# Patient Record
Sex: FEMALE | Race: BLACK OR AFRICAN AMERICAN | NOT HISPANIC OR LATINO | URBAN - METROPOLITAN AREA
[De-identification: names, ages, dates, MRNs, and addresses within clinical notes are randomized per-mention and may not be internally consistent; named-entity substitution may affect disease eponyms.]

---

## 2017-08-09 ENCOUNTER — OUTPATIENT (OUTPATIENT)
Dept: OUTPATIENT SERVICES | Facility: HOSPITAL | Age: 57
LOS: 1 days | End: 2017-08-09
Payer: COMMERCIAL

## 2017-08-09 DIAGNOSIS — I10 ESSENTIAL (PRIMARY) HYPERTENSION: ICD-10-CM

## 2017-08-09 DIAGNOSIS — E11.8 TYPE 2 DIABETES MELLITUS WITH UNSPECIFIED COMPLICATIONS: ICD-10-CM

## 2017-08-09 DIAGNOSIS — E78.00 PURE HYPERCHOLESTEROLEMIA, UNSPECIFIED: ICD-10-CM

## 2017-08-09 DIAGNOSIS — E55.9 VITAMIN D DEFICIENCY, UNSPECIFIED: ICD-10-CM

## 2017-08-09 LAB
24R-OH-CALCIDIOL SERPL-MCNC: 31.7 NG/ML — SIGNIFICANT CHANGE UP (ref 30–100)
ALBUMIN SERPL ELPH-MCNC: 3.3 G/DL — SIGNIFICANT CHANGE UP (ref 3.3–5)
ALP SERPL-CCNC: 165 U/L — HIGH (ref 40–120)
ALT FLD-CCNC: 32 U/L — SIGNIFICANT CHANGE UP (ref 12–78)
ANION GAP SERPL CALC-SCNC: 9 MMOL/L — SIGNIFICANT CHANGE UP (ref 5–17)
APPEARANCE UR: CLEAR — SIGNIFICANT CHANGE UP
AST SERPL-CCNC: 25 U/L — SIGNIFICANT CHANGE UP (ref 15–37)
BACTERIA # UR AUTO: ABNORMAL
BASOPHILS # BLD AUTO: 0.1 K/UL — SIGNIFICANT CHANGE UP (ref 0–0.2)
BASOPHILS NFR BLD AUTO: 2.5 % — HIGH (ref 0–2)
BILIRUB SERPL-MCNC: 0.5 MG/DL — SIGNIFICANT CHANGE UP (ref 0.2–1.2)
BILIRUB UR-MCNC: NEGATIVE — SIGNIFICANT CHANGE UP
BUN SERPL-MCNC: 14 MG/DL — SIGNIFICANT CHANGE UP (ref 7–23)
CALCIUM SERPL-MCNC: 8.8 MG/DL — SIGNIFICANT CHANGE UP (ref 8.5–10.1)
CHLORIDE SERPL-SCNC: 104 MMOL/L — SIGNIFICANT CHANGE UP (ref 96–108)
CHOLEST SERPL-MCNC: 157 MG/DL — SIGNIFICANT CHANGE UP (ref 10–199)
CO2 SERPL-SCNC: 24 MMOL/L — SIGNIFICANT CHANGE UP (ref 22–31)
COLOR SPEC: YELLOW — SIGNIFICANT CHANGE UP
CREAT ?TM UR-MCNC: 54 MG/DL — SIGNIFICANT CHANGE UP
CREAT SERPL-MCNC: 0.86 MG/DL — SIGNIFICANT CHANGE UP (ref 0.5–1.3)
DIFF PNL FLD: NEGATIVE — SIGNIFICANT CHANGE UP
EOSINOPHIL # BLD AUTO: 0.4 K/UL — SIGNIFICANT CHANGE UP (ref 0–0.5)
EOSINOPHIL NFR BLD AUTO: 6.9 % — HIGH (ref 0–6)
EPI CELLS # UR: SIGNIFICANT CHANGE UP
FRUCTOSAMINE SERPL-MCNC: 469 UMOL/L — HIGH (ref 205–285)
GLUCOSE SERPL-MCNC: 329 MG/DL — HIGH (ref 70–99)
GLUCOSE UR QL: 1000 MG/DL
HBA1C BLD-MCNC: 12.1 % — HIGH (ref 4–5.6)
HCT VFR BLD CALC: 35.6 % — SIGNIFICANT CHANGE UP (ref 34.5–45)
HDLC SERPL-MCNC: 86 MG/DL — SIGNIFICANT CHANGE UP (ref 40–125)
HGB BLD-MCNC: 11.5 G/DL — SIGNIFICANT CHANGE UP (ref 11.5–15.5)
KETONES UR-MCNC: NEGATIVE — SIGNIFICANT CHANGE UP
LEUKOCYTE ESTERASE UR-ACNC: NEGATIVE — SIGNIFICANT CHANGE UP
LIPID PNL WITH DIRECT LDL SERPL: 60 MG/DL — SIGNIFICANT CHANGE UP
LYMPHOCYTES # BLD AUTO: 1.7 K/UL — SIGNIFICANT CHANGE UP (ref 1–3.3)
LYMPHOCYTES # BLD AUTO: 30.3 % — SIGNIFICANT CHANGE UP (ref 13–44)
MCHC RBC-ENTMCNC: 27.3 PG — SIGNIFICANT CHANGE UP (ref 27–34)
MCHC RBC-ENTMCNC: 32.2 GM/DL — SIGNIFICANT CHANGE UP (ref 32–36)
MCV RBC AUTO: 84.8 FL — SIGNIFICANT CHANGE UP (ref 80–100)
MICROALBUMIN UR-MCNC: 2 MG/DL — SIGNIFICANT CHANGE UP
MICROALBUMIN/CREAT UR-RTO: 37 MG/G — HIGH (ref 0–30)
MONOCYTES # BLD AUTO: 0.6 K/UL — SIGNIFICANT CHANGE UP (ref 0–0.9)
MONOCYTES NFR BLD AUTO: 10.3 % — HIGH (ref 1–9)
NEUTROPHILS # BLD AUTO: 2.8 K/UL — SIGNIFICANT CHANGE UP (ref 1.8–7.4)
NEUTROPHILS NFR BLD AUTO: 50 % — SIGNIFICANT CHANGE UP (ref 43–77)
NITRITE UR-MCNC: POSITIVE
PH UR: 5 — SIGNIFICANT CHANGE UP (ref 5–8)
PLATELET # BLD AUTO: 368 K/UL — SIGNIFICANT CHANGE UP (ref 150–400)
POTASSIUM SERPL-MCNC: 4.2 MMOL/L — SIGNIFICANT CHANGE UP (ref 3.5–5.3)
POTASSIUM SERPL-SCNC: 4.2 MMOL/L — SIGNIFICANT CHANGE UP (ref 3.5–5.3)
PROT SERPL-MCNC: 7.6 G/DL — SIGNIFICANT CHANGE UP (ref 6–8.3)
PROT UR-MCNC: NEGATIVE — SIGNIFICANT CHANGE UP
RBC # BLD: 4.2 M/UL — SIGNIFICANT CHANGE UP (ref 3.8–5.2)
RBC # FLD: 12.6 % — SIGNIFICANT CHANGE UP (ref 10.3–14.5)
RBC CASTS # UR COMP ASSIST: NEGATIVE /HPF — SIGNIFICANT CHANGE UP (ref 0–4)
SODIUM SERPL-SCNC: 137 MMOL/L — SIGNIFICANT CHANGE UP (ref 135–145)
SP GR SPEC: 1.01 — SIGNIFICANT CHANGE UP (ref 1.01–1.02)
T4 FREE SERPL-MCNC: 1.3 NG/DL — SIGNIFICANT CHANGE UP (ref 0.9–1.8)
TOTAL CHOLESTEROL/HDL RATIO MEASUREMENT: 1.8 RATIO — LOW (ref 3.3–7.1)
TRIGL SERPL-MCNC: 53 MG/DL — SIGNIFICANT CHANGE UP (ref 10–149)
TSH SERPL-MCNC: 0.98 UIU/ML — SIGNIFICANT CHANGE UP (ref 0.36–3.74)
UROBILINOGEN FLD QL: NEGATIVE — SIGNIFICANT CHANGE UP
WBC # BLD: 5.5 K/UL — SIGNIFICANT CHANGE UP (ref 3.8–10.5)
WBC # FLD AUTO: 5.5 K/UL — SIGNIFICANT CHANGE UP (ref 3.8–10.5)
WBC UR QL: SIGNIFICANT CHANGE UP

## 2017-08-09 PROCEDURE — 80053 COMPREHEN METABOLIC PANEL: CPT

## 2017-08-09 PROCEDURE — 85027 COMPLETE CBC AUTOMATED: CPT

## 2017-08-09 PROCEDURE — 84443 ASSAY THYROID STIM HORMONE: CPT

## 2017-08-09 PROCEDURE — 86735 MUMPS ANTIBODY: CPT

## 2017-08-09 PROCEDURE — 81001 URINALYSIS AUTO W/SCOPE: CPT

## 2017-08-09 PROCEDURE — 80061 LIPID PANEL: CPT

## 2017-08-09 PROCEDURE — 82043 UR ALBUMIN QUANTITATIVE: CPT

## 2017-08-09 PROCEDURE — 84439 ASSAY OF FREE THYROXINE: CPT

## 2017-08-09 PROCEDURE — 82306 VITAMIN D 25 HYDROXY: CPT

## 2017-08-09 PROCEDURE — 86787 VARICELLA-ZOSTER ANTIBODY: CPT

## 2017-08-09 PROCEDURE — 82985 ASSAY OF GLYCATED PROTEIN: CPT

## 2017-08-09 PROCEDURE — 83036 HEMOGLOBIN GLYCOSYLATED A1C: CPT

## 2018-06-20 ENCOUNTER — OUTPATIENT (OUTPATIENT)
Dept: OUTPATIENT SERVICES | Facility: HOSPITAL | Age: 58
LOS: 1 days | End: 2018-06-20
Payer: COMMERCIAL

## 2018-06-20 DIAGNOSIS — E78.00 PURE HYPERCHOLESTEROLEMIA, UNSPECIFIED: ICD-10-CM

## 2018-06-20 DIAGNOSIS — I10 ESSENTIAL (PRIMARY) HYPERTENSION: ICD-10-CM

## 2018-06-20 DIAGNOSIS — E55.9 VITAMIN D DEFICIENCY, UNSPECIFIED: ICD-10-CM

## 2018-06-20 DIAGNOSIS — E11.8 TYPE 2 DIABETES MELLITUS WITH UNSPECIFIED COMPLICATIONS: ICD-10-CM

## 2018-06-20 LAB
24R-OH-CALCIDIOL SERPL-MCNC: 22.6 NG/ML — LOW (ref 30–80)
ALBUMIN SERPL ELPH-MCNC: 3.5 G/DL — SIGNIFICANT CHANGE UP (ref 3.3–5)
ALP SERPL-CCNC: 168 U/L — HIGH (ref 40–120)
ALT FLD-CCNC: 29 U/L — SIGNIFICANT CHANGE UP (ref 12–78)
ANION GAP SERPL CALC-SCNC: 7 MMOL/L — SIGNIFICANT CHANGE UP (ref 5–17)
APPEARANCE UR: ABNORMAL
AST SERPL-CCNC: 26 U/L — SIGNIFICANT CHANGE UP (ref 15–37)
BACTERIA # UR AUTO: ABNORMAL
BASOPHILS # BLD AUTO: 0.08 K/UL — SIGNIFICANT CHANGE UP (ref 0–0.2)
BASOPHILS NFR BLD AUTO: 1.6 % — SIGNIFICANT CHANGE UP (ref 0–2)
BILIRUB SERPL-MCNC: 0.3 MG/DL — SIGNIFICANT CHANGE UP (ref 0.2–1.2)
BILIRUB UR-MCNC: NEGATIVE — SIGNIFICANT CHANGE UP
BUN SERPL-MCNC: 12 MG/DL — SIGNIFICANT CHANGE UP (ref 7–23)
CALCIUM SERPL-MCNC: 9.1 MG/DL — SIGNIFICANT CHANGE UP (ref 8.5–10.1)
CHLORIDE SERPL-SCNC: 104 MMOL/L — SIGNIFICANT CHANGE UP (ref 96–108)
CHOLEST SERPL-MCNC: 148 MG/DL — SIGNIFICANT CHANGE UP (ref 10–199)
CO2 SERPL-SCNC: 25 MMOL/L — SIGNIFICANT CHANGE UP (ref 22–31)
COLOR SPEC: YELLOW — SIGNIFICANT CHANGE UP
CREAT SERPL-MCNC: 1 MG/DL — SIGNIFICANT CHANGE UP (ref 0.5–1.3)
DIFF PNL FLD: ABNORMAL
EOSINOPHIL # BLD AUTO: 0.31 K/UL — SIGNIFICANT CHANGE UP (ref 0–0.5)
EOSINOPHIL NFR BLD AUTO: 6 % — SIGNIFICANT CHANGE UP (ref 0–6)
EPI CELLS # UR: SIGNIFICANT CHANGE UP
FRUCTOSAMINE SERPL-MCNC: 504 UMOL/L — HIGH (ref 205–285)
GLUCOSE SERPL-MCNC: 401 MG/DL — HIGH (ref 70–99)
GLUCOSE UR QL: 1000 MG/DL
HBA1C BLD-MCNC: 13.4 % — HIGH (ref 4–5.6)
HCT VFR BLD CALC: 35.5 % — SIGNIFICANT CHANGE UP (ref 34.5–45)
HDLC SERPL-MCNC: 69 MG/DL — SIGNIFICANT CHANGE UP (ref 40–125)
HGB BLD-MCNC: 11.3 G/DL — LOW (ref 11.5–15.5)
IMM GRANULOCYTES NFR BLD AUTO: 0.2 % — SIGNIFICANT CHANGE UP (ref 0–1.5)
KETONES UR-MCNC: NEGATIVE — SIGNIFICANT CHANGE UP
LEUKOCYTE ESTERASE UR-ACNC: ABNORMAL
LIPID PNL WITH DIRECT LDL SERPL: 70 MG/DL — SIGNIFICANT CHANGE UP
LYMPHOCYTES # BLD AUTO: 1.31 K/UL — SIGNIFICANT CHANGE UP (ref 1–3.3)
LYMPHOCYTES # BLD AUTO: 25.4 % — SIGNIFICANT CHANGE UP (ref 13–44)
MCHC RBC-ENTMCNC: 26.3 PG — LOW (ref 27–34)
MCHC RBC-ENTMCNC: 31.8 GM/DL — LOW (ref 32–36)
MCV RBC AUTO: 82.6 FL — SIGNIFICANT CHANGE UP (ref 80–100)
MONOCYTES # BLD AUTO: 0.52 K/UL — SIGNIFICANT CHANGE UP (ref 0–0.9)
MONOCYTES NFR BLD AUTO: 10.1 % — SIGNIFICANT CHANGE UP (ref 2–14)
NEUTROPHILS # BLD AUTO: 2.93 K/UL — SIGNIFICANT CHANGE UP (ref 1.8–7.4)
NEUTROPHILS NFR BLD AUTO: 56.7 % — SIGNIFICANT CHANGE UP (ref 43–77)
NITRITE UR-MCNC: POSITIVE
NRBC # BLD: 0 /100 WBCS — SIGNIFICANT CHANGE UP (ref 0–0)
PH UR: 5 — SIGNIFICANT CHANGE UP (ref 5–8)
PLATELET # BLD AUTO: 406 K/UL — HIGH (ref 150–400)
POTASSIUM SERPL-MCNC: 4.4 MMOL/L — SIGNIFICANT CHANGE UP (ref 3.5–5.3)
POTASSIUM SERPL-SCNC: 4.4 MMOL/L — SIGNIFICANT CHANGE UP (ref 3.5–5.3)
PROT SERPL-MCNC: 7.8 G/DL — SIGNIFICANT CHANGE UP (ref 6–8.3)
PROT UR-MCNC: NEGATIVE — SIGNIFICANT CHANGE UP
RBC # BLD: 4.3 M/UL — SIGNIFICANT CHANGE UP (ref 3.8–5.2)
RBC # FLD: 12.4 % — SIGNIFICANT CHANGE UP (ref 10.3–14.5)
RBC CASTS # UR COMP ASSIST: ABNORMAL /HPF (ref 0–4)
SODIUM SERPL-SCNC: 136 MMOL/L — SIGNIFICANT CHANGE UP (ref 135–145)
SP GR SPEC: 1.01 — SIGNIFICANT CHANGE UP (ref 1.01–1.02)
T4 FREE SERPL-MCNC: 1.4 NG/DL — SIGNIFICANT CHANGE UP (ref 0.9–1.8)
TOTAL CHOLESTEROL/HDL RATIO MEASUREMENT: 2.1 RATIO — LOW (ref 3.3–7.1)
TRIGL SERPL-MCNC: 43 MG/DL — SIGNIFICANT CHANGE UP (ref 10–149)
TSH SERPL-MCNC: 1.2 UIU/ML — SIGNIFICANT CHANGE UP (ref 0.36–3.74)
UROBILINOGEN FLD QL: NEGATIVE — SIGNIFICANT CHANGE UP
WBC # BLD: 5.16 K/UL — SIGNIFICANT CHANGE UP (ref 3.8–10.5)
WBC # FLD AUTO: 5.16 K/UL — SIGNIFICANT CHANGE UP (ref 3.8–10.5)
WBC UR QL: ABNORMAL

## 2018-06-20 PROCEDURE — 82043 UR ALBUMIN QUANTITATIVE: CPT

## 2018-06-20 PROCEDURE — 82985 ASSAY OF GLYCATED PROTEIN: CPT

## 2018-06-20 PROCEDURE — 84443 ASSAY THYROID STIM HORMONE: CPT

## 2018-06-20 PROCEDURE — 80053 COMPREHEN METABOLIC PANEL: CPT

## 2018-06-20 PROCEDURE — 80061 LIPID PANEL: CPT

## 2018-06-20 PROCEDURE — 81001 URINALYSIS AUTO W/SCOPE: CPT

## 2018-06-20 PROCEDURE — 84439 ASSAY OF FREE THYROXINE: CPT

## 2018-06-20 PROCEDURE — 83036 HEMOGLOBIN GLYCOSYLATED A1C: CPT

## 2018-06-20 PROCEDURE — 82306 VITAMIN D 25 HYDROXY: CPT

## 2018-06-20 PROCEDURE — 85027 COMPLETE CBC AUTOMATED: CPT

## 2018-06-21 LAB
CREAT ?TM UR-MCNC: 100 MG/DL — SIGNIFICANT CHANGE UP
MICROALBUMIN UR-MCNC: 4.9 MG/DL — SIGNIFICANT CHANGE UP
MICROALBUMIN/CREAT UR-RTO: 49 MG/G — HIGH (ref 0–30)

## 2018-11-10 ENCOUNTER — EMERGENCY (EMERGENCY)
Facility: HOSPITAL | Age: 58
LOS: 1 days | Discharge: HOME | End: 2018-11-10
Attending: EMERGENCY MEDICINE
Payer: COMMERCIAL

## 2018-11-10 VITALS
RESPIRATION RATE: 15 BRPM | SYSTOLIC BLOOD PRESSURE: 103 MMHG | OXYGEN SATURATION: 100 % | WEIGHT: 149.91 LBS | DIASTOLIC BLOOD PRESSURE: 62 MMHG | TEMPERATURE: 98 F | HEIGHT: 66 IN | HEART RATE: 105 BPM

## 2018-11-10 PROCEDURE — 73610 X-RAY EXAM OF ANKLE: CPT | Mod: 26,RT

## 2018-11-10 PROCEDURE — 99283 EMERGENCY DEPT VISIT LOW MDM: CPT | Mod: 25

## 2018-11-10 PROCEDURE — 73610 X-RAY EXAM OF ANKLE: CPT

## 2018-11-10 PROCEDURE — 99283 EMERGENCY DEPT VISIT LOW MDM: CPT

## 2018-11-10 RX ORDER — IBUPROFEN 200 MG
600 TABLET ORAL ONCE
Qty: 0 | Refills: 0 | Status: COMPLETED | OUTPATIENT
Start: 2018-11-10 | End: 2018-11-10

## 2018-11-10 RX ADMIN — Medication 600 MILLIGRAM(S): at 12:59

## 2018-11-10 RX ADMIN — Medication 600 MILLIGRAM(S): at 13:21

## 2018-11-10 NOTE — ED PROVIDER NOTE - PROGRESS NOTE DETAILS
Reevaluated patient at bedside.  Patient feeling improved after motrin and ace wrap. CONSTANTINO explained. will follow up with ortho if pain continues or fails to improve. Discussed the results of all diagnostic testing in ED and copies of all reports given.   An opportunity to ask questions was given.  Discussed the importance of prompt, close medical follow-up.  Patient will return with any changes, concerns or persistent / worsening symptoms.  Understanding of all instructions verbalized.

## 2018-11-10 NOTE — ED PROVIDER NOTE - ATTENDING CONTRIBUTION TO CARE
57 yo F p/w inverted R ankle today while at work. pt co mild discomfort with walking. no numb/ting/focal weak. No fall / head trauma. No neck/ back pain. No agg/allev factors. No other inj or co.  Exam with MM moist. neck supple. no spinal tend. no signs of head trauma. no signs of truncal trauma.  ankle with mild tend ant to lat mall,  No other bony tend. nl foot / 5th mt. Nl prox tib/fib/knee.  2+ pulses. nl cap refill. No other signs of inj  Check xr, outpt fu with ortho

## 2018-11-10 NOTE — ED PROVIDER NOTE - MUSCULOSKELETAL, MLM
tenderness and mild swelling to ant aspect of right ankle. full ROM. no laxity with ant drawer or talor tilt. achilles tendon intact. no tenderness to base of 5th metatarsal or head of fibula

## 2018-11-10 NOTE — ED PROVIDER NOTE - MEDICAL DECISION MAKING DETAILS
right ankle pain and swelling after twisting. will x-ray and give motrin. no warmth or erythema to suggest cellulitis of septic joint

## 2018-11-10 NOTE — ED PROVIDER NOTE - OBJECTIVE STATEMENT
presents to ED for pain and swelling to right ankle. 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 did not fall. pain and swelling to front of right ankle has been getting worse since initial injury today around 0900. pain currently 8/10. worse with movement. ambulatory. has not taken anything for pain or symptoms. history of sprain to same ankle years ago. denies other injuries or complaints   PCP Dr. Davies

## 2018-11-10 NOTE — ED ADULT NURSE NOTE - NSIMPLEMENTINTERV_GEN_ALL_ED
Implemented All Universal Safety Interventions:  [place] to call system. Call bell, personal items and telephone within reach. Instruct patient to call for assistance. Room bathroom lighting operational. Non-slip footwear when patient is off stretcher. Physically safe environment: no spills, clutter or unnecessary equipment. Stretcher in lowest position, wheels locked, appropriate side rails in place.

## 2019-01-14 ENCOUNTER — EMERGENCY (EMERGENCY)
Facility: HOSPITAL | Age: 59
LOS: 0 days | Discharge: HOME | End: 2019-01-14
Attending: EMERGENCY MEDICINE
Payer: COMMERCIAL

## 2019-01-14 VITALS
RESPIRATION RATE: 18 BRPM | HEART RATE: 117 BPM | DIASTOLIC BLOOD PRESSURE: 64 MMHG | HEIGHT: 66 IN | OXYGEN SATURATION: 98 % | WEIGHT: 154.98 LBS | SYSTOLIC BLOOD PRESSURE: 129 MMHG | TEMPERATURE: 101 F

## 2019-01-14 VITALS
SYSTOLIC BLOOD PRESSURE: 116 MMHG | RESPIRATION RATE: 16 BRPM | HEART RATE: 108 BPM | TEMPERATURE: 101 F | DIASTOLIC BLOOD PRESSURE: 59 MMHG

## 2019-01-14 DIAGNOSIS — S09.90XA UNSPECIFIED INJURY OF HEAD, INITIAL ENCOUNTER: ICD-10-CM
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
## 2019-01-14 DIAGNOSIS — S93.402A SPRAIN OF UNSPECIFIED LIGAMENT OF LEFT ANKLE, INITIAL ENCOUNTER: ICD-10-CM

## 2019-01-14 DIAGNOSIS — R51 HEADACHE: ICD-10-CM

## 2019-01-14 DIAGNOSIS — S90.112A CONTUSION OF LEFT GREAT TOE WITHOUT DAMAGE TO NAIL, INITIAL ENCOUNTER: ICD-10-CM

## 2019-01-14 DIAGNOSIS — B34.9 VIRAL INFECTION, UNSPECIFIED: ICD-10-CM
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
## 2019-01-14 DIAGNOSIS — R42 DIZZINESS AND GIDDINESS: ICD-10-CM

## 2019-01-14 DIAGNOSIS — Z79.4 LONG TERM (CURRENT) USE OF INSULIN: ICD-10-CM

## 2019-01-14 DIAGNOSIS — H26.9 UNSPECIFIED CATARACT: Chronic | ICD-10-CM

## 2019-01-14 DIAGNOSIS — E11.9 TYPE 2 DIABETES MELLITUS WITHOUT COMPLICATIONS: ICD-10-CM

## 2019-01-14 DIAGNOSIS — M79.672 PAIN IN LEFT FOOT: ICD-10-CM

## 2019-01-14 LAB
FLUAV SPEC QL CULT: POSITIVE
FLUBV AG SPEC QL IA: NEGATIVE — SIGNIFICANT CHANGE UP

## 2019-01-14 PROCEDURE — 72125 CT NECK SPINE W/O DYE: CPT | Mod: 26

## 2019-01-14 PROCEDURE — 73630 X-RAY EXAM OF FOOT: CPT | Mod: 26,LT

## 2019-01-14 PROCEDURE — 99284 EMERGENCY DEPT VISIT MOD MDM: CPT

## 2019-01-14 PROCEDURE — 72100 X-RAY EXAM L-S SPINE 2/3 VWS: CPT | Mod: 26

## 2019-01-14 PROCEDURE — 70450 CT HEAD/BRAIN W/O DYE: CPT | Mod: 26

## 2019-01-14 PROCEDURE — 76377 3D RENDER W/INTRP POSTPROCES: CPT | Mod: 26

## 2019-01-14 PROCEDURE — 73610 X-RAY EXAM OF ANKLE: CPT | Mod: 26,LT

## 2019-01-14 RX ORDER — ACETAMINOPHEN 500 MG
650 TABLET ORAL ONCE
Qty: 0 | Refills: 0 | Status: COMPLETED | OUTPATIENT
Start: 2019-01-14 | End: 2019-01-14

## 2019-01-14 RX ORDER — IBUPROFEN 200 MG
1 TABLET ORAL
Qty: 15 | Refills: 0
Start: 2019-01-14 | End: 2019-01-18

## 2019-01-14 RX ORDER — CYCLOBENZAPRINE HYDROCHLORIDE 10 MG/1
1 TABLET, FILM COATED ORAL
Qty: 15 | Refills: 0
Start: 2019-01-14 | End: 2019-01-18

## 2019-01-14 RX ADMIN — Medication 650 MILLIGRAM(S): at 14:25

## 2019-01-14 NOTE — ED PROVIDER NOTE - MEDICAL DECISION MAKING DETAILS
xray neg for fx. CT scan demonstrates no acute pathology. Pt also likely viral syndrome for symtpomatic management. Discussed results and outcome of testing with the patient.  Patient given copy of available results. Patient advised to please follow up with their PMD within the next 24 hours and return to the Emergency Department for worsening symptoms or any other concerns.

## 2019-01-14 NOTE — ED ADULT NURSE NOTE - CHIEF COMPLAINT QUOTE
Pain, swelling with abrasions to top of left foot, lower back pains with headache  s/p falling from top stairs since yesterday morning, today c/o feeling dizzy today.

## 2019-01-14 NOTE — ED PROVIDER NOTE - CARE PLAN
Principal Discharge DX:	Head injury Principal Discharge DX:	Head injury  Secondary Diagnosis:	Ankle sprain  Secondary Diagnosis:	Toe contusion  Secondary Diagnosis:	Viral syndrome

## 2019-01-14 NOTE — ED PROVIDER NOTE - OBJECTIVE STATEMENT
59yo female with pmh DM presents s/p slip down one flight of stairs yesterday morning. PT denies LOC. Pt with headache, dizziness, left foot pain and back pain since.     ROS: No fever/chills. No photophobia/eye pain/changes in vision, No ear pain/sore throat/dysphagia, No chest pain/palpitations. No SOB/cough/stridor. No abdominal pain, N/V/D, no black/bloody bm. No dysuria/frequency/discharge, +headache. +Dizziness.  No rash.  No numbness/tingling/weakness. 59yo female with pmh DM presents s/p slip down one flight of stairs yesterday morning. PT denies LOC. Pt with headache, dizziness, left foot pain and back pain since.  as an aside, pt noted to be febrile in ER, states she has been having a cough and body aches in past 2 days.     ROS: No fever/chills. No photophobia/eye pain/changes in vision, No ear pain/sore throat/dysphagia, No chest pain/palpitations. No SOB/cough/stridor. No abdominal pain, N/V/D, no black/bloody bm. No dysuria/frequency/discharge, +headache. +Dizziness.  No rash.  No numbness/tingling/weakness.

## 2019-01-14 NOTE — ED PROVIDER NOTE - PHYSICAL EXAMINATION
Gen: Alert, Well appearing. NAD    Head: NC, AT, PERRL, EOMI, normal lids/conjunctiva   ENT: Bilateral TM WNL, normal hearing, patent oropharynx without erythema/exudate, uvula midline  Neck: supple, no tenderness/meningismus/JVD   Pulm: Bilateral clear BS, normal resp effort, no wheeze/stridor/retractions  CV: RRR, no M/R/G, +dist pulses   Abd: soft, NT/ND, +BS, no guarding/rebound tenderness  Mskel: no edema/erythema/cyanosis , +L3-5 paraspinal tenderness, no step offs. abrasion to left dorsal foot, + big toe tenderness. ++ mild ankle edema, from of ankle. pulses intact.  Skin: no rash   Neuro: AAOx3, no sensory/motor deficits, CN 2-12 intact

## 2019-01-15 LAB
FLUAV H3 RNA SPEC QL NAA+PROBE: DETECTED
RAPID RVP RESULT: DETECTED

## 2019-08-23 ENCOUNTER — EMERGENCY (EMERGENCY)
Facility: HOSPITAL | Age: 59
LOS: 1 days | Discharge: HOME | End: 2019-08-23
Attending: INTERNAL MEDICINE | Admitting: INTERNAL MEDICINE
Payer: COMMERCIAL

## 2019-08-23 VITALS
SYSTOLIC BLOOD PRESSURE: 113 MMHG | TEMPERATURE: 98 F | HEART RATE: 80 BPM | OXYGEN SATURATION: 100 % | DIASTOLIC BLOOD PRESSURE: 84 MMHG | RESPIRATION RATE: 18 BRPM

## 2019-08-23 VITALS
SYSTOLIC BLOOD PRESSURE: 151 MMHG | HEART RATE: 99 BPM | TEMPERATURE: 99 F | RESPIRATION RATE: 19 BRPM | DIASTOLIC BLOOD PRESSURE: 78 MMHG | OXYGEN SATURATION: 99 %

## 2019-08-23 DIAGNOSIS — H26.9 UNSPECIFIED CATARACT: Chronic | ICD-10-CM

## 2019-08-23 PROCEDURE — 73620 X-RAY EXAM OF FOOT: CPT

## 2019-08-23 PROCEDURE — 99284 EMERGENCY DEPT VISIT MOD MDM: CPT

## 2019-08-23 PROCEDURE — 73620 X-RAY EXAM OF FOOT: CPT | Mod: 26,LT

## 2019-08-23 PROCEDURE — 99284 EMERGENCY DEPT VISIT MOD MDM: CPT | Mod: 25

## 2019-08-23 RX ORDER — OXYCODONE AND ACETAMINOPHEN 5; 325 MG/1; MG/1
1 TABLET ORAL ONCE
Refills: 0 | Status: DISCONTINUED | OUTPATIENT
Start: 2019-08-23 | End: 2019-08-23

## 2019-08-23 RX ADMIN — OXYCODONE AND ACETAMINOPHEN 1 TABLET(S): 5; 325 TABLET ORAL at 06:18

## 2019-08-23 RX ADMIN — OXYCODONE AND ACETAMINOPHEN 1 TABLET(S): 5; 325 TABLET ORAL at 05:48

## 2019-08-23 NOTE — ED ADULT NURSE NOTE - OBJECTIVE STATEMENT
57 y/o F patient presents to ED from home c/o sharp shooting pain in left toes. As per patient pain started at 0100 and has been persistent. Patient denies any recent falls/injuries/trauma to foot. Patient reports she took Tylenol with no relief. Patient A&Ox4. skin warm and intact. no swelling or redness noted to left foot. ambulatory as per patient. Patient denies HA, dizziness, SOB, chest pain, abdominal pain, N/V/D. Safety and comfort measures provided and maintained.

## 2019-08-23 NOTE — ED PROVIDER NOTE - OBJECTIVE STATEMENT
"woke up With a shooting pain on my left toes"  toe pain 59 y/o BF h/o IDDM C/C "woke up With sharp shooting, intermittent  pain on my 3  left mid three toes"  she injured the area two months ago, no recent injury, there is no swelling to the area, no redness, no warmth, no pallor, no drainage no streaking.

## 2019-08-23 NOTE — ED PROVIDER NOTE - MUSCULOSKELETAL, MLM
intermittent and shooting sharp pain l 2,3,4 toes, skin intact, normal temperature, no swelling no redness, no streak, no drainage, no FB, no fluctuance, no gas cap refill <2 sec, normal looking toes, skin nails and nail bed

## 2019-08-23 NOTE — ED PROVIDER NOTE - SIGNIFICANT NEGATIVE FINDINGS
no headache, no chest pain, no SOB, no palpitations, no n/v, no swelling no skin change, no sign of infection,  no neuro changes.

## 2019-08-23 NOTE — ED PROVIDER NOTE - CLINICAL SUMMARY MEDICAL DECISION MAKING FREE TEXT BOX
shooting foot pain h/o healed toe Fx   X ray not acute  Percocet given  f/u with podiatrist Dr Palacios today

## 2019-10-07 ENCOUNTER — OUTPATIENT (OUTPATIENT)
Dept: OUTPATIENT SERVICES | Facility: HOSPITAL | Age: 59
LOS: 1 days | End: 2019-10-07
Payer: COMMERCIAL

## 2019-10-07 DIAGNOSIS — I10 ESSENTIAL (PRIMARY) HYPERTENSION: ICD-10-CM

## 2019-10-07 DIAGNOSIS — E55.9 VITAMIN D DEFICIENCY, UNSPECIFIED: ICD-10-CM

## 2019-10-07 DIAGNOSIS — E78.00 PURE HYPERCHOLESTEROLEMIA, UNSPECIFIED: ICD-10-CM

## 2019-10-07 DIAGNOSIS — H26.9 UNSPECIFIED CATARACT: Chronic | ICD-10-CM

## 2019-10-07 DIAGNOSIS — E11.8 TYPE 2 DIABETES MELLITUS WITH UNSPECIFIED COMPLICATIONS: ICD-10-CM

## 2019-10-07 LAB
24R-OH-CALCIDIOL SERPL-MCNC: 18.9 NG/ML — LOW (ref 30–80)
ALBUMIN SERPL ELPH-MCNC: 3.5 G/DL — SIGNIFICANT CHANGE UP (ref 3.3–5)
ALP SERPL-CCNC: 131 U/L — HIGH (ref 40–120)
ALT FLD-CCNC: 40 U/L — SIGNIFICANT CHANGE UP (ref 12–78)
ANION GAP SERPL CALC-SCNC: 4 MMOL/L — LOW (ref 5–17)
APPEARANCE UR: ABNORMAL
AST SERPL-CCNC: 35 U/L — SIGNIFICANT CHANGE UP (ref 15–37)
BACTERIA # UR AUTO: ABNORMAL
BASOPHILS # BLD AUTO: 0.07 K/UL — SIGNIFICANT CHANGE UP (ref 0–0.2)
BASOPHILS NFR BLD AUTO: 1.1 % — SIGNIFICANT CHANGE UP (ref 0–2)
BILIRUB SERPL-MCNC: 0.4 MG/DL — SIGNIFICANT CHANGE UP (ref 0.2–1.2)
BILIRUB UR-MCNC: NEGATIVE — SIGNIFICANT CHANGE UP
BUN SERPL-MCNC: 9 MG/DL — SIGNIFICANT CHANGE UP (ref 7–23)
CALCIUM SERPL-MCNC: 8.9 MG/DL — SIGNIFICANT CHANGE UP (ref 8.5–10.1)
CHLORIDE SERPL-SCNC: 109 MMOL/L — HIGH (ref 96–108)
CO2 SERPL-SCNC: 27 MMOL/L — SIGNIFICANT CHANGE UP (ref 22–31)
COLOR SPEC: YELLOW — SIGNIFICANT CHANGE UP
CREAT ?TM UR-MCNC: 34 MG/DL — SIGNIFICANT CHANGE UP
CREAT SERPL-MCNC: 0.77 MG/DL — SIGNIFICANT CHANGE UP (ref 0.5–1.3)
DIFF PNL FLD: NEGATIVE — SIGNIFICANT CHANGE UP
EOSINOPHIL # BLD AUTO: 0.35 K/UL — SIGNIFICANT CHANGE UP (ref 0–0.5)
EOSINOPHIL NFR BLD AUTO: 5.7 % — SIGNIFICANT CHANGE UP (ref 0–6)
EPI CELLS # UR: SIGNIFICANT CHANGE UP
FRUCTOSAMINE SERPL-MCNC: 399 UMOL/L — HIGH (ref 205–285)
GLUCOSE SERPL-MCNC: 277 MG/DL — HIGH (ref 70–99)
GLUCOSE UR QL: 1000 MG/DL
HBA1C BLD-MCNC: 11.6 % — HIGH (ref 4–5.6)
HCT VFR BLD CALC: 33.1 % — LOW (ref 34.5–45)
HGB BLD-MCNC: 10.4 G/DL — LOW (ref 11.5–15.5)
IMM GRANULOCYTES NFR BLD AUTO: 0.3 % — SIGNIFICANT CHANGE UP (ref 0–1.5)
KETONES UR-MCNC: ABNORMAL
LEUKOCYTE ESTERASE UR-ACNC: NEGATIVE — SIGNIFICANT CHANGE UP
LYMPHOCYTES # BLD AUTO: 1.56 K/UL — SIGNIFICANT CHANGE UP (ref 1–3.3)
LYMPHOCYTES # BLD AUTO: 25.5 % — SIGNIFICANT CHANGE UP (ref 13–44)
MCHC RBC-ENTMCNC: 26.4 PG — LOW (ref 27–34)
MCHC RBC-ENTMCNC: 31.4 GM/DL — LOW (ref 32–36)
MCV RBC AUTO: 84 FL — SIGNIFICANT CHANGE UP (ref 80–100)
MICROALBUMIN UR-MCNC: 5.9 MG/DL — SIGNIFICANT CHANGE UP
MICROALBUMIN/CREAT UR-RTO: 176 MG/G — HIGH (ref 0–30)
MONOCYTES # BLD AUTO: 0.64 K/UL — SIGNIFICANT CHANGE UP (ref 0–0.9)
MONOCYTES NFR BLD AUTO: 10.5 % — SIGNIFICANT CHANGE UP (ref 2–14)
NEUTROPHILS # BLD AUTO: 3.48 K/UL — SIGNIFICANT CHANGE UP (ref 1.8–7.4)
NEUTROPHILS NFR BLD AUTO: 56.9 % — SIGNIFICANT CHANGE UP (ref 43–77)
NITRITE UR-MCNC: POSITIVE
NRBC # BLD: 0 /100 WBCS — SIGNIFICANT CHANGE UP (ref 0–0)
PH UR: 5 — SIGNIFICANT CHANGE UP (ref 5–8)
PLATELET # BLD AUTO: 364 K/UL — SIGNIFICANT CHANGE UP (ref 150–400)
POTASSIUM SERPL-MCNC: 5 MMOL/L — SIGNIFICANT CHANGE UP (ref 3.5–5.3)
POTASSIUM SERPL-SCNC: 5 MMOL/L — SIGNIFICANT CHANGE UP (ref 3.5–5.3)
PROT SERPL-MCNC: 7.7 G/DL — SIGNIFICANT CHANGE UP (ref 6–8.3)
PROT UR-MCNC: 25 MG/DL
RBC # BLD: 3.94 M/UL — SIGNIFICANT CHANGE UP (ref 3.8–5.2)
RBC # FLD: 13.1 % — SIGNIFICANT CHANGE UP (ref 10.3–14.5)
RBC CASTS # UR COMP ASSIST: SIGNIFICANT CHANGE UP /HPF (ref 0–4)
SODIUM SERPL-SCNC: 140 MMOL/L — SIGNIFICANT CHANGE UP (ref 135–145)
SP GR SPEC: 1.01 — SIGNIFICANT CHANGE UP (ref 1.01–1.02)
T4 FREE SERPL-MCNC: 1.2 NG/DL — SIGNIFICANT CHANGE UP (ref 0.9–1.8)
TSH SERPL-MCNC: 0.74 UIU/ML — SIGNIFICANT CHANGE UP (ref 0.36–3.74)
UROBILINOGEN FLD QL: NEGATIVE — SIGNIFICANT CHANGE UP
WBC # BLD: 6.12 K/UL — SIGNIFICANT CHANGE UP (ref 3.8–10.5)
WBC # FLD AUTO: 6.12 K/UL — SIGNIFICANT CHANGE UP (ref 3.8–10.5)
WBC UR QL: SIGNIFICANT CHANGE UP

## 2019-10-07 PROCEDURE — 82306 VITAMIN D 25 HYDROXY: CPT

## 2019-10-07 PROCEDURE — 84443 ASSAY THYROID STIM HORMONE: CPT

## 2019-10-07 PROCEDURE — 84439 ASSAY OF FREE THYROXINE: CPT

## 2019-10-07 PROCEDURE — 85027 COMPLETE CBC AUTOMATED: CPT

## 2019-10-07 PROCEDURE — 82985 ASSAY OF GLYCATED PROTEIN: CPT

## 2019-10-07 PROCEDURE — 81001 URINALYSIS AUTO W/SCOPE: CPT

## 2019-10-07 PROCEDURE — 82043 UR ALBUMIN QUANTITATIVE: CPT

## 2019-10-07 PROCEDURE — 36415 COLL VENOUS BLD VENIPUNCTURE: CPT

## 2019-10-07 PROCEDURE — 83036 HEMOGLOBIN GLYCOSYLATED A1C: CPT

## 2019-10-07 PROCEDURE — 80053 COMPREHEN METABOLIC PANEL: CPT

## 2020-01-14 ENCOUNTER — EMERGENCY (EMERGENCY)
Facility: HOSPITAL | Age: 60
LOS: 1 days | Discharge: HOME | End: 2020-01-14
Attending: EMERGENCY MEDICINE | Admitting: EMERGENCY MEDICINE
Payer: COMMERCIAL

## 2020-01-14 VITALS
TEMPERATURE: 98 F | OXYGEN SATURATION: 99 % | HEART RATE: 102 BPM | DIASTOLIC BLOOD PRESSURE: 76 MMHG | SYSTOLIC BLOOD PRESSURE: 151 MMHG | RESPIRATION RATE: 18 BRPM

## 2020-01-14 DIAGNOSIS — H26.9 UNSPECIFIED CATARACT: Chronic | ICD-10-CM

## 2020-01-14 LAB
ALBUMIN SERPL ELPH-MCNC: 3.8 G/DL — SIGNIFICANT CHANGE UP (ref 3.3–5)
ALP SERPL-CCNC: 119 U/L — SIGNIFICANT CHANGE UP (ref 40–120)
ALT FLD-CCNC: 22 U/L — SIGNIFICANT CHANGE UP (ref 4–33)
ANION GAP SERPL CALC-SCNC: 12 MMO/L — SIGNIFICANT CHANGE UP (ref 7–14)
AST SERPL-CCNC: 25 U/L — SIGNIFICANT CHANGE UP (ref 4–32)
BASE EXCESS BLDV CALC-SCNC: 1.3 MMOL/L — SIGNIFICANT CHANGE UP
BASOPHILS # BLD AUTO: 0.06 K/UL — SIGNIFICANT CHANGE UP (ref 0–0.2)
BASOPHILS NFR BLD AUTO: 1.1 % — SIGNIFICANT CHANGE UP (ref 0–2)
BILIRUB SERPL-MCNC: < 0.2 MG/DL — LOW (ref 0.2–1.2)
BLOOD GAS VENOUS - CREATININE: 0.67 MG/DL — SIGNIFICANT CHANGE UP (ref 0.5–1.3)
BUN SERPL-MCNC: 15 MG/DL — SIGNIFICANT CHANGE UP (ref 7–23)
CALCIUM SERPL-MCNC: 9.2 MG/DL — SIGNIFICANT CHANGE UP (ref 8.4–10.5)
CHLORIDE BLDV-SCNC: 104 MMOL/L — SIGNIFICANT CHANGE UP (ref 96–108)
CHLORIDE SERPL-SCNC: 102 MMOL/L — SIGNIFICANT CHANGE UP (ref 98–107)
CO2 SERPL-SCNC: 24 MMOL/L — SIGNIFICANT CHANGE UP (ref 22–31)
CREAT SERPL-MCNC: 0.76 MG/DL — SIGNIFICANT CHANGE UP (ref 0.5–1.3)
D DIMER BLD IA.RAPID-MCNC: < 150 NG/ML — SIGNIFICANT CHANGE UP
EOSINOPHIL # BLD AUTO: 0.23 K/UL — SIGNIFICANT CHANGE UP (ref 0–0.5)
EOSINOPHIL NFR BLD AUTO: 4.2 % — SIGNIFICANT CHANGE UP (ref 0–6)
GAS PNL BLDV: 140 MMOL/L — SIGNIFICANT CHANGE UP (ref 136–146)
GLUCOSE BLDV-MCNC: 373 MG/DL — HIGH (ref 70–99)
GLUCOSE SERPL-MCNC: 385 MG/DL — HIGH (ref 70–99)
HCO3 BLDV-SCNC: 24 MMOL/L — SIGNIFICANT CHANGE UP (ref 20–27)
HCT VFR BLD CALC: 34.5 % — SIGNIFICANT CHANGE UP (ref 34.5–45)
HCT VFR BLDV CALC: 32.8 % — LOW (ref 34.5–45)
HGB BLD-MCNC: 10.4 G/DL — LOW (ref 11.5–15.5)
HGB BLDV-MCNC: 10.6 G/DL — LOW (ref 11.5–15.5)
IMM GRANULOCYTES NFR BLD AUTO: 0.2 % — SIGNIFICANT CHANGE UP (ref 0–1.5)
LACTATE BLDV-MCNC: 1.9 MMOL/L — SIGNIFICANT CHANGE UP (ref 0.5–2)
LYMPHOCYTES # BLD AUTO: 1.99 K/UL — SIGNIFICANT CHANGE UP (ref 1–3.3)
LYMPHOCYTES # BLD AUTO: 36.2 % — SIGNIFICANT CHANGE UP (ref 13–44)
MAGNESIUM SERPL-MCNC: 1.9 MG/DL — SIGNIFICANT CHANGE UP (ref 1.6–2.6)
MCHC RBC-ENTMCNC: 26.1 PG — LOW (ref 27–34)
MCHC RBC-ENTMCNC: 30.1 % — LOW (ref 32–36)
MCV RBC AUTO: 86.5 FL — SIGNIFICANT CHANGE UP (ref 80–100)
MONOCYTES # BLD AUTO: 0.7 K/UL — SIGNIFICANT CHANGE UP (ref 0–0.9)
MONOCYTES NFR BLD AUTO: 12.7 % — SIGNIFICANT CHANGE UP (ref 2–14)
NEUTROPHILS # BLD AUTO: 2.51 K/UL — SIGNIFICANT CHANGE UP (ref 1.8–7.4)
NEUTROPHILS NFR BLD AUTO: 45.6 % — SIGNIFICANT CHANGE UP (ref 43–77)
NRBC # FLD: 0 K/UL — SIGNIFICANT CHANGE UP (ref 0–0)
NT-PROBNP SERPL-SCNC: 48.07 PG/ML — SIGNIFICANT CHANGE UP
PCO2 BLDV: 47 MMHG — SIGNIFICANT CHANGE UP (ref 41–51)
PH BLDV: 7.36 PH — SIGNIFICANT CHANGE UP (ref 7.32–7.43)
PHOSPHATE SERPL-MCNC: 2.4 MG/DL — LOW (ref 2.5–4.5)
PLATELET # BLD AUTO: 416 K/UL — HIGH (ref 150–400)
PMV BLD: 9.2 FL — SIGNIFICANT CHANGE UP (ref 7–13)
PO2 BLDV: < 24 MMHG — LOW (ref 35–40)
POTASSIUM BLDV-SCNC: 3.9 MMOL/L — SIGNIFICANT CHANGE UP (ref 3.4–4.5)
POTASSIUM SERPL-MCNC: 4 MMOL/L — SIGNIFICANT CHANGE UP (ref 3.5–5.3)
POTASSIUM SERPL-SCNC: 4 MMOL/L — SIGNIFICANT CHANGE UP (ref 3.5–5.3)
PROT SERPL-MCNC: 7.1 G/DL — SIGNIFICANT CHANGE UP (ref 6–8.3)
RBC # BLD: 3.99 M/UL — SIGNIFICANT CHANGE UP (ref 3.8–5.2)
RBC # FLD: 13.2 % — SIGNIFICANT CHANGE UP (ref 10.3–14.5)
SAO2 % BLDV: 36.3 % — LOW (ref 60–85)
SODIUM SERPL-SCNC: 138 MMOL/L — SIGNIFICANT CHANGE UP (ref 135–145)
TROPONIN T, HIGH SENSITIVITY: 14 NG/L — SIGNIFICANT CHANGE UP (ref ?–14)
TROPONIN T, HIGH SENSITIVITY: 15 NG/L — SIGNIFICANT CHANGE UP (ref ?–14)
WBC # BLD: 5.5 K/UL — SIGNIFICANT CHANGE UP (ref 3.8–10.5)
WBC # FLD AUTO: 5.5 K/UL — SIGNIFICANT CHANGE UP (ref 3.8–10.5)

## 2020-01-14 PROCEDURE — 93010 ELECTROCARDIOGRAM REPORT: CPT | Mod: 59

## 2020-01-14 PROCEDURE — 71275 CT ANGIOGRAPHY CHEST: CPT | Mod: 26

## 2020-01-14 PROCEDURE — 71046 X-RAY EXAM CHEST 2 VIEWS: CPT | Mod: 26

## 2020-01-14 PROCEDURE — 99285 EMERGENCY DEPT VISIT HI MDM: CPT | Mod: 25

## 2020-01-14 RX ORDER — IPRATROPIUM/ALBUTEROL SULFATE 18-103MCG
3 AEROSOL WITH ADAPTER (GRAM) INHALATION ONCE
Refills: 0 | Status: COMPLETED | OUTPATIENT
Start: 2020-01-14 | End: 2020-01-14

## 2020-01-14 RX ORDER — ACETAMINOPHEN 500 MG
650 TABLET ORAL ONCE
Refills: 0 | Status: COMPLETED | OUTPATIENT
Start: 2020-01-14 | End: 2020-01-14

## 2020-01-14 RX ADMIN — Medication 3 MILLILITER(S): at 18:48

## 2020-01-14 RX ADMIN — Medication 650 MILLIGRAM(S): at 18:48

## 2020-01-14 NOTE — ED PROVIDER NOTE - CLINICAL SUMMARY MEDICAL DECISION MAKING FREE TEXT BOX
59 y.o. female here for pleuritic CP with SOB and palpitations, tachycardic, hypertensive, not hypocix. Exam with diffuse crackles. current HEART score 3, Wells low risk currently. Concern for PE vs ACS vs infectious etiology vs component of new heart failure vs reactive airway. Low suspicion for MSK-related CP, GI related at current time. Will get labs including trop, ddimer, ekg, cxr, treat headache, give duoneb, reassess.

## 2020-01-14 NOTE — ED ADULT NURSE NOTE - OBJECTIVE STATEMENT
Pt received to intake AAO x 3 and ambulatory c/o chest tightness x few days accompanied by SOB. Pt states she took a 2 hour plane ride from [place] this AM and tightness became worse after flight. Pt breathing with ease on room air, labs sent  and 18G placed to the left AC. Pt medicated for discomfort and neb treatment in progress. PMH DM

## 2020-01-14 NOTE — ED PROVIDER NOTE - ATTENDING CONTRIBUTION TO CARE
I have personally performed a face to face bedside history and physical examination of this patient. I have discussed the history, examination, review of systems, assessment and plan of management with the resident. I have reviewed the electronic medical record and amended it to reflect my history, review of systems, physical exam, assessment and plan.    59 y.o. f hx of HLD, DM presents to the ED for 4 days of chest tightness/pressure, lasting a few minutes, nonexertional, nonpositional but pleuritic with associated SOB. During episodes, patient feels palpitations. Flew back from [place] today at 9am. Denies fever, chills, nausea, vomiting, abdominal pain, back pain, leg pain or swelling, rashes. FH sig for mom passing from MI at 68. Never smoker, never, vape. No hx of arrhythmias, thyroid disorders.  Tachy on arrival af.  Exam non-toxic appearing, non-diaphoretic, crackle right lung base, no wheeze, heart sounds tachy but no murmur, well perfused extremities, no le edema.  EKG sinus tachycardia without e/o ischemia or strain.  Low c/f acs as cp non-exertional, non-raditing, no nausea or diaphoresis.  PE possible given tachycardia and recent travel.  PNA considered given crackle on exam but no fever or cough.  Will send labs, dimer, cxr.  Disposition pending.

## 2020-01-14 NOTE — ED PROVIDER NOTE - NS ED ROS FT
CONSTITUTIONAL: No fevers, chills, dizziness, weakness  EYES: No loss of vision, double vision, blurry vision  Nose: No nasal congestion, runny nose  MOUTH/THROAT: No sore throat, painful swallowing  CV: CP, PALPITATIONS  PULM: SOB  GI: No abdominal pain, nausea, vomiting, diarrhea, constipation  SKIN: No rashes, swelling  MSK: No muscle aches, joint aches  NEURO: HEADACHE

## 2020-01-14 NOTE — ED PROVIDER NOTE - PROGRESS NOTE DETAILS
REMY:  Dimer negative, however patient with RLL infiltrate on cxr and presentation not clinically c/w pneumonia.  Will perform CTPE given high pre-test probability of PE.   Patient stable. PA Emerson- Patient reassessed, still notes midsternal chest discomfort radiating under left breast. Results of CT chest discussed +pulmonary fibrosis noted. No PE. Pt aware will need to f/u with Pulm. Given patient has not had cardiac work up with +Risk factor of DM and HLD and Fhx plan for CDU for stress test. Pt agreeable to Stay. CDU PA aware and accepted. Will dispo to CDU. Patient states she took 162 mg of ASA PTA

## 2020-01-14 NOTE — ED ADULT TRIAGE NOTE - CHIEF COMPLAINT QUOTE
pt brought in by ems from home, here for evaluation chest tightness that started 4 days ago. tightness got worse since this morning. hx: dm

## 2020-01-14 NOTE — ED PROVIDER NOTE - PHYSICAL EXAMINATION
GENERAL: middle aged female, lying in bed, holding chest, appears uncomfortable. Tachycardic, afebrile, hypertensive, otherwise Vital signs are within normal limits  HEENT: moist mucous membranes. Blind in Left eye  LUNG: crackles diffusely. overall decreased air movement. Endorses chest pain when asked to deeply inspire.  CV: tachycardic. no m/r/g appreciated, Pulses- Radial: 2+ b/l  ABDOMEN: Soft, NTND, no rebound or guarding  MSK: No edema, no visible deformities  SKIN: Warm, dry, well perfused, no evidence of rash

## 2020-01-14 NOTE — ED PROVIDER NOTE - OBJECTIVE STATEMENT
59 y.o. f hx of HLD, DM presents to the ED for 4 days of chest tightness/pressure, lasting a few minutes, nonexertional, nonpositional but pleuritic with associated SOB. During episodes, patient feels palpitations. Flew back from [place] today at 9am. Denies fever, chills, nausea, vomiting, abdominal pain, back pain, leg pain or swelling, rashes. FH sig for mom passing from MI at 68. Never smoker, never, vape. No hx of arrhythmias, thyroid disorders.

## 2020-01-15 VITALS
HEART RATE: 100 BPM | OXYGEN SATURATION: 100 % | SYSTOLIC BLOOD PRESSURE: 129 MMHG | DIASTOLIC BLOOD PRESSURE: 84 MMHG | TEMPERATURE: 99 F | RESPIRATION RATE: 16 BRPM

## 2020-01-15 LAB — HBA1C BLD-MCNC: 9.3 % — HIGH (ref 4–5.6)

## 2020-01-15 PROCEDURE — 99234 HOSP IP/OBS SM DT SF/LOW 45: CPT

## 2020-01-15 RX ORDER — SODIUM CHLORIDE 9 MG/ML
1000 INJECTION, SOLUTION INTRAVENOUS
Refills: 0 | Status: DISCONTINUED | OUTPATIENT
Start: 2020-01-15 | End: 2020-02-02

## 2020-01-15 RX ORDER — ASPIRIN/CALCIUM CARB/MAGNESIUM 324 MG
81 TABLET ORAL DAILY
Refills: 0 | Status: DISCONTINUED | OUTPATIENT
Start: 2020-01-16 | End: 2020-02-02

## 2020-01-15 RX ORDER — DEXTROSE 50 % IN WATER 50 %
12.5 SYRINGE (ML) INTRAVENOUS ONCE
Refills: 0 | Status: DISCONTINUED | OUTPATIENT
Start: 2020-01-15 | End: 2020-02-02

## 2020-01-15 RX ORDER — ASPIRIN/CALCIUM CARB/MAGNESIUM 324 MG
162 TABLET ORAL ONCE
Refills: 0 | Status: DISCONTINUED | OUTPATIENT
Start: 2020-01-15 | End: 2020-01-15

## 2020-01-15 RX ORDER — INSULIN GLARGINE 100 [IU]/ML
30 INJECTION, SOLUTION SUBCUTANEOUS ONCE
Refills: 0 | Status: COMPLETED | OUTPATIENT
Start: 2020-01-15 | End: 2020-01-15

## 2020-01-15 RX ORDER — DEXTROSE 50 % IN WATER 50 %
15 SYRINGE (ML) INTRAVENOUS ONCE
Refills: 0 | Status: DISCONTINUED | OUTPATIENT
Start: 2020-01-15 | End: 2020-02-02

## 2020-01-15 RX ORDER — INSULIN GLARGINE 100 [IU]/ML
30 INJECTION, SOLUTION SUBCUTANEOUS AT BEDTIME
Refills: 0 | Status: DISCONTINUED | OUTPATIENT
Start: 2020-01-15 | End: 2020-02-02

## 2020-01-15 RX ORDER — INSULIN LISPRO 100/ML
VIAL (ML) SUBCUTANEOUS
Refills: 0 | Status: DISCONTINUED | OUTPATIENT
Start: 2020-01-15 | End: 2020-02-02

## 2020-01-15 RX ORDER — INSULIN LISPRO 100/ML
VIAL (ML) SUBCUTANEOUS AT BEDTIME
Refills: 0 | Status: DISCONTINUED | OUTPATIENT
Start: 2020-01-15 | End: 2020-02-02

## 2020-01-15 RX ORDER — GLUCAGON INJECTION, SOLUTION 0.5 MG/.1ML
1 INJECTION, SOLUTION SUBCUTANEOUS ONCE
Refills: 0 | Status: DISCONTINUED | OUTPATIENT
Start: 2020-01-15 | End: 2020-02-02

## 2020-01-15 RX ORDER — DEXTROSE 50 % IN WATER 50 %
25 SYRINGE (ML) INTRAVENOUS ONCE
Refills: 0 | Status: DISCONTINUED | OUTPATIENT
Start: 2020-01-15 | End: 2020-02-02

## 2020-01-15 RX ADMIN — Medication 4: at 09:37

## 2020-01-15 RX ADMIN — INSULIN GLARGINE 30 UNIT(S): 100 INJECTION, SOLUTION SUBCUTANEOUS at 03:27

## 2020-01-15 NOTE — ED CDU PROVIDER INITIAL DAY NOTE - ATTENDING CONTRIBUTION TO CARE
60 yo F yo past medical history dm, hld, family history of CAD with 4 days of atypical chest pain in cdu for monitoring and provocative testing. CTA chest concerning for pulmonary fibrosis, negative for PE, pneumonia. Stress test negative for inducible ischemia. patient asymptomatic. patient stable for dc with follow up with cardiology, pulmonology, pcp/endo (for elevated hba1c).

## 2020-01-15 NOTE — CONSULT NOTE ADULT - ASSESSMENT
chest pain  atypical  ruled our for PE and MI  agree with stress test    DM  Monitor finger stick. Insulin coverage. Diabetic education and Diabetic diet. Consider nutrition consultation.

## 2020-01-15 NOTE — ED CDU PROVIDER INITIAL DAY NOTE - PROGRESS NOTE DETAILS
Patient signed out to me to f/u stress test. Stress normal. Discussed with attending, patient can be discharged home to f/u with PCP and Dr. Amezquita, pulmonary referral for pulm fibrosis. The patient was given verbal and written discharge instructions. Specifically, instructions when to return to the ED and when to seek follow-up from their pcp was discussed. Any specialty follow-up was discussed, including how to make an appointment.  Instructions were discussed in simple, plain language and was understood by the patient. The patient understands that should their symptoms worsen or any new symptoms arise, they should return to the ED immediately for further evaluation. All pt's questions were answered. Patient verbalizes understanding.

## 2020-01-15 NOTE — ED CDU PROVIDER INITIAL DAY NOTE - OBJECTIVE STATEMENT
58 y/o female with pmhx of DM, HLD presents to ED c/o chest pressure x 4 days. Intermittent lasting few minutes at a time, not exertional. Pleuritic and worse with movement. Return from on flight from [place] today. Mother  of MI at age 68. No ocps,  surgeries, hospitalizations, le edema, calf pain, hx of dvt/pe. Nonsmoker. Never had cardiac work up. Asymptomatic in cdu. No fever, chills, palpitations, sob, diaphoresis, n/v, abd pain.

## 2020-01-15 NOTE — ED CDU PROVIDER DISPOSITION NOTE - CARE PROVIDER_API CALL
Justin Amezquita (MD)  Cardiovascular Disease; Internal Medicine  [address] [address]  Phone: [phone]  Fax: [phone]  Follow Up Time: 1-3 Days

## 2020-01-15 NOTE — ED CDU PROVIDER DISPOSITION NOTE - NSFOLLOWUPINSTRUCTIONS_ED_ALL_ED_FT
Rest, drink plenty of fluids.  Advance activity as tolerated.  Continue all previously prescribed medications as directed.  Follow up with your primary care physician and cardiologist in 48-72 hours- bring copies of your results. Call on referral list given for next available appointment. Return to the ER for worsening or persistent symptoms, and/or ANY NEW OR CONCERNING SYMPTOMS. If you have issues obtaining follow up, please call: [phone] to obtain a doctor or specialist who takes your insurance in your area.

## 2020-01-15 NOTE — ED CDU PROVIDER DISPOSITION NOTE - PATIENT PORTAL LINK FT
You can access the FollowMyHealth Patient Portal offered by [facility] by registering at the following website: [email or web address]. By joining [employer] [facility]’s FollowMyHealth portal, you will also be able to view your health information using other applications (apps) compatible with our system.

## 2020-01-15 NOTE — ED CDU PROVIDER DISPOSITION NOTE - CLINICAL COURSE
58 yo F yo past medical history dm, hld, family history of CAD with 4 days of atypical chest pain in cdu for monitoring and provocative testing. CTA chest concerning for pulmonary fibrosis, negative for PE, pneumonia. Stress test negative for inducible ischemia. patient asymptomatic. patient stable for dc with follow up with cardiology, pulmonology, pcp/endo (for elevated hba1c).

## 2020-01-15 NOTE — ED CDU PROVIDER INITIAL DAY NOTE - MEDICAL DECISION MAKING DETAILS
60 y/o female with pmhx of DM, HLD presents to ED c/o chest pressure x 4 days. cta ruled out PE, discussed concern for pulm fibrosis and close pulm f/u. sent to cdu for tele monitoring and stress test given risk factors and family hx.

## 2020-01-15 NOTE — CONSULT NOTE ADULT - SUBJECTIVE AND OBJECTIVE BOX
CHIEF COMPLAINT:Patient is a 59y old  Female who presents with a chief complaint of     HISTORY OF PRESENT ILLNESS:    59 female with history as below presents complaining of chest pain   no radiation  no sob  intermittent  not associated with activity or emotional stress     PAST MEDICAL & SURGICAL HISTORY:  Hyperlipidemia  Diabetes mellitus  Cataract: right eye          MEDICATIONS:            dextrose 40% Gel 15 Gram(s) Oral once PRN  dextrose 50% Injectable 12.5 Gram(s) IV Push once  dextrose 50% Injectable 25 Gram(s) IV Push once  dextrose 50% Injectable 25 Gram(s) IV Push once  glucagon  Injectable 1 milliGRAM(s) IntraMuscular once PRN  insulin glargine Injectable (LANTUS) 30 Unit(s) SubCutaneous at bedtime  insulin lispro (HumaLOG) corrective regimen sliding scale   SubCutaneous three times a day before meals  insulin lispro (HumaLOG) corrective regimen sliding scale   SubCutaneous at bedtime    dextrose 5%. 1000 milliLiter(s) IV Continuous <Continuous>      FAMILY HISTORY:  Family history of MI (myocardial infarction) (Father)      Non-contributory    SOCIAL HISTORY:    No tobacco, drugs or etoh    Allergies    No Known Allergies    Intolerances    	    REVIEW OF SYSTEMS:  as above  The rest of the 14 points ROS reviewed and except above they are unremarkable.        PHYSICAL EXAM:  T(C): 36.7 (01-15-20 @ 05:35), Max: 36.8 (01-15-20 @ 00:06)  HR: 89 (01-15-20 @ 05:35) (78 - 102)  BP: 136/79 (01-15-20 @ 05:35) (136/79 - 152/67)  RR: 18 (01-15-20 @ 05:35) (14 - 18)  SpO2: 100% (01-15-20 @ 05:35) (98% - 100%)  Wt(kg): --  I&O's Summary      Appearance: Normal	  HEENT:   no gross abnormality   Cardiovascular: Normal S1 S2,    Murmur:   Neck: JVP normal  Respiratory: Lungs clear   Gastrointestinal:  Soft, Non-tender  Skin: normal   Neuro: No gross deficits.   Psychiatry:  Mood & affect flat  Ext: No edema    LABS/DATA:    TELEMETRY: 	    ECG:  	   	  CARDIAC MARKERS:                                      10.4   5.50  )-----------( 416      ( 14 Jan 2020 18:40 )             34.5     01-14    138  |  102  |  15  ----------------------------<  385<H>  4.0   |  24  |  0.76    Ca    9.2      14 Jan 2020 18:40  Phos  2.4     01-14  Mg     1.9     01-14    TPro  7.1  /  Alb  3.8  /  TBili  < 0.2<L>  /  DBili  x   /  AST  25  /  ALT  22  /  AlkPhos  119  01-14    proBNP: Serum Pro-Brain Natriuretic Peptide: 48.07 pg/mL (01-14 @ 18:40)    Lipid Profile:   HgA1c: Hemoglobin A1C, Whole Blood: 9.3 % (01-14 @ 18:15)    TSH:

## 2020-01-23 ENCOUNTER — APPOINTMENT (OUTPATIENT)
Dept: PULMONOLOGY | Facility: CLINIC | Age: 60
End: 2020-01-23
Payer: COMMERCIAL

## 2020-01-23 VITALS
DIASTOLIC BLOOD PRESSURE: 71 MMHG | SYSTOLIC BLOOD PRESSURE: 131 MMHG | WEIGHT: 145 LBS | HEIGHT: 65 IN | TEMPERATURE: 98.5 F | BODY MASS INDEX: 24.16 KG/M2 | OXYGEN SATURATION: 97 % | RESPIRATION RATE: 16 BRPM | HEART RATE: 110 BPM

## 2020-01-23 DIAGNOSIS — Z78.9 OTHER SPECIFIED HEALTH STATUS: ICD-10-CM

## 2020-01-23 DIAGNOSIS — J98.4 EMPHYSEMA, UNSPECIFIED: ICD-10-CM

## 2020-01-23 DIAGNOSIS — R07.89 OTHER CHEST PAIN: ICD-10-CM

## 2020-01-23 DIAGNOSIS — R05 COUGH: ICD-10-CM

## 2020-01-23 DIAGNOSIS — J43.9 EMPHYSEMA, UNSPECIFIED: ICD-10-CM

## 2020-01-23 DIAGNOSIS — R93.89 ABNORMAL FINDINGS ON DIAGNOSTIC IMAGING OF OTHER SPECIFIED BODY STRUCTURES: ICD-10-CM

## 2020-01-23 DIAGNOSIS — Z86.39 PERSONAL HISTORY OF OTHER ENDOCRINE, NUTRITIONAL AND METABOLIC DISEASE: ICD-10-CM

## 2020-01-23 PROCEDURE — 99203 OFFICE O/P NEW LOW 30 MIN: CPT | Mod: 25

## 2020-01-23 PROCEDURE — 94727 GAS DIL/WSHOT DETER LNG VOL: CPT

## 2020-01-23 PROCEDURE — 94060 EVALUATION OF WHEEZING: CPT

## 2020-01-23 PROCEDURE — 94729 DIFFUSING CAPACITY: CPT

## 2020-01-23 RX ORDER — FLUTICASONE FUROATE AND VILANTEROL TRIFENATATE 200; 25 UG/1; UG/1
200-25 POWDER RESPIRATORY (INHALATION)
Qty: 1 | Refills: 3 | Status: ACTIVE | COMMUNITY
Start: 2020-01-23 | End: 1900-01-01

## 2020-01-23 RX ORDER — ALBUTEROL SULFATE 90 UG/1
108 (90 BASE) AEROSOL, METERED RESPIRATORY (INHALATION)
Qty: 1 | Refills: 1 | Status: ACTIVE | COMMUNITY
Start: 2020-01-23 | End: 1900-01-01

## 2020-01-27 PROBLEM — J43.9 MIXED RESTRICTIVE AND OBSTRUCTIVE LUNG DISEASE: Status: ACTIVE | Noted: 2020-01-27

## 2020-01-27 PROBLEM — R93.89 ABNORMAL CHEST CT: Status: ACTIVE | Noted: 2020-01-27

## 2020-01-27 PROBLEM — Z78.9 SOCIAL ALCOHOL USE: Status: ACTIVE | Noted: 2020-01-27

## 2020-01-27 PROBLEM — Z86.39 HISTORY OF DIABETES MELLITUS: Status: RESOLVED | Noted: 2020-01-27 | Resolved: 2020-01-27

## 2020-01-27 PROBLEM — Z78.9 NON-SMOKER: Status: ACTIVE | Noted: 2020-01-27

## 2020-01-27 PROBLEM — R07.89 CHEST PAIN, ATYPICAL: Status: ACTIVE | Noted: 2020-01-27

## 2020-01-27 RX ORDER — METFORMIN HYDROCHLORIDE 1000 MG/1
1000 TABLET, COATED ORAL
Refills: 0 | Status: ACTIVE | COMMUNITY
Start: 2020-01-27

## 2020-01-27 RX ORDER — INSULIN GLARGINE 100 [IU]/ML
100 INJECTION, SOLUTION SUBCUTANEOUS
Refills: 0 | Status: ACTIVE | COMMUNITY
Start: 2020-01-27

## 2020-01-27 NOTE — PROCEDURE
[FreeTextEntry1] : PFT results:Mild mixed dz with decreased diffusion. Bronchodilator response noted

## 2020-01-27 NOTE — HISTORY OF PRESENT ILLNESS
[FreeTextEntry1] : 58 yo female presents for evaluation of chest CT abnormality. The study was performed after recent ER evaluation for chest pain.The patient complains of PRN dry cough without dyspnea,fever, night sweats or hemoptysis. She denies joint pains or rash.

## 2020-01-27 NOTE — PHYSICAL EXAM
[Normal Appearance] : normal appearance [General Appearance - Well Developed] : well developed [Well Groomed] : well groomed [General Appearance - Well Nourished] : well nourished [No Deformities] : no deformities [General Appearance - In No Acute Distress] : no acute distress [Normal Conjunctiva] : the conjunctiva exhibited no abnormalities [Eyelids - No Xanthelasma] : the eyelids demonstrated no xanthelasmas [Normal Oropharynx] : normal oropharynx [Neck Appearance] : the appearance of the neck was normal [Neck Cervical Mass (___cm)] : no neck mass was observed [Jugular Venous Distention Increased] : there was no jugular-venous distention [Thyroid Diffuse Enlargement] : the thyroid was not enlarged [Thyroid Nodule] : there were no palpable thyroid nodules [Heart Rate And Rhythm] : heart rate and rhythm were normal [Heart Sounds] : normal S1 and S2 [Murmurs] : no murmurs present [Respiration, Rhythm And Depth] : normal respiratory rhythm and effort [Exaggerated Use Of Accessory Muscles For Inspiration] : no accessory muscle use [Bibasilar Rales/Crackles] : bibasilar rales [Abdomen Soft] : soft [Abdomen Tenderness] : non-tender [Abdomen Mass (___ Cm)] : no abdominal mass palpated [Nail Clubbing] : no clubbing of the fingernails [Cyanosis, Localized] : no localized cyanosis [Petechial Hemorrhages (___cm)] : no petechial hemorrhages [Skin Color & Pigmentation] : normal skin color and pigmentation [Skin Turgor] : normal skin turgor [] : no rash [No Focal Deficits] : no focal deficits [Oriented To Time, Place, And Person] : oriented to person, place, and time [Impaired Insight] : insight and judgment were intact [Affect] : the affect was normal

## 2020-01-27 NOTE — REVIEW OF SYSTEMS
[As Noted in HPI] : as noted in HPI [Cough] : cough [Chest Discomfort] : chest discomfort [Negative] : Sleep Disorder [Sputum] : not coughing up ~M sputum [Dyspnea] : no dyspnea [Edema] : ~T edema was not present

## 2020-01-27 NOTE — DISCUSSION/SUMMARY
[FreeTextEntry1] : 60 yo female with mixed obstructive/restrictive disease with decreased diffusion and chest CT abnormalities. I reviewed the chest CT images on line and discussed the findings with the patient and her sister who was present. There are bilateral peripheral patchy interstitial changes likely chronic, possibly IPF. Rheumatologic biomarkers indicated with follow PFT and chest CT in three months. Further intervention to include lung biopsy,will depend on the results. I had a very long discussion with the patient and her sister, and both appear to understand. She is moving to [place] next week. She was given a copy of her PFT and told to follow up with a pulmonologist .

## 2020-04-25 ENCOUNTER — MESSAGE (OUTPATIENT)
Age: 60
End: 2020-04-25

## 2021-05-21 NOTE — ED ADULT NURSE NOTE - GENITOURINARY ASSESSMENT
Pt verbalized good understanding of home safety and safe car transfer/good, to achieve stated therapy goals
WDL

## 2022-01-12 NOTE — ED PROVIDER NOTE - NS ED ATTENDING STATEMENT MOD
What Type Of Note Output Would You Prefer (Optional)?: Standard Output Hpi Title: Evaluation of Skin Lesions How Severe Are Your Spot(S)?: mild Have Your Spot(S) Been Treated In The Past?: has not been treated Attending Only

## 2022-02-21 NOTE — ED ADULT NURSE NOTE - DISCHARGE DATE/TIME
Addended by: LEANDRA DRAPER on: 2/21/2022 05:46 PM     Modules accepted: Orders    
15-Charli-2020 13:12

## 2022-10-29 ENCOUNTER — INPATIENT (INPATIENT)
Facility: HOSPITAL | Age: 62
LOS: 4 days | Discharge: HOME HEALTH | DRG: 175 | End: 2022-11-03
Attending: INTERNAL MEDICINE | Admitting: INTERNAL MEDICINE
Payer: MEDICAID

## 2022-10-29 VITALS
OXYGEN SATURATION: 63 % | RESPIRATION RATE: 36 BRPM | TEMPERATURE: 98 F | SYSTOLIC BLOOD PRESSURE: 175 MMHG | HEART RATE: 127 BPM | DIASTOLIC BLOOD PRESSURE: 94 MMHG | HEIGHT: 66 IN

## 2022-10-29 DIAGNOSIS — I26.99 OTHER PULMONARY EMBOLISM WITHOUT ACUTE COR PULMONALE: ICD-10-CM

## 2022-10-29 DIAGNOSIS — H26.9 UNSPECIFIED CATARACT: Chronic | ICD-10-CM

## 2022-10-29 LAB
ALBUMIN SERPL ELPH-MCNC: 2.1 G/DL — LOW (ref 3.5–5)
ALP SERPL-CCNC: 99 U/L — SIGNIFICANT CHANGE UP (ref 40–120)
ALT FLD-CCNC: 32 U/L DA — SIGNIFICANT CHANGE UP (ref 10–60)
ANION GAP SERPL CALC-SCNC: 6 MMOL/L — SIGNIFICANT CHANGE UP (ref 5–17)
APTT BLD: 23.4 SEC — LOW (ref 27.5–35.5)
AST SERPL-CCNC: 28 U/L — SIGNIFICANT CHANGE UP (ref 10–40)
BASE EXCESS BLDA CALC-SCNC: -3.3 MMOL/L — LOW (ref -2–3)
BASOPHILS # BLD AUTO: 0.03 K/UL — SIGNIFICANT CHANGE UP (ref 0–0.2)
BASOPHILS NFR BLD AUTO: 0.2 % — SIGNIFICANT CHANGE UP (ref 0–2)
BILIRUB SERPL-MCNC: 0.3 MG/DL — SIGNIFICANT CHANGE UP (ref 0.2–1.2)
BLOOD GAS COMMENTS ARTERIAL: SIGNIFICANT CHANGE UP
BUN SERPL-MCNC: 16 MG/DL — SIGNIFICANT CHANGE UP (ref 7–18)
CALCIUM SERPL-MCNC: 8.8 MG/DL — SIGNIFICANT CHANGE UP (ref 8.4–10.5)
CHLORIDE SERPL-SCNC: 111 MMOL/L — HIGH (ref 96–108)
CO2 SERPL-SCNC: 24 MMOL/L — SIGNIFICANT CHANGE UP (ref 22–31)
CREAT SERPL-MCNC: 1.21 MG/DL — SIGNIFICANT CHANGE UP (ref 0.5–1.3)
EGFR: 51 ML/MIN/1.73M2 — LOW
EOSINOPHIL # BLD AUTO: 0.09 K/UL — SIGNIFICANT CHANGE UP (ref 0–0.5)
EOSINOPHIL NFR BLD AUTO: 0.7 % — SIGNIFICANT CHANGE UP (ref 0–6)
GLUCOSE BLDC GLUCOMTR-MCNC: 249 MG/DL — HIGH (ref 70–99)
GLUCOSE BLDC GLUCOMTR-MCNC: 482 MG/DL — CRITICAL HIGH (ref 70–99)
GLUCOSE BLDC GLUCOMTR-MCNC: 540 MG/DL — CRITICAL HIGH (ref 70–99)
GLUCOSE SERPL-MCNC: 256 MG/DL — HIGH (ref 70–99)
HCO3 BLDA-SCNC: 21 MMOL/L — SIGNIFICANT CHANGE UP (ref 21–28)
HCT VFR BLD CALC: 34.9 % — SIGNIFICANT CHANGE UP (ref 34.5–45)
HGB BLD-MCNC: 10.6 G/DL — LOW (ref 11.5–15.5)
HOROWITZ INDEX BLDA+IHG-RTO: 100 — SIGNIFICANT CHANGE UP
IMM GRANULOCYTES NFR BLD AUTO: 0.7 % — SIGNIFICANT CHANGE UP (ref 0–0.9)
INR BLD: 0.95 RATIO — SIGNIFICANT CHANGE UP (ref 0.88–1.16)
LACTATE SERPL-SCNC: 1.9 MMOL/L — SIGNIFICANT CHANGE UP (ref 0.7–2)
LYMPHOCYTES # BLD AUTO: 1.54 K/UL — SIGNIFICANT CHANGE UP (ref 1–3.3)
LYMPHOCYTES # BLD AUTO: 12.4 % — LOW (ref 13–44)
MCHC RBC-ENTMCNC: 25.7 PG — LOW (ref 27–34)
MCHC RBC-ENTMCNC: 30.4 GM/DL — LOW (ref 32–36)
MCV RBC AUTO: 84.5 FL — SIGNIFICANT CHANGE UP (ref 80–100)
MONOCYTES # BLD AUTO: 1.13 K/UL — HIGH (ref 0–0.9)
MONOCYTES NFR BLD AUTO: 9.1 % — SIGNIFICANT CHANGE UP (ref 2–14)
NEUTROPHILS # BLD AUTO: 9.57 K/UL — HIGH (ref 1.8–7.4)
NEUTROPHILS NFR BLD AUTO: 76.9 % — SIGNIFICANT CHANGE UP (ref 43–77)
NRBC # BLD: 0 /100 WBCS — SIGNIFICANT CHANGE UP (ref 0–0)
NT-PROBNP SERPL-SCNC: 4519 PG/ML — HIGH (ref 0–125)
PCO2 BLDA: 35 MMHG — SIGNIFICANT CHANGE UP (ref 32–35)
PH BLDA: 7.39 — SIGNIFICANT CHANGE UP (ref 7.35–7.45)
PLATELET # BLD AUTO: 337 K/UL — SIGNIFICANT CHANGE UP (ref 150–400)
PO2 BLDA: 216 MMHG — HIGH (ref 83–108)
POTASSIUM SERPL-MCNC: 4.9 MMOL/L — SIGNIFICANT CHANGE UP (ref 3.5–5.3)
POTASSIUM SERPL-SCNC: 4.9 MMOL/L — SIGNIFICANT CHANGE UP (ref 3.5–5.3)
PROT SERPL-MCNC: 6.2 G/DL — SIGNIFICANT CHANGE UP (ref 6–8.3)
PROTHROM AB SERPL-ACNC: 11.3 SEC — SIGNIFICANT CHANGE UP (ref 10.5–13.4)
RBC # BLD: 4.13 M/UL — SIGNIFICANT CHANGE UP (ref 3.8–5.2)
RBC # FLD: 14.1 % — SIGNIFICANT CHANGE UP (ref 10.3–14.5)
SAO2 % BLDA: 98 % — SIGNIFICANT CHANGE UP
SARS-COV-2 RNA SPEC QL NAA+PROBE: SIGNIFICANT CHANGE UP
SODIUM SERPL-SCNC: 141 MMOL/L — SIGNIFICANT CHANGE UP (ref 135–145)
TROPONIN I, HIGH SENSITIVITY RESULT: 155.7 NG/L — HIGH
WBC # BLD: 12.45 K/UL — HIGH (ref 3.8–10.5)
WBC # FLD AUTO: 12.45 K/UL — HIGH (ref 3.8–10.5)

## 2022-10-29 PROCEDURE — 71275 CT ANGIOGRAPHY CHEST: CPT | Mod: 26,MA

## 2022-10-29 PROCEDURE — 99285 EMERGENCY DEPT VISIT HI MDM: CPT

## 2022-10-29 PROCEDURE — 71045 X-RAY EXAM CHEST 1 VIEW: CPT | Mod: 26

## 2022-10-29 RX ORDER — INSULIN LISPRO 100/ML
VIAL (ML) SUBCUTANEOUS
Refills: 0 | Status: DISCONTINUED | OUTPATIENT
Start: 2022-10-29 | End: 2022-10-29

## 2022-10-29 RX ORDER — INSULIN LISPRO 100/ML
4 VIAL (ML) SUBCUTANEOUS ONCE
Refills: 0 | Status: COMPLETED | OUTPATIENT
Start: 2022-10-29 | End: 2022-10-29

## 2022-10-29 RX ORDER — IPRATROPIUM/ALBUTEROL SULFATE 18-103MCG
3 AEROSOL WITH ADAPTER (GRAM) INHALATION EVERY 6 HOURS
Refills: 0 | Status: DISCONTINUED | OUTPATIENT
Start: 2022-10-29 | End: 2022-11-01

## 2022-10-29 RX ORDER — ENOXAPARIN SODIUM 100 MG/ML
65 INJECTION SUBCUTANEOUS EVERY 12 HOURS
Refills: 0 | Status: DISCONTINUED | OUTPATIENT
Start: 2022-10-29 | End: 2022-10-29

## 2022-10-29 RX ORDER — IPRATROPIUM/ALBUTEROL SULFATE 18-103MCG
3 AEROSOL WITH ADAPTER (GRAM) INHALATION
Refills: 0 | Status: COMPLETED | OUTPATIENT
Start: 2022-10-29 | End: 2022-10-29

## 2022-10-29 RX ORDER — INFLUENZA VIRUS VACCINE 15; 15; 15; 15 UG/.5ML; UG/.5ML; UG/.5ML; UG/.5ML
0.5 SUSPENSION INTRAMUSCULAR ONCE
Refills: 0 | Status: DISCONTINUED | OUTPATIENT
Start: 2022-10-29 | End: 2022-11-03

## 2022-10-29 RX ORDER — INSULIN GLARGINE 100 [IU]/ML
20 INJECTION, SOLUTION SUBCUTANEOUS AT BEDTIME
Refills: 0 | Status: DISCONTINUED | OUTPATIENT
Start: 2022-10-29 | End: 2022-10-30

## 2022-10-29 RX ORDER — PANTOPRAZOLE SODIUM 20 MG/1
40 TABLET, DELAYED RELEASE ORAL DAILY
Refills: 0 | Status: DISCONTINUED | OUTPATIENT
Start: 2022-10-29 | End: 2022-11-02

## 2022-10-29 RX ORDER — ENOXAPARIN SODIUM 100 MG/ML
60 INJECTION SUBCUTANEOUS ONCE
Refills: 0 | Status: COMPLETED | OUTPATIENT
Start: 2022-10-29 | End: 2022-10-29

## 2022-10-29 RX ORDER — ENOXAPARIN SODIUM 100 MG/ML
65 INJECTION SUBCUTANEOUS EVERY 12 HOURS
Refills: 0 | Status: DISCONTINUED | OUTPATIENT
Start: 2022-10-30 | End: 2022-11-01

## 2022-10-29 RX ORDER — CEFTRIAXONE 500 MG/1
1000 INJECTION, POWDER, FOR SOLUTION INTRAMUSCULAR; INTRAVENOUS ONCE
Refills: 0 | Status: COMPLETED | OUTPATIENT
Start: 2022-10-29 | End: 2022-10-29

## 2022-10-29 RX ORDER — GABAPENTIN 400 MG/1
300 CAPSULE ORAL DAILY
Refills: 0 | Status: DISCONTINUED | OUTPATIENT
Start: 2022-10-29 | End: 2022-11-03

## 2022-10-29 RX ORDER — ENOXAPARIN SODIUM 100 MG/ML
60 INJECTION SUBCUTANEOUS EVERY 12 HOURS
Refills: 0 | Status: DISCONTINUED | OUTPATIENT
Start: 2022-10-29 | End: 2022-10-29

## 2022-10-29 RX ORDER — AZITHROMYCIN 500 MG/1
500 TABLET, FILM COATED ORAL ONCE
Refills: 0 | Status: COMPLETED | OUTPATIENT
Start: 2022-10-29 | End: 2022-10-29

## 2022-10-29 RX ORDER — ATORVASTATIN CALCIUM 80 MG/1
20 TABLET, FILM COATED ORAL AT BEDTIME
Refills: 0 | Status: DISCONTINUED | OUTPATIENT
Start: 2022-10-29 | End: 2022-11-03

## 2022-10-29 RX ORDER — INSULIN LISPRO 100/ML
VIAL (ML) SUBCUTANEOUS EVERY 6 HOURS
Refills: 0 | Status: DISCONTINUED | OUTPATIENT
Start: 2022-10-29 | End: 2022-10-31

## 2022-10-29 RX ORDER — ALBUTEROL 90 UG/1
2 AEROSOL, METERED ORAL EVERY 6 HOURS
Refills: 0 | Status: DISCONTINUED | OUTPATIENT
Start: 2022-10-29 | End: 2022-10-31

## 2022-10-29 RX ADMIN — AZITHROMYCIN 255 MILLIGRAM(S): 500 TABLET, FILM COATED ORAL at 10:43

## 2022-10-29 RX ADMIN — PANTOPRAZOLE SODIUM 40 MILLIGRAM(S): 20 TABLET, DELAYED RELEASE ORAL at 18:37

## 2022-10-29 RX ADMIN — Medication 3 MILLILITER(S): at 10:08

## 2022-10-29 RX ADMIN — CEFTRIAXONE 100 MILLIGRAM(S): 500 INJECTION, POWDER, FOR SOLUTION INTRAMUSCULAR; INTRAVENOUS at 10:10

## 2022-10-29 RX ADMIN — Medication 3 MILLILITER(S): at 11:52

## 2022-10-29 RX ADMIN — Medication 3 MILLILITER(S): at 20:48

## 2022-10-29 RX ADMIN — Medication 3 MILLILITER(S): at 10:46

## 2022-10-29 RX ADMIN — GABAPENTIN 300 MILLIGRAM(S): 400 CAPSULE ORAL at 18:37

## 2022-10-29 RX ADMIN — Medication 4 UNIT(S): at 18:41

## 2022-10-29 RX ADMIN — Medication 1 TABLET(S): at 22:21

## 2022-10-29 RX ADMIN — ENOXAPARIN SODIUM 60 MILLIGRAM(S): 100 INJECTION SUBCUTANEOUS at 13:45

## 2022-10-29 RX ADMIN — ATORVASTATIN CALCIUM 20 MILLIGRAM(S): 80 TABLET, FILM COATED ORAL at 23:17

## 2022-10-29 RX ADMIN — Medication 12: at 18:36

## 2022-10-29 RX ADMIN — Medication 100 MILLIGRAM(S): at 10:07

## 2022-10-29 RX ADMIN — Medication 4: at 23:39

## 2022-10-29 RX ADMIN — Medication 40 MILLIGRAM(S): at 23:18

## 2022-10-29 RX ADMIN — INSULIN GLARGINE 20 UNIT(S): 100 INJECTION, SOLUTION SUBCUTANEOUS at 23:19

## 2022-10-29 NOTE — CONSULT NOTE ADULT - SUBJECTIVE AND OBJECTIVE BOX
PATIENT SEEN AND EXAMINED ON :- 10/29/22  DATE OF SERVICE:      10/29/22       Interim events noted,Labs ,Radiological studies and Cardiology tests reviewed .       HOSPITAL COURSE: HPI:      INTERIM EVENTS:Patient seen at bedside ,interim events noted.      PMH -reviewed admission note, no change since admission  HEART FAILURE: Acute[ ]Chronic[ ] Systolic[ ] Diastolic[ ] Combined Systolic and Diastolic[ ]  CAD[ ] CABG[ ] PCI[ ]  DEVICES[ ] PPM[ ] ICD[ ] ILR[ ]  ATRIAL FIBRILLATION[ ] Paroxysmal[ ] Permanent[ ] CHADS2-[  ]  DONOVAN[ ] CKD1[ ] CKD2[ ] CKD3[ ] CKD4[ ] ESRD[ ]  COPD[ ] HTN[ ]   DM[ ] Type1[ ] Type 2[ ]   CVA[ ] Paresis[ ]    AMBULATION: Assisted[ ] Cane/walker[ ] Independent[ ]    MEDICATIONS  (STANDING):  ALBUTerol    90 MICROgram(s) HFA Inhaler 2 Puff(s) Inhalation every 6 hours  albuterol/ipratropium for Nebulization 3 milliLiter(s) Nebulizer every 6 hours  atorvastatin 20 milliGRAM(s) Oral at bedtime  gabapentin 300 milliGRAM(s) Oral daily  influenza   Vaccine 0.5 milliLiter(s) IntraMuscular once  insulin glargine Injectable (LANTUS) 20 Unit(s) SubCutaneous at bedtime  insulin lispro (ADMELOG) corrective regimen sliding scale   SubCutaneous every 6 hours  methylPREDNISolone sodium succinate Injectable 40 milliGRAM(s) IV Push every 8 hours  pantoprazole  Injectable 40 milliGRAM(s) IV Push daily  trimethoprim  160 mG/sulfamethoxazole 800 mG 1 Tablet(s) Oral daily    MEDICATIONS  (PRN):            REVIEW OF SYSTEMS:  Constitutional: [ ] fever, [ ]weight loss,  [ ]fatigue [ ]weight gain  Eyes: [ ] visual changes  Respiratory: [ ]shortness of breath;  [ ] cough, [ ]wheezing, [ ]chills, [ ]hemoptysis  Cardiovascular: [ ] chest pain, [ ]palpitations, [ ]dizziness,  [ ]leg swelling[ ]orthopnea[ ]PND  Gastrointestinal: [ ] abdominal pain, [ ]nausea, [ ]vomiting,  [ ]diarrhea [ ]Constipation [ ]Melena  Genitourinary: [ ] dysuria, [ ] hematuria [ ]Pike  Neurologic: [ ] headaches [ ] tremors[ ]weakness [ ]Paralysis Right[ ] Left[ ]  Skin: [ ] itching, [ ]burning, [ ] rashes  Endocrine: [ ] heat or cold intolerance  Musculoskeletal: [ ] joint pain or swelling; [ ] muscle, back, or extremity pain  Psychiatric: [ ] depression, [ ]anxiety, [ ]mood swings, or [ ]difficulty sleeping  Hematologic: [ ] easy bruising, [ ] bleeding gums    [ ] All remaining systems negative except as per above.   [ ]Unable to obtain.  [x] No change in ROS since admission      Vital Signs Last 24 Hrs  T(C): 36.9 (29 Oct 2022 21:58), Max: 37.8 (29 Oct 2022 08:08)  T(F): 98.4 (29 Oct 2022 21:58), Max: 100 (29 Oct 2022 08:08)  HR: 111 (29 Oct 2022 21:00) (106 - 127)  BP: 132/77 (29 Oct 2022 21:00) (110/60 - 175/94)  BP(mean): 89 (29 Oct 2022 21:00) (85 - 111)  RR: 25 (29 Oct 2022 21:00) (15 - 36)  SpO2: 100% (29 Oct 2022 21:00) (63% - 100%)    Parameters below as of 29 Oct 2022 21:00  Patient On (Oxygen Delivery Method): nasal cannula, high flow      I&O's Summary    29 Oct 2022 07:01  -  29 Oct 2022 23:22  --------------------------------------------------------  IN: 0 mL / OUT: 0 mL / NET: 0 mL        PHYSICAL EXAM:  General: No acute distress BMI-  HEENT: EOMI, PERRL  Neck: Supple, [ ] JVD  Lungs: Equal air entry bilaterally; [ ] rales [ ] wheezing [ ] rhonchi  Heart: Regular rate and rhythm; [x ] murmur   2/6 [ x] systolic [ ] diastolic [ ] radiation[ ] rubs [ ]  gallops  Abdomen: Nontender, bowel sounds present  Extremities: No clubbing, cyanosis, [ ] edema [ ]Pulses  equal and intact  Nervous system:  Alert & Oriented X3, no focal deficits  Psychiatric: Normal affect  Skin: No rashes or lesions    LABS:  10-29    141  |  111<H>  |  16  ----------------------------<  256<H>  4.9   |  24  |  1.21    Ca    8.8      29 Oct 2022 07:42    TPro  6.2  /  Alb  2.1<L>  /  TBili  0.3  /  DBili  x   /  AST  28  /  ALT  32  /  AlkPhos  99  10-29    Creatinine Trend: 1.21<--                        10.6   12.45 )-----------( 337      ( 29 Oct 2022 07:42 )             34.9     PT/INR - ( 29 Oct 2022 07:42 )   PT: 11.3 sec;   INR: 0.95 ratio         PTT - ( 29 Oct 2022 07:42 )  PTT:23.4 sec    Serum Pro-Brain Natriuretic Peptide: 4519 pg/mL (10-29-22 @ 07:42)

## 2022-10-29 NOTE — CONSULT NOTE ADULT - ATTENDING COMMENTS
IMP: This is a 62 yr old  woman  Pulmonary fibrosis (dx in 2020, not on home oxygen due to insurance issues ), DM, accompanied by daughter presents with shortness of breath for the last three days. Upon evaluation in ED patient afebrile, hemodynamically stable but hypoxic to 63% on RA. On CTA patient found to have right lower lobe segmental/ subsegmental PE and findings consistent with Interstitial lung disease (traction bronchiectasis and reticular ground glass opacities). Patient admitted to ICU for AHRF 2/2   Pulmonary Fibrosis.  The small PE is likely to cause such resp embarrassment .     #Actue Hypoxic Respiratory Failure   #Right Segmental/ Subsegmental PE  #Pulmonary Fibrosis  # Elevated troponin  #DM   #Leukocytosis       Plan     - Admit to ICU   - HFNC to maintain sat >90%  - Solumedrol  ( confirm prednisone dose )   - Duoneb  neb  q6h   - Bactrim DS prophy due to was on high dose prednisone   - Therapeutic lovenox  - 2 DECHO   - Hemodynamic monitoring   - Monitor blood sugar with coverage   - Diet   - I discussed GOAL of care with pat and daughter at bedside

## 2022-10-29 NOTE — CONSULT NOTE ADULT - ASSESSMENT
Patient is a 62y old  Female with hx of Pulmonary fibrosis (dx in 2020, not on home oxygen), DM, accompanied by daughter presents with shortness of breath for the last three days. Upon evaluation in ED patient afebrile, hemodynamically stable but hypoxic to 63% on RA. On CTA patient found to have right lower lobe segmental/ subsegmental PE and findings consistent with Interstitial lung disease (traction bronchiectasis and reticular ground glass opacities). Patient admitted to ICU for AHRF 2/2 PE and Pulmonary Fibrosis.     #Actue Hypoxic Respiratory Failure   #Right Segmental/ Subsegmental PE  #Pulmonary Fibrosis  # Elevated troponin  #DM   #Leukocytosis      Patient is a 62y old  Female with hx of Pulmonary fibrosis (dx in 2020, not on home oxygen), DM, accompanied by daughter presents with shortness of breath for the last three days. Upon evaluation in ED patient afebrile, hemodynamically stable but hypoxic to 63% on RA. On CTA patient found to have right lower lobe segmental/ subsegmental PE and findings consistent with Interstitial lung disease (traction bronchiectasis and reticular ground glass opacities). Patient admitted to ICU for AHRF 2/2 PE and Pulmonary Fibrosis.     #Actue Hypoxic Respiratory Failure   #Right Segmental/ Subsegmental PE  #Pulmonary Fibrosis  # Elevated troponin  #DM   #Leukocytosis     ----------------CNS:------------------  No acute issues, patient as per family is at baseline    -----------------CVS:------------------  -Elevated troponin level   trop level noted to be 155, BNP noted 4519  can be 2/2 right sided heart strain due to PE  EKG noted to be Sinus Tachycardia 118 LAD  trend troponin level  f/u echocardiogram  Cardiology consulted Dr. Wilhelm         -----------------RESPIRATORY:--------  AHRF 2/2 Right Subsegmental PE vs Pulmonary fibrosis   - CT scan showing ILD and right subsegmental PE  -on admission patient noted to be hypoxic on RA to 63%  - supplement oxygen as needed, HFNC  - full dose Lovenox  - f/u echo  - f/u LE doppler U/S     Pulmonary fibrosis  - patient dx 2 yrs ago, not on home oxygen   - currently on prednisone 20mg BID   - will start on empiric bactrim for PCP ppx  - f/u LDH   - f/u PCP sputum culture  - SW consult     -----------------GI:----------------------  No issues     -----------------ID:-----------------------  Leukocytosis  - afebrile will monitor off abx for now       -----------------RENAL: ---------------  No issues  -----------------HEME/ONC: ---------------  Anemia  - hgb 10.6  - transfuse if hgb<7  - monitor cbc     -----------------ENDO: ---------------  Diabetes  - SSI     -----------------EXTREMITIES:-------  No acute issues     -----------------PROPHYLAXIS: -------  Full Dose Lovenox     -----------------DISPOSITION--------  Accepted to ICU

## 2022-10-29 NOTE — CONSULT NOTE ADULT - TIME BILLING
- Review of records, telemetry, vital signs and daily labs.   - General and cardiovascular physical examination.  - Generation of cardiovascular treatment plan.  - Coordination of care.      Patient was seen and examined by me on 10/29/22,interim events noted,labs and radiology studies reviewed.  Godwin Wilhelm MD,FACC.  [address] [place], [place] [id number].  [phone]

## 2022-10-29 NOTE — CONSULT NOTE ADULT - SUBJECTIVE AND OBJECTIVE BOX
PULMONARY CONSULT NOTE      ESTEBAN BRANHAM  MRN-[id number]    Patient is a 62y old  Female who presents with a chief complaint of Right Subsegmental Pulmonary Embolism (29 Oct 2022 14:59)  Hx chart lab and xrays reviewed  Pt examined and discussed with ER Dr Meza at 1.0 pm    HISTORY OF PRESENT ILLNESS: 59yo female with pmh DM presents s/p slip down one flight of stairs yesterday morning. PT denies LOC. Pt with headache, dizziness, left foot pain and back pain since.  as an aside, pt noted to be febrile in ER, states she has been having a cough and body aches in past 2 days.    62y old  Female with hx of Pulmonary fibrosis (dx in 2020, not on home oxygen), DM, accompanied by daughter presents with shortness of breath for the last three days. Patient states that she has had increasing shortness of breath on exertion and at rest. Dyspnea is associated with cough. Patient states that cough is productive of white sputum. Patient denies fevers or chills. Patient states she is able to lay flay at night, denies PND or orthopnea. Patient states she has worsening lower extremity edema. Patient denies chest pain, but has had new onset palpitations since this morning. Patient denies family or personal history of clotting disorders. Denies PE, DVT. Denies prior episodes. Patient denies any hx of malignancy, recent immobilization or surgery. Patient denies any prior intubation. Patient states for her pulmonary fibrosis she was recently prescribed steroids (Prednisone 100mg BID. 2 months ago) by her outpatient Pulmonologist Dr. Ventura. Patient states she has had a difficult time getting medications for her pulmonary fibrosis because of her insurance.    Home Medications:  Admelog 100 units/mL injectable solution: Sliding Scale  (29 Oct 2022 15:21)  atorvastatin 20 mg oral tablet: 1 tab(s) orally once a day (29 Oct 2022 15:21)  Basaglar KwikPen 100 units/mL subcutaneous solution: 25 unit(s) subcutaneous once a day (at bedtime) (29 Oct 2022 15:21)  benzonatate 100 mg oral capsule: 1 cap(s) orally 3 times a day, As Needed (29 Oct 2022 15:21)  cetirizine 5 mg oral tablet: 1 tab(s) orally once a day (29 Oct 2022 15:21)  gabapentin 300 mg oral capsule: 1 tab(s) orally once a day (29 Oct 2022 15:21)  metFORMIN 1000 mg oral tablet: 1 tab(s) orally 2 times a day (29 Oct 2022 15:21)  predniSONE 20 mg oral tablet: 2 tab(s) orally once a day (29 Oct 2022 15:21)  traZODone 100 mg oral tablet: 1 tab(s) orally once a day (at bedtime) (29 Oct 2022 15:21)      MEDICATIONS  (STANDING):  ALBUTerol    90 MICROgram(s) HFA Inhaler 2 Puff(s) Inhalation every 6 hours  albuterol/ipratropium for Nebulization 3 milliLiter(s) Nebulizer every 6 hours  atorvastatin 20 milliGRAM(s) Oral at bedtime  enoxaparin Injectable 65 milliGRAM(s) SubCutaneous every 12 hours  gabapentin 300 milliGRAM(s) Oral daily  insulin lispro (ADMELOG) corrective regimen sliding scale   SubCutaneous three times a day before meals  methylPREDNISolone sodium succinate Injectable 40 milliGRAM(s) IV Push every 8 hours  trimethoprim  160 mG/sulfamethoxazole 800 mG 1 Tablet(s) Oral daily        Allergies    No Known Allergies          PAST MEDICAL & SURGICAL HISTORY:  Diabetes mellitus  Hyperlipidemia  Pulm Fibrosis  Cataract  right eye          FAMILY HISTORY:  Family history of MI (myocardial infarction) (Father)    HTN--    DM--         SOCIAL HISTORY SMOKING--    ETOH--     DRUG--   with kids        REVIEW OF SYSTEMS:  CONSTITUTIONAL:  fever+,, no weight loss or fatigue   EYES: No eye pain, visual disturbances, or discharge  ENT:  No difficulty hearing, tinnitus, vertigo; No sinus or throat pain  NECK: No pain or stiffness   RESPIRATORY:  cough++   wheezing--   chills--   hemoptysis--    Shortness of Breath++  CARDIOVASCULAR: No chest pain, palpitations, passing out, dizziness, or leg swelling  GASTROINTESTINAL: No abdominal or epigastric pain. No nausea, vomiting,   GENITOURINARY: No dysuria, frequency, hematuria, or incontinence  NEUROLOGICAL: No headaches, memory loss, loss of strength, numbness, or tremors  SKIN: No itching, burning, rashes, or lesions   LYMPH Nodes: No enlarged glands  ENDOCRINE: No heat or cold intolerance; No hair loss  MUSCULOSKELETAL: No joint pain or swelling; No muscle, back, or extremity pain  PSYCHIATRIC: No depression, anxiety, mood swings, or difficulty sleeping  HEME/LYMPH: No easy bruising, or bleeding gums  ALLERGY AND IMMUNOLOGIC: No hives or eczema      Vital Signs Last 24 Hrs  T(C): 36.7 (29 Oct 2022 14:41), Max: 37.8 (29 Oct 2022 08:08)  T(F): 98 (29 Oct 2022 14:41), Max: 100 (29 Oct 2022 08:08)  HR: 125 (29 Oct 2022 15:20) (110 - 127)  BP: 120/70 (29 Oct 2022 14:41) (110/60 - 175/94)  BP(mean): --  RR: 20 (29 Oct 2022 15:20) (18 - 36)  SpO2: 100% (29 Oct 2022 15:20) (63% - 100%)    Parameters below as of 29 Oct 2022 15:20  Patient On (Oxygen Delivery Method): nasal cannula, high flow  O2 Flow (L/min): 50  O2 Concentration (%): 50  I&O's Detail      PHYSICAL EXAMINATION:    GENERAL: The patient is a  pleasant lady sitting in bed on o2 nrm    pleraSKIN: No rashes ecchymoses or cyanosis  HEENT: Head is normocephalic and atraumatic. Extraocular muscles are intact. Mucous membranes are moist.   Neck supple LN not felt, JVP not increased  Thyroid not enlarged  Lymphatic: No lymphadenopathy  Cardiovascular:  S1 S2  heard ,RSR , P2 loud  JVP not increased , systolic  murmur at apex, No  gallop or rub  Respiratory:  Symmetrical chest wall movements Breathing vesicular , Percussion note normal no dulness   with  fine inspiratory basal rales, no wheeze  ABDOMEN:  Soft, Non-tender,  No  hepatosplenomegaly ,BS positive		  Extremities: Normal range of motion, No clubbing, cyanosis or edema , No calf tenderness  Vascular: Peripheral pulses palpable 2+ bilaterally  CNS: Alert and oriented x3,  Mood and affect appropriate  Cranial nerves intact  sensory intact  motor Power5/5, DTR 2+   Babinski neg    LABS:                        10.6   12.45 )-----------( 337      ( 29 Oct 2022 07:42 )             34.9     10-29    141  |  111<H>  |  16  ----------------------------<  256<H>  4.9   |  24  |  1.21    Ca    8.8      29 Oct 2022 07:42    TPro  6.2  /  Alb  2.1<L>  /  TBili  0.3  /  DBili  x   /  AST  28  /  ALT  32  /  AlkPhos  99  10-29    PT/INR - ( 29 Oct 2022 07:42 )   PT: 11.3 sec;   INR: 0.95 ratio         PTT - ( 29 Oct 2022 07:42 )  PTT:23.4 sec    ABG - ( 29 Oct 2022 12:58 )  pH, Arterial: 7.39  pH, Blood: x     /  pCO2: 35    /  pO2: 216   / HCO3: 21    / Base Excess: -3.3  /  SaO2: 98          Troponin I, High Sensitivity Result: 155.7 ng/L (10-29-22 @ 07:42)      Serum Pro-Brain Natriuretic Peptide: 4519 pg/mL (10-29-22 @ 07:42)    Lactate, Blood: 1.9 mmol/L (10-29-22 @ 07:42)      RADIOLOGY & ADDITIONAL STUDIES:    CXR: < from: Xray Chest 1 View-PORTABLE IMMEDIATE (10.29.22 @ 08:38) >  Present film shows a diffuse fairly advanced bilateral scattered   interstitial infiltrate at picture increased from prior. Acute disease is   favored. There may vitaliy chronic component. CAT scan has been ordered.      CT Chest;   < from: CT Angio Chest PE Protocol w/ IV Cont (10.29.22 @ 11:48) >  Right lower lobe segmental/subsegmental pulmonary embolism.    Bilateral reticular and groundglass opacities with architectural   distortion and traction bronchiectasis. No honeycombing. These findings   likely represent interstitial lung disease with or without superimposed   process. Mediastinal adenopathy may be related to this process. Continued   follow-up CT recommended.    Enlarged right heart and pulmonary artery may be related to pulmonary   artery hypertension and/or right heart strain.        ekg; Sinus tach, LAD

## 2022-10-29 NOTE — ED ADULT TRIAGE NOTE - CHIEF COMPLAINT QUOTE
shortness of breath x today, hx of Cystic fibrosis and taking prednisone with no improvement. shortness of breath x today, hx of pulmonary fibrosis and taking prednisone with no improvement.

## 2022-10-29 NOTE — CONSULT NOTE ADULT - SUBJECTIVE AND OBJECTIVE BOX
Patient is a 62y old  Female with hx of Pulmonary fibrosis (dx in 2020, not on home oxygen), DM, accompanied by daughter presents with shortness of breath for the last three days. Patient states that she has had increasing shortness of breath on exertion and at rest. Dyspnea is associated with cough. Patient states that cough is productive of white sputum. Patient denies fevers or chills. Patient states she is able to lay flay at night, denies PND or orthopnea. Patient states she has worsening lower extremity edema. Patient denies chest pain, but has had new onset palpitations since this morning. Patient denies family or personal history of clotting disorders. Denies PE, DVT. Denies prior episodes. Patient denies any hx of malignancy, recent immobilization or surgery. Patient denies any prior intubation. Patient states for her pulmonary fibrosis she was recently prescribed steroids (Prednisone 100mg BID. 2 months ago) by her outpatient Pulmonologist Dr. Ventura. Patient states she has had a difficult time getting medications for her pulmonary fibrosis because of her insurance.     HPI:      Allergies    No Known Allergies    Intolerances        MEDICATIONS  (STANDING):    MEDICATIONS  (PRN):      Daily Height in cm: 167.64 (29 Oct 2022 07:24)    Daily     Drug Dosing Weight  Height (cm): 167.6 (29 Oct 2022 07:24)    PAST MEDICAL & SURGICAL HISTORY:  Diabetes mellitus      Hyperlipidemia      Cataract  right eye          FAMILY HISTORY:  Family history of MI (myocardial infarction) (Father)        SOCIAL HISTORY:    ADVANCE DIRECTIVES:    REVIEW OF SYSTEMS:    CONSTITUTIONAL: No fever, weight loss, or fatigue  EYES: No eye pain, visual disturbances, or discharge  ENMT:  No difficulty hearing, tinnitus, vertigo; No sinus or throat pain  NECK: No pain or stiffness  BREASTS: No pain, masses, or nipple discharge  RESPIRATORY: No cough, wheezing, chills or hemoptysis; No shortness of breath  CARDIOVASCULAR: No chest pain, palpitations, dizziness, or leg swelling  GASTROINTESTINAL: No abdominal or epigastric pain. No nausea, vomiting, or hematemesis; No diarrhea or constipation. No melena or hematochezia.  GENITOURINARY: No dysuria, frequency, hematuria, or incontinence  NEUROLOGICAL: No headaches, memory loss, loss of strength, numbness, or tremors  SKIN: No itching, burning, rashes, or lesions   LYMPH NODES: No enlarged glands  ENDOCRINE: No heat or cold intolerance; No hair loss  MUSCULOSKELETAL: No joint pain or swelling; No muscle, back, or extremity pain  PSYCHIATRIC: No depression, anxiety, mood swings, or difficulty sleeping  HEME/LYMPH: No easy bruising, or bleeding gums  ALLERGY AND IMMUNOLOGIC: No hives or eczema          ICU Vital Signs Last 24 Hrs  T(C): 36.7 (29 Oct 2022 14:41), Max: 37.8 (29 Oct 2022 08:08)  T(F): 98 (29 Oct 2022 14:41), Max: 100 (29 Oct 2022 08:08)  HR: 120 (29 Oct 2022 14:41) (110 - 127)  BP: 120/70 (29 Oct 2022 14:41) (110/60 - 175/94)  BP(mean): --  ABP: --  ABP(mean): --  RR: 18 (29 Oct 2022 14:41) (18 - 36)  SpO2: 98% (29 Oct 2022 14:41) (63% - 98%)    O2 Parameters below as of 29 Oct 2022 14:41  Patient On (Oxygen Delivery Method): mask, nonrebreather            ABG - ( 29 Oct 2022 12:58 )  pH, Arterial: 7.39  pH, Blood: x     /  pCO2: 35    /  pO2: 216   / HCO3: 21    / Base Excess: -3.3  /  SaO2: 98                  I&O's Detail      PHYSICAL EXAM:    GENERAL: NAD, well-groomed, well-developed  HEAD:  Atraumatic, Normocephalic  EYES: EOMI, PERRLA, conjunctiva and sclera clear  ENMT: No tonsillar erythema, exudates, or enlargement; Moist mucous membranes, Good dentition, No lesions  NECK: Supple, No JVD, Normal thyroid  NERVOUS SYSTEM:  Alert & Oriented X3, Good concentration; Motor Strength 5/5 B/L upper and lower extremities; DTRs 2+ intact and symmetric  CHEST/LUNG: Clear to percussion bilaterally; No rales, rhonchi, wheezing, or rubs  HEART: Regular rate and rhythm; No murmurs, rubs, or gallops  ABDOMEN: Soft, Nontender, Nondistended; Bowel sounds present  EXTREMITIES:  2+ Peripheral Pulses, No clubbing, cyanosis, or edema  LYMPH: No lymphadenopathy noted  SKIN: No rashes or lesions    LABS:  CBC Full  -  ( 29 Oct 2022 07:42 )  WBC Count : 12.45 K/uL  RBC Count : 4.13 M/uL  Hemoglobin : 10.6 g/dL  Hematocrit : 34.9 %  Platelet Count - Automated : 337 K/uL  Mean Cell Volume : 84.5 fl  Mean Cell Hemoglobin : 25.7 pg  Mean Cell Hemoglobin Concentration : 30.4 gm/dL  Auto Neutrophil # : 9.57 K/uL  Auto Lymphocyte # : 1.54 K/uL  Auto Monocyte # : 1.13 K/uL  Auto Eosinophil # : 0.09 K/uL  Auto Basophil # : 0.03 K/uL  Auto Neutrophil % : 76.9 %  Auto Lymphocyte % : 12.4 %  Auto Monocyte % : 9.1 %  Auto Eosinophil % : 0.7 %  Auto Basophil % : 0.2 %    10-29    141  |  111<H>  |  16  ----------------------------<  256<H>  4.9   |  24  |  1.21    Ca    8.8      29 Oct 2022 07:42    TPro  6.2  /  Alb  2.1<L>  /  TBili  0.3  /  DBili  x   /  AST  28  /  ALT  32  /  AlkPhos  99  10-29    CAPILLARY BLOOD GLUCOSE        PT/INR - ( 29 Oct 2022 07:42 )   PT: 11.3 sec;   INR: 0.95 ratio         PTT - ( 29 Oct 2022 07:42 )  PTT:23.4 sec            EKG:    ECHO, US:    RADIOLOGY:    CRITICAL CARE TIME SPENT:   HPI: Patient is a 62y old  Female with hx of Pulmonary fibrosis (dx in 2020, not on home oxygen), DM, accompanied by daughter presents with shortness of breath for the last three days. Patient states that she has had increasing shortness of breath on exertion and at rest. Dyspnea is associated with cough. Patient states that cough is productive of white sputum. Patient denies fevers or chills. Patient states she is able to lay flay at night, denies PND or orthopnea. Patient states she has worsening lower extremity edema. Patient denies chest pain, but has had new onset palpitations since this morning. Patient denies family or personal history of clotting disorders. Denies PE, DVT. Denies prior episodes. Patient denies any hx of malignancy, recent immobilization or surgery. Patient denies any prior intubation. Patient states for her pulmonary fibrosis she was recently prescribed steroids (Prednisone 100mg BID. 2 months ago) by her outpatient Pulmonologist Dr. Ventura. Patient states she has had a difficult time getting medications for her pulmonary fibrosis because of her insurance. Patient's pharmacy is [address] [phone]. As per [facility], patient is on prednisone 20mg BID.         Allergies    No Known Allergies    Intolerances        MEDICATIONS  (STANDING):    MEDICATIONS  (PRN):      Daily Height in cm: 167.64 (29 Oct 2022 07:24)    Daily     Drug Dosing Weight  Height (cm): 167.6 (29 Oct 2022 07:24)    PAST MEDICAL & SURGICAL HISTORY:  Diabetes mellitus      Hyperlipidemia      Cataract  right eye          FAMILY HISTORY:  Family history of MI (myocardial infarction) (Father)        SOCIAL HISTORY:    ADVANCE DIRECTIVES:    REVIEW OF SYSTEMS:    CONSTITUTIONAL: No fever, weight loss, or fatigue  EYES: No eye pain, visual disturbances, or discharge  ENMT:  No difficulty hearing, tinnitus, vertigo; No sinus or throat pain  NECK: No pain or stiffness  BREASTS: No pain, masses, or nipple discharge  RESPIRATORY: Has cough No wheezing, chills or hemoptysis; Has shortness of breath  CARDIOVASCULAR: No chest pain. Has leg swelling and palpitations   GASTROINTESTINAL: No abdominal or epigastric pain. No nausea, vomiting, or hematemesis; No diarrhea or constipation. No melena or hematochezia.  GENITOURINARY: No dysuria, frequency, hematuria, or incontinence  NEUROLOGICAL: No headaches, memory loss, loss of strength, numbness, or tremors. Has dizziness  SKIN: No itching, burning, rashes, or lesions   LYMPH NODES: No enlarged glands  ENDOCRINE: No heat or cold intolerance; No hair loss  MUSCULOSKELETAL: No joint pain or swelling; No muscle, back, or extremity pain  PSYCHIATRIC: No depression, anxiety, mood swings, or difficulty sleeping  HEME/LYMPH: No easy bruising, or bleeding gums  ALLERGY AND IMMUNOLOGIC: No hives or eczema          ICU Vital Signs Last 24 Hrs  T(C): 36.7 (29 Oct 2022 14:41), Max: 37.8 (29 Oct 2022 08:08)  T(F): 98 (29 Oct 2022 14:41), Max: 100 (29 Oct 2022 08:08)  HR: 120 (29 Oct 2022 14:41) (110 - 127)  BP: 120/70 (29 Oct 2022 14:41) (110/60 - 175/94)  BP(mean): --  ABP: --  ABP(mean): --  RR: 18 (29 Oct 2022 14:41) (18 - 36)  SpO2: 98% (29 Oct 2022 14:41) (63% - 98%)    O2 Parameters below as of 29 Oct 2022 14:41  Patient On (Oxygen Delivery Method): mask, nonrebreather            ABG - ( 29 Oct 2022 12:58 )  pH, Arterial: 7.39  pH, Blood: x     /  pCO2: 35    /  pO2: 216   / HCO3: 21    / Base Excess: -3.3  /  SaO2: 98                  I&O's Detail      PHYSICAL EXAM:    GENERAL: NAD, well-groomed, well-developed saturating 100% on 15LNRBM   HEAD:  Atraumatic, Normocephalic  EYES: EOMI, PERRLA, conjunctiva and sclera clear  ENMT: No tonsillar erythema, exudates, or enlargement; Moist mucous membranes, Good dentition, No lesions  NECK: Supple, No JVD, Normal thyroid  NERVOUS SYSTEM:  Alert & Oriented X3, Good concentration; Motor Strength 5/5 B/L upper and lower extremities  CHEST/LUNG: Clear to percussion bilaterally; No rales, rhonchi, wheezing, or rubs  HEART: Tachycardic; No murmurs, rubs, or gallops  ABDOMEN: Soft, Nontender, Nondistended; Bowel sounds present  EXTREMITIES:  2+ Peripheral Pulses, No clubbing, cyanosis, or edema  LYMPH: No lymphadenopathy noted  SKIN: No rashes or lesions    LABS:  CBC Full  -  ( 29 Oct 2022 07:42 )  WBC Count : 12.45 K/uL  RBC Count : 4.13 M/uL  Hemoglobin : 10.6 g/dL  Hematocrit : 34.9 %  Platelet Count - Automated : 337 K/uL  Mean Cell Volume : 84.5 fl  Mean Cell Hemoglobin : 25.7 pg  Mean Cell Hemoglobin Concentration : 30.4 gm/dL  Auto Neutrophil # : 9.57 K/uL  Auto Lymphocyte # : 1.54 K/uL  Auto Monocyte # : 1.13 K/uL  Auto Eosinophil # : 0.09 K/uL  Auto Basophil # : 0.03 K/uL  Auto Neutrophil % : 76.9 %  Auto Lymphocyte % : 12.4 %  Auto Monocyte % : 9.1 %  Auto Eosinophil % : 0.7 %  Auto Basophil % : 0.2 %    10-29    141  |  111<H>  |  16  ----------------------------<  256<H>  4.9   |  24  |  1.21    Ca    8.8      29 Oct 2022 07:42    TPro  6.2  /  Alb  2.1<L>  /  TBili  0.3  /  DBili  x   /  AST  28  /  ALT  32  /  AlkPhos  99  10-29    CAPILLARY BLOOD GLUCOSE        PT/INR - ( 29 Oct 2022 07:42 )   PT: 11.3 sec;   INR: 0.95 ratio         PTT - ( 29 Oct 2022 07:42 )  PTT:23.4 sec            EKG:    ECHO, US:    RADIOLOGY:    CRITICAL CARE TIME SPENT:

## 2022-10-29 NOTE — CONSULT NOTE ADULT - ASSESSMENT
-Elevated troponin level   trop level noted to be 155, BNP noted 4519  can be 2/2 right sided heart strain due to PE  EKG noted to be Sinus Tachycardia 118 LAD  trend troponin level  f/u echocardiogram

## 2022-10-29 NOTE — ED PROVIDER NOTE - CLINICAL SUMMARY MEDICAL DECISION MAKING FREE TEXT BOX
61 yo F with diabetes, pulmonary fibrosis p/w SOB, productive cough and hypoxia. Already on high dose steroids at baseline. Lungs clear. Concern for upper repiratory infection but also concern for PE, ACS among other causes. Will perform STAT EKG, CXR, labs trial of nebs, steroids and reassess. Anticipate admission

## 2022-10-29 NOTE — PATIENT PROFILE ADULT - FALL HARM RISK - HARM RISK INTERVENTIONS

## 2022-10-29 NOTE — PATIENT PROFILE ADULT - VISION (WITH CORRECTIVE LENSES IF THE PATIENT USUALLY WEARS THEM):
left eye blindness, wears glasses/Partially impaired: cannot see medication labels or newsprint, but can see obstacles in path, and the surrounding layout; can count fingers at arm's length

## 2022-10-29 NOTE — ED PROVIDER NOTE - OBJECTIVE STATEMENT
61 yo F h/o Pulmonary Fibrosis on prednisone 100mg BID, DMII, p/w gradual onset SOB over past 3 days. Symptoms worse this AM. Pt has home pulse ox with was showing 60's on RA. Pt also relates increased white sputum producing cough. Also relates dull midsternal chest pain with cough and increased swelling to both legs. Denies any fever, leg pain, abd pain, HA or dizziness. No h/o intubation. No h/o DVT or PE. No travel or sick contacts.

## 2022-10-29 NOTE — CONSULT NOTE ADULT - ASSESSMENT
Acute Pulm Embolism RLL with RV strain increasing Troponin   Pulm Fibrosis ? Etio   IDDM    Plan---   Venous Doppler legs  S/C Lovenox 65 mg q 12h  O2 n/c 5 lpm  Echo  FS coverage  IV steroids x 48 hrs  To Get record from Dr Ventura about Pul Fibrosis w/u  Discussed with ER MD and PMD  Thanks for consult

## 2022-10-30 LAB
A1C WITH ESTIMATED AVERAGE GLUCOSE RESULT: 12.4 % — HIGH (ref 4–5.6)
ALBUMIN SERPL ELPH-MCNC: 2.2 G/DL — LOW (ref 3.5–5)
ALP SERPL-CCNC: 104 U/L — SIGNIFICANT CHANGE UP (ref 40–120)
ALT FLD-CCNC: 30 U/L DA — SIGNIFICANT CHANGE UP (ref 10–60)
ANION GAP SERPL CALC-SCNC: 6 MMOL/L — SIGNIFICANT CHANGE UP (ref 5–17)
APPEARANCE UR: CLEAR — SIGNIFICANT CHANGE UP
AST SERPL-CCNC: 18 U/L — SIGNIFICANT CHANGE UP (ref 10–40)
BILIRUB SERPL-MCNC: 0.5 MG/DL — SIGNIFICANT CHANGE UP (ref 0.2–1.2)
BILIRUB UR-MCNC: NEGATIVE — SIGNIFICANT CHANGE UP
BUN SERPL-MCNC: 14 MG/DL — SIGNIFICANT CHANGE UP (ref 7–18)
CALCIUM SERPL-MCNC: 8.5 MG/DL — SIGNIFICANT CHANGE UP (ref 8.4–10.5)
CHLORIDE SERPL-SCNC: 104 MMOL/L — SIGNIFICANT CHANGE UP (ref 96–108)
CO2 SERPL-SCNC: 26 MMOL/L — SIGNIFICANT CHANGE UP (ref 22–31)
COLOR SPEC: YELLOW — SIGNIFICANT CHANGE UP
CREAT SERPL-MCNC: 0.7 MG/DL — SIGNIFICANT CHANGE UP (ref 0.5–1.3)
DIFF PNL FLD: ABNORMAL
EGFR: 99 ML/MIN/1.73M2 — SIGNIFICANT CHANGE UP
ESTIMATED AVERAGE GLUCOSE: 309 MG/DL — HIGH (ref 68–114)
GLUCOSE BLDC GLUCOMTR-MCNC: 294 MG/DL — HIGH (ref 70–99)
GLUCOSE BLDC GLUCOMTR-MCNC: 340 MG/DL — HIGH (ref 70–99)
GLUCOSE BLDC GLUCOMTR-MCNC: 376 MG/DL — HIGH (ref 70–99)
GLUCOSE SERPL-MCNC: 255 MG/DL — HIGH (ref 70–99)
GLUCOSE UR QL: 1000 MG/DL
HCT VFR BLD CALC: 32.8 % — LOW (ref 34.5–45)
HGB BLD-MCNC: 10.2 G/DL — LOW (ref 11.5–15.5)
KETONES UR-MCNC: NEGATIVE — SIGNIFICANT CHANGE UP
LDH SERPL L TO P-CCNC: 423 U/L — HIGH (ref 120–225)
LEUKOCYTE ESTERASE UR-ACNC: NEGATIVE — SIGNIFICANT CHANGE UP
MAGNESIUM SERPL-MCNC: 2.2 MG/DL — SIGNIFICANT CHANGE UP (ref 1.6–2.6)
MCHC RBC-ENTMCNC: 25.6 PG — LOW (ref 27–34)
MCHC RBC-ENTMCNC: 31.1 GM/DL — LOW (ref 32–36)
MCV RBC AUTO: 82.4 FL — SIGNIFICANT CHANGE UP (ref 80–100)
NITRITE UR-MCNC: NEGATIVE — SIGNIFICANT CHANGE UP
NRBC # BLD: 0 /100 WBCS — SIGNIFICANT CHANGE UP (ref 0–0)
PH UR: 6.5 — SIGNIFICANT CHANGE UP (ref 5–8)
PHOSPHATE SERPL-MCNC: 2.8 MG/DL — SIGNIFICANT CHANGE UP (ref 2.5–4.5)
PLATELET # BLD AUTO: 349 K/UL — SIGNIFICANT CHANGE UP (ref 150–400)
POTASSIUM SERPL-MCNC: 4.5 MMOL/L — SIGNIFICANT CHANGE UP (ref 3.5–5.3)
POTASSIUM SERPL-SCNC: 4.5 MMOL/L — SIGNIFICANT CHANGE UP (ref 3.5–5.3)
PROT SERPL-MCNC: 6.5 G/DL — SIGNIFICANT CHANGE UP (ref 6–8.3)
PROT UR-MCNC: 100
RBC # BLD: 3.98 M/UL — SIGNIFICANT CHANGE UP (ref 3.8–5.2)
RBC # FLD: 13.8 % — SIGNIFICANT CHANGE UP (ref 10.3–14.5)
RBC CASTS # UR COMP ASSIST: SIGNIFICANT CHANGE UP /HPF (ref 0–2)
SODIUM SERPL-SCNC: 136 MMOL/L — SIGNIFICANT CHANGE UP (ref 135–145)
SP GR SPEC: 1.01 — SIGNIFICANT CHANGE UP (ref 1.01–1.02)
TROPONIN I, HIGH SENSITIVITY RESULT: 218.3 NG/L — HIGH
TROPONIN I, HIGH SENSITIVITY RESULT: 220.1 NG/L — HIGH
UROBILINOGEN FLD QL: NEGATIVE — SIGNIFICANT CHANGE UP
WBC # BLD: 10.25 K/UL — SIGNIFICANT CHANGE UP (ref 3.8–10.5)
WBC # FLD AUTO: 10.25 K/UL — SIGNIFICANT CHANGE UP (ref 3.8–10.5)
WBC UR QL: SIGNIFICANT CHANGE UP /HPF (ref 0–5)

## 2022-10-30 PROCEDURE — 93970 EXTREMITY STUDY: CPT | Mod: 26

## 2022-10-30 RX ORDER — INSULIN LISPRO 100/ML
4 VIAL (ML) SUBCUTANEOUS
Refills: 0 | Status: DISCONTINUED | OUTPATIENT
Start: 2022-10-30 | End: 2022-10-31

## 2022-10-30 RX ORDER — AZATHIOPRINE 100 MG/1
50 TABLET ORAL DAILY
Refills: 0 | Status: DISCONTINUED | OUTPATIENT
Start: 2022-10-30 | End: 2022-11-03

## 2022-10-30 RX ORDER — INSULIN GLARGINE 100 [IU]/ML
25 INJECTION, SOLUTION SUBCUTANEOUS AT BEDTIME
Refills: 0 | Status: DISCONTINUED | OUTPATIENT
Start: 2022-10-30 | End: 2022-10-31

## 2022-10-30 RX ADMIN — Medication 3 MILLILITER(S): at 21:00

## 2022-10-30 RX ADMIN — GABAPENTIN 300 MILLIGRAM(S): 400 CAPSULE ORAL at 11:48

## 2022-10-30 RX ADMIN — INSULIN GLARGINE 25 UNIT(S): 100 INJECTION, SOLUTION SUBCUTANEOUS at 22:41

## 2022-10-30 RX ADMIN — Medication 40 MILLIGRAM(S): at 13:20

## 2022-10-30 RX ADMIN — Medication 40 MILLIGRAM(S): at 07:00

## 2022-10-30 RX ADMIN — ATORVASTATIN CALCIUM 20 MILLIGRAM(S): 80 TABLET, FILM COATED ORAL at 22:41

## 2022-10-30 RX ADMIN — Medication 10: at 11:44

## 2022-10-30 RX ADMIN — Medication 40 MILLIGRAM(S): at 22:40

## 2022-10-30 RX ADMIN — Medication 3 MILLILITER(S): at 08:50

## 2022-10-30 RX ADMIN — ENOXAPARIN SODIUM 65 MILLIGRAM(S): 100 INJECTION SUBCUTANEOUS at 13:14

## 2022-10-30 RX ADMIN — PANTOPRAZOLE SODIUM 40 MILLIGRAM(S): 20 TABLET, DELAYED RELEASE ORAL at 11:48

## 2022-10-30 RX ADMIN — Medication 6: at 07:16

## 2022-10-30 RX ADMIN — Medication 8: at 17:07

## 2022-10-30 RX ADMIN — Medication 1 TABLET(S): at 12:14

## 2022-10-30 RX ADMIN — AZATHIOPRINE 50 MILLIGRAM(S): 100 TABLET ORAL at 11:49

## 2022-10-30 RX ADMIN — Medication 3 MILLILITER(S): at 15:46

## 2022-10-30 RX ADMIN — ENOXAPARIN SODIUM 65 MILLIGRAM(S): 100 INJECTION SUBCUTANEOUS at 01:12

## 2022-10-30 NOTE — PROGRESS NOTE ADULT - SUBJECTIVE AND OBJECTIVE BOX
PULMONARY  progress note    ESTEBAN BRANHAM  MRN-[id number]    Patient is a 62y old  Female who presents with a chief complaint of Right Subsegmental Pulmonary Embolism (29 Oct 2022 14:59)  no chest pain but sob+      MEDICATIONS  (STANDING):  ALBUTerol    90 MICROgram(s) HFA Inhaler 2 Puff(s) Inhalation every 6 hours  albuterol/ipratropium for Nebulization 3 milliLiter(s) Nebulizer every 6 hours  atorvastatin 20 milliGRAM(s) Oral at bedtime  azaTHIOprine 50 milliGRAM(s) Oral daily  enoxaparin Injectable 65 milliGRAM(s) SubCutaneous every 12 hours  gabapentin 300 milliGRAM(s) Oral daily  influenza   Vaccine 0.5 milliLiter(s) IntraMuscular once  insulin glargine Injectable (LANTUS) 20 Unit(s) SubCutaneous at bedtime  insulin lispro (ADMELOG) corrective regimen sliding scale   SubCutaneous every 6 hours  methylPREDNISolone sodium succinate Injectable 40 milliGRAM(s) IV Push every 8 hours  pantoprazole  Injectable 40 milliGRAM(s) IV Push daily  trimethoprim  160 mG/sulfamethoxazole 800 mG 1 Tablet(s) Oral daily              PAST MEDICAL & SURGICAL HISTORY:  Diabetes mellitus  Hyperlipidemia  Cataract  right eye               REVIEW OF SYSTEMS:  CONSTITUTIONAL: No fever, weight loss, or fatigue   EYES: No eye pain, visual disturbances, or discharge  ENT:  No difficulty hearing, tinnitus, vertigo; No sinus or throat pain  NECK: No pain or stiffness or nodes  RESPIRATORY:  cough++   wheezing--   chills--  hemoptysis--  Shortness of Breath++  CARDIOVASCULAR: No chest pain, palpitations, passing out, dizziness, or leg swelling  GASTROINTESTINAL: No abdominal or epigastric pain. No nausea, vomiting,   GENITOURINARY: No dysuria, frequency, hematuria, or incontinence  NEUROLOGICAL: No headaches, memory loss, loss of strength, numbness, or tremors  SKIN: No itching, burning, rashes, or lesions   LYMPH Nodes: No enlarged glands  ENDOCRINE: No heat or cold intolerance; No hair loss  ALLERGY AND IMMUNOLOGIC: No hives or eczema        Vital Signs Last 24 Hrs  T(C): 36.8 (30 Oct 2022 12:00), Max: 37.1 (29 Oct 2022 17:42)  T(F): 98.3 (30 Oct 2022 12:00), Max: 98.8 (29 Oct 2022 17:42)  HR: 109 (30 Oct 2022 14:00) (92 - 118)  BP: 149/76 (30 Oct 2022 14:00) (117/102 - 155/83)  BP(mean): 92 (30 Oct 2022 14:00) (85 - 111)  RR: 28 (30 Oct 2022 14:00) (13 - 31)  SpO2: 100% (30 Oct 2022 14:00) (97% - 100%)    Parameters below as of 30 Oct 2022 14:00  Patient On (Oxygen Delivery Method): nasal cannula, high flow  O2 Flow (L/min): 40  O2 Concentration (%): 30  I&O's Detail    29 Oct 2022 07:01  -  30 Oct 2022 07:00  --------------------------------------------------------  IN:    Oral Fluid: 240 mL  Total IN: 240 mL    OUT:    Voided (mL): 275 mL  Total OUT: 275 mL    Total NET: -35 mL      30 Oct 2022 07:01  -  30 Oct 2022 15:47  --------------------------------------------------------  IN:    Oral Fluid: 480 mL  Total IN: 480 mL    OUT:    Voided (mL): 800 mL  Total OUT: 800 mL    Total NET: -320 mL          PHYSICAL EXAMINATION:    GENERAL: The patient is a pleasant lady in no apparent distress.   SKIN no rash ecchymoses or bruises  HEENT: Head is normocephalic and atraumatic  RICHARD , Extraocular muscles are intact. Mucous membranes  moist.   Neck supple ,No LN felt JVP not increased  Thyroid not enlarged  Cardiovascular:  S1 S2 heard, RSR, No JVD , systolic  murmur at apex, No gallop or rub  Respiratory: Chest wall symmetrical with good air entry ,Percussion note normal,    Lungs vesicular breathing with  fine inspiratory    rales , no   wheeze	  ABDOMEN:  Soft, Non-tender,  no hepatomegaly or splenomegaly BS positive	  Extremities: Normal range of motion, No clubbing, cyanosis or edema  Vascular: Peripheral pulses palpable 2+ bilaterally  CNS:  Alert and oriented x3  Cranial nerves intact  sensory intact  motor power5/5  dtr 2+  Babinski neg        LABS:                        10.2   10.25 )-----------( 349      ( 30 Oct 2022 05:09 )             32.8     10-    136  |  104  |  14  ----------------------------<  255<H>  4.5   |  26  |  0.70    Ca    8.5      30 Oct 2022 05:09  Phos  2.8     10-30  Mg     2.2     10-30    TPro  6.5  /  Alb  2.2<L>  /  TBili  0.5  /  DBili  x   /  AST  18  /  ALT  30  /  AlkPhos  104  10    PT/INR - ( 29 Oct 2022 07:42 )   PT: 11.3 sec;   INR: 0.95 ratio         PTT - ( 29 Oct 2022 07:42 )  PTT:23.4 sec  Urinalysis Basic - ( 30 Oct 2022 12:35 )    Color: Yellow / Appearance: Clear / S.010 / pH: x  Gluc: x / Ketone: Negative  / Bili: Negative / Urobili: Negative   Blood: x / Protein: 100 / Nitrite: Negative   Leuk Esterase: Negative / RBC: 0-2 /HPF / WBC 0-2 /HPF   Sq Epi: x / Non Sq Epi: x / Bacteria: x      ABG - ( 29 Oct 2022 12:58 )  pH, Arterial: 7.39  pH, Blood: x     /  pCO2: 35    /  pO2: 216   / HCO3: 21    / Base Excess: -3.3  /  SaO2: 98          Troponin I, High Sensitivity Result: 218.3 ng/L (10-30-22 @ 08:30)  Troponin I, High Sensitivity Result: 220.1 ng/L (10-30-22 @ 05:09)  Troponin I, High Sensitivity Result: 155.7 ng/L (10-29-22 @ 07:42)      Serum Pro-Brain Natriuretic Peptide: 4519 pg/mL (10-29-22 @ 07:42)          RADIOLOGY & ADDITIONAL STUDIES:    Venous Doppler legs; < from: US Duplex Venous Lower Ext Complete, Bilateral (10.30.22 @ 11:10) >  Normal compressibility of the RIGHT common femoral, femoral and popliteal   veins.  Doppler examination shows normal spontaneous and phasic flow.  There is thrombus in the right peroneal vein.    LEFT:  Normal compressibility of the LEFT common femoral, femoral and popliteal   veins.  Doppler examination shows normal spontaneous and phasic flow.  No LEFT calf vein thrombosis is detected.    IMPRESSION:  Thrombus in the right peroneal vein.          Echo:   < from: Transthoracic Echocardiogram (10.29.22 @ 13:42) >  1. Mitral annular calcification, and calcified mitral  leaflets with normal diastolic opening. Mild mitral  regurgitation.  2. Normal trileaflet aortic valve.  3. Aortic Root: 3.1 cm.  4. Mildly dilated left atrium.  LA volume index = 36 cc/m2.  5. Mild concentric left ventricular hypertrophy.  6. Normal Left Ventricular Systolic Function,  (EF = 55 to  60%)  7. Normal diastolic function.  8. Normal right atrium.  9. Right ventricular enlargement with normal RV systolic  function (TAPSE 2.1cm,tissue dopper.17m/s).  10. RV systolic pressure is severely increased at  68 mm  Hg.  11. There is mild tricuspid regurgitation.  12. There is mild pulmonic regurgitation.

## 2022-10-30 NOTE — PROGRESS NOTE ADULT - ASSESSMENT
Patient is a 62y old  Female with hx of Pulmonary fibrosis (dx in 2020, not on home oxygen), DM, accompanied by daughter presents with shortness of breath for the last three days. Upon evaluation in ED patient afebrile, hemodynamically stable but hypoxic to 63% on RA. On CTA patient found to have right lower lobe segmental/ subsegmental PE and findings consistent with Interstitial lung disease (traction bronchiectasis and reticular ground glass opacities). Patient admitted to ICU for AHRF 2/2 PE and Pulmonary Fibrosis.     #Actue Hypoxic Respiratory Failure - suppl oxygen , steroids, pulm f/u  #Right Segmental/ Subsegmental PE - lovenox sq  # Elevated troponin-trop level noted to be 155, BNP noted 4519  can be 2/2 right sided heart strain due to PE  EKG noted to be Sinus Tachycardia 118 LAD  trend troponin level  f/u echocardiogram  #DM - iss  #Leukocytosis - pt afebrile, ? reactive  Anemia - hgb 10.6  - transfuse if hgb<7  - monitor cbc

## 2022-10-30 NOTE — PROGRESS NOTE ADULT - SUBJECTIVE AND OBJECTIVE BOX
PATIENT SEEN AND EXAMINED ON :- 10/30/22  DATE OF SERVICE:    10/30/22         Interim events noted,Labs ,Radiological studies and Cardiology tests reviewed .       HOSPITAL COURSE: HPI:      INTERIM EVENTS:Patient seen at bedside ,interim events noted.      PMH -reviewed admission note, no change since admission  HEART FAILURE: Acute[ ]Chronic[ ] Systolic[ ] Diastolic[ ] Combined Systolic and Diastolic[ ]  CAD[ ] CABG[ ] PCI[ ]  DEVICES[ ] PPM[ ] ICD[ ] ILR[ ]  ATRIAL FIBRILLATION[ ] Paroxysmal[ ] Permanent[ ] CHADS2-[  ]  DONOVAN[ ] CKD1[ ] CKD2[ ] CKD3[ ] CKD4[ ] ESRD[ ]  COPD[ ] HTN[ ]   DM[ ] Type1[ ] Type 2[ ]   CVA[ ] Paresis[ ]    AMBULATION: Assisted[ ] Cane/walker[ ] Independent[ ]    MEDICATIONS  (STANDING):  ALBUTerol    90 MICROgram(s) HFA Inhaler 2 Puff(s) Inhalation every 6 hours  albuterol/ipratropium for Nebulization 3 milliLiter(s) Nebulizer every 6 hours  atorvastatin 20 milliGRAM(s) Oral at bedtime  azaTHIOprine 50 milliGRAM(s) Oral daily  enoxaparin Injectable 65 milliGRAM(s) SubCutaneous every 12 hours  gabapentin 300 milliGRAM(s) Oral daily  influenza   Vaccine 0.5 milliLiter(s) IntraMuscular once  insulin glargine Injectable (LANTUS) 25 Unit(s) SubCutaneous at bedtime  insulin lispro (ADMELOG) corrective regimen sliding scale   SubCutaneous every 6 hours  insulin lispro Injectable (ADMELOG) 4 Unit(s) SubCutaneous three times a day before meals  methylPREDNISolone sodium succinate Injectable 40 milliGRAM(s) IV Push every 8 hours  pantoprazole  Injectable 40 milliGRAM(s) IV Push daily  trimethoprim  160 mG/sulfamethoxazole 800 mG 1 Tablet(s) Oral daily    MEDICATIONS  (PRN):            REVIEW OF SYSTEMS:  Constitutional: [ ] fever, [ ]weight loss,  [ ]fatigue [ ]weight gain  Eyes: [ ] visual changes  Respiratory: [ ]shortness of breath;  [ ] cough, [ ]wheezing, [ ]chills, [ ]hemoptysis  Cardiovascular: [ ] chest pain, [ ]palpitations, [ ]dizziness,  [ ]leg swelling[ ]orthopnea[ ]PND  Gastrointestinal: [ ] abdominal pain, [ ]nausea, [ ]vomiting,  [ ]diarrhea [ ]Constipation [ ]Melena  Genitourinary: [ ] dysuria, [ ] hematuria [ ]Pike  Neurologic: [ ] headaches [ ] tremors[ ]weakness [ ]Paralysis Right[ ] Left[ ]  Skin: [ ] itching, [ ]burning, [ ] rashes  Endocrine: [ ] heat or cold intolerance  Musculoskeletal: [ ] joint pain or swelling; [ ] muscle, back, or extremity pain  Psychiatric: [ ] depression, [ ]anxiety, [ ]mood swings, or [ ]difficulty sleeping  Hematologic: [ ] easy bruising, [ ] bleeding gums    [ ] All remaining systems negative except as per above.   [ ]Unable to obtain.  [x] No change in ROS since admission      Vital Signs Last 24 Hrs  T(C): 36.7 (30 Oct 2022 20:00), Max: 37 (30 Oct 2022 17:00)  T(F): 98 (30 Oct 2022 20:00), Max: 98.6 (30 Oct 2022 17:00)  HR: 102 (30 Oct 2022 19:00) (92 - 114)  BP: 146/80 (30 Oct 2022 19:00) (117/102 - 155/83)  BP(mean): 95 (30 Oct 2022 19:00) (87 - 106)  RR: 21 (30 Oct 2022 19:00) (13 - 31)  SpO2: 100% (30 Oct 2022 19:00) (93% - 100%)    Parameters below as of 30 Oct 2022 19:00  Patient On (Oxygen Delivery Method): nasal cannula, high flow      I&O's Summary    29 Oct 2022 07:01  -  30 Oct 2022 07:00  --------------------------------------------------------  IN: 240 mL / OUT: 275 mL / NET: -35 mL    30 Oct 2022 07:01  -  30 Oct 2022 21:16  --------------------------------------------------------  IN: 720 mL / OUT: 800 mL / NET: -80 mL        PHYSICAL EXAM:  General: No acute distress BMI-  HEENT: EOMI, PERRL  Neck: Supple, [ ] JVD  Lungs: Equal air entry bilaterally; [ ] rales [ ] wheezing [ ] rhonchi  Heart: Regular rate and rhythm; [x ] murmur   2/6 [ x] systolic [ ] diastolic [ ] radiation[ ] rubs [ ]  gallops  Abdomen: Nontender, bowel sounds present  Extremities: No clubbing, cyanosis, [ ] edema [ ]Pulses  equal and intact  Nervous system:  Alert & Oriented X3, no focal deficits  Psychiatric: Normal affect  Skin: No rashes or lesions    LABS:  10-30    136  |  104  |  14  ----------------------------<  255<H>  4.5   |  26  |  0.70    Ca    8.5      30 Oct 2022 05:09  Phos  2.8     10-30  Mg     2.2     10-30    TPro  6.5  /  Alb  2.2<L>  /  TBili  0.5  /  DBili  x   /  AST  18  /  ALT  30  /  AlkPhos  104  10-30    Creatinine Trend: 0.70<--, 1.21<--                        10.2   10.25 )-----------( 349      ( 30 Oct 2022 05:09 )             32.8     PT/INR - ( 29 Oct 2022 07:42 )   PT: 11.3 sec;   INR: 0.95 ratio         PTT - ( 29 Oct 2022 07:42 )  PTT:23.4 sec    Serum Pro-Brain Natriuretic Peptide: 4519 pg/mL (10-29-22 @ 07:42)

## 2022-10-30 NOTE — PROGRESS NOTE ADULT - SUBJECTIVE AND OBJECTIVE BOX
SUBJECTIVE / OVERNIGHT EVENTS:pt seen and examined  10-30-22     MEDICATIONS  (STANDING):  ALBUTerol    90 MICROgram(s) HFA Inhaler 2 Puff(s) Inhalation every 6 hours  albuterol/ipratropium for Nebulization 3 milliLiter(s) Nebulizer every 6 hours  atorvastatin 20 milliGRAM(s) Oral at bedtime  azaTHIOprine 50 milliGRAM(s) Oral daily  enoxaparin Injectable 65 milliGRAM(s) SubCutaneous every 12 hours  gabapentin 300 milliGRAM(s) Oral daily  influenza   Vaccine 0.5 milliLiter(s) IntraMuscular once  insulin glargine Injectable (LANTUS) 25 Unit(s) SubCutaneous at bedtime  insulin lispro (ADMELOG) corrective regimen sliding scale   SubCutaneous every 6 hours  insulin lispro Injectable (ADMELOG) 4 Unit(s) SubCutaneous three times a day before meals  methylPREDNISolone sodium succinate Injectable 40 milliGRAM(s) IV Push every 8 hours  pantoprazole  Injectable 40 milliGRAM(s) IV Push daily  trimethoprim  160 mG/sulfamethoxazole 800 mG 1 Tablet(s) Oral daily    MEDICATIONS  (PRN):    T(C): 36.7 (10-30-22 @ 20:00), Max: 37 (10-30-22 @ 17:00)  HR: 102 (10-30-22 @ 19:00) (92 - 114)  BP: 146/80 (10-30-22 @ 19:00) (117/102 - 155/83)  RR: 23 (10-30-22 @ 20:49) (13 - 31)  SpO2: 99% (10-30-22 @ 20:49) (93% - 100%)    CAPILLARY BLOOD GLUCOSE      POCT Blood Glucose.: 294 mg/dL (30 Oct 2022 22:34)  POCT Blood Glucose.: 340 mg/dL (30 Oct 2022 16:36)  POCT Blood Glucose.: 376 mg/dL (30 Oct 2022 11:30)    I&O's Summary    29 Oct 2022 07:01  -  30 Oct 2022 07:00  --------------------------------------------------------  IN: 240 mL / OUT: 275 mL / NET: -35 mL    30 Oct 2022 07:01  -  30 Oct 2022 23:28  --------------------------------------------------------  IN: 720 mL / OUT: 800 mL / NET: -80 mL        Constitutional: No fever, fatigue  Skin: No rash.  Eyes: No recent vision problems or eye pain.  ENT: No congestion, ear pain, or sore throat.  Cardiovascular: No chest pain or palpation.  Respiratory: No cough, shortness of breath, congestion, or wheezing.  Gastrointestinal: No abdominal pain, nausea, vomiting, or diarrhea.  Genitourinary: No dysuria.  Musculoskeletal: No joint swelling.  Neurologic: No headache.    PHYSICAL EXAM:  GENERAL: NAD  EYES: EOMI, PERRLA  NECK: Supple, No JVD  CHEST/LUNG: dec breath sounds at bases  HEART:  S1 , S2 +  ABDOMEN: soft, bs+  EXTREMITIES:  trace edema  NEUROLOGY:alert awake      LABS:                        10.2   10.25 )-----------( 349      ( 30 Oct 2022 05:09 )             32.8     10-30    136  |  104  |  14  ----------------------------<  255<H>  4.5   |  26  |  0.70    Ca    8.5      30 Oct 2022 05:09  Phos  2.8     10-30  Mg     2.2     10-30    TPro  6.5  /  Alb  2.2<L>  /  TBili  0.5  /  DBili  x   /  AST  18  /  ALT  30  /  AlkPhos  104  10-30    PT/INR - ( 29 Oct 2022 07:42 )   PT: 11.3 sec;   INR: 0.95 ratio         PTT - ( 29 Oct 2022 07:42 )  PTT:23.4 sec      Urinalysis Basic - ( 30 Oct 2022 12:35 )    Color: Yellow / Appearance: Clear / S.010 / pH: x  Gluc: x / Ketone: Negative  / Bili: Negative / Urobili: Negative   Blood: x / Protein: 100 / Nitrite: Negative   Leuk Esterase: Negative / RBC: 0-2 /HPF / WBC 0-2 /HPF   Sq Epi: x / Non Sq Epi: x / Bacteria: x        RADIOLOGY & ADDITIONAL TESTS:    Imaging Personally Reviewed:    Consultant(s) Notes Reviewed:      Care Discussed with Consultants/Other Providers:

## 2022-10-30 NOTE — PROGRESS NOTE ADULT - ASSESSMENT
Acute Pulm Embolism RLL with RV strain increasing Troponin sec to DVT rt Peroneal nerve   Pulm Fibrosis ? Etio with Cor Pulmonale with Pulm Htn   IDDM    Plan---  Azothiaprine ,  Septra -DS  S/C Lovenox 65 mg q 12h  O2 n/c 5 lpm  FS coverage  IV steroids x 48 hrs, then Po Prednisone 40 mg qd pc  To Get record from Dr Ventura about Pul Fibrosis w/u

## 2022-10-30 NOTE — PROGRESS NOTE ADULT - ASSESSMENT
Patient is a 62y old  Female with hx of Pulmonary fibrosis (dx in 2020, not on home oxygen), DM, accompanied by daughter presents with shortness of breath for the last three days. Upon evaluation in ED patient afebrile, hemodynamically stable but hypoxic to 63% on RA. On CTA patient found to have right lower lobe segmental/ subsegmental PE and findings consistent with Interstitial lung disease (traction bronchiectasis and reticular ground glass opacities). Patient admitted to ICU for AHRF 2/2 PE and Pulmonary Fibrosis.     #Actue Hypoxic Respiratory Failure   #Right Segmental/ Subsegmental PE  #Pulmonary Fibrosis  # Elevated troponin  #DM   #Leukocytosis     ----------------CNS:------------------  No acute issues, patient as per family is at baseline    -----------------CVS:------------------  -Elevated troponin level   trop level noted to be 155, BNP noted 4519  can be 2/2 right sided heart strain due to PE  EKG noted to be Sinus Tachycardia 118 LAD  trend troponin level  f/u echocardiogram  Cardiology consulted Dr. Wilhelm         -----------------RESPIRATORY:--------  AHRF 2/2 Right Subsegmental PE vs Pulmonary fibrosis   - CT scan showing ILD and right subsegmental PE  -on admission patient noted to be hypoxic on RA to 63%  - supplement oxygen as needed, HFNC  - full dose Lovenox  - f/u echo  - f/u LE doppler U/S     Pulmonary fibrosis  - patient dx 2 yrs ago, not on home oxygen   - currently on prednisone 20mg BID   - will start on empiric bactrim for PCP ppx  - f/u LDH   - f/u PCP sputum culture  - SW consult     -----------------GI:----------------------  No issues     -----------------ID:-----------------------  Leukocytosis  - afebrile will monitor off abx for now       -----------------RENAL: ---------------  No issues  -----------------HEME/ONC: ---------------  Anemia  - hgb 10.6  - transfuse if hgb<7  - monitor cbc     -----------------ENDO: ---------------  Diabetes  - SSI     -----------------EXTREMITIES:-------  No acute issues     -----------------PROPHYLAXIS: -------  Full Dose Lovenox     -----------------DISPOSITION--------  Accepted to ICU      Patient is a 62y old  Female with hx of Pulmonary fibrosis (dx in 2020, not on home oxygen), DM, accompanied by daughter presents with shortness of breath for the last three days. Upon evaluation in ED patient afebrile, hemodynamically stable but hypoxic to 63% on RA. On CTA patient found to have right lower lobe segmental/ subsegmental PE and findings consistent with Interstitial lung disease (traction bronchiectasis and reticular ground glass opacities). Patient admitted to ICU for AHRF 2/2 PE and Pulmonary Fibrosis.     #Actue Hypoxic Respiratory Failure   #Right Segmental/ Subsegmental PE  #Pulmonary Fibrosis  # Elevated troponin  #DM   #Leukocytosis     ----------------CNS:------------------  No acute issues, patient as per family is at baseline    -----------------CVS:------------------  -Elevated troponin level: 155>220>218  -? 2/2 right sided heart strain due to severe pulm HTN vs PE  -EKG noted to be Sinus Tachycardia 118 LAD  - Echo with EF 55-60%, Mild concentric left ventricular hypertrophy and Right ventricular  enlargement with normal RV systolic function, elevated RV pressure; no valvular dysfunction  Cardiology consulted Dr. Wilhelm         -----------------RESPIRATORY:--------  AHRF 2/2 Right Subsegmental PE vs Pulmonary fibrosis   - CT scan showing ILD and right subsegmental PE  -on admission patient noted to be hypoxic on RA to 63%  - supplement oxygen as needed, HFNC  - full dose Lovenox  - Echo findings as above  - LE doppler U/S: POS-  Thrombus in the right peroneal vein.    Pulmonary fibrosis  - patient dx 2 yrs ago, not on home oxygen, will likely need home O2 on d/c   - currently on prednisone 20mg BID   - will start on empiric bactrim for PCP ppx  - f/u LDH   - f/u PCP sputum culture  - SW consult   -Goals of Care conversation given diagnosis/disease process/prognosis    -----------------GI:----------------------  No issues     -----------------ID:-----------------------  Leukocytosis  - afebrile will monitor off abx for now       -----------------RENAL: ---------------  No issues  -----------------HEME/ONC: ---------------  Anemia  - hgb 10.6  - transfuse if hgb<7  - monitor cbc     -----------------ENDO: ---------------  Diabetes  - SSI     -----------------EXTREMITIES:-------  No acute issues     -----------------PROPHYLAXIS: -------  Full Dose Lovenox     -----------------DISPOSITION--------  Accepted to ICU

## 2022-10-30 NOTE — PROGRESS NOTE ADULT - SUBJECTIVE AND OBJECTIVE BOX
INTERVAL HPI/OVERNIGHT EVENTS: no acute overnight events.    PRESSORS: [ ] YES [x ] NO  WHICH:    Antimicrobial:  trimethoprim  160 mG/sulfamethoxazole 800 mG 1 Tablet(s) Oral daily    Cardiovascular:    Pulmonary:  ALBUTerol    90 MICROgram(s) HFA Inhaler 2 Puff(s) Inhalation every 6 hours  albuterol/ipratropium for Nebulization 3 milliLiter(s) Nebulizer every 6 hours    Hematalogic:  enoxaparin Injectable 65 milliGRAM(s) SubCutaneous every 12 hours    Other:  atorvastatin 20 milliGRAM(s) Oral at bedtime  gabapentin 300 milliGRAM(s) Oral daily  influenza   Vaccine 0.5 milliLiter(s) IntraMuscular once  insulin glargine Injectable (LANTUS) 20 Unit(s) SubCutaneous at bedtime  insulin lispro (ADMELOG) corrective regimen sliding scale   SubCutaneous every 6 hours  methylPREDNISolone sodium succinate Injectable 40 milliGRAM(s) IV Push every 8 hours  pantoprazole  Injectable 40 milliGRAM(s) IV Push daily    ALBUTerol    90 MICROgram(s) HFA Inhaler 2 Puff(s) Inhalation every 6 hours  albuterol/ipratropium for Nebulization 3 milliLiter(s) Nebulizer every 6 hours  atorvastatin 20 milliGRAM(s) Oral at bedtime  enoxaparin Injectable 65 milliGRAM(s) SubCutaneous every 12 hours  gabapentin 300 milliGRAM(s) Oral daily  influenza   Vaccine 0.5 milliLiter(s) IntraMuscular once  insulin glargine Injectable (LANTUS) 20 Unit(s) SubCutaneous at bedtime  insulin lispro (ADMELOG) corrective regimen sliding scale   SubCutaneous every 6 hours  methylPREDNISolone sodium succinate Injectable 40 milliGRAM(s) IV Push every 8 hours  pantoprazole  Injectable 40 milliGRAM(s) IV Push daily  trimethoprim  160 mG/sulfamethoxazole 800 mG 1 Tablet(s) Oral daily    Drug Dosing Weight  Height (cm): 167.6 (29 Oct 2022 17:42)  Weight (kg): 65.5 (29 Oct 2022 17:42)  BMI (kg/m2): 23.3 (29 Oct 2022 17:42)  BSA (m2): 1.74 (29 Oct 2022 17:42)    CENTRAL LINE: [ ] YES [x ] NO  LOCATION:   DATE INSERTED:  REMOVE: [ ] YES [ ] NO  EXPLAIN:    BAKER: [ ] YES [x ] NO    DATE INSERTED:  REMOVE:  [ ] YES [ ] NO  EXPLAIN:    A-LINE:  [ ] YES x] NO  LOCATION:   DATE INSERTED:  REMOVE:  [ ] YES [ ] NO  EXPLAIN:    PMH -reviewed admission note, no change since admission  PAST MEDICAL & SURGICAL HISTORY:  Diabetes mellitus      Hyperlipidemia      Cataract  right eye          ICU Vital Signs Last 24 Hrs  T(C): 36.8 (30 Oct 2022 00:00), Max: 37.8 (29 Oct 2022 08:08)  T(F): 98.2 (30 Oct 2022 00:00), Max: 100 (29 Oct 2022 08:08)  HR: 92 (30 Oct 2022 01:00) (92 - 127)  BP: 151/82 (30 Oct 2022 01:00) (110/60 - 175/94)  BP(mean): 96 (30 Oct 2022 01:00) (85 - 111)  ABP: --  ABP(mean): --  RR: 16 (30 Oct 2022 01:00) (15 - 36)  SpO2: 100% (30 Oct 2022 01:00) (63% - 100%)    O2 Parameters below as of 30 Oct 2022 00:18  Patient On (Oxygen Delivery Method): nasal cannula, high flow  O2 Flow (L/min): 50  O2 Concentration (%): 50        ABG - ( 29 Oct 2022 12:58 )  pH, Arterial: 7.39  pH, Blood: x     /  pCO2: 35    /  pO2: 216   / HCO3: 21    / Base Excess: -3.3  /  SaO2: 98                          PHYSICAL EXAM:    GENERAL: NAD, well-groomed, well-developed HFNC  HEAD:  Atraumatic, Normocephalic  EYES: EOMI, PERRLA, conjunctiva and sclera clear  ENMT: No tonsillar erythema, exudates, or enlargement; Moist mucous membranes, Good dentition, No lesions  NECK: Supple, No JVD, Normal thyroid  NERVOUS SYSTEM:  Alert & Oriented X3, Good concentration; Motor Strength 5/5 B/L upper and lower extremities  CHEST/LUNG: Clear to percussion bilaterally; No rales, rhonchi, wheezing, or rubs  HEART: Tachycardic; No murmurs, rubs, or gallops  ABDOMEN: Soft, Nontender, Nondistended; Bowel sounds present  EXTREMITIES:  2+ Peripheral Pulses, No clubbing, cyanosis, or edema  LYMPH: No lymphadenopathy noted    LABS:  CBC Full  -  ( 29 Oct 2022 07:42 )  WBC Count : 12.45 K/uL  RBC Count : 4.13 M/uL  Hemoglobin : 10.6 g/dL  Hematocrit : 34.9 %  Platelet Count - Automated : 337 K/uL  Mean Cell Volume : 84.5 fl  Mean Cell Hemoglobin : 25.7 pg  Mean Cell Hemoglobin Concentration : 30.4 gm/dL  Auto Neutrophil # : 9.57 K/uL  Auto Lymphocyte # : 1.54 K/uL  Auto Monocyte # : 1.13 K/uL  Auto Eosinophil # : 0.09 K/uL  Auto Basophil # : 0.03 K/uL  Auto Neutrophil % : 76.9 %  Auto Lymphocyte % : 12.4 %  Auto Monocyte % : 9.1 %  Auto Eosinophil % : 0.7 %  Auto Basophil % : 0.2 %    10-29    141  |  111<H>  |  16  ----------------------------<  256<H>  4.9   |  24  |  1.21    Ca    8.8      29 Oct 2022 07:42    TPro  6.2  /  Alb  2.1<L>  /  TBili  0.3  /  DBili  x   /  AST  28  /  ALT  32  /  AlkPhos  99  10-29    PT/INR - ( 29 Oct 2022 07:42 )   PT: 11.3 sec;   INR: 0.95 ratio         PTT - ( 29 Oct 2022 07:42 )  PTT:23.4 sec        RADIOLOGY & ADDITIONAL STUDIES REVIEWED:  ***    [ ]GOALS OF CARE DISCUSSION WITH PATIENT/FAMILY/PROXY:    CRITICAL CARE TIME SPENT: 35 minutes INTERVAL HPI/OVERNIGHT EVENTS: no acute overnight events.    PRESSORS: [ ] YES [x ] NO  WHICH:    Antimicrobial:  trimethoprim  160 mG/sulfamethoxazole 800 mG 1 Tablet(s) Oral daily    Cardiovascular:    Pulmonary:  ALBUTerol    90 MICROgram(s) HFA Inhaler 2 Puff(s) Inhalation every 6 hours  albuterol/ipratropium for Nebulization 3 milliLiter(s) Nebulizer every 6 hours    Hematalogic:  enoxaparin Injectable 65 milliGRAM(s) SubCutaneous every 12 hours    Other:  atorvastatin 20 milliGRAM(s) Oral at bedtime  gabapentin 300 milliGRAM(s) Oral daily  influenza   Vaccine 0.5 milliLiter(s) IntraMuscular once  insulin glargine Injectable (LANTUS) 20 Unit(s) SubCutaneous at bedtime  insulin lispro (ADMELOG) corrective regimen sliding scale   SubCutaneous every 6 hours  methylPREDNISolone sodium succinate Injectable 40 milliGRAM(s) IV Push every 8 hours  pantoprazole  Injectable 40 milliGRAM(s) IV Push daily    ALBUTerol    90 MICROgram(s) HFA Inhaler 2 Puff(s) Inhalation every 6 hours  albuterol/ipratropium for Nebulization 3 milliLiter(s) Nebulizer every 6 hours  atorvastatin 20 milliGRAM(s) Oral at bedtime  enoxaparin Injectable 65 milliGRAM(s) SubCutaneous every 12 hours  gabapentin 300 milliGRAM(s) Oral daily  influenza   Vaccine 0.5 milliLiter(s) IntraMuscular once  insulin glargine Injectable (LANTUS) 20 Unit(s) SubCutaneous at bedtime  insulin lispro (ADMELOG) corrective regimen sliding scale   SubCutaneous every 6 hours  methylPREDNISolone sodium succinate Injectable 40 milliGRAM(s) IV Push every 8 hours  pantoprazole  Injectable 40 milliGRAM(s) IV Push daily  trimethoprim  160 mG/sulfamethoxazole 800 mG 1 Tablet(s) Oral daily    Drug Dosing Weight  Height (cm): 167.6 (29 Oct 2022 17:42)  Weight (kg): 65.5 (29 Oct 2022 17:42)  BMI (kg/m2): 23.3 (29 Oct 2022 17:42)  BSA (m2): 1.74 (29 Oct 2022 17:42)    CENTRAL LINE: [ ] YES [x ] NO  LOCATION:   DATE INSERTED:  REMOVE: [ ] YES [ ] NO  EXPLAIN:    BAKER: [ ] YES [x ] NO    DATE INSERTED:  REMOVE:  [ ] YES [ ] NO  EXPLAIN:    A-LINE:  [ ] YES x] NO  LOCATION:   DATE INSERTED:  REMOVE:  [ ] YES [ ] NO  EXPLAIN:    PMH -reviewed admission note, no change since admission  PAST MEDICAL & SURGICAL HISTORY:  Diabetes mellitus      Hyperlipidemia      Cataract  right eye          ICU Vital Signs Last 24 Hrs  T(C): 36.8 (30 Oct 2022 00:00), Max: 37.8 (29 Oct 2022 08:08)  T(F): 98.2 (30 Oct 2022 00:00), Max: 100 (29 Oct 2022 08:08)  HR: 92 (30 Oct 2022 01:00) (92 - 127)  BP: 151/82 (30 Oct 2022 01:00) (110/60 - 175/94)  BP(mean): 96 (30 Oct 2022 01:00) (85 - 111)  ABP: --  ABP(mean): --  RR: 16 (30 Oct 2022 01:00) (15 - 36)  SpO2: 100% (30 Oct 2022 01:00) (63% - 100%)    O2 Parameters below as of 30 Oct 2022 00:18  Patient On (Oxygen Delivery Method): nasal cannula, high flow  O2 Flow (L/min): 50  O2 Concentration (%): 50        ABG - ( 29 Oct 2022 12:58 )  pH, Arterial: 7.39  pH, Blood: x     /  pCO2: 35    /  pO2: 216   / HCO3: 21    / Base Excess: -3.3  /  SaO2: 98                          PHYSICAL EXAM:    GENERAL: NAD, well-groomed, well-developed HFNC  HEAD:  Atraumatic, Normocephalic  EYES: EOMI, PERRLA, conjunctiva and sclera clear  ENMT: No tonsillar erythema, exudates, or enlargement; Moist mucous membranes, Good dentition, No lesions  NECK: Supple, No JVD, Normal thyroid  NERVOUS SYSTEM:  Alert & Oriented X3, Good concentration; Motor Strength 5/5 B/L upper and lower extremities  CHEST/LUNG: Clear to percussion bilaterally; No rales, rhonchi, wheezing, or rubs  HEART: Tachycardic; No murmurs, rubs, or gallops  ABDOMEN: Soft, Nontender, Nondistended; Bowel sounds present  EXTREMITIES:  2+ Peripheral Pulses, No clubbing, cyanosis, or edema  LYMPH: No lymphadenopathy noted    LABS:  CBC Full  -  ( 29 Oct 2022 07:42 )  WBC Count : 12.45 K/uL  RBC Count : 4.13 M/uL  Hemoglobin : 10.6 g/dL  Hematocrit : 34.9 %  Platelet Count - Automated : 337 K/uL  Mean Cell Volume : 84.5 fl  Mean Cell Hemoglobin : 25.7 pg  Mean Cell Hemoglobin Concentration : 30.4 gm/dL  Auto Neutrophil # : 9.57 K/uL  Auto Lymphocyte # : 1.54 K/uL  Auto Monocyte # : 1.13 K/uL  Auto Eosinophil # : 0.09 K/uL  Auto Basophil # : 0.03 K/uL  Auto Neutrophil % : 76.9 %  Auto Lymphocyte % : 12.4 %  Auto Monocyte % : 9.1 %  Auto Eosinophil % : 0.7 %  Auto Basophil % : 0.2 %    10-29    141  |  111<H>  |  16  ----------------------------<  256<H>  4.9   |  24  |  1.21    Ca    8.8      29 Oct 2022 07:42    TPro  6.2  /  Alb  2.1<L>  /  TBili  0.3  /  DBili  x   /  AST  28  /  ALT  32  /  AlkPhos  99  10-29    PT/INR - ( 29 Oct 2022 07:42 )   PT: 11.3 sec;   INR: 0.95 ratio         PTT - ( 29 Oct 2022 07:42 )  PTT:23.4 sec    2 DECHO:< from: Transthoracic Echocardiogram (10.29.22 @ 13:42) >    Patient name: ESTEBAN BRANHAM  YOB: 1960   Age: 62 (F)   MR#: [id number]  Study Date: 10/29/2022  Location: [place] [id number]: Carol Ramirez RDCS  Study quality: Technically good  Referring Physician:  KENNEDY WILLIAM MD  Blood Pressure: 110/60 mmHg  Height: 168 cm  Weight: 64 kg  BSA: 1.7 m2  ------------------------------------------------------------------------    PROCEDURE: Transthoracic echocardiogram with 2-D, M-Mode  and complete spectral and color flow Doppler.  INDICATION:Primary pulmonary hypertension (I27.0)  HISTORY:  ------------------------------------------------------------------------  DIMENSIONS:  Dimensions:     Normal Values:  LA:     3.5 cm    2.0 - 4.0 cm  Ao:     3.1 cm    2.0 - 3.8 cm  SEPTUM: 1.4 cm0.6 - 1.2 cm  PWT:    0.9 cm    0.6 - 1.1 cm  LVIDd:  3.8 cm    3.0 - 5.6 cm  LVIDs:  3.0 cm    1.8 - 4.0 cm      Derived Variables:  LVMI: 84 g/m2  RWT: 0.47  Ejection Fraction Visual Estimate: 55-60 %  Ejection Fraction Brown: 60 %    ------------------------------------------------------------------------  OBSERVATIONS:  Mitral Valve: Mitral annular calcification, and calcified  mitral leaflets with normal diastolic opening. Mild mitral  regurgitation.  Aortic Root: Aortic Root: 3.1 cm.    Aortic Valve: Normal trileaflet aortic valve.  Left Atrium: Mildly dilated left atrium.  LA volume index =  36 cc/m2.  Left Ventricle: Normal Left Ventricular Systolic Function,  (EF = 55 to 60%) Mild concentric left ventricular  hypertrophy. Normal diastolic function.  Right Heart: Normal right atrium. Right ventricular  enlargement with normal RV systolic function(TAPSE  2.1cm,tissue dopper.17m/s). There is mild tricuspid  regurgitation. There is mild pulmonic regurgitation.  Pericardium/PleuraTrivial pericardial effusion is seen.  Hemodynamic: RV systolic pressure is severely increased at  68 mm Hg.  ------------------------------------------------------------------------  CONCLUSIONS:  1. Mitral annular calcification, and calcified mitral  leaflets with normal diastolic opening. Mild mitral  regurgitation.  2. Normal trileaflet aortic valve.  3. Aortic Root: 3.1 cm.  4. Mildly dilated left atrium.  LA volume index = 36 cc/m2.  5. Mild concentric left ventricular hypertrophy.  6. Normal Left Ventricular Systolic Function,  (EF = 55 to  60%)  7. Normal diastolic function.  8. Normal right atrium.  9. Right ventricular enlargement with normal RV systolic  function(TAPSE 2.1cm,tissue dopper.17m/s).  10. RV systolic pressure is severely increased at  68 mm  Hg.  11. There is mild tricuspid regurgitation.  12. There is mild pulmonic regurgitation.    ------------------------------------------------------------------------  Confirmed on  10/30/2022 - 09:36:45 by Delores Eisenberg MD  ------------------------------------------------------------------------    < end of copied text >      Venous doppler b/l LE :< from: US Duplex Venous Lower Ext Complete, Bilateral (10.30.22 @ 11:10) >    ACC: [id number] EXAM:  US DPLX LWR EXT VEINS COMPL BI                          PROCEDURE DATE:  10/30/2022          INTERPRETATION:  CLINICAL INFORMATION: new PE, SOB with tachycardia   Admitting Dxs: I26.99 OTHER PULMONARY EMBOLISM WITHOUT ACUTE COR  PULMONALE HUS    COMPARISON: None available.    TECHNIQUE: Duplex sonography of the BILATERAL LOWER extremity veins with   color and spectral Doppler, with and without compression.    FINDINGS:    RIGHT:  Normal compressibility of the RIGHT common femoral, femoral and popliteal   veins.  Doppler examination shows normal spontaneous and phasic flow.  There is thrombus in the right peroneal vein.    LEFT:  Normal compressibility of the LEFT common femoral, femoral and popliteal   veins.  Doppler examination shows normal spontaneous and phasic flow.  No LEFT calf vein thrombosis is detected.    IMPRESSION:  Thrombus in the right peroneal vein.    The findings were discussed with the ICU Team by the ultrasound   technologist Morgan Burrell on 10/30/2022.        --- End of Report ---            NIALL PORRAS MD; Attending Radiologist  This document has been electronically signed. Oct 30 2022 11:14AM    < end of copied text >      RADIOLOGY & ADDITIONAL STUDIES REVIEWED:  ***    [ ]GOALS OF CARE DISCUSSION WITH PATIENT/FAMILY/PROXY:    CRITICAL CARE TIME SPENT: 35 minutes INTERVAL HPI/OVERNIGHT EVENTS: no acute overnight events.    PRESSORS: [ ] YES [x ] NO  WHICH:    Antimicrobial:  trimethoprim  160 mG/sulfamethoxazole 800 mG 1 Tablet(s) Oral daily    Cardiovascular:    Pulmonary:  ALBUTerol    90 MICROgram(s) HFA Inhaler 2 Puff(s) Inhalation every 6 hours  albuterol/ipratropium for Nebulization 3 milliLiter(s) Nebulizer every 6 hours    Hematalogic:  enoxaparin Injectable 65 milliGRAM(s) SubCutaneous every 12 hours    Other:  atorvastatin 20 milliGRAM(s) Oral at bedtime  gabapentin 300 milliGRAM(s) Oral daily  influenza   Vaccine 0.5 milliLiter(s) IntraMuscular once  insulin glargine Injectable (LANTUS) 20 Unit(s) SubCutaneous at bedtime  insulin lispro (ADMELOG) corrective regimen sliding scale   SubCutaneous every 6 hours  methylPREDNISolone sodium succinate Injectable 40 milliGRAM(s) IV Push every 8 hours  pantoprazole  Injectable 40 milliGRAM(s) IV Push daily    ALBUTerol    90 MICROgram(s) HFA Inhaler 2 Puff(s) Inhalation every 6 hours  albuterol/ipratropium for Nebulization 3 milliLiter(s) Nebulizer every 6 hours  atorvastatin 20 milliGRAM(s) Oral at bedtime  enoxaparin Injectable 65 milliGRAM(s) SubCutaneous every 12 hours  gabapentin 300 milliGRAM(s) Oral daily  influenza   Vaccine 0.5 milliLiter(s) IntraMuscular once  insulin glargine Injectable (LANTUS) 20 Unit(s) SubCutaneous at bedtime  insulin lispro (ADMELOG) corrective regimen sliding scale   SubCutaneous every 6 hours  methylPREDNISolone sodium succinate Injectable 40 milliGRAM(s) IV Push every 8 hours  pantoprazole  Injectable 40 milliGRAM(s) IV Push daily  trimethoprim  160 mG/sulfamethoxazole 800 mG 1 Tablet(s) Oral daily    Drug Dosing Weight  Height (cm): 167.6 (29 Oct 2022 17:42)  Weight (kg): 65.5 (29 Oct 2022 17:42)  BMI (kg/m2): 23.3 (29 Oct 2022 17:42)  BSA (m2): 1.74 (29 Oct 2022 17:42)    CENTRAL LINE: [ ] YES [x ] NO  LOCATION:   DATE INSERTED:  REMOVE: [ ] YES [ ] NO  EXPLAIN:    BAKER: [ ] YES [x ] NO    DATE INSERTED:  REMOVE:  [ ] YES [ ] NO  EXPLAIN:    A-LINE:  [ ] YES x] NO  LOCATION:   DATE INSERTED:  REMOVE:  [ ] YES [ ] NO  EXPLAIN:    PMH -reviewed admission note, no change since admission  PAST MEDICAL & SURGICAL HISTORY:  Diabetes mellitus      Hyperlipidemia      Cataract  right eye          ICU Vital Signs Last 24 Hrs  T(C): 36.8 (30 Oct 2022 00:00), Max: 37.8 (29 Oct 2022 08:08)  T(F): 98.2 (30 Oct 2022 00:00), Max: 100 (29 Oct 2022 08:08)  HR: 92 (30 Oct 2022 01:00) (92 - 127)  BP: 151/82 (30 Oct 2022 01:00) (110/60 - 175/94)  BP(mean): 96 (30 Oct 2022 01:00) (85 - 111)  ABP: --  ABP(mean): --  RR: 16 (30 Oct 2022 01:00) (15 - 36)  SpO2: 100% (30 Oct 2022 01:00) (63% - 100%)    O2 Parameters below as of 30 Oct 2022 00:18  Patient On (Oxygen Delivery Method): nasal cannula, high flow  O2 Flow (L/min): 50  O2 Concentration (%): 50        ABG - ( 29 Oct 2022 12:58 )  pH, Arterial: 7.39  pH, Blood: x     /  pCO2: 35    /  pO2: 216   / HCO3: 21    / Base Excess: -3.3  /  SaO2: 98                          PHYSICAL EXAM:    GENERAL: NAD, well-groomed, well-developed HFNC  HEAD:  Atraumatic, Normocephalic  EYES: EOMI, PERRLA, conjunctiva and sclera clear  ENMT: No tonsillar erythema, exudates, or enlargement; Moist mucous membranes, Good dentition, No lesions  NECK: Supple, No JVD, Normal thyroid  NERVOUS SYSTEM:  Alert & Oriented X3, Motor Strength 5/5 B/L upper and lower extremities  CHEST/LUNG: Clear to auscultation; No rales, rhonchi, wheezing, or rubs  HEART: regular rate and rhythm, No murmurs, rubs, or gallops  ABDOMEN: Soft, Nontender, Nondistended; Bowel sounds present  EXTREMITIES:  2+ Peripheral Pulses, No clubbing, cyanosis, or edema  LYMPH: No lymphadenopathy noted    LABS:  CBC Full  -  ( 29 Oct 2022 07:42 )  WBC Count : 12.45 K/uL  RBC Count : 4.13 M/uL  Hemoglobin : 10.6 g/dL  Hematocrit : 34.9 %  Platelet Count - Automated : 337 K/uL  Mean Cell Volume : 84.5 fl  Mean Cell Hemoglobin : 25.7 pg  Mean Cell Hemoglobin Concentration : 30.4 gm/dL  Auto Neutrophil # : 9.57 K/uL  Auto Lymphocyte # : 1.54 K/uL  Auto Monocyte # : 1.13 K/uL  Auto Eosinophil # : 0.09 K/uL  Auto Basophil # : 0.03 K/uL  Auto Neutrophil % : 76.9 %  Auto Lymphocyte % : 12.4 %  Auto Monocyte % : 9.1 %  Auto Eosinophil % : 0.7 %  Auto Basophil % : 0.2 %    10-29    141  |  111<H>  |  16  ----------------------------<  256<H>  4.9   |  24  |  1.21    Ca    8.8      29 Oct 2022 07:42    TPro  6.2  /  Alb  2.1<L>  /  TBili  0.3  /  DBili  x   /  AST  28  /  ALT  32  /  AlkPhos  99  10-29    PT/INR - ( 29 Oct 2022 07:42 )   PT: 11.3 sec;   INR: 0.95 ratio         PTT - ( 29 Oct 2022 07:42 )  PTT:23.4 sec    2 DECHO:< from: Transthoracic Echocardiogram (10.29.22 @ 13:42) >    Patient name: ESTEBAN BRANHAM  YOB: 1960   Age: 62 (F)   MR#: [id number]  Study Date: 10/29/2022  Location: [place] [id number]: Carol Ramirez LYUBOV  Study quality: Technically good  Referring Physician:  KENNEDY WILLIAM MD  Blood Pressure: 110/60 mmHg  Height: 168 cm  Weight: 64 kg  BSA: 1.7 m2  ------------------------------------------------------------------------    PROCEDURE: Transthoracic echocardiogram with 2-D, M-Mode  and complete spectral and color flow Doppler.  INDICATION:Primary pulmonary hypertension (I27.0)  HISTORY:  ------------------------------------------------------------------------  DIMENSIONS:  Dimensions:     Normal Values:  LA:     3.5 cm    2.0 - 4.0 cm  Ao:     3.1 cm    2.0 - 3.8 cm  SEPTUM: 1.4 cm0.6 - 1.2 cm  PWT:    0.9 cm    0.6 - 1.1 cm  LVIDd:  3.8 cm    3.0 - 5.6 cm  LVIDs:  3.0 cm    1.8 - 4.0 cm      Derived Variables:  LVMI: 84 g/m2  RWT: 0.47  Ejection Fraction Visual Estimate: 55-60 %  Ejection Fraction Brown: 60 %    ------------------------------------------------------------------------  OBSERVATIONS:  Mitral Valve: Mitral annular calcification, and calcified  mitral leaflets with normal diastolic opening. Mild mitral  regurgitation.  Aortic Root: Aortic Root: 3.1 cm.    Aortic Valve: Normal trileaflet aortic valve.  Left Atrium: Mildly dilated left atrium.  LA volume index =  36 cc/m2.  Left Ventricle: Normal Left Ventricular Systolic Function,  (EF = 55 to 60%) Mild concentric left ventricular  hypertrophy. Normal diastolic function.  Right Heart: Normal right atrium. Right ventricular  enlargement with normal RV systolic function(TAPSE  2.1cm,tissue dopper.17m/s). There is mild tricuspid  regurgitation. There is mild pulmonic regurgitation.  Pericardium/PleuraTrivial pericardial effusion is seen.  Hemodynamic: RV systolic pressure is severely increased at  68 mm Hg.  ------------------------------------------------------------------------  CONCLUSIONS:  1. Mitral annular calcification, and calcified mitral  leaflets with normal diastolic opening. Mild mitral  regurgitation.  2. Normal trileaflet aortic valve.  3. Aortic Root: 3.1 cm.  4. Mildly dilated left atrium.  LA volume index = 36 cc/m2.  5. Mild concentric left ventricular hypertrophy.  6. Normal Left Ventricular Systolic Function,  (EF = 55 to  60%)  7. Normal diastolic function.  8. Normal right atrium.  9. Right ventricular enlargement with normal RV systolic  function(TAPSE 2.1cm,tissue dopper.17m/s).  10. RV systolic pressure is severely increased at  68 mm  Hg.  11. There is mild tricuspid regurgitation.  12. There is mild pulmonic regurgitation.    ------------------------------------------------------------------------  Confirmed on  10/30/2022 - 09:36:45 by Delores Eisenberg MD  ------------------------------------------------------------------------    < end of copied text >      Venous doppler b/l LE :< from: US Duplex Venous Lower Ext Complete, Bilateral (10.30.22 @ 11:10) >    ACC: [id number] EXAM:  US DPLX LWR EXT VEINS COMPL BI                          PROCEDURE DATE:  10/30/2022          INTERPRETATION:  CLINICAL INFORMATION: new PE, SOB with tachycardia   Admitting Dxs: I26.99 OTHER PULMONARY EMBOLISM WITHOUT ACUTE COR  PULMONALE HUS    COMPARISON: None available.    TECHNIQUE: Duplex sonography of the BILATERAL LOWER extremity veins with   color and spectral Doppler, with and without compression.    FINDINGS:    RIGHT:  Normal compressibility of the RIGHT common femoral, femoral and popliteal   veins.  Doppler examination shows normal spontaneous and phasic flow.  There is thrombus in the right peroneal vein.    LEFT:  Normal compressibility of the LEFT common femoral, femoral and popliteal   veins.  Doppler examination shows normal spontaneous and phasic flow.  No LEFT calf vein thrombosis is detected.    IMPRESSION:  Thrombus in the right peroneal vein.    The findings were discussed with the ICU Team by the ultrasound   technologist Morgan Burrell on 10/30/2022.        --- End of Report ---            NIALL PORRAS MD; Attending Radiologist  This document has been electronically signed. Oct 30 2022 11:14AM    < end of copied text >      RADIOLOGY & ADDITIONAL STUDIES REVIEWED:  ***    [ ]GOALS OF CARE DISCUSSION WITH PATIENT/FAMILY/PROXY:    CRITICAL CARE TIME SPENT: 35 minutes

## 2022-10-31 LAB
ALBUMIN SERPL ELPH-MCNC: 2 G/DL — LOW (ref 3.5–5)
ALP SERPL-CCNC: 102 U/L — SIGNIFICANT CHANGE UP (ref 40–120)
ALT FLD-CCNC: 48 U/L DA — SIGNIFICANT CHANGE UP (ref 10–60)
ANION GAP SERPL CALC-SCNC: 10 MMOL/L — SIGNIFICANT CHANGE UP (ref 5–17)
AST SERPL-CCNC: 64 U/L — HIGH (ref 10–40)
BILIRUB SERPL-MCNC: 0.4 MG/DL — SIGNIFICANT CHANGE UP (ref 0.2–1.2)
BUN SERPL-MCNC: 19 MG/DL — HIGH (ref 7–18)
CALCIUM SERPL-MCNC: 8.5 MG/DL — SIGNIFICANT CHANGE UP (ref 8.4–10.5)
CHLORIDE SERPL-SCNC: 103 MMOL/L — SIGNIFICANT CHANGE UP (ref 96–108)
CO2 SERPL-SCNC: 23 MMOL/L — SIGNIFICANT CHANGE UP (ref 22–31)
CREAT SERPL-MCNC: 0.83 MG/DL — SIGNIFICANT CHANGE UP (ref 0.5–1.3)
CULTURE RESULTS: SIGNIFICANT CHANGE UP
EGFR: 81 ML/MIN/1.73M2 — SIGNIFICANT CHANGE UP
GLUCOSE BLDC GLUCOMTR-MCNC: 244 MG/DL — HIGH (ref 70–99)
GLUCOSE BLDC GLUCOMTR-MCNC: 262 MG/DL — HIGH (ref 70–99)
GLUCOSE BLDC GLUCOMTR-MCNC: 311 MG/DL — HIGH (ref 70–99)
GLUCOSE BLDC GLUCOMTR-MCNC: 387 MG/DL — HIGH (ref 70–99)
GLUCOSE BLDC GLUCOMTR-MCNC: 389 MG/DL — HIGH (ref 70–99)
GLUCOSE SERPL-MCNC: 289 MG/DL — HIGH (ref 70–99)
HCT VFR BLD CALC: 33.8 % — LOW (ref 34.5–45)
HGB BLD-MCNC: 10.4 G/DL — LOW (ref 11.5–15.5)
MAGNESIUM SERPL-MCNC: 2.4 MG/DL — SIGNIFICANT CHANGE UP (ref 1.6–2.6)
MCHC RBC-ENTMCNC: 25.2 PG — LOW (ref 27–34)
MCHC RBC-ENTMCNC: 30.8 GM/DL — LOW (ref 32–36)
MCV RBC AUTO: 82 FL — SIGNIFICANT CHANGE UP (ref 80–100)
NRBC # BLD: 0 /100 WBCS — SIGNIFICANT CHANGE UP (ref 0–0)
PHOSPHATE SERPL-MCNC: 3 MG/DL — SIGNIFICANT CHANGE UP (ref 2.5–4.5)
PLATELET # BLD AUTO: 382 K/UL — SIGNIFICANT CHANGE UP (ref 150–400)
POTASSIUM SERPL-MCNC: 4.7 MMOL/L — SIGNIFICANT CHANGE UP (ref 3.5–5.3)
POTASSIUM SERPL-SCNC: 4.7 MMOL/L — SIGNIFICANT CHANGE UP (ref 3.5–5.3)
PROT SERPL-MCNC: 6.5 G/DL — SIGNIFICANT CHANGE UP (ref 6–8.3)
RBC # BLD: 4.12 M/UL — SIGNIFICANT CHANGE UP (ref 3.8–5.2)
RBC # FLD: 13.7 % — SIGNIFICANT CHANGE UP (ref 10.3–14.5)
SODIUM SERPL-SCNC: 136 MMOL/L — SIGNIFICANT CHANGE UP (ref 135–145)
SPECIMEN SOURCE: SIGNIFICANT CHANGE UP
WBC # BLD: 11.98 K/UL — HIGH (ref 3.8–10.5)
WBC # FLD AUTO: 11.98 K/UL — HIGH (ref 3.8–10.5)

## 2022-10-31 RX ORDER — POLYETHYLENE GLYCOL 3350 17 G/17G
17 POWDER, FOR SOLUTION ORAL DAILY
Refills: 0 | Status: DISCONTINUED | OUTPATIENT
Start: 2022-10-31 | End: 2022-10-31

## 2022-10-31 RX ORDER — INSULIN LISPRO 100/ML
8 VIAL (ML) SUBCUTANEOUS
Refills: 0 | Status: DISCONTINUED | OUTPATIENT
Start: 2022-10-31 | End: 2022-11-03

## 2022-10-31 RX ORDER — INSULIN LISPRO 100/ML
VIAL (ML) SUBCUTANEOUS
Refills: 0 | Status: DISCONTINUED | OUTPATIENT
Start: 2022-10-31 | End: 2022-11-03

## 2022-10-31 RX ORDER — CHLORHEXIDINE GLUCONATE 213 G/1000ML
1 SOLUTION TOPICAL
Refills: 0 | Status: DISCONTINUED | OUTPATIENT
Start: 2022-10-31 | End: 2022-11-01

## 2022-10-31 RX ORDER — INSULIN LISPRO 100/ML
VIAL (ML) SUBCUTANEOUS AT BEDTIME
Refills: 0 | Status: DISCONTINUED | OUTPATIENT
Start: 2022-10-31 | End: 2022-11-03

## 2022-10-31 RX ORDER — INSULIN GLARGINE 100 [IU]/ML
30 INJECTION, SOLUTION SUBCUTANEOUS AT BEDTIME
Refills: 0 | Status: DISCONTINUED | OUTPATIENT
Start: 2022-10-31 | End: 2022-11-03

## 2022-10-31 RX ORDER — TRAZODONE HCL 50 MG
100 TABLET ORAL AT BEDTIME
Refills: 0 | Status: DISCONTINUED | OUTPATIENT
Start: 2022-10-31 | End: 2022-11-03

## 2022-10-31 RX ADMIN — Medication 4 UNIT(S): at 16:40

## 2022-10-31 RX ADMIN — Medication 4: at 06:29

## 2022-10-31 RX ADMIN — Medication 8: at 01:35

## 2022-10-31 RX ADMIN — Medication 3 MILLILITER(S): at 21:11

## 2022-10-31 RX ADMIN — Medication 2: at 22:59

## 2022-10-31 RX ADMIN — GABAPENTIN 300 MILLIGRAM(S): 400 CAPSULE ORAL at 11:36

## 2022-10-31 RX ADMIN — Medication 4 UNIT(S): at 06:30

## 2022-10-31 RX ADMIN — Medication 40 MILLIGRAM(S): at 06:27

## 2022-10-31 RX ADMIN — ENOXAPARIN SODIUM 65 MILLIGRAM(S): 100 INJECTION SUBCUTANEOUS at 13:40

## 2022-10-31 RX ADMIN — Medication 10: at 11:19

## 2022-10-31 RX ADMIN — Medication 1 TABLET(S): at 11:35

## 2022-10-31 RX ADMIN — Medication 100 MILLIGRAM(S): at 22:48

## 2022-10-31 RX ADMIN — INSULIN GLARGINE 30 UNIT(S): 100 INJECTION, SOLUTION SUBCUTANEOUS at 22:46

## 2022-10-31 RX ADMIN — Medication 3 MILLILITER(S): at 09:44

## 2022-10-31 RX ADMIN — Medication 8 UNIT(S): at 22:50

## 2022-10-31 RX ADMIN — ATORVASTATIN CALCIUM 20 MILLIGRAM(S): 80 TABLET, FILM COATED ORAL at 22:48

## 2022-10-31 RX ADMIN — AZATHIOPRINE 50 MILLIGRAM(S): 100 TABLET ORAL at 11:36

## 2022-10-31 RX ADMIN — Medication 100 MILLIGRAM(S): at 01:16

## 2022-10-31 RX ADMIN — PANTOPRAZOLE SODIUM 40 MILLIGRAM(S): 20 TABLET, DELAYED RELEASE ORAL at 11:28

## 2022-10-31 RX ADMIN — CHLORHEXIDINE GLUCONATE 1 APPLICATION(S): 213 SOLUTION TOPICAL at 11:37

## 2022-10-31 RX ADMIN — Medication 3 MILLILITER(S): at 14:33

## 2022-10-31 RX ADMIN — Medication 4 UNIT(S): at 11:18

## 2022-10-31 RX ADMIN — ENOXAPARIN SODIUM 65 MILLIGRAM(S): 100 INJECTION SUBCUTANEOUS at 01:35

## 2022-10-31 RX ADMIN — Medication 3 MILLILITER(S): at 03:16

## 2022-10-31 RX ADMIN — Medication 10: at 16:40

## 2022-10-31 NOTE — PROGRESS NOTE ADULT - SUBJECTIVE AND OBJECTIVE BOX
SUBJECTIVE / OVERNIGHT EVENTS:pt seen and examined  10-31-22     MEDICATIONS  (STANDING):  albuterol/ipratropium for Nebulization 3 milliLiter(s) Nebulizer every 6 hours  atorvastatin 20 milliGRAM(s) Oral at bedtime  azaTHIOprine 50 milliGRAM(s) Oral daily  chlorhexidine 2% Cloths 1 Application(s) Topical <User Schedule>  enoxaparin Injectable 65 milliGRAM(s) SubCutaneous every 12 hours  gabapentin 300 milliGRAM(s) Oral daily  influenza   Vaccine 0.5 milliLiter(s) IntraMuscular once  insulin glargine Injectable (LANTUS) 30 Unit(s) SubCutaneous at bedtime  insulin lispro (ADMELOG) corrective regimen sliding scale   SubCutaneous three times a day before meals  insulin lispro (ADMELOG) corrective regimen sliding scale   SubCutaneous at bedtime  insulin lispro Injectable (ADMELOG) 8 Unit(s) SubCutaneous three times a day before meals  pantoprazole  Injectable 40 milliGRAM(s) IV Push daily  traZODone 100 milliGRAM(s) Oral at bedtime  trimethoprim  160 mG/sulfamethoxazole 800 mG 1 Tablet(s) Oral daily    MEDICATIONS  (PRN):    Vital Signs Last 24 Hrs  T(C): 36.6 (10-31-22 @ 20:00), Max: 36.8 (10-31-22 @ 15:00)  T(F): 97.8 (10-31-22 @ 20:00), Max: 98.3 (10-31-22 @ 15:00)  HR: 97 (10-31-22 @ 21:00) (89 - 113)  BP: 130/72 (10-31-22 @ 20:00) (120/58 - 164/91)  BP(mean): 86 (10-31-22 @ 20:00) (72 - 115)  RR: 19 (10-31-22 @ 21:00) (17 - 31)  SpO2: 100% (10-31-22 @ 21:00) (96% - 100%)            Constitutional: No fever, fatigue  Skin: No rash.  Eyes: No recent vision problems or eye pain.  ENT: No congestion, ear pain, or sore throat.  Cardiovascular: No chest pain or palpation.  Respiratory: No cough, shortness of breath, congestion, or wheezing.  Gastrointestinal: No abdominal pain, nausea, vomiting, or diarrhea.  Genitourinary: No dysuria.  Musculoskeletal: No joint swelling.  Neurologic: No headache.    PHYSICAL EXAM:  GENERAL: NAD  EYES: EOMI, PERRLA  NECK: Supple, No JVD  CHEST/LUNG: dec breath sounds at bases  HEART:  S1 , S2 +  ABDOMEN: soft, bs+  EXTREMITIES:  trace edema  NEUROLOGY:alert awake      LABS:  10-31    136  |  103  |  19<H>  ----------------------------<  289<H>  4.7   |  23  |  0.83    Ca    8.5      31 Oct 2022 07:21  Phos  3.0     10-31  Mg     2.4     10-31    TPro  6.5  /  Alb  2.0<L>  /  TBili  0.4  /  DBili      /  AST  64<H>  /  ALT  48  /  AlkPhos  102  10-31    Creatinine Trend: 0.83 <--, 0.70 <--, 1.21 <--                        10.4   11.98 )-----------( 382      ( 31 Oct 2022 07:21 )             33.8     Urine Studies:  Urinalysis Basic - ( 30 Oct 2022 12:35 )    Color: Yellow / Appearance: Clear / S.010 / pH:   Gluc:  / Ketone: Negative  / Bili: Negative / Urobili: Negative   Blood:  / Protein: 100 / Nitrite: Negative   Leuk Esterase: Negative / RBC: 0-2 /HPF / WBC 0-2 /HPF   Sq Epi:  / Non Sq Epi:  / Bacteria:               LIVER FUNCTIONS - ( 31 Oct 2022 07:21 )  Alb: 2.0 g/dL / Pro: 6.5 g/dL / ALK PHOS: 102 U/L / ALT: 48 U/L DA / AST: 64 U/L / GGT: x                 RADIOLOGY & ADDITIONAL TESTS:    Imaging Personally Reviewed:yes    Consultant(s) Notes Reviewed:  yes    Care Discussed with Consultants/Other Providers:yes

## 2022-10-31 NOTE — DIETITIAN INITIAL EVALUATION ADULT - ADD RECOMMEND
(A1c 12.4)/ Steroids. Pt seen in ICU, sleepy. Requested Glucerna shakes BID. Consider diet ed when more awake/ less acutely ill. MD to monitor. RD available.

## 2022-10-31 NOTE — PROGRESS NOTE ADULT - SUBJECTIVE AND OBJECTIVE BOX
Patient is a 62y old  Female who presents with a chief complaint of P/E (30 Oct 2022 15:46)    INTERVAL HISTORY/ OVERNIGHT EVENTS:       PRESSORS: [ ] YES [ ] NO  WHICH:    Antimicrobial:  trimethoprim  160 mG/sulfamethoxazole 800 mG 1 Tablet(s) Oral daily    Cardiovascular:    Pulmonary:  ALBUTerol    90 MICROgram(s) HFA Inhaler 2 Puff(s) Inhalation every 6 hours  albuterol/ipratropium for Nebulization 3 milliLiter(s) Nebulizer every 6 hours    Hematalogic:  enoxaparin Injectable 65 milliGRAM(s) SubCutaneous every 12 hours    Other:  atorvastatin 20 milliGRAM(s) Oral at bedtime  azaTHIOprine 50 milliGRAM(s) Oral daily  gabapentin 300 milliGRAM(s) Oral daily  influenza   Vaccine 0.5 milliLiter(s) IntraMuscular once  insulin glargine Injectable (LANTUS) 25 Unit(s) SubCutaneous at bedtime  insulin lispro (ADMELOG) corrective regimen sliding scale   SubCutaneous every 6 hours  insulin lispro Injectable (ADMELOG) 4 Unit(s) SubCutaneous three times a day before meals  methylPREDNISolone sodium succinate Injectable 40 milliGRAM(s) IV Push every 8 hours  pantoprazole  Injectable 40 milliGRAM(s) IV Push daily  traZODone 100 milliGRAM(s) Oral at bedtime    ALBUTerol    90 MICROgram(s) HFA Inhaler 2 Puff(s) Inhalation every 6 hours  albuterol/ipratropium for Nebulization 3 milliLiter(s) Nebulizer every 6 hours  atorvastatin 20 milliGRAM(s) Oral at bedtime  azaTHIOprine 50 milliGRAM(s) Oral daily  enoxaparin Injectable 65 milliGRAM(s) SubCutaneous every 12 hours  gabapentin 300 milliGRAM(s) Oral daily  influenza   Vaccine 0.5 milliLiter(s) IntraMuscular once  insulin glargine Injectable (LANTUS) 25 Unit(s) SubCutaneous at bedtime  insulin lispro (ADMELOG) corrective regimen sliding scale   SubCutaneous every 6 hours  insulin lispro Injectable (ADMELOG) 4 Unit(s) SubCutaneous three times a day before meals  methylPREDNISolone sodium succinate Injectable 40 milliGRAM(s) IV Push every 8 hours  pantoprazole  Injectable 40 milliGRAM(s) IV Push daily  traZODone 100 milliGRAM(s) Oral at bedtime  trimethoprim  160 mG/sulfamethoxazole 800 mG 1 Tablet(s) Oral daily    Drug Dosing Weight  Height (cm): 167.6 (29 Oct 2022 17:42)  Weight (kg): 65.5 (29 Oct 2022 17:42)  BMI (kg/m2): 23.3 (29 Oct 2022 17:42)  BSA (m2): 1.74 (29 Oct 2022 17:42)    INTUBATED: [ ] YES [ ] NO  DATE:  CENTRAL LINE: [ ] YES [ ] NO  LOCATION:     BAKER: [ ] YES [ ] NO      A-LINE:  [ ] YES [ ] NO  LOCATION:         ICU Vital Signs Last 24 Hrs  T(C): 36.6 (31 Oct 2022 04:22), Max: 37 (30 Oct 2022 17:00)  T(F): 97.8 (31 Oct 2022 04:22), Max: 98.6 (30 Oct 2022 17:00)  HR: 91 (31 Oct 2022 07:00) (90 - 114)  BP: 126/61 (31 Oct 2022 06:00) (117/102 - 164/91)  BP(mean): 76 (31 Oct 2022 06:00) (72 - 107)  ABP: --  ABP(mean): --  RR: 20 (31 Oct 2022 07:00) (13 - 31)  SpO2: 99% (31 Oct 2022 07:00) (93% - 100%)    O2 Parameters below as of 31 Oct 2022 03:58  Patient On (Oxygen Delivery Method): nasal cannula, high flow  O2 Flow (L/min): 40  O2 Concentration (%): 30        ABG - ( 29 Oct 2022 12:58 )  pH, Arterial: 7.39  pH, Blood: x     /  pCO2: 35    /  pO2: 216   / HCO3: 21    / Base Excess: -3.3  /  SaO2: 98                    10-30 @ 07:01  -  10-31 @ 07:00  --------------------------------------------------------  IN: 720 mL / OUT: 1225 mL / NET: -505 mL            PHYSICAL EXAM:    GENERAL: NAD, well-groomed, well-developed  EYES: EOMI, PERRLA,   NECK: Supple, No JVD; Normal thyroid; Trachea midline  NERVOUS SYSTEM:  Alert & Oriented X3,  Motor Strength 5/5 B/L upper and lower extremities; DTRs 2+ intact and symmetric  CHEST/LUNG: No rales, rhonchi, wheezing   HEART: Regular rate and rhythm; No murmurs,   ABDOMEN: Soft, Nontender, Nondistended; Bowel sounds present  EXTREMITIES:  2+ Peripheral Pulses, No clubbing, cyanosis, or edema      LABS:                        10.2   10.25 )-----------( 349      ( 30 Oct 2022 05:09 )             32.8     10-30    136  |  104  |  14  ----------------------------<  255<H>  4.5   |  26  |  0.70    Ca    8.5      30 Oct 2022 05:09  Phos  2.8     10-30  Mg     2.2     10-30    TPro  6.5  /  Alb  2.2<L>  /  TBili  0.5  /  DBili  x   /  AST  18  /  ALT  30  /  AlkPhos  104  10-30    PT/INR - ( 29 Oct 2022 07:42 )   PT: 11.3 sec;   INR: 0.95 ratio         PTT - ( 29 Oct 2022 07:42 )  PTT:23.4 sec  Urinalysis Basic - ( 30 Oct 2022 12:35 )    Color: Yellow / Appearance: Clear / S.010 / pH: x  Gluc: x / Ketone: Negative  / Bili: Negative / Urobili: Negative   Blood: x / Protein: 100 / Nitrite: Negative   Leuk Esterase: Negative / RBC: 0-2 /HPF / WBC 0-2 /HPF   Sq Epi: x / Non Sq Epi: x / Bacteria: x      CAPILLARY BLOOD GLUCOSE      POCT Blood Glucose.: 244 mg/dL (31 Oct 2022 06:17)  POCT Blood Glucose.: 311 mg/dL (31 Oct 2022 01:21)  POCT Blood Glucose.: 294 mg/dL (30 Oct 2022 22:34)  POCT Blood Glucose.: 340 mg/dL (30 Oct 2022 16:36)  POCT Blood Glucose.: 376 mg/dL (30 Oct 2022 11:30)    Culture Results:   No growth to date. (10-29 @ 07:40)  Culture Results:   No growth to date. (10-29 @ 07:35)      RADIOLOGY & ADDITIONAL STUDIES REVIEWED:  ***     Patient is a 62y old  Female who presents with a chief complaint of P/E (30 Oct 2022 15:46)    INTERVAL HISTORY/ OVERNIGHT EVENTS:   No acute overnight events. Pt states she feels mildly short of breath. Pt had a BM today and feels relief. Otherwise denies any other acute complaints.    PRESSORS: [ ] YES [X] NO  WHICH:    Antimicrobial:  trimethoprim  160 mG/sulfamethoxazole 800 mG 1 Tablet(s) Oral daily    Cardiovascular:    Pulmonary:  ALBUTerol    90 MICROgram(s) HFA Inhaler 2 Puff(s) Inhalation every 6 hours  albuterol/ipratropium for Nebulization 3 milliLiter(s) Nebulizer every 6 hours    Hematalogic:  enoxaparin Injectable 65 milliGRAM(s) SubCutaneous every 12 hours    Other:  atorvastatin 20 milliGRAM(s) Oral at bedtime  azaTHIOprine 50 milliGRAM(s) Oral daily  gabapentin 300 milliGRAM(s) Oral daily  influenza   Vaccine 0.5 milliLiter(s) IntraMuscular once  insulin glargine Injectable (LANTUS) 25 Unit(s) SubCutaneous at bedtime  insulin lispro (ADMELOG) corrective regimen sliding scale   SubCutaneous every 6 hours  insulin lispro Injectable (ADMELOG) 4 Unit(s) SubCutaneous three times a day before meals  methylPREDNISolone sodium succinate Injectable 40 milliGRAM(s) IV Push every 8 hours  pantoprazole  Injectable 40 milliGRAM(s) IV Push daily  traZODone 100 milliGRAM(s) Oral at bedtime    ALBUTerol    90 MICROgram(s) HFA Inhaler 2 Puff(s) Inhalation every 6 hours  albuterol/ipratropium for Nebulization 3 milliLiter(s) Nebulizer every 6 hours  atorvastatin 20 milliGRAM(s) Oral at bedtime  azaTHIOprine 50 milliGRAM(s) Oral daily  enoxaparin Injectable 65 milliGRAM(s) SubCutaneous every 12 hours  gabapentin 300 milliGRAM(s) Oral daily  influenza   Vaccine 0.5 milliLiter(s) IntraMuscular once  insulin glargine Injectable (LANTUS) 25 Unit(s) SubCutaneous at bedtime  insulin lispro (ADMELOG) corrective regimen sliding scale   SubCutaneous every 6 hours  insulin lispro Injectable (ADMELOG) 4 Unit(s) SubCutaneous three times a day before meals  methylPREDNISolone sodium succinate Injectable 40 milliGRAM(s) IV Push every 8 hours  pantoprazole  Injectable 40 milliGRAM(s) IV Push daily  traZODone 100 milliGRAM(s) Oral at bedtime  trimethoprim  160 mG/sulfamethoxazole 800 mG 1 Tablet(s) Oral daily    Drug Dosing Weight  Height (cm): 167.6 (29 Oct 2022 17:42)  Weight (kg): 65.5 (29 Oct 2022 17:42)  BMI (kg/m2): 23.3 (29 Oct 2022 17:42)  BSA (m2): 1.74 (29 Oct 2022 17:42)    INTUBATED: [ ] YES [X] NO  DATE:  CENTRAL LINE: [ ] YES [X] NO  LOCATION:     BAKER: [ ] YES [X] NO      A-LINE:  [ ] YES [X] NO  LOCATION:         ICU Vital Signs Last 24 Hrs  T(C): 36.6 (31 Oct 2022 04:22), Max: 37 (30 Oct 2022 17:00)  T(F): 97.8 (31 Oct 2022 04:22), Max: 98.6 (30 Oct 2022 17:00)  HR: 91 (31 Oct 2022 07:00) (90 - 114)  BP: 126/61 (31 Oct 2022 06:00) (117/102 - 164/91)  BP(mean): 76 (31 Oct 2022 06:00) (72 - 107)  ABP: --  ABP(mean): --  RR: 20 (31 Oct 2022 07:00) (13 - 31)  SpO2: 99% (31 Oct 2022 07:00) (93% - 100%)    O2 Parameters below as of 31 Oct 2022 03:58  Patient On (Oxygen Delivery Method): nasal cannula, high flow  O2 Flow (L/min): 40  O2 Concentration (%): 30        ABG - ( 29 Oct 2022 12:58 )  pH, Arterial: 7.39  pH, Blood: x     /  pCO2: 35    /  pO2: 216   / HCO3: 21    / Base Excess: -3.3  /  SaO2: 98                10-30 @ 07:01  -  10-31 @ 07:00  --------------------------------------------------------  IN: 720 mL / OUT: 1225 mL / NET: -505 mL        PHYSICAL EXAM:  GENERAL: NAD, well-groomed, well-developed HFNC  HEAD:  Atraumatic, Normocephalic  EYES: EOMI, PERRLA, conjunctiva and sclera clear  ENMT: No tonsillar erythema, exudates, or enlargement; Moist mucous membranes, Good dentition, No lesions  NECK: Supple, No JVD, Normal thyroid  NERVOUS SYSTEM:  Alert & Oriented X3, Motor Strength 5/5 B/L upper and lower extremities  CHEST/LUNG: Clear to auscultation; No rales, rhonchi, wheezing, or rubs  HEART: regular rate and rhythm, No murmurs, rubs, or gallops  ABDOMEN: Soft, Nontender, Nondistended; Bowel sounds present  EXTREMITIES:  2+ Peripheral Pulses, No clubbing, cyanosis, or edema  LYMPH: No lymphadenopathy noted      LABS:                        10.2   10.25 )-----------( 349      ( 30 Oct 2022 05:09 )             32.8     10-30    136  |  104  |  14  ----------------------------<  255<H>  4.5   |  26  |  0.70    Ca    8.5      30 Oct 2022 05:09  Phos  2.8     10-30  Mg     2.2     10-30    TPro  6.5  /  Alb  2.2<L>  /  TBili  0.5  /  DBili  x   /  AST  18  /  ALT  30  /  AlkPhos  104  10-30    PT/INR - ( 29 Oct 2022 07:42 )   PT: 11.3 sec;   INR: 0.95 ratio         PTT - ( 29 Oct 2022 07:42 )  PTT:23.4 sec  Urinalysis Basic - ( 30 Oct 2022 12:35 )    Color: Yellow / Appearance: Clear / S.010 / pH: x  Gluc: x / Ketone: Negative  / Bili: Negative / Urobili: Negative   Blood: x / Protein: 100 / Nitrite: Negative   Leuk Esterase: Negative / RBC: 0-2 /HPF / WBC 0-2 /HPF   Sq Epi: x / Non Sq Epi: x / Bacteria: x      CAPILLARY BLOOD GLUCOSE      POCT Blood Glucose.: 244 mg/dL (31 Oct 2022 06:17)  POCT Blood Glucose.: 311 mg/dL (31 Oct 2022 01:21)  POCT Blood Glucose.: 294 mg/dL (30 Oct 2022 22:34)  POCT Blood Glucose.: 340 mg/dL (30 Oct 2022 16:36)  POCT Blood Glucose.: 376 mg/dL (30 Oct 2022 11:30)    Culture Results:   No growth to date. (10-29 @ 07:40)  Culture Results:   No growth to date. (10-29 @ 07:35)      RADIOLOGY & ADDITIONAL STUDIES REVIEWED:  ***

## 2022-10-31 NOTE — PROGRESS NOTE ADULT - SUBJECTIVE AND OBJECTIVE BOX
PULMONARY/ CCM  progress note    ESTEBAN BRANHAM  MRN-[id number]    Patient is a 62y old  Female who presents with a chief complaint of P/E (30 Oct 2022 15:46)  c/o sob, on 30% o2, no chest pain      MEDICATIONS  (STANDING):  albuterol/ipratropium for Nebulization 3 milliLiter(s) Nebulizer every 6 hours  atorvastatin 20 milliGRAM(s) Oral at bedtime  azaTHIOprine 50 milliGRAM(s) Oral daily  chlorhexidine 2% Cloths 1 Application(s) Topical <User Schedule>  enoxaparin Injectable 65 milliGRAM(s) SubCutaneous every 12 hours  gabapentin 300 milliGRAM(s) Oral daily  influenza   Vaccine 0.5 milliLiter(s) IntraMuscular once  insulin glargine Injectable (LANTUS) 25 Unit(s) SubCutaneous at bedtime  insulin lispro (ADMELOG) corrective regimen sliding scale   SubCutaneous every 6 hours  insulin lispro Injectable (ADMELOG) 4 Unit(s) SubCutaneous three times a day before meals  methylPREDNISolone sodium succinate Injectable 40 milliGRAM(s) IV Push every 8 hours  pantoprazole  Injectable 40 milliGRAM(s) IV Push daily  polyethylene glycol 3350 17 Gram(s) Oral daily  traZODone 100 milliGRAM(s) Oral at bedtime  trimethoprim  160 mG/sulfamethoxazole 800 mG 1 Tablet(s) Oral daily      PAST MEDICAL & SURGICAL HISTORY:  Diabetes mellitus  Hyperlipidemia  Cataract  right eye               REVIEW OF SYSTEMS:  CONSTITUTIONAL: No fever, weight loss, or fatigue   EYES: No eye pain, visual disturbances, or discharge  ENT:  No difficulty hearing, tinnitus, vertigo; No sinus or throat pain  NECK: No pain or stiffness or nodes  RESPIRATORY:  cough +  wheezing--   chills--   hemoptysis--  Shortness of Breath+  CARDIOVASCULAR: No chest pain, palpitations, passing out, dizziness, or leg swelling  GASTROINTESTINAL: No abdominal or epigastric pain. No nausea, vomiting, or hematemesis;   GENITOURINARY: No dysuria, frequency, hematuria, or incontinence  NEUROLOGICAL: No headaches, memory loss, loss of strength, numbness, or tremors  SKIN: No itching, burning, rashes, or lesions   LYMPH Nodes: No enlarged glands  ENDOCRINE: No heat or cold intolerance; No hair loss  MUSCULOSKELETAL: No joint pain or swelling; No muscle, back, or extremity pain  ALLERGY AND IMMUNOLOGIC: No hives or eczema        Vital Signs Last 24 Hrs  T(C): 36.6 (31 Oct 2022 04:22), Max: 37 (30 Oct 2022 17:00)  T(F): 97.8 (31 Oct 2022 04:22), Max: 98.6 (30 Oct 2022 17:00)  HR: 102 (31 Oct 2022 09:00) (89 - 114)  BP: 142/74 (31 Oct 2022 09:) (117/102 - 164/91)  BP(mean): 91 (31 Oct 2022 09:) (72 - 115)  RR: 22 (31 Oct 2022 09:) (17 - 31)  SpO2: 100% (31 Oct 2022 09:) (93% - 100%)    Parameters below as of 31 Oct 2022 09:  Patient On (Oxygen Delivery Method): nasal cannula, high flow  O2 Flow (L/min): 40  O2 Concentration (%): 30  I&O's Detail    30 Oct 2022 07:01  -  31 Oct 2022 07:00  --------------------------------------------------------  IN:    Oral Fluid: 720 mL  Total IN: 720 mL    OUT:    Voided (mL): 1225 mL  Total OUT: 1225 mL    Total NET: -505 mL      31 Oct 2022 07:01  -  31 Oct 2022 10:12  --------------------------------------------------------  IN:    Oral Fluid: 240 mL  Total IN: 240 mL    OUT:    Voided (mL): 300 mL  Total OUT: 300 mL    Total NET: -60 mL          PHYSICAL EXAMINATION:    GENERAL: The patient is a well-developed, pleasant B/F in no apparent distress.   SKIN no rash ecchymoses or bruises  HEENT: Head is normocephalic and atraumatic  RICHARD , Extraocular muscles are intact. Mucous membranes  moist.   Neck supple ,No LN felt JVP not increased  Thyroid not enlarged  Cardiovascular:  S1 S2 heard, RSR, No JVD , systolic  murmur at apex, No gallop or rub  Respiratory: Chest wall symmetrical with good air entry ,Percussion note normal,    Lungs vesicular breathing with  inspiratory basilar  rales , no  wheeze	  ABDOMEN:  Soft, Non-tender,  no hepatomegaly or splenomegaly BS positive	  Extremities: Normal range of motion, No clubbing, cyanosis or edema  Vascular: Peripheral pulses palpable 2+ bilaterally  CNS:  Alert and oriented x3  Cranial nerves intact  sensory intact  motor power5/5  dtr 2+  Babinski neg        LABS:                        10.4   11.98 )-----------( 382      ( 31 Oct 2022 07:21 )             33.8     10-31    136  |  103  |  19<H>  ----------------------------<  289<H>  4.7   |  23  |  0.83    Ca    8.5      31 Oct 2022 07:21  Phos  3.0     10-31  Mg     2.4     10-31    TPro  6.5  /  Alb  2.0<L>  /  TBili  0.4  /  DBili  x   /  AST  64<H>  /  ALT  48  /  AlkPhos  102  10-31      Urinalysis Basic - ( 30 Oct 2022 12:35 )    Color: Yellow / Appearance: Clear / S.010 / pH: x  Gluc: x / Ketone: Negative  / Bili: Negative / Urobili: Negative   Blood: x / Protein: 100 / Nitrite: Negative   Leuk Esterase: Negative / RBC: 0-2 /HPF / WBC 0-2 /HPF   Sq Epi: x / Non Sq Epi: x / Bacteria: x      ABG - ( 29 Oct 2022 12:58 )  pH, Arterial: 7.39  pH, Blood: x     /  pCO2: 35    /  pO2: 216   / HCO3: 21    / Base Excess: -3.3  /  SaO2: 98          Troponin I, High Sensitivity Result: 218.3 ng/L (10-30-22 @ 08:30)  Troponin I, High Sensitivity Result: 220.1 ng/L (10-30-22 @ 05:09)      Serum Pro-Brain Natriuretic Peptide: 4519 pg/mL (10-29-22 @ 07:42)      HbA1C  12.4      MICROBIOLOGY:    Culture - Blood (collected 10-29-22 @ 07:40)  Source: .Blood Blood-Peripheral  Preliminary Report (10-30-22 @ 17:01):    No growth to date.    Culture - Blood (collected 10-29-22 @ 07:35)  Source: .Blood Blood-Peripheral  Preliminary Report (10-30-22 @ 17:01):    No growth to date.

## 2022-10-31 NOTE — DIETITIAN INITIAL EVALUATION ADULT - PERTINENT MEDS FT
MEDICATIONS  (STANDING):  albuterol/ipratropium for Nebulization 3 milliLiter(s) Nebulizer every 6 hours  atorvastatin 20 milliGRAM(s) Oral at bedtime  azaTHIOprine 50 milliGRAM(s) Oral daily  chlorhexidine 2% Cloths 1 Application(s) Topical <User Schedule>  enoxaparin Injectable 65 milliGRAM(s) SubCutaneous every 12 hours  gabapentin 300 milliGRAM(s) Oral daily  influenza   Vaccine 0.5 milliLiter(s) IntraMuscular once  insulin glargine Injectable (LANTUS) 25 Unit(s) SubCutaneous at bedtime  insulin lispro (ADMELOG) corrective regimen sliding scale   SubCutaneous three times a day before meals  insulin lispro (ADMELOG) corrective regimen sliding scale   SubCutaneous at bedtime  insulin lispro Injectable (ADMELOG) 4 Unit(s) SubCutaneous three times a day before meals  pantoprazole  Injectable 40 milliGRAM(s) IV Push daily  traZODone 100 milliGRAM(s) Oral at bedtime  trimethoprim  160 mG/sulfamethoxazole 800 mG 1 Tablet(s) Oral daily    MEDICATIONS  (PRN):

## 2022-10-31 NOTE — PROGRESS NOTE ADULT - ASSESSMENT
Patient is a 62y old  Female with hx of Pulmonary fibrosis (dx in 2020, not on home oxygen), DM, accompanied by daughter presents with shortness of breath for the last three days. Upon evaluation in ED patient afebrile, hemodynamically stable but hypoxic to 63% on RA. On CTA patient found to have right lower lobe segmental/ subsegmental PE and findings consistent with Interstitial lung disease (traction bronchiectasis and reticular ground glass opacities). Patient admitted to ICU for AHRF 2/2 PE and Pulmonary Fibrosis.     #Actue Hypoxic Respiratory Failure   #Right Segmental/ Subsegmental PE  #Pulmonary Fibrosis  # Elevated troponin  #DM   #Leukocytosis     ----------------CNS:------------------  No acute issues, patient as per family is at baseline    -----------------CVS:------------------  -Elevated troponin level: 155>220>218  -? 2/2 right sided heart strain due to severe pulm HTN vs PE  - EKG noted to be Sinus Tachycardia 118 LAD  - Echo with EF 55-60%, Mild concentric left ventricular hypertrophy and Right ventricular  enlargement with normal RV systolic function, elevated RV pressure; no valvular dysfunction  Cardiology consulted Dr. Wilhelm         -----------------RESPIRATORY:--------  AHRF 2/2 Right Subsegmental PE vs Pulmonary fibrosis   - CT scan showing ILD and right subsegmental PE  - on admission patient noted to be hypoxic on RA to 63%  - supplement oxygen as needed, HFNC  - full dose Lovenox  - Echo findings as above  - LE doppler U/S: POS-  Thrombus in the right peroneal vein.    Pulmonary fibrosis  - patient dx 2 yrs ago, not on home oxygen, will likely need home O2 on d/c   - currently on prednisone 20mg BID at home  - switch IV solumedrol q8 to PO Prednisone 40qd  - c/w empiric bactrim for PCP ppx  -   - f/u PCP sputum culture  - contact transplant center to start referral  - SW consult   - Goals of Care conversation given diagnosis/disease process/prognosis    -----------------GI:----------------------  No issues     -----------------ID:-----------------------  Leukocytosis  - afebrile will monitor off abx for now       -----------------RENAL: ---------------  No issues      -----------------HEME/ONC: ---------------  Anemia  - hgb 10.6  - transfuse if hgb<7  - monitor cbc     -----------------ENDO: ---------------  Diabetes  - Lantus 25U and Admelog 3U TID  - SSI  - monitor BG, elevated due to steroids    -----------------EXTREMITIES:-------  No acute issues     -----------------PROPHYLAXIS: -------  Full Dose Lovenox     -----------------DISPOSITION--------  ICU      Patient is a 62y old  Female with hx of Pulmonary fibrosis (dx in 2020, not on home oxygen), DM, accompanied by daughter presents with shortness of breath for the last three days. Upon evaluation in ED patient afebrile, hemodynamically stable but hypoxic to 63% on RA. On CTA patient found to have right lower lobe segmental/ subsegmental PE and findings consistent with Interstitial lung disease (traction bronchiectasis and reticular ground glass opacities). Patient admitted to ICU for AHRF 2/2 PE and Pulmonary Fibrosis.     #Actue Hypoxic Respiratory Failure   #Right Segmental/ Subsegmental PE  #Pulmonary Fibrosis  # Elevated troponin  #DM   #Leukocytosis     ----------------CNS:------------------  No acute issues, patient as per family is at baseline    -----------------CVS:------------------  -Elevated troponin level: 155>220>218  -? 2/2 right sided heart strain due to severe pulm HTN vs PE  - EKG noted to be Sinus Tachycardia 118 LAD  - Echo with EF 55-60%, Mild concentric left ventricular hypertrophy and Right ventricular  enlargement with normal RV systolic function, elevated RV pressure; no valvular dysfunction  Cardiology consulted Dr. Wilhelm         -----------------RESPIRATORY:--------  AHRF 2/2 Right Subsegmental PE vs Pulmonary fibrosis   - CT scan showing ILD and right subsegmental PE  - on admission patient noted to be hypoxic on RA to 63%  - supplement oxygen as needed, HFNC  - full dose Lovenox  - Echo findings as above  - LE doppler U/S: POS-  Thrombus in the right peroneal vein.    Pulmonary fibrosis  - patient dx 2 yrs ago, not on home oxygen, will likely need home O2 on d/c   - currently on prednisone 20mg BID at home  - switch IV solumedrol q8 to PO Prednisone 40qd  - c/w empiric bactrim for PCP ppx  -   - f/u PCP sputum culture  - contact transplant center to start referral  - SW consult   - Goals of Care conversation given diagnosis/disease process/prognosis    -----------------GI:----------------------  No issues     -----------------ID:-----------------------  Leukocytosis  - afebrile will monitor off abx for now   - blood cx negative      -----------------RENAL: ---------------  No issues      -----------------HEME/ONC: ---------------  Anemia  - hgb 10.6  - transfuse if hgb<7  - monitor cbc     -----------------ENDO: ---------------  Diabetes  - Lantus 25U and Admelog 3U TID  - SSI  - monitor BG, elevated due to steroids    -----------------EXTREMITIES:-------  No acute issues     -----------------PROPHYLAXIS: -------  Full Dose Lovenox     -----------------DISPOSITION--------  ICU

## 2022-10-31 NOTE — PROGRESS NOTE ADULT - ASSESSMENT
Acute Pulm Embolism RLL with RV strain increasing Troponin sec to DVT rt Peroneal nerve   Pulm Fibrosis ? Chemicals exposure at work x 17 years with Cor Pulmonale with Pulm Htn   IDDM--- Uncontrolled    Plan---  Azothiaprine ,  Septra -DS  S/C Lovenox 65 mg q 12h  O2 n/c 3 lpm  FS coverage, Increase Insulin basal  Po Prednisone 30 mg qd pc  Consider Xarelto 15 mg bid x 3 weeks then 20 mg qd  OOB in chair/ BRP   Discussed with ICU team and PMD

## 2022-10-31 NOTE — DIETITIAN INITIAL EVALUATION ADULT - PERTINENT LABORATORY DATA
10-31    136  |  103  |  19<H>  ----------------------------<  289<H>  4.7   |  23  |  0.83    Ca    8.5      31 Oct 2022 07:21  Phos  3.0     10-31  Mg     2.4     10-31    TPro  6.5  /  Alb  2.0<L>  /  TBili  0.4  /  DBili  x   /  AST  64<H>  /  ALT  48  /  AlkPhos  102  10-31  POCT Blood Glucose.: 389 mg/dL (10-31-22 @ 16:03)  A1C with Estimated Average Glucose Result: 12.4 % (10-30-22 @ 05:09)

## 2022-10-31 NOTE — DIETITIAN INITIAL EVALUATION ADULT - OTHER INFO
Pt seen, sleepy. She reports she is 5'7 w/ -145#. She reports good intake PTA and in hospital (intake at breakfast noted as 50%). Pt rerquesting Glucerna BID. Discussed with PGY 1, diet order changed + to include Glucerna BID. Called kitchen for pt's flavor preference of Vanilla.

## 2022-10-31 NOTE — PROGRESS NOTE ADULT - SUBJECTIVE AND OBJECTIVE BOX
PATIENT SEEN AND EXAMINED ON :- 10/31/22  DATE OF SERVICE:  10/31/22           Interim events noted,Labs ,Radiological studies and Cardiology tests reviewed .       HOSPITAL COURSE: HPI:      INTERIM EVENTS:Patient seen at bedside ,interim events noted.      PMH -reviewed admission note, no change since admission  HEART FAILURE: Acute[ ]Chronic[ ] Systolic[ ] Diastolic[ ] Combined Systolic and Diastolic[ ]  CAD[ ] CABG[ ] PCI[ ]  DEVICES[ ] PPM[ ] ICD[ ] ILR[ ]  ATRIAL FIBRILLATION[ ] Paroxysmal[ ] Permanent[ ] CHADS2-[  ]  DONOVAN[ ] CKD1[ ] CKD2[ ] CKD3[ ] CKD4[ ] ESRD[ ]  COPD[ ] HTN[ ]   DM[ ] Type1[ ] Type 2[ ]   CVA[ ] Paresis[ ]    AMBULATION: Assisted[ ] Cane/walker[ ] Independent[ ]    MEDICATIONS  (STANDING):  albuterol/ipratropium for Nebulization 3 milliLiter(s) Nebulizer every 6 hours  atorvastatin 20 milliGRAM(s) Oral at bedtime  azaTHIOprine 50 milliGRAM(s) Oral daily  chlorhexidine 2% Cloths 1 Application(s) Topical <User Schedule>  enoxaparin Injectable 65 milliGRAM(s) SubCutaneous every 12 hours  gabapentin 300 milliGRAM(s) Oral daily  influenza   Vaccine 0.5 milliLiter(s) IntraMuscular once  insulin glargine Injectable (LANTUS) 30 Unit(s) SubCutaneous at bedtime  insulin lispro (ADMELOG) corrective regimen sliding scale   SubCutaneous three times a day before meals  insulin lispro (ADMELOG) corrective regimen sliding scale   SubCutaneous at bedtime  insulin lispro Injectable (ADMELOG) 8 Unit(s) SubCutaneous three times a day before meals  pantoprazole  Injectable 40 milliGRAM(s) IV Push daily  traZODone 100 milliGRAM(s) Oral at bedtime  trimethoprim  160 mG/sulfamethoxazole 800 mG 1 Tablet(s) Oral daily    MEDICATIONS  (PRN):            REVIEW OF SYSTEMS:  Constitutional: [ ] fever, [ ]weight loss,  [ ]fatigue [ ]weight gain  Eyes: [ ] visual changes  Respiratory: [ ]shortness of breath;  [ ] cough, [ ]wheezing, [ ]chills, [ ]hemoptysis  Cardiovascular: [ ] chest pain, [ ]palpitations, [ ]dizziness,  [ ]leg swelling[ ]orthopnea[ ]PND  Gastrointestinal: [ ] abdominal pain, [ ]nausea, [ ]vomiting,  [ ]diarrhea [ ]Constipation [ ]Melena  Genitourinary: [ ] dysuria, [ ] hematuria [ ]Pike  Neurologic: [ ] headaches [ ] tremors[ ]weakness [ ]Paralysis Right[ ] Left[ ]  Skin: [ ] itching, [ ]burning, [ ] rashes  Endocrine: [ ] heat or cold intolerance  Musculoskeletal: [ ] joint pain or swelling; [ ] muscle, back, or extremity pain  Psychiatric: [ ] depression, [ ]anxiety, [ ]mood swings, or [ ]difficulty sleeping  Hematologic: [ ] easy bruising, [ ] bleeding gums    [ ] All remaining systems negative except as per above.   [ ]Unable to obtain.  [x] No change in ROS since admission      Vital Signs Last 24 Hrs  T(C): 36.6 (31 Oct 2022 20:00), Max: 36.8 (31 Oct 2022 15:00)  T(F): 97.8 (31 Oct 2022 20:00), Max: 98.3 (31 Oct 2022 15:00)  HR: 97 (31 Oct 2022 21:00) (89 - 113)  BP: 130/72 (31 Oct 2022 20:00) (120/58 - 164/91)  BP(mean): 86 (31 Oct 2022 20:00) (72 - 115)  RR: 19 (31 Oct 2022 21:00) (17 - 31)  SpO2: 100% (31 Oct 2022 21:00) (96% - 100%)    Parameters below as of 31 Oct 2022 20:00  Patient On (Oxygen Delivery Method): nasal cannula  O2 Flow (L/min): 2    I&O's Summary    30 Oct 2022 07:01  -  31 Oct 2022 07:00  --------------------------------------------------------  IN: 720 mL / OUT: 1225 mL / NET: -505 mL    31 Oct 2022 07:01  -  31 Oct 2022 23:02  --------------------------------------------------------  IN: 960 mL / OUT: 800 mL / NET: 160 mL        PHYSICAL EXAM:  General: No acute distress BMI-  HEENT: EOMI, PERRL  Neck: Supple, [ ] JVD  Lungs: Equal air entry bilaterally; [ ] rales [ ] wheezing [ ] rhonchi  Heart: Regular rate and rhythm; [x ] murmur   2/6 [ x] systolic [ ] diastolic [ ] radiation[ ] rubs [ ]  gallops  Abdomen: Nontender, bowel sounds present  Extremities: No clubbing, cyanosis, [ ] edema [ ]Pulses  equal and intact  Nervous system:  Alert & Oriented X3, no focal deficits  Psychiatric: Normal affect  Skin: No rashes or lesions    LABS:  10-31    136  |  103  |  19<H>  ----------------------------<  289<H>  4.7   |  23  |  0.83    Ca    8.5      31 Oct 2022 07:21  Phos  3.0     10-31  Mg     2.4     10-31    TPro  6.5  /  Alb  2.0<L>  /  TBili  0.4  /  DBili  x   /  AST  64<H>  /  ALT  48  /  AlkPhos  102  10-31    Creatinine Trend: 0.83<--, 0.70<--, 1.21<--                        10.4   11.98 )-----------( 382      ( 31 Oct 2022 07:21 )             33.8

## 2022-11-01 LAB
ALBUMIN SERPL ELPH-MCNC: 2.2 G/DL — LOW (ref 3.5–5)
ALP SERPL-CCNC: 101 U/L — SIGNIFICANT CHANGE UP (ref 40–120)
ALT FLD-CCNC: 62 U/L DA — HIGH (ref 10–60)
ANION GAP SERPL CALC-SCNC: 6 MMOL/L — SIGNIFICANT CHANGE UP (ref 5–17)
AST SERPL-CCNC: 46 U/L — HIGH (ref 10–40)
BILIRUB SERPL-MCNC: 0.4 MG/DL — SIGNIFICANT CHANGE UP (ref 0.2–1.2)
BUN SERPL-MCNC: 17 MG/DL — SIGNIFICANT CHANGE UP (ref 7–18)
C3 SERPL-MCNC: 136 MG/DL — SIGNIFICANT CHANGE UP (ref 81–157)
C4 SERPL-MCNC: 39 MG/DL — SIGNIFICANT CHANGE UP (ref 13–39)
CALCIUM SERPL-MCNC: 8.3 MG/DL — LOW (ref 8.4–10.5)
CHLORIDE SERPL-SCNC: 106 MMOL/L — SIGNIFICANT CHANGE UP (ref 96–108)
CO2 SERPL-SCNC: 24 MMOL/L — SIGNIFICANT CHANGE UP (ref 22–31)
CREAT SERPL-MCNC: 0.91 MG/DL — SIGNIFICANT CHANGE UP (ref 0.5–1.3)
EGFR: 72 ML/MIN/1.73M2 — SIGNIFICANT CHANGE UP
GLUCOSE BLDC GLUCOMTR-MCNC: 116 MG/DL — HIGH (ref 70–99)
GLUCOSE BLDC GLUCOMTR-MCNC: 124 MG/DL — HIGH (ref 70–99)
GLUCOSE BLDC GLUCOMTR-MCNC: 165 MG/DL — HIGH (ref 70–99)
GLUCOSE BLDC GLUCOMTR-MCNC: 179 MG/DL — HIGH (ref 70–99)
GLUCOSE BLDC GLUCOMTR-MCNC: 207 MG/DL — HIGH (ref 70–99)
GLUCOSE SERPL-MCNC: 231 MG/DL — HIGH (ref 70–99)
HCT VFR BLD CALC: 36.1 % — SIGNIFICANT CHANGE UP (ref 34.5–45)
HGB BLD-MCNC: 11 G/DL — LOW (ref 11.5–15.5)
MAGNESIUM SERPL-MCNC: 2.1 MG/DL — SIGNIFICANT CHANGE UP (ref 1.6–2.6)
MCHC RBC-ENTMCNC: 25.6 PG — LOW (ref 27–34)
MCHC RBC-ENTMCNC: 30.5 GM/DL — LOW (ref 32–36)
MCV RBC AUTO: 84.1 FL — SIGNIFICANT CHANGE UP (ref 80–100)
NRBC # BLD: 0 /100 WBCS — SIGNIFICANT CHANGE UP (ref 0–0)
PHOSPHATE SERPL-MCNC: 3 MG/DL — SIGNIFICANT CHANGE UP (ref 2.5–4.5)
PLATELET # BLD AUTO: 434 K/UL — HIGH (ref 150–400)
POTASSIUM SERPL-MCNC: 4.4 MMOL/L — SIGNIFICANT CHANGE UP (ref 3.5–5.3)
POTASSIUM SERPL-SCNC: 4.4 MMOL/L — SIGNIFICANT CHANGE UP (ref 3.5–5.3)
PROT SERPL-MCNC: 6.4 G/DL — SIGNIFICANT CHANGE UP (ref 6–8.3)
RBC # BLD: 4.29 M/UL — SIGNIFICANT CHANGE UP (ref 3.8–5.2)
RBC # FLD: 13.5 % — SIGNIFICANT CHANGE UP (ref 10.3–14.5)
RHEUMATOID FACT SERPL-ACNC: <10 IU/ML — SIGNIFICANT CHANGE UP (ref 0–13)
SODIUM SERPL-SCNC: 136 MMOL/L — SIGNIFICANT CHANGE UP (ref 135–145)
VIT D25+D1,25 OH+D1,25 PNL SERPL-MCNC: 52.4 PG/ML — SIGNIFICANT CHANGE UP (ref 19.9–79.3)
WBC # BLD: 8.81 K/UL — SIGNIFICANT CHANGE UP (ref 3.8–10.5)
WBC # FLD AUTO: 8.81 K/UL — SIGNIFICANT CHANGE UP (ref 3.8–10.5)

## 2022-11-01 RX ORDER — APIXABAN 2.5 MG/1
1 TABLET, FILM COATED ORAL
Qty: 60 | Refills: 0
Start: 2022-11-01 | End: 2022-11-30

## 2022-11-01 RX ORDER — LOPERAMIDE HCL 2 MG
2 TABLET ORAL ONCE
Refills: 0 | Status: COMPLETED | OUTPATIENT
Start: 2022-11-01 | End: 2022-11-01

## 2022-11-01 RX ORDER — IPRATROPIUM/ALBUTEROL SULFATE 18-103MCG
3 AEROSOL WITH ADAPTER (GRAM) INHALATION EVERY 6 HOURS
Refills: 0 | Status: DISCONTINUED | OUTPATIENT
Start: 2022-11-01 | End: 2022-11-03

## 2022-11-01 RX ORDER — APIXABAN 2.5 MG/1
10 TABLET, FILM COATED ORAL EVERY 12 HOURS
Refills: 0 | Status: DISCONTINUED | OUTPATIENT
Start: 2022-11-02 | End: 2022-11-03

## 2022-11-01 RX ORDER — APIXABAN 2.5 MG/1
1 TABLET, FILM COATED ORAL
Qty: 30 | Refills: 0
Start: 2022-11-01 | End: 2022-11-30

## 2022-11-01 RX ADMIN — INSULIN GLARGINE 30 UNIT(S): 100 INJECTION, SOLUTION SUBCUTANEOUS at 21:00

## 2022-11-01 RX ADMIN — Medication 40 MILLIGRAM(S): at 05:48

## 2022-11-01 RX ADMIN — CHLORHEXIDINE GLUCONATE 1 APPLICATION(S): 213 SOLUTION TOPICAL at 05:49

## 2022-11-01 RX ADMIN — Medication 4: at 08:43

## 2022-11-01 RX ADMIN — ATORVASTATIN CALCIUM 20 MILLIGRAM(S): 80 TABLET, FILM COATED ORAL at 21:00

## 2022-11-01 RX ADMIN — Medication 2 MILLIGRAM(S): at 21:00

## 2022-11-01 RX ADMIN — PANTOPRAZOLE SODIUM 40 MILLIGRAM(S): 20 TABLET, DELAYED RELEASE ORAL at 11:36

## 2022-11-01 RX ADMIN — Medication 8 UNIT(S): at 11:36

## 2022-11-01 RX ADMIN — Medication 8 UNIT(S): at 16:51

## 2022-11-01 RX ADMIN — Medication 2: at 16:50

## 2022-11-01 RX ADMIN — GABAPENTIN 300 MILLIGRAM(S): 400 CAPSULE ORAL at 11:36

## 2022-11-01 RX ADMIN — Medication 8 UNIT(S): at 08:44

## 2022-11-01 RX ADMIN — Medication 1 TABLET(S): at 11:37

## 2022-11-01 RX ADMIN — AZATHIOPRINE 50 MILLIGRAM(S): 100 TABLET ORAL at 11:37

## 2022-11-01 RX ADMIN — ENOXAPARIN SODIUM 65 MILLIGRAM(S): 100 INJECTION SUBCUTANEOUS at 14:24

## 2022-11-01 RX ADMIN — Medication 100 MILLIGRAM(S): at 21:00

## 2022-11-01 RX ADMIN — ENOXAPARIN SODIUM 65 MILLIGRAM(S): 100 INJECTION SUBCUTANEOUS at 05:47

## 2022-11-01 NOTE — PHYSICAL THERAPY INITIAL EVALUATION ADULT - PERTINENT HX OF CURRENT PROBLEM, REHAB EVAL
Patient admitted from home due to SOB & midsternal chest pain, cough and swelling on both LE. Patient w/ h/o Pulmonary Fibrosis on Predinisone.

## 2022-11-01 NOTE — CHART NOTE - NSCHARTNOTEFT_GEN_A_CORE
Assessment:   Patient is a 62y old  Female who presents with a chief complaint of Other pulmonary embolism without acute cor pulmonale. See assessment 10/31.Discussed on ICU IDR this AM.  Pt noted for D/G. Pt visited at dinner, ate well (>75%). Provided diet ed/ copy on CHO Counting for Diabetes.     (31 Oct 2022 16:22)       Diet Prescription: Diet, Regular:   Consistent Carbohydrate {Evening Snacks}  Supplement Feeding Modality:  Oral  Glucerna Shake Cans or Servings Per Day:  1       Frequency:  Two Times a day (10-31-22 @ 16:14)        Daily     Daily Weight in k (31 Oct 2022 07:05)  Weight in k.5 (30 Oct 2022 09:00)        Pertinent Medications: MEDICATIONS  (STANDING):  atorvastatin 20 milliGRAM(s) Oral at bedtime  azaTHIOprine 50 milliGRAM(s) Oral daily  chlorhexidine 2% Cloths 1 Application(s) Topical <User Schedule>  gabapentin 300 milliGRAM(s) Oral daily  influenza   Vaccine 0.5 milliLiter(s) IntraMuscular once  insulin glargine Injectable (LANTUS) 30 Unit(s) SubCutaneous at bedtime  insulin lispro (ADMELOG) corrective regimen sliding scale   SubCutaneous three times a day before meals  insulin lispro (ADMELOG) corrective regimen sliding scale   SubCutaneous at bedtime  insulin lispro Injectable (ADMELOG) 8 Unit(s) SubCutaneous three times a day before meals  pantoprazole  Injectable 40 milliGRAM(s) IV Push daily  predniSONE   Tablet 40 milliGRAM(s) Oral daily  traZODone 100 milliGRAM(s) Oral at bedtime  trimethoprim  160 mG/sulfamethoxazole 800 mG 1 Tablet(s) Oral daily    MEDICATIONS  (PRN):  albuterol/ipratropium for Nebulization 3 milliLiter(s) Nebulizer every 6 hours PRN Shortness of Breath and/or Wheezing    Pertinent Labs:  Na136 mmol/L Glu 231 mg/dL<H> K+ 4.4 mmol/L Cr  0.91 mg/dL BUN 17 mg/dL  Phos 3.0 mg/dL  Alb 2.2 g/dL<L>     CAPILLARY BLOOD GLUCOSE      POCT Blood Glucose.: 179 mg/dL (2022 16:37)  POCT Blood Glucose.: 116 mg/dL (2022 11:17)  POCT Blood Glucose.: 207 mg/dL (2022 08:38)  POCT Blood Glucose.: 165 mg/dL (2022 06:02)  POCT Blood Glucose.: 262 mg/dL (31 Oct 2022 21:29)        Estimated Needs:   [x ] no change since previous assessment  [ ] recalculated:       Previous Nutrition Diagnosis:   [ ] Altered GI function  [ ]Inadequate Oral Intake [ ] Swallowing Difficulty   [x ] Altered nutrition related labs [ ] Increased Nutrient Needs [ ] Overweight/Obesity   [ ] Unintended Weight Loss [ ] Food & Nutrition Related Knowledge Deficit [ ] Malnutrition   [ ] Other:     Nutrition Diagnosis is [x ] ongoing  [ ] resolved [ ] not applicable         Interventions:   Recommend  [ ] Change Diet To:  [x ] Nutrition Supplement: Glucerna shakes BID  [ ] Nutrition Support  [x ] Other: Diet ed/ copy given. MD to monitor. RD available.     Monitoring and Evaluation:   [x ] PO intake [ x ] Tolerance to diet prescription [ x ] weights [ x ] labs[ x ] follow up per protocol  [ ] other:

## 2022-11-01 NOTE — PROGRESS NOTE ADULT - ASSESSMENT
Patient is a 62y old  Female with hx of Pulmonary fibrosis (dx in 2020, not on home oxygen), DM, accompanied by daughter presents with shortness of breath for the last three days. Upon evaluation in ED patient afebrile, hemodynamically stable but hypoxic to 63% on RA. On CTA patient found to have right lower lobe segmental/ subsegmental PE and findings consistent with Interstitial lung disease (traction bronchiectasis and reticular ground glass opacities). Patient admitted to ICU for AHRF 2/2 PE and Pulmonary Fibrosis.     #Actue Hypoxic Respiratory Failure   #Right Segmental/ Subsegmental PE  #Pulmonary Fibrosis  # Elevated troponin  #DM   #Leukocytosis     ----------------CNS:------------------  No acute issues, patient as per family is at baseline    -----------------CVS:------------------  -Elevated troponin level: 155>220>218  -? 2/2 right sided heart strain due to severe pulm HTN vs PE  - EKG noted to be Sinus Tachycardia 118 LAD  - Echo with EF 55-60%, Mild concentric left ventricular hypertrophy and Right ventricular  enlargement with normal RV systolic function, elevated RV pressure; no valvular dysfunction  Cardiology consulted Dr. Wilhelm         -----------------RESPIRATORY:--------  AHRF 2/2 Right Subsegmental PE vs Pulmonary fibrosis   - CT scan showing ILD and right subsegmental PE  - on admission patient noted to be hypoxic on RA to 63%  - supplement oxygen as needed, HFNC  - full dose Lovenox  - Echo findings as above  - LE doppler U/S: POS-  Thrombus in the right peroneal vein.    Pulmonary fibrosis  - patient dx 2 yrs ago, not on home oxygen, will likely need home O2 on d/c   - currently on prednisone 20mg BID at home  - switch IV solumedrol q8 to PO Prednisone 40qd  - c/w empiric bactrim for PCP ppx  -   - f/u PCP sputum culture  - contact transplant center to start referral  - SW consult   - Goals of Care conversation given diagnosis/disease process/prognosis    -----------------GI:----------------------  No issues     -----------------ID:-----------------------  Leukocytosis  - afebrile will monitor off abx for now   - blood cx negative      -----------------RENAL: ---------------  No issues      -----------------HEME/ONC: ---------------  Anemia  - hgb 10.6  - transfuse if hgb<7  - monitor cbc     -----------------ENDO: ---------------  Diabetes  - Lantus 25U and Admelog 3U TID  - SSI  - monitor BG, elevated due to steroids    -----------------EXTREMITIES:-------  No acute issues     -----------------PROPHYLAXIS: -------  Full Dose Lovenox     -----------------DISPOSITION--------  ICU      Patient is a 62y old  Female with hx of Pulmonary fibrosis (dx in 2020, not on home oxygen), DM, accompanied by daughter presents with shortness of breath for the last three days. Upon evaluation in ED patient afebrile, hemodynamically stable but hypoxic to 63% on RA. On CTA patient found to have right lower lobe segmental/ subsegmental PE and findings consistent with Interstitial lung disease (traction bronchiectasis and reticular ground glass opacities). Patient admitted to ICU for AHRF 2/2 PE and Pulmonary Fibrosis.     #Actue Hypoxic Respiratory Failure   #Right Segmental/ Subsegmental PE  #Pulmonary Fibrosis  # Elevated troponin  #DM   #Leukocytosis     ----------------CNS:------------------  No acute issues, patient as per family is at baseline    -----------------CVS:------------------  -Elevated troponin level: 155>220>218  -? 2/2 right sided heart strain due to severe pulm HTN vs PE  - EKG noted to be Sinus Tachycardia 118 LAD  - Echo with EF 55-60%, Mild concentric left ventricular hypertrophy and Right ventricular  enlargement with normal RV systolic function, elevated RV pressure; no valvular dysfunction  Cardiology consulted Dr. Wilhelm         -----------------RESPIRATORY:--------  AHRF 2/2 Right Subsegmental PE vs Pulmonary fibrosis   - CT scan showing ILD and right subsegmental PE  - on admission patient noted to be hypoxic on RA to 63%  - supplement oxygen as needed, HFNC  - full dose Lovenox  - Echo findings as above  - LE doppler U/S: POS-  Thrombus in the right peroneal vein.    Pulmonary fibrosis  - patient dx 2 yrs ago, not on home oxygen, will likely need home O2 on d/c   - currently on prednisone 20mg BID at home  - c/w PO Prednisone 40qd  - c/w empiric bactrim for PCP ppx  - c/w azathioprine  -   - f/u PCP sputum culture  - contact transplant center to start referral  - SW consult   - Goals of Care conversation given diagnosis/disease process/prognosis    -----------------GI:----------------------  No issues     -----------------ID:-----------------------  Leukocytosis  - afebrile will monitor off abx for now   - blood cx negative      -----------------RENAL: ---------------  No issues      -----------------HEME/ONC: ---------------  Anemia  - hgb 10.6  - transfuse if hgb<7  - monitor cbc     -----------------ENDO: ---------------  Diabetes  - Lantus 30U and Admelog 8U TID  - SSI  - monitor BG, elevated due to steroids    -----------------EXTREMITIES:-------  No acute issues     -----------------PROPHYLAXIS: -------  Full Dose Lovenox     -----------------DISPOSITION--------  Stable for DG to medicine

## 2022-11-01 NOTE — PROGRESS NOTE ADULT - ASSESSMENT
Acute Pulm Embolism RLL with RV strain increasing Troponin sec to DVT rt Peroneal nerve   Pulm Fibrosis ? Chemicals exposure at work x 17 years with Cor Pulmonale with Pulm Htn   IDDM--- Uncontrolled    Plan---  Azothiaprine ,  Septra -DS   D/C  Lovenox 65 mg q 12h and start Xarelto  O2 n/c 2 lpm  FS coverage, Increase Insulin basal  Po Prednisone 30 mg qd pc x 1 wk then as per PMD  Consider Xarelto 15 mg bid x 3 weeks then 20 mg qd  OOB in chair/ BRP  Discussed with ICU team and PMD Acute Pulm Embolism RLL with RV strain increasing Troponin sec to DVT rt Peroneal vein   Pulm Fibrosis ? Chemicals exposure at work x 17 years with Cor Pulmonale with Pulm Htn   IDDM--- Uncontrolled    Plan---  Azothiaprine ,  Septra -DS  O2 n/c 2 lpm  FS coverage, Increase Insulin basal  Po Prednisone 30 mg qd pc x 1 wk then as per PMD  Eliquis 10 mg bid x 1 week, then 5 mg bid x 3-6 months

## 2022-11-01 NOTE — PROGRESS NOTE ADULT - SUBJECTIVE AND OBJECTIVE BOX
Patient is a 62y old  Female who presents with a chief complaint of Other pulmonary embolism without acute cor pulmonale     (31 Oct 2022 16:22)    INTERVAL HISTORY/ OVERNIGHT EVENTS:       PRESSORS: [ ] YES [ ] NO  WHICH:    Antimicrobial:  trimethoprim  160 mG/sulfamethoxazole 800 mG 1 Tablet(s) Oral daily    Cardiovascular:    Pulmonary:  albuterol/ipratropium for Nebulization 3 milliLiter(s) Nebulizer every 6 hours    Hematalogic:  enoxaparin Injectable 65 milliGRAM(s) SubCutaneous every 12 hours    Other:  atorvastatin 20 milliGRAM(s) Oral at bedtime  azaTHIOprine 50 milliGRAM(s) Oral daily  chlorhexidine 2% Cloths 1 Application(s) Topical <User Schedule>  gabapentin 300 milliGRAM(s) Oral daily  influenza   Vaccine 0.5 milliLiter(s) IntraMuscular once  insulin glargine Injectable (LANTUS) 30 Unit(s) SubCutaneous at bedtime  insulin lispro (ADMELOG) corrective regimen sliding scale   SubCutaneous three times a day before meals  insulin lispro (ADMELOG) corrective regimen sliding scale   SubCutaneous at bedtime  insulin lispro Injectable (ADMELOG) 8 Unit(s) SubCutaneous three times a day before meals  pantoprazole  Injectable 40 milliGRAM(s) IV Push daily  predniSONE   Tablet 40 milliGRAM(s) Oral daily  traZODone 100 milliGRAM(s) Oral at bedtime    albuterol/ipratropium for Nebulization 3 milliLiter(s) Nebulizer every 6 hours  atorvastatin 20 milliGRAM(s) Oral at bedtime  azaTHIOprine 50 milliGRAM(s) Oral daily  chlorhexidine 2% Cloths 1 Application(s) Topical <User Schedule>  enoxaparin Injectable 65 milliGRAM(s) SubCutaneous every 12 hours  gabapentin 300 milliGRAM(s) Oral daily  influenza   Vaccine 0.5 milliLiter(s) IntraMuscular once  insulin glargine Injectable (LANTUS) 30 Unit(s) SubCutaneous at bedtime  insulin lispro (ADMELOG) corrective regimen sliding scale   SubCutaneous three times a day before meals  insulin lispro (ADMELOG) corrective regimen sliding scale   SubCutaneous at bedtime  insulin lispro Injectable (ADMELOG) 8 Unit(s) SubCutaneous three times a day before meals  pantoprazole  Injectable 40 milliGRAM(s) IV Push daily  predniSONE   Tablet 40 milliGRAM(s) Oral daily  traZODone 100 milliGRAM(s) Oral at bedtime  trimethoprim  160 mG/sulfamethoxazole 800 mG 1 Tablet(s) Oral daily    Drug Dosing Weight  Height (cm): 167.6 (29 Oct 2022 17:42)  Weight (kg): 65.5 (29 Oct 2022 17:42)  BMI (kg/m2): 23.3 (29 Oct 2022 17:42)  BSA (m2): 1.74 (29 Oct 2022 17:42)    INTUBATED: [ ] YES [ ] NO  DATE:  CENTRAL LINE: [ ] YES [ ] NO  LOCATION:     BAKER: [ ] YES [ ] NO      A-LINE:  [ ] YES [ ] NO  LOCATION:         ICU Vital Signs Last 24 Hrs  T(C): 36.4 (2022 04:00), Max: 36.8 (31 Oct 2022 15:00)  T(F): 97.6 (2022 04:00), Max: 98.3 (31 Oct 2022 15:00)  HR: 98 (2022 06:00) (94 - 113)  BP: 120/60 (2022 06:00) (115/58 - 151/72)  BP(mean): 75 (2022 06:00) (68 - 115)  ABP: --  ABP(mean): --  RR: 24 (2022 06:00) (17 - 31)  SpO2: 98% (2022 06:00) (96% - 100%)    O2 Parameters below as of 2022 06:00  Patient On (Oxygen Delivery Method): nasal cannula  O2 Flow (L/min): 2                10-31 @ 07:01  -   @ 07:00  --------------------------------------------------------  IN: 1300 mL / OUT: 1475 mL / NET: -175 mL            PHYSICAL EXAM:    GENERAL: NAD, well-groomed, well-developed  EYES: EOMI, PERRLA,   NECK: Supple, No JVD; Normal thyroid; Trachea midline  NERVOUS SYSTEM:  Alert & Oriented X3,  Motor Strength 5/5 B/L upper and lower extremities; DTRs 2+ intact and symmetric  CHEST/LUNG: No rales, rhonchi, wheezing   HEART: Regular rate and rhythm; No murmurs,   ABDOMEN: Soft, Nontender, Nondistended; Bowel sounds present  EXTREMITIES:  2+ Peripheral Pulses, No clubbing, cyanosis, or edema      LABS:                        10.4   11.98 )-----------( 382      ( 31 Oct 2022 07:21 )             33.8     10-31    136  |  103  |  19<H>  ----------------------------<  289<H>  4.7   |  23  |  0.83    Ca    8.5      31 Oct 2022 07:21  Phos  3.0     10-31  Mg     2.4     10-31    TPro  6.5  /  Alb  2.0<L>  /  TBili  0.4  /  DBili  x   /  AST  64<H>  /  ALT  48  /  AlkPhos  102  10-31      Urinalysis Basic - ( 30 Oct 2022 12:35 )    Color: Yellow / Appearance: Clear / S.010 / pH: x  Gluc: x / Ketone: Negative  / Bili: Negative / Urobili: Negative   Blood: x / Protein: 100 / Nitrite: Negative   Leuk Esterase: Negative / RBC: 0-2 /HPF / WBC 0-2 /HPF   Sq Epi: x / Non Sq Epi: x / Bacteria: x      CAPILLARY BLOOD GLUCOSE      POCT Blood Glucose.: 165 mg/dL (2022 06:02)  POCT Blood Glucose.: 262 mg/dL (31 Oct 2022 21:29)  POCT Blood Glucose.: 389 mg/dL (31 Oct 2022 16:03)  POCT Blood Glucose.: 387 mg/dL (31 Oct 2022 11:07)    Culture Results:   <10,000 CFU/mL Normal Urogenital Marily (10-29 @ 12:00)  Culture Results:   No growth to date. (10-29 @ 07:40)  Culture Results:   No growth to date. (10-29 @ 07:35)      RADIOLOGY & ADDITIONAL STUDIES REVIEWED:  ***     Patient is a 62y old  Female who presents with a chief complaint of Other pulmonary embolism without acute cor pulmonale     (31 Oct 2022 16:22)    INTERVAL HISTORY/ OVERNIGHT EVENTS:   No acute overnight events. Pt on 2L NC, saturating well and comfortable. Discussed with pt possible treatment options for IPF and option of lung transplant. Pt stated she would like to dicuss this with her daughter who helps her make medical decisions.    PRESSORS: [ ] YES [ ] NO  WHICH:    Antimicrobial:  trimethoprim  160 mG/sulfamethoxazole 800 mG 1 Tablet(s) Oral daily    Cardiovascular:    Pulmonary:  albuterol/ipratropium for Nebulization 3 milliLiter(s) Nebulizer every 6 hours    Hematalogic:  enoxaparin Injectable 65 milliGRAM(s) SubCutaneous every 12 hours    Other:  atorvastatin 20 milliGRAM(s) Oral at bedtime  azaTHIOprine 50 milliGRAM(s) Oral daily  chlorhexidine 2% Cloths 1 Application(s) Topical <User Schedule>  gabapentin 300 milliGRAM(s) Oral daily  influenza   Vaccine 0.5 milliLiter(s) IntraMuscular once  insulin glargine Injectable (LANTUS) 30 Unit(s) SubCutaneous at bedtime  insulin lispro (ADMELOG) corrective regimen sliding scale   SubCutaneous three times a day before meals  insulin lispro (ADMELOG) corrective regimen sliding scale   SubCutaneous at bedtime  insulin lispro Injectable (ADMELOG) 8 Unit(s) SubCutaneous three times a day before meals  pantoprazole  Injectable 40 milliGRAM(s) IV Push daily  predniSONE   Tablet 40 milliGRAM(s) Oral daily  traZODone 100 milliGRAM(s) Oral at bedtime    albuterol/ipratropium for Nebulization 3 milliLiter(s) Nebulizer every 6 hours  atorvastatin 20 milliGRAM(s) Oral at bedtime  azaTHIOprine 50 milliGRAM(s) Oral daily  chlorhexidine 2% Cloths 1 Application(s) Topical <User Schedule>  enoxaparin Injectable 65 milliGRAM(s) SubCutaneous every 12 hours  gabapentin 300 milliGRAM(s) Oral daily  influenza   Vaccine 0.5 milliLiter(s) IntraMuscular once  insulin glargine Injectable (LANTUS) 30 Unit(s) SubCutaneous at bedtime  insulin lispro (ADMELOG) corrective regimen sliding scale   SubCutaneous three times a day before meals  insulin lispro (ADMELOG) corrective regimen sliding scale   SubCutaneous at bedtime  insulin lispro Injectable (ADMELOG) 8 Unit(s) SubCutaneous three times a day before meals  pantoprazole  Injectable 40 milliGRAM(s) IV Push daily  predniSONE   Tablet 40 milliGRAM(s) Oral daily  traZODone 100 milliGRAM(s) Oral at bedtime  trimethoprim  160 mG/sulfamethoxazole 800 mG 1 Tablet(s) Oral daily    Drug Dosing Weight  Height (cm): 167.6 (29 Oct 2022 17:42)  Weight (kg): 65.5 (29 Oct 2022 17:42)  BMI (kg/m2): 23.3 (29 Oct 2022 17:42)  BSA (m2): 1.74 (29 Oct 2022 17:42)    INTUBATED: [ ] YES [ ] NO  DATE:  CENTRAL LINE: [ ] YES [ ] NO  LOCATION:     BAKER: [ ] YES [ ] NO      A-LINE:  [ ] YES [ ] NO  LOCATION:         ICU Vital Signs Last 24 Hrs  T(C): 36.4 (2022 04:00), Max: 36.8 (31 Oct 2022 15:00)  T(F): 97.6 (2022 04:00), Max: 98.3 (31 Oct 2022 15:00)  HR: 98 (2022 06:00) (94 - 113)  BP: 120/60 (2022 06:00) (115/58 - 151/72)  BP(mean): 75 (2022 06:00) (68 - 115)  ABP: --  ABP(mean): --  RR: 24 (2022 06:00) (17 - 31)  SpO2: 98% (2022 06:00) (96% - 100%)    O2 Parameters below as of 2022 06:00  Patient On (Oxygen Delivery Method): nasal cannula  O2 Flow (L/min): 2                10-31 @ 07:01  -   @ 07:00  --------------------------------------------------------  IN: 1300 mL / OUT: 1475 mL / NET: -175 mL            PHYSICAL EXAM:    GENERAL: NAD, well-groomed, well-developed  EYES: EOMI, PERRLA,   NECK: Supple, No JVD; Normal thyroid; Trachea midline  NERVOUS SYSTEM:  Alert & Oriented X3,  Motor Strength 5/5 B/L upper and lower extremities; DTRs 2+ intact and symmetric  CHEST/LUNG: No rales, rhonchi, wheezing   HEART: Regular rate and rhythm; No murmurs,   ABDOMEN: Soft, Nontender, Nondistended; Bowel sounds present  EXTREMITIES:  2+ Peripheral Pulses, No clubbing, cyanosis, or edema      LABS:                        10.4   11.98 )-----------( 382      ( 31 Oct 2022 07:21 )             33.8     10-31    136  |  103  |  19<H>  ----------------------------<  289<H>  4.7   |  23  |  0.83    Ca    8.5      31 Oct 2022 07:21  Phos  3.0     10-31  Mg     2.4     10-31    TPro  6.5  /  Alb  2.0<L>  /  TBili  0.4  /  DBili  x   /  AST  64<H>  /  ALT  48  /  AlkPhos  102  10-31      Urinalysis Basic - ( 30 Oct 2022 12:35 )    Color: Yellow / Appearance: Clear / S.010 / pH: x  Gluc: x / Ketone: Negative  / Bili: Negative / Urobili: Negative   Blood: x / Protein: 100 / Nitrite: Negative   Leuk Esterase: Negative / RBC: 0-2 /HPF / WBC 0-2 /HPF   Sq Epi: x / Non Sq Epi: x / Bacteria: x      CAPILLARY BLOOD GLUCOSE      POCT Blood Glucose.: 165 mg/dL (2022 06:02)  POCT Blood Glucose.: 262 mg/dL (31 Oct 2022 21:29)  POCT Blood Glucose.: 389 mg/dL (31 Oct 2022 16:03)  POCT Blood Glucose.: 387 mg/dL (31 Oct 2022 11:07)    Culture Results:   <10,000 CFU/mL Normal Urogenital Marily (10-29 @ 12:00)  Culture Results:   No growth to date. (10-29 @ 07:40)  Culture Results:   No growth to date. (10-29 @ 07:35)      RADIOLOGY & ADDITIONAL STUDIES REVIEWED:  ***     Patient is a 62y old  Female who presents with a chief complaint of Other pulmonary embolism without acute cor pulmonale     (31 Oct 2022 16:22)    INTERVAL HISTORY/ OVERNIGHT EVENTS:   No acute overnight events. Pt on 2L NC, saturating well and comfortable. Discussed with pt possible treatment options for IPF and option of lung transplant. Pt stated she would like to discuss this with her daughter who helps her make medical decisions.    PRESSORS: [ ] YES [X] NO  WHICH:    Antimicrobial:  trimethoprim  160 mG/sulfamethoxazole 800 mG 1 Tablet(s) Oral daily    Cardiovascular:    Pulmonary:  albuterol/ipratropium for Nebulization 3 milliLiter(s) Nebulizer every 6 hours    Hematalogic:  enoxaparin Injectable 65 milliGRAM(s) SubCutaneous every 12 hours    Other:  atorvastatin 20 milliGRAM(s) Oral at bedtime  azaTHIOprine 50 milliGRAM(s) Oral daily  chlorhexidine 2% Cloths 1 Application(s) Topical <User Schedule>  gabapentin 300 milliGRAM(s) Oral daily  influenza   Vaccine 0.5 milliLiter(s) IntraMuscular once  insulin glargine Injectable (LANTUS) 30 Unit(s) SubCutaneous at bedtime  insulin lispro (ADMELOG) corrective regimen sliding scale   SubCutaneous three times a day before meals  insulin lispro (ADMELOG) corrective regimen sliding scale   SubCutaneous at bedtime  insulin lispro Injectable (ADMELOG) 8 Unit(s) SubCutaneous three times a day before meals  pantoprazole  Injectable 40 milliGRAM(s) IV Push daily  predniSONE   Tablet 40 milliGRAM(s) Oral daily  traZODone 100 milliGRAM(s) Oral at bedtime    albuterol/ipratropium for Nebulization 3 milliLiter(s) Nebulizer every 6 hours  atorvastatin 20 milliGRAM(s) Oral at bedtime  azaTHIOprine 50 milliGRAM(s) Oral daily  chlorhexidine 2% Cloths 1 Application(s) Topical <User Schedule>  enoxaparin Injectable 65 milliGRAM(s) SubCutaneous every 12 hours  gabapentin 300 milliGRAM(s) Oral daily  influenza   Vaccine 0.5 milliLiter(s) IntraMuscular once  insulin glargine Injectable (LANTUS) 30 Unit(s) SubCutaneous at bedtime  insulin lispro (ADMELOG) corrective regimen sliding scale   SubCutaneous three times a day before meals  insulin lispro (ADMELOG) corrective regimen sliding scale   SubCutaneous at bedtime  insulin lispro Injectable (ADMELOG) 8 Unit(s) SubCutaneous three times a day before meals  pantoprazole  Injectable 40 milliGRAM(s) IV Push daily  predniSONE   Tablet 40 milliGRAM(s) Oral daily  traZODone 100 milliGRAM(s) Oral at bedtime  trimethoprim  160 mG/sulfamethoxazole 800 mG 1 Tablet(s) Oral daily    Drug Dosing Weight  Height (cm): 167.6 (29 Oct 2022 17:42)  Weight (kg): 65.5 (29 Oct 2022 17:42)  BMI (kg/m2): 23.3 (29 Oct 2022 17:42)  BSA (m2): 1.74 (29 Oct 2022 17:42)    INTUBATED: [ ] YES [X ] NO  DATE:  CENTRAL LINE: [ ] YES [ X] NO  LOCATION:     BAKER: [ ] YES [X ] NO      A-LINE:  [ ] YES [X ] NO  LOCATION:         ICU Vital Signs Last 24 Hrs  T(C): 36.4 (2022 04:00), Max: 36.8 (31 Oct 2022 15:00)  T(F): 97.6 (2022 04:00), Max: 98.3 (31 Oct 2022 15:00)  HR: 98 (2022 06:00) (94 - 113)  BP: 120/60 (2022 06:00) (115/58 - 151/72)  BP(mean): 75 (2022 06:00) (68 - 115)  ABP: --  ABP(mean): --  RR: 24 (2022 06:00) (17 - 31)  SpO2: 98% (2022 06:00) (96% - 100%)    O2 Parameters below as of 2022 06:00  Patient On (Oxygen Delivery Method): nasal cannula  O2 Flow (L/min): 2                10- @ 07:01  -  - @ 07:00  --------------------------------------------------------  IN: 1300 mL / OUT: 1475 mL / NET: -175 mL            PHYSICAL EXAM:    GENERAL: NAD, well-groomed, female comfortable on NC  HEAD:  Atraumatic, Normocephalic  EYES: EOMI, PERRLA, conjunctiva and sclera clear  ENMT: No tonsillar erythema, exudates, or enlargement; Moist mucous membranes, Good dentition, No lesions  NECK: Supple, No JVD, Normal thyroid  NERVOUS SYSTEM:  Alert & Oriented X3, Motor Strength 5/5 B/L upper and lower extremities  CHEST/LUNG: Clear to auscultation; No rales, rhonchi, wheezing, or rubs  HEART: regular rate and rhythm, No murmurs, rubs, or gallops  ABDOMEN: Soft, Nontender, Nondistended; Bowel sounds present  EXTREMITIES:  2+ Peripheral Pulses, No clubbing, cyanosis, or edema  LYMPH: No lymphadenopathy noted    LABS:                        10.4   11.98 )-----------( 382      ( 31 Oct 2022 07:21 )             33.8     10-31    136  |  103  |  19<H>  ----------------------------<  289<H>  4.7   |  23  |  0.83    Ca    8.5      31 Oct 2022 07:21  Phos  3.0     10-31  Mg     2.4     10-31    TPro  6.5  /  Alb  2.0<L>  /  TBili  0.4  /  DBili  x   /  AST  64<H>  /  ALT  48  /  AlkPhos  102  10-31      Urinalysis Basic - ( 30 Oct 2022 12:35 )    Color: Yellow / Appearance: Clear / S.010 / pH: x  Gluc: x / Ketone: Negative  / Bili: Negative / Urobili: Negative   Blood: x / Protein: 100 / Nitrite: Negative   Leuk Esterase: Negative / RBC: 0-2 /HPF / WBC 0-2 /HPF   Sq Epi: x / Non Sq Epi: x / Bacteria: x      CAPILLARY BLOOD GLUCOSE      POCT Blood Glucose.: 165 mg/dL (2022 06:02)  POCT Blood Glucose.: 262 mg/dL (31 Oct 2022 21:29)  POCT Blood Glucose.: 389 mg/dL (31 Oct 2022 16:03)  POCT Blood Glucose.: 387 mg/dL (31 Oct 2022 11:07)    Culture Results:   <10,000 CFU/mL Normal Urogenital Marily (10-29 @ 12:00)  Culture Results:   No growth to date. (10-29 @ 07:40)  Culture Results:   No growth to date. (10-29 @ 07:35)      RADIOLOGY & ADDITIONAL STUDIES REVIEWED:  ***

## 2022-11-01 NOTE — CHART NOTE - NSCHARTNOTEFT_GEN_A_CORE
Patient is a 62y old  Female with hx of Pulmonary fibrosis (dx in 2020, not on home oxygen), DM, accompanied by daughter presents with shortness of breath for the last three days. Patient states that she has had increasing shortness of breath on exertion and at rest. Dyspnea is associated with cough. Patient states that cough is productive of white sputum. Patient denies fevers or chills. Patient states she is able to lay flay at night, denies PND or orthopnea. Patient states she has worsening lower extremity edema. Patient denies chest pain, but has had new onset palpitations since this morning. Patient denies family or personal history of clotting disorders. Denies PE, DVT. Denies prior episodes. Patient denies any hx of malignancy, recent immobilization or surgery. Patient denies any prior intubation. Patient states for her pulmonary fibrosis she was recently prescribed steroids (Prednisone 100mg BID. 2 months ago) by her outpatient Pulmonologist Dr. Ventura. Patient states she has had a difficult time getting medications for her pulmonary fibrosis because of her insurance. Patient's pharmacy is [facility] [address] [facility] [phone]. As per [facility] scripts, patient is on prednisone 20mg BID    ICU Course:     Upon evaluation in ED patient afebrile, hemodynamically stable but hypoxic to 63% on RA. On CTA patient found to have right lower lobe segmental/ subsegmental PE and findings consistent with Interstitial lung disease (traction bronchiectasis and reticular ground glass opacities). Patient admitted to ICU for AHRF 2/2 Pulmonary Fibrosis. Pt was intially started on HFNC and was eventually be able to transitioned to 2L nasal canula and is currently saturating well. She does get short of breath on exertion. She was started on full dose lovenox, eliquis was sent to her pharmacy to confirm coverage and can be started after. She is currently on bactrim for PCP prophylaxis. On admission trop was elevated to 172 with probnp of 4519. Dr. Wilhelm cardio following, suspects its due to right heart strain secondary to PE. LE US performed confirmed the presence of Thrombus in the right peroneal vein. Echo showed normal EF >60%, with RV systolic pressure is severely increased at  68 mmHg.     Pt was signed out to Dr. ROUSSEAU, attending and covering resident XXXXX.       Things to follow:   - Eliquis coverage  - Pulm - Dr. Momo  - Monitor LFT's daily - currently on Azathioprine  - Monitor blood Sugars. - Currently on Lantus 30, Admelog 8. Patient is a 62y old  Female with hx of Pulmonary fibrosis (dx in 2020, not on home oxygen), DM, accompanied by daughter presents with shortness of breath for the last three days. Patient states that she has had increasing shortness of breath on exertion and at rest. Dyspnea is associated with cough. Patient states that cough is productive of white sputum. Patient denies fevers or chills. Patient states she is able to lay flay at night, denies PND or orthopnea. Patient states she has worsening lower extremity edema. Patient denies chest pain, but has had new onset palpitations since this morning. Patient denies family or personal history of clotting disorders. Denies PE, DVT. Denies prior episodes. Patient denies any hx of malignancy, recent immobilization or surgery. Patient denies any prior intubation. Patient states for her pulmonary fibrosis she was recently prescribed steroids (Prednisone 100mg BID. 2 months ago) by her outpatient Pulmonologist Dr. Ventura. Patient states she has had a difficult time getting medications for her pulmonary fibrosis because of her insurance. Patient's pharmacy is [facility] [address] [facility] [phone]. As per [facility] scripts, patient is on prednisone 20mg BID    ICU Course:     Upon evaluation in ED patient afebrile, hemodynamically stable but hypoxic to 63% on RA. On CTA patient found to have right lower lobe segmental/ subsegmental PE and findings consistent with Interstitial lung disease (traction bronchiectasis and reticular ground glass opacities). Patient admitted to ICU for AHRF 2/2 Pulmonary Fibrosis. Pt was intially started on HFNC and was eventually be able to transitioned to 2L nasal canula and is currently saturating well. She does get short of breath on exertion. She was started on full dose lovenox, eliquis was sent to her pharmacy to confirm coverage and can be started after. She is currently on bactrim for PCP prophylaxis. On admission trop was elevated to 172 with probnp of 4519. Dr. Wilhelm cardio following, suspects its due to right heart strain secondary to PE. LE US performed confirmed the presence of Thrombus in the right peroneal vein. Echo showed normal EF >60%, with RV systolic pressure is severely increased at  68 mmHg.     Pt was signed out to Dr. Deras, attending and covering NP Rena Alvarez.       Things to follow:   - Eliquis coverage  - Pulm - Dr. Momo  - Monitor LFT's daily - currently on Azathioprine  - Monitor blood Sugars. - Currently on Lantus 30, Admelog 8. Patient is a 62y old  Female with hx of Pulmonary fibrosis (dx in 2020, not on home oxygen), DM, accompanied by daughter presents with shortness of breath for the last three days. Patient states that she has had increasing shortness of breath on exertion and at rest. Dyspnea is associated with cough. Patient states that cough is productive of white sputum. Patient denies fevers or chills. Patient states she is able to lay flay at night, denies PND or orthopnea. Patient states she has worsening lower extremity edema. Patient denies chest pain, but has had new onset palpitations since this morning. Patient denies family or personal history of clotting disorders. Denies PE, DVT. Denies prior episodes. Patient denies any hx of malignancy, recent immobilization or surgery. Patient denies any prior intubation. Patient states for her pulmonary fibrosis she was recently prescribed steroids (Prednisone 100mg BID. 2 months ago) by her outpatient Pulmonologist Dr. Ventura. Patient states she has had a difficult time getting medications for her pulmonary fibrosis because of her insurance. Patient's pharmacy is [facility] [address] [facility] [phone]. As per [facility] scripts, patient is on prednisone 20mg BID    ICU Course:     Upon evaluation in ED patient afebrile, hemodynamically stable but hypoxic to 63% on RA. On CTA patient found to have right lower lobe segmental/ subsegmental PE and findings consistent with Interstitial lung disease (traction bronchiectasis and reticular ground glass opacities). Patient admitted to ICU for AHRF 2/2 Pulmonary Fibrosis. Pt was intially started on HFNC and was eventually be able to transitioned to 2L nasal canula and is currently saturating well. She does get short of breath on exertion. She was started on full dose lovenox, eliquis was sent to her pharmacy to confirm coverage and can be started after. She is currently on bactrim for PCP prophylaxis. On admission trop was elevated to 172 with probnp of 4519. Dr. Wilhelm cardio following, suspects its due to right heart strain secondary to PE. LE US performed confirmed the presence of Thrombus in the right peroneal vein. Echo showed normal EF >60%, with RV systolic pressure is severely increased at  68 mmHg.     Pt was signed out to Dr. Deras, attending and covering NP Rena Alvarez.       Things to follow:   - Started on Eliquis for PE. Initial dose 10mg BID for 7 days (11/2-11/8). then 5mg BID.  - Pulm - Dr. Barr  - Needs good pulm follow up  - Monitor LFT's daily - currently on Azathioprine  - Monitor blood Sugars. - Currently on Lantus 30, Admelog 8. Patient is a 62y old  Female with hx of Pulmonary fibrosis (dx in 2020, not on home oxygen), DM, accompanied by daughter presents with shortness of breath for the last three days. Patient states that she has had increasing shortness of breath on exertion and at rest. Dyspnea is associated with cough. Patient states that cough is productive of white sputum. Patient denies fevers or chills. Patient states she is able to lay flay at night, denies PND or orthopnea. Patient states she has worsening lower extremity edema. Patient denies chest pain, but has had new onset palpitations since this morning. Patient denies family or personal history of clotting disorders. Denies PE, DVT. Denies prior episodes. Patient denies any hx of malignancy, recent immobilization or surgery. Patient denies any prior intubation. Patient states for her pulmonary fibrosis she was recently prescribed steroids (Prednisone 100mg BID. 2 months ago) by her outpatient Pulmonologist Dr. Ventura. Patient states she has had a difficult time getting medications for her pulmonary fibrosis because of her insurance. Patient's pharmacy is [facility] [address] [facility] [phone]. As per [facility] scripts, patient is on prednisone 20mg BID    ICU Course:     Upon evaluation in ED patient afebrile, hemodynamically stable but hypoxic to 63% on RA. On CTA patient found to have right lower lobe segmental/ subsegmental PE and findings consistent with Interstitial lung disease (traction bronchiectasis and reticular ground glass opacities). Patient admitted to ICU for AHRF 2/2 Pulmonary Fibrosis. Pt was intially started on HFNC and was eventually be able to transitioned to 2L nasal canula and is currently saturating well. She does get short of breath on exertion. She was started on full dose lovenox, eliquis was sent to her pharmacy to confirm coverage and can be started after. She is currently on bactrim for PCP prophylaxis. On admission trop was elevated to 172 with probnp of 4519. Dr. Wilhelm cardio following, suspects its due to right heart strain secondary to PE. LE US performed confirmed the presence of Thrombus in the right peroneal vein. Echo showed normal EF >60%, with RV systolic pressure is severely increased at  68 mmHg.     Pt was signed out to Dr. Deras, attending and covering NP Rena Alvarze.       Things to follow:   - Started on Eliquis for PE. Initial dose 10mg BID for 7 days (11/2-11/8). then 5mg BID.  - Pulm - Dr. Barr  - Needs good pulm follow up  - f/u autoimmune workup  - Monitor LFT's daily - was started on Azathioprine  - Monitor blood Sugars. - Currently on Lantus 30, Admelog 8.

## 2022-11-01 NOTE — PROGRESS NOTE ADULT - SUBJECTIVE AND OBJECTIVE BOX
PULMONARY  progress note    ESTEBAN BRANHAM  MRN-[id number]    Patient is a 62y old  Female who presents with a chief complaint of Other pulmonary embolism without acute cor pulmonale  Feels better, slight cough, no sob, on n/c        MEDICATIONS  (STANDING):  albuterol/ipratropium for Nebulization 3 milliLiter(s) Nebulizer every 6 hours  atorvastatin 20 milliGRAM(s) Oral at bedtime  azaTHIOprine 50 milliGRAM(s) Oral daily  chlorhexidine 2% Cloths 1 Application(s) Topical <User Schedule>  enoxaparin Injectable 65 milliGRAM(s) SubCutaneous every 12 hours  gabapentin 300 milliGRAM(s) Oral daily  influenza   Vaccine 0.5 milliLiter(s) IntraMuscular once  insulin glargine Injectable (LANTUS) 30 Unit(s) SubCutaneous at bedtime  insulin lispro (ADMELOG) corrective regimen sliding scale   SubCutaneous three times a day before meals  insulin lispro (ADMELOG) corrective regimen sliding scale   SubCutaneous at bedtime  insulin lispro Injectable (ADMELOG) 8 Unit(s) SubCutaneous three times a day before meals  pantoprazole  Injectable 40 milliGRAM(s) IV Push daily  predniSONE   Tablet 40 milliGRAM(s) Oral daily  traZODone 100 milliGRAM(s) Oral at bedtime  trimethoprim  160 mG/sulfamethoxazole 800 mG 1 Tablet(s) Oral daily            PAST MEDICAL & SURGICAL HISTORY:  Diabetes mellitus      Hyperlipidemia               REVIEW OF SYSTEMS:  CONSTITUTIONAL: No fever, weight loss, or fatigue   EYES: No eye pain, visual disturbances, or discharge  ENT:  No difficulty hearing, tinnitus, vertigo; No sinus or throat pain  NECK: No pain or stiffness or nodes  RESPIRATORY:  cough +  wheezing--   chills--   hemoptysis--  Shortness of Breath--  CARDIOVASCULAR: No chest pain, palpitations, passing out, dizziness, or leg swelling  GASTROINTESTINAL: No abdominal or epigastric pain. No nausea, vomiting,  GENITOURINARY: No dysuria, frequency, hematuria, or incontinence  NEUROLOGICAL: No headaches, memory loss, loss of strength, numbness, or tremors  SKIN: No itching, burning, rashes, or lesions   LYMPH Nodes: No enlarged glands  ENDOCRINE: No heat or cold intolerance; No hair loss  HEME/LYMPH: No easy bruising, or bleeding gums  ALLERGY AND IMMUNOLOGIC: No hives or eczema        Vital Signs Last 24 Hrs  T(C): 36.7 (2022 08:00), Max: 36.8 (31 Oct 2022 15:00)  T(F): 98.1 (2022 08:00), Max: 98.3 (31 Oct 2022 15:00)  HR: 102 (2022 09:00) (94 - 113)  BP: 134/75 (2022 09:00) (115/58 - 151/72)  BP(mean): 88 (2022 09:00) (68 - 115)  RR: 25 (2022 09:00) (17 - 31)  SpO2: 91% (2022 09:00) (91% - 100%)    Parameters below as of 2022 07:00  Patient On (Oxygen Delivery Method): nasal cannula  O2 Flow (L/min): 2    I&O's Detail    31 Oct 2022 07:01  -  2022 07:00  --------------------------------------------------------  IN:    Oral Fluid: 1300 mL  Total IN: 1300 mL    OUT:    Voided (mL): 1475 mL  Total OUT: 1475 mL    Total NET: -175 mL          PHYSICAL EXAMINATION:    GENERAL: The patient is a well-developed b/f  in no apparent distress.   SKIN no rash ecchymoses or bruises  HEENT: Head is normocephalic and atraumatic  RICHARD , Extraocular muscles are intact. Mucous membranes  moist.   Neck supple ,No LN felt JVP not increased  Thyroid not enlarged  Cardiovascular:  S1 S2 heard, RSR, No JVD , systolic  murmur at apex, No gallop or rub  Respiratory: Chest wall symmetrical with good air entry ,Percussion note normal,    Lungs vesicular breathing with basilar inspiratory   rales , no   wheeze	  ABDOMEN:  Soft, Non-tender,  no hepatomegaly or splenomegaly BS positive	  Extremities: Normal range of motion, No clubbing, cyanosis or edema  Vascular: Peripheral pulses palpable 2+ bilaterally  CNS:  Alert and oriented x3  Cranial nerves intact  sensory intact  motor power5/5  dtr 2+  Babinski neg      LABS:                        11.0   8.81  )-----------( 434      ( 2022 08:45 )             36.1         136  |  106  |  17  ----------------------------<  231<H>  4.4   |  24  |  0.91    Ca    8.3<L>      2022 08:45  Phos  3.0       Mg     2.1         TPro  6.4  /  Alb  2.2<L>  /  TBili  0.4  /  DBili  x   /  AST  46<H>  /  ALT  62<H>  /  AlkPhos  101        Urinalysis Basic - ( 30 Oct 2022 12:35 )    Color: Yellow / Appearance: Clear / S.010 / pH: x  Gluc: x / Ketone: Negative  / Bili: Negative / Urobili: Negative   Blood: x / Protein: 100 / Nitrite: Negative   Leuk Esterase: Negative / RBC: 0-2 /HPF / WBC 0-2 /HPF   Sq Epi: x / Non Sq Epi: x / Bacteria: x          MICROBIOLOGY:    Culture - Urine (collected 10-29-22 @ 12:00)  Source: Clean Catch Clean Catch (Midstream)  Final Report (10-31-22 @ 11:52):    <10,000 CFU/mL Normal Urogenital Marily    Culture - Blood (collected 10-29-22 @ 07:40)  Source: .Blood Blood-Peripheral  Preliminary Report (10-30-22 @ 17:01):    No growth to date.    Culture - Blood (collected 10-29-22 @ 07:35)  Source: .Blood Blood-Peripheral  Preliminary Report (10-30-22 @ 17:01):    No growth to date.

## 2022-11-01 NOTE — PROGRESS NOTE ADULT - ASSESSMENT
Patient is a 62y old  Female with hx of Pulmonary fibrosis (dx in 2020, not on home oxygen), DM, accompanied by daughter presents with shortness of breath for the last three days. Upon evaluation in ED patient afebrile, hemodynamically stable but hypoxic to 63% on RA. On CTA patient found to have right lower lobe segmental/ subsegmental PE and findings consistent with Interstitial lung disease (traction bronchiectasis and reticular ground glass opacities). Patient admitted to ICU for AHRF 2/2 PE and Pulmonary Fibrosis.     #Actue Hypoxic Respiratory Failure   #Right Segmental/ Subsegmental PE  #Pulmonary Fibrosis  # Elevated troponin  #DM   #Leukocytosis     ----------------CNS:------------------  No acute issues, patient as per family is at baseline    -----------------CVS:------------------  -Elevated troponin level: 155>220>218  -? 2/2 right sided heart strain due to severe pulm HTN vs PE  - EKG noted to be Sinus Tachycardia 118 LAD  - Echo with EF 55-60%, Mild concentric left ventricular hypertrophy and Right ventricular  enlargement with normal RV systolic function, elevated RV pressure; no valvular dysfunction         -----------------RESPIRATORY:--------  AHRF 2/2 Right Subsegmental PE vs Pulmonary fibrosis   - CT scan showing ILD and right subsegmental PE  - on admission patient noted to be hypoxic on RA to 63%  - supplement oxygen as needed, HFNC  - full dose Lovenox  - Echo findings as above  - LE doppler U/S: POS-  Thrombus in the right peroneal vein.    Pulmonary fibrosis  - patient dx 2 yrs ago, not on home oxygen, will likely need home O2 on d/c   - currently on prednisone 20mg BID at home  - c/w PO Prednisone 40qd  - c/w empiric bactrim for PCP ppx  - c/w azathioprine  -   - f/u PCP sputum culture  - contact transplant center to start referral  - SW consult   - Goals of Care conversation given diagnosis/disease process/prognosis    -----------------GI:----------------------  No issues     -----------------ID:-----------------------  Leukocytosis  - afebrile will monitor off abx for now   - blood cx negative      -----------------RENAL: ---------------  No issues      -----------------HEME/ONC: ---------------  Anemia  - hgb 10.6  - transfuse if hgb<7  - monitor cbc     -----------------ENDO: ---------------  Diabetes  - Lantus 30U and Admelog 8U TID  - SSI  - monitor BG, elevated due to steroids    -----------------EXTREMITIES:-------  No acute issues     -----------------PROPHYLAXIS: -------  Full Dose Lovenox     -----------------DISPOSITION--------  Stable for DG to medicine

## 2022-11-01 NOTE — PROGRESS NOTE ADULT - SUBJECTIVE AND OBJECTIVE BOX
PATIENT SEEN AND EXAMINED ON :- 11/1/22  DATE OF SERVICE:  11/1/22           Interim events noted,Labs ,Radiological studies and Cardiology tests reviewed .         HOSPITAL COURSE: HPI:      INTERIM EVENTS:Patient seen at bedside ,interim events noted.      PMH -reviewed admission note, no change since admission  HEART FAILURE: Acute[ ]Chronic[ ] Systolic[ ] Diastolic[ ] Combined Systolic and Diastolic[ ]  CAD[ ] CABG[ ] PCI[ ]  DEVICES[ ] PPM[ ] ICD[ ] ILR[ ]  ATRIAL FIBRILLATION[ ] Paroxysmal[ ] Permanent[ ] CHADS2-[  ]  DONOVAN[ ] CKD1[ ] CKD2[ ] CKD3[ ] CKD4[ ] ESRD[ ]  COPD[ ] HTN[ ]   DM[ ] Type1[ ] Type 2[ ]   CVA[ ] Paresis[ ]    AMBULATION: Assisted[ ] Cane/walker[ ] Independent[ ]    MEDICATIONS  (STANDING):  atorvastatin 20 milliGRAM(s) Oral at bedtime  azaTHIOprine 50 milliGRAM(s) Oral daily  chlorhexidine 2% Cloths 1 Application(s) Topical <User Schedule>  enoxaparin Injectable 65 milliGRAM(s) SubCutaneous every 12 hours  gabapentin 300 milliGRAM(s) Oral daily  influenza   Vaccine 0.5 milliLiter(s) IntraMuscular once  insulin glargine Injectable (LANTUS) 30 Unit(s) SubCutaneous at bedtime  insulin lispro (ADMELOG) corrective regimen sliding scale   SubCutaneous three times a day before meals  insulin lispro (ADMELOG) corrective regimen sliding scale   SubCutaneous at bedtime  insulin lispro Injectable (ADMELOG) 8 Unit(s) SubCutaneous three times a day before meals  pantoprazole  Injectable 40 milliGRAM(s) IV Push daily  predniSONE   Tablet 40 milliGRAM(s) Oral daily  traZODone 100 milliGRAM(s) Oral at bedtime  trimethoprim  160 mG/sulfamethoxazole 800 mG 1 Tablet(s) Oral daily    MEDICATIONS  (PRN):  albuterol/ipratropium for Nebulization 3 milliLiter(s) Nebulizer every 6 hours PRN Shortness of Breath and/or Wheezing            REVIEW OF SYSTEMS:  Constitutional: [ ] fever, [ ]weight loss,  [ ]fatigue [ ]weight gain  Eyes: [ ] visual changes  Respiratory: [ ]shortness of breath;  [ ] cough, [ ]wheezing, [ ]chills, [ ]hemoptysis  Cardiovascular: [ ] chest pain, [ ]palpitations, [ ]dizziness,  [ ]leg swelling[ ]orthopnea[ ]PND  Gastrointestinal: [ ] abdominal pain, [ ]nausea, [ ]vomiting,  [ ]diarrhea [ ]Constipation [ ]Melena  Genitourinary: [ ] dysuria, [ ] hematuria [ ]Pike  Neurologic: [ ] headaches [ ] tremors[ ]weakness [ ]Paralysis Right[ ] Left[ ]  Skin: [ ] itching, [ ]burning, [ ] rashes  Endocrine: [ ] heat or cold intolerance  Musculoskeletal: [ ] joint pain or swelling; [ ] muscle, back, or extremity pain  Psychiatric: [ ] depression, [ ]anxiety, [ ]mood swings, or [ ]difficulty sleeping  Hematologic: [ ] easy bruising, [ ] bleeding gums    [ ] All remaining systems negative except as per above.   [ ]Unable to obtain.  [x] No change in ROS since admission      Vital Signs Last 24 Hrs  T(C): 36.8 (01 Nov 2022 11:00), Max: 36.8 (01 Nov 2022 11:00)  T(F): 98.2 (01 Nov 2022 11:00), Max: 98.2 (01 Nov 2022 11:00)  HR: 104 (01 Nov 2022 12:00) (94 - 113)  BP: 146/84 (01 Nov 2022 12:00) (115/58 - 150/88)  BP(mean): 98 (01 Nov 2022 12:00) (68 - 111)  RR: 23 (01 Nov 2022 12:00) (19 - 31)  SpO2: 98% (01 Nov 2022 12:00) (90% - 100%)    Parameters below as of 01 Nov 2022 07:00  Patient On (Oxygen Delivery Method): nasal cannula  O2 Flow (L/min): 2    I&O's Summary    31 Oct 2022 07:01  -  01 Nov 2022 07:00  --------------------------------------------------------  IN: 1300 mL / OUT: 1475 mL / NET: -175 mL    01 Nov 2022 07:01  -  01 Nov 2022 15:29  --------------------------------------------------------  IN: 700 mL / OUT: 0 mL / NET: 700 mL        PHYSICAL EXAM:  General: No acute distress BMI-  HEENT: EOMI, PERRL  Neck: Supple, [ ] JVD  Lungs: Equal air entry bilaterally; [ ] rales [ ] wheezing [ ] rhonchi  Heart: Regular rate and rhythm; [x ] murmur   2/6 [ x] systolic [ ] diastolic [ ] radiation[ ] rubs [ ]  gallops  Abdomen: Nontender, bowel sounds present  Extremities: No clubbing, cyanosis, [ ] edema [ ]Pulses  equal and intact  Nervous system:  Alert & Oriented X3, no focal deficits  Psychiatric: Normal affect  Skin: No rashes or lesions    LABS:  11-01    136  |  106  |  17  ----------------------------<  231<H>  4.4   |  24  |  0.91    Ca    8.3<L>      01 Nov 2022 08:45  Phos  3.0     11-01  Mg     2.1     11-01    TPro  6.4  /  Alb  2.2<L>  /  TBili  0.4  /  DBili  x   /  AST  46<H>  /  ALT  62<H>  /  AlkPhos  101  11-01    Creatinine Trend: 0.91<--, 0.83<--, 0.70<--, 1.21<--                        11.0   8.81  )-----------( 434      ( 01 Nov 2022 08:45 )             36.1

## 2022-11-01 NOTE — PHYSICAL THERAPY INITIAL EVALUATION ADULT - ADDITIONAL COMMENTS
Patient's RR increased significantly w/ minimal activity. Patient required 2-3 rest periods before proceeding to theraex.

## 2022-11-01 NOTE — PROGRESS NOTE ADULT - ATTENDING COMMENTS
IMP: This is a 62 yr old  woman  Pulmonary fibrosis (dx in 2020, not on home oxygen due to insurance issues ), DM, accompanied by daughter presents with shortness of breath for the last three days. Upon evaluation in ED patient afebrile, hemodynamically stable but hypoxic to 63% on RA. On CTA patient found to have right lower lobe segmental/ subsegmental PE and findings consistent with Interstitial lung disease (traction bronchiectasis and reticular ground glass opacities). Patient admitted to ICU for AHRF 2/2   Pulmonary Fibrosis.  The small PE is likely to cause such resp embarrassment .     #Actue Hypoxic Respiratory Failure   #Right Segmental/ Subsegmental PE  #Pulmonary Fibrosis  #Severe Pul HTN   # Elevated troponin  #DM   #Leukocytosis       Plan       - HFNC to maintain sat >90%, titrate down as tolerated   - Continue solumedrol   - Duoneb  neb  q6h   - Bactrim DS prophy due to was on high dose prednisone   - Therapeutic lovenox  - 2 DECHO noted , severe Pul HTN   - US of LE positive for L DVT   - Hemodynamic monitoring   - Monitor blood sugar with coverage   - Diet
IMP: This is a 62 yr old  woman  Pulmonary fibrosis (dx in 2020, not on home oxygen due to insurance issues ), DM, accompanied by daughter presents with shortness of breath for the last three days. Upon evaluation in ED patient afebrile, hemodynamically stable but hypoxic to 63% on RA. On CTA patient found to have right lower lobe segmental/ subsegmental PE and findings consistent with Interstitial lung disease (traction bronchiectasis and reticular ground glass opacities). Patient admitted to ICU for AHRF 2/2   Pulmonary Fibrosis.  The small PE is likely to cause such resp embarrassment .     #Actue Hypoxic Respiratory Failure   #Right Segmental/ Subsegmental PE  #Pulmonary Fibrosis  #Severe Pul HTN   #DM     Plan:  - Comfortable on HFNC support, transition to NC oxygen  - Monitor respiratory status   - Continue steroids  - Duoneb  neb  q6h   - Bactrim DS prophy due to was on high dose prednisone   - Therapeutic lovenox  - 2 DECHO noted , severe Pul HTN   - US of LE positive for L DVT   - Hemodynamic monitoring   - Monitor blood sugar with coverage   - Diet  - Obtain records from outpatient pulmonologist  - OOB to chair   - PT evaluation
IMP: This is a 62 yr old  woman  Pulmonary fibrosis (dx in 2020, not on home oxygen due to insurance issues ), DM, accompanied by daughter presents with shortness of breath for the last three days. Upon evaluation in ED patient afebrile, hemodynamically stable but hypoxic to 63% on RA. On CTA patient found to have right lower lobe segmental/ subsegmental PE and findings consistent with Interstitial lung disease (traction bronchiectasis and reticular ground glass opacities). Patient admitted to ICU for AHRF 2/2   Pulmonary Fibrosis.  The small PE is likely to cause such resp embarrassment .     #Actue Hypoxic Respiratory Failure   #Right Segmental/ Subsegmental PE  #Pulmonary Fibrosis  #Severe Pul HTN   #DM     Plan:  - Comfortable on NC oxygen  - Monitor respiratory status   - Continue steroids  - Duoneb nebs  - Bactrim DS prophy due to was on high dose prednisone   - Therapeutic lovenox likely can transition to oral anticoagulants prior to discharge  - 2 DECHO noted , severe Pul HTN   - Follow up autoimmune/ILD work up   - US of LE positive for L DVT   - Hemodynamic monitoring   - Monitor blood sugar with coverage   - Diet  - OOB to chair   - PT evaluation

## 2022-11-02 DIAGNOSIS — E11.9 TYPE 2 DIABETES MELLITUS WITHOUT COMPLICATIONS: ICD-10-CM

## 2022-11-02 DIAGNOSIS — Z02.9 ENCOUNTER FOR ADMINISTRATIVE EXAMINATIONS, UNSPECIFIED: ICD-10-CM

## 2022-11-02 DIAGNOSIS — Z29.9 ENCOUNTER FOR PROPHYLACTIC MEASURES, UNSPECIFIED: ICD-10-CM

## 2022-11-02 DIAGNOSIS — J96.01 ACUTE RESPIRATORY FAILURE WITH HYPOXIA: ICD-10-CM

## 2022-11-02 DIAGNOSIS — I26.99 OTHER PULMONARY EMBOLISM WITHOUT ACUTE COR PULMONALE: ICD-10-CM

## 2022-11-02 LAB
ALDOLASE SERPL-CCNC: 7.8 U/L — SIGNIFICANT CHANGE UP (ref 3.3–10.3)
ANION GAP SERPL CALC-SCNC: 8 MMOL/L — SIGNIFICANT CHANGE UP (ref 5–17)
ANTI-RIBONUCLEAR PROTEIN: <0.2 AI — SIGNIFICANT CHANGE UP
BUN SERPL-MCNC: 13 MG/DL — SIGNIFICANT CHANGE UP (ref 7–18)
CALCIUM SERPL-MCNC: 8.8 MG/DL — SIGNIFICANT CHANGE UP (ref 8.4–10.5)
CHLORIDE SERPL-SCNC: 103 MMOL/L — SIGNIFICANT CHANGE UP (ref 96–108)
CO2 SERPL-SCNC: 28 MMOL/L — SIGNIFICANT CHANGE UP (ref 22–31)
CREAT SERPL-MCNC: 0.87 MG/DL — SIGNIFICANT CHANGE UP (ref 0.5–1.3)
DSDNA AB FLD-ACNC: <0.2 AI — SIGNIFICANT CHANGE UP
EGFR: 76 ML/MIN/1.73M2 — SIGNIFICANT CHANGE UP
ENA JO1 AB SER-ACNC: <0.2 AI — SIGNIFICANT CHANGE UP
ENA SCL70 AB SER-ACNC: <0.2 AI — SIGNIFICANT CHANGE UP
ENA SS-A AB FLD IA-ACNC: <0.2 AI — SIGNIFICANT CHANGE UP
GLUCOSE BLDC GLUCOMTR-MCNC: 106 MG/DL — HIGH (ref 70–99)
GLUCOSE BLDC GLUCOMTR-MCNC: 248 MG/DL — HIGH (ref 70–99)
GLUCOSE BLDC GLUCOMTR-MCNC: 260 MG/DL — HIGH (ref 70–99)
GLUCOSE BLDC GLUCOMTR-MCNC: 273 MG/DL — HIGH (ref 70–99)
GLUCOSE SERPL-MCNC: 104 MG/DL — HIGH (ref 70–99)
HCT VFR BLD CALC: 38.6 % — SIGNIFICANT CHANGE UP (ref 34.5–45)
HGB BLD-MCNC: 11.6 G/DL — SIGNIFICANT CHANGE UP (ref 11.5–15.5)
MAGNESIUM SERPL-MCNC: 2.1 MG/DL — SIGNIFICANT CHANGE UP (ref 1.6–2.6)
MCHC RBC-ENTMCNC: 25.2 PG — LOW (ref 27–34)
MCHC RBC-ENTMCNC: 30.1 GM/DL — LOW (ref 32–36)
MCV RBC AUTO: 83.9 FL — SIGNIFICANT CHANGE UP (ref 80–100)
MRSA PCR RESULT.: SIGNIFICANT CHANGE UP
NRBC # BLD: 0 /100 WBCS — SIGNIFICANT CHANGE UP (ref 0–0)
PHOSPHATE SERPL-MCNC: 3.3 MG/DL — SIGNIFICANT CHANGE UP (ref 2.5–4.5)
PLATELET # BLD AUTO: 428 K/UL — HIGH (ref 150–400)
POTASSIUM SERPL-MCNC: 4 MMOL/L — SIGNIFICANT CHANGE UP (ref 3.5–5.3)
POTASSIUM SERPL-SCNC: 4 MMOL/L — SIGNIFICANT CHANGE UP (ref 3.5–5.3)
RBC # BLD: 4.6 M/UL — SIGNIFICANT CHANGE UP (ref 3.8–5.2)
RBC # FLD: 13.4 % — SIGNIFICANT CHANGE UP (ref 10.3–14.5)
S AUREUS DNA NOSE QL NAA+PROBE: SIGNIFICANT CHANGE UP
SMOOTH MUSCLE AB SER-ACNC: ABNORMAL
SODIUM SERPL-SCNC: 139 MMOL/L — SIGNIFICANT CHANGE UP (ref 135–145)
WBC # BLD: 7.82 K/UL — SIGNIFICANT CHANGE UP (ref 3.8–10.5)
WBC # FLD AUTO: 7.82 K/UL — SIGNIFICANT CHANGE UP (ref 3.8–10.5)

## 2022-11-02 PROCEDURE — 99223 1ST HOSP IP/OBS HIGH 75: CPT

## 2022-11-02 RX ORDER — PANTOPRAZOLE SODIUM 20 MG/1
40 TABLET, DELAYED RELEASE ORAL
Refills: 0 | Status: DISCONTINUED | OUTPATIENT
Start: 2022-11-02 | End: 2022-11-03

## 2022-11-02 RX ADMIN — Medication 6: at 12:19

## 2022-11-02 RX ADMIN — Medication 8 UNIT(S): at 17:08

## 2022-11-02 RX ADMIN — APIXABAN 10 MILLIGRAM(S): 2.5 TABLET, FILM COATED ORAL at 17:10

## 2022-11-02 RX ADMIN — Medication 40 MILLIGRAM(S): at 06:07

## 2022-11-02 RX ADMIN — APIXABAN 10 MILLIGRAM(S): 2.5 TABLET, FILM COATED ORAL at 06:06

## 2022-11-02 RX ADMIN — PANTOPRAZOLE SODIUM 40 MILLIGRAM(S): 20 TABLET, DELAYED RELEASE ORAL at 12:36

## 2022-11-02 RX ADMIN — ATORVASTATIN CALCIUM 20 MILLIGRAM(S): 80 TABLET, FILM COATED ORAL at 21:20

## 2022-11-02 RX ADMIN — Medication 8 UNIT(S): at 12:20

## 2022-11-02 RX ADMIN — Medication 1 TABLET(S): at 13:40

## 2022-11-02 RX ADMIN — Medication 4: at 17:08

## 2022-11-02 RX ADMIN — Medication 8 UNIT(S): at 08:06

## 2022-11-02 RX ADMIN — INSULIN GLARGINE 30 UNIT(S): 100 INJECTION, SOLUTION SUBCUTANEOUS at 22:25

## 2022-11-02 RX ADMIN — Medication 2: at 22:25

## 2022-11-02 RX ADMIN — AZATHIOPRINE 50 MILLIGRAM(S): 100 TABLET ORAL at 13:40

## 2022-11-02 RX ADMIN — GABAPENTIN 300 MILLIGRAM(S): 400 CAPSULE ORAL at 12:34

## 2022-11-02 RX ADMIN — Medication 100 MILLIGRAM(S): at 21:20

## 2022-11-02 NOTE — PROGRESS NOTE ADULT - ASSESSMENT
Patient is a 62 year old Female from home with hx of Pulmonary fibrosis (dx in 2020, not on home oxygen), DM. Patient presented to ED for SOB on exertion and at rest with productive white cough, with worsening lower leg swelling, palpitations. Patient admitted to ICU for Acute Hypoxic respiratory failure secondary to Pulmonary fibrosis and acute PE. Patient CTA chest resulted PE, doppler positive for right peroneal vein thrombus, started on full dose Lovenox.    ICU Course:     Patientt was intially started on HFNC and was eventually be able to transitioned to 2L nasal canula and is currently saturating well. She does get short of breath on exertion. She was started on full dose lovenox, eliquis was sent to her pharmacy to confirm coverage and can be started after. She is currently on bactrim for PCP prophylaxis. On admission trop was elevated to 172 with probnp of 4519. Dr. Wilhelm cardio following, suspects its due to right heart strain secondary to PE. LE US performed confirmed the presence of Thrombus in the right peroneal vein. Echo showed normal EF >60%, with RV systolic pressure is severely increased at  68 mmHg.     Patient stable and downgraded to medicine. Patient transitioned off oxygen. Patient Eliquis approved by insurance.

## 2022-11-02 NOTE — PROGRESS NOTE ADULT - SUBJECTIVE AND OBJECTIVE BOX
PATIENT SEEN AND EXAMINED ON :- 11/2/22  DATE OF SERVICE:    11/2/22         Interim events noted,Labs ,Radiological studies and Cardiology tests reviewed .       HOSPITAL COURSE: HPI:      INTERIM EVENTS:Patient seen at bedside ,interim events noted.      PMH -reviewed admission note, no change since admission  HEART FAILURE: Acute[ ]Chronic[ ] Systolic[ ] Diastolic[ ] Combined Systolic and Diastolic[ ]  CAD[ ] CABG[ ] PCI[ ]  DEVICES[ ] PPM[ ] ICD[ ] ILR[ ]  ATRIAL FIBRILLATION[ ] Paroxysmal[ ] Permanent[ ] CHADS2-[  ]  DONOVAN[ ] CKD1[ ] CKD2[ ] CKD3[ ] CKD4[ ] ESRD[ ]  COPD[ ] HTN[ ]   DM[ ] Type1[ ] Type 2[ ]   CVA[ ] Paresis[ ]    AMBULATION: Assisted[ ] Cane/walker[ ] Independent[ ]    MEDICATIONS  (STANDING):  apixaban 10 milliGRAM(s) Oral every 12 hours  atorvastatin 20 milliGRAM(s) Oral at bedtime  azaTHIOprine 50 milliGRAM(s) Oral daily  gabapentin 300 milliGRAM(s) Oral daily  influenza   Vaccine 0.5 milliLiter(s) IntraMuscular once  insulin glargine Injectable (LANTUS) 30 Unit(s) SubCutaneous at bedtime  insulin lispro (ADMELOG) corrective regimen sliding scale   SubCutaneous three times a day before meals  insulin lispro (ADMELOG) corrective regimen sliding scale   SubCutaneous at bedtime  insulin lispro Injectable (ADMELOG) 8 Unit(s) SubCutaneous three times a day before meals  pantoprazole    Tablet 40 milliGRAM(s) Oral before breakfast  predniSONE   Tablet 40 milliGRAM(s) Oral daily  traZODone 100 milliGRAM(s) Oral at bedtime  trimethoprim  160 mG/sulfamethoxazole 800 mG 1 Tablet(s) Oral daily    MEDICATIONS  (PRN):  albuterol/ipratropium for Nebulization 3 milliLiter(s) Nebulizer every 6 hours PRN Shortness of Breath and/or Wheezing            REVIEW OF SYSTEMS:  Constitutional: [ ] fever, [ ]weight loss,  [ ]fatigue [ ]weight gain  Eyes: [ ] visual changes  Respiratory: [ ]shortness of breath;  [ ] cough, [ ]wheezing, [ ]chills, [ ]hemoptysis  Cardiovascular: [ ] chest pain, [ ]palpitations, [ ]dizziness,  [ ]leg swelling[ ]orthopnea[ ]PND  Gastrointestinal: [ ] abdominal pain, [ ]nausea, [ ]vomiting,  [ ]diarrhea [ ]Constipation [ ]Melena  Genitourinary: [ ] dysuria, [ ] hematuria [ ]Pike  Neurologic: [ ] headaches [ ] tremors[ ]weakness [ ]Paralysis Right[ ] Left[ ]  Skin: [ ] itching, [ ]burning, [ ] rashes  Endocrine: [ ] heat or cold intolerance  Musculoskeletal: [ ] joint pain or swelling; [ ] muscle, back, or extremity pain  Psychiatric: [ ] depression, [ ]anxiety, [ ]mood swings, or [ ]difficulty sleeping  Hematologic: [ ] easy bruising, [ ] bleeding gums    [ ] All remaining systems negative except as per above.   [ ]Unable to obtain.  [x] No change in ROS since admission      Vital Signs Last 24 Hrs  T(C): 36.7 (02 Nov 2022 12:45), Max: 37.3 (02 Nov 2022 05:12)  T(F): 98.1 (02 Nov 2022 12:45), Max: 99.1 (02 Nov 2022 05:12)  HR: 103 (02 Nov 2022 12:45) (91 - 103)  BP: 125/77 (02 Nov 2022 12:45) (122/60 - 146/76)  BP(mean): --  RR: 18 (02 Nov 2022 12:45) (18 - 20)  SpO2: 97% (02 Nov 2022 12:45) (97% - 100%)    Parameters below as of 02 Nov 2022 12:45  Patient On (Oxygen Delivery Method): room air      I&O's Summary    01 Nov 2022 07:01  -  02 Nov 2022 07:00  --------------------------------------------------------  IN: 1050 mL / OUT: 0 mL / NET: 1050 mL        PHYSICAL EXAM:  General: No acute distress BMI-  HEENT: EOMI, PERRL  Neck: Supple, [ ] JVD  Lungs: Equal air entry bilaterally; [ ] rales [ ] wheezing [ ] rhonchi  Heart: Regular rate and rhythm; [x ] murmur   2/6 [ x] systolic [ ] diastolic [ ] radiation[ ] rubs [ ]  gallops  Abdomen: Nontender, bowel sounds present  Extremities: No clubbing, cyanosis, [ ] edema [ ]Pulses  equal and intact  Nervous system:  Alert & Oriented X3, no focal deficits  Psychiatric: Normal affect  Skin: No rashes or lesions    LABS:  11-02    139  |  103  |  13  ----------------------------<  104<H>  4.0   |  28  |  0.87    Ca    8.8      02 Nov 2022 06:45  Phos  3.3     11-02  Mg     2.1     11-02    TPro  6.4  /  Alb  2.2<L>  /  TBili  0.4  /  DBili  x   /  AST  46<H>  /  ALT  62<H>  /  AlkPhos  101  11-01    Creatinine Trend: 0.87<--, 0.91<--, 0.83<--, 0.70<--, 1.21<--                        11.6   7.82  )-----------( 428      ( 02 Nov 2022 06:45 )             38.6

## 2022-11-02 NOTE — PROGRESS NOTE ADULT - SUBJECTIVE AND OBJECTIVE BOX
NP Note discussed with  Primary Attending    Patient is a 62y old  Female who presents with a chief complaint of Other pulmonary embolism without acute cor pulmonale     (31 Oct 2022 16:22)      INTERVAL HPI/OVERNIGHT EVENTS: no new complaints    MEDICATIONS  (STANDING):  apixaban 10 milliGRAM(s) Oral every 12 hours  atorvastatin 20 milliGRAM(s) Oral at bedtime  azaTHIOprine 50 milliGRAM(s) Oral daily  gabapentin 300 milliGRAM(s) Oral daily  influenza   Vaccine 0.5 milliLiter(s) IntraMuscular once  insulin glargine Injectable (LANTUS) 30 Unit(s) SubCutaneous at bedtime  insulin lispro (ADMELOG) corrective regimen sliding scale   SubCutaneous three times a day before meals  insulin lispro (ADMELOG) corrective regimen sliding scale   SubCutaneous at bedtime  insulin lispro Injectable (ADMELOG) 8 Unit(s) SubCutaneous three times a day before meals  pantoprazole  Injectable 40 milliGRAM(s) IV Push daily  predniSONE   Tablet 40 milliGRAM(s) Oral daily  traZODone 100 milliGRAM(s) Oral at bedtime  trimethoprim  160 mG/sulfamethoxazole 800 mG 1 Tablet(s) Oral daily    MEDICATIONS  (PRN):  albuterol/ipratropium for Nebulization 3 milliLiter(s) Nebulizer every 6 hours PRN Shortness of Breath and/or Wheezing      __________________________________________________  REVIEW OF SYSTEMS:    CONSTITUTIONAL: No fever,   EYES: no acute visual disturbances  NECK: No pain or stiffness  RESPIRATORY: No cough; No shortness of breath  CARDIOVASCULAR: No chest pain, no palpitations  GASTROINTESTINAL: No pain. No nausea or vomiting; No diarrhea   NEUROLOGICAL: No headache or numbness, no tremors  MUSCULOSKELETAL: No joint pain, no muscle pain  GENITOURINARY: no dysuria, no frequency, no hesitancy  PSYCHIATRY: no depression , no anxiety  ALL OTHER  ROS negative        Vital Signs Last 24 Hrs  T(C): 36.7 (02 Nov 2022 12:45), Max: 37.3 (02 Nov 2022 05:12)  T(F): 98.1 (02 Nov 2022 12:45), Max: 99.1 (02 Nov 2022 05:12)  HR: 103 (02 Nov 2022 12:45) (91 - 104)  BP: 125/77 (02 Nov 2022 12:45) (116/64 - 146/76)  BP(mean): 95 (01 Nov 2022 20:00) (75 - 95)  RR: 18 (02 Nov 2022 12:45) (18 - 27)  SpO2: 97% (02 Nov 2022 12:45) (94% - 100%)    Parameters below as of 02 Nov 2022 12:45  Patient On (Oxygen Delivery Method): room air        ________________________________________________  PHYSICAL EXAM:  GENERAL: NAD  HEENT: Normocephalic;  conjunctivae and sclerae clear; moist mucous membranes;   NECK : supple  CHEST/LUNG: Clear to auscultation bilaterally with good air entry   HEART: S1 S2  regular; no murmurs, gallops or rubs  ABDOMEN: Soft, Nontender, Nondistended; Bowel sounds present  EXTREMITIES: no cyanosis; no edema; no calf tenderness  SKIN: warm and dry; no rash  NERVOUS SYSTEM:  Awake and alert; Oriented  to place, person and time ; no new deficits    _________________________________________________  LABS:                        11.6   7.82  )-----------( 428      ( 02 Nov 2022 06:45 )             38.6     11-02    139  |  103  |  13  ----------------------------<  104<H>  4.0   |  28  |  0.87    Ca    8.8      02 Nov 2022 06:45  Phos  3.3     11-02  Mg     2.1     11-02    TPro  6.4  /  Alb  2.2<L>  /  TBili  0.4  /  DBili  x   /  AST  46<H>  /  ALT  62<H>  /  AlkPhos  101  11-01        CAPILLARY BLOOD GLUCOSE      POCT Blood Glucose.: 273 mg/dL (02 Nov 2022 11:23)  POCT Blood Glucose.: 106 mg/dL (02 Nov 2022 07:46)  POCT Blood Glucose.: 124 mg/dL (01 Nov 2022 20:51)  POCT Blood Glucose.: 179 mg/dL (01 Nov 2022 16:37)        RADIOLOGY & ADDITIONAL TESTS:    Imaging  Reviewed:  YES/NO    Consultant(s) Notes Reviewed:   YES/ No      Plan of care was discussed with patient and /or primary care giver; all questions and concerns were addressed  NP Note discussed with  Primary Attending    Patient is a 62y old  Female who presents with a chief complaint of Other pulmonary embolism without acute cor pulmonale     (31 Oct 2022 16:22)      INTERVAL HPI/OVERNIGHT EVENTS: no new complaints    MEDICATIONS  (STANDING):  apixaban 10 milliGRAM(s) Oral every 12 hours  atorvastatin 20 milliGRAM(s) Oral at bedtime  azaTHIOprine 50 milliGRAM(s) Oral daily  gabapentin 300 milliGRAM(s) Oral daily  influenza   Vaccine 0.5 milliLiter(s) IntraMuscular once  insulin glargine Injectable (LANTUS) 30 Unit(s) SubCutaneous at bedtime  insulin lispro (ADMELOG) corrective regimen sliding scale   SubCutaneous three times a day before meals  insulin lispro (ADMELOG) corrective regimen sliding scale   SubCutaneous at bedtime  insulin lispro Injectable (ADMELOG) 8 Unit(s) SubCutaneous three times a day before meals  pantoprazole  Injectable 40 milliGRAM(s) IV Push daily  predniSONE   Tablet 40 milliGRAM(s) Oral daily  traZODone 100 milliGRAM(s) Oral at bedtime  trimethoprim  160 mG/sulfamethoxazole 800 mG 1 Tablet(s) Oral daily    MEDICATIONS  (PRN):  albuterol/ipratropium for Nebulization 3 milliLiter(s) Nebulizer every 6 hours PRN Shortness of Breath and/or Wheezing      __________________________________________________  REVIEW OF SYSTEMS:    CONSTITUTIONAL: No fever,   EYES: no acute visual disturbances  NECK: No pain or stiffness  RESPIRATORY: No cough; No shortness of breath  CARDIOVASCULAR: No chest pain, no palpitations  GASTROINTESTINAL: No pain. No nausea or vomiting; No diarrhea   NEUROLOGICAL: No headache or numbness, no tremors  MUSCULOSKELETAL: No joint pain, no muscle pain  GENITOURINARY: no dysuria, no frequency, no hesitancy  PSYCHIATRY: no depression , no anxiety  ALL OTHER  ROS negative        Vital Signs Last 24 Hrs  T(C): 36.7 (02 Nov 2022 12:45), Max: 37.3 (02 Nov 2022 05:12)  T(F): 98.1 (02 Nov 2022 12:45), Max: 99.1 (02 Nov 2022 05:12)  HR: 103 (02 Nov 2022 12:45) (91 - 104)  BP: 125/77 (02 Nov 2022 12:45) (116/64 - 146/76)  BP(mean): 95 (01 Nov 2022 20:00) (75 - 95)  RR: 18 (02 Nov 2022 12:45) (18 - 27)  SpO2: 97% (02 Nov 2022 12:45) (94% - 100%)    Parameters below as of 02 Nov 2022 12:45  Patient On (Oxygen Delivery Method): room air        ________________________________________________  PHYSICAL EXAM:  GENERAL: NAD  HEENT: Normocephalic;  conjunctivae and sclerae clear; moist mucous membranes;   NECK : supple  CHEST/LUNG: Clear to auscultation bilaterally with good air entry   HEART: S1 S2  regular; no murmurs, gallops or rubs  ABDOMEN: Soft, Nontender, Nondistended; Bowel sounds present  EXTREMITIES: no cyanosis; no edema; no calf tenderness  SKIN: warm and dry; no rash  NERVOUS SYSTEM:  Awake and alert; Oriented  to place, person and time ; no new deficits    _________________________________________________  LABS:                        11.6   7.82  )-----------( 428      ( 02 Nov 2022 06:45 )             38.6     11-02    139  |  103  |  13  ----------------------------<  104<H>  4.0   |  28  |  0.87    Ca    8.8      02 Nov 2022 06:45  Phos  3.3     11-02  Mg     2.1     11-02    TPro  6.4  /  Alb  2.2<L>  /  TBili  0.4  /  DBili  x   /  AST  46<H>  /  ALT  62<H>  /  AlkPhos  101  11-01        CAPILLARY BLOOD GLUCOSE      POCT Blood Glucose.: 273 mg/dL (02 Nov 2022 11:23)  POCT Blood Glucose.: 106 mg/dL (02 Nov 2022 07:46)  POCT Blood Glucose.: 124 mg/dL (01 Nov 2022 20:51)  POCT Blood Glucose.: 179 mg/dL (01 Nov 2022 16:37)        RADIOLOGY & ADDITIONAL TESTS:  < from: US Duplex Venous Lower Ext Complete, Bilateral (10.30.22 @ 11:10) >  FINDINGS:    RIGHT:  Normal compressibility of the RIGHT common femoral, femoral and popliteal   veins.  Doppler examination shows normal spontaneous and phasic flow.  There is thrombus in the right peroneal vein.    LEFT:  Normal compressibility of the LEFT common femoral, femoral and popliteal   veins.  Doppler examination shows normal spontaneous and phasic flow.  No LEFT calf vein thrombosis is detected.    IMPRESSION:  Thrombus in the right peroneal vein.    The findings were discussed with the ICU Team by the ultrasound   technologist Morgan Burrell on 10/30/2022.        --- End of Report ---    < end of copied text >    Imaging  Reviewed:  YES/NO    Consultant(s) Notes Reviewed:   YES/ No      Plan of care was discussed with patient and /or primary care giver; all questions and concerns were addressed  NP Note discussed with  Primary Attending    Patient is a 62y old  Female who presents with a chief complaint of Other pulmonary embolism without acute cor pulmonale     (31 Oct 2022 16:22)      INTERVAL HPI/OVERNIGHT EVENTS: no new complaints    MEDICATIONS  (STANDING):  apixaban 10 milliGRAM(s) Oral every 12 hours  atorvastatin 20 milliGRAM(s) Oral at bedtime  azaTHIOprine 50 milliGRAM(s) Oral daily  gabapentin 300 milliGRAM(s) Oral daily  influenza   Vaccine 0.5 milliLiter(s) IntraMuscular once  insulin glargine Injectable (LANTUS) 30 Unit(s) SubCutaneous at bedtime  insulin lispro (ADMELOG) corrective regimen sliding scale   SubCutaneous three times a day before meals  insulin lispro (ADMELOG) corrective regimen sliding scale   SubCutaneous at bedtime  insulin lispro Injectable (ADMELOG) 8 Unit(s) SubCutaneous three times a day before meals  pantoprazole  Injectable 40 milliGRAM(s) IV Push daily  predniSONE   Tablet 40 milliGRAM(s) Oral daily  traZODone 100 milliGRAM(s) Oral at bedtime  trimethoprim  160 mG/sulfamethoxazole 800 mG 1 Tablet(s) Oral daily    MEDICATIONS  (PRN):  albuterol/ipratropium for Nebulization 3 milliLiter(s) Nebulizer every 6 hours PRN Shortness of Breath and/or Wheezing      __________________________________________________  REVIEW OF SYSTEMS:    CONSTITUTIONAL: No fever,   EYES: no acute visual disturbances  NECK: No pain or stiffness  RESPIRATORY: No cough; No shortness of breath  CARDIOVASCULAR: No chest pain, no palpitations  GASTROINTESTINAL: No pain. No nausea or vomiting; No diarrhea   NEUROLOGICAL: No headache or numbness, no tremors  MUSCULOSKELETAL: No joint pain, no muscle pain  GENITOURINARY: no dysuria, no frequency, no hesitancy  PSYCHIATRY: no depression , no anxiety  ALL OTHER  ROS negative        Vital Signs Last 24 Hrs  T(C): 36.7 (02 Nov 2022 12:45), Max: 37.3 (02 Nov 2022 05:12)  T(F): 98.1 (02 Nov 2022 12:45), Max: 99.1 (02 Nov 2022 05:12)  HR: 103 (02 Nov 2022 12:45) (91 - 104)  BP: 125/77 (02 Nov 2022 12:45) (116/64 - 146/76)  BP(mean): 95 (01 Nov 2022 20:00) (75 - 95)  RR: 18 (02 Nov 2022 12:45) (18 - 27)  SpO2: 97% (02 Nov 2022 12:45) (94% - 100%)    Parameters below as of 02 Nov 2022 12:45  Patient On (Oxygen Delivery Method): room air        ________________________________________________  PHYSICAL EXAM:  GENERAL: NAD  HEENT: Normocephalic;  conjunctivae and sclerae clear; moist mucous membranes;   NECK : supple  CHEST/LUNG: Clear to auscultation bilaterally with good air entry   HEART: S1 S2  regular; no murmurs, gallops or rubs  ABDOMEN: Soft, Nontender, Nondistended; Bowel sounds present  EXTREMITIES: no cyanosis; no edema; no calf tenderness  SKIN: warm and dry; no rash  NERVOUS SYSTEM:  Awake and alert; Oriented  to place, person and time ; no new deficits    _________________________________________________  LABS:                        11.6   7.82  )-----------( 428      ( 02 Nov 2022 06:45 )             38.6     11-02    139  |  103  |  13  ----------------------------<  104<H>  4.0   |  28  |  0.87    Ca    8.8      02 Nov 2022 06:45  Phos  3.3     11-02  Mg     2.1     11-02    TPro  6.4  /  Alb  2.2<L>  /  TBili  0.4  /  DBili  x   /  AST  46<H>  /  ALT  62<H>  /  AlkPhos  101  11-01        CAPILLARY BLOOD GLUCOSE      POCT Blood Glucose.: 273 mg/dL (02 Nov 2022 11:23)  POCT Blood Glucose.: 106 mg/dL (02 Nov 2022 07:46)  POCT Blood Glucose.: 124 mg/dL (01 Nov 2022 20:51)  POCT Blood Glucose.: 179 mg/dL (01 Nov 2022 16:37)        RADIOLOGY & ADDITIONAL TESTS:  < from: US Duplex Venous Lower Ext Complete, Bilateral (10.30.22 @ 11:10) >  FINDINGS:    RIGHT:  Normal compressibility of the RIGHT common femoral, femoral and popliteal   veins.  Doppler examination shows normal spontaneous and phasic flow.  There is thrombus in the right peroneal vein.    LEFT:  Normal compressibility of the LEFT common femoral, femoral and popliteal   veins.  Doppler examination shows normal spontaneous and phasic flow.  No LEFT calf vein thrombosis is detected.    IMPRESSION:  Thrombus in the right peroneal vein.    The findings were discussed with the ICU Team by the ultrasound   technologist Morgan Burrell on 10/30/2022.        --- End of Report ---    < end of copied text >  < from: CT Angio Chest PE Protocol w/ IV Cont (10.29.22 @ 11:48) >    FINDINGS:    PULMONARY EMBOLISM: Right lower lobe segmental/subsegmental pulmonary   emboli..    AIRWAYS AND LUNGS: The central tracheobronchial tree is patent.    Bilateral reticular and groundglass opacities with architectural   distortion and traction bronchiectasis. No honeycombing.    MEDIASTINUM AND PLEURA: Mediastinal adenopathy. The visualized portion of   the thyroid gland is unremarkable. There is no pleural effusion. There is   no pneumothorax.    HEART AND VESSELS: Prominent right heart   There are atherosclerotic   calcifications of the aorta and coronary arteries.  There is a small   pericardial effusion.  Pulmonary artery measures up to 3.7 cm.    UPPER ABDOMEN: Images of the upper abdomen demonstrate no abnormality.    BONES AND SOFT TISSUES: The bones are unremarkable.  The soft tissues are   unremarkable.    IMPRESSION:  Right lower lobe segmental/subsegmental pulmonary embolism.    Bilateral reticular and groundglass opacities with architectural   distortion and traction bronchiectasis. No honeycombing. These findings   likely represent interstitial lung disease with or without superimposed   process. Mediastinal adenopathy may be related to this process. Continued   follow-up CT recommended.    Enlarged right heart and pulmonary artery may be related to pulmonary   artery hypertension and/or right heart strain.    These findings were discussed with Dr. HUDSON  at 10/29/2022 11:59 AM   by Dr. Tima Cummins with read back confirmation.    --- End of Report ---    < end of copied text >  < from: Transthoracic Echocardiogram (10.29.22 @ 13:42) >  Derived Variables:  LVMI: 84 g/m2  RWT: 0.47  Ejection Fraction Visual Estimate: 55-60 %  Ejection Fraction Brown: 60 %    ------------------------------------------------------------------------  OBSERVATIONS:  Mitral Valve: Mitral annular calcification, and calcified  mitral leaflets with normal diastolic opening. Mild mitral  regurgitation.  Aortic Root: Aortic Root: 3.1 cm.    Aortic Valve: Normal trileaflet aortic valve.  Left Atrium: Mildly dilated left atrium.  LA volume index =  36 cc/m2.  Left Ventricle: Normal Left Ventricular Systolic Function,  (EF = 55 to 60%) Mild concentric left ventricular  hypertrophy. Normal diastolic function.  Right Heart: Normal right atrium. Right ventricular  enlargement with normal RV systolic function(TAPSE  2.1cm,tissue dopper.17m/s). There is mild tricuspid  regurgitation. There is mild pulmonic regurgitation.  Pericardium/PleuraTrivial pericardial effusion is seen.  Hemodynamic: RV systolic pressure is severely increased at  68 mm Hg.  ------------------------------------------------------------------------  CONCLUSIONS:  1. Mitral annular calcification, and calcified mitral  leaflets with normal diastolic opening. Mild mitral  regurgitation.  2. Normal trileaflet aortic valve.  3. Aortic Root: 3.1 cm.  4. Mildly dilated left atrium.  LA volume index = 36 cc/m2.  5. Mild concentric left ventricular hypertrophy.  6. Normal Left Ventricular Systolic Function,  (EF = 55 to  60%)  7. Normal diastolic function.  8. Normal right atrium.  9. Right ventricular enlargement with normal RV systolic  function(TAPSE 2.1cm,tissue dopper.17m/s).  10. RV systolic pressure is severely increased at  68 mm  Hg.  11. There is mild tricuspid regurgitation.  12. There is mild pulmonic regurgitation.    ------------------------------------------------------------------------  Confirmed on  10/30/2022 - 09:36:45 by Delores Eisenberg MD  ------------------------------------------------------------------------    < end of copied text >    Imaging  Reviewed:  YES/NO    Consultant(s) Notes Reviewed:   YES/ No      Plan of care was discussed with patient and /or primary care giver; all questions and concerns were addressed

## 2022-11-02 NOTE — PROGRESS NOTE ADULT - ASSESSMENT
Patient is a 62 year old Female from home with hx of Pulmonary fibrosis (dx in 2020, not on home oxygen), DM, accompanied by daughter presents with shortness of breath for the last three days. Patient states that she has had increasing shortness of breath on exertion and at rest. Dyspnea is associated with cough. Patient states that cough is productive of white sputum. Patient denies fevers or chills. Patient states she is able to lay flay at night, denies PND or orthopnea. Patient states she has worsening lower extremity edema. Patient denies chest pain, but has had new onset palpitations since this morning. Patient denies family or personal history of clotting disorders. Denies PE, DVT. Denies prior episodes. Patient denies any hx of malignancy, recent immobilization or surgery. Patient denies any prior intubation. Patient states for her pulmonary fibrosis she was recently prescribed steroids (Prednisone 100mg BID. 2 months ago) by her outpatient Pulmonologist Dr. Ventura. Patient states she has had a difficult time getting medications for her pulmonary fibrosis because of her insurance. Patient's pharmacy is [facility] [address] [facility] [phone]. As per [facility], patient is on prednisone 20mg BID    ICU Course:     Upon evaluation in ED patient afebrile, hemodynamically stable but hypoxic to 63% on RA. On CTA patient found to have right lower lobe segmental/ subsegmental PE and findings consistent with Interstitial lung disease (traction bronchiectasis and reticular ground glass opacities). Patient admitted to ICU for AHRF 2/2 Pulmonary Fibrosis. Pt was intially started on HFNC and was eventually be able to transitioned to 2L nasal canula and is currently saturating well. She does get short of breath on exertion. She was started on full dose lovenox, eliquis was sent to her pharmacy to confirm coverage and can be started after. She is currently on bactrim for PCP prophylaxis. On admission trop was elevated to 172 with probnp of [id number]. Dr. Wilhelm cardio following, suspects its due to right heart strain secondary to PE. LE US performed confirmed the presence of Thrombus in the right peroneal vein. Echo showed normal EF >60%, with RV systolic pressure is severely increased at  68 mmHg.     Pt was signed out to Dr. Deras, attending and covering NP Rena Alvarez.       Things to follow:   - Started on Eliquis for PE. Initial dose 10mg BID for 7 days (11/2-11/8). then 5mg BID.  - Pulm - Dr. Barr  - Needs good pulm follow up  - f/u autoimmune workup  - Monitor LFT's daily - was started on Azathioprine  - Monitor blood Sugars. - Currently on Lantus 30, Admelog 8.     Patient is a 62 year old Female from home with hx of Pulmonary fibrosis (dx in 2020, not on home oxygen), DM. Patient presented to ED for SOB on exertion and at rest with productive white cough, with worsening lower leg swelling, palpitations. Patient admitted to ICU for Acute Hypoxic respiratory failure secondary to Pulmonary fibrosis and acute PE. Patient CTA chest resulted PE, doppler positive for right peroneal vein thrombus, started on full dose Lovenox.    ICU Course:     Patientt was intially started on HFNC and was eventually be able to transitioned to 2L nasal canula and is currently saturating well. She does get short of breath on exertion. She was started on full dose lovenox, eliquis was sent to her pharmacy to confirm coverage and can be started after. She is currently on bactrim for PCP prophylaxis. On admission trop was elevated to 172 with probnp of [id number]. Dr. Wilhelm cardio following, suspects its due to right heart strain secondary to PE. LE US performed confirmed the presence of Thrombus in the right peroneal vein. Echo showed normal EF >60%, with RV systolic pressure is severely increased at  68 mmHg.     Patient stable and downgraded to medicine. Patient transitioned off oxygen. Patient Eliquis approved by insurance.

## 2022-11-02 NOTE — PROGRESS NOTE ADULT - SUBJECTIVE AND OBJECTIVE BOX
Patient is a 62y old  Female who presents with a chief complaint of Other pulmonary embolism without acute cor pulmonale     (31 Oct 2022 16:22)      INTERVAL HPI/OVERNIGHT EVENTS: pt seen and examined    MEDICATIONS  (STANDING):  apixaban 10 milliGRAM(s) Oral every 12 hours  atorvastatin 20 milliGRAM(s) Oral at bedtime  azaTHIOprine 50 milliGRAM(s) Oral daily  gabapentin 300 milliGRAM(s) Oral daily  influenza   Vaccine 0.5 milliLiter(s) IntraMuscular once  insulin glargine Injectable (LANTUS) 30 Unit(s) SubCutaneous at bedtime  insulin lispro (ADMELOG) corrective regimen sliding scale   SubCutaneous three times a day before meals  insulin lispro (ADMELOG) corrective regimen sliding scale   SubCutaneous at bedtime  insulin lispro Injectable (ADMELOG) 8 Unit(s) SubCutaneous three times a day before meals  pantoprazole  Injectable 40 milliGRAM(s) IV Push daily  predniSONE   Tablet 40 milliGRAM(s) Oral daily  traZODone 100 milliGRAM(s) Oral at bedtime  trimethoprim  160 mG/sulfamethoxazole 800 mG 1 Tablet(s) Oral daily    MEDICATIONS  (PRN):  albuterol/ipratropium for Nebulization 3 milliLiter(s) Nebulizer every 6 hours PRN Shortness of Breath and/or Wheezing      __________________________________________________  REVIEW OF SYSTEMS:    CONSTITUTIONAL: No fever,   EYES: no acute visual disturbances  NECK: No pain or stiffness  RESPIRATORY: No cough; No shortness of breath  CARDIOVASCULAR: No chest pain, no palpitations  GASTROINTESTINAL: No pain. No nausea or vomiting; No diarrhea   NEUROLOGICAL: No headache or numbness, no tremors  MUSCULOSKELETAL: No joint pain, no muscle pain  GENITOURINARY: no dysuria, no frequency, no hesitancy  PSYCHIATRY: no depression , no anxiety  ALL OTHER  ROS negative        Vital Signs Last 24 Hrs  T(C): 36.7 (02 Nov 2022 12:45), Max: 37.3 (02 Nov 2022 05:12)  T(F): 98.1 (02 Nov 2022 12:45), Max: 99.1 (02 Nov 2022 05:12)  HR: 103 (02 Nov 2022 12:45) (91 - 104)  BP: 125/77 (02 Nov 2022 12:45) (116/64 - 146/76)  BP(mean): 95 (01 Nov 2022 20:00) (75 - 95)  RR: 18 (02 Nov 2022 12:45) (18 - 27)  SpO2: 97% (02 Nov 2022 12:45) (94% - 100%)    Parameters below as of 02 Nov 2022 12:45  Patient On (Oxygen Delivery Method): room air        ________________________________________________  PHYSICAL EXAM:  GENERAL: NAD  HEENT: Normocephalic;  conjunctivae and sclerae clear; moist mucous membranes;   NECK : supple  CHEST/LUNG: dec breath sounds at bases  HEART: S1 S2  regular; no murmurs, gallops or rubs  ABDOMEN: Soft, Nontender, Nondistended; Bowel sounds present  EXTREMITIES: trace edema+  SKIN: warm and dry; no rash  NERVOUS SYSTEM:  Awake and alert; Oriented  to place, person and time ;   _________________________________________________  LABS:                        11.6   7.82  )-----------( 428      ( 02 Nov 2022 06:45 )             38.6     11-02    139  |  103  |  13  ----------------------------<  104<H>  4.0   |  28  |  0.87    Ca    8.8      02 Nov 2022 06:45  Phos  3.3     11-02  Mg     2.1     11-02    TPro  6.4  /  Alb  2.2<L>  /  TBili  0.4  /  DBili  x   /  AST  46<H>  /  ALT  62<H>  /  AlkPhos  101  11-01        CAPILLARY BLOOD GLUCOSE      POCT Blood Glucose.: 273 mg/dL (02 Nov 2022 11:23)  POCT Blood Glucose.: 106 mg/dL (02 Nov 2022 07:46)  POCT Blood Glucose.: 124 mg/dL (01 Nov 2022 20:51)  POCT Blood Glucose.: 179 mg/dL (01 Nov 2022 16:37)        RADIOLOGY & ADDITIONAL TESTS:  < from: US Duplex Venous Lower Ext Complete, Bilateral (10.30.22 @ 11:10) >  FINDINGS:    RIGHT:  Normal compressibility of the RIGHT common femoral, femoral and popliteal   veins.  Doppler examination shows normal spontaneous and phasic flow.  There is thrombus in the right peroneal vein.    LEFT:  Normal compressibility of the LEFT common femoral, femoral and popliteal   veins.  Doppler examination shows normal spontaneous and phasic flow.  No LEFT calf vein thrombosis is detected.    IMPRESSION:  Thrombus in the right peroneal vein.    The findings were discussed with the ICU Team by the ultrasound   technologist Morgan Burrell on 10/30/2022.        --- End of Report ---    < end of copied text >  < from: CT Angio Chest PE Protocol w/ IV Cont (10.29.22 @ 11:48) >    FINDINGS:    PULMONARY EMBOLISM: Right lower lobe segmental/subsegmental pulmonary   emboli..    AIRWAYS AND LUNGS: The central tracheobronchial tree is patent.    Bilateral reticular and groundglass opacities with architectural   distortion and traction bronchiectasis. No honeycombing.    MEDIASTINUM AND PLEURA: Mediastinal adenopathy. The visualized portion of   the thyroid gland is unremarkable. There is no pleural effusion. There is   no pneumothorax.    HEART AND VESSELS: Prominent right heart   There are atherosclerotic   calcifications of the aorta and coronary arteries.  There is a small   pericardial effusion.  Pulmonary artery measures up to 3.7 cm.    UPPER ABDOMEN: Images of the upper abdomen demonstrate no abnormality.    BONES AND SOFT TISSUES: The bones are unremarkable.  The soft tissues are   unremarkable.    IMPRESSION:  Right lower lobe segmental/subsegmental pulmonary embolism.    Bilateral reticular and groundglass opacities with architectural   distortion and traction bronchiectasis. No honeycombing. These findings   likely represent interstitial lung disease with or without superimposed   process. Mediastinal adenopathy may be related to this process. Continued   follow-up CT recommended.    Enlarged right heart and pulmonary artery may be related to pulmonary   artery hypertension and/or right heart strain.    These findings were discussed with Dr. HUDSON  at 10/29/2022 11:59 AM   by Dr. Tima Cummins with read back confirmation.    --- End of Report ---    < end of copied text >  < from: Transthoracic Echocardiogram (10.29.22 @ 13:42) >  Derived Variables:  LVMI: 84 g/m2  RWT: 0.47  Ejection Fraction Visual Estimate: 55-60 %  Ejection Fraction Brown: 60 %    ------------------------------------------------------------------------  OBSERVATIONS:  Mitral Valve: Mitral annular calcification, and calcified  mitral leaflets with normal diastolic opening. Mild mitral  regurgitation.  Aortic Root: Aortic Root: 3.1 cm.    Aortic Valve: Normal trileaflet aortic valve.  Left Atrium: Mildly dilated left atrium.  LA volume index =  36 cc/m2.  Left Ventricle: Normal Left Ventricular Systolic Function,  (EF = 55 to 60%) Mild concentric left ventricular  hypertrophy. Normal diastolic function.  Right Heart: Normal right atrium. Right ventricular  enlargement with normal RV systolic function(TAPSE  2.1cm,tissue dopper.17m/s). There is mild tricuspid  regurgitation. There is mild pulmonic regurgitation.  Pericardium/PleuraTrivial pericardial effusion is seen.  Hemodynamic: RV systolic pressure is severely increased at  68 mm Hg.  ------------------------------------------------------------------------  CONCLUSIONS:  1. Mitral annular calcification, and calcified mitral  leaflets with normal diastolic opening. Mild mitral  regurgitation.  2. Normal trileaflet aortic valve.  3. Aortic Root: 3.1 cm.  4. Mildly dilated left atrium.  LA volume index = 36 cc/m2.  5. Mild concentric left ventricular hypertrophy.  6. Normal Left Ventricular Systolic Function,  (EF = 55 to  60%)  7. Normal diastolic function.  8. Normal right atrium.  9. Right ventricular enlargement with normal RV systolic  function(TAPSE 2.1cm,tissue dopper.17m/s).  10. RV systolic pressure is severely increased at  68 mm  Hg.  11. There is mild tricuspid regurgitation.  12. There is mild pulmonic regurgitation.    ------------------------------------------------------------------------  Confirmed on  10/30/2022 - 09:36:45 by Delores Eisenberg MD  ------------------------------------------------------------------------    < end of copied text >    Imaging  Reviewed:  YES/NO    Consultant(s) Notes Reviewed:   YES/ No      Plan of care was discussed with patient and /or primary care giver; all questions and concerns were addressed

## 2022-11-03 ENCOUNTER — TRANSCRIPTION ENCOUNTER (OUTPATIENT)
Age: 62
End: 2022-11-03

## 2022-11-03 VITALS
HEART RATE: 95 BPM | TEMPERATURE: 99 F | OXYGEN SATURATION: 96 % | DIASTOLIC BLOOD PRESSURE: 89 MMHG | RESPIRATION RATE: 18 BRPM | SYSTOLIC BLOOD PRESSURE: 152 MMHG

## 2022-11-03 LAB
ANA TITR SER: NEGATIVE — SIGNIFICANT CHANGE UP
ANION GAP SERPL CALC-SCNC: 7 MMOL/L — SIGNIFICANT CHANGE UP (ref 5–17)
APPEARANCE UR: CLEAR — SIGNIFICANT CHANGE UP
BACTERIA # UR AUTO: ABNORMAL /HPF
BILIRUB UR-MCNC: NEGATIVE — SIGNIFICANT CHANGE UP
BUN SERPL-MCNC: 16 MG/DL — SIGNIFICANT CHANGE UP (ref 7–18)
CALCIUM SERPL-MCNC: 9.1 MG/DL — SIGNIFICANT CHANGE UP (ref 8.4–10.5)
CHLORIDE SERPL-SCNC: 107 MMOL/L — SIGNIFICANT CHANGE UP (ref 96–108)
CO2 SERPL-SCNC: 23 MMOL/L — SIGNIFICANT CHANGE UP (ref 22–31)
COLOR SPEC: YELLOW — SIGNIFICANT CHANGE UP
CREAT SERPL-MCNC: 0.9 MG/DL — SIGNIFICANT CHANGE UP (ref 0.5–1.3)
CULTURE RESULTS: SIGNIFICANT CHANGE UP
CULTURE RESULTS: SIGNIFICANT CHANGE UP
DIFF PNL FLD: ABNORMAL
DSDNA AB SER-ACNC: <12 IU/ML — SIGNIFICANT CHANGE UP
EGFR: 73 ML/MIN/1.73M2 — SIGNIFICANT CHANGE UP
EPI CELLS # UR: ABNORMAL /HPF
GLUCOSE BLDC GLUCOMTR-MCNC: 174 MG/DL — HIGH (ref 70–99)
GLUCOSE BLDC GLUCOMTR-MCNC: 191 MG/DL — HIGH (ref 70–99)
GLUCOSE BLDC GLUCOMTR-MCNC: 283 MG/DL — HIGH (ref 70–99)
GLUCOSE BLDC GLUCOMTR-MCNC: 316 MG/DL — HIGH (ref 70–99)
GLUCOSE SERPL-MCNC: 180 MG/DL — HIGH (ref 70–99)
GLUCOSE UR QL: 1000 MG/DL
HCT VFR BLD CALC: 37.7 % — SIGNIFICANT CHANGE UP (ref 34.5–45)
HGB BLD-MCNC: 11.5 G/DL — SIGNIFICANT CHANGE UP (ref 11.5–15.5)
KETONES UR-MCNC: NEGATIVE — SIGNIFICANT CHANGE UP
LEUKOCYTE ESTERASE UR-ACNC: NEGATIVE — SIGNIFICANT CHANGE UP
MCHC RBC-ENTMCNC: 25.1 PG — LOW (ref 27–34)
MCHC RBC-ENTMCNC: 30.5 GM/DL — LOW (ref 32–36)
MCV RBC AUTO: 82.1 FL — SIGNIFICANT CHANGE UP (ref 80–100)
NITRITE UR-MCNC: NEGATIVE — SIGNIFICANT CHANGE UP
NRBC # BLD: 0 /100 WBCS — SIGNIFICANT CHANGE UP (ref 0–0)
PH UR: 6 — SIGNIFICANT CHANGE UP (ref 5–8)
PLATELET # BLD AUTO: 432 K/UL — HIGH (ref 150–400)
POTASSIUM SERPL-MCNC: 4.3 MMOL/L — SIGNIFICANT CHANGE UP (ref 3.5–5.3)
POTASSIUM SERPL-SCNC: 4.3 MMOL/L — SIGNIFICANT CHANGE UP (ref 3.5–5.3)
PROT UR-MCNC: 100
RBC # BLD: 4.59 M/UL — SIGNIFICANT CHANGE UP (ref 3.8–5.2)
RBC # FLD: 13.5 % — SIGNIFICANT CHANGE UP (ref 10.3–14.5)
RBC CASTS # UR COMP ASSIST: ABNORMAL /HPF (ref 0–2)
SODIUM SERPL-SCNC: 137 MMOL/L — SIGNIFICANT CHANGE UP (ref 135–145)
SP GR SPEC: 1.01 — SIGNIFICANT CHANGE UP (ref 1.01–1.02)
SPECIMEN SOURCE: SIGNIFICANT CHANGE UP
SPECIMEN SOURCE: SIGNIFICANT CHANGE UP
UROBILINOGEN FLD QL: NEGATIVE — SIGNIFICANT CHANGE UP
WBC # BLD: 9.02 K/UL — SIGNIFICANT CHANGE UP (ref 3.8–10.5)
WBC # FLD AUTO: 9.02 K/UL — SIGNIFICANT CHANGE UP (ref 3.8–10.5)
WBC UR QL: SIGNIFICANT CHANGE UP /HPF (ref 0–5)

## 2022-11-03 PROCEDURE — 85027 COMPLETE CBC AUTOMATED: CPT

## 2022-11-03 PROCEDURE — 85610 PROTHROMBIN TIME: CPT

## 2022-11-03 PROCEDURE — 71275 CT ANGIOGRAPHY CHEST: CPT | Mod: MA

## 2022-11-03 PROCEDURE — 96374 THER/PROPH/DIAG INJ IV PUSH: CPT

## 2022-11-03 PROCEDURE — 94760 N-INVAS EAR/PLS OXIMETRY 1: CPT

## 2022-11-03 PROCEDURE — 82803 BLOOD GASES ANY COMBINATION: CPT

## 2022-11-03 PROCEDURE — 93306 TTE W/DOPPLER COMPLETE: CPT

## 2022-11-03 PROCEDURE — 99285 EMERGENCY DEPT VISIT HI MDM: CPT | Mod: 25

## 2022-11-03 PROCEDURE — 93970 EXTREMITY STUDY: CPT

## 2022-11-03 PROCEDURE — 85025 COMPLETE CBC W/AUTO DIFF WBC: CPT

## 2022-11-03 PROCEDURE — 83880 ASSAY OF NATRIURETIC PEPTIDE: CPT

## 2022-11-03 PROCEDURE — 80053 COMPREHEN METABOLIC PANEL: CPT

## 2022-11-03 PROCEDURE — 85730 THROMBOPLASTIN TIME PARTIAL: CPT

## 2022-11-03 PROCEDURE — 36415 COLL VENOUS BLD VENIPUNCTURE: CPT

## 2022-11-03 PROCEDURE — 84484 ASSAY OF TROPONIN QUANT: CPT

## 2022-11-03 PROCEDURE — 82962 GLUCOSE BLOOD TEST: CPT

## 2022-11-03 PROCEDURE — 81001 URINALYSIS AUTO W/SCOPE: CPT

## 2022-11-03 PROCEDURE — 93005 ELECTROCARDIOGRAM TRACING: CPT

## 2022-11-03 PROCEDURE — 86225 DNA ANTIBODY NATIVE: CPT

## 2022-11-03 PROCEDURE — 87640 STAPH A DNA AMP PROBE: CPT

## 2022-11-03 PROCEDURE — 87641 MR-STAPH DNA AMP PROBE: CPT

## 2022-11-03 PROCEDURE — 86235 NUCLEAR ANTIGEN ANTIBODY: CPT

## 2022-11-03 PROCEDURE — 83615 LACTATE (LD) (LDH) ENZYME: CPT

## 2022-11-03 PROCEDURE — 87635 SARS-COV-2 COVID-19 AMP PRB: CPT

## 2022-11-03 PROCEDURE — 87040 BLOOD CULTURE FOR BACTERIA: CPT

## 2022-11-03 PROCEDURE — 97161 PT EVAL LOW COMPLEX 20 MIN: CPT

## 2022-11-03 PROCEDURE — 82085 ASSAY OF ALDOLASE: CPT

## 2022-11-03 PROCEDURE — 86431 RHEUMATOID FACTOR QUANT: CPT

## 2022-11-03 PROCEDURE — 94640 AIRWAY INHALATION TREATMENT: CPT

## 2022-11-03 PROCEDURE — 83520 IMMUNOASSAY QUANT NOS NONAB: CPT

## 2022-11-03 PROCEDURE — 84100 ASSAY OF PHOSPHORUS: CPT

## 2022-11-03 PROCEDURE — 83605 ASSAY OF LACTIC ACID: CPT

## 2022-11-03 PROCEDURE — 83036 HEMOGLOBIN GLYCOSYLATED A1C: CPT

## 2022-11-03 PROCEDURE — 87086 URINE CULTURE/COLONY COUNT: CPT

## 2022-11-03 PROCEDURE — 86038 ANTINUCLEAR ANTIBODIES: CPT

## 2022-11-03 PROCEDURE — 83735 ASSAY OF MAGNESIUM: CPT

## 2022-11-03 PROCEDURE — 71045 X-RAY EXAM CHEST 1 VIEW: CPT

## 2022-11-03 PROCEDURE — 82652 VIT D 1 25-DIHYDROXY: CPT

## 2022-11-03 PROCEDURE — 96375 TX/PRO/DX INJ NEW DRUG ADDON: CPT

## 2022-11-03 PROCEDURE — 86160 COMPLEMENT ANTIGEN: CPT

## 2022-11-03 PROCEDURE — 86255 FLUORESCENT ANTIBODY SCREEN: CPT

## 2022-11-03 PROCEDURE — 80048 BASIC METABOLIC PNL TOTAL CA: CPT

## 2022-11-03 RX ORDER — ALBUTEROL 90 UG/1
2 AEROSOL, METERED ORAL
Qty: 1 | Refills: 0
Start: 2022-11-03 | End: 2022-11-09

## 2022-11-03 RX ADMIN — PANTOPRAZOLE SODIUM 40 MILLIGRAM(S): 20 TABLET, DELAYED RELEASE ORAL at 05:48

## 2022-11-03 RX ADMIN — GABAPENTIN 300 MILLIGRAM(S): 400 CAPSULE ORAL at 13:36

## 2022-11-03 RX ADMIN — Medication 8: at 17:18

## 2022-11-03 RX ADMIN — AZATHIOPRINE 50 MILLIGRAM(S): 100 TABLET ORAL at 13:34

## 2022-11-03 RX ADMIN — APIXABAN 10 MILLIGRAM(S): 2.5 TABLET, FILM COATED ORAL at 05:47

## 2022-11-03 RX ADMIN — Medication 1 TABLET(S): at 13:34

## 2022-11-03 RX ADMIN — Medication 2: at 08:41

## 2022-11-03 RX ADMIN — APIXABAN 10 MILLIGRAM(S): 2.5 TABLET, FILM COATED ORAL at 17:20

## 2022-11-03 RX ADMIN — Medication 8 UNIT(S): at 08:42

## 2022-11-03 RX ADMIN — Medication 8 UNIT(S): at 17:19

## 2022-11-03 RX ADMIN — Medication 8 UNIT(S): at 12:27

## 2022-11-03 RX ADMIN — Medication 2: at 12:27

## 2022-11-03 RX ADMIN — Medication 40 MILLIGRAM(S): at 05:47

## 2022-11-03 NOTE — PROGRESS NOTE ADULT - PROBLEM SELECTOR PLAN 1
secondary to Pulmonary Fibrosis and PE  see CTA chest as above  continue prednisone  continue Eliquis  continue Azathioprine  continue duoneb  SpO2 97% RA- tolerating being off O2  continue Bactrim for PCP PPX  Pulmonology Dr. Barr  check oxygen saturation on ambulation
secondary to Pulmonary Fibrosis and PE  see CTA chest as above  continue prednisone  continue Eliquis  continue Azathioprine  continue duoneb  SpO2 97% RA- tolerating being off O2  continue Bactrim for PCP PPX  Pulmonology Dr. Barr

## 2022-11-03 NOTE — PROGRESS NOTE ADULT - PROVIDER SPECIALTY LIST ADULT
Critical Care
Internal Medicine
Internal Medicine
Critical Care
Critical Care
Cardiology
Internal Medicine
Internal Medicine
Pulmonology
Pulmonology
Cardiology
Cardiology
Pulmonology
Pulmonology
Cardiology
Internal Medicine
Internal Medicine
Cardiology

## 2022-11-03 NOTE — PROGRESS NOTE ADULT - SUBJECTIVE AND OBJECTIVE BOX
PULMONARY  progress note    ESTEBAN BRANHAM  MRN-[id number]    Patient is a 62y old  Female who presents with a chief complaint of Other pulmonary embolism without acute cor pulmonale  No chest pain or sob        MEDICATIONS  (STANDING):  apixaban 10 milliGRAM(s) Oral every 12 hours  atorvastatin 20 milliGRAM(s) Oral at bedtime  azaTHIOprine 50 milliGRAM(s) Oral daily  gabapentin 300 milliGRAM(s) Oral daily  influenza   Vaccine 0.5 milliLiter(s) IntraMuscular once  insulin glargine Injectable (LANTUS) 30 Unit(s) SubCutaneous at bedtime  insulin lispro (ADMELOG) corrective regimen sliding scale   SubCutaneous three times a day before meals  insulin lispro (ADMELOG) corrective regimen sliding scale   SubCutaneous at bedtime  insulin lispro Injectable (ADMELOG) 8 Unit(s) SubCutaneous three times a day before meals  pantoprazole    Tablet 40 milliGRAM(s) Oral before breakfast  predniSONE   Tablet 40 milliGRAM(s) Oral daily  traZODone 100 milliGRAM(s) Oral at bedtime  trimethoprim  160 mG/sulfamethoxazole 800 mG 1 Tablet(s) Oral daily        PAST MEDICAL & SURGICAL HISTORY:  Diabetes mellitus  Hyperlipidemia  ILD             REVIEW OF SYSTEMS:  CONSTITUTIONAL: No fever, weight loss, or fatigue   EYES: No eye pain, visual disturbances, or discharge  ENT:  No difficulty hearing, tinnitus, vertigo; No sinus or throat pain  NECK: No pain or stiffness or nodes  RESPIRATORY:  cough+   wheezing --  chills--   hemoptysis--  Shortness of Breath--  CARDIOVASCULAR: No chest pain, palpitations, passing out, dizziness, or leg swelling  GASTROINTESTINAL: No abdominal or epigastric pain. No nausea, vomiting,  GENITOURINARY: No dysuria, frequency, hematuria, or incontinence  NEUROLOGICAL: No headaches, memory loss, loss of strength, numbness, or tremors  SKIN: No itching, burning, rashes, or lesions   LYMPH Nodes: No enlarged glands  ENDOCRINE: No heat or cold intolerance; No hair loss  MUSCULOSKELETAL: No joint pain or swelling; No muscle, back, or extremity pain  PSYCHIATRIC: No depression, anxiety, mood swings, or difficulty sleeping  HEME/LYMPH: No easy bruising, or bleeding gums  ALLERGY AND IMMUNOLOGIC: No hives or eczema        Vital Signs Last 24 Hrs  T(C): 37.2 (03 Nov 2022 05:06), Max: 37.2 (03 Nov 2022 05:06)  T(F): 99 (03 Nov 2022 05:06), Max: 99 (03 Nov 2022 05:06)  HR: 103 (03 Nov 2022 05:06) (96 - 103)  BP: 129/70 (03 Nov 2022 05:06) (125/77 - 149/83)  BP(mean): --  RR: 18 (03 Nov 2022 05:06) (18 - 18)  SpO2: 93% (03 Nov 2022 05:06) (93% - 97%)    Parameters below as of 03 Nov 2022 05:06  Patient On (Oxygen Delivery Method): room air      I&O's Detail      PHYSICAL EXAMINATION:    GENERAL: The patient is a well-developed, well-nourished in no apparent distress.   SKIN no rash ecchymoses or bruises  HEENT: Head is normocephalic and atraumatic  RICHARD , Extraocular muscles are intact. Mucous membranes  moist.   Neck supple ,No LN felt JVP not increased  Thyroid not enlarged  Cardiovascular:  S1 S2 heard, RSR, No JVD , systolic  murmur at apex, No gallop or rub  Respiratory: Chest wall symmetrical with good air entry ,Percussion note normal,    Lungs vesicular breathing with fine inspiratory basilar  rales , no   wheeze	  ABDOMEN:  Soft, Non-tender, no hepatomegaly or splenomegaly BS positive	  Extremities: Normal range of motion, No clubbing, cyanosis or edema  Vascular: Peripheral pulses palpable 2+ bilaterally  CNS:  Alert and oriented x3  Cranial nerves intact  sensory intact  motor power5/5  dtr 2+  Babinski neg        LABS:                        11.5   9.02  )-----------( 432      ( 03 Nov 2022 07:25 )             37.7     11-03    137  |  107  |  16  ----------------------------<  180<H>  4.3   |  23  |  0.90    Ca    9.1      03 Nov 2022 07:25  Phos  3.3     11-02  Mg     2.1     11-02            MICROBIOLOGY:    Culture - Urine (collected 10-29-22 @ 12:00)  Source: Clean Catch Clean Catch (Midstream)  Final Report (10-31-22 @ 11:52):    <10,000 CFU/mL Normal Urogenital Marily    Culture - Blood (collected 10-29-22 @ 07:40)  Source: .Blood Blood-Peripheral  Preliminary Report (10-30-22 @ 17:01):    No growth to date.    Culture - Blood (collected 10-29-22 @ 07:35)  Source: .Blood Blood-Peripheral  Preliminary Report (10-30-22 @ 17:01):    No growth to date.

## 2022-11-03 NOTE — DISCHARGE NOTE PROVIDER - HOSPITAL COURSE
Patient is a 62 year old Female from home with hx of Pulmonary fibrosis (dx in 2020, not on home oxygen), DM. Patient presented to ED for SOB on exertion and at rest with productive white cough, with worsening lower leg swelling, palpitations. Patient admitted to ICU for Acute Hypoxic respiratory failure secondary to Pulmonary fibrosis and acute PE. Patient CTA chest resulted PE, doppler positive for right peroneal vein thrombus, started on full dose Lovenox.    ICU Course:     Patientt was intially started on HFNC and was eventually be able to transitioned to 2L nasal canula and is currently saturating well. She does get short of breath on exertion. She was started on full dose lovenox, eliquis was sent to her pharmacy to confirm coverage and can be started after. She is currently on bactrim for PCP prophylaxis. On admission trop was elevated to 172 with probnp of 4519. Dr. Wilhelm cardio following, suspects its due to right heart strain secondary to PE. LE US performed confirmed the presence of Thrombus in the right peroneal vein. Echo showed normal EF >60%, with RV systolic pressure is severely increased at  68 mmHg.     Patient stable and downgraded to medicine. Patient transitioned off oxygen. Patient Eliquis approved by insurance.  Saturating 98% on RA at rest on ambulation 95%   Given patient's improved clinical status and current hemodynamic stability, decision was made to discharge.

## 2022-11-03 NOTE — CHART NOTE - NSCHARTNOTEFT_GEN_A_CORE
EVENT: UA negative pt requesting discharge  Urine Microscopic-Add On (NC) (11.03.22 @ 18:40)    Red Blood Cell - Urine: 2-5 /HPF    White Blood Cell - Urine: 0-2 /HPF    Bacteria: Few /HPF    Epithelial Cells: Moderate /HPF  Urinalysis (11.03.22 @ 18:40)    pH Urine: 6.0    Glucose Qualitative, Urine: 1000 mg/dL    Blood, Urine: Trace    Color: Yellow    Urine Appearance: Clear    Bilirubin: Negative    Ketone - Urine: Negative    Specific Gravity: 1.015    Protein, Urine: 100    Urobilinogen: Negative    Nitrite: Negative    Leukocyte Esterase Concentration: Negative    Vital Signs Last 24 Hrs  T(C): 36.9 (03 Nov 2022 14:13), Max: 37.2 (03 Nov 2022 05:06)  T(F): 98.5 (03 Nov 2022 14:13), Max: 99 (03 Nov 2022 05:06)  HR: 105 (03 Nov 2022 14:13) (96 - 105)  BP: 138/75 (03 Nov 2022 14:13) (129/70 - 149/83)  BP(mean): --  RR: 18 (03 Nov 2022 14:13) (18 - 18)  SpO2: 97% (03 Nov 2022 14:13) (93% - 97%)    Parameters below as of 03 Nov 2022 14:13  Patient On (Oxygen Delivery Method): room air    A1C with Estimated Average Glucose Result: 12.4 (10.30.22 @ 05:09)    PLAN:  1. Discharge instruction reiterated on follow up for blood glucose management.  2. Discharge pt now    Dr Zavaleta called and TEAMS

## 2022-11-03 NOTE — PROGRESS NOTE ADULT - ASSESSMENT
Patient is a 62 year old Female from home with hx of Pulmonary fibrosis (dx in 2020, not on home oxygen), DM. Patient presented to ED for SOB on exertion and at rest with productive white cough, with worsening lower leg swelling, palpitations. Patient admitted to ICU for Acute Hypoxic respiratory failure secondary to Pulmonary fibrosis and acute PE. Patient CTA chest resulted PE, doppler positive for right peroneal vein thrombus, started on full dose Lovenox.    ICU Course:     Patientt was intially started on HFNC and was eventually be able to transitioned to 2L nasal canula and is currently saturating well. She does get short of breath on exertion. She was started on full dose lovenox, eliquis was sent to her pharmacy to confirm coverage and can be started after. She is currently on bactrim for PCP prophylaxis. On admission trop was elevated to 172 with probnp of 4519. Dr. Wilhelm cardio following, suspects its due to right heart strain secondary to PE. LE US performed confirmed the presence of Thrombus in the right peroneal vein. Echo showed normal EF >60%, with RV systolic pressure is severely increased at  68 mmHg.     Patient stable and downgraded to medicine. Patient transitioned off oxygen. Patient Eliquis approved by insurance.    Pt seen at  bedside, NAD, states is feeling better. Saturating 98% on RA at rest  Will check saturation on ambulation

## 2022-11-03 NOTE — PROGRESS NOTE ADULT - ASSESSMENT
Patient is a 62 year old Female from home with hx of Pulmonary fibrosis (dx in 2020, not on home oxygen), DM. Patient presented to ED for SOB on exertion and at rest with productive white cough, with worsening lower leg swelling, palpitations. Patient admitted to ICU for Acute Hypoxic respiratory failure secondary to Pulmonary fibrosis and acute PE. Patient CTA chest resulted PE, doppler positive for right peroneal vein thrombus, started on full dose Lovenox.

## 2022-11-03 NOTE — PROGRESS NOTE ADULT - PROBLEM SELECTOR PLAN 3
insulin sliding scale  A1c 12.4  continue Lantus 30 units at bedtime  continue Admelog 8units premeal TID

## 2022-11-03 NOTE — DISCHARGE NOTE PROVIDER - PROVIDER TOKENS
PROVIDER:[TOKEN:[[id number]]] PROVIDER:[TOKEN:[[id number]]],PROVIDER:[TOKEN:[[id number]]],PROVIDER:[TOKEN:[[id number]]]

## 2022-11-03 NOTE — DISCHARGE NOTE PROVIDER - NSDCCPCAREPLAN_GEN_ALL_CORE_FT
PRINCIPAL DISCHARGE DIAGNOSIS  Diagnosis: Pulmonary embolism  Assessment and Plan of Treatment:        PRINCIPAL DISCHARGE DIAGNOSIS  Diagnosis: Pulmonary embolism  Assessment and Plan of Treatment: Pulmonary embolism is a blockage in one of the pulmonary arteries in your lungs. In most cases, pulmonary embolism is caused by blood clots that travel to the lungs from deep veins in the legs or, rarely, from veins in other parts of the body (deep vein thrombosis).  Please contine taking Eliquis as prescribed   Follow up with your pulmonologist and cardiologist      SECONDARY DISCHARGE DIAGNOSES  Diagnosis: DM (diabetes mellitus)  Assessment and Plan of Treatment: Continue all your medications as prescribed   Follow up with your doctor for furhter managment    Diagnosis: Pulmonary interstitial fibrosis  Assessment and Plan of Treatment: Follow up with your pulmonologist for further amnagment     PRINCIPAL DISCHARGE DIAGNOSIS  Diagnosis: Pulmonary embolism  Assessment and Plan of Treatment: Pulmonary embolism is a blockage in one of the pulmonary arteries in your lungs. In most cases, pulmonary embolism is caused by blood clots that travel to the lungs from deep veins in the legs or, rarely, from veins in other parts of the body (deep vein thrombosis).  Please contine taking Eliquis as prescribed   Follow up with your pulmonologist and cardiologist      SECONDARY DISCHARGE DIAGNOSES  Diagnosis: DM (diabetes mellitus)  Assessment and Plan of Treatment: Continue all your medications as prescribed   Follow up with your doctor for furhter managment    Diagnosis: Pulmonary interstitial fibrosis  Assessment and Plan of Treatment: Follow up with your pulmonologist for further amnagment  Continue Prednisone for one week and follow up with PCP for further managment

## 2022-11-03 NOTE — PROGRESS NOTE ADULT - PROBLEM SELECTOR PLAN 5
-- DO NOT REPLY / DO NOT REPLY ALL --  -- Message is from the [facility]--    General Patient Message      Reason for Call:   Patient returning call from voicemail received stating they were calling from Dr. Garibay's office and requesting a call back from patient. Patient stated no further information was provided in [facility] and requesting a call back        Caller Information       Type Contact Phone    10/08/2021 11:48 AM CDT Phone (Incoming) Fanta Heredia (Self) [phone] (M)          Alternative phone number: n/a    Turnaround time given to caller:   \"This message will be sent to [state Provider's name]. The clinical team will fulfill your request as soon as they review your message.\"    
PT recommending Home PT  needs to tolerate being off oxygen therapy
Patient has been contacted through the patient portal as she has send a message through the portal.   
PT recommending Home PT  needs to tolerate being off oxygen therapy

## 2022-11-03 NOTE — PROGRESS NOTE ADULT - SUBJECTIVE AND OBJECTIVE BOX
Patient is a 62y old  Female who presents with a chief complaint of Other pulmonary embolism without acute cor pulmonale         INTERVAL HPI/OVERNIGHT EVENTS: pt seen and examined    MEDICATIONS  (STANDING):  apixaban 10 milliGRAM(s) Oral every 12 hours  atorvastatin 20 milliGRAM(s) Oral at bedtime  azaTHIOprine 50 milliGRAM(s) Oral daily  gabapentin 300 milliGRAM(s) Oral daily  influenza   Vaccine 0.5 milliLiter(s) IntraMuscular once  insulin glargine Injectable (LANTUS) 30 Unit(s) SubCutaneous at bedtime  insulin lispro (ADMELOG) corrective regimen sliding scale   SubCutaneous three times a day before meals  insulin lispro (ADMELOG) corrective regimen sliding scale   SubCutaneous at bedtime  insulin lispro Injectable (ADMELOG) 8 Unit(s) SubCutaneous three times a day before meals  pantoprazole    Tablet 40 milliGRAM(s) Oral before breakfast  predniSONE   Tablet 40 milliGRAM(s) Oral daily  traZODone 100 milliGRAM(s) Oral at bedtime  trimethoprim  160 mG/sulfamethoxazole 800 mG 1 Tablet(s) Oral daily    MEDICATIONS  (PRN):  albuterol/ipratropium for Nebulization 3 milliLiter(s) Nebulizer every 6 hours PRN Shortness of Breath and/or Wheezing      __________________________________________________  REVIEW OF SYSTEMS:    CONSTITUTIONAL: No fever,   RESPIRATORY: No cough; No shortness of breath  CARDIOVASCULAR: No chest pain, no palpitations  GASTROINTESTINAL: No pain. No nausea or vomiting; No diarrhea   NEUROLOGICAL: No headache or numbness, no tremors  MUSCULOSKELETAL: No joint pain, no muscle pain  GENITOURINARY: no dysuria, no frequency, no hesitancy      Vital Signs Last 24 Hrs  T(C): 37.2 (03 Nov 2022 05:06), Max: 37.2 (03 Nov 2022 05:06)  T(F): 99 (03 Nov 2022 05:06), Max: 99 (03 Nov 2022 05:06)  HR: 103 (03 Nov 2022 05:06) (96 - 103)  BP: 129/70 (03 Nov 2022 05:06) (125/77 - 149/83)  BP(mean): --  RR: 18 (03 Nov 2022 05:06) (18 - 18)  SpO2: 93% (03 Nov 2022 05:06) (93% - 97%)    Parameters below as of 03 Nov 2022 05:06  Patient On (Oxygen Delivery Method): room air        ________________________________________________  PHYSICAL EXAM:  GENERAL: NAD  CHEST/LUNG: dec breath sounds at bases  HEART: S1 S2  regular; no murmurs, gallops or rubs  ABDOMEN: Soft, Nontender, Nondistended; Bowel sounds present  EXTREMITIES: no cyanosis; no edema; no calf tenderness  SKIN: warm and dry; no rash  NERVOUS SYSTEM:  Awake and alert  _________________________________________________  LABS:                        11.5   9.02  )-----------( 432      ( 03 Nov 2022 07:25 )             37.7     11-03    137  |  107  |  16  ----------------------------<  180<H>  4.3   |  23  |  0.90    Ca    9.1      03 Nov 2022 07:25  Phos  3.3     11-02  Mg     2.1     11-02          CAPILLARY BLOOD GLUCOSE      POCT Blood Glucose.: 174 mg/dL (03 Nov 2022 11:53)  POCT Blood Glucose.: 191 mg/dL (03 Nov 2022 07:57)  POCT Blood Glucose.: 260 mg/dL (02 Nov 2022 21:30)  POCT Blood Glucose.: 248 mg/dL (02 Nov 2022 16:20)        RADIOLOGY & ADDITIONAL TESTS:    Imaging Personally Reviewed:  YES/NO    Consultant(s) Notes Reviewed:   YES/ No    Care Discussed with Consultants :     Plan of care was discussed with patient and /or primary care giver; all questions and concerns were addressed and care was aligned with patient's wishes.

## 2022-11-03 NOTE — PROGRESS NOTE ADULT - PROBLEM SELECTOR PLAN 4
DVT PPX: Eliquis  GI PPX: PPI

## 2022-11-03 NOTE — PROGRESS NOTE ADULT - ASSESSMENT
Patient is a 62 year old Female from home with hx of Pulmonary fibrosis (dx in 2020, not on home oxygen), DM. Patient presented to ED for SOB on exertion and at rest with productive white cough, with worsening lower leg swelling, palpitations. Patient admitted to ICU for Acute Hypoxic respiratory failure secondary to Pulmonary fibrosis and acute PE. Patient CTA chest resulted PE, doppler positive for right peroneal vein thrombus, started on full dose Lovenox.    ICU Course:     Patientt was intially started on HFNC and was eventually be able to transitioned to 2L nasal canula and is currently saturating well. She does get short of breath on exertion. She was started on full dose lovenox, eliquis was sent to her pharmacy to confirm coverage and can be started after. She is currently on bactrim for PCP prophylaxis. On admission trop was elevated to 172 with probnp of 4519. Dr. Wilhelm cardio following, suspects its due to right heart strain secondary to PE. LE US performed confirmed the presence of Thrombus in the right peroneal vein. Echo showed normal EF >60%, with RV systolic pressure is severely increased at  68 mmHg.

## 2022-11-03 NOTE — DISCHARGE NOTE NURSING/CASE MANAGEMENT/SOCIAL WORK - PATIENT PORTAL LINK FT
You can access the FollowMyHealth Patient Portal offered by [facility] by registering at the following website: [email or web address]. By joining [facility] FollowMyHealth portal, you will also be able to view your health information using other applications (apps) compatible with our system.

## 2022-11-03 NOTE — PROGRESS NOTE ADULT - PROBLEM SELECTOR PLAN 2
Doppler positive for right peroneal vein   see plan as above

## 2022-11-03 NOTE — DISCHARGE NOTE PROVIDER - CARE PROVIDER_API CALL
Lex Zavaleta)  Internal Medicine  [phone] [place] [address]  Phone: [phone]  Fax: [phone]  Follow Up Time:    Lex Zavaleta)  Internal Medicine  [address] [place] [address]  Phone: [phone]  Fax: [phone]  Follow Up Time:     Godwin Wilhelm)  Cardiology  [address] [place] [address]  Phone: [phone]  Fax: [phone]  Follow Up Time:     Mark Barr)  Internal Medicine; Pulmonary Disease  [address] [place] [address]  Phone: [phone]  Fax: [phone]  Follow Up Time:

## 2022-11-03 NOTE — DISCHARGE NOTE NURSING/CASE MANAGEMENT/SOCIAL WORK - NSDCPEFALRISK_GEN_ALL_CORE
For information on Fall & Injury Prevention, visit: [email or web address] OR  [email or web address] OR  https://www.cdc.gov/steadi/patient.html

## 2022-11-03 NOTE — DISCHARGE NOTE PROVIDER - NSDCMRMEDTOKEN_GEN_ALL_CORE_FT
Admelog 100 units/mL injectable solution: Sliding Scale   apixaban 5 mg oral tablet: 1 tab(s) orally 2 times a day   atorvastatin 20 mg oral tablet: 1 tab(s) orally once a day  Basaglar KwikPen 100 units/mL subcutaneous solution: 25 unit(s) subcutaneous once a day (at bedtime)  benzonatate 100 mg oral capsule: 1 cap(s) orally 3 times a day, As Needed  cetirizine 5 mg oral tablet: 1 tab(s) orally once a day  gabapentin 300 mg oral capsule: 1 tab(s) orally once a day  metFORMIN 1000 mg oral tablet: 1 tab(s) orally 2 times a day  predniSONE 20 mg oral tablet: 2 tab(s) orally once a day  traZODone 100 mg oral tablet: 1 tab(s) orally once a day (at bedtime)   Admelog 100 units/mL injectable solution: Sliding Scale   Albuterol (Eqv-Proventil HFA) 90 mcg/inh inhalation aerosol: 2 puff(s) inhaled every 6 hours   apixaban 5 mg oral tablet: 1 tab(s) orally 2 times a day   atorvastatin 20 mg oral tablet: 1 tab(s) orally once a day  Bactrim  mg-160 mg oral tablet: 1 tab(s) orally once a day  Basaglar KwikPen 100 units/mL subcutaneous solution: 25 unit(s) subcutaneous once a day (at bedtime)  cetirizine 5 mg oral tablet: 1 tab(s) orally once a day  gabapentin 300 mg oral capsule: 1 tab(s) orally once a day  metFORMIN 1000 mg oral tablet: 1 tab(s) orally 2 times a day  predniSONE 10 mg oral tablet: 3 tab(s) orally once a day   traZODone 100 mg oral tablet: 1 tab(s) orally once a day (at bedtime)

## 2022-11-03 NOTE — PROGRESS NOTE ADULT - TIME BILLING
- Review of records, telemetry, vital signs and daily labs.   - General and cardiovascular physical examination.  - Generation of cardiovascular treatment plan.  - Coordination of care.      Patient was seen and examined by me on 10/31/22,interim events noted,labs and radiology studies reviewed.  Godwin Wilhelm MD,FACC.  [address] [place], [place] [id number].  [phone]
- Review of records, telemetry, vital signs and daily labs.   - General and cardiovascular physical examination.  - Generation of cardiovascular treatment plan.  - Coordination of care.      Patient was seen and examined by me on 11/1/22,interim events noted,labs and radiology studies reviewed.  Godwin Wilhelm MD,FACC.  [address] [place], [place] [id number].  [phone]
I have examined pt personally Hx chart lab and xrays reviewed and pt discussed with  PMD
- Review of records, telemetry, vital signs and daily labs.   - General and cardiovascular physical examination.  - Generation of cardiovascular treatment plan.  - Coordination of care.      Patient was seen and examined by me on 10/30/22,interim events noted,labs and radiology studies reviewed.  Godwin Wilhelm MD,FACC.  [address] [place], [place] [id number].  [phone]
- Review of records, telemetry, vital signs and daily labs.   - General and cardiovascular physical examination.  - Generation of cardiovascular treatment plan.  - Coordination of care.      Patient was seen and examined by me on 11/2/22,interim events noted,labs and radiology studies reviewed.  Godwin Wilhelm MD,FACC.  [address] [place], [place] [id number].  [phone]
- Review of records, telemetry, vital signs and daily labs.   - General and cardiovascular physical examination.  - Generation of cardiovascular treatment plan.  - Coordination of care.      Patient was seen and examined by me  11/3/22events noted,labs and radiology studies reviewed.  Godwin Wilhelm MD,FACC.  [address] [place], [place] [id number].  [phone]

## 2022-11-03 NOTE — PROGRESS NOTE ADULT - SUBJECTIVE AND OBJECTIVE BOX
PATIENT SEEN AND EXAMINED ON :- 11/3/22  DATE OF SERVICE:     11/3/22        Interim events noted,Labs ,Radiological studies and Cardiology tests reviewed .       HOSPITAL COURSE: HPI:      INTERIM EVENTS:Patient seen at bedside ,interim events noted.      PMH -reviewed admission note, no change since admission  HEART FAILURE: Acute[ ]Chronic[ ] Systolic[ ] Diastolic[ ] Combined Systolic and Diastolic[ ]  CAD[ ] CABG[ ] PCI[ ]  DEVICES[ ] PPM[ ] ICD[ ] ILR[ ]  ATRIAL FIBRILLATION[ ] Paroxysmal[ ] Permanent[ ] CHADS2-[  ]  DONOVAN[ ] CKD1[ ] CKD2[ ] CKD3[ ] CKD4[ ] ESRD[ ]  COPD[ ] HTN[ ]   DM[ ] Type1[ ] Type 2[ ]   CVA[ ] Paresis[ ]    AMBULATION: Assisted[ ] Cane/walker[ ] Independent[ ]    MEDICATIONS  (STANDING):  apixaban 10 milliGRAM(s) Oral every 12 hours  atorvastatin 20 milliGRAM(s) Oral at bedtime  azaTHIOprine 50 milliGRAM(s) Oral daily  gabapentin 300 milliGRAM(s) Oral daily  influenza   Vaccine 0.5 milliLiter(s) IntraMuscular once  insulin glargine Injectable (LANTUS) 30 Unit(s) SubCutaneous at bedtime  insulin lispro (ADMELOG) corrective regimen sliding scale   SubCutaneous three times a day before meals  insulin lispro (ADMELOG) corrective regimen sliding scale   SubCutaneous at bedtime  insulin lispro Injectable (ADMELOG) 8 Unit(s) SubCutaneous three times a day before meals  pantoprazole    Tablet 40 milliGRAM(s) Oral before breakfast  predniSONE   Tablet 40 milliGRAM(s) Oral daily  traZODone 100 milliGRAM(s) Oral at bedtime  trimethoprim  160 mG/sulfamethoxazole 800 mG 1 Tablet(s) Oral daily    MEDICATIONS  (PRN):  albuterol/ipratropium for Nebulization 3 milliLiter(s) Nebulizer every 6 hours PRN Shortness of Breath and/or Wheezing            REVIEW OF SYSTEMS:  Constitutional: [ ] fever, [ ]weight loss,  [ ]fatigue [ ]weight gain  Eyes: [ ] visual changes  Respiratory: [ ]shortness of breath;  [ ] cough, [ ]wheezing, [ ]chills, [ ]hemoptysis  Cardiovascular: [ ] chest pain, [ ]palpitations, [ ]dizziness,  [ ]leg swelling[ ]orthopnea[ ]PND  Gastrointestinal: [ ] abdominal pain, [ ]nausea, [ ]vomiting,  [ ]diarrhea [ ]Constipation [ ]Melena  Genitourinary: [ ] dysuria, [ ] hematuria [ ]Pike  Neurologic: [ ] headaches [ ] tremors[ ]weakness [ ]Paralysis Right[ ] Left[ ]  Skin: [ ] itching, [ ]burning, [ ] rashes  Endocrine: [ ] heat or cold intolerance  Musculoskeletal: [ ] joint pain or swelling; [ ] muscle, back, or extremity pain  Psychiatric: [ ] depression, [ ]anxiety, [ ]mood swings, or [ ]difficulty sleeping  Hematologic: [ ] easy bruising, [ ] bleeding gums    [ ] All remaining systems negative except as per above.   [ ]Unable to obtain.  [x] No change in ROS since admission      Vital Signs Last 24 Hrs  T(C): 37.1 (03 Nov 2022 20:58), Max: 37.2 (03 Nov 2022 05:06)  T(F): 98.8 (03 Nov 2022 20:58), Max: 99 (03 Nov 2022 05:06)  HR: 95 (03 Nov 2022 20:58) (95 - 105)  BP: 152/89 (03 Nov 2022 20:58) (129/70 - 152/89)  BP(mean): --  RR: 18 (03 Nov 2022 20:58) (18 - 18)  SpO2: 96% (03 Nov 2022 20:58) (93% - 97%)    Parameters below as of 03 Nov 2022 20:58  Patient On (Oxygen Delivery Method): room air      I&O's Summary      PHYSICAL EXAM:  General: No acute distress BMI-  HEENT: EOMI, PERRL  Neck: Supple, [ ] JVD  Lungs: Equal air entry bilaterally; [ ] rales [ ] wheezing [ ] rhonchi  Heart: Regular rate and rhythm; [x ] murmur   2/6 [ x] systolic [ ] diastolic [ ] radiation[ ] rubs [ ]  gallops  Abdomen: Nontender, bowel sounds present  Extremities: No clubbing, cyanosis, [ ] edema [ ]Pulses  equal and intact  Nervous system:  Alert & Oriented X3, no focal deficits  Psychiatric: Normal affect  Skin: No rashes or lesions    LABS:  11-03    137  |  107  |  16  ----------------------------<  180<H>  4.3   |  23  |  0.90    Ca    9.1      03 Nov 2022 07:25  Phos  3.3     11-02  Mg     2.1     11-02      Creatinine Trend: 0.90<--, 0.87<--, 0.91<--, 0.83<--, 0.70<--, 1.21<--                        11.5   9.02  )-----------( 432      ( 03 Nov 2022 07:25 )             37.7

## 2022-11-03 NOTE — PROGRESS NOTE ADULT - SUBJECTIVE AND OBJECTIVE BOX
NP Note discussed with  primary attending    Patient is a 62y old  Female who presents with a chief complaint of Other pulmonary embolism without acute cor pulmonale     (31 Oct 2022 16:22)      INTERVAL HPI/OVERNIGHT EVENTS: no new complaints    MEDICATIONS  (STANDING):  apixaban 10 milliGRAM(s) Oral every 12 hours  atorvastatin 20 milliGRAM(s) Oral at bedtime  azaTHIOprine 50 milliGRAM(s) Oral daily  gabapentin 300 milliGRAM(s) Oral daily  influenza   Vaccine 0.5 milliLiter(s) IntraMuscular once  insulin glargine Injectable (LANTUS) 30 Unit(s) SubCutaneous at bedtime  insulin lispro (ADMELOG) corrective regimen sliding scale   SubCutaneous three times a day before meals  insulin lispro (ADMELOG) corrective regimen sliding scale   SubCutaneous at bedtime  insulin lispro Injectable (ADMELOG) 8 Unit(s) SubCutaneous three times a day before meals  pantoprazole    Tablet 40 milliGRAM(s) Oral before breakfast  predniSONE   Tablet 40 milliGRAM(s) Oral daily  traZODone 100 milliGRAM(s) Oral at bedtime  trimethoprim  160 mG/sulfamethoxazole 800 mG 1 Tablet(s) Oral daily    MEDICATIONS  (PRN):  albuterol/ipratropium for Nebulization 3 milliLiter(s) Nebulizer every 6 hours PRN Shortness of Breath and/or Wheezing      __________________________________________________  REVIEW OF SYSTEMS:    CONSTITUTIONAL: No fever,   RESPIRATORY: No cough; No shortness of breath  CARDIOVASCULAR: No chest pain, no palpitations  GASTROINTESTINAL: No pain. No nausea or vomiting; No diarrhea   NEUROLOGICAL: No headache or numbness, no tremors  MUSCULOSKELETAL: No joint pain, no muscle pain  GENITOURINARY: no dysuria, no frequency, no hesitancy      Vital Signs Last 24 Hrs  T(C): 37.2 (03 Nov 2022 05:06), Max: 37.2 (03 Nov 2022 05:06)  T(F): 99 (03 Nov 2022 05:06), Max: 99 (03 Nov 2022 05:06)  HR: 103 (03 Nov 2022 05:06) (96 - 103)  BP: 129/70 (03 Nov 2022 05:06) (125/77 - 149/83)  BP(mean): --  RR: 18 (03 Nov 2022 05:06) (18 - 18)  SpO2: 93% (03 Nov 2022 05:06) (93% - 97%)    Parameters below as of 03 Nov 2022 05:06  Patient On (Oxygen Delivery Method): room air        ________________________________________________  PHYSICAL EXAM:  GENERAL: NAD  CHEST/LUNG: Clear to ausculitation bilaterally with good air entry   HEART: S1 S2  regular; no murmurs, gallops or rubs  ABDOMEN: Soft, Nontender, Nondistended; Bowel sounds present  EXTREMITIES: no cyanosis; no edema; no calf tenderness  SKIN: warm and dry; no rash  NERVOUS SYSTEM:  Awake and alert; Oriented  to place, person and time ; no new deficits    _________________________________________________  LABS:                        11.5   9.02  )-----------( 432      ( 03 Nov 2022 07:25 )             37.7     11-03    137  |  107  |  16  ----------------------------<  180<H>  4.3   |  23  |  0.90    Ca    9.1      03 Nov 2022 07:25  Phos  3.3     11-02  Mg     2.1     11-02          CAPILLARY BLOOD GLUCOSE      POCT Blood Glucose.: 174 mg/dL (03 Nov 2022 11:53)  POCT Blood Glucose.: 191 mg/dL (03 Nov 2022 07:57)  POCT Blood Glucose.: 260 mg/dL (02 Nov 2022 21:30)  POCT Blood Glucose.: 248 mg/dL (02 Nov 2022 16:20)        RADIOLOGY & ADDITIONAL TESTS:    Imaging Personally Reviewed:  YES/NO    Consultant(s) Notes Reviewed:   YES/ No    Care Discussed with Consultants :     Plan of care was discussed with patient and /or primary care giver; all questions and concerns were addressed and care was aligned with patient's wishes.

## 2023-02-27 ENCOUNTER — INPATIENT (INPATIENT)
Facility: HOSPITAL | Age: 63
LOS: 3 days | Discharge: HOME | DRG: 189 | End: 2023-03-03
Attending: INTERNAL MEDICINE | Admitting: INTERNAL MEDICINE
Payer: MEDICAID

## 2023-02-27 VITALS
RESPIRATION RATE: 20 BRPM | HEART RATE: 109 BPM | OXYGEN SATURATION: 91 % | HEIGHT: 66 IN | TEMPERATURE: 99 F | SYSTOLIC BLOOD PRESSURE: 126 MMHG | DIASTOLIC BLOOD PRESSURE: 76 MMHG | WEIGHT: 145.06 LBS

## 2023-02-27 DIAGNOSIS — H26.9 UNSPECIFIED CATARACT: Chronic | ICD-10-CM

## 2023-02-27 LAB
ALBUMIN SERPL ELPH-MCNC: 2.5 G/DL — LOW (ref 3.5–5)
ALP SERPL-CCNC: 184 U/L — HIGH (ref 40–120)
ALT FLD-CCNC: 56 U/L DA — SIGNIFICANT CHANGE UP (ref 10–60)
ANION GAP SERPL CALC-SCNC: 6 MMOL/L — SIGNIFICANT CHANGE UP (ref 5–17)
APTT BLD: 28.6 SEC — SIGNIFICANT CHANGE UP (ref 27.5–35.5)
AST SERPL-CCNC: 85 U/L — HIGH (ref 10–40)
BASE EXCESS BLDV CALC-SCNC: -2.8 MMOL/L — SIGNIFICANT CHANGE UP
BASOPHILS # BLD AUTO: 0.07 K/UL — SIGNIFICANT CHANGE UP (ref 0–0.2)
BASOPHILS NFR BLD AUTO: 0.7 % — SIGNIFICANT CHANGE UP (ref 0–2)
BILIRUB SERPL-MCNC: 0.3 MG/DL — SIGNIFICANT CHANGE UP (ref 0.2–1.2)
BUN SERPL-MCNC: 20 MG/DL — HIGH (ref 7–18)
CALCIUM SERPL-MCNC: 9 MG/DL — SIGNIFICANT CHANGE UP (ref 8.4–10.5)
CHLORIDE SERPL-SCNC: 104 MMOL/L — SIGNIFICANT CHANGE UP (ref 96–108)
CO2 SERPL-SCNC: 24 MMOL/L — SIGNIFICANT CHANGE UP (ref 22–31)
CREAT SERPL-MCNC: 1.31 MG/DL — HIGH (ref 0.5–1.3)
EGFR: 47 ML/MIN/1.73M2 — LOW
EOSINOPHIL # BLD AUTO: 0.19 K/UL — SIGNIFICANT CHANGE UP (ref 0–0.5)
EOSINOPHIL NFR BLD AUTO: 2 % — SIGNIFICANT CHANGE UP (ref 0–6)
FLUAV AG NPH QL: SIGNIFICANT CHANGE UP
FLUBV AG NPH QL: SIGNIFICANT CHANGE UP
GLUCOSE SERPL-MCNC: 265 MG/DL — HIGH (ref 70–99)
HCO3 BLDV-SCNC: 22 MMOL/L — SIGNIFICANT CHANGE UP (ref 22–29)
HCT VFR BLD CALC: 34.2 % — LOW (ref 34.5–45)
HGB BLD-MCNC: 10.2 G/DL — LOW (ref 11.5–15.5)
HOROWITZ INDEX BLDV+IHG-RTO: 21 — SIGNIFICANT CHANGE UP
IMM GRANULOCYTES NFR BLD AUTO: 0.6 % — SIGNIFICANT CHANGE UP (ref 0–0.9)
INR BLD: 1.1 RATIO — SIGNIFICANT CHANGE UP (ref 0.88–1.16)
LACTATE SERPL-SCNC: 2.6 MMOL/L — HIGH (ref 0.7–2)
LYMPHOCYTES # BLD AUTO: 1.63 K/UL — SIGNIFICANT CHANGE UP (ref 1–3.3)
LYMPHOCYTES # BLD AUTO: 17.1 % — SIGNIFICANT CHANGE UP (ref 13–44)
MAGNESIUM SERPL-MCNC: 2.2 MG/DL — SIGNIFICANT CHANGE UP (ref 1.6–2.6)
MCHC RBC-ENTMCNC: 25.1 PG — LOW (ref 27–34)
MCHC RBC-ENTMCNC: 29.8 GM/DL — LOW (ref 32–36)
MCV RBC AUTO: 84.2 FL — SIGNIFICANT CHANGE UP (ref 80–100)
MONOCYTES # BLD AUTO: 0.77 K/UL — SIGNIFICANT CHANGE UP (ref 0–0.9)
MONOCYTES NFR BLD AUTO: 8.1 % — SIGNIFICANT CHANGE UP (ref 2–14)
NEUTROPHILS # BLD AUTO: 6.83 K/UL — SIGNIFICANT CHANGE UP (ref 1.8–7.4)
NEUTROPHILS NFR BLD AUTO: 71.5 % — SIGNIFICANT CHANGE UP (ref 43–77)
NRBC # BLD: 0 /100 WBCS — SIGNIFICANT CHANGE UP (ref 0–0)
NT-PROBNP SERPL-SCNC: 6630 PG/ML — HIGH (ref 0–125)
PCO2 BLDV: 37 MMHG — LOW (ref 39–42)
PH BLDV: 7.38 — SIGNIFICANT CHANGE UP (ref 7.32–7.43)
PLATELET # BLD AUTO: 430 K/UL — HIGH (ref 150–400)
PO2 BLDV: 44 MMHG — SIGNIFICANT CHANGE UP
POTASSIUM SERPL-MCNC: 4.6 MMOL/L — SIGNIFICANT CHANGE UP (ref 3.5–5.3)
POTASSIUM SERPL-SCNC: 4.6 MMOL/L — SIGNIFICANT CHANGE UP (ref 3.5–5.3)
PROT SERPL-MCNC: 7.1 G/DL — SIGNIFICANT CHANGE UP (ref 6–8.3)
PROTHROM AB SERPL-ACNC: 13.1 SEC — SIGNIFICANT CHANGE UP (ref 10.5–13.4)
RBC # BLD: 4.06 M/UL — SIGNIFICANT CHANGE UP (ref 3.8–5.2)
RBC # FLD: 15.2 % — HIGH (ref 10.3–14.5)
SAO2 % BLDV: 66.5 % — SIGNIFICANT CHANGE UP
SARS-COV-2 RNA SPEC QL NAA+PROBE: SIGNIFICANT CHANGE UP
SODIUM SERPL-SCNC: 134 MMOL/L — LOW (ref 135–145)
TROPONIN I, HIGH SENSITIVITY RESULT: 921.5 NG/L — HIGH
WBC # BLD: 9.55 K/UL — SIGNIFICANT CHANGE UP (ref 3.8–10.5)
WBC # FLD AUTO: 9.55 K/UL — SIGNIFICANT CHANGE UP (ref 3.8–10.5)

## 2023-02-27 PROCEDURE — 93010 ELECTROCARDIOGRAM REPORT: CPT

## 2023-02-27 PROCEDURE — 99285 EMERGENCY DEPT VISIT HI MDM: CPT

## 2023-02-27 PROCEDURE — 71045 X-RAY EXAM CHEST 1 VIEW: CPT | Mod: 26

## 2023-02-27 RX ORDER — ALBUTEROL 90 UG/1
2.5 AEROSOL, METERED ORAL
Refills: 0 | Status: COMPLETED | OUTPATIENT
Start: 2023-02-27 | End: 2023-02-27

## 2023-02-27 RX ADMIN — ALBUTEROL 2.5 MILLIGRAM(S): 90 AEROSOL, METERED ORAL at 23:50

## 2023-02-27 NOTE — ED PROVIDER NOTE - CLINICAL SUMMARY MEDICAL DECISION MAKING FREE TEXT BOX
63yo F with hx pulmonary fibrosis on prednisone, PE on Eliquis and IDDM presents with shortness of breath.  ekg interpretation-   lab interpretation-  imaging interpretation-  Discussed above with patient  patient stable for 61yo F with hx pulmonary fibrosis on prednisone, PE on Eliquis and IDDM presents with shortness of breath.  ekg interpretation- sinus tach and TWI in V1-V4  lab interpretation- trop 921, probnp>6k  imaging interpretation- CXR shows bilateral opacities  CTA chest shows  Discussed above with patient  patient stable for 63yo F with hx pulmonary fibrosis on prednisone, PE on Eliquis and IDDM presents with shortness of breath. in ED O2sat >95% on Ventimask at 50% FiO2  ekg interpretation- sinus tach and TWI in V1-V4  lab interpretation- trop 921-->649, probnp>6k  imaging interpretation- CXR shows bilateral opacities  CTA chest shows 1. Limited evaluation of the subsegmental pulmonary arteries in the lung bases secondary to motion artifact. No pulmonary embolism in the main, lobar, segmental or upper lobe subsegmental pulmonary arteries.2. Redemonstration of interstitial reticular and groundglass opacities throughout the lungs with a peripheral and basilar predominance and associated traction bronchiectasis suggestive of an interstitial lung disease.3. Mediastinal lymphadenopathy.  Given lasix for heart failure  Discussed above with patient who agrees with plan for admission.  Discussed above with hospitalist who agrees with plan above. MAR informed.  patient stable for admission

## 2023-02-27 NOTE — ED PROVIDER NOTE - DIFFERENTIAL DIAGNOSIS
Differential Diagnosis pe vs pna vs uri vs chronic pulmonary fibrosis pe vs pna vs uri vs chronic pulmonary fibrosis vs CHF

## 2023-02-27 NOTE — ED ADULT TRIAGE NOTE - CHIEF COMPLAINT QUOTE
Pt BIBA for c/o shortness of breathe, as per EMS pt oxygen was 70% on room air.  Pt has history of pulmonary fibrosis.

## 2023-02-27 NOTE — ED PROVIDER NOTE - OBJECTIVE STATEMENT
61yo F with hx pulmonary fibrosis on prednisone, PE on Eliquis and IDDM presents with shortness of breath. Reports onset of symptoms 3 days ago, at the time she was seen by pulmonologist at [facility] who prescribed her oxygen which she has not been setup with yet. Reports shortness of breath associated with cough with clear sputum and chest tightness. Reports since last week she noticed bilateral leg swelling. Denies fever, sick contacts or recent travel.

## 2023-02-28 DIAGNOSIS — R09.89 OTHER SPECIFIED SYMPTOMS AND SIGNS INVOLVING THE CIRCULATORY AND RESPIRATORY SYSTEMS: ICD-10-CM

## 2023-02-28 DIAGNOSIS — R77.8 OTHER SPECIFIED ABNORMALITIES OF PLASMA PROTEINS: ICD-10-CM

## 2023-02-28 DIAGNOSIS — Z29.9 ENCOUNTER FOR PROPHYLACTIC MEASURES, UNSPECIFIED: ICD-10-CM

## 2023-02-28 DIAGNOSIS — I50.810 RIGHT HEART FAILURE, UNSPECIFIED: ICD-10-CM

## 2023-02-28 DIAGNOSIS — N17.9 ACUTE KIDNEY FAILURE, UNSPECIFIED: ICD-10-CM

## 2023-02-28 DIAGNOSIS — J96.21 ACUTE AND CHRONIC RESPIRATORY FAILURE WITH HYPOXIA: ICD-10-CM

## 2023-02-28 DIAGNOSIS — E11.9 TYPE 2 DIABETES MELLITUS WITHOUT COMPLICATIONS: ICD-10-CM

## 2023-02-28 DIAGNOSIS — I26.09 OTHER PULMONARY EMBOLISM WITH ACUTE COR PULMONALE: ICD-10-CM

## 2023-02-28 LAB
ALBUMIN SERPL ELPH-MCNC: 2.9 G/DL — LOW (ref 3.5–5)
ALP SERPL-CCNC: 203 U/L — HIGH (ref 40–120)
ALT FLD-CCNC: 49 U/L DA — SIGNIFICANT CHANGE UP (ref 10–60)
ANION GAP SERPL CALC-SCNC: 10 MMOL/L — SIGNIFICANT CHANGE UP (ref 5–17)
AST SERPL-CCNC: 40 U/L — SIGNIFICANT CHANGE UP (ref 10–40)
BACTERIA # UR AUTO: ABNORMAL /HPF
BILIRUB SERPL-MCNC: 0.5 MG/DL — SIGNIFICANT CHANGE UP (ref 0.2–1.2)
BUN SERPL-MCNC: 15 MG/DL — SIGNIFICANT CHANGE UP (ref 7–18)
CALCIUM SERPL-MCNC: 9.9 MG/DL — SIGNIFICANT CHANGE UP (ref 8.4–10.5)
CHLORIDE SERPL-SCNC: 102 MMOL/L — SIGNIFICANT CHANGE UP (ref 96–108)
CO2 SERPL-SCNC: 25 MMOL/L — SIGNIFICANT CHANGE UP (ref 22–31)
CREAT SERPL-MCNC: 1.05 MG/DL — SIGNIFICANT CHANGE UP (ref 0.5–1.3)
EGFR: 61 ML/MIN/1.73M2 — SIGNIFICANT CHANGE UP
EPI CELLS # UR: SIGNIFICANT CHANGE UP /HPF
GLUCOSE BLDC GLUCOMTR-MCNC: 278 MG/DL — HIGH (ref 70–99)
GLUCOSE BLDC GLUCOMTR-MCNC: 417 MG/DL — HIGH (ref 70–99)
GLUCOSE BLDC GLUCOMTR-MCNC: 426 MG/DL — HIGH (ref 70–99)
GLUCOSE BLDC GLUCOMTR-MCNC: 495 MG/DL — CRITICAL HIGH (ref 70–99)
GLUCOSE SERPL-MCNC: 370 MG/DL — HIGH (ref 70–99)
HCT VFR BLD CALC: 37.5 % — SIGNIFICANT CHANGE UP (ref 34.5–45)
HGB BLD-MCNC: 11.4 G/DL — LOW (ref 11.5–15.5)
LACTATE SERPL-SCNC: 2.7 MMOL/L — HIGH (ref 0.7–2)
MAGNESIUM SERPL-MCNC: 1.8 MG/DL — SIGNIFICANT CHANGE UP (ref 1.6–2.6)
MCHC RBC-ENTMCNC: 25.3 PG — LOW (ref 27–34)
MCHC RBC-ENTMCNC: 30.4 GM/DL — LOW (ref 32–36)
MCV RBC AUTO: 83.3 FL — SIGNIFICANT CHANGE UP (ref 80–100)
NRBC # BLD: 0 /100 WBCS — SIGNIFICANT CHANGE UP (ref 0–0)
PHOSPHATE SERPL-MCNC: 3.9 MG/DL — SIGNIFICANT CHANGE UP (ref 2.5–4.5)
PLATELET # BLD AUTO: 453 K/UL — HIGH (ref 150–400)
POTASSIUM SERPL-MCNC: 4.2 MMOL/L — SIGNIFICANT CHANGE UP (ref 3.5–5.3)
POTASSIUM SERPL-SCNC: 4.2 MMOL/L — SIGNIFICANT CHANGE UP (ref 3.5–5.3)
PROT SERPL-MCNC: 8.1 G/DL — SIGNIFICANT CHANGE UP (ref 6–8.3)
RBC # BLD: 4.5 M/UL — SIGNIFICANT CHANGE UP (ref 3.8–5.2)
RBC # FLD: 15.2 % — HIGH (ref 10.3–14.5)
RBC CASTS # UR COMP ASSIST: ABNORMAL /HPF (ref 0–2)
SODIUM SERPL-SCNC: 137 MMOL/L — SIGNIFICANT CHANGE UP (ref 135–145)
TROPONIN I, HIGH SENSITIVITY RESULT: 649.1 NG/L — HIGH
WBC # BLD: 9.49 K/UL — SIGNIFICANT CHANGE UP (ref 3.8–10.5)
WBC # FLD AUTO: 9.49 K/UL — SIGNIFICANT CHANGE UP (ref 3.8–10.5)
WBC UR QL: ABNORMAL /HPF (ref 0–5)

## 2023-02-28 PROCEDURE — 12345: CPT | Mod: NC

## 2023-02-28 PROCEDURE — 99223 1ST HOSP IP/OBS HIGH 75: CPT

## 2023-02-28 PROCEDURE — 71275 CT ANGIOGRAPHY CHEST: CPT | Mod: 26,MA

## 2023-02-28 RX ORDER — INSULIN GLARGINE 100 [IU]/ML
20 INJECTION, SOLUTION SUBCUTANEOUS AT BEDTIME
Refills: 0 | Status: DISCONTINUED | OUTPATIENT
Start: 2023-02-28 | End: 2023-02-28

## 2023-02-28 RX ORDER — INSULIN LISPRO 100/ML
VIAL (ML) SUBCUTANEOUS EVERY 6 HOURS
Refills: 0 | Status: DISCONTINUED | OUTPATIENT
Start: 2023-02-28 | End: 2023-02-28

## 2023-02-28 RX ORDER — ATORVASTATIN CALCIUM 80 MG/1
20 TABLET, FILM COATED ORAL AT BEDTIME
Refills: 0 | Status: DISCONTINUED | OUTPATIENT
Start: 2023-02-28 | End: 2023-03-03

## 2023-02-28 RX ORDER — ASPIRIN/CALCIUM CARB/MAGNESIUM 324 MG
325 TABLET ORAL ONCE
Refills: 0 | Status: COMPLETED | OUTPATIENT
Start: 2023-02-28 | End: 2023-02-28

## 2023-02-28 RX ORDER — INSULIN LISPRO 100/ML
0 VIAL (ML) SUBCUTANEOUS
Qty: 0 | Refills: 0 | DISCHARGE

## 2023-02-28 RX ORDER — INSULIN LISPRO 100/ML
3 VIAL (ML) SUBCUTANEOUS
Refills: 0 | Status: DISCONTINUED | OUTPATIENT
Start: 2023-02-28 | End: 2023-03-01

## 2023-02-28 RX ORDER — INSULIN LISPRO 100/ML
VIAL (ML) SUBCUTANEOUS
Refills: 0 | Status: DISCONTINUED | OUTPATIENT
Start: 2023-02-28 | End: 2023-03-01

## 2023-02-28 RX ORDER — GABAPENTIN 400 MG/1
300 CAPSULE ORAL DAILY
Refills: 0 | Status: DISCONTINUED | OUTPATIENT
Start: 2023-02-28 | End: 2023-03-03

## 2023-02-28 RX ORDER — INSULIN LISPRO 100/ML
6 VIAL (ML) SUBCUTANEOUS ONCE
Refills: 0 | Status: COMPLETED | OUTPATIENT
Start: 2023-02-28 | End: 2023-02-28

## 2023-02-28 RX ORDER — FUROSEMIDE 40 MG
40 TABLET ORAL ONCE
Refills: 0 | Status: COMPLETED | OUTPATIENT
Start: 2023-02-28 | End: 2023-02-28

## 2023-02-28 RX ORDER — INSULIN GLARGINE 100 [IU]/ML
20 INJECTION, SOLUTION SUBCUTANEOUS AT BEDTIME
Refills: 0 | Status: DISCONTINUED | OUTPATIENT
Start: 2023-02-28 | End: 2023-03-01

## 2023-02-28 RX ORDER — APIXABAN 2.5 MG/1
5 TABLET, FILM COATED ORAL EVERY 12 HOURS
Refills: 0 | Status: DISCONTINUED | OUTPATIENT
Start: 2023-02-28 | End: 2023-03-03

## 2023-02-28 RX ORDER — INSULIN GLARGINE 100 [IU]/ML
25 INJECTION, SOLUTION SUBCUTANEOUS AT BEDTIME
Refills: 0 | Status: DISCONTINUED | OUTPATIENT
Start: 2023-02-28 | End: 2023-02-28

## 2023-02-28 RX ORDER — INFLUENZA VIRUS VACCINE 15; 15; 15; 15 UG/.5ML; UG/.5ML; UG/.5ML; UG/.5ML
0.5 SUSPENSION INTRAMUSCULAR ONCE
Refills: 0 | Status: DISCONTINUED | OUTPATIENT
Start: 2023-02-28 | End: 2023-03-03

## 2023-02-28 RX ORDER — TRAZODONE HCL 50 MG
100 TABLET ORAL AT BEDTIME
Refills: 0 | Status: DISCONTINUED | OUTPATIENT
Start: 2023-02-28 | End: 2023-03-03

## 2023-02-28 RX ORDER — INSULIN GLARGINE 100 [IU]/ML
25 INJECTION, SOLUTION SUBCUTANEOUS
Qty: 0 | Refills: 0 | DISCHARGE

## 2023-02-28 RX ORDER — INSULIN HUMAN 100 [IU]/ML
10 INJECTION, SOLUTION SUBCUTANEOUS ONCE
Refills: 0 | Status: COMPLETED | OUTPATIENT
Start: 2023-02-28 | End: 2023-02-28

## 2023-02-28 RX ORDER — CEFTRIAXONE 500 MG/1
1000 INJECTION, POWDER, FOR SOLUTION INTRAMUSCULAR; INTRAVENOUS EVERY 24 HOURS
Refills: 0 | Status: DISCONTINUED | OUTPATIENT
Start: 2023-02-28 | End: 2023-03-01

## 2023-02-28 RX ORDER — INSULIN GLARGINE 100 [IU]/ML
15 INJECTION, SOLUTION SUBCUTANEOUS AT BEDTIME
Refills: 0 | Status: DISCONTINUED | OUTPATIENT
Start: 2023-02-28 | End: 2023-02-28

## 2023-02-28 RX ORDER — METFORMIN HYDROCHLORIDE 850 MG/1
1 TABLET ORAL
Qty: 0 | Refills: 0 | DISCHARGE

## 2023-02-28 RX ADMIN — Medication 325 MILLIGRAM(S): at 03:27

## 2023-02-28 RX ADMIN — Medication 6 UNIT(S): at 18:04

## 2023-02-28 RX ADMIN — Medication 6: at 17:07

## 2023-02-28 RX ADMIN — ALBUTEROL 2.5 MILLIGRAM(S): 90 AEROSOL, METERED ORAL at 01:00

## 2023-02-28 RX ADMIN — Medication 3 UNIT(S): at 17:07

## 2023-02-28 RX ADMIN — INSULIN HUMAN 10 UNIT(S): 100 INJECTION, SOLUTION SUBCUTANEOUS at 15:15

## 2023-02-28 RX ADMIN — Medication 6: at 12:22

## 2023-02-28 RX ADMIN — Medication 40 MILLIGRAM(S): at 06:31

## 2023-02-28 RX ADMIN — APIXABAN 5 MILLIGRAM(S): 2.5 TABLET, FILM COATED ORAL at 17:00

## 2023-02-28 RX ADMIN — GABAPENTIN 300 MILLIGRAM(S): 400 CAPSULE ORAL at 12:25

## 2023-02-28 RX ADMIN — ALBUTEROL 2.5 MILLIGRAM(S): 90 AEROSOL, METERED ORAL at 01:15

## 2023-02-28 RX ADMIN — INSULIN GLARGINE 20 UNIT(S): 100 INJECTION, SOLUTION SUBCUTANEOUS at 21:34

## 2023-02-28 RX ADMIN — Medication 20 MILLIGRAM(S): at 06:06

## 2023-02-28 RX ADMIN — Medication 40 MILLIGRAM(S): at 15:16

## 2023-02-28 RX ADMIN — Medication 3: at 06:11

## 2023-02-28 RX ADMIN — CEFTRIAXONE 100 MILLIGRAM(S): 500 INJECTION, POWDER, FOR SOLUTION INTRAMUSCULAR; INTRAVENOUS at 23:18

## 2023-02-28 RX ADMIN — Medication 40 MILLIGRAM(S): at 04:55

## 2023-02-28 RX ADMIN — APIXABAN 5 MILLIGRAM(S): 2.5 TABLET, FILM COATED ORAL at 06:06

## 2023-02-28 NOTE — H&P ADULT - PROBLEM SELECTOR PROBLEM 1
The patient is a 82y Female complaining of urinary symptoms. Acute on chronic respiratory failure with hypoxia

## 2023-02-28 NOTE — H&P ADULT - NSHPREVIEWOFSYSTEMS_GEN_ALL_CORE
CONSTITUTIONAL: No changes in appetite or weakness.  No fever, weight loss, or fatigue  EYES: No eye pain, visual disturbances, or discharge  ENT:  No difficulty hearing, tinnitus, vertigo; No sinus or throat pain  NECK: No pain or stiffness  RESPIRATORY: Cough and shortness or breath   CARDIOVASCULAR: Has chest pain and leg swelling   GASTROINTESTINAL: No abdominal or epigastric pain. No nausea, vomiting, or hematemesis; No diarrhea or constipation. No melena or hematochezia.  GENITOURINARY: No dysuria, frequency, hematuria, or incontinence  NEUROLOGICAL: No headaches, memory loss, loss of strength, numbness, or tremors  SKIN: No skin lesions   LYMPH Nodes: No enlarged glands  ENDOCRINE: No heat or cold intolerance; No hair loss  MUSCULOSKELETAL: No joint pain or swelling; No muscle, back, No extremity pain  PSYCHIATRIC: No depression, anxiety, mood swings, or difficulty sleeping  HEME/LYMPH: No easy bruising, or bleeding gums  ALLERGY AND IMMUNOLOGIC: No hives or eczema

## 2023-02-28 NOTE — CHART NOTE - NSCHARTNOTEFT_GEN_A_CORE
MEDICAL ATTENDING NOTE  Patient is a 62y old  Female who presents with a chief complaint of Hypoxia 2/2 ILD flare /Pulmonary fibrosis (28 Feb 2023 10:57)      HPI:  Patient is a 61 yo female with hx of Pulmonary fibrosis, not on home oxygen, Pulmonary embolism on Eliquis, DM on Lantus and Metformin presents with shortness of breath. Patient states that shortness of breath began three days ago associated with cough productive of clear sputum. Patient also states that she has had bilateral swelling of lower extremities for the last three days. Patient states shortness of breath is worsened on exertion, however patient is able to lay flat at night, uses 1 pillow to sleep and denies PND. Patient states she also has had chest discomfort located in the middle of the chest 8/10 , non radiating and has had prior episodes in the past. Patient admitted to [facility] in 11/22 due to hypoxia and was found at the time to have PE, patient was admitted to ICU at the time for Acute hypoxic respiratory failure 2/2 PE and Pulmonary fibrosis. Patient states she was recently seen by pulmonologist at [facility], who recommended home oxygen but has not been set up yet. Denies fevers or chills, sick contacts.     Med recc as per patient, however Surescripts stating patient is on Lantus 35 units at bedtime. Primary team to confirm.  (28 Feb 2023 05:28)      INTERVAL HPI/OVERNIGHT EVENTS: no new complaints    MEDICATIONS  (STANDING):  apixaban 5 milliGRAM(s) Oral every 12 hours  atorvastatin 20 milliGRAM(s) Oral at bedtime  gabapentin 300 milliGRAM(s) Oral daily  influenza   Vaccine 0.5 milliLiter(s) IntraMuscular once  insulin glargine Injectable (LANTUS) 20 Unit(s) SubCutaneous at bedtime  insulin lispro (ADMELOG) corrective regimen sliding scale   SubCutaneous three times a day before meals  insulin lispro Injectable (ADMELOG) 3 Unit(s) SubCutaneous three times a day before meals  methylPREDNISolone sodium succinate Injectable 40 milliGRAM(s) IV Push three times a day  traZODone 100 milliGRAM(s) Oral at bedtime    MEDICATIONS  (PRN):      __________________________________________________  REVIEW OF SYSTEMS:    CONSTITUTIONAL: No fever,   EYES: no acute visual disturbances  NECK: No pain or stiffness  RESPIRATORY: No cough; No shortness of breath  CARDIOVASCULAR: No chest pain, no palpitations  GASTROINTESTINAL: No pain. No nausea or vomiting; No diarrhea   NEUROLOGICAL: No headache or numbness, no tremors  MUSCULOSKELETAL: No joint pain, no muscle pain  GENITOURINARY: no dysuria, no frequency, no hesitancy  PSYCHIATRY: no depression , no anxiety  ALL OTHER  ROS negative        Vital Signs Last 24 Hrs  T(C): 37.8 (28 Feb 2023 18:30), Max: 37.8 (28 Feb 2023 18:30)  T(F): 100 (28 Feb 2023 18:30), Max: 100 (28 Feb 2023 18:30)  HR: 106 (28 Feb 2023 18:30) (100 - 119)  BP: 102/64 (28 Feb 2023 18:30) (102/64 - 149/82)  BP(mean): 93 (28 Feb 2023 04:55) (93 - 93)  RR: 20 (28 Feb 2023 18:30) (19 - 24)  SpO2: 96% (28 Feb 2023 18:30) (91% - 99%)    Parameters below as of 28 Feb 2023 18:30  Patient On (Oxygen Delivery Method): nasal cannula  O2 Flow (L/min): 5      ________________________________________________  PHYSICAL EXAM:  GENERAL: NAD  HEENT: Normocephalic;  conjunctivae and sclerae clear; moist mucous membranes;   NECK : supple  CHEST/LUNG: Clear to auscultation bilaterally with good air entry   HEART: S1 S2  regular; no murmurs, gallops or rubs  ABDOMEN: Soft, Nontender, Nondistended; Bowel sounds present  EXTREMITIES: no cyanosis; no edema; no calf tenderness  SKIN: warm and dry; no rash  NERVOUS SYSTEM:  Awake and alert; Oriented  to place, person and time ; no new deficits    _________________________________________________  LABS:                        11.4   9.49  )-----------( 453      ( 28 Feb 2023 10:00 )             37.5     02-28    137  |  102  |  15  ----------------------------<  370<H>  4.2   |  25  |  1.05    Ca    9.9      28 Feb 2023 10:00  Phos  3.9     02-28  Mg     1.8     02-28    TPro  8.1  /  Alb  2.9<L>  /  TBili  0.5  /  DBili  x   /  AST  40  /  ALT  49  /  AlkPhos  203<H>  02-28    PT/INR - ( 27 Feb 2023 22:42 )   PT: 13.1 sec;   INR: 1.10 ratio         PTT - ( 27 Feb 2023 22:42 )  PTT:28.6 sec    CAPILLARY BLOOD GLUCOSE      POCT Blood Glucose.: 403 mg/dL (28 Feb 2023 17:53)  POCT Blood Glucose.: 417 mg/dL (28 Feb 2023 16:54)  POCT Blood Glucose.: 495 mg/dL (28 Feb 2023 14:41)  POCT Blood Glucose.: 426 mg/dL (28 Feb 2023 12:14)  POCT Blood Glucose.: 278 mg/dL (28 Feb 2023 06:07)      IMP:  Sudden onset SOB with hypoxia is most c/w PE. CTA shows no PE in large vessel.  Underlying ILD is most likely reason for chronic hypoxia.           Hyperglycemia, will need insulin treatment  Plan: Continue eliquis at present.  Patient may require alternative anticoagulation.  Will consult Hematology.           I have called patient's previous Pulmonologist, Dr. Korey Marlow, [phone] for more history.

## 2023-02-28 NOTE — H&P ADULT - PROBLEM/PLAN-4
Addended by: BEAU ALFARO on: 5/22/2018 04:11 PM     Modules accepted: Orders     DISPLAY PLAN FREE TEXT

## 2023-02-28 NOTE — H&P ADULT - PROBLEM SELECTOR PLAN 1
- patient presenting with hypoxemia   - CTA showing re- demonstration of ILD negative for PE   - admit to medicine  - continue with supplemental oxygen as needed  - Solumedrol 40mg IV q8hrs  - Pulmonary consult - patient presenting with hypoxemia   - CTA showing re- demonstration of ILD negative for PE   - admit to medicine  - continue with supplemental oxygen as needed  - Solumedrol 40mg IV q8hrs  - Pulmonary consulted Dr. Dubois

## 2023-02-28 NOTE — H&P ADULT - NSICDXFAMILYHX_GEN_ALL_CORE_FT
FAMILY HISTORY:  Father  Still living? Unknown  Family history of MI (myocardial infarction), Age at diagnosis: Age Unknown

## 2023-02-28 NOTE — H&P ADULT - ATTENDING COMMENTS
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 Had been seen by her doctor who prescribed home O2 but still awaiting its arrival.    Vital Signs Last 24 Hrs  T(C): 37.4 (28 Feb 2023 05:23), Max: 37.4 (28 Feb 2023 05:23)  T(F): 99.3 (28 Feb 2023 05:23), Max: 99.3 (28 Feb 2023 05:23)  HR: 110 (28 Feb 2023 05:23) (100 - 119)  BP: 128/82 (28 Feb 2023 05:23) (126/76 - 141/90)  BP(mean): 93 (28 Feb 2023 04:55) (93 - 93)  RR: 24 (28 Feb 2023 05:23) (20 - 24)  SpO2: 94% (28 Feb 2023 05:23) (91% - 94%)    Parameters below as of 28 Feb 2023 05:23  Patient On (Oxygen Delivery Method): mask, Venturi  O2 Flow (L/min): 15    labs                         10.2   9.55  )-----------( 430      ( 27 Feb 2023 22:42 )             34.2     trop - 921.5--> 649    02-27    134<L>  |  104  |  20<H>  ----------------------------<  265<H>  4.6   |  24  |  1.31<H>    Ca    9.0      27 Feb 2023 22:42  Mg     2.2     02-27    TPro  7.1  /  Alb  2.5<L>  /  TBili  0.3  /  DBili  x   /  AST  85<H>  /  ALT  56  /  AlkPhos  184<H>  02-27    pro BNP - 6630    CT chest  . Limited evaluation of the subsegmental pulmonary arteries in the lung   bases secondary to motion artifact. No pulmonary embolism in the main,   lobar, segmental or upper lobe subsegmental pulmonary arteries.  2. Redemonstration of interstitial reticular and groundglass opacities   throughout the lungs with a peripheral and basilar predominance and   associated traction bronchiectasis suggestive of an interstitial lung   disease.  3. Mediastinal lymphadenopathy.      Impression   - Acute ILD flare   - Cor pulmonale with right heart failure   - Mild lactic acidosis   - DM 2 - uncontrolled with hyperglycemia  - DONOVAN likely pre-renal; cardiorenal    - Elevated troponin - demand ischemia    Plan   - Admit to Medicine   - Supplemental O2   - IV steroids  - Pulmonology consult   - ECHO   - A1c   - Insulin therapy

## 2023-02-28 NOTE — H&P ADULT - PROBLEM SELECTOR PROBLEM 6
Tim;  Regarding your urinary frequency I would like to initiate a trial of Flomax.  This will help you with emptying of the bladder and should reduce the sensation of urgency or bladder contraction.  If this is ineffective, a medication to relax the smooth muscle of the bladder would be recommended.    Regarding your lower extremity swelling is uncertain as to what may be causing this.  The possibilities are quite broad.  Renal abnormalities in conjunction with benign prostatic hypertrophy are a possibility.  Liver abnormalities related to your medications also are a possibility.  I doubt this is purely related to your heart but may be.  We will obtain a transthoracic echo to make certain that the performance of your heart has not changed dramatically.    Best regards  Alexandru  
Prophylactic measure

## 2023-02-28 NOTE — H&P ADULT - NSICDXPASTMEDICALHX_GEN_ALL_CORE_FT
PAST MEDICAL HISTORY:  Diabetes mellitus     Hyperlipidemia     Pulmonary embolism     Pulmonary fibrosis

## 2023-02-28 NOTE — H&P ADULT - NSHPPHYSICALEXAM_GEN_ALL_CORE
PHYSICAL EXAM:  GENERAL: NAD, speaks in full sentences, no signs of respiratory distress, breathing on venturi mask 15L   HEAD:  Atraumatic, Normocephalic  EYES: EOMI, PERRLA, conjunctiva and sclera clear  NECK: Supple,  CHEST/LUNG: Clear to auscultation bilaterally; No wheeze; No crackles; No accessory muscles used  HEART: Regular rate and rhythm; No murmurs; gallops or rubs   ABDOMEN: Soft, Nontender, Nondistended; Bowel sounds present; No guarding  EXTREMITIES:  2+ Peripheral Pulses, trace pitting edema noted bilaterally   PSYCH: AAOx3  NEUROLOGY: non-focal  SKIN: No rashes or lesions

## 2023-02-28 NOTE — H&P ADULT - PROBLEM SELECTOR PLAN 5
- continue with SSI   - noted to be on Lantus 35 units QHS, please confirm dose   - will start lantus at 15 units qhs titrate as needed

## 2023-02-28 NOTE — H&P ADULT - HISTORY OF PRESENT ILLNESS
Patient is a 63 yo female with hx of Pulmonary fibrosis, not on home oxygen, Pulmonary embolism on Eliquis, DM on Lantus and Metformin presents with shortness of breath. Patient states that shortness of breath began three days ago associated with cough productive of clear sputum. Patient also states that she has had bilateral swelling of lower extremities for the last three days. Patient states shortness of breath is worsened on exertion, however patient is able to lay flat at night, uses 1 pillow to sleep and denies PND. Patient states she also has had chest discomfort located in the middle of the chest 8/10 , non radiating and has had prior episodes in the past. Patient admitted to [facility] in 11/22 due to hypoxia and was found at the time to have PE, patient was admitted to ICU at the time for Acute hypoxic respiratory failure 2/2 PE and Pulmonary fibrosis. Patient states she was recently seen by pulmonologist at [facility], who recommended home oxygen but has not been set up yet. Denies fevers or chills, sick contacts.     Med recc as per patient, however Surescripts stating patient is on Lantus 35 units at bedtime. Primary team to confirm.

## 2023-02-28 NOTE — H&P ADULT - ASSESSMENT
Patient is a 63 yo female with hx of Pulmonary fibrosis, not on home oxygen, Pulmonary embolism on Eliquis, DM on Lantus and Metformin presents with shortness of breath. Upon evaluation in ED patient found to be hypoxic on 6L NC at 91%, but hemodynamically stable. Labs significant for bnp of 6630 with elevated troponin at 921, downtrended to 649. EKG showing new t wave inversion in leads V3/V4. CTA done showing no PE but Re demonstration of interstitial reticular and ground glass opacities throughout the lungs with a peripheral and basilar predominance and associated traction bronchiectasis suggestive of an interstitial lung   disease. Patient admitted to medicine for Acute hypoxic respiratory failure 2/2 ILD flare and right sided heart failure.

## 2023-02-28 NOTE — CONSULT NOTE ADULT - ASSESSMENT
63 yo female with hx of Pulmonary fibrosis, not on home oxygen, Pulmonary embolism on Eliquis, DM on Lantus and Metformin presents with shortness of breath. Upon evaluation in ED patient found to be hypoxic on 6L NC at 91%, but hemodynamically stable. Labs significant for bnp of 6630 with elevated troponin at 921, downtrended to 649. EKG showing new t wave inversion in leads V3/V4. CTA done showing no PE but Re demonstration of interstitial reticular and ground glass opacities throughout the lungs with a peripheral and basilar predominance and associated traction bronchiectasis suggestive of an interstitial lung   disease. Patient admitted to medicine for Acute hypoxic respiratory failure 2/2 ILD flare and right sided heart failure.       Problem/Plan - 1:  ·  Problem: Acute on chronic respiratory failure with hypoxia.   ·  Plan: - patient presenting with hypoxemia   - CTA showing re- demonstration of ILD negative for PE   -V/Q scan r/o CTEPHT      Problem/Plan - 2:  ·  Problem: Cor pulmonale, acute.   ·  Plan: - patient with evidence of right sided heart failure on CTA, with elevated right heart pressures, PAH  - bnp noted to be 6630, euvolemic on exam   -Repeat TTE    Problem/Plan - 3:  ·  Problem: DONOVAN (acute kidney injury).   ·  Plan: - noted to have Scr of 1.31  - normal baseline  - avoid nephrotoxins   - monitor bmp.    Problem/Plan - 4:  ·  Problem: Elevated troponin.   ·  Plan: - patient noted to have troponin of 921, now 649  - EKG showing new T wave inversions in v3-v4  - noted to be on anticoagulation with Eliquis   - f/u echocardiogram.    Problem/Plan - 5:  ·  Problem: Diabetes.   ·  Plan: - continue with SSI   - noted to be on Lantus 35 units QHS, please confirm dose   - will start lantus at 15 units qhs titrate as needed.    Problem/Plan - 6:  ·  Problem: Prophylactic measure.   ·  Plan: - Eliquis.

## 2023-02-28 NOTE — CONSULT NOTE ADULT - SUBJECTIVE AND OBJECTIVE BOX
MR:-[id number] :     NAME:-ESTEBAN BRANHAM:-    DATE OF SERVICE:02-28-23 @ 10:57    Patient was seen,examined and evaluated  by me.ER evaluation, Labs and Hospital course was reviewed,    CHIEF COMPLAINT:    HPI:HPI:  Patient is a 61 yo female with hx of Pulmonary fibrosis, not on home oxygen, Pulmonary embolism on Eliquis, DM on Lantus and Metformin presents with shortness of breath. Patient states that shortness of breath began three days ago associated with cough productive of clear sputum. Patient also states that she has had bilateral swelling of lower extremities for the last three days. Patient states shortness of breath is worsened on exertion, however patient is able to lay flat at night, uses 1 pillow to sleep and denies PND. Patient states she also has had chest discomfort located in the middle of the chest 8/10 , non radiating and has had prior episodes in the past. Patient admitted to [facility] in 11/22 due to hypoxia and was found at the time to have PE, patient was admitted to ICU at the time for Acute hypoxic respiratory failure 2/2 PE and Pulmonary fibrosis. Patient states she was recently seen by pulmonologist at [facility], who recommended home oxygen but has not been set up yet. Denies fevers or chills, sick contacts.     Med recc as per patient, however Surescripts stating patient is on Lantus 35 units at bedtime. Primary team to confirm.  (28 Feb 2023 05:28)  Prior TTE 10/2022 Nornmal LV Systolic Function EF 55%    PAST MEDICAL & SURGICAL HISTORY:  Diabetes mellitus      Hyperlipidemia      Pulmonary embolism      Pulmonary fibrosis      Cataract  right eye          MEDICATIONS  (STANDING):  apixaban 5 milliGRAM(s) Oral every 12 hours  atorvastatin 20 milliGRAM(s) Oral at bedtime  gabapentin 300 milliGRAM(s) Oral daily  influenza   Vaccine 0.5 milliLiter(s) IntraMuscular once  insulin glargine Injectable (LANTUS) 15 Unit(s) SubCutaneous at bedtime  insulin lispro (ADMELOG) corrective regimen sliding scale   SubCutaneous three times a day before meals  methylPREDNISolone sodium succinate Injectable 40 milliGRAM(s) IV Push three times a day  traZODone 100 milliGRAM(s) Oral at bedtime    MEDICATIONS  (PRN):      FAMILY HISTORY:  Family history of MI (myocardial infarction) (Father)      No family history of premature coronary artery disease or sudden cardiac death    SOCIAL HISTORY:  Smoking-[ ] Active  [ ] Former [ ] Non Smoker  Alcohol-[ ] Denies [ ] Social [ ] Daily  Ilicit Drug use-[ ] Denies [ ] Active user    REVIEW OF SYSTEMS:  Constitutional: [ ] fever, [ ]weight loss, [ ]fatigue   Activity [ ] Bedbound,[ ] Ambulates [ ] Unassisted[ ] Cane/Walker [ ] Assistence.  Effort tolerance:[ ] Excellent [ ] Good [ ] Fair [ ] Poor [ ]  Eyes: [ ] visual changes  Respiratory: [ ]shortness of breath;  [ ] cough, [ ]wheezing, [ ]chills, [ ]hemoptysis  Cardiovascular: [ ] chest pain, [ ]palpitations, [ ]dizziness,  [ ]leg swelling[ ]orthopnea [ ]PND  Gastrointestinal: [ ] abdominal pain, [ ]nausea, [ ]vomiting,  [ ]diarrhea,[ ]constipation  Genitourinary: [ ] dysuria, [ ] hematuria  Neurologic: [ ] headaches [ ] tremors[ ] weakness  Skin: [ ] itching, [ ]burning, [ ] rashes  Endocrine: [ ] heat or cold intolerance  Musculoskeletal: [ ] joint pain or swelling; [ ] muscle, back, or extremity pain  Psychiatric: [ ] depression, [ ]anxiety, [ ]mood swings, or [ ]difficulty sleeping  Hematologic: [ ] easy bruising, [ ] bleeding gums       [ x] All others negative	  [ ] Unable to obtain    Vital Signs Last 24 Hrs  T(C): 37.1 (28 Feb 2023 10:36), Max: 37.4 (28 Feb 2023 05:23)  T(F): 98.8 (28 Feb 2023 10:36), Max: 99.3 (28 Feb 2023 05:23)  HR: 109 (28 Feb 2023 10:36) (100 - 119)  BP: 149/82 (28 Feb 2023 10:36) (126/76 - 149/82)  BP(mean): 93 (28 Feb 2023 04:55) (93 - 93)  RR: 22 (28 Feb 2023 10:36) (19 - 24)  SpO2: 96% (28 Feb 2023 10:36) (91% - 99%)    Parameters below as of 28 Feb 2023 10:36  Patient On (Oxygen Delivery Method): mask, Venturi  O2 Flow (L/min): 5    I&O's Summary      PHYSICAL EXAM:  General: No acute distress BMI-  HEENT: EOMI, PERRL[ ] Icteric  Neck: Supple, No JVD  Lungs: Equal air entry bilaterally; [ ] Rales [ ] Rhonchi [ ] Wheezing  Heart: Regular rate and rhythm;[ ] Murmurs-   /6 [ ] Systolic [ ] Diastolic [ ] Radiation,No rubs, or gallops  Abdomen: Nontender, bowel sounds present  Extremities: No clubbing, cyanosis, or edema[ ] Calf tenderness  Nervous system:  Alert & Oriented X3, no focal deficits  Psychiatric: Normal affect  Skin: No rashes or lesions      LABS:  02-27    134<L>  |  104  |  20<H>  ----------------------------<  265<H>  4.6   |  24  |  1.31<H>    Ca    9.0      27 Feb 2023 22:42  Mg     2.2     02-27    TPro  7.1  /  Alb  2.5<L>  /  TBili  0.3  /  DBili  x   /  AST  85<H>  /  ALT  56  /  AlkPhos  184<H>  02-27    Creatinine Trend: 1.31<--                        10.2   9.55  )-----------( 430      ( 27 Feb 2023 22:42 )             34.2     PT/INR - ( 27 Feb 2023 22:42 )   PT: 13.1 sec;   INR: 1.10 ratio         PTT - ( 27 Feb 2023 22:42 )  PTT:28.6 sec    Lipid Panel:   Cardiac Enzymes:     Serum Pro-Brain Natriuretic Peptide: 6630 pg/mL (02-27-23 @ 22:42)        RADIOLOGY:  : CT Angio Chest PE Protocol w/ IV Cont (02.28.23 @ 02:20) >  IMPRESSION:    1. Limited evaluation of the subsegmental pulmonary arteries in the lung  bases secondary to motion artifact. No pulmonary embolism in the main,   lobar, segmental or upper lobe subsegmental pulmonary arteries.  2. Redemonstration of interstitial reticular and groundglass opacities  throughout the lungs with a peripheral and basilar predominance and  associated traction bronchiectasis suggestive of an interstitial lung  disease.  3. Mediastinal lymphadenopathy.            ECG [my interpretation]:    TELEMETRY:    ECHO:   Transthoracic Echocardiogram (10.29.22 @ 13:42) >  CONCLUSIONS:  1. Mitral annular calcification, and calcified mitral leaflets with normal diastolic opening. Mild mitral regurgitation.  2. Normal trileaflet aortic valve.  3. Aortic Root: 3.1 cm.  4. Mildly dilated left atrium.  LA volume index = 36 cc/m2.  5. Mild concentric left ventricular hypertrophy.  6. Normal Left Ventricular Systolic Function,  (EF = 55 to 60%)  7. Normal diastolic function.  8. Normal right atrium.  9. Right ventricular enlargement with normal RV systolic function(TAPSE 2.1cm,tissue dopper.17m/s).  10. RV systolic pressure is severely increased at  68 mm Hg.  11. There is mild tricuspid regurgitation.  12. There is mild pulmonic regurgitation.    STRESS:   Nuclear Stress Test-Exercise (Nuclear Stress Test-Exercise .) (01.15.20 @ 09:03) >  IMPRESSIONS:Normal Study  * Myocardial Perfusion SPECT results are normal.  * Review of raw data shows: The study is of good technical quality.  * The left ventricle was small in size. Normal myocardial perfusion scan,with no evidence of infarction or inducible ischemia.  * Post-stress gated wall motion analysis was performed (LVEF = 65 %;LVEDV = 48 ml.), revealing normal LV function. RV function appeared normal.  *** No previous Nuclear/Stress exam.

## 2023-02-28 NOTE — H&P ADULT - PROBLEM SELECTOR PLAN 2
- patient with evidence of right sided heart failure on CTA, with elevated right heart pressures, PAH  - bnp noted to be 6630, euvolemic on exam   - f/u repeat echocardiogram

## 2023-02-28 NOTE — PATIENT PROFILE ADULT - FALL HARM RISK - HARM RISK INTERVENTIONS

## 2023-02-28 NOTE — CHART NOTE - NSCHARTNOTEFT_GEN_A_CORE
Notified by RN that patient complained of urinary symptoms including discomfort upon urination, increased frequency and urgency. Pt has tachycardia since admission otherwise vital signs within normal range, afebrile. Pt denied any other symptoms including abdominal pain, back pain, nausea, vomiting, chills. U/A was positive for infection. Urine culture ordered. Pt started on Rocephin IV for 3 days. Primary team to kindly f/u urine culture results.   Discussed with senior resident Dr. Monet.

## 2023-02-28 NOTE — H&P ADULT - PROBLEM SELECTOR PLAN 4
- patient noted to have troponin of 921, now 649  - EKG showing new T wave inversions in v3-v4  - noted to be on anticoagulation with Eliquis   - f/u echocardiogram

## 2023-03-01 LAB
A1C WITH ESTIMATED AVERAGE GLUCOSE RESULT: 11.7 % — HIGH (ref 4–5.6)
ACETONE SERPL-MCNC: NEGATIVE — SIGNIFICANT CHANGE UP
ANION GAP SERPL CALC-SCNC: 8 MMOL/L — SIGNIFICANT CHANGE UP (ref 5–17)
BUN SERPL-MCNC: 34 MG/DL — HIGH (ref 7–18)
CALCIUM SERPL-MCNC: 9.1 MG/DL — SIGNIFICANT CHANGE UP (ref 8.4–10.5)
CHLORIDE SERPL-SCNC: 100 MMOL/L — SIGNIFICANT CHANGE UP (ref 96–108)
CO2 SERPL-SCNC: 23 MMOL/L — SIGNIFICANT CHANGE UP (ref 22–31)
CREAT SERPL-MCNC: 1.63 MG/DL — HIGH (ref 0.5–1.3)
EGFR: 36 ML/MIN/1.73M2 — LOW
ESTIMATED AVERAGE GLUCOSE: 289 MG/DL — HIGH (ref 68–114)
GLUCOSE BLDC GLUCOMTR-MCNC: 318 MG/DL — HIGH (ref 70–99)
GLUCOSE BLDC GLUCOMTR-MCNC: 378 MG/DL — HIGH (ref 70–99)
GLUCOSE BLDC GLUCOMTR-MCNC: 440 MG/DL — HIGH (ref 70–99)
GLUCOSE BLDC GLUCOMTR-MCNC: 449 MG/DL — HIGH (ref 70–99)
GLUCOSE BLDC GLUCOMTR-MCNC: 461 MG/DL — CRITICAL HIGH (ref 70–99)
GLUCOSE BLDC GLUCOMTR-MCNC: 476 MG/DL — CRITICAL HIGH (ref 70–99)
GLUCOSE BLDC GLUCOMTR-MCNC: 577 MG/DL — CRITICAL HIGH (ref 70–99)
GLUCOSE SERPL-MCNC: 567 MG/DL — CRITICAL HIGH (ref 70–99)
HCV AB S/CO SERPL IA: 0.09 S/CO — SIGNIFICANT CHANGE UP (ref 0–0.99)
HCV AB SERPL-IMP: SIGNIFICANT CHANGE UP
LDH SERPL L TO P-CCNC: 414 U/L — HIGH (ref 120–225)
POTASSIUM SERPL-MCNC: 4.3 MMOL/L — SIGNIFICANT CHANGE UP (ref 3.5–5.3)
POTASSIUM SERPL-SCNC: 4.3 MMOL/L — SIGNIFICANT CHANGE UP (ref 3.5–5.3)
SODIUM SERPL-SCNC: 131 MMOL/L — LOW (ref 135–145)

## 2023-03-01 PROCEDURE — 99223 1ST HOSP IP/OBS HIGH 75: CPT

## 2023-03-01 PROCEDURE — 99233 SBSQ HOSP IP/OBS HIGH 50: CPT | Mod: GC

## 2023-03-01 PROCEDURE — 71045 X-RAY EXAM CHEST 1 VIEW: CPT | Mod: 26

## 2023-03-01 RX ORDER — INSULIN GLARGINE 100 [IU]/ML
35 INJECTION, SOLUTION SUBCUTANEOUS AT BEDTIME
Refills: 0 | Status: DISCONTINUED | OUTPATIENT
Start: 2023-03-01 | End: 2023-03-03

## 2023-03-01 RX ORDER — AZITHROMYCIN 500 MG/1
500 TABLET, FILM COATED ORAL EVERY 24 HOURS
Refills: 0 | Status: DISCONTINUED | OUTPATIENT
Start: 2023-03-01 | End: 2023-03-01

## 2023-03-01 RX ORDER — INSULIN LISPRO 100/ML
8 VIAL (ML) SUBCUTANEOUS
Refills: 0 | Status: DISCONTINUED | OUTPATIENT
Start: 2023-03-01 | End: 2023-03-02

## 2023-03-01 RX ORDER — PIPERACILLIN AND TAZOBACTAM 4; .5 G/20ML; G/20ML
4.5 INJECTION, POWDER, LYOPHILIZED, FOR SOLUTION INTRAVENOUS ONCE
Refills: 0 | Status: DISCONTINUED | OUTPATIENT
Start: 2023-03-01 | End: 2023-03-01

## 2023-03-01 RX ORDER — ALBUTEROL 90 UG/1
2 AEROSOL, METERED ORAL EVERY 6 HOURS
Refills: 0 | Status: DISCONTINUED | OUTPATIENT
Start: 2023-03-01 | End: 2023-03-03

## 2023-03-01 RX ORDER — INSULIN LISPRO 100/ML
VIAL (ML) SUBCUTANEOUS
Refills: 0 | Status: DISCONTINUED | OUTPATIENT
Start: 2023-03-01 | End: 2023-03-01

## 2023-03-01 RX ORDER — INSULIN LISPRO 100/ML
7 VIAL (ML) SUBCUTANEOUS
Refills: 0 | Status: DISCONTINUED | OUTPATIENT
Start: 2023-03-01 | End: 2023-03-01

## 2023-03-01 RX ORDER — PIPERACILLIN AND TAZOBACTAM 4; .5 G/20ML; G/20ML
4.5 INJECTION, POWDER, LYOPHILIZED, FOR SOLUTION INTRAVENOUS EVERY 8 HOURS
Refills: 0 | Status: DISCONTINUED | OUTPATIENT
Start: 2023-03-01 | End: 2023-03-01

## 2023-03-01 RX ORDER — INSULIN HUMAN 100 [IU]/ML
10 INJECTION, SOLUTION SUBCUTANEOUS ONCE
Refills: 0 | Status: COMPLETED | OUTPATIENT
Start: 2023-03-01 | End: 2023-03-01

## 2023-03-01 RX ORDER — ACETAMINOPHEN 500 MG
650 TABLET ORAL EVERY 6 HOURS
Refills: 0 | Status: DISCONTINUED | OUTPATIENT
Start: 2023-03-01 | End: 2023-03-03

## 2023-03-01 RX ORDER — SODIUM CHLORIDE 9 MG/ML
500 INJECTION, SOLUTION INTRAVENOUS ONCE
Refills: 0 | Status: COMPLETED | OUTPATIENT
Start: 2023-03-01 | End: 2023-03-01

## 2023-03-01 RX ORDER — INSULIN LISPRO 100/ML
10 VIAL (ML) SUBCUTANEOUS
Refills: 0 | Status: DISCONTINUED | OUTPATIENT
Start: 2023-03-01 | End: 2023-03-01

## 2023-03-01 RX ORDER — INSULIN LISPRO 100/ML
7 VIAL (ML) SUBCUTANEOUS ONCE
Refills: 0 | Status: COMPLETED | OUTPATIENT
Start: 2023-03-01 | End: 2023-03-01

## 2023-03-01 RX ORDER — INSULIN HUMAN 100 [IU]/ML
10 INJECTION, SOLUTION SUBCUTANEOUS ONCE
Refills: 0 | Status: DISCONTINUED | OUTPATIENT
Start: 2023-03-01 | End: 2023-03-01

## 2023-03-01 RX ORDER — INSULIN LISPRO 100/ML
VIAL (ML) SUBCUTANEOUS EVERY 4 HOURS
Refills: 0 | Status: DISCONTINUED | OUTPATIENT
Start: 2023-03-01 | End: 2023-03-02

## 2023-03-01 RX ADMIN — Medication 2 TABLET(S): at 21:43

## 2023-03-01 RX ADMIN — Medication 650 MILLIGRAM(S): at 21:43

## 2023-03-01 RX ADMIN — GABAPENTIN 300 MILLIGRAM(S): 400 CAPSULE ORAL at 11:51

## 2023-03-01 RX ADMIN — Medication 6: at 17:05

## 2023-03-01 RX ADMIN — INSULIN GLARGINE 35 UNIT(S): 100 INJECTION, SOLUTION SUBCUTANEOUS at 21:48

## 2023-03-01 RX ADMIN — Medication 40 MILLIGRAM(S): at 05:44

## 2023-03-01 RX ADMIN — Medication 650 MILLIGRAM(S): at 22:43

## 2023-03-01 RX ADMIN — Medication 10: at 21:47

## 2023-03-01 RX ADMIN — Medication 40 MILLIGRAM(S): at 14:48

## 2023-03-01 RX ADMIN — Medication 4: at 07:55

## 2023-03-01 RX ADMIN — Medication 7 UNIT(S): at 12:38

## 2023-03-01 RX ADMIN — Medication 100 MILLIGRAM(S): at 21:45

## 2023-03-01 RX ADMIN — Medication 6: at 12:24

## 2023-03-01 RX ADMIN — APIXABAN 5 MILLIGRAM(S): 2.5 TABLET, FILM COATED ORAL at 05:45

## 2023-03-01 RX ADMIN — INSULIN HUMAN 10 UNIT(S): 100 INJECTION, SOLUTION SUBCUTANEOUS at 18:22

## 2023-03-01 RX ADMIN — APIXABAN 5 MILLIGRAM(S): 2.5 TABLET, FILM COATED ORAL at 18:18

## 2023-03-01 RX ADMIN — ALBUTEROL 2 PUFF(S): 90 AEROSOL, METERED ORAL at 10:56

## 2023-03-01 RX ADMIN — SODIUM CHLORIDE 1000 MILLILITER(S): 9 INJECTION, SOLUTION INTRAVENOUS at 20:12

## 2023-03-01 RX ADMIN — Medication 3 UNIT(S): at 07:56

## 2023-03-01 RX ADMIN — ATORVASTATIN CALCIUM 20 MILLIGRAM(S): 80 TABLET, FILM COATED ORAL at 21:44

## 2023-03-01 RX ADMIN — Medication 40 MILLIGRAM(S): at 21:45

## 2023-03-01 RX ADMIN — Medication 8 UNIT(S): at 17:06

## 2023-03-01 NOTE — CONSULT NOTE ADULT - SUBJECTIVE AND OBJECTIVE BOX
PULMONARY SERVICE INITIAL CONSULT NOTE    HPI:  Patient is a 63 yo female with hx of Pulmonary fibrosis, not on home oxygen, Pulmonary embolism on Eliquis, DM on Lantus and Metformin presents with shortness of breath. Patient states that shortness of breath began three days ago associated with cough productive of clear sputum. Patient also states that she has had bilateral swelling of lower extremities for the last three days. Patient states shortness of breath is worsened on exertion, however patient is able to lay flat at night, uses 1 pillow to sleep and denies PND. Patient states she also has had chest discomfort located in the middle of the chest 8/10 , non radiating and has had prior episodes in the past. Patient admitted to [facility] in  due to hypoxia and was found at the time to have PE, patient was admitted to ICU at the time for Acute hypoxic respiratory failure 2/2 PE and Pulmonary fibrosis. Patient states she was recently seen by pulmonologist at [facility], who recommended home oxygen but has not been set up yet. Denies fevers or chills, sick contacts.     Med recc as per patient, however Surescripts stating patient is on Lantus 35 units at bedtime. Primary team to confirm.  (2023 05:28)    ADDITIONAL/PULMONARY HPI:  61yo woman never smoker w/ PMH PE on Eliquis w/ hx of cor pulmonale (late Oct 2022), ILD (?IPF), DM2, who presents w/ increasing dyspnea and productive cough of clear sputum for past several days. She had recently established with a new pulmonologist Dr. Korey Marlow and had office visit with him on  where she says her SpO2 was in the 60s on ambient air. Her doctor said she needed home O2 and was working on getting that for her in the interim. Since that time, she has been so dyspneic she has barely been able to move around let alone get to her bedroom since she cannot ascend stairs without tremendous difficulty. She had been on steroids for "months" (says since November) that were recently being weaned down, supposedly as of Friday office visit. She is unsure of what dose she is taking or for exactly how long or the taper schedule. Is not sure if she is on antibiotic prophylaxis for steroid tx. Denies sick contacts, recent travel, or fevers at home. No hemoptysis or pleurisy either. Said her doctor was trying to get her on Ofev but insurance had declined it and needed further authorizations so has not started it yet. Stated compliance with Eliquis twice a day.     REVIEW OF SYSTEMS:  Constitutional: No fever, weight loss or fatigue  Eyes: No eye pain, visual disturbances, or discharge  ENMT:  No difficulty hearing, tinnitus, vertigo; No sinus or throat pain  Neck: No pain, stiffness or neck swelling  Respiratory: see HPI  Cardiovascular: No chest pain, palpitations, dizziness or leg swelling  Gastrointestinal: No abdominal or epigastric pain. No nausea, vomiting or hematemesis; No diarrhea or constipation. No melena or hematochezia.  Genitourinary: No dysuria, frequency, hematuria or incontinence  Neurological: No headaches, memory loss, loss of strength, numbness or tremors  Skin: No itching, burning, rashes or lesions   Lymph Nodes: No enlarged glands  Endocrine: No heat or cold intolerance; No hair loss  Musculoskeletal: No joint pain or swelling; No muscle, back or extremity pain  Psychiatric: No depression, anxiety, mood swings or difficulty sleeping  Heme/Lymph: No easy bruising or bleeding gums  Allergy and Immunologic: No hives or eczema    PAST MEDICAL & SURGICAL HISTORY:  Diabetes mellitus      Hyperlipidemia      Pulmonary embolism      Pulmonary fibrosis      Cataract  right eye    FAMILY HISTORY:  Family history of MI (myocardial infarction) (Father)    SOCIAL HISTORY:  Smoking Status: [ ] Current, [ ] Former, [X] Never  Pack Years:    MEDICATIONS:  Pulmonary:  albuterol    90 MICROgram(s) HFA Inhaler 2 Puff(s) Inhalation every 6 hours PRN  guaiFENesin Oral Liquid (Sugar-Free) 200 milliGRAM(s) Oral every 6 hours PRN    Antimicrobials:    Anticoagulants:  apixaban 5 milliGRAM(s) Oral every 12 hours    Onc:    GI/:    Endocrine:  atorvastatin 20 milliGRAM(s) Oral at bedtime  insulin glargine Injectable (LANTUS) 35 Unit(s) SubCutaneous at bedtime  insulin lispro (ADMELOG) corrective regimen sliding scale   SubCutaneous three times a day before meals  insulin lispro Injectable (ADMELOG) 7 Unit(s) SubCutaneous three times a day before meals  methylPREDNISolone sodium succinate Injectable 40 milliGRAM(s) IV Push three times a day    Cardiac:    Other Medications:  gabapentin 300 milliGRAM(s) Oral daily  influenza   Vaccine 0.5 milliLiter(s) IntraMuscular once  traZODone 100 milliGRAM(s) Oral at bedtime    Allergies    No Known Allergies    Intolerances    Vital Signs Last 24 Hrs  T(C): 37.3 (01 Mar 2023 10:08), Max: 37.8 (2023 18:30)  T(F): 99.1 (01 Mar 2023 10:08), Max: 100 (2023 18:30)  HR: 103 (01 Mar 2023 10:08) (99 - 115)  BP: 131/74 (01 Mar 2023 10:08) (102/64 - 131/74)  BP(mean): --  RR: 19 (01 Mar 2023 10:08) (19 - 20)  SpO2: 92% (01 Mar 2023 10:08) (92% - 99%)    Parameters below as of 01 Mar 2023 10:08  Patient On (Oxygen Delivery Method): nasal cannula  O2 Flow (L/min): 4      - @ 07:01  -  03-01 @ 07:00  --------------------------------------------------------  IN: 0 mL / OUT: 1400 mL / NET: -1400 mL    PHYSICAL EXAM:  Constitutional: fatigued but non-toxic appearing woman resting semirecumbent in bed; NAD  Head: NC/AT  EENT: PERRL, anicteric sclera; oropharynx clear, MMM  Neck: supple, no appreciable JVD  Respiratory: few scattered velcro-like crackles throughout B/L; respirations appear non-labored on supplemental NC 4L and conversive in full sentences  Cardiovascular: +S1/S2, RRR  Gastrointestinal: soft, NT/ND  Extremities: WWP; no edema, clubbing or cyanosis  Vascular: 2+ radial pulses B/L  Neurological: awake and alert; PADILLA    LABS:  CBC Full  -  ( 2023 10:00 )  WBC Count : 9.49 K/uL  RBC Count : 4.50 M/uL  Hemoglobin : 11.4 g/dL  Hematocrit : 37.5 %  Platelet Count - Automated : 453 K/uL  Mean Cell Volume : 83.3 fl  Mean Cell Hemoglobin : 25.3 pg  Mean Cell Hemoglobin Concentration : 30.4 gm/dL  Auto Neutrophil # : x  Auto Lymphocyte # : x  Auto Monocyte # : x  Auto Eosinophil # : x  Auto Basophil # : x  Auto Neutrophil % : x  Auto Lymphocyte % : x  Auto Monocyte % : x  Auto Eosinophil % : x  Auto Basophil % : x        137  |  102  |  15  ----------------------------<  370<H>  4.2   |  25  |  1.05    Ca    9.9      2023 10:00  Phos  3.9       Mg     1.8         TPro  8.1  /  Alb  2.9<L>  /  TBili  0.5  /  DBili  x   /  AST  40  /  ALT  49  /  AlkPhos  203<H>      PT/INR - ( 2023 22:42 )   PT: 13.1 sec;   INR: 1.10 ratio         PTT - ( 2023 22:42 )  PTT:28.6 sec      Urinalysis Basic - ( 2023 21:38 )    Color: Yellow / Appearance: Clear / S.015 / pH: x  Gluc: x / Ketone: Trace  / Bili: Negative / Urobili: Negative   Blood: x / Protein: 30 mg/dL / Nitrite: Positive   Leuk Esterase: Negative / RBC: 2-5 /HPF / WBC 11-25 /HPF   Sq Epi: x / Non Sq Epi: Few /HPF / Bacteria: Moderate /HPF    RADIOLOGY & ADDITIONAL STUDIES (personally reviewed):  < from: CT Angio Chest PE Protocol w/ IV Cont (23 @ 02:20) >  IMPRESSION:    1. Limited evaluation of the subsegmental pulmonary arteries in the lung   bases secondary to motion artifact. No pulmonary embolism in the main,   lobar, segmental or upper lobe subsegmental pulmonary arteries.  2. Redemonstration of interstitial reticular and groundglass opacities   throughout the lungs with a peripheral and basilar predominance and   associated traction bronchiectasis suggestive of an interstitial lung   disease.  3. Mediastinal lymphadenopathy.

## 2023-03-01 NOTE — PROGRESS NOTE ADULT - SUBJECTIVE AND OBJECTIVE BOX
PATIENT SEEN AND EXAMINED ON :- 3/1/23  DATE OF SERVICE:   3/1/23          Interim events noted,Labs ,Radiological studies and Cardiology tests reviewed .      PMH -reviewed admission note, no change since admission  Heart failure: acute [ ] chronic [ ] acute or chronic [ ] diastolic [ ] systolic [ ] combined systolic and diastolic[ ]  DONOVAN: ATN[ ] renal medullary necrosis [ ] CKD I [ ]CKDII [ ]CKD III [ ]CKD IV [ ]CKD V [ ]Other pathological lesions [ ]    MEDICATIONS  (STANDING):  apixaban 5 milliGRAM(s) Oral every 12 hours  atorvastatin 20 milliGRAM(s) Oral at bedtime  gabapentin 300 milliGRAM(s) Oral daily  influenza   Vaccine 0.5 milliLiter(s) IntraMuscular once  insulin glargine Injectable (LANTUS) 35 Unit(s) SubCutaneous at bedtime  insulin lispro (ADMELOG) corrective regimen sliding scale   SubCutaneous three times a day before meals  insulin lispro Injectable (ADMELOG) 8 Unit(s) SubCutaneous three times a day before meals  methylPREDNISolone sodium succinate Injectable 40 milliGRAM(s) IV Push three times a day  traZODone 100 milliGRAM(s) Oral at bedtime    MEDICATIONS  (PRN):  albuterol    90 MICROgram(s) HFA Inhaler 2 Puff(s) Inhalation every 6 hours PRN Shortness of Breath and/or Wheezing  guaiFENesin Oral Liquid (Sugar-Free) 200 milliGRAM(s) Oral every 6 hours PRN Cough              REVIEW OF SYSTEMS:  Constitutional: [ ] fever, [ ]weight loss,  [ ]fatigue  Eyes: [ ] visual changes  Respiratory: [ ]shortness of breath;  [ ] cough, [ ]wheezing, [ ]chills, [ ]hemoptysis  Cardiovascular: [ ] chest pain, [ ]palpitations, [ ]dizziness,  [ ]leg swelling[ ]orthopnea[ ]PND  Gastrointestinal: [ ] abdominal pain, [ ]nausea, [ ]vomiting,  [ ]diarrhea   Genitourinary: [ ] dysuria, [ ] hematuria  Neurologic: [ ] headaches [ ] tremors[ ]weakness  Skin: [ ] itching, [ ]burning, [ ] rashes  Endocrine: [ ] heat or cold intolerance  Musculoskeletal: [ ] joint pain or swelling; [ ] muscle, back, or extremity pain  Psychiatric: [ ] depression, [ ]anxiety, [ ]mood swings, or [ ]difficulty sleeping  Hematologic: [ ] easy bruising, [ ] bleeding gums    [x] All remaining systems negative except as per above.   [ ]Unable to obtain.    Vital Signs Last 24 Hrs  T(C): 37.2 (01 Mar 2023 13:42), Max: 37.3 (01 Mar 2023 10:08)  T(F): 99 (01 Mar 2023 13:42), Max: 99.1 (01 Mar 2023 10:08)  HR: 107 (01 Mar 2023 13:42) (99 - 110)  BP: 125/64 (01 Mar 2023 13:42) (115/71 - 131/74)  BP(mean): --  RR: 19 (01 Mar 2023 13:42) (19 - 21)  SpO2: 95% (01 Mar 2023 13:42) (81% - 99%)    Parameters below as of 01 Mar 2023 13:42  Patient On (Oxygen Delivery Method): nasal cannula  O2 Flow (L/min): 4    I&O's Summary    28 Feb 2023 07:01  -  01 Mar 2023 07:00  --------------------------------------------------------  IN: 0 mL / OUT: 1400 mL / NET: -1400 mL      Orthostatic VS      PHYSICAL EXAM:  General: No acute distress BMI-  HEENT: EOMI, PERRL  Neck: Supple, [ ] JVD  Lungs: Equal air entry bilaterally; [ ] rales [ ] wheezing [ ] rhonchi  Heart: Regular rate and rhythm; [ ] murmur   /6 [ ] systolic [ ] diastolic [ ] radiation[ ] rubs [ ]  gallops  Abdomen: Nontender, bowel sounds present  Extremities: No clubbing, cyanosis, [ ] edema  Nervous system:  Alert & Oriented X3, no focal deficits  Psychiatric: Normal affect  Skin: No rashes or lesions    LABS:  02-28    137  |  102  |  15  ----------------------------<  370<H>  4.2   |  25  |  1.05    Ca    9.9      28 Feb 2023 10:00  Phos  3.9     02-28  Mg     1.8     02-28    TPro  8.1  /  Alb  2.9<L>  /  TBili  0.5  /  DBili  x   /  AST  40  /  ALT  49  /  AlkPhos  203<H>  02-28    Creatinine Trend: 1.05<--, 1.31<--                        11.4   9.49  )-----------( 453      ( 28 Feb 2023 10:00 )             37.5     PT/INR - ( 27 Feb 2023 22:42 )   PT: 13.1 sec;   INR: 1.10 ratio         PTT - ( 27 Feb 2023 22:42 )  PTT:28.6 sec  Lipid Panel:   Cardiac Enzymes:     Serum Pro-Brain Natriuretic Peptide: 6630 pg/mL (02-27-23 @ 22:42)

## 2023-03-01 NOTE — PROGRESS NOTE ADULT - PROBLEM SELECTOR PLAN 5
- patient noted to have troponin of 921, now 649, down trended   - EKG showing new T wave inversions in v3-v4  - noted to be on anticoagulation with Eliquis   - 2/28 ECHO study limited

## 2023-03-01 NOTE — PROGRESS NOTE ADULT - PROBLEM SELECTOR PLAN 4
- noted to have Scr of 1.31 on admission > 1.05 RESOLVED   - normal baseline  - avoid nephrotoxins   - monitor bmp

## 2023-03-01 NOTE — PROGRESS NOTE ADULT - PROBLEM SELECTOR PLAN 3
insulin sliding scale  A1c 12.4  continue Lantus 30 units at bedtime  continue Admelog 8units premeal TID - patient with evidence of right sided heart failure on CTA, with elevated right heart pressures, PAH  - bnp noted to be 6630, euvolemic on exam   - 2/28 ECHO study limited

## 2023-03-01 NOTE — PROGRESS NOTE ADULT - SUBJECTIVE AND OBJECTIVE BOX
PGY-1 Progress Note discussed with attending    PAGER #: [[phone]] TILL 5:00 PM  PLEASE CONTACT ON CALL TEAM:  - On Call Team (Please refer to Benito) FROM 5:00 PM - 8:30PM  - Nightfloat Team FROM 8:30 -7:30 AM    CHIEF COMPLAINT & BRIEF HOSPITAL COURSE:    INTERVAL HPI/OVERNIGHT EVENTS:   MEDICATIONS  (STANDING):  apixaban 5 milliGRAM(s) Oral every 12 hours  atorvastatin 20 milliGRAM(s) Oral at bedtime  cefTRIAXone   IVPB 1000 milliGRAM(s) IV Intermittent every 24 hours  gabapentin 300 milliGRAM(s) Oral daily  influenza   Vaccine 0.5 milliLiter(s) IntraMuscular once  insulin glargine Injectable (LANTUS) 20 Unit(s) SubCutaneous at bedtime  insulin lispro (ADMELOG) corrective regimen sliding scale   SubCutaneous three times a day before meals  insulin lispro Injectable (ADMELOG) 3 Unit(s) SubCutaneous three times a day before meals  methylPREDNISolone sodium succinate Injectable 40 milliGRAM(s) IV Push three times a day  traZODone 100 milliGRAM(s) Oral at bedtime    MEDICATIONS  (PRN):      REVIEW OF SYSTEMS:  CONSTITUTIONAL: No fever, weight loss, or fatigue  RESPIRATORY: No cough, wheezing, chills or hemoptysis; No shortness of breath  CARDIOVASCULAR: No chest pain, palpitations, dizziness, or leg swelling  GASTROINTESTINAL: No abdominal pain. No nausea, vomiting, or hematemesis; No diarrhea or constipation. No melena or hematochezia.  GENITOURINARY: No dysuria or hematuria, urinary frequency  NEUROLOGICAL: No headaches, memory loss, loss of strength, numbness, or tremors  SKIN: No itching, burning, rashes, or lesions     Vital Signs Last 24 Hrs  T(C): 36.8 (01 Mar 2023 05:19), Max: 37.8 (28 Feb 2023 18:30)  T(F): 98.2 (01 Mar 2023 05:19), Max: 100 (28 Feb 2023 18:30)  HR: 103 (01 Mar 2023 05:19) (99 - 115)  BP: 126/72 (01 Mar 2023 05:19) (102/64 - 149/82)  BP(mean): --  RR: 20 (01 Mar 2023 05:19) (19 - 23)  SpO2: 98% (01 Mar 2023 05:19) (95% - 99%)    Parameters below as of 01 Mar 2023 05:19  Patient On (Oxygen Delivery Method): nasal cannula  O2 Flow (L/min): 5      PHYSICAL EXAMINATION:  GENERAL: NAD, well built  HEAD:  Atraumatic, Normocephalic  EYES:  conjunctiva and sclera clear  NECK: Supple, No JVD, Normal thyroid  CHEST/LUNG: Clear to auscultation. Clear to percussion bilaterally; No rales, rhonchi, wheezing, or rubs  HEART: Regular rate and rhythm; No murmurs, rubs, or gallops  ABDOMEN: Soft, Nontender, Nondistended; Bowel sounds present  NERVOUS SYSTEM:  Alert & Oriented X3,    EXTREMITIES:  2+ Peripheral Pulses, No clubbing, cyanosis, or edema  SKIN: warm dry                          11.4   9.49  )-----------( 453      ( 28 Feb 2023 10:00 )             37.5     02-28    137  |  102  |  15  ----------------------------<  370<H>  4.2   |  25  |  1.05    Ca    9.9      28 Feb 2023 10:00  Phos  3.9     02-28  Mg     1.8     02-28    TPro  8.1  /  Alb  2.9<L>  /  TBili  0.5  /  DBili  x   /  AST  40  /  ALT  49  /  AlkPhos  203<H>  02-28    LIVER FUNCTIONS - ( 28 Feb 2023 10:00 )  Alb: 2.9 g/dL / Pro: 8.1 g/dL / ALK PHOS: 203 U/L / ALT: 49 U/L DA / AST: 40 U/L / GGT: x               PT/INR - ( 27 Feb 2023 22:42 )   PT: 13.1 sec;   INR: 1.10 ratio         PTT - ( 27 Feb 2023 22:42 )  PTT:28.6 sec    CAPILLARY BLOOD GLUCOSE      RADIOLOGY & ADDITIONAL TESTS:                   PGY-1 Progress Note discussed with attending    PLEASE CONTACT ON CALL TEAM:  - On Call Team (Please refer to Benito) FROM 5:00 PM - 8:30PM  - Nightfloat Team FROM 8:30 -7:30 AM    CHIEF COMPLAINT & BRIEF HOSPITAL COURSE: Patient is a 62 year old Female from home with hx of Pulmonary fibrosis (dx in 2020, not on home oxygen), DM. Patient presented to ED for SOB on exertion and at rest with productive white cough, with worsening lower leg swelling, palpitations. Patient admitted to ICU for Acute Hypoxic respiratory failure secondary to Pulmonary fibrosis and acute PE. Patient CTA chest resulted PE, doppler positive for right peroneal vein thrombus, started on full dose Lovenox.    INTERVAL HPI/OVERNIGHT EVENTS:   MEDICATIONS  (STANDING):  apixaban 5 milliGRAM(s) Oral every 12 hours  atorvastatin 20 milliGRAM(s) Oral at bedtime  cefTRIAXone   IVPB 1000 milliGRAM(s) IV Intermittent every 24 hours  gabapentin 300 milliGRAM(s) Oral daily  influenza   Vaccine 0.5 milliLiter(s) IntraMuscular once  insulin glargine Injectable (LANTUS) 20 Unit(s) SubCutaneous at bedtime  insulin lispro (ADMELOG) corrective regimen sliding scale   SubCutaneous three times a day before meals  insulin lispro Injectable (ADMELOG) 3 Unit(s) SubCutaneous three times a day before meals  methylPREDNISolone sodium succinate Injectable 40 milliGRAM(s) IV Push three times a day  traZODone 100 milliGRAM(s) Oral at bedtime    MEDICATIONS  (PRN):      REVIEW OF SYSTEMS:  CONSTITUTIONAL: No fever, weight loss, or fatigue  RESPIRATORY: No cough, wheezing, chills or hemoptysis; No shortness of breath  CARDIOVASCULAR: No chest pain, palpitations, dizziness, or leg swelling  GASTROINTESTINAL: No abdominal pain. No nausea, vomiting, or hematemesis; No diarrhea or constipation. No melena or hematochezia.  GENITOURINARY: No dysuria or hematuria, urinary frequency  NEUROLOGICAL: No headaches, memory loss, loss of strength, numbness, or tremors  SKIN: No itching, burning, rashes, or lesions     Vital Signs Last 24 Hrs  T(C): 36.8 (01 Mar 2023 05:19), Max: 37.8 (28 Feb 2023 18:30)  T(F): 98.2 (01 Mar 2023 05:19), Max: 100 (28 Feb 2023 18:30)  HR: 103 (01 Mar 2023 05:19) (99 - 115)  BP: 126/72 (01 Mar 2023 05:19) (102/64 - 149/82)  BP(mean): --  RR: 20 (01 Mar 2023 05:19) (19 - 23)  SpO2: 98% (01 Mar 2023 05:19) (95% - 99%)    Parameters below as of 01 Mar 2023 05:19  Patient On (Oxygen Delivery Method): nasal cannula  O2 Flow (L/min): 5      PHYSICAL EXAMINATION:  GENERAL: NAD, well built  HEAD:  Atraumatic, Normocephalic  EYES:  conjunctiva and sclera clear  NECK: Supple, No JVD, Normal thyroid  CHEST/LUNG: Clear to auscultation. Clear to percussion bilaterally; No rales, rhonchi, wheezing, or rubs  HEART: Regular rate and rhythm; No murmurs, rubs, or gallops  ABDOMEN: Soft, Nontender, Nondistended; Bowel sounds present  NERVOUS SYSTEM:  Alert & Oriented X3,    EXTREMITIES:  2+ Peripheral Pulses, No clubbing, cyanosis, or edema  SKIN: warm dry                          11.4   9.49  )-----------( 453      ( 28 Feb 2023 10:00 )             37.5     02-28    137  |  102  |  15  ----------------------------<  370<H>  4.2   |  25  |  1.05    Ca    9.9      28 Feb 2023 10:00  Phos  3.9     02-28  Mg     1.8     02-28    TPro  8.1  /  Alb  2.9<L>  /  TBili  0.5  /  DBili  x   /  AST  40  /  ALT  49  /  AlkPhos  203<H>  02-28    LIVER FUNCTIONS - ( 28 Feb 2023 10:00 )  Alb: 2.9 g/dL / Pro: 8.1 g/dL / ALK PHOS: 203 U/L / ALT: 49 U/L DA / AST: 40 U/L / GGT: x               PT/INR - ( 27 Feb 2023 22:42 )   PT: 13.1 sec;   INR: 1.10 ratio         PTT - ( 27 Feb 2023 22:42 )  PTT:28.6 sec    CAPILLARY BLOOD GLUCOSE      RADIOLOGY & ADDITIONAL TESTS:                   PGY-1 Progress Note discussed with attending    PLEASE CONTACT ON CALL TEAM:  - On Call Team (Please refer to Benito) FROM 5:00 PM - 8:30PM  - Nightfloat Team FROM 8:30 -7:30 AM    CHIEF COMPLAINT & BRIEF HOSPITAL COURSE: Patient is a 62 year old Female from home with hx of Pulmonary fibrosis (dx in 2020, not on home oxygen), DM. Patient presented to ED for SOB on exertion and at rest with productive white cough, with worsening lower leg swelling, palpitations. Patient admitted to ICU for Acute Hypoxic respiratory failure secondary to Pulmonary fibrosis and acute PE. Patient CTA chest resulted PE, doppler positive for right peroneal vein thrombus, started on full dose Lovenox.    INTERVAL HPI/OVERNIGHT EVENTS: Overnight, urine cultures were sent due to urinary sxs such as suprapubic pain. Started on CTX. Pt on 4 L NC saturating 92%. For VQ scan manuel in the AM.      MEDICATIONS  (STANDING):  apixaban 5 milliGRAM(s) Oral every 12 hours  atorvastatin 20 milliGRAM(s) Oral at bedtime  cefTRIAXone   IVPB 1000 milliGRAM(s) IV Intermittent every 24 hours  gabapentin 300 milliGRAM(s) Oral daily  influenza   Vaccine 0.5 milliLiter(s) IntraMuscular once  insulin glargine Injectable (LANTUS) 20 Unit(s) SubCutaneous at bedtime  insulin lispro (ADMELOG) corrective regimen sliding scale   SubCutaneous three times a day before meals  insulin lispro Injectable (ADMELOG) 3 Unit(s) SubCutaneous three times a day before meals  methylPREDNISolone sodium succinate Injectable 40 milliGRAM(s) IV Push three times a day  traZODone 100 milliGRAM(s) Oral at bedtime    MEDICATIONS  (PRN):      REVIEW OF SYSTEMS:  CONSTITUTIONAL: No fever, weight loss, or fatigue  RESPIRATORY: (+) rhonchi heard in b/l lung fields, more in the bases, No cough, wheezing, chills or hemoptysis; No shortness of breath  CARDIOVASCULAR: No chest pain, palpitations, dizziness, or leg swelling  GASTROINTESTINAL: (+) suprapubic pain. No nausea, vomiting, or hematemesis; No diarrhea or constipation. No melena or hematochezia.  GENITOURINARY: No dysuria or hematuria, urinary frequency  NEUROLOGICAL: No headaches, memory loss, loss of strength, numbness, or tremors  SKIN: No itching, burning, rashes, or lesions     Vital Signs Last 24 Hrs  T(C): 36.8 (01 Mar 2023 05:19), Max: 37.8 (28 Feb 2023 18:30)  T(F): 98.2 (01 Mar 2023 05:19), Max: 100 (28 Feb 2023 18:30)  HR: 103 (01 Mar 2023 05:19) (99 - 115)  BP: 126/72 (01 Mar 2023 05:19) (102/64 - 149/82)  BP(mean): --  RR: 20 (01 Mar 2023 05:19) (19 - 23)  SpO2: 98% (01 Mar 2023 05:19) (95% - 99%)    Parameters below as of 01 Mar 2023 05:19  Patient On (Oxygen Delivery Method): nasal cannula  O2 Flow (L/min): 5      PHYSICAL EXAMINATION:  GENERAL: NAD, well built  HEAD:  Atraumatic, Normocephalic  EYES:  conjunctiva and sclera clear  NECK: Supple, No JVD, Normal thyroid  CHEST/LUNG: Clear to auscultation. Clear to percussion bilaterally; No rales, rhonchi, wheezing, or rubs  HEART: Regular rate and rhythm; No murmurs, rubs, or gallops  ABDOMEN: Soft, Nontender, Nondistended; Bowel sounds present  NERVOUS SYSTEM:  Alert & Oriented X3,    EXTREMITIES:  2+ Peripheral Pulses, No clubbing, cyanosis, or edema  SKIN: warm dry                          11.4   9.49  )-----------( 453      ( 28 Feb 2023 10:00 )             37.5     02-28    137  |  102  |  15  ----------------------------<  370<H>  4.2   |  25  |  1.05    Ca    9.9      28 Feb 2023 10:00  Phos  3.9     02-28  Mg     1.8     02-28    TPro  8.1  /  Alb  2.9<L>  /  TBili  0.5  /  DBili  x   /  AST  40  /  ALT  49  /  AlkPhos  203<H>  02-28    LIVER FUNCTIONS - ( 28 Feb 2023 10:00 )  Alb: 2.9 g/dL / Pro: 8.1 g/dL / ALK PHOS: 203 U/L / ALT: 49 U/L DA / AST: 40 U/L / GGT: x               PT/INR - ( 27 Feb 2023 22:42 )   PT: 13.1 sec;   INR: 1.10 ratio         PTT - ( 27 Feb 2023 22:42 )  PTT:28.6 sec    CAPILLARY BLOOD GLUCOSE      RADIOLOGY & ADDITIONAL TESTS:

## 2023-03-01 NOTE — PROGRESS NOTE ADULT - ASSESSMENT
Patient is a 61 yo female with hx of Pulmonary fibrosis, not on home oxygen, Pulmonary embolism on Eliquis, DM on Lantus and Metformin presents with shortness of breath. Upon evaluation in ED patient found to be hypoxic on 6L NC at 91%, but hemodynamically stable. Labs significant for bnp of 6630 with elevated troponin at 921, downtrended to 649. EKG showing new t wave inversion in leads V3/V4. CTA done showing no PE but Re demonstration of interstitial reticular and ground glass opacities throughout the lungs with a peripheral and basilar predominance and associated traction bronchiectasis suggestive of an interstitial lung disease. Patient admitted to medicine for Acute hypoxic respiratory failure 2/2 ILD flare and right sided heart failure.

## 2023-03-01 NOTE — PROGRESS NOTE ADULT - PROBLEM SELECTOR PLAN 1
- patient presenting with hypoxemia, tachycardia  - diagnosed with ILD 2021, on prednisone 20 BID since 1/29 (prescribed 1/29)  - CTA showing re- demonstration of ILD negative for PE   - 2/28 ECHO study limited, 10/29 ECHO showed RV systolic pressure is severely increased at 68 mm Hg  - continue with supplemental oxygen as needed, on 4 L NC saturating 92%  - Solumedrol 40mg IV q8hrs, albuterol PRN   - f/u VQ scan to r/o PE   - received 2 days of rocephen and azithro, d/c'd for low suspicion of infectious cause  - Pulmonary consulted Dr. Dubois, f/u recs

## 2023-03-01 NOTE — PROGRESS NOTE ADULT - PROBLEM SELECTOR PROBLEM 1
Dr. Brown notified of /65. Per MD, do not administer scheduled dose of clonidine 0.1 mg.   Acute respiratory failure with hypoxia Acute on chronic respiratory failure with hypoxia

## 2023-03-01 NOTE — PROGRESS NOTE ADULT - PROBLEM SELECTOR PLAN 4
DVT PPX: Eliquis  GI PPX: PPI - noted to have Scr of 1.31 on admission > 1.05 RESOLVED   - normal baseline  - avoid nephrotoxins   - monitor bmp

## 2023-03-01 NOTE — PROGRESS NOTE ADULT - PROBLEM SELECTOR PLAN 1
secondary to Pulmonary Fibrosis and PE  see CTA chest as above  continue prednisone  continue Eliquis  continue Azathioprine  continue duoneb  SpO2 97% RA- tolerating being off O2  continue Bactrim for PCP PPX  Pulmonology Dr. Barr  check oxygen saturation on ambulation - patient presenting with hypoxemia, tachycardia  - diagnosed with ILD 2021, on prednisone 20 BID since 1/29 (prescribed 1/29)  - CTA showing re- demonstration of ILD negative for PE   - 2/28 ECHO study limited, 10/29 ECHO showed RV systolic pressure is severely increased at 68 mm Hg  - continue with supplemental oxygen as needed, on 4 L NC saturating 92%  - Solumedrol 40mg IV q8hrs, albuterol PRN   - f/u VQ scan to r/o PE   - received 2 days of rocephen and azithro, d/c'd for low suspicion of infectious cause  - Pulmonary consulted Dr. Dubois, f/u recs

## 2023-03-01 NOTE — PROGRESS NOTE ADULT - PROBLEM SELECTOR PLAN 3
- patient with evidence of right sided heart failure on CTA, with elevated right heart pressures, PAH  - bnp noted to be 6630, euvolemic on exam   - 2/28 ECHO study limited

## 2023-03-01 NOTE — CONSULT NOTE ADULT - TIME BILLING
- personally reviewed patient's labs, flowsheets, pertinent imaging, and consultant notes  - bedside SpO2 measurement and titration of supplemental O2 accordingly  - gen pul hx/exam  - counseling re: ILD and course of disease, possible tx options and f/u  - coordination of care w/ primary team  - medication reconciliation  - review of prior charts and imaging

## 2023-03-01 NOTE — PROGRESS NOTE ADULT - PROBLEM SELECTOR PLAN 5
PT recommending Home PT  needs to tolerate being off oxygen therapy - patient noted to have troponin of 921, now 649, down trended   - EKG showing new T wave inversions in v3-v4  - noted to be on anticoagulation with Eliquis   - 2/28 ECHO study limited

## 2023-03-01 NOTE — CONSULT NOTE ADULT - ASSESSMENT
61yo woman never smoker w/ PMH PE on Eliquis w/ hx of cor pulmonale (late Oct 2022), ILD (?IPF), DM2, who presents w/ increasing dyspnea and productive cough of clear sputum for past several days; found with AHRF with CT redemonstrating ILD with possible increased GGOs.     Unclear etiology of original ILD presentation and had been managed as an outpatient with steroid tx and inhaled therapy. Patient says her pulmonologist was working on getting her Ofev but is not currently taking. Her disease has likely progressed to the point of requiring baseline oxygen, though difficult to rule out if a component of AHRF is secondary to ILD exacerbation (given slightly increased GGOs compared to CT from 10/2022). It is also unclear how long pt has been on steroids and whether she has received appropriate PCP prophylaxis based on steroid therapy. Pending clarification with her pharmacy, cannot exclude PCP PNA nor can fungal infections be excluded if immunosuppressed. Her ILD findings on CT are suggestive of either probable UIP pattern vs. fibrotic NSIP vs. fibrotic HP; lacks discrete honeycombing thus far.     #Acute hypoxemic respiratory failure  #ILD, concern for acute exacerbation  #PE w/ RH dysfxn  #Pulmonary HTN    Recommendations:  - continue supplemental O2 for now to maintain normoxia; pt will need O2 for discharge especially given this was evaluated recently as outpatient  - consider broad spec ABx w/ pseudomonal and atypical coverage empirically for possible ILD exacerbation with increased GGOs on current CT compared to prior in 10/2022  - would warrant 1mg/kg/day steroid for ILD exac (currently on) and eventual wean back to baseline  - please obtain sputum cx  - check serum fungitel and galactomannan, serum LDH  - clarify w/ pharmacy prednisone regimen and whether pt was on prophylaxis as may be at risk for PCP PNA  - cont BDs q4-6h ATC duoneb vs. albuterol HFA  - cont eliquis for PE  - primary team has already reached out to her outpt pulmonologist, will f/u collateral   - incentive davonte 10x/hr while awake and in bed; mobilize OOBTC as tolerated    Will follow with you    Dany Hilliard, DO  Pulmonary & Critical Care Medicine  Available on Teams

## 2023-03-01 NOTE — PROGRESS NOTE ADULT - ASSESSMENT
Patient is a 62 year old Female from home with hx of Pulmonary fibrosis (dx in 2020, not on home oxygen), DM. Patient presented to ED for SOB on exertion and at rest with productive white cough, with worsening lower leg swelling, palpitations. Patient admitted to ICU for Acute Hypoxic respiratory failure secondary to Pulmonary fibrosis and acute PE. Patient CTA chest resulted PE, doppler positive for right peroneal vein thrombus, started on full dose Lovenox.    ICU Course:     Patientt was intially started on HFNC and was eventually be able to transitioned to 2L nasal canula and is currently saturating well. She does get short of breath on exertion. She was started on full dose lovenox, eliquis was sent to her pharmacy to confirm coverage and can be started after. She is currently on bactrim for PCP prophylaxis. On admission trop was elevated to 172 with probnp of 4519. Dr. Wilhelm cardio following, suspects its due to right heart strain secondary to PE. LE US performed confirmed the presence of Thrombus in the right peroneal vein. Echo showed normal EF >60%, with RV systolic pressure is severely increased at  68 mmHg.     Patient stable and downgraded to medicine. Patient transitioned off oxygen. Patient Eliquis approved by insurance.    Pt seen at  bedside, NAD, states is feeling better. Saturating 98% on RA at rest  Will check saturation on ambulation       Patient is a 61 yo female with hx of Pulmonary fibrosis, not on home oxygen, Pulmonary embolism on Eliquis, DM on Lantus and Metformin presents with shortness of breath. Upon evaluation in ED patient found to be hypoxic on 6L NC at 91%, but hemodynamically stable. Labs significant for bnp of 6630 with elevated troponin at 921, downtrended to 649. EKG showing new t wave inversion in leads V3/V4. CTA done showing no PE but Re demonstration of interstitial reticular and ground glass opacities throughout the lungs with a peripheral and basilar predominance and associated traction bronchiectasis suggestive of an interstitial lung disease. Patient admitted to medicine for Acute hypoxic respiratory failure 2/2 ILD flare and right sided heart failure.

## 2023-03-01 NOTE — PROGRESS NOTE ADULT - PROBLEM SELECTOR PLAN 2
- hx of PE in the past during ICU stay 10/2022, started on eliquis then   - tachycardic and hypoxic upon admission, requiring 4 L NC, still tachycardic   - currently on eliquis 5 BID   - CTA showing re- demonstration of ILD negative for PE   - 2/28 ECHO study limited, 10/29 ECHO showed RV systolic pressure is severely increased at 68 mm Hg  - f/u VQ scan

## 2023-03-01 NOTE — PROGRESS NOTE ADULT - PROBLEM SELECTOR PLAN 2
Doppler positive for right peroneal vein   see plan as above - hx of PE in the past during ICU stay 10/2022, started on eliquis then   - tachycardic and hypoxic upon admission, requiring 4 L NC, still tachycardic   - currently on eliquis 5 BID   - CTA showing re- demonstration of ILD negative for PE   - 2/28 ECHO study limited, 10/29 ECHO showed RV systolic pressure is severely increased at 68 mm Hg  - f/u VQ scan

## 2023-03-02 LAB
ALBUMIN SERPL ELPH-MCNC: 2.1 G/DL — LOW (ref 3.5–5)
ALP SERPL-CCNC: 138 U/L — HIGH (ref 40–120)
ALT FLD-CCNC: 32 U/L DA — SIGNIFICANT CHANGE UP (ref 10–60)
ANION GAP SERPL CALC-SCNC: 8 MMOL/L — SIGNIFICANT CHANGE UP (ref 5–17)
AST SERPL-CCNC: 21 U/L — SIGNIFICANT CHANGE UP (ref 10–40)
BASOPHILS # BLD AUTO: 0.01 K/UL — SIGNIFICANT CHANGE UP (ref 0–0.2)
BASOPHILS NFR BLD AUTO: 0.1 % — SIGNIFICANT CHANGE UP (ref 0–2)
BILIRUB SERPL-MCNC: 0.3 MG/DL — SIGNIFICANT CHANGE UP (ref 0.2–1.2)
BUN SERPL-MCNC: 23 MG/DL — HIGH (ref 7–18)
CALCIUM SERPL-MCNC: 9 MG/DL — SIGNIFICANT CHANGE UP (ref 8.4–10.5)
CHLORIDE SERPL-SCNC: 106 MMOL/L — SIGNIFICANT CHANGE UP (ref 96–108)
CO2 SERPL-SCNC: 24 MMOL/L — SIGNIFICANT CHANGE UP (ref 22–31)
CREAT SERPL-MCNC: 0.88 MG/DL — SIGNIFICANT CHANGE UP (ref 0.5–1.3)
CULTURE RESULTS: SIGNIFICANT CHANGE UP
EGFR: 75 ML/MIN/1.73M2 — SIGNIFICANT CHANGE UP
EOSINOPHIL # BLD AUTO: 0 K/UL — SIGNIFICANT CHANGE UP (ref 0–0.5)
EOSINOPHIL NFR BLD AUTO: 0 % — SIGNIFICANT CHANGE UP (ref 0–6)
GLUCOSE BLDC GLUCOMTR-MCNC: 136 MG/DL — HIGH (ref 70–99)
GLUCOSE BLDC GLUCOMTR-MCNC: 144 MG/DL — HIGH (ref 70–99)
GLUCOSE BLDC GLUCOMTR-MCNC: 173 MG/DL — HIGH (ref 70–99)
GLUCOSE BLDC GLUCOMTR-MCNC: 208 MG/DL — HIGH (ref 70–99)
GLUCOSE BLDC GLUCOMTR-MCNC: 256 MG/DL — HIGH (ref 70–99)
GLUCOSE BLDC GLUCOMTR-MCNC: 289 MG/DL — HIGH (ref 70–99)
GLUCOSE BLDC GLUCOMTR-MCNC: 297 MG/DL — HIGH (ref 70–99)
GLUCOSE BLDC GLUCOMTR-MCNC: 330 MG/DL — HIGH (ref 70–99)
GLUCOSE SERPL-MCNC: 173 MG/DL — HIGH (ref 70–99)
GRAM STN FLD: SIGNIFICANT CHANGE UP
HCT VFR BLD CALC: 33.2 % — LOW (ref 34.5–45)
HGB BLD-MCNC: 10.1 G/DL — LOW (ref 11.5–15.5)
IMM GRANULOCYTES NFR BLD AUTO: 0.6 % — SIGNIFICANT CHANGE UP (ref 0–0.9)
LDH SERPL L TO P-CCNC: 370 U/L — HIGH (ref 120–225)
LYMPHOCYTES # BLD AUTO: 1.09 K/UL — SIGNIFICANT CHANGE UP (ref 1–3.3)
LYMPHOCYTES # BLD AUTO: 7.9 % — LOW (ref 13–44)
MAGNESIUM SERPL-MCNC: 2.4 MG/DL — SIGNIFICANT CHANGE UP (ref 1.6–2.6)
MCHC RBC-ENTMCNC: 24.8 PG — LOW (ref 27–34)
MCHC RBC-ENTMCNC: 30.4 GM/DL — LOW (ref 32–36)
MCV RBC AUTO: 81.4 FL — SIGNIFICANT CHANGE UP (ref 80–100)
MONOCYTES # BLD AUTO: 0.65 K/UL — SIGNIFICANT CHANGE UP (ref 0–0.9)
MONOCYTES NFR BLD AUTO: 4.7 % — SIGNIFICANT CHANGE UP (ref 2–14)
NEUTROPHILS # BLD AUTO: 12.01 K/UL — HIGH (ref 1.8–7.4)
NEUTROPHILS NFR BLD AUTO: 86.7 % — HIGH (ref 43–77)
NRBC # BLD: 0 /100 WBCS — SIGNIFICANT CHANGE UP (ref 0–0)
PHOSPHATE SERPL-MCNC: 3.8 MG/DL — SIGNIFICANT CHANGE UP (ref 2.5–4.5)
PLATELET # BLD AUTO: 438 K/UL — HIGH (ref 150–400)
POTASSIUM SERPL-MCNC: 4.4 MMOL/L — SIGNIFICANT CHANGE UP (ref 3.5–5.3)
POTASSIUM SERPL-SCNC: 4.4 MMOL/L — SIGNIFICANT CHANGE UP (ref 3.5–5.3)
PROT SERPL-MCNC: 6.7 G/DL — SIGNIFICANT CHANGE UP (ref 6–8.3)
RBC # BLD: 4.08 M/UL — SIGNIFICANT CHANGE UP (ref 3.8–5.2)
RBC # FLD: 15.1 % — HIGH (ref 10.3–14.5)
SODIUM SERPL-SCNC: 138 MMOL/L — SIGNIFICANT CHANGE UP (ref 135–145)
SPECIMEN SOURCE: SIGNIFICANT CHANGE UP
SPECIMEN SOURCE: SIGNIFICANT CHANGE UP
TSH SERPL-MCNC: 0.36 UU/ML — SIGNIFICANT CHANGE UP (ref 0.34–4.82)
WBC # BLD: 13.84 K/UL — HIGH (ref 3.8–10.5)
WBC # FLD AUTO: 13.84 K/UL — HIGH (ref 3.8–10.5)

## 2023-03-02 PROCEDURE — 78582 LUNG VENTILAT&PERFUS IMAGING: CPT | Mod: 26

## 2023-03-02 PROCEDURE — 99233 SBSQ HOSP IP/OBS HIGH 50: CPT | Mod: GC

## 2023-03-02 PROCEDURE — 99233 SBSQ HOSP IP/OBS HIGH 50: CPT

## 2023-03-02 PROCEDURE — 99223 1ST HOSP IP/OBS HIGH 75: CPT

## 2023-03-02 RX ORDER — INSULIN LISPRO 100/ML
VIAL (ML) SUBCUTANEOUS EVERY 4 HOURS
Refills: 0 | Status: DISCONTINUED | OUTPATIENT
Start: 2023-03-02 | End: 2023-03-03

## 2023-03-02 RX ORDER — INSULIN LISPRO 100/ML
VIAL (ML) SUBCUTANEOUS EVERY 4 HOURS
Refills: 0 | Status: DISCONTINUED | OUTPATIENT
Start: 2023-03-02 | End: 2023-03-02

## 2023-03-02 RX ORDER — INSULIN LISPRO 100/ML
14 VIAL (ML) SUBCUTANEOUS
Refills: 0 | Status: DISCONTINUED | OUTPATIENT
Start: 2023-03-02 | End: 2023-03-03

## 2023-03-02 RX ORDER — SIMETHICONE 80 MG/1
80 TABLET, CHEWABLE ORAL ONCE
Refills: 0 | Status: COMPLETED | OUTPATIENT
Start: 2023-03-02 | End: 2023-03-02

## 2023-03-02 RX ORDER — PANTOPRAZOLE SODIUM 20 MG/1
40 TABLET, DELAYED RELEASE ORAL
Refills: 0 | Status: DISCONTINUED | OUTPATIENT
Start: 2023-03-02 | End: 2023-03-03

## 2023-03-02 RX ORDER — ONDANSETRON 8 MG/1
4 TABLET, FILM COATED ORAL ONCE
Refills: 0 | Status: COMPLETED | OUTPATIENT
Start: 2023-03-02 | End: 2023-03-02

## 2023-03-02 RX ADMIN — APIXABAN 5 MILLIGRAM(S): 2.5 TABLET, FILM COATED ORAL at 05:30

## 2023-03-02 RX ADMIN — Medication 8: at 14:25

## 2023-03-02 RX ADMIN — INSULIN GLARGINE 35 UNIT(S): 100 INJECTION, SOLUTION SUBCUTANEOUS at 21:33

## 2023-03-02 RX ADMIN — Medication 14 UNIT(S): at 11:54

## 2023-03-02 RX ADMIN — ALBUTEROL 2 PUFF(S): 90 AEROSOL, METERED ORAL at 08:32

## 2023-03-02 RX ADMIN — Medication 6: at 17:29

## 2023-03-02 RX ADMIN — Medication 2 TABLET(S): at 05:22

## 2023-03-02 RX ADMIN — Medication 200 MILLIGRAM(S): at 09:10

## 2023-03-02 RX ADMIN — Medication 100 MILLIGRAM(S): at 21:33

## 2023-03-02 RX ADMIN — GABAPENTIN 300 MILLIGRAM(S): 400 CAPSULE ORAL at 11:53

## 2023-03-02 RX ADMIN — Medication 200 MILLIGRAM(S): at 15:39

## 2023-03-02 RX ADMIN — ALBUTEROL 2 PUFF(S): 90 AEROSOL, METERED ORAL at 15:39

## 2023-03-02 RX ADMIN — Medication 2 TABLET(S): at 14:13

## 2023-03-02 RX ADMIN — ALBUTEROL 2 PUFF(S): 90 AEROSOL, METERED ORAL at 22:46

## 2023-03-02 RX ADMIN — Medication 2 TABLET(S): at 21:33

## 2023-03-02 RX ADMIN — Medication 40 MILLIGRAM(S): at 14:12

## 2023-03-02 RX ADMIN — Medication 40 MILLIGRAM(S): at 17:29

## 2023-03-02 RX ADMIN — ATORVASTATIN CALCIUM 20 MILLIGRAM(S): 80 TABLET, FILM COATED ORAL at 21:33

## 2023-03-02 RX ADMIN — ONDANSETRON 4 MILLIGRAM(S): 8 TABLET, FILM COATED ORAL at 18:52

## 2023-03-02 RX ADMIN — Medication 5 MILLIGRAM(S): at 21:32

## 2023-03-02 RX ADMIN — SIMETHICONE 80 MILLIGRAM(S): 80 TABLET, CHEWABLE ORAL at 18:53

## 2023-03-02 RX ADMIN — APIXABAN 5 MILLIGRAM(S): 2.5 TABLET, FILM COATED ORAL at 17:29

## 2023-03-02 RX ADMIN — Medication 2: at 06:11

## 2023-03-02 RX ADMIN — Medication 6: at 02:22

## 2023-03-02 RX ADMIN — Medication 14 UNIT(S): at 15:39

## 2023-03-02 RX ADMIN — Medication 40 MILLIGRAM(S): at 05:23

## 2023-03-02 NOTE — PROGRESS NOTE ADULT - ASSESSMENT
63yo woman never smoker w/ PMH PE on Eliquis w/ hx of cor pulmonale (late Oct 2022), ILD (?IPF), DM2, who presents w/ increasing dyspnea and productive cough of clear sputum for past several days; found with AHRF with CT redemonstrating ILD with possible increased GGOs.     Unclear etiology of original ILD presentation and had been managed as an outpatient with steroid tx and inhaled therapy. Patient says her pulmonologist was working on getting her Ofev but is not currently taking. Her disease has likely progressed to the point of requiring baseline oxygen, though difficult to rule out if a component of AHRF is secondary to ILD exacerbation (given slightly increased GGOs compared to CT from 10/2022). It is also unclear how long pt has been on steroids and whether she has received appropriate PCP prophylaxis based on steroid therapy -- collateral suggests not having been prophylaxed despite at least a couple of months of steroids. Thus, cannot exclude PCP PNA nor can fungal infections be excluded if immunosuppressed. Her ILD findings on CT are suggestive of either probable UIP pattern vs. fibrotic NSIP vs. fibrotic HP; lacks discrete honeycombing thus far.     #Acute hypoxemic respiratory failure  #ILD, possible acute exacerbation  #PE w/ RH dysfxn  #Pulmonary HTN  #?PCP PNA    Recommendations:  - continue supplemental O2 for now to maintain normoxia; pt will need O2 for discharge especially given this was evaluated recently as outpatient  --> her O2 was weaned to 2L NC this AM, please monitor on 2L at rest today -- will need re-assessment of ambulatory SpO2 prior to d/c  - primary team initiated treatment dose TMP-SMX for PCP PNA as collateral hx revealed no prophylaxis despite long term steroid tx  --> for PCP treatment, data is extrapolated from HIV+ patients which would call for 21 days of TMP-SMX, and pred 40 BID x 5 days --> 40mg daily x 5 days --> 20mg daily x 11d for total 21 days of abx + steroids; in her case, may need to adjust steroids back to admission baseline dose after initial tx   - f/u sputum cx  - f/u serum fungitel and galactomannan  - LDH elevated 414  - consider starting GI prophylaxis while on long term steroids  - V/Q scan reviewed, with low likelihood of new PE (besides PE from 11/2022) or Eliquis treatment failure - concordant with admission CTA study  - cont BDs q4-6h ATC duoneb vs. albuterol HFA  - cont Eliquis for PE  - collateral from outpt pulm was pt was re-establishing pulmonary care and was so hypoxemic in office that pt was ordered for home O2 and advised to go to hospital in meantime, she now has oxygen delivered to her home   - incentive davonte 10x/hr while awake and in bed; mobilize OOBTC as tolerated    Patient should follow-up with her pulmonologist Dr. Korey Marlow via [facility] within 1-2 weeks of discharge    Dany Hilliard, DO  Pulmonary & Critical Care Medicine  Available on Teams

## 2023-03-02 NOTE — PROGRESS NOTE ADULT - SUBJECTIVE AND OBJECTIVE BOX
PGY-1 Progress Note discussed with attending    PLEASE CONTACT ON CALL TEAM:  - On Call Team (Please refer to Benito) FROM 5:00 PM - 8:30PM  - Nightfloat Team FROM 8:30 -7:30 AM    INTERVAL HPI/OVERNIGHT EVENTS: No acute events overnight. Pt states that his respiratory status has improved, on 4 L NC, will down titrate to 3L NC. Went for VQ scan today.     MEDICATIONS  (STANDING):  apixaban 5 milliGRAM(s) Oral every 12 hours  atorvastatin 20 milliGRAM(s) Oral at bedtime  gabapentin 300 milliGRAM(s) Oral daily  influenza   Vaccine 0.5 milliLiter(s) IntraMuscular once  insulin glargine Injectable (LANTUS) 35 Unit(s) SubCutaneous at bedtime  insulin lispro (ADMELOG) corrective regimen sliding scale   SubCutaneous every 4 hours  insulin lispro Injectable (ADMELOG) 14 Unit(s) SubCutaneous three times a day before meals  pantoprazole    Tablet 40 milliGRAM(s) Oral before breakfast  predniSONE   Tablet 40 milliGRAM(s) Oral every 24 hours  traZODone 100 milliGRAM(s) Oral at bedtime  trimethoprim  160 mG/sulfamethoxazole 800 mG 2 Tablet(s) Oral every 8 hours    MEDICATIONS  (PRN):  acetaminophen     Tablet .. 650 milliGRAM(s) Oral every 6 hours PRN Mild Pain (1 - 3)  albuterol    90 MICROgram(s) HFA Inhaler 2 Puff(s) Inhalation every 6 hours PRN Shortness of Breath and/or Wheezing  guaiFENesin Oral Liquid (Sugar-Free) 200 milliGRAM(s) Oral every 6 hours PRN Cough      REVIEW OF SYSTEMS:  CONSTITUTIONAL: No fever, weight loss, or fatigue  RESPIRATORY: No cough, wheezing, chills or hemoptysis; No shortness of breath  CARDIOVASCULAR: No chest pain, palpitations, dizziness, or leg swelling  GASTROINTESTINAL: No abdominal pain. No nausea, vomiting, or hematemesis; No diarrhea or constipation. No melena or hematochezia.  GENITOURINARY: No dysuria or hematuria, urinary frequency  NEUROLOGICAL: No headaches, memory loss, loss of strength, numbness, or tremors  SKIN: No itching, burning, rashes, or lesions     Vital Signs Last 24 Hrs  T(C): 36.5 (02 Mar 2023 13:05), Max: 37.5 (01 Mar 2023 21:29)  T(F): 97.7 (02 Mar 2023 13:05), Max: 99.5 (01 Mar 2023 21:29)  HR: 95 (02 Mar 2023 13:05) (93 - 110)  BP: 151/89 (02 Mar 2023 13:05) (124/72 - 151/89)  BP(mean): --  RR: 18 (02 Mar 2023 13:05) (18 - 19)  SpO2: 94% (02 Mar 2023 13:05) (93% - 100%)    Parameters below as of 02 Mar 2023 13:05  Patient On (Oxygen Delivery Method): nasal cannula  O2 Flow (L/min): 4      PHYSICAL EXAMINATION:  GENERAL: NAD, well built  HEAD:  Atraumatic, Normocephalic  EYES:  conjunctiva and sclera clear  NECK: Supple, No JVD, Normal thyroid  CHEST/LUNG: (+) inspiratory rhonchi auscultated in lower long bases. Clear to percussion bilaterally; No rales, rhonchi, wheezing, or rubs  HEART: Regular rate and rhythm; No murmurs, rubs, or gallops  ABDOMEN: Soft, Nontender, Nondistended; Bowel sounds present  NERVOUS SYSTEM:  Alert & Oriented X3,    EXTREMITIES:  2+ Peripheral Pulses, No clubbing, cyanosis, or edema  SKIN: warm dry                          10.1   13.84 )-----------( 438      ( 02 Mar 2023 05:58 )             33.2     03-02    138  |  106  |  23<H>  ----------------------------<  173<H>  4.4   |  24  |  0.88    Ca    9.0      02 Mar 2023 05:58  Phos  3.8     03-02  Mg     2.4     03-02    TPro  6.7  /  Alb  2.1<L>  /  TBili  0.3  /  DBili  x   /  AST  21  /  ALT  32  /  AlkPhos  138<H>  03-02    LIVER FUNCTIONS - ( 02 Mar 2023 05:58 )  Alb: 2.1 g/dL / Pro: 6.7 g/dL / ALK PHOS: 138 U/L / ALT: 32 U/L DA / AST: 21 U/L / GGT: x                   CAPILLARY BLOOD GLUCOSE      RADIOLOGY & ADDITIONAL TESTS:

## 2023-03-02 NOTE — CONSULT NOTE ADULT - REASON FOR ADMISSION
Hypoxia 2/2 ILD flare /Pulmonary fibrosis
Hypoxia 2/2 ILD flare Pulmonary fibrosis
Hypoxia 2/2 ILD flare Pulmonary fibrosis

## 2023-03-02 NOTE — PROGRESS NOTE ADULT - REASON FOR ADMISSION
Hypoxia 2/2 ILD flare Pulmonary fibrosis

## 2023-03-02 NOTE — CONSULT NOTE ADULT - NS ATTEST RISK PROBLEM GEN_ALL_CORE FT
Patient is high risk with high level decision making due to severe steroid induced hyperglycemia with uncontrolled diabetes which places patient at high risk for cardiovascular and cerebrovascular events. Patient with hyperglycemia requiring close monitoring and insulin adjustments.

## 2023-03-02 NOTE — PROGRESS NOTE ADULT - PROBLEM SELECTOR PLAN 1
- patient presenting with hypoxemia, tachycardia  - diagnosed with ILD 2021, on prednisone 20 BID since 1/29 (prescribed 1/29)  - CTA showing re- demonstration of ILD negative for PE   - 2/28 ECHO study limited, 10/29 ECHO showed RV systolic pressure is severely increased at 68 mm Hg  - continue with supplemental oxygen as needed, on 4 L NC saturating 92%  - Solumedrol 40mg IV q8hrs down titrated to prednisone 40 BID   - albuterol PRN   - f/u VQ scan to r/o PE   - Pulmonary consulted Dr. Hilliard f/u recs - patient presenting with hypoxemia, tachycardia  - diagnosed with ILD 2021, on prednisone 20 BID since 1/29 (prescribed 1/29)  - CTA showing re- demonstration of ILD negative for PE   - 2/28 ECHO study limited, 10/29 ECHO showed RV systolic pressure is severely increased at 68 mm Hg  - continue with supplemental oxygen as needed, on 4 L NC saturating 92%  - Solumedrol 40mg IV q8hrs down titrated to prednisone 40 BID (PCP treatment dose)  - started on bactrium (/) q8 for PCP treatment   - albuterol PRN   - f/u VQ scan to r/o PE   - Pulmonary consulted Dr. Hilliard f/u recs

## 2023-03-02 NOTE — PROGRESS NOTE ADULT - PROBLEM SELECTOR PLAN 1
- patient presenting with hypoxemia, tachycardia  - diagnosed with ILD 2021, on prednisone 20 BID since 1/29 (prescribed 1/29)  - CTA showing re- demonstration of ILD negative for PE   - 2/28 ECHO study limited, 10/29 ECHO showed RV systolic pressure is severely increased at 68 mm Hg  - continue with supplemental oxygen as needed, on 4 L NC saturating 92%  - Solumedrol 40mg IV q8hrs down titrated to prednisone 40 BID (PCP treatment dose)  - started on bactrium (/) q8 for PCP treatment   - albuterol PRN   - f/u VQ scan to r/o PE   - Pulmonary consulted Dr. Hilliard f/u recs

## 2023-03-02 NOTE — CONSULT NOTE ADULT - SUBJECTIVE AND OBJECTIVE BOX
ENDOCRINE INITIAL CONSULT NOTE:    Patient is a 62y old  Female who presents with a chief complaint of Hypoxia 2/2 ILD flare Pulmonary fibrosis (01 Mar 2023 18:35)      HPI:  Patient is a 63 yo female with hx of Pulmonary fibrosis, not on home oxygen, Pulmonary embolism on Eliquis, DM on Lantus and Metformin presents with shortness of breath. Patient states that shortness of breath began three days ago associated with cough productive of clear sputum. Patient also states that she has had bilateral swelling of lower extremities for the last three days. Patient states shortness of breath is worsened on exertion, however patient is able to lay flat at night, uses 1 pillow to sleep and denies PND. Patient states she also has had chest discomfort located in the middle of the chest 8/10 , non radiating and has had prior episodes in the past. Patient admitted to [facility] in  due to hypoxia and was found at the time to have PE, patient was admitted to ICU at the time for Acute hypoxic respiratory failure 2/2 PE and Pulmonary fibrosis. Patient states she was recently seen by pulmonologist at [facility], who recommended home oxygen but has not been set up yet. Denies fevers or chills, sick contacts.     Med recc as per patient, however Surescripts stating patient is on Lantus 35 units at bedtime. Primary team to confirm.  (2023 05:28)    62y Female    Diabetes History:  Diagnosis:  Home regimen:  Home fingersticks/ CGM:  Hypoglycemia events:  Retinopathy/most recent opthalmology:  Peripheral Neuropathy:  Nephropathy:  Cardiovascular disease:  Peripheral vascular disease:  Diet:  Recent A1C   PCP  Endocrinologist:    Review of systems:  Constitutional:  Constitutional: No fever, weight loss, fatigue, low energy, generalized weakness, poor appetite  Eyes: No redness, no dryness, no pain, no tearing, no gritty or gene feeling, no bulging  Cardiovascular/ Respiratory: No palpitations,, no chest pain, no shortness of breath, no exercise intolerance, no cough, no leg/ ankle swelling  Gastrointestinal: No trouble swallowing, no heart burn, no abdominal pain, no bloating, no nausea, no vomiting, no constipation, no diarrhea, no frequent bowel movements  Skin: No excessive hair growth, no hair loss, no acne, no excessive sweating, no rash, no easy bruising  Neurological: No headaches, no change in vision, no dizziness/ lightheadedness, no tremors, no numbness/ tingling in feet, no pain/ burning in feet, no trouble with balance, no muscular weakness.   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  Exercise:  Tobacco:  Alcohol:  illicit drug abuse:  Health insurance status:    MEDICATIONS  (STANDING):  apixaban 5 milliGRAM(s) Oral every 12 hours  atorvastatin 20 milliGRAM(s) Oral at bedtime  gabapentin 300 milliGRAM(s) Oral daily  influenza   Vaccine 0.5 milliLiter(s) IntraMuscular once  insulin glargine Injectable (LANTUS) 35 Unit(s) SubCutaneous at bedtime  insulin lispro (ADMELOG) corrective regimen sliding scale   SubCutaneous every 4 hours  insulin lispro Injectable (ADMELOG) 14 Unit(s) SubCutaneous three times a day before meals  pantoprazole    Tablet 40 milliGRAM(s) Oral before breakfast  predniSONE   Tablet 40 milliGRAM(s) Oral every 24 hours  traZODone 100 milliGRAM(s) Oral at bedtime  trimethoprim  160 mG/sulfamethoxazole 800 mG 2 Tablet(s) Oral every 8 hours    MEDICATIONS  (PRN):  acetaminophen     Tablet .. 650 milliGRAM(s) Oral every 6 hours PRN Mild Pain (1 - 3)  albuterol    90 MICROgram(s) HFA Inhaler 2 Puff(s) Inhalation every 6 hours PRN Shortness of Breath and/or Wheezing  guaiFENesin Oral Liquid (Sugar-Free) 200 milliGRAM(s) Oral every 6 hours PRN Cough      Physical Examination  Vital Signs Last 24 Hrs  T(C): 36.5 (02 Mar 2023 13:05), Max: 37.5 (01 Mar 2023 21:29)  T(F): 97.7 (02 Mar 2023 13:05), Max: 99.5 (01 Mar 2023 21:29)  HR: 95 (02 Mar 2023 13:05) (93 - 110)  BP: 151/89 (02 Mar 2023 13:05) (124/72 - 151/89)  BP(mean): --  RR: 18 (02 Mar 2023 13:05) (18 - 19)  SpO2: 94% (02 Mar 2023 13:05) (93% - 100%)    Parameters below as of 02 Mar 2023 13:05  Patient On (Oxygen Delivery Method): nasal cannula  O2 Flow (L/min): 4    Constitutional: No acute distress, ill- appearing, no anxious appearing, hyperkinetic, no diaphoretic  HEENT: Moist mucous membranes  Neck:  No JVD, bruits or thyromegaly, No thyroid nodules palpable, no LAD  Respiratory:  Respiratory effort normal, lungs clear to ausculation, without rales or rhonchi  Cardiovascular:  Regular heart rate, normal S1 and S2 sounds, without murmur, rub or gallop.  Gastrointestinal: Soft, non tender without hepatosplenomegaly and masses, no abdominal obesity  Extremities: Sensation intact to monofilament in feet, no cyanosis, clubbing or edema, positive pedal pulses  Neurological:  Oriented to person, place and time, No gross sensory or motor defects, visual fields intact to confrontation, normal deep tendon reflexes    Labs:                        10.1   13.84 )-----------( 438      ( 02 Mar 2023 05:58 )             33.2     03-02    138  |  106  |  23<H>  ----------------------------<  173<H>  4.4   |  24  |  0.88    Ca    9.0      02 Mar 2023 05:58  Phos  3.8     03-02  Mg     2.4     03-02    TPro  6.7  /  Alb  2.1<L>  /  TBili  0.3  /  DBili  x   /  AST  21  /  ALT  32  /  AlkPhos  138<H>  03-02          Urinalysis Basic - ( 2023 21:38 )    Color: Yellow / Appearance: Clear / S.015 / pH: x  Gluc: x / Ketone: Trace  / Bili: Negative / Urobili: Negative   Blood: x / Protein: 30 mg/dL / Nitrite: Positive   Leuk Esterase: Negative / RBC: 2-5 /HPF / WBC 11-25 /HPF   Sq Epi: x / Non Sq Epi: Few /HPF / Bacteria: Moderate /HPF      CAPILLARY BLOOD GLUCOSE      POCT Blood Glucose.: 297 mg/dL (02 Mar 2023 15:16)  POCT Blood Glucose.: 330 mg/dL (02 Mar 2023 14:24)  POCT Blood Glucose.: 289 mg/dL (02 Mar 2023 11:22)  POCT Blood Glucose.: 136 mg/dL (02 Mar 2023 07:30)  POCT Blood Glucose.: 173 mg/dL (02 Mar 2023 06:01)  POCT Blood Glucose.: 256 mg/dL (02 Mar 2023 02:12)  POCT Blood Glucose.: 378 mg/dL (01 Mar 2023 21:29)  POCT Blood Glucose.: 461 mg/dL (01 Mar 2023 20:21)  POCT Blood Glucose.: 476 mg/dL (01 Mar 2023 19:05)  POCT Blood Glucose.: 577 mg/dL (01 Mar 2023 18:01)  POCT Blood Glucose.: 440 mg/dL (01 Mar 2023 16:24)      Radiology and diagnostic studies:      Assessment and Plan:  62y Female   Endocrinology is consulted fro    1) Type 2 diabetes:      Recommendations:  Basal Insulin:   Glargine ( Lantus) units once daily    Nutritional Insulin:   Lispro (Admelog) units with breakfast, Hold if NPO or eating <50% of meals  Lispro ( Admelog) units with lunch, Hold if NPO or eating < 50% of meals  Lispro (Admelog) units with dinner, Hold if NPO or eating < 50% of meals    Correctional Insulin:  Normal Lispro ( Admelog) correctional scale with meals and bedtime    Oral Diabetes Medications:  Non in the hospital     ENDOCRINE INITIAL CONSULT NOTE:    Patient is a 62y old  Female who presents with a chief complaint of Hypoxia 2/2 ILD flare Pulmonary fibrosis (01 Mar 2023 18:35)    HPI:  61 yo female with hx of Pulmonary fibrosis, Pulmonary embolism, T2DM presents with shortness of breath. Admitted for  Acute hypoxic respiratory failure 2/2 Pulmonary fibrosis requiring high doses of steroids. Endocrinology is consulted for type 2 diabetes glycemic management in setting of steroid induced hyperglycemia    Patient seen at the bedside, having mild dyspnea. Reports eating >50% of meals. Providing diabetes hx    Diabetes History:  Diagnosed with Type 2 Diabetes 20 years ago  Home regimen: Basaglar 35 units at bedtime, Novolog SS 10-25 units with meals, metformin and glimepiride  Home fingersticks: usually in 200s  Hypoglycemia events: Denies  Retinopathy/most recent opthalmology: Has diabetic retinopathy s/p laser and intravitreal injections  Peripheral Neuropathy: Denies  Nephropathy: Unknown  Cardiovascular disease: Denies  Peripheral vascular disease: Denies  Recent A1C : 10%  PCP: Diabetes Managed by PCP    Review of systems:  Constitutional: No fever, yes weight loss, yes fatigue, yes low energy, yes generalized weakness, denies poor appetite  Cardiovascular/ Respiratory: No palpitations, no chest pain, yes shortness of breath, yes cough, no leg/ ankle swelling  Gastrointestinal: no heart burn, no abdominal pain, no nausea, no vomiting, no constipation, no frequent bowel movements  Neurological: No headaches, no change in vision, no dizziness/ lightheadedness, no tremors, no numbness/ tingling in feet, no pain/ burning in feet,   Endocrine: yes Frequent urination, excessive urination, excessive thirst, symptoms     Past Medical and Surgical Hx:  Diabetes mellitus  Hyperlipidemia  Pulmonary embolism  Pulmonary fibrosis  Cataract s/p right eye    Family History:  Family history of MI (myocardial infarction) (Father)    Social History:  Tobacco: Denies  Alcohol: Denies  illicit drug abuse: Denies    Medications (STANDING):  apixaban 5 milliGRAM(s) Oral every 12 hours  atorvastatin 20 milliGRAM(s) Oral at bedtime  gabapentin 300 milliGRAM(s) Oral daily  influenza   Vaccine 0.5 milliLiter(s) IntraMuscular once  insulin glargine Injectable (LANTUS) 35 Unit(s) SubCutaneous at bedtime  insulin lispro (ADMELOG) corrective regimen sliding scale   SubCutaneous every 4 hours  insulin lispro Injectable (ADMELOG) 14 Unit(s) SubCutaneous three times a day before meals  pantoprazole    Tablet 40 milliGRAM(s) Oral before breakfast  predniSONE   Tablet 40 milliGRAM(s) Oral every 24 hours  traZODone 100 milliGRAM(s) Oral at bedtime  trimethoprim  160 mG/sulfamethoxazole 800 mG 2 Tablet(s) Oral every 8 hours    Medications (PRN):  acetaminophen     Tablet .. 650 milliGRAM(s) Oral every 6 hours PRN Mild Pain (1 - 3)  albuterol    90 MICROgram(s) HFA Inhaler 2 Puff(s) Inhalation every 6 hours PRN Shortness of Breath and/or Wheezing  guaiFENesin Oral Liquid (Sugar-Free) 200 milliGRAM(s) Oral every 6 hours PRN Cough      Physical Examination  Vital Signs Last 24 Hrs  T(C): 36.5 (02 Mar 2023 13:05), Max: 37.5 (01 Mar 2023 21:29)  T(F): 97.7 (02 Mar 2023 13:05), Max: 99.5 (01 Mar 2023 21:29)  HR: 95 (02 Mar 2023 13:05) (93 - 110)  BP: 151/89 (02 Mar 2023 13:05) (124/72 - 151/89)  BP(mean): --  RR: 18 (02 Mar 2023 13:05) (18 - 19)  SpO2: 94% (02 Mar 2023 13:05) (93% - 100%)    Parameters below as of 02 Mar 2023 13:05  Patient On (Oxygen Delivery Method): nasal cannula  O2 Flow (L/min): 4    Constitutional: No acute distress, yes ill- appearing, looks mild dyspneic  HEENT: Moist mucous membranes  Neck:  No JVD, bruits or thyromegaly, No thyroid nodules palpable, no LAD  Respiratory:  Respiratory effort normal, lungs clear to ausculation, without rales or rhonchi  Cardiovascular: Regular heart rate, normal S1 and S2 sounds, without murmur, rub or gallop.  Gastrointestinal: Soft, non tender without hepatosplenomegaly and masses, +ve abdominal obesity  Extremities: Sensation intact in feet, no cyanosis, clubbing or edema, positive pedal pulses  Neurological:  Oriented to person, place and time,     Labs:               10.1   13.84 )-----------( 438      ( 02 Mar 2023 05:58 )             33.2     03-02  138  |  106  |  23<H>  ----------------------------<  173<H>  4.4   |  24  |  0.88    Ca    9.0      02 Mar 2023 05:58  Phos  3.8     03-02  Mg     2.4     03-02    Urinalysis Basic - ( 2023 21:38 )    Color: Yellow / Appearance: Clear / S.015 / pH: x  Gluc: x / Ketone: Trace  / Bili: Negative / Urobili: Negative   Blood: x / Protein: 30 mg/dL / Nitrite: Positive   Leuk Esterase: Negative / RBC: 2-5 /HPF / WBC 11-25 /HPF   Sq Epi: x / Non Sq Epi: Few /HPF / Bacteria: Moderate /HPF    CAPILLARY BLOOD GLUCOSE  POCT Blood Glucose.: 297 mg/dL (02 Mar 2023 15:16)  POCT Blood Glucose.: 330 mg/dL (02 Mar 2023 14:24)  POCT Blood Glucose.: 289 mg/dL (02 Mar 2023 11:22)  POCT Blood Glucose.: 136 mg/dL (02 Mar 2023 07:30)  POCT Blood Glucose.: 173 mg/dL (02 Mar 2023 06:01)  POCT Blood Glucose.: 256 mg/dL (02 Mar 2023 02:12)  POCT Blood Glucose.: 378 mg/dL (01 Mar 2023 21:29)  POCT Blood Glucose.: 461 mg/dL (01 Mar 2023 20:21)  POCT Blood Glucose.: 476 mg/dL (01 Mar 2023 19:05)  POCT Blood Glucose.: 577 mg/dL (01 Mar 2023 18:01)  POCT Blood Glucose.: 440 mg/dL (01 Mar 2023 16:24)    A1C with Estimated Average Glucose Result: 11.7 % (23 @ 06:10)    Assessment and Plan:  61 yo female with hx of Pulmonary fibrosis, Pulmonary embolism, T2DM presents with shortness of breath. Admitted for  Acute hypoxic respiratory failure 2/2 Pulmonary fibrosis requiring high doses of steroids. Endocrinology is consulted for type 2 diabetes glycemic management in setting of steroid induced hyperglycemia    1) Poorly Controlled Type 2 diabetes:  2) Steroid induced hyperglycemia  3) Proliferative Diabetic retinopathy  A1C:11 % likely due to the use of high doses of steroids at home and dietary indiscretion  Home Regimen: Basaglar 35 units at bedtime, metformin, glimepiride, Novolog 10-25 units with meals  Inpatient, sugars are high due to steroids  Patient has received methylprednisolone 40 mg IV TID yesterday and prednisone 40 mg q 12 hours  Expect severe hyperglycemia for atleast 48 hours even after the steroids are tapered  Fasting blood sugar are better as patient did get overnight lispro coverage  Adjust the insulin as below    Inpatient Recommendations:  Basal Insulin:   Continue Glargine ( Lantus) 35 units once daily    Nutritional Insulin:  Continue Lispro (Admelog) 14 units with meals, Hold if NPO or eating <50% of meals    Correctional Insulin:  Change the scale to resistant Lispro ( Admelog) correctional scale q 4 hour as below  4 Unit(s) if Glucose 151 - 200  6 Unit(s) if Glucose 201 - 250  8 Unit(s) if Glucose 251 - 300  10 Unit(s) if Glucose 301 - 350  12 Unit(s) if Glucose 351 - 400  14 Unit(s) if Glucose Greater Than 400    Oral Diabetes Medications:  None in the hospital    Check POC glucose q 4 hours    DIABETES EDUCATION:  Counselled that steroids can cause severe hyperglycemia, therefore she should not miss taking her insulin at home.   Extensive education about diabetes, hyperglycemia, hypoglycemia, glucose self-monitoring with glucometer, glucose target ranges,   Explained the definition of HBA1C and target range.   Explained the complications of diabetes including stroke, renal failure and blindness  Reviewed hypoglycemic sign/symptoms and necessary precautions.   Discussed the goal fasting and postprandial BS at home  Patient verbalized understanding and agrees with the plan     Discussed endocrine plan of care with patient and primary team     Jing Sosa MD   Endocrinology, Diabetes and Metabolism   Available on MS. teams  ENDOCRINE INITIAL CONSULT NOTE:    Patient is a 62y old  Female who presents with a chief complaint of Hypoxia 2/2 ILD flare Pulmonary fibrosis (01 Mar 2023 18:35)    HPI:  61 yo female with hx of Pulmonary fibrosis, Pulmonary embolism, T2DM presents with shortness of breath. Admitted for  Acute hypoxic respiratory failure 2/2 Pulmonary fibrosis requiring high doses of steroids. Endocrinology is consulted for type 2 diabetes glycemic management in setting of steroid induced hyperglycemia    Patient seen at the bedside, having mild dyspnea. Reports eating >50% of meals. Providing diabetes hx    Diabetes History:  Diagnosed with Type 2 Diabetes 20 years ago  Home regimen: Basaglar 35 units at bedtime, Novolog SS 10-25 units with meals, metformin and glimepiride  Home fingersticks: usually in 200s  Hypoglycemia events: Denies  Retinopathy/most recent opthalmology: Has diabetic retinopathy s/p laser and intravitreal injections  Peripheral Neuropathy: Denies  Nephropathy: Unknown  Cardiovascular disease: Denies  Peripheral vascular disease: Denies  Recent A1C : 10%  PCP: Diabetes Managed by PCP    Review of systems:  Constitutional: No fever, yes weight loss, yes fatigue, yes low energy, yes generalized weakness, denies poor appetite  Cardiovascular/ Respiratory: No palpitations, no chest pain, yes shortness of breath, yes cough, no leg/ ankle swelling  Gastrointestinal: no heart burn, no abdominal pain, no nausea, no vomiting, no constipation, no frequent bowel movements  Neurological: No headaches, no change in vision, no dizziness/ lightheadedness, no tremors, no numbness/ tingling in feet, no pain/ burning in feet,   Endocrine: yes Frequent urination, excessive urination, excessive thirst, symptoms     Past Medical and Surgical Hx:  Diabetes mellitus  Hyperlipidemia  Pulmonary embolism  Pulmonary fibrosis  Cataract s/p right eye    Family History:  Family history of MI (myocardial infarction) (Father)    Social History:  Tobacco: Denies  Alcohol: Denies  illicit drug abuse: Denies    Medications (STANDING):  apixaban 5 milliGRAM(s) Oral every 12 hours  atorvastatin 20 milliGRAM(s) Oral at bedtime  gabapentin 300 milliGRAM(s) Oral daily  influenza   Vaccine 0.5 milliLiter(s) IntraMuscular once  insulin glargine Injectable (LANTUS) 35 Unit(s) SubCutaneous at bedtime  insulin lispro (ADMELOG) corrective regimen sliding scale   SubCutaneous every 4 hours  insulin lispro Injectable (ADMELOG) 14 Unit(s) SubCutaneous three times a day before meals  pantoprazole    Tablet 40 milliGRAM(s) Oral before breakfast  predniSONE   Tablet 40 milliGRAM(s) Oral every 24 hours  traZODone 100 milliGRAM(s) Oral at bedtime  trimethoprim  160 mG/sulfamethoxazole 800 mG 2 Tablet(s) Oral every 8 hours    Medications (PRN):  acetaminophen     Tablet .. 650 milliGRAM(s) Oral every 6 hours PRN Mild Pain (1 - 3)  albuterol    90 MICROgram(s) HFA Inhaler 2 Puff(s) Inhalation every 6 hours PRN Shortness of Breath and/or Wheezing  guaiFENesin Oral Liquid (Sugar-Free) 200 milliGRAM(s) Oral every 6 hours PRN Cough      Physical Examination  Vital Signs Last 24 Hrs  T(C): 36.5 (02 Mar 2023 13:05), Max: 37.5 (01 Mar 2023 21:29)  T(F): 97.7 (02 Mar 2023 13:05), Max: 99.5 (01 Mar 2023 21:29)  HR: 95 (02 Mar 2023 13:05) (93 - 110)  BP: 151/89 (02 Mar 2023 13:05) (124/72 - 151/89)  BP(mean): --  RR: 18 (02 Mar 2023 13:05) (18 - 19)  SpO2: 94% (02 Mar 2023 13:05) (93% - 100%)    Parameters below as of 02 Mar 2023 13:05  Patient On (Oxygen Delivery Method): nasal cannula  O2 Flow (L/min): 4    Constitutional: No acute distress, yes ill- appearing, looks mild dyspneic  HEENT: Moist mucous membranes  Neck:  No JVD, bruits or thyromegaly, No thyroid nodules palpable, no LAD  Respiratory:  Respiratory effort normal, lungs clear to ausculation, without rales or rhonchi  Cardiovascular: Regular heart rate, normal S1 and S2 sounds, without murmur, rub or gallop.  Gastrointestinal: Soft, non tender without hepatosplenomegaly and masses, +ve abdominal obesity  Extremities: Sensation intact in feet, no cyanosis, clubbing or edema, positive pedal pulses  Neurological:  Oriented to person, place and time,     Labs:               10.1   13.84 )-----------( 438      ( 02 Mar 2023 05:58 )             33.2     03-02  138  |  106  |  23<H>  ----------------------------<  173<H>  4.4   |  24  |  0.88    Ca    9.0      02 Mar 2023 05:58  Phos  3.8     03-02  Mg     2.4     03-02    Urinalysis Basic - ( 2023 21:38 )    Color: Yellow / Appearance: Clear / S.015 / pH: x  Gluc: x / Ketone: Trace  / Bili: Negative / Urobili: Negative   Blood: x / Protein: 30 mg/dL / Nitrite: Positive   Leuk Esterase: Negative / RBC: 2-5 /HPF / WBC 11-25 /HPF   Sq Epi: x / Non Sq Epi: Few /HPF / Bacteria: Moderate /HPF    CAPILLARY BLOOD GLUCOSE  POCT Blood Glucose.: 297 mg/dL (02 Mar 2023 15:16)  POCT Blood Glucose.: 330 mg/dL (02 Mar 2023 14:24)  POCT Blood Glucose.: 289 mg/dL (02 Mar 2023 11:22)  POCT Blood Glucose.: 136 mg/dL (02 Mar 2023 07:30)  POCT Blood Glucose.: 173 mg/dL (02 Mar 2023 06:01)  POCT Blood Glucose.: 256 mg/dL (02 Mar 2023 02:12)  POCT Blood Glucose.: 378 mg/dL (01 Mar 2023 21:29)  POCT Blood Glucose.: 461 mg/dL (01 Mar 2023 20:21)  POCT Blood Glucose.: 476 mg/dL (01 Mar 2023 19:05)  POCT Blood Glucose.: 577 mg/dL (01 Mar 2023 18:01)  POCT Blood Glucose.: 440 mg/dL (01 Mar 2023 16:24)    A1C with Estimated Average Glucose Result: 11.7 % (23 @ 06:10)    Assessment and Plan:  61 yo female with hx of Pulmonary fibrosis, Pulmonary embolism, T2DM presents with shortness of breath. Admitted for  Acute hypoxic respiratory failure 2/2 Pulmonary fibrosis requiring high doses of steroids. Endocrinology is consulted for type 2 diabetes glycemic management in setting of steroid induced hyperglycemia    1) Poorly Controlled Type 2 diabetes:  2) Steroid induced hyperglycemia  3) Proliferative Diabetic retinopathy  A1C:11 % likely due to the use of high doses of steroids at home and dietary indiscretion  Home Regimen: Basaglar 35 units at bedtime, metformin, glimepiride, Novolog 10-25 units with meals  Inpatient, sugars are high due to steroids  Patient has received methylprednisolone 40 mg IV TID yesterday and prednisone 40 mg q 12 hours  Expect severe hyperglycemia for atleast 48 hours even after the steroids are tapered  Fasting blood sugar are better as patient did get overnight lispro coverage  Adjust the insulin as below    Inpatient Recommendations:  Basal Insulin:   Continue Glargine ( Lantus) 35 units once daily    Nutritional Insulin:  Continue Lispro (Admelog) 14 units with meals, Hold if NPO or eating <50% of meals    Correctional Insulin:  Change the scale to resistant Lispro ( Admelog) correctional scale q 4 hour as below  4 Unit(s) if Glucose 151 - 200  6 Unit(s) if Glucose 201 - 250  8 Unit(s) if Glucose 251 - 300  10 Unit(s) if Glucose 301 - 350  12 Unit(s) if Glucose 351 - 400  14 Unit(s) if Glucose Greater Than 400    Oral Diabetes Medications:  None in the hospital    Check POC glucose q 4 hours    DIABETES EDUCATION:  Counselled that steroids can cause severe hyperglycemia, therefore she should not miss taking her insulin at home.   Extensive education about diabetes, hyperglycemia, hypoglycemia, glucose self-monitoring with glucometer, glucose target ranges,   Explained the definition of HBA1C and target range.   Explained the complications of diabetes including stroke, renal failure and blindness  Reviewed hypoglycemic sign/symptoms and necessary precautions.   Discussed the goal fasting and postprandial BS at home  Patient verbalized understanding and agrees with the plan     Discussed endocrine plan of care with patient and primary team     Please inform the endocrine if steroids are tapered or discontinued as patient needs adjustment of insulin regimen to avoid hypoglycemia    Jing Sosa MD   Endocrinology, Diabetes and Metabolism   Available on MS. teams

## 2023-03-02 NOTE — PROGRESS NOTE ADULT - ATTENDING COMMENTS
Less SOB today.  Alert, cooperative woman, using nasal oxygen.  No supraclavicular retraction.   Vital Signs Last 24 Hrs  T(C): 36.5 (02 Mar 2023 13:05), Max: 37.5 (01 Mar 2023 21:29)  T(F): 97.7 (02 Mar 2023 13:05), Max: 99.5 (01 Mar 2023 21:29)  HR: 95 (02 Mar 2023 13:05) (93 - 110)  BP: 151/89 (02 Mar 2023 13:05) (124/72 - 151/89)  BP(mean): --  RR: 18 (02 Mar 2023 13:05) (18 - 19)  SpO2: 94% (02 Mar 2023 13:05) (93% - 100%)    Parameters below as of 02 Mar 2023 13:05  Patient On (Oxygen Delivery Method): nasal cannula  O2 Flow (L/min): 4  Scattered rales  Cor, RRR  Abdomen, soft  Neurological, intact                        10.1   13.84 )-----------( 438      ( 02 Mar 2023 05:58 )             33.2   03-02    138  |  106  |  23<H>  ----------------------------<  173<H>  4.4   |  24  |  0.88    Ca    9.0      02 Mar 2023 05:58  Phos  3.8     03-02  Mg     2.4     03-02    TPro  6.7  /  Alb  2.1<L>  /  TBili  0.3  /  DBili  x   /  AST  21  /  ALT  32  /  AlkPhos  138<H>  03-02    < from: NM Pulmonary Ventilation/Perfusion Scan (03.02.23 @ 10:56) >    FINDINGS: There is heterogeneous distribution of radiotracers in both   lungs on the ventilation and perfusion images. Deposition of radiotracer   is noted within the airway on ventilation imaging. There are multiple   triple matched defects seen in the mid and lower portions of both lungs,   which are chronic when compared to chest radiograph from February 27, 2023 and prior chest CTs. No V/Q mismatches are identified in either lung.    IMPRESSION:  Very low probability of pulmonary embolus.    < end of copied text >    e< from: Limited Transthoracic Echo (02.28.23 @ 09:20) >    Right Heart: Normal right atrium. Normal right ventricular  size and function. There is mild tricuspid regurgitation.    < end of copied text >  CAPILLARY BLOOD GLUCOSE      POCT Blood Glucose.: 208 mg/dL (02 Mar 2023 16:53)  POCT Blood Glucose.: 297 mg/dL (02 Mar 2023 15:16)  POCT Blood Glucose.: 330 mg/dL (02 Mar 2023 14:24)  POCT Blood Glucose.: 289 mg/dL (02 Mar 2023 11:22)  POCT Blood Glucose.: 136 mg/dL (02 Mar 2023 07:30)  POCT Blood Glucose.: 173 mg/dL (02 Mar 2023 06:01)  POCT Blood Glucose.: 256 mg/dL (02 Mar 2023 02:12)  POCT Blood Glucose.: 378 mg/dL (01 Mar 2023 21:29)  POCT Blood Glucose.: 461 mg/dL (01 Mar 2023 20:21)  POCT Blood Glucose.: 476 mg/dL (01 Mar 2023 19:05)    IMP:  ILD, no evidence of PE.  Good response to steroids.  PJP is possible.          DM with exaggerated hyperglycemia on steroids.  Endocrinology consultation, appreciated.   Plan:  FSBS with coverage q 4 hours, as recommended by Endocrinology           Continue steroids and bactrim for PJP.
Patient feels better with the use of nasal oxygen.   Vital Signs Last 24 Hrs  T(C): 37.2 (01 Mar 2023 13:42), Max: 37.3 (01 Mar 2023 10:08)  T(F): 99 (01 Mar 2023 13:42), Max: 99.1 (01 Mar 2023 10:08)  HR: 107 (01 Mar 2023 13:42) (99 - 110)  BP: 125/64 (01 Mar 2023 13:42) (115/71 - 131/74)  BP(mean): --  RR: 19 (01 Mar 2023 13:42) (19 - 21)  SpO2: 95% (01 Mar 2023 13:42) (81% - 99%)    Parameters below as of 01 Mar 2023 13:42  Patient On (Oxygen Delivery Method): nasal cannula  O2 Flow (L/min): 4  Mild supraclavicular retractions  Lungs, decreased bs bilaterally  Cor, RRR  Abdomen, soft  Neurological, intact                        11.4   9.49  )-----------( 453      ( 28 Feb 2023 10:00 )             37.5   03-01    131<L>  |  100  |  34<H>  ----------------------------<  567<HH>  4.3   |  23  |  1.63<H>    Ca    9.1      01 Mar 2023 18:20  Phos  3.9     02-28  Mg     1.8     02-28    TPro  8.1  /  Alb  2.9<L>  /  TBili  0.5  /  DBili  x   /  AST  40  /  ALT  49  /  AlkPhos  203<H>  02-28    CAPILLARY BLOOD GLUCOSE      POCT Blood Glucose.: 476 mg/dL (01 Mar 2023 19:05)  POCT Blood Glucose.: 577 mg/dL (01 Mar 2023 18:01)  POCT Blood Glucose.: 440 mg/dL (01 Mar 2023 16:24)  POCT Blood Glucose.: 449 mg/dL (01 Mar 2023 12:00)  POCT Blood Glucose.: 318 mg/dL (01 Mar 2023 07:37)  POCT Blood Glucose.: 194 mg/dL (28 Feb 2023 21:07)    White Blood Cell - Urine: 11-25 /HPF (02.28.23 @ 21:38)     e< from: Limited Transthoracic Echo (02.28.23 @ 09:20) >    1. Mitral annular calcification, and calcified mitral  leaflets with normal diastolic opening. Mild mitral  regurgitation.  2. Calcified trileaflet aortic valve with normal opening.  3. Aortic Root: 3.0 cm.  4. Normal left atrium.  LA volume index = 20 cc/m2.  5. Mild concentric left ventricular hypertrophy.  6. Normal Left Ventricular Systolic Function,  (EF = 55 to  60%)  7. Diastolic function not assessed.  8. Normal right atrium.  9. Normal right ventricular size and function.  10. Unable to estimate RVSP.  11. There is mild tricuspid regurgitation.  12. There is trace pulmonic regurgitation.  13. Small pericardial effusion.    < end of copied text >    I have contacted patient's outpatient Pulmonologist, Dr. Marlow, [phone], who met her only last week after referral from PMD.  She was hypoxic and he ordered oxygen, but she did not receive it, and she came to ED.  He is not aware of her having been on steroids before    IMP:  hypoxic respiratory failure, improved with supplemental oxygen          Idiopathic pulmonary fibrosis and PE, by history, r/o recurrent PE, r/o PCP          hyperglycemia, likely exacerbated by steroids  Plan: - 1mg/kg/day steroid for ILD exac (currently on) and eventual wean back to baseline  - sputum cx  - serum fungitel and galactomannan, serum LDH  - cont BDs q4-6h ATC duoneb vs. albuterol HFA  - cont eliquis for PE  - incentive davonte 10x/hr while awake and in bed; mobilize OOBTC as tolerated  - increase insulin dose  -TSH  -begin bactrim DS q 8 hours for PJP treatment  -VQ scan

## 2023-03-02 NOTE — PROGRESS NOTE ADULT - SUBJECTIVE AND OBJECTIVE BOX
PULMONARY CONSULT SERVICE FOLLOW-UP NOTE    INTERVAL HPI:  Reviewed chart and overnight events; patient seen and examined at bedside. Feels a bit tired after going down for test earlier in AM but slept well overnight and thinks her breathing has improved since yesterday. No new respiratory symptoms. Has not coughed up any mucus. Denies pleurisy or CP, no hemoptysis, no palpitations. Says her oxygen tanks were delivered to her home but is still waiting on the portable unit.     Primary team initiated bactrim full dose for PCP treatment yesterday, and steroids were changed to oral.     MEDICATIONS:  Pulmonary:  albuterol    90 MICROgram(s) HFA Inhaler 2 Puff(s) Inhalation every 6 hours PRN  guaiFENesin Oral Liquid (Sugar-Free) 200 milliGRAM(s) Oral every 6 hours PRN    Antimicrobials:  trimethoprim  160 mG/sulfamethoxazole 800 mG 2 Tablet(s) Oral every 8 hours    Anticoagulants:  apixaban 5 milliGRAM(s) Oral every 12 hours    Cardiac:    Allergies    No Known Allergies    Intolerances    Vital Signs Last 24 Hrs  T(C): 36.5 (02 Mar 2023 13:05), Max: 37.5 (01 Mar 2023 21:29)  T(F): 97.7 (02 Mar 2023 13:05), Max: 99.5 (01 Mar 2023 21:29)  HR: 95 (02 Mar 2023 13:05) (93 - 110)  BP: 151/89 (02 Mar 2023 13:05) (124/72 - 151/89)  BP(mean): --  RR: 18 (02 Mar 2023 13:05) (18 - 19)  SpO2: 94% (02 Mar 2023 13:05) (93% - 100%)    Parameters below as of 02 Mar 2023 13:05  Patient On (Oxygen Delivery Method): nasal cannula  O2 Flow (L/min): 4    PHYSICAL EXAM:  Constitutional: less fatigued and non-toxic appearing woman resting semirecumbent in bed; NAD  HEENT: NC/AT; PERRL, anicteric sclera; MMM  Neck: supple  Cardiovascular: +S1/S2, RRR  Respiratory: few scattered velcro-like crackles B/L, good inspiratory effort and less tachypneic overall; conversive full sentences  Gastrointestinal: soft, NT/ND  Extremities: WWP; no edema, clubbing or cyanosis  Vascular: 2+ radial pulses B/L  Neurological: awake and alert; PADILLA    LABS:  CBC Full  -  ( 02 Mar 2023 05:58 )  WBC Count : 13.84 K/uL  RBC Count : 4.08 M/uL  Hemoglobin : 10.1 g/dL  Hematocrit : 33.2 %  Platelet Count - Automated : 438 K/uL  Mean Cell Volume : 81.4 fl  Mean Cell Hemoglobin : 24.8 pg  Mean Cell Hemoglobin Concentration : 30.4 gm/dL  Auto Neutrophil # : 12.01 K/uL  Auto Lymphocyte # : 1.09 K/uL  Auto Monocyte # : 0.65 K/uL  Auto Eosinophil # : 0.00 K/uL  Auto Basophil # : 0.01 K/uL  Auto Neutrophil % : 86.7 %  Auto Lymphocyte % : 7.9 %  Auto Monocyte % : 4.7 %  Auto Eosinophil % : 0.0 %  Auto Basophil % : 0.1 %    03    138  |  106  |  23<H>  ----------------------------<  173<H>  4.4   |  24  |  0.88    Ca    9.0      02 Mar 2023 05:58  Phos  3.8     03-02  Mg     2.4     03-02    TPro  6.7  /  Alb  2.1<L>  /  TBili  0.3  /  DBili  x   /  AST  21  /  ALT  32  /  AlkPhos  138<H>  03-02    Urinalysis Basic - ( 2023 21:38 )    Color: Yellow / Appearance: Clear / S.015 / pH: x  Gluc: x / Ketone: Trace  / Bili: Negative / Urobili: Negative   Blood: x / Protein: 30 mg/dL / Nitrite: Positive   Leuk Esterase: Negative / RBC: 2-5 /HPF / WBC 11-25 /HPF   Sq Epi: x / Non Sq Epi: Few /HPF / Bacteria: Moderate /HPF    RADIOLOGY & ADDITIONAL STUDIES (personally reviewed):  < from: Xray Chest 1 View- PORTABLE-Urgent (Xray Chest 1 View- PORTABLE-Urgent .) (23 @ 18:05) >  Impression:    Diffuse chronic interstitial changes again noted. No focal infiltrate or   pulmonary venous congestion.    < from: NM Pulmonary Ventilation/Perfusion Scan (23 @ 10:56) >    IMPRESSION:  Very low probability of pulmonary embolus.

## 2023-03-02 NOTE — PROGRESS NOTE ADULT - PROBLEM SELECTOR PLAN 6
- continue with SSI   - noted to be on Lantus 35 units QHS, please confirm dose   - sugars corrected today  - will do lantus 35 u bedtime, 14 units lispro TID + SS
- continue with SSI   - noted to be on Lantus 35 units QHS, please confirm dose   - sugars corrected today  - will do lantus 35 u bedtime, 14 units lispro TID + SS
- continue with SSI   - noted to be on Lantus 35 units QHS, please confirm dose   - sugars in the 400s  - will do lantus 35 u bedtime, 7 units lispro TID + SS
- continue with SSI   - noted to be on Lantus 35 units QHS, please confirm dose   - sugars in the 400s  - will do lantus 35 u bedtime, 7 units lispro TID + SS

## 2023-03-02 NOTE — PROGRESS NOTE ADULT - SUBJECTIVE AND OBJECTIVE BOX
PATIENT SEEN AND EXAMINED ON :- 3/2/23  DATE OF SERVICE:   3/2/23          Interim events noted,Labs ,Radiological studies and Cardiology tests reviewed .      PMH -reviewed admission note, no change since admission  Heart failure: acute [ ] chronic [ ] acute or chronic [ ] diastolic [ ] systolic [ ] combined systolic and diastolic[ ]  DONOVAN: ATN[ ] renal medullary necrosis [ ] CKD I [ ]CKDII [ ]CKD III [ ]CKD IV [ ]CKD V [ ]Other pathological lesions [ ]    MEDICATIONS  (STANDING):  apixaban 5 milliGRAM(s) Oral every 12 hours  atorvastatin 20 milliGRAM(s) Oral at bedtime  bisacodyl 5 milliGRAM(s) Oral at bedtime  gabapentin 300 milliGRAM(s) Oral daily  influenza   Vaccine 0.5 milliLiter(s) IntraMuscular once  insulin glargine Injectable (LANTUS) 35 Unit(s) SubCutaneous at bedtime  insulin lispro (ADMELOG) corrective regimen sliding scale   SubCutaneous every 4 hours  insulin lispro Injectable (ADMELOG) 14 Unit(s) SubCutaneous three times a day before meals  pantoprazole    Tablet 40 milliGRAM(s) Oral before breakfast  predniSONE   Tablet 40 milliGRAM(s) Oral every 12 hours  traZODone 100 milliGRAM(s) Oral at bedtime  trimethoprim  160 mG/sulfamethoxazole 800 mG 2 Tablet(s) Oral every 8 hours    MEDICATIONS  (PRN):  acetaminophen     Tablet .. 650 milliGRAM(s) Oral every 6 hours PRN Mild Pain (1 - 3)  albuterol    90 MICROgram(s) HFA Inhaler 2 Puff(s) Inhalation every 6 hours PRN Shortness of Breath and/or Wheezing  guaiFENesin Oral Liquid (Sugar-Free) 200 milliGRAM(s) Oral every 6 hours PRN Cough              REVIEW OF SYSTEMS:  Constitutional: [ ] fever, [ ]weight loss,  [ ]fatigue  Eyes: [ ] visual changes  Respiratory: [ ]shortness of breath;  [ ] cough, [ ]wheezing, [ ]chills, [ ]hemoptysis  Cardiovascular: [ ] chest pain, [ ]palpitations, [ ]dizziness,  [ ]leg swelling[ ]orthopnea[ ]PND  Gastrointestinal: [ ] abdominal pain, [ ]nausea, [ ]vomiting,  [ ]diarrhea   Genitourinary: [ ] dysuria, [ ] hematuria  Neurologic: [ ] headaches [ ] tremors[ ]weakness  Skin: [ ] itching, [ ]burning, [ ] rashes  Endocrine: [ ] heat or cold intolerance  Musculoskeletal: [ ] joint pain or swelling; [ ] muscle, back, or extremity pain  Psychiatric: [ ] depression, [ ]anxiety, [ ]mood swings, or [ ]difficulty sleeping  Hematologic: [ ] easy bruising, [ ] bleeding gums    [x] All remaining systems negative except as per above.   [ ]Unable to obtain.    Vital Signs Last 24 Hrs  T(C): 36.5 (02 Mar 2023 13:05), Max: 37.5 (01 Mar 2023 21:29)  T(F): 97.7 (02 Mar 2023 13:05), Max: 99.5 (01 Mar 2023 21:29)  HR: 96 (02 Mar 2023 19:00) (93 - 110)  BP: 125/79 (02 Mar 2023 19:00) (124/72 - 151/89)  BP(mean): --  RR: 20 (02 Mar 2023 19:00) (18 - 20)  SpO2: 98% (02 Mar 2023 19:00) (93% - 100%)    Parameters below as of 02 Mar 2023 19:00  Patient On (Oxygen Delivery Method): nasal cannula  O2 Flow (L/min): 4    I&O's Summary    Orthostatic VS      PHYSICAL EXAM:  General: No acute distress BMI-  HEENT: EOMI, PERRL  Neck: Supple, [ ] JVD  Lungs: Equal air entry bilaterally; [ ] rales [ ] wheezing [ ] rhonchi  Heart: Regular rate and rhythm; [ ] murmur   /6 [ ] systolic [ ] diastolic [ ] radiation[ ] rubs [ ]  gallops  Abdomen: Nontender, bowel sounds present  Extremities: No clubbing, cyanosis, [ ] edema  Nervous system:  Alert & Oriented X3, no focal deficits  Psychiatric: Normal affect  Skin: No rashes or lesions    LABS:  03-02    138  |  106  |  23<H>  ----------------------------<  173<H>  4.4   |  24  |  0.88    Ca    9.0      02 Mar 2023 05:58  Phos  3.8     03-02  Mg     2.4     03-02    TPro  6.7  /  Alb  2.1<L>  /  TBili  0.3  /  DBili  x   /  AST  21  /  ALT  32  /  AlkPhos  138<H>  03-02    Creatinine Trend: 0.88<--, 1.63<--, 1.05<--, 1.31<--                        10.1   13.84 )-----------( 438      ( 02 Mar 2023 05:58 )             33.2       Lipid Panel:   Cardiac Enzymes:

## 2023-03-03 ENCOUNTER — TRANSCRIPTION ENCOUNTER (OUTPATIENT)
Age: 63
End: 2023-03-03

## 2023-03-03 VITALS
DIASTOLIC BLOOD PRESSURE: 69 MMHG | RESPIRATION RATE: 18 BRPM | TEMPERATURE: 98 F | SYSTOLIC BLOOD PRESSURE: 110 MMHG | OXYGEN SATURATION: 97 % | HEART RATE: 95 BPM

## 2023-03-03 LAB
ALBUMIN SERPL ELPH-MCNC: 2.3 G/DL — LOW (ref 3.5–5)
ALP SERPL-CCNC: 125 U/L — HIGH (ref 40–120)
ALT FLD-CCNC: 36 U/L DA — SIGNIFICANT CHANGE UP (ref 10–60)
ANION GAP SERPL CALC-SCNC: 5 MMOL/L — SIGNIFICANT CHANGE UP (ref 5–17)
AST SERPL-CCNC: 32 U/L — SIGNIFICANT CHANGE UP (ref 10–40)
BASOPHILS # BLD AUTO: 0.01 K/UL — SIGNIFICANT CHANGE UP (ref 0–0.2)
BASOPHILS NFR BLD AUTO: 0.1 % — SIGNIFICANT CHANGE UP (ref 0–2)
BILIRUB SERPL-MCNC: 0.3 MG/DL — SIGNIFICANT CHANGE UP (ref 0.2–1.2)
BUN SERPL-MCNC: 24 MG/DL — HIGH (ref 7–18)
CALCIUM SERPL-MCNC: 9.4 MG/DL — SIGNIFICANT CHANGE UP (ref 8.4–10.5)
CHLORIDE SERPL-SCNC: 102 MMOL/L — SIGNIFICANT CHANGE UP (ref 96–108)
CO2 SERPL-SCNC: 26 MMOL/L — SIGNIFICANT CHANGE UP (ref 22–31)
CREAT SERPL-MCNC: 1.06 MG/DL — SIGNIFICANT CHANGE UP (ref 0.5–1.3)
EGFR: 60 ML/MIN/1.73M2 — SIGNIFICANT CHANGE UP
EOSINOPHIL # BLD AUTO: 0 K/UL — SIGNIFICANT CHANGE UP (ref 0–0.5)
EOSINOPHIL NFR BLD AUTO: 0 % — SIGNIFICANT CHANGE UP (ref 0–6)
FERRITIN SERPL-MCNC: 36 NG/ML — SIGNIFICANT CHANGE UP (ref 15–150)
FUNGITELL: 38 PG/ML — SIGNIFICANT CHANGE UP
GLUCOSE BLDC GLUCOMTR-MCNC: 141 MG/DL — HIGH (ref 70–99)
GLUCOSE BLDC GLUCOMTR-MCNC: 205 MG/DL — HIGH (ref 70–99)
GLUCOSE BLDC GLUCOMTR-MCNC: 229 MG/DL — HIGH (ref 70–99)
GLUCOSE BLDC GLUCOMTR-MCNC: 245 MG/DL — HIGH (ref 70–99)
GLUCOSE BLDC GLUCOMTR-MCNC: 245 MG/DL — HIGH (ref 70–99)
GLUCOSE SERPL-MCNC: 266 MG/DL — HIGH (ref 70–99)
HCT VFR BLD CALC: 33.8 % — LOW (ref 34.5–45)
HGB BLD-MCNC: 10.2 G/DL — LOW (ref 11.5–15.5)
IMM GRANULOCYTES NFR BLD AUTO: 0.6 % — SIGNIFICANT CHANGE UP (ref 0–0.9)
IRON SATN MFR SERPL: 22 UG/DL — LOW (ref 40–150)
IRON SATN MFR SERPL: 6 % — LOW (ref 15–50)
LYMPHOCYTES # BLD AUTO: 1.14 K/UL — SIGNIFICANT CHANGE UP (ref 1–3.3)
LYMPHOCYTES # BLD AUTO: 9.2 % — LOW (ref 13–44)
MAGNESIUM SERPL-MCNC: 2.3 MG/DL — SIGNIFICANT CHANGE UP (ref 1.6–2.6)
MCHC RBC-ENTMCNC: 24.8 PG — LOW (ref 27–34)
MCHC RBC-ENTMCNC: 30.2 GM/DL — LOW (ref 32–36)
MCV RBC AUTO: 82 FL — SIGNIFICANT CHANGE UP (ref 80–100)
MONOCYTES # BLD AUTO: 1.02 K/UL — HIGH (ref 0–0.9)
MONOCYTES NFR BLD AUTO: 8.2 % — SIGNIFICANT CHANGE UP (ref 2–14)
NEUTROPHILS # BLD AUTO: 10.13 K/UL — HIGH (ref 1.8–7.4)
NEUTROPHILS NFR BLD AUTO: 81.9 % — HIGH (ref 43–77)
NRBC # BLD: 0 /100 WBCS — SIGNIFICANT CHANGE UP (ref 0–0)
PHOSPHATE SERPL-MCNC: 3.7 MG/DL — SIGNIFICANT CHANGE UP (ref 2.5–4.5)
PLATELET # BLD AUTO: 455 K/UL — HIGH (ref 150–400)
POTASSIUM SERPL-MCNC: 4.9 MMOL/L — SIGNIFICANT CHANGE UP (ref 3.5–5.3)
POTASSIUM SERPL-SCNC: 4.9 MMOL/L — SIGNIFICANT CHANGE UP (ref 3.5–5.3)
PROT SERPL-MCNC: 6.5 G/DL — SIGNIFICANT CHANGE UP (ref 6–8.3)
RBC # BLD: 4.12 M/UL — SIGNIFICANT CHANGE UP (ref 3.8–5.2)
RBC # FLD: 15 % — HIGH (ref 10.3–14.5)
SODIUM SERPL-SCNC: 133 MMOL/L — LOW (ref 135–145)
TIBC SERPL-MCNC: 352 UG/DL — SIGNIFICANT CHANGE UP (ref 250–450)
UIBC SERPL-MCNC: 330 UG/DL — SIGNIFICANT CHANGE UP (ref 110–370)
WBC # BLD: 12.37 K/UL — HIGH (ref 3.8–10.5)
WBC # FLD AUTO: 12.37 K/UL — HIGH (ref 3.8–10.5)

## 2023-03-03 PROCEDURE — 84100 ASSAY OF PHOSPHORUS: CPT

## 2023-03-03 PROCEDURE — 83550 IRON BINDING TEST: CPT

## 2023-03-03 PROCEDURE — 80048 BASIC METABOLIC PNL TOTAL CA: CPT

## 2023-03-03 PROCEDURE — 85610 PROTHROMBIN TIME: CPT

## 2023-03-03 PROCEDURE — 71045 X-RAY EXAM CHEST 1 VIEW: CPT

## 2023-03-03 PROCEDURE — 87637 SARSCOV2&INF A&B&RSV AMP PRB: CPT

## 2023-03-03 PROCEDURE — 87086 URINE CULTURE/COLONY COUNT: CPT

## 2023-03-03 PROCEDURE — 87070 CULTURE OTHR SPECIMN AEROBIC: CPT

## 2023-03-03 PROCEDURE — 81001 URINALYSIS AUTO W/SCOPE: CPT

## 2023-03-03 PROCEDURE — 93321 DOPPLER ECHO F-UP/LMTD STD: CPT

## 2023-03-03 PROCEDURE — 82009 KETONE BODYS QUAL: CPT

## 2023-03-03 PROCEDURE — 83735 ASSAY OF MAGNESIUM: CPT

## 2023-03-03 PROCEDURE — 83540 ASSAY OF IRON: CPT

## 2023-03-03 PROCEDURE — 85730 THROMBOPLASTIN TIME PARTIAL: CPT

## 2023-03-03 PROCEDURE — 87305 ASPERGILLUS AG IA: CPT

## 2023-03-03 PROCEDURE — 84443 ASSAY THYROID STIM HORMONE: CPT

## 2023-03-03 PROCEDURE — 93308 TTE F-UP OR LMTD: CPT

## 2023-03-03 PROCEDURE — 80053 COMPREHEN METABOLIC PANEL: CPT

## 2023-03-03 PROCEDURE — 78582 LUNG VENTILAT&PERFUS IMAGING: CPT

## 2023-03-03 PROCEDURE — 83615 LACTATE (LD) (LDH) ENZYME: CPT

## 2023-03-03 PROCEDURE — 94640 AIRWAY INHALATION TREATMENT: CPT

## 2023-03-03 PROCEDURE — 71275 CT ANGIOGRAPHY CHEST: CPT | Mod: MA

## 2023-03-03 PROCEDURE — 36415 COLL VENOUS BLD VENIPUNCTURE: CPT

## 2023-03-03 PROCEDURE — 87040 BLOOD CULTURE FOR BACTERIA: CPT

## 2023-03-03 PROCEDURE — 82962 GLUCOSE BLOOD TEST: CPT

## 2023-03-03 PROCEDURE — 99239 HOSP IP/OBS DSCHRG MGMT >30: CPT | Mod: GC

## 2023-03-03 PROCEDURE — 82728 ASSAY OF FERRITIN: CPT

## 2023-03-03 PROCEDURE — 99285 EMERGENCY DEPT VISIT HI MDM: CPT

## 2023-03-03 PROCEDURE — 83605 ASSAY OF LACTIC ACID: CPT

## 2023-03-03 PROCEDURE — 83880 ASSAY OF NATRIURETIC PEPTIDE: CPT

## 2023-03-03 PROCEDURE — 85025 COMPLETE CBC W/AUTO DIFF WBC: CPT

## 2023-03-03 PROCEDURE — 86803 HEPATITIS C AB TEST: CPT

## 2023-03-03 PROCEDURE — 85027 COMPLETE CBC AUTOMATED: CPT

## 2023-03-03 PROCEDURE — 87449 NOS EACH ORGANISM AG IA: CPT

## 2023-03-03 PROCEDURE — 86606 ASPERGILLUS ANTIBODY: CPT

## 2023-03-03 PROCEDURE — 83036 HEMOGLOBIN GLYCOSYLATED A1C: CPT

## 2023-03-03 PROCEDURE — 99232 SBSQ HOSP IP/OBS MODERATE 35: CPT

## 2023-03-03 PROCEDURE — 84484 ASSAY OF TROPONIN QUANT: CPT

## 2023-03-03 PROCEDURE — 93005 ELECTROCARDIOGRAM TRACING: CPT

## 2023-03-03 PROCEDURE — 82803 BLOOD GASES ANY COMBINATION: CPT

## 2023-03-03 RX ORDER — SENNA PLUS 8.6 MG/1
2 TABLET ORAL
Qty: 20 | Refills: 0
Start: 2023-03-03 | End: 2023-03-12

## 2023-03-03 RX ORDER — POLYETHYLENE GLYCOL 3350 17 G/17G
17 POWDER, FOR SOLUTION ORAL DAILY
Refills: 0 | Status: DISCONTINUED | OUTPATIENT
Start: 2023-03-03 | End: 2023-03-03

## 2023-03-03 RX ORDER — INSULIN GLARGINE 100 [IU]/ML
40 INJECTION, SOLUTION SUBCUTANEOUS AT BEDTIME
Refills: 0 | Status: DISCONTINUED | OUTPATIENT
Start: 2023-03-03 | End: 2023-03-03

## 2023-03-03 RX ORDER — INSULIN GLARGINE 100 [IU]/ML
5 INJECTION, SOLUTION SUBCUTANEOUS ONCE
Refills: 0 | Status: COMPLETED | OUTPATIENT
Start: 2023-03-03 | End: 2023-03-03

## 2023-03-03 RX ORDER — PANTOPRAZOLE SODIUM 20 MG/1
1 TABLET, DELAYED RELEASE ORAL
Qty: 30 | Refills: 0
Start: 2023-03-03 | End: 2023-04-01

## 2023-03-03 RX ORDER — POLYETHYLENE GLYCOL 3350 17 G/17G
17 POWDER, FOR SOLUTION ORAL
Qty: 170 | Refills: 0
Start: 2023-03-03 | End: 2023-03-12

## 2023-03-03 RX ORDER — INSULIN LISPRO 100/ML
VIAL (ML) SUBCUTANEOUS
Refills: 0 | Status: DISCONTINUED | OUTPATIENT
Start: 2023-03-03 | End: 2023-03-03

## 2023-03-03 RX ORDER — SENNA PLUS 8.6 MG/1
2 TABLET ORAL AT BEDTIME
Refills: 0 | Status: DISCONTINUED | OUTPATIENT
Start: 2023-03-03 | End: 2023-03-03

## 2023-03-03 RX ORDER — INSULIN LISPRO 100/ML
16 VIAL (ML) SUBCUTANEOUS
Refills: 0 | Status: DISCONTINUED | OUTPATIENT
Start: 2023-03-03 | End: 2023-03-03

## 2023-03-03 RX ADMIN — APIXABAN 5 MILLIGRAM(S): 2.5 TABLET, FILM COATED ORAL at 17:23

## 2023-03-03 RX ADMIN — APIXABAN 5 MILLIGRAM(S): 2.5 TABLET, FILM COATED ORAL at 05:48

## 2023-03-03 RX ADMIN — Medication 40 MILLIGRAM(S): at 05:47

## 2023-03-03 RX ADMIN — Medication 6: at 02:45

## 2023-03-03 RX ADMIN — POLYETHYLENE GLYCOL 3350 17 GRAM(S): 17 POWDER, FOR SOLUTION ORAL at 11:35

## 2023-03-03 RX ADMIN — Medication 1 TABLET(S): at 14:40

## 2023-03-03 RX ADMIN — Medication 2 TABLET(S): at 05:47

## 2023-03-03 RX ADMIN — Medication 6: at 06:00

## 2023-03-03 RX ADMIN — Medication 6: at 11:37

## 2023-03-03 RX ADMIN — PANTOPRAZOLE SODIUM 40 MILLIGRAM(S): 20 TABLET, DELAYED RELEASE ORAL at 06:01

## 2023-03-03 RX ADMIN — Medication 14 UNIT(S): at 11:38

## 2023-03-03 RX ADMIN — ALBUTEROL 2 PUFF(S): 90 AEROSOL, METERED ORAL at 11:34

## 2023-03-03 RX ADMIN — Medication 14 UNIT(S): at 08:17

## 2023-03-03 RX ADMIN — INSULIN GLARGINE 5 UNIT(S): 100 INJECTION, SOLUTION SUBCUTANEOUS at 15:45

## 2023-03-03 RX ADMIN — GABAPENTIN 300 MILLIGRAM(S): 400 CAPSULE ORAL at 11:35

## 2023-03-03 RX ADMIN — ALBUTEROL 2 PUFF(S): 90 AEROSOL, METERED ORAL at 04:46

## 2023-03-03 NOTE — PROGRESS NOTE ADULT - ASSESSMENT
61yo woman never smoker w/ PMH PE on Eliquis w/ hx of cor pulmonale (late Oct 2022), ILD (?IPF), DM2, who presents w/ increasing dyspnea and productive cough of clear sputum for past several days; found with AHRF with CT redemonstrating ILD with possible increased GGOs.     Unclear etiology of original ILD presentation and had been managed as an outpatient with steroid tx and inhaled therapy. Patient says her pulmonologist was working on getting her Ofev but is not currently taking. Her disease has likely progressed to the point of requiring baseline oxygen, though difficult to rule out if a component of AHRF is secondary to ILD exacerbation (given slightly increased GGOs compared to CT from 10/2022). It is also unclear how long pt has been on steroids and whether she has received appropriate PCP prophylaxis based on steroid therapy -- collateral suggests not having been prophylaxed despite at least a couple of months of steroids. Thus, cannot exclude PCP PNA nor can fungal infections be excluded if immunosuppressed. Her ILD findings on CT are suggestive of either probable UIP pattern vs. fibrotic NSIP vs. fibrotic HP; lacks discrete honeycombing thus far.     #Acute hypoxemic respiratory failure  #ILD, suspect acute exacerbation  #PE w/ RH dysfxn  #Pulmonary HTN  #?PCP PNA, less likely    Recommendations:  - continue supplemental O2 for now to maintain normoxia; pt will have concentrator and home O2 for discharge  - it is seemingly less likely she has PCP PNA and w/ neg B-D glucan would consider transitioning bactrim to PPX dose and steroids to her baseline dose to resume taper as outpt w/ her pulmonologist  - sputum cx NGTD  - fungitel reviewed, 38 not elevated  - f/u aspergillus serology, galactomannan  - LDH elevated 414  - cont GI ppx while on long term steroids  - V/Q scan reviewed, with low likelihood of new PE (besides PE from 11/2022) or Eliquis treatment failure - concordant with admission CTA study  - cont BDs q4-6h ATC duoneb vs. albuterol HFA  - cont Eliquis for P  - incentive davonte 10x/hr while awake and in bed; cont mobilizing OOBTC as tolerated and encourage ambulation w/ supp O2    Patient should follow-up with her pulmonologist Dr. Korey Marlow via [facility] within 1-2 weeks of discharge    Dany Hilliard, DO  Pulmonary & Critical Care Medicine  Available on Teams

## 2023-03-03 NOTE — DISCHARGE NOTE NURSING/CASE MANAGEMENT/SOCIAL WORK - NSDCPEFALRISK_GEN_ALL_CORE
For information on Fall & Injury Prevention, visit: [email or web address] OR  [email or web address] OR  https://www.cdc.gov/steadi/patient.html

## 2023-03-03 NOTE — CHART NOTE - NSCHARTNOTEFT_GEN_A_CORE
Blood sugars are better but still above goal. Adjusted the insulin as below    Change the POC glucose with meals and bedtime    Increase Lantus to 40 units at bedtime    Increase lispro to 16 units with meals. Hold if NPO or eating <50% of meals    Change the resistant scale frequency to with meals and bedtime    Please inform the endocrine if prednisone is tapered as she needs lower doses of insulin at that time. Blood sugars are better but still above goal. Adjusted the insulin as below    Change the POC glucose with meals and bedtime    Increase Lantus to 40 units at bedtime    Increase lispro to 16 units with meals. Hold if NPO or eating <50% of meals    Change the resistant scale frequency to with meals and bedtime    Please inform the endocrine if prednisone is tapered as she needs lower doses of insulin at that time.    Discharge Recommendation    1) Metformin 500 mg once a day ONLY. If the respiratory status is better at home then increase to 500 mg BID after one week  2) STOP Glimepiride on discharge  3) Basaglar 40 units at bedtime (when on Prednisone 40BID), Decrease to Basaglar 35 units when on Prednisone 40 mg daily if Fasting BS are <200s otherwise continue 40 units.  4) Her home Novolog scale 10-25 units.     She needs to see endocrinologist in 2-3 weeks to adjust the regimen outpatient based upon the BS. Please mention endocrine office address    D/W primary team

## 2023-03-03 NOTE — DISCHARGE NOTE PROVIDER - HOSPITAL COURSE
Patient is a 63 yo female with hx of Pulmonary fibrosis (diagnosed 2021), not on home oxygen, Pulmonary embolism on Eliquis, DM on Lantus and Metformin presents with shortness of breath. Upon evaluation in ED patient found to be hypoxic on 6L NC at 91%, but hemodynamically stable. Labs significant for bnp of 6630 with elevated troponin at 921, downtrended to 649. EKG showing new t wave inversion in leads V3/V4. CXR showed Diffuse chronic interstitial changes again noted. No focal infiltrate or pulmonary venous congestion. CTA done showing no PE but Re demonstration of interstitial reticular and ground glass opacities throughout the lungs with a peripheral and basilar predominance and associated traction bronchiectasis suggestive of an interstitial lung disease. Patient admitted to medicine for Acute hypoxic respiratory failure 2/2 ILD flare and right sided heart failure. 2/28 ECHO study limited, 10/29 ECHO showed RV systolic pressure is severely increased at 68 mm Hg. Pt was started on supplemental oxygen, requiring 4 LNC, downtitrated to 3 L NC. Pt. was initially placed on solumedrol 40mg IV q8hrs down titrated to prednisone 40 BID, then 40 qd (3/3). Pt. was also initally started on bactrium (/) q8 for PCP treatment dosing, which was then switched to 1 tablet qd for ppx dose. VQ scan 3/2 showed low probability of PE. Pt. on eliquis 5 BID. Pt. originally had an DONOVAN, that resolved. Pt's sugars were difficult to control, so her insulin regmien was adjusted multiple times, she was last palced on lantus 40 and lispro 16 TID, but she will be sent home on:   1) Metformin 500 mg once a day ONLY. If the respiratory status is better at home then increase to 500 mg BID after one week  2) STOP Glimepiride on discharge  3) Basaglar 35 units when on Prednisone 40 mg daily if Fasting BS are <200s otherwise continue 40 units.  4) Home Novolog scale 10-25 units.     Patient is stable for discharge per attending and is advised to follow up with PCP as outpatient  Please refer to patient's complete medical chart with documents for a full hospital course, for this is only a brief summary.

## 2023-03-03 NOTE — DISCHARGE NOTE PROVIDER - DISCHARGE DATE
03-Mar-2023 Closure 2 Information: This tab is for additional flaps and grafts, including complex repair and grafts and complex repair and flaps. You can also specify a different location for the additional defect, if the location is the same you do not need to select a new one. We will insert the automated text for the repair you select below just as we do for solitary flaps and grafts. Please note that at this time if you select a location with a different insurance zone you will need to override the ICD10 and CPT if appropriate.

## 2023-03-03 NOTE — DISCHARGE NOTE NURSING/CASE MANAGEMENT/SOCIAL WORK - PATIENT PORTAL LINK FT
You can access the FollowMyHealth Patient Portal offered by [facility] by registering at the following website: [email or web address]. By joining [employer]’s FollowMyHealth portal, you will also be able to view your health information using other applications (apps) compatible with our system.

## 2023-03-03 NOTE — DISCHARGE NOTE PROVIDER - CARE PROVIDER_API CALL
[facility]  Internal Medicine  [address] [place], [place] [id number]  Phone: [phone]  Fax: [phone]  Follow Up Time:

## 2023-03-03 NOTE — DISCHARGE NOTE PROVIDER - NSDCMRMEDTOKEN_GEN_ALL_CORE_FT
Albuterol (Eqv-Proventil HFA) 90 mcg/inh inhalation aerosol: 2 puff(s) inhaled every 6 hours   apixaban 5 mg oral tablet: 1 tab(s) orally 2 times a day   atorvastatin 20 mg oral tablet: 1 tab(s) orally once a day  Basaglar KwikPen 100 units/mL subcutaneous solution: 35 unit(s) subcutaneous once a day (at bedtime)  cetirizine 5 mg oral tablet: 1 tab(s) orally once a day  gabapentin 300 mg oral capsule: 1 tab(s) orally once a day  metFORMIN 500 mg oral tablet: 1 tab(s) orally 2 times a day  pantoprazole 40 mg oral delayed release tablet: 1 tab(s) orally once a day (before a meal)  polyethylene glycol 3350 oral powder for reconstitution: 17 gram(s) orally once a day  predniSONE 10 mg oral tablet: 4 tab(s) orally once a day  predniSONE 10 mg oral tablet: 2 tab(s) orally once a day   senna leaf extract oral tablet: 2 tab(s) orally once a day (at bedtime)  sulfamethoxazole-trimethoprim 800 mg-160 mg oral tablet: 1 tab(s) orally once a day  traZODone 100 mg oral tablet: 1 tab(s) orally once a day (at bedtime)

## 2023-03-03 NOTE — DIETITIAN INITIAL EVALUATION ADULT - PERTINENT MEDS FT
MEDICATIONS  (STANDING):  apixaban 5 milliGRAM(s) Oral every 12 hours  atorvastatin 20 milliGRAM(s) Oral at bedtime  bisacodyl 5 milliGRAM(s) Oral at bedtime  gabapentin 300 milliGRAM(s) Oral daily  influenza   Vaccine 0.5 milliLiter(s) IntraMuscular once  insulin glargine Injectable (LANTUS) 35 Unit(s) SubCutaneous at bedtime  insulin lispro (ADMELOG) corrective regimen sliding scale   SubCutaneous every 4 hours  insulin lispro Injectable (ADMELOG) 14 Unit(s) SubCutaneous three times a day before meals  pantoprazole    Tablet 40 milliGRAM(s) Oral before breakfast  polyethylene glycol 3350 17 Gram(s) Oral daily  predniSONE   Tablet 40 milliGRAM(s) Oral every 12 hours  senna 2 Tablet(s) Oral at bedtime  traZODone 100 milliGRAM(s) Oral at bedtime  trimethoprim  160 mG/sulfamethoxazole 800 mG 2 Tablet(s) Oral every 8 hours    MEDICATIONS  (PRN):  acetaminophen     Tablet .. 650 milliGRAM(s) Oral every 6 hours PRN Mild Pain (1 - 3)  albuterol    90 MICROgram(s) HFA Inhaler 2 Puff(s) Inhalation every 6 hours PRN Shortness of Breath and/or Wheezing  guaiFENesin Oral Liquid (Sugar-Free) 200 milliGRAM(s) Oral every 6 hours PRN Cough

## 2023-03-03 NOTE — DISCHARGE NOTE PROVIDER - NSDCCPCAREPLAN_GEN_ALL_CORE_FT
PRINCIPAL DISCHARGE DIAGNOSIS  Diagnosis: Acute on chronic respiratory failure with hypoxia  Assessment and Plan of Treatment: You were diagnosed with Acute hypoxemic respiratory failure due to worsening Pulmonary Fibrosis. Your chest X-Ray showed Diffuse chronic interstitial changes again noted. CT angiogram of the chest done showed no blood clot in your lungs, and it redemonstrated your interstitial lung disease, showing fibrosis of the lung tissue.  You were admitted to the hospital as you were requiring oxygen supplementation to maintain oxygen level above 92% (about 6 Liters initially). You were treated with Solumedrol (IV steroid), and then tapered down to oral steroid. You were also started on BACTRIM 40mg DAILY, which is to prevent one type of pulmonary infection, called Pneumocystis Jirovecii Pneumonia. You are being discharge with SUPPLEMENTAL OXYGEN (3 Liters), OXYGEN SENSOR TO USE 3 TIMES A DAY, O2 SATURATION SHOULD BE >88%. YOU WILL BE PRESCRIBED PREDNISONE 40MG DAILY FOR 5 DAYS, then PREDNISONE 20MG DAILY FOR 5 DAYS. Please follow up with your pulmonologist Dr. Korey Marlow outpatient within 1 week from discharge to further manage your steroid course and taper.      SECONDARY DISCHARGE DIAGNOSES  Diagnosis: DONOVAN (acute kidney injury)  Assessment and Plan of Treatment: You were diagnosed with acute kidney injury. Your creatinine was found to be elevated around 1.3, which then normalized the next day to 1. You are recommended to follow up with your PCP in a week from discharge.    Diagnosis: Elevated troponin  Assessment and Plan of Treatment: You initially were found with elevated cardiac enzymes, and some abnormalities were seen on your Electrocardiogram. This was likely in the setting of acute respiratory failure, becuase your cardiac eznymes normalized. The CT Chest was negative for blood clot in your lungs. You were continued on your eliquis home medication. You are recommended to follow up with your PCP in a week from discharge.    Diagnosis: Diabetes  Assessment and Plan of Treatment: You have a history of diabetes.   Your HbA1c was 11.7 during this admission. Your sugars were difficult to control during this admission, so your lantus was increased to 40 units during the admission, and your pre-meal insulin was increased to 16 during this admission.   UPON DISCHARGE, THIS IS THE REGIMEN YOU SHOULD FOLLOW:  1) Metformin 500 mg once a day ONLY. If the respiratory status is better at home then increase to 500 mg BID after one week  2) STOP Glimepiride on discharge  3) Basaglar 35 units when on Prednisone 40 mg daily if Fasting BS are <200s otherwise continue 40 units.  4) Continue your Novolog scale 10-25 units.   You need to continue monitoring your blood sugar levels closely and maintain healthy lifestyle by eating healthy diabetic regimen, weight loss and exercise regularly as tolerated.  Please continue to take your medications as prescribed.   Please follow up with your PCP/Endocrinologist within a week of discharge.

## 2023-03-03 NOTE — DIETITIAN INITIAL EVALUATION ADULT - WEIGHT FOR BMI (KG)
"Citlaly Vianca Bell is a 14 y.o. female here for a non-provider visit for:   FLU    Reason for immunization: Annual Flu Vaccine  Immunization records indicate need for vaccine: Yes, confirmed with Epic  Minimum interval has been met for this vaccine: Yes  ABN completed: Yes    Order and dose verified by: nehemiah MORENO Dated  8/15/ was given to patient: Yes  All IAC Questionnaire questions were answered \"No.\"    Patient tolerated injection and no adverse effects were observed or reported: Yes    Pt scheduled for next dose in series: Not Indicated  "
65.8

## 2023-03-03 NOTE — DISCHARGE NOTE PROVIDER - ATTENDING DISCHARGE PHYSICAL EXAMINATION:
Patient is alert and cooperative.  No supraclavicular retractions while on nasal oxygen.   Vital Signs Last 24 Hrs  T(C): 36.8 (03 Mar 2023 13:25), Max: 37.6 (03 Mar 2023 10:18)  T(F): 98.2 (03 Mar 2023 13:25), Max: 99.7 (03 Mar 2023 10:18)  HR: 95 (03 Mar 2023 13:25) (88 - 96)  BP: 110/69 (03 Mar 2023 13:25) (110/69 - 135/81)  BP(mean): --  RR: 18 (03 Mar 2023 13:25) (18 - 20)  SpO2: 97% (03 Mar 2023 13:25) (97% - 100%)    Parameters below as of 03 Mar 2023 13:25  Patient On (Oxygen Delivery Method): nasal cannula  O2 Flow (L/min): 3  Lungs, clear  cor, RRR  Abdomen, soft  Neurological, intact

## 2023-03-03 NOTE — DIETITIAN INITIAL EVALUATION ADULT - OTHER INFO
Pt lives home with family PTA, alert, oriented, well-communicated; Reported appetite good, denied recent wt changes, denied GI distress, chewing or swallowing problem at present, Unknown food allergies, food choices obtained and forwarded to Dietary; h/o DM, [id number]=23.4 previous admission with RD Assessment/education 10/31, 11/1/22 noted; current IduX0Y=69.7, elevated glucose, on Steroid Rx, Endocrinology consulted; Following DM meal planning at home with written copy, not interested in diet education/nutrition information at present, RD business card given for contact as needed

## 2023-03-03 NOTE — DIETITIAN INITIAL EVALUATION ADULT - FACTORS AFF FOOD INTAKE
acute on chronic comorbidities including uncontrolled DM, steroid induced hyperglycemia, CHF/Yarsanism/ethnic/cultural/personal food preferences

## 2023-03-03 NOTE — DIETITIAN INITIAL EVALUATION ADULT - PERTINENT LABORATORY DATA
03-03    133<L>  |  102  |  24<H>  ----------------------------<  266<H>  4.9   |  26  |  1.06    Ca    9.4      03 Mar 2023 05:50  Phos  3.7     03-03  Mg     2.3     03-03    TPro  6.5  /  Alb  2.3<L>  /  TBili  0.3  /  DBili  x   /  AST  32  /  ALT  36  /  AlkPhos  125<H>  03-03  POCT Blood Glucose.: 229 mg/dL (03-03-23 @ 07:41)  A1C with Estimated Average Glucose Result: 11.7 % (03-01-23 @ 06:10)  A1C with Estimated Average Glucose Result: 12.4 % (10-30-22 @ 05:09)

## 2023-03-03 NOTE — PROGRESS NOTE ADULT - TIME BILLING
- Review of records, telemetry, vital signs and daily labs.   - General and cardiovascular physical examination.  - Generation of cardiovascular treatment plan.  - Coordination of care.      Patient was seen and examined by me on 3/2/23,interim events noted,labs and radiology studies reviewed.  Godwin Wilhelm MD,FACC.  [address] [place], [place] [id number].  [phone]
- personally reviewed patient's labs, flowsheets, pertinent imaging, and consultant notes  - bedside SpO2 measurement and titration of supplemental O2 accordingly  - gen pul hx/exam  - counseling re: ILD and course of disease, oxygen needs, new medications  - coordination of care w/ primary team  - medication reconciliation  - review of prior charts and imaging
- personally reviewed patient's labs, flowsheets, pertinent imaging, and consultant notes  - bedside SpO2 measurement and titration of supplemental O2 accordingly  - gen pul hx/exam  - counseling re: ILD and course of disease, oxygen needs, new medications  - coordination of care w/ primary team  - medication reconciliation  - review of prior charts and imaging  - discharge planning
- Review of records, telemetry, vital signs and daily labs.   - General and cardiovascular physical examination.  - Generation of cardiovascular treatment plan.  - Coordination of care.      Patient was seen and examined by me on 3/1/23,interim events noted,labs and radiology studies reviewed.  Godwin Wilhelm MD,FACC.  [address] [place], [place] [id number].  [phone]

## 2023-03-04 LAB
A FLAVUS AB FLD QL: NEGATIVE — SIGNIFICANT CHANGE UP
A NIGER AB FLD QL: NEGATIVE — SIGNIFICANT CHANGE UP
A NIGER AB FLD QL: NEGATIVE — SIGNIFICANT CHANGE UP
CULTURE RESULTS: SIGNIFICANT CHANGE UP
SPECIMEN SOURCE: SIGNIFICANT CHANGE UP

## 2023-03-05 LAB
CULTURE RESULTS: SIGNIFICANT CHANGE UP
CULTURE RESULTS: SIGNIFICANT CHANGE UP
SPECIMEN SOURCE: SIGNIFICANT CHANGE UP
SPECIMEN SOURCE: SIGNIFICANT CHANGE UP

## 2023-03-06 LAB — GALACTOMANNAN AG SERPL-ACNC: 0.07 INDEX — SIGNIFICANT CHANGE UP (ref 0–0.49)

## 2023-04-14 ENCOUNTER — EMERGENCY (EMERGENCY)
Facility: HOSPITAL | Age: 63
LOS: 1 days | Discharge: HOME | End: 2023-04-14
Attending: EMERGENCY MEDICINE
Payer: MEDICAID

## 2023-04-14 VITALS
HEART RATE: 100 BPM | WEIGHT: 145.06 LBS | TEMPERATURE: 98 F | RESPIRATION RATE: 17 BRPM | HEIGHT: 66 IN | DIASTOLIC BLOOD PRESSURE: 83 MMHG | SYSTOLIC BLOOD PRESSURE: 120 MMHG | OXYGEN SATURATION: 100 %

## 2023-04-14 DIAGNOSIS — H26.9 UNSPECIFIED CATARACT: Chronic | ICD-10-CM

## 2023-04-14 LAB
ALBUMIN SERPL ELPH-MCNC: 2.3 G/DL — LOW (ref 3.5–5)
ALP SERPL-CCNC: 111 U/L — SIGNIFICANT CHANGE UP (ref 40–120)
ALT FLD-CCNC: 22 U/L DA — SIGNIFICANT CHANGE UP (ref 10–60)
ANION GAP SERPL CALC-SCNC: 1 MMOL/L — LOW (ref 5–17)
AST SERPL-CCNC: 28 U/L — SIGNIFICANT CHANGE UP (ref 10–40)
BASOPHILS # BLD AUTO: 0.06 K/UL — SIGNIFICANT CHANGE UP (ref 0–0.2)
BASOPHILS NFR BLD AUTO: 0.7 % — SIGNIFICANT CHANGE UP (ref 0–2)
BILIRUB SERPL-MCNC: 0.2 MG/DL — SIGNIFICANT CHANGE UP (ref 0.2–1.2)
BUN SERPL-MCNC: 23 MG/DL — HIGH (ref 7–18)
CALCIUM SERPL-MCNC: 9.1 MG/DL — SIGNIFICANT CHANGE UP (ref 8.4–10.5)
CHLORIDE SERPL-SCNC: 108 MMOL/L — SIGNIFICANT CHANGE UP (ref 96–108)
CO2 SERPL-SCNC: 29 MMOL/L — SIGNIFICANT CHANGE UP (ref 22–31)
CREAT SERPL-MCNC: 1.43 MG/DL — HIGH (ref 0.5–1.3)
EGFR: 42 ML/MIN/1.73M2 — LOW
EOSINOPHIL # BLD AUTO: 0.17 K/UL — SIGNIFICANT CHANGE UP (ref 0–0.5)
EOSINOPHIL NFR BLD AUTO: 2 % — SIGNIFICANT CHANGE UP (ref 0–6)
GLUCOSE SERPL-MCNC: 277 MG/DL — HIGH (ref 70–99)
HCT VFR BLD CALC: 33 % — LOW (ref 34.5–45)
HGB BLD-MCNC: 9.9 G/DL — LOW (ref 11.5–15.5)
IMM GRANULOCYTES NFR BLD AUTO: 0.5 % — SIGNIFICANT CHANGE UP (ref 0–0.9)
LYMPHOCYTES # BLD AUTO: 1.99 K/UL — SIGNIFICANT CHANGE UP (ref 1–3.3)
LYMPHOCYTES # BLD AUTO: 23.8 % — SIGNIFICANT CHANGE UP (ref 13–44)
MCHC RBC-ENTMCNC: 24.1 PG — LOW (ref 27–34)
MCHC RBC-ENTMCNC: 30 GM/DL — LOW (ref 32–36)
MCV RBC AUTO: 80.3 FL — SIGNIFICANT CHANGE UP (ref 80–100)
MONOCYTES # BLD AUTO: 0.87 K/UL — SIGNIFICANT CHANGE UP (ref 0–0.9)
MONOCYTES NFR BLD AUTO: 10.4 % — SIGNIFICANT CHANGE UP (ref 2–14)
NEUTROPHILS # BLD AUTO: 5.24 K/UL — SIGNIFICANT CHANGE UP (ref 1.8–7.4)
NEUTROPHILS NFR BLD AUTO: 62.6 % — SIGNIFICANT CHANGE UP (ref 43–77)
NRBC # BLD: 0 /100 WBCS — SIGNIFICANT CHANGE UP (ref 0–0)
PLATELET # BLD AUTO: 481 K/UL — HIGH (ref 150–400)
POTASSIUM SERPL-MCNC: 4 MMOL/L — SIGNIFICANT CHANGE UP (ref 3.5–5.3)
POTASSIUM SERPL-SCNC: 4 MMOL/L — SIGNIFICANT CHANGE UP (ref 3.5–5.3)
PROT SERPL-MCNC: 7.1 G/DL — SIGNIFICANT CHANGE UP (ref 6–8.3)
RBC # BLD: 4.11 M/UL — SIGNIFICANT CHANGE UP (ref 3.8–5.2)
RBC # FLD: 15.4 % — HIGH (ref 10.3–14.5)
SODIUM SERPL-SCNC: 138 MMOL/L — SIGNIFICANT CHANGE UP (ref 135–145)
TROPONIN I, HIGH SENSITIVITY RESULT: 14 NG/L — SIGNIFICANT CHANGE UP
WBC # BLD: 8.37 K/UL — SIGNIFICANT CHANGE UP (ref 3.8–10.5)
WBC # FLD AUTO: 8.37 K/UL — SIGNIFICANT CHANGE UP (ref 3.8–10.5)

## 2023-04-14 PROCEDURE — 99285 EMERGENCY DEPT VISIT HI MDM: CPT

## 2023-04-14 PROCEDURE — 71045 X-RAY EXAM CHEST 1 VIEW: CPT | Mod: 26

## 2023-04-14 RX ORDER — SODIUM CHLORIDE 9 MG/ML
1000 INJECTION INTRAMUSCULAR; INTRAVENOUS; SUBCUTANEOUS ONCE
Refills: 0 | Status: COMPLETED | OUTPATIENT
Start: 2023-04-14 | End: 2023-04-14

## 2023-04-14 RX ADMIN — SODIUM CHLORIDE 1000 MILLILITER(S): 9 INJECTION INTRAMUSCULAR; INTRAVENOUS; SUBCUTANEOUS at 19:50

## 2023-04-14 RX ADMIN — SODIUM CHLORIDE 1000 MILLILITER(S): 9 INJECTION INTRAMUSCULAR; INTRAVENOUS; SUBCUTANEOUS at 21:14

## 2023-04-14 NOTE — ED ADULT NURSE NOTE - OBJECTIVE STATEMENT
pt sent by PCP with c/o dizziness and low blood pressure today. Pt states she fainted for few seconds, denies fall and head injury.

## 2023-04-14 NOTE — ED PROVIDER NOTE - NSFOLLOWUPINSTRUCTIONS_ED_ALL_ED_FT
You were seen in the emergency department for: dizziness  Your results report is attached.  We recommend you follow up with: your primary care doctor.    Please return to the Emergency Department if you experience any of the following symptoms:   - Shortness of breath or trouble breathing  - Pressure, pain or tightness in the chest  - Face drooping, arm weakness or speech difficulty  - Persistence of severe vomiting  - Head injury or loss of consciousness  - Nonstop bleeding or an open wound    (1) Follow up with your primary care physician within the next 24-48 hours as discussed. In addition, we did not find evidence of a life threatening illness on your testing here today, but listed below are the specialists that will be necessary to see as an outpatient to continue the workup.  Please call the numbers listed below or [phone] to set up the necessary appointments.  (2) Take Tylenol (up to 1000mg or 1 g)  and/or Motrin (up to 600mg) up to every 6 hours as needed for pain.   (3) If you had an IV (intravenous) line placed, it was removed. Sometimes, after IV removal, that area can be tender for a few days; if it develops redness and swelling, those could be signs of infection; in which case, return to the Emergency Department for assessment.  (4) Please continue taking all of your home medications as directed.

## 2023-04-14 NOTE — ED PROVIDER NOTE - PHYSICAL EXAMINATION
Exam:  General: Patient well appearing, vital signs within normal limits  HEENT: airway patent with moist mucous membranes  Cardiac: RRR S1/S2 with strong peripheral pulses  Respiratory: slight diffuse fine rales without respiratory distress  GI: abdomen soft, non tender, non distended  Neuro: no gross neurologic deficits  Skin: warm, well perfused  Psych: normal mood and affect

## 2023-04-14 NOTE — ED PROVIDER NOTE - PATIENT PORTAL LINK FT
You can access the FollowMyHealth Patient Portal offered by [facility] by registering at the following website: [email or web address]. By joining [employer]’s FollowMyHealth portal, you will also be able to view your health information using other applications (apps) compatible with our system.

## 2023-04-14 NOTE — ED PROVIDER NOTE - PROGRESS NOTE DETAILS
received sign-out from Dr. Hartman to repeat BMP and dc if wnl and patient feeling fine.  will discharge, return precautions provided.

## 2023-04-14 NOTE — ED PROVIDER NOTE - CLINICAL SUMMARY MEDICAL DECISION MAKING FREE TEXT BOX
Patient presenting with near syncope secondary to hypotension of unclear etiology, plan for labs, troponin to r/o ACS, telemetry monitoring, EKG, intravenous fluids for possible dehydration and re-evaluate.

## 2023-04-14 NOTE — ED PROVIDER NOTE - OBJECTIVE STATEMENT
Patient presenting for episode of near syncope with associated hypotension in field per EMS.  Patient reporting she was in her usual state of health when she began feeling lightheaded associated with palpitations.  Did not lose consciousness.  No chest pains.  Had similar episode a few weeks ago which self resolved.  Ate and drank as normal earlier today.  No known cardiac history, does have pulmonary fibrosis on 2-3 L NC at baseline which is unchanged.

## 2023-04-15 VITALS
TEMPERATURE: 98 F | OXYGEN SATURATION: 97 % | DIASTOLIC BLOOD PRESSURE: 94 MMHG | RESPIRATION RATE: 18 BRPM | SYSTOLIC BLOOD PRESSURE: 150 MMHG | HEART RATE: 107 BPM

## 2023-04-15 PROBLEM — I26.99 OTHER PULMONARY EMBOLISM WITHOUT ACUTE COR PULMONALE: Chronic | Status: ACTIVE | Noted: 2023-02-28

## 2023-04-15 PROBLEM — J84.10 PULMONARY FIBROSIS, UNSPECIFIED: Chronic | Status: ACTIVE | Noted: 2023-02-28

## 2023-04-15 LAB
ANION GAP SERPL CALC-SCNC: 4 MMOL/L — LOW (ref 5–17)
BUN SERPL-MCNC: 21 MG/DL — HIGH (ref 7–18)
CALCIUM SERPL-MCNC: 8.3 MG/DL — LOW (ref 8.4–10.5)
CHLORIDE SERPL-SCNC: 109 MMOL/L — HIGH (ref 96–108)
CO2 SERPL-SCNC: 26 MMOL/L — SIGNIFICANT CHANGE UP (ref 22–31)
CREAT SERPL-MCNC: 1.22 MG/DL — SIGNIFICANT CHANGE UP (ref 0.5–1.3)
EGFR: 51 ML/MIN/1.73M2 — LOW
GLUCOSE SERPL-MCNC: 331 MG/DL — HIGH (ref 70–99)
POTASSIUM SERPL-MCNC: 4.1 MMOL/L — SIGNIFICANT CHANGE UP (ref 3.5–5.3)
POTASSIUM SERPL-SCNC: 4.1 MMOL/L — SIGNIFICANT CHANGE UP (ref 3.5–5.3)
SODIUM SERPL-SCNC: 139 MMOL/L — SIGNIFICANT CHANGE UP (ref 135–145)

## 2023-04-15 PROCEDURE — 84484 ASSAY OF TROPONIN QUANT: CPT

## 2023-04-15 PROCEDURE — 36415 COLL VENOUS BLD VENIPUNCTURE: CPT

## 2023-04-15 PROCEDURE — 71045 X-RAY EXAM CHEST 1 VIEW: CPT

## 2023-04-15 PROCEDURE — 80053 COMPREHEN METABOLIC PANEL: CPT

## 2023-04-15 PROCEDURE — 93005 ELECTROCARDIOGRAM TRACING: CPT

## 2023-04-15 PROCEDURE — 82962 GLUCOSE BLOOD TEST: CPT

## 2023-04-15 PROCEDURE — 80048 BASIC METABOLIC PNL TOTAL CA: CPT

## 2023-04-15 PROCEDURE — 99285 EMERGENCY DEPT VISIT HI MDM: CPT | Mod: 25

## 2023-04-15 PROCEDURE — 85025 COMPLETE CBC W/AUTO DIFF WBC: CPT

## 2024-01-01 ENCOUNTER — INPATIENT (INPATIENT)
Facility: HOSPITAL | Age: 64
LOS: 36 days | DRG: 206 | End: 2024-06-01
Attending: STUDENT IN AN ORGANIZED HEALTH CARE EDUCATION/TRAINING PROGRAM | Admitting: STUDENT IN AN ORGANIZED HEALTH CARE EDUCATION/TRAINING PROGRAM
Payer: MEDICAID

## 2024-01-01 ENCOUNTER — RESULT REVIEW (OUTPATIENT)
Age: 64
End: 2024-01-01

## 2024-01-01 ENCOUNTER — TRANSCRIPTION ENCOUNTER (OUTPATIENT)
Age: 64
End: 2024-01-01

## 2024-01-01 ENCOUNTER — INPATIENT (INPATIENT)
Facility: HOSPITAL | Age: 64
LOS: 13 days | Discharge: ANOTHER HOSPITAL | DRG: 204 | End: 2024-04-25
Attending: STUDENT IN AN ORGANIZED HEALTH CARE EDUCATION/TRAINING PROGRAM | Admitting: STUDENT IN AN ORGANIZED HEALTH CARE EDUCATION/TRAINING PROGRAM
Payer: MEDICAID

## 2024-01-01 ENCOUNTER — NON-APPOINTMENT (OUTPATIENT)
Age: 64
End: 2024-01-01

## 2024-01-01 VITALS
HEART RATE: 116 BPM | DIASTOLIC BLOOD PRESSURE: 79 MMHG | SYSTOLIC BLOOD PRESSURE: 128 MMHG | RESPIRATION RATE: 25 BRPM | WEIGHT: 124.78 LBS | OXYGEN SATURATION: 100 % | HEIGHT: 66 IN | TEMPERATURE: 99 F

## 2024-01-01 VITALS
SYSTOLIC BLOOD PRESSURE: 125 MMHG | TEMPERATURE: 98 F | RESPIRATION RATE: 38 BRPM | HEART RATE: 116 BPM | DIASTOLIC BLOOD PRESSURE: 74 MMHG | OXYGEN SATURATION: 96 %

## 2024-01-01 VITALS
RESPIRATION RATE: 26 BRPM | DIASTOLIC BLOOD PRESSURE: 56 MMHG | OXYGEN SATURATION: 98 % | HEART RATE: 112 BPM | WEIGHT: 134.04 LBS | HEIGHT: 66 IN | TEMPERATURE: 98 F | SYSTOLIC BLOOD PRESSURE: 94 MMHG

## 2024-01-01 VITALS — HEART RATE: 109 BPM | OXYGEN SATURATION: 76 % | RESPIRATION RATE: 21 BRPM

## 2024-01-01 DIAGNOSIS — I26.99 OTHER PULMONARY EMBOLISM WITHOUT ACUTE COR PULMONALE: ICD-10-CM

## 2024-01-01 DIAGNOSIS — I10 ESSENTIAL (PRIMARY) HYPERTENSION: ICD-10-CM

## 2024-01-01 DIAGNOSIS — E11.65 TYPE 2 DIABETES MELLITUS WITH HYPERGLYCEMIA: ICD-10-CM

## 2024-01-01 DIAGNOSIS — J84.9 INTERSTITIAL PULMONARY DISEASE, UNSPECIFIED: ICD-10-CM

## 2024-01-01 DIAGNOSIS — J96.01 ACUTE RESPIRATORY FAILURE WITH HYPOXIA: ICD-10-CM

## 2024-01-01 DIAGNOSIS — Z51.5 ENCOUNTER FOR PALLIATIVE CARE: ICD-10-CM

## 2024-01-01 DIAGNOSIS — R06.02 SHORTNESS OF BREATH: ICD-10-CM

## 2024-01-01 DIAGNOSIS — D64.9 ANEMIA, UNSPECIFIED: ICD-10-CM

## 2024-01-01 DIAGNOSIS — R53.81 OTHER MALAISE: ICD-10-CM

## 2024-01-01 DIAGNOSIS — Z94.2 LUNG TRANSPLANT STATUS: ICD-10-CM

## 2024-01-01 DIAGNOSIS — E11.9 TYPE 2 DIABETES MELLITUS WITHOUT COMPLICATIONS: ICD-10-CM

## 2024-01-01 DIAGNOSIS — Z29.9 ENCOUNTER FOR PROPHYLACTIC MEASURES, UNSPECIFIED: ICD-10-CM

## 2024-01-01 DIAGNOSIS — J90 PLEURAL EFFUSION, NOT ELSEWHERE CLASSIFIED: ICD-10-CM

## 2024-01-01 DIAGNOSIS — E78.5 HYPERLIPIDEMIA, UNSPECIFIED: ICD-10-CM

## 2024-01-01 DIAGNOSIS — Z86.711 PERSONAL HISTORY OF PULMONARY EMBOLISM: ICD-10-CM

## 2024-01-01 DIAGNOSIS — Z71.89 OTHER SPECIFIED COUNSELING: ICD-10-CM

## 2024-01-01 DIAGNOSIS — N17.9 ACUTE KIDNEY FAILURE, UNSPECIFIED: ICD-10-CM

## 2024-01-01 DIAGNOSIS — J96.21 ACUTE AND CHRONIC RESPIRATORY FAILURE WITH HYPOXIA: ICD-10-CM

## 2024-01-01 DIAGNOSIS — M62.838 OTHER MUSCLE SPASM: ICD-10-CM

## 2024-01-01 DIAGNOSIS — I27.81 COR PULMONALE (CHRONIC): ICD-10-CM

## 2024-01-01 DIAGNOSIS — M79.2 NEURALGIA AND NEURITIS, UNSPECIFIED: ICD-10-CM

## 2024-01-01 DIAGNOSIS — Z02.9 ENCOUNTER FOR ADMINISTRATIVE EXAMINATIONS, UNSPECIFIED: ICD-10-CM

## 2024-01-01 DIAGNOSIS — I27.20 PULMONARY HYPERTENSION, UNSPECIFIED: ICD-10-CM

## 2024-01-01 DIAGNOSIS — R19.7 DIARRHEA, UNSPECIFIED: ICD-10-CM

## 2024-01-01 DIAGNOSIS — R33.9 RETENTION OF URINE, UNSPECIFIED: ICD-10-CM

## 2024-01-01 DIAGNOSIS — H26.9 UNSPECIFIED CATARACT: Chronic | ICD-10-CM

## 2024-01-01 DIAGNOSIS — Z86.718 PERSONAL HISTORY OF OTHER VENOUS THROMBOSIS AND EMBOLISM: ICD-10-CM

## 2024-01-01 DIAGNOSIS — Z86.79 PERSONAL HISTORY OF OTHER DISEASES OF THE CIRCULATORY SYSTEM: ICD-10-CM

## 2024-01-01 DIAGNOSIS — R00.0 TACHYCARDIA, UNSPECIFIED: ICD-10-CM

## 2024-01-01 DIAGNOSIS — G47.00 INSOMNIA, UNSPECIFIED: ICD-10-CM

## 2024-01-01 LAB
-  AMPICILLIN: SIGNIFICANT CHANGE UP
-  CIPROFLOXACIN: SIGNIFICANT CHANGE UP
-  DAPTOMYCIN: SIGNIFICANT CHANGE UP
-  LEVOFLOXACIN: SIGNIFICANT CHANGE UP
-  LINEZOLID: SIGNIFICANT CHANGE UP
-  NITROFURANTOIN: SIGNIFICANT CHANGE UP
-  TETRACYCLINE: SIGNIFICANT CHANGE UP
-  VANCOMYCIN: SIGNIFICANT CHANGE UP
A1C WITH ESTIMATED AVERAGE GLUCOSE RESULT: 11.9 % — HIGH (ref 4–5.6)
ACHR BLOCK AB SER-ACNC: 14 % — SIGNIFICANT CHANGE UP (ref 0–25)
ACHR MOD AB SER-ACNC: 0 % — SIGNIFICANT CHANGE UP (ref 0–45)
ACRM BINDING ANTIBODY: <0.03 NMOL/L — SIGNIFICANT CHANGE UP (ref 0–0.24)
ADD ON TEST-SPECIMEN IN LAB: SIGNIFICANT CHANGE UP
ALBUMIN SERPL ELPH-MCNC: 2 G/DL — LOW (ref 3.5–5)
ALBUMIN SERPL ELPH-MCNC: 2.2 G/DL — LOW (ref 3.5–5)
ALBUMIN SERPL ELPH-MCNC: 2.2 G/DL — LOW (ref 3.5–5)
ALBUMIN SERPL ELPH-MCNC: 2.3 G/DL — LOW (ref 3.5–5)
ALBUMIN SERPL ELPH-MCNC: 2.5 G/DL — LOW (ref 3.3–5)
ALBUMIN SERPL ELPH-MCNC: 2.5 G/DL — LOW (ref 3.5–5)
ALBUMIN SERPL ELPH-MCNC: 2.5 G/DL — LOW (ref 3.5–5)
ALBUMIN SERPL ELPH-MCNC: 2.8 G/DL — LOW (ref 3.3–5)
ALBUMIN SERPL ELPH-MCNC: 2.8 G/DL — LOW (ref 3.3–5)
ALBUMIN SERPL ELPH-MCNC: 2.9 G/DL — LOW (ref 3.3–5)
ALBUMIN SERPL ELPH-MCNC: 2.9 G/DL — LOW (ref 3.3–5)
ALBUMIN SERPL ELPH-MCNC: 3.2 G/DL — LOW (ref 3.3–5)
ALDOLASE SERPL-CCNC: 9.3 U/L — SIGNIFICANT CHANGE UP (ref 3.3–10.3)
ALP SERPL-CCNC: 101 U/L — SIGNIFICANT CHANGE UP (ref 40–120)
ALP SERPL-CCNC: 101 U/L — SIGNIFICANT CHANGE UP (ref 40–120)
ALP SERPL-CCNC: 102 U/L — SIGNIFICANT CHANGE UP (ref 40–120)
ALP SERPL-CCNC: 106 U/L — SIGNIFICANT CHANGE UP (ref 40–120)
ALP SERPL-CCNC: 108 U/L — SIGNIFICANT CHANGE UP (ref 40–120)
ALP SERPL-CCNC: 138 U/L — HIGH (ref 40–120)
ALP SERPL-CCNC: 81 U/L — SIGNIFICANT CHANGE UP (ref 40–120)
ALP SERPL-CCNC: 81 U/L — SIGNIFICANT CHANGE UP (ref 40–120)
ALP SERPL-CCNC: 83 U/L — SIGNIFICANT CHANGE UP (ref 40–120)
ALP SERPL-CCNC: 87 U/L — SIGNIFICANT CHANGE UP (ref 40–120)
ALP SERPL-CCNC: 98 U/L — SIGNIFICANT CHANGE UP (ref 40–120)
ALP SERPL-CCNC: 98 U/L — SIGNIFICANT CHANGE UP (ref 40–120)
ALT FLD-CCNC: 15 U/L DA — SIGNIFICANT CHANGE UP (ref 10–60)
ALT FLD-CCNC: 16 U/L DA — SIGNIFICANT CHANGE UP (ref 10–60)
ALT FLD-CCNC: 19 U/L DA — SIGNIFICANT CHANGE UP (ref 10–60)
ALT FLD-CCNC: 30 U/L DA — SIGNIFICANT CHANGE UP (ref 10–60)
ALT FLD-CCNC: 32 U/L DA — SIGNIFICANT CHANGE UP (ref 10–60)
ALT FLD-CCNC: 34 U/L — SIGNIFICANT CHANGE UP (ref 10–45)
ALT FLD-CCNC: 44 U/L DA — SIGNIFICANT CHANGE UP (ref 10–60)
ALT FLD-CCNC: 47 U/L — HIGH (ref 10–45)
ALT FLD-CCNC: 47 U/L — HIGH (ref 10–45)
ALT FLD-CCNC: 50 U/L — HIGH (ref 10–45)
ALT FLD-CCNC: 55 U/L — HIGH (ref 10–45)
ALT FLD-CCNC: 66 U/L — HIGH (ref 10–45)
ANA TITR SER: NEGATIVE — SIGNIFICANT CHANGE UP
ANION GAP SERPL CALC-SCNC: 10 MMOL/L — SIGNIFICANT CHANGE UP (ref 5–17)
ANION GAP SERPL CALC-SCNC: 11 MMOL/L — SIGNIFICANT CHANGE UP (ref 5–17)
ANION GAP SERPL CALC-SCNC: 12 MMOL/L — SIGNIFICANT CHANGE UP (ref 5–17)
ANION GAP SERPL CALC-SCNC: 12 MMOL/L — SIGNIFICANT CHANGE UP (ref 5–17)
ANION GAP SERPL CALC-SCNC: 16 MMOL/L — SIGNIFICANT CHANGE UP (ref 5–17)
ANION GAP SERPL CALC-SCNC: 17 MMOL/L — SIGNIFICANT CHANGE UP (ref 5–17)
ANION GAP SERPL CALC-SCNC: 19 MMOL/L — HIGH (ref 5–17)
ANION GAP SERPL CALC-SCNC: 5 MMOL/L — SIGNIFICANT CHANGE UP (ref 5–17)
ANION GAP SERPL CALC-SCNC: 6 MMOL/L — SIGNIFICANT CHANGE UP (ref 5–17)
ANION GAP SERPL CALC-SCNC: 7 MMOL/L — SIGNIFICANT CHANGE UP (ref 5–17)
ANION GAP SERPL CALC-SCNC: 8 MMOL/L — SIGNIFICANT CHANGE UP (ref 5–17)
ANION GAP SERPL CALC-SCNC: 9 MMOL/L — SIGNIFICANT CHANGE UP (ref 5–17)
ANISOCYTOSIS BLD QL: SLIGHT — SIGNIFICANT CHANGE UP
ANTI PM-SCL-100 PLUS: <20 UNITS — SIGNIFICANT CHANGE UP
ANTI-RIBONUCLEAR PROTEIN: <0.2 AI — SIGNIFICANT CHANGE UP
ANTI-SAE 1 IGG: <20 UNITS — SIGNIFICANT CHANGE UP
ANTI-SS-A 52 KD AB, IGG PLUS: <20 UNITS — SIGNIFICANT CHANGE UP
ANTI-U1-RNP AB PLUS: <20 UNITS — SIGNIFICANT CHANGE UP
APPEARANCE UR: CLEAR — SIGNIFICANT CHANGE UP
APTT BLD: 25.8 SEC — SIGNIFICANT CHANGE UP (ref 24.5–35.6)
APTT BLD: 26 SEC — SIGNIFICANT CHANGE UP (ref 24.5–35.6)
AST SERPL-CCNC: 14 U/L — SIGNIFICANT CHANGE UP (ref 10–40)
AST SERPL-CCNC: 15 U/L — SIGNIFICANT CHANGE UP (ref 10–40)
AST SERPL-CCNC: 18 U/L — SIGNIFICANT CHANGE UP (ref 10–40)
AST SERPL-CCNC: 22 U/L — SIGNIFICANT CHANGE UP (ref 10–40)
AST SERPL-CCNC: 28 U/L — SIGNIFICANT CHANGE UP (ref 10–40)
AST SERPL-CCNC: 29 U/L — SIGNIFICANT CHANGE UP (ref 10–40)
AST SERPL-CCNC: 33 U/L — SIGNIFICANT CHANGE UP (ref 10–40)
AST SERPL-CCNC: 38 U/L — SIGNIFICANT CHANGE UP (ref 10–40)
AST SERPL-CCNC: 38 U/L — SIGNIFICANT CHANGE UP (ref 10–40)
AST SERPL-CCNC: 39 U/L — SIGNIFICANT CHANGE UP (ref 10–40)
AST SERPL-CCNC: 42 U/L — HIGH (ref 10–40)
AST SERPL-CCNC: 63 U/L — HIGH (ref 10–40)
AUTO DIFF PNL BLD: NEGATIVE — SIGNIFICANT CHANGE UP
B PERT DNA SPEC QL NAA+PROBE: SIGNIFICANT CHANGE UP
BACTERIA # UR AUTO: NEGATIVE /HPF — SIGNIFICANT CHANGE UP
BASE EXCESS BLDA CALC-SCNC: 3.9 MMOL/L — HIGH (ref -2–3)
BASE EXCESS BLDA CALC-SCNC: 5.8 MMOL/L — HIGH (ref -2–3)
BASE EXCESS BLDV CALC-SCNC: 10.1 MMOL/L — HIGH (ref -2–3)
BASE EXCESS BLDV CALC-SCNC: 4.4 MMOL/L — HIGH (ref -2–3)
BASOPHILS # BLD AUTO: 0 K/UL — SIGNIFICANT CHANGE UP (ref 0–0.2)
BASOPHILS # BLD AUTO: 0.01 K/UL — SIGNIFICANT CHANGE UP (ref 0–0.2)
BASOPHILS # BLD AUTO: 0.02 K/UL — SIGNIFICANT CHANGE UP (ref 0–0.2)
BASOPHILS # BLD AUTO: 0.03 K/UL — SIGNIFICANT CHANGE UP (ref 0–0.2)
BASOPHILS # BLD AUTO: 0.04 K/UL — SIGNIFICANT CHANGE UP (ref 0–0.2)
BASOPHILS NFR BLD AUTO: 0 % — SIGNIFICANT CHANGE UP (ref 0–2)
BASOPHILS NFR BLD AUTO: 0.1 % — SIGNIFICANT CHANGE UP (ref 0–2)
BASOPHILS NFR BLD AUTO: 0.1 % — SIGNIFICANT CHANGE UP (ref 0–2)
BASOPHILS NFR BLD AUTO: 0.2 % — SIGNIFICANT CHANGE UP (ref 0–2)
BASOPHILS NFR BLD AUTO: 0.4 % — SIGNIFICANT CHANGE UP (ref 0–2)
BILIRUB SERPL-MCNC: 0.1 MG/DL — LOW (ref 0.2–1.2)
BILIRUB SERPL-MCNC: 0.2 MG/DL — SIGNIFICANT CHANGE UP (ref 0.2–1.2)
BILIRUB SERPL-MCNC: 0.3 MG/DL — SIGNIFICANT CHANGE UP (ref 0.2–1.2)
BILIRUB SERPL-MCNC: 0.4 MG/DL — SIGNIFICANT CHANGE UP (ref 0.2–1.2)
BILIRUB UR-MCNC: NEGATIVE — SIGNIFICANT CHANGE UP
BLOOD GAS COMMENTS ARTERIAL: SIGNIFICANT CHANGE UP
BUN SERPL-MCNC: 17 MG/DL — SIGNIFICANT CHANGE UP (ref 7–18)
BUN SERPL-MCNC: 21 MG/DL — HIGH (ref 7–18)
BUN SERPL-MCNC: 22 MG/DL — HIGH (ref 7–18)
BUN SERPL-MCNC: 22 MG/DL — SIGNIFICANT CHANGE UP (ref 7–23)
BUN SERPL-MCNC: 23 MG/DL — HIGH (ref 7–18)
BUN SERPL-MCNC: 23 MG/DL — SIGNIFICANT CHANGE UP (ref 7–23)
BUN SERPL-MCNC: 26 MG/DL — HIGH (ref 7–18)
BUN SERPL-MCNC: 28 MG/DL — HIGH (ref 7–18)
BUN SERPL-MCNC: 28 MG/DL — HIGH (ref 7–23)
BUN SERPL-MCNC: 29 MG/DL — HIGH (ref 7–23)
BUN SERPL-MCNC: 30 MG/DL — HIGH (ref 7–23)
BUN SERPL-MCNC: 31 MG/DL — HIGH (ref 7–23)
BUN SERPL-MCNC: 32 MG/DL — HIGH (ref 7–23)
BUN SERPL-MCNC: 32 MG/DL — HIGH (ref 7–23)
BUN SERPL-MCNC: 33 MG/DL — HIGH (ref 7–23)
BUN SERPL-MCNC: 34 MG/DL — HIGH (ref 7–18)
BUN SERPL-MCNC: 34 MG/DL — HIGH (ref 7–23)
BUN SERPL-MCNC: 34 MG/DL — HIGH (ref 7–23)
BUN SERPL-MCNC: 35 MG/DL — HIGH (ref 7–23)
BUN SERPL-MCNC: 36 MG/DL — HIGH (ref 7–18)
BUN SERPL-MCNC: 38 MG/DL — HIGH (ref 7–23)
BUN SERPL-MCNC: 39 MG/DL — HIGH (ref 7–23)
BUN SERPL-MCNC: 43 MG/DL — HIGH (ref 7–23)
BUN SERPL-MCNC: 45 MG/DL — HIGH (ref 7–23)
C PNEUM DNA SPEC QL NAA+PROBE: SIGNIFICANT CHANGE UP
C-ANCA SER-ACNC: NEGATIVE — SIGNIFICANT CHANGE UP
CA-I SERPL-SCNC: 1.18 MMOL/L — SIGNIFICANT CHANGE UP (ref 1.15–1.33)
CA-I SERPL-SCNC: 1.24 MMOL/L — SIGNIFICANT CHANGE UP (ref 1.15–1.33)
CALCIUM SERPL-MCNC: 7.9 MG/DL — LOW (ref 8.4–10.5)
CALCIUM SERPL-MCNC: 7.9 MG/DL — LOW (ref 8.4–10.5)
CALCIUM SERPL-MCNC: 8.2 MG/DL — LOW (ref 8.4–10.5)
CALCIUM SERPL-MCNC: 8.3 MG/DL — LOW (ref 8.4–10.5)
CALCIUM SERPL-MCNC: 8.4 MG/DL — SIGNIFICANT CHANGE UP (ref 8.4–10.5)
CALCIUM SERPL-MCNC: 8.5 MG/DL — SIGNIFICANT CHANGE UP (ref 8.4–10.5)
CALCIUM SERPL-MCNC: 8.5 MG/DL — SIGNIFICANT CHANGE UP (ref 8.4–10.5)
CALCIUM SERPL-MCNC: 8.6 MG/DL — SIGNIFICANT CHANGE UP (ref 8.4–10.5)
CALCIUM SERPL-MCNC: 8.7 MG/DL — SIGNIFICANT CHANGE UP (ref 8.4–10.5)
CALCIUM SERPL-MCNC: 8.8 MG/DL — SIGNIFICANT CHANGE UP (ref 8.4–10.5)
CALCIUM SERPL-MCNC: 8.9 MG/DL — SIGNIFICANT CHANGE UP (ref 8.4–10.5)
CALCIUM SERPL-MCNC: 9 MG/DL — SIGNIFICANT CHANGE UP (ref 8.4–10.5)
CALCIUM SERPL-MCNC: 9 MG/DL — SIGNIFICANT CHANGE UP (ref 8.4–10.5)
CALCIUM SERPL-MCNC: 9.1 MG/DL — SIGNIFICANT CHANGE UP (ref 8.4–10.5)
CALCIUM SERPL-MCNC: 9.2 MG/DL — SIGNIFICANT CHANGE UP (ref 8.4–10.5)
CALCIUM SERPL-MCNC: 9.3 MG/DL — SIGNIFICANT CHANGE UP (ref 8.4–10.5)
CALCIUM SERPL-MCNC: 9.4 MG/DL — SIGNIFICANT CHANGE UP (ref 8.4–10.5)
CALCIUM SERPL-MCNC: 9.5 MG/DL — SIGNIFICANT CHANGE UP (ref 8.4–10.5)
CALCIUM SERPL-MCNC: 9.6 MG/DL — SIGNIFICANT CHANGE UP (ref 8.4–10.5)
CALCIUM SERPL-MCNC: 9.9 MG/DL — SIGNIFICANT CHANGE UP (ref 8.4–10.5)
CAST: 2 /LPF — SIGNIFICANT CHANGE UP (ref 0–4)
CCP IGG SERPL-ACNC: <8 UNITS — SIGNIFICANT CHANGE UP
CENTROMERE AB SER-ACNC: <0.2 AI — SIGNIFICANT CHANGE UP
CHLORIDE BLDV-SCNC: 106 MMOL/L — SIGNIFICANT CHANGE UP (ref 96–108)
CHLORIDE BLDV-SCNC: 97 MMOL/L — SIGNIFICANT CHANGE UP (ref 96–108)
CHLORIDE SERPL-SCNC: 100 MMOL/L — SIGNIFICANT CHANGE UP (ref 96–108)
CHLORIDE SERPL-SCNC: 101 MMOL/L — SIGNIFICANT CHANGE UP (ref 96–108)
CHLORIDE SERPL-SCNC: 102 MMOL/L — SIGNIFICANT CHANGE UP (ref 96–108)
CHLORIDE SERPL-SCNC: 103 MMOL/L — SIGNIFICANT CHANGE UP (ref 96–108)
CHLORIDE SERPL-SCNC: 104 MMOL/L — SIGNIFICANT CHANGE UP (ref 96–108)
CHLORIDE SERPL-SCNC: 105 MMOL/L — SIGNIFICANT CHANGE UP (ref 96–108)
CHLORIDE SERPL-SCNC: 105 MMOL/L — SIGNIFICANT CHANGE UP (ref 96–108)
CHLORIDE SERPL-SCNC: 106 MMOL/L — SIGNIFICANT CHANGE UP (ref 96–108)
CHLORIDE SERPL-SCNC: 108 MMOL/L — SIGNIFICANT CHANGE UP (ref 96–108)
CHLORIDE SERPL-SCNC: 109 MMOL/L — HIGH (ref 96–108)
CHLORIDE SERPL-SCNC: 109 MMOL/L — HIGH (ref 96–108)
CHLORIDE SERPL-SCNC: 95 MMOL/L — LOW (ref 96–108)
CHLORIDE SERPL-SCNC: 95 MMOL/L — LOW (ref 96–108)
CHLORIDE SERPL-SCNC: 96 MMOL/L — SIGNIFICANT CHANGE UP (ref 96–108)
CHLORIDE SERPL-SCNC: 96 MMOL/L — SIGNIFICANT CHANGE UP (ref 96–108)
CHLORIDE SERPL-SCNC: 97 MMOL/L — SIGNIFICANT CHANGE UP (ref 96–108)
CHLORIDE SERPL-SCNC: 97 MMOL/L — SIGNIFICANT CHANGE UP (ref 96–108)
CHLORIDE SERPL-SCNC: 98 MMOL/L — SIGNIFICANT CHANGE UP (ref 96–108)
CHLORIDE SERPL-SCNC: 99 MMOL/L — SIGNIFICANT CHANGE UP (ref 96–108)
CHLORIDE SERPL-SCNC: 99 MMOL/L — SIGNIFICANT CHANGE UP (ref 96–108)
CK SERPL-CCNC: 43 U/L — SIGNIFICANT CHANGE UP (ref 25–170)
CK SERPL-CCNC: 45 U/L — SIGNIFICANT CHANGE UP (ref 25–170)
CK SERPL-CCNC: 53 U/L — SIGNIFICANT CHANGE UP (ref 25–170)
CO2 BLDA-SCNC: 30 MMOL/L — HIGH (ref 19–24)
CO2 BLDV-SCNC: 30 MMOL/L — HIGH (ref 22–26)
CO2 BLDV-SCNC: 39 MMOL/L — HIGH (ref 22–26)
CO2 SERPL-SCNC: 23 MMOL/L — SIGNIFICANT CHANGE UP (ref 22–31)
CO2 SERPL-SCNC: 24 MMOL/L — SIGNIFICANT CHANGE UP (ref 22–31)
CO2 SERPL-SCNC: 24 MMOL/L — SIGNIFICANT CHANGE UP (ref 22–31)
CO2 SERPL-SCNC: 25 MMOL/L — SIGNIFICANT CHANGE UP (ref 22–31)
CO2 SERPL-SCNC: 26 MMOL/L — SIGNIFICANT CHANGE UP (ref 22–31)
CO2 SERPL-SCNC: 26 MMOL/L — SIGNIFICANT CHANGE UP (ref 22–31)
CO2 SERPL-SCNC: 27 MMOL/L — SIGNIFICANT CHANGE UP (ref 22–31)
CO2 SERPL-SCNC: 28 MMOL/L — SIGNIFICANT CHANGE UP (ref 22–31)
CO2 SERPL-SCNC: 29 MMOL/L — SIGNIFICANT CHANGE UP (ref 22–31)
CO2 SERPL-SCNC: 30 MMOL/L — SIGNIFICANT CHANGE UP (ref 22–31)
CO2 SERPL-SCNC: 31 MMOL/L — SIGNIFICANT CHANGE UP (ref 22–31)
CO2 SERPL-SCNC: 32 MMOL/L — HIGH (ref 22–31)
CO2 SERPL-SCNC: 33 MMOL/L — HIGH (ref 22–31)
CO2 SERPL-SCNC: 33 MMOL/L — HIGH (ref 22–31)
COLOR SPEC: YELLOW — SIGNIFICANT CHANGE UP
CREAT ?TM UR-MCNC: 66 MG/DL — SIGNIFICANT CHANGE UP
CREAT SERPL-MCNC: 0.6 MG/DL — SIGNIFICANT CHANGE UP (ref 0.5–1.3)
CREAT SERPL-MCNC: 0.64 MG/DL — SIGNIFICANT CHANGE UP (ref 0.5–1.3)
CREAT SERPL-MCNC: 0.65 MG/DL — SIGNIFICANT CHANGE UP (ref 0.5–1.3)
CREAT SERPL-MCNC: 0.66 MG/DL — SIGNIFICANT CHANGE UP (ref 0.5–1.3)
CREAT SERPL-MCNC: 0.7 MG/DL — SIGNIFICANT CHANGE UP (ref 0.5–1.3)
CREAT SERPL-MCNC: 0.73 MG/DL — SIGNIFICANT CHANGE UP (ref 0.5–1.3)
CREAT SERPL-MCNC: 0.74 MG/DL — SIGNIFICANT CHANGE UP (ref 0.5–1.3)
CREAT SERPL-MCNC: 0.76 MG/DL — SIGNIFICANT CHANGE UP (ref 0.5–1.3)
CREAT SERPL-MCNC: 0.77 MG/DL — SIGNIFICANT CHANGE UP (ref 0.5–1.3)
CREAT SERPL-MCNC: 0.78 MG/DL — SIGNIFICANT CHANGE UP (ref 0.5–1.3)
CREAT SERPL-MCNC: 0.78 MG/DL — SIGNIFICANT CHANGE UP (ref 0.5–1.3)
CREAT SERPL-MCNC: 0.8 MG/DL — SIGNIFICANT CHANGE UP (ref 0.5–1.3)
CREAT SERPL-MCNC: 0.81 MG/DL — SIGNIFICANT CHANGE UP (ref 0.5–1.3)
CREAT SERPL-MCNC: 0.81 MG/DL — SIGNIFICANT CHANGE UP (ref 0.5–1.3)
CREAT SERPL-MCNC: 0.82 MG/DL — SIGNIFICANT CHANGE UP (ref 0.5–1.3)
CREAT SERPL-MCNC: 0.85 MG/DL — SIGNIFICANT CHANGE UP (ref 0.5–1.3)
CREAT SERPL-MCNC: 0.85 MG/DL — SIGNIFICANT CHANGE UP (ref 0.5–1.3)
CREAT SERPL-MCNC: 0.86 MG/DL — SIGNIFICANT CHANGE UP (ref 0.5–1.3)
CREAT SERPL-MCNC: 0.87 MG/DL — SIGNIFICANT CHANGE UP (ref 0.5–1.3)
CREAT SERPL-MCNC: 0.87 MG/DL — SIGNIFICANT CHANGE UP (ref 0.5–1.3)
CREAT SERPL-MCNC: 0.88 MG/DL — SIGNIFICANT CHANGE UP (ref 0.5–1.3)
CREAT SERPL-MCNC: 0.88 MG/DL — SIGNIFICANT CHANGE UP (ref 0.5–1.3)
CREAT SERPL-MCNC: 0.89 MG/DL — SIGNIFICANT CHANGE UP (ref 0.5–1.3)
CREAT SERPL-MCNC: 0.89 MG/DL — SIGNIFICANT CHANGE UP (ref 0.5–1.3)
CREAT SERPL-MCNC: 0.9 MG/DL — SIGNIFICANT CHANGE UP (ref 0.5–1.3)
CREAT SERPL-MCNC: 0.93 MG/DL — SIGNIFICANT CHANGE UP (ref 0.5–1.3)
CREAT SERPL-MCNC: 0.97 MG/DL — SIGNIFICANT CHANGE UP (ref 0.5–1.3)
CREAT SERPL-MCNC: 0.97 MG/DL — SIGNIFICANT CHANGE UP (ref 0.5–1.3)
CREAT SERPL-MCNC: 1.01 MG/DL — SIGNIFICANT CHANGE UP (ref 0.5–1.3)
CREAT SERPL-MCNC: 1.01 MG/DL — SIGNIFICANT CHANGE UP (ref 0.5–1.3)
CREAT SERPL-MCNC: 1.02 MG/DL — SIGNIFICANT CHANGE UP (ref 0.5–1.3)
CREAT SERPL-MCNC: 1.11 MG/DL — SIGNIFICANT CHANGE UP (ref 0.5–1.3)
CREAT SERPL-MCNC: 1.19 MG/DL — SIGNIFICANT CHANGE UP (ref 0.5–1.3)
CREAT SERPL-MCNC: 1.36 MG/DL — HIGH (ref 0.5–1.3)
CREAT SERPL-MCNC: 1.74 MG/DL — HIGH (ref 0.5–1.3)
CULTURE RESULTS: ABNORMAL
CULTURE RESULTS: SIGNIFICANT CHANGE UP
D DIMER BLD IA.RAPID-MCNC: 246 NG/ML DDU — HIGH
DIFF PNL FLD: ABNORMAL
DSDNA AB FLD-ACNC: <0.2 AI — SIGNIFICANT CHANGE UP
DSDNA AB SER-ACNC: <1 IU/ML — SIGNIFICANT CHANGE UP
EGFR: 100 ML/MIN/1.73M2 — SIGNIFICANT CHANGE UP
EGFR: 102 ML/MIN/1.73M2 — SIGNIFICANT CHANGE UP
EGFR: 33 ML/MIN/1.73M2 — LOW
EGFR: 44 ML/MIN/1.73M2 — LOW
EGFR: 52 ML/MIN/1.73M2 — LOW
EGFR: 57 ML/MIN/1.73M2 — LOW
EGFR: 63 ML/MIN/1.73M2 — SIGNIFICANT CHANGE UP
EGFR: 66 ML/MIN/1.73M2 — SIGNIFICANT CHANGE UP
EGFR: 66 ML/MIN/1.73M2 — SIGNIFICANT CHANGE UP
EGFR: 70 ML/MIN/1.73M2 — SIGNIFICANT CHANGE UP
EGFR: 73 ML/MIN/1.73M2 — SIGNIFICANT CHANGE UP
EGFR: 74 ML/MIN/1.73M2 — SIGNIFICANT CHANGE UP
EGFR: 74 ML/MIN/1.73M2 — SIGNIFICANT CHANGE UP
EGFR: 75 ML/MIN/1.73M2 — SIGNIFICANT CHANGE UP
EGFR: 75 ML/MIN/1.73M2 — SIGNIFICANT CHANGE UP
EGFR: 76 ML/MIN/1.73M2 — SIGNIFICANT CHANGE UP
EGFR: 76 ML/MIN/1.73M2 — SIGNIFICANT CHANGE UP
EGFR: 77 ML/MIN/1.73M2 — SIGNIFICANT CHANGE UP
EGFR: 78 ML/MIN/1.73M2 — SIGNIFICANT CHANGE UP
EGFR: 78 ML/MIN/1.73M2 — SIGNIFICANT CHANGE UP
EGFR: 81 ML/MIN/1.73M2 — SIGNIFICANT CHANGE UP
EGFR: 83 ML/MIN/1.73M2 — SIGNIFICANT CHANGE UP
EGFR: 83 ML/MIN/1.73M2 — SIGNIFICANT CHANGE UP
EGFR: 84 ML/MIN/1.73M2 — SIGNIFICANT CHANGE UP
EGFR: 86 ML/MIN/1.73M2 — SIGNIFICANT CHANGE UP
EGFR: 86 ML/MIN/1.73M2 — SIGNIFICANT CHANGE UP
EGFR: 88 ML/MIN/1.73M2 — SIGNIFICANT CHANGE UP
EGFR: 89 ML/MIN/1.73M2 — SIGNIFICANT CHANGE UP
EGFR: 92 ML/MIN/1.73M2 — SIGNIFICANT CHANGE UP
EGFR: 94 ML/MIN/1.73M2 — SIGNIFICANT CHANGE UP
EGFR: 98 ML/MIN/1.73M2 — SIGNIFICANT CHANGE UP
EJ MYOMARKER3 PLUS: NEGATIVE — SIGNIFICANT CHANGE UP
ELLIPTOCYTES BLD QL SMEAR: SLIGHT — SIGNIFICANT CHANGE UP
ENA JO1 AB SER IA-ACNC: <20 UNITS — SIGNIFICANT CHANGE UP
ENA JO1 AB SER-ACNC: <0.2 AI — SIGNIFICANT CHANGE UP
ENA SCL70 AB SER-ACNC: <0.2 AI — SIGNIFICANT CHANGE UP
ENA SS-A AB FLD IA-ACNC: <0.2 AI — SIGNIFICANT CHANGE UP
EOSINOPHIL # BLD AUTO: 0 K/UL — SIGNIFICANT CHANGE UP (ref 0–0.5)
EOSINOPHIL # BLD AUTO: 0.01 K/UL — SIGNIFICANT CHANGE UP (ref 0–0.5)
EOSINOPHIL # BLD AUTO: 0.02 K/UL — SIGNIFICANT CHANGE UP (ref 0–0.5)
EOSINOPHIL # BLD AUTO: 0.18 K/UL — SIGNIFICANT CHANGE UP (ref 0–0.5)
EOSINOPHIL NFR BLD AUTO: 0 % — SIGNIFICANT CHANGE UP (ref 0–6)
EOSINOPHIL NFR BLD AUTO: 0.1 % — SIGNIFICANT CHANGE UP (ref 0–6)
EOSINOPHIL NFR BLD AUTO: 0.2 % — SIGNIFICANT CHANGE UP (ref 0–6)
EOSINOPHIL NFR BLD AUTO: 1.7 % — SIGNIFICANT CHANGE UP (ref 0–6)
ESTIMATED AVERAGE GLUCOSE: 295 MG/DL — HIGH (ref 68–114)
FIBRILLARIN (U3 RNP) PLUS: NEGATIVE — SIGNIFICANT CHANGE UP
FLUAV AG NPH QL: SIGNIFICANT CHANGE UP
FLUAV H1 2009 PAND RNA SPEC QL NAA+PROBE: SIGNIFICANT CHANGE UP
FLUAV H1 RNA SPEC QL NAA+PROBE: SIGNIFICANT CHANGE UP
FLUAV H3 RNA SPEC QL NAA+PROBE: SIGNIFICANT CHANGE UP
FLUAV SUBTYP SPEC NAA+PROBE: SIGNIFICANT CHANGE UP
FLUBV AG NPH QL: SIGNIFICANT CHANGE UP
FLUBV RNA SPEC QL NAA+PROBE: SIGNIFICANT CHANGE UP
FOLATE SERPL-MCNC: 10.4 NG/ML — SIGNIFICANT CHANGE UP
GAS PNL BLDA: SIGNIFICANT CHANGE UP
GAS PNL BLDV: 134 MMOL/L — LOW (ref 136–145)
GAS PNL BLDV: 141 MMOL/L — SIGNIFICANT CHANGE UP (ref 136–145)
GAS PNL BLDV: SIGNIFICANT CHANGE UP
GAS PNL BLDV: SIGNIFICANT CHANGE UP
GI PCR PANEL: SIGNIFICANT CHANGE UP
GI PCR PANEL: SIGNIFICANT CHANGE UP
GLUCOSE BLDC GLUCOMTR-MCNC: 100 MG/DL — HIGH (ref 70–99)
GLUCOSE BLDC GLUCOMTR-MCNC: 101 MG/DL — HIGH (ref 70–99)
GLUCOSE BLDC GLUCOMTR-MCNC: 102 MG/DL — HIGH (ref 70–99)
GLUCOSE BLDC GLUCOMTR-MCNC: 103 MG/DL — HIGH (ref 70–99)
GLUCOSE BLDC GLUCOMTR-MCNC: 104 MG/DL — HIGH (ref 70–99)
GLUCOSE BLDC GLUCOMTR-MCNC: 104 MG/DL — HIGH (ref 70–99)
GLUCOSE BLDC GLUCOMTR-MCNC: 105 MG/DL — HIGH (ref 70–99)
GLUCOSE BLDC GLUCOMTR-MCNC: 105 MG/DL — HIGH (ref 70–99)
GLUCOSE BLDC GLUCOMTR-MCNC: 107 MG/DL — HIGH (ref 70–99)
GLUCOSE BLDC GLUCOMTR-MCNC: 108 MG/DL — HIGH (ref 70–99)
GLUCOSE BLDC GLUCOMTR-MCNC: 109 MG/DL — HIGH (ref 70–99)
GLUCOSE BLDC GLUCOMTR-MCNC: 111 MG/DL — HIGH (ref 70–99)
GLUCOSE BLDC GLUCOMTR-MCNC: 112 MG/DL — HIGH (ref 70–99)
GLUCOSE BLDC GLUCOMTR-MCNC: 112 MG/DL — HIGH (ref 70–99)
GLUCOSE BLDC GLUCOMTR-MCNC: 114 MG/DL — HIGH (ref 70–99)
GLUCOSE BLDC GLUCOMTR-MCNC: 114 MG/DL — HIGH (ref 70–99)
GLUCOSE BLDC GLUCOMTR-MCNC: 115 MG/DL — HIGH (ref 70–99)
GLUCOSE BLDC GLUCOMTR-MCNC: 116 MG/DL — HIGH (ref 70–99)
GLUCOSE BLDC GLUCOMTR-MCNC: 116 MG/DL — HIGH (ref 70–99)
GLUCOSE BLDC GLUCOMTR-MCNC: 119 MG/DL — HIGH (ref 70–99)
GLUCOSE BLDC GLUCOMTR-MCNC: 121 MG/DL — HIGH (ref 70–99)
GLUCOSE BLDC GLUCOMTR-MCNC: 121 MG/DL — HIGH (ref 70–99)
GLUCOSE BLDC GLUCOMTR-MCNC: 122 MG/DL — HIGH (ref 70–99)
GLUCOSE BLDC GLUCOMTR-MCNC: 122 MG/DL — HIGH (ref 70–99)
GLUCOSE BLDC GLUCOMTR-MCNC: 123 MG/DL — HIGH (ref 70–99)
GLUCOSE BLDC GLUCOMTR-MCNC: 124 MG/DL — HIGH (ref 70–99)
GLUCOSE BLDC GLUCOMTR-MCNC: 124 MG/DL — HIGH (ref 70–99)
GLUCOSE BLDC GLUCOMTR-MCNC: 126 MG/DL — HIGH (ref 70–99)
GLUCOSE BLDC GLUCOMTR-MCNC: 128 MG/DL — HIGH (ref 70–99)
GLUCOSE BLDC GLUCOMTR-MCNC: 129 MG/DL — HIGH (ref 70–99)
GLUCOSE BLDC GLUCOMTR-MCNC: 130 MG/DL — HIGH (ref 70–99)
GLUCOSE BLDC GLUCOMTR-MCNC: 131 MG/DL — HIGH (ref 70–99)
GLUCOSE BLDC GLUCOMTR-MCNC: 131 MG/DL — HIGH (ref 70–99)
GLUCOSE BLDC GLUCOMTR-MCNC: 132 MG/DL — HIGH (ref 70–99)
GLUCOSE BLDC GLUCOMTR-MCNC: 135 MG/DL — HIGH (ref 70–99)
GLUCOSE BLDC GLUCOMTR-MCNC: 135 MG/DL — HIGH (ref 70–99)
GLUCOSE BLDC GLUCOMTR-MCNC: 136 MG/DL — HIGH (ref 70–99)
GLUCOSE BLDC GLUCOMTR-MCNC: 138 MG/DL — HIGH (ref 70–99)
GLUCOSE BLDC GLUCOMTR-MCNC: 139 MG/DL — HIGH (ref 70–99)
GLUCOSE BLDC GLUCOMTR-MCNC: 139 MG/DL — HIGH (ref 70–99)
GLUCOSE BLDC GLUCOMTR-MCNC: 141 MG/DL — HIGH (ref 70–99)
GLUCOSE BLDC GLUCOMTR-MCNC: 143 MG/DL — HIGH (ref 70–99)
GLUCOSE BLDC GLUCOMTR-MCNC: 144 MG/DL — HIGH (ref 70–99)
GLUCOSE BLDC GLUCOMTR-MCNC: 144 MG/DL — HIGH (ref 70–99)
GLUCOSE BLDC GLUCOMTR-MCNC: 145 MG/DL — HIGH (ref 70–99)
GLUCOSE BLDC GLUCOMTR-MCNC: 146 MG/DL — HIGH (ref 70–99)
GLUCOSE BLDC GLUCOMTR-MCNC: 150 MG/DL — HIGH (ref 70–99)
GLUCOSE BLDC GLUCOMTR-MCNC: 150 MG/DL — HIGH (ref 70–99)
GLUCOSE BLDC GLUCOMTR-MCNC: 151 MG/DL — HIGH (ref 70–99)
GLUCOSE BLDC GLUCOMTR-MCNC: 153 MG/DL — HIGH (ref 70–99)
GLUCOSE BLDC GLUCOMTR-MCNC: 154 MG/DL — HIGH (ref 70–99)
GLUCOSE BLDC GLUCOMTR-MCNC: 157 MG/DL — HIGH (ref 70–99)
GLUCOSE BLDC GLUCOMTR-MCNC: 157 MG/DL — HIGH (ref 70–99)
GLUCOSE BLDC GLUCOMTR-MCNC: 158 MG/DL — HIGH (ref 70–99)
GLUCOSE BLDC GLUCOMTR-MCNC: 158 MG/DL — HIGH (ref 70–99)
GLUCOSE BLDC GLUCOMTR-MCNC: 159 MG/DL — HIGH (ref 70–99)
GLUCOSE BLDC GLUCOMTR-MCNC: 161 MG/DL — HIGH (ref 70–99)
GLUCOSE BLDC GLUCOMTR-MCNC: 167 MG/DL — HIGH (ref 70–99)
GLUCOSE BLDC GLUCOMTR-MCNC: 167 MG/DL — HIGH (ref 70–99)
GLUCOSE BLDC GLUCOMTR-MCNC: 168 MG/DL — HIGH (ref 70–99)
GLUCOSE BLDC GLUCOMTR-MCNC: 169 MG/DL — HIGH (ref 70–99)
GLUCOSE BLDC GLUCOMTR-MCNC: 169 MG/DL — HIGH (ref 70–99)
GLUCOSE BLDC GLUCOMTR-MCNC: 170 MG/DL — HIGH (ref 70–99)
GLUCOSE BLDC GLUCOMTR-MCNC: 173 MG/DL — HIGH (ref 70–99)
GLUCOSE BLDC GLUCOMTR-MCNC: 175 MG/DL — HIGH (ref 70–99)
GLUCOSE BLDC GLUCOMTR-MCNC: 176 MG/DL — HIGH (ref 70–99)
GLUCOSE BLDC GLUCOMTR-MCNC: 181 MG/DL — HIGH (ref 70–99)
GLUCOSE BLDC GLUCOMTR-MCNC: 182 MG/DL — HIGH (ref 70–99)
GLUCOSE BLDC GLUCOMTR-MCNC: 183 MG/DL — HIGH (ref 70–99)
GLUCOSE BLDC GLUCOMTR-MCNC: 185 MG/DL — HIGH (ref 70–99)
GLUCOSE BLDC GLUCOMTR-MCNC: 186 MG/DL — HIGH (ref 70–99)
GLUCOSE BLDC GLUCOMTR-MCNC: 190 MG/DL — HIGH (ref 70–99)
GLUCOSE BLDC GLUCOMTR-MCNC: 193 MG/DL — HIGH (ref 70–99)
GLUCOSE BLDC GLUCOMTR-MCNC: 194 MG/DL — HIGH (ref 70–99)
GLUCOSE BLDC GLUCOMTR-MCNC: 194 MG/DL — HIGH (ref 70–99)
GLUCOSE BLDC GLUCOMTR-MCNC: 196 MG/DL — HIGH (ref 70–99)
GLUCOSE BLDC GLUCOMTR-MCNC: 198 MG/DL — HIGH (ref 70–99)
GLUCOSE BLDC GLUCOMTR-MCNC: 199 MG/DL — HIGH (ref 70–99)
GLUCOSE BLDC GLUCOMTR-MCNC: 199 MG/DL — HIGH (ref 70–99)
GLUCOSE BLDC GLUCOMTR-MCNC: 200 MG/DL — HIGH (ref 70–99)
GLUCOSE BLDC GLUCOMTR-MCNC: 201 MG/DL — HIGH (ref 70–99)
GLUCOSE BLDC GLUCOMTR-MCNC: 203 MG/DL — HIGH (ref 70–99)
GLUCOSE BLDC GLUCOMTR-MCNC: 204 MG/DL — HIGH (ref 70–99)
GLUCOSE BLDC GLUCOMTR-MCNC: 204 MG/DL — HIGH (ref 70–99)
GLUCOSE BLDC GLUCOMTR-MCNC: 206 MG/DL — HIGH (ref 70–99)
GLUCOSE BLDC GLUCOMTR-MCNC: 207 MG/DL — HIGH (ref 70–99)
GLUCOSE BLDC GLUCOMTR-MCNC: 207 MG/DL — HIGH (ref 70–99)
GLUCOSE BLDC GLUCOMTR-MCNC: 208 MG/DL — HIGH (ref 70–99)
GLUCOSE BLDC GLUCOMTR-MCNC: 209 MG/DL — HIGH (ref 70–99)
GLUCOSE BLDC GLUCOMTR-MCNC: 211 MG/DL — HIGH (ref 70–99)
GLUCOSE BLDC GLUCOMTR-MCNC: 213 MG/DL — HIGH (ref 70–99)
GLUCOSE BLDC GLUCOMTR-MCNC: 213 MG/DL — HIGH (ref 70–99)
GLUCOSE BLDC GLUCOMTR-MCNC: 214 MG/DL — HIGH (ref 70–99)
GLUCOSE BLDC GLUCOMTR-MCNC: 218 MG/DL — HIGH (ref 70–99)
GLUCOSE BLDC GLUCOMTR-MCNC: 219 MG/DL — HIGH (ref 70–99)
GLUCOSE BLDC GLUCOMTR-MCNC: 220 MG/DL — HIGH (ref 70–99)
GLUCOSE BLDC GLUCOMTR-MCNC: 220 MG/DL — HIGH (ref 70–99)
GLUCOSE BLDC GLUCOMTR-MCNC: 221 MG/DL — HIGH (ref 70–99)
GLUCOSE BLDC GLUCOMTR-MCNC: 222 MG/DL — HIGH (ref 70–99)
GLUCOSE BLDC GLUCOMTR-MCNC: 227 MG/DL — HIGH (ref 70–99)
GLUCOSE BLDC GLUCOMTR-MCNC: 228 MG/DL — HIGH (ref 70–99)
GLUCOSE BLDC GLUCOMTR-MCNC: 228 MG/DL — HIGH (ref 70–99)
GLUCOSE BLDC GLUCOMTR-MCNC: 230 MG/DL — HIGH (ref 70–99)
GLUCOSE BLDC GLUCOMTR-MCNC: 232 MG/DL — HIGH (ref 70–99)
GLUCOSE BLDC GLUCOMTR-MCNC: 232 MG/DL — HIGH (ref 70–99)
GLUCOSE BLDC GLUCOMTR-MCNC: 233 MG/DL — HIGH (ref 70–99)
GLUCOSE BLDC GLUCOMTR-MCNC: 234 MG/DL — HIGH (ref 70–99)
GLUCOSE BLDC GLUCOMTR-MCNC: 234 MG/DL — HIGH (ref 70–99)
GLUCOSE BLDC GLUCOMTR-MCNC: 236 MG/DL — HIGH (ref 70–99)
GLUCOSE BLDC GLUCOMTR-MCNC: 236 MG/DL — HIGH (ref 70–99)
GLUCOSE BLDC GLUCOMTR-MCNC: 237 MG/DL — HIGH (ref 70–99)
GLUCOSE BLDC GLUCOMTR-MCNC: 241 MG/DL — HIGH (ref 70–99)
GLUCOSE BLDC GLUCOMTR-MCNC: 242 MG/DL — HIGH (ref 70–99)
GLUCOSE BLDC GLUCOMTR-MCNC: 242 MG/DL — HIGH (ref 70–99)
GLUCOSE BLDC GLUCOMTR-MCNC: 244 MG/DL — HIGH (ref 70–99)
GLUCOSE BLDC GLUCOMTR-MCNC: 244 MG/DL — HIGH (ref 70–99)
GLUCOSE BLDC GLUCOMTR-MCNC: 247 MG/DL — HIGH (ref 70–99)
GLUCOSE BLDC GLUCOMTR-MCNC: 248 MG/DL — HIGH (ref 70–99)
GLUCOSE BLDC GLUCOMTR-MCNC: 248 MG/DL — HIGH (ref 70–99)
GLUCOSE BLDC GLUCOMTR-MCNC: 249 MG/DL — HIGH (ref 70–99)
GLUCOSE BLDC GLUCOMTR-MCNC: 253 MG/DL — HIGH (ref 70–99)
GLUCOSE BLDC GLUCOMTR-MCNC: 254 MG/DL — HIGH (ref 70–99)
GLUCOSE BLDC GLUCOMTR-MCNC: 255 MG/DL — HIGH (ref 70–99)
GLUCOSE BLDC GLUCOMTR-MCNC: 257 MG/DL — HIGH (ref 70–99)
GLUCOSE BLDC GLUCOMTR-MCNC: 257 MG/DL — HIGH (ref 70–99)
GLUCOSE BLDC GLUCOMTR-MCNC: 259 MG/DL — HIGH (ref 70–99)
GLUCOSE BLDC GLUCOMTR-MCNC: 261 MG/DL — HIGH (ref 70–99)
GLUCOSE BLDC GLUCOMTR-MCNC: 263 MG/DL — HIGH (ref 70–99)
GLUCOSE BLDC GLUCOMTR-MCNC: 263 MG/DL — HIGH (ref 70–99)
GLUCOSE BLDC GLUCOMTR-MCNC: 266 MG/DL — HIGH (ref 70–99)
GLUCOSE BLDC GLUCOMTR-MCNC: 269 MG/DL — HIGH (ref 70–99)
GLUCOSE BLDC GLUCOMTR-MCNC: 272 MG/DL — HIGH (ref 70–99)
GLUCOSE BLDC GLUCOMTR-MCNC: 272 MG/DL — HIGH (ref 70–99)
GLUCOSE BLDC GLUCOMTR-MCNC: 273 MG/DL — HIGH (ref 70–99)
GLUCOSE BLDC GLUCOMTR-MCNC: 275 MG/DL — HIGH (ref 70–99)
GLUCOSE BLDC GLUCOMTR-MCNC: 276 MG/DL — HIGH (ref 70–99)
GLUCOSE BLDC GLUCOMTR-MCNC: 278 MG/DL — HIGH (ref 70–99)
GLUCOSE BLDC GLUCOMTR-MCNC: 280 MG/DL — HIGH (ref 70–99)
GLUCOSE BLDC GLUCOMTR-MCNC: 280 MG/DL — HIGH (ref 70–99)
GLUCOSE BLDC GLUCOMTR-MCNC: 282 MG/DL — HIGH (ref 70–99)
GLUCOSE BLDC GLUCOMTR-MCNC: 285 MG/DL — HIGH (ref 70–99)
GLUCOSE BLDC GLUCOMTR-MCNC: 285 MG/DL — HIGH (ref 70–99)
GLUCOSE BLDC GLUCOMTR-MCNC: 290 MG/DL — HIGH (ref 70–99)
GLUCOSE BLDC GLUCOMTR-MCNC: 293 MG/DL — HIGH (ref 70–99)
GLUCOSE BLDC GLUCOMTR-MCNC: 293 MG/DL — HIGH (ref 70–99)
GLUCOSE BLDC GLUCOMTR-MCNC: 296 MG/DL — HIGH (ref 70–99)
GLUCOSE BLDC GLUCOMTR-MCNC: 298 MG/DL — HIGH (ref 70–99)
GLUCOSE BLDC GLUCOMTR-MCNC: 298 MG/DL — HIGH (ref 70–99)
GLUCOSE BLDC GLUCOMTR-MCNC: 302 MG/DL — HIGH (ref 70–99)
GLUCOSE BLDC GLUCOMTR-MCNC: 302 MG/DL — HIGH (ref 70–99)
GLUCOSE BLDC GLUCOMTR-MCNC: 304 MG/DL — HIGH (ref 70–99)
GLUCOSE BLDC GLUCOMTR-MCNC: 305 MG/DL — HIGH (ref 70–99)
GLUCOSE BLDC GLUCOMTR-MCNC: 306 MG/DL — HIGH (ref 70–99)
GLUCOSE BLDC GLUCOMTR-MCNC: 309 MG/DL — HIGH (ref 70–99)
GLUCOSE BLDC GLUCOMTR-MCNC: 315 MG/DL — HIGH (ref 70–99)
GLUCOSE BLDC GLUCOMTR-MCNC: 316 MG/DL — HIGH (ref 70–99)
GLUCOSE BLDC GLUCOMTR-MCNC: 318 MG/DL — HIGH (ref 70–99)
GLUCOSE BLDC GLUCOMTR-MCNC: 320 MG/DL — HIGH (ref 70–99)
GLUCOSE BLDC GLUCOMTR-MCNC: 321 MG/DL — HIGH (ref 70–99)
GLUCOSE BLDC GLUCOMTR-MCNC: 325 MG/DL — HIGH (ref 70–99)
GLUCOSE BLDC GLUCOMTR-MCNC: 326 MG/DL — HIGH (ref 70–99)
GLUCOSE BLDC GLUCOMTR-MCNC: 327 MG/DL — HIGH (ref 70–99)
GLUCOSE BLDC GLUCOMTR-MCNC: 330 MG/DL — HIGH (ref 70–99)
GLUCOSE BLDC GLUCOMTR-MCNC: 333 MG/DL — HIGH (ref 70–99)
GLUCOSE BLDC GLUCOMTR-MCNC: 339 MG/DL — HIGH (ref 70–99)
GLUCOSE BLDC GLUCOMTR-MCNC: 340 MG/DL — HIGH (ref 70–99)
GLUCOSE BLDC GLUCOMTR-MCNC: 340 MG/DL — HIGH (ref 70–99)
GLUCOSE BLDC GLUCOMTR-MCNC: 345 MG/DL — HIGH (ref 70–99)
GLUCOSE BLDC GLUCOMTR-MCNC: 348 MG/DL — HIGH (ref 70–99)
GLUCOSE BLDC GLUCOMTR-MCNC: 349 MG/DL — HIGH (ref 70–99)
GLUCOSE BLDC GLUCOMTR-MCNC: 349 MG/DL — HIGH (ref 70–99)
GLUCOSE BLDC GLUCOMTR-MCNC: 356 MG/DL — HIGH (ref 70–99)
GLUCOSE BLDC GLUCOMTR-MCNC: 358 MG/DL — HIGH (ref 70–99)
GLUCOSE BLDC GLUCOMTR-MCNC: 363 MG/DL — HIGH (ref 70–99)
GLUCOSE BLDC GLUCOMTR-MCNC: 363 MG/DL — HIGH (ref 70–99)
GLUCOSE BLDC GLUCOMTR-MCNC: 371 MG/DL — HIGH (ref 70–99)
GLUCOSE BLDC GLUCOMTR-MCNC: 372 MG/DL — HIGH (ref 70–99)
GLUCOSE BLDC GLUCOMTR-MCNC: 372 MG/DL — HIGH (ref 70–99)
GLUCOSE BLDC GLUCOMTR-MCNC: 376 MG/DL — HIGH (ref 70–99)
GLUCOSE BLDC GLUCOMTR-MCNC: 377 MG/DL — HIGH (ref 70–99)
GLUCOSE BLDC GLUCOMTR-MCNC: 383 MG/DL — HIGH (ref 70–99)
GLUCOSE BLDC GLUCOMTR-MCNC: 388 MG/DL — HIGH (ref 70–99)
GLUCOSE BLDC GLUCOMTR-MCNC: 394 MG/DL — HIGH (ref 70–99)
GLUCOSE BLDC GLUCOMTR-MCNC: 412 MG/DL — HIGH (ref 70–99)
GLUCOSE BLDC GLUCOMTR-MCNC: 412 MG/DL — HIGH (ref 70–99)
GLUCOSE BLDC GLUCOMTR-MCNC: 422 MG/DL — HIGH (ref 70–99)
GLUCOSE BLDC GLUCOMTR-MCNC: 424 MG/DL — HIGH (ref 70–99)
GLUCOSE BLDC GLUCOMTR-MCNC: 520 MG/DL — CRITICAL HIGH (ref 70–99)
GLUCOSE BLDC GLUCOMTR-MCNC: 61 MG/DL — LOW (ref 70–99)
GLUCOSE BLDC GLUCOMTR-MCNC: 63 MG/DL — LOW (ref 70–99)
GLUCOSE BLDC GLUCOMTR-MCNC: 65 MG/DL — LOW (ref 70–99)
GLUCOSE BLDC GLUCOMTR-MCNC: 67 MG/DL — LOW (ref 70–99)
GLUCOSE BLDC GLUCOMTR-MCNC: 73 MG/DL — SIGNIFICANT CHANGE UP (ref 70–99)
GLUCOSE BLDC GLUCOMTR-MCNC: 79 MG/DL — SIGNIFICANT CHANGE UP (ref 70–99)
GLUCOSE BLDC GLUCOMTR-MCNC: 79 MG/DL — SIGNIFICANT CHANGE UP (ref 70–99)
GLUCOSE BLDC GLUCOMTR-MCNC: 80 MG/DL — SIGNIFICANT CHANGE UP (ref 70–99)
GLUCOSE BLDC GLUCOMTR-MCNC: 80 MG/DL — SIGNIFICANT CHANGE UP (ref 70–99)
GLUCOSE BLDC GLUCOMTR-MCNC: 84 MG/DL — SIGNIFICANT CHANGE UP (ref 70–99)
GLUCOSE BLDC GLUCOMTR-MCNC: 84 MG/DL — SIGNIFICANT CHANGE UP (ref 70–99)
GLUCOSE BLDC GLUCOMTR-MCNC: 86 MG/DL — SIGNIFICANT CHANGE UP (ref 70–99)
GLUCOSE BLDC GLUCOMTR-MCNC: 89 MG/DL — SIGNIFICANT CHANGE UP (ref 70–99)
GLUCOSE BLDC GLUCOMTR-MCNC: 90 MG/DL — SIGNIFICANT CHANGE UP (ref 70–99)
GLUCOSE BLDC GLUCOMTR-MCNC: 90 MG/DL — SIGNIFICANT CHANGE UP (ref 70–99)
GLUCOSE BLDC GLUCOMTR-MCNC: 91 MG/DL — SIGNIFICANT CHANGE UP (ref 70–99)
GLUCOSE BLDC GLUCOMTR-MCNC: 92 MG/DL — SIGNIFICANT CHANGE UP (ref 70–99)
GLUCOSE BLDC GLUCOMTR-MCNC: 92 MG/DL — SIGNIFICANT CHANGE UP (ref 70–99)
GLUCOSE BLDC GLUCOMTR-MCNC: 94 MG/DL — SIGNIFICANT CHANGE UP (ref 70–99)
GLUCOSE BLDC GLUCOMTR-MCNC: 94 MG/DL — SIGNIFICANT CHANGE UP (ref 70–99)
GLUCOSE BLDC GLUCOMTR-MCNC: 95 MG/DL — SIGNIFICANT CHANGE UP (ref 70–99)
GLUCOSE BLDV-MCNC: 194 MG/DL — HIGH (ref 70–99)
GLUCOSE BLDV-MCNC: 77 MG/DL — SIGNIFICANT CHANGE UP (ref 70–99)
GLUCOSE SERPL-MCNC: 107 MG/DL — HIGH (ref 70–99)
GLUCOSE SERPL-MCNC: 109 MG/DL — HIGH (ref 70–99)
GLUCOSE SERPL-MCNC: 129 MG/DL — HIGH (ref 70–99)
GLUCOSE SERPL-MCNC: 133 MG/DL — HIGH (ref 70–99)
GLUCOSE SERPL-MCNC: 140 MG/DL — HIGH (ref 70–99)
GLUCOSE SERPL-MCNC: 140 MG/DL — HIGH (ref 70–99)
GLUCOSE SERPL-MCNC: 145 MG/DL — HIGH (ref 70–99)
GLUCOSE SERPL-MCNC: 149 MG/DL — HIGH (ref 70–99)
GLUCOSE SERPL-MCNC: 173 MG/DL — HIGH (ref 70–99)
GLUCOSE SERPL-MCNC: 183 MG/DL — HIGH (ref 70–99)
GLUCOSE SERPL-MCNC: 186 MG/DL — HIGH (ref 70–99)
GLUCOSE SERPL-MCNC: 192 MG/DL — HIGH (ref 70–99)
GLUCOSE SERPL-MCNC: 196 MG/DL — HIGH (ref 70–99)
GLUCOSE SERPL-MCNC: 197 MG/DL — HIGH (ref 70–99)
GLUCOSE SERPL-MCNC: 200 MG/DL — HIGH (ref 70–99)
GLUCOSE SERPL-MCNC: 214 MG/DL — HIGH (ref 70–99)
GLUCOSE SERPL-MCNC: 215 MG/DL — HIGH (ref 70–99)
GLUCOSE SERPL-MCNC: 224 MG/DL — HIGH (ref 70–99)
GLUCOSE SERPL-MCNC: 225 MG/DL — HIGH (ref 70–99)
GLUCOSE SERPL-MCNC: 243 MG/DL — HIGH (ref 70–99)
GLUCOSE SERPL-MCNC: 244 MG/DL — HIGH (ref 70–99)
GLUCOSE SERPL-MCNC: 247 MG/DL — HIGH (ref 70–99)
GLUCOSE SERPL-MCNC: 266 MG/DL — HIGH (ref 70–99)
GLUCOSE SERPL-MCNC: 268 MG/DL — HIGH (ref 70–99)
GLUCOSE SERPL-MCNC: 285 MG/DL — HIGH (ref 70–99)
GLUCOSE SERPL-MCNC: 300 MG/DL — HIGH (ref 70–99)
GLUCOSE SERPL-MCNC: 344 MG/DL — HIGH (ref 70–99)
GLUCOSE SERPL-MCNC: 372 MG/DL — HIGH (ref 70–99)
GLUCOSE SERPL-MCNC: 375 MG/DL — HIGH (ref 70–99)
GLUCOSE SERPL-MCNC: 375 MG/DL — HIGH (ref 70–99)
GLUCOSE SERPL-MCNC: 467 MG/DL — CRITICAL HIGH (ref 70–99)
GLUCOSE SERPL-MCNC: 467 MG/DL — CRITICAL HIGH (ref 70–99)
GLUCOSE SERPL-MCNC: 83 MG/DL — SIGNIFICANT CHANGE UP (ref 70–99)
GLUCOSE SERPL-MCNC: 89 MG/DL — SIGNIFICANT CHANGE UP (ref 70–99)
GLUCOSE SERPL-MCNC: 91 MG/DL — SIGNIFICANT CHANGE UP (ref 70–99)
GLUCOSE SERPL-MCNC: 96 MG/DL — SIGNIFICANT CHANGE UP (ref 70–99)
GLUCOSE SERPL-MCNC: 99 MG/DL — SIGNIFICANT CHANGE UP (ref 70–99)
GLUCOSE UR QL: >=1000 MG/DL
HADV DNA SPEC QL NAA+PROBE: SIGNIFICANT CHANGE UP
HCO3 BLDA-SCNC: 28 MMOL/L — SIGNIFICANT CHANGE UP (ref 21–28)
HCO3 BLDA-SCNC: 31 MMOL/L — HIGH (ref 21–28)
HCO3 BLDV-SCNC: 28 MMOL/L — SIGNIFICANT CHANGE UP (ref 22–29)
HCO3 BLDV-SCNC: 37 MMOL/L — HIGH (ref 22–29)
HCOV PNL SPEC NAA+PROBE: SIGNIFICANT CHANGE UP
HCT VFR BLD CALC: 29.4 % — LOW (ref 34.5–45)
HCT VFR BLD CALC: 29.9 % — LOW (ref 34.5–45)
HCT VFR BLD CALC: 30 % — LOW (ref 34.5–45)
HCT VFR BLD CALC: 30.1 % — LOW (ref 34.5–45)
HCT VFR BLD CALC: 30.4 % — LOW (ref 34.5–45)
HCT VFR BLD CALC: 30.5 % — LOW (ref 34.5–45)
HCT VFR BLD CALC: 30.8 % — LOW (ref 34.5–45)
HCT VFR BLD CALC: 31.1 % — LOW (ref 34.5–45)
HCT VFR BLD CALC: 31.4 % — LOW (ref 34.5–45)
HCT VFR BLD CALC: 31.5 % — LOW (ref 34.5–45)
HCT VFR BLD CALC: 31.5 % — LOW (ref 34.5–45)
HCT VFR BLD CALC: 31.8 % — LOW (ref 34.5–45)
HCT VFR BLD CALC: 32.2 % — LOW (ref 34.5–45)
HCT VFR BLD CALC: 32.4 % — LOW (ref 34.5–45)
HCT VFR BLD CALC: 32.6 % — LOW (ref 34.5–45)
HCT VFR BLD CALC: 32.6 % — LOW (ref 34.5–45)
HCT VFR BLD CALC: 32.7 % — LOW (ref 34.5–45)
HCT VFR BLD CALC: 32.8 % — LOW (ref 34.5–45)
HCT VFR BLD CALC: 32.9 % — LOW (ref 34.5–45)
HCT VFR BLD CALC: 33 % — LOW (ref 34.5–45)
HCT VFR BLD CALC: 33.1 % — LOW (ref 34.5–45)
HCT VFR BLD CALC: 33.2 % — LOW (ref 34.5–45)
HCT VFR BLD CALC: 33.3 % — LOW (ref 34.5–45)
HCT VFR BLD CALC: 33.6 % — LOW (ref 34.5–45)
HCT VFR BLD CALC: 33.9 % — LOW (ref 34.5–45)
HCT VFR BLD CALC: 33.9 % — LOW (ref 34.5–45)
HCT VFR BLD CALC: 34.2 % — LOW (ref 34.5–45)
HCT VFR BLD CALC: 34.6 % — SIGNIFICANT CHANGE UP (ref 34.5–45)
HCT VFR BLD CALC: 35.2 % — SIGNIFICANT CHANGE UP (ref 34.5–45)
HCT VFR BLD CALC: 35.5 % — SIGNIFICANT CHANGE UP (ref 34.5–45)
HCT VFR BLD CALC: 36.3 % — SIGNIFICANT CHANGE UP (ref 34.5–45)
HCT VFR BLD CALC: 36.6 % — SIGNIFICANT CHANGE UP (ref 34.5–45)
HCT VFR BLD CALC: 37 % — SIGNIFICANT CHANGE UP (ref 34.5–45)
HCT VFR BLD CALC: 38 % — SIGNIFICANT CHANGE UP (ref 34.5–45)
HCT VFR BLD CALC: 39.2 % — SIGNIFICANT CHANGE UP (ref 34.5–45)
HCT VFR BLDA CALC: 30 % — LOW (ref 34.5–46.5)
HCT VFR BLDA CALC: 34 % — LOW (ref 34.5–46.5)
HGB BLD CALC-MCNC: 10.1 G/DL — LOW (ref 11.7–16.1)
HGB BLD CALC-MCNC: 11.3 G/DL — LOW (ref 11.7–16.1)
HGB BLD-MCNC: 10 G/DL — LOW (ref 11.5–15.5)
HGB BLD-MCNC: 10.1 G/DL — LOW (ref 11.5–15.5)
HGB BLD-MCNC: 10.2 G/DL — LOW (ref 11.5–15.5)
HGB BLD-MCNC: 10.3 G/DL — LOW (ref 11.5–15.5)
HGB BLD-MCNC: 10.4 G/DL — LOW (ref 11.5–15.5)
HGB BLD-MCNC: 10.6 G/DL — LOW (ref 11.5–15.5)
HGB BLD-MCNC: 10.6 G/DL — LOW (ref 11.5–15.5)
HGB BLD-MCNC: 10.7 G/DL — LOW (ref 11.5–15.5)
HGB BLD-MCNC: 10.8 G/DL — LOW (ref 11.5–15.5)
HGB BLD-MCNC: 10.9 G/DL — LOW (ref 11.5–15.5)
HGB BLD-MCNC: 11 G/DL — LOW (ref 11.5–15.5)
HGB BLD-MCNC: 11.3 G/DL — LOW (ref 11.5–15.5)
HGB BLD-MCNC: 11.7 G/DL — SIGNIFICANT CHANGE UP (ref 11.5–15.5)
HGB BLD-MCNC: 12.1 G/DL — SIGNIFICANT CHANGE UP (ref 11.5–15.5)
HGB BLD-MCNC: 9 G/DL — LOW (ref 11.5–15.5)
HGB BLD-MCNC: 9.2 G/DL — LOW (ref 11.5–15.5)
HGB BLD-MCNC: 9.3 G/DL — LOW (ref 11.5–15.5)
HGB BLD-MCNC: 9.4 G/DL — LOW (ref 11.5–15.5)
HGB BLD-MCNC: 9.6 G/DL — LOW (ref 11.5–15.5)
HGB BLD-MCNC: 9.7 G/DL — LOW (ref 11.5–15.5)
HGB BLD-MCNC: 9.8 G/DL — LOW (ref 11.5–15.5)
HGB BLD-MCNC: 9.9 G/DL — LOW (ref 11.5–15.5)
HIV 1+2 AB+HIV1 P24 AG SERPL QL IA: SIGNIFICANT CHANGE UP
HMPV RNA SPEC QL NAA+PROBE: SIGNIFICANT CHANGE UP
HOROWITZ INDEX BLDA+IHG-RTO: 100 — SIGNIFICANT CHANGE UP
HOROWITZ INDEX BLDA+IHG-RTO: 44 — SIGNIFICANT CHANGE UP
HPIV1 RNA SPEC QL NAA+PROBE: SIGNIFICANT CHANGE UP
HPIV2 RNA SPEC QL NAA+PROBE: SIGNIFICANT CHANGE UP
HPIV3 RNA SPEC QL NAA+PROBE: SIGNIFICANT CHANGE UP
HPIV4 RNA SPEC QL NAA+PROBE: SIGNIFICANT CHANGE UP
IGG4 SER-MCNC: 13.9 MG/DL — SIGNIFICANT CHANGE UP (ref 1–123)
IMM GRANULOCYTES NFR BLD AUTO: 0.2 % — SIGNIFICANT CHANGE UP (ref 0–0.9)
IMM GRANULOCYTES NFR BLD AUTO: 0.4 % — SIGNIFICANT CHANGE UP (ref 0–0.9)
IMM GRANULOCYTES NFR BLD AUTO: 0.5 % — SIGNIFICANT CHANGE UP (ref 0–0.9)
IMM GRANULOCYTES NFR BLD AUTO: 0.6 % — SIGNIFICANT CHANGE UP (ref 0–0.9)
IMM GRANULOCYTES NFR BLD AUTO: 1.3 % — HIGH (ref 0–0.9)
INR BLD: 1.03 RATIO — SIGNIFICANT CHANGE UP (ref 0.85–1.18)
INR BLD: 1.13 RATIO — SIGNIFICANT CHANGE UP (ref 0.85–1.18)
KETONES UR-MCNC: NEGATIVE MG/DL — SIGNIFICANT CHANGE UP
KU MYOMARKER3 PLUS: NEGATIVE — SIGNIFICANT CHANGE UP
LACTATE BLDV-MCNC: 1.1 MMOL/L — SIGNIFICANT CHANGE UP (ref 0.5–2)
LACTATE BLDV-MCNC: 2.6 MMOL/L — HIGH (ref 0.5–2)
LACTATE SERPL-SCNC: 1.8 MMOL/L — SIGNIFICANT CHANGE UP (ref 0.5–2)
LACTATE SERPL-SCNC: 2.4 MMOL/L — HIGH (ref 0.5–2)
LACTATE SERPL-SCNC: 2.7 MMOL/L — HIGH (ref 0.5–2)
LEGIONELLA AG UR QL: NEGATIVE — SIGNIFICANT CHANGE UP
LEUKOCYTE ESTERASE UR-ACNC: NEGATIVE — SIGNIFICANT CHANGE UP
LYMPHOCYTES # BLD AUTO: 0.23 K/UL — LOW (ref 1–3.3)
LYMPHOCYTES # BLD AUTO: 0.71 K/UL — LOW (ref 1–3.3)
LYMPHOCYTES # BLD AUTO: 0.82 K/UL — LOW (ref 1–3.3)
LYMPHOCYTES # BLD AUTO: 0.89 K/UL — LOW (ref 1–3.3)
LYMPHOCYTES # BLD AUTO: 0.98 K/UL — LOW (ref 1–3.3)
LYMPHOCYTES # BLD AUTO: 1.16 K/UL — SIGNIFICANT CHANGE UP (ref 1–3.3)
LYMPHOCYTES # BLD AUTO: 19.5 % — SIGNIFICANT CHANGE UP (ref 13–44)
LYMPHOCYTES # BLD AUTO: 2.1 K/UL — SIGNIFICANT CHANGE UP (ref 1–3.3)
LYMPHOCYTES # BLD AUTO: 3 % — LOW (ref 13–44)
LYMPHOCYTES # BLD AUTO: 6.9 % — LOW (ref 13–44)
LYMPHOCYTES # BLD AUTO: 7 % — LOW (ref 13–44)
LYMPHOCYTES # BLD AUTO: 7.6 % — LOW (ref 13–44)
LYMPHOCYTES # BLD AUTO: 7.7 % — LOW (ref 13–44)
LYMPHOCYTES # BLD AUTO: 7.8 % — LOW (ref 13–44)
M PNEUMO IGM SER-ACNC: 0.61 INDEX — SIGNIFICANT CHANGE UP (ref 0–0.9)
MAGNESIUM SERPL-MCNC: 1.8 MG/DL — SIGNIFICANT CHANGE UP (ref 1.6–2.6)
MAGNESIUM SERPL-MCNC: 1.9 MG/DL — SIGNIFICANT CHANGE UP (ref 1.6–2.6)
MAGNESIUM SERPL-MCNC: 1.9 MG/DL — SIGNIFICANT CHANGE UP (ref 1.6–2.6)
MAGNESIUM SERPL-MCNC: 2 MG/DL — SIGNIFICANT CHANGE UP (ref 1.6–2.6)
MAGNESIUM SERPL-MCNC: 2.1 MG/DL — SIGNIFICANT CHANGE UP (ref 1.6–2.6)
MAGNESIUM SERPL-MCNC: 2.2 MG/DL — SIGNIFICANT CHANGE UP (ref 1.6–2.6)
MAGNESIUM SERPL-MCNC: 2.3 MG/DL — SIGNIFICANT CHANGE UP (ref 1.6–2.6)
MAGNESIUM SERPL-MCNC: 2.4 MG/DL — SIGNIFICANT CHANGE UP (ref 1.6–2.6)
MANUAL SMEAR VERIFICATION: SIGNIFICANT CHANGE UP
MCHC RBC-ENTMCNC: 25.4 PG — LOW (ref 27–34)
MCHC RBC-ENTMCNC: 25.4 PG — LOW (ref 27–34)
MCHC RBC-ENTMCNC: 25.6 PG — LOW (ref 27–34)
MCHC RBC-ENTMCNC: 25.7 PG — LOW (ref 27–34)
MCHC RBC-ENTMCNC: 25.7 PG — LOW (ref 27–34)
MCHC RBC-ENTMCNC: 25.8 PG — LOW (ref 27–34)
MCHC RBC-ENTMCNC: 25.9 PG — LOW (ref 27–34)
MCHC RBC-ENTMCNC: 26 PG — LOW (ref 27–34)
MCHC RBC-ENTMCNC: 26 PG — LOW (ref 27–34)
MCHC RBC-ENTMCNC: 26.1 PG — LOW (ref 27–34)
MCHC RBC-ENTMCNC: 26.1 PG — LOW (ref 27–34)
MCHC RBC-ENTMCNC: 26.2 PG — LOW (ref 27–34)
MCHC RBC-ENTMCNC: 26.3 PG — LOW (ref 27–34)
MCHC RBC-ENTMCNC: 26.4 PG — LOW (ref 27–34)
MCHC RBC-ENTMCNC: 26.5 PG — LOW (ref 27–34)
MCHC RBC-ENTMCNC: 26.5 PG — LOW (ref 27–34)
MCHC RBC-ENTMCNC: 26.6 PG — LOW (ref 27–34)
MCHC RBC-ENTMCNC: 26.7 PG — LOW (ref 27–34)
MCHC RBC-ENTMCNC: 26.8 PG — LOW (ref 27–34)
MCHC RBC-ENTMCNC: 26.8 PG — LOW (ref 27–34)
MCHC RBC-ENTMCNC: 26.9 PG — LOW (ref 27–34)
MCHC RBC-ENTMCNC: 29.5 GM/DL — LOW (ref 32–36)
MCHC RBC-ENTMCNC: 29.9 GM/DL — LOW (ref 32–36)
MCHC RBC-ENTMCNC: 30 GM/DL — LOW (ref 32–36)
MCHC RBC-ENTMCNC: 30.1 GM/DL — LOW (ref 32–36)
MCHC RBC-ENTMCNC: 30.3 GM/DL — LOW (ref 32–36)
MCHC RBC-ENTMCNC: 30.4 GM/DL — LOW (ref 32–36)
MCHC RBC-ENTMCNC: 30.4 GM/DL — LOW (ref 32–36)
MCHC RBC-ENTMCNC: 30.5 GM/DL — LOW (ref 32–36)
MCHC RBC-ENTMCNC: 30.6 GM/DL — LOW (ref 32–36)
MCHC RBC-ENTMCNC: 30.7 GM/DL — LOW (ref 32–36)
MCHC RBC-ENTMCNC: 30.8 GM/DL — LOW (ref 32–36)
MCHC RBC-ENTMCNC: 30.9 GM/DL — LOW (ref 32–36)
MCHC RBC-ENTMCNC: 30.9 GM/DL — LOW (ref 32–36)
MCHC RBC-ENTMCNC: 31 GM/DL — LOW (ref 32–36)
MCHC RBC-ENTMCNC: 31.1 GM/DL — LOW (ref 32–36)
MCHC RBC-ENTMCNC: 31.2 GM/DL — LOW (ref 32–36)
MCHC RBC-ENTMCNC: 31.3 GM/DL — LOW (ref 32–36)
MCHC RBC-ENTMCNC: 31.4 GM/DL — LOW (ref 32–36)
MCHC RBC-ENTMCNC: 31.5 GM/DL — LOW (ref 32–36)
MCHC RBC-ENTMCNC: 31.6 GM/DL — LOW (ref 32–36)
MCV RBC AUTO: 82.6 FL — SIGNIFICANT CHANGE UP (ref 80–100)
MCV RBC AUTO: 83.4 FL — SIGNIFICANT CHANGE UP (ref 80–100)
MCV RBC AUTO: 83.5 FL — SIGNIFICANT CHANGE UP (ref 80–100)
MCV RBC AUTO: 83.6 FL — SIGNIFICANT CHANGE UP (ref 80–100)
MCV RBC AUTO: 83.6 FL — SIGNIFICANT CHANGE UP (ref 80–100)
MCV RBC AUTO: 83.7 FL — SIGNIFICANT CHANGE UP (ref 80–100)
MCV RBC AUTO: 83.9 FL — SIGNIFICANT CHANGE UP (ref 80–100)
MCV RBC AUTO: 83.9 FL — SIGNIFICANT CHANGE UP (ref 80–100)
MCV RBC AUTO: 84 FL — SIGNIFICANT CHANGE UP (ref 80–100)
MCV RBC AUTO: 84.1 FL — SIGNIFICANT CHANGE UP (ref 80–100)
MCV RBC AUTO: 84.2 FL — SIGNIFICANT CHANGE UP (ref 80–100)
MCV RBC AUTO: 84.2 FL — SIGNIFICANT CHANGE UP (ref 80–100)
MCV RBC AUTO: 84.3 FL — SIGNIFICANT CHANGE UP (ref 80–100)
MCV RBC AUTO: 84.5 FL — SIGNIFICANT CHANGE UP (ref 80–100)
MCV RBC AUTO: 84.6 FL — SIGNIFICANT CHANGE UP (ref 80–100)
MCV RBC AUTO: 84.9 FL — SIGNIFICANT CHANGE UP (ref 80–100)
MCV RBC AUTO: 84.9 FL — SIGNIFICANT CHANGE UP (ref 80–100)
MCV RBC AUTO: 85 FL — SIGNIFICANT CHANGE UP (ref 80–100)
MCV RBC AUTO: 85.2 FL — SIGNIFICANT CHANGE UP (ref 80–100)
MCV RBC AUTO: 85.3 FL — SIGNIFICANT CHANGE UP (ref 80–100)
MCV RBC AUTO: 85.3 FL — SIGNIFICANT CHANGE UP (ref 80–100)
MCV RBC AUTO: 85.6 FL — SIGNIFICANT CHANGE UP (ref 80–100)
MCV RBC AUTO: 85.9 FL — SIGNIFICANT CHANGE UP (ref 80–100)
MCV RBC AUTO: 86 FL — SIGNIFICANT CHANGE UP (ref 80–100)
MCV RBC AUTO: 86 FL — SIGNIFICANT CHANGE UP (ref 80–100)
MCV RBC AUTO: 86.1 FL — SIGNIFICANT CHANGE UP (ref 80–100)
MCV RBC AUTO: 86.2 FL — SIGNIFICANT CHANGE UP (ref 80–100)
MCV RBC AUTO: 86.3 FL — SIGNIFICANT CHANGE UP (ref 80–100)
MCV RBC AUTO: 86.4 FL — SIGNIFICANT CHANGE UP (ref 80–100)
MCV RBC AUTO: 86.5 FL — SIGNIFICANT CHANGE UP (ref 80–100)
MCV RBC AUTO: 86.5 FL — SIGNIFICANT CHANGE UP (ref 80–100)
MCV RBC AUTO: 87.1 FL — SIGNIFICANT CHANGE UP (ref 80–100)
MDA5 (P140)(CADM-140) PLUS: <20 UNITS — SIGNIFICANT CHANGE UP
METHOD TYPE: SIGNIFICANT CHANGE UP
MI-2 PLUS: NEGATIVE — SIGNIFICANT CHANGE UP
MONOCYTES # BLD AUTO: 0.05 K/UL — SIGNIFICANT CHANGE UP (ref 0–0.9)
MONOCYTES # BLD AUTO: 0.18 K/UL — SIGNIFICANT CHANGE UP (ref 0–0.9)
MONOCYTES # BLD AUTO: 0.2 K/UL — SIGNIFICANT CHANGE UP (ref 0–0.9)
MONOCYTES # BLD AUTO: 0.42 K/UL — SIGNIFICANT CHANGE UP (ref 0–0.9)
MONOCYTES # BLD AUTO: 0.6 K/UL — SIGNIFICANT CHANGE UP (ref 0–0.9)
MONOCYTES # BLD AUTO: 0.67 K/UL — SIGNIFICANT CHANGE UP (ref 0–0.9)
MONOCYTES # BLD AUTO: 0.79 K/UL — SIGNIFICANT CHANGE UP (ref 0–0.9)
MONOCYTES NFR BLD AUTO: 0.6 % — LOW (ref 2–14)
MONOCYTES NFR BLD AUTO: 1.3 % — LOW (ref 2–14)
MONOCYTES NFR BLD AUTO: 1.7 % — LOW (ref 2–14)
MONOCYTES NFR BLD AUTO: 3.3 % — SIGNIFICANT CHANGE UP (ref 2–14)
MONOCYTES NFR BLD AUTO: 5.6 % — SIGNIFICANT CHANGE UP (ref 2–14)
MONOCYTES NFR BLD AUTO: 6.2 % — SIGNIFICANT CHANGE UP (ref 2–14)
MONOCYTES NFR BLD AUTO: 6.6 % — SIGNIFICANT CHANGE UP (ref 2–14)
MRSA PCR RESULT.: SIGNIFICANT CHANGE UP
MRSA PCR RESULT.: SIGNIFICANT CHANGE UP
MUSK IGG SER IA-MCNC: SIGNIFICANT CHANGE UP
MYCOPLASMA AG SPEC QL: NEGATIVE — SIGNIFICANT CHANGE UP
NEUTROPHILS # BLD AUTO: 11.27 K/UL — HIGH (ref 1.8–7.4)
NEUTROPHILS # BLD AUTO: 12.25 K/UL — HIGH (ref 1.8–7.4)
NEUTROPHILS # BLD AUTO: 13.83 K/UL — HIGH (ref 1.8–7.4)
NEUTROPHILS # BLD AUTO: 7.4 K/UL — SIGNIFICANT CHANGE UP (ref 1.8–7.4)
NEUTROPHILS # BLD AUTO: 7.67 K/UL — HIGH (ref 1.8–7.4)
NEUTROPHILS # BLD AUTO: 7.74 K/UL — HIGH (ref 1.8–7.4)
NEUTROPHILS # BLD AUTO: 9.62 K/UL — HIGH (ref 1.8–7.4)
NEUTROPHILS NFR BLD AUTO: 71.9 % — SIGNIFICANT CHANGE UP (ref 43–77)
NEUTROPHILS NFR BLD AUTO: 84.8 % — HIGH (ref 43–77)
NEUTROPHILS NFR BLD AUTO: 86 % — HIGH (ref 43–77)
NEUTROPHILS NFR BLD AUTO: 89 % — HIGH (ref 43–77)
NEUTROPHILS NFR BLD AUTO: 89.9 % — HIGH (ref 43–77)
NEUTROPHILS NFR BLD AUTO: 90.4 % — HIGH (ref 43–77)
NEUTROPHILS NFR BLD AUTO: 96 % — HIGH (ref 43–77)
NITRITE UR-MCNC: NEGATIVE — SIGNIFICANT CHANGE UP
NRBC # BLD: 0 /100 WBCS — SIGNIFICANT CHANGE UP (ref 0–0)
NT-PROBNP SERPL-SCNC: 2826 PG/ML — HIGH (ref 0–300)
NT-PROBNP SERPL-SCNC: 3705 PG/ML — HIGH (ref 0–300)
NT-PROBNP SERPL-SCNC: 4682 PG/ML — HIGH (ref 0–300)
NT-PROBNP SERPL-SCNC: 5496 PG/ML — HIGH (ref 0–300)
NT-PROBNP SERPL-SCNC: 5721 PG/ML — HIGH (ref 0–300)
NT-PROBNP SERPL-SCNC: 6470 PG/ML — HIGH (ref 0–300)
NT-PROBNP SERPL-SCNC: 6919 PG/ML — HIGH (ref 0–300)
NT-PROBNP SERPL-SCNC: 7056 PG/ML — HIGH (ref 0–300)
NT-PROBNP SERPL-SCNC: 8271 PG/ML — HIGH (ref 0–300)
NT-PROBNP SERPL-SCNC: 8766 PG/ML — HIGH (ref 0–300)
NT-PROBNP SERPL-SCNC: 9026 PG/ML — HIGH (ref 0–300)
NT-PROBNP SERPL-SCNC: HIGH PG/ML (ref 0–300)
NXP-2 (P140) MYOPLUS: <20 UNITS — SIGNIFICANT CHANGE UP
OJ MYOMARKER3 PLUS: NEGATIVE — SIGNIFICANT CHANGE UP
ORGANISM # SPEC MICROSCOPIC CNT: ABNORMAL
ORGANISM # SPEC MICROSCOPIC CNT: ABNORMAL
OSMOLALITY UR: 642 MOS/KG — SIGNIFICANT CHANGE UP (ref 50–1200)
OVALOCYTES BLD QL SMEAR: SLIGHT — SIGNIFICANT CHANGE UP
P-ANCA SER-ACNC: NEGATIVE — SIGNIFICANT CHANGE UP
PCO2 BLDA: 42 MMHG — SIGNIFICANT CHANGE UP (ref 32–45)
PCO2 BLDA: 44 MMHG — HIGH (ref 32–35)
PCO2 BLDV: 39 MMHG — SIGNIFICANT CHANGE UP (ref 39–42)
PCO2 BLDV: 63 MMHG — HIGH (ref 39–42)
PH BLDA: 7.44 — SIGNIFICANT CHANGE UP (ref 7.35–7.45)
PH BLDA: 7.45 — SIGNIFICANT CHANGE UP (ref 7.35–7.45)
PH BLDV: 7.38 — SIGNIFICANT CHANGE UP (ref 7.32–7.43)
PH BLDV: 7.47 — HIGH (ref 7.32–7.43)
PH UR: 5.5 — SIGNIFICANT CHANGE UP (ref 5–8)
PHOSPHATE SERPL-MCNC: 1.8 MG/DL — LOW (ref 2.5–4.5)
PHOSPHATE SERPL-MCNC: 2.4 MG/DL — LOW (ref 2.5–4.5)
PHOSPHATE SERPL-MCNC: 2.5 MG/DL — SIGNIFICANT CHANGE UP (ref 2.5–4.5)
PHOSPHATE SERPL-MCNC: 2.5 MG/DL — SIGNIFICANT CHANGE UP (ref 2.5–4.5)
PHOSPHATE SERPL-MCNC: 2.6 MG/DL — SIGNIFICANT CHANGE UP (ref 2.5–4.5)
PHOSPHATE SERPL-MCNC: 2.8 MG/DL — SIGNIFICANT CHANGE UP (ref 2.5–4.5)
PHOSPHATE SERPL-MCNC: 2.9 MG/DL — SIGNIFICANT CHANGE UP (ref 2.5–4.5)
PHOSPHATE SERPL-MCNC: 3.1 MG/DL — SIGNIFICANT CHANGE UP (ref 2.5–4.5)
PHOSPHATE SERPL-MCNC: 3.1 MG/DL — SIGNIFICANT CHANGE UP (ref 2.5–4.5)
PHOSPHATE SERPL-MCNC: 3.2 MG/DL — SIGNIFICANT CHANGE UP (ref 2.5–4.5)
PHOSPHATE SERPL-MCNC: 3.5 MG/DL — SIGNIFICANT CHANGE UP (ref 2.5–4.5)
PHOSPHATE SERPL-MCNC: 3.5 MG/DL — SIGNIFICANT CHANGE UP (ref 2.5–4.5)
PHOSPHATE SERPL-MCNC: 3.6 MG/DL — SIGNIFICANT CHANGE UP (ref 2.5–4.5)
PHOSPHATE SERPL-MCNC: 3.9 MG/DL — SIGNIFICANT CHANGE UP (ref 2.5–4.5)
PHOSPHATE SERPL-MCNC: 4.5 MG/DL — SIGNIFICANT CHANGE UP (ref 2.5–4.5)
PHOSPHATE SERPL-MCNC: 4.7 MG/DL — HIGH (ref 2.5–4.5)
PL-12 PLUS: NEGATIVE — SIGNIFICANT CHANGE UP
PL-7 PLUS: NEGATIVE — SIGNIFICANT CHANGE UP
PLAT MORPH BLD: NORMAL — SIGNIFICANT CHANGE UP
PLATELET # BLD AUTO: 320 K/UL — SIGNIFICANT CHANGE UP (ref 150–400)
PLATELET # BLD AUTO: 330 K/UL — SIGNIFICANT CHANGE UP (ref 150–400)
PLATELET # BLD AUTO: 331 K/UL — SIGNIFICANT CHANGE UP (ref 150–400)
PLATELET # BLD AUTO: 335 K/UL — SIGNIFICANT CHANGE UP (ref 150–400)
PLATELET # BLD AUTO: 338 K/UL — SIGNIFICANT CHANGE UP (ref 150–400)
PLATELET # BLD AUTO: 339 K/UL — SIGNIFICANT CHANGE UP (ref 150–400)
PLATELET # BLD AUTO: 342 K/UL — SIGNIFICANT CHANGE UP (ref 150–400)
PLATELET # BLD AUTO: 343 K/UL — SIGNIFICANT CHANGE UP (ref 150–400)
PLATELET # BLD AUTO: 370 K/UL — SIGNIFICANT CHANGE UP (ref 150–400)
PLATELET # BLD AUTO: 372 K/UL — SIGNIFICANT CHANGE UP (ref 150–400)
PLATELET # BLD AUTO: 375 K/UL — SIGNIFICANT CHANGE UP (ref 150–400)
PLATELET # BLD AUTO: 389 K/UL — SIGNIFICANT CHANGE UP (ref 150–400)
PLATELET # BLD AUTO: 395 K/UL — SIGNIFICANT CHANGE UP (ref 150–400)
PLATELET # BLD AUTO: 399 K/UL — SIGNIFICANT CHANGE UP (ref 150–400)
PLATELET # BLD AUTO: 399 K/UL — SIGNIFICANT CHANGE UP (ref 150–400)
PLATELET # BLD AUTO: 408 K/UL — HIGH (ref 150–400)
PLATELET # BLD AUTO: 411 K/UL — HIGH (ref 150–400)
PLATELET # BLD AUTO: 411 K/UL — HIGH (ref 150–400)
PLATELET # BLD AUTO: 414 K/UL — HIGH (ref 150–400)
PLATELET # BLD AUTO: 418 K/UL — HIGH (ref 150–400)
PLATELET # BLD AUTO: 441 K/UL — HIGH (ref 150–400)
PLATELET # BLD AUTO: 444 K/UL — HIGH (ref 150–400)
PLATELET # BLD AUTO: 452 K/UL — HIGH (ref 150–400)
PLATELET # BLD AUTO: 460 K/UL — HIGH (ref 150–400)
PLATELET # BLD AUTO: 466 K/UL — HIGH (ref 150–400)
PLATELET # BLD AUTO: 497 K/UL — HIGH (ref 150–400)
PLATELET # BLD AUTO: 499 K/UL — HIGH (ref 150–400)
PLATELET # BLD AUTO: 510 K/UL — HIGH (ref 150–400)
PLATELET # BLD AUTO: 516 K/UL — HIGH (ref 150–400)
PLATELET # BLD AUTO: 516 K/UL — HIGH (ref 150–400)
PLATELET # BLD AUTO: 517 K/UL — HIGH (ref 150–400)
PLATELET # BLD AUTO: 537 K/UL — HIGH (ref 150–400)
PLATELET # BLD AUTO: 547 K/UL — HIGH (ref 150–400)
PLATELET # BLD AUTO: 554 K/UL — HIGH (ref 150–400)
PLATELET # BLD AUTO: 555 K/UL — HIGH (ref 150–400)
PLATELET COUNT - ESTIMATE: NORMAL — SIGNIFICANT CHANGE UP
PO2 BLDA: 141 MMHG — HIGH (ref 83–108)
PO2 BLDA: 58 MMHG — LOW (ref 83–108)
PO2 BLDV: 48 MMHG — HIGH (ref 25–45)
PO2 BLDV: 64 MMHG — HIGH (ref 25–45)
POIKILOCYTOSIS BLD QL AUTO: SLIGHT — SIGNIFICANT CHANGE UP
POTASSIUM BLDV-SCNC: 3.7 MMOL/L — SIGNIFICANT CHANGE UP (ref 3.5–5.1)
POTASSIUM BLDV-SCNC: 4.8 MMOL/L — SIGNIFICANT CHANGE UP (ref 3.5–5.1)
POTASSIUM SERPL-MCNC: 3.3 MMOL/L — LOW (ref 3.5–5.3)
POTASSIUM SERPL-MCNC: 3.5 MMOL/L — SIGNIFICANT CHANGE UP (ref 3.5–5.3)
POTASSIUM SERPL-MCNC: 3.6 MMOL/L — SIGNIFICANT CHANGE UP (ref 3.5–5.3)
POTASSIUM SERPL-MCNC: 3.7 MMOL/L — SIGNIFICANT CHANGE UP (ref 3.5–5.3)
POTASSIUM SERPL-MCNC: 3.7 MMOL/L — SIGNIFICANT CHANGE UP (ref 3.5–5.3)
POTASSIUM SERPL-MCNC: 3.8 MMOL/L — SIGNIFICANT CHANGE UP (ref 3.5–5.3)
POTASSIUM SERPL-MCNC: 3.9 MMOL/L — SIGNIFICANT CHANGE UP (ref 3.5–5.3)
POTASSIUM SERPL-MCNC: 4 MMOL/L — SIGNIFICANT CHANGE UP (ref 3.5–5.3)
POTASSIUM SERPL-MCNC: 4.1 MMOL/L — SIGNIFICANT CHANGE UP (ref 3.5–5.3)
POTASSIUM SERPL-MCNC: 4.2 MMOL/L — SIGNIFICANT CHANGE UP (ref 3.5–5.3)
POTASSIUM SERPL-MCNC: 4.2 MMOL/L — SIGNIFICANT CHANGE UP (ref 3.5–5.3)
POTASSIUM SERPL-MCNC: 4.3 MMOL/L — SIGNIFICANT CHANGE UP (ref 3.5–5.3)
POTASSIUM SERPL-MCNC: 4.4 MMOL/L — SIGNIFICANT CHANGE UP (ref 3.5–5.3)
POTASSIUM SERPL-MCNC: 4.5 MMOL/L — SIGNIFICANT CHANGE UP (ref 3.5–5.3)
POTASSIUM SERPL-MCNC: 4.5 MMOL/L — SIGNIFICANT CHANGE UP (ref 3.5–5.3)
POTASSIUM SERPL-MCNC: 4.6 MMOL/L — SIGNIFICANT CHANGE UP (ref 3.5–5.3)
POTASSIUM SERPL-MCNC: 4.7 MMOL/L — SIGNIFICANT CHANGE UP (ref 3.5–5.3)
POTASSIUM SERPL-MCNC: 4.8 MMOL/L — SIGNIFICANT CHANGE UP (ref 3.5–5.3)
POTASSIUM SERPL-MCNC: 4.9 MMOL/L — SIGNIFICANT CHANGE UP (ref 3.5–5.3)
POTASSIUM SERPL-MCNC: 4.9 MMOL/L — SIGNIFICANT CHANGE UP (ref 3.5–5.3)
POTASSIUM SERPL-SCNC: 3.3 MMOL/L — LOW (ref 3.5–5.3)
POTASSIUM SERPL-SCNC: 3.5 MMOL/L — SIGNIFICANT CHANGE UP (ref 3.5–5.3)
POTASSIUM SERPL-SCNC: 3.6 MMOL/L — SIGNIFICANT CHANGE UP (ref 3.5–5.3)
POTASSIUM SERPL-SCNC: 3.7 MMOL/L — SIGNIFICANT CHANGE UP (ref 3.5–5.3)
POTASSIUM SERPL-SCNC: 3.7 MMOL/L — SIGNIFICANT CHANGE UP (ref 3.5–5.3)
POTASSIUM SERPL-SCNC: 3.8 MMOL/L — SIGNIFICANT CHANGE UP (ref 3.5–5.3)
POTASSIUM SERPL-SCNC: 3.9 MMOL/L — SIGNIFICANT CHANGE UP (ref 3.5–5.3)
POTASSIUM SERPL-SCNC: 4 MMOL/L — SIGNIFICANT CHANGE UP (ref 3.5–5.3)
POTASSIUM SERPL-SCNC: 4.1 MMOL/L — SIGNIFICANT CHANGE UP (ref 3.5–5.3)
POTASSIUM SERPL-SCNC: 4.2 MMOL/L — SIGNIFICANT CHANGE UP (ref 3.5–5.3)
POTASSIUM SERPL-SCNC: 4.2 MMOL/L — SIGNIFICANT CHANGE UP (ref 3.5–5.3)
POTASSIUM SERPL-SCNC: 4.3 MMOL/L — SIGNIFICANT CHANGE UP (ref 3.5–5.3)
POTASSIUM SERPL-SCNC: 4.4 MMOL/L — SIGNIFICANT CHANGE UP (ref 3.5–5.3)
POTASSIUM SERPL-SCNC: 4.5 MMOL/L — SIGNIFICANT CHANGE UP (ref 3.5–5.3)
POTASSIUM SERPL-SCNC: 4.5 MMOL/L — SIGNIFICANT CHANGE UP (ref 3.5–5.3)
POTASSIUM SERPL-SCNC: 4.6 MMOL/L — SIGNIFICANT CHANGE UP (ref 3.5–5.3)
POTASSIUM SERPL-SCNC: 4.7 MMOL/L — SIGNIFICANT CHANGE UP (ref 3.5–5.3)
POTASSIUM SERPL-SCNC: 4.8 MMOL/L — SIGNIFICANT CHANGE UP (ref 3.5–5.3)
POTASSIUM SERPL-SCNC: 4.9 MMOL/L — SIGNIFICANT CHANGE UP (ref 3.5–5.3)
POTASSIUM SERPL-SCNC: 4.9 MMOL/L — SIGNIFICANT CHANGE UP (ref 3.5–5.3)
POTASSIUM UR-SCNC: 18 MMOL/L — SIGNIFICANT CHANGE UP
PROT ?TM UR-MCNC: 161 MG/DL — HIGH (ref 0–12)
PROT SERPL-MCNC: 5.8 G/DL — LOW (ref 6–8.3)
PROT SERPL-MCNC: 6 G/DL — SIGNIFICANT CHANGE UP (ref 6–8.3)
PROT SERPL-MCNC: 6 G/DL — SIGNIFICANT CHANGE UP (ref 6–8.3)
PROT SERPL-MCNC: 6.1 G/DL — SIGNIFICANT CHANGE UP (ref 6–8.3)
PROT SERPL-MCNC: 6.2 G/DL — SIGNIFICANT CHANGE UP (ref 6–8.3)
PROT SERPL-MCNC: 6.5 G/DL — SIGNIFICANT CHANGE UP (ref 6–8.3)
PROT SERPL-MCNC: 6.6 G/DL — SIGNIFICANT CHANGE UP (ref 6–8.3)
PROT SERPL-MCNC: 7.1 G/DL — SIGNIFICANT CHANGE UP (ref 6–8.3)
PROT SERPL-MCNC: 7.1 G/DL — SIGNIFICANT CHANGE UP (ref 6–8.3)
PROT UR-MCNC: 100 MG/DL
PROTHROM AB SERPL-ACNC: 10.8 SEC — SIGNIFICANT CHANGE UP (ref 9.5–13)
PROTHROM AB SERPL-ACNC: 11.8 SEC — SIGNIFICANT CHANGE UP (ref 9.5–13)
RAPID RVP RESULT: SIGNIFICANT CHANGE UP
RBC # BLD: 3.4 M/UL — LOW (ref 3.8–5.2)
RBC # BLD: 3.51 M/UL — LOW (ref 3.8–5.2)
RBC # BLD: 3.52 M/UL — LOW (ref 3.8–5.2)
RBC # BLD: 3.54 M/UL — LOW (ref 3.8–5.2)
RBC # BLD: 3.58 M/UL — LOW (ref 3.8–5.2)
RBC # BLD: 3.6 M/UL — LOW (ref 3.8–5.2)
RBC # BLD: 3.63 M/UL — LOW (ref 3.8–5.2)
RBC # BLD: 3.64 M/UL — LOW (ref 3.8–5.2)
RBC # BLD: 3.66 M/UL — LOW (ref 3.8–5.2)
RBC # BLD: 3.68 M/UL — LOW (ref 3.8–5.2)
RBC # BLD: 3.71 M/UL — LOW (ref 3.8–5.2)
RBC # BLD: 3.74 M/UL — LOW (ref 3.8–5.2)
RBC # BLD: 3.78 M/UL — LOW (ref 3.8–5.2)
RBC # BLD: 3.79 M/UL — LOW (ref 3.8–5.2)
RBC # BLD: 3.84 M/UL — SIGNIFICANT CHANGE UP (ref 3.8–5.2)
RBC # BLD: 3.84 M/UL — SIGNIFICANT CHANGE UP (ref 3.8–5.2)
RBC # BLD: 3.86 M/UL — SIGNIFICANT CHANGE UP (ref 3.8–5.2)
RBC # BLD: 3.88 M/UL — SIGNIFICANT CHANGE UP (ref 3.8–5.2)
RBC # BLD: 3.89 M/UL — SIGNIFICANT CHANGE UP (ref 3.8–5.2)
RBC # BLD: 3.92 M/UL — SIGNIFICANT CHANGE UP (ref 3.8–5.2)
RBC # BLD: 3.94 M/UL — SIGNIFICANT CHANGE UP (ref 3.8–5.2)
RBC # BLD: 3.94 M/UL — SIGNIFICANT CHANGE UP (ref 3.8–5.2)
RBC # BLD: 3.98 M/UL — SIGNIFICANT CHANGE UP (ref 3.8–5.2)
RBC # BLD: 4.06 M/UL — SIGNIFICANT CHANGE UP (ref 3.8–5.2)
RBC # BLD: 4.09 M/UL — SIGNIFICANT CHANGE UP (ref 3.8–5.2)
RBC # BLD: 4.1 M/UL — SIGNIFICANT CHANGE UP (ref 3.8–5.2)
RBC # BLD: 4.21 M/UL — SIGNIFICANT CHANGE UP (ref 3.8–5.2)
RBC # BLD: 4.22 M/UL — SIGNIFICANT CHANGE UP (ref 3.8–5.2)
RBC # BLD: 4.22 M/UL — SIGNIFICANT CHANGE UP (ref 3.8–5.2)
RBC # BLD: 4.25 M/UL — SIGNIFICANT CHANGE UP (ref 3.8–5.2)
RBC # BLD: 4.26 M/UL — SIGNIFICANT CHANGE UP (ref 3.8–5.2)
RBC # BLD: 4.6 M/UL — SIGNIFICANT CHANGE UP (ref 3.8–5.2)
RBC # BLD: 4.69 M/UL — SIGNIFICANT CHANGE UP (ref 3.8–5.2)
RBC # FLD: 13.2 % — SIGNIFICANT CHANGE UP (ref 10.3–14.5)
RBC # FLD: 13.3 % — SIGNIFICANT CHANGE UP (ref 10.3–14.5)
RBC # FLD: 13.5 % — SIGNIFICANT CHANGE UP (ref 10.3–14.5)
RBC # FLD: 13.6 % — SIGNIFICANT CHANGE UP (ref 10.3–14.5)
RBC # FLD: 13.7 % — SIGNIFICANT CHANGE UP (ref 10.3–14.5)
RBC # FLD: 13.8 % — SIGNIFICANT CHANGE UP (ref 10.3–14.5)
RBC # FLD: 13.8 % — SIGNIFICANT CHANGE UP (ref 10.3–14.5)
RBC # FLD: 13.9 % — SIGNIFICANT CHANGE UP (ref 10.3–14.5)
RBC # FLD: 13.9 % — SIGNIFICANT CHANGE UP (ref 10.3–14.5)
RBC # FLD: 14 % — SIGNIFICANT CHANGE UP (ref 10.3–14.5)
RBC # FLD: 14.1 % — SIGNIFICANT CHANGE UP (ref 10.3–14.5)
RBC # FLD: 14.3 % — SIGNIFICANT CHANGE UP (ref 10.3–14.5)
RBC # FLD: 14.7 % — HIGH (ref 10.3–14.5)
RBC # FLD: 14.8 % — HIGH (ref 10.3–14.5)
RBC # FLD: 14.9 % — HIGH (ref 10.3–14.5)
RBC # FLD: 14.9 % — HIGH (ref 10.3–14.5)
RBC # FLD: 15 % — HIGH (ref 10.3–14.5)
RBC # FLD: 15 % — HIGH (ref 10.3–14.5)
RBC # FLD: 15.2 % — HIGH (ref 10.3–14.5)
RBC # FLD: 15.2 % — HIGH (ref 10.3–14.5)
RBC # FLD: 15.3 % — HIGH (ref 10.3–14.5)
RBC # FLD: 15.3 % — HIGH (ref 10.3–14.5)
RBC # FLD: 15.5 % — HIGH (ref 10.3–14.5)
RBC # FLD: 15.5 % — HIGH (ref 10.3–14.5)
RBC # FLD: 15.6 % — HIGH (ref 10.3–14.5)
RBC BLD AUTO: ABNORMAL
RBC CASTS # UR COMP ASSIST: 81 /HPF — HIGH (ref 0–4)
REVIEW: SIGNIFICANT CHANGE UP
RF+CCP IGG SER-IMP: NEGATIVE — SIGNIFICANT CHANGE UP
RHEUMATOID FACT SERPL-ACNC: <10 IU/ML — SIGNIFICANT CHANGE UP (ref 0–13)
RSV RNA NPH QL NAA+NON-PROBE: SIGNIFICANT CHANGE UP
RSV RNA SPEC QL NAA+PROBE: SIGNIFICANT CHANGE UP
RV+EV RNA SPEC QL NAA+PROBE: SIGNIFICANT CHANGE UP
S AUREUS DNA NOSE QL NAA+PROBE: DETECTED
S AUREUS DNA NOSE QL NAA+PROBE: SIGNIFICANT CHANGE UP
S PNEUM AG UR QL: NEGATIVE — SIGNIFICANT CHANGE UP
SAO2 % BLDA: 89.8 % — LOW (ref 94–98)
SAO2 % BLDA: 98 % — SIGNIFICANT CHANGE UP
SAO2 % BLDV: 76.4 % — SIGNIFICANT CHANGE UP (ref 67–88)
SAO2 % BLDV: 92 % — HIGH (ref 67–88)
SARS-COV-2 RNA SPEC QL NAA+PROBE: SIGNIFICANT CHANGE UP
SARS-COV-2 RNA SPEC QL NAA+PROBE: SIGNIFICANT CHANGE UP
SODIUM SERPL-SCNC: 132 MMOL/L — LOW (ref 135–145)
SODIUM SERPL-SCNC: 135 MMOL/L — SIGNIFICANT CHANGE UP (ref 135–145)
SODIUM SERPL-SCNC: 136 MMOL/L — SIGNIFICANT CHANGE UP (ref 135–145)
SODIUM SERPL-SCNC: 137 MMOL/L — SIGNIFICANT CHANGE UP (ref 135–145)
SODIUM SERPL-SCNC: 137 MMOL/L — SIGNIFICANT CHANGE UP (ref 135–145)
SODIUM SERPL-SCNC: 138 MMOL/L — SIGNIFICANT CHANGE UP (ref 135–145)
SODIUM SERPL-SCNC: 139 MMOL/L — SIGNIFICANT CHANGE UP (ref 135–145)
SODIUM SERPL-SCNC: 140 MMOL/L — SIGNIFICANT CHANGE UP (ref 135–145)
SODIUM SERPL-SCNC: 141 MMOL/L — SIGNIFICANT CHANGE UP (ref 135–145)
SODIUM SERPL-SCNC: 142 MMOL/L — SIGNIFICANT CHANGE UP (ref 135–145)
SODIUM SERPL-SCNC: 143 MMOL/L — SIGNIFICANT CHANGE UP (ref 135–145)
SODIUM SERPL-SCNC: 143 MMOL/L — SIGNIFICANT CHANGE UP (ref 135–145)
SODIUM SERPL-SCNC: 146 MMOL/L — HIGH (ref 135–145)
SODIUM UR-SCNC: 16 MMOL/L — SIGNIFICANT CHANGE UP
SP GR SPEC: 1.03 — SIGNIFICANT CHANGE UP (ref 1–1.03)
SPECIMEN SOURCE: SIGNIFICANT CHANGE UP
SQUAMOUS # UR AUTO: 0 /HPF — SIGNIFICANT CHANGE UP (ref 0–5)
SRP MYOMARKER3 PLUS: NEGATIVE — SIGNIFICANT CHANGE UP
TIF GAMMA (P155/140) PLUS: <20 UNITS — SIGNIFICANT CHANGE UP
TROPONIN I, HIGH SENSITIVITY RESULT: 172.4 NG/L — HIGH
TROPONIN I, HIGH SENSITIVITY RESULT: 177 NG/L — HIGH
TROPONIN T, HIGH SENSITIVITY RESULT: 20 NG/L — SIGNIFICANT CHANGE UP (ref 0–51)
TROPONIN T, HIGH SENSITIVITY RESULT: 22 NG/L — SIGNIFICANT CHANGE UP (ref 0–51)
U2 SNRNP PLUS: NEGATIVE — SIGNIFICANT CHANGE UP
UROBILINOGEN FLD QL: 0.2 MG/DL — SIGNIFICANT CHANGE UP (ref 0.2–1)
UUN UR-MCNC: 339 MG/DL — SIGNIFICANT CHANGE UP
VIT B12 SERPL-MCNC: 942 PG/ML — SIGNIFICANT CHANGE UP (ref 232–1245)
WBC # BLD: 10.08 K/UL — SIGNIFICANT CHANGE UP (ref 3.8–10.5)
WBC # BLD: 10.27 K/UL — SIGNIFICANT CHANGE UP (ref 3.8–10.5)
WBC # BLD: 10.49 K/UL — SIGNIFICANT CHANGE UP (ref 3.8–10.5)
WBC # BLD: 10.69 K/UL — HIGH (ref 3.8–10.5)
WBC # BLD: 10.76 K/UL — HIGH (ref 3.8–10.5)
WBC # BLD: 11.13 K/UL — HIGH (ref 3.8–10.5)
WBC # BLD: 11.48 K/UL — HIGH (ref 3.8–10.5)
WBC # BLD: 11.67 K/UL — HIGH (ref 3.8–10.5)
WBC # BLD: 11.72 K/UL — HIGH (ref 3.8–10.5)
WBC # BLD: 11.75 K/UL — HIGH (ref 3.8–10.5)
WBC # BLD: 12 K/UL — HIGH (ref 3.8–10.5)
WBC # BLD: 12.05 K/UL — HIGH (ref 3.8–10.5)
WBC # BLD: 12.67 K/UL — HIGH (ref 3.8–10.5)
WBC # BLD: 14.23 K/UL — HIGH (ref 3.8–10.5)
WBC # BLD: 14.81 K/UL — HIGH (ref 3.8–10.5)
WBC # BLD: 15.29 K/UL — HIGH (ref 3.8–10.5)
WBC # BLD: 6.42 K/UL — SIGNIFICANT CHANGE UP (ref 3.8–10.5)
WBC # BLD: 6.78 K/UL — SIGNIFICANT CHANGE UP (ref 3.8–10.5)
WBC # BLD: 6.84 K/UL — SIGNIFICANT CHANGE UP (ref 3.8–10.5)
WBC # BLD: 6.99 K/UL — SIGNIFICANT CHANGE UP (ref 3.8–10.5)
WBC # BLD: 7.15 K/UL — SIGNIFICANT CHANGE UP (ref 3.8–10.5)
WBC # BLD: 7.6 K/UL — SIGNIFICANT CHANGE UP (ref 3.8–10.5)
WBC # BLD: 7.65 K/UL — SIGNIFICANT CHANGE UP (ref 3.8–10.5)
WBC # BLD: 7.71 K/UL — SIGNIFICANT CHANGE UP (ref 3.8–10.5)
WBC # BLD: 7.83 K/UL — SIGNIFICANT CHANGE UP (ref 3.8–10.5)
WBC # BLD: 8.09 K/UL — SIGNIFICANT CHANGE UP (ref 3.8–10.5)
WBC # BLD: 8.14 K/UL — SIGNIFICANT CHANGE UP (ref 3.8–10.5)
WBC # BLD: 8.2 K/UL — SIGNIFICANT CHANGE UP (ref 3.8–10.5)
WBC # BLD: 8.52 K/UL — SIGNIFICANT CHANGE UP (ref 3.8–10.5)
WBC # BLD: 8.64 K/UL — SIGNIFICANT CHANGE UP (ref 3.8–10.5)
WBC # BLD: 8.81 K/UL — SIGNIFICANT CHANGE UP (ref 3.8–10.5)
WBC # BLD: 8.86 K/UL — SIGNIFICANT CHANGE UP (ref 3.8–10.5)
WBC # BLD: 9.06 K/UL — SIGNIFICANT CHANGE UP (ref 3.8–10.5)
WBC # BLD: 9.31 K/UL — SIGNIFICANT CHANGE UP (ref 3.8–10.5)
WBC # BLD: 9.34 K/UL — SIGNIFICANT CHANGE UP (ref 3.8–10.5)
WBC # FLD AUTO: 10.08 K/UL — SIGNIFICANT CHANGE UP (ref 3.8–10.5)
WBC # FLD AUTO: 10.27 K/UL — SIGNIFICANT CHANGE UP (ref 3.8–10.5)
WBC # FLD AUTO: 10.49 K/UL — SIGNIFICANT CHANGE UP (ref 3.8–10.5)
WBC # FLD AUTO: 10.69 K/UL — HIGH (ref 3.8–10.5)
WBC # FLD AUTO: 10.76 K/UL — HIGH (ref 3.8–10.5)
WBC # FLD AUTO: 11.13 K/UL — HIGH (ref 3.8–10.5)
WBC # FLD AUTO: 11.48 K/UL — HIGH (ref 3.8–10.5)
WBC # FLD AUTO: 11.67 K/UL — HIGH (ref 3.8–10.5)
WBC # FLD AUTO: 11.72 K/UL — HIGH (ref 3.8–10.5)
WBC # FLD AUTO: 11.75 K/UL — HIGH (ref 3.8–10.5)
WBC # FLD AUTO: 12 K/UL — HIGH (ref 3.8–10.5)
WBC # FLD AUTO: 12.05 K/UL — HIGH (ref 3.8–10.5)
WBC # FLD AUTO: 12.67 K/UL — HIGH (ref 3.8–10.5)
WBC # FLD AUTO: 14.23 K/UL — HIGH (ref 3.8–10.5)
WBC # FLD AUTO: 14.81 K/UL — HIGH (ref 3.8–10.5)
WBC # FLD AUTO: 15.29 K/UL — HIGH (ref 3.8–10.5)
WBC # FLD AUTO: 6.42 K/UL — SIGNIFICANT CHANGE UP (ref 3.8–10.5)
WBC # FLD AUTO: 6.78 K/UL — SIGNIFICANT CHANGE UP (ref 3.8–10.5)
WBC # FLD AUTO: 6.84 K/UL — SIGNIFICANT CHANGE UP (ref 3.8–10.5)
WBC # FLD AUTO: 6.99 K/UL — SIGNIFICANT CHANGE UP (ref 3.8–10.5)
WBC # FLD AUTO: 7.15 K/UL — SIGNIFICANT CHANGE UP (ref 3.8–10.5)
WBC # FLD AUTO: 7.6 K/UL — SIGNIFICANT CHANGE UP (ref 3.8–10.5)
WBC # FLD AUTO: 7.65 K/UL — SIGNIFICANT CHANGE UP (ref 3.8–10.5)
WBC # FLD AUTO: 7.71 K/UL — SIGNIFICANT CHANGE UP (ref 3.8–10.5)
WBC # FLD AUTO: 7.83 K/UL — SIGNIFICANT CHANGE UP (ref 3.8–10.5)
WBC # FLD AUTO: 8.09 K/UL — SIGNIFICANT CHANGE UP (ref 3.8–10.5)
WBC # FLD AUTO: 8.14 K/UL — SIGNIFICANT CHANGE UP (ref 3.8–10.5)
WBC # FLD AUTO: 8.2 K/UL — SIGNIFICANT CHANGE UP (ref 3.8–10.5)
WBC # FLD AUTO: 8.52 K/UL — SIGNIFICANT CHANGE UP (ref 3.8–10.5)
WBC # FLD AUTO: 8.64 K/UL — SIGNIFICANT CHANGE UP (ref 3.8–10.5)
WBC # FLD AUTO: 8.81 K/UL — SIGNIFICANT CHANGE UP (ref 3.8–10.5)
WBC # FLD AUTO: 8.86 K/UL — SIGNIFICANT CHANGE UP (ref 3.8–10.5)
WBC # FLD AUTO: 9.06 K/UL — SIGNIFICANT CHANGE UP (ref 3.8–10.5)
WBC # FLD AUTO: 9.31 K/UL — SIGNIFICANT CHANGE UP (ref 3.8–10.5)
WBC # FLD AUTO: 9.34 K/UL — SIGNIFICANT CHANGE UP (ref 3.8–10.5)
WBC UR QL: 33 /HPF — HIGH (ref 0–5)
YEAST-LIKE CELLS: PRESENT

## 2024-01-01 PROCEDURE — 85610 PROTHROMBIN TIME: CPT

## 2024-01-01 PROCEDURE — 85027 COMPLETE CBC AUTOMATED: CPT

## 2024-01-01 PROCEDURE — 72158 MRI LUMBAR SPINE W/O & W/DYE: CPT | Mod: 26

## 2024-01-01 PROCEDURE — 87507 IADNA-DNA/RNA PROBE TQ 12-25: CPT

## 2024-01-01 PROCEDURE — 87086 URINE CULTURE/COLONY COUNT: CPT

## 2024-01-01 PROCEDURE — 82787 IGG 1 2 3 OR 4 EACH: CPT

## 2024-01-01 PROCEDURE — 99239 HOSP IP/OBS DSCHRG MGMT >30: CPT

## 2024-01-01 PROCEDURE — 82330 ASSAY OF CALCIUM: CPT

## 2024-01-01 PROCEDURE — 71045 X-RAY EXAM CHEST 1 VIEW: CPT | Mod: 26

## 2024-01-01 PROCEDURE — 72158 MRI LUMBAR SPINE W/O & W/DYE: CPT | Mod: MC

## 2024-01-01 PROCEDURE — 87046 STOOL CULTR AEROBIC BACT EA: CPT

## 2024-01-01 PROCEDURE — 71045 X-RAY EXAM CHEST 1 VIEW: CPT

## 2024-01-01 PROCEDURE — 83880 ASSAY OF NATRIURETIC PEPTIDE: CPT

## 2024-01-01 PROCEDURE — 99285 EMERGENCY DEPT VISIT HI MDM: CPT

## 2024-01-01 PROCEDURE — 99232 SBSQ HOSP IP/OBS MODERATE 35: CPT

## 2024-01-01 PROCEDURE — 99233 SBSQ HOSP IP/OBS HIGH 50: CPT

## 2024-01-01 PROCEDURE — 80048 BASIC METABOLIC PNL TOTAL CA: CPT

## 2024-01-01 PROCEDURE — 71275 CT ANGIOGRAPHY CHEST: CPT | Mod: 26

## 2024-01-01 PROCEDURE — 72156 MRI NECK SPINE W/O & W/DYE: CPT | Mod: 26

## 2024-01-01 PROCEDURE — 87449 NOS EACH ORGANISM AG IA: CPT

## 2024-01-01 PROCEDURE — 99497 ADVNCD CARE PLAN 30 MIN: CPT | Mod: 25

## 2024-01-01 PROCEDURE — 94660 CPAP INITIATION&MGMT: CPT

## 2024-01-01 PROCEDURE — 85379 FIBRIN DEGRADATION QUANT: CPT

## 2024-01-01 PROCEDURE — 99223 1ST HOSP IP/OBS HIGH 75: CPT

## 2024-01-01 PROCEDURE — 99291 CRITICAL CARE FIRST HOUR: CPT

## 2024-01-01 PROCEDURE — 83735 ASSAY OF MAGNESIUM: CPT

## 2024-01-01 PROCEDURE — 99233 SBSQ HOSP IP/OBS HIGH 50: CPT | Mod: GC

## 2024-01-01 PROCEDURE — 85730 THROMBOPLASTIN TIME PARTIAL: CPT

## 2024-01-01 PROCEDURE — 82435 ASSAY OF BLOOD CHLORIDE: CPT

## 2024-01-01 PROCEDURE — 99223 1ST HOSP IP/OBS HIGH 75: CPT | Mod: 25

## 2024-01-01 PROCEDURE — 99232 SBSQ HOSP IP/OBS MODERATE 35: CPT | Mod: GC

## 2024-01-01 PROCEDURE — 86225 DNA ANTIBODY NATIVE: CPT

## 2024-01-01 PROCEDURE — 84484 ASSAY OF TROPONIN QUANT: CPT

## 2024-01-01 PROCEDURE — 92610 EVALUATE SWALLOWING FUNCTION: CPT

## 2024-01-01 PROCEDURE — 93321 DOPPLER ECHO F-UP/LMTD STD: CPT

## 2024-01-01 PROCEDURE — 87077 CULTURE AEROBIC IDENTIFY: CPT

## 2024-01-01 PROCEDURE — 85018 HEMOGLOBIN: CPT

## 2024-01-01 PROCEDURE — 82085 ASSAY OF ALDOLASE: CPT

## 2024-01-01 PROCEDURE — 93306 TTE W/DOPPLER COMPLETE: CPT

## 2024-01-01 PROCEDURE — 84133 ASSAY OF URINE POTASSIUM: CPT

## 2024-01-01 PROCEDURE — 99223 1ST HOSP IP/OBS HIGH 75: CPT | Mod: GC

## 2024-01-01 PROCEDURE — A9585: CPT

## 2024-01-01 PROCEDURE — 82550 ASSAY OF CK (CPK): CPT

## 2024-01-01 PROCEDURE — 82803 BLOOD GASES ANY COMBINATION: CPT

## 2024-01-01 PROCEDURE — 97162 PT EVAL MOD COMPLEX 30 MIN: CPT

## 2024-01-01 PROCEDURE — 84300 ASSAY OF URINE SODIUM: CPT

## 2024-01-01 PROCEDURE — 99222 1ST HOSP IP/OBS MODERATE 55: CPT

## 2024-01-01 PROCEDURE — 99498 ADVNCD CARE PLAN ADDL 30 MIN: CPT

## 2024-01-01 PROCEDURE — 84295 ASSAY OF SERUM SODIUM: CPT

## 2024-01-01 PROCEDURE — 97530 THERAPEUTIC ACTIVITIES: CPT

## 2024-01-01 PROCEDURE — 87640 STAPH A DNA AMP PROBE: CPT

## 2024-01-01 PROCEDURE — 87641 MR-STAPH DNA AMP PROBE: CPT

## 2024-01-01 PROCEDURE — 86200 CCP ANTIBODY: CPT

## 2024-01-01 PROCEDURE — 87040 BLOOD CULTURE FOR BACTERIA: CPT

## 2024-01-01 PROCEDURE — 93010 ELECTROCARDIOGRAM REPORT: CPT

## 2024-01-01 PROCEDURE — 84156 ASSAY OF PROTEIN URINE: CPT

## 2024-01-01 PROCEDURE — 94640 AIRWAY INHALATION TREATMENT: CPT

## 2024-01-01 PROCEDURE — 99233 SBSQ HOSP IP/OBS HIGH 50: CPT | Mod: 25

## 2024-01-01 PROCEDURE — 83036 HEMOGLOBIN GLYCOSYLATED A1C: CPT

## 2024-01-01 PROCEDURE — 86036 ANCA SCREEN EACH ANTIBODY: CPT

## 2024-01-01 PROCEDURE — 84540 ASSAY OF URINE/UREA-N: CPT

## 2024-01-01 PROCEDURE — 85014 HEMATOCRIT: CPT

## 2024-01-01 PROCEDURE — 99498 ADVNCD CARE PLAN ADDL 30 MIN: CPT | Mod: 25

## 2024-01-01 PROCEDURE — 86235 NUCLEAR ANTIGEN ANTIBODY: CPT

## 2024-01-01 PROCEDURE — 84132 ASSAY OF SERUM POTASSIUM: CPT

## 2024-01-01 PROCEDURE — 70553 MRI BRAIN STEM W/O & W/DYE: CPT | Mod: MC

## 2024-01-01 PROCEDURE — 81001 URINALYSIS AUTO W/SCOPE: CPT

## 2024-01-01 PROCEDURE — 94760 N-INVAS EAR/PLS OXIMETRY 1: CPT

## 2024-01-01 PROCEDURE — 97116 GAIT TRAINING THERAPY: CPT

## 2024-01-01 PROCEDURE — 83935 ASSAY OF URINE OSMOLALITY: CPT

## 2024-01-01 PROCEDURE — 36415 COLL VENOUS BLD VENIPUNCTURE: CPT

## 2024-01-01 PROCEDURE — 86431 RHEUMATOID FACTOR QUANT: CPT

## 2024-01-01 PROCEDURE — 97110 THERAPEUTIC EXERCISES: CPT

## 2024-01-01 PROCEDURE — 86043 ACETYLCHOLN RCPTR MODLG ANTB: CPT

## 2024-01-01 PROCEDURE — 93308 TTE F-UP OR LMTD: CPT | Mod: 26

## 2024-01-01 PROCEDURE — 82962 GLUCOSE BLOOD TEST: CPT

## 2024-01-01 PROCEDURE — 99223 1ST HOSP IP/OBS HIGH 75: CPT | Mod: GC,25

## 2024-01-01 PROCEDURE — 82947 ASSAY GLUCOSE BLOOD QUANT: CPT

## 2024-01-01 PROCEDURE — 93306 TTE W/DOPPLER COMPLETE: CPT | Mod: 26

## 2024-01-01 PROCEDURE — 86042 ACETYLCHOLN RCPTR BLCKG ANTB: CPT

## 2024-01-01 PROCEDURE — 78428 CARDIAC SHUNT IMAGING: CPT | Mod: MC

## 2024-01-01 PROCEDURE — 86038 ANTINUCLEAR ANTIBODIES: CPT

## 2024-01-01 PROCEDURE — 82607 VITAMIN B-12: CPT

## 2024-01-01 PROCEDURE — 99221 1ST HOSP IP/OBS SF/LOW 40: CPT | Mod: 25

## 2024-01-01 PROCEDURE — A9540: CPT

## 2024-01-01 PROCEDURE — 72156 MRI NECK SPINE W/O & W/DYE: CPT | Mod: MC

## 2024-01-01 PROCEDURE — 93005 ELECTROCARDIOGRAM TRACING: CPT

## 2024-01-01 PROCEDURE — 87186 SC STD MICRODIL/AGAR DIL: CPT

## 2024-01-01 PROCEDURE — 86255 FLUORESCENT ANTIBODY SCREEN: CPT

## 2024-01-01 PROCEDURE — 71275 CT ANGIOGRAPHY CHEST: CPT | Mod: MC

## 2024-01-01 PROCEDURE — 87637 SARSCOV2&INF A&B&RSV AMP PRB: CPT

## 2024-01-01 PROCEDURE — 87389 HIV-1 AG W/HIV-1&-2 AB AG IA: CPT

## 2024-01-01 PROCEDURE — 80053 COMPREHEN METABOLIC PANEL: CPT

## 2024-01-01 PROCEDURE — 84100 ASSAY OF PHOSPHORUS: CPT

## 2024-01-01 PROCEDURE — 93321 DOPPLER ECHO F-UP/LMTD STD: CPT | Mod: 26

## 2024-01-01 PROCEDURE — 82746 ASSAY OF FOLIC ACID SERUM: CPT

## 2024-01-01 PROCEDURE — 83605 ASSAY OF LACTIC ACID: CPT

## 2024-01-01 PROCEDURE — 36600 WITHDRAWAL OF ARTERIAL BLOOD: CPT

## 2024-01-01 PROCEDURE — 82570 ASSAY OF URINE CREATININE: CPT

## 2024-01-01 PROCEDURE — 71250 CT THORAX DX C-: CPT | Mod: MC

## 2024-01-01 PROCEDURE — 87899 AGENT NOS ASSAY W/OPTIC: CPT

## 2024-01-01 PROCEDURE — 85025 COMPLETE CBC W/AUTO DIFF WBC: CPT

## 2024-01-01 PROCEDURE — 82565 ASSAY OF CREATININE: CPT

## 2024-01-01 PROCEDURE — 70553 MRI BRAIN STEM W/O & W/DYE: CPT | Mod: 26

## 2024-01-01 PROCEDURE — 92526 ORAL FUNCTION THERAPY: CPT

## 2024-01-01 PROCEDURE — 99285 EMERGENCY DEPT VISIT HI MDM: CPT | Mod: 25

## 2024-01-01 PROCEDURE — 86041 ACETYLCHOLN RCPTR BNDNG ANTB: CPT

## 2024-01-01 PROCEDURE — 93970 EXTREMITY STUDY: CPT | Mod: 26

## 2024-01-01 PROCEDURE — 78428 CARDIAC SHUNT IMAGING: CPT | Mod: 26

## 2024-01-01 PROCEDURE — 99231 SBSQ HOSP IP/OBS SF/LOW 25: CPT | Mod: 25

## 2024-01-01 PROCEDURE — 93970 EXTREMITY STUDY: CPT

## 2024-01-01 PROCEDURE — 86366 MUSCLE-SPECIFIC KINASE ANTB: CPT

## 2024-01-01 PROCEDURE — 87177 OVA AND PARASITES SMEARS: CPT

## 2024-01-01 PROCEDURE — 93308 TTE F-UP OR LMTD: CPT

## 2024-01-01 PROCEDURE — 0225U NFCT DS DNA&RNA 21 SARSCOV2: CPT

## 2024-01-01 PROCEDURE — 99236 HOSP IP/OBS SAME DATE HI 85: CPT

## 2024-01-01 PROCEDURE — 83516 IMMUNOASSAY NONANTIBODY: CPT

## 2024-01-01 PROCEDURE — 71250 CT THORAX DX C-: CPT | Mod: 26

## 2024-01-01 PROCEDURE — 87045 FECES CULTURE AEROBIC BACT: CPT

## 2024-01-01 PROCEDURE — 86738 MYCOPLASMA ANTIBODY: CPT

## 2024-01-01 RX ORDER — HALOPERIDOL DECANOATE 100 MG/ML
0.5 INJECTION INTRAMUSCULAR
Refills: 0 | Status: DISCONTINUED | OUTPATIENT
Start: 2024-01-01 | End: 2024-01-01

## 2024-01-01 RX ORDER — DEXTROSE 50 % IN WATER 50 %
15 SYRINGE (ML) INTRAVENOUS ONCE
Refills: 0 | Status: DISCONTINUED | OUTPATIENT
Start: 2024-01-01 | End: 2024-01-01

## 2024-01-01 RX ORDER — INSULIN LISPRO 100/ML
VIAL (ML) SUBCUTANEOUS AT BEDTIME
Refills: 0 | Status: DISCONTINUED | OUTPATIENT
Start: 2024-01-01 | End: 2024-01-01

## 2024-01-01 RX ORDER — BUDESONIDE AND FORMOTEROL FUMARATE DIHYDRATE 160; 4.5 UG/1; UG/1
2 AEROSOL RESPIRATORY (INHALATION)
Refills: 0 | Status: DISCONTINUED | OUTPATIENT
Start: 2024-01-01 | End: 2024-01-01

## 2024-01-01 RX ORDER — INSULIN LISPRO 100/ML
13 VIAL (ML) SUBCUTANEOUS
Refills: 0 | Status: DISCONTINUED | OUTPATIENT
Start: 2024-01-01 | End: 2024-01-01

## 2024-01-01 RX ORDER — INSULIN GLARGINE 100 [IU]/ML
16 INJECTION, SOLUTION SUBCUTANEOUS AT BEDTIME
Refills: 0 | Status: DISCONTINUED | OUTPATIENT
Start: 2024-01-01 | End: 2024-01-01

## 2024-01-01 RX ORDER — DIAZEPAM 5 MG
2.5 TABLET ORAL ONCE
Refills: 0 | Status: DISCONTINUED | OUTPATIENT
Start: 2024-01-01 | End: 2024-01-01

## 2024-01-01 RX ORDER — VERAPAMIL HCL 240 MG
80 CAPSULE, EXTENDED RELEASE PELLETS 24 HR ORAL EVERY 8 HOURS
Refills: 0 | Status: DISCONTINUED | OUTPATIENT
Start: 2024-01-01 | End: 2024-01-01

## 2024-01-01 RX ORDER — HUMAN INSULIN 100 [IU]/ML
8 INJECTION, SUSPENSION SUBCUTANEOUS ONCE
Refills: 0 | Status: COMPLETED | OUTPATIENT
Start: 2024-01-01 | End: 2024-01-01

## 2024-01-01 RX ORDER — POLYETHYLENE GLYCOL 3350 17 G/17G
17 POWDER, FOR SOLUTION ORAL DAILY
Refills: 0 | Status: DISCONTINUED | OUTPATIENT
Start: 2024-01-01 | End: 2024-01-01

## 2024-01-01 RX ORDER — ENOXAPARIN SODIUM 100 MG/ML
60 INJECTION SUBCUTANEOUS EVERY 12 HOURS
Refills: 0 | Status: DISCONTINUED | OUTPATIENT
Start: 2024-01-01 | End: 2024-01-01

## 2024-01-01 RX ORDER — INSULIN GLARGINE 100 [IU]/ML
15 INJECTION, SOLUTION SUBCUTANEOUS ONCE
Refills: 0 | Status: DISCONTINUED | OUTPATIENT
Start: 2024-01-01 | End: 2024-01-01

## 2024-01-01 RX ORDER — HYDROMORPHONE HYDROCHLORIDE 2 MG/ML
1 INJECTION INTRAMUSCULAR; INTRAVENOUS; SUBCUTANEOUS ONCE
Refills: 0 | Status: DISCONTINUED | OUTPATIENT
Start: 2024-01-01 | End: 2024-01-01

## 2024-01-01 RX ORDER — ACETAMINOPHEN 500 MG
650 TABLET ORAL EVERY 6 HOURS
Refills: 0 | Status: DISCONTINUED | OUTPATIENT
Start: 2024-01-01 | End: 2024-01-01

## 2024-01-01 RX ORDER — INSULIN LISPRO 100/ML
10 VIAL (ML) SUBCUTANEOUS ONCE
Refills: 0 | Status: COMPLETED | OUTPATIENT
Start: 2024-01-01 | End: 2024-01-01

## 2024-01-01 RX ORDER — IPRATROPIUM/ALBUTEROL SULFATE 18-103MCG
3 AEROSOL WITH ADAPTER (GRAM) INHALATION EVERY 8 HOURS
Refills: 0 | Status: DISCONTINUED | OUTPATIENT
Start: 2024-01-01 | End: 2024-01-01

## 2024-01-01 RX ORDER — LOPERAMIDE HCL 2 MG
2 TABLET ORAL ONCE
Refills: 0 | Status: COMPLETED | OUTPATIENT
Start: 2024-01-01 | End: 2024-01-01

## 2024-01-01 RX ORDER — INSULIN GLARGINE 100 [IU]/ML
17 INJECTION, SOLUTION SUBCUTANEOUS AT BEDTIME
Refills: 0 | Status: DISCONTINUED | OUTPATIENT
Start: 2024-01-01 | End: 2024-01-01

## 2024-01-01 RX ORDER — PANTOPRAZOLE SODIUM 20 MG/1
40 TABLET, DELAYED RELEASE ORAL
Refills: 0 | Status: DISCONTINUED | OUTPATIENT
Start: 2024-01-01 | End: 2024-01-01

## 2024-01-01 RX ORDER — IPRATROPIUM/ALBUTEROL SULFATE 18-103MCG
3 AEROSOL WITH ADAPTER (GRAM) INHALATION EVERY 6 HOURS
Refills: 0 | Status: DISCONTINUED | OUTPATIENT
Start: 2024-01-01 | End: 2024-01-01

## 2024-01-01 RX ORDER — SIMETHICONE 80 MG/1
80 TABLET, CHEWABLE ORAL
Refills: 0 | Status: DISCONTINUED | OUTPATIENT
Start: 2024-01-01 | End: 2024-01-01

## 2024-01-01 RX ORDER — INSULIN LISPRO 100/ML
5 VIAL (ML) SUBCUTANEOUS ONCE
Refills: 0 | Status: COMPLETED | OUTPATIENT
Start: 2024-01-01 | End: 2024-01-01

## 2024-01-01 RX ORDER — CLOTRIMAZOLE AND BETAMETHASONE DIPROPIONATE 10; .5 MG/G; MG/G
1 CREAM TOPICAL ONCE
Refills: 0 | Status: COMPLETED | OUTPATIENT
Start: 2024-01-01 | End: 2024-01-01

## 2024-01-01 RX ORDER — BUMETANIDE 0.25 MG/ML
2 INJECTION INTRAMUSCULAR; INTRAVENOUS ONCE
Refills: 0 | Status: COMPLETED | OUTPATIENT
Start: 2024-01-01 | End: 2024-01-01

## 2024-01-01 RX ORDER — INSULIN LISPRO 100/ML
12 VIAL (ML) SUBCUTANEOUS
Refills: 0 | Status: DISCONTINUED | OUTPATIENT
Start: 2024-01-01 | End: 2024-01-01

## 2024-01-01 RX ORDER — FUROSEMIDE 40 MG
40 TABLET ORAL ONCE
Refills: 0 | Status: COMPLETED | OUTPATIENT
Start: 2024-01-01 | End: 2024-01-01

## 2024-01-01 RX ORDER — IBUPROFEN 200 MG
600 TABLET ORAL EVERY 6 HOURS
Refills: 0 | Status: DISCONTINUED | OUTPATIENT
Start: 2024-01-01 | End: 2024-01-01

## 2024-01-01 RX ORDER — GUAIFENESIN/DEXTROMETHORPHAN 600MG-30MG
10 TABLET, EXTENDED RELEASE 12 HR ORAL ONCE
Refills: 0 | Status: COMPLETED | OUTPATIENT
Start: 2024-01-01 | End: 2024-01-01

## 2024-01-01 RX ORDER — ACETAMINOPHEN 500 MG
1000 TABLET ORAL ONCE
Refills: 0 | Status: COMPLETED | OUTPATIENT
Start: 2024-01-01 | End: 2024-01-01

## 2024-01-01 RX ORDER — ACETAMINOPHEN 500 MG
2 TABLET ORAL
Qty: 0 | Refills: 0 | DISCHARGE
Start: 2024-01-01

## 2024-01-01 RX ORDER — IBUPROFEN 200 MG
200 TABLET ORAL EVERY 6 HOURS
Refills: 0 | Status: DISCONTINUED | OUTPATIENT
Start: 2024-01-01 | End: 2024-01-01

## 2024-01-01 RX ORDER — VERAPAMIL HCL 240 MG
1 CAPSULE, EXTENDED RELEASE PELLETS 24 HR ORAL
Qty: 0 | Refills: 0 | DISCHARGE
Start: 2024-01-01

## 2024-01-01 RX ORDER — INSULIN GLARGINE 100 [IU]/ML
24 INJECTION, SOLUTION SUBCUTANEOUS AT BEDTIME
Refills: 0 | Status: DISCONTINUED | OUTPATIENT
Start: 2024-01-01 | End: 2024-01-01

## 2024-01-01 RX ORDER — INSULIN GLARGINE 100 [IU]/ML
30 INJECTION, SOLUTION SUBCUTANEOUS AT BEDTIME
Refills: 0 | Status: DISCONTINUED | OUTPATIENT
Start: 2024-01-01 | End: 2024-01-01

## 2024-01-01 RX ORDER — DEXTROSE 10 % IN WATER 10 %
125 INTRAVENOUS SOLUTION INTRAVENOUS ONCE
Refills: 0 | Status: DISCONTINUED | OUTPATIENT
Start: 2024-01-01 | End: 2024-01-01

## 2024-01-01 RX ORDER — SIMETHICONE 80 MG/1
80 TABLET, CHEWABLE ORAL ONCE
Refills: 0 | Status: COMPLETED | OUTPATIENT
Start: 2024-01-01 | End: 2024-01-01

## 2024-01-01 RX ORDER — APIXABAN 2.5 MG/1
5 TABLET, FILM COATED ORAL
Refills: 0 | Status: DISCONTINUED | OUTPATIENT
Start: 2024-01-01 | End: 2024-01-01

## 2024-01-01 RX ORDER — ATORVASTATIN CALCIUM 80 MG/1
1 TABLET, FILM COATED ORAL
Qty: 0 | Refills: 0 | DISCHARGE

## 2024-01-01 RX ORDER — INSULIN LISPRO 100/ML
10 VIAL (ML) SUBCUTANEOUS
Refills: 0 | Status: DISCONTINUED | OUTPATIENT
Start: 2024-01-01 | End: 2024-01-01

## 2024-01-01 RX ORDER — INSULIN GLARGINE 100 [IU]/ML
8 INJECTION, SOLUTION SUBCUTANEOUS ONCE
Refills: 0 | Status: COMPLETED | OUTPATIENT
Start: 2024-01-01 | End: 2024-01-01

## 2024-01-01 RX ORDER — INSULIN GLARGINE 100 [IU]/ML
10 INJECTION, SOLUTION SUBCUTANEOUS ONCE
Refills: 0 | Status: COMPLETED | OUTPATIENT
Start: 2024-01-01 | End: 2024-01-01

## 2024-01-01 RX ORDER — DIAZEPAM 5 MG
5 TABLET ORAL ONCE
Refills: 0 | Status: DISCONTINUED | OUTPATIENT
Start: 2024-01-01 | End: 2024-01-01

## 2024-01-01 RX ORDER — FUROSEMIDE 40 MG
40 TABLET ORAL DAILY
Refills: 0 | Status: DISCONTINUED | OUTPATIENT
Start: 2024-01-01 | End: 2024-01-01

## 2024-01-01 RX ORDER — PIPERACILLIN AND TAZOBACTAM 4; .5 G/20ML; G/20ML
3.38 INJECTION, POWDER, LYOPHILIZED, FOR SOLUTION INTRAVENOUS ONCE
Refills: 0 | Status: COMPLETED | OUTPATIENT
Start: 2024-01-01 | End: 2024-01-01

## 2024-01-01 RX ORDER — INSULIN LISPRO 100/ML
14 VIAL (ML) SUBCUTANEOUS
Refills: 0 | Status: DISCONTINUED | OUTPATIENT
Start: 2024-01-01 | End: 2024-01-01

## 2024-01-01 RX ORDER — INSULIN GLARGINE 100 [IU]/ML
18 INJECTION, SOLUTION SUBCUTANEOUS AT BEDTIME
Refills: 0 | Status: DISCONTINUED | OUTPATIENT
Start: 2024-01-01 | End: 2024-01-01

## 2024-01-01 RX ORDER — DEXTROSE 50 % IN WATER 50 %
12.5 SYRINGE (ML) INTRAVENOUS ONCE
Refills: 0 | Status: DISCONTINUED | OUTPATIENT
Start: 2024-01-01 | End: 2024-01-01

## 2024-01-01 RX ORDER — ALENDRONATE SODIUM 70 MG/1
1 TABLET ORAL
Refills: 0 | DISCHARGE

## 2024-01-01 RX ORDER — HYDROMORPHONE HYDROCHLORIDE 2 MG/ML
0.2 INJECTION INTRAMUSCULAR; INTRAVENOUS; SUBCUTANEOUS EVERY 4 HOURS
Refills: 0 | Status: DISCONTINUED | OUTPATIENT
Start: 2024-01-01 | End: 2024-01-01

## 2024-01-01 RX ORDER — INSULIN GLARGINE 100 [IU]/ML
8 INJECTION, SOLUTION SUBCUTANEOUS AT BEDTIME
Refills: 0 | Status: DISCONTINUED | OUTPATIENT
Start: 2024-01-01 | End: 2024-01-01

## 2024-01-01 RX ORDER — POLYETHYLENE GLYCOL 3350 17 G/17G
17 POWDER, FOR SOLUTION ORAL ONCE
Refills: 0 | Status: COMPLETED | OUTPATIENT
Start: 2024-01-01 | End: 2024-01-01

## 2024-01-01 RX ORDER — CYCLOBENZAPRINE HYDROCHLORIDE 10 MG/1
5 TABLET, FILM COATED ORAL ONCE
Refills: 0 | Status: COMPLETED | OUTPATIENT
Start: 2024-01-01 | End: 2024-01-01

## 2024-01-01 RX ORDER — ONDANSETRON 8 MG/1
4 TABLET, FILM COATED ORAL ONCE
Refills: 0 | Status: COMPLETED | OUTPATIENT
Start: 2024-01-01 | End: 2024-01-01

## 2024-01-01 RX ORDER — INSULIN LISPRO 100/ML
9 VIAL (ML) SUBCUTANEOUS
Refills: 0 | Status: DISCONTINUED | OUTPATIENT
Start: 2024-01-01 | End: 2024-01-01

## 2024-01-01 RX ORDER — GABAPENTIN 400 MG/1
1 CAPSULE ORAL
Refills: 0 | DISCHARGE

## 2024-01-01 RX ORDER — INSULIN LISPRO 100/ML
VIAL (ML) SUBCUTANEOUS
Refills: 0 | Status: DISCONTINUED | OUTPATIENT
Start: 2024-01-01 | End: 2024-01-01

## 2024-01-01 RX ORDER — TRAZODONE HCL 50 MG
1 TABLET ORAL
Qty: 0 | Refills: 0 | DISCHARGE

## 2024-01-01 RX ORDER — INSULIN GLARGINE 100 [IU]/ML
27 INJECTION, SOLUTION SUBCUTANEOUS AT BEDTIME
Refills: 0 | Status: DISCONTINUED | OUTPATIENT
Start: 2024-01-01 | End: 2024-01-01

## 2024-01-01 RX ORDER — ONDANSETRON 8 MG/1
4 TABLET, FILM COATED ORAL EVERY 6 HOURS
Refills: 0 | Status: DISCONTINUED | OUTPATIENT
Start: 2024-01-01 | End: 2024-01-01

## 2024-01-01 RX ORDER — ATORVASTATIN CALCIUM 80 MG/1
40 TABLET, FILM COATED ORAL AT BEDTIME
Refills: 0 | Status: DISCONTINUED | OUTPATIENT
Start: 2024-01-01 | End: 2024-01-01

## 2024-01-01 RX ORDER — METFORMIN HYDROCHLORIDE 850 MG/1
1 TABLET ORAL
Qty: 0 | Refills: 0 | DISCHARGE

## 2024-01-01 RX ORDER — CYCLOBENZAPRINE HYDROCHLORIDE 10 MG/1
1 TABLET, FILM COATED ORAL
Qty: 0 | Refills: 0 | DISCHARGE
Start: 2024-01-01

## 2024-01-01 RX ORDER — FUROSEMIDE 40 MG
20 TABLET ORAL DAILY
Refills: 0 | Status: DISCONTINUED | OUTPATIENT
Start: 2024-01-01 | End: 2024-01-01

## 2024-01-01 RX ORDER — LANOLIN ALCOHOL/MO/W.PET/CERES
3 CREAM (GRAM) TOPICAL AT BEDTIME
Refills: 0 | Status: DISCONTINUED | OUTPATIENT
Start: 2024-01-01 | End: 2024-01-01

## 2024-01-01 RX ORDER — ACETAMINOPHEN 500 MG
650 TABLET ORAL ONCE
Refills: 0 | Status: COMPLETED | OUTPATIENT
Start: 2024-01-01 | End: 2024-01-01

## 2024-01-01 RX ORDER — BUMETANIDE 0.25 MG/ML
2 INJECTION INTRAMUSCULAR; INTRAVENOUS
Refills: 0 | Status: DISCONTINUED | OUTPATIENT
Start: 2024-01-01 | End: 2024-01-01

## 2024-01-01 RX ORDER — DEXTROSE 50 % IN WATER 50 %
25 SYRINGE (ML) INTRAVENOUS ONCE
Refills: 0 | Status: DISCONTINUED | OUTPATIENT
Start: 2024-01-01 | End: 2024-01-01

## 2024-01-01 RX ORDER — BUMETANIDE 0.25 MG/ML
4 INJECTION INTRAMUSCULAR; INTRAVENOUS EVERY 8 HOURS
Refills: 0 | Status: COMPLETED | OUTPATIENT
Start: 2024-01-01 | End: 2024-01-01

## 2024-01-01 RX ORDER — CHLORHEXIDINE GLUCONATE 213 G/1000ML
1 SOLUTION TOPICAL DAILY
Refills: 0 | Status: DISCONTINUED | OUTPATIENT
Start: 2024-01-01 | End: 2024-01-01

## 2024-01-01 RX ORDER — BUMETANIDE 0.25 MG/ML
1 INJECTION INTRAMUSCULAR; INTRAVENOUS ONCE
Refills: 0 | Status: DISCONTINUED | OUTPATIENT
Start: 2024-01-01 | End: 2024-01-01

## 2024-01-01 RX ORDER — SENNA PLUS 8.6 MG/1
2 TABLET ORAL AT BEDTIME
Refills: 0 | Status: DISCONTINUED | OUTPATIENT
Start: 2024-01-01 | End: 2024-01-01

## 2024-01-01 RX ORDER — TRAMADOL HYDROCHLORIDE 50 MG/1
25 TABLET ORAL EVERY 6 HOURS
Refills: 0 | Status: DISCONTINUED | OUTPATIENT
Start: 2024-01-01 | End: 2024-01-01

## 2024-01-01 RX ORDER — INSULIN LISPRO 100/ML
8 VIAL (ML) SUBCUTANEOUS
Refills: 0 | Status: DISCONTINUED | OUTPATIENT
Start: 2024-01-01 | End: 2024-01-01

## 2024-01-01 RX ORDER — INSULIN GLARGINE 100 [IU]/ML
10 INJECTION, SOLUTION SUBCUTANEOUS AT BEDTIME
Refills: 0 | Status: DISCONTINUED | OUTPATIENT
Start: 2024-01-01 | End: 2024-01-01

## 2024-01-01 RX ORDER — SODIUM CHLORIDE 9 MG/ML
1000 INJECTION, SOLUTION INTRAVENOUS
Refills: 0 | Status: DISCONTINUED | OUTPATIENT
Start: 2024-01-01 | End: 2024-01-01

## 2024-01-01 RX ORDER — INSULIN LISPRO 100/ML
15 VIAL (ML) SUBCUTANEOUS
Refills: 0 | Status: DISCONTINUED | OUTPATIENT
Start: 2024-01-01 | End: 2024-01-01

## 2024-01-01 RX ORDER — CYCLOBENZAPRINE HYDROCHLORIDE 10 MG/1
5 TABLET, FILM COATED ORAL THREE TIMES A DAY
Refills: 0 | Status: DISCONTINUED | OUTPATIENT
Start: 2024-01-01 | End: 2024-01-01

## 2024-01-01 RX ORDER — INSULIN GLARGINE 100 [IU]/ML
0 INJECTION, SOLUTION SUBCUTANEOUS
Refills: 0 | DISCHARGE

## 2024-01-01 RX ORDER — FUROSEMIDE 40 MG
60 TABLET ORAL ONCE
Refills: 0 | Status: COMPLETED | OUTPATIENT
Start: 2024-01-01 | End: 2024-01-01

## 2024-01-01 RX ORDER — MORPHINE SULFATE 50 MG/1
1 CAPSULE, EXTENDED RELEASE ORAL EVERY 6 HOURS
Refills: 0 | Status: DISCONTINUED | OUTPATIENT
Start: 2024-01-01 | End: 2024-01-01

## 2024-01-01 RX ORDER — TAMSULOSIN HYDROCHLORIDE 0.4 MG/1
0.8 CAPSULE ORAL AT BEDTIME
Refills: 0 | Status: DISCONTINUED | OUTPATIENT
Start: 2024-01-01 | End: 2024-01-01

## 2024-01-01 RX ORDER — PANTOPRAZOLE SODIUM 20 MG/1
1 TABLET, DELAYED RELEASE ORAL
Qty: 0 | Refills: 0 | DISCHARGE
Start: 2024-01-01

## 2024-01-01 RX ORDER — CETIRIZINE HYDROCHLORIDE 10 MG/1
1 TABLET ORAL
Qty: 0 | Refills: 0 | DISCHARGE

## 2024-01-01 RX ORDER — INSULIN LISPRO 100/ML
18 VIAL (ML) SUBCUTANEOUS
Refills: 0 | Status: DISCONTINUED | OUTPATIENT
Start: 2024-01-01 | End: 2024-01-01

## 2024-01-01 RX ORDER — INSULIN GLARGINE 100 [IU]/ML
26 INJECTION, SOLUTION SUBCUTANEOUS AT BEDTIME
Refills: 0 | Status: DISCONTINUED | OUTPATIENT
Start: 2024-01-01 | End: 2024-01-01

## 2024-01-01 RX ORDER — SIMETHICONE 80 MG/1
80 TABLET, CHEWABLE ORAL EVERY 6 HOURS
Refills: 0 | Status: DISCONTINUED | OUTPATIENT
Start: 2024-01-01 | End: 2024-01-01

## 2024-01-01 RX ORDER — PANTOPRAZOLE SODIUM 20 MG/1
40 TABLET, DELAYED RELEASE ORAL EVERY 12 HOURS
Refills: 0 | Status: DISCONTINUED | OUTPATIENT
Start: 2024-01-01 | End: 2024-01-01

## 2024-01-01 RX ORDER — TRAZODONE HCL 50 MG
100 TABLET ORAL AT BEDTIME
Refills: 0 | Status: DISCONTINUED | OUTPATIENT
Start: 2024-01-01 | End: 2024-01-01

## 2024-01-01 RX ORDER — INSULIN LISPRO 100/ML
6 VIAL (ML) SUBCUTANEOUS AT BEDTIME
Refills: 0 | Status: DISCONTINUED | OUTPATIENT
Start: 2024-01-01 | End: 2024-01-01

## 2024-01-01 RX ORDER — INSULIN LISPRO 100/ML
7 VIAL (ML) SUBCUTANEOUS
Refills: 0 | Status: DISCONTINUED | OUTPATIENT
Start: 2024-01-01 | End: 2024-01-01

## 2024-01-01 RX ORDER — ONDANSETRON 8 MG/1
4 TABLET, FILM COATED ORAL EVERY 8 HOURS
Refills: 0 | Status: DISCONTINUED | OUTPATIENT
Start: 2024-01-01 | End: 2024-01-01

## 2024-01-01 RX ORDER — MORPHINE SULFATE 50 MG/1
0.5 CAPSULE, EXTENDED RELEASE ORAL ONCE
Refills: 0 | Status: DISCONTINUED | OUTPATIENT
Start: 2024-01-01 | End: 2024-01-01

## 2024-01-01 RX ORDER — SENNA PLUS 8.6 MG/1
1 TABLET ORAL
Qty: 0 | Refills: 0 | DISCHARGE
Start: 2024-01-01

## 2024-01-01 RX ORDER — LANOLIN ALCOHOL/MO/W.PET/CERES
3 CREAM (GRAM) TOPICAL ONCE
Refills: 0 | Status: COMPLETED | OUTPATIENT
Start: 2024-01-01 | End: 2024-01-01

## 2024-01-01 RX ORDER — GABAPENTIN 400 MG/1
1 CAPSULE ORAL
Qty: 0 | Refills: 0 | DISCHARGE

## 2024-01-01 RX ORDER — KETOROLAC TROMETHAMINE 30 MG/ML
15 SYRINGE (ML) INJECTION ONCE
Refills: 0 | Status: DISCONTINUED | OUTPATIENT
Start: 2024-01-01 | End: 2024-01-01

## 2024-01-01 RX ORDER — ALBUTEROL 90 UG/1
2.5 AEROSOL, METERED ORAL EVERY 6 HOURS
Refills: 0 | Status: DISCONTINUED | OUTPATIENT
Start: 2024-01-01 | End: 2024-01-01

## 2024-01-01 RX ORDER — INSULIN GLARGINE 100 [IU]/ML
22 INJECTION, SOLUTION SUBCUTANEOUS AT BEDTIME
Refills: 0 | Status: DISCONTINUED | OUTPATIENT
Start: 2024-01-01 | End: 2024-01-01

## 2024-01-01 RX ORDER — FUROSEMIDE 40 MG
40 TABLET ORAL
Refills: 0 | Status: DISCONTINUED | OUTPATIENT
Start: 2024-01-01 | End: 2024-01-01

## 2024-01-01 RX ORDER — ALBUTEROL 90 UG/1
2 AEROSOL, METERED ORAL EVERY 6 HOURS
Refills: 0 | Status: DISCONTINUED | OUTPATIENT
Start: 2024-01-01 | End: 2024-01-01

## 2024-01-01 RX ORDER — INSULIN LISPRO 100/ML
20 VIAL (ML) SUBCUTANEOUS
Refills: 0 | Status: DISCONTINUED | OUTPATIENT
Start: 2024-01-01 | End: 2024-01-01

## 2024-01-01 RX ORDER — DIAZEPAM 5 MG
2.5 TABLET ORAL EVERY 12 HOURS
Refills: 0 | Status: DISCONTINUED | OUTPATIENT
Start: 2024-01-01 | End: 2024-01-01

## 2024-01-01 RX ORDER — ACETAMINOPHEN 500 MG
975 TABLET ORAL ONCE
Refills: 0 | Status: COMPLETED | OUTPATIENT
Start: 2024-01-01 | End: 2024-01-01

## 2024-01-01 RX ORDER — SODIUM,POTASSIUM PHOSPHATES 278-250MG
1 POWDER IN PACKET (EA) ORAL ONCE
Refills: 0 | Status: COMPLETED | OUTPATIENT
Start: 2024-01-01 | End: 2024-01-01

## 2024-01-01 RX ORDER — SENNA PLUS 8.6 MG/1
1 TABLET ORAL DAILY
Refills: 0 | Status: DISCONTINUED | OUTPATIENT
Start: 2024-01-01 | End: 2024-01-01

## 2024-01-01 RX ORDER — APIXABAN 2.5 MG/1
1 TABLET, FILM COATED ORAL
Refills: 0 | DISCHARGE

## 2024-01-01 RX ORDER — OXYMETAZOLINE HYDROCHLORIDE 0.5 MG/ML
2 SPRAY NASAL
Refills: 0 | Status: COMPLETED | OUTPATIENT
Start: 2024-01-01 | End: 2024-01-01

## 2024-01-01 RX ORDER — IPRATROPIUM/ALBUTEROL SULFATE 18-103MCG
3 AEROSOL WITH ADAPTER (GRAM) INHALATION ONCE
Refills: 0 | Status: COMPLETED | OUTPATIENT
Start: 2024-01-01 | End: 2024-01-01

## 2024-01-01 RX ORDER — CALCIUM CARBONATE 500(1250)
1 TABLET ORAL ONCE
Refills: 0 | Status: COMPLETED | OUTPATIENT
Start: 2024-01-01 | End: 2024-01-01

## 2024-01-01 RX ORDER — APIXABAN 2.5 MG/1
5 TABLET, FILM COATED ORAL EVERY 12 HOURS
Refills: 0 | Status: DISCONTINUED | OUTPATIENT
Start: 2024-01-01 | End: 2024-01-01

## 2024-01-01 RX ORDER — INSULIN LISPRO 100/ML
5 VIAL (ML) SUBCUTANEOUS
Refills: 0 | Status: DISCONTINUED | OUTPATIENT
Start: 2024-01-01 | End: 2024-01-01

## 2024-01-01 RX ORDER — CHLORHEXIDINE GLUCONATE 213 G/1000ML
1 SOLUTION TOPICAL
Refills: 0 | Status: DISCONTINUED | OUTPATIENT
Start: 2024-01-01 | End: 2024-01-01

## 2024-01-01 RX ORDER — PIPERACILLIN AND TAZOBACTAM 4; .5 G/20ML; G/20ML
3.38 INJECTION, POWDER, LYOPHILIZED, FOR SOLUTION INTRAVENOUS EVERY 8 HOURS
Refills: 0 | Status: DISCONTINUED | OUTPATIENT
Start: 2024-01-01 | End: 2024-01-01

## 2024-01-01 RX ORDER — INSULIN LISPRO 100/ML
5 VIAL (ML) SUBCUTANEOUS AT BEDTIME
Refills: 0 | Status: DISCONTINUED | OUTPATIENT
Start: 2024-01-01 | End: 2024-01-01

## 2024-01-01 RX ORDER — INSULIN GLARGINE 100 [IU]/ML
20 INJECTION, SOLUTION SUBCUTANEOUS AT BEDTIME
Refills: 0 | Status: DISCONTINUED | OUTPATIENT
Start: 2024-01-01 | End: 2024-01-01

## 2024-01-01 RX ORDER — TAMSULOSIN HYDROCHLORIDE 0.4 MG/1
1 CAPSULE ORAL
Refills: 0 | DISCHARGE

## 2024-01-01 RX ORDER — INSULIN GLARGINE 100 [IU]/ML
35 INJECTION, SOLUTION SUBCUTANEOUS
Qty: 0 | Refills: 0 | DISCHARGE

## 2024-01-01 RX ORDER — BENZOCAINE AND MENTHOL 5; 1 G/100ML; G/100ML
1 LIQUID ORAL EVERY 4 HOURS
Refills: 0 | Status: DISCONTINUED | OUTPATIENT
Start: 2024-01-01 | End: 2024-01-01

## 2024-01-01 RX ORDER — IPRATROPIUM/ALBUTEROL SULFATE 18-103MCG
3 AEROSOL WITH ADAPTER (GRAM) INHALATION
Refills: 0 | Status: COMPLETED | OUTPATIENT
Start: 2024-01-01 | End: 2024-01-01

## 2024-01-01 RX ORDER — ROBINUL 0.2 MG/ML
0.4 INJECTION INTRAMUSCULAR; INTRAVENOUS
Refills: 0 | Status: DISCONTINUED | OUTPATIENT
Start: 2024-01-01 | End: 2024-01-01

## 2024-01-01 RX ORDER — SODIUM CHLORIDE 9 MG/ML
1000 INJECTION, SOLUTION INTRAVENOUS ONCE
Refills: 0 | Status: COMPLETED | OUTPATIENT
Start: 2024-01-01 | End: 2024-01-01

## 2024-01-01 RX ORDER — FUROSEMIDE 40 MG
20 TABLET ORAL ONCE
Refills: 0 | Status: COMPLETED | OUTPATIENT
Start: 2024-01-01 | End: 2024-01-01

## 2024-01-01 RX ORDER — BUDESONIDE, MICRONIZED 100 %
0.5 POWDER (GRAM) MISCELLANEOUS
Refills: 0 | Status: DISCONTINUED | OUTPATIENT
Start: 2024-01-01 | End: 2024-01-01

## 2024-01-01 RX ORDER — TAMSULOSIN HYDROCHLORIDE 0.4 MG/1
0.4 CAPSULE ORAL AT BEDTIME
Refills: 0 | Status: DISCONTINUED | OUTPATIENT
Start: 2024-01-01 | End: 2024-01-01

## 2024-01-01 RX ORDER — BUMETANIDE 0.25 MG/ML
2 INJECTION INTRAMUSCULAR; INTRAVENOUS EVERY 8 HOURS
Refills: 0 | Status: DISCONTINUED | OUTPATIENT
Start: 2024-01-01 | End: 2024-01-01

## 2024-01-01 RX ORDER — POTASSIUM CHLORIDE 20 MEQ
40 PACKET (EA) ORAL ONCE
Refills: 0 | Status: COMPLETED | OUTPATIENT
Start: 2024-01-01 | End: 2024-01-01

## 2024-01-01 RX ORDER — INSULIN GLARGINE 100 [IU]/ML
14 INJECTION, SOLUTION SUBCUTANEOUS AT BEDTIME
Refills: 0 | Status: DISCONTINUED | OUTPATIENT
Start: 2024-01-01 | End: 2024-01-01

## 2024-01-01 RX ORDER — INSULIN LISPRO 100/ML
16 VIAL (ML) SUBCUTANEOUS
Refills: 0 | Status: DISCONTINUED | OUTPATIENT
Start: 2024-01-01 | End: 2024-01-01

## 2024-01-01 RX ORDER — TRAZODONE HCL 50 MG
150 TABLET ORAL AT BEDTIME
Refills: 0 | Status: DISCONTINUED | OUTPATIENT
Start: 2024-01-01 | End: 2024-01-01

## 2024-01-01 RX ORDER — GLUCAGON INJECTION, SOLUTION 0.5 MG/.1ML
1 INJECTION, SOLUTION SUBCUTANEOUS ONCE
Refills: 0 | Status: DISCONTINUED | OUTPATIENT
Start: 2024-01-01 | End: 2024-01-01

## 2024-01-01 RX ORDER — LANOLIN ALCOHOL/MO/W.PET/CERES
1 CREAM (GRAM) TOPICAL
Qty: 0 | Refills: 0 | DISCHARGE
Start: 2024-01-01

## 2024-01-01 RX ORDER — HYDROMORPHONE HYDROCHLORIDE 2 MG/ML
0.5 INJECTION INTRAMUSCULAR; INTRAVENOUS; SUBCUTANEOUS EVERY 4 HOURS
Refills: 0 | Status: DISCONTINUED | OUTPATIENT
Start: 2024-01-01 | End: 2024-01-01

## 2024-01-01 RX ORDER — IBUPROFEN 200 MG
600 TABLET ORAL ONCE
Refills: 0 | Status: COMPLETED | OUTPATIENT
Start: 2024-01-01 | End: 2024-01-01

## 2024-01-01 RX ORDER — HYDROMORPHONE HYDROCHLORIDE 2 MG/ML
0.5 INJECTION INTRAMUSCULAR; INTRAVENOUS; SUBCUTANEOUS ONCE
Refills: 0 | Status: DISCONTINUED | OUTPATIENT
Start: 2024-01-01 | End: 2024-01-01

## 2024-01-01 RX ORDER — INSULIN LISPRO 100/ML
11 VIAL (ML) SUBCUTANEOUS
Refills: 0 | Status: DISCONTINUED | OUTPATIENT
Start: 2024-01-01 | End: 2024-01-01

## 2024-01-01 RX ORDER — LANOLIN ALCOHOL/MO/W.PET/CERES
5 CREAM (GRAM) TOPICAL ONCE
Refills: 0 | Status: COMPLETED | OUTPATIENT
Start: 2024-01-01 | End: 2024-01-01

## 2024-01-01 RX ORDER — BUDESONIDE AND FORMOTEROL FUMARATE DIHYDRATE 160; 4.5 UG/1; UG/1
1 AEROSOL RESPIRATORY (INHALATION)
Qty: 0 | Refills: 0 | DISCHARGE
Start: 2024-01-01

## 2024-01-01 RX ORDER — PHENAZOPYRIDINE HCL 100 MG
100 TABLET ORAL EVERY 8 HOURS
Refills: 0 | Status: DISCONTINUED | OUTPATIENT
Start: 2024-01-01 | End: 2024-01-01

## 2024-01-01 RX ORDER — NINTEDANIB 100 MG/1
1 CAPSULE ORAL
Refills: 0 | DISCHARGE

## 2024-01-01 RX ORDER — DIAZEPAM 5 MG
5 TABLET ORAL EVERY 12 HOURS
Refills: 0 | Status: DISCONTINUED | OUTPATIENT
Start: 2024-01-01 | End: 2024-01-01

## 2024-01-01 RX ORDER — INSULIN GLARGINE 100 [IU]/ML
25 INJECTION, SOLUTION SUBCUTANEOUS
Refills: 0 | DISCHARGE

## 2024-01-01 RX ORDER — ATORVASTATIN CALCIUM 80 MG/1
1 TABLET, FILM COATED ORAL
Refills: 0 | DISCHARGE

## 2024-01-01 RX ORDER — INSULIN LISPRO 100/ML
22 VIAL (ML) SUBCUTANEOUS ONCE
Refills: 0 | Status: COMPLETED | OUTPATIENT
Start: 2024-01-01 | End: 2024-01-01

## 2024-01-01 RX ORDER — POLYETHYLENE GLYCOL 3350 17 G/17G
17 POWDER, FOR SOLUTION ORAL
Refills: 0 | Status: DISCONTINUED | OUTPATIENT
Start: 2024-01-01 | End: 2024-01-01

## 2024-01-01 RX ORDER — LOPERAMIDE HCL 2 MG
2 TABLET ORAL
Refills: 0 | Status: DISCONTINUED | OUTPATIENT
Start: 2024-01-01 | End: 2024-01-01

## 2024-01-01 RX ORDER — GABAPENTIN 400 MG/1
300 CAPSULE ORAL DAILY
Refills: 0 | Status: DISCONTINUED | OUTPATIENT
Start: 2024-01-01 | End: 2024-01-01

## 2024-01-01 RX ORDER — LANOLIN ALCOHOL/MO/W.PET/CERES
3 CREAM (GRAM) TOPICAL AT BEDTIME
Refills: 0 | Status: COMPLETED | OUTPATIENT
Start: 2024-01-01 | End: 2024-01-01

## 2024-01-01 RX ORDER — INSULIN LISPRO 100/ML
22 VIAL (ML) SUBCUTANEOUS
Refills: 0 | Status: DISCONTINUED | OUTPATIENT
Start: 2024-01-01 | End: 2024-01-01

## 2024-01-01 RX ORDER — INSULIN LISPRO 100/ML
15 VIAL (ML) SUBCUTANEOUS
Refills: 0 | DISCHARGE

## 2024-01-01 RX ORDER — SIMETHICONE 80 MG/1
80 TABLET, CHEWABLE ORAL EVERY 8 HOURS
Refills: 0 | Status: DISCONTINUED | OUTPATIENT
Start: 2024-01-01 | End: 2024-01-01

## 2024-01-01 RX ORDER — METOCLOPRAMIDE HCL 10 MG
5 TABLET ORAL EVERY 8 HOURS
Refills: 0 | Status: DISCONTINUED | OUTPATIENT
Start: 2024-01-01 | End: 2024-01-01

## 2024-01-01 RX ORDER — MORPHINE SULFATE 50 MG/1
2 CAPSULE, EXTENDED RELEASE ORAL
Refills: 0 | Status: DISCONTINUED | OUTPATIENT
Start: 2024-01-01 | End: 2024-01-01

## 2024-01-01 RX ORDER — INSULIN GLARGINE 100 [IU]/ML
15 INJECTION, SOLUTION SUBCUTANEOUS AT BEDTIME
Refills: 0 | Status: DISCONTINUED | OUTPATIENT
Start: 2024-01-01 | End: 2024-01-01

## 2024-01-01 RX ADMIN — Medication 40 MILLIGRAM(S): at 14:15

## 2024-01-01 RX ADMIN — Medication 3 MILLILITER(S): at 11:55

## 2024-01-01 RX ADMIN — Medication 20 MILLIGRAM(S): at 21:31

## 2024-01-01 RX ADMIN — GABAPENTIN 300 MILLIGRAM(S): 400 CAPSULE ORAL at 12:14

## 2024-01-01 RX ADMIN — ATORVASTATIN CALCIUM 40 MILLIGRAM(S): 80 TABLET, FILM COATED ORAL at 21:36

## 2024-01-01 RX ADMIN — Medication 20 MILLIGRAM(S): at 13:44

## 2024-01-01 RX ADMIN — INSULIN GLARGINE 16 UNIT(S): 100 INJECTION, SOLUTION SUBCUTANEOUS at 21:35

## 2024-01-01 RX ADMIN — Medication 3 MILLILITER(S): at 05:33

## 2024-01-01 RX ADMIN — Medication 14 UNIT(S): at 11:44

## 2024-01-01 RX ADMIN — Medication 1 TABLET(S): at 11:01

## 2024-01-01 RX ADMIN — CLOTRIMAZOLE AND BETAMETHASONE DIPROPIONATE 1 APPLICATION(S): 10; .5 CREAM TOPICAL at 17:09

## 2024-01-01 RX ADMIN — CYCLOBENZAPRINE HYDROCHLORIDE 5 MILLIGRAM(S): 10 TABLET, FILM COATED ORAL at 22:14

## 2024-01-01 RX ADMIN — Medication 3 MILLILITER(S): at 23:17

## 2024-01-01 RX ADMIN — INSULIN GLARGINE 24 UNIT(S): 100 INJECTION, SOLUTION SUBCUTANEOUS at 21:40

## 2024-01-01 RX ADMIN — Medication 3 MILLILITER(S): at 23:03

## 2024-01-01 RX ADMIN — PANTOPRAZOLE SODIUM 40 MILLIGRAM(S): 20 TABLET, DELAYED RELEASE ORAL at 17:21

## 2024-01-01 RX ADMIN — Medication 20 MILLIGRAM(S): at 12:19

## 2024-01-01 RX ADMIN — Medication 6: at 17:20

## 2024-01-01 RX ADMIN — Medication 650 MILLIGRAM(S): at 00:38

## 2024-01-01 RX ADMIN — Medication 3 MILLILITER(S): at 18:49

## 2024-01-01 RX ADMIN — HYDROMORPHONE HYDROCHLORIDE 0.5 MILLIGRAM(S): 2 INJECTION INTRAMUSCULAR; INTRAVENOUS; SUBCUTANEOUS at 11:50

## 2024-01-01 RX ADMIN — Medication 1 TABLET(S): at 11:52

## 2024-01-01 RX ADMIN — Medication 150 MILLIGRAM(S): at 21:37

## 2024-01-01 RX ADMIN — INSULIN GLARGINE 22 UNIT(S): 100 INJECTION, SOLUTION SUBCUTANEOUS at 21:16

## 2024-01-01 RX ADMIN — BENZOCAINE AND MENTHOL 1 LOZENGE: 5; 1 LIQUID ORAL at 05:53

## 2024-01-01 RX ADMIN — GABAPENTIN 300 MILLIGRAM(S): 400 CAPSULE ORAL at 11:54

## 2024-01-01 RX ADMIN — Medication 3 MILLILITER(S): at 23:22

## 2024-01-01 RX ADMIN — Medication 10 MILLIGRAM(S): at 14:31

## 2024-01-01 RX ADMIN — Medication 3 MILLILITER(S): at 05:34

## 2024-01-01 RX ADMIN — Medication 650 MILLIGRAM(S): at 23:25

## 2024-01-01 RX ADMIN — PANTOPRAZOLE SODIUM 40 MILLIGRAM(S): 20 TABLET, DELAYED RELEASE ORAL at 05:09

## 2024-01-01 RX ADMIN — APIXABAN 5 MILLIGRAM(S): 2.5 TABLET, FILM COATED ORAL at 05:39

## 2024-01-01 RX ADMIN — Medication 3 MILLILITER(S): at 17:43

## 2024-01-01 RX ADMIN — BUDESONIDE AND FORMOTEROL FUMARATE DIHYDRATE 2 PUFF(S): 160; 4.5 AEROSOL RESPIRATORY (INHALATION) at 09:30

## 2024-01-01 RX ADMIN — Medication 20 MILLIGRAM(S): at 05:05

## 2024-01-01 RX ADMIN — INSULIN GLARGINE 24 UNIT(S): 100 INJECTION, SOLUTION SUBCUTANEOUS at 21:16

## 2024-01-01 RX ADMIN — TRAMADOL HYDROCHLORIDE 25 MILLIGRAM(S): 50 TABLET ORAL at 19:11

## 2024-01-01 RX ADMIN — Medication 650 MILLIGRAM(S): at 21:41

## 2024-01-01 RX ADMIN — Medication 20 MILLIGRAM(S): at 05:46

## 2024-01-01 RX ADMIN — Medication 10 MILLIGRAM(S): at 22:27

## 2024-01-01 RX ADMIN — Medication 650 MILLIGRAM(S): at 17:31

## 2024-01-01 RX ADMIN — Medication 3 MILLILITER(S): at 16:50

## 2024-01-01 RX ADMIN — Medication 0.5 MILLIGRAM(S): at 22:05

## 2024-01-01 RX ADMIN — BUDESONIDE AND FORMOTEROL FUMARATE DIHYDRATE 2 PUFF(S): 160; 4.5 AEROSOL RESPIRATORY (INHALATION) at 10:04

## 2024-01-01 RX ADMIN — ENOXAPARIN SODIUM 60 MILLIGRAM(S): 100 INJECTION SUBCUTANEOUS at 05:55

## 2024-01-01 RX ADMIN — GABAPENTIN 300 MILLIGRAM(S): 400 CAPSULE ORAL at 13:30

## 2024-01-01 RX ADMIN — SENNA PLUS 2 TABLET(S): 8.6 TABLET ORAL at 23:16

## 2024-01-01 RX ADMIN — CYCLOBENZAPRINE HYDROCHLORIDE 5 MILLIGRAM(S): 10 TABLET, FILM COATED ORAL at 21:34

## 2024-01-01 RX ADMIN — Medication 0.5 MILLIGRAM(S): at 12:32

## 2024-01-01 RX ADMIN — POLYETHYLENE GLYCOL 3350 17 GRAM(S): 17 POWDER, FOR SOLUTION ORAL at 18:30

## 2024-01-01 RX ADMIN — Medication 100 MILLIGRAM(S): at 21:56

## 2024-01-01 RX ADMIN — Medication 1 TABLET(S): at 14:32

## 2024-01-01 RX ADMIN — BUDESONIDE AND FORMOTEROL FUMARATE DIHYDRATE 2 PUFF(S): 160; 4.5 AEROSOL RESPIRATORY (INHALATION) at 21:38

## 2024-01-01 RX ADMIN — Medication 3 MILLILITER(S): at 12:04

## 2024-01-01 RX ADMIN — Medication 20 MILLIGRAM(S): at 05:10

## 2024-01-01 RX ADMIN — Medication 20 MILLIGRAM(S): at 13:18

## 2024-01-01 RX ADMIN — Medication 12 UNIT(S): at 16:42

## 2024-01-01 RX ADMIN — PANTOPRAZOLE SODIUM 40 MILLIGRAM(S): 20 TABLET, DELAYED RELEASE ORAL at 06:06

## 2024-01-01 RX ADMIN — Medication 11 UNIT(S): at 08:09

## 2024-01-01 RX ADMIN — Medication 20 MILLIGRAM(S): at 05:31

## 2024-01-01 RX ADMIN — Medication 40 MILLIGRAM(S): at 06:02

## 2024-01-01 RX ADMIN — Medication 3 MILLILITER(S): at 17:13

## 2024-01-01 RX ADMIN — Medication 10 MILLIGRAM(S): at 05:40

## 2024-01-01 RX ADMIN — APIXABAN 5 MILLIGRAM(S): 2.5 TABLET, FILM COATED ORAL at 05:08

## 2024-01-01 RX ADMIN — Medication 14 UNIT(S): at 08:00

## 2024-01-01 RX ADMIN — Medication 3 MILLILITER(S): at 18:47

## 2024-01-01 RX ADMIN — APIXABAN 5 MILLIGRAM(S): 2.5 TABLET, FILM COATED ORAL at 17:39

## 2024-01-01 RX ADMIN — Medication 3 MILLILITER(S): at 12:03

## 2024-01-01 RX ADMIN — Medication 0.5 MILLIGRAM(S): at 22:35

## 2024-01-01 RX ADMIN — Medication 6: at 17:11

## 2024-01-01 RX ADMIN — BENZOCAINE AND MENTHOL 1 LOZENGE: 5; 1 LIQUID ORAL at 22:27

## 2024-01-01 RX ADMIN — Medication 650 MILLIGRAM(S): at 22:07

## 2024-01-01 RX ADMIN — Medication 1 TABLET(S): at 00:30

## 2024-01-01 RX ADMIN — Medication 3 MILLILITER(S): at 05:41

## 2024-01-01 RX ADMIN — Medication 11 UNIT(S): at 12:00

## 2024-01-01 RX ADMIN — BUDESONIDE AND FORMOTEROL FUMARATE DIHYDRATE 2 PUFF(S): 160; 4.5 AEROSOL RESPIRATORY (INHALATION) at 21:54

## 2024-01-01 RX ADMIN — PANTOPRAZOLE SODIUM 40 MILLIGRAM(S): 20 TABLET, DELAYED RELEASE ORAL at 18:50

## 2024-01-01 RX ADMIN — CHLORHEXIDINE GLUCONATE 1 APPLICATION(S): 213 SOLUTION TOPICAL at 14:15

## 2024-01-01 RX ADMIN — Medication 4: at 22:03

## 2024-01-01 RX ADMIN — Medication 10 MILLIGRAM(S): at 12:14

## 2024-01-01 RX ADMIN — CHLORHEXIDINE GLUCONATE 1 APPLICATION(S): 213 SOLUTION TOPICAL at 13:06

## 2024-01-01 RX ADMIN — Medication 4: at 16:44

## 2024-01-01 RX ADMIN — Medication 60 MILLIGRAM(S): at 06:50

## 2024-01-01 RX ADMIN — Medication 650 MILLIGRAM(S): at 08:41

## 2024-01-01 RX ADMIN — PANTOPRAZOLE SODIUM 40 MILLIGRAM(S): 20 TABLET, DELAYED RELEASE ORAL at 05:25

## 2024-01-01 RX ADMIN — Medication 5 UNIT(S): at 22:18

## 2024-01-01 RX ADMIN — SIMETHICONE 80 MILLIGRAM(S): 80 TABLET, CHEWABLE ORAL at 18:10

## 2024-01-01 RX ADMIN — CYCLOBENZAPRINE HYDROCHLORIDE 5 MILLIGRAM(S): 10 TABLET, FILM COATED ORAL at 15:54

## 2024-01-01 RX ADMIN — Medication 2: at 17:31

## 2024-01-01 RX ADMIN — Medication 0.5 MILLIGRAM(S): at 12:09

## 2024-01-01 RX ADMIN — HYDROMORPHONE HYDROCHLORIDE 1 MILLIGRAM(S): 2 INJECTION INTRAMUSCULAR; INTRAVENOUS; SUBCUTANEOUS at 01:02

## 2024-01-01 RX ADMIN — ONDANSETRON 4 MILLIGRAM(S): 8 TABLET, FILM COATED ORAL at 23:12

## 2024-01-01 RX ADMIN — Medication 650 MILLIGRAM(S): at 12:42

## 2024-01-01 RX ADMIN — POLYETHYLENE GLYCOL 3350 17 GRAM(S): 17 POWDER, FOR SOLUTION ORAL at 11:49

## 2024-01-01 RX ADMIN — ATORVASTATIN CALCIUM 40 MILLIGRAM(S): 80 TABLET, FILM COATED ORAL at 22:27

## 2024-01-01 RX ADMIN — Medication 14 UNIT(S): at 12:22

## 2024-01-01 RX ADMIN — Medication 20 MILLIGRAM(S): at 13:08

## 2024-01-01 RX ADMIN — Medication 12 UNIT(S): at 16:45

## 2024-01-01 RX ADMIN — INSULIN GLARGINE 18 UNIT(S): 100 INJECTION, SOLUTION SUBCUTANEOUS at 22:13

## 2024-01-01 RX ADMIN — Medication 150 MILLIGRAM(S): at 21:56

## 2024-01-01 RX ADMIN — Medication 3 MILLILITER(S): at 06:12

## 2024-01-01 RX ADMIN — Medication 40 MILLIGRAM(S): at 05:55

## 2024-01-01 RX ADMIN — BENZOCAINE AND MENTHOL 1 LOZENGE: 5; 1 LIQUID ORAL at 02:11

## 2024-01-01 RX ADMIN — Medication 3 MILLIGRAM(S): at 22:24

## 2024-01-01 RX ADMIN — APIXABAN 5 MILLIGRAM(S): 2.5 TABLET, FILM COATED ORAL at 06:21

## 2024-01-01 RX ADMIN — Medication 40 MILLIGRAM(S): at 14:30

## 2024-01-01 RX ADMIN — APIXABAN 5 MILLIGRAM(S): 2.5 TABLET, FILM COATED ORAL at 05:53

## 2024-01-01 RX ADMIN — Medication 10 MILLIGRAM(S): at 14:30

## 2024-01-01 RX ADMIN — Medication 5: at 09:28

## 2024-01-01 RX ADMIN — Medication 2: at 21:12

## 2024-01-01 RX ADMIN — Medication 650 MILLIGRAM(S): at 22:17

## 2024-01-01 RX ADMIN — Medication 100 MILLIGRAM(S): at 22:16

## 2024-01-01 RX ADMIN — GABAPENTIN 300 MILLIGRAM(S): 400 CAPSULE ORAL at 11:09

## 2024-01-01 RX ADMIN — APIXABAN 5 MILLIGRAM(S): 2.5 TABLET, FILM COATED ORAL at 17:40

## 2024-01-01 RX ADMIN — Medication 40 MILLIGRAM(S): at 05:41

## 2024-01-01 RX ADMIN — CYCLOBENZAPRINE HYDROCHLORIDE 5 MILLIGRAM(S): 10 TABLET, FILM COATED ORAL at 11:22

## 2024-01-01 RX ADMIN — Medication 3 MILLILITER(S): at 20:16

## 2024-01-01 RX ADMIN — PANTOPRAZOLE SODIUM 40 MILLIGRAM(S): 20 TABLET, DELAYED RELEASE ORAL at 18:32

## 2024-01-01 RX ADMIN — Medication 10 MILLIGRAM(S): at 05:05

## 2024-01-01 RX ADMIN — ATORVASTATIN CALCIUM 40 MILLIGRAM(S): 80 TABLET, FILM COATED ORAL at 22:59

## 2024-01-01 RX ADMIN — Medication 2 MILLIGRAM(S): at 16:52

## 2024-01-01 RX ADMIN — Medication 10 UNIT(S): at 16:48

## 2024-01-01 RX ADMIN — Medication 40 MILLIGRAM(S): at 05:02

## 2024-01-01 RX ADMIN — Medication 3 MILLILITER(S): at 23:47

## 2024-01-01 RX ADMIN — Medication 2: at 08:46

## 2024-01-01 RX ADMIN — Medication 2: at 08:32

## 2024-01-01 RX ADMIN — Medication 20 MILLIGRAM(S): at 16:51

## 2024-01-01 RX ADMIN — Medication 12 UNIT(S): at 17:31

## 2024-01-01 RX ADMIN — Medication 650 MILLIGRAM(S): at 05:33

## 2024-01-01 RX ADMIN — HYDROMORPHONE HYDROCHLORIDE 0.5 MILLIGRAM(S): 2 INJECTION INTRAMUSCULAR; INTRAVENOUS; SUBCUTANEOUS at 17:45

## 2024-01-01 RX ADMIN — Medication 20 MILLIGRAM(S): at 05:40

## 2024-01-01 RX ADMIN — Medication 650 MILLIGRAM(S): at 01:39

## 2024-01-01 RX ADMIN — ATORVASTATIN CALCIUM 40 MILLIGRAM(S): 80 TABLET, FILM COATED ORAL at 21:31

## 2024-01-01 RX ADMIN — ATORVASTATIN CALCIUM 40 MILLIGRAM(S): 80 TABLET, FILM COATED ORAL at 21:50

## 2024-01-01 RX ADMIN — Medication 6: at 12:47

## 2024-01-01 RX ADMIN — Medication 650 MILLIGRAM(S): at 07:47

## 2024-01-01 RX ADMIN — INSULIN GLARGINE 16 UNIT(S): 100 INJECTION, SOLUTION SUBCUTANEOUS at 22:27

## 2024-01-01 RX ADMIN — APIXABAN 5 MILLIGRAM(S): 2.5 TABLET, FILM COATED ORAL at 18:46

## 2024-01-01 RX ADMIN — Medication 40 MILLIGRAM(S): at 17:12

## 2024-01-01 RX ADMIN — Medication 10 MILLIGRAM(S): at 05:55

## 2024-01-01 RX ADMIN — BENZOCAINE AND MENTHOL 1 LOZENGE: 5; 1 LIQUID ORAL at 18:55

## 2024-01-01 RX ADMIN — Medication 2: at 18:24

## 2024-01-01 RX ADMIN — BUDESONIDE AND FORMOTEROL FUMARATE DIHYDRATE 2 PUFF(S): 160; 4.5 AEROSOL RESPIRATORY (INHALATION) at 10:08

## 2024-01-01 RX ADMIN — GABAPENTIN 300 MILLIGRAM(S): 400 CAPSULE ORAL at 11:43

## 2024-01-01 RX ADMIN — BUMETANIDE 2 MILLIGRAM(S): 0.25 INJECTION INTRAMUSCULAR; INTRAVENOUS at 13:51

## 2024-01-01 RX ADMIN — Medication 650 MILLIGRAM(S): at 22:12

## 2024-01-01 RX ADMIN — Medication 8: at 17:05

## 2024-01-01 RX ADMIN — CHLORHEXIDINE GLUCONATE 1 APPLICATION(S): 213 SOLUTION TOPICAL at 13:50

## 2024-01-01 RX ADMIN — Medication 3 MILLILITER(S): at 17:33

## 2024-01-01 RX ADMIN — Medication 3 MILLILITER(S): at 18:05

## 2024-01-01 RX ADMIN — BUDESONIDE AND FORMOTEROL FUMARATE DIHYDRATE 2 PUFF(S): 160; 4.5 AEROSOL RESPIRATORY (INHALATION) at 22:59

## 2024-01-01 RX ADMIN — BUDESONIDE AND FORMOTEROL FUMARATE DIHYDRATE 2 PUFF(S): 160; 4.5 AEROSOL RESPIRATORY (INHALATION) at 10:20

## 2024-01-01 RX ADMIN — ENOXAPARIN SODIUM 60 MILLIGRAM(S): 100 INJECTION SUBCUTANEOUS at 06:41

## 2024-01-01 RX ADMIN — Medication 14 UNIT(S): at 11:29

## 2024-01-01 RX ADMIN — BUDESONIDE AND FORMOTEROL FUMARATE DIHYDRATE 2 PUFF(S): 160; 4.5 AEROSOL RESPIRATORY (INHALATION) at 08:13

## 2024-01-01 RX ADMIN — Medication 40 MILLIGRAM(S): at 05:45

## 2024-01-01 RX ADMIN — Medication 14 UNIT(S): at 12:36

## 2024-01-01 RX ADMIN — Medication 3 MILLILITER(S): at 23:09

## 2024-01-01 RX ADMIN — SIMETHICONE 80 MILLIGRAM(S): 80 TABLET, CHEWABLE ORAL at 17:44

## 2024-01-01 RX ADMIN — MORPHINE SULFATE 0.5 MILLIGRAM(S): 50 CAPSULE, EXTENDED RELEASE ORAL at 21:53

## 2024-01-01 RX ADMIN — Medication 20 MILLIGRAM(S): at 14:27

## 2024-01-01 RX ADMIN — Medication 3 MILLILITER(S): at 12:26

## 2024-01-01 RX ADMIN — Medication 2: at 21:25

## 2024-01-01 RX ADMIN — INSULIN GLARGINE 22 UNIT(S): 100 INJECTION, SOLUTION SUBCUTANEOUS at 21:23

## 2024-01-01 RX ADMIN — Medication 10 MILLIGRAM(S): at 22:07

## 2024-01-01 RX ADMIN — PANTOPRAZOLE SODIUM 40 MILLIGRAM(S): 20 TABLET, DELAYED RELEASE ORAL at 05:13

## 2024-01-01 RX ADMIN — Medication 80 MILLIGRAM(S): at 21:38

## 2024-01-01 RX ADMIN — Medication 3 MILLILITER(S): at 23:12

## 2024-01-01 RX ADMIN — CYCLOBENZAPRINE HYDROCHLORIDE 5 MILLIGRAM(S): 10 TABLET, FILM COATED ORAL at 20:29

## 2024-01-01 RX ADMIN — POLYETHYLENE GLYCOL 3350 17 GRAM(S): 17 POWDER, FOR SOLUTION ORAL at 06:03

## 2024-01-01 RX ADMIN — Medication 1 TABLET(S): at 14:19

## 2024-01-01 RX ADMIN — Medication 14 UNIT(S): at 12:31

## 2024-01-01 RX ADMIN — SIMETHICONE 80 MILLIGRAM(S): 80 TABLET, CHEWABLE ORAL at 06:08

## 2024-01-01 RX ADMIN — Medication 3 MILLILITER(S): at 06:45

## 2024-01-01 RX ADMIN — BENZOCAINE AND MENTHOL 1 LOZENGE: 5; 1 LIQUID ORAL at 05:31

## 2024-01-01 RX ADMIN — GABAPENTIN 300 MILLIGRAM(S): 400 CAPSULE ORAL at 12:39

## 2024-01-01 RX ADMIN — Medication 8 UNIT(S): at 12:03

## 2024-01-01 RX ADMIN — Medication 3 MILLILITER(S): at 14:31

## 2024-01-01 RX ADMIN — Medication 0.5 MILLIGRAM(S): at 03:48

## 2024-01-01 RX ADMIN — Medication 6: at 08:11

## 2024-01-01 RX ADMIN — INSULIN GLARGINE 8 UNIT(S): 100 INJECTION, SOLUTION SUBCUTANEOUS at 23:44

## 2024-01-01 RX ADMIN — APIXABAN 5 MILLIGRAM(S): 2.5 TABLET, FILM COATED ORAL at 17:05

## 2024-01-01 RX ADMIN — Medication 12: at 08:08

## 2024-01-01 RX ADMIN — Medication 20 MILLIGRAM(S): at 21:32

## 2024-01-01 RX ADMIN — Medication 40 MILLIGRAM(S): at 14:33

## 2024-01-01 RX ADMIN — Medication 40 MILLIGRAM(S): at 05:49

## 2024-01-01 RX ADMIN — Medication 6: at 16:43

## 2024-01-01 RX ADMIN — Medication 3 MILLILITER(S): at 12:20

## 2024-01-01 RX ADMIN — Medication 8: at 12:33

## 2024-01-01 RX ADMIN — Medication 20 MILLIGRAM(S): at 05:43

## 2024-01-01 RX ADMIN — PIPERACILLIN AND TAZOBACTAM 25 GRAM(S): 4; .5 INJECTION, POWDER, LYOPHILIZED, FOR SOLUTION INTRAVENOUS at 00:38

## 2024-01-01 RX ADMIN — Medication 12 UNIT(S): at 09:10

## 2024-01-01 RX ADMIN — Medication 40 MILLIGRAM(S): at 18:06

## 2024-01-01 RX ADMIN — Medication 3 MILLIGRAM(S): at 21:51

## 2024-01-01 RX ADMIN — Medication 650 MILLIGRAM(S): at 08:28

## 2024-01-01 RX ADMIN — Medication 14 UNIT(S): at 17:13

## 2024-01-01 RX ADMIN — Medication 650 MILLIGRAM(S): at 22:32

## 2024-01-01 RX ADMIN — Medication 3 MILLILITER(S): at 11:52

## 2024-01-01 RX ADMIN — Medication 100 MILLIGRAM(S): at 21:14

## 2024-01-01 RX ADMIN — Medication 10: at 17:02

## 2024-01-01 RX ADMIN — Medication 3 MILLILITER(S): at 11:06

## 2024-01-01 RX ADMIN — GABAPENTIN 300 MILLIGRAM(S): 400 CAPSULE ORAL at 12:19

## 2024-01-01 RX ADMIN — BUDESONIDE AND FORMOTEROL FUMARATE DIHYDRATE 2 PUFF(S): 160; 4.5 AEROSOL RESPIRATORY (INHALATION) at 22:17

## 2024-01-01 RX ADMIN — Medication 4: at 17:29

## 2024-01-01 RX ADMIN — INSULIN GLARGINE 10 UNIT(S): 100 INJECTION, SOLUTION SUBCUTANEOUS at 02:31

## 2024-01-01 RX ADMIN — INSULIN GLARGINE 18 UNIT(S): 100 INJECTION, SOLUTION SUBCUTANEOUS at 21:37

## 2024-01-01 RX ADMIN — Medication 40 MILLIGRAM(S): at 05:31

## 2024-01-01 RX ADMIN — Medication 40 MILLIGRAM(S): at 14:12

## 2024-01-01 RX ADMIN — PANTOPRAZOLE SODIUM 40 MILLIGRAM(S): 20 TABLET, DELAYED RELEASE ORAL at 05:06

## 2024-01-01 RX ADMIN — Medication 4: at 08:19

## 2024-01-01 RX ADMIN — Medication 30 MILLIGRAM(S): at 05:02

## 2024-01-01 RX ADMIN — CYCLOBENZAPRINE HYDROCHLORIDE 5 MILLIGRAM(S): 10 TABLET, FILM COATED ORAL at 02:10

## 2024-01-01 RX ADMIN — Medication 1 TABLET(S): at 12:12

## 2024-01-01 RX ADMIN — Medication 14 UNIT(S): at 08:02

## 2024-01-01 RX ADMIN — SIMETHICONE 80 MILLIGRAM(S): 80 TABLET, CHEWABLE ORAL at 17:05

## 2024-01-01 RX ADMIN — APIXABAN 5 MILLIGRAM(S): 2.5 TABLET, FILM COATED ORAL at 17:08

## 2024-01-01 RX ADMIN — Medication 13 UNIT(S): at 11:30

## 2024-01-01 RX ADMIN — APIXABAN 5 MILLIGRAM(S): 2.5 TABLET, FILM COATED ORAL at 05:10

## 2024-01-01 RX ADMIN — Medication 0.5 MILLIGRAM(S): at 22:33

## 2024-01-01 RX ADMIN — Medication 1 TABLET(S): at 11:12

## 2024-01-01 RX ADMIN — Medication 650 MILLIGRAM(S): at 23:41

## 2024-01-01 RX ADMIN — Medication 30 MILLIGRAM(S): at 17:14

## 2024-01-01 RX ADMIN — BUDESONIDE AND FORMOTEROL FUMARATE DIHYDRATE 2 PUFF(S): 160; 4.5 AEROSOL RESPIRATORY (INHALATION) at 21:56

## 2024-01-01 RX ADMIN — Medication 7 UNIT(S): at 08:48

## 2024-01-01 RX ADMIN — Medication 100 MILLIGRAM(S): at 22:02

## 2024-01-01 RX ADMIN — Medication 3 MILLILITER(S): at 11:48

## 2024-01-01 RX ADMIN — Medication 30 MILLILITER(S): at 00:13

## 2024-01-01 RX ADMIN — Medication 3 MILLILITER(S): at 08:04

## 2024-01-01 RX ADMIN — Medication 3 MILLILITER(S): at 05:46

## 2024-01-01 RX ADMIN — Medication 40 MILLIGRAM(S): at 05:05

## 2024-01-01 RX ADMIN — CHLORHEXIDINE GLUCONATE 1 APPLICATION(S): 213 SOLUTION TOPICAL at 11:06

## 2024-01-01 RX ADMIN — CHLORHEXIDINE GLUCONATE 1 APPLICATION(S): 213 SOLUTION TOPICAL at 11:11

## 2024-01-01 RX ADMIN — POLYETHYLENE GLYCOL 3350 17 GRAM(S): 17 POWDER, FOR SOLUTION ORAL at 06:13

## 2024-01-01 RX ADMIN — Medication 10 UNIT(S): at 16:58

## 2024-01-01 RX ADMIN — Medication 400 MILLIGRAM(S): at 23:01

## 2024-01-01 RX ADMIN — Medication 20 MILLIGRAM(S): at 06:26

## 2024-01-01 RX ADMIN — Medication 1 TABLET(S): at 11:28

## 2024-01-01 RX ADMIN — PANTOPRAZOLE SODIUM 40 MILLIGRAM(S): 20 TABLET, DELAYED RELEASE ORAL at 05:10

## 2024-01-01 RX ADMIN — Medication 4: at 11:28

## 2024-01-01 RX ADMIN — Medication 1000 MILLIGRAM(S): at 21:17

## 2024-01-01 RX ADMIN — Medication 2: at 02:06

## 2024-01-01 RX ADMIN — Medication 100 MILLIGRAM(S): at 23:17

## 2024-01-01 RX ADMIN — Medication 2.5 MILLIGRAM(S): at 10:05

## 2024-01-01 RX ADMIN — INSULIN GLARGINE 18 UNIT(S): 100 INJECTION, SOLUTION SUBCUTANEOUS at 21:38

## 2024-01-01 RX ADMIN — Medication 80 MILLIGRAM(S): at 13:52

## 2024-01-01 RX ADMIN — Medication 3 MILLILITER(S): at 17:19

## 2024-01-01 RX ADMIN — GABAPENTIN 300 MILLIGRAM(S): 400 CAPSULE ORAL at 11:19

## 2024-01-01 RX ADMIN — ATORVASTATIN CALCIUM 40 MILLIGRAM(S): 80 TABLET, FILM COATED ORAL at 22:10

## 2024-01-01 RX ADMIN — ALBUTEROL 2.5 MILLIGRAM(S): 90 AEROSOL, METERED ORAL at 08:45

## 2024-01-01 RX ADMIN — Medication 40 MILLIGRAM(S): at 13:38

## 2024-01-01 RX ADMIN — Medication 10: at 08:28

## 2024-01-01 RX ADMIN — Medication 3 MILLIGRAM(S): at 23:13

## 2024-01-01 RX ADMIN — Medication 40 MILLIGRAM(S): at 11:29

## 2024-01-01 RX ADMIN — ATORVASTATIN CALCIUM 40 MILLIGRAM(S): 80 TABLET, FILM COATED ORAL at 21:14

## 2024-01-01 RX ADMIN — Medication 6: at 08:34

## 2024-01-01 RX ADMIN — Medication 80 MILLIGRAM(S): at 05:41

## 2024-01-01 RX ADMIN — Medication 2: at 17:11

## 2024-01-01 RX ADMIN — GABAPENTIN 300 MILLIGRAM(S): 400 CAPSULE ORAL at 14:27

## 2024-01-01 RX ADMIN — Medication 18 UNIT(S): at 08:25

## 2024-01-01 RX ADMIN — Medication 2.5 MILLIGRAM(S): at 13:12

## 2024-01-01 RX ADMIN — PANTOPRAZOLE SODIUM 40 MILLIGRAM(S): 20 TABLET, DELAYED RELEASE ORAL at 17:20

## 2024-01-01 RX ADMIN — GABAPENTIN 300 MILLIGRAM(S): 400 CAPSULE ORAL at 11:50

## 2024-01-01 RX ADMIN — Medication 2 MILLIGRAM(S): at 10:17

## 2024-01-01 RX ADMIN — INSULIN GLARGINE 20 UNIT(S): 100 INJECTION, SOLUTION SUBCUTANEOUS at 22:18

## 2024-01-01 RX ADMIN — CHLORHEXIDINE GLUCONATE 1 APPLICATION(S): 213 SOLUTION TOPICAL at 14:17

## 2024-01-01 RX ADMIN — Medication 3 MILLILITER(S): at 11:27

## 2024-01-01 RX ADMIN — CHLORHEXIDINE GLUCONATE 1 APPLICATION(S): 213 SOLUTION TOPICAL at 13:02

## 2024-01-01 RX ADMIN — Medication 2: at 11:36

## 2024-01-01 RX ADMIN — Medication 150 MILLIGRAM(S): at 21:58

## 2024-01-01 RX ADMIN — Medication 1 TABLET(S): at 11:23

## 2024-01-01 RX ADMIN — Medication 11 UNIT(S): at 18:02

## 2024-01-01 RX ADMIN — Medication 40 MILLIGRAM(S): at 13:15

## 2024-01-01 RX ADMIN — Medication 400 MILLIGRAM(S): at 04:48

## 2024-01-01 RX ADMIN — Medication 12 UNIT(S): at 12:20

## 2024-01-01 RX ADMIN — Medication 22 UNIT(S): at 17:13

## 2024-01-01 RX ADMIN — Medication 40 MILLIGRAM(S): at 17:22

## 2024-01-01 RX ADMIN — Medication 30 MILLIGRAM(S): at 06:08

## 2024-01-01 RX ADMIN — Medication 3 MILLILITER(S): at 21:42

## 2024-01-01 RX ADMIN — SIMETHICONE 80 MILLIGRAM(S): 80 TABLET, CHEWABLE ORAL at 05:13

## 2024-01-01 RX ADMIN — Medication 1 TABLET(S): at 11:05

## 2024-01-01 RX ADMIN — Medication 11 UNIT(S): at 16:46

## 2024-01-01 RX ADMIN — GABAPENTIN 300 MILLIGRAM(S): 400 CAPSULE ORAL at 13:12

## 2024-01-01 RX ADMIN — BENZOCAINE AND MENTHOL 1 LOZENGE: 5; 1 LIQUID ORAL at 21:24

## 2024-01-01 RX ADMIN — Medication 14 UNIT(S): at 12:15

## 2024-01-01 RX ADMIN — APIXABAN 5 MILLIGRAM(S): 2.5 TABLET, FILM COATED ORAL at 05:03

## 2024-01-01 RX ADMIN — Medication 30 MILLILITER(S): at 23:49

## 2024-01-01 RX ADMIN — Medication 3 MILLILITER(S): at 03:02

## 2024-01-01 RX ADMIN — Medication 8 UNIT(S): at 16:50

## 2024-01-01 RX ADMIN — Medication 4: at 07:56

## 2024-01-01 RX ADMIN — CYCLOBENZAPRINE HYDROCHLORIDE 5 MILLIGRAM(S): 10 TABLET, FILM COATED ORAL at 13:59

## 2024-01-01 RX ADMIN — Medication 14 UNIT(S): at 11:46

## 2024-01-01 RX ADMIN — Medication 5 MILLIGRAM(S): at 11:01

## 2024-01-01 RX ADMIN — Medication 650 MILLIGRAM(S): at 08:48

## 2024-01-01 RX ADMIN — BUDESONIDE AND FORMOTEROL FUMARATE DIHYDRATE 2 PUFF(S): 160; 4.5 AEROSOL RESPIRATORY (INHALATION) at 08:56

## 2024-01-01 RX ADMIN — Medication 1 TABLET(S): at 12:07

## 2024-01-01 RX ADMIN — GABAPENTIN 300 MILLIGRAM(S): 400 CAPSULE ORAL at 14:35

## 2024-01-01 RX ADMIN — Medication 40 MILLIGRAM(S): at 18:10

## 2024-01-01 RX ADMIN — Medication 650 MILLIGRAM(S): at 21:55

## 2024-01-01 RX ADMIN — Medication 2 MILLIGRAM(S): at 11:54

## 2024-01-01 RX ADMIN — Medication 12 UNIT(S): at 18:31

## 2024-01-01 RX ADMIN — HYDROMORPHONE HYDROCHLORIDE 0.5 MILLIGRAM(S): 2 INJECTION INTRAMUSCULAR; INTRAVENOUS; SUBCUTANEOUS at 21:15

## 2024-01-01 RX ADMIN — Medication 9: at 12:28

## 2024-01-01 RX ADMIN — Medication 10 MILLIGRAM(S): at 02:11

## 2024-01-01 RX ADMIN — ONDANSETRON 4 MILLIGRAM(S): 8 TABLET, FILM COATED ORAL at 13:04

## 2024-01-01 RX ADMIN — Medication 8: at 12:23

## 2024-01-01 RX ADMIN — GABAPENTIN 300 MILLIGRAM(S): 400 CAPSULE ORAL at 12:17

## 2024-01-01 RX ADMIN — Medication 80 MILLIGRAM(S): at 14:06

## 2024-01-01 RX ADMIN — Medication 40 MILLIGRAM(S): at 17:20

## 2024-01-01 RX ADMIN — Medication 6: at 12:27

## 2024-01-01 RX ADMIN — Medication 30 MILLIGRAM(S): at 06:27

## 2024-01-01 RX ADMIN — HYDROMORPHONE HYDROCHLORIDE 0.5 MILLIGRAM(S): 2 INJECTION INTRAMUSCULAR; INTRAVENOUS; SUBCUTANEOUS at 22:40

## 2024-01-01 RX ADMIN — CYCLOBENZAPRINE HYDROCHLORIDE 5 MILLIGRAM(S): 10 TABLET, FILM COATED ORAL at 13:43

## 2024-01-01 RX ADMIN — Medication 1000 MILLIGRAM(S): at 01:29

## 2024-01-01 RX ADMIN — Medication 30 MILLILITER(S): at 11:44

## 2024-01-01 RX ADMIN — HUMAN INSULIN 8 UNIT(S): 100 INJECTION, SUSPENSION SUBCUTANEOUS at 08:04

## 2024-01-01 RX ADMIN — POLYETHYLENE GLYCOL 3350 17 GRAM(S): 17 POWDER, FOR SOLUTION ORAL at 05:41

## 2024-01-01 RX ADMIN — Medication 40 MILLIGRAM(S): at 11:37

## 2024-01-01 RX ADMIN — Medication 3 MILLILITER(S): at 21:38

## 2024-01-01 RX ADMIN — Medication 40 MILLIGRAM(S): at 17:44

## 2024-01-01 RX ADMIN — APIXABAN 5 MILLIGRAM(S): 2.5 TABLET, FILM COATED ORAL at 05:05

## 2024-01-01 RX ADMIN — Medication 3 MILLILITER(S): at 11:11

## 2024-01-01 RX ADMIN — APIXABAN 5 MILLIGRAM(S): 2.5 TABLET, FILM COATED ORAL at 18:31

## 2024-01-01 RX ADMIN — Medication 14 UNIT(S): at 16:43

## 2024-01-01 RX ADMIN — BUDESONIDE AND FORMOTEROL FUMARATE DIHYDRATE 2 PUFF(S): 160; 4.5 AEROSOL RESPIRATORY (INHALATION) at 21:40

## 2024-01-01 RX ADMIN — APIXABAN 5 MILLIGRAM(S): 2.5 TABLET, FILM COATED ORAL at 06:13

## 2024-01-01 RX ADMIN — PANTOPRAZOLE SODIUM 40 MILLIGRAM(S): 20 TABLET, DELAYED RELEASE ORAL at 18:56

## 2024-01-01 RX ADMIN — BUDESONIDE AND FORMOTEROL FUMARATE DIHYDRATE 2 PUFF(S): 160; 4.5 AEROSOL RESPIRATORY (INHALATION) at 20:09

## 2024-01-01 RX ADMIN — OXYMETAZOLINE HYDROCHLORIDE 2 SPRAY(S): 0.5 SPRAY NASAL at 05:08

## 2024-01-01 RX ADMIN — SENNA PLUS 2 TABLET(S): 8.6 TABLET ORAL at 22:18

## 2024-01-01 RX ADMIN — Medication 3 MILLILITER(S): at 17:12

## 2024-01-01 RX ADMIN — ATORVASTATIN CALCIUM 40 MILLIGRAM(S): 80 TABLET, FILM COATED ORAL at 21:51

## 2024-01-01 RX ADMIN — GABAPENTIN 300 MILLIGRAM(S): 400 CAPSULE ORAL at 11:44

## 2024-01-01 RX ADMIN — Medication 10 UNIT(S): at 12:48

## 2024-01-01 RX ADMIN — BUDESONIDE AND FORMOTEROL FUMARATE DIHYDRATE 2 PUFF(S): 160; 4.5 AEROSOL RESPIRATORY (INHALATION) at 08:50

## 2024-01-01 RX ADMIN — Medication 4: at 08:48

## 2024-01-01 RX ADMIN — Medication 13 UNIT(S): at 16:45

## 2024-01-01 RX ADMIN — BUMETANIDE 2 MILLIGRAM(S): 0.25 INJECTION INTRAMUSCULAR; INTRAVENOUS at 06:23

## 2024-01-01 RX ADMIN — Medication 650 MILLIGRAM(S): at 23:07

## 2024-01-01 RX ADMIN — Medication 3 MILLILITER(S): at 23:10

## 2024-01-01 RX ADMIN — SIMETHICONE 80 MILLIGRAM(S): 80 TABLET, CHEWABLE ORAL at 05:05

## 2024-01-01 RX ADMIN — APIXABAN 5 MILLIGRAM(S): 2.5 TABLET, FILM COATED ORAL at 17:29

## 2024-01-01 RX ADMIN — Medication 650 MILLIGRAM(S): at 19:28

## 2024-01-01 RX ADMIN — Medication 20 MILLIGRAM(S): at 14:08

## 2024-01-01 RX ADMIN — Medication 3 MILLILITER(S): at 06:08

## 2024-01-01 RX ADMIN — INSULIN GLARGINE 20 UNIT(S): 100 INJECTION, SOLUTION SUBCUTANEOUS at 21:38

## 2024-01-01 RX ADMIN — Medication 3 MILLILITER(S): at 20:09

## 2024-01-01 RX ADMIN — Medication 11 UNIT(S): at 11:37

## 2024-01-01 RX ADMIN — PANTOPRAZOLE SODIUM 40 MILLIGRAM(S): 20 TABLET, DELAYED RELEASE ORAL at 05:08

## 2024-01-01 RX ADMIN — BENZOCAINE AND MENTHOL 1 LOZENGE: 5; 1 LIQUID ORAL at 10:35

## 2024-01-01 RX ADMIN — SIMETHICONE 80 MILLIGRAM(S): 80 TABLET, CHEWABLE ORAL at 16:03

## 2024-01-01 RX ADMIN — Medication 10 MILLIGRAM(S): at 21:40

## 2024-01-01 RX ADMIN — Medication 40 MILLIGRAM(S): at 06:24

## 2024-01-01 RX ADMIN — Medication 30 MILLILITER(S): at 17:48

## 2024-01-01 RX ADMIN — ENOXAPARIN SODIUM 60 MILLIGRAM(S): 100 INJECTION SUBCUTANEOUS at 05:25

## 2024-01-01 RX ADMIN — Medication 2: at 16:50

## 2024-01-01 RX ADMIN — Medication 12 UNIT(S): at 17:08

## 2024-01-01 RX ADMIN — Medication 30 MILLILITER(S): at 02:10

## 2024-01-01 RX ADMIN — BUMETANIDE 132 MILLIGRAM(S): 0.25 INJECTION INTRAMUSCULAR; INTRAVENOUS at 22:41

## 2024-01-01 RX ADMIN — Medication 3 MILLILITER(S): at 11:09

## 2024-01-01 RX ADMIN — Medication 6: at 08:26

## 2024-01-01 RX ADMIN — CYCLOBENZAPRINE HYDROCHLORIDE 5 MILLIGRAM(S): 10 TABLET, FILM COATED ORAL at 04:18

## 2024-01-01 RX ADMIN — ATORVASTATIN CALCIUM 40 MILLIGRAM(S): 80 TABLET, FILM COATED ORAL at 22:20

## 2024-01-01 RX ADMIN — Medication 14 UNIT(S): at 08:27

## 2024-01-01 RX ADMIN — Medication 20 MILLIGRAM(S): at 23:17

## 2024-01-01 RX ADMIN — Medication 100 MILLIGRAM(S): at 21:25

## 2024-01-01 RX ADMIN — Medication 80 MILLIGRAM(S): at 06:54

## 2024-01-01 RX ADMIN — ONDANSETRON 4 MILLIGRAM(S): 8 TABLET, FILM COATED ORAL at 06:22

## 2024-01-01 RX ADMIN — Medication 40 MILLIGRAM(S): at 05:33

## 2024-01-01 RX ADMIN — Medication 20 MILLIGRAM(S): at 14:35

## 2024-01-01 RX ADMIN — Medication 11: at 08:20

## 2024-01-01 RX ADMIN — APIXABAN 5 MILLIGRAM(S): 2.5 TABLET, FILM COATED ORAL at 17:14

## 2024-01-01 RX ADMIN — Medication 100 MILLIGRAM(S): at 21:40

## 2024-01-01 RX ADMIN — INSULIN GLARGINE 22 UNIT(S): 100 INJECTION, SOLUTION SUBCUTANEOUS at 23:07

## 2024-01-01 RX ADMIN — PANTOPRAZOLE SODIUM 40 MILLIGRAM(S): 20 TABLET, DELAYED RELEASE ORAL at 06:21

## 2024-01-01 RX ADMIN — Medication 650 MILLIGRAM(S): at 12:45

## 2024-01-01 RX ADMIN — BUDESONIDE AND FORMOTEROL FUMARATE DIHYDRATE 2 PUFF(S): 160; 4.5 AEROSOL RESPIRATORY (INHALATION) at 08:44

## 2024-01-01 RX ADMIN — Medication 4: at 01:34

## 2024-01-01 RX ADMIN — BENZOCAINE AND MENTHOL 1 LOZENGE: 5; 1 LIQUID ORAL at 11:44

## 2024-01-01 RX ADMIN — Medication 20 UNIT(S): at 08:43

## 2024-01-01 RX ADMIN — Medication 20 MILLIGRAM(S): at 05:27

## 2024-01-01 RX ADMIN — CHLORHEXIDINE GLUCONATE 1 APPLICATION(S): 213 SOLUTION TOPICAL at 11:38

## 2024-01-01 RX ADMIN — Medication 0.5 MILLIGRAM(S): at 11:28

## 2024-01-01 RX ADMIN — GABAPENTIN 300 MILLIGRAM(S): 400 CAPSULE ORAL at 11:51

## 2024-01-01 RX ADMIN — Medication 20 MILLIGRAM(S): at 22:37

## 2024-01-01 RX ADMIN — Medication 20 MILLIGRAM(S): at 06:04

## 2024-01-01 RX ADMIN — SENNA PLUS 1 TABLET(S): 8.6 TABLET ORAL at 21:15

## 2024-01-01 RX ADMIN — Medication 650 MILLIGRAM(S): at 13:28

## 2024-01-01 RX ADMIN — ALBUTEROL 2.5 MILLIGRAM(S): 90 AEROSOL, METERED ORAL at 20:22

## 2024-01-01 RX ADMIN — Medication 2: at 11:57

## 2024-01-01 RX ADMIN — CHLORHEXIDINE GLUCONATE 1 APPLICATION(S): 213 SOLUTION TOPICAL at 05:11

## 2024-01-01 RX ADMIN — APIXABAN 5 MILLIGRAM(S): 2.5 TABLET, FILM COATED ORAL at 17:32

## 2024-01-01 RX ADMIN — Medication 2 MILLIGRAM(S): at 10:26

## 2024-01-01 RX ADMIN — Medication 18 UNIT(S): at 16:07

## 2024-01-01 RX ADMIN — Medication 40 MILLIGRAM(S): at 17:05

## 2024-01-01 RX ADMIN — Medication 10 MILLIGRAM(S): at 13:56

## 2024-01-01 RX ADMIN — Medication 18 UNIT(S): at 12:33

## 2024-01-01 RX ADMIN — Medication 3 MILLILITER(S): at 14:11

## 2024-01-01 RX ADMIN — TRAMADOL HYDROCHLORIDE 25 MILLIGRAM(S): 50 TABLET ORAL at 18:01

## 2024-01-01 RX ADMIN — BENZOCAINE AND MENTHOL 1 LOZENGE: 5; 1 LIQUID ORAL at 14:46

## 2024-01-01 RX ADMIN — BENZOCAINE AND MENTHOL 1 LOZENGE: 5; 1 LIQUID ORAL at 06:52

## 2024-01-01 RX ADMIN — APIXABAN 5 MILLIGRAM(S): 2.5 TABLET, FILM COATED ORAL at 05:42

## 2024-01-01 RX ADMIN — APIXABAN 5 MILLIGRAM(S): 2.5 TABLET, FILM COATED ORAL at 06:17

## 2024-01-01 RX ADMIN — Medication 40 MILLIGRAM(S): at 06:40

## 2024-01-01 RX ADMIN — SIMETHICONE 80 MILLIGRAM(S): 80 TABLET, CHEWABLE ORAL at 05:34

## 2024-01-01 RX ADMIN — CHLORHEXIDINE GLUCONATE 1 APPLICATION(S): 213 SOLUTION TOPICAL at 11:05

## 2024-01-01 RX ADMIN — Medication 3 MILLILITER(S): at 11:31

## 2024-01-01 RX ADMIN — INSULIN GLARGINE 22 UNIT(S): 100 INJECTION, SOLUTION SUBCUTANEOUS at 21:40

## 2024-01-01 RX ADMIN — Medication 1 TABLET(S): at 17:11

## 2024-01-01 RX ADMIN — Medication 3 MILLILITER(S): at 06:36

## 2024-01-01 RX ADMIN — CHLORHEXIDINE GLUCONATE 1 APPLICATION(S): 213 SOLUTION TOPICAL at 11:21

## 2024-01-01 RX ADMIN — Medication 14 UNIT(S): at 08:45

## 2024-01-01 RX ADMIN — Medication 1 TABLET(S): at 09:00

## 2024-01-01 RX ADMIN — HYDROMORPHONE HYDROCHLORIDE 0.5 MILLIGRAM(S): 2 INJECTION INTRAMUSCULAR; INTRAVENOUS; SUBCUTANEOUS at 00:00

## 2024-01-01 RX ADMIN — Medication 80 MILLIGRAM(S): at 05:55

## 2024-01-01 RX ADMIN — HYDROMORPHONE HYDROCHLORIDE 0.5 MILLIGRAM(S): 2 INJECTION INTRAMUSCULAR; INTRAVENOUS; SUBCUTANEOUS at 01:58

## 2024-01-01 RX ADMIN — Medication 40 MILLIGRAM(S): at 06:20

## 2024-01-01 RX ADMIN — Medication 6: at 11:44

## 2024-01-01 RX ADMIN — Medication 20 MILLIGRAM(S): at 05:30

## 2024-01-01 RX ADMIN — SIMETHICONE 80 MILLIGRAM(S): 80 TABLET, CHEWABLE ORAL at 13:11

## 2024-01-01 RX ADMIN — Medication 650 MILLIGRAM(S): at 17:00

## 2024-01-01 RX ADMIN — Medication 20 MILLIGRAM(S): at 13:38

## 2024-01-01 RX ADMIN — SENNA PLUS 1 TABLET(S): 8.6 TABLET ORAL at 11:13

## 2024-01-01 RX ADMIN — Medication 10 UNIT(S): at 08:39

## 2024-01-01 RX ADMIN — Medication 5: at 12:34

## 2024-01-01 RX ADMIN — INSULIN GLARGINE 18 UNIT(S): 100 INJECTION, SOLUTION SUBCUTANEOUS at 21:24

## 2024-01-01 RX ADMIN — Medication 6: at 11:46

## 2024-01-01 RX ADMIN — Medication 100 MILLIGRAM(S): at 21:16

## 2024-01-01 RX ADMIN — GABAPENTIN 300 MILLIGRAM(S): 400 CAPSULE ORAL at 14:09

## 2024-01-01 RX ADMIN — APIXABAN 5 MILLIGRAM(S): 2.5 TABLET, FILM COATED ORAL at 18:25

## 2024-01-01 RX ADMIN — Medication 3 MILLILITER(S): at 12:13

## 2024-01-01 RX ADMIN — APIXABAN 5 MILLIGRAM(S): 2.5 TABLET, FILM COATED ORAL at 06:27

## 2024-01-01 RX ADMIN — BUDESONIDE AND FORMOTEROL FUMARATE DIHYDRATE 2 PUFF(S): 160; 4.5 AEROSOL RESPIRATORY (INHALATION) at 21:28

## 2024-01-01 RX ADMIN — Medication 3 MILLILITER(S): at 17:39

## 2024-01-01 RX ADMIN — Medication 5 UNIT(S): at 02:58

## 2024-01-01 RX ADMIN — Medication 22 UNIT(S): at 08:12

## 2024-01-01 RX ADMIN — Medication 12 UNIT(S): at 12:59

## 2024-01-01 RX ADMIN — Medication 3 MILLIGRAM(S): at 21:16

## 2024-01-01 RX ADMIN — TRAMADOL HYDROCHLORIDE 25 MILLIGRAM(S): 50 TABLET ORAL at 15:15

## 2024-01-01 RX ADMIN — Medication 14 UNIT(S): at 08:38

## 2024-01-01 RX ADMIN — Medication 40 MILLIGRAM(S): at 05:43

## 2024-01-01 RX ADMIN — Medication 40 MILLIGRAM(S): at 05:30

## 2024-01-01 RX ADMIN — Medication 650 MILLIGRAM(S): at 00:20

## 2024-01-01 RX ADMIN — Medication 20 MILLIGRAM(S): at 05:33

## 2024-01-01 RX ADMIN — Medication 3 MILLILITER(S): at 06:22

## 2024-01-01 RX ADMIN — SIMETHICONE 80 MILLIGRAM(S): 80 TABLET, CHEWABLE ORAL at 17:51

## 2024-01-01 RX ADMIN — BUDESONIDE AND FORMOTEROL FUMARATE DIHYDRATE 2 PUFF(S): 160; 4.5 AEROSOL RESPIRATORY (INHALATION) at 23:17

## 2024-01-01 RX ADMIN — CHLORHEXIDINE GLUCONATE 1 APPLICATION(S): 213 SOLUTION TOPICAL at 13:35

## 2024-01-01 RX ADMIN — APIXABAN 5 MILLIGRAM(S): 2.5 TABLET, FILM COATED ORAL at 05:52

## 2024-01-01 RX ADMIN — PANTOPRAZOLE SODIUM 40 MILLIGRAM(S): 20 TABLET, DELAYED RELEASE ORAL at 06:22

## 2024-01-01 RX ADMIN — Medication 0.5 MILLIGRAM(S): at 20:36

## 2024-01-01 RX ADMIN — Medication 3 MILLILITER(S): at 18:45

## 2024-01-01 RX ADMIN — Medication 1000 MILLIGRAM(S): at 11:36

## 2024-01-01 RX ADMIN — Medication 3 MILLILITER(S): at 05:28

## 2024-01-01 RX ADMIN — Medication 10 MILLILITER(S): at 01:53

## 2024-01-01 RX ADMIN — Medication 650 MILLIGRAM(S): at 06:35

## 2024-01-01 RX ADMIN — Medication 10 UNIT(S): at 17:39

## 2024-01-01 RX ADMIN — Medication 4: at 02:14

## 2024-01-01 RX ADMIN — Medication 40 MILLIGRAM(S): at 05:08

## 2024-01-01 RX ADMIN — Medication 14 UNIT(S): at 08:35

## 2024-01-01 RX ADMIN — Medication 10 MILLIGRAM(S): at 06:17

## 2024-01-01 RX ADMIN — Medication 20 MILLIGRAM(S): at 06:06

## 2024-01-01 RX ADMIN — APIXABAN 5 MILLIGRAM(S): 2.5 TABLET, FILM COATED ORAL at 05:26

## 2024-01-01 RX ADMIN — Medication 650 MILLIGRAM(S): at 06:21

## 2024-01-01 RX ADMIN — PANTOPRAZOLE SODIUM 40 MILLIGRAM(S): 20 TABLET, DELAYED RELEASE ORAL at 05:45

## 2024-01-01 RX ADMIN — Medication 3 MILLILITER(S): at 16:59

## 2024-01-01 RX ADMIN — BUDESONIDE AND FORMOTEROL FUMARATE DIHYDRATE 2 PUFF(S): 160; 4.5 AEROSOL RESPIRATORY (INHALATION) at 21:15

## 2024-01-01 RX ADMIN — APIXABAN 5 MILLIGRAM(S): 2.5 TABLET, FILM COATED ORAL at 05:54

## 2024-01-01 RX ADMIN — Medication 650 MILLIGRAM(S): at 10:08

## 2024-01-01 RX ADMIN — PANTOPRAZOLE SODIUM 40 MILLIGRAM(S): 20 TABLET, DELAYED RELEASE ORAL at 17:09

## 2024-01-01 RX ADMIN — SIMETHICONE 80 MILLIGRAM(S): 80 TABLET, CHEWABLE ORAL at 18:23

## 2024-01-01 RX ADMIN — BUMETANIDE 2 MILLIGRAM(S): 0.25 INJECTION INTRAMUSCULAR; INTRAVENOUS at 08:33

## 2024-01-01 RX ADMIN — Medication 150 MILLIGRAM(S): at 21:53

## 2024-01-01 RX ADMIN — Medication 2: at 07:56

## 2024-01-01 RX ADMIN — Medication 1 TABLET(S): at 13:51

## 2024-01-01 RX ADMIN — BUDESONIDE AND FORMOTEROL FUMARATE DIHYDRATE 2 PUFF(S): 160; 4.5 AEROSOL RESPIRATORY (INHALATION) at 08:25

## 2024-01-01 RX ADMIN — Medication 650 MILLIGRAM(S): at 23:06

## 2024-01-01 RX ADMIN — Medication 10 MILLIGRAM(S): at 13:00

## 2024-01-01 RX ADMIN — TAMSULOSIN HYDROCHLORIDE 0.8 MILLIGRAM(S): 0.4 CAPSULE ORAL at 21:25

## 2024-01-01 RX ADMIN — CHLORHEXIDINE GLUCONATE 1 APPLICATION(S): 213 SOLUTION TOPICAL at 06:54

## 2024-01-01 RX ADMIN — Medication 30 MILLILITER(S): at 23:02

## 2024-01-01 RX ADMIN — BUDESONIDE AND FORMOTEROL FUMARATE DIHYDRATE 2 PUFF(S): 160; 4.5 AEROSOL RESPIRATORY (INHALATION) at 21:50

## 2024-01-01 RX ADMIN — Medication 2: at 18:02

## 2024-01-01 RX ADMIN — Medication 12 UNIT(S): at 18:42

## 2024-01-01 RX ADMIN — ATORVASTATIN CALCIUM 40 MILLIGRAM(S): 80 TABLET, FILM COATED ORAL at 21:32

## 2024-01-01 RX ADMIN — Medication 3 MILLILITER(S): at 09:51

## 2024-01-01 RX ADMIN — APIXABAN 5 MILLIGRAM(S): 2.5 TABLET, FILM COATED ORAL at 05:49

## 2024-01-01 RX ADMIN — INSULIN GLARGINE 18 UNIT(S): 100 INJECTION, SOLUTION SUBCUTANEOUS at 21:54

## 2024-01-01 RX ADMIN — Medication 40 MILLIEQUIVALENT(S): at 05:25

## 2024-01-01 RX ADMIN — HYDROMORPHONE HYDROCHLORIDE 0.5 MILLIGRAM(S): 2 INJECTION INTRAMUSCULAR; INTRAVENOUS; SUBCUTANEOUS at 02:48

## 2024-01-01 RX ADMIN — CHLORHEXIDINE GLUCONATE 1 APPLICATION(S): 213 SOLUTION TOPICAL at 11:28

## 2024-01-01 RX ADMIN — Medication 20 MILLIGRAM(S): at 22:21

## 2024-01-01 RX ADMIN — Medication 30 MILLILITER(S): at 06:44

## 2024-01-01 RX ADMIN — Medication 3 MILLIGRAM(S): at 01:51

## 2024-01-01 RX ADMIN — Medication 3 MILLILITER(S): at 20:25

## 2024-01-01 RX ADMIN — HYDROMORPHONE HYDROCHLORIDE 0.5 MILLIGRAM(S): 2 INJECTION INTRAMUSCULAR; INTRAVENOUS; SUBCUTANEOUS at 19:50

## 2024-01-01 RX ADMIN — CHLORHEXIDINE GLUCONATE 1 APPLICATION(S): 213 SOLUTION TOPICAL at 11:33

## 2024-01-01 RX ADMIN — INSULIN GLARGINE 18 UNIT(S): 100 INJECTION, SOLUTION SUBCUTANEOUS at 21:32

## 2024-01-01 RX ADMIN — Medication 13 UNIT(S): at 16:50

## 2024-01-01 RX ADMIN — BUDESONIDE AND FORMOTEROL FUMARATE DIHYDRATE 2 PUFF(S): 160; 4.5 AEROSOL RESPIRATORY (INHALATION) at 11:58

## 2024-01-01 RX ADMIN — Medication 20 MILLIGRAM(S): at 05:12

## 2024-01-01 RX ADMIN — Medication 12 UNIT(S): at 17:09

## 2024-01-01 RX ADMIN — ATORVASTATIN CALCIUM 40 MILLIGRAM(S): 80 TABLET, FILM COATED ORAL at 21:38

## 2024-01-01 RX ADMIN — BENZOCAINE AND MENTHOL 1 LOZENGE: 5; 1 LIQUID ORAL at 05:21

## 2024-01-01 RX ADMIN — BUDESONIDE AND FORMOTEROL FUMARATE DIHYDRATE 2 PUFF(S): 160; 4.5 AEROSOL RESPIRATORY (INHALATION) at 21:11

## 2024-01-01 RX ADMIN — Medication 100 MILLIGRAM(S): at 21:51

## 2024-01-01 RX ADMIN — INSULIN GLARGINE 8 UNIT(S): 100 INJECTION, SOLUTION SUBCUTANEOUS at 22:08

## 2024-01-01 RX ADMIN — GABAPENTIN 300 MILLIGRAM(S): 400 CAPSULE ORAL at 11:27

## 2024-01-01 RX ADMIN — Medication 10 MILLIGRAM(S): at 22:20

## 2024-01-01 RX ADMIN — BENZOCAINE AND MENTHOL 1 LOZENGE: 5; 1 LIQUID ORAL at 21:28

## 2024-01-01 RX ADMIN — Medication 3 MILLIGRAM(S): at 02:31

## 2024-01-01 RX ADMIN — Medication 30 MILLIGRAM(S): at 05:31

## 2024-01-01 RX ADMIN — ATORVASTATIN CALCIUM 40 MILLIGRAM(S): 80 TABLET, FILM COATED ORAL at 21:29

## 2024-01-01 RX ADMIN — PANTOPRAZOLE SODIUM 40 MILLIGRAM(S): 20 TABLET, DELAYED RELEASE ORAL at 05:33

## 2024-01-01 RX ADMIN — POLYETHYLENE GLYCOL 3350 17 GRAM(S): 17 POWDER, FOR SOLUTION ORAL at 18:07

## 2024-01-01 RX ADMIN — Medication 10 MILLIGRAM(S): at 21:38

## 2024-01-01 RX ADMIN — Medication 600 MILLIGRAM(S): at 18:33

## 2024-01-01 RX ADMIN — Medication 1000 MILLIGRAM(S): at 17:11

## 2024-01-01 RX ADMIN — Medication 10 UNIT(S): at 08:25

## 2024-01-01 RX ADMIN — Medication 20 MILLIGRAM(S): at 12:37

## 2024-01-01 RX ADMIN — CHLORHEXIDINE GLUCONATE 1 APPLICATION(S): 213 SOLUTION TOPICAL at 11:09

## 2024-01-01 RX ADMIN — Medication 650 MILLIGRAM(S): at 21:42

## 2024-01-01 RX ADMIN — Medication 11 UNIT(S): at 11:49

## 2024-01-01 RX ADMIN — INSULIN GLARGINE 24 UNIT(S): 100 INJECTION, SOLUTION SUBCUTANEOUS at 21:29

## 2024-01-01 RX ADMIN — Medication 13 UNIT(S): at 08:29

## 2024-01-01 RX ADMIN — PANTOPRAZOLE SODIUM 40 MILLIGRAM(S): 20 TABLET, DELAYED RELEASE ORAL at 06:13

## 2024-01-01 RX ADMIN — BUDESONIDE AND FORMOTEROL FUMARATE DIHYDRATE 2 PUFF(S): 160; 4.5 AEROSOL RESPIRATORY (INHALATION) at 10:03

## 2024-01-01 RX ADMIN — Medication 1 TABLET(S): at 12:04

## 2024-01-01 RX ADMIN — PANTOPRAZOLE SODIUM 40 MILLIGRAM(S): 20 TABLET, DELAYED RELEASE ORAL at 05:35

## 2024-01-01 RX ADMIN — Medication 40 MILLIGRAM(S): at 06:13

## 2024-01-01 RX ADMIN — Medication 6: at 12:05

## 2024-01-01 RX ADMIN — Medication 650 MILLIGRAM(S): at 01:56

## 2024-01-01 RX ADMIN — Medication 10 MILLIGRAM(S): at 13:11

## 2024-01-01 RX ADMIN — Medication 3 MILLILITER(S): at 11:23

## 2024-01-01 RX ADMIN — BENZOCAINE AND MENTHOL 1 LOZENGE: 5; 1 LIQUID ORAL at 21:50

## 2024-01-01 RX ADMIN — BENZOCAINE AND MENTHOL 1 LOZENGE: 5; 1 LIQUID ORAL at 17:28

## 2024-01-01 RX ADMIN — Medication 40 MILLIGRAM(S): at 05:54

## 2024-01-01 RX ADMIN — BENZOCAINE AND MENTHOL 1 LOZENGE: 5; 1 LIQUID ORAL at 17:12

## 2024-01-01 RX ADMIN — PANTOPRAZOLE SODIUM 40 MILLIGRAM(S): 20 TABLET, DELAYED RELEASE ORAL at 17:42

## 2024-01-01 RX ADMIN — Medication 2.5 MILLIGRAM(S): at 12:44

## 2024-01-01 RX ADMIN — TAMSULOSIN HYDROCHLORIDE 0.8 MILLIGRAM(S): 0.4 CAPSULE ORAL at 21:16

## 2024-01-01 RX ADMIN — Medication 40 MILLIGRAM(S): at 05:03

## 2024-01-01 RX ADMIN — Medication 3 MILLILITER(S): at 08:07

## 2024-01-01 RX ADMIN — Medication 20 MILLIGRAM(S): at 06:20

## 2024-01-01 RX ADMIN — Medication 30 MILLIGRAM(S): at 05:04

## 2024-01-01 RX ADMIN — Medication 650 MILLIGRAM(S): at 07:21

## 2024-01-01 RX ADMIN — Medication 9 UNIT(S): at 17:24

## 2024-01-01 RX ADMIN — Medication 40 MILLIGRAM(S): at 05:34

## 2024-01-01 RX ADMIN — Medication 40 MILLIGRAM(S): at 13:44

## 2024-01-01 RX ADMIN — POLYETHYLENE GLYCOL 3350 17 GRAM(S): 17 POWDER, FOR SOLUTION ORAL at 05:52

## 2024-01-01 RX ADMIN — BUDESONIDE AND FORMOTEROL FUMARATE DIHYDRATE 2 PUFF(S): 160; 4.5 AEROSOL RESPIRATORY (INHALATION) at 21:17

## 2024-01-01 RX ADMIN — Medication 400 MILLIGRAM(S): at 04:59

## 2024-01-01 RX ADMIN — PANTOPRAZOLE SODIUM 40 MILLIGRAM(S): 20 TABLET, DELAYED RELEASE ORAL at 05:49

## 2024-01-01 RX ADMIN — Medication 3 MILLILITER(S): at 05:35

## 2024-01-01 RX ADMIN — CHLORHEXIDINE GLUCONATE 1 APPLICATION(S): 213 SOLUTION TOPICAL at 11:51

## 2024-01-01 RX ADMIN — Medication 40 MILLIGRAM(S): at 17:19

## 2024-01-01 RX ADMIN — Medication 3 MILLILITER(S): at 20:42

## 2024-01-01 RX ADMIN — APIXABAN 5 MILLIGRAM(S): 2.5 TABLET, FILM COATED ORAL at 18:53

## 2024-01-01 RX ADMIN — ATORVASTATIN CALCIUM 40 MILLIGRAM(S): 80 TABLET, FILM COATED ORAL at 22:18

## 2024-01-01 RX ADMIN — Medication 4: at 08:38

## 2024-01-01 RX ADMIN — Medication 4: at 06:12

## 2024-01-01 RX ADMIN — Medication 3 MILLILITER(S): at 23:14

## 2024-01-01 RX ADMIN — INSULIN GLARGINE 14 UNIT(S): 100 INJECTION, SOLUTION SUBCUTANEOUS at 21:26

## 2024-01-01 RX ADMIN — PANTOPRAZOLE SODIUM 40 MILLIGRAM(S): 20 TABLET, DELAYED RELEASE ORAL at 06:41

## 2024-01-01 RX ADMIN — APIXABAN 5 MILLIGRAM(S): 2.5 TABLET, FILM COATED ORAL at 05:28

## 2024-01-01 RX ADMIN — ATORVASTATIN CALCIUM 40 MILLIGRAM(S): 80 TABLET, FILM COATED ORAL at 21:54

## 2024-01-01 RX ADMIN — Medication 30 MILLILITER(S): at 20:02

## 2024-01-01 RX ADMIN — Medication 40 MILLIGRAM(S): at 05:40

## 2024-01-01 RX ADMIN — ENOXAPARIN SODIUM 60 MILLIGRAM(S): 100 INJECTION SUBCUTANEOUS at 17:19

## 2024-01-01 RX ADMIN — CYCLOBENZAPRINE HYDROCHLORIDE 5 MILLIGRAM(S): 10 TABLET, FILM COATED ORAL at 14:57

## 2024-01-01 RX ADMIN — PANTOPRAZOLE SODIUM 40 MILLIGRAM(S): 20 TABLET, DELAYED RELEASE ORAL at 17:07

## 2024-01-01 RX ADMIN — Medication 1 TABLET(S): at 11:44

## 2024-01-01 RX ADMIN — APIXABAN 5 MILLIGRAM(S): 2.5 TABLET, FILM COATED ORAL at 06:23

## 2024-01-01 RX ADMIN — Medication 10 MILLIGRAM(S): at 06:37

## 2024-01-01 RX ADMIN — Medication 6: at 08:02

## 2024-01-01 RX ADMIN — Medication 20 MILLIGRAM(S): at 14:02

## 2024-01-01 RX ADMIN — APIXABAN 5 MILLIGRAM(S): 2.5 TABLET, FILM COATED ORAL at 17:54

## 2024-01-01 RX ADMIN — Medication 650 MILLIGRAM(S): at 22:49

## 2024-01-01 RX ADMIN — Medication 400 MILLIGRAM(S): at 23:27

## 2024-01-01 RX ADMIN — Medication 2 MILLIGRAM(S): at 14:08

## 2024-01-01 RX ADMIN — Medication 650 MILLIGRAM(S): at 21:36

## 2024-01-01 RX ADMIN — APIXABAN 5 MILLIGRAM(S): 2.5 TABLET, FILM COATED ORAL at 17:15

## 2024-01-01 RX ADMIN — INSULIN GLARGINE 20 UNIT(S): 100 INJECTION, SOLUTION SUBCUTANEOUS at 21:12

## 2024-01-01 RX ADMIN — Medication 16 UNIT(S): at 12:03

## 2024-01-01 RX ADMIN — Medication 2.5 MILLIGRAM(S): at 12:38

## 2024-01-01 RX ADMIN — BUDESONIDE AND FORMOTEROL FUMARATE DIHYDRATE 2 PUFF(S): 160; 4.5 AEROSOL RESPIRATORY (INHALATION) at 21:57

## 2024-01-01 RX ADMIN — GABAPENTIN 300 MILLIGRAM(S): 400 CAPSULE ORAL at 12:04

## 2024-01-01 RX ADMIN — Medication 30 MILLILITER(S): at 05:55

## 2024-01-01 RX ADMIN — Medication 100 MILLIGRAM(S): at 21:24

## 2024-01-01 RX ADMIN — Medication 20 MILLIGRAM(S): at 05:54

## 2024-01-01 RX ADMIN — Medication 12 UNIT(S): at 08:49

## 2024-01-01 RX ADMIN — Medication 0.5 MILLIGRAM(S): at 12:28

## 2024-01-01 RX ADMIN — Medication 2: at 22:21

## 2024-01-01 RX ADMIN — SIMETHICONE 80 MILLIGRAM(S): 80 TABLET, CHEWABLE ORAL at 06:23

## 2024-01-01 RX ADMIN — CYCLOBENZAPRINE HYDROCHLORIDE 5 MILLIGRAM(S): 10 TABLET, FILM COATED ORAL at 21:15

## 2024-01-01 RX ADMIN — BUMETANIDE 2 MILLIGRAM(S): 0.25 INJECTION INTRAMUSCULAR; INTRAVENOUS at 12:01

## 2024-01-01 RX ADMIN — Medication 3 MILLILITER(S): at 23:01

## 2024-01-01 RX ADMIN — Medication 650 MILLIGRAM(S): at 11:50

## 2024-01-01 RX ADMIN — Medication 2: at 21:40

## 2024-01-01 RX ADMIN — Medication 650 MILLIGRAM(S): at 12:33

## 2024-01-01 RX ADMIN — ENOXAPARIN SODIUM 60 MILLIGRAM(S): 100 INJECTION SUBCUTANEOUS at 18:22

## 2024-01-01 RX ADMIN — Medication 3 MILLIGRAM(S): at 21:25

## 2024-01-01 RX ADMIN — SIMETHICONE 80 MILLIGRAM(S): 80 TABLET, CHEWABLE ORAL at 06:03

## 2024-01-01 RX ADMIN — Medication 2: at 21:38

## 2024-01-01 RX ADMIN — BUDESONIDE AND FORMOTEROL FUMARATE DIHYDRATE 2 PUFF(S): 160; 4.5 AEROSOL RESPIRATORY (INHALATION) at 22:18

## 2024-01-01 RX ADMIN — SIMETHICONE 80 MILLIGRAM(S): 80 TABLET, CHEWABLE ORAL at 05:33

## 2024-01-01 RX ADMIN — Medication 1 TABLET(S): at 12:15

## 2024-01-01 RX ADMIN — Medication 40 MILLIGRAM(S): at 05:53

## 2024-01-01 RX ADMIN — Medication 3 MILLILITER(S): at 23:23

## 2024-01-01 RX ADMIN — PANTOPRAZOLE SODIUM 40 MILLIGRAM(S): 20 TABLET, DELAYED RELEASE ORAL at 05:41

## 2024-01-01 RX ADMIN — Medication 4: at 08:21

## 2024-01-01 RX ADMIN — ATORVASTATIN CALCIUM 40 MILLIGRAM(S): 80 TABLET, FILM COATED ORAL at 21:33

## 2024-01-01 RX ADMIN — Medication 8: at 12:31

## 2024-01-01 RX ADMIN — Medication 3 MILLIGRAM(S): at 22:27

## 2024-01-01 RX ADMIN — CHLORHEXIDINE GLUCONATE 1 APPLICATION(S): 213 SOLUTION TOPICAL at 11:02

## 2024-01-01 RX ADMIN — Medication 1 TABLET(S): at 14:10

## 2024-01-01 RX ADMIN — Medication 1000 MILLIGRAM(S): at 00:23

## 2024-01-01 RX ADMIN — Medication 4: at 09:10

## 2024-01-01 RX ADMIN — Medication 2: at 21:16

## 2024-01-01 RX ADMIN — HYDROMORPHONE HYDROCHLORIDE 0.5 MILLIGRAM(S): 2 INJECTION INTRAMUSCULAR; INTRAVENOUS; SUBCUTANEOUS at 20:14

## 2024-01-01 RX ADMIN — CHLORHEXIDINE GLUCONATE 1 APPLICATION(S): 213 SOLUTION TOPICAL at 12:43

## 2024-01-01 RX ADMIN — Medication 4: at 17:06

## 2024-01-01 RX ADMIN — Medication 40 MILLIGRAM(S): at 13:07

## 2024-01-01 RX ADMIN — HYDROMORPHONE HYDROCHLORIDE 0.2 MILLIGRAM(S): 2 INJECTION INTRAMUSCULAR; INTRAVENOUS; SUBCUTANEOUS at 22:56

## 2024-01-01 RX ADMIN — Medication 1 TABLET(S): at 13:07

## 2024-01-01 RX ADMIN — Medication 20 MILLIGRAM(S): at 21:40

## 2024-01-01 RX ADMIN — APIXABAN 5 MILLIGRAM(S): 2.5 TABLET, FILM COATED ORAL at 18:10

## 2024-01-01 RX ADMIN — POLYETHYLENE GLYCOL 3350 17 GRAM(S): 17 POWDER, FOR SOLUTION ORAL at 05:35

## 2024-01-01 RX ADMIN — Medication 10 MILLIGRAM(S): at 13:47

## 2024-01-01 RX ADMIN — APIXABAN 5 MILLIGRAM(S): 2.5 TABLET, FILM COATED ORAL at 05:40

## 2024-01-01 RX ADMIN — Medication 2: at 08:31

## 2024-01-01 RX ADMIN — APIXABAN 5 MILLIGRAM(S): 2.5 TABLET, FILM COATED ORAL at 17:13

## 2024-01-01 RX ADMIN — Medication 12 UNIT(S): at 08:24

## 2024-01-01 RX ADMIN — Medication 40 MILLIGRAM(S): at 18:09

## 2024-01-01 RX ADMIN — Medication 4: at 13:07

## 2024-01-01 RX ADMIN — GABAPENTIN 300 MILLIGRAM(S): 400 CAPSULE ORAL at 11:21

## 2024-01-01 RX ADMIN — Medication 10 MILLIGRAM(S): at 14:14

## 2024-01-01 RX ADMIN — Medication 2.5 MILLIGRAM(S): at 21:06

## 2024-01-01 RX ADMIN — Medication 40 MILLIGRAM(S): at 17:31

## 2024-01-01 RX ADMIN — Medication 10: at 11:57

## 2024-01-01 RX ADMIN — Medication 3: at 16:49

## 2024-01-01 RX ADMIN — Medication 3 MILLILITER(S): at 05:04

## 2024-01-01 RX ADMIN — Medication 10 MILLIGRAM(S): at 06:22

## 2024-01-01 RX ADMIN — Medication 40 MILLIGRAM(S): at 05:13

## 2024-01-01 RX ADMIN — Medication 20 MILLIGRAM(S): at 21:42

## 2024-01-01 RX ADMIN — Medication 3 MILLILITER(S): at 13:30

## 2024-01-01 RX ADMIN — Medication 650 MILLIGRAM(S): at 23:27

## 2024-01-01 RX ADMIN — CYCLOBENZAPRINE HYDROCHLORIDE 5 MILLIGRAM(S): 10 TABLET, FILM COATED ORAL at 17:01

## 2024-01-01 RX ADMIN — INSULIN GLARGINE 22 UNIT(S): 100 INJECTION, SOLUTION SUBCUTANEOUS at 22:17

## 2024-01-01 RX ADMIN — PANTOPRAZOLE SODIUM 40 MILLIGRAM(S): 20 TABLET, DELAYED RELEASE ORAL at 17:32

## 2024-01-01 RX ADMIN — Medication 125 MILLIGRAM(S): at 23:14

## 2024-01-01 RX ADMIN — BENZOCAINE AND MENTHOL 1 LOZENGE: 5; 1 LIQUID ORAL at 19:11

## 2024-01-01 RX ADMIN — Medication 2: at 12:32

## 2024-01-01 RX ADMIN — APIXABAN 5 MILLIGRAM(S): 2.5 TABLET, FILM COATED ORAL at 05:35

## 2024-01-01 RX ADMIN — Medication 2.5 MILLIGRAM(S): at 21:57

## 2024-01-01 RX ADMIN — SIMETHICONE 80 MILLIGRAM(S): 80 TABLET, CHEWABLE ORAL at 17:21

## 2024-01-01 RX ADMIN — BUDESONIDE AND FORMOTEROL FUMARATE DIHYDRATE 2 PUFF(S): 160; 4.5 AEROSOL RESPIRATORY (INHALATION) at 22:01

## 2024-01-01 RX ADMIN — Medication 1 TABLET(S): at 11:54

## 2024-01-01 RX ADMIN — APIXABAN 5 MILLIGRAM(S): 2.5 TABLET, FILM COATED ORAL at 17:51

## 2024-01-01 RX ADMIN — Medication 10 UNIT(S): at 17:03

## 2024-01-01 RX ADMIN — PANTOPRAZOLE SODIUM 40 MILLIGRAM(S): 20 TABLET, DELAYED RELEASE ORAL at 06:26

## 2024-01-01 RX ADMIN — BUDESONIDE AND FORMOTEROL FUMARATE DIHYDRATE 2 PUFF(S): 160; 4.5 AEROSOL RESPIRATORY (INHALATION) at 11:01

## 2024-01-01 RX ADMIN — Medication 2.5 MILLIGRAM(S): at 10:13

## 2024-01-01 RX ADMIN — APIXABAN 5 MILLIGRAM(S): 2.5 TABLET, FILM COATED ORAL at 17:42

## 2024-01-01 RX ADMIN — Medication 40 MILLIGRAM(S): at 05:20

## 2024-01-01 RX ADMIN — Medication 200 MILLIGRAM(S): at 21:15

## 2024-01-01 RX ADMIN — Medication 3 MILLILITER(S): at 00:39

## 2024-01-01 RX ADMIN — Medication 14 UNIT(S): at 17:24

## 2024-01-01 RX ADMIN — Medication 20 MILLIGRAM(S): at 22:17

## 2024-01-01 RX ADMIN — Medication 650 MILLIGRAM(S): at 06:48

## 2024-01-01 RX ADMIN — Medication 1000 MILLIGRAM(S): at 23:57

## 2024-01-01 RX ADMIN — Medication 3 MILLIGRAM(S): at 21:14

## 2024-01-01 RX ADMIN — APIXABAN 5 MILLIGRAM(S): 2.5 TABLET, FILM COATED ORAL at 17:02

## 2024-01-01 RX ADMIN — Medication 650 MILLIGRAM(S): at 15:00

## 2024-01-01 RX ADMIN — APIXABAN 5 MILLIGRAM(S): 2.5 TABLET, FILM COATED ORAL at 17:09

## 2024-01-01 RX ADMIN — Medication 2: at 21:36

## 2024-01-01 RX ADMIN — APIXABAN 5 MILLIGRAM(S): 2.5 TABLET, FILM COATED ORAL at 05:30

## 2024-01-01 RX ADMIN — SIMETHICONE 80 MILLIGRAM(S): 80 TABLET, CHEWABLE ORAL at 18:31

## 2024-01-01 RX ADMIN — Medication 650 MILLIGRAM(S): at 22:35

## 2024-01-01 RX ADMIN — HYDROMORPHONE HYDROCHLORIDE 0.5 MILLIGRAM(S): 2 INJECTION INTRAMUSCULAR; INTRAVENOUS; SUBCUTANEOUS at 02:24

## 2024-01-01 RX ADMIN — Medication 2: at 21:00

## 2024-01-01 RX ADMIN — APIXABAN 5 MILLIGRAM(S): 2.5 TABLET, FILM COATED ORAL at 17:34

## 2024-01-01 RX ADMIN — INSULIN GLARGINE 26 UNIT(S): 100 INJECTION, SOLUTION SUBCUTANEOUS at 21:29

## 2024-01-01 RX ADMIN — Medication 40 MILLIGRAM(S): at 06:07

## 2024-01-01 RX ADMIN — Medication 20 MILLIGRAM(S): at 05:44

## 2024-01-01 RX ADMIN — APIXABAN 5 MILLIGRAM(S): 2.5 TABLET, FILM COATED ORAL at 17:11

## 2024-01-01 RX ADMIN — BUDESONIDE AND FORMOTEROL FUMARATE DIHYDRATE 2 PUFF(S): 160; 4.5 AEROSOL RESPIRATORY (INHALATION) at 22:15

## 2024-01-01 RX ADMIN — Medication 0.5 MILLIGRAM(S): at 11:04

## 2024-01-01 RX ADMIN — Medication 7 UNIT(S): at 11:39

## 2024-01-01 RX ADMIN — Medication 2: at 08:38

## 2024-01-01 RX ADMIN — Medication 1 TABLET(S): at 14:20

## 2024-01-01 RX ADMIN — CHLORHEXIDINE GLUCONATE 1 APPLICATION(S): 213 SOLUTION TOPICAL at 11:12

## 2024-01-01 RX ADMIN — Medication 650 MILLIGRAM(S): at 03:33

## 2024-01-01 RX ADMIN — INSULIN GLARGINE 10 UNIT(S): 100 INJECTION, SOLUTION SUBCUTANEOUS at 02:58

## 2024-01-01 RX ADMIN — Medication 10 MILLIGRAM(S): at 15:14

## 2024-01-01 RX ADMIN — Medication 3 MILLILITER(S): at 11:51

## 2024-01-01 RX ADMIN — HYDROMORPHONE HYDROCHLORIDE 0.5 MILLIGRAM(S): 2 INJECTION INTRAMUSCULAR; INTRAVENOUS; SUBCUTANEOUS at 11:45

## 2024-01-01 RX ADMIN — Medication 15 UNIT(S): at 08:19

## 2024-01-01 RX ADMIN — Medication 10 UNIT(S): at 12:40

## 2024-01-01 RX ADMIN — Medication 1000 MILLIGRAM(S): at 21:42

## 2024-01-01 RX ADMIN — CYCLOBENZAPRINE HYDROCHLORIDE 5 MILLIGRAM(S): 10 TABLET, FILM COATED ORAL at 19:29

## 2024-01-01 RX ADMIN — Medication 650 MILLIGRAM(S): at 20:00

## 2024-01-01 RX ADMIN — Medication 40 MILLIGRAM(S): at 05:27

## 2024-01-01 RX ADMIN — CYCLOBENZAPRINE HYDROCHLORIDE 5 MILLIGRAM(S): 10 TABLET, FILM COATED ORAL at 21:50

## 2024-01-01 RX ADMIN — SENNA PLUS 2 TABLET(S): 8.6 TABLET ORAL at 23:10

## 2024-01-01 RX ADMIN — PANTOPRAZOLE SODIUM 40 MILLIGRAM(S): 20 TABLET, DELAYED RELEASE ORAL at 17:04

## 2024-01-01 RX ADMIN — Medication 5 UNIT(S): at 13:05

## 2024-01-01 RX ADMIN — Medication 3 MILLILITER(S): at 05:31

## 2024-01-01 RX ADMIN — Medication 100 MILLIGRAM(S): at 21:30

## 2024-01-01 RX ADMIN — Medication 3 MILLILITER(S): at 23:02

## 2024-01-01 RX ADMIN — Medication 650 MILLIGRAM(S): at 01:26

## 2024-01-01 RX ADMIN — Medication 975 MILLIGRAM(S): at 01:35

## 2024-01-01 RX ADMIN — Medication 3 MILLILITER(S): at 21:35

## 2024-01-01 RX ADMIN — BUMETANIDE 2 MILLIGRAM(S): 0.25 INJECTION INTRAMUSCULAR; INTRAVENOUS at 13:25

## 2024-01-01 RX ADMIN — Medication 200 MILLIGRAM(S): at 05:08

## 2024-01-01 RX ADMIN — CYCLOBENZAPRINE HYDROCHLORIDE 5 MILLIGRAM(S): 10 TABLET, FILM COATED ORAL at 06:41

## 2024-01-01 RX ADMIN — Medication 40 MILLIGRAM(S): at 06:18

## 2024-01-01 RX ADMIN — Medication 40 MILLIGRAM(S): at 17:26

## 2024-01-01 RX ADMIN — Medication 30 MILLILITER(S): at 18:19

## 2024-01-01 RX ADMIN — SIMETHICONE 80 MILLIGRAM(S): 80 TABLET, CHEWABLE ORAL at 05:28

## 2024-01-01 RX ADMIN — Medication 3 MILLILITER(S): at 05:27

## 2024-01-01 RX ADMIN — CYCLOBENZAPRINE HYDROCHLORIDE 5 MILLIGRAM(S): 10 TABLET, FILM COATED ORAL at 05:30

## 2024-01-01 RX ADMIN — Medication 100 MILLIGRAM(S): at 21:11

## 2024-01-01 RX ADMIN — Medication 2: at 21:32

## 2024-01-01 RX ADMIN — PANTOPRAZOLE SODIUM 40 MILLIGRAM(S): 20 TABLET, DELAYED RELEASE ORAL at 05:32

## 2024-01-01 RX ADMIN — MORPHINE SULFATE 1 MILLIGRAM(S): 50 CAPSULE, EXTENDED RELEASE ORAL at 10:28

## 2024-01-01 RX ADMIN — BUDESONIDE AND FORMOTEROL FUMARATE DIHYDRATE 2 PUFF(S): 160; 4.5 AEROSOL RESPIRATORY (INHALATION) at 21:25

## 2024-01-01 RX ADMIN — Medication 6: at 08:48

## 2024-01-01 RX ADMIN — HYDROMORPHONE HYDROCHLORIDE 0.5 MILLIGRAM(S): 2 INJECTION INTRAMUSCULAR; INTRAVENOUS; SUBCUTANEOUS at 22:16

## 2024-01-01 RX ADMIN — GABAPENTIN 300 MILLIGRAM(S): 400 CAPSULE ORAL at 13:49

## 2024-01-01 RX ADMIN — BENZOCAINE AND MENTHOL 1 LOZENGE: 5; 1 LIQUID ORAL at 17:18

## 2024-01-01 RX ADMIN — Medication 650 MILLIGRAM(S): at 02:07

## 2024-01-01 RX ADMIN — PIPERACILLIN AND TAZOBACTAM 25 GRAM(S): 4; .5 INJECTION, POWDER, LYOPHILIZED, FOR SOLUTION INTRAVENOUS at 18:42

## 2024-01-01 RX ADMIN — ATORVASTATIN CALCIUM 40 MILLIGRAM(S): 80 TABLET, FILM COATED ORAL at 23:17

## 2024-01-01 RX ADMIN — Medication 5: at 17:33

## 2024-01-01 RX ADMIN — Medication 2.5 MILLIGRAM(S): at 21:02

## 2024-01-01 RX ADMIN — BUMETANIDE 2 MILLIGRAM(S): 0.25 INJECTION INTRAMUSCULAR; INTRAVENOUS at 05:41

## 2024-01-01 RX ADMIN — Medication 100 MILLIGRAM(S): at 21:38

## 2024-01-01 RX ADMIN — BENZOCAINE AND MENTHOL 1 LOZENGE: 5; 1 LIQUID ORAL at 21:23

## 2024-01-01 RX ADMIN — Medication 0.5 MILLIGRAM(S): at 18:53

## 2024-01-01 RX ADMIN — INSULIN GLARGINE 22 UNIT(S): 100 INJECTION, SOLUTION SUBCUTANEOUS at 21:33

## 2024-01-01 RX ADMIN — Medication 1 TABLET(S): at 12:40

## 2024-01-01 RX ADMIN — PANTOPRAZOLE SODIUM 40 MILLIGRAM(S): 20 TABLET, DELAYED RELEASE ORAL at 17:19

## 2024-01-01 RX ADMIN — BUDESONIDE AND FORMOTEROL FUMARATE DIHYDRATE 2 PUFF(S): 160; 4.5 AEROSOL RESPIRATORY (INHALATION) at 11:46

## 2024-01-01 RX ADMIN — Medication 2: at 21:58

## 2024-01-01 RX ADMIN — Medication 12 UNIT(S): at 17:12

## 2024-01-01 RX ADMIN — APIXABAN 5 MILLIGRAM(S): 2.5 TABLET, FILM COATED ORAL at 05:45

## 2024-01-01 RX ADMIN — Medication 10 MILLIGRAM(S): at 06:13

## 2024-01-01 RX ADMIN — Medication 3 MILLILITER(S): at 17:52

## 2024-01-01 RX ADMIN — BUDESONIDE AND FORMOTEROL FUMARATE DIHYDRATE 2 PUFF(S): 160; 4.5 AEROSOL RESPIRATORY (INHALATION) at 04:45

## 2024-01-01 RX ADMIN — Medication 20 MILLIGRAM(S): at 06:22

## 2024-01-01 RX ADMIN — Medication 2: at 17:27

## 2024-01-01 RX ADMIN — CYCLOBENZAPRINE HYDROCHLORIDE 5 MILLIGRAM(S): 10 TABLET, FILM COATED ORAL at 18:59

## 2024-01-01 RX ADMIN — INSULIN GLARGINE 14 UNIT(S): 100 INJECTION, SOLUTION SUBCUTANEOUS at 22:04

## 2024-01-01 RX ADMIN — APIXABAN 5 MILLIGRAM(S): 2.5 TABLET, FILM COATED ORAL at 18:45

## 2024-01-01 RX ADMIN — Medication 7: at 12:06

## 2024-01-01 RX ADMIN — Medication 1000 MILLIGRAM(S): at 05:16

## 2024-01-01 RX ADMIN — BUDESONIDE AND FORMOTEROL FUMARATE DIHYDRATE 2 PUFF(S): 160; 4.5 AEROSOL RESPIRATORY (INHALATION) at 21:03

## 2024-01-01 RX ADMIN — Medication 30 MILLIGRAM(S): at 06:23

## 2024-01-01 RX ADMIN — Medication 3 MILLILITER(S): at 11:01

## 2024-01-01 RX ADMIN — ATORVASTATIN CALCIUM 40 MILLIGRAM(S): 80 TABLET, FILM COATED ORAL at 22:13

## 2024-01-01 RX ADMIN — Medication 40 MILLIGRAM(S): at 18:40

## 2024-01-01 RX ADMIN — Medication 3 MILLIGRAM(S): at 02:07

## 2024-01-01 RX ADMIN — Medication 0.5 MILLIGRAM(S): at 18:30

## 2024-01-01 RX ADMIN — Medication 1 TABLET(S): at 11:43

## 2024-01-01 RX ADMIN — HYDROMORPHONE HYDROCHLORIDE 0.5 MILLIGRAM(S): 2 INJECTION INTRAMUSCULAR; INTRAVENOUS; SUBCUTANEOUS at 14:41

## 2024-01-01 RX ADMIN — Medication 100 MILLIGRAM(S): at 21:15

## 2024-01-01 RX ADMIN — Medication 11 UNIT(S): at 16:43

## 2024-01-01 RX ADMIN — BENZOCAINE AND MENTHOL 1 LOZENGE: 5; 1 LIQUID ORAL at 10:21

## 2024-01-01 RX ADMIN — Medication 80 MILLIGRAM(S): at 21:17

## 2024-01-01 RX ADMIN — Medication 40 MILLIGRAM(S): at 06:37

## 2024-01-01 RX ADMIN — Medication 400 MILLIGRAM(S): at 04:09

## 2024-01-01 RX ADMIN — SIMETHICONE 80 MILLIGRAM(S): 80 TABLET, CHEWABLE ORAL at 12:13

## 2024-01-01 RX ADMIN — Medication 4: at 17:33

## 2024-01-01 RX ADMIN — Medication 9: at 12:04

## 2024-01-01 RX ADMIN — Medication 650 MILLIGRAM(S): at 22:25

## 2024-01-01 RX ADMIN — Medication 100 MILLIGRAM(S): at 16:41

## 2024-01-01 RX ADMIN — PANTOPRAZOLE SODIUM 40 MILLIGRAM(S): 20 TABLET, DELAYED RELEASE ORAL at 05:42

## 2024-01-01 RX ADMIN — Medication 3 MILLILITER(S): at 17:07

## 2024-01-01 RX ADMIN — Medication 3 MILLILITER(S): at 12:15

## 2024-01-01 RX ADMIN — ATORVASTATIN CALCIUM 40 MILLIGRAM(S): 80 TABLET, FILM COATED ORAL at 21:25

## 2024-01-01 RX ADMIN — BENZOCAINE AND MENTHOL 1 LOZENGE: 5; 1 LIQUID ORAL at 13:43

## 2024-01-01 RX ADMIN — Medication 100 MILLIGRAM(S): at 21:41

## 2024-01-01 RX ADMIN — Medication 10 MILLIGRAM(S): at 05:34

## 2024-01-01 RX ADMIN — APIXABAN 5 MILLIGRAM(S): 2.5 TABLET, FILM COATED ORAL at 06:37

## 2024-01-01 RX ADMIN — BENZOCAINE AND MENTHOL 1 LOZENGE: 5; 1 LIQUID ORAL at 21:01

## 2024-01-01 RX ADMIN — PANTOPRAZOLE SODIUM 40 MILLIGRAM(S): 20 TABLET, DELAYED RELEASE ORAL at 17:05

## 2024-01-01 RX ADMIN — Medication 10 MILLIGRAM(S): at 21:47

## 2024-01-01 RX ADMIN — Medication 650 MILLIGRAM(S): at 04:31

## 2024-01-01 RX ADMIN — Medication 4: at 13:55

## 2024-01-01 RX ADMIN — Medication 2.5 MILLIGRAM(S): at 09:36

## 2024-01-01 RX ADMIN — Medication 100 MILLIGRAM(S): at 22:13

## 2024-01-01 RX ADMIN — Medication 10 MILLIGRAM(S): at 21:20

## 2024-01-01 RX ADMIN — Medication 400 MILLIGRAM(S): at 22:54

## 2024-01-01 RX ADMIN — PANTOPRAZOLE SODIUM 40 MILLIGRAM(S): 20 TABLET, DELAYED RELEASE ORAL at 07:40

## 2024-01-01 RX ADMIN — Medication 30 MILLILITER(S): at 17:43

## 2024-01-01 RX ADMIN — INSULIN GLARGINE 24 UNIT(S): 100 INJECTION, SOLUTION SUBCUTANEOUS at 21:01

## 2024-01-01 RX ADMIN — Medication 3 MILLILITER(S): at 21:58

## 2024-01-01 RX ADMIN — APIXABAN 5 MILLIGRAM(S): 2.5 TABLET, FILM COATED ORAL at 05:34

## 2024-01-01 RX ADMIN — BUMETANIDE 2 MILLIGRAM(S): 0.25 INJECTION INTRAMUSCULAR; INTRAVENOUS at 13:11

## 2024-01-01 RX ADMIN — APIXABAN 5 MILLIGRAM(S): 2.5 TABLET, FILM COATED ORAL at 05:31

## 2024-01-01 RX ADMIN — SIMETHICONE 80 MILLIGRAM(S): 80 TABLET, CHEWABLE ORAL at 12:26

## 2024-01-01 RX ADMIN — Medication 40 MILLIGRAM(S): at 14:08

## 2024-01-01 RX ADMIN — Medication 20 MILLIGRAM(S): at 13:49

## 2024-01-01 RX ADMIN — Medication 1 MILLIGRAM(S): at 22:30

## 2024-01-01 RX ADMIN — SIMETHICONE 80 MILLIGRAM(S): 80 TABLET, CHEWABLE ORAL at 13:10

## 2024-01-01 RX ADMIN — Medication 2 MILLIGRAM(S): at 12:00

## 2024-01-01 RX ADMIN — CHLORHEXIDINE GLUCONATE 1 APPLICATION(S): 213 SOLUTION TOPICAL at 11:45

## 2024-01-01 RX ADMIN — Medication 8 UNIT(S): at 13:20

## 2024-01-01 RX ADMIN — Medication 400 MILLIGRAM(S): at 21:12

## 2024-01-01 RX ADMIN — ATORVASTATIN CALCIUM 40 MILLIGRAM(S): 80 TABLET, FILM COATED ORAL at 22:24

## 2024-01-01 RX ADMIN — ATORVASTATIN CALCIUM 40 MILLIGRAM(S): 80 TABLET, FILM COATED ORAL at 21:16

## 2024-01-01 RX ADMIN — Medication 6 UNIT(S): at 22:25

## 2024-01-01 RX ADMIN — Medication 650 MILLIGRAM(S): at 07:43

## 2024-01-01 RX ADMIN — PANTOPRAZOLE SODIUM 40 MILLIGRAM(S): 20 TABLET, DELAYED RELEASE ORAL at 06:37

## 2024-01-01 RX ADMIN — Medication 80 MILLIGRAM(S): at 13:08

## 2024-01-01 RX ADMIN — PANTOPRAZOLE SODIUM 40 MILLIGRAM(S): 20 TABLET, DELAYED RELEASE ORAL at 17:27

## 2024-01-01 RX ADMIN — Medication 200 MILLIGRAM(S): at 22:20

## 2024-01-01 RX ADMIN — Medication 3 MILLIGRAM(S): at 22:21

## 2024-01-01 RX ADMIN — Medication 650 MILLIGRAM(S): at 21:50

## 2024-01-01 RX ADMIN — Medication 10 MILLIGRAM(S): at 21:17

## 2024-01-01 RX ADMIN — BENZOCAINE AND MENTHOL 1 LOZENGE: 5; 1 LIQUID ORAL at 09:43

## 2024-01-01 RX ADMIN — Medication 30 MILLILITER(S): at 08:25

## 2024-01-01 RX ADMIN — Medication 10 MILLIGRAM(S): at 21:54

## 2024-01-01 RX ADMIN — ATORVASTATIN CALCIUM 40 MILLIGRAM(S): 80 TABLET, FILM COATED ORAL at 21:40

## 2024-01-01 RX ADMIN — BENZOCAINE AND MENTHOL 1 LOZENGE: 5; 1 LIQUID ORAL at 10:08

## 2024-01-01 RX ADMIN — Medication 400 MILLIGRAM(S): at 04:51

## 2024-01-01 RX ADMIN — MORPHINE SULFATE 1 MILLIGRAM(S): 50 CAPSULE, EXTENDED RELEASE ORAL at 11:00

## 2024-01-01 RX ADMIN — POLYETHYLENE GLYCOL 3350 17 GRAM(S): 17 POWDER, FOR SOLUTION ORAL at 11:45

## 2024-01-01 RX ADMIN — HYDROMORPHONE HYDROCHLORIDE 0.5 MILLIGRAM(S): 2 INJECTION INTRAMUSCULAR; INTRAVENOUS; SUBCUTANEOUS at 10:12

## 2024-01-01 RX ADMIN — PANTOPRAZOLE SODIUM 40 MILLIGRAM(S): 20 TABLET, DELAYED RELEASE ORAL at 17:00

## 2024-01-01 RX ADMIN — Medication 650 MILLIGRAM(S): at 07:58

## 2024-01-01 RX ADMIN — SENNA PLUS 2 TABLET(S): 8.6 TABLET ORAL at 21:33

## 2024-01-01 RX ADMIN — Medication 12 UNIT(S): at 13:37

## 2024-01-01 RX ADMIN — Medication 3 MILLILITER(S): at 02:41

## 2024-01-01 RX ADMIN — APIXABAN 5 MILLIGRAM(S): 2.5 TABLET, FILM COATED ORAL at 17:44

## 2024-01-01 RX ADMIN — APIXABAN 5 MILLIGRAM(S): 2.5 TABLET, FILM COATED ORAL at 18:07

## 2024-01-01 RX ADMIN — Medication 20 MILLIGRAM(S): at 21:29

## 2024-01-01 RX ADMIN — CYCLOBENZAPRINE HYDROCHLORIDE 5 MILLIGRAM(S): 10 TABLET, FILM COATED ORAL at 20:22

## 2024-01-01 RX ADMIN — Medication 600 MILLIGRAM(S): at 03:58

## 2024-01-01 RX ADMIN — Medication 150 MILLIGRAM(S): at 22:27

## 2024-01-01 RX ADMIN — BUDESONIDE AND FORMOTEROL FUMARATE DIHYDRATE 2 PUFF(S): 160; 4.5 AEROSOL RESPIRATORY (INHALATION) at 10:28

## 2024-01-01 RX ADMIN — Medication 3 MILLILITER(S): at 17:41

## 2024-01-01 RX ADMIN — Medication 100 MILLIGRAM(S): at 21:33

## 2024-01-01 RX ADMIN — BENZOCAINE AND MENTHOL 1 LOZENGE: 5; 1 LIQUID ORAL at 05:55

## 2024-01-01 RX ADMIN — Medication 1 TABLET(S): at 11:18

## 2024-01-01 RX ADMIN — Medication 40 MILLIGRAM(S): at 13:00

## 2024-01-01 RX ADMIN — Medication 100 MILLIGRAM(S): at 21:01

## 2024-01-01 RX ADMIN — PANTOPRAZOLE SODIUM 40 MILLIGRAM(S): 20 TABLET, DELAYED RELEASE ORAL at 05:54

## 2024-01-01 RX ADMIN — PANTOPRAZOLE SODIUM 40 MILLIGRAM(S): 20 TABLET, DELAYED RELEASE ORAL at 05:22

## 2024-01-01 RX ADMIN — Medication 3 MILLIGRAM(S): at 22:35

## 2024-01-01 RX ADMIN — Medication 3 MILLILITER(S): at 14:44

## 2024-01-01 RX ADMIN — Medication 3 MILLILITER(S): at 12:40

## 2024-01-01 RX ADMIN — Medication 12 UNIT(S): at 12:43

## 2024-01-01 RX ADMIN — Medication 3 MILLILITER(S): at 17:06

## 2024-01-01 RX ADMIN — BUDESONIDE AND FORMOTEROL FUMARATE DIHYDRATE 2 PUFF(S): 160; 4.5 AEROSOL RESPIRATORY (INHALATION) at 22:16

## 2024-01-01 RX ADMIN — CYCLOBENZAPRINE HYDROCHLORIDE 5 MILLIGRAM(S): 10 TABLET, FILM COATED ORAL at 22:54

## 2024-01-01 RX ADMIN — Medication 12 UNIT(S): at 12:06

## 2024-01-01 RX ADMIN — BENZOCAINE AND MENTHOL 1 LOZENGE: 5; 1 LIQUID ORAL at 05:39

## 2024-01-01 RX ADMIN — Medication 650 MILLIGRAM(S): at 22:59

## 2024-01-01 RX ADMIN — SIMETHICONE 80 MILLIGRAM(S): 80 TABLET, CHEWABLE ORAL at 17:25

## 2024-01-01 RX ADMIN — Medication 12 UNIT(S): at 13:08

## 2024-01-01 RX ADMIN — HYDROMORPHONE HYDROCHLORIDE 0.5 MILLIGRAM(S): 2 INJECTION INTRAMUSCULAR; INTRAVENOUS; SUBCUTANEOUS at 11:38

## 2024-01-01 RX ADMIN — BUMETANIDE 2 MILLIGRAM(S): 0.25 INJECTION INTRAMUSCULAR; INTRAVENOUS at 14:25

## 2024-01-01 RX ADMIN — Medication 40 MILLIGRAM(S): at 05:57

## 2024-01-01 RX ADMIN — Medication 650 MILLIGRAM(S): at 22:20

## 2024-01-01 RX ADMIN — APIXABAN 5 MILLIGRAM(S): 2.5 TABLET, FILM COATED ORAL at 06:05

## 2024-01-01 RX ADMIN — Medication 100 MILLIGRAM(S): at 21:36

## 2024-01-01 RX ADMIN — INSULIN GLARGINE 18 UNIT(S): 100 INJECTION, SOLUTION SUBCUTANEOUS at 22:31

## 2024-01-01 RX ADMIN — Medication 3 MILLIGRAM(S): at 22:26

## 2024-01-01 RX ADMIN — CHLORHEXIDINE GLUCONATE 1 APPLICATION(S): 213 SOLUTION TOPICAL at 10:23

## 2024-01-01 RX ADMIN — Medication 650 MILLIGRAM(S): at 06:07

## 2024-01-01 RX ADMIN — TAMSULOSIN HYDROCHLORIDE 0.8 MILLIGRAM(S): 0.4 CAPSULE ORAL at 21:14

## 2024-01-01 RX ADMIN — Medication 3 MILLILITER(S): at 06:04

## 2024-01-01 RX ADMIN — INSULIN GLARGINE 8 UNIT(S): 100 INJECTION, SOLUTION SUBCUTANEOUS at 21:37

## 2024-01-01 RX ADMIN — INSULIN GLARGINE 20 UNIT(S): 100 INJECTION, SOLUTION SUBCUTANEOUS at 21:24

## 2024-01-01 RX ADMIN — Medication 1 TABLET(S): at 12:20

## 2024-01-01 RX ADMIN — PANTOPRAZOLE SODIUM 40 MILLIGRAM(S): 20 TABLET, DELAYED RELEASE ORAL at 17:12

## 2024-01-01 RX ADMIN — Medication 3 MILLILITER(S): at 14:21

## 2024-01-01 RX ADMIN — Medication 20 MILLIGRAM(S): at 05:14

## 2024-01-01 RX ADMIN — Medication 2.5 MILLIGRAM(S): at 10:46

## 2024-01-01 RX ADMIN — Medication 10 MILLIGRAM(S): at 21:55

## 2024-01-01 RX ADMIN — Medication 12 UNIT(S): at 17:13

## 2024-01-01 RX ADMIN — Medication 12 UNIT(S): at 13:05

## 2024-01-01 RX ADMIN — APIXABAN 5 MILLIGRAM(S): 2.5 TABLET, FILM COATED ORAL at 05:27

## 2024-01-01 RX ADMIN — Medication 650 MILLIGRAM(S): at 13:32

## 2024-01-01 RX ADMIN — ALBUTEROL 2.5 MILLIGRAM(S): 90 AEROSOL, METERED ORAL at 02:42

## 2024-01-01 RX ADMIN — GABAPENTIN 300 MILLIGRAM(S): 400 CAPSULE ORAL at 11:23

## 2024-01-01 RX ADMIN — Medication 40 MILLIGRAM(S): at 17:15

## 2024-01-01 RX ADMIN — Medication 4: at 12:40

## 2024-01-01 RX ADMIN — PANTOPRAZOLE SODIUM 40 MILLIGRAM(S): 20 TABLET, DELAYED RELEASE ORAL at 17:29

## 2024-01-01 RX ADMIN — Medication 3 MILLILITER(S): at 17:04

## 2024-01-01 RX ADMIN — Medication 40 MILLIGRAM(S): at 13:49

## 2024-01-01 RX ADMIN — Medication 13 UNIT(S): at 08:19

## 2024-01-01 RX ADMIN — Medication 10 MILLIGRAM(S): at 22:03

## 2024-01-01 RX ADMIN — Medication 3 MILLILITER(S): at 17:31

## 2024-01-01 RX ADMIN — Medication 400 MILLIGRAM(S): at 21:16

## 2024-01-01 RX ADMIN — Medication 12 UNIT(S): at 17:27

## 2024-01-01 RX ADMIN — CHLORHEXIDINE GLUCONATE 1 APPLICATION(S): 213 SOLUTION TOPICAL at 14:09

## 2024-01-01 RX ADMIN — Medication 100 MILLIGRAM(S): at 21:47

## 2024-01-01 RX ADMIN — CYCLOBENZAPRINE HYDROCHLORIDE 5 MILLIGRAM(S): 10 TABLET, FILM COATED ORAL at 12:09

## 2024-01-01 RX ADMIN — Medication 100 MILLIGRAM(S): at 22:18

## 2024-01-01 RX ADMIN — Medication 3 MILLILITER(S): at 09:24

## 2024-01-01 RX ADMIN — PANTOPRAZOLE SODIUM 40 MILLIGRAM(S): 20 TABLET, DELAYED RELEASE ORAL at 17:44

## 2024-01-01 RX ADMIN — Medication 4: at 13:12

## 2024-01-01 RX ADMIN — HYDROMORPHONE HYDROCHLORIDE 1 MILLIGRAM(S): 2 INJECTION INTRAMUSCULAR; INTRAVENOUS; SUBCUTANEOUS at 00:05

## 2024-01-01 RX ADMIN — Medication 1000 MILLIGRAM(S): at 05:06

## 2024-01-01 RX ADMIN — Medication 3 MILLILITER(S): at 17:05

## 2024-01-01 RX ADMIN — Medication 1 TABLET(S): at 11:42

## 2024-01-01 RX ADMIN — BENZOCAINE AND MENTHOL 1 LOZENGE: 5; 1 LIQUID ORAL at 05:10

## 2024-01-01 RX ADMIN — CYCLOBENZAPRINE HYDROCHLORIDE 5 MILLIGRAM(S): 10 TABLET, FILM COATED ORAL at 12:22

## 2024-01-01 RX ADMIN — Medication 650 MILLIGRAM(S): at 22:21

## 2024-01-01 RX ADMIN — Medication 3 MILLILITER(S): at 06:09

## 2024-01-01 RX ADMIN — Medication 3 MILLILITER(S): at 00:57

## 2024-01-01 RX ADMIN — ATORVASTATIN CALCIUM 40 MILLIGRAM(S): 80 TABLET, FILM COATED ORAL at 21:42

## 2024-01-01 RX ADMIN — SENNA PLUS 2 TABLET(S): 8.6 TABLET ORAL at 21:29

## 2024-01-01 RX ADMIN — Medication 400 MILLIGRAM(S): at 17:11

## 2024-01-01 RX ADMIN — BENZOCAINE AND MENTHOL 1 LOZENGE: 5; 1 LIQUID ORAL at 06:42

## 2024-01-01 RX ADMIN — CHLORHEXIDINE GLUCONATE 1 APPLICATION(S): 213 SOLUTION TOPICAL at 12:05

## 2024-01-01 RX ADMIN — Medication 20 MILLIGRAM(S): at 21:36

## 2024-01-01 RX ADMIN — Medication 10 MILLIGRAM(S): at 06:06

## 2024-01-01 RX ADMIN — Medication 8: at 16:21

## 2024-01-01 RX ADMIN — BUDESONIDE AND FORMOTEROL FUMARATE DIHYDRATE 2 PUFF(S): 160; 4.5 AEROSOL RESPIRATORY (INHALATION) at 21:39

## 2024-01-01 RX ADMIN — BENZOCAINE AND MENTHOL 1 LOZENGE: 5; 1 LIQUID ORAL at 17:26

## 2024-01-01 RX ADMIN — PANTOPRAZOLE SODIUM 40 MILLIGRAM(S): 20 TABLET, DELAYED RELEASE ORAL at 05:27

## 2024-01-01 RX ADMIN — Medication 10 UNIT(S): at 08:48

## 2024-01-01 RX ADMIN — HYDROMORPHONE HYDROCHLORIDE 0.5 MILLIGRAM(S): 2 INJECTION INTRAMUSCULAR; INTRAVENOUS; SUBCUTANEOUS at 18:40

## 2024-01-01 RX ADMIN — CHLORHEXIDINE GLUCONATE 1 APPLICATION(S): 213 SOLUTION TOPICAL at 12:15

## 2024-01-01 RX ADMIN — Medication 30 MILLILITER(S): at 12:33

## 2024-01-01 RX ADMIN — Medication 0.5 MILLIGRAM(S): at 00:14

## 2024-01-01 RX ADMIN — PANTOPRAZOLE SODIUM 40 MILLIGRAM(S): 20 TABLET, DELAYED RELEASE ORAL at 05:03

## 2024-01-01 RX ADMIN — Medication 14 UNIT(S): at 11:37

## 2024-01-01 RX ADMIN — Medication 2.5 MILLIGRAM(S): at 09:25

## 2024-01-01 RX ADMIN — Medication 20 MILLIGRAM(S): at 21:25

## 2024-01-01 RX ADMIN — SIMETHICONE 80 MILLIGRAM(S): 80 TABLET, CHEWABLE ORAL at 17:12

## 2024-01-01 RX ADMIN — CYCLOBENZAPRINE HYDROCHLORIDE 5 MILLIGRAM(S): 10 TABLET, FILM COATED ORAL at 21:01

## 2024-01-01 RX ADMIN — Medication 3 MILLILITER(S): at 06:17

## 2024-01-01 RX ADMIN — Medication 10 UNIT(S): at 17:41

## 2024-01-01 RX ADMIN — Medication 80 MILLIGRAM(S): at 22:17

## 2024-01-01 RX ADMIN — BENZOCAINE AND MENTHOL 1 LOZENGE: 5; 1 LIQUID ORAL at 15:59

## 2024-01-01 RX ADMIN — Medication 650 MILLIGRAM(S): at 02:40

## 2024-01-01 RX ADMIN — HYDROMORPHONE HYDROCHLORIDE 0.5 MILLIGRAM(S): 2 INJECTION INTRAMUSCULAR; INTRAVENOUS; SUBCUTANEOUS at 17:53

## 2024-01-01 RX ADMIN — Medication 3 MILLILITER(S): at 18:20

## 2024-01-01 RX ADMIN — Medication 10 UNIT(S): at 12:04

## 2024-01-01 RX ADMIN — Medication 1 TABLET(S): at 07:35

## 2024-01-01 RX ADMIN — SIMETHICONE 80 MILLIGRAM(S): 80 TABLET, CHEWABLE ORAL at 05:42

## 2024-01-01 RX ADMIN — Medication 150 MILLIGRAM(S): at 22:06

## 2024-01-01 RX ADMIN — Medication 1 TABLET(S): at 12:14

## 2024-01-01 RX ADMIN — BUDESONIDE AND FORMOTEROL FUMARATE DIHYDRATE 2 PUFF(S): 160; 4.5 AEROSOL RESPIRATORY (INHALATION) at 21:35

## 2024-01-01 RX ADMIN — POLYETHYLENE GLYCOL 3350 17 GRAM(S): 17 POWDER, FOR SOLUTION ORAL at 12:14

## 2024-01-01 RX ADMIN — BENZOCAINE AND MENTHOL 1 LOZENGE: 5; 1 LIQUID ORAL at 14:03

## 2024-01-01 RX ADMIN — Medication 30 MILLILITER(S): at 11:54

## 2024-01-01 RX ADMIN — INSULIN GLARGINE 24 UNIT(S): 100 INJECTION, SOLUTION SUBCUTANEOUS at 21:36

## 2024-01-01 RX ADMIN — Medication 6: at 17:23

## 2024-01-01 RX ADMIN — POLYETHYLENE GLYCOL 3350 17 GRAM(S): 17 POWDER, FOR SOLUTION ORAL at 05:26

## 2024-01-01 RX ADMIN — CYCLOBENZAPRINE HYDROCHLORIDE 5 MILLIGRAM(S): 10 TABLET, FILM COATED ORAL at 11:54

## 2024-01-01 RX ADMIN — CHLORHEXIDINE GLUCONATE 1 APPLICATION(S): 213 SOLUTION TOPICAL at 05:45

## 2024-01-01 RX ADMIN — HYDROMORPHONE HYDROCHLORIDE 0.2 MILLIGRAM(S): 2 INJECTION INTRAMUSCULAR; INTRAVENOUS; SUBCUTANEOUS at 12:42

## 2024-01-01 RX ADMIN — Medication 10 UNIT(S): at 17:10

## 2024-01-01 RX ADMIN — CHLORHEXIDINE GLUCONATE 1 APPLICATION(S): 213 SOLUTION TOPICAL at 05:31

## 2024-01-01 RX ADMIN — HYDROMORPHONE HYDROCHLORIDE 0.5 MILLIGRAM(S): 2 INJECTION INTRAMUSCULAR; INTRAVENOUS; SUBCUTANEOUS at 17:15

## 2024-01-01 RX ADMIN — Medication 20 MILLIGRAM(S): at 13:12

## 2024-01-01 RX ADMIN — Medication 3 MILLILITER(S): at 21:31

## 2024-01-01 RX ADMIN — TAMSULOSIN HYDROCHLORIDE 0.8 MILLIGRAM(S): 0.4 CAPSULE ORAL at 00:06

## 2024-01-01 RX ADMIN — HYDROMORPHONE HYDROCHLORIDE 0.5 MILLIGRAM(S): 2 INJECTION INTRAMUSCULAR; INTRAVENOUS; SUBCUTANEOUS at 12:50

## 2024-01-01 RX ADMIN — Medication 3 MILLILITER(S): at 05:43

## 2024-01-01 RX ADMIN — Medication 650 MILLIGRAM(S): at 12:22

## 2024-01-01 RX ADMIN — Medication 30 MILLILITER(S): at 15:30

## 2024-01-01 RX ADMIN — Medication 40 MILLIGRAM(S): at 17:39

## 2024-01-01 RX ADMIN — Medication 100 MILLIGRAM(S): at 21:39

## 2024-01-01 RX ADMIN — APIXABAN 5 MILLIGRAM(S): 2.5 TABLET, FILM COATED ORAL at 05:43

## 2024-01-01 RX ADMIN — Medication 0: at 01:18

## 2024-01-01 RX ADMIN — Medication 650 MILLIGRAM(S): at 13:03

## 2024-01-01 RX ADMIN — BENZOCAINE AND MENTHOL 1 LOZENGE: 5; 1 LIQUID ORAL at 18:46

## 2024-01-01 RX ADMIN — Medication 20 UNIT(S): at 12:11

## 2024-01-01 RX ADMIN — PANTOPRAZOLE SODIUM 40 MILLIGRAM(S): 20 TABLET, DELAYED RELEASE ORAL at 18:10

## 2024-01-01 RX ADMIN — Medication 3 MILLILITER(S): at 23:16

## 2024-01-01 RX ADMIN — BENZOCAINE AND MENTHOL 1 LOZENGE: 5; 1 LIQUID ORAL at 21:39

## 2024-01-01 RX ADMIN — Medication 3 MILLILITER(S): at 06:27

## 2024-01-01 RX ADMIN — INSULIN GLARGINE 17 UNIT(S): 100 INJECTION, SOLUTION SUBCUTANEOUS at 21:32

## 2024-01-01 RX ADMIN — CYCLOBENZAPRINE HYDROCHLORIDE 5 MILLIGRAM(S): 10 TABLET, FILM COATED ORAL at 05:14

## 2024-01-01 RX ADMIN — Medication 12: at 18:30

## 2024-01-01 RX ADMIN — Medication 650 MILLIGRAM(S): at 12:40

## 2024-01-01 RX ADMIN — Medication 3 MILLILITER(S): at 21:48

## 2024-01-01 RX ADMIN — SIMETHICONE 80 MILLIGRAM(S): 80 TABLET, CHEWABLE ORAL at 17:20

## 2024-01-01 RX ADMIN — SIMETHICONE 80 MILLIGRAM(S): 80 TABLET, CHEWABLE ORAL at 17:42

## 2024-01-01 RX ADMIN — Medication 100 MILLIGRAM(S): at 05:20

## 2024-01-01 RX ADMIN — APIXABAN 5 MILLIGRAM(S): 2.5 TABLET, FILM COATED ORAL at 06:20

## 2024-01-01 RX ADMIN — PANTOPRAZOLE SODIUM 40 MILLIGRAM(S): 20 TABLET, DELAYED RELEASE ORAL at 17:25

## 2024-01-01 RX ADMIN — Medication 5 MILLIGRAM(S): at 09:22

## 2024-01-01 RX ADMIN — Medication 3 MILLIGRAM(S): at 22:04

## 2024-01-01 RX ADMIN — BENZOCAINE AND MENTHOL 1 LOZENGE: 5; 1 LIQUID ORAL at 22:16

## 2024-01-01 RX ADMIN — Medication 650 MILLIGRAM(S): at 23:40

## 2024-01-01 RX ADMIN — ATORVASTATIN CALCIUM 40 MILLIGRAM(S): 80 TABLET, FILM COATED ORAL at 21:15

## 2024-01-01 RX ADMIN — SIMETHICONE 80 MILLIGRAM(S): 80 TABLET, CHEWABLE ORAL at 06:37

## 2024-01-01 RX ADMIN — POLYETHYLENE GLYCOL 3350 17 GRAM(S): 17 POWDER, FOR SOLUTION ORAL at 17:45

## 2024-01-01 RX ADMIN — Medication 650 MILLIGRAM(S): at 13:06

## 2024-01-01 RX ADMIN — BUDESONIDE AND FORMOTEROL FUMARATE DIHYDRATE 2 PUFF(S): 160; 4.5 AEROSOL RESPIRATORY (INHALATION) at 10:46

## 2024-01-01 RX ADMIN — SIMETHICONE 80 MILLIGRAM(S): 80 TABLET, CHEWABLE ORAL at 17:37

## 2024-01-01 RX ADMIN — Medication 975 MILLIGRAM(S): at 02:53

## 2024-01-01 RX ADMIN — PIPERACILLIN AND TAZOBACTAM 25 GRAM(S): 4; .5 INJECTION, POWDER, LYOPHILIZED, FOR SOLUTION INTRAVENOUS at 06:15

## 2024-01-01 RX ADMIN — Medication 3 MILLILITER(S): at 05:05

## 2024-01-01 RX ADMIN — Medication 10 UNIT(S): at 07:57

## 2024-01-01 RX ADMIN — Medication 20 MILLIGRAM(S): at 22:59

## 2024-01-01 RX ADMIN — APIXABAN 5 MILLIGRAM(S): 2.5 TABLET, FILM COATED ORAL at 17:25

## 2024-01-01 RX ADMIN — Medication 3 MILLILITER(S): at 06:21

## 2024-01-01 RX ADMIN — Medication 650 MILLIGRAM(S): at 13:10

## 2024-01-01 RX ADMIN — PANTOPRAZOLE SODIUM 40 MILLIGRAM(S): 20 TABLET, DELAYED RELEASE ORAL at 05:43

## 2024-01-01 RX ADMIN — Medication 30 MILLILITER(S): at 18:40

## 2024-01-01 RX ADMIN — Medication 3 MILLILITER(S): at 17:09

## 2024-01-01 RX ADMIN — Medication 10 MILLIGRAM(S): at 23:08

## 2024-01-01 RX ADMIN — Medication 8: at 08:01

## 2024-01-01 RX ADMIN — Medication 40 MILLIGRAM(S): at 05:35

## 2024-01-01 RX ADMIN — BUDESONIDE AND FORMOTEROL FUMARATE DIHYDRATE 2 PUFF(S): 160; 4.5 AEROSOL RESPIRATORY (INHALATION) at 11:44

## 2024-01-01 RX ADMIN — Medication 6: at 17:13

## 2024-01-01 RX ADMIN — SODIUM CHLORIDE 1000 MILLILITER(S): 9 INJECTION, SOLUTION INTRAVENOUS at 02:52

## 2024-01-01 RX ADMIN — Medication 10: at 16:08

## 2024-01-01 RX ADMIN — Medication 30 MILLIGRAM(S): at 06:49

## 2024-01-01 RX ADMIN — Medication 30 MILLIGRAM(S): at 17:06

## 2024-01-01 RX ADMIN — CHLORHEXIDINE GLUCONATE 1 APPLICATION(S): 213 SOLUTION TOPICAL at 12:23

## 2024-01-01 RX ADMIN — Medication 20 MILLIGRAM(S): at 17:01

## 2024-01-01 RX ADMIN — SIMETHICONE 80 MILLIGRAM(S): 80 TABLET, CHEWABLE ORAL at 17:08

## 2024-01-01 RX ADMIN — Medication 650 MILLIGRAM(S): at 12:50

## 2024-01-01 RX ADMIN — Medication 8: at 08:43

## 2024-01-01 RX ADMIN — INSULIN GLARGINE 15 UNIT(S): 100 INJECTION, SOLUTION SUBCUTANEOUS at 22:15

## 2024-01-01 RX ADMIN — Medication 4: at 12:37

## 2024-01-01 RX ADMIN — BUDESONIDE AND FORMOTEROL FUMARATE DIHYDRATE 2 PUFF(S): 160; 4.5 AEROSOL RESPIRATORY (INHALATION) at 08:33

## 2024-01-01 RX ADMIN — Medication 15 MILLIGRAM(S): at 01:50

## 2024-01-01 RX ADMIN — APIXABAN 5 MILLIGRAM(S): 2.5 TABLET, FILM COATED ORAL at 17:03

## 2024-01-01 RX ADMIN — Medication 600 MILLIGRAM(S): at 04:48

## 2024-01-01 RX ADMIN — Medication 3 MILLILITER(S): at 05:08

## 2024-01-01 RX ADMIN — Medication 12 UNIT(S): at 17:33

## 2024-01-01 RX ADMIN — Medication 3 MILLIGRAM(S): at 22:22

## 2024-01-01 RX ADMIN — SENNA PLUS 1 TABLET(S): 8.6 TABLET ORAL at 11:44

## 2024-01-01 RX ADMIN — POLYETHYLENE GLYCOL 3350 17 GRAM(S): 17 POWDER, FOR SOLUTION ORAL at 12:19

## 2024-01-01 RX ADMIN — Medication 2: at 11:49

## 2024-01-01 RX ADMIN — HYDROMORPHONE HYDROCHLORIDE 0.5 MILLIGRAM(S): 2 INJECTION INTRAMUSCULAR; INTRAVENOUS; SUBCUTANEOUS at 13:15

## 2024-01-01 RX ADMIN — Medication 20 MILLIGRAM(S): at 21:55

## 2024-01-01 RX ADMIN — Medication 3 MILLILITER(S): at 05:01

## 2024-01-01 RX ADMIN — BUDESONIDE AND FORMOTEROL FUMARATE DIHYDRATE 2 PUFF(S): 160; 4.5 AEROSOL RESPIRATORY (INHALATION) at 08:35

## 2024-01-01 RX ADMIN — Medication 650 MILLIGRAM(S): at 02:30

## 2024-01-01 RX ADMIN — Medication 3 MILLILITER(S): at 10:24

## 2024-01-01 RX ADMIN — TRAMADOL HYDROCHLORIDE 25 MILLIGRAM(S): 50 TABLET ORAL at 14:03

## 2024-01-01 RX ADMIN — Medication 2.5 MILLIGRAM(S): at 23:12

## 2024-01-01 RX ADMIN — Medication 2: at 17:12

## 2024-01-01 RX ADMIN — Medication 2.5 MILLIGRAM(S): at 04:04

## 2024-01-01 RX ADMIN — Medication 650 MILLIGRAM(S): at 23:52

## 2024-01-01 RX ADMIN — PANTOPRAZOLE SODIUM 40 MILLIGRAM(S): 20 TABLET, DELAYED RELEASE ORAL at 05:12

## 2024-01-01 RX ADMIN — BUDESONIDE AND FORMOTEROL FUMARATE DIHYDRATE 2 PUFF(S): 160; 4.5 AEROSOL RESPIRATORY (INHALATION) at 21:33

## 2024-01-01 RX ADMIN — Medication 30 MILLILITER(S): at 05:23

## 2024-01-01 RX ADMIN — POLYETHYLENE GLYCOL 3350 17 GRAM(S): 17 POWDER, FOR SOLUTION ORAL at 11:43

## 2024-01-01 RX ADMIN — TRAMADOL HYDROCHLORIDE 25 MILLIGRAM(S): 50 TABLET ORAL at 20:01

## 2024-01-01 RX ADMIN — INSULIN GLARGINE 18 UNIT(S): 100 INJECTION, SOLUTION SUBCUTANEOUS at 21:39

## 2024-01-01 RX ADMIN — PANTOPRAZOLE SODIUM 40 MILLIGRAM(S): 20 TABLET, DELAYED RELEASE ORAL at 18:21

## 2024-01-01 RX ADMIN — CHLORHEXIDINE GLUCONATE 1 APPLICATION(S): 213 SOLUTION TOPICAL at 12:40

## 2024-01-01 RX ADMIN — Medication 650 MILLIGRAM(S): at 07:01

## 2024-01-01 RX ADMIN — BUDESONIDE AND FORMOTEROL FUMARATE DIHYDRATE 2 PUFF(S): 160; 4.5 AEROSOL RESPIRATORY (INHALATION) at 21:36

## 2024-01-01 RX ADMIN — HYDROMORPHONE HYDROCHLORIDE 0.5 MILLIGRAM(S): 2 INJECTION INTRAMUSCULAR; INTRAVENOUS; SUBCUTANEOUS at 22:53

## 2024-01-01 RX ADMIN — Medication 4: at 08:17

## 2024-01-01 RX ADMIN — Medication 650 MILLIGRAM(S): at 15:30

## 2024-01-01 RX ADMIN — Medication 20 MILLIGRAM(S): at 05:08

## 2024-01-01 RX ADMIN — Medication 650 MILLIGRAM(S): at 07:16

## 2024-01-01 RX ADMIN — Medication 650 MILLIGRAM(S): at 18:06

## 2024-01-01 RX ADMIN — SIMETHICONE 80 MILLIGRAM(S): 80 TABLET, CHEWABLE ORAL at 05:40

## 2024-01-01 RX ADMIN — HYDROMORPHONE HYDROCHLORIDE 0.5 MILLIGRAM(S): 2 INJECTION INTRAMUSCULAR; INTRAVENOUS; SUBCUTANEOUS at 21:51

## 2024-01-01 RX ADMIN — Medication 12 UNIT(S): at 08:46

## 2024-01-01 RX ADMIN — Medication 10 UNIT(S): at 08:31

## 2024-01-01 RX ADMIN — Medication 12 UNIT(S): at 08:54

## 2024-01-01 RX ADMIN — Medication 6: at 08:35

## 2024-01-01 RX ADMIN — Medication 650 MILLIGRAM(S): at 12:07

## 2024-01-01 RX ADMIN — INSULIN GLARGINE 24 UNIT(S): 100 INJECTION, SOLUTION SUBCUTANEOUS at 21:37

## 2024-01-01 RX ADMIN — Medication 30 MILLIGRAM(S): at 05:13

## 2024-01-01 RX ADMIN — Medication 3 MILLILITER(S): at 00:11

## 2024-01-01 RX ADMIN — Medication 10 MILLIGRAM(S): at 14:08

## 2024-01-01 RX ADMIN — Medication 4: at 08:31

## 2024-01-01 RX ADMIN — Medication 8 UNIT(S): at 08:55

## 2024-01-01 RX ADMIN — TAMSULOSIN HYDROCHLORIDE 0.8 MILLIGRAM(S): 0.4 CAPSULE ORAL at 21:38

## 2024-01-01 RX ADMIN — Medication 3: at 17:10

## 2024-01-01 RX ADMIN — Medication 40 MILLIGRAM(S): at 13:46

## 2024-01-01 RX ADMIN — PANTOPRAZOLE SODIUM 40 MILLIGRAM(S): 20 TABLET, DELAYED RELEASE ORAL at 06:23

## 2024-01-01 RX ADMIN — BUDESONIDE AND FORMOTEROL FUMARATE DIHYDRATE 2 PUFF(S): 160; 4.5 AEROSOL RESPIRATORY (INHALATION) at 21:47

## 2024-01-01 RX ADMIN — APIXABAN 5 MILLIGRAM(S): 2.5 TABLET, FILM COATED ORAL at 17:19

## 2024-01-01 RX ADMIN — Medication 150 MILLIGRAM(S): at 22:14

## 2024-01-01 RX ADMIN — Medication 3 MILLILITER(S): at 11:05

## 2024-01-01 RX ADMIN — Medication 80 MILLIGRAM(S): at 21:14

## 2024-01-01 RX ADMIN — Medication 3 MILLILITER(S): at 00:20

## 2024-01-01 RX ADMIN — BUMETANIDE 2 MILLIGRAM(S): 0.25 INJECTION INTRAMUSCULAR; INTRAVENOUS at 05:36

## 2024-01-01 RX ADMIN — PANTOPRAZOLE SODIUM 40 MILLIGRAM(S): 20 TABLET, DELAYED RELEASE ORAL at 05:52

## 2024-01-01 RX ADMIN — BUMETANIDE 132 MILLIGRAM(S): 0.25 INJECTION INTRAMUSCULAR; INTRAVENOUS at 13:56

## 2024-01-01 RX ADMIN — Medication 2.5 MILLIGRAM(S): at 22:35

## 2024-01-01 RX ADMIN — CHLORHEXIDINE GLUCONATE 1 APPLICATION(S): 213 SOLUTION TOPICAL at 05:21

## 2024-01-01 RX ADMIN — Medication 2: at 23:12

## 2024-01-01 RX ADMIN — ATORVASTATIN CALCIUM 40 MILLIGRAM(S): 80 TABLET, FILM COATED ORAL at 22:06

## 2024-01-01 RX ADMIN — POLYETHYLENE GLYCOL 3350 17 GRAM(S): 17 POWDER, FOR SOLUTION ORAL at 12:05

## 2024-01-01 RX ADMIN — Medication 2 MILLIGRAM(S): at 11:29

## 2024-01-01 RX ADMIN — Medication 650 MILLIGRAM(S): at 14:08

## 2024-01-01 RX ADMIN — Medication 30 MILLILITER(S): at 11:50

## 2024-01-01 RX ADMIN — Medication 1000 MILLIGRAM(S): at 05:24

## 2024-01-01 RX ADMIN — Medication 14 UNIT(S): at 11:40

## 2024-01-01 RX ADMIN — BUDESONIDE AND FORMOTEROL FUMARATE DIHYDRATE 2 PUFF(S): 160; 4.5 AEROSOL RESPIRATORY (INHALATION) at 11:37

## 2024-01-01 RX ADMIN — Medication 150 MILLIGRAM(S): at 21:20

## 2024-01-01 RX ADMIN — Medication 3 MILLILITER(S): at 08:51

## 2024-01-01 RX ADMIN — BUDESONIDE AND FORMOTEROL FUMARATE DIHYDRATE 2 PUFF(S): 160; 4.5 AEROSOL RESPIRATORY (INHALATION) at 08:32

## 2024-01-01 RX ADMIN — Medication 40 MILLIGRAM(S): at 14:21

## 2024-01-01 RX ADMIN — APIXABAN 5 MILLIGRAM(S): 2.5 TABLET, FILM COATED ORAL at 05:02

## 2024-01-01 RX ADMIN — APIXABAN 5 MILLIGRAM(S): 2.5 TABLET, FILM COATED ORAL at 17:07

## 2024-01-01 RX ADMIN — ATORVASTATIN CALCIUM 40 MILLIGRAM(S): 80 TABLET, FILM COATED ORAL at 21:44

## 2024-01-01 RX ADMIN — HYDROMORPHONE HYDROCHLORIDE 0.5 MILLIGRAM(S): 2 INJECTION INTRAMUSCULAR; INTRAVENOUS; SUBCUTANEOUS at 21:17

## 2024-01-01 RX ADMIN — Medication 1 TABLET(S): at 21:02

## 2024-01-01 RX ADMIN — Medication 6: at 07:42

## 2024-01-01 RX ADMIN — BENZOCAINE AND MENTHOL 1 LOZENGE: 5; 1 LIQUID ORAL at 13:34

## 2024-01-01 RX ADMIN — HYDROMORPHONE HYDROCHLORIDE 0.5 MILLIGRAM(S): 2 INJECTION INTRAMUSCULAR; INTRAVENOUS; SUBCUTANEOUS at 21:03

## 2024-01-01 RX ADMIN — SIMETHICONE 80 MILLIGRAM(S): 80 TABLET, CHEWABLE ORAL at 18:42

## 2024-01-01 RX ADMIN — BENZOCAINE AND MENTHOL 1 LOZENGE: 5; 1 LIQUID ORAL at 17:31

## 2024-01-01 RX ADMIN — SIMETHICONE 80 MILLIGRAM(S): 80 TABLET, CHEWABLE ORAL at 17:13

## 2024-01-01 RX ADMIN — PANTOPRAZOLE SODIUM 40 MILLIGRAM(S): 20 TABLET, DELAYED RELEASE ORAL at 16:51

## 2024-01-01 RX ADMIN — APIXABAN 5 MILLIGRAM(S): 2.5 TABLET, FILM COATED ORAL at 06:08

## 2024-01-01 RX ADMIN — BUMETANIDE 2 MILLIGRAM(S): 0.25 INJECTION INTRAMUSCULAR; INTRAVENOUS at 06:13

## 2024-01-01 RX ADMIN — Medication 650 MILLIGRAM(S): at 14:25

## 2024-01-01 RX ADMIN — SIMETHICONE 80 MILLIGRAM(S): 80 TABLET, CHEWABLE ORAL at 17:14

## 2024-01-01 RX ADMIN — HYDROMORPHONE HYDROCHLORIDE 0.5 MILLIGRAM(S): 2 INJECTION INTRAMUSCULAR; INTRAVENOUS; SUBCUTANEOUS at 19:19

## 2024-01-01 RX ADMIN — Medication 200 MILLIGRAM(S): at 21:37

## 2024-01-01 RX ADMIN — Medication 14 UNIT(S): at 08:46

## 2024-01-01 RX ADMIN — PIPERACILLIN AND TAZOBACTAM 200 GRAM(S): 4; .5 INJECTION, POWDER, LYOPHILIZED, FOR SOLUTION INTRAVENOUS at 15:18

## 2024-01-01 RX ADMIN — Medication 650 MILLIGRAM(S): at 12:18

## 2024-01-01 RX ADMIN — Medication 10: at 16:45

## 2024-01-01 RX ADMIN — Medication 2: at 17:10

## 2024-01-01 RX ADMIN — Medication 12 UNIT(S): at 12:36

## 2024-01-01 RX ADMIN — Medication 7 UNIT(S): at 13:12

## 2024-01-01 RX ADMIN — Medication 2.5 MILLIGRAM(S): at 21:20

## 2024-01-01 RX ADMIN — Medication 650 MILLIGRAM(S): at 21:48

## 2024-01-01 RX ADMIN — Medication 20 MILLIGRAM(S): at 14:40

## 2024-01-01 RX ADMIN — Medication 4: at 17:00

## 2024-01-01 RX ADMIN — CYCLOBENZAPRINE HYDROCHLORIDE 5 MILLIGRAM(S): 10 TABLET, FILM COATED ORAL at 21:49

## 2024-01-01 RX ADMIN — ATORVASTATIN CALCIUM 40 MILLIGRAM(S): 80 TABLET, FILM COATED ORAL at 22:02

## 2024-01-01 RX ADMIN — Medication 100 MILLIGRAM(S): at 22:27

## 2024-01-01 RX ADMIN — Medication 3 MILLILITER(S): at 22:54

## 2024-01-01 RX ADMIN — Medication 18 UNIT(S): at 08:02

## 2024-01-01 RX ADMIN — ENOXAPARIN SODIUM 60 MILLIGRAM(S): 100 INJECTION SUBCUTANEOUS at 06:40

## 2024-01-01 RX ADMIN — INSULIN GLARGINE 22 UNIT(S): 100 INJECTION, SOLUTION SUBCUTANEOUS at 22:28

## 2024-01-01 RX ADMIN — Medication 650 MILLIGRAM(S): at 14:15

## 2024-01-01 RX ADMIN — PANTOPRAZOLE SODIUM 40 MILLIGRAM(S): 20 TABLET, DELAYED RELEASE ORAL at 06:02

## 2024-01-01 RX ADMIN — Medication 3 MILLILITER(S): at 07:39

## 2024-01-01 RX ADMIN — Medication 0.5 MILLIGRAM(S): at 17:24

## 2024-01-01 RX ADMIN — Medication 3 MILLILITER(S): at 11:44

## 2024-01-01 RX ADMIN — HYDROMORPHONE HYDROCHLORIDE 0.5 MILLIGRAM(S): 2 INJECTION INTRAMUSCULAR; INTRAVENOUS; SUBCUTANEOUS at 02:52

## 2024-01-01 RX ADMIN — CHLORHEXIDINE GLUCONATE 1 APPLICATION(S): 213 SOLUTION TOPICAL at 13:40

## 2024-01-01 RX ADMIN — INSULIN GLARGINE 30 UNIT(S): 100 INJECTION, SOLUTION SUBCUTANEOUS at 21:42

## 2024-01-01 RX ADMIN — Medication 150 MILLIGRAM(S): at 21:55

## 2024-01-01 RX ADMIN — POLYETHYLENE GLYCOL 3350 17 GRAM(S): 17 POWDER, FOR SOLUTION ORAL at 11:28

## 2024-01-01 RX ADMIN — Medication 3 MILLILITER(S): at 11:45

## 2024-01-01 RX ADMIN — Medication 40 MILLIGRAM(S): at 13:51

## 2024-01-01 RX ADMIN — Medication 2.5 MILLIGRAM(S): at 21:31

## 2024-01-01 RX ADMIN — HYDROMORPHONE HYDROCHLORIDE 0.2 MILLIGRAM(S): 2 INJECTION INTRAMUSCULAR; INTRAVENOUS; SUBCUTANEOUS at 21:34

## 2024-01-01 RX ADMIN — Medication 650 MILLIGRAM(S): at 22:19

## 2024-01-01 RX ADMIN — ALBUTEROL 2 PUFF(S): 90 AEROSOL, METERED ORAL at 17:24

## 2024-01-01 RX ADMIN — INSULIN GLARGINE 8 UNIT(S): 100 INJECTION, SOLUTION SUBCUTANEOUS at 21:54

## 2024-01-01 RX ADMIN — Medication 10 MILLIGRAM(S): at 13:53

## 2024-01-01 RX ADMIN — Medication 20 MILLIGRAM(S): at 06:09

## 2024-01-01 RX ADMIN — BUDESONIDE AND FORMOTEROL FUMARATE DIHYDRATE 2 PUFF(S): 160; 4.5 AEROSOL RESPIRATORY (INHALATION) at 22:35

## 2024-01-01 RX ADMIN — Medication 30 MILLIGRAM(S): at 06:24

## 2024-01-01 RX ADMIN — Medication 8: at 08:48

## 2024-01-01 RX ADMIN — PANTOPRAZOLE SODIUM 40 MILLIGRAM(S): 20 TABLET, DELAYED RELEASE ORAL at 06:08

## 2024-01-01 RX ADMIN — Medication 3 MILLILITER(S): at 23:39

## 2024-01-01 RX ADMIN — Medication 0.5 MILLIGRAM(S): at 08:40

## 2024-01-01 RX ADMIN — BUDESONIDE AND FORMOTEROL FUMARATE DIHYDRATE 2 PUFF(S): 160; 4.5 AEROSOL RESPIRATORY (INHALATION) at 21:41

## 2024-01-01 RX ADMIN — Medication 80 MILLIGRAM(S): at 05:09

## 2024-01-01 RX ADMIN — Medication 3 MILLILITER(S): at 23:08

## 2024-01-01 RX ADMIN — PANTOPRAZOLE SODIUM 40 MILLIGRAM(S): 20 TABLET, DELAYED RELEASE ORAL at 17:39

## 2024-01-01 RX ADMIN — Medication 20 MILLIGRAM(S): at 21:51

## 2024-01-01 RX ADMIN — Medication 4: at 08:24

## 2024-01-01 RX ADMIN — CYCLOBENZAPRINE HYDROCHLORIDE 5 MILLIGRAM(S): 10 TABLET, FILM COATED ORAL at 22:17

## 2024-01-01 RX ADMIN — Medication 13 UNIT(S): at 07:58

## 2024-01-01 RX ADMIN — Medication 1 TABLET(S): at 11:45

## 2024-01-01 RX ADMIN — APIXABAN 5 MILLIGRAM(S): 2.5 TABLET, FILM COATED ORAL at 17:01

## 2024-01-01 RX ADMIN — Medication 4: at 16:50

## 2024-01-01 RX ADMIN — Medication 40 MILLIGRAM(S): at 10:56

## 2024-01-01 RX ADMIN — Medication 22 UNIT(S): at 13:54

## 2024-01-01 RX ADMIN — BUDESONIDE AND FORMOTEROL FUMARATE DIHYDRATE 2 PUFF(S): 160; 4.5 AEROSOL RESPIRATORY (INHALATION) at 08:31

## 2024-01-01 RX ADMIN — Medication 10 UNIT(S): at 16:21

## 2024-01-01 RX ADMIN — Medication 20 MILLIGRAM(S): at 06:23

## 2024-01-01 RX ADMIN — Medication 3 MILLILITER(S): at 20:03

## 2024-01-01 RX ADMIN — PANTOPRAZOLE SODIUM 40 MILLIGRAM(S): 20 TABLET, DELAYED RELEASE ORAL at 06:17

## 2024-01-01 RX ADMIN — APIXABAN 5 MILLIGRAM(S): 2.5 TABLET, FILM COATED ORAL at 18:21

## 2024-01-01 RX ADMIN — APIXABAN 5 MILLIGRAM(S): 2.5 TABLET, FILM COATED ORAL at 17:24

## 2024-01-01 RX ADMIN — Medication 40 MILLIGRAM(S): at 05:12

## 2024-01-01 RX ADMIN — Medication 1 TABLET(S): at 11:50

## 2024-01-01 RX ADMIN — CHLORHEXIDINE GLUCONATE 1 APPLICATION(S): 213 SOLUTION TOPICAL at 12:12

## 2024-01-01 RX ADMIN — Medication 650 MILLIGRAM(S): at 06:51

## 2024-01-01 RX ADMIN — CYCLOBENZAPRINE HYDROCHLORIDE 5 MILLIGRAM(S): 10 TABLET, FILM COATED ORAL at 21:36

## 2024-01-01 RX ADMIN — CHLORHEXIDINE GLUCONATE 1 APPLICATION(S): 213 SOLUTION TOPICAL at 05:46

## 2024-01-01 RX ADMIN — BENZOCAINE AND MENTHOL 1 LOZENGE: 5; 1 LIQUID ORAL at 10:05

## 2024-01-01 RX ADMIN — Medication 650 MILLIGRAM(S): at 09:38

## 2024-01-01 RX ADMIN — Medication 3 MILLIGRAM(S): at 22:54

## 2024-01-01 RX ADMIN — Medication 12 UNIT(S): at 08:32

## 2024-01-01 RX ADMIN — Medication 3 MILLILITER(S): at 17:45

## 2024-01-01 RX ADMIN — Medication 40 MILLIGRAM(S): at 18:22

## 2024-01-01 RX ADMIN — Medication 8: at 08:53

## 2024-01-01 RX ADMIN — Medication 2: at 02:32

## 2024-01-01 RX ADMIN — Medication 3 MILLILITER(S): at 17:18

## 2024-01-01 RX ADMIN — INSULIN GLARGINE 24 UNIT(S): 100 INJECTION, SOLUTION SUBCUTANEOUS at 00:38

## 2024-01-01 RX ADMIN — Medication 6 UNIT(S): at 21:43

## 2024-01-01 RX ADMIN — BENZOCAINE AND MENTHOL 1 LOZENGE: 5; 1 LIQUID ORAL at 19:29

## 2024-01-01 RX ADMIN — PANTOPRAZOLE SODIUM 40 MILLIGRAM(S): 20 TABLET, DELAYED RELEASE ORAL at 05:31

## 2024-01-01 RX ADMIN — PANTOPRAZOLE SODIUM 40 MILLIGRAM(S): 20 TABLET, DELAYED RELEASE ORAL at 17:34

## 2024-01-01 RX ADMIN — Medication 2: at 13:35

## 2024-01-01 RX ADMIN — CHLORHEXIDINE GLUCONATE 1 APPLICATION(S): 213 SOLUTION TOPICAL at 11:20

## 2024-01-01 RX ADMIN — Medication 1 TABLET(S): at 12:18

## 2024-01-01 RX ADMIN — SIMETHICONE 80 MILLIGRAM(S): 80 TABLET, CHEWABLE ORAL at 12:14

## 2024-01-01 RX ADMIN — Medication 16 UNIT(S): at 17:02

## 2024-01-01 RX ADMIN — Medication 1 TABLET(S): at 11:19

## 2024-01-01 RX ADMIN — CYCLOBENZAPRINE HYDROCHLORIDE 5 MILLIGRAM(S): 10 TABLET, FILM COATED ORAL at 18:07

## 2024-01-01 RX ADMIN — Medication 1 TABLET(S): at 11:40

## 2024-01-01 RX ADMIN — Medication 1 PACKET(S): at 06:40

## 2024-01-01 RX ADMIN — Medication 3 MILLIGRAM(S): at 21:47

## 2024-01-01 RX ADMIN — Medication 650 MILLIGRAM(S): at 13:55

## 2024-01-01 RX ADMIN — CHLORHEXIDINE GLUCONATE 1 APPLICATION(S): 213 SOLUTION TOPICAL at 11:01

## 2024-01-01 RX ADMIN — Medication 20 MILLIGRAM(S): at 05:20

## 2024-01-01 RX ADMIN — Medication 3 MILLILITER(S): at 14:07

## 2024-01-01 RX ADMIN — Medication 4: at 12:11

## 2024-01-01 RX ADMIN — PANTOPRAZOLE SODIUM 40 MILLIGRAM(S): 20 TABLET, DELAYED RELEASE ORAL at 18:07

## 2024-01-01 RX ADMIN — Medication 4: at 07:57

## 2024-01-01 RX ADMIN — Medication 3 MILLIGRAM(S): at 23:03

## 2024-01-01 RX ADMIN — Medication 2.5 MILLIGRAM(S): at 20:34

## 2024-01-01 RX ADMIN — Medication 40 MILLIGRAM(S): at 14:40

## 2024-01-01 RX ADMIN — INSULIN GLARGINE 24 UNIT(S): 100 INJECTION, SOLUTION SUBCUTANEOUS at 21:47

## 2024-01-01 RX ADMIN — Medication 2: at 08:19

## 2024-01-01 RX ADMIN — Medication 40 MILLIGRAM(S): at 05:11

## 2024-01-01 RX ADMIN — Medication 1 MILLIGRAM(S): at 18:40

## 2024-01-01 RX ADMIN — Medication 8: at 16:39

## 2024-01-01 RX ADMIN — PANTOPRAZOLE SODIUM 40 MILLIGRAM(S): 20 TABLET, DELAYED RELEASE ORAL at 05:30

## 2024-01-01 RX ADMIN — OXYMETAZOLINE HYDROCHLORIDE 2 SPRAY(S): 0.5 SPRAY NASAL at 05:33

## 2024-01-01 RX ADMIN — Medication 12 UNIT(S): at 09:13

## 2024-01-01 RX ADMIN — Medication 40 MILLIGRAM(S): at 06:11

## 2024-01-01 RX ADMIN — Medication 16 UNIT(S): at 08:18

## 2024-01-01 RX ADMIN — Medication 150 MILLIGRAM(S): at 22:36

## 2024-01-01 RX ADMIN — Medication 7 UNIT(S): at 16:39

## 2024-01-01 RX ADMIN — Medication 30 MILLILITER(S): at 10:06

## 2024-01-01 RX ADMIN — Medication 400 MILLIGRAM(S): at 10:01

## 2024-01-01 RX ADMIN — PANTOPRAZOLE SODIUM 40 MILLIGRAM(S): 20 TABLET, DELAYED RELEASE ORAL at 18:22

## 2024-01-01 RX ADMIN — ATORVASTATIN CALCIUM 40 MILLIGRAM(S): 80 TABLET, FILM COATED ORAL at 21:48

## 2024-01-01 RX ADMIN — BENZOCAINE AND MENTHOL 1 LOZENGE: 5; 1 LIQUID ORAL at 10:17

## 2024-01-01 RX ADMIN — Medication 14 UNIT(S): at 08:12

## 2024-01-01 RX ADMIN — Medication 30 MILLIGRAM(S): at 05:43

## 2024-01-01 RX ADMIN — Medication 20 MILLIGRAM(S): at 21:28

## 2024-01-01 RX ADMIN — SIMETHICONE 80 MILLIGRAM(S): 80 TABLET, CHEWABLE ORAL at 06:13

## 2024-01-01 RX ADMIN — ATORVASTATIN CALCIUM 40 MILLIGRAM(S): 80 TABLET, FILM COATED ORAL at 22:21

## 2024-01-01 RX ADMIN — APIXABAN 5 MILLIGRAM(S): 2.5 TABLET, FILM COATED ORAL at 06:02

## 2024-01-01 RX ADMIN — Medication 400 MILLIGRAM(S): at 20:02

## 2024-01-01 RX ADMIN — POLYETHYLENE GLYCOL 3350 17 GRAM(S): 17 POWDER, FOR SOLUTION ORAL at 22:15

## 2024-01-01 RX ADMIN — BENZOCAINE AND MENTHOL 1 LOZENGE: 5; 1 LIQUID ORAL at 17:08

## 2024-01-01 RX ADMIN — Medication 30 MILLILITER(S): at 11:43

## 2024-01-01 RX ADMIN — ATORVASTATIN CALCIUM 40 MILLIGRAM(S): 80 TABLET, FILM COATED ORAL at 22:34

## 2024-01-01 RX ADMIN — Medication 40 MILLIGRAM(S): at 06:23

## 2024-01-01 RX ADMIN — Medication 650 MILLIGRAM(S): at 06:43

## 2024-01-01 RX ADMIN — INSULIN GLARGINE 18 UNIT(S): 100 INJECTION, SOLUTION SUBCUTANEOUS at 21:55

## 2024-01-01 RX ADMIN — APIXABAN 5 MILLIGRAM(S): 2.5 TABLET, FILM COATED ORAL at 05:11

## 2024-01-01 RX ADMIN — Medication 30 MILLILITER(S): at 17:26

## 2024-01-01 RX ADMIN — Medication 80 MILLIGRAM(S): at 16:41

## 2024-01-01 RX ADMIN — Medication 14 UNIT(S): at 17:09

## 2024-01-01 RX ADMIN — Medication 40 MILLIGRAM(S): at 21:30

## 2024-01-01 RX ADMIN — Medication 1 TABLET(S): at 12:19

## 2024-01-01 RX ADMIN — Medication 400 MILLIGRAM(S): at 20:14

## 2024-01-01 RX ADMIN — HYDROMORPHONE HYDROCHLORIDE 0.5 MILLIGRAM(S): 2 INJECTION INTRAMUSCULAR; INTRAVENOUS; SUBCUTANEOUS at 12:52

## 2024-01-01 RX ADMIN — SIMETHICONE 80 MILLIGRAM(S): 80 TABLET, CHEWABLE ORAL at 05:32

## 2024-01-01 RX ADMIN — BUDESONIDE AND FORMOTEROL FUMARATE DIHYDRATE 2 PUFF(S): 160; 4.5 AEROSOL RESPIRATORY (INHALATION) at 08:46

## 2024-01-01 RX ADMIN — Medication 40 MILLIGRAM(S): at 06:27

## 2024-01-01 RX ADMIN — SIMETHICONE 80 MILLIGRAM(S): 80 TABLET, CHEWABLE ORAL at 06:22

## 2024-01-01 RX ADMIN — Medication 4: at 07:46

## 2024-01-01 RX ADMIN — Medication 12 UNIT(S): at 08:47

## 2024-01-01 RX ADMIN — Medication 650 MILLIGRAM(S): at 06:15

## 2024-01-01 RX ADMIN — Medication 13 UNIT(S): at 11:55

## 2024-01-01 RX ADMIN — Medication 3: at 08:26

## 2024-01-01 RX ADMIN — ATORVASTATIN CALCIUM 40 MILLIGRAM(S): 80 TABLET, FILM COATED ORAL at 21:30

## 2024-01-01 RX ADMIN — Medication 8: at 17:41

## 2024-01-01 RX ADMIN — PANTOPRAZOLE SODIUM 40 MILLIGRAM(S): 20 TABLET, DELAYED RELEASE ORAL at 05:02

## 2024-01-01 RX ADMIN — SIMETHICONE 80 MILLIGRAM(S): 80 TABLET, CHEWABLE ORAL at 17:40

## 2024-01-01 RX ADMIN — SIMETHICONE 80 MILLIGRAM(S): 80 TABLET, CHEWABLE ORAL at 23:23

## 2024-01-01 RX ADMIN — ATORVASTATIN CALCIUM 40 MILLIGRAM(S): 80 TABLET, FILM COATED ORAL at 22:30

## 2024-01-01 RX ADMIN — MORPHINE SULFATE 0.5 MILLIGRAM(S): 50 CAPSULE, EXTENDED RELEASE ORAL at 21:23

## 2024-01-01 RX ADMIN — Medication 0.5 MILLIGRAM(S): at 19:52

## 2024-01-01 RX ADMIN — Medication 2: at 12:35

## 2024-01-01 RX ADMIN — BUDESONIDE AND FORMOTEROL FUMARATE DIHYDRATE 2 PUFF(S): 160; 4.5 AEROSOL RESPIRATORY (INHALATION) at 08:27

## 2024-01-01 RX ADMIN — Medication 10 MILLIGRAM(S): at 06:03

## 2024-01-01 RX ADMIN — BENZOCAINE AND MENTHOL 1 LOZENGE: 5; 1 LIQUID ORAL at 21:38

## 2024-01-01 RX ADMIN — Medication 4: at 08:45

## 2024-01-01 RX ADMIN — Medication 20 MILLIGRAM(S): at 06:49

## 2024-01-01 RX ADMIN — CHLORHEXIDINE GLUCONATE 1 APPLICATION(S): 213 SOLUTION TOPICAL at 11:27

## 2024-01-01 RX ADMIN — Medication 3 MILLILITER(S): at 17:34

## 2024-01-01 RX ADMIN — Medication 40 MILLIGRAM(S): at 05:25

## 2024-01-01 RX ADMIN — Medication 2: at 11:37

## 2024-01-01 RX ADMIN — Medication 0.5 MILLIGRAM(S): at 12:18

## 2024-01-01 RX ADMIN — Medication 40 MILLIGRAM(S): at 05:28

## 2024-01-01 RX ADMIN — Medication 3 MILLILITER(S): at 05:52

## 2024-01-01 RX ADMIN — Medication 3 MILLILITER(S): at 12:18

## 2024-01-01 RX ADMIN — Medication 3 MILLILITER(S): at 05:13

## 2024-01-01 RX ADMIN — BENZOCAINE AND MENTHOL 1 LOZENGE: 5; 1 LIQUID ORAL at 06:12

## 2024-01-01 RX ADMIN — CYCLOBENZAPRINE HYDROCHLORIDE 5 MILLIGRAM(S): 10 TABLET, FILM COATED ORAL at 12:17

## 2024-01-01 RX ADMIN — Medication 8: at 12:16

## 2024-01-01 RX ADMIN — MORPHINE SULFATE 2 MILLIGRAM(S): 50 CAPSULE, EXTENDED RELEASE ORAL at 22:16

## 2024-01-01 RX ADMIN — Medication 4: at 17:40

## 2024-01-01 RX ADMIN — BUDESONIDE AND FORMOTEROL FUMARATE DIHYDRATE 2 PUFF(S): 160; 4.5 AEROSOL RESPIRATORY (INHALATION) at 10:26

## 2024-01-01 RX ADMIN — Medication 20 MILLIGRAM(S): at 05:02

## 2024-01-01 RX ADMIN — PANTOPRAZOLE SODIUM 40 MILLIGRAM(S): 20 TABLET, DELAYED RELEASE ORAL at 06:20

## 2024-01-01 RX ADMIN — Medication 1 TABLET(S): at 11:20

## 2024-01-01 RX ADMIN — Medication 30 MILLILITER(S): at 22:22

## 2024-01-01 RX ADMIN — Medication 150 MILLIGRAM(S): at 22:20

## 2024-01-01 RX ADMIN — Medication 2: at 17:08

## 2024-01-01 RX ADMIN — POLYETHYLENE GLYCOL 3350 17 GRAM(S): 17 POWDER, FOR SOLUTION ORAL at 18:51

## 2024-01-01 RX ADMIN — Medication 10: at 17:01

## 2024-01-01 RX ADMIN — APIXABAN 5 MILLIGRAM(S): 2.5 TABLET, FILM COATED ORAL at 05:32

## 2024-01-01 RX ADMIN — Medication 14 UNIT(S): at 09:51

## 2024-01-01 RX ADMIN — CHLORHEXIDINE GLUCONATE 1 APPLICATION(S): 213 SOLUTION TOPICAL at 11:42

## 2024-01-01 RX ADMIN — PANTOPRAZOLE SODIUM 40 MILLIGRAM(S): 20 TABLET, DELAYED RELEASE ORAL at 06:49

## 2024-01-01 RX ADMIN — Medication 10 UNIT(S): at 17:43

## 2024-01-01 RX ADMIN — SIMETHICONE 80 MILLIGRAM(S): 80 TABLET, CHEWABLE ORAL at 11:43

## 2024-01-01 RX ADMIN — Medication 30 MILLIGRAM(S): at 05:03

## 2024-01-01 RX ADMIN — Medication 400 MILLIGRAM(S): at 00:11

## 2024-01-01 RX ADMIN — BENZOCAINE AND MENTHOL 1 LOZENGE: 5; 1 LIQUID ORAL at 17:02

## 2024-01-01 RX ADMIN — Medication 18 UNIT(S): at 17:26

## 2024-01-01 RX ADMIN — Medication 0.5 MILLIGRAM(S): at 21:04

## 2024-01-01 RX ADMIN — PANTOPRAZOLE SODIUM 40 MILLIGRAM(S): 20 TABLET, DELAYED RELEASE ORAL at 05:04

## 2024-01-01 RX ADMIN — Medication 3 MILLIGRAM(S): at 00:35

## 2024-01-01 RX ADMIN — Medication 10 MILLIGRAM(S): at 23:17

## 2024-01-01 RX ADMIN — Medication 3 MILLIGRAM(S): at 21:41

## 2024-01-01 RX ADMIN — PANTOPRAZOLE SODIUM 40 MILLIGRAM(S): 20 TABLET, DELAYED RELEASE ORAL at 17:30

## 2024-01-01 RX ADMIN — Medication 2.5 MILLIGRAM(S): at 13:07

## 2024-01-01 RX ADMIN — Medication 100 MILLIGRAM(S): at 21:42

## 2024-01-01 RX ADMIN — Medication 10: at 17:38

## 2024-01-01 RX ADMIN — Medication 10 MILLIGRAM(S): at 21:57

## 2024-01-01 RX ADMIN — Medication 14 UNIT(S): at 08:32

## 2024-01-01 RX ADMIN — Medication 3 MILLILITER(S): at 18:31

## 2024-01-01 RX ADMIN — Medication 3 MILLILITER(S): at 22:37

## 2024-01-01 RX ADMIN — Medication 3 MILLILITER(S): at 18:23

## 2024-01-01 RX ADMIN — Medication 650 MILLIGRAM(S): at 23:12

## 2024-01-01 RX ADMIN — GABAPENTIN 300 MILLIGRAM(S): 400 CAPSULE ORAL at 11:57

## 2024-01-01 RX ADMIN — Medication 3 MILLILITER(S): at 14:10

## 2024-01-01 RX ADMIN — Medication 650 MILLIGRAM(S): at 15:25

## 2024-01-01 RX ADMIN — Medication 2: at 02:11

## 2024-01-01 RX ADMIN — Medication 10 UNIT(S): at 16:49

## 2024-01-01 RX ADMIN — Medication 30 MILLILITER(S): at 21:59

## 2024-01-01 RX ADMIN — Medication 3 MILLIGRAM(S): at 23:32

## 2024-01-01 RX ADMIN — BENZOCAINE AND MENTHOL 1 LOZENGE: 5; 1 LIQUID ORAL at 21:40

## 2024-01-01 RX ADMIN — SIMETHICONE 80 MILLIGRAM(S): 80 TABLET, CHEWABLE ORAL at 18:50

## 2024-01-01 RX ADMIN — INSULIN GLARGINE 14 UNIT(S): 100 INJECTION, SOLUTION SUBCUTANEOUS at 21:57

## 2024-01-01 RX ADMIN — BUDESONIDE AND FORMOTEROL FUMARATE DIHYDRATE 2 PUFF(S): 160; 4.5 AEROSOL RESPIRATORY (INHALATION) at 11:42

## 2024-01-01 RX ADMIN — APIXABAN 5 MILLIGRAM(S): 2.5 TABLET, FILM COATED ORAL at 05:14

## 2024-01-01 RX ADMIN — Medication 650 MILLIGRAM(S): at 18:00

## 2024-01-01 RX ADMIN — ENOXAPARIN SODIUM 60 MILLIGRAM(S): 100 INJECTION SUBCUTANEOUS at 17:23

## 2024-01-01 RX ADMIN — BUDESONIDE AND FORMOTEROL FUMARATE DIHYDRATE 2 PUFF(S): 160; 4.5 AEROSOL RESPIRATORY (INHALATION) at 09:06

## 2024-01-01 RX ADMIN — Medication 8: at 16:45

## 2024-01-01 RX ADMIN — Medication 10 MILLIGRAM(S): at 05:28

## 2024-01-01 RX ADMIN — INSULIN GLARGINE 16 UNIT(S): 100 INJECTION, SOLUTION SUBCUTANEOUS at 21:57

## 2024-01-01 RX ADMIN — Medication 40 MILLIGRAM(S): at 06:06

## 2024-01-01 RX ADMIN — HYDROMORPHONE HYDROCHLORIDE 0.5 MILLIGRAM(S): 2 INJECTION INTRAMUSCULAR; INTRAVENOUS; SUBCUTANEOUS at 03:04

## 2024-01-01 RX ADMIN — APIXABAN 5 MILLIGRAM(S): 2.5 TABLET, FILM COATED ORAL at 17:00

## 2024-01-01 RX ADMIN — GABAPENTIN 300 MILLIGRAM(S): 400 CAPSULE ORAL at 11:11

## 2024-01-01 RX ADMIN — PANTOPRAZOLE SODIUM 40 MILLIGRAM(S): 20 TABLET, DELAYED RELEASE ORAL at 18:27

## 2024-01-01 RX ADMIN — Medication 650 MILLIGRAM(S): at 12:53

## 2024-01-01 RX ADMIN — Medication 1000 MILLIGRAM(S): at 21:46

## 2024-01-01 RX ADMIN — BUDESONIDE AND FORMOTEROL FUMARATE DIHYDRATE 2 PUFF(S): 160; 4.5 AEROSOL RESPIRATORY (INHALATION) at 08:20

## 2024-01-01 RX ADMIN — Medication 14 UNIT(S): at 13:07

## 2024-01-01 RX ADMIN — INSULIN GLARGINE 20 UNIT(S): 100 INJECTION, SOLUTION SUBCUTANEOUS at 22:19

## 2024-01-01 RX ADMIN — APIXABAN 5 MILLIGRAM(S): 2.5 TABLET, FILM COATED ORAL at 06:49

## 2024-01-01 RX ADMIN — Medication 3 MILLILITER(S): at 23:07

## 2024-01-01 RX ADMIN — Medication 650 MILLIGRAM(S): at 22:34

## 2024-01-01 RX ADMIN — Medication 11 UNIT(S): at 07:46

## 2024-01-01 RX ADMIN — Medication 10 MILLIGRAM(S): at 05:13

## 2024-01-01 RX ADMIN — BUDESONIDE AND FORMOTEROL FUMARATE DIHYDRATE 2 PUFF(S): 160; 4.5 AEROSOL RESPIRATORY (INHALATION) at 21:32

## 2024-01-01 RX ADMIN — Medication 650 MILLIGRAM(S): at 14:29

## 2024-01-01 RX ADMIN — INSULIN GLARGINE 24 UNIT(S): 100 INJECTION, SOLUTION SUBCUTANEOUS at 21:57

## 2024-01-01 RX ADMIN — Medication 100 MILLIGRAM(S): at 21:32

## 2024-01-01 RX ADMIN — BUDESONIDE AND FORMOTEROL FUMARATE DIHYDRATE 2 PUFF(S): 160; 4.5 AEROSOL RESPIRATORY (INHALATION) at 11:21

## 2024-01-01 RX ADMIN — Medication 1 TABLET(S): at 11:04

## 2024-01-01 RX ADMIN — Medication 20 UNIT(S): at 17:07

## 2024-01-01 RX ADMIN — Medication 11 UNIT(S): at 07:42

## 2024-01-01 RX ADMIN — Medication 1000 MILLIGRAM(S): at 23:54

## 2024-01-01 RX ADMIN — SIMETHICONE 80 MILLIGRAM(S): 80 TABLET, CHEWABLE ORAL at 06:18

## 2024-01-01 RX ADMIN — Medication 40 MILLIGRAM(S): at 12:49

## 2024-01-01 RX ADMIN — APIXABAN 5 MILLIGRAM(S): 2.5 TABLET, FILM COATED ORAL at 19:57

## 2024-01-01 RX ADMIN — Medication 200 MILLIGRAM(S): at 22:38

## 2024-01-01 RX ADMIN — Medication 8: at 11:40

## 2024-01-01 RX ADMIN — Medication 100 MILLIGRAM(S): at 21:28

## 2024-01-01 RX ADMIN — Medication 2.5 MILLIGRAM(S): at 12:18

## 2024-01-01 RX ADMIN — Medication 650 MILLIGRAM(S): at 14:23

## 2024-01-01 RX ADMIN — Medication 650 MILLIGRAM(S): at 22:33

## 2024-01-01 RX ADMIN — TAMSULOSIN HYDROCHLORIDE 0.8 MILLIGRAM(S): 0.4 CAPSULE ORAL at 22:28

## 2024-01-01 RX ADMIN — Medication 40 MILLIGRAM(S): at 05:06

## 2024-01-01 RX ADMIN — PANTOPRAZOLE SODIUM 40 MILLIGRAM(S): 20 TABLET, DELAYED RELEASE ORAL at 17:51

## 2024-01-01 RX ADMIN — CYCLOBENZAPRINE HYDROCHLORIDE 5 MILLIGRAM(S): 10 TABLET, FILM COATED ORAL at 17:17

## 2024-01-01 RX ADMIN — CHLORHEXIDINE GLUCONATE 1 APPLICATION(S): 213 SOLUTION TOPICAL at 12:06

## 2024-01-01 RX ADMIN — APIXABAN 5 MILLIGRAM(S): 2.5 TABLET, FILM COATED ORAL at 05:44

## 2024-01-01 RX ADMIN — Medication 14 UNIT(S): at 07:57

## 2024-01-01 RX ADMIN — BUDESONIDE AND FORMOTEROL FUMARATE DIHYDRATE 2 PUFF(S): 160; 4.5 AEROSOL RESPIRATORY (INHALATION) at 21:20

## 2024-01-01 RX ADMIN — Medication 3 MILLILITER(S): at 13:06

## 2024-01-01 RX ADMIN — Medication 10 MILLIGRAM(S): at 13:25

## 2024-01-01 RX ADMIN — Medication 2.5 MILLIGRAM(S): at 01:40

## 2024-01-01 RX ADMIN — Medication 650 MILLIGRAM(S): at 04:33

## 2024-01-01 RX ADMIN — Medication 40 MILLIGRAM(S): at 06:17

## 2024-01-01 RX ADMIN — Medication 6: at 11:29

## 2024-01-01 RX ADMIN — Medication 2 MILLIGRAM(S): at 10:42

## 2024-01-01 RX ADMIN — Medication 12 UNIT(S): at 17:02

## 2024-01-01 RX ADMIN — BUDESONIDE AND FORMOTEROL FUMARATE DIHYDRATE 2 PUFF(S): 160; 4.5 AEROSOL RESPIRATORY (INHALATION) at 08:40

## 2024-01-01 RX ADMIN — BUDESONIDE AND FORMOTEROL FUMARATE DIHYDRATE 2 PUFF(S): 160; 4.5 AEROSOL RESPIRATORY (INHALATION) at 10:40

## 2024-01-01 RX ADMIN — Medication 3 MILLILITER(S): at 17:20

## 2024-01-01 RX ADMIN — Medication 650 MILLIGRAM(S): at 17:55

## 2024-01-01 RX ADMIN — BENZOCAINE AND MENTHOL 1 LOZENGE: 5; 1 LIQUID ORAL at 19:09

## 2024-01-01 RX ADMIN — Medication 8 UNIT(S): at 17:20

## 2024-01-01 RX ADMIN — Medication 3 MILLIGRAM(S): at 23:11

## 2024-01-01 RX ADMIN — PANTOPRAZOLE SODIUM 40 MILLIGRAM(S): 20 TABLET, DELAYED RELEASE ORAL at 05:44

## 2024-01-01 RX ADMIN — Medication 3 MILLILITER(S): at 05:49

## 2024-01-01 RX ADMIN — Medication 0.5 MILLIGRAM(S): at 08:44

## 2024-01-01 RX ADMIN — HYDROMORPHONE HYDROCHLORIDE 0.5 MILLIGRAM(S): 2 INJECTION INTRAMUSCULAR; INTRAVENOUS; SUBCUTANEOUS at 13:00

## 2024-01-01 RX ADMIN — Medication 40 MILLIGRAM(S): at 18:01

## 2024-01-01 RX ADMIN — Medication 14 UNIT(S): at 17:01

## 2024-01-01 RX ADMIN — Medication 5 MILLIGRAM(S): at 01:53

## 2024-01-01 RX ADMIN — Medication 3 MILLILITER(S): at 18:10

## 2024-01-01 RX ADMIN — Medication 3 MILLILITER(S): at 17:10

## 2024-01-01 RX ADMIN — Medication 100 MILLIGRAM(S): at 21:44

## 2024-01-01 RX ADMIN — Medication 16 UNIT(S): at 17:32

## 2024-01-01 RX ADMIN — HYDROMORPHONE HYDROCHLORIDE 0.5 MILLIGRAM(S): 2 INJECTION INTRAMUSCULAR; INTRAVENOUS; SUBCUTANEOUS at 17:38

## 2024-01-01 RX ADMIN — Medication 3 MILLILITER(S): at 11:40

## 2024-01-01 RX ADMIN — BENZOCAINE AND MENTHOL 1 LOZENGE: 5; 1 LIQUID ORAL at 21:12

## 2024-01-01 RX ADMIN — Medication 650 MILLIGRAM(S): at 17:30

## 2024-01-01 RX ADMIN — Medication 40 MILLIGRAM(S): at 06:21

## 2024-01-01 RX ADMIN — Medication 14 UNIT(S): at 12:39

## 2024-01-01 RX ADMIN — Medication 0: at 22:01

## 2024-01-01 RX ADMIN — Medication 2: at 12:39

## 2024-01-01 RX ADMIN — Medication 150 MILLIGRAM(S): at 21:17

## 2024-01-01 RX ADMIN — Medication 650 MILLIGRAM(S): at 23:53

## 2024-01-01 RX ADMIN — BUDESONIDE AND FORMOTEROL FUMARATE DIHYDRATE 2 PUFF(S): 160; 4.5 AEROSOL RESPIRATORY (INHALATION) at 12:22

## 2024-01-01 RX ADMIN — Medication 20 MILLIGRAM(S): at 06:41

## 2024-01-01 RX ADMIN — Medication 3 MILLIGRAM(S): at 21:40

## 2024-01-01 RX ADMIN — PANTOPRAZOLE SODIUM 40 MILLIGRAM(S): 20 TABLET, DELAYED RELEASE ORAL at 05:55

## 2024-01-01 RX ADMIN — ATORVASTATIN CALCIUM 40 MILLIGRAM(S): 80 TABLET, FILM COATED ORAL at 21:56

## 2024-01-01 RX ADMIN — Medication 3 MILLIGRAM(S): at 23:06

## 2024-01-01 RX ADMIN — APIXABAN 5 MILLIGRAM(S): 2.5 TABLET, FILM COATED ORAL at 18:49

## 2024-01-01 RX ADMIN — BENZOCAINE AND MENTHOL 1 LOZENGE: 5; 1 LIQUID ORAL at 05:11

## 2024-01-01 RX ADMIN — CHLORHEXIDINE GLUCONATE 1 APPLICATION(S): 213 SOLUTION TOPICAL at 05:53

## 2024-01-01 RX ADMIN — Medication 100 MILLIGRAM(S): at 22:59

## 2024-01-01 RX ADMIN — Medication 10 MILLIGRAM(S): at 22:25

## 2024-01-01 RX ADMIN — INSULIN GLARGINE 16 UNIT(S): 100 INJECTION, SOLUTION SUBCUTANEOUS at 22:19

## 2024-01-01 RX ADMIN — INSULIN GLARGINE 17 UNIT(S): 100 INJECTION, SOLUTION SUBCUTANEOUS at 21:59

## 2024-01-01 RX ADMIN — Medication 3 MILLILITER(S): at 17:29

## 2024-01-01 RX ADMIN — Medication 650 MILLIGRAM(S): at 12:34

## 2024-01-01 RX ADMIN — Medication 2.5 MILLIGRAM(S): at 12:35

## 2024-01-01 RX ADMIN — BUDESONIDE AND FORMOTEROL FUMARATE DIHYDRATE 2 PUFF(S): 160; 4.5 AEROSOL RESPIRATORY (INHALATION) at 10:17

## 2024-01-01 RX ADMIN — Medication 20 MILLIGRAM(S): at 06:39

## 2024-01-01 RX ADMIN — Medication 10 UNIT(S): at 08:26

## 2024-01-01 RX ADMIN — CHLORHEXIDINE GLUCONATE 1 APPLICATION(S): 213 SOLUTION TOPICAL at 11:40

## 2024-01-01 RX ADMIN — PANTOPRAZOLE SODIUM 40 MILLIGRAM(S): 20 TABLET, DELAYED RELEASE ORAL at 05:56

## 2024-01-01 RX ADMIN — Medication 4: at 12:00

## 2024-01-01 RX ADMIN — HYDROMORPHONE HYDROCHLORIDE 0.5 MILLIGRAM(S): 2 INJECTION INTRAMUSCULAR; INTRAVENOUS; SUBCUTANEOUS at 11:21

## 2024-01-01 RX ADMIN — CYCLOBENZAPRINE HYDROCHLORIDE 5 MILLIGRAM(S): 10 TABLET, FILM COATED ORAL at 05:21

## 2024-01-01 RX ADMIN — APIXABAN 5 MILLIGRAM(S): 2.5 TABLET, FILM COATED ORAL at 19:09

## 2024-01-01 RX ADMIN — Medication 650 MILLIGRAM(S): at 23:24

## 2024-01-01 RX ADMIN — Medication 8 UNIT(S): at 17:41

## 2024-01-01 RX ADMIN — Medication 8: at 12:03

## 2024-01-01 RX ADMIN — PANTOPRAZOLE SODIUM 40 MILLIGRAM(S): 20 TABLET, DELAYED RELEASE ORAL at 06:04

## 2024-01-01 RX ADMIN — SENNA PLUS 1 TABLET(S): 8.6 TABLET ORAL at 12:03

## 2024-01-01 RX ADMIN — Medication 14 UNIT(S): at 17:06

## 2024-01-01 RX ADMIN — APIXABAN 5 MILLIGRAM(S): 2.5 TABLET, FILM COATED ORAL at 06:06

## 2024-01-01 RX ADMIN — GABAPENTIN 300 MILLIGRAM(S): 400 CAPSULE ORAL at 11:48

## 2024-01-01 RX ADMIN — Medication 1 TABLET(S): at 16:50

## 2024-01-01 RX ADMIN — BENZOCAINE AND MENTHOL 1 LOZENGE: 5; 1 LIQUID ORAL at 10:29

## 2024-01-01 RX ADMIN — PANTOPRAZOLE SODIUM 40 MILLIGRAM(S): 20 TABLET, DELAYED RELEASE ORAL at 17:08

## 2024-01-01 RX ADMIN — Medication 150 MILLIGRAM(S): at 21:38

## 2024-01-01 RX ADMIN — Medication 2: at 22:17

## 2024-01-01 RX ADMIN — Medication 10 MILLIGRAM(S): at 13:27

## 2024-01-01 RX ADMIN — Medication 2.5 MILLIGRAM(S): at 22:14

## 2024-01-01 RX ADMIN — Medication 650 MILLIGRAM(S): at 21:30

## 2024-01-01 RX ADMIN — SENNA PLUS 2 TABLET(S): 8.6 TABLET ORAL at 22:13

## 2024-01-01 RX ADMIN — Medication 100 MILLIGRAM(S): at 22:22

## 2024-01-01 RX ADMIN — GABAPENTIN 300 MILLIGRAM(S): 400 CAPSULE ORAL at 12:18

## 2024-01-01 RX ADMIN — TAMSULOSIN HYDROCHLORIDE 0.8 MILLIGRAM(S): 0.4 CAPSULE ORAL at 22:23

## 2024-01-01 RX ADMIN — Medication 650 MILLIGRAM(S): at 22:50

## 2024-01-01 RX ADMIN — Medication 40 MILLIGRAM(S): at 18:43

## 2024-01-01 RX ADMIN — Medication 40 MILLIGRAM(S): at 15:04

## 2024-01-01 RX ADMIN — Medication 10 MILLIGRAM(S): at 06:47

## 2024-01-01 RX ADMIN — APIXABAN 5 MILLIGRAM(S): 2.5 TABLET, FILM COATED ORAL at 05:21

## 2024-01-01 RX ADMIN — Medication 3 MILLILITER(S): at 23:15

## 2024-01-01 RX ADMIN — Medication 10 MILLIGRAM(S): at 05:04

## 2024-01-01 RX ADMIN — APIXABAN 5 MILLIGRAM(S): 2.5 TABLET, FILM COATED ORAL at 17:21

## 2024-01-01 RX ADMIN — Medication 2.5 MILLIGRAM(S): at 00:40

## 2024-01-01 RX ADMIN — Medication 150 MILLIGRAM(S): at 22:09

## 2024-01-01 RX ADMIN — APIXABAN 5 MILLIGRAM(S): 2.5 TABLET, FILM COATED ORAL at 16:51

## 2024-01-01 RX ADMIN — CHLORHEXIDINE GLUCONATE 1 APPLICATION(S): 213 SOLUTION TOPICAL at 14:30

## 2024-01-01 RX ADMIN — PANTOPRAZOLE SODIUM 40 MILLIGRAM(S): 20 TABLET, DELAYED RELEASE ORAL at 17:11

## 2024-01-01 RX ADMIN — GABAPENTIN 300 MILLIGRAM(S): 400 CAPSULE ORAL at 12:07

## 2024-01-01 RX ADMIN — BENZOCAINE AND MENTHOL 1 LOZENGE: 5; 1 LIQUID ORAL at 05:45

## 2024-01-01 RX ADMIN — Medication 1000 MILLIGRAM(S): at 06:00

## 2024-01-01 RX ADMIN — Medication 3 MILLILITER(S): at 11:17

## 2024-01-01 RX ADMIN — Medication 1000 MILLIGRAM(S): at 20:25

## 2024-01-01 RX ADMIN — BUMETANIDE 2 MILLIGRAM(S): 0.25 INJECTION INTRAMUSCULAR; INTRAVENOUS at 14:03

## 2024-01-01 RX ADMIN — APIXABAN 5 MILLIGRAM(S): 2.5 TABLET, FILM COATED ORAL at 17:06

## 2024-01-01 RX ADMIN — HYDROMORPHONE HYDROCHLORIDE 0.5 MILLIGRAM(S): 2 INJECTION INTRAMUSCULAR; INTRAVENOUS; SUBCUTANEOUS at 22:45

## 2024-01-01 RX ADMIN — Medication 10 UNIT(S): at 12:20

## 2024-01-01 RX ADMIN — Medication 4: at 16:58

## 2024-01-01 RX ADMIN — Medication 100 MILLIGRAM(S): at 21:35

## 2024-01-01 RX ADMIN — MORPHINE SULFATE 2 MILLIGRAM(S): 50 CAPSULE, EXTENDED RELEASE ORAL at 23:13

## 2024-01-01 RX ADMIN — POLYETHYLENE GLYCOL 3350 17 GRAM(S): 17 POWDER, FOR SOLUTION ORAL at 17:06

## 2024-01-01 RX ADMIN — BENZOCAINE AND MENTHOL 1 LOZENGE: 5; 1 LIQUID ORAL at 11:21

## 2024-01-01 RX ADMIN — Medication 15 MILLIGRAM(S): at 01:20

## 2024-01-01 RX ADMIN — POLYETHYLENE GLYCOL 3350 17 GRAM(S): 17 POWDER, FOR SOLUTION ORAL at 18:45

## 2024-01-01 RX ADMIN — Medication 10 UNIT(S): at 08:36

## 2024-01-01 RX ADMIN — BENZOCAINE AND MENTHOL 1 LOZENGE: 5; 1 LIQUID ORAL at 01:53

## 2024-01-01 RX ADMIN — BUDESONIDE AND FORMOTEROL FUMARATE DIHYDRATE 2 PUFF(S): 160; 4.5 AEROSOL RESPIRATORY (INHALATION) at 22:29

## 2024-01-01 RX ADMIN — SIMETHICONE 80 MILLIGRAM(S): 80 TABLET, CHEWABLE ORAL at 17:31

## 2024-01-01 RX ADMIN — Medication 80 MILLIGRAM(S): at 14:46

## 2024-01-01 RX ADMIN — HYDROMORPHONE HYDROCHLORIDE 0.2 MILLIGRAM(S): 2 INJECTION INTRAMUSCULAR; INTRAVENOUS; SUBCUTANEOUS at 11:45

## 2024-01-01 RX ADMIN — Medication 650 MILLIGRAM(S): at 08:11

## 2024-01-01 RX ADMIN — SIMETHICONE 80 MILLIGRAM(S): 80 TABLET, CHEWABLE ORAL at 17:29

## 2024-01-01 RX ADMIN — Medication 1 TABLET(S): at 11:10

## 2024-01-01 RX ADMIN — Medication 2.5 MILLIGRAM(S): at 11:17

## 2024-01-01 RX ADMIN — INSULIN GLARGINE 18 UNIT(S): 100 INJECTION, SOLUTION SUBCUTANEOUS at 22:24

## 2024-01-01 RX ADMIN — Medication 3 MILLILITER(S): at 23:54

## 2024-01-01 RX ADMIN — Medication 3 MILLILITER(S): at 06:20

## 2024-01-01 RX ADMIN — Medication 3 MILLILITER(S): at 14:42

## 2024-01-01 RX ADMIN — Medication 650 MILLIGRAM(S): at 22:03

## 2024-01-01 RX ADMIN — Medication 40 MILLIGRAM(S): at 12:48

## 2024-01-01 RX ADMIN — Medication 3 MILLILITER(S): at 11:19

## 2024-01-01 RX ADMIN — CYCLOBENZAPRINE HYDROCHLORIDE 5 MILLIGRAM(S): 10 TABLET, FILM COATED ORAL at 10:29

## 2024-03-20 NOTE — ED ADULT TRIAGE NOTE - AS O2 DELIVERY
Per Provider patient to finish fluids prior to discharge.      Sherlyn Rodriges RN  03/19/24 2035    
room air

## 2024-03-25 NOTE — PATIENT PROFILE ADULT - FALL HARM RISK - FALL HARM RISK
DOT form insulin treated diabetes mellitus assessment form     Faxed to [phone]     's license [id number]        
Other

## 2024-04-11 NOTE — ED PROVIDER NOTE - PHYSICAL EXAMINATION
Exam:  General: Patient chronically ill appearing, hypoxic on room air  HEENT: airway patent with moist mucous membranes  Cardiac: RRR S1/S2 with strong peripheral pulses  Respiratory: diffuse fine crackles, speaking in full sentences but tachypneic  GI: abdomen soft, non tender, non distended  Neuro: no gross neurologic deficits  Skin: warm, well perfused  Psych: normal mood and affect

## 2024-04-11 NOTE — ED PROVIDER NOTE - PROGRESS NOTE DETAILS
patient was admitted to floor however she desatted to 86% on 6L NC and became tachypneic. Placed on NRB, ICU paged cleared by ICU for floor admitted to ICU

## 2024-04-11 NOTE — ED ADULT TRIAGE NOTE - CHIEF COMPLAINT QUOTE
Patient reports difficulty breathing since yesterday. Patient has hx of pulmonary fibrosis and diabetes, on home oxygen 3l via NC, sat was 70% at home, fingerstick 473.

## 2024-04-11 NOTE — ED PROVIDER NOTE - OBJECTIVE STATEMENT
63-year-old woman with a past medical history of diabetes hypertension and pulmonary fibrosis presenting with sudden onset shortness of breath beginning earlier this evening.  Tried home nebulizers without improvement.  Denies any associated fevers or chills.  No reported sick contacts.

## 2024-04-11 NOTE — ED PROVIDER NOTE - CLINICAL SUMMARY MEDICAL DECISION MAKING FREE TEXT BOX
Patient presenting with exacerbation of pulmonary fibrosis.  Unclear if underlying infectious trigger.  Plan for labs, ABG given hypoxia, nebs and steroids, chest x-ray and RVP and reevaluate.  Anticipate will need admission at this time.

## 2024-04-12 NOTE — PHYSICAL THERAPY INITIAL EVALUATION ADULT - GENERAL OBSERVATIONS, REHAB EVAL
Pt received supine, NAD, tachypneic but VSS on 40% hi flow, (+) Pike, A&Ox4, c/o 6/10 right calf pain (RN Baylee aware)

## 2024-04-12 NOTE — PROGRESS NOTE ADULT - SUBJECTIVE AND OBJECTIVE BOX
INTERVAL HPI/OVERNIGHT EVENTS:       PRESSORS: [ ] YES [ ] NO  WHICH:    ANTIBIOTICS:                  DATE STARTED:  ANTIBIOTICS:                  DATE STARTED:    Antimicrobial:    Cardiovascular:    Pulmonary:    Hematalogic:  enoxaparin Injectable 60 milliGRAM(s) SubCutaneous every 12 hours    Other:  atorvastatin 40 milliGRAM(s) Oral at bedtime  benzocaine/menthol Lozenge 1 Lozenge Oral every 4 hours  insulin glargine Injectable (LANTUS) 18 Unit(s) SubCutaneous at bedtime  insulin lispro (ADMELOG) corrective regimen sliding scale   SubCutaneous at bedtime  insulin lispro (ADMELOG) corrective regimen sliding scale   SubCutaneous three times a day before meals  insulin lispro Injectable (ADMELOG) 11 Unit(s) SubCutaneous three times a day before meals  methylPREDNISolone sodium succinate Injectable 40 milliGRAM(s) IV Push every 12 hours  pantoprazole    Tablet 40 milliGRAM(s) Oral before breakfast  traZODone 100 milliGRAM(s) Oral at bedtime    atorvastatin 40 milliGRAM(s) Oral at bedtime  benzocaine/menthol Lozenge 1 Lozenge Oral every 4 hours  enoxaparin Injectable 60 milliGRAM(s) SubCutaneous every 12 hours  insulin glargine Injectable (LANTUS) 18 Unit(s) SubCutaneous at bedtime  insulin lispro (ADMELOG) corrective regimen sliding scale   SubCutaneous three times a day before meals  insulin lispro (ADMELOG) corrective regimen sliding scale   SubCutaneous at bedtime  insulin lispro Injectable (ADMELOG) 11 Unit(s) SubCutaneous three times a day before meals  methylPREDNISolone sodium succinate Injectable 40 milliGRAM(s) IV Push every 12 hours  pantoprazole    Tablet 40 milliGRAM(s) Oral before breakfast  traZODone 100 milliGRAM(s) Oral at bedtime    Drug Dosing Weight  Height (cm): 167.6 (11 Apr 2024 21:44)  Weight (kg): 54.7 (12 Apr 2024 04:13)  BMI (kg/m2): 19.5 (12 Apr 2024 04:13)  BSA (m2): 1.61 (12 Apr 2024 04:13)    PHYSICAL EXAM:  GENERAL: NAD  EYES: EOMI, PERRL  NECK: Supple, No JVD; Normal thyroid; Trachea midline: No LAD   NERVOUS SYSTEM:  Alert & Oriented X3,  Motor Strength 5/5 B/L upper and lower extremities; DTRs 2+ intact and symmetric  CHEST/LUNG: Bilateral Rales on auscultation.   HEART: Regular rate and rhythm; No murmurs, no gallops  ABDOMEN: Soft, Suprapubic distention and tenderness. Bowel sounds present, no pain or masses on palpation  : Voiding freely, godwin in place   EXTREMITIES:  2+ Peripheral Pulses, No clubbing, cyanosis, or edema  SKIN: warm, intact, no lesions     LINES/DRAINS/DEVICES  CENTRAL LINE: [ ] YES [X] NO  LOCATION:     GODWIN: [ ] YES [X] NO     A-LINE:  [ ] YES [X] NO  LOCATION:       ICU Vital Signs Last 24 Hrs  T(C): 36.9 (12 Apr 2024 06:00), Max: 37.7 (12 Apr 2024 02:13)  T(F): 98.4 (12 Apr 2024 06:00), Max: 99.8 (12 Apr 2024 02:13)  HR: 101 (12 Apr 2024 16:51) (101 - 123)  BP: 132/75 (12 Apr 2024 15:17) (94/56 - 163/97)  BP(mean): 92 (12 Apr 2024 15:17) (81 - 117)  ABP: --  ABP(mean): --  RR: 21 (12 Apr 2024 16:51) (17 - 50)  SpO2: 100% (12 Apr 2024 16:51) (86% - 100%)    O2 Parameters below as of 12 Apr 2024 16:51  Patient On (Oxygen Delivery Method): nasal cannula, high flow  O2 Flow (L/min): 40  O2 Concentration (%): 40      ABG - ( 11 Apr 2024 22:36 )  pH, Arterial: 7.45  pH, Blood: x     /  pCO2: 44    /  pO2: 141   / HCO3: 31    / Base Excess: 5.8   /  SaO2: 98          04-11 @ 07:01  -  04-12 @ 07:00  --------------------------------------------------------  IN: 50 mL / OUT: 200 mL / NET: -150 mL      LABS:  CBC Full  -  ( 12 Apr 2024 06:18 )  WBC Count : 7.71 K/uL  RBC Count : 3.88 M/uL  Hemoglobin : 10.3 g/dL  Hematocrit : 33.1 %  Platelet Count - Automated : 320 K/uL  Mean Cell Volume : 85.3 fl  Mean Cell Hemoglobin : 26.5 pg  Mean Cell Hemoglobin Concentration : 31.1 gm/dL  Auto Neutrophil # : 7.40 K/uL  Auto Lymphocyte # : 0.23 K/uL  Auto Monocyte # : 0.05 K/uL  Auto Eosinophil # : 0.00 K/uL  Auto Basophil # : 0.00 K/uL  Auto Neutrophil % : 96.0 %  Auto Lymphocyte % : 3.0 %  Auto Monocyte % : 0.6 %  Auto Eosinophil % : 0.0 %  Auto Basophil % : 0.0 %    04-12    137  |  104  |  21<H>  ----------------------------<  467<HH>  4.1   |  25  |  1.36<H>    Ca    9.2      12 Apr 2024 06:18  Phos  2.9     04-12  Mg     2.0     04-12    TPro  7.1  /  Alb  2.5<L>  /  TBili  0.3  /  DBili  x   /  AST  14  /  ALT  15  /  AlkPhos  98  04-12    Urinalysis Basic - ( 12 Apr 2024 06:18 )    Color: x / Appearance: x / SG: x / pH: x  Gluc: 467 mg/dL / Ketone: x  / Bili: x / Urobili: x   Blood: x / Protein: x / Nitrite: x   Leuk Esterase: x / RBC: x / WBC x   Sq Epi: x / Non Sq Epi: x / Bacteria: x      RADIOLOGY & ADDITIONAL STUDIES REVIEWED DURING TEAM ROUNDS   INTERVAL HPI/OVERNIGHT EVENTS:   No acute events overnight. This AM on HFNC 40/40 and tachypneic. Pt with c/o of abdominal pain and noted to have suprapubic distention and tenderness. Bladder scan showing 1L of urine in bladder,Godwin placed and approx. 2L of urine noted in Godwin catheter.     PRESSORS: [ ] YES [X] NO    Antimicrobial:    Cardiovascular:    Pulmonary:    Hematalogic:  enoxaparin Injectable 60 milliGRAM(s) SubCutaneous every 12 hours    Other:  atorvastatin 40 milliGRAM(s) Oral at bedtime  benzocaine/menthol Lozenge 1 Lozenge Oral every 4 hours  insulin glargine Injectable (LANTUS) 18 Unit(s) SubCutaneous at bedtime  insulin lispro (ADMELOG) corrective regimen sliding scale   SubCutaneous at bedtime  insulin lispro (ADMELOG) corrective regimen sliding scale   SubCutaneous three times a day before meals  insulin lispro Injectable (ADMELOG) 11 Unit(s) SubCutaneous three times a day before meals  methylPREDNISolone sodium succinate Injectable 40 milliGRAM(s) IV Push every 12 hours  pantoprazole    Tablet 40 milliGRAM(s) Oral before breakfast  traZODone 100 milliGRAM(s) Oral at bedtime    atorvastatin 40 milliGRAM(s) Oral at bedtime  benzocaine/menthol Lozenge 1 Lozenge Oral every 4 hours  enoxaparin Injectable 60 milliGRAM(s) SubCutaneous every 12 hours  insulin glargine Injectable (LANTUS) 18 Unit(s) SubCutaneous at bedtime  insulin lispro (ADMELOG) corrective regimen sliding scale   SubCutaneous three times a day before meals  insulin lispro (ADMELOG) corrective regimen sliding scale   SubCutaneous at bedtime  insulin lispro Injectable (ADMELOG) 11 Unit(s) SubCutaneous three times a day before meals  methylPREDNISolone sodium succinate Injectable 40 milliGRAM(s) IV Push every 12 hours  pantoprazole    Tablet 40 milliGRAM(s) Oral before breakfast  traZODone 100 milliGRAM(s) Oral at bedtime    Drug Dosing Weight  Height (cm): 167.6 (11 Apr 2024 21:44)  Weight (kg): 54.7 (12 Apr 2024 04:13)  BMI (kg/m2): 19.5 (12 Apr 2024 04:13)  BSA (m2): 1.61 (12 Apr 2024 04:13)    PHYSICAL EXAM:  GENERAL: NAD  EYES: EOMI, PERRL  NECK: Supple, No JVD; Normal thyroid; Trachea midline: No LAD   NERVOUS SYSTEM:  Alert & Oriented X3,  Motor Strength 5/5 B/L upper and lower extremities; DTRs 2+ intact and symmetric  CHEST/LUNG: Bilateral Rales on auscultation.   HEART: Regular rate and rhythm; No murmurs, no gallops  ABDOMEN: Soft, Suprapubic distention and tenderness. Bowel sounds present, no pain or masses on palpation  : Voiding freely, godwin in place   EXTREMITIES:  2+ Peripheral Pulses, No clubbing, cyanosis, or edema  SKIN: warm, intact, no lesions     LINES/DRAINS/DEVICES  CENTRAL LINE: [ ] YES [X] NO  LOCATION:     GODWIN: [ ] YES [X] NO     A-LINE:  [ ] YES [X] NO  LOCATION:       ICU Vital Signs Last 24 Hrs  T(C): 36.9 (12 Apr 2024 06:00), Max: 37.7 (12 Apr 2024 02:13)  T(F): 98.4 (12 Apr 2024 06:00), Max: 99.8 (12 Apr 2024 02:13)  HR: 101 (12 Apr 2024 16:51) (101 - 123)  BP: 132/75 (12 Apr 2024 15:17) (94/56 - 163/97)  BP(mean): 92 (12 Apr 2024 15:17) (81 - 117)  ABP: --  ABP(mean): --  RR: 21 (12 Apr 2024 16:51) (17 - 50)  SpO2: 100% (12 Apr 2024 16:51) (86% - 100%)    O2 Parameters below as of 12 Apr 2024 16:51  Patient On (Oxygen Delivery Method): nasal cannula, high flow  O2 Flow (L/min): 40  O2 Concentration (%): 40      ABG - ( 11 Apr 2024 22:36 )  pH, Arterial: 7.45  pH, Blood: x     /  pCO2: 44    /  pO2: 141   / HCO3: 31    / Base Excess: 5.8   /  SaO2: 98          04-11 @ 07:01  -  04-12 @ 07:00  --------------------------------------------------------  IN: 50 mL / OUT: 200 mL / NET: -150 mL      LABS:  CBC Full  -  ( 12 Apr 2024 06:18 )  WBC Count : 7.71 K/uL  RBC Count : 3.88 M/uL  Hemoglobin : 10.3 g/dL  Hematocrit : 33.1 %  Platelet Count - Automated : 320 K/uL  Mean Cell Volume : 85.3 fl  Mean Cell Hemoglobin : 26.5 pg  Mean Cell Hemoglobin Concentration : 31.1 gm/dL  Auto Neutrophil # : 7.40 K/uL  Auto Lymphocyte # : 0.23 K/uL  Auto Monocyte # : 0.05 K/uL  Auto Eosinophil # : 0.00 K/uL  Auto Basophil # : 0.00 K/uL  Auto Neutrophil % : 96.0 %  Auto Lymphocyte % : 3.0 %  Auto Monocyte % : 0.6 %  Auto Eosinophil % : 0.0 %  Auto Basophil % : 0.0 %    04-12    137  |  104  |  21<H>  ----------------------------<  467<HH>  4.1   |  25  |  1.36<H>    Ca    9.2      12 Apr 2024 06:18  Phos  2.9     04-12  Mg     2.0     04-12    TPro  7.1  /  Alb  2.5<L>  /  TBili  0.3  /  DBili  x   /  AST  14  /  ALT  15  /  AlkPhos  98  04-12    Urinalysis Basic - ( 12 Apr 2024 06:18 )    Color: x / Appearance: x / SG: x / pH: x  Gluc: 467 mg/dL / Ketone: x  / Bili: x / Urobili: x   Blood: x / Protein: x / Nitrite: x   Leuk Esterase: x / RBC: x / WBC x   Sq Epi: x / Non Sq Epi: x / Bacteria: x      RADIOLOGY & ADDITIONAL STUDIES REVIEWED DURING TEAM ROUNDS

## 2024-04-12 NOTE — PROGRESS NOTE ADULT - ASSESSMENT
63 yrs old F, from home, ambulating independently, pmhx of ILD on 3L NC at baseline , DM, HLD, presented with shortness of breath. Pt is admitted for dyspnea likely 2/2 ILD flare vs ?underlying pneumonia.       In ED:   vitals: BP: 94/56 HR: 112, T: 36.6 NRB> Venti mask  s/p Levaquin 1L LR  Lantus 10 units   CXR increase intersitial infiltrates  63 year old F from home ambulates with walker w/ pmhx chronic hypoxic respiratory failure on 2-3L NC at home 2/2 ILD, currently on ofev , unprovoked? PE (dx  on 2022, currently on Eliquis 5 mg PO QD), severe pulmonary HTN (based on TTE from 2023), IDDM, who came in with c/o SOB. As per pt since last week she was experiencing intermittent SOB, however, since Tuesday of this week endorses becoming more dyspneic on exertion and at rest, associated with dry cough, not associated with wheezing, CP, LE edema. In the ED pt was initially saturating 95% on 5L NC, subsequently desaturated to 86% and placed on NRB. ICU was consulted for worsening hypoxia. At the time of assessment pf was on NRB 15L, she was transitioned to Venti mask 35% @ 12 L saturating 92-95%, with RR 20 and Hr 110's, afebrile. On assessment pt with moderate respiratory distress while taking, however, speaking in full sentences, no significant findings on lung  exam. In terms of her labs no leukocytosis, RVP neg,. SCr 1.47 (baseline 0.8-1.2), EKG sinus tach with left axis deviation, TWI on septal leads (unchanged from prior EKG).  CXR neg for consolidations, no effusions, unchanged from prior CXR obtain 1 yr ago.  Pt admitted to ICU for acute on chronic hypoxic respiratory failure requiring HFNC      # Acute on chronic hypoxic respiratory failure  # ILD with likely progression to pulm fibrosis  # ILD exacerbation   # Cor pulmonale   # Severe Pulm HTN  # DONOVAN  # Uncontrolled DM       ============Neuro================  # No active issues  - resumed home dose of trazodone    =============Cards================  #Sinus tachycardia   - likely precipitated by increased WOB and hypoxia  - monitor HR    #Hx of cor pulmonale  - BNP 13K  - Bedside pocus  with findings of Multiple B-lines, good cardiac contractility, no obvious septal flattening.   - Ordered Lasix 60 mg IVP X1  - Given 1x dose of 40mg IV Lasix IVP   - TTE 4/12: . Left ventricular cavity is small. Left ventricular wall thickness is normal. LVSF normal with EF of 65 %. TAPSE     #HLD  resumed home dose of lipitor     ==============Pulm================  #Acute on Chronic respiratory failure  #ILD  ddx ILD flare vs Recurrent PE? (in the s/o subtherapeutic DOAC dosing)  s/p solumedrol 125 mg IV in the ED  will continue solumedrol 40 mg IV q 8 hrs  started on empiric coverage with Levaquin (renally dosed)  started on FD lovenox  HF NC  continue ofev (family will bring from home)  f/u Mycoplasma and legionella  given her DONOVAN will defer CTA at this time. ordered CTC non contrast  f/u D-Dimer and LE doppler studies   titrate down supplemental O2 as tolerated       #Pulmonary HTN  based on prior TTE with severe pulmonary HTN  obtain repeat TTE     =============GI============  No active issues     =============Renal/===============  #DONOVAN  Pt w/ SCr  1.74 on admission  Baseline SCr 0.8-1.2  obtain bladder scan  F/U Urine Lytes, calculate FeNa  follow BMP daily   avoid nephrotoxin    ==============Endo=============  #IDDM  uncontrolled  at home on Basaglar 25 U QHS + Admelog 15U TID  started on  Lantus 18 + Admelog 11U TID + Mod SSI   FS AC HS  adjust regimen as needed  f/u A1C      ==============Heme===============  #Normocytic Anemia  likely anemia of chronic disease  H/H stable  monitor CBC daily     ==============ppx================  PPI QD   FD lovenox    ==============Dispo===============  ICU      63 year old F from home ambulates with walker w/ pmhx chronic hypoxic respiratory failure on 2-3L NC at home 2/2 ILD, currently on ofev , unprovoked? PE (dx  on 2022, currently on Eliquis 5 mg PO QD), severe pulmonary HTN (based on TTE from 2023), IDDM, who came in with c/o SOB. As per pt since last week she was experiencing intermittent SOB, however, since Tuesday of this week endorses becoming more dyspneic on exertion and at rest, associated with dry cough, not associated with wheezing, CP, LE edema. In the ED pt was initially saturating 95% on 5L NC, subsequently desaturated to 86% and placed on NRB. ICU was consulted for worsening hypoxia. At the time of assessment pf was on NRB 15L, she was transitioned to Venti mask 35% @ 12 L saturating 92-95%, with RR 20 and Hr 110's, afebrile. On assessment pt with moderate respiratory distress while taking, however, speaking in full sentences, no significant findings on lung  exam. In terms of her labs no leukocytosis, RVP neg,. SCr 1.47 (baseline 0.8-1.2), EKG sinus tach with left axis deviation, TWI on septal leads (unchanged from prior EKG).  CXR neg for consolidations, no effusions, unchanged from prior CXR obtain 1 yr ago.  Pt admitted to ICU for acute on chronic hypoxic respiratory failure requiring HFNC      # Acute on chronic hypoxic respiratory failure  # ILD with likely progression to pulm fibrosis  # ILD exacerbation   # Cor pulmonale   # Severe Pulm HTN  # DONOVAN  # Uncontrolled DM       ============Neuro================  # No active issues  - resumed home dose of trazodone    =============Cards================  #Sinus tachycardia   - likely precipitated by increased WOB and hypoxia  - monitor HR    #Hx of cor pulmonale  - BNP 13K  - Bedside pocus  with findings of Multiple B-lines, good cardiac contractility, no obvious septal flattening.   - Ordered Lasix 60 mg IVP X1  - Given 1x dose of 40mg IV Lasix IVP   - TTE 4/12: . Left ventricular cavity is small. Left ventricular wall thickness is normal. LVSF normal with EF of 65 %. TAPSE 2.0 with normal RV function. Mild MR, PASP 28mmHg, normal PAP.     #HLD  - resumed home dose of lipitor     ==============Pulm================  #AcuteHypoxic Respiratory Failure  #ILD  - ddx ILD flare vs Recurrent PE  - s/p solumedrol 125 mg IV in the ED  - Continue solumedrol 40 mg IV q 8 hrs  - started on FD lovenox  - Wean HFNC  - Continue ofev (family will bring from home)  - CTA once DONOVAN resolved   - LE Dopplers negative for DVT     #Pulmonary HTN  - based on prior TTE with severe pulmonary HTN, not seen on TTE completed during this admission    =============GI============  #GERD   - Regular Diet  -    =============Renal/===============  #DONOVAN  Pt w/ SCr  1.74 on admission  Baseline SCr 0.8-1.2  obtain bladder scan  F/U Urine Lytes, calculate FeNa  follow BMP daily   avoid nephrotoxin    ==============Endo=============  #IDDM  uncontrolled  at home on Basaglar 25 U QHS + Admelog 15U TID  started on  Lantus 18 + Admelog 11U TID + Mod SSI   FS AC HS  adjust regimen as needed  f/u A1C      ==============Heme===============  #Normocytic Anemia  likely anemia of chronic disease  H/H stable  monitor CBC daily     ==============ppx================  PPI QD   FD lovenox    ==============Dispo===============  ICU      63 year old F from home ambulates with walker w/ pmhx chronic hypoxic respiratory failure on 2-3L NC at home 2/2 ILD, currently on ofev , unprovoked? PE (dx  on 2022, currently on Eliquis 5 mg PO QD), severe pulmonary HTN (based on TTE from 2023), IDDM, who came in with c/o SOB. As per pt since last week she was experiencing intermittent SOB, however, since Tuesday of this week endorses becoming more dyspneic on exertion and at rest, associated with dry cough, not associated with wheezing, CP, LE edema. In the ED pt was initially saturating 95% on 5L NC, subsequently desaturated to 86% and placed on NRB. ICU was consulted for worsening hypoxia. At the time of assessment pf was on NRB 15L, she was transitioned to Venti mask 35% @ 12 L saturating 92-95%, with RR 20 and Hr 110's, afebrile. On assessment pt with moderate respiratory distress while taking, however, speaking in full sentences, no significant findings on lung  exam. In terms of her labs no leukocytosis, RVP neg,. SCr 1.47 (baseline 0.8-1.2), EKG sinus tach with left axis deviation, TWI on septal leads (unchanged from prior EKG).  CXR neg for consolidations, no effusions, unchanged from prior CXR obtain 1 yr ago.  Pt admitted to ICU for acute on chronic hypoxic respiratory failure requiring HFNC    # ILD with likely progression to pulm fibrosis  # ILD exacerbation   # DONOVAN  # Uncontrolled DM     ============Neuro================  # No active issues  - resumed home dose of trazodone    =============Cards================  #Sinus tachycardia   - likely precipitated by increased WOB and hypoxia  - monitor HR    #Hx of cor pulmonale  - BNP 13K  - Bedside pocus  with findings of Multiple B-lines, good cardiac contractility, no obvious septal flattening.   - Ordered Lasix 60 mg IVP X1  - Given 1x dose of 40mg IV Lasix IVP   - TTE 4/12: . Left ventricular cavity is small. Left ventricular wall thickness is normal. LVSF normal with EF of 65 %. TAPSE 2.0 with normal RV function. Mild MR, PASP 28mmHg, normal PAP. Unable to assess for diastolic dysfunction.     #HLD  - resumed home dose of lipitor     ==============Pulm================  #AcuteHypoxic Respiratory Failure  #ILD  - ddx ILD flare vs Recurrent PE  - s/p solumedrol 125 mg IV in the ED  - Continue solumedrol 40 mg IV q 8 hrs  - started on FD lovenox  - Wean HFNC  - Continue ofev (family will bring from home)  - CTA once DONOVAN resolved   - LE Dopplers negative for DVT     #Pulmonary HTN  - based on prior TTE with severe pulmonary HTN, not seen on TTE completed during this admission    =============GI============  #GERD   - Regular Diet  - Protonic 40mg PO qd    =============Renal/===============  #DONOVAN likely Pre-Renal vs obstructive    - Baseline SCr 0.8-1.2  - Bladder scan showing urinary retention  - FeUrea 33.5% likely pre-renal   - Pike Cathter placed with 2L of urine drained  - Daily BMP   - avoid nephrotoxin    ============ID=====================  #CAP vs ILD exacerbation  -     ==============Endo=============  #IDDM  - at home on Basaglar 25 U QHS + Admelog 15U TID  - started on  Lantus 18 + Admelog 11U TID + Mod SSI   - FS AC HS  - adjust regimen as needed  - f/u A1C    ==============Heme===============  #Normocytic Anemia  - likely anemia of chronic disease  - H/H stable  - monitor CBC daily     ==============ppx================  - PPI QD   - FD lovenox    ==============Dispo===============  ICU      63 year old F from home ambulates with walker w/ pmhx chronic hypoxic respiratory failure on 2-3L NC at home 2/2 ILD, currently on ofev , unprovoked? PE (dx  on 2022, currently on Eliquis 5 mg PO QD), severe pulmonary HTN (based on TTE from 2023), IDDM, who came in with c/o SOB. As per pt since last week she was experiencing intermittent SOB, however, since Tuesday of this week endorses becoming more dyspneic on exertion and at rest, associated with dry cough, not associated with wheezing, CP, LE edema. In the ED pt was initially saturating 95% on 5L NC, subsequently desaturated to 86% and placed on NRB. ICU was consulted for worsening hypoxia. At the time of assessment pf was on NRB 15L, she was transitioned to Venti mask 35% @ 12 L saturating 92-95%, with RR 20 and Hr 110's, afebrile. On assessment pt with moderate respiratory distress while taking, however, speaking in full sentences, no significant findings on lung  exam. In terms of her labs no leukocytosis, RVP neg,. SCr 1.47 (baseline 0.8-1.2), EKG sinus tach with left axis deviation, TWI on septal leads (unchanged from prior EKG).  CXR neg for consolidations, no effusions, unchanged from prior CXR obtain 1 yr ago.  Pt admitted to ICU for acute on chronic hypoxic respiratory failure requiring HFNC    # ILD with likely progression to pulm fibrosis  # ILD exacerbation   # DONOVAN  # Uncontrolled DM     ============Neuro================  # No active issues  - resumed home dose of trazodone    =============Cards================  #Sinus tachycardia   - likely precipitated by increased WOB and hypoxia  - monitor HR    #Hx of cor pulmonale  - BNP 13K  - Bedside pocus  with findings of Multiple B-lines, good cardiac contractility, no obvious septal flattening.   - Ordered Lasix 60 mg IVP X1  - Given 1x dose of 40mg IV Lasix IVP   - TTE 4/12: . Left ventricular cavity is small. Left ventricular wall thickness is normal. LVSF normal with EF of 65 %. TAPSE 2.0 with normal RV function. Mild MR, PASP 28mmHg, normal PAP. Unable to assess for diastolic dysfunction.     #HLD  - resumed home dose of lipitor     ==============Pulm================  #AcuteHypoxic Respiratory Failure  #ILD  - ddx ILD flare vs Recurrent PE  - s/p solumedrol 125 mg IV in the ED  - Continue solumedrol 40 mg IV q 8 hrs  - started on FD lovenox  - Wean HFNC  - Continue ofev (family will bring from home)  - CTA once DONOVAN resolved   - LE Dopplers negative for DVT     #Pulmonary HTN  - based on prior TTE with severe pulmonary HTN, not seen on TTE completed during this admission    =============GI============  #GERD   - Regular Diet  - Protonic 40mg PO qd    =============Renal/===============  #DONOVAN likely Pre-Renal vs obstructive    - Baseline SCr 0.8-1.2  - Bladder scan showing urinary retention  - FeUrea 33.5% likely pre-renal   - Pike Cathter placed with 2L of urine drained  - Daily BMP   - avoid nephrotoxin    ============ID=====================  #CAP vs ILD exacerbation  - CT Chest 4/12/2024: Findings suggesting interstitial lung disease without significant interval progression. No evidence of pneumonia.  Trace pericardial effusion with mild cardiomegaly.  - no leukocytosis, afebrile  - Strep, mycoplasma, legionella negative   - RVP neg   - S/p 4 doses of Levaquin, now off abx    ==============Endo=============  #IDDM  - at home on Basaglar 25 U QHS + Admelog 15U TID  - started on  Lantus 18 + Admelog 11U TID + Mod SSI   - FS AC HS  - adjust regimen as needed  - f/u A1C    ==============Heme===============  #Normocytic Anemia  - likely anemia of chronic disease  - H/H stable  - monitor CBC daily     ==============ppx================  - PPI QD   - FD lovenox    ==============Dispo===============  ICU

## 2024-04-12 NOTE — ED ADULT NURSE NOTE - NSSEPSISSUSPECTED_ED_A_ED
pt seen  no complaints  ICU Vital Signs Last 24 Hrs  T(C): 37.4 (05 Dec 2018 12:08), Max: 37.4 (05 Dec 2018 12:08)  T(F): 99.3 (05 Dec 2018 12:08), Max: 99.3 (05 Dec 2018 12:08)  HR: 100 (05 Dec 2018 12:00) (93 - 104)  BP: 121/58 (05 Dec 2018 12:00) (102/51 - 138/63)  BP(mean): 84 (05 Dec 2018 12:00) (73 - 91)  ABP: --  ABP(mean): --  RR: 19 (05 Dec 2018 12:00) (12 - 31)  SpO2: 100% (05 Dec 2018 12:00) (92% - 100%)  gen-NAD  resp-clear  abd-soft NT/ND                          7.8    9.35  )-----------( 106      ( 05 Dec 2018 05:59 )             22.5 No

## 2024-04-12 NOTE — ED ADULT NURSE NOTE - OBJECTIVE STATEMENT
pt awake alert oriented x 3,with complaint of difficulty breathing today,pt on o2 cannula at 3lpm at home

## 2024-04-12 NOTE — CONSULT NOTE ADULT - ASSESSMENT
COMPLETE NOTE TO FOLLOW  63 year old F from home w/ pmhx chronic hypoxic respiratory failiure on 2-3L NC at home 2/2 pulmonary fibrosis, unprovoked? PE (dx  on 2022, currently on Eliquis 5 mg PO QD)  ,  severe pulmonary HTN (based on TTE from 2023), IDDM, who came in with c/o SOB. As per pt since last week she was experiencing intermittent SOB, however, since Tuesday of this week endorses becoming more dyspneic, associated with dry cough, not associated with wheezing, CP, LE edema. In the ED pt was initially saturating 95% on 5L NC, subsequently desaturating on 86% and placed on NRB. ICU was consulted for worsening hypoxia. At the time of assessment pf was on NRB 15L, she was transitioned to Venti mask 35% @ 12 L saturating 92-95%, with RR 20 and Hr 110's, afebrile. On assessment pt with moderate respiratory distress when taking, however, taking in full sentenses, negative findings on exam. In terms of her labs no leukocytosis, neg RVP. SCr 1.47 (baseline 0.8-1.2), EKG sinus tach with left axis deviation, TWI on septal leads (unchanged from prior EKG).  CXR neg for consolidations or fluid, unchanged from prior CXR obtain 1 yr ago.  Pt admitted to ICU for acute on chronic hypoxic respiratory failure requring HFNC      # Acute on chronic hypoxic respiratory failure  # ILD exacerbation   # Pulm HTN  # DONOVAN  # Uncontrolled DM       ============Neuro================  No active issues      =============Cards================  #Sinus tachycardia  likely precipitated by increased WOB and hypoxia  monitor HR    #Hx of cor pulmonare  pt w/o signs of right HF  obtain Trop and pro-BNP  f/u TTE    #HLD  resumed home dose of lipitor     ==============Pulm================  #Acute on Chronic respiratory failure  ddx ILD flare vs Recurrent PE? (in the s/o subtherapeutic DOAC dosing)  s/p solumedrol 125 mg IV in the ED  will continue solumedrol 40 mg IV q 8 hrs  started on empiric coverage with Levaquin (renally dosed)  given her DONOVAN will defer CTA at this time.   started on FD lovenox  ordered CTC   f/u D-Dimer and LE doppler studies       #Pulmonary HTN  based on prior TTE with severe pulmonary HTN  obtain repeat TTE     =============GI============      =============Renal/===============  #DONOVAN          ==============Endo=============  #IDDM  uncontrolled  at home on Basaglar 25 U QHS + Admelog 15U TID  started on                      + Admelog 11U TID + Mod SSI   FS AC HS      ==============Heme===============      ==============ppx================  PPI QD  FD lovenox    ==============Dispo===============  ICU                    63 year old F from home w/ pmhx chronic hypoxic respiratory failure on 2-3L NC at home 2/2 ILD, currently on ofev ,  unprovoked? PE (dx  on 2022, currently on Eliquis 5 mg PO QD)  ,  severe pulmonary HTN (based on TTE from 2023), IDDM, who came in with c/o SOB. As per pt since last week she was experiencing intermittent SOB, however, since Tuesday of this week endorses becoming more dyspneic on exertion and at rest, associated with dry cough, not associated with wheezing, CP, LE edema. In the ED pt was initially saturating 95% on 5L NC, subsequently desaturated to 86% and placed on NRB. ICU was consulted for worsening hypoxia. At the time of assessment pf was on NRB 15L, she was transitioned to Venti mask 35% @ 12 L saturating 92-95%, with RR 20 and Hr 110's, afebrile. On assessment pt with moderate respiratory distress while taking, however, speaking in full sentences, no significant findings on lung  exam. In terms of her labs no leukocytosis, RVP neg,. SCr 1.47 (baseline 0.8-1.2), EKG sinus tach with left axis deviation, TWI on septal leads (unchanged from prior EKG).  CXR neg for consolidations, pulm edema or effusions, unchanged from prior CXR obtain 1 yr ago.  Pt admitted to ICU for acute on chronic hypoxic respiratory failure requring HFNC      # Acute on chronic hypoxic respiratory failure  # ILD with likely progression to pulm fibrosis  # ILD exacerbation   # Pulm HTN  # DONOVAN  # Uncontrolled DM       ============Neuro================  No active issues      =============Cards================  #Sinus tachycardia  likely precipitated by increased WOB and hypoxia  monitor HR    #Hx of cor pulmonale  pt w/o signs of right HF  no findings of fluid overload on exam  BNP 13K  obtain Trop  f/u TTE    #HLD  resumed home dose of lipitor     ==============Pulm================  #Acute on Chronic respiratory failure  ddx ILD flare vs Recurrent PE? (in the s/o subtherapeutic DOAC dosing)  s/p solumedrol 125 mg IV in the ED  will continue solumedrol 40 mg IV q 8 hrs  started on empiric coverage with Levaquin (renally dosed)  started on FD lovenox  f/u Mycoplasma and legionella  given her DONOVAN will defer CTA at this time. ordered CTC non contrast  f/u D-Dimer and LE doppler studies       #Pulmonary HTN  based on prior TTE with severe pulmonary HTN  obtain repeat TTE     =============GI============  No active issues     =============Renal/===============  #DONOVAN  Pt w/ SCr  1.74 on admission  Baseline SCr 0.8-1.2  obtain bladder scan  F/U Urine Lytes, calculate FeNa  follow BMP daily   avoid nephrotoxin    ==============Endo=============  #IDDM  uncontrolled  at home on Basaglar 25 U QHS + Admelog 15U TID  started on  Lantus 18 + Admelog 11U TID + Mod SSI   FS AC HS      ==============Heme===============  No active issues     ==============ppx================  PPI QD  FD lovenox    ==============Dispo===============  ICU                    63 year old F from home w/ pmhx chronic hypoxic respiratory failure on 2-3L NC at home 2/2 ILD, currently on ofev ,  unprovoked? PE (dx  on 2022, currently on Eliquis 5 mg PO QD)  ,  severe pulmonary HTN (based on TTE from 2023), IDDM, who came in with c/o SOB. As per pt since last week she was experiencing intermittent SOB, however, since Tuesday of this week endorses becoming more dyspneic on exertion and at rest, associated with dry cough, not associated with wheezing, CP, LE edema. In the ED pt was initially saturating 95% on 5L NC, subsequently desaturated to 86% and placed on NRB. ICU was consulted for worsening hypoxia. At the time of assessment pf was on NRB 15L, she was transitioned to Venti mask 35% @ 12 L saturating 92-95%, with RR 20 and Hr 110's, afebrile. On assessment pt with moderate respiratory distress while taking, however, speaking in full sentences, no significant findings on lung  exam. In terms of her labs no leukocytosis, RVP neg,. SCr 1.47 (baseline 0.8-1.2), EKG sinus tach with left axis deviation, TWI on septal leads (unchanged from prior EKG).  CXR neg for consolidations, pulm edema or effusions, unchanged from prior CXR obtain 1 yr ago.  Pt admitted to ICU for acute on chronic hypoxic respiratory failure requiring HFNC      # Acute on chronic hypoxic respiratory failure  # ILD with likely progression to pulm fibrosis  # ILD exacerbation   # Cor pulmonale   # Severe Pulm HTN  # DONOVAN  # Uncontrolled DM       ============Neuro================  No active issues      =============Cards================  #Sinus tachycardia  likely precipitated by increased WOB and hypoxia  monitor HR    #Hx of cor pulmonale  BNP 13K  Bedside pocus  with findings of Multiple B-lines, good cardiac contractility, no obvious septal flattening.   Ordered lasix 60 mg IVP X1  continue lasix 40 mg IV BID  f/u TTE    #HLD  resumed home dose of lipitor     ==============Pulm================  #Acute on Chronic respiratory failure  #ILD  ddx ILD flare vs Recurrent PE? (in the s/o subtherapeutic DOAC dosing)  s/p solumedrol 125 mg IV in the ED  will continue solumedrol 40 mg IV q 8 hrs  started on empiric coverage with Levaquin (renally dosed)  started on FD lovenox  continue ofev (family will bring from home)  f/u Mycoplasma and legionella  given her DONOVAN will defer CTA at this time. ordered CTC non contrast  f/u D-Dimer and LE doppler studies       #Pulmonary HTN  based on prior TTE with severe pulmonary HTN  obtain repeat TTE     =============GI============  No active issues     =============Renal/===============  #DONOVAN  Pt w/ SCr  1.74 on admission  Baseline SCr 0.8-1.2  obtain bladder scan  F/U Urine Lytes, calculate FeNa  follow BMP daily   avoid nephrotoxin    ==============Endo=============  #IDDM  uncontrolled  at home on Basaglar 25 U QHS + Admelog 15U TID  started on  Lantus 18 + Admelog 11U TID + Mod SSI   FS AC HS      ==============Heme===============  No active issues     ==============ppx================  PPI QD   FD lovenox    ==============Dispo===============  ICU                    63 year old F from home w/ pmhx chronic hypoxic respiratory failure on 2-3L NC at home 2/2 ILD, currently on ofev ,  unprovoked? PE (dx  on 2022, currently on Eliquis 5 mg PO QD)  ,  severe pulmonary HTN (based on TTE from 2023), IDDM, who came in with c/o SOB. As per pt since last week she was experiencing intermittent SOB, however, since Tuesday of this week endorses becoming more dyspneic on exertion and at rest, associated with dry cough, not associated with wheezing, CP, LE edema. In the ED pt was initially saturating 95% on 5L NC, subsequently desaturated to 86% and placed on NRB. ICU was consulted for worsening hypoxia. At the time of assessment pf was on NRB 15L, she was transitioned to Venti mask 35% @ 12 L saturating 92-95%, with RR 20 and Hr 110's, afebrile. On assessment pt with moderate respiratory distress while taking, however, speaking in full sentences, no significant findings on lung  exam. In terms of her labs no leukocytosis, RVP neg,. SCr 1.47 (baseline 0.8-1.2), EKG sinus tach with left axis deviation, TWI on septal leads (unchanged from prior EKG).  CXR neg for consolidations, pulm edema or effusions, unchanged from prior CXR obtain 1 yr ago.  Pt admitted to ICU for acute on chronic hypoxic respiratory failure requiring HFNC      # Acute on chronic hypoxic respiratory failure  # ILD with likely progression to pulm fibrosis  # ILD exacerbation   # Cor pulmonale   # Severe Pulm HTN  # DONOVAN  # Uncontrolled DM       ============Neuro================  No active issues      =============Cards================  #Sinus tachycardia  likely precipitated by increased WOB and hypoxia  monitor HR    #Hx of cor pulmonale  BNP 13K  Bedside pocus  with findings of Multiple B-lines, good cardiac contractility, no obvious septal flattening.   Ordered lasix 60 mg IVP X1  continue lasix 40 mg IV BID  f/u TTE    #HLD  resumed home dose of lipitor     ==============Pulm================  #Acute on Chronic respiratory failure  #ILD  ddx ILD flare vs Recurrent PE? (in the s/o subtherapeutic DOAC dosing)  s/p solumedrol 125 mg IV in the ED  will continue solumedrol 40 mg IV q 8 hrs  started on empiric coverage with Levaquin (renally dosed)  started on FD lovenox  HF NC  continue ofev (family will bring from home)  f/u Mycoplasma and legionella  given her DONOVAN will defer CTA at this time. ordered CTC non contrast  f/u D-Dimer and LE doppler studies   titrate down supplemental O2 as tolerated       #Pulmonary HTN  based on prior TTE with severe pulmonary HTN  obtain repeat TTE     =============GI============  No active issues     =============Renal/===============  #DONOVAN  Pt w/ SCr  1.74 on admission  Baseline SCr 0.8-1.2  obtain bladder scan  F/U Urine Lytes, calculate FeNa  follow BMP daily   avoid nephrotoxin    ==============Endo=============  #IDDM  uncontrolled  at home on Basaglar 25 U QHS + Admelog 15U TID  started on  Lantus 18 + Admelog 11U TID + Mod SSI   FS AC HS      ==============Heme===============  No active issues     ==============ppx================  PPI QD   FD lovenox    ==============Dispo===============  ICU                    63 year old F from home ambulates with walker w/ pmhx chronic hypoxic respiratory failure on 2-3L NC at home 2/2 ILD, currently on ofev , unprovoked? PE (dx  on 2022, currently on Eliquis 5 mg PO QD), severe pulmonary HTN (based on TTE from 2023), IDDM, who came in with c/o SOB. As per pt since last week she was experiencing intermittent SOB, however, since Tuesday of this week endorses becoming more dyspneic on exertion and at rest, associated with dry cough, not associated with wheezing, CP, LE edema. In the ED pt was initially saturating 95% on 5L NC, subsequently desaturated to 86% and placed on NRB. ICU was consulted for worsening hypoxia. At the time of assessment pf was on NRB 15L, she was transitioned to Venti mask 35% @ 12 L saturating 92-95%, with RR 20 and Hr 110's, afebrile. On assessment pt with moderate respiratory distress while taking, however, speaking in full sentences, no significant findings on lung  exam. In terms of her labs no leukocytosis, RVP neg,. SCr 1.47 (baseline 0.8-1.2), EKG sinus tach with left axis deviation, TWI on septal leads (unchanged from prior EKG).  CXR neg for consolidations, pulm edema or effusions, unchanged from prior CXR obtain 1 yr ago.  Pt admitted to ICU for acute on chronic hypoxic respiratory failure requiring HFNC      # Acute on chronic hypoxic respiratory failure  # ILD with likely progression to pulm fibrosis  # ILD exacerbation   # Cor pulmonale   # Severe Pulm HTN  # DONOVAN  # Uncontrolled DM       ============Neuro================  No active issues  resumed home dose of trazodone    =============Cards================  #Sinus tachycardia  likely precipitated by increased WOB and hypoxia  monitor HR    #Hx of cor pulmonale  BNP 13K  Bedside pocus  with findings of Multiple B-lines, good cardiac contractility, no obvious septal flattening.   Ordered Lasix 60 mg IVP X1  continue lasix 40 mg IV BID  f/u TTE    #HLD  resumed home dose of lipitor     ==============Pulm================  #Acute on Chronic respiratory failure  #ILD  ddx ILD flare vs Recurrent PE? (in the s/o subtherapeutic DOAC dosing)  s/p solumedrol 125 mg IV in the ED  will continue solumedrol 40 mg IV q 8 hrs  started on empiric coverage with Levaquin (renally dosed)  started on FD lovenox  HF NC  continue ofev (family will bring from home)  f/u Mycoplasma and legionella  given her DONOVAN will defer CTA at this time. ordered CTC non contrast  f/u D-Dimer and LE doppler studies   titrate down supplemental O2 as tolerated       #Pulmonary HTN  based on prior TTE with severe pulmonary HTN  obtain repeat TTE     =============GI============  No active issues     =============Renal/===============  #DONOVAN  Pt w/ SCr  1.74 on admission  Baseline SCr 0.8-1.2  obtain bladder scan  F/U Urine Lytes, calculate FeNa  follow BMP daily   avoid nephrotoxin    ==============Endo=============  #IDDM  uncontrolled  at home on Basaglar 25 U QHS + Admelog 15U TID  started on  Lantus 18 + Admelog 11U TID + Mod SSI   FS AC HS  adjust regimen as needed  f/u A1C      ==============Heme===============  #Normocytic Anemia  likely anemia of chronic disease  H/H stable  monitor CBC daily     ==============ppx================  PPI QD   FD lovenox    ==============Dispo===============  ICU                    63 year old F from home ambulates with walker w/ pmhx chronic hypoxic respiratory failure on 2-3L NC at home 2/2 ILD, currently on ofev , unprovoked? PE (dx  on 2022, currently on Eliquis 5 mg PO QD), severe pulmonary HTN (based on TTE from 2023), IDDM, who came in with c/o SOB. As per pt since last week she was experiencing intermittent SOB, however, since Tuesday of this week endorses becoming more dyspneic on exertion and at rest, associated with dry cough, not associated with wheezing, CP, LE edema. In the ED pt was initially saturating 95% on 5L NC, subsequently desaturated to 86% and placed on NRB. ICU was consulted for worsening hypoxia. At the time of assessment pf was on NRB 15L, she was transitioned to Venti mask 35% @ 12 L saturating 92-95%, with RR 20 and Hr 110's, afebrile. On assessment pt with moderate respiratory distress while taking, however, speaking in full sentences, no significant findings on lung  exam. In terms of her labs no leukocytosis, RVP neg,. SCr 1.47 (baseline 0.8-1.2), EKG sinus tach with left axis deviation, TWI on septal leads (unchanged from prior EKG).  CXR neg for consolidations, no effusions, unchanged from prior CXR obtain 1 yr ago.  Pt admitted to ICU for acute on chronic hypoxic respiratory failure requiring HFNC      # Acute on chronic hypoxic respiratory failure  # ILD with likely progression to pulm fibrosis  # ILD exacerbation   # Cor pulmonale   # Severe Pulm HTN  # DONOVAN  # Uncontrolled DM       ============Neuro================  No active issues  resumed home dose of trazodone    =============Cards================  #Sinus tachycardia  likely precipitated by increased WOB and hypoxia  monitor HR    #Hx of cor pulmonale  BNP 13K  Bedside pocus  with findings of Multiple B-lines, good cardiac contractility, no obvious septal flattening.   Ordered Lasix 60 mg IVP X1  continue lasix 40 mg IV BID  f/u TTE    #HLD  resumed home dose of lipitor     ==============Pulm================  #Acute on Chronic respiratory failure  #ILD  ddx ILD flare vs Recurrent PE? (in the s/o subtherapeutic DOAC dosing)  s/p solumedrol 125 mg IV in the ED  will continue solumedrol 40 mg IV q 8 hrs  started on empiric coverage with Levaquin (renally dosed)  started on FD lovenox  HF NC  continue ofev (family will bring from home)  f/u Mycoplasma and legionella  given her DONOVAN will defer CTA at this time. ordered CTC non contrast  f/u D-Dimer and LE doppler studies   titrate down supplemental O2 as tolerated       #Pulmonary HTN  based on prior TTE with severe pulmonary HTN  obtain repeat TTE     =============GI============  No active issues     =============Renal/===============  #DONOVAN  Pt w/ SCr  1.74 on admission  Baseline SCr 0.8-1.2  obtain bladder scan  F/U Urine Lytes, calculate FeNa  follow BMP daily   avoid nephrotoxin    ==============Endo=============  #IDDM  uncontrolled  at home on Basaglar 25 U QHS + Admelog 15U TID  started on  Lantus 18 + Admelog 11U TID + Mod SSI   FS AC HS  adjust regimen as needed  f/u A1C      ==============Heme===============  #Normocytic Anemia  likely anemia of chronic disease  H/H stable  monitor CBC daily     ==============ppx================  PPI QD   FD lovenox    ==============Dispo===============  ICU

## 2024-04-12 NOTE — PROGRESS NOTE ADULT - CRITICAL CARE ATTENDING COMMENT
63F with h/o Pulmonary fibrosis/IPF (on home NC 3L), PE (on eliquis) presented to the ED with few days of SOB associated with dry cough, no fevers. In the ED she was noted to be more hypoxemic and initially required NC 6L and then NRM to venti mask. CXR unchanged. Chest CT with extensive reticular and groundglass opacities in both lungs associated with thickening of   interlobular septae fand traction bronchiectasis no clear pneumonia.    Plan:   - Titrate supplemental O2 with goal SpO2 90-95%; wean down HFNC as tolerated  - Monitor strict I/Os, daily weights; B profile on POCUS - escalate diuresis as hemodynamics/renal fn allow   - deescalate to methylprednisolone 40mg BID, then prednisone 50mg/d  - F/u bilateral LE dopplers  - C/w therapeutic lovenox for now  - Unclear role for abx given no radiographical/clinical e/o pna. F/u peripheral infectious w/u  - C/w OFev  - GI ppx 63F with h/o Pulmonary fibrosis/IPF (on home NC 3L), PE (on eliquis) presented to the ED with few days of SOB associated with dry cough, no fevers. In the ED she was noted to be more hypoxemic and initially required NC 6L and then NRM to venti mask. CXR unchanged. Chest CT with extensive reticular and groundglass opacities in both lungs associated with thickening of   interlobular septae fand traction bronchiectasis no clear pneumonia.    Plan:   - Titrate supplemental O2 with goal SpO2 90-95%; wean down HFNC as tolerated  - Monitor strict I/Os, daily weights; B profile on POCUS - escalate diuresis as hemodynamics/renal fn allow   - deescalate to methylprednisolone 40mg BID, then prednisone 50mg/d  - F/u bilateral LE dopplers  - C/w therapeutic lovenox for now  - Unclear role for abx given no radiographical/clinical e/o pna. F/u peripheral infectious w/u  - f/u TTE  - C/w OFev  - GI ppx

## 2024-04-12 NOTE — H&P ADULT - ATTENDING COMMENTS
Vital Signs Last 24 Hrs  T(C): 36.8 (12 Apr 2024 00:26), Max: 36.8 (12 Apr 2024 00:26)  T(F): 98.2 (12 Apr 2024 00:26), Max: 98.2 (12 Apr 2024 00:26)  HR: 122 (12 Apr 2024 01:30) (112 - 122)  BP: 111/69 (12 Apr 2024 00:26) (94/56 - 111/69)  RR: 25 (12 Apr 2024 01:30) (25 - 27)  SpO2: 95% (12 Apr 2024 01:30) (95% - 98%)  Parameters below as of 12 Apr 2024 01:30  Patient On (Oxygen Delivery Method): nasal cannula  O2 Flow (L/min): 5      Labs  BUN/Cr - 22/1.74  Gluc 467  pCO2 -44  RVP -ve    Impression   63 year old woman known to us from prior hospital admission with medical hx including pulmonary fibrosis, hx of PE on OAC,  cor pulmonale, DM with prior visits for flares of pulmonary fibrosis coming in today with SOB, hypoxia with no evidence of respiratory infection concerning for another flare of underlying ILD.  CXR appears unchanged showing diffuse widespread chronic interstitial changes c/w her ILD.     Problems  1) Acute on chronic hypoxic respiratory failure  2) ILD exacerbation  3) right hear failure-> cor pulmonale   4) DM - uncontrolled with hyperglycemia     Plan   Admit to medicine  Supplemental O2   Systemic steroids - continue  solumedrol   Bronchodilators via nebulizer during acute period  Insulin therapy; check A1c- last assay here was > 11.  No acute chf note   Sepsis work up but hold antibiotics

## 2024-04-12 NOTE — PATIENT PROFILE ADULT - FALL HARM RISK - HARM RISK INTERVENTIONS
Assistance with ambulation/Assistance OOB with selected safe patient handling equipment/Communicate Risk of Fall with Harm to all staff/Discuss with provider need for PT consult/Monitor gait and stability/Provide patient with walking aids - walker, cane, crutches/Reinforce activity limits and safety measures with patient and family/Review medications for side effects contributing to fall risk/Sit up slowly, dangle for a short time, stand at bedside before walking/Tailored Fall Risk Interventions/Toileting schedule using arm’s reach rule for commode and bathroom/Visual Cue: Yellow wristband and red socks/Bed in lowest position, wheels locked, appropriate side rails in place/Call bell, personal items and telephone in reach/Instruct patient to call for assistance before getting out of bed or chair/Non-slip footwear when patient is out of bed/[place] to call system/Physically safe environment - no spills, clutter or unnecessary equipment/Purposeful Proactive Rounding/Room/bathroom lighting operational, light cord in reach

## 2024-04-12 NOTE — H&P ADULT - PROBLEM SELECTOR PLAN 1
p/w dyspnea sudden onset w/o fever, chills or other acute symptoms   in ED: afebrile, , BP 94/65, Sat 98% on NRB -> weaned to NC sat 95%   pt sat deteriorated to 86 % requiring NRB  s/p IV solumedrol 125, Duoneb, Levaquin IV   CXR: ?ILD flare vs ?consolidation in the Rt middle and lower lobe - pending official read  unable to obtain CT chest with IV contrast due to DONOVAN   c/w Duoneb q6, chest PT  c/w Prednisone 40 mg daily for now  ICU consulted p/w dyspnea sudden onset w/o fever, chills or other acute symptoms   in ED: afebrile, , BP 94/65, Sat 98% on NRB -> weaned to NC sat 95%   pt sat deteriorated to 86 % requiring NRB  s/p IV solumedrol 125, Duoneb, Levaquin IV   CXR: ?ILD flare vs ?consolidation in the Rt middle and lower lobe - pending official read  unable to obtain CT chest with IV contrast due to DONOVAN   RVP negative   c/w levaquin   c/w Duoneb q6, chest PT  start solu-medrol 40 IV q 8   PNA work up: procal, sputum lee, legionella, mycoplasma, strep   ICU consulted pt will be placed on HFNC p/w dyspnea sudden onset w/o fever, chills or other acute symptoms   in ED: afebrile, , BP 94/65, Sat 98% on NRB -> weaned to NC sat 95%   pt sat deteriorated to 86 % requiring NRB  s/p IV solumedrol 125, Duoneb, Levaquin IV   CXR: ?ILD flare vs ?consolidation in the Rt middle and lower lobe - pending official read  unable to obtain CT chest with IV contrast due to DONOVAN   RVP negative   c/w levaquin and bactrim (ppx)   c/w Duoneb q6, chest PT  start solu-medrol 40 IV q 8   PNA work up: procal, sputum lee, legionella, mycoplasma, strep   ICU consulted pt will be placed on HFNC

## 2024-04-12 NOTE — H&P ADULT - PROBLEM SELECTOR PLAN 3
p/w BUN and SCr 22 and 1.74   Baseline 1.2-1.4  f/u bladder scan to rule out post-renal  s/p IV bolus in ED  avoid nephrotoxic agents such as ACE/ARBs, contrast agent   monitor CMP

## 2024-04-12 NOTE — PROGRESS NOTE ADULT - PROBLEM SELECTOR PLAN 4
hx of PE on Eliquis  only takes medication qd   start full dose lovenox as is tachycardic and hypoxic worse than baseline

## 2024-04-12 NOTE — PHYSICAL THERAPY INITIAL EVALUATION ADULT - PERTINENT HX OF CURRENT PROBLEM, REHAB EVAL
Pt is a 63F admitted for AHRF 2/2 ILD flare, PMH significant for pulmonary fibrosis, PE, DM. Endorses fall 2/2 to orthostatic hypotension on 4/2/24, no LOC, denies hitting head, fell backwards onto hip.

## 2024-04-12 NOTE — H&P ADULT - PROBLEM SELECTOR PLAN 5
hx of HLD on Atorvastatin   c/w home meds hx DVT in 10/ 2022   Left calf tenderness   f/u Doppler studies

## 2024-04-12 NOTE — PROGRESS NOTE ADULT - PROBLEM SELECTOR PLAN 1
p/w dyspnea sudden onset w/o fever, chills or other acute symptoms   in ED: afebrile, , BP 94/65, Sat 98% on NRB -> weaned to NC sat 95%   pt sat deteriorated to 86 % requiring NRB  s/p IV solumedrol 125, Duoneb, Levaquin IV   CXR: ?ILD flare vs ?consolidation in the Rt middle and lower lobe - pending official read  unable to obtain CT chest with IV contrast due to DONOVAN   RVP negative   c/w levaquin and bactrim (ppx)   c/w Duoneb q6, chest PT  start solu-medrol 40 IV q 8   PNA work up: procal, sputum lee, legionella, mycoplasma, strep   ICU consulted pt will be placed on HFNC

## 2024-04-12 NOTE — H&P ADULT - HISTORY OF PRESENT ILLNESS
63 yrs old F, from home, ambulating independently, pmhx of ILD on 3L NC at baseline , DM, HLD, presented with shortness of breath. Pt reported of shortness of breath since the evening, denied fever, chills, chest pain, palpitation, or other acute symptoms such as abdominal pain, N/V/D, urinary symptoms.  Patient is a 62 yo female with hx of Pulmonary fibrosis (diagnosed 2021), not on home oxygen, Pulmonary embolism on Eliquis, DM on Lantus and Metformin  presented with shortness of breath. Pt reported of shortness of breath since the evening, and was persistently hypoxic at home with home inhalers not improving breathing. She describes SOB most prominent at rest and with minimal exertion. Endorses palpitations and left calf tenderness. She takes Eliquis once a day. Denied any fever, chills, chest pain, palpitation, or other acute symptoms such as abdominal pain, N/V/D, urinary symptoms.   Patient was recently admitted to [facility] in March for ILD flair, dopplers and VQ scan done then showing low probability of PE.       In ED:   vitals: BP: 94/56 HR: 112, T: 36.6 NRB> Venti mask   s/p Levaquin 1L LR  Lantus 10 units   CXR increase intersitial infiltrates

## 2024-04-12 NOTE — H&P ADULT - NSHPPHYSICALEXAM_GEN_ALL_CORE
VITALS:   ICU Vital Signs Last 24 Hrs  T(C): 37.7 (12 Apr 2024 02:13), Max: 37.7 (12 Apr 2024 02:13)  T(F): 99.8 (12 Apr 2024 02:13), Max: 99.8 (12 Apr 2024 02:13)  HR: 123 (12 Apr 2024 02:13) (112 - 123)  BP: 115/60 (12 Apr 2024 02:13) (94/56 - 115/60)  BP(mean): --  ABP: --  ABP(mean): --  RR: 32 (12 Apr 2024 02:13) (25 - 32)  SpO2: 86% (12 Apr 2024 02:13) (86% - 98%)    O2 Parameters below as of 12 Apr 2024 02:13  Patient On (Oxygen Delivery Method): nasal cannula, dr. Cheung informed ,switched cannula to NRB mask       GENERAL: NAD, lying in bed comfortably  HEAD:  Atraumatic, Normocephalic  EYES: EOMI, PERRLA, conjunctiva and sclera clear  ENT: Moist mucous membranes  NECK: Supple, No JVD  CHEST/LUNG: Clear to auscultation bilaterally; No rales, rhonchi, wheezing, or rubs. Unlabored respirations  HEART: Regular rate and rhythm; No murmurs, rubs, or gallops  ABDOMEN: Bowel sounds present; Soft, Nontender, Nondistended.   EXTREMITIES:  2+ Peripheral Pulses, brisk capillary refill. No clubbing, cyanosis, or edema  NERVOUS SYSTEM:  Alert & Oriented X3, speech clear. No deficits   MSK: FROM all 4 extremities, full and equal strength  SKIN: No rashes or lesions VITALS:   ICU Vital Signs Last 24 Hrs  T(C): 37.7 (12 Apr 2024 02:13), Max: 37.7 (12 Apr 2024 02:13)  T(F): 99.8 (12 Apr 2024 02:13), Max: 99.8 (12 Apr 2024 02:13)  HR: 123 (12 Apr 2024 02:13) (112 - 123)  BP: 115/60 (12 Apr 2024 02:13) (94/56 - 115/60)  BP(mean): --  ABP: --  ABP(mean): --  RR: 32 (12 Apr 2024 02:13) (25 - 32)  SpO2: 86% (12 Apr 2024 02:13) (86% - 98%)    O2 Parameters below as of 12 Apr 2024 02:13  Patient On (Oxygen Delivery Method): nasal cannula, dr. Cheung informed ,switched cannula to NRB mask       GENERAL: in mild resp distress   HEAD:  Atraumatic, Normocephalic  EYES: EOMI, PERRLA, conjunctiva and sclera clear  ENT: Moist mucous membranes  NECK: Supple, No JVD  CHEST/LUNG: bilateral fine crackles in LL   HEART: Tachycardic (+), regular rhythm; No murmurs, rubs, or gallops  ABDOMEN: Bowel sounds present; Soft, Nontender, Nondistended.   EXTREMITIES: calf tenderness on left side (+).  2+ Peripheral Pulses, brisk capillary refill. No clubbing, cyanosis, or edema  NERVOUS SYSTEM:  Alert & Oriented X3, speech clear. No deficits   MSK: FROM all 4 extremities, full and equal strength  SKIN: No rashes or lesions

## 2024-04-12 NOTE — H&P ADULT - PROBLEM SELECTOR PLAN 4
hx of PE on Eliquis  c/w home meds hx of PE on Eliquis  only takes medication qd   start full dose lovenox as is tachycardic and hypoxic worse than baseline

## 2024-04-12 NOTE — ED ADULT NURSE NOTE - NSFALLRISKINTERV_ED_ALL_ED
Assistance OOB with selected safe patient handling equipment if applicable/Communicate fall risk and risk factors to all staff, patient, and family/Monitor gait and stability/Provide patient with walking aids/Provide visual cue: yellow wristband, yellow gown, etc/Reinforce activity limits and safety measures with patient and family/Call bell, personal items and telephone in reach/Instruct patient to call for assistance before getting out of bed/chair/stretcher/Non-slip footwear applied when patient is off stretcher/[place] to call system/Physically safe environment - no spills, clutter or unnecessary equipment/Purposeful Proactive Rounding/Room/bathroom lighting operational, light cord in reach Assistance OOB with selected safe patient handling equipment if applicable/Communicate fall risk and risk factors to all staff, patient, and family/Monitor gait and stability/Monitor for mental status changes and reorient to person, place, and time, as needed/Move patient closer to nursing station/within visual sight of ED staff/Provide patient with walking aids/Provide visual cue: yellow wristband, yellow gown, etc/Reinforce activity limits and safety measures with patient and family/Toileting schedule using arm’s reach rule for commode and bathroom/Use of alarms - bed, stretcher, chair and/or video monitoring/Call bell, personal items and telephone in reach/Instruct patient to call for assistance before getting out of bed/chair/stretcher/Non-slip footwear applied when patient is off stretcher/[place] to call system/Physically safe environment - no spills, clutter or unnecessary equipment/Purposeful Proactive Rounding/Room/bathroom lighting operational, light cord in reach

## 2024-04-12 NOTE — H&P ADULT - PROBLEM SELECTOR PLAN 2
p/w blood glucose of 467, neg anion gap   s/p  Lantus 10 and Lispro 5 stat for now will titrate up to home dose   likely worsening hyperglycemia in the setting of chronic prednisone use   will given NPH insulin   c/w DM/Dash diet   f/u HbA1c

## 2024-04-12 NOTE — CONSULT NOTE ADULT - SUBJECTIVE AND OBJECTIVE BOX
Patient is a 63y old  Female who presents with a chief complaint of     HPI:  63 yrs old F, from home, ambulating independently, pmhx of ILD on 3L NC at baseline , DM, HLD, presented with shortness of breath. Pt reported of shortness of breath since the evening, denied fever, chills, chest pain, palpitation, or other acute symptoms such as abdominal pain, N/V/D, urinary symptoms.  (12 Apr 2024 02:05)      PAST MEDICAL & SURGICAL HISTORY:  Diabetes mellitus      Hyperlipidemia      Pulmonary embolism      Pulmonary fibrosis      Cataract  right eye          SOCIAL HX:   Smoking                         ETOH                            Other    FAMILY HISTORY:  Family history of MI (myocardial infarction) (Father)    :  No known cardiovacular family hisotry     ROS:  See HPI     Allergies    No Known Allergies    Intolerances          PHYSICAL EXAM    ICU Vital Signs Last 24 Hrs  T(C): 36.8 (12 Apr 2024 04:13), Max: 37.7 (12 Apr 2024 02:13)  T(F): 98.3 (12 Apr 2024 04:13), Max: 99.8 (12 Apr 2024 02:13)  HR: 113 (12 Apr 2024 04:13) (112 - 123)  BP: 121/73 (12 Apr 2024 04:13) (94/56 - 121/73)  BP(mean): --  ABP: --  ABP(mean): --  RR: 22 (12 Apr 2024 04:13) (22 - 32)  SpO2: 96% (12 Apr 2024 04:13) (86% - 98%)    O2 Parameters below as of 12 Apr 2024 04:13  Patient On (Oxygen Delivery Method): mask, Venturi  O2 Flow (L/min): 10          General: Not in distress  HEENT:  RICHARD              Lymphatic system: No LN  Lungs: Bilateral BS  Cardiovascular: Regular  Gastrointestinal: Soft, Positive BS  Musculoskeletal: No clubbing.  Moves all extremities.    Skin: Warm.  Intact  Neurological: No motor or sensory deficit         LABS:                          10.6   9.06  )-----------( 339      ( 11 Apr 2024 22:46 )             33.6                                               04-11    135  |  102  |  22<H>  ----------------------------<  467<HH>  4.5   |  27  |  1.74<H>    Ca    9.3      11 Apr 2024 22:46                                               Urinalysis Basic - ( 11 Apr 2024 22:46 )    Color: x / Appearance: x / SG: x / pH: x  Gluc: 467 mg/dL / Ketone: x  / Bili: x / Urobili: x   Blood: x / Protein: x / Nitrite: x   Leuk Esterase: x / RBC: x / WBC x   Sq Epi: x / Non Sq Epi: x / Bacteria: x                                                                                                                                                                            ABG - ( 11 Apr 2024 22:36 )  pH, Arterial: 7.45  pH, Blood: x     /  pCO2: 44    /  pO2: 141   / HCO3: 31    / Base Excess: 5.8   /  SaO2: 98                  CXR:    ECHO:    MEDICATIONS  (STANDING):  enoxaparin Injectable 60 milliGRAM(s) SubCutaneous every 12 hours  insulin glargine Injectable (LANTUS) 10 Unit(s) SubCutaneous at bedtime  insulin lispro (ADMELOG) corrective regimen sliding scale   SubCutaneous three times a day before meals  insulin lispro (ADMELOG) corrective regimen sliding scale   SubCutaneous at bedtime  insulin lispro Injectable (ADMELOG) 5 Unit(s) SubCutaneous three times a day before meals  methylPREDNISolone sodium succinate Injectable 40 milliGRAM(s) IV Push every 8 hours    MEDICATIONS  (PRN):         Patient is a 63y old  Female who presents with a chief complaint of     HPI:  63 yrs old F, from home, ambulating independently, pmhx of ILD on 3L NC at baseline , DM, HLD, presented with shortness of breath. Pt reported of shortness of breath since the evening, denied fever, chills, chest pain, palpitation, or other acute symptoms such as abdominal pain, N/V/D, urinary symptoms.  (12 Apr 2024 02:05)      PAST MEDICAL & SURGICAL HISTORY:  Diabetes mellitus      Hyperlipidemia      Pulmonary embolism      Pulmonary fibrosis      Cataract  right eye          SOCIAL HX:   Smoking                         ETOH                            Other    FAMILY HISTORY:  Family history of MI (myocardial infarction) (Father)    :  No known cardiovacular family hisotry     ROS:  See HPI     Allergies    No Known Allergies    Intolerances          PHYSICAL EXAM    ICU Vital Signs Last 24 Hrs  T(C): 36.8 (12 Apr 2024 04:13), Max: 37.7 (12 Apr 2024 02:13)  T(F): 98.3 (12 Apr 2024 04:13), Max: 99.8 (12 Apr 2024 02:13)  HR: 113 (12 Apr 2024 04:13) (112 - 123)  BP: 121/73 (12 Apr 2024 04:13) (94/56 - 121/73)  BP(mean): --  ABP: --  ABP(mean): --  RR: 22 (12 Apr 2024 04:13) (22 - 32)  SpO2: 96% (12 Apr 2024 04:13) (86% - 98%)    O2 Parameters below as of 12 Apr 2024 04:13  Patient On (Oxygen Delivery Method): mask, Venturi  O2 Flow (L/min): 10          General NAD  HEENT:  RICHARD, moist mucus membrane              Lungs: speaking in full sentences, tachypneic while talking,  Bilateral BS present, occasional rales at the bases, no wheezing or rhoncus, No accessory muscle use   Cardiovascular: tachycardic, regular rate and rhythm. No murmurs, gallops  Gastrointestinal: Soft, Positive BS, no tenderness to palpation  Musculoskeletal: No clubbing or peripheral cyanosis.  Moves all extremities. No LE edema; Homans neg  Skin: Warm and dry  Neurological: AAOX3, awake, alert and following commands. No motor or sensory deficit         LABS:                          10.6   9.06  )-----------( 339      ( 11 Apr 2024 22:46 )             33.6                                               04-11    135  |  102  |  22<H>  ----------------------------<  467<HH>  4.5   |  27  |  1.74<H>    Ca    9.3      11 Apr 2024 22:46                                               Urinalysis Basic - ( 11 Apr 2024 22:46 )    Color: x / Appearance: x / SG: x / pH: x  Gluc: 467 mg/dL / Ketone: x  / Bili: x / Urobili: x   Blood: x / Protein: x / Nitrite: x   Leuk Esterase: x / RBC: x / WBC x   Sq Epi: x / Non Sq Epi: x / Bacteria: x                                                                                                                                                                            ABG - ( 11 Apr 2024 22:36 )  pH, Arterial: 7.45  pH, Blood: x     /  pCO2: 44    /  pO2: 141   / HCO3: 31    / Base Excess: 5.8   /  SaO2: 98                  CXR:    ECHO:    MEDICATIONS  (STANDING):  enoxaparin Injectable 60 milliGRAM(s) SubCutaneous every 12 hours  insulin glargine Injectable (LANTUS) 10 Unit(s) SubCutaneous at bedtime  insulin lispro (ADMELOG) corrective regimen sliding scale   SubCutaneous three times a day before meals  insulin lispro (ADMELOG) corrective regimen sliding scale   SubCutaneous at bedtime  insulin lispro Injectable (ADMELOG) 5 Unit(s) SubCutaneous three times a day before meals  methylPREDNISolone sodium succinate Injectable 40 milliGRAM(s) IV Push every 8 hours    MEDICATIONS  (PRN):         Patient is a 63y old  Female who presents with a chief complaint of     HPI:  63 yrs old F, from home, ambulating independently, pmhx of ILD on 3L NC at baseline , DM, HLD, presented with shortness of breath. Pt reported of shortness of breath since the evening, denied fever, chills, chest pain, palpitation, or other acute symptoms such as abdominal pain, N/V/D, urinary symptoms.  (12 Apr 2024 02:05)      PAST MEDICAL & SURGICAL HISTORY:  Diabetes mellitus      Hyperlipidemia      Pulmonary embolism      Pulmonary fibrosis      Cataract  right eye          SOCIAL HX:   Smoking                         ETOH                            Other    FAMILY HISTORY:  Family history of MI (myocardial infarction) (Father)    :  No known cardiovascular family history     ROS:  See HPI     Allergies    No Known Allergies    Intolerances          PHYSICAL EXAM    ICU Vital Signs Last 24 Hrs  T(C): 36.8 (12 Apr 2024 04:13), Max: 37.7 (12 Apr 2024 02:13)  T(F): 98.3 (12 Apr 2024 04:13), Max: 99.8 (12 Apr 2024 02:13)  HR: 113 (12 Apr 2024 04:13) (112 - 123)  BP: 121/73 (12 Apr 2024 04:13) (94/56 - 121/73)  BP(mean): --  ABP: --  ABP(mean): --  RR: 22 (12 Apr 2024 04:13) (22 - 32)  SpO2: 96% (12 Apr 2024 04:13) (86% - 98%)    O2 Parameters below as of 12 Apr 2024 04:13  Patient On (Oxygen Delivery Method): mask, Venturi  O2 Flow (L/min): 10          General NAD  HEENT:  RICHARD, moist mucus membrane              Lungs: speaking in full sentences, tachypneic while talking,  Bilateral BS present, occasional rales at the bases, no wheezing or rhoncus, No accessory muscle use   Cardiovascular: tachycardic, regular rate and rhythm. No murmurs, gallops  Gastrointestinal: Soft, Positive BS, no tenderness to palpation  Musculoskeletal: No clubbing or peripheral cyanosis.  Moves all extremities. No LE edema; Homans neg  Skin: Warm and dry  Neurological: AAOX3, awake, alert and following commands. No motor or sensory deficit         LABS:                          10.6   9.06  )-----------( 339      ( 11 Apr 2024 22:46 )             33.6                                               04-11    135  |  102  |  22<H>  ----------------------------<  467<HH>  4.5   |  27  |  1.74<H>    Ca    9.3      11 Apr 2024 22:46                                               Urinalysis Basic - ( 11 Apr 2024 22:46 )    Color: x / Appearance: x / SG: x / pH: x  Gluc: 467 mg/dL / Ketone: x  / Bili: x / Urobili: x   Blood: x / Protein: x / Nitrite: x   Leuk Esterase: x / RBC: x / WBC x   Sq Epi: x / Non Sq Epi: x / Bacteria: x                                                                                                                                                                            ABG - ( 11 Apr 2024 22:36 )  pH, Arterial: 7.45  pH, Blood: x     /  pCO2: 44    /  pO2: 141   / HCO3: 31    / Base Excess: 5.8   /  SaO2: 98                  CXR:    ECHO:    MEDICATIONS  (STANDING):  enoxaparin Injectable 60 milliGRAM(s) SubCutaneous every 12 hours  insulin glargine Injectable (LANTUS) 10 Unit(s) SubCutaneous at bedtime  insulin lispro (ADMELOG) corrective regimen sliding scale   SubCutaneous three times a day before meals  insulin lispro (ADMELOG) corrective regimen sliding scale   SubCutaneous at bedtime  insulin lispro Injectable (ADMELOG) 5 Unit(s) SubCutaneous three times a day before meals  methylPREDNISolone sodium succinate Injectable 40 milliGRAM(s) IV Push every 8 hours    MEDICATIONS  (PRN):

## 2024-04-12 NOTE — H&P ADULT - ASSESSMENT
63 yrs old F, from home, ambulating independently, pmhx of ILD on 3L NC at baseline , DM, HLD, presented with shortness of breath. Pt is admitted for dyspnea likely 2/2 ILD flare vs ?underlying pneumonia.  63 yrs old F, from home, ambulating independently, pmhx of ILD on 3L NC at baseline , DM, HLD, presented with shortness of breath. Pt is admitted for dyspnea likely 2/2 ILD flare vs ?underlying pneumonia.       In ED:   vitals: BP: 94/56 HR: 112, T: 36.6 NRB> Venti mask  s/p Levaquin 1L LR  Lantus 10 units   CXR increase intersitial infiltrates

## 2024-04-12 NOTE — H&P ADULT - PROBLEM/PLAN-5
[facility] at [facility]  Office Visit Note    DATE: 10/14/2020    IDENTIFICATION: Jania Oconnor is a  51 y.o. female who resides in [place], [place].    REASON FOR VISIT:  • Coronary artery disease  • Palpitations  • Hyperlipidemia            Jania Oconnor returns to the office today in follow-up of her coronary disease and risk factors.  The patient states that she is having worsening exercise intolerance and shortness of breath with activities.  She denies chest discomfort associated with the symptoms.  No orthopnea or PND.  She also denies any palpitations related to these symptoms.  She has been tolerating her medications without difficulty.    Review of Systems   Constitution: Negative for malaise/fatigue.   Eyes: Negative for vision loss in left eye and vision loss in right eye.   Cardiovascular: Negative for chest pain, dyspnea on exertion, near-syncope, orthopnea, palpitations, paroxysmal nocturnal dyspnea and syncope.   Respiratory: Positive for shortness of breath.    Musculoskeletal: Negative for myalgias.   Neurological: Negative for brief paralysis, excessive daytime sleepiness, focal weakness, numbness, paresthesias and weakness.   All other systems reviewed and are negative.      The patient's past medical, social, family history and ROS reviewed in the patient's electronic medical record.    Allergies   Allergen Reactions   • Atorvastatin GI Intolerance   • Erythromycin GI Intolerance         Current Outpatient Medications:   •  ALPRAZolam (XANAX) 1 MG tablet, Take 1 mg by mouth 3 (Three) Times a Day As Needed for Anxiety., Disp: , Rfl:   •  aspirin 81 MG EC tablet, Take 1 tablet by mouth Daily., Disp: , Rfl:   •  clopidogrel (PLAVIX) 75 MG tablet, Take 1 tablet by mouth Daily., Disp: 30 tablet, Rfl:   •  Cyanocobalamin ([employer] B-12) 1000 MCG/15ML liquid, Take  by mouth Daily., Disp: , Rfl:   •  metoprolol succinate XL (TOPROL-XL) 25 MG 24 hr tablet, Take 1 tablet by  "mouth Daily., Disp: 90 tablet, Rfl: 3  •  nitroglycerin (NITROSTAT) 0.4 MG SL tablet, Place 1 tablet under the tongue Every 5 (Five) Minutes As Needed for Chest Pain. Take no more than 3 doses in 15 minutes., Disp: 25 tablet, Rfl: 5  •  rosuvastatin (CRESTOR) 40 MG tablet, Take 1 tablet by mouth Daily., Disp: 90 tablet, Rfl: 3  •  warfarin (COUMADIN) 10 MG tablet, Alternating between 8-10mg, Disp: , Rfl:     Past Medical History:   Diagnosis Date   • Anemia    • Anxiety    • Arthritis    • CHF (congestive heart failure) (CMS/HCC)    • Hyperlipidemia    • Metabolic disorder    • Myocardial infarction (CMS/HCC)    • Panic disorder        Past Surgical History:   Procedure Laterality Date   • CARDIAC CATHETERIZATION  2012   • DILATATION AND CURETTAGE     • HYSTERECTOMY     • SINUS SURGERY     • VASCULAR SURGERY         Family History   Problem Relation Age of Onset   • Lung cancer Mother    • Breast cancer Mother 47   • Heart attack Father 45        several MI's   • Heart failure Father    • Heart attack Sister 42   • Heart attack Brother 36   • Heart attack Paternal Uncle 40   • Heart attack Paternal Uncle 45   • Heart attack Paternal Uncle 45   • Heart attack Paternal Uncle 40   • Heart attack Paternal Uncle 45   • Heart attack Paternal Uncle 45       Social History     Tobacco Use   • Smoking status: Former Smoker     Packs/day: 0.50     Years: 15.00     Pack years: 7.50     Types: Cigarettes     Quit date: 4/3/2012     Years since quittin.5   • Smokeless tobacco: Never Used   Substance Use Topics   • Alcohol use: No           Blood pressure 118/78, pulse 86, temperature 97.5 °F (36.4 °C), height 157.5 cm (62\"), weight 51.7 kg (114 lb), SpO2 97 %.  Body mass index is 20.85 kg/m².  Vitals:    10/14/20 1339   Patient Position: Sitting       Constitutional:       Appearance: Well-developed.   Eyes:      General: No scleral icterus.     Pupils: Pupils are equal, round, and reactive to light.   HENT:    "   Head: Normocephalic and atraumatic.   Neck:      Thyroid: No thyromegaly.      Vascular: No carotid bruit or JVD.   Pulmonary:      Effort: Pulmonary effort is normal.      Breath sounds: Normal breath sounds.   Cardiovascular:      Normal rate. Regular rhythm.      Murmurs: There is no murmur.      No gallop.   Abdominal:      Palpations: Abdomen is soft. There is no abdominal mass.      Tenderness: There is no abdominal tenderness.   Musculoskeletal:      Extremities: No clubbing present.  Skin:     General: Skin is warm and dry. There is no cyanosis.   Neurological:      Mental Status: Alert and oriented to person, place, and time.   Psychiatric:         Behavior: Behavior normal.         Data Review (reviewed with patient):     Procedures    Lab Results   Component Value Date    GLUCOSE 99 2019    BUN 7 2019    CREATININE 0.72 2019    EGFRIFNONA 86 2019    BCR 9.7 2019    K 3.9 2019    CO2 25.3 2019    CALCIUM 9.3 2019    ALBUMIN 4.52 2019    ALKPHOS 90 2019    AST 13 2019    ALT 12 2019      Last Lipid panel, 3/6/2019:  TC: 207  Tri  HDL: 37  LDL: 142    Lab Results   Component Value Date    WBC 6.83 2019    HGB 14.0 2019    HCT 41.7 2019    MCV 92.7 2019     2019            Problem List Items Addressed This Visit        Cardiology Problems    Coronary artery disease involving native coronary artery of native heart with angina pectoris (CMS/HCC) - Primary    Overview     · Cardiac catheterization for anterior MI/out-of-hospital cardiac arrest (2012): Bare metal stent to LAD.  LVEF 50-55%.  · Pharmacologic nuclear stress test (2014): Negative for ischemia or scar.  LVEF 66%  · Echo (2017): Normal LVEF.  Significant valvular abnormality  · Nuclear stress test (2019): No evidence of ischemia. LVEF 69%.          Current Assessment & Plan     · No classic anginal chest pain  symptoms, but patient reports worsening exertional shortness of breath  · Continue low-dose antiplatelet, beta-blocker, and statin therapy         Relevant Medications    clopidogrel (PLAVIX) 75 MG tablet    metoprolol succinate XL (TOPROL-XL) 25 MG 24 hr tablet    Other Relevant Orders    CBC (No Diff)    Hyperlipidemia LDL goal <70    Overview     · High intensity statin therapy indicated given presence coronary artery disease         Current Assessment & Plan     · Uncontrolled based on lab work over 1 year ago  · Obtain CMP and lipids         Relevant Medications    rosuvastatin (CRESTOR) 40 MG tablet    Other Relevant Orders    Comprehensive Metabolic Panel    Lipid Panel    Palpitations    Overview     · Zio patch (3/15/2017): Sinus rhythm.  Short episodes of atrial tachycardia.  No significant arrhythmia.  · Echo (02/22/2020): LVEF 60%. No valvular abnormalities.         Relevant Medications    metoprolol succinate XL (TOPROL-XL) 25 MG 24 hr tablet       Other    Dyspnea on exertion    Current Assessment & Plan     · Worsening NYHA class III symptoms  · Obtain echo  · Obtain proBNP         Relevant Orders    proBNP    CBC (No Diff)    Adult Transthoracic Echo Complete W/ Cont if Necessary Per Protocol    Factor 5 Leiden mutation, heterozygous (CMS/HCC)    Overview     · Chronic Coumadin therapy  · Elevated homocystine level.  · Abnormal genetic testing (data deficit).  · Right thigh DVT (data deficit).         Relevant Medications    clopidogrel (PLAVIX) 75 MG tablet    warfarin (COUMADIN) 10 MG tablet               · Obtain echocardiogram  · Obtain proBNP, CBC, CMP, lipids today  Return in about 9 months (around 7/14/2021).      Reinier Mccauley IV MD, FAC, [facility]  10/14/2020     DISPLAY PLAN FREE TEXT

## 2024-04-12 NOTE — PHYSICAL THERAPY INITIAL EVALUATION ADULT - DIAGNOSIS, PT EVAL
Pt w/decreased capacity for physical activity impacting ambulatory function and ability to perform basic ADLs d/t AHRF 2/2 ILD

## 2024-04-12 NOTE — ED ADULT NURSE NOTE - ED STAT RN HANDOFF DETAILS
pt awake ,hooked to cardiac monitor on sinus tachy,with o2 NRB mask ,waiting for icu consult ,pt endorsed to nurse sandor del toro.

## 2024-04-13 NOTE — PROGRESS NOTE ADULT - SUBJECTIVE AND OBJECTIVE BOX
INTERVAL HPI/OVERNIGHT EVENTS: ***    PRESSORS: [ ] YES [ ] NO  WHICH:    ANTIBIOTICS:                      Antimicrobial:  levoFLOXacin IVPB 750 milliGRAM(s) IV Intermittent every 48 hours    Cardiovascular:    Pulmonary:    Hematalogic:  enoxaparin Injectable 60 milliGRAM(s) SubCutaneous every 12 hours    Other:  atorvastatin 40 milliGRAM(s) Oral at bedtime  benzocaine/menthol Lozenge 1 Lozenge Oral every 4 hours  insulin glargine Injectable (LANTUS) 18 Unit(s) SubCutaneous at bedtime  insulin lispro (ADMELOG) corrective regimen sliding scale   SubCutaneous three times a day before meals  insulin lispro (ADMELOG) corrective regimen sliding scale   SubCutaneous at bedtime  insulin lispro Injectable (ADMELOG) 11 Unit(s) SubCutaneous three times a day before meals  methylPREDNISolone sodium succinate Injectable 40 milliGRAM(s) IV Push every 12 hours  pantoprazole    Tablet 40 milliGRAM(s) Oral before breakfast  traZODone 100 milliGRAM(s) Oral at bedtime    atorvastatin 40 milliGRAM(s) Oral at bedtime  benzocaine/menthol Lozenge 1 Lozenge Oral every 4 hours  enoxaparin Injectable 60 milliGRAM(s) SubCutaneous every 12 hours  insulin glargine Injectable (LANTUS) 18 Unit(s) SubCutaneous at bedtime  insulin lispro (ADMELOG) corrective regimen sliding scale   SubCutaneous three times a day before meals  insulin lispro (ADMELOG) corrective regimen sliding scale   SubCutaneous at bedtime  insulin lispro Injectable (ADMELOG) 11 Unit(s) SubCutaneous three times a day before meals  levoFLOXacin IVPB 750 milliGRAM(s) IV Intermittent every 48 hours  methylPREDNISolone sodium succinate Injectable 40 milliGRAM(s) IV Push every 12 hours  pantoprazole    Tablet 40 milliGRAM(s) Oral before breakfast  traZODone 100 milliGRAM(s) Oral at bedtime    Drug Dosing Weight  Height (cm): 167.6 (11 Apr 2024 21:44)  Weight (kg): 54.7 (12 Apr 2024 04:13)  BMI (kg/m2): 19.5 (12 Apr 2024 04:13)  BSA (m2): 1.61 (12 Apr 2024 04:13)    CENTRAL LINE: [ ] YES [ ] NO  LOCATION:   DATE INSERTED:  REMOVE: [ ] YES [ ] NO  EXPLAIN:    BAKER: [ ] YES [ ] NO    DATE INSERTED:  REMOVE:  [ ] YES [ ] NO  EXPLAIN:    A-LINE:  [ ] YES [ ] NO  LOCATION:   DATE INSERTED:  REMOVE:  [ ] YES [ ] NO  EXPLAIN:    PMH -reviewed admission note, no change since admission    ICU Vital Signs Last 24 Hrs  T(C): 36.9 (13 Apr 2024 00:00), Max: 37.7 (12 Apr 2024 02:13)  T(F): 98.4 (13 Apr 2024 00:00), Max: 99.8 (12 Apr 2024 02:13)  HR: 82 (13 Apr 2024 01:00) (82 - 123)  BP: 144/89 (13 Apr 2024 01:00) (109/68 - 163/97)  BP(mean): 107 (13 Apr 2024 01:00) (81 - 117)  ABP: --  ABP(mean): --  RR: 16 (13 Apr 2024 01:00) (16 - 50)  SpO2: 100% (13 Apr 2024 01:00) (86% - 100%)    O2 Parameters below as of 13 Apr 2024 01:00  Patient On (Oxygen Delivery Method): nasal cannula, high flow  O2 Flow (L/min): 40  O2 Concentration (%): 40        ABG - ( 11 Apr 2024 22:36 )  pH, Arterial: 7.45  pH, Blood: x     /  pCO2: 44    /  pO2: 141   / HCO3: 31    / Base Excess: 5.8   /  SaO2: 98                    04-11 @ 07:01  -  04-12 @ 07:00  --------------------------------------------------------  IN: 50 mL / OUT: 200 mL / NET: -150 mL            PHYSICAL EXAM:    GENERAL: NAD, well-groomed, well-developed  HEAD:  Atraumatic, Normocephalic  EYES: EOMI, PERRLA, conjunctiva and sclera clear  ENMT: No tonsillar erythema, exudates, or enlargement; Moist mucous membranes, Good dentition, No lesions  NECK: Supple, normal appearance, No JVD; Normal thyroid; Trachea midline  NERVOUS SYSTEM:  Alert & Oriented X3, Good concentration; Motor Strength 5/5 B/L upper and lower extremities; DTRs 2+ intact and symmetric  CHEST/LUNG: No chest deformity; Normal percussion bilaterally; No rales, rhonchi, wheezing   HEART: Regular rate and rhythm; No murmurs, rubs, or gallops  ABDOMEN: Soft, Nontender, Nondistended; Bowel sounds present  EXTREMITIES:  2+ Peripheral Pulses, No clubbing, cyanosis, or edema  LYMPH: No lymphadenopathy noted  SKIN: No rashes or lesions; Good capillary refill      LABS:  CBC Full  -  ( 12 Apr 2024 06:18 )  WBC Count : 7.71 K/uL  RBC Count : 3.88 M/uL  Hemoglobin : 10.3 g/dL  Hematocrit : 33.1 %  Platelet Count - Automated : 320 K/uL  Mean Cell Volume : 85.3 fl  Mean Cell Hemoglobin : 26.5 pg  Mean Cell Hemoglobin Concentration : 31.1 gm/dL  Auto Neutrophil # : 7.40 K/uL  Auto Lymphocyte # : 0.23 K/uL  Auto Monocyte # : 0.05 K/uL  Auto Eosinophil # : 0.00 K/uL  Auto Basophil # : 0.00 K/uL  Auto Neutrophil % : 96.0 %  Auto Lymphocyte % : 3.0 %  Auto Monocyte % : 0.6 %  Auto Eosinophil % : 0.0 %  Auto Basophil % : 0.0 %    04-12    137  |  104  |  21<H>  ----------------------------<  467<HH>  4.1   |  25  |  1.36<H>    Ca    9.2      12 Apr 2024 06:18  Phos  2.9     04-12  Mg     2.0     04-12    TPro  7.1  /  Alb  2.5<L>  /  TBili  0.3  /  DBili  x   /  AST  14  /  ALT  15  /  AlkPhos  98  04-12      Urinalysis Basic - ( 12 Apr 2024 06:18 )    Color: x / Appearance: x / SG: x / pH: x  Gluc: 467 mg/dL / Ketone: x  / Bili: x / Urobili: x   Blood: x / Protein: x / Nitrite: x   Leuk Esterase: x / RBC: x / WBC x   Sq Epi: x / Non Sq Epi: x / Bacteria: x          RADIOLOGY & ADDITIONAL STUDIES REVIEWED:  ***    GOALS OF CARE DISCUSSION WITH PATIENT/FAMILY/PROXY:    CRITICAL CARE TIME SPENT: 35 minutes INTERVAL HPI/OVERNIGHT EVENTS: ***  No acute events overnight, patient remains on HFNC           Antimicrobial:  levoFLOXacin IVPB 750 milliGRAM(s) IV Intermittent every 48 hours    Cardiovascular:    Pulmonary:    Hematalogic:  enoxaparin Injectable 60 milliGRAM(s) SubCutaneous every 12 hours    Other:  atorvastatin 40 milliGRAM(s) Oral at bedtime  benzocaine/menthol Lozenge 1 Lozenge Oral every 4 hours  insulin glargine Injectable (LANTUS) 18 Unit(s) SubCutaneous at bedtime  insulin lispro (ADMELOG) corrective regimen sliding scale   SubCutaneous three times a day before meals  insulin lispro (ADMELOG) corrective regimen sliding scale   SubCutaneous at bedtime  insulin lispro Injectable (ADMELOG) 11 Unit(s) SubCutaneous three times a day before meals  methylPREDNISolone sodium succinate Injectable 40 milliGRAM(s) IV Push every 12 hours  pantoprazole    Tablet 40 milliGRAM(s) Oral before breakfast  traZODone 100 milliGRAM(s) Oral at bedtime    atorvastatin 40 milliGRAM(s) Oral at bedtime  benzocaine/menthol Lozenge 1 Lozenge Oral every 4 hours  enoxaparin Injectable 60 milliGRAM(s) SubCutaneous every 12 hours  insulin glargine Injectable (LANTUS) 18 Unit(s) SubCutaneous at bedtime  insulin lispro (ADMELOG) corrective regimen sliding scale   SubCutaneous three times a day before meals  insulin lispro (ADMELOG) corrective regimen sliding scale   SubCutaneous at bedtime  insulin lispro Injectable (ADMELOG) 11 Unit(s) SubCutaneous three times a day before meals  levoFLOXacin IVPB 750 milliGRAM(s) IV Intermittent every 48 hours  methylPREDNISolone sodium succinate Injectable 40 milliGRAM(s) IV Push every 12 hours  pantoprazole    Tablet 40 milliGRAM(s) Oral before breakfast  traZODone 100 milliGRAM(s) Oral at bedtime    Drug Dosing Weight  Height (cm): 167.6 (11 Apr 2024 21:44)  Weight (kg): 54.7 (12 Apr 2024 04:13)  BMI (kg/m2): 19.5 (12 Apr 2024 04:13)  BSA (m2): 1.61 (12 Apr 2024 04:13)    CENTRAL LINE: [ ] YES [ ] NO  LOCATION:   DATE INSERTED:  REMOVE: [ ] YES [ ] NO  EXPLAIN:    BAKER: [ ] YES [ ] NO    DATE INSERTED:  REMOVE:  [ ] YES [ ] NO  EXPLAIN:    A-LINE:  [ ] YES [ ] NO  LOCATION:   DATE INSERTED:  REMOVE:  [ ] YES [ ] NO  EXPLAIN:    PMH -reviewed admission note, no change since admission    ICU Vital Signs Last 24 Hrs  T(C): 36.9 (13 Apr 2024 00:00), Max: 37.7 (12 Apr 2024 02:13)  T(F): 98.4 (13 Apr 2024 00:00), Max: 99.8 (12 Apr 2024 02:13)  HR: 82 (13 Apr 2024 01:00) (82 - 123)  BP: 144/89 (13 Apr 2024 01:00) (109/68 - 163/97)  BP(mean): 107 (13 Apr 2024 01:00) (81 - 117)  ABP: --  ABP(mean): --  RR: 16 (13 Apr 2024 01:00) (16 - 50)  SpO2: 100% (13 Apr 2024 01:00) (86% - 100%)    O2 Parameters below as of 13 Apr 2024 01:00  Patient On (Oxygen Delivery Method): nasal cannula, high flow  O2 Flow (L/min): 40  O2 Concentration (%): 40        ABG - ( 11 Apr 2024 22:36 )  pH, Arterial: 7.45  pH, Blood: x     /  pCO2: 44    /  pO2: 141   / HCO3: 31    / Base Excess: 5.8   /  SaO2: 98                    04-11 @ 07:01  -  04-12 @ 07:00  --------------------------------------------------------  IN: 50 mL / OUT: 200 mL / NET: -150 mL            PHYSICAL EXAM:    GENERAL: Awake and alert in minor respiratory distress   HEAD:  Atraumatic, Normocephalic  EYES: PERRLA, conjunctiva and sclera clear  ENMT: Moist mucous membranes  NECK: Supple, normal appearance, No JVD; trachea midline   NERVOUS SYSTEM: Awake and alert x4, motor Strength 5/5 B/L upper and lower extremities  CHEST/LUNG: Chest rise equal and symmetrical but shallow and tachypneic after exertion, lungs sounds diminished in upper and lower lobes B/L   HEART: Heart rate regular rate and rhythm, no mumurs/rubs/gallops   ABDOMEN: Abdomen soft and nontender, non distended, bowel sounds present in all four quadrants   EXTREMITIES: Warm and well perfused, capillary refill <2 seconds,  2+ peripheral pulses with no edema present   LYMPH: No lymphadenopathy noted  SKIN: No rashes or lesions      LABS:  CBC Full  -  ( 12 Apr 2024 06:18 )  WBC Count : 7.71 K/uL  RBC Count : 3.88 M/uL  Hemoglobin : 10.3 g/dL  Hematocrit : 33.1 %  Platelet Count - Automated : 320 K/uL  Mean Cell Volume : 85.3 fl  Mean Cell Hemoglobin : 26.5 pg  Mean Cell Hemoglobin Concentration : 31.1 gm/dL  Auto Neutrophil # : 7.40 K/uL  Auto Lymphocyte # : 0.23 K/uL  Auto Monocyte # : 0.05 K/uL  Auto Eosinophil # : 0.00 K/uL  Auto Basophil # : 0.00 K/uL  Auto Neutrophil % : 96.0 %  Auto Lymphocyte % : 3.0 %  Auto Monocyte % : 0.6 %  Auto Eosinophil % : 0.0 %  Auto Basophil % : 0.0 %    04-12    137  |  104  |  21<H>  ----------------------------<  467<HH>  4.1   |  25  |  1.36<H>    Ca    9.2      12 Apr 2024 06:18  Phos  2.9     04-12  Mg     2.0     04-12    TPro  7.1  /  Alb  2.5<L>  /  TBili  0.3  /  DBili  x   /  AST  14  /  ALT  15  /  AlkPhos  98  04-12      Urinalysis Basic - ( 12 Apr 2024 06:18 )    Color: x / Appearance: x / SG: x / pH: x  Gluc: 467 mg/dL / Ketone: x  / Bili: x / Urobili: x   Blood: x / Protein: x / Nitrite: x   Leuk Esterase: x / RBC: x / WBC x   Sq Epi: x / Non Sq Epi: x / Bacteria: x          RADIOLOGY & ADDITIONAL STUDIES REVIEWED:  ***    GOALS OF CARE DISCUSSION WITH PATIENT/FAMILY/PROXY:    CRITICAL CARE TIME SPENT: 35 minutes INTERVAL HPI/OVERNIGHT EVENTS:   No acute events overnight, patient remains on HFNC       Antimicrobial:  levoFLOXacin IVPB 750 milliGRAM(s) IV Intermittent every 48 hours    Cardiovascular:    Pulmonary:    Hematalogic:  enoxaparin Injectable 60 milliGRAM(s) SubCutaneous every 12 hours    Other:  atorvastatin 40 milliGRAM(s) Oral at bedtime  benzocaine/menthol Lozenge 1 Lozenge Oral every 4 hours  insulin glargine Injectable (LANTUS) 18 Unit(s) SubCutaneous at bedtime  insulin lispro (ADMELOG) corrective regimen sliding scale   SubCutaneous three times a day before meals  insulin lispro (ADMELOG) corrective regimen sliding scale   SubCutaneous at bedtime  insulin lispro Injectable (ADMELOG) 11 Unit(s) SubCutaneous three times a day before meals  methylPREDNISolone sodium succinate Injectable 40 milliGRAM(s) IV Push every 12 hours  pantoprazole    Tablet 40 milliGRAM(s) Oral before breakfast  traZODone 100 milliGRAM(s) Oral at bedtime    atorvastatin 40 milliGRAM(s) Oral at bedtime  benzocaine/menthol Lozenge 1 Lozenge Oral every 4 hours  enoxaparin Injectable 60 milliGRAM(s) SubCutaneous every 12 hours  insulin glargine Injectable (LANTUS) 18 Unit(s) SubCutaneous at bedtime  insulin lispro (ADMELOG) corrective regimen sliding scale   SubCutaneous three times a day before meals  insulin lispro (ADMELOG) corrective regimen sliding scale   SubCutaneous at bedtime  insulin lispro Injectable (ADMELOG) 11 Unit(s) SubCutaneous three times a day before meals  levoFLOXacin IVPB 750 milliGRAM(s) IV Intermittent every 48 hours  methylPREDNISolone sodium succinate Injectable 40 milliGRAM(s) IV Push every 12 hours  pantoprazole    Tablet 40 milliGRAM(s) Oral before breakfast  traZODone 100 milliGRAM(s) Oral at bedtime    Drug Dosing Weight  Height (cm): 167.6 (11 Apr 2024 21:44)  Weight (kg): 54.7 (12 Apr 2024 04:13)  BMI (kg/m2): 19.5 (12 Apr 2024 04:13)  BSA (m2): 1.61 (12 Apr 2024 04:13)    PMH -reviewed admission note, no change since admission    ICU Vital Signs Last 24 Hrs  T(C): 36.9 (13 Apr 2024 00:00), Max: 37.7 (12 Apr 2024 02:13)  T(F): 98.4 (13 Apr 2024 00:00), Max: 99.8 (12 Apr 2024 02:13)  HR: 82 (13 Apr 2024 01:00) (82 - 123)  BP: 144/89 (13 Apr 2024 01:00) (109/68 - 163/97)  BP(mean): 107 (13 Apr 2024 01:00) (81 - 117)  ABP: --  ABP(mean): --  RR: 16 (13 Apr 2024 01:00) (16 - 50)  SpO2: 100% (13 Apr 2024 01:00) (86% - 100%)    O2 Parameters below as of 13 Apr 2024 01:00  Patient On (Oxygen Delivery Method): nasal cannula, high flow  O2 Flow (L/min): 40  O2 Concentration (%): 40      ABG - ( 11 Apr 2024 22:36 )  pH, Arterial: 7.45  pH, Blood: x     /  pCO2: 44    /  pO2: 141   / HCO3: 31    / Base Excess: 5.8   /  SaO2: 98          04-11 @ 07:01  -  04-12 @ 07:00  --------------------------------------------------------  IN: 50 mL / OUT: 200 mL / NET: -150 mL        PHYSICAL EXAM:  GENERAL: Awake and alert in minor respiratory distress   HEAD:  Atraumatic, Normocephalic  EYES: PERRLA, conjunctiva and sclera clear  ENMT: Moist mucous membranes  NECK: Supple, normal appearance, No JVD; trachea midline   NERVOUS SYSTEM: Awake and alert x4, motor Strength 5/5 B/L upper and lower extremities  CHEST/LUNG: Chest rise equal and symmetrical but shallow and tachypneic after exertion, lungs sounds diminished in upper and lower lobes B/L   HEART: Heart rate regular rate and rhythm, no mumurs/rubs/gallops   ABDOMEN: Abdomen soft and nontender, non distended, bowel sounds present in all four quadrants   EXTREMITIES: Warm and well perfused, capillary refill <2 seconds,  2+ peripheral pulses with no edema present   LYMPH: No lymphadenopathy noted  SKIN: No rashes or lesions      LABS:  CBC Full  -  ( 12 Apr 2024 06:18 )  WBC Count : 7.71 K/uL  RBC Count : 3.88 M/uL  Hemoglobin : 10.3 g/dL  Hematocrit : 33.1 %  Platelet Count - Automated : 320 K/uL  Mean Cell Volume : 85.3 fl  Mean Cell Hemoglobin : 26.5 pg  Mean Cell Hemoglobin Concentration : 31.1 gm/dL  Auto Neutrophil # : 7.40 K/uL  Auto Lymphocyte # : 0.23 K/uL  Auto Monocyte # : 0.05 K/uL  Auto Eosinophil # : 0.00 K/uL  Auto Basophil # : 0.00 K/uL  Auto Neutrophil % : 96.0 %  Auto Lymphocyte % : 3.0 %  Auto Monocyte % : 0.6 %  Auto Eosinophil % : 0.0 %  Auto Basophil % : 0.0 %    04-12    137  |  104  |  21<H>  ----------------------------<  467<HH>  4.1   |  25  |  1.36<H>    Ca    9.2      12 Apr 2024 06:18  Phos  2.9     04-12  Mg     2.0     04-12    TPro  7.1  /  Alb  2.5<L>  /  TBili  0.3  /  DBili  x   /  AST  14  /  ALT  15  /  AlkPhos  98  04-12      Urinalysis Basic - ( 12 Apr 2024 06:18 )    Color: x / Appearance: x / SG: x / pH: x  Gluc: 467 mg/dL / Ketone: x  / Bili: x / Urobili: x   Blood: x / Protein: x / Nitrite: x   Leuk Esterase: x / RBC: x / WBC x   Sq Epi: x / Non Sq Epi: x / Bacteria: x      RADIOLOGY & ADDITIONAL STUDIES REVIEWED DURING ROUNDS

## 2024-04-13 NOTE — PROGRESS NOTE ADULT - ASSESSMENT
63 year old F from home ambulates with walker w/ pmhx chronic hypoxic respiratory failure on 2-3L NC at home 2/2 ILD, currently on ofev , unprovoked? PE (dx  on 2022, currently on Eliquis 5 mg PO QD), severe pulmonary HTN (based on TTE from 2023), IDDM, who came in with c/o SOB. As per pt since last week she was experiencing intermittent SOB, however, since Tuesday of this week endorses becoming more dyspneic on exertion and at rest, associated with dry cough, not associated with wheezing, CP, LE edema. In the ED pt was initially saturating 95% on 5L NC, subsequently desaturated to 86% and placed on NRB. ICU was consulted for worsening hypoxia. At the time of assessment pf was on NRB 15L, she was transitioned to Venti mask 35% @ 12 L saturating 92-95%, with RR 20 and Hr 110's, afebrile. On assessment pt with moderate respiratory distress while taking, however, speaking in full sentences, no significant findings on lung  exam. In terms of her labs no leukocytosis, RVP neg,. SCr 1.47 (baseline 0.8-1.2), EKG sinus tach with left axis deviation, TWI on septal leads (unchanged from prior EKG).  CXR neg for consolidations, no effusions, unchanged from prior CXR obtain 1 yr ago.  Pt admitted to ICU for acute on chronic hypoxic respiratory failure requiring HFNC    # ILD with likely progression to pulm fibrosis  # ILD exacerbation   # DONOVAN  # Uncontrolled DM     ============Neuro================  # No active issues  - resumed home dose of trazodone    =============Cards================  #Sinus tachycardia   - likely precipitated by increased WOB and hypoxia  - monitor HR    #Hx of cor pulmonale  - BNP 13K  - Bedside pocus  with findings of Multiple B-lines, good cardiac contractility, no obvious septal flattening.   - Ordered Lasix 60 mg IVP X1  - Given 1x dose of 40mg IV Lasix IVP   - TTE 4/12: . Left ventricular cavity is small. Left ventricular wall thickness is normal. LVSF normal with EF of 65 %. TAPSE 2.0 with normal RV function. Mild MR, PASP 28mmHg, normal PAP. Unable to assess for diastolic dysfunction.     #HLD  - resumed home dose of lipitor     ==============Pulm================  #AcuteHypoxic Respiratory Failure  #ILD  - ddx ILD flare vs Recurrent PE  - s/p solumedrol 125 mg IV in the ED  - Continue solumedrol 40 mg IV q 8 hrs  - started on FD lovenox  - Wean HFNC  - Continue ofev (family will bring from home)  - CTA once DONOVAN resolved   - LE Dopplers negative for DVT     #Pulmonary HTN  - based on prior TTE with severe pulmonary HTN, not seen on TTE completed during this admission    =============GI============  #GERD   - Regular Diet  - Protonic 40mg PO qd    =============Renal/===============  #DONOVAN likely Pre-Renal vs obstructive    - Baseline SCr 0.8-1.2  - Bladder scan showing urinary retention  - FeUrea 33.5% likely pre-renal   - Pike Cathter placed with 2L of urine drained  - Daily BMP   - avoid nephrotoxin    ============ID=====================  #CAP vs ILD exacerbation  - CT Chest 4/12/2024: Findings suggesting interstitial lung disease without significant interval progression. No evidence of pneumonia.  Trace pericardial effusion with mild cardiomegaly.  - no leukocytosis, afebrile  - Strep, mycoplasma, legionella negative   - RVP neg   - S/p 4 doses of Levaquin, now off abx    ==============Endo=============  #IDDM  - at home on Basaglar 25 U QHS + Admelog 15U TID  - started on  Lantus 18 + Admelog 11U TID + Mod SSI   - FS AC HS  - adjust regimen as needed  - f/u A1C    ==============Heme===============  #Normocytic Anemia  - likely anemia of chronic disease  - H/H stable  - monitor CBC daily     ==============ppx================  - PPI QD   - FD lovenox    ==============Dispo===============  ICU      63 year old F from home ambulates with walker w/ pmhx chronic hypoxic respiratory failure on 2-3L NC at home 2/2 ILD, currently on ofev , unprovoked? PE (dx  on 2022, currently on Eliquis 5 mg PO QD), severe pulmonary HTN (based on TTE from 2023), IDDM, who came in with c/o SOB. As per pt since last week she was experiencing intermittent SOB, however, since Tuesday of this week endorses becoming more dyspneic on exertion and at rest, associated with dry cough, not associated with wheezing, CP, LE edema. In the ED pt was initially saturating 95% on 5L NC, subsequently desaturated to 86% and placed on NRB. ICU was consulted for worsening hypoxia. At the time of assessment pf was on NRB 15L, she was transitioned to Venti mask 35% @ 12 L saturating 92-95%, with RR 20 and Hr 110's, afebrile. On assessment pt with moderate respiratory distress while taking, however, speaking in full sentences, no significant findings on lung  exam. In terms of her labs no leukocytosis, RVP neg,. SCr 1.47 (baseline 0.8-1.2), EKG sinus tach with left axis deviation, TWI on septal leads (unchanged from prior EKG).  CXR neg for consolidations, no effusions, unchanged from prior CXR obtain 1 yr ago.  Pt admitted to ICU for acute on chronic hypoxic respiratory failure requiring HFNC    # ILD with likely progression to pulm fibrosis  # ILD exacerbation   # DONOVAN  # Uncontrolled DM     ============Neuro================  # No active issues  - Continue home dose of trazodone 100mg oral at bedtime     =============Cards================  #Sinus tachycardia  - likely precipitated by increased WOB and hypoxia  - monitor HR  - HR now in 80's-90's    #Hx of cor pulmonale  - BNP 13K  - Bedside pocus  with findings of Multiple B-lines, good cardiac contractility, no obvious septal flattening.   - Ordered Lasix 60 mg IVP X1  - Given 1x dose of 40mg IV Lasix IVP   - TTE 4/12: . Left ventricular cavity is small. Left ventricular wall thickness is normal. LVSF normal with EF of 65 %. TAPSE 2.0 with normal RV function. Mild MR, PASP 28mmHg, normal PAP. Unable to assess for diastolic dysfunction.   - Give additional 40mg lasix IVP     #HLD  - resumed home dose of lipitor     ==============Pulm================  #AcuteHypoxic Respiratory Failure  #ILD  - ddx ILD flare vs Recurrent PE  - s/p solumedrol 125 mg IV in the ED  - Continue solumedrol 40 mg IV q 8 hrs switched to Q12 (4/12-)  - Continue FD Lovenox 60mg Q12   - Wean HFNC as tolerated   - Continue home ofev for ILD (family will bring from home)  - f/u CTA now that DONOVAN resolved  - LE Dopplers negative for DVT 4/12  - Started on Symbicort 160mg 2 puffs QD   - Started on Duoneb Q6H     #Pulmonary HTN  - based on prior TTE with severe pulmonary HTN, not seen on TTE completed during this admission    =============GI============  #GERD   - Regular Diet  - Protonix 40mg PO qd    =============Renal/===============  #DONOVAN likely Pre-Renal vs obstructive    - Baseline SCr 0.8-1.2  - Bladder scan showing urinary retention  - FeUrea 33.5% likely pre-renal   - Pike Catheter placed with 2L of urine drained  - Daily BMP   - avoid nephrotoxic agents  - SCr now back at baseline 0.82, BUN 23  - Maintain Pike  - RESOLVED     ============ID=====================  #CAP vs ILD exacerbation  - CT Chest 4/12/2024: Findings suggesting interstitial lung disease without significant interval progression. No evidence of pneumonia.  Trace pericardial effusion with mild cardiomegaly.  - no leukocytosis, afebrile  - Strep, mycoplasma, legionella negative   - RVP neg   - S/p 4 doses of Levaquin  - Levaquin d/c 4/13    ==============Endo=============  #IDDM  - at home on Basaglar 25 U QHS + Admelog 15U TID  - started on  Lantus 18 + Admelog 11U TID + Mod SSI   - FS AC HS  - adjust regimen as needed  - DASH/TLC diet   - f/u A1C    ==============Heme===============  #Normocytic Anemia  - likely anemia of chronic disease  - H/H stable  - monitor CBC daily     ==============ppx================  - PPI QD   - FD lovenox    ==============Dispo===============  ICU      63 year old F from home ambulates with walker w/ pmhx chronic hypoxic respiratory failure on 2-3L NC at home 2/2 ILD, currently on ofev , unprovoked? PE (dx  on 2022, currently on Eliquis 5 mg PO QD), severe pulmonary HTN (based on TTE from 2023), IDDM, who came in with c/o SOB. As per pt since last week she was experiencing intermittent SOB, however, since Tuesday of this week endorses becoming more dyspneic on exertion and at rest, associated with dry cough, not associated with wheezing, CP, LE edema. In the ED pt was initially saturating 95% on 5L NC, subsequently desaturated to 86% and placed on NRB. ICU was consulted for worsening hypoxia. At the time of assessment pf was on NRB 15L, she was transitioned to Venti mask 35% @ 12 L saturating 92-95%, with RR 20 and Hr 110's, afebrile. On assessment pt with moderate respiratory distress while taking, however, speaking in full sentences, no significant findings on lung  exam. In terms of her labs no leukocytosis, RVP neg,. SCr 1.47 (baseline 0.8-1.2), EKG sinus tach with left axis deviation, TWI on septal leads (unchanged from prior EKG).  CXR neg for consolidations, no effusions, unchanged from prior CXR obtain 1 yr ago.  Pt admitted to ICU for acute on chronic hypoxic respiratory failure requiring HFNC    # ILD with likely progression to pulm fibrosis  # ILD exacerbation   # DONOVAN  # Uncontrolled DM     ============Neuro================  # No active issues  - Continue home dose of trazodone 100mg oral at bedtime     =============Cards================  #Sinus tachycardia  - likely precipitated by increased WOB and hypoxia  - monitor HR  - HR now in 80's-90's    #Hx of cor pulmonale  - BNP 13K  - Bedside pocus  with findings of Multiple B-lines, good cardiac contractility, no obvious septal flattening.   - Ordered Lasix 60 mg IVP X1  - Given 1x dose of 40mg IV Lasix IVP   - TTE 4/12: . Left ventricular cavity is small. Left ventricular wall thickness is normal. LVSF normal with EF of 65 %. TAPSE 2.0 with normal RV function. Mild MR, PASP 28mmHg, normal PAP. Unable to assess for diastolic dysfunction.   - Give additional 40mg lasix IVP   - Goal net neg 500cc    #HLD  - resumed home dose of lipitor     ==============Pulm================  #Acute Hypoxic Respiratory Failure likely 2/2 to ILD Flare  #Pulmonary HTN  - s/p solumedrol 125 mg IV in the ED  - Continue solumedrol 40 mg IV q 8 hrs switched to Q12 (4/12-)  - Continue FD Lovenox 60mg Q12   - Continue home ofev for ILD (family will bring from home)  - LE Dopplers negative for DVT 4/12  - Started on Symbicort 160mg 2 puffs QD   - Started on Duoneb Q8H   - Wean HFNC as tolerated   - Outpatient Pulm Collateral for Lung Transplant Eval    =============GI============  #GERD   - Regular Diet  - Protonix 40mg PO qd    =============Renal/===============  #DONOVAN likely Pre-Renal vs obstructive    - Baseline SCr 0.8-1.2  - Bladder scan showing urinary retention  - FeUrea 33.5% likely pre-renal   - Pike Catheter placed with 2L of urine drained  - SCr now back at baseline 0.82, BUN 23  - Maintain Pike  - RESOLVED     ============ID=====================  #CAP vs ILD exacerbation  - CT Chest 4/12/2024: Findings suggesting interstitial lung disease without significant interval progression. No evidence of pneumonia.  Trace pericardial effusion with mild cardiomegaly.  - no leukocytosis, afebrile  - Strep, mycoplasma, legionella negative   - RVP neg   - S/p 4 doses of Levaquin last dose 4/13    ==============Endo=============  #IDDM  - at home on Basaglar 25 U QHS + Admelog 15U TID  - started on  Lantus 18 + Admelog 11U TID + Mod SSI   - FS AC HS  - adjust regimen as needed  - DASH/TLC diet   - f/u A1C    ==============Heme===============  #Normocytic Anemia  - likely anemia of chronic disease  - H/H stable  - monitor CBC daily     ==============ppx================  - PPI QD   - FD lovenox    ==============Dispo===============  ICU

## 2024-04-14 NOTE — PROGRESS NOTE ADULT - SUBJECTIVE AND OBJECTIVE BOX
INTERVAL HPI/OVERNIGHT EVENTS:     PRESSORS: [ ] YES [ ] NO  WHICH:    ANTIBIOTICS:                  DATE STARTED:  ANTIBIOTICS:                  DATE STARTED:  ANTIBIOTICS:                  DATE STARTED:    Antimicrobial:    Cardiovascular:    Pulmonary:  albuterol    0.083% 2.5 milliGRAM(s) Nebulizer every 6 hours  albuterol    90 MICROgram(s) HFA Inhaler 2 Puff(s) Inhalation every 6 hours PRN  albuterol/ipratropium for Nebulization 3 milliLiter(s) Nebulizer every 8 hours  budesonide 160 MICROgram(s)/formoterol 4.5 MICROgram(s) Inhaler 2 Puff(s) Inhalation two times a day    Hematalogic:  enoxaparin Injectable 60 milliGRAM(s) SubCutaneous every 12 hours    Other:  atorvastatin 40 milliGRAM(s) Oral at bedtime  benzocaine/menthol Lozenge 1 Lozenge Oral every 4 hours  insulin glargine Injectable (LANTUS) 18 Unit(s) SubCutaneous at bedtime  insulin lispro (ADMELOG) corrective regimen sliding scale   SubCutaneous three times a day before meals  insulin lispro (ADMELOG) corrective regimen sliding scale   SubCutaneous at bedtime  insulin lispro Injectable (ADMELOG) 11 Unit(s) SubCutaneous three times a day before meals  methylPREDNISolone sodium succinate Injectable 40 milliGRAM(s) IV Push every 12 hours  pantoprazole    Tablet 40 milliGRAM(s) Oral before breakfast  traZODone 100 milliGRAM(s) Oral at bedtime      Drug Dosing Weight  Height (cm): 167.6 (11 Apr 2024 21:44)  Weight (kg): 54.7 (12 Apr 2024 04:13)  BMI (kg/m2): 19.5 (12 Apr 2024 04:13)  BSA (m2): 1.61 (12 Apr 2024 04:13)    CENTRAL LINE: [ ] YES [ ] NO  LOCATION:   DATE INSERTED:  REMOVE: [ ] YES [ ] NO  EXPLAIN:    BAKER: [ ] YES [ ] NO    DATE INSERTED:  REMOVE:  [ ] YES [ ] NO  EXPLAIN:    A-LINE:  [ ] YES [ ] NO  LOCATION:   DATE INSERTED:  REMOVE:  [ ] YES [ ] NO  EXPLAIN:    PMH/Social Hx/Fam Hx -reviewed admission note, no change since admission  PAST MEDICAL & SURGICAL HISTORY:  Diabetes mellitus      Hyperlipidemia      Pulmonary embolism      Pulmonary fibrosis      Cataract  right eye        Heart faliure: acute [ ] chronic [ ] acute or chronic [ ] diastolic [ ] systolic [ ] combied systolic and diastolic[ ]  DONOVAN: ATN[ ] renal medullary necrosis [ ] CKD I [ ]CKDII [ ]CKD III [ ]CKD IV [ ]CKD V [ ]Other pathological lesions [ ]  Abdominal Nutrition Status: malnutrition [ ] cachexia [ ] morbid obesity/BMI=40 [ ] Supplement ordered [___________]     T(C): 36.4 (04-13-24 @ 21:29), Max: 36.9 (04-13-24 @ 16:00)  HR: 99 (04-14-24 @ 00:13)  BP: 147/85 (04-14-24 @ 00:00)  BP(mean): 103 (04-14-24 @ 00:00)  ABP: --  ABP(mean): --  RR: 24 (04-14-24 @ 00:13)  SpO2: 99% (04-14-24 @ 00:13)  Wt(kg): --          04-12 @ 07:01  -  04-13 @ 07:00  --------------------------------------------------------  IN: 1140 mL / OUT: 2755 mL / NET: -1615 mL            PHYSICAL EXAM:  General NAD  HEENT:  RICHARD, moist mucus membrane              Lungs: speaking in full sentences, tachypneic while talking,  Bilateral BS present, occasional rales at the bases, no wheezing or rhoncus, No accessory muscle use   Cardiovascular: tachycardic, regular rate and rhythm. No murmurs, gallops  Gastrointestinal: Soft, Positive BS, no tenderness to palpation  Musculoskeletal: No clubbing or peripheral cyanosis.  Moves all extremities. No LE edema; Homans neg  Skin: Warm and dry  Neurological: AAOX3, awake, alert and following commands. No motor or sensory deficit       LABS:  CBC Full  -  ( 13 Apr 2024 03:53 )  WBC Count : 12.67 K/uL  RBC Count : 3.86 M/uL  Hemoglobin : 10.1 g/dL  Hematocrit : 32.4 %  Platelet Count - Automated : 331 K/uL  Mean Cell Volume : 83.9 fl  Mean Cell Hemoglobin : 26.2 pg  Mean Cell Hemoglobin Concentration : 31.2 gm/dL  Auto Neutrophil # : 11.27 K/uL  Auto Lymphocyte # : 0.89 K/uL  Auto Monocyte # : 0.42 K/uL  Auto Eosinophil # : 0.00 K/uL  Auto Basophil # : 0.01 K/uL  Auto Neutrophil % : 89.0 %  Auto Lymphocyte % : 7.0 %  Auto Monocyte % : 3.3 %  Auto Eosinophil % : 0.0 %  Auto Basophil % : 0.1 %    04-13    139  |  103  |  23<H>  ----------------------------<  224<H>  3.7   |  29  |  0.82    Ca    9.1      13 Apr 2024 03:53  Phos  3.2     04-13  Mg     1.9     04-13    TPro  6.6  /  Alb  2.5<L>  /  TBili  0.3  /  DBili  x   /  AST  15  /  ALT  16  /  AlkPhos  87  04-13      Urinalysis Basic - ( 13 Apr 2024 03:53 )    Color: x / Appearance: x / SG: x / pH: x  Gluc: 224 mg/dL / Ketone: x  / Bili: x / Urobili: x   Blood: x / Protein: x / Nitrite: x   Leuk Esterase: x / RBC: x / WBC x   Sq Epi: x / Non Sq Epi: x / Bacteria: x          CRITICAL CARE TIME SPENT: 35 minutes INTERVAL HPI/OVERNIGHT EVENTS:   No acute events overnight.   Patient assessed this AM with complaint of RLQ abdominal discomfort and cramping similar to "gas pain". Abdomen is soft, nontender, and nondistended. Patient reports no BM since admission. Remains on HFNC 40/40 with no respiratory distress.     PRESSORS: [ ] YES [X] NO  WHICH:    Antimicrobial:    Cardiovascular:  Pulmonary:  albuterol    0.083% 2.5 milliGRAM(s) Nebulizer every 6 hours  albuterol    90 MICROgram(s) HFA Inhaler 2 Puff(s) Inhalation every 6 hours PRN  albuterol/ipratropium for Nebulization 3 milliLiter(s) Nebulizer every 8 hours  budesonide 160 MICROgram(s)/formoterol 4.5 MICROgram(s) Inhaler 2 Puff(s) Inhalation two times a day    Hematalogic:  enoxaparin Injectable 60 milliGRAM(s) SubCutaneous every 12 hours    Other:  atorvastatin 40 milliGRAM(s) Oral at bedtime  benzocaine/menthol Lozenge 1 Lozenge Oral every 4 hours  insulin glargine Injectable (LANTUS) 18 Unit(s) SubCutaneous at bedtime  insulin lispro (ADMELOG) corrective regimen sliding scale   SubCutaneous three times a day before meals  insulin lispro (ADMELOG) corrective regimen sliding scale   SubCutaneous at bedtime  insulin lispro Injectable (ADMELOG) 11 Unit(s) SubCutaneous three times a day before meals  methylPREDNISolone sodium succinate Injectable 40 milliGRAM(s) IV Push every 12 hours  pantoprazole    Tablet 40 milliGRAM(s) Oral before breakfast  traZODone 100 milliGRAM(s) Oral at bedtime      Drug Dosing Weight  Height (cm): 167.6 (11 Apr 2024 21:44)  Weight (kg): 54.7 (12 Apr 2024 04:13)  BMI (kg/m2): 19.5 (12 Apr 2024 04:13)  BSA (m2): 1.61 (12 Apr 2024 04:13)    CENTRAL LINE: [ ] YES [X] NO  LOCATION:   DATE INSERTED:  REMOVE: [ ] YES [ ] NO  EXPLAIN:    PMH/Social Hx/Fam Hx -reviewed admission note, no change since admission    PAST MEDICAL & SURGICAL HISTORY:  Diabetes mellitus  Hyperlipidemia  Pulmonary embolism  Pulmonary fibrosis  Cataract right eye      T(C): 36.4 (04-13-24 @ 21:29), Max: 36.9 (04-13-24 @ 16:00)  HR: 99 (04-14-24 @ 00:13)  BP: 147/85 (04-14-24 @ 00:00)  BP(mean): 103 (04-14-24 @ 00:00)  ABP: --  ABP(mean): --  RR: 24 (04-14-24 @ 00:13)  SpO2: 99% (04-14-24 @ 00:13)  Wt(kg): --      04-12 @ 07:01  -  04-13 @ 07:00  --------------------------------------------------------  IN: 1140 mL / OUT: 2755 mL / NET: -1615 mL      PHYSICAL EXAM:  General: NAD, supine in bed  HEENT:  RICHARD, moist mucus membrane              Lungs: Clear lung sounds auscultated bilaterally with some bibasilar rales. No accessory muscle use. Speaking in clear full sentences.   Cardiovascular: Tachycardic, regular rate and rhythm. No murmurs, gallops  Gastrointestinal: Soft, Positive BS, no tenderness to palpation  Musculoskeletal: No clubbing or peripheral cyanosis.  Moves all extremities. No LE edema; Homans neg  Skin: Warm and dry  Neurological: AAOX3, awake, alert and following commands. No motor or sensory deficit       LABS:  CBC Full  -  ( 13 Apr 2024 03:53 )  WBC Count : 12.67 K/uL  RBC Count : 3.86 M/uL  Hemoglobin : 10.1 g/dL  Hematocrit : 32.4 %  Platelet Count - Automated : 331 K/uL  Mean Cell Volume : 83.9 fl  Mean Cell Hemoglobin : 26.2 pg  Mean Cell Hemoglobin Concentration : 31.2 gm/dL  Auto Neutrophil # : 11.27 K/uL  Auto Lymphocyte # : 0.89 K/uL  Auto Monocyte # : 0.42 K/uL  Auto Eosinophil # : 0.00 K/uL  Auto Basophil # : 0.01 K/uL  Auto Neutrophil % : 89.0 %  Auto Lymphocyte % : 7.0 %  Auto Monocyte % : 3.3 %  Auto Eosinophil % : 0.0 %  Auto Basophil % : 0.1 %    04-13    139  |  103  |  23<H>  ----------------------------<  224<H>  3.7   |  29  |  0.82    Ca    9.1      13 Apr 2024 03:53  Phos  3.2     04-13  Mg     1.9     04-13    TPro  6.6  /  Alb  2.5<L>  /  TBili  0.3  /  DBili  x   /  AST  15  /  ALT  16  /  AlkPhos  87  04-13      Urinalysis Basic - ( 13 Apr 2024 03:53 )    Color: x / Appearance: x / SG: x / pH: x  Gluc: 224 mg/dL / Ketone: x  / Bili: x / Urobili: x   Blood: x / Protein: x / Nitrite: x   Leuk Esterase: x / RBC: x / WBC x   Sq Epi: x / Non Sq Epi: x / Bacteria: x    CRITICAL CARE TIME SPENT: 35 minutes

## 2024-04-14 NOTE — PROGRESS NOTE ADULT - ASSESSMENT
63 year old F from home ambulates with walker w/ pmhx chronic hypoxic respiratory failure on 2-3L NC at home 2/2 ILD, currently on ofev , unprovoked? PE (dx  on 2022, currently on Eliquis 5 mg PO QD), severe pulmonary HTN (based on TTE from 2023), IDDM, who came in with c/o SOB. As per pt since last week she was experiencing intermittent SOB, however, since Tuesday of this week endorses becoming more dyspneic on exertion and at rest, associated with dry cough, not associated with wheezing, CP, LE edema. In the ED pt was initially saturating 95% on 5L NC, subsequently desaturated to 86% and placed on NRB. ICU was consulted for worsening hypoxia. At the time of assessment pf was on NRB 15L, she was transitioned to Venti mask 35% @ 12 L saturating 92-95%, with RR 20 and Hr 110's, afebrile. On assessment pt with moderate respiratory distress while taking, however, speaking in full sentences, no significant findings on lung  exam. In terms of her labs no leukocytosis, RVP neg,. SCr 1.47 (baseline 0.8-1.2), EKG sinus tach with left axis deviation, TWI on septal leads (unchanged from prior EKG).  CXR neg for consolidations, no effusions, unchanged from prior CXR obtain 1 yr ago.  Pt admitted to ICU for acute on chronic hypoxic respiratory failure requiring HFNC      # Acute on chronic hypoxic respiratory failure  # ILD with likely progression to pulm fibrosis  # ILD exacerbation   # Cor pulmonale   # Severe Pulm HTN  # DONOVAN  # Uncontrolled DM       ============Neuro================  No active issues  resumed home dose of trazodone    =============Cards================  #Sinus tachycardia  likely precipitated by increased WOB and hypoxia  monitor HR    #Hx of cor pulmonale  BNP 13K  Bedside pocus  with findings of Multiple B-lines, good cardiac contractility, no obvious septal flattening.   Ordered Lasix 60 mg IVP X1  continue lasix 40 mg IV BID  f/u TTE    #HLD  resumed home dose of lipitor     ==============Pulm================  #Acute on Chronic respiratory failure  #ILD  ddx ILD flare vs Recurrent PE? (in the s/o subtherapeutic DOAC dosing)  s/p solumedrol 125 mg IV in the ED  will continue solumedrol 40 mg IV q 8 hrs  started on empiric coverage with Levaquin (renally dosed)  started on FD lovenox  HF NC  continue ofev (family will bring from home)  f/u Mycoplasma and legionella  given her DONOVAN will defer CTA at this time. ordered CTC non contrast  f/u D-Dimer and LE doppler studies   titrate down supplemental O2 as tolerated       #Pulmonary HTN  based on prior TTE with severe pulmonary HTN  obtain repeat TTE     =============GI============  No active issues     =============Renal/===============  #DONOVAN  Pt w/ SCr  1.74 on admission  Baseline SCr 0.8-1.2  obtain bladder scan  F/U Urine Lytes, calculate FeNa  follow BMP daily   avoid nephrotoxin    ==============Endo=============  #IDDM  uncontrolled  at home on Basaglar 25 U QHS + Admelog 15U TID  started on  Lantus 18 + Admelog 11U TID + Mod SSI   FS AC HS  adjust regimen as needed  f/u A1C      ==============Heme===============  #Normocytic Anemia  likely anemia of chronic disease  H/H stable  monitor CBC daily     ==============ppx================  PPI QD   FD lovenox    ==============Dispo===============  ICU      63 year old F from home ambulates with walker w/ pmhx chronic hypoxic respiratory failure on 2-3L NC at home 2/2 ILD, currently on ofev , unprovoked? PE (dx  on 2022, currently on Eliquis 5 mg PO QD), severe pulmonary HTN (based on TTE from 2023), IDDM, who came in with c/o SOB. As per pt since last week she was experiencing intermittent SOB, however, since Tuesday of this week endorses becoming more dyspneic on exertion and at rest, associated with dry cough, not associated with wheezing, CP, LE edema. In the ED pt was initially saturating 95% on 5L NC, subsequently desaturated to 86% and placed on NRB. ICU was consulted for worsening hypoxia. At the time of assessment pf was on NRB 15L, she was transitioned to Venti mask 35% @ 12 L saturating 92-95%, with RR 20 and Hr 110's, afebrile. On assessment pt with moderate respiratory distress while taking, however, speaking in full sentences, no significant findings on lung  exam. In terms of her labs no leukocytosis, RVP neg,. SCr 1.47 (baseline 0.8-1.2), EKG sinus tach with left axis deviation, TWI on septal leads (unchanged from prior EKG).  CXR neg for consolidations, no effusions, unchanged from prior CXR obtain 1 yr ago.  Pt admitted to ICU for acute on chronic hypoxic respiratory failure requiring HFNC    # ILD with likely progression to pulm fibrosis  # ILD exacerbation   # DONOVAN  # Uncontrolled DM     ============Neuro================  # No active issues  - Continue home dose of trazodone 100mg oral at bedtime     =============Cards================  #Sinus tachycardia  - likely precipitated by increased WOB and hypoxia  - monitor HR  - HR now in 80's-90's    #Hx of cor pulmonale  - BNP 13K  - Bedside pocus  with findings of Multiple B-lines, good cardiac contractility, no obvious septal flattening.   - Ordered Lasix 60 mg IVP X1  - Given 1x dose of 40mg IV Lasix IVP   - TTE 4/12: . Left ventricular cavity is small. Left ventricular wall thickness is normal. LVSF normal with EF of 65 %. TAPSE 2.0 with normal RV function. Mild MR, PASP 28mmHg, normal PAP. Unable to assess for diastolic dysfunction.   - Give additional 40mg lasix IVP   - Goal net neg 500cc    #HLD  - resumed home dose of lipitor     ==============Pulm================  #Acute Hypoxic Respiratory Failure likely 2/2 to ILD Flare  #Pulmonary HTN  - s/p solumedrol 125 mg IV in the ED  - Continue solumedrol 40 mg IV q 8 hrs switched to Q12 (4/12-)  - Continue FD Lovenox 60mg Q12   - Continue home ofev for ILD (family will bring from home)  - LE Dopplers negative for DVT 4/12  - Started on Symbicort 160mg 2 puffs QD   - Started on Duoneb Q8H   - Wean HFNC as tolerated   - Outpatient Pulm Collateral for Lung Transplant Eval    =============GI============  #GERD   - Regular Diet  - Protonix 40mg PO qd    =============Renal/===============  #DONOVAN likely Pre-Renal vs obstructive    - Baseline SCr 0.8-1.2  - Bladder scan showing urinary retention  - FeUrea 33.5% likely pre-renal   - Pike Catheter placed with 2L of urine drained  - SCr now back at baseline 0.82, BUN 23  - Maintain Pike  - RESOLVED     ============ID=====================  #CAP vs ILD exacerbation  - CT Chest 4/12/2024: Findings suggesting interstitial lung disease without significant interval progression. No evidence of pneumonia.  Trace pericardial effusion with mild cardiomegaly.  - no leukocytosis, afebrile  - Strep, mycoplasma, legionella negative   - RVP neg   - S/p 4 doses of Levaquin last dose 4/13    ==============Endo=============  #IDDM  - at home on Basaglar 25 U QHS + Admelog 15U TID  - started on  Lantus 18 + Admelog 11U TID + Mod SSI   - FS AC HS  - adjust regimen as needed  - DASH/TLC diet   - f/u A1C    ==============Heme===============  #Normocytic Anemia  - likely anemia of chronic disease  - H/H stable  - monitor CBC daily     ==============ppx================  - PPI QD   - FD lovenox    ==============Dispo===============  ICU    63 year old F from home ambulates with walker w/ pmhx chronic hypoxic respiratory failure on 2-3L NC at home 2/2 ILD, currently on ofev , unprovoked? PE (dx  on 2022, currently on Eliquis 5 mg PO QD), severe pulmonary HTN (based on TTE from 2023), IDDM, who came in with c/o SOB. As per pt since last week she was experiencing intermittent SOB, however, since Tuesday of this week endorses becoming more dyspneic on exertion and at rest, associated with dry cough, not associated with wheezing, CP, LE edema. In the ED pt was initially saturating 95% on 5L NC, subsequently desaturated to 86% and placed on NRB. ICU was consulted for worsening hypoxia. At the time of assessment pf was on NRB 15L, she was transitioned to Venti mask 35% @ 12 L saturating 92-95%, with RR 20 and Hr 110's, afebrile. On assessment pt with moderate respiratory distress while taking, however, speaking in full sentences, no significant findings on lung  exam. In terms of her labs no leukocytosis, RVP neg,. SCr 1.47 (baseline 0.8-1.2), EKG sinus tach with left axis deviation, TWI on septal leads (unchanged from prior EKG).  CXR neg for consolidations, no effusions, unchanged from prior CXR obtain 1 yr ago.  Pt admitted to ICU for acute on chronic hypoxic respiratory failure requiring HFNC    # ILD with likely progression to pulm fibrosis  # ILD exacerbation   # DONOVAN  # Uncontrolled DM     ============Neuro================  # No active issues  - Continue home dose of trazodone 100mg oral at bedtime     =============Cards================  #Sinus tachycardia  - likely precipitated by increased WOB and hypoxia  - monitor HR  - HR now in 80's-90's    #Hx of cor pulmonale  - BNP 13K  - Bedside pocus  with findings of Multiple B-lines, good cardiac contractility, no obvious septal flattening.   - Diuresed with IV Lasix  - Started on PO Lasix 20mg qd   - TTE 4/12: . Left ventricular cavity is small. Left ventricular wall thickness is normal. LVSF normal with EF of 65 %. TAPSE 2.0 with normal RV function. Mild MR, PASP 28mmHg, normal PAP. Unable to assess for diastolic dysfunction.     #HLD  - resumed home dose of lipitor     ==============Pulm================  #Acute Hypoxic Respiratory Failure likely 2/2 to ILD Flare  #Pulmonary HTN  - s/p solumedrol 125 mg IV in the ED  - Continue solumedrol 40 mg IV q 8 hrs switched to Q12 (4/12-)  - Continue FD Lovenox 60mg Q12   - Continue home ofev for ILD (family will bring from home)  - LE Dopplers negative for DVT 4/12  - Started on Symbicort 160mg 2 puffs QD   - Started on Duoneb Q8H   - Wean HFNC as tolerated   - Patient and daughter both educated on progressive nature of ILD and option to pursue Lung Transplant Eval as outpatient.     =============GI============  #GERD   #Constipation   - Regular Diet  - Protonix 40mg PO qd  - Bowel regimen with Miralax and Senna  - Miralax for abdominal cramping and "gas pain"    =============Renal/===============  #DONOVAN likely Pre-Renal vs obstructive    - Baseline SCr 0.8-1.2  - Bladder scan showing urinary retention  - FeUrea 33.5% likely pre-renal   - Pike Catheter placed with 2L of urine drained  - SCr now back at baseline 0.82, BUN 23  - Maintain Pike     ============ID=====================  #CAP vs ILD exacerbation  - CT Chest 4/12/2024: Findings suggesting interstitial lung disease without significant interval progression. No evidence of pneumonia.  Trace pericardial effusion with mild cardiomegaly.  - no leukocytosis, afebrile  - Strep, mycoplasma, legionella negative   - RVP neg   - S/p 4 doses of Levaquin last dose 4/13  - Remains afebrile, no leukocytosis, and improving respiratory status off abx     ==============Endo=============  #IDDM (A1c 11.9)  - at home on Basaglar 25 U QHS + Admelog 15U TID  - started on  Lantus 18 + Admelog 11U TID + Mod SSI   - FS AC HS  - adjust regimen as needed  - DASH/TLC, consistent carb diet     ==============Heme===============  #Normocytic Anemia  - likely anemia of chronic disease  - H/H stable  - monitor CBC daily     ==============ppx================  - PPI QD   - FD lovenox    ==============Dispo===============  ICU

## 2024-04-15 NOTE — PROGRESS NOTE ADULT - ASSESSMENT
63 year old F from home ambulates with walker w/ pmhx chronic hypoxic respiratory failure on 2-3L NC at home 2/2 ILD, currently on ofev , unprovoked? PE (dx  on 2022, currently on Eliquis 5 mg PO QD), severe pulmonary HTN (based on TTE from 2023), IDDM, who came in with c/o SOB. As per pt since last week she was experiencing intermittent SOB, however, since Tuesday of this week endorses becoming more dyspneic on exertion and at rest, associated with dry cough, not associated with wheezing, CP, LE edema. In the ED pt was initially saturating 95% on 5L NC, subsequently desaturated to 86% and placed on NRB. ICU was consulted for worsening hypoxia. At the time of assessment pf was on NRB 15L, she was transitioned to Venti mask 35% @ 12 L saturating 92-95%, with RR 20 and Hr 110's, afebrile. On assessment pt with moderate respiratory distress while taking, however, speaking in full sentences, no significant findings on lung  exam. In terms of her labs no leukocytosis, RVP neg,. SCr 1.47 (baseline 0.8-1.2), EKG sinus tach with left axis deviation, TWI on septal leads (unchanged from prior EKG).  CXR neg for consolidations, no effusions, unchanged from prior CXR obtain 1 yr ago.  Pt admitted to ICU for acute on chronic hypoxic respiratory failure requiring HFNC    # ILD with likely progression to pulm fibrosis  # ILD exacerbation   # DONOVAN  # Uncontrolled DM     ============Neuro================  # No active issues  - Continue home dose of trazodone 100mg oral at bedtime     =============Cards================  #Sinus tachycardia  - likely precipitated by increased WOB and hypoxia  - monitor HR  - HR now in 80's-90's    #Hx of cor pulmonale  - BNP 13K  - Bedside pocus  with findings of Multiple B-lines, good cardiac contractility, no obvious septal flattening.   - Diuresed with IV Lasix  - c/w PO Lasix 20mg qd   - TTE 4/12: . Left ventricular cavity is small. Left ventricular wall thickness is normal. LVSF normal with EF of 65 %. TAPSE 2.0 with normal RV function. Mild MR, PASP 28mmHg, normal PAP. Unable to assess for diastolic dysfunction.     #HLD  - resumed home dose of lipitor     ==============Pulm================  #Acute Hypoxic Respiratory Failure likely 2/2 to ILD Flare  #Pulmonary HTN  - s/p solumedrol 125 mg IV in the ED  - solumedrol d/c, c/w Prednisone from 4/16 for total 5 days of steroids  - d/c FD Lovenox 60mg Q12 > Eliquis 5 mg BID  - Continue home ofev for ILD (family will bring from home)  - LE Dopplers negative for DVT 4/12  - Started on Symbicort 160mg 2 puffs QD   - Started on Duoneb Q8H   - pt tolerating NC 3L  - Patient and daughter both educated on progressive nature of ILD and option to pursue Lung Transplant Eval as outpatient.     =============GI============  #GERD   #Constipation   - Regular Diet  - Protonix 40mg PO qd  - Bowel regimen with Miralax and Senna  - Miralax for abdominal cramping and "gas pain"    =============Renal/===============  #DONOVAN likely Pre-Renal vs obstructive    - Baseline SCr 0.8-1.2  - Bladder scan showing urinary retention  - FeUrea 33.5% likely pre-renal   - Pike Catheter placed with 2L of urine drained  - SCr now back at baseline 0.82, BUN 23  - Maintain Pike (pt has history of urinary retention)    ============ID=====================  #CAP vs ILD exacerbation  - CT Chest 4/12/2024: Findings suggesting interstitial lung disease without significant interval progression. No evidence of pneumonia.  Trace pericardial effusion with mild cardiomegaly.  - no leukocytosis, afebrile  - Strep, mycoplasma, legionella negative   - RVP neg   - S/p 4 doses of Levaquin last dose 4/13  - Remains afebrile, no leukocytosis, and improving respiratory status off abx     ==============Endo=============  #IDDM (A1c 11.9)  - at home on Basaglar 25 U QHS + Admelog 15U TID  - inc to Lantus 24 + Admelog 16u TID + Mod SSI   -a1c 11.9  -Endo Dr. Zavaleta consulted  - FS AC HS  - adjust regimen as needed  - DASH/TLC, consistent carb diet     ==============Heme===============  #Normocytic Anemia  - likely anemia of chronic disease  - H/H stable  - monitor CBC daily     ==============ppx================  - PPI QD   - Eliquis BID    ==============Dispo===============  ICU

## 2024-04-15 NOTE — DIETITIAN INITIAL EVALUATION ADULT - PERTINENT MEDS FT
MEDICATIONS  (STANDING):  albuterol/ipratropium for Nebulization 3 milliLiter(s) Nebulizer every 6 hours  apixaban 5 milliGRAM(s) Oral every 12 hours  atorvastatin 40 milliGRAM(s) Oral at bedtime  benzocaine/menthol Lozenge 1 Lozenge Oral every 4 hours  budesonide 160 MICROgram(s)/formoterol 4.5 MICROgram(s) Inhaler 2 Puff(s) Inhalation two times a day  chlorhexidine 2% Cloths 1 Application(s) Topical <User Schedule>  furosemide    Tablet 20 milliGRAM(s) Oral daily  insulin glargine Injectable (LANTUS) 24 Unit(s) SubCutaneous at bedtime  insulin lispro (ADMELOG) corrective regimen sliding scale   SubCutaneous at bedtime  insulin lispro (ADMELOG) corrective regimen sliding scale   SubCutaneous three times a day before meals  insulin lispro Injectable (ADMELOG) 13 Unit(s) SubCutaneous three times a day before meals  pantoprazole    Tablet 40 milliGRAM(s) Oral before breakfast  senna 1 Tablet(s) Oral daily  traZODone 100 milliGRAM(s) Oral at bedtime    MEDICATIONS  (PRN):

## 2024-04-15 NOTE — PROGRESS NOTE ADULT - ATTENDING COMMENTS
63F with h/o Pulmonary fibrosis/IPF (on home NC 3L), PE (on eliquis) presented to the ED with few days of SOB associated with dry cough, no fevers. In the ED she was noted to be more hypoxemic and initially required NC 6L and then NRM to venti mask. CXR unchanged. Chest CT with extensive reticular and groundglass opacities in both lungs associated with thickening of   interlobular septae and traction bronchiectasis no clear pneumonia.    Plan:   - Titrate supplemental O2 with goal SpO2 90-95%; c/w home 2-3LNC  - Monitor strict I/Os, daily weights; B profile on POCUS - continue diuresis as hemodynamics/renal fn allow   - deescalate to methylprednisolone 40mg BID, then prednisone 50mg/d  - Restart Eliquis 5mg BID  - Unclear role for abx given no radiographical/clinical e/o pna. F/u peripheral infectious w/u  - TTE noted  - C/w OFev  - GI ppx.  - Stable for downgrade to medical floor
63 year old F from home ambulates with walker w/ pmhx chronic hypoxic respiratory failure on 2-3L NC at home 2/2 ILD, currently on ofev , unprovoked? PE (dx  on 2022, currently on Eliquis 5 mg PO QD), severe pulmonary HTN (based on TTE from 2023), IDDM, who came in with c/o SOB. As per pt since last week she was experiencing intermittent SOB, however, since Tuesday of this week endorses becoming more dyspneic on exertion and at rest, associated with dry cough, not associated with wheezing, CP, LE edema. In the ED pt was initially saturating 95% on 5L NC, subsequently desaturated to 86% and placed on NRB. ICU was consulted for worsening hypoxia. At the time of assessment pf was on NRB 15L, she was transitioned to Venti mask 35% @ 12 L saturating 92-95%, with RR 20 and Hr 110's, afebrile. On assessment pt with moderate respiratory distress while taking, however, speaking in full sentences, no significant findings on lung  exam. In terms of her labs no leukocytosis, RVP neg,. SCr 1.47 (baseline 0.8-1.2), EKG sinus tach with left axis deviation, TWI on septal leads (unchanged from prior EKG).  CXR neg for consolidations, no effusions, unchanged from prior CXR obtain 1 yr ago.  Pt admitted to ICU for acute on chronic hypoxic respiratory failure requiring HFNC    # ILD with likely progression to pulm fibrosis  # ILD exacerbation   # DONOVAN  # Uncontrolled DM     ============Neuro================  # No active issues  - Continue home dose of trazodone 100mg oral at bedtime     =============Cards================  #Sinus tachycardia  - likely precipitated by increased WOB and hypoxia  - monitor HR  - HR now in 80's-90's  - Lasix   - 2 DECHO  #Hx of cor pulmonale  - BNP 13K  - Bedside pocus  with findings of Multiple B-lines, good cardiac contractility, no obvious septal flattening.   - Ordered Lasix 60 mg IVP X1  - Given 1x dose of 40mg IV Lasix IVP   - TTE 4/12: . Left ventricular cavity is small. Left ventricular wall thickness is normal. LVSF normal with EF of 65 %. TAPSE 2.0 with normal RV function. Mild MR, PASP 28mmHg, normal PAP. Unable to assess for diastolic dysfunction.   - Give additional 40mg lasix IVP   - Goal net neg 500cc    #HLD  - resumed home dose of lipitor     ==============Pulm================  #Acute Hypoxic Respiratory Failure likely 2/2 to ILD Flare  #Pulmonary HTN  - s/p solumedrol 125 mg IV in the ED  - Continue solumedrol 40 mg IV q 8 hrs switched to Q12 (4/12-)  - Continue FD Lovenox 60mg Q12   - Continue home ofev for ILD (family will bring from home)  - LE Dopplers negative for DVT 4/12  - Started on Symbicort 160mg 2 puffs QD   - Started on Duoneb Q8H   - Wean HFNC as tolerated   - Outpatient Pulm Collateral for Lung Transplant Eval    =============Renal/===============  #DONOVAN likely Pre-Renal vs obstructive    - Baseline SCr 0.8-1.2  - Bladder scan showing urinary retention  - FeUrea 33.5% likely pre-renal   - Pike Catheter placed with 2L of urine drained  - SCr now back at baseline 0.82, BUN 23  - Maintain Pike  - RESOLVED     ============ID=====================  #CAP vs ILD exacerbation  - CT Chest 4/12/2024: Findings suggesting interstitial lung disease without significant interval progression. No evidence of pneumonia.  Trace pericardial effusion with mild cardiomegaly.  - no leukocytosis, afebrile  - Strep, mycoplasma, legionella negative   - RVP neg   - S/p 4 doses of Levaquin last dose 4/13    ==============Endo=============  #IDDM  - at home on Basaglar 25 U QHS + Admelog 15U TID  - started on  Lantus 18 + Admelog 11U TID + Mod SSI   - FS AC HS  - adjust regimen as needed  - DASH/TLC diet   - f/u A1C .
63 year old F from home ambulates with walker w/ pmhx chronic hypoxic respiratory failure on 2-3L NC at home 2/2 ILD, currently on ofev , unprovoked? PE (dx  on 2022, currently on Eliquis 5 mg PO QD), severe pulmonary HTN (based on TTE from 2023), IDDM, who came in with c/o SOB. As per pt since last week she was experiencing intermittent SOB, however, since Tuesday of this week endorses becoming more dyspneic on exertion and at rest, associated with dry cough, not associated with wheezing, CP, LE edema. In the ED pt was initially saturating 95% on 5L NC, subsequently desaturated to 86% and placed on NRB. ICU was consulted for worsening hypoxia. At the time of assessment pf was on NRB 15L, she was transitioned to Venti mask 35% @ 12 L saturating 92-95%, with RR 20 and Hr 110's, afebrile. On assessment pt with moderate respiratory distress while taking, however, speaking in full sentences, no significant findings on lung  exam. In terms of her labs no leukocytosis, RVP neg,. SCr 1.47 (baseline 0.8-1.2), EKG sinus tach with left axis deviation, TWI on septal leads (unchanged from prior EKG).  CXR neg for consolidations, no effusions, unchanged from prior CXR obtain 1 yr ago.  Pt admitted to ICU for acute on chronic hypoxic respiratory failure requiring HFNC    # ILD with likely progression to pulm fibrosis  # ILD exacerbation   # DONOVAN  # Uncontrolled DM     ============Neuro================  # No active issues  - Continue home dose of trazodone 100mg oral at bedtime     =============Cards================  #Sinus tachycardia  - likely precipitated by increased WOB and hypoxia  - monitor HR  - HR now in 80's-90's    #Hx of cor pulmonale  - BNP 13K  - Bedside pocus  with findings of Multiple B-lines, good cardiac contractility, no obvious septal flattening.   - Ordered Lasix 60 mg IVP X1  - Given 1x dose of 40mg IV Lasix IVP   - TTE 4/12: . Left ventricular cavity is small. Left ventricular wall thickness is normal. LVSF normal with EF of 65 %. TAPSE 2.0 with normal RV function. Mild MR, PASP 28mmHg, normal PAP. Unable to assess for diastolic dysfunction.   - Give additional 40mg lasix IVP   - Goal net neg 500cc    #HLD  - resumed home dose of lipitor     ==============Pulm================  #Acute Hypoxic Respiratory Failure likely 2/2 to ILD Flare  #Pulmonary HTN  - s/p solumedrol 125 mg IV in the ED  - Continue solumedrol 40 mg IV q 8 hrs switched to Q12 (4/12-)  - Continue FD Lovenox 60mg Q12   - Continue home ofev for ILD (family will bring from home)  - LE Dopplers negative for DVT 4/12  - Started on Symbicort 160mg 2 puffs QD   - Started on Duoneb Q8H   - Wean HFNC as tolerated   - Outpatient Pulm Collateral for Lung Transplant Eval    =============Renal/===============  #DONOVAN likely Pre-Renal vs obstructive    - Baseline SCr 0.8-1.2  - Bladder scan showing urinary retention  - FeUrea 33.5% likely pre-renal   - Pike Catheter placed with 2L of urine drained  - SCr now back at baseline 0.82, BUN 23  - Maintain Pike  - RESOLVED     ============ID=====================  #CAP vs ILD exacerbation  - CT Chest 4/12/2024: Findings suggesting interstitial lung disease without significant interval progression. No evidence of pneumonia.  Trace pericardial effusion with mild cardiomegaly.  - no leukocytosis, afebrile  - Strep, mycoplasma, legionella negative   - RVP neg   - S/p 4 doses of Levaquin last dose 4/13    ==============Endo=============  #IDDM  - at home on Basaglar 25 U QHS + Admelog 15U TID  - started on  Lantus 18 + Admelog 11U TID + Mod SSI   - FS AC HS  - adjust regimen as needed  - DASH/TLC diet   - f/u A1C

## 2024-04-15 NOTE — ADVANCED PRACTICE NURSE CONSULT - RECOMMEDATIONS
-Apply a foam dressing to the Coccyx area Q 72hrs PRN, for protection  -Encourage the patient to reposition Q 2hrs using wedges or pillows

## 2024-04-15 NOTE — DIETITIAN INITIAL EVALUATION ADULT - PERTINENT LABORATORY DATA
04-15    138  |  103  |  28<H>  ----------------------------<  375<H>  4.3   |  29  |  0.89    Ca    8.2<L>      15 Apr 2024 03:20  Phos  2.4     04-15  Mg     2.3     04-15    TPro  6.0  /  Alb  2.2<L>  /  TBili  0.3  /  DBili  x   /  AST  33  /  ALT  32  /  AlkPhos  83  04-15  POCT Blood Glucose.: 181 mg/dL (04-15-24 @ 11:36)  A1C with Estimated Average Glucose Result: 11.9 % (04-13-24 @ 03:53)

## 2024-04-15 NOTE — DIETITIAN NUTRITION RISK NOTIFICATION - TREATMENT: THE FOLLOWING DIET HAS BEEN RECOMMENDED
Diet, Regular:   Consistent Carbohydrate {Evening Snacks}  DASH/TLC {Sodium & Cholesterol Restricted}  1500mL Fluid Restriction ([id number]) (04-12-24 @ 06:46) [Active]

## 2024-04-15 NOTE — PROGRESS NOTE ADULT - SUBJECTIVE AND OBJECTIVE BOX
INTERVAL HPI/OVERNIGHT EVENTS: ***    PRESSORS: [ ] YES [ ] NO  WHICH:    MEDICATIONS:     Antimicrobial:    Cardiovascular:  furosemide    Tablet 20 milliGRAM(s) Oral daily    Pulmonary:  albuterol/ipratropium for Nebulization 3 milliLiter(s) Nebulizer every 6 hours  budesonide 160 MICROgram(s)/formoterol 4.5 MICROgram(s) Inhaler 2 Puff(s) Inhalation two times a day    Hematalogic:  enoxaparin Injectable 60 milliGRAM(s) SubCutaneous every 12 hours    Other:  atorvastatin 40 milliGRAM(s) Oral at bedtime  benzocaine/menthol Lozenge 1 Lozenge Oral every 4 hours  insulin glargine Injectable (LANTUS) 24 Unit(s) SubCutaneous at bedtime  insulin lispro (ADMELOG) corrective regimen sliding scale   SubCutaneous three times a day before meals  insulin lispro (ADMELOG) corrective regimen sliding scale   SubCutaneous at bedtime  insulin lispro Injectable (ADMELOG) 13 Unit(s) SubCutaneous three times a day before meals  methylPREDNISolone sodium succinate Injectable 40 milliGRAM(s) IV Push every 12 hours  pantoprazole    Tablet 40 milliGRAM(s) Oral before breakfast  senna 1 Tablet(s) Oral daily  traZODone 100 milliGRAM(s) Oral at bedtime      Drug Dosing Weight  Height (cm): 167.6 (11 Apr 2024 21:44)  Weight (kg): 54.7 (12 Apr 2024 04:13)  BMI (kg/m2): 19.5 (12 Apr 2024 04:13)  BSA (m2): 1.61 (12 Apr 2024 04:13)    INTUBATED: [ ] YES [ ] NO   DATE:     CENTRAL LINE: [ ] YES [ ] NO  LOCATION:       BAKER: [ ] YES [ ] NO        A-LINE:  [ ] YES [ ] NO  LOCATION:       ICU Vital Signs Last 24 Hrs  T(C): 36.7 (15 Apr 2024 04:24), Max: 36.8 (14 Apr 2024 23:37)  T(F): 98 (15 Apr 2024 04:24), Max: 98.3 (14 Apr 2024 23:37)  HR: 94 (15 Apr 2024 06:00) (89 - 109)  BP: 149/81 (15 Apr 2024 06:00) (114/64 - 155/91)  BP(mean): 98 (15 Apr 2024 06:00) (78 - 129)  ABP: --  ABP(mean): --  RR: 11 (15 Apr 2024 06:00) (11 - 39)  SpO2: 100% (15 Apr 2024 06:00) (96% - 100%)    O2 Parameters below as of 14 Apr 2024 20:00  Patient On (Oxygen Delivery Method): nasal cannula  O2 Flow (L/min): 3                04-14 @ 07:01  -  04-15 @ 07:00  --------------------------------------------------------  IN: 1360 mL / OUT: 1860 mL / NET: -500 mL            REVIEW OF SYSTEMS:    CONSTITUTIONAL: No fever, chills, weight loss, or fatigue  RESPIRATORY: No cough, wheezing, or hemoptysis; No shortness of breath  CARDIOVASCULAR: No chest pain, palpitations, dizziness, or leg swelling  GASTROINTESTINAL: No abdominal pain. No nausea, vomiting, or hematemesis; No diarrhea or constipation. No melena or hematochezia.  GENITOURINARY: No dysuria, hematuria, or urinary frequency  NEUROLOGICAL: No headaches, memory loss, loss of strength, numbness, or tremors  MSK: No muscle or joint pain  SKIN: No itching, burning, rashes, or lesions      PHYSICAL EXAM:    GENERAL: NAD, well-groomed, well-developed  HEAD:  Atraumatic, Normocephalic  EYES: EOMI, PERRLA, conjunctiva and sclera clear  ENMT: No tonsillar erythema, exudates, or enlargement; Moist mucous membranes, Good dentition, No lesions  NECK: Supple, normal appearance, No JVD; Normal thyroid; Trachea midline  NERVOUS SYSTEM:  Alert & Oriented X3, Good concentration; Motor Strength 5/5 B/L upper and lower extremities; DTRs 2+ intact and symmetric  CHEST/LUNG: No chest deformity; Normal percussion bilaterally; No rales, rhonchi, wheezing   HEART: Regular rate and rhythm; Clear S1 and S2, No murmurs, rubs, or gallops  ABDOMEN: Soft, Nontender, Nondistended; Bowel sounds present  EXTREMITIES:  2+ Peripheral Pulses, No clubbing, cyanosis, or edema  LYMPH: No lymphadenopathy noted  SKIN: No rashes or lesions; Good capillary refill      LABS:  CBC Full  -  ( 15 Apr 2024 03:20 )  WBC Count : 8.14 K/uL  RBC Count : 3.92 M/uL  Hemoglobin : 10.3 g/dL  Hematocrit : 33.0 %  Platelet Count - Automated : 338 K/uL  Mean Cell Volume : 84.2 fl  Mean Cell Hemoglobin : 26.3 pg  Mean Cell Hemoglobin Concentration : 31.2 gm/dL  Auto Neutrophil # : x  Auto Lymphocyte # : x  Auto Monocyte # : x  Auto Eosinophil # : x  Auto Basophil # : x  Auto Neutrophil % : x  Auto Lymphocyte % : x  Auto Monocyte % : x  Auto Eosinophil % : x  Auto Basophil % : x    04-15    138  |  103  |  28<H>  ----------------------------<  375<H>  4.3   |  29  |  0.89    Ca    8.2<L>      15 Apr 2024 03:20  Phos  2.4     04-15  Mg     2.3     04-15    TPro  6.0  /  Alb  2.2<L>  /  TBili  0.3  /  DBili  x   /  AST  33  /  ALT  32  /  AlkPhos  83  04-15      Urinalysis Basic - ( 15 Apr 2024 03:20 )    Color: x / Appearance: x / SG: x / pH: x  Gluc: 375 mg/dL / Ketone: x  / Bili: x / Urobili: x   Blood: x / Protein: x / Nitrite: x   Leuk Esterase: x / RBC: x / WBC x   Sq Epi: x / Non Sq Epi: x / Bacteria: x          RADIOLOGY & ADDITIONAL STUDIES REVIEWED:  ***    [ ]GOALS OF CARE DISCUSSION WITH PATIENT/FAMILY/PROXY:   INTERVAL HPI/OVERNIGHT EVENTS: No acute overnight events. Pt is comfortably breathing on NC.     PRESSORS: [ ] YES [x ] NO  WHICH:    MEDICATIONS:     Antimicrobial:    Cardiovascular:  furosemide    Tablet 20 milliGRAM(s) Oral daily    Pulmonary:  albuterol/ipratropium for Nebulization 3 milliLiter(s) Nebulizer every 6 hours  budesonide 160 MICROgram(s)/formoterol 4.5 MICROgram(s) Inhaler 2 Puff(s) Inhalation two times a day    Hematalogic:  enoxaparin Injectable 60 milliGRAM(s) SubCutaneous every 12 hours    Other:  atorvastatin 40 milliGRAM(s) Oral at bedtime  benzocaine/menthol Lozenge 1 Lozenge Oral every 4 hours  insulin glargine Injectable (LANTUS) 24 Unit(s) SubCutaneous at bedtime  insulin lispro (ADMELOG) corrective regimen sliding scale   SubCutaneous three times a day before meals  insulin lispro (ADMELOG) corrective regimen sliding scale   SubCutaneous at bedtime  insulin lispro Injectable (ADMELOG) 13 Unit(s) SubCutaneous three times a day before meals  methylPREDNISolone sodium succinate Injectable 40 milliGRAM(s) IV Push every 12 hours  pantoprazole    Tablet 40 milliGRAM(s) Oral before breakfast  senna 1 Tablet(s) Oral daily  traZODone 100 milliGRAM(s) Oral at bedtime      Drug Dosing Weight  Height (cm): 167.6 (11 Apr 2024 21:44)  Weight (kg): 54.7 (12 Apr 2024 04:13)  BMI (kg/m2): 19.5 (12 Apr 2024 04:13)  BSA (m2): 1.61 (12 Apr 2024 04:13)    ICU Vital Signs Last 24 Hrs  T(C): 36.7 (15 Apr 2024 04:24), Max: 36.8 (14 Apr 2024 23:37)  T(F): 98 (15 Apr 2024 04:24), Max: 98.3 (14 Apr 2024 23:37)  HR: 94 (15 Apr 2024 06:00) (89 - 109)  BP: 149/81 (15 Apr 2024 06:00) (114/64 - 155/91)  BP(mean): 98 (15 Apr 2024 06:00) (78 - 129)  ABP: --  ABP(mean): --  RR: 11 (15 Apr 2024 06:00) (11 - 39)  SpO2: 100% (15 Apr 2024 06:00) (96% - 100%)    O2 Parameters below as of 14 Apr 2024 20:00  Patient On (Oxygen Delivery Method): nasal cannula  O2 Flow (L/min): 3                04-14 @ 07:01  -  04-15 @ 07:00  --------------------------------------------------------  IN: 1360 mL / OUT: 1860 mL / NET: -500 mL          PHYSICAL EXAM:  General: NAD, supine in bed  HEENT:  RICHARD, moist mucus membrane              Lungs: Clear lung sounds auscultated bilaterally with some bibasilar rales. No accessory muscle use. Speaking in clear full sentences.   Cardiovascular: Tachycardic, regular rate and rhythm. No murmurs, gallops  Gastrointestinal: Soft, Positive BS, no tenderness to palpation  Musculoskeletal: No clubbing or peripheral cyanosis.  Moves all extremities. No LE edema; Homans neg  Skin: Warm and dry  Neurological: AAOX3, awake, alert and following commands. No motor or sensory deficit       LABS:  CBC Full  -  ( 15 Apr 2024 03:20 )  WBC Count : 8.14 K/uL  RBC Count : 3.92 M/uL  Hemoglobin : 10.3 g/dL  Hematocrit : 33.0 %  Platelet Count - Automated : 338 K/uL  Mean Cell Volume : 84.2 fl  Mean Cell Hemoglobin : 26.3 pg  Mean Cell Hemoglobin Concentration : 31.2 gm/dL  Auto Neutrophil # : x  Auto Lymphocyte # : x  Auto Monocyte # : x  Auto Eosinophil # : x  Auto Basophil # : x  Auto Neutrophil % : x  Auto Lymphocyte % : x  Auto Monocyte % : x  Auto Eosinophil % : x  Auto Basophil % : x    04-15    138  |  103  |  28<H>  ----------------------------<  375<H>  4.3   |  29  |  0.89    Ca    8.2<L>      15 Apr 2024 03:20  Phos  2.4     04-15  Mg     2.3     04-15    TPro  6.0  /  Alb  2.2<L>  /  TBili  0.3  /  DBili  x   /  AST  33  /  ALT  32  /  AlkPhos  83  04-15      Urinalysis Basic - ( 15 Apr 2024 03:20 )    Color: x / Appearance: x / SG: x / pH: x  Gluc: 375 mg/dL / Ketone: x  / Bili: x / Urobili: x   Blood: x / Protein: x / Nitrite: x   Leuk Esterase: x / RBC: x / WBC x   Sq Epi: x / Non Sq Epi: x / Bacteria: x          RADIOLOGY & ADDITIONAL STUDIES REVIEWED:  ***    [ ]GOALS OF CARE DISCUSSION WITH PATIENT/FAMILY/PROXY:

## 2024-04-15 NOTE — DIETITIAN INITIAL EVALUATION ADULT - MALNUTRITION
PCM (moderate) related to acute on chronic  illness  (including uncontrolled blood sugar) as evidenced by decreased PO/ appetite w/ weight loss PTA (? time), loss muscle mass/ weakness.

## 2024-04-15 NOTE — DIETITIAN INITIAL EVALUATION ADULT - ORAL INTAKE PTA/DIET HISTORY
decreased appetite/ PO PTA (per MST screen), does not Follow-up note in document section. Does not Follow-up note in document section. therapeutic diet

## 2024-04-15 NOTE — DIETITIAN INITIAL EVALUATION ADULT - OTHER INFO
admit w/ AHRF 2/2 to ILD vs PNA. Discussed on [facility] IDR, for endo consult. Noted as worsening hyperglycemia in setting of chronic prednisone. Pt visited, she reports her #. Pt ate well at lunch. Food requests relayed to kitchen. Pt accepted diet ed/ copies on Heart Healthy Consistent CHO, CHO counting for Diabetes and Fluid control

## 2024-04-15 NOTE — DIETITIAN INITIAL EVALUATION ADULT - NSFNSPHYEXAMSKINFT_GEN_A_CORE
Pressure Injury 1: none, Healed  Pressure Injury 2: none, none  Pressure Injury 3: none, none  Pressure Injury 4: none, none  Pressure Injury 5: none, none  Pressure Injury 6: none, none  Pressure Injury 7: none, none  Pressure Injury 8: none, none  Pressure Injury 9: none, none  Pressure Injury 10: none, none  Pressure Injury 11: none, none, Pressure Injury 1: coccyx, Stage II  Pressure Injury 2: none, none  Pressure Injury 3: none, none  Pressure Injury 4: none, none  Pressure Injury 5: none, none  Pressure Injury 6: none, none  Pressure Injury 7: none, none  Pressure Injury 8: none, none  Pressure Injury 9: none, none  Pressure Injury 10: none, none  Pressure Injury 11: none, none, Pressure Injury 1: coccyx, Stage II  Pressure Injury 2: none, none  Pressure Injury 3: none, none  Pressure Injury 4: none, none  Pressure Injury 5: none, none  Pressure Injury 6: none, none  Pressure Injury 7: none, none  Pressure Injury 8: none, none  Pressure Injury 9: none, none  Pressure Injury 10: none, none  Pressure Injury 11: none, none

## 2024-04-15 NOTE — DIETITIAN INITIAL EVALUATION ADULT - REASON INDICATOR FOR ASSESSMENT
EDU, MST 2 or greater (weight loss w/ poor decreased appetite/ PO), Pressure injury stage 2 or greater (skin intake per wound care RN), A1c 11.9

## 2024-04-15 NOTE — DIETITIAN INITIAL EVALUATION ADULT - ADD RECOMMEND
PCM (moderate). May Consider Ensure Plant Based Once 1-2/ day. Diet ed given. MD to monitor. RD available.

## 2024-04-16 NOTE — PROGRESS NOTE ADULT - PROBLEM SELECTOR PLAN 3
HbA1c 11.9  likely from steroid therapy outpatient  continue lantus and premeal  ISS  Endocrine Dr. Zavaleta

## 2024-04-16 NOTE — PROGRESS NOTE ADULT - PROBLEM SELECTOR PLAN 1
secondary to acute on chronic ILD  continue steroid therapy  continue oxygen therapy  see CT chest as above  outpatient follow up with Pulmonology Lung transplant

## 2024-04-16 NOTE — CHART NOTE - NSCHARTNOTEFT_GEN_A_CORE
63 year old F from home ambulates with walker w/ pmhx chronic hypoxic respiratory failure on 2-3L NC at home 2/2 ILD, currently on ofev , unprovoked? PE (dx  on 2022, currently on Eliquis 5 mg PO QD), severe pulmonary HTN (based on TTE from 2023), IDDM, cor pulmonale who came in with c/o SOB. As per pt since a week before admission she was experiencing intermittent SOB, however, since Tuesday of the week of admission endorsed becoming more dyspneic on exertion and at rest, associated with dry cough, not associated with wheezing, CP, LE edema. In the ED patient was placed on NRB. ICU was consulted for worsening hypoxia and transitioned to Venti mask 35% @ 12 L saturating 92-95% and tachypneic. Pt was then placed and HFNC and admitted to ICU for acute hypoxic respiratory failure. Patient admitted to ICU for acute on chronic hypoxic respiratory failure requiring HFNC 2/2 to ILD flair.     While in ICU patient was started on Levaquin (4/11-4/13) but it was discontinued given CT chest did not have signs of pneumonia. Pt was also started on IV steroids, Duoneb, Symbicort, and diuresed with IV Lasix. Patient has history of urinary retention and was retaining 2L urine and a Godwin was placed. Patient was transitioned to PO steroids on 4/16. Lovenox transitioned to Eliquis BID. Patient's family has been asked to bring home dose of PO Ofirmev which will be continued. Patient was able to be weaned of HFNC and currently on 3L O2 NC with no respiratory distress. Patient echo on 4/12/24 showed EF 65% with normal right artery systolic pressure. Patient remains hemodynamically stable and downgraded to medicine.     Patient signed out to NP [id number] and attending Dr. Kearney.     For Follow-up:  [ ] Outpatient Pulm follow-up for Lung transplant eval   [ ] Continue Lasix 20mg PO QD  [ ] Continue Tapering steroids  [ ] TOV for godwin 63 year old F from home ambulates with walker w/ pmhx chronic hypoxic respiratory failure on 2-3L NC at home 2/2 ILD, currently on ofev , unprovoked? PE (dx  on 2022, currently on Eliquis 5 mg PO QD), severe pulmonary HTN (based on TTE from 2023), IDDM, cor pulmonale who came in with c/o SOB. As per pt since a week before admission she was experiencing intermittent SOB, however, since Tuesday of the week of admission endorsed becoming more dyspneic on exertion and at rest, associated with dry cough, not associated with wheezing, CP, LE edema. In the ED patient was placed on NRB. ICU was consulted for worsening hypoxia and transitioned to Venti mask 35% @ 12 L saturating 92-95% and tachypneic. Pt was then placed and HFNC and admitted to ICU for acute hypoxic respiratory failure. Patient admitted to ICU for acute on chronic hypoxic respiratory failure requiring HFNC 2/2 to ILD flair.     While in ICU patient was started on Levaquin (4/11-4/13) but it was discontinued given CT chest did not have signs of pneumonia. Pt was also started on IV steroids, Duoneb, Symbicort, and diuresed with IV Lasix. Patient has history of urinary retention and was retaining 2L urine and a Godwin was placed. Patient was transitioned to PO steroids on 4/16. Lovenox transitioned to Eliquis BID. Patient's family has been asked to bring home dose of PO Ofirmev which will be continued. Patient was able to be weaned of HFNC and currently on 3L O2 NC with no respiratory distress. Patient echo on 4/12/24 showed EF 65% with normal right artery systolic pressure. Patient remains hemodynamically stable and downgraded to medicine.     Patient signed out to NP Lola and attending Dr. Kearney.     For Follow-up:  [ ] Outpatient Pulm follow-up for Lung transplant eval   [ ] Continue Lasix 20mg PO QD  [ ] Continue Tapering steroids  [ ] TOV for godwin 63 year old F from home ambulates with walker w/ pmhx chronic hypoxic respiratory failure on 2-3L NC at home 2/2 ILD, currently on ofev , unprovoked? PE (dx  on 2022, currently on Eliquis 5 mg PO QD), severe pulmonary HTN (based on TTE from 2023), IDDM, cor pulmonale who came in with c/o SOB. As per pt since a week before admission she was experiencing intermittent SOB, however, since Tuesday of the week of admission endorsed becoming more dyspneic on exertion and at rest, associated with dry cough, not associated with wheezing, CP, LE edema. In the ED patient was placed on NRB. ICU was consulted for worsening hypoxia and transitioned to Venti mask 35% @ 12 L saturating 92-95% and tachypneic. Pt was then placed and HFNC and admitted to ICU for acute hypoxic respiratory failure. Patient admitted to ICU for acute on chronic hypoxic respiratory failure requiring HFNC 2/2 to ILD flair.     While in ICU patient was started on Levaquin (4/11-4/13) but it was discontinued given CT chest did not have signs of pneumonia. Pt was also started on IV steroids, Duoneb, Symbicort, and diuresed with IV Lasix. Patient has history of urinary retention and was retaining 2L urine and a Godwin was placed. Patient was transitioned to PO steroids on 4/16. Lovenox transitioned to Eliquis BID. Patient's family has been asked to bring home dose of PO Ofirmev which will be continued. Patient was able to be weaned of HFNC and currently on 3L O2 NC with no respiratory distress. Patient echo on 4/12/24 showed EF 65% with normal right artery systolic pressure. Patient remains hemodynamically stable and downgraded to medicine.     Patient signed out to NP Lola and attending Dr. Kearney.     For Follow-up:  [ ] Outpatient Pulm follow-up for Lung transplant eval   [ ] Continue Lasix 20mg PO QD  [ ] Continue Tapering steroids  [ ] TOV for godwin  [ ] Endo consult in AM.

## 2024-04-16 NOTE — CONSULT NOTE ADULT - PROBLEM SELECTOR RECOMMENDATION 9
uncont with hyperglycemia  on steroids  hba1c- 11  cont lantus 24  admelog 13 and dec dose as steroids taper  nutrition eval  fsg ac and hs  d/w prim team

## 2024-04-16 NOTE — PROGRESS NOTE ADULT - PROBLEM SELECTOR PLAN 4
p/w BUN and SCr 22 and 1.74   Baseline 1.2-1.4  s/p IV bolus in ED  avoid nephrotoxic agents such as ACE/ARBs, contrast agent   continue godwin- attempt TOV before DC  started pyridium

## 2024-04-16 NOTE — CONSULT NOTE ADULT - SUBJECTIVE AND OBJECTIVE BOX
Patient is a 63y old  Female who presents with a chief complaint of Shortness of breath     (15 Apr 2024 14:53)      HPI:  Patient is a 62 yo female with hx of Pulmonary fibrosis (diagnosed 2021), not on home oxygen, Pulmonary embolism on Eliquis, DM on Lantus and Metformin  presented with shortness of breath. Pt reported of shortness of breath since the evening, and was persistently hypoxic at home with home inhalers not improving breathing. She describes SOB most prominent at rest and with minimal exertion. Endorses palpitations and left calf tenderness. She takes Eliquis once a day. Denied any fever, chills, chest pain, palpitation, or other acute symptoms such as abdominal pain, N/V/D, urinary symptoms.   Patient was recently admitted to [facility] in March for ILD flair, dopplers and VQ scan done then showing low probability of PE.       In ED:   vitals: BP: 94/56 HR: 112, T: 36.6 NRB> Venti mask   s/p Levaquin 1L LR  Lantus 10 units   CXR increase intersitial infiltrates  (12 Apr 2024 02:05)      PAST MEDICAL & SURGICAL HISTORY:  Diabetes mellitus      Hyperlipidemia      Pulmonary embolism      Pulmonary fibrosis      Cataract  right eye             MEDICATIONS  (STANDING):  albuterol/ipratropium for Nebulization 3 milliLiter(s) Nebulizer every 6 hours  apixaban 5 milliGRAM(s) Oral every 12 hours  atorvastatin 40 milliGRAM(s) Oral at bedtime  benzocaine/menthol Lozenge 1 Lozenge Oral every 4 hours  budesonide 160 MICROgram(s)/formoterol 4.5 MICROgram(s) Inhaler 2 Puff(s) Inhalation two times a day  chlorhexidine 2% Cloths 1 Application(s) Topical <User Schedule>  furosemide    Tablet 20 milliGRAM(s) Oral daily  insulin glargine Injectable (LANTUS) 24 Unit(s) SubCutaneous at bedtime  insulin lispro (ADMELOG) corrective regimen sliding scale   SubCutaneous three times a day before meals  insulin lispro (ADMELOG) corrective regimen sliding scale   SubCutaneous at bedtime  insulin lispro Injectable (ADMELOG) 13 Unit(s) SubCutaneous three times a day before meals  pantoprazole    Tablet 40 milliGRAM(s) Oral before breakfast  predniSONE   Tablet 40 milliGRAM(s) Oral daily  senna 1 Tablet(s) Oral daily  traZODone 100 milliGRAM(s) Oral at bedtime    MEDICATIONS  (PRN):  cyclobenzaprine 5 milliGRAM(s) Oral three times a day PRN Muscle Spasm      FAMILY HISTORY:  Family history of MI (myocardial infarction) (Father)        SOCIAL HISTORY:      REVIEW OF SYSTEMS:  CONSTITUTIONAL: No fever, weight loss, or fatigue  EYES: No eye pain, visual disturbances, or discharge  ENT:  No difficulty hearing, tinnitus, vertigo; No sinus or throat pain  NECK: No pain or stiffness  RESPIRATORY: No cough, wheezing, chills or hemoptysis; No Shortness of Breath  CARDIOVASCULAR: No chest pain, palpitations, passing out, dizziness, or leg swelling  GASTROINTESTINAL: No abdominal or epigastric pain. No nausea, vomiting, or hematemesis; No diarrhea or constipation. No melena or hematochezia.  GENITOURINARY: No dysuria, frequency, hematuria, or incontinence  NEUROLOGICAL: No headaches, memory loss, loss of strength, numbness, or tremors  SKIN: No itching, burning, rashes, or lesions   LYMPH Nodes: No enlarged glands  ENDOCRINE: No heat or cold intolerance; No hair loss  MUSCULOSKELETAL: No joint pain or swelling; No muscle, back, or extremity pain  PSYCHIATRIC: No depression, anxiety, mood swings, or difficulty sleeping  HEME/LYMPH: No easy bruising, or bleeding gums  ALLERGY AND IMMUNOLOGIC: No hives or eczema	        Vital Signs Last 24 Hrs  T(C): 36.3 (15 Apr 2024 20:33), Max: 36.8 (15 Apr 2024 15:51)  T(F): 97.4 (15 Apr 2024 20:33), Max: 98.3 (15 Apr 2024 15:51)  HR: 102 (16 Apr 2024 04:24) (95 - 111)  BP: 133/80 (16 Apr 2024 04:24) (124/76 - 159/94)  BP(mean): 98 (16 Apr 2024 04:24) (86 - 115)  RR: 20 (16 Apr 2024 07:58) (13 - 37)  SpO2: 98% (16 Apr 2024 07:58) (96% - 100%)    Parameters below as of 16 Apr 2024 07:58  Patient On (Oxygen Delivery Method): nasal cannula  O2 Flow (L/min): 3        Constitutional:    NC/AT:    HEENT:    Neck:  No JVD, bruits or thyromegaly    Respiratory:  Clear without rales or rhonchi    Cardiovascular:  RR without murmur, rub or gallop.    Gastrointestinal: Soft without hepatosplenomegaly.    Extremities: without cyanosis, clubbing or edema.    Neurological:  Oriented   x      . No gross sensory or motor defects.        LABS:                        10.3   8.14  )-----------( 338      ( 15 Apr 2024 03:20 )             33.0     04-15    138  |  103  |  28<H>  ----------------------------<  375<H>  4.3   |  29  |  0.89    Ca    8.2<L>      15 Apr 2024 03:20  Phos  2.4     04-15  Mg     2.3     04-15    TPro  6.0  /  Alb  2.2<L>  /  TBili  0.3  /  DBili  x   /  AST  33  /  ALT  32  /  AlkPhos  83  04-15          Urinalysis Basic - ( 15 Apr 2024 03:20 )    Color: x / Appearance: x / SG: x / pH: x  Gluc: 375 mg/dL / Ketone: x  / Bili: x / Urobili: x   Blood: x / Protein: x / Nitrite: x   Leuk Esterase: x / RBC: x / WBC x   Sq Epi: x / Non Sq Epi: x / Bacteria: x      CAPILLARY BLOOD GLUCOSE      POCT Blood Glucose.: 212 mg/dL (16 Apr 2024 07:31)  POCT Blood Glucose.: 155 mg/dL (15 Apr 2024 21:53)  POCT Blood Glucose.: 208 mg/dL (15 Apr 2024 16:17)  POCT Blood Glucose.: 181 mg/dL (15 Apr 2024 11:36)      RADIOLOGY & ADDITIONAL STUDIES: Patient is a 63y old  Female who presents with a chief complaint of Shortness of breath     (15 Apr 2024 14:53)      HPI:  Patient is a 64 yo female with hx of Pulmonary fibrosis (diagnosed 2021), not on home oxygen, Pulmonary embolism on Eliquis, DM on Lantus and Metformin  presented with shortness of breath. Pt reported of shortness of breath since the evening, and was persistently hypoxic at home with home inhalers not improving breathing. She describes SOB most prominent at rest and with minimal exertion. Endorses palpitations and left calf tenderness. She takes Eliquis once a day. Denied any fever, chills, chest pain, palpitation, or other acute symptoms such as abdominal pain, N/V/D, urinary symptoms.   Patient was recently admitted to [facility] in March for ILD flair, dopplers and VQ scan done then showing low probability of PE. Found to have uncont dm. Pt admits to taking lantus 30 at night and premeal 25ac tid ? injection technique reviewed- admits to noncompliance with diet. On and off on prednisone       In ED:   vitals: BP: 94/56 HR: 112, T: 36.6 NRB> Venti mask   s/p Levaquin 1L LR  Lantus 10 units   CXR increase intersitial infiltrates  (12 Apr 2024 02:05)      PAST MEDICAL & SURGICAL HISTORY:  Diabetes mellitus      Hyperlipidemia      Pulmonary embolism      Pulmonary fibrosis      Cataract  right eye             MEDICATIONS  (STANDING):  albuterol/ipratropium for Nebulization 3 milliLiter(s) Nebulizer every 6 hours  apixaban 5 milliGRAM(s) Oral every 12 hours  atorvastatin 40 milliGRAM(s) Oral at bedtime  benzocaine/menthol Lozenge 1 Lozenge Oral every 4 hours  budesonide 160 MICROgram(s)/formoterol 4.5 MICROgram(s) Inhaler 2 Puff(s) Inhalation two times a day  chlorhexidine 2% Cloths 1 Application(s) Topical <User Schedule>  furosemide    Tablet 20 milliGRAM(s) Oral daily  insulin glargine Injectable (LANTUS) 24 Unit(s) SubCutaneous at bedtime  insulin lispro (ADMELOG) corrective regimen sliding scale   SubCutaneous three times a day before meals  insulin lispro (ADMELOG) corrective regimen sliding scale   SubCutaneous at bedtime  insulin lispro Injectable (ADMELOG) 13 Unit(s) SubCutaneous three times a day before meals  pantoprazole    Tablet 40 milliGRAM(s) Oral before breakfast  predniSONE   Tablet 40 milliGRAM(s) Oral daily  senna 1 Tablet(s) Oral daily  traZODone 100 milliGRAM(s) Oral at bedtime    MEDICATIONS  (PRN):  cyclobenzaprine 5 milliGRAM(s) Oral three times a day PRN Muscle Spasm      FAMILY HISTORY:  Family history of MI (myocardial infarction) (Father)        SOCIAL HISTORY:      REVIEW OF SYSTEMS:  CONSTITUTIONAL: No fever, weight loss, or fatigue  EYES: No eye pain, visual disturbances, or discharge  ENT:  No difficulty hearing, tinnitus, vertigo; No sinus or throat pain  NECK: No pain or stiffness  RESPIRATORY: No cough, wheezing, chills or hemoptysis; No Shortness of Breath  CARDIOVASCULAR: No chest pain, palpitations, passing out, dizziness, or leg swelling  GASTROINTESTINAL: No abdominal or epigastric pain. No nausea, vomiting, or hematemesis; No diarrhea or constipation. No melena or hematochezia.  GENITOURINARY: No dysuria, frequency, hematuria, or incontinence  NEUROLOGICAL: No headaches, memory loss, loss of strength, numbness, or tremors  SKIN: No itching, burning, rashes, or lesions   LYMPH Nodes: No enlarged glands  ENDOCRINE: No heat or cold intolerance; No hair loss  MUSCULOSKELETAL: No joint pain or swelling; No muscle, back, or extremity pain  PSYCHIATRIC: No depression, anxiety, mood swings, or difficulty sleeping  HEME/LYMPH: No easy bruising, or bleeding gums  ALLERGY AND IMMUNOLOGIC: No hives or eczema	        Vital Signs Last 24 Hrs  T(C): 36.3 (15 Apr 2024 20:33), Max: 36.8 (15 Apr 2024 15:51)  T(F): 97.4 (15 Apr 2024 20:33), Max: 98.3 (15 Apr 2024 15:51)  HR: 102 (16 Apr 2024 04:24) (95 - 111)  BP: 133/80 (16 Apr 2024 04:24) (124/76 - 159/94)  BP(mean): 98 (16 Apr 2024 04:24) (86 - 115)  RR: 20 (16 Apr 2024 07:58) (13 - 37)  SpO2: 98% (16 Apr 2024 07:58) (96% - 100%)    Parameters below as of 16 Apr 2024 07:58  Patient On (Oxygen Delivery Method): nasal cannula  O2 Flow (L/min): 3        Constitutional:    HEENT: nad    Neck:  No JVD, bruits or thyromegaly    Respiratory:  Clear without rales or rhonchi    Cardiovascular:  RR without murmur, rub or gallop.    Gastrointestinal: Soft without hepatosplenomegaly.    Extremities: without cyanosis, clubbing or edema.    Neurological:  Oriented   x   3   . No gross sensory or motor defects.        LABS:                        10.3   8.14  )-----------( 338      ( 15 Apr 2024 03:20 )             33.0     04-15    138  |  103  |  28<H>  ----------------------------<  375<H>  4.3   |  29  |  0.89    Ca    8.2<L>      15 Apr 2024 03:20  Phos  2.4     04-15  Mg     2.3     04-15    TPro  6.0  /  Alb  2.2<L>  /  TBili  0.3  /  DBili  x   /  AST  33  /  ALT  32  /  AlkPhos  83  04-15          Urinalysis Basic - ( 15 Apr 2024 03:20 )    Color: x / Appearance: x / SG: x / pH: x  Gluc: 375 mg/dL / Ketone: x  / Bili: x / Urobili: x   Blood: x / Protein: x / Nitrite: x   Leuk Esterase: x / RBC: x / WBC x   Sq Epi: x / Non Sq Epi: x / Bacteria: x      CAPILLARY BLOOD GLUCOSE      POCT Blood Glucose.: 212 mg/dL (16 Apr 2024 07:31)  POCT Blood Glucose.: 155 mg/dL (15 Apr 2024 21:53)  POCT Blood Glucose.: 208 mg/dL (15 Apr 2024 16:17)  POCT Blood Glucose.: 181 mg/dL (15 Apr 2024 11:36)      RADIOLOGY & ADDITIONAL STUDIES:

## 2024-04-16 NOTE — PROGRESS NOTE ADULT - TIME BILLING
ICU Downgrade, multiple acute medical issues requiring intervention. Review of labs cbc cmp ma gphos. Review of current and previous imaging- CT chest, echocardiogram. Discussion with patient's daughter, history via independent historian. medication management. Formulation of plan. Documentation of Encounter. ICU Downgrade, multiple acute medical issues requiring intervention. Review of labs cbc cmp ma gphos. Review of current and previous imaging- CT chest, echocardiogram. Discussion with patient's daughter, history via independent historian. medication management. Formulation of plan. Documentation of Encounter. Discussion with endocrine attending

## 2024-04-16 NOTE — PROGRESS NOTE ADULT - ASSESSMENT
63 year old F from home ambulates with walker w/ pmhx chronic hypoxic respiratory failure on 2-3L NC at home 2/2 ILD, currently on ofev , unprovoked? PE (dx  on 2022, currently on Eliquis 5 mg PO QD), severe pulmonary HTN (based on TTE from 2023), IDDM, cor pulmonale who came in with c/o SOB. As per pt since a week before admission she was experiencing intermittent SOB, however, since Tuesday of the week of admission endorsed becoming more dyspneic on exertion and at rest, associated with dry cough, not associated with wheezing, CP, LE edema. In the ED patient was placed on NRB. ICU was consulted for worsening hypoxia and transitioned to Venti mask 35% @ 12 L saturating 92-95% and tachypneic. Pt was then placed and HFNC and admitted to ICU for acute hypoxic respiratory failure. Patient admitted to ICU for acute on chronic hypoxic respiratory failure requiring HFNC 2/2 to ILD flair.     While in ICU patient was started on Levaquin (4/11-4/13) but it was discontinued given CT chest did not have signs of pneumonia. Pt was also started on IV steroids, Duoneb, Symbicort, and diuresed with IV Lasix. Patient has history of urinary retention and was retaining 2L urine and a Pike was placed. Patient was transitioned to PO steroids on 4/16. Lovenox transitioned to Eliquis BID. Patient's family has been asked to bring home dose of PO Ofirmev which will be continued. Patient was able to be weaned of HFNC and currently on 3L O2 NC with no respiratory distress. Patient echo on 4/12/24 showed EF 65% with normal right artery systolic pressure. Patient remains hemodynamically stable and downgraded to medicine. A1c 11.9.

## 2024-04-16 NOTE — PROGRESS NOTE ADULT - SUBJECTIVE AND OBJECTIVE BOX
NP Note discussed with  Primary Attending    Patient is a 63y old  Female who presents with a chief complaint of AHRF 2/2 ILD flair vs PNA (16 Apr 2024 10:25)      INTERVAL HPI/OVERNIGHT EVENTS: no new complaints    MEDICATIONS  (STANDING):  albuterol/ipratropium for Nebulization 3 milliLiter(s) Nebulizer every 6 hours  apixaban 5 milliGRAM(s) Oral every 12 hours  atorvastatin 40 milliGRAM(s) Oral at bedtime  benzocaine/menthol Lozenge 1 Lozenge Oral every 4 hours  budesonide 160 MICROgram(s)/formoterol 4.5 MICROgram(s) Inhaler 2 Puff(s) Inhalation two times a day  chlorhexidine 2% Cloths 1 Application(s) Topical <User Schedule>  furosemide    Tablet 20 milliGRAM(s) Oral daily  insulin glargine Injectable (LANTUS) 24 Unit(s) SubCutaneous at bedtime  insulin lispro (ADMELOG) corrective regimen sliding scale   SubCutaneous three times a day before meals  insulin lispro (ADMELOG) corrective regimen sliding scale   SubCutaneous at bedtime  insulin lispro Injectable (ADMELOG) 13 Unit(s) SubCutaneous three times a day before meals  pantoprazole    Tablet 40 milliGRAM(s) Oral before breakfast  predniSONE   Tablet 40 milliGRAM(s) Oral daily  senna 1 Tablet(s) Oral daily  traZODone 100 milliGRAM(s) Oral at bedtime    MEDICATIONS  (PRN):  cyclobenzaprine 5 milliGRAM(s) Oral three times a day PRN Muscle Spasm      __________________________________________________  REVIEW OF SYSTEMS:    CONSTITUTIONAL: No fever,   EYES: no acute visual disturbances  NECK: No pain or stiffness  RESPIRATORY: No cough; No shortness of breath  CARDIOVASCULAR: No chest pain, no palpitations  GASTROINTESTINAL: No pain. No nausea or vomiting; No diarrhea   NEUROLOGICAL: No headache or numbness, no tremors  MUSCULOSKELETAL: No joint pain, no muscle pain  GENITOURINARY: no dysuria, no frequency, no hesitancy  PSYCHIATRY: no depression , no anxiety  ALL OTHER  ROS negative        Vital Signs Last 24 Hrs  T(C): 36.3 (15 Apr 2024 20:33), Max: 36.8 (15 Apr 2024 15:51)  T(F): 97.4 (15 Apr 2024 20:33), Max: 98.3 (15 Apr 2024 15:51)  HR: 102 (16 Apr 2024 04:24) (95 - 111)  BP: 133/80 (16 Apr 2024 04:24) (128/73 - 159/94)  BP(mean): 98 (16 Apr 2024 04:24) (86 - 115)  RR: 20 (16 Apr 2024 07:58) (13 - 37)  SpO2: 98% (16 Apr 2024 07:58) (96% - 100%)    Parameters below as of 16 Apr 2024 07:58  Patient On (Oxygen Delivery Method): nasal cannula  O2 Flow (L/min): 3      ________________________________________________  PHYSICAL EXAM:  GENERAL: NAD  HEENT: Normocephalic;  conjunctivae and sclerae clear; moist mucous membranes;   NECK : supple  CHEST/LUNG: Clear to auscultation bilaterally with good air entry   HEART: S1 S2  regular; no murmurs, gallops or rubs  ABDOMEN: Soft, Nontender, Nondistended; Bowel sounds present  EXTREMITIES: no cyanosis; no edema; no calf tenderness  SKIN: warm and dry; no rash  NERVOUS SYSTEM:  Awake and alert; Oriented  to place, person and time ; no new deficits    _________________________________________________  LABS:                        11.3   6.99  )-----------( 343      ( 16 Apr 2024 10:40 )             37.0     04-16    138  |  101  |  21<H>  ----------------------------<  266<H>  4.6   |  32<H>  |  0.88    Ca    9.1      16 Apr 2024 10:40  Phos  2.5     04-16  Mg     2.1     04-16    TPro  6.0  /  Alb  2.2<L>  /  TBili  0.3  /  DBili  x   /  AST  33  /  ALT  32  /  AlkPhos  83  04-15      Urinalysis Basic - ( 16 Apr 2024 10:40 )    Color: x / Appearance: x / SG: x / pH: x  Gluc: 266 mg/dL / Ketone: x  / Bili: x / Urobili: x   Blood: x / Protein: x / Nitrite: x   Leuk Esterase: x / RBC: x / WBC x   Sq Epi: x / Non Sq Epi: x / Bacteria: x      CAPILLARY BLOOD GLUCOSE      POCT Blood Glucose.: 275 mg/dL (16 Apr 2024 11:18)  POCT Blood Glucose.: 212 mg/dL (16 Apr 2024 07:31)  POCT Blood Glucose.: 155 mg/dL (15 Apr 2024 21:53)  POCT Blood Glucose.: 208 mg/dL (15 Apr 2024 16:17)        RADIOLOGY & ADDITIONAL TESTS:    Imaging  Reviewed:  YES/NO    Consultant(s) Notes Reviewed:   YES/ No      Plan of care was discussed with patient and /or primary care giver; all questions and concerns were addressed  NP Note discussed with  Primary Attending    Patient is a 63y old  Female who presents with a chief complaint of AHRF 2/2 ILD flair vs PNA (16 Apr 2024 10:25)      INTERVAL HPI/OVERNIGHT EVENTS: no new complaints, persistent sob. Pike in place, patient endorsing a history of urinary retention. No fever or chills.    MEDICATIONS  (STANDING):  albuterol/ipratropium for Nebulization 3 milliLiter(s) Nebulizer every 6 hours  apixaban 5 milliGRAM(s) Oral every 12 hours  atorvastatin 40 milliGRAM(s) Oral at bedtime  benzocaine/menthol Lozenge 1 Lozenge Oral every 4 hours  budesonide 160 MICROgram(s)/formoterol 4.5 MICROgram(s) Inhaler 2 Puff(s) Inhalation two times a day  chlorhexidine 2% Cloths 1 Application(s) Topical <User Schedule>  furosemide    Tablet 20 milliGRAM(s) Oral daily  insulin glargine Injectable (LANTUS) 24 Unit(s) SubCutaneous at bedtime  insulin lispro (ADMELOG) corrective regimen sliding scale   SubCutaneous three times a day before meals  insulin lispro (ADMELOG) corrective regimen sliding scale   SubCutaneous at bedtime  insulin lispro Injectable (ADMELOG) 13 Unit(s) SubCutaneous three times a day before meals  pantoprazole    Tablet 40 milliGRAM(s) Oral before breakfast  predniSONE   Tablet 40 milliGRAM(s) Oral daily  senna 1 Tablet(s) Oral daily  traZODone 100 milliGRAM(s) Oral at bedtime    MEDICATIONS  (PRN):  cyclobenzaprine 5 milliGRAM(s) Oral three times a day PRN Muscle Spasm      __________________________________________________  REVIEW OF SYSTEMS:    CONSTITUTIONAL: No fever,   EYES: no acute visual disturbances  NECK: No pain or stiffness  RESPIRATORY: No cough; No shortness of breath  CARDIOVASCULAR: No chest pain, no palpitations  GASTROINTESTINAL: No pain. No nausea or vomiting; No diarrhea   NEUROLOGICAL: No headache or numbness, no tremors  MUSCULOSKELETAL: No joint pain, no muscle pain  GENITOURINARY: no dysuria, no frequency, no hesitancy  PSYCHIATRY: no depression , no anxiety  ALL OTHER  ROS negative        Vital Signs Last 24 Hrs  T(C): 36.3 (15 Apr 2024 20:33), Max: 36.8 (15 Apr 2024 15:51)  T(F): 97.4 (15 Apr 2024 20:33), Max: 98.3 (15 Apr 2024 15:51)  HR: 102 (16 Apr 2024 04:24) (95 - 111)  BP: 133/80 (16 Apr 2024 04:24) (128/73 - 159/94)  BP(mean): 98 (16 Apr 2024 04:24) (86 - 115)  RR: 20 (16 Apr 2024 07:58) (13 - 37)  SpO2: 98% (16 Apr 2024 07:58) (96% - 100%)    Parameters below as of 16 Apr 2024 07:58  Patient On (Oxygen Delivery Method): nasal cannula  O2 Flow (L/min): 3      ________________________________________________  PHYSICAL EXAM:  GENERAL: NAD  HEENT: Normocephalic;  conjunctivae and sclerae clear; moist mucous membranes;   NECK : supple  CHEST/LUNG: Clear to auscultation bilaterally with good air entry   HEART: S1 S2  regular; no murmurs, gallops or rubs  ABDOMEN: Soft, Nontender, Nondistended; Bowel sounds present  EXTREMITIES: no cyanosis; no edema; no calf tenderness  SKIN: warm and dry; no rash  NERVOUS SYSTEM:  Awake and alert; Oriented  to place, person and time ; no new deficits    _________________________________________________  LABS:                        11.3   6.99  )-----------( 343      ( 16 Apr 2024 10:40 )             37.0     04-16    138  |  101  |  21<H>  ----------------------------<  266<H>  4.6   |  32<H>  |  0.88    Ca    9.1      16 Apr 2024 10:40  Phos  2.5     04-16  Mg     2.1     04-16    TPro  6.0  /  Alb  2.2<L>  /  TBili  0.3  /  DBili  x   /  AST  33  /  ALT  32  /  AlkPhos  83  04-15      Urinalysis Basic - ( 16 Apr 2024 10:40 )    Color: x / Appearance: x / SG: x / pH: x  Gluc: 266 mg/dL / Ketone: x  / Bili: x / Urobili: x   Blood: x / Protein: x / Nitrite: x   Leuk Esterase: x / RBC: x / WBC x   Sq Epi: x / Non Sq Epi: x / Bacteria: x      CAPILLARY BLOOD GLUCOSE      POCT Blood Glucose.: 275 mg/dL (16 Apr 2024 11:18)  POCT Blood Glucose.: 212 mg/dL (16 Apr 2024 07:31)  POCT Blood Glucose.: 155 mg/dL (15 Apr 2024 21:53)  POCT Blood Glucose.: 208 mg/dL (15 Apr 2024 16:17)        RADIOLOGY & ADDITIONAL TESTS:    Imaging  Reviewed:  YES/NO    Consultant(s) Notes Reviewed:   YES/ No      Plan of care was discussed with patient and /or primary care giver; all questions and concerns were addressed  NP Note discussed with  Primary Attending    Patient is a 63y old  Female who presents with a chief complaint of AHRF 2/2 ILD flair vs PNA (16 Apr 2024 10:25)      INTERVAL HPI/OVERNIGHT EVENTS: no new complaints, persistent sob. Pike in place, patient endorsing a history of urinary retention. No fever or chills.    MEDICATIONS  (STANDING):  albuterol/ipratropium for Nebulization 3 milliLiter(s) Nebulizer every 6 hours  apixaban 5 milliGRAM(s) Oral every 12 hours  atorvastatin 40 milliGRAM(s) Oral at bedtime  benzocaine/menthol Lozenge 1 Lozenge Oral every 4 hours  budesonide 160 MICROgram(s)/formoterol 4.5 MICROgram(s) Inhaler 2 Puff(s) Inhalation two times a day  chlorhexidine 2% Cloths 1 Application(s) Topical <User Schedule>  furosemide    Tablet 20 milliGRAM(s) Oral daily  insulin glargine Injectable (LANTUS) 24 Unit(s) SubCutaneous at bedtime  insulin lispro (ADMELOG) corrective regimen sliding scale   SubCutaneous three times a day before meals  insulin lispro (ADMELOG) corrective regimen sliding scale   SubCutaneous at bedtime  insulin lispro Injectable (ADMELOG) 13 Unit(s) SubCutaneous three times a day before meals  pantoprazole    Tablet 40 milliGRAM(s) Oral before breakfast  predniSONE   Tablet 40 milliGRAM(s) Oral daily  senna 1 Tablet(s) Oral daily  traZODone 100 milliGRAM(s) Oral at bedtime    MEDICATIONS  (PRN):  cyclobenzaprine 5 milliGRAM(s) Oral three times a day PRN Muscle Spasm      __________________________________________________  REVIEW OF SYSTEMS:    CONSTITUTIONAL: No fever,   EYES: no acute visual disturbances  NECK: No pain or stiffness  RESPIRATORY: No cough; shortness of breath  CARDIOVASCULAR: No chest pain, no palpitations  GASTROINTESTINAL: No pain. No nausea or vomiting; No diarrhea   NEUROLOGICAL: No headache or numbness, no tremors  MUSCULOSKELETAL: No joint pain, no muscle pain  GENITOURINARY: no dysuria, no frequency, no hesitancy  PSYCHIATRY: no depression , no anxiety  ALL OTHER  ROS negative        Vital Signs Last 24 Hrs  T(C): 36.3 (15 Apr 2024 20:33), Max: 36.8 (15 Apr 2024 15:51)  T(F): 97.4 (15 Apr 2024 20:33), Max: 98.3 (15 Apr 2024 15:51)  HR: 102 (16 Apr 2024 04:24) (95 - 111)  BP: 133/80 (16 Apr 2024 04:24) (128/73 - 159/94)  BP(mean): 98 (16 Apr 2024 04:24) (86 - 115)  RR: 20 (16 Apr 2024 07:58) (13 - 37)  SpO2: 98% (16 Apr 2024 07:58) (96% - 100%)    Parameters below as of 16 Apr 2024 07:58  Patient On (Oxygen Delivery Method): nasal cannula  O2 Flow (L/min): 3      ________________________________________________  PHYSICAL EXAM:  GENERAL: NAD  HEENT: Normocephalic;  conjunctivae and sclerae clear; moist mucous membranes;   NECK : supple  CHEST/LUNG: diminished with rales bilaterally with good air entry   HEART: S1 S2  regular; no murmurs, gallops or rubs  ABDOMEN: Soft, Nontender, Nondistended; Bowel sounds present  EXTREMITIES: no cyanosis; no edema; no calf tenderness  SKIN: warm and dry; no rash  NERVOUS SYSTEM:  Awake and alert; Oriented  to place, person and time ; no new deficits    _________________________________________________  LABS:                        11.3   6.99  )-----------( 343      ( 16 Apr 2024 10:40 )             37.0     04-16    138  |  101  |  21<H>  ----------------------------<  266<H>  4.6   |  32<H>  |  0.88    Ca    9.1      16 Apr 2024 10:40  Phos  2.5     04-16  Mg     2.1     04-16    TPro  6.0  /  Alb  2.2<L>  /  TBili  0.3  /  DBili  x   /  AST  33  /  ALT  32  /  AlkPhos  83  04-15      Urinalysis Basic - ( 16 Apr 2024 10:40 )    Color: x / Appearance: x / SG: x / pH: x  Gluc: 266 mg/dL / Ketone: x  / Bili: x / Urobili: x   Blood: x / Protein: x / Nitrite: x   Leuk Esterase: x / RBC: x / WBC x   Sq Epi: x / Non Sq Epi: x / Bacteria: x      CAPILLARY BLOOD GLUCOSE      POCT Blood Glucose.: 275 mg/dL (16 Apr 2024 11:18)  POCT Blood Glucose.: 212 mg/dL (16 Apr 2024 07:31)  POCT Blood Glucose.: 155 mg/dL (15 Apr 2024 21:53)  POCT Blood Glucose.: 208 mg/dL (15 Apr 2024 16:17)        RADIOLOGY & ADDITIONAL TESTS:  < from: US Duplex Venous Lower Ext Complete, Bilateral (04.12.24 @ 11:28) >  ACC: [id number] EXAM:  US DPLX LWR EXT VEINS COMPL BI   ORDERED BY: AYDEE FIELDS     PROCEDURE DATE:  04/12/2024          INTERPRETATION:  CLINICAL INFORMATION: History of pulmonary embolism.   Right calf pain.    COMPARISON: Bilateral lower extremity venous Doppler 10/30/2022.    TECHNIQUE: Duplex sonography of the BILATERAL LOWER extremity veins with   color and spectral Doppler, with and without compression.    FINDINGS:    RIGHT:  Normal compressibility of the RIGHT common femoral, femoral and popliteal   veins.  Doppler examination shows normal spontaneous and phasic flow.  No RIGHT calf vein thrombosis is detected.    LEFT:  Normal compressibility of the LEFT common femoral, femoral and popliteal   veins.  Doppler examination showsnormal spontaneous and phasic flow.  No LEFT calf vein thrombosis is detected.    IMPRESSION:  No evidence of deep venous thrombosis in either lower extremity.            --- End of Report ---    < end of copied text >  < from: CT Chest No Cont (04.12.24 @ 05:24) >  ACC: [id number] EXAM:  CT CHEST   ORDERED BY: AYDEE FIELDS     PROCEDURE DATE:  04/12/2024          INTERPRETATION:  CLINICAL INFORMATION: Interstitial lung disease   progression versus pneumonia    COMPARISON: 2/28/2023    CONTRAST/COMPLICATIONS:  IV Contrast: NONE  Oral Contrast: NONE  Complications: None reported at time of study completion    PROCEDURE:  CT of the Chest was performed.  Sagittal and coronal reformats were performed.    FINDINGS:    LUNGS AND AIRWAYS: Patent central airways. There are extensive reticular   and groundglass opacities in both lungs associated with thickening of   interlobular septae with flexion bronchiectasis. There is no   consolidation or parenchymal infiltration suggesting pneumonia.  PLEURA: No pleural effusion.  MEDIASTINUM AND KAROLINE: No lymphadenopathy.  VESSELS: Prominent main pulmonary artery currently measuring 3.5 cm.  HEART: Mild cardiomegaly.  pericardial effusion.  CHEST WALL AND LOWER NECK: Within normal limits.  VISUALIZED UPPER ABDOMEN:Within normal limits.  BONES: Degenerative change.    IMPRESSION:  Findings suggesting interstitial lung disease without significant   interval progression. No evidence of pneumonia.  Trace pericardial effusion with mild cardiomegaly.      --- End ofReport ---      < end of copied text >    Imaging  Reviewed:  YES    Consultant(s) Notes Reviewed:   YES      Plan of care was discussed with patient and /or primary care giver; all questions and concerns were addressed

## 2024-04-16 NOTE — CONSULT NOTE ADULT - ASSESSMENT
Patient is a 64 yo female with hx of Pulmonary fibrosis (diagnosed 2021), not on home oxygen, Pulmonary embolism on Eliquis, DM on Lantus and Metformin  presented with shortness of breath. Found to have uncont dm. Pt admits to taking lantus 30 at night and premeal 25ac tid ? injection technique reviewed- admits to noncompliance with diet. On and off on prednisone

## 2024-04-17 NOTE — PROGRESS NOTE ADULT - ASSESSMENT
63 year old F from home ambulates with walker w/ pmhx chronic hypoxic respiratory failure on 2-3L NC at home 2/2 ILD, currently on ofev , unprovoked? PE (dx  on 2022, currently on Eliquis 5 mg PO QD), severe pulmonary HTN (based on TTE from 2023), IDDM, cor pulmonale who came in with c/o SOB. As per pt since a week before admission she was experiencing intermittent SOB, however, since Tuesday of the week of admission endorsed becoming more dyspneic on exertion and at rest, associated with dry cough, not associated with wheezing, CP, LE edema. In the ED patient was placed on NRB. ICU was consulted for worsening hypoxia and transitioned to Venti mask 35% @ 12 L saturating 92-95% and tachypneic. Pt was then placed and HFNC and admitted to ICU for acute hypoxic respiratory failure. Patient admitted to ICU for acute on chronic hypoxic respiratory failure requiring HFNC 2/2 to ILD flair.     While in ICU patient was started on Levaquin (4/11-4/13) but it was discontinued given CT chest did not have signs of pneumonia. Pt was also started on IV steroids, Duoneb, Symbicort, and diuresed with IV Lasix. Patient has history of urinary retention and was retaining 2L urine and a Pike was placed. Patient was transitioned to PO steroids on 4/16. Lovenox transitioned to Eliquis BID. Patient's family has been asked to bring home dose of PO Ofirmev which will be continued. Patient was able to be weaned of HFNC and currently on 3L O2 NC with no respiratory distress. Patient echo on 4/12/24 showed EF 65% with normal right artery systolic pressure. Patient remains hemodynamically stable and downgraded to medicine. A1c 11.9. 63 year old F from home ambulates with walker w/ pmhx chronic hypoxic respiratory failure on 2-3L NC at home 2/2 ILD, currently on ofev , unprovoked? PE (dx  on 2022, currently on Eliquis 5 mg PO QD), severe pulmonary HTN (based on TTE from 2023), IDDM, cor pulmonale who came in with c/o SOB, associated with dry cough, not associated with wheezing, CP, LE edema. In the ED patient was placed on NRB. ICU was consulted for worsening hypoxia and transitioned to Venti mask 35% @ 12 L saturating 92-95% and tachypneic. Pt was then placed and HFNC and admitted to ICU for acute hypoxic respiratory failure 2/2 to ILD flair.     While in ICU patient was started on Levaquin (4/11-4/13) but it was discontinued given CT chest did not have signs of pneumonia. Pt was also started on IV steroids, Duoneb, Symbicort, and diuresed with IV Lasix. Patient has history of urinary retention and was retaining 2L urine and a Pike was placed. Patient was transitioned to PO steroids on 4/16. Lovenox transitioned to Eliquis BID. Patient's family has been asked to bring home dose of PO Ofirmev which will be continued. Patient was able to be weaned of HFNC and currently on 3L O2 NC with no respiratory distress. Patient echo on 4/12/24 showed EF 65% with normal right artery systolic pressure. Patient remains hemodynamically stable and downgraded to medicine.

## 2024-04-17 NOTE — PROGRESS NOTE ADULT - SUBJECTIVE AND OBJECTIVE BOX
Interval Events:  pt in nad    Allergies    No Known Allergies    Intolerances      Endocrine/Metabolic Medications:  atorvastatin 40 milliGRAM(s) Oral at bedtime  insulin glargine Injectable (LANTUS) 24 Unit(s) SubCutaneous at bedtime  insulin lispro (ADMELOG) corrective regimen sliding scale   SubCutaneous three times a day before meals  insulin lispro (ADMELOG) corrective regimen sliding scale   SubCutaneous at bedtime  insulin lispro Injectable (ADMELOG) 13 Unit(s) SubCutaneous three times a day before meals  predniSONE   Tablet 40 milliGRAM(s) Oral daily      Vital Signs Last 24 Hrs  T(C): 37.4 (17 Apr 2024 05:09), Max: 37.4 (17 Apr 2024 05:09)  T(F): 99.3 (17 Apr 2024 05:09), Max: 99.3 (17 Apr 2024 05:09)  HR: 114 (17 Apr 2024 05:09) (102 - 124)  BP: 128/75 (17 Apr 2024 05:09) (128/73 - 143/76)  BP(mean): --  RR: 19 (17 Apr 2024 05:09) (19 - 20)  SpO2: 98% (17 Apr 2024 05:09) (95% - 98%)    Parameters below as of 17 Apr 2024 05:09  Patient On (Oxygen Delivery Method): nasal cannula  O2 Flow (L/min): 3        PHYSICAL EXAM  All physical exam findings normal, except those marked:  General:	Alert, active, cooperative, NAD, well hydrated  .		[] Abnormal:  Neck		Normal: supple, no cervical adenopathy, no palpable thyroid  .		[] Abnormal:  Cardiovascular	Normal: regular rate, normal S1, S2, no murmurs  .		[] Abnormal:  Respiratory	Normal: no chest wall deformity, normal respiratory pattern, CTA B/L  .		[] Abnormal:  Abdominal	Normal: soft, ND, NT, bowel sounds present, no masses, no organomegaly  .		[] Abnormal:  		Normal normal genitalia, testes descended, circumcised/uncircumcised  .		Eze stage:			Breast eze:  .		Menstrual history:  .		[] Abnormal:  Extremities	Normal: FROM x4  .		[] Abnormal:  Skin		Normal: intact and not indurated, no rash, no acanthosis nigricans  .		[] Abnormal:  Neurologic	Normal: grossly intact  .		[] Abnormal:    LABS                        10.4   9.34  )-----------( 335      ( 17 Apr 2024 06:55 )             33.9                               138    |  104    |  22                  Calcium: 7.9   / iCa: x      (04-17 @ 06:55)    ----------------------------<  192       Magnesium: 2.1                              4.2     |  28     |  0.88             Phosphorous: 2.5      TPro  5.8    /  Alb  2.2    /  TBili  0.3    /  DBili  x      /  AST  29     /  ALT  30     /  AlkPhos  81     17 Apr 2024 06:55    CAPILLARY BLOOD GLUCOSE      POCT Blood Glucose.: 186 mg/dL (17 Apr 2024 07:49)  POCT Blood Glucose.: 257 mg/dL (16 Apr 2024 20:58)  POCT Blood Glucose.: 383 mg/dL (16 Apr 2024 16:42)  POCT Blood Glucose.: 275 mg/dL (16 Apr 2024 11:18)        Assesment/plan    Patient is a 62 yo female with hx of Pulmonary fibrosis (diagnosed 2021), not on home oxygen, Pulmonary embolism on Eliquis, DM on Lantus and Metformin  presented with shortness of breath. Found to have uncont dm. Pt admits to taking lantus 30 at night and premeal 25ac tid ? injection technique reviewed- admits to noncompliance with diet. On and off on prednisone          Problem/Recommendation - 1:  ·  Problem: Diabetes mellitus with hyperglycemia.   ·  Recommendation: uncont with hyperglycemia  on steroids  hba1c- 11  cont lantus 24  inc admelog to 16 and dec dose as steroids taper- hold if poor intake  moderate correction admelog doses  nutrition eval  fsg ac and hs  d/w prim team.     Problem/Recommendation - 2:  ·  Problem: ILD (interstitial lung disease).   ·  Recommendation: cont tx per pulm  steroid taper.

## 2024-04-17 NOTE — PROGRESS NOTE ADULT - PROBLEM SELECTOR PLAN 6
hx DVT in 10/ 2022   Left calf tenderness   Doppler studies negative - Godwin Catheter placed with 2L of urine drained  - continue godwin- attempt TOV before DC

## 2024-04-17 NOTE — PROGRESS NOTE ADULT - PROBLEM SELECTOR PLAN 3
HbA1c 11.9  likely from steroid therapy outpatient  continue lantus and premeal  ISS  Endocrine Dr. Zavaleta CT chest - interstitial lung disease without significant interval progression. No evidence of pneumonia.                       Trace pericardial effusion with mild cardiomegaly.  c/w lasix + supplemental 02 CT chest - interstitial lung disease without significant interval progression. No evidence of pneumonia.                  Trace pericardial effusion with mild cardiomegaly.  c/w lasix + supplemental 02

## 2024-04-17 NOTE — CONSULT NOTE ADULT - SUBJECTIVE AND OBJECTIVE BOX
Patient is a 63y old  Female who presents with a chief complaint of     HPI:  63 yrs old F, from home, ambulating independently, pmhx of ILD on 3L NC at baseline , DM, HLD, presented with shortness of breath. Pt reported of shortness of breath since the evening, denied fever, chills, chest pain, palpitation, or other acute symptoms such as abdominal pain, N/V/D, urinary symptoms.  (12 Apr 2024 02:05)      PAST MEDICAL & SURGICAL HISTORY:  Diabetes mellitus      Hyperlipidemia      Pulmonary embolism      Pulmonary fibrosis      Cataract  right eye          SOCIAL HX:   Smoking                         ETOH                            Other    FAMILY HISTORY:  Family history of MI (myocardial infarction) (Father)    :  No known cardiovascular family history     ROS:  See HPI     Allergies    No Known Allergies    Intolerances      MEDICATIONS  (STANDING):  albuterol/ipratropium for Nebulization 3 milliLiter(s) Nebulizer every 6 hours  apixaban 5 milliGRAM(s) Oral every 12 hours  atorvastatin 40 milliGRAM(s) Oral at bedtime  benzocaine/menthol Lozenge 1 Lozenge Oral every 4 hours  budesonide 160 MICROgram(s)/formoterol 4.5 MICROgram(s) Inhaler 2 Puff(s) Inhalation two times a day  chlorhexidine 2% Cloths 1 Application(s) Topical <User Schedule>  furosemide    Tablet 20 milliGRAM(s) Oral daily  insulin glargine Injectable (LANTUS) 24 Unit(s) SubCutaneous at bedtime  insulin lispro (ADMELOG) corrective regimen sliding scale   SubCutaneous three times a day before meals  insulin lispro (ADMELOG) corrective regimen sliding scale   SubCutaneous at bedtime  insulin lispro Injectable (ADMELOG) 16 Unit(s) SubCutaneous three times a day before meals  pantoprazole    Tablet 40 milliGRAM(s) Oral before breakfast  predniSONE   Tablet 40 milliGRAM(s) Oral daily  senna 1 Tablet(s) Oral daily  traZODone 100 milliGRAM(s) Oral at bedtime    MEDICATIONS  (PRN):  acetaminophen     Tablet .. 650 milliGRAM(s) Oral every 6 hours PRN Mild Pain (1 - 3)  cyclobenzaprine 5 milliGRAM(s) Oral three times a day PRN Muscle Spasm      PHYSICAL EXAM    Vital Signs Last 24 Hrs  T(C): 36.9 (17 Apr 2024 14:30), Max: 37.4 (17 Apr 2024 05:09)  T(F): 98.5 (17 Apr 2024 14:30), Max: 99.3 (17 Apr 2024 05:09)  HR: 123 (17 Apr 2024 16:48) (111 - 123)  BP: 131/69 (17 Apr 2024 14:30) (128/75 - 143/76)  BP(mean): --  RR: 30 (17 Apr 2024 16:48) (19 - 30)  SpO2: 98% (17 Apr 2024 16:48) (96% - 98%)    Parameters below as of 17 Apr 2024 16:48  Patient On (Oxygen Delivery Method): nasal cannula  O2 Flow (L/min): 3          General NAD  HEENT:  RICHARD, moist mucus membrane              Lungs: speaking in full sentences, tachypneic while talking,  Bilateral BS present, occasional rales at the bases, no wheezing or rhoncus, No accessory muscle use   Cardiovascular: tachycardic, regular rate and rhythm. No murmurs, gallops  Gastrointestinal: Soft, Positive BS, no tenderness to palpation  Musculoskeletal: No clubbing or peripheral cyanosis.  Moves all extremities. No LE edema; Homans neg  Skin: Warm and dry  Neurological: AAOX3, awake, alert and following commands. No motor or sensory deficit         LABS:                                                    10.4   9.34  )-----------( 335      ( 17 Apr 2024 06:55 )             33.9   04-17    138  |  104  |  22<H>  ----------------------------<  192<H>  4.2   |  28  |  0.88    Ca    7.9<L>      17 Apr 2024 06:55  Phos  2.5     04-17  Mg     2.1     04-17    < from: CT Chest No Cont (04.12.24 @ 05:24) >  ACC: [id number] EXAM:  CT CHEST   ORDERED BY: AYDEE FIELDS     PROCEDURE DATE:  04/12/2024          INTERPRETATION:  CLINICAL INFORMATION: Interstitial lung disease   progression versus pneumonia    COMPARISON: 2/28/2023    CONTRAST/COMPLICATIONS:  IV Contrast: NONE  Oral Contrast: NONE  Complications: None reported at time of study completion    PROCEDURE:  CT of the Chest was performed.  Sagittal and coronal reformats were performed.    FINDINGS:    LUNGS AND AIRWAYS: Patent central airways. There are extensive reticular   and groundglass opacities in both lungs associated with thickening of   interlobular septae with flexion bronchiectasis. There is no   consolidation or parenchymal infiltration suggesting pneumonia.  PLEURA: No pleural effusion.  MEDIASTINUM AND KAROLINE: No lymphadenopathy.  VESSELS: Prominent main pulmonary artery currently measuring 3.5 cm.  HEART: Mild cardiomegaly.  pericardial effusion.  CHEST WALL AND LOWER NECK: Within normal limits.  VISUALIZED UPPER ABDOMEN:Within normal limits.  BONES: Degenerative change.    IMPRESSION:  Findings suggesting interstitial lung disease without significant   interval progression. No evidence of pneumonia.  Trace pericardial effusion with mild cardiomegaly.      --- End ofReport ---            LUIS ALBERTO MICHAUD MD; Attending Radiologist  This document has been electronically signed. Apr 12 2024  5:41AM    < end of copied text >  < from: TTE W or WO Ultrasound Enhancing Agent (04.12.24 @ 11:23) >    TRANSTHORACIC ECHOCARDIOGRAM REPORT  ________________________________________________________________________________                                      _______       Pt. Name:       ESTEBAN BRANHAM Study Date:    4/12/2024  MRN:            [id number]    YOB: 1960  Accession #:    [id number]    Age:           63 years  Account#:       [id number]   Gender:        F  Visit ID#  Heart Rate:     104 bpm      Height:        65.00 in (165.10 cm)  Rhythm:                      Weight:133.71 lb (60.65 kg)  Blood Pressure: 156/85 mmHg  BSA/BMI:       1.67 m² / 22.25 kg/m²  ________________________________________________________________________________________  Referring Physician:    [id number] Alexander Laguerre  Interpreting Physician: Delores Eisenberg MD  Primary Sonographer:    Awilda Brennan [facility]    CPT:               ECHO TTE WO CON COMP W DOPP - 80607.m  Indication(s):     Dyspnea, unspecified - R06.00  Procedure:         Transthoracic echocardiogram with 2-D, M-mode and complete                     spectral and color flow Doppler.  Ordering Location: [facility]  Admission Status:  Inpatient  Study Information: Image quality for this study is adequate.    _______________________________________________________________________________________     CONCLUSIONS:      1. Left ventricular cavity is small. Left ventricular wall thickness is normal. Left ventricular systolic function is normal with an ejection fraction of 65 % by Brown's method of disks. There are no regional wall motion abnormalities seen.   2. Unable to assess left ventricular diastolic function due to insufficient data.   3. Normal right ventricular cavity size, with normal wall thickness, and normal systolic function. Tricuspid annular plane systolic excursion (TAPSE) is 2.0 cm (normal >=1.7 cm).   4. Mild mitral regurgitation.   5. Normal left and right atrial size.   6. Mild tricuspid regurgitation.   7. Estimated pulmonary artery systolic pressure is 28 mmHg, consistent with normal pulmonary artery pressure.   8. Trace pulmonic regurgitation.   9. Trileaflet aortic valve with normal systolic excursion. There is calcification of the aortic valve leaflets.  10. Small pericardial effusion.    ________________________________________________________________________________________  FINDINGS:     Left Ventricle:  The left ventricular cavity is small. Left ventricular wall thickness is normal. Left ventricular systolic function is normal with a calculated ejection fraction of 65 % by the Brown's biplane method of disks. There are no regional wall motion abnormalities seen. Unable to assess left ventricular diastolic function due to insufficient data.     Right Ventricle:  The right ventricular cavity is normal in size, with normal wall thickness and normal systolic function. Tricuspid annular plane systolic excursion (TAPSE) is 2.0 cm (normal >=1.7 cm).     Left Atrium:  The left atrium is normal in size.     Right Atrium:  The right atrium is normal in size with an indexed area of 6.48 cm²/m².     Interatrial Septum:  The interatrial septum appears intact.     Aortic Valve:  The aortic valve appears trileaflet with normal systolic excursion. There is calcification of the aortic valve leaflets. There is no aortic valve stenosis. There is no evidence of aortic regurgitation.     Mitral Valve:  Structurally normal mitral valve with normal leaflet excursion. There is no mitral valve stenosis. There is mild mitral regurgitation.     Tricuspid Valve:  Structurally normal tricuspid valve with normal leaflet excursion. There is mild tricuspid regurgitation. Estimated pulmonary artery systolic pressure is 28 mmHg, consistent with normal pulmonary artery pressure.     Pulmonic Valve:  Structurally normal pulmonic valve with normal leaflet excursion. There is trace pulmonic regurgitation.     Aorta:  The aortic root appears normal in size.     Pericardium:  There is a small pericardial effusion.     ____________________________________________________________________  QUANTITATIVE DATA:  Left Ventricle Measurements: (Indexed to BSA)     IVSd (2D):   1.0 cm  LVPWd (2D):  1.0 cm  LVIDd (2D):  3.7 cm  LVIDs (2D):  2.2 cm  LV Mass:     109 g  65.4 g/m²  LV Vol d, MOD A2C: 74.1 ml 44.42 ml/m²  LV Vol d, MOD A4C: 58.1 ml 34.85ml/m²  LV Vol d, MOD BP:  66.3 ml 39.80 ml/m²  LV Vol s, MOD A2C: 36.8 ml 22.08 ml/m²  LV Vol s, MOD A4C: 12.4 ml 7.45 ml/m²  LV Vol s, MOD BP:  23.5 ml 14.09 ml/m²  LVOT SV MOD BP:    42.9 ml  LV EF% MOD BP:     65 %     MV E Vmax: 0.64 m/s  MV A Vmax: 1.20 m/s  MV E/A:    0.53  MV DT:     92 msec    Aorta Measurements: (Normal range) (Indexed to BSA)     Ao Root 3.0 cm       Left Atrium Measurements: (Indexed to BSA)  LA Diam 2D:        2.07 cm  LA Vol s, MOD A4C: 44.92 ml.    Right Ventricle Measurements:     RV d, 2D: 3.7 cm  TAPSE:    2.0 cm       LVOT / RVOT/ Qp/Qs Data: (Indexed to BSA)  LVOT Diameter: 1.62 cm  LVOT Vmax:     0.95 m/s  LVOT VTI:      16.43 cm  LVOT SV:       33.7 ml  20.20 ml/m²    Aortic Valve Measurements:  AV Vmax:              1.3 m/s  AV Peak Gradient:       6.6 mmHg  AV Mean Gradient:       3.1 mmHg  AV VTI:                 16.9 cm  AV VTI Ratio:           0.97  AoV EOA, Contin:        1.99 cm²  AoV EOA, Contin i:      1.19 cm²/m²  AoV Dimensionless Index 0.97    Mitral Valve Measurements:     MV E Vmax: 0.6 m/s  MV A Vmax: 1.2 m/s  MV E/A:    0.5       Tricuspid Valve Measurements:     TR Vmax:          2.5 m/s  TR Peak Gradient: 25.0 mmHg  RA Pressure:      3 mmHg  PASP:             28 mmHg    ________________________________________________________________________________________  Electronically signed on 4/12/2024 at 4:21:23 PM by Delores Eisenberg MD         *** Final ***    < end of copied text >  TPro  5.8<L>  /  Alb  2.2<L>  /  TBili  0.3  /  DBili  x   /  AST  29  /  ALT  30  /  AlkPhos  81  04-17                                                                                    Patient is a 63y old  Female who presents with a chief complaint of SOB and cough    HPI:  63F from home, has h/o ILD (?IPF) on 3L home O2, prior PE on Eliquis. At baseline she ambulates with O2 but for a couple days prior to admission developed increased HERNANDEZ and dry cough. In the ED she had a high O2 requirement with NRB and was admitted to the ICU for management. She takes Ofev at home and some inhalers. IN ICU she was diuresed and started on IV steroids high dose. Her O2 requirement was titrated down and pt transferred to the medical floor. Today she still endorses dry cough and HERNANDEZ above baseline but with improvement. She also noted anxiety which she thinks exacerbates her breathing. No phlegm, no fevers, no chest pain.       PAST MEDICAL & SURGICAL HISTORY:  Diabetes mellitus      Hyperlipidemia      Pulmonary embolism      Pulmonary fibrosis      Cataract  right eye          SOCIAL HX:   Smoking                         ETOH                            Other    FAMILY HISTORY:  Family history of MI (myocardial infarction) (Father)    :  No known cardiovascular family history     ROS:  See HPI     Allergies    No Known Allergies    Intolerances      MEDICATIONS  (STANDING):  albuterol/ipratropium for Nebulization 3 milliLiter(s) Nebulizer every 6 hours  apixaban 5 milliGRAM(s) Oral every 12 hours  atorvastatin 40 milliGRAM(s) Oral at bedtime  benzocaine/menthol Lozenge 1 Lozenge Oral every 4 hours  budesonide 160 MICROgram(s)/formoterol 4.5 MICROgram(s) Inhaler 2 Puff(s) Inhalation two times a day  chlorhexidine 2% Cloths 1 Application(s) Topical <User Schedule>  furosemide    Tablet 20 milliGRAM(s) Oral daily  insulin glargine Injectable (LANTUS) 24 Unit(s) SubCutaneous at bedtime  insulin lispro (ADMELOG) corrective regimen sliding scale   SubCutaneous three times a day before meals  insulin lispro (ADMELOG) corrective regimen sliding scale   SubCutaneous at bedtime  insulin lispro Injectable (ADMELOG) 16 Unit(s) SubCutaneous three times a day before meals  pantoprazole    Tablet 40 milliGRAM(s) Oral before breakfast  predniSONE   Tablet 40 milliGRAM(s) Oral daily  senna 1 Tablet(s) Oral daily  traZODone 100 milliGRAM(s) Oral at bedtime    MEDICATIONS  (PRN):  acetaminophen     Tablet .. 650 milliGRAM(s) Oral every 6 hours PRN Mild Pain (1 - 3)  cyclobenzaprine 5 milliGRAM(s) Oral three times a day PRN Muscle Spasm      PHYSICAL EXAM    Vital Signs Last 24 Hrs  T(C): 36.9 (17 Apr 2024 14:30), Max: 37.4 (17 Apr 2024 05:09)  T(F): 98.5 (17 Apr 2024 14:30), Max: 99.3 (17 Apr 2024 05:09)  HR: 123 (17 Apr 2024 16:48) (111 - 123)  BP: 131/69 (17 Apr 2024 14:30) (128/75 - 143/76)  BP(mean): --  RR: 30 (17 Apr 2024 16:48) (19 - 30)  SpO2: 98% (17 Apr 2024 16:48) (96% - 98%)    Parameters below as of 17 Apr 2024 16:48  Patient On (Oxygen Delivery Method): nasal cannula  O2 Flow (L/min): 3          General NAD  HEENT:  RICHARD, moist mucus membrane              Lungs: speaking in full sentences, tachypneic while talking,  Bilateral BS present, occasional rales at the bases, no wheezing or rhoncus, No accessory muscle use   Cardiovascular: tachycardic, regular rate and rhythm. No murmurs, gallops  Gastrointestinal: Soft, Positive BS, no tenderness to palpation  Musculoskeletal: No clubbing or peripheral cyanosis.  Moves all extremities. No LE edema; Homans neg  Skin: Warm and dry  Neurological: AAOX3, awake, alert and following commands. No motor or sensory deficit         LABS:                                                    10.4   9.34  )-----------( 335      ( 17 Apr 2024 06:55 )             33.9   04-17    138  |  104  |  22<H>  ----------------------------<  192<H>  4.2   |  28  |  0.88    Ca    7.9<L>      17 Apr 2024 06:55  Phos  2.5     04-17  Mg     2.1     04-17    < from: CT Chest No Cont (04.12.24 @ 05:24) >  ACC: [id number] EXAM:  CT CHEST   ORDERED BY: AYDEE FIELDS     PROCEDURE DATE:  04/12/2024          INTERPRETATION:  CLINICAL INFORMATION: Interstitial lung disease   progression versus pneumonia    COMPARISON: 2/28/2023    CONTRAST/COMPLICATIONS:  IV Contrast: NONE  Oral Contrast: NONE  Complications: None reported at time of study completion    PROCEDURE:  CT of the Chest was performed.  Sagittal and coronal reformats were performed.    FINDINGS:    LUNGS AND AIRWAYS: Patent central airways. There are extensive reticular   and groundglass opacities in both lungs associated with thickening of   interlobular septae with flexion bronchiectasis. There is no   consolidation or parenchymal infiltration suggesting pneumonia.  PLEURA: No pleural effusion.  MEDIASTINUM AND KAROLINE: No lymphadenopathy.  VESSELS: Prominent main pulmonary artery currently measuring 3.5 cm.  HEART: Mild cardiomegaly.  pericardial effusion.  CHEST WALL AND LOWER NECK: Within normal limits.  VISUALIZED UPPER ABDOMEN:Within normal limits.  BONES: Degenerative change.    IMPRESSION:  Findings suggesting interstitial lung disease without significant   interval progression. No evidence of pneumonia.  Trace pericardial effusion with mild cardiomegaly.      --- End ofReport ---            LUIS ALBERTO MICHAUD MD; Attending Radiologist  This document has been electronically signed. Apr 12 2024  5:41AM    < end of copied text >  < from: TTE W or WO Ultrasound Enhancing Agent (04.12.24 @ 11:23) >    TRANSTHORACIC ECHOCARDIOGRAM REPORT  ________________________________________________________________________________                                      _______       Pt. Name:       ESTEBAN BRANHAM Study Date:    4/12/2024  MRN:            [id number]    YOB: 1960  Accession #:    [id number]    Age:           63 years  Account#:       [id number]   Gender:        F  Visit ID#  Heart Rate:     104 bpm      Height:        65.00 in (165.10 cm)  Rhythm:                      Weight:133.71 lb (60.65 kg)  Blood Pressure: 156/85 mmHg  BSA/BMI:       1.67 m² / 22.25 kg/m²  ________________________________________________________________________________________  Referring Physician:    [id number] Alexander Laguerre  Interpreting Physician: Delores Eisenberg MD  Primary Sonographer:    Awilda Brennan RDCS    CPT:               ECHO TTE WO CON COMP W DOPP - 20883.m  Indication(s):     Dyspnea, unspecified - R06.00  Procedure:         Transthoracic echocardiogram with 2-D, M-mode and complete                     spectral and color flow Doppler.  Ordering Location: [facility]  Admission Status:  Inpatient  Study Information: Image quality for this study is adequate.    _______________________________________________________________________________________     CONCLUSIONS:      1. Left ventricular cavity is small. Left ventricular wall thickness is normal. Left ventricular systolic function is normal with an ejection fraction of 65 % by Brown's method of disks. There are no regional wall motion abnormalities seen.   2. Unable to assess left ventricular diastolic function due to insufficient data.   3. Normal right ventricular cavity size, with normal wall thickness, and normal systolic function. Tricuspid annular plane systolic excursion (TAPSE) is 2.0 cm (normal >=1.7 cm).   4. Mild mitral regurgitation.   5. Normal left and right atrial size.   6. Mild tricuspid regurgitation.   7. Estimated pulmonary artery systolic pressure is 28 mmHg, consistent with normal pulmonary artery pressure.   8. Trace pulmonic regurgitation.   9. Trileaflet aortic valve with normal systolic excursion. There is calcification of the aortic valve leaflets.  10. Small pericardial effusion.    ________________________________________________________________________________________  FINDINGS:     Left Ventricle:  The left ventricular cavity is small. Left ventricular wall thickness is normal. Left ventricular systolic function is normal with a calculated ejection fraction of 65 % by the Brown's biplane method of disks. There are no regional wall motion abnormalities seen. Unable to assess left ventricular diastolic function due to insufficient data.     Right Ventricle:  The right ventricular cavity is normal in size, with normal wall thickness and normal systolic function. Tricuspid annular plane systolic excursion (TAPSE) is 2.0 cm (normal >=1.7 cm).     Left Atrium:  The left atrium is normal in size.     Right Atrium:  The right atrium is normal in size with an indexed area of 6.48 cm²/m².     Interatrial Septum:  The interatrial septum appears intact.     Aortic Valve:  The aortic valve appears trileaflet with normal systolic excursion. There is calcification of the aortic valve leaflets. There is no aortic valve stenosis. There is no evidence of aortic regurgitation.     Mitral Valve:  Structurally normal mitral valve with normal leaflet excursion. There is no mitral valve stenosis. There is mild mitral regurgitation.     Tricuspid Valve:  Structurally normal tricuspid valve with normal leaflet excursion. There is mild tricuspid regurgitation. Estimated pulmonary artery systolic pressure is 28 mmHg, consistent with normal pulmonary artery pressure.     Pulmonic Valve:  Structurally normal pulmonic valve with normal leaflet excursion. There is trace pulmonic regurgitation.     Aorta:  The aortic root appears normal in size.     Pericardium:  There is a small pericardial effusion.     ____________________________________________________________________  QUANTITATIVE DATA:  Left Ventricle Measurements: (Indexed to BSA)     IVSd (2D):   1.0 cm  LVPWd (2D):  1.0 cm  LVIDd (2D):  3.7 cm  LVIDs (2D):  2.2 cm  LV Mass:     109 g  65.4 g/m²  LV Vol d, MOD A2C: 74.1 ml 44.42 ml/m²  LV Vol d, MOD A4C: 58.1 ml 34.85ml/m²  LV Vol d, MOD BP:  66.3 ml 39.80 ml/m²  LV Vol s, MOD A2C: 36.8 ml 22.08 ml/m²  LV Vol s, MOD A4C: 12.4 ml 7.45 ml/m²  LV Vol s, MOD BP:  23.5 ml 14.09 ml/m²  LVOT SV MOD BP:    42.9 ml  LV EF% MOD BP:     65 %     MV E Vmax: 0.64 m/s  MV A Vmax: 1.20 m/s  MV E/A:    0.53  MV DT:     92 msec    Aorta Measurements: (Normal range) (Indexed to BSA)     Ao Root 3.0 cm       Left Atrium Measurements: (Indexed to BSA)  LA Diam 2D:        2.07 cm  LA Vol s, MOD A4C: 44.92 ml.    Right Ventricle Measurements:     RV d, 2D: 3.7 cm  TAPSE:    2.0 cm       LVOT / RVOT/ Qp/Qs Data: (Indexed to BSA)  LVOT Diameter: 1.62 cm  LVOT Vmax:     0.95 m/s  LVOT VTI:      16.43 cm  LVOT SV:       33.7 ml  20.20 ml/m²    Aortic Valve Measurements:  AV Vmax:              1.3 m/s  AV Peak Gradient:       6.6 mmHg  AV Mean Gradient:       3.1 mmHg  AV VTI:                 16.9 cm  AV VTI Ratio:           0.97  AoV EOA, Contin:        1.99 cm²  AoV EOA, Contin i:      1.19 cm²/m²  AoV Dimensionless Index 0.97    Mitral Valve Measurements:     MV E Vmax: 0.6 m/s  MV A Vmax: 1.2 m/s  MV E/A:    0.5       Tricuspid Valve Measurements:     TR Vmax:          2.5 m/s  TR Peak Gradient: 25.0 mmHg  RA Pressure:      3 mmHg  PASP:             28 mmHg    ________________________________________________________________________________________  Electronically signed on 4/12/2024 at 4:21:23 PM by Delores Eisenberg MD         *** Final ***    < end of copied text >  TPro  5.8<L>  /  Alb  2.2<L>  /  TBili  0.3  /  DBili  x   /  AST  29  /  ALT  30  /  AlkPhos  81  04-17

## 2024-04-17 NOTE — CONSULT NOTE ADULT - ASSESSMENT
63F with very advanced ILD, probable IPF, with likely acute exacerbation as well as pulm edema  High BNP, B lines pattern in ICU and interlobular septal thickening   Improved on combo of high dose steroids and diuresis  On eliquis with negative dopplers and clinical improvement suggest low prob of acute PE  Echo without any significant Cor pulmonale or pulm HTN  Clinically no evidence of PNA, viral panel and peripheral ID w/u negative    - advanced ILD, likely IPF  - acute ILD/IPF exacerbation  - pulm edema  - h/o PE on eliquis    Recommend:  - on PO prednisone 40mg, ok to continue for now  - maintain euvolemia  - cont eliquis  - Nebs probably not helping and possibly contributing to tachycardia and anxiety  - titrate O2 as tolerated  - cont home Ofev  This pt has very advanced end stage ILD, she would do very poorly if ever intubated. Please address GOC and consider a pall care consult.

## 2024-04-17 NOTE — PROGRESS NOTE ADULT - SUBJECTIVE AND OBJECTIVE BOX
Patient is a 63y old  Female who presents with a chief complaint of AHRF 2/2 ILD flair vs PNA (17 Apr 2024 09:56)      INTERVAL HPI/OVERNIGHT EVENTS:    I&O's Summary    16 Apr 2024 07:01  -  17 Apr 2024 07:00  --------------------------------------------------------  IN: 0 mL / OUT: 1100 mL / NET: -1100 mL      Vital Signs Last 24 Hrs  T(C): 37.4 (17 Apr 2024 05:09), Max: 37.4 (17 Apr 2024 05:09)  T(F): 99.3 (17 Apr 2024 05:09), Max: 99.3 (17 Apr 2024 05:09)  HR: 114 (17 Apr 2024 05:09) (102 - 124)  BP: 128/75 (17 Apr 2024 05:09) (128/73 - 143/76)  BP(mean): --  RR: 19 (17 Apr 2024 05:09) (19 - 20)  SpO2: 98% (17 Apr 2024 05:09) (95% - 98%)    Parameters below as of 17 Apr 2024 05:09  Patient On (Oxygen Delivery Method): nasal cannula  O2 Flow (L/min): 3    PAST MEDICAL & SURGICAL HISTORY:  Diabetes mellitus      Hyperlipidemia      Pulmonary embolism      Pulmonary fibrosis      Cataract  right eye          SOCIAL HISTORY  Alcohol:  Tobacco:  Illicit substance use:      FAMILY HISTORY:      LABS:                        10.4   9.34  )-----------( 335      ( 17 Apr 2024 06:55 )             33.9     04-17    138  |  104  |  22<H>  ----------------------------<  192<H>  4.2   |  28  |  0.88    Ca    7.9<L>      17 Apr 2024 06:55  Phos  2.5     04-17  Mg     2.1     04-17    TPro  5.8<L>  /  Alb  2.2<L>  /  TBili  0.3  /  DBili  x   /  AST  29  /  ALT  30  /  AlkPhos  81  04-17      Urinalysis Basic - ( 17 Apr 2024 06:55 )    Color: x / Appearance: x / SG: x / pH: x  Gluc: 192 mg/dL / Ketone: x  / Bili: x / Urobili: x   Blood: x / Protein: x / Nitrite: x   Leuk Esterase: x / RBC: x / WBC x   Sq Epi: x / Non Sq Epi: x / Bacteria: x      CAPILLARY BLOOD GLUCOSE      POCT Blood Glucose.: 95 mg/dL (17 Apr 2024 11:20)  POCT Blood Glucose.: 186 mg/dL (17 Apr 2024 07:49)  POCT Blood Glucose.: 257 mg/dL (16 Apr 2024 20:58)  POCT Blood Glucose.: 383 mg/dL (16 Apr 2024 16:42)        Urinalysis Basic - ( 17 Apr 2024 06:55 )    Color: x / Appearance: x / SG: x / pH: x  Gluc: 192 mg/dL / Ketone: x  / Bili: x / Urobili: x   Blood: x / Protein: x / Nitrite: x   Leuk Esterase: x / RBC: x / WBC x   Sq Epi: x / Non Sq Epi: x / Bacteria: x        MEDICATIONS  (STANDING):  albuterol/ipratropium for Nebulization 3 milliLiter(s) Nebulizer every 6 hours  apixaban 5 milliGRAM(s) Oral every 12 hours  atorvastatin 40 milliGRAM(s) Oral at bedtime  benzocaine/menthol Lozenge 1 Lozenge Oral every 4 hours  budesonide 160 MICROgram(s)/formoterol 4.5 MICROgram(s) Inhaler 2 Puff(s) Inhalation two times a day  chlorhexidine 2% Cloths 1 Application(s) Topical <User Schedule>  furosemide    Tablet 20 milliGRAM(s) Oral daily  insulin glargine Injectable (LANTUS) 24 Unit(s) SubCutaneous at bedtime  insulin lispro (ADMELOG) corrective regimen sliding scale   SubCutaneous three times a day before meals  insulin lispro (ADMELOG) corrective regimen sliding scale   SubCutaneous at bedtime  insulin lispro Injectable (ADMELOG) 16 Unit(s) SubCutaneous three times a day before meals  pantoprazole    Tablet 40 milliGRAM(s) Oral before breakfast  predniSONE   Tablet 40 milliGRAM(s) Oral daily  senna 1 Tablet(s) Oral daily  traZODone 100 milliGRAM(s) Oral at bedtime    MEDICATIONS  (PRN):  cyclobenzaprine 5 milliGRAM(s) Oral three times a day PRN Muscle Spasm      REVIEW OF SYSTEMS:  CONSTITUTIONAL: No fever, weight loss, or fatigue  EYES: No eye pain, visual disturbances, or discharge  ENMT:  No difficulty hearing, tinnitus, vertigo; No sinus or throat pain  NECK: No pain or stiffness  RESPIRATORY: No cough, wheezing, chills or hemoptysis; No shortness of breath  CARDIOVASCULAR: No chest pain, palpitations, dizziness, or leg swelling  GASTROINTESTINAL: No abdominal or epigastric pain. No nausea, vomiting, or hematemesis; No diarrhea or constipation. No melena or hematochezia.  GENITOURINARY: No dysuria, frequency, hematuria, or incontinence  NEUROLOGICAL: No headaches, memory loss, loss of strength, numbness, or tremors  SKIN: No itching, burning, rashes, or lesions   LYMPH NODES: No enlarged glands  ENDOCRINE: No heat or cold intolerance; No hair loss  MUSCULOSKELETAL: No joint pain or swelling; No muscle, back, or extremity pain  PSYCHIATRIC: No depression, anxiety, mood swings, or difficulty sleeping  HEME/LYMPH: No easy bruising, or bleeding gums  ALLERY AND IMMUNOLOGIC: No hives or eczema    RADIOLOGY & ADDITIONAL TESTS:    Imaging Personally Reviewed:  [ ] YES  [ ] NO    Consultant(s) Notes Reviewed:  [ ] YES  [ ] NO    PHYSICAL EXAM:  GENERAL: NAD, well-groomed, well-developed  HEAD:  Atraumatic, Normocephalic  EYES: EOMI, PERRLA, conjunctiva and sclera clear  ENMT: No tonsillar erythema, exudates, or enlargement; Moist mucous membranes, Good dentition, No lesions  NECK: Supple, No JVD, Normal thyroid  NERVOUS SYSTEM:  Alert & Oriented X3, Good concentration; Motor Strength 5/5 B/L upper and lower extremities; DTRs 2+ intact and symmetric  CHEST/LUNG: Clear to percussion bilaterally; No rales, rhonchi, wheezing, or rubs  HEART: Regular rate and rhythm; No murmurs, rubs, or gallops  ABDOMEN: Soft, Nontender, Nondistended; Bowel sounds present  EXTREMITIES:  2+ Peripheral Pulses, No clubbing, cyanosis, or edema  LYMPH: No lymphadenopathy noted  SKIN: No rashes or lesions    Care Collaborated Discussed with Consultants/Other Providers [ ] YES  [ ] NO Patient is a 63y old  Female who presents with a chief complaint of AHRF 2/2 ILD flair vs PNA (17 Apr 2024 09:56)      INTERVAL HPI/OVERNIGHT EVENTS: loose stool overnight    I&O's Summary    16 Apr 2024 07:01  -  17 Apr 2024 07:00  --------------------------------------------------------  IN: 0 mL / OUT: 1100 mL / NET: -1100 mL      Vital Signs Last 24 Hrs  T(C): 37.4 (17 Apr 2024 05:09), Max: 37.4 (17 Apr 2024 05:09)  T(F): 99.3 (17 Apr 2024 05:09), Max: 99.3 (17 Apr 2024 05:09)  HR: 114 (17 Apr 2024 05:09) (102 - 124)  BP: 128/75 (17 Apr 2024 05:09) (128/73 - 143/76)  BP(mean): --  RR: 19 (17 Apr 2024 05:09) (19 - 20)  SpO2: 98% (17 Apr 2024 05:09) (95% - 98%)    Parameters below as of 17 Apr 2024 05:09  Patient On (Oxygen Delivery Method): nasal cannula  O2 Flow (L/min): 3    PAST MEDICAL & SURGICAL HISTORY:  Diabetes mellitus      Hyperlipidemia      Pulmonary embolism      Pulmonary fibrosis      Cataract  right eye          SOCIAL HISTORY  Alcohol:  Tobacco:  Illicit substance use:      FAMILY HISTORY:      LABS:                        10.4   9.34  )-----------( 335      ( 17 Apr 2024 06:55 )             33.9     04-17    138  |  104  |  22<H>  ----------------------------<  192<H>  4.2   |  28  |  0.88    Ca    7.9<L>      17 Apr 2024 06:55  Phos  2.5     04-17  Mg     2.1     04-17    TPro  5.8<L>  /  Alb  2.2<L>  /  TBili  0.3  /  DBili  x   /  AST  29  /  ALT  30  /  AlkPhos  81  04-17      Urinalysis Basic - ( 17 Apr 2024 06:55 )    Color: x / Appearance: x / SG: x / pH: x  Gluc: 192 mg/dL / Ketone: x  / Bili: x / Urobili: x   Blood: x / Protein: x / Nitrite: x   Leuk Esterase: x / RBC: x / WBC x   Sq Epi: x / Non Sq Epi: x / Bacteria: x      CAPILLARY BLOOD GLUCOSE      POCT Blood Glucose.: 95 mg/dL (17 Apr 2024 11:20)  POCT Blood Glucose.: 186 mg/dL (17 Apr 2024 07:49)  POCT Blood Glucose.: 257 mg/dL (16 Apr 2024 20:58)  POCT Blood Glucose.: 383 mg/dL (16 Apr 2024 16:42)        Urinalysis Basic - ( 17 Apr 2024 06:55 )    Color: x / Appearance: x / SG: x / pH: x  Gluc: 192 mg/dL / Ketone: x  / Bili: x / Urobili: x   Blood: x / Protein: x / Nitrite: x   Leuk Esterase: x / RBC: x / WBC x   Sq Epi: x / Non Sq Epi: x / Bacteria: x        MEDICATIONS  (STANDING):  albuterol/ipratropium for Nebulization 3 milliLiter(s) Nebulizer every 6 hours  apixaban 5 milliGRAM(s) Oral every 12 hours  atorvastatin 40 milliGRAM(s) Oral at bedtime  benzocaine/menthol Lozenge 1 Lozenge Oral every 4 hours  budesonide 160 MICROgram(s)/formoterol 4.5 MICROgram(s) Inhaler 2 Puff(s) Inhalation two times a day  chlorhexidine 2% Cloths 1 Application(s) Topical <User Schedule>  furosemide    Tablet 20 milliGRAM(s) Oral daily  insulin glargine Injectable (LANTUS) 24 Unit(s) SubCutaneous at bedtime  insulin lispro (ADMELOG) corrective regimen sliding scale   SubCutaneous three times a day before meals  insulin lispro (ADMELOG) corrective regimen sliding scale   SubCutaneous at bedtime  insulin lispro Injectable (ADMELOG) 16 Unit(s) SubCutaneous three times a day before meals  pantoprazole    Tablet 40 milliGRAM(s) Oral before breakfast  predniSONE   Tablet 40 milliGRAM(s) Oral daily  senna 1 Tablet(s) Oral daily  traZODone 100 milliGRAM(s) Oral at bedtime    MEDICATIONS  (PRN):  cyclobenzaprine 5 milliGRAM(s) Oral three times a day PRN Muscle Spasm      REVIEW OF SYSTEMS:  CONSTITUTIONAL: No fever   EYES: No eye pain, visual disturbances  ENMT:  No sinus or throat pain  NECK: No pain or stiffness  RESPIRATORY: No cough, wheezing, chills or hemoptysis; + shortness of breath  CARDIOVASCULAR: No chest pain, palpitations  GASTROINTESTINAL: + abdominal pain. No nausea, vomiting. + diarrhea  GENITOURINARY: No dysuria  NEUROLOGICAL: No headaches  SKIN: No rashes  MUSCULOSKELETAL: No joint pain     RADIOLOGY & ADDITIONAL TESTS:  ACC: [id number] EXAM:  CT CHEST   ORDERED BY: AYDEE FIELDS     PROCEDURE DATE:  04/12/2024          INTERPRETATION:  CLINICAL INFORMATION: Interstitial lung disease   progression versus pneumonia    COMPARISON: 2/28/2023    CONTRAST/COMPLICATIONS:  IV Contrast: NONE  Oral Contrast: NONE  Complications: None reported at time of study completion    PROCEDURE:  CT of the Chest was performed.  Sagittal and coronal reformats were performed.    FINDINGS:    LUNGS AND AIRWAYS: Patent central airways. There are extensive reticular   and groundglass opacities in both lungs associated with thickening of   interlobular septae with flexion bronchiectasis. There is no   consolidation or parenchymal infiltration suggesting pneumonia.  PLEURA: No pleural effusion.  MEDIASTINUM AND KAROLINE: No lymphadenopathy.  VESSELS: Prominent main pulmonary artery currently measuring 3.5 cm.  HEART: Mild cardiomegaly.  pericardial effusion.  CHEST WALL AND LOWER NECK: Within normal limits.  VISUALIZED UPPER ABDOMEN:Within normal limits.  BONES: Degenerative change.    IMPRESSION:  Findings suggesting interstitial lung disease without significant   interval progression. No evidence of pneumonia.  Trace pericardial effusion with mild cardiomegaly.      --- End ofReport ---    ACC: [id number] EXAM:  US DPLX LWR EXT VEINS COMPL BI   ORDERED BY: AYDEE FIELDS     PROCEDURE DATE:  04/12/2024          INTERPRETATION:  CLINICAL INFORMATION: History of pulmonary embolism.   Right calf pain.    COMPARISON: Bilateral lower extremity venous Doppler 10/30/2022.    TECHNIQUE: Duplex sonography of the BILATERAL LOWER extremity veins with   color and spectral Doppler, with and without compression.    FINDINGS:    RIGHT:  Normal compressibility of the RIGHT common femoral, femoral and popliteal   veins.  Doppler examination shows normal spontaneous and phasic flow.  No RIGHT calf vein thrombosis is detected.    LEFT:  Normal compressibility of the LEFT common femoral, femoral and popliteal   veins.  Doppler examination showsnormal spontaneous and phasic flow.  No LEFT calf vein thrombosis is detected.    IMPRESSION:  No evidence of deep venous thrombosis in either lower extremity.            Imaging Personally Reviewed:  [x ] YES  [ ] NO    Consultant(s) Notes Reviewed:  [x ] YES  [ ] NO    PHYSICAL EXAM:  GENERAL: NAD  HEAD:  Atraumatic, Normocephalic  EYES:  conjunctiva and sclera clear  ENMT:  Moist mucous membranes  NECK: Supple  NERVOUS SYSTEM:  Alert & Oriented X3, Good concentration  CHEST/LUNG: + diminished  HEART: Regular rate and rhythm  ABDOMEN: Soft, Nontender, Nondistended; Bowel sounds present  EXTREMITIES:  2+ Peripheral Pulses, No clubbing, cyanosis, or edema  LYMPH: No lymphadenopathy noted  SKIN: No rashes or lesions    Care Collaborated Discussed with Consultants/Other Providers [ ] YES  [ ] NO Patient is a 63y old  Female who presents with a chief complaint of AHRF 2/2 ILD flair vs PNA (17 Apr 2024 09:56)      INTERVAL HPI/OVERNIGHT EVENTS: loose stool overnight    I&O's Summary    16 Apr 2024 07:01  -  17 Apr 2024 07:00  --------------------------------------------------------  IN: 0 mL / OUT: 1100 mL / NET: -1100 mL      Vital Signs Last 24 Hrs  T(C): 37.4 (17 Apr 2024 05:09), Max: 37.4 (17 Apr 2024 05:09)  T(F): 99.3 (17 Apr 2024 05:09), Max: 99.3 (17 Apr 2024 05:09)  HR: 114 (17 Apr 2024 05:09) (102 - 124)  BP: 128/75 (17 Apr 2024 05:09) (128/73 - 143/76)  BP(mean): --  RR: 19 (17 Apr 2024 05:09) (19 - 20)  SpO2: 98% (17 Apr 2024 05:09) (95% - 98%)    Parameters below as of 17 Apr 2024 05:09  Patient On (Oxygen Delivery Method): nasal cannula  O2 Flow (L/min): 3    PAST MEDICAL & SURGICAL HISTORY:  Diabetes mellitus      Hyperlipidemia      Pulmonary embolism      Pulmonary fibrosis      Cataract  right eye          SOCIAL HISTORY  Alcohol:  Tobacco:  Illicit substance use:      FAMILY HISTORY:      LABS:                        10.4   9.34  )-----------( 335      ( 17 Apr 2024 06:55 )             33.9     04-17    138  |  104  |  22<H>  ----------------------------<  192<H>  4.2   |  28  |  0.88    Ca    7.9<L>      17 Apr 2024 06:55  Phos  2.5     04-17  Mg     2.1     04-17    TPro  5.8<L>  /  Alb  2.2<L>  /  TBili  0.3  /  DBili  x   /  AST  29  /  ALT  30  /  AlkPhos  81  04-17      Urinalysis Basic - ( 17 Apr 2024 06:55 )    Color: x / Appearance: x / SG: x / pH: x  Gluc: 192 mg/dL / Ketone: x  / Bili: x / Urobili: x   Blood: x / Protein: x / Nitrite: x   Leuk Esterase: x / RBC: x / WBC x   Sq Epi: x / Non Sq Epi: x / Bacteria: x      CAPILLARY BLOOD GLUCOSE      POCT Blood Glucose.: 95 mg/dL (17 Apr 2024 11:20)  POCT Blood Glucose.: 186 mg/dL (17 Apr 2024 07:49)  POCT Blood Glucose.: 257 mg/dL (16 Apr 2024 20:58)  POCT Blood Glucose.: 383 mg/dL (16 Apr 2024 16:42)        Urinalysis Basic - ( 17 Apr 2024 06:55 )    Color: x / Appearance: x / SG: x / pH: x  Gluc: 192 mg/dL / Ketone: x  / Bili: x / Urobili: x   Blood: x / Protein: x / Nitrite: x   Leuk Esterase: x / RBC: x / WBC x   Sq Epi: x / Non Sq Epi: x / Bacteria: x        MEDICATIONS  (STANDING):  albuterol/ipratropium for Nebulization 3 milliLiter(s) Nebulizer every 6 hours  apixaban 5 milliGRAM(s) Oral every 12 hours  atorvastatin 40 milliGRAM(s) Oral at bedtime  benzocaine/menthol Lozenge 1 Lozenge Oral every 4 hours  budesonide 160 MICROgram(s)/formoterol 4.5 MICROgram(s) Inhaler 2 Puff(s) Inhalation two times a day  chlorhexidine 2% Cloths 1 Application(s) Topical <User Schedule>  furosemide    Tablet 20 milliGRAM(s) Oral daily  insulin glargine Injectable (LANTUS) 24 Unit(s) SubCutaneous at bedtime  insulin lispro (ADMELOG) corrective regimen sliding scale   SubCutaneous three times a day before meals  insulin lispro (ADMELOG) corrective regimen sliding scale   SubCutaneous at bedtime  insulin lispro Injectable (ADMELOG) 16 Unit(s) SubCutaneous three times a day before meals  pantoprazole    Tablet 40 milliGRAM(s) Oral before breakfast  predniSONE   Tablet 40 milliGRAM(s) Oral daily  senna 1 Tablet(s) Oral daily  traZODone 100 milliGRAM(s) Oral at bedtime    MEDICATIONS  (PRN):  cyclobenzaprine 5 milliGRAM(s) Oral three times a day PRN Muscle Spasm      REVIEW OF SYSTEMS:  CONSTITUTIONAL: No fever   EYES: No eye pain, visual disturbances  ENMT:  No sinus or throat pain  NECK: No pain or stiffness  RESPIRATORY: No cough, wheezing, chills or hemoptysis; + shortness of breath  CARDIOVASCULAR: No chest pain, palpitations  GASTROINTESTINAL: + abdominal pain. No nausea, vomiting. + diarrhea  GENITOURINARY: No dysuria  NEUROLOGICAL: No headaches  SKIN: No rashes  MUSCULOSKELETAL: No joint pain     RADIOLOGY & ADDITIONAL TESTS:  ACC: [id number] EXAM:  CT CHEST   ORDERED BY: AYDEE FIELDS     PROCEDURE DATE:  04/12/2024          INTERPRETATION:  CLINICAL INFORMATION: Interstitial lung disease   progression versus pneumonia    COMPARISON: 2/28/2023    CONTRAST/COMPLICATIONS:  IV Contrast: NONE  Oral Contrast: NONE  Complications: None reported at time of study completion    PROCEDURE:  CT of the Chest was performed.  Sagittal and coronal reformats were performed.    FINDINGS:    LUNGS AND AIRWAYS: Patent central airways. There are extensive reticular   and groundglass opacities in both lungs associated with thickening of   interlobular septae with flexion bronchiectasis. There is no   consolidation or parenchymal infiltration suggesting pneumonia.  PLEURA: No pleural effusion.  MEDIASTINUM AND KAROLINE: No lymphadenopathy.  VESSELS: Prominent main pulmonary artery currently measuring 3.5 cm.  HEART: Mild cardiomegaly.  pericardial effusion.  CHEST WALL AND LOWER NECK: Within normal limits.  VISUALIZED UPPER ABDOMEN:Within normal limits.  BONES: Degenerative change.    IMPRESSION:  Findings suggesting interstitial lung disease without significant   interval progression. No evidence of pneumonia.  Trace pericardial effusion with mild cardiomegaly.      --- End ofReport ---    ACC: [id number] EXAM:  US DPLX LWR EXT VEINS COMPL BI   ORDERED BY: AYDEE FIELDS     PROCEDURE DATE:  04/12/2024          INTERPRETATION:  CLINICAL INFORMATION: History of pulmonary embolism.   Right calf pain.    COMPARISON: Bilateral lower extremity venous Doppler 10/30/2022.    TECHNIQUE: Duplex sonography of the BILATERAL LOWER extremity veins with   color and spectral Doppler, with and without compression.    FINDINGS:    RIGHT:  Normal compressibility of the RIGHT common femoral, femoral and popliteal   veins.  Doppler examination shows normal spontaneous and phasic flow.  No RIGHT calf vein thrombosis is detected.    LEFT:  Normal compressibility of the LEFT common femoral, femoral and popliteal   veins.  Doppler examination showsnormal spontaneous and phasic flow.  No LEFT calf vein thrombosis is detected.    IMPRESSION:  No evidence of deep venous thrombosis in either lower extremity.            Imaging Personally Reviewed:  [x ] YES  [ ] NO    Consultant(s) Notes Reviewed:  [x ] YES  [ ] NO    PHYSICAL EXAM:  GENERAL: NAD  HEAD:  Atraumatic, Normocephalic  EYES:  conjunctiva and sclera clear  ENMT:  Moist mucous membranes  NECK: Supple  NERVOUS SYSTEM:  Alert & Oriented X3, Good concentration  CHEST/LUNG: + diminished  HEART: Regular rate and rhythm  ABDOMEN: Soft, Nontender, Nondistended; Bowel sounds present  EXTREMITIES:  No clubbing, cyanosis, or edema  SKIN: No rashes     Care Collaborated Discussed with Consultants/Other Providers [x ] YES  [ ] NO

## 2024-04-17 NOTE — PROGRESS NOTE ADULT - PROBLEM SELECTOR PLAN 9
pending PT  needs safe disposition hx DVT in 10/ 2022   Left calf tenderness   Doppler studies negative

## 2024-04-17 NOTE — PROGRESS NOTE ADULT - PROBLEM SELECTOR PLAN 4
p/w BUN and SCr 22 and 1.74   Baseline 1.2-1.4  s/p IV bolus in ED  avoid nephrotoxic agents such as ACE/ARBs, contrast agent   continue godwin- attempt TOV before DC  started pyridium HbA1c 11.9%, likely from steroid therapy outpatient  continue lantus and premeal, ISS       cont lantus 24, inc admelog to 16 and dec dose as steroids taper- hold if poor intake       moderate correction admelog doses       nutrition eval       fsg ac and hs    Endocrine Dr. Zavaleta

## 2024-04-17 NOTE — PROGRESS NOTE ADULT - PROBLEM SELECTOR PLAN 1
secondary to acute on chronic ILD  continue steroid therapy  continue oxygen therapy  see CT chest as above  outpatient follow up with Pulmonology Lung transplant secondary to acute on chronic ILD  c/w Prednisone from 4/16 for total 5 days of steroids   Continue home ofev for ILD (family will bring from home)  continue oxygen therapy on 3l  c/w duonebs, symbicort  see CT chest - interstitial lung disease without significant interval progression. No evidence of pneumonia.                       Trace pericardial effusion with mild cardiomegaly.    pulm Dr. Dubois consulted, f/u reccs  outpatient follow up with Pulmonology Lung transplant

## 2024-04-17 NOTE — PROGRESS NOTE ADULT - PROBLEM SELECTOR PLAN 5
hx of PE on Eliquis  only takes medication qd   continue eliquis p/w BUN and SCr 22 and 1.74 (Baseline 1.2-1.4)  s/p IV bolus in ED  avoid nephrotoxic agents such as ACE/ARBs, contrast agent   resolving-->> scr 0.88

## 2024-04-17 NOTE — PROGRESS NOTE ADULT - PROBLEM SELECTOR PLAN 7
hx of HLD on Atorvastatin   c/w home meds pt c/o multiple episodes of loose stools  bowel regimen stopped  send stool studies ( gi pcr, o&P and stool cultures) pt c/o multiple episodes of loose stools  bowel regimen stopped  send stool studies ( gi pcr, o&P and stool cultures)  monitor lytes on bmp

## 2024-04-17 NOTE — CHART NOTE - NSCHARTNOTEFT_GEN_A_CORE
EVENT:  notified by Rn pt tachypneic and tachycardic    HPI:  Patient is a 62 yo female with hx of Pulmonary fibrosis (diagnosed 2021), not on home oxygen, Pulmonary embolism on Eliquis, DM on Lantus and Metformin  presented with shortness of breath. Pt reported of shortness of breath since the evening, and was persistently hypoxic at home with home inhalers not improving breathing. She describes SOB most prominent at rest and with minimal exertion. Endorses palpitations and left calf tenderness. She takes Eliquis once a day. Denied any fever, chills, chest pain, palpitation, or other acute symptoms such as abdominal pain, N/V/D, urinary symptoms.   Patient was recently admitted to [facility] in March for ILD flair, dopplers and VQ scan done then showing low probability of PE.       In ED:   vitals: BP: 94/56 HR: 112, T: 36.6 NRB> Venti mask   s/p Levaquin 1L LR  Lantus 10 units   CXR increase intersitial infiltrates  (12 Apr 2024 02:05)        OBJECTIVE:  Vital Signs Last 24 Hrs  T(C): 36.9 (17 Apr 2024 14:30), Max: 37.4 (17 Apr 2024 05:09)  T(F): 98.5 (17 Apr 2024 14:30), Max: 99.3 (17 Apr 2024 05:09)  HR: 112 (17 Apr 2024 14:30) (111 - 114)  BP: 131/69 (17 Apr 2024 14:30) (128/75 - 143/76)  BP(mean): --  RR: 20 (17 Apr 2024 14:30) (19 - 20)  SpO2: 96% (17 Apr 2024 14:30) (96% - 98%)    Parameters below as of 17 Apr 2024 14:30  Patient On (Oxygen Delivery Method): nasal cannula  O2 Flow (L/min): 3      LABS:                        10.4   9.34  )-----------( 335      ( 17 Apr 2024 06:55 )             33.9     04-17    138  |  104  |  22<H>  ----------------------------<  192<H>  4.2   |  28  |  0.88    Ca    7.9<L>      17 Apr 2024 06:55  Phos  2.5     04-17  Mg     2.1     04-17    TPro  5.8<L>  /  Alb  2.2<L>  /  TBili  0.3  /  DBili  x   /  AST  29  /  ALT  30  /  AlkPhos  81  04-17      ASSESSMENT:  1. vs noted hr 126 resp 30,pt endorsed anxiety    PLAN:   1. discussed with Dr. Olivas  2. stat cxr ordered  3. c/w supplemental o2  4. give stat 0.5mg ativan    FOLLOW UP/RESULTS:    1. monitor vs

## 2024-04-18 NOTE — PROGRESS NOTE ADULT - PROBLEM SELECTOR PLAN 4
HbA1c 11.9%, likely from steroid therapy outpatient  continue lantus and premeal, ISS       cont lantus 24, inc admelog to 16 and dec dose as steroids taper- hold if poor intake       moderate correction admelog doses       nutrition eval       fsg ac and hs    Endocrine Dr. Zavaleta

## 2024-04-18 NOTE — DISCHARGE NOTE PROVIDER - HOSPITAL COURSE
63 year old F from home ambulates with walker w/ pmhx chronic hypoxic respiratory failure on 2-3L NC at home 2/2 ILD, currently on ofev , unprovoked? PE (dx  on 2022, currently on Eliquis 5 mg PO QD), severe pulmonary HTN (based on TTE from 2023), IDDM, cor pulmonale who came in with c/o SOB, associated with dry cough, not associated with wheezing, CP, LE edema. In the ED patient was placed on NRB. ICU was consulted for worsening hypoxia and transitioned to Venti mask 35% @ 12 L saturating 92-95% and tachypneic. Pt was then placed and HFNC and admitted to ICU for acute hypoxic respiratory failure 2/2 to ILD flair.     While in ICU patient was started on Levaquin (4/11-4/13) but it was discontinued given CT chest did not have signs of pneumonia. Pt was also started on IV steroids, Duoneb, Symbicort, and diuresed with IV Lasix. Patient has history of urinary retention and was retaining 2L urine and a Pike was placed. Patient was transitioned to PO steroids on 4/16. Lovenox transitioned to Eliquis BID. Patient's family has been asked to bring home dose of PO Ofmev which will be continued. Patient was able to be weaned of HFNC and currently on 3L O2 NC with no respiratory distress. Patient echo on 4/12/24 showed EF 65% with normal right artery systolic pressure. Patient remains hemodynamically stable and downgraded to medicine.     pulm recc:   -on PO prednisone 40mg, ok to continue for now  - maintain euvolemia  - cont eliquis  - Nebs probably not helping and possibly contributing to tachycardia and anxiety  - titrate O2 as tolerated  - cont home Ofev  This pt has very advanced end stage ILD, she would do very poorly if ever intubated. Please address GOC and consider a pall care consult.      incomplete 4/18--->>> 63 year old F from home ambulates with walker w/ pmhx chronic hypoxic respiratory failure on 2-3L NC at home 2/2 ILD/probable IPF, currently on ofev , unprovoked? PE (dx  on 2022, currently on Eliquis 5 mg PO QD), severe pulmonary HTN (based on TTE from 2023), IDDM, who came in 4/12 with complaints of SOB.  Patient was initially admitted to ICU and was placed on HFNC for acute ILD exacerbation and pulmonary edema. In the  ICU had some improvement on combination of high dose steroids and diuresis. Was on eliquis with negative dopplers and clinical improvement suggest low prob of acute PE. Echo without any significant Cor pulmonale or pulm HTN. Clinically no evidence of PNA, viral panel and peripheral ID w/u negative. Patient was transferred out of ICU on 4/16 on nasal cannula.    Overnight developed worsening SOB and hypoxia. Found to have inspiratory crackles with no signs of fluid overload. Likely decompensating due to advanced ILD, pt willl req HFNC and will be transferred to ICU for further management.     Patient's out-patient pulmonologist, Dr. Primo Marlow ([facility]) contacted, who shares that patient had been previously offered eval for lung transplant in 2022 but that patient had declined at that time, stating she was 'not ready for that.'  Following discussion with both patient's pulmonologist and patient regarding her current condition and plan of care, patient agreeable for referral for lung transplant eval. Patient to be transferred to [facility] for lung transplant evaluation.

## 2024-04-18 NOTE — PROGRESS NOTE ADULT - TIME BILLING
Review of overnight events, review of labs, review of imaging. Goals of care discussion with patient and her daughter. DIscussion with pulmonary attending. medication management. care coordination with case management. Formulation of plan. Documentation of encounter

## 2024-04-18 NOTE — PROGRESS NOTE ADULT - SUBJECTIVE AND OBJECTIVE BOX
PULMONARY CONSULT SERVICE FOLLOW-UP NOTE    INTERVAL HPI:  Reviewed chart and overnight events; patient seen and examined at bedside. Still finds her breathing to not be too great. Better than since arriving to the hospital but describes it as "up and down." Has some cough, mostly dry right now. Not dyspneic in bed eating right now. No CP or palpitations. Also denies hemoptysis. Understands she needs time for medications to help her.     MEDICATIONS:  Pulmonary:  budesonide 160 MICROgram(s)/formoterol 4.5 MICROgram(s) Inhaler 2 Puff(s) Inhalation two times a day  guaiFENesin Oral Liquid (Sugar-Free) 200 milliGRAM(s) Oral every 6 hours PRN    Antimicrobials:    Anticoagulants:  apixaban 5 milliGRAM(s) Oral every 12 hours    Cardiac:  furosemide    Tablet 20 milliGRAM(s) Oral daily    Allergies    No Known Allergies    Intolerances    Vital Signs Last 24 Hrs  T(C): 37.1 (18 Apr 2024 05:09), Max: 37.6 (17 Apr 2024 20:51)  T(F): 98.8 (18 Apr 2024 05:09), Max: 99.7 (17 Apr 2024 20:51)  HR: 118 (18 Apr 2024 11:03) (109 - 126)  BP: 135/81 (18 Apr 2024 05:09) (135/76 - 135/81)  BP(mean): --  RR: 20 (18 Apr 2024 05:09) (20 - 30)  SpO2: 96% (18 Apr 2024 11:03) (96% - 99%)    Parameters below as of 18 Apr 2024 11:03  Patient On (Oxygen Delivery Method): nasal cannula  O2 Flow (L/min): 3    04-17 @ 07:01  -  04-18 @ 07:00  --------------------------------------------------------  IN: 0 mL / OUT: 900 mL / NET: -900 mL    04-18 @ 07:01  -  04-18 @ 14:38  --------------------------------------------------------  IN: 0 mL / OUT: 800 mL / NET: -800 mL    PHYSICAL EXAM:  Constitutional: non-toxic appearing elderly woman resting in bed; NAD  HEENT: atraumatic; PERRL, anicteric sclera; MMM  Neck: supple  Cardiovascular: +S1/S2, RRR  Respiratory: scattered fine crackles mostly near bases; respirations overall non-labored   Gastrointestinal: soft, NT/ND  Extremities: WWP  Vascular: 2+ radial pulses B/L  Neurological: awake and alert; PADILLA    LABS:  CBC Full  -  ( 17 Apr 2024 06:55 )  WBC Count : 9.34 K/uL  RBC Count : 3.94 M/uL  Hemoglobin : 10.4 g/dL  Hematocrit : 33.9 %  Platelet Count - Automated : 335 K/uL  Mean Cell Volume : 86.0 fl  Mean Cell Hemoglobin : 26.4 pg  Mean Cell Hemoglobin Concentration : 30.7 gm/dL  Auto Neutrophil # : x  Auto Lymphocyte # : x  Auto Monocyte # : x  Auto Eosinophil # : x  Auto Basophil # : x  Auto Neutrophil % : x  Auto Lymphocyte % : x  Auto Monocyte % : x  Auto Eosinophil % : x  Auto Basophil % : x    04-17    138  |  104  |  22<H>  ----------------------------<  192<H>  4.2   |  28  |  0.88    Ca    7.9<L>      17 Apr 2024 06:55  Phos  2.5     04-17  Mg     2.1     04-17    TPro  5.8<L>  /  Alb  2.2<L>  /  TBili  0.3  /  DBili  x   /  AST  29  /  ALT  30  /  AlkPhos  81  04-17    Urinalysis Basic - ( 17 Apr 2024 06:55 )    Color: x / Appearance: x / SG: x / pH: x  Gluc: 192 mg/dL / Ketone: x  / Bili: x / Urobili: x   Blood: x / Protein: x / Nitrite: x   Leuk Esterase: x / RBC: x / WBC x   Sq Epi: x / Non Sq Epi: x / Bacteria: x    RADIOLOGY & ADDITIONAL STUDIES:  No interval chest study for review

## 2024-04-18 NOTE — CHART NOTE - NSCHARTNOTEFT_GEN_A_CORE
EVENT: Bladder scan 224 ml    OBJECTIVE:  Vital Signs Last 24 Hrs  T(C): 36.8 (18 Apr 2024 20:52), Max: 37.2 (18 Apr 2024 14:04)  T(F): 98.3 (18 Apr 2024 20:52), Max: 99 (18 Apr 2024 14:04)  HR: 107 (18 Apr 2024 20:52) (107 - 118)  BP: 149/85 (18 Apr 2024 20:52) (115/69 - 149/85)  BP(mean): --  RR: 20 (18 Apr 2024 20:52) (20 - 20)  SpO2: 98% (18 Apr 2024 14:04) (96% - 99%)    Parameters below as of 18 Apr 2024 20:52  Patient On (Oxygen Delivery Method): nasal cannula  O2 Flow (L/min): 3    FOCUSED PHYSICAL EXAM:    LABS:                        10.4   9.34  )-----------( 335      ( 17 Apr 2024 06:55 )             33.9     04-17    138  |  104  |  22<H>  ----------------------------<  192<H>  4.2   |  28  |  0.88    Ca    7.9<L>      17 Apr 2024 06:55  Phos  2.5     04-17  Mg     2.1     04-17    TPro  5.8<L>  /  Alb  2.2<L>  /  TBili  0.3  /  DBili  x   /  AST  29  /  ALT  30  /  Alk Phos  81  04-17      EKG:   IMGAGING:    ASSESSMENT:  HPI:  Patient is a 62 yo female with hx of Pulmonary fibrosis (diagnosed 2021), not on home oxygen, Pulmonary embolism on Eliquis, DM on Lantus and Metformin  presented with shortness of breath. Pt reported of shortness of breath since the evening, and was persistently hypoxic at home with home inhalers not improving breathing. She describes SOB most prominent at rest and with minimal exertion. Endorses palpitations and left calf tenderness. She takes Eliquis once a day. Denied any fever, chills, chest pain, palpitation, or other acute symptoms such as abdominal pain, N/V/D, urinary symptoms.   Patient was recently admitted to [facility] in March for ILD flair, dopplers and VQ scan done then showing low probability of PE.       In ED:   vitals: BP: 94/56 HR: 112, T: 36.6 NRB> Venti mask   s/p Levaquin 1L LR  Lantus 10 units   CXR increase intersitial infiltrates  (12 Apr 2024 02:05)      PLAN:   MEDICATIONS  (STANDING):  apixaban 5 milliGRAM(s) Oral every 12 hours  atorvastatin 40 milliGRAM(s) Oral at bedtime  benzocaine/menthol Lozenge 1 Lozenge Oral every 4 hours  budesonide 160 MICROgram(s)/formoterol 4.5 MICROgram(s) Inhaler 2 Puff(s) Inhalation two times a day  chlorhexidine 2% Cloths 1 Application(s) Topical <User Schedule>  furosemide    Tablet 20 milliGRAM(s) Oral daily  insulin glargine Injectable (LANTUS) 24 Unit(s) SubCutaneous at bedtime  insulin lispro (ADMELOG) corrective regimen sliding scale   SubCutaneous at bedtime  insulin lispro (ADMELOG) corrective regimen sliding scale   SubCutaneous three times a day before meals  insulin lispro Injectable (ADMELOG) 16 Unit(s) SubCutaneous three times a day before meals  pantoprazole    Tablet 40 milliGRAM(s) Oral before breakfast  predniSONE   Tablet 40 milliGRAM(s) Oral daily  senna 1 Tablet(s) Oral daily  traZODone 100 milliGRAM(s) Oral at bedtime    MEDICATIONS  (PRN):  acetaminophen     Tablet .. 650 milliGRAM(s) Oral every 6 hours PRN Mild Pain (1 - 3)  cyclobenzaprine 5 milliGRAM(s) Oral three times a day PRN Muscle Spasm  guaiFENesin Oral Liquid (Sugar-Free) 200 milliGRAM(s) Oral every 6 hours PRN Cough  HYDROmorphone  Injectable 1 milliGRAM(s) IV Push once PRN Pain    FOLLOW UP / RESULT: EVENT: Called to assess pt for c/o abd pain 10/10, bladder scan 224 ml    BRIEF HPI: 63 year old F from home ambulates with walker w/ PMH chronic hypoxic respiratory failure on 2-3L NC at home 2/2 ILD, currently on ofev , unprovoked? PE (dx  on 2022, currently on Eliquis 5 mg PO QD), severe pulmonary HTN (based on TTE from 2023), IDDM, cor pulmonale who came in with c/o SOB, associated with dry cough, not associated with wheezing, CP, LE edema. In the ED patient was placed on NRB. ICU was consulted for worsening hypoxia and transitioned to Venti mask 35% @ 12 L saturating 92-95% and tachypneic. Pt was then placed and HFNC and admitted to ICU for acute hypoxic respiratory failure 2/2 to ILD flair. Patient weaned off HFNC and currently on 3L O2 NC with no respiratory distress. Pt downgraded to medicine, optimized for d/c to [facility]. Pending choices. Pt states she takes Flomax at home but it doesn't work.    OBJECTIVE:  Vital Signs Last 24 Hrs  T(C): 36.8 (18 Apr 2024 20:52), Max: 37.2 (18 Apr 2024 14:04)  T(F): 98.3 (18 Apr 2024 20:52), Max: 99 (18 Apr 2024 14:04)  HR: 107 (18 Apr 2024 20:52) (107 - 118)  BP: 149/85 (18 Apr 2024 20:52) (115/69 - 149/85)  BP(mean): --  RR: 20 (18 Apr 2024 20:52) (20 - 20)  SpO2: 98% (18 Apr 2024 14:04) (96% - 99%)    Parameters below as of 18 Apr 2024 20:52  Patient On (Oxygen Delivery Method): nasal cannula  O2 Flow (L/min): 3    FOCUSED PHYSICAL EXAM:  ABD: Soft, + BS, no bladder distension  RESP: Inc with c/o pain, diminished  CV: S1 S2 regular  NEURO: Alert, oriented X 4, groaning with c/o abd pain    LABS:                        10.4   9.34  )-----------( 335      ( 17 Apr 2024 06:55 )             33.9     04-17    138  |  104  |  22<H>  ----------------------------<  192<H>  4.2   |  28  |  0.88    Ca    7.9<L>      17 Apr 2024 06:55  Phos  2.5     04-17  Mg     2.1     04-17    TPro  5.8<L>  /  Alb  2.2<L>  /  TBili  0.3  /  DBili  x   /  AST  29  /  ALT  30  /  Alk Phos  81  04-17    PLAN:   Insert Pike now as requested by pt  Tamsulosin inc 0.8 usha GRAM(s) Oral at bedtime  Cont furosemide Tablet 20 usha GRAM(s) Oral daily  HYDROmorphone  Injectable 1 usha GRAM(s) IV Push once PRN Pain    FOLLOW UP / RESULT: TOV when appropriate

## 2024-04-18 NOTE — DISCHARGE NOTE PROVIDER - PROVIDER TOKENS
FREE:[LAST:[PCP dr. Plummer],FIRST:[Tam],PHONE:[phone]],FAX:[(   )    -],FOLLOWUP:[1 week]],PROVIDER:[TOKEN:[[id number]],FOLLOWUP:[2 weeks]]

## 2024-04-18 NOTE — PROGRESS NOTE ADULT - ASSESSMENT
63 year old F from home ambulates with walker w/ pmhx chronic hypoxic respiratory failure on 2-3L NC at home 2/2 ILD, currently on ofev , unprovoked? PE (dx  on 2022, currently on Eliquis 5 mg PO QD), severe pulmonary HTN (based on TTE from 2023), IDDM, cor pulmonale who came in with c/o SOB, associated with dry cough, not associated with wheezing, CP, LE edema. In the ED patient was placed on NRB. ICU was consulted for worsening hypoxia and transitioned to Venti mask 35% @ 12 L saturating 92-95% and tachypneic. Pt was then placed and HFNC and admitted to ICU for acute hypoxic respiratory failure 2/2 to ILD flair.     While in ICU patient was started on Levaquin (4/11-4/13) but it was discontinued given CT chest did not have signs of pneumonia. Pt was also started on IV steroids, Duoneb, Symbicort, and diuresed with IV Lasix. Patient has history of urinary retention and was retaining 2L urine and a Pike was placed. Patient was transitioned to PO steroids on 4/16. Lovenox transitioned to Eliquis BID. Patient's family has been asked to bring home dose of PO Ofirmev which will be continued. Patient was able to be weaned of HFNC and currently on 3L O2 NC with no respiratory distress. Patient echo on 4/12/24 showed EF 65% with normal right artery systolic pressure. Patient remains hemodynamically stable and downgraded to medicine.   Patient is optimized for d/c to [facility]. Pending choices.

## 2024-04-18 NOTE — DISCHARGE NOTE PROVIDER - NSDCMRMEDTOKEN_GEN_ALL_CORE_FT
Admelog 100 units/mL injectable solution: 15 unit(s) injectable 3 times a day (before meals)  alendronate 40 mg oral tablet: 1 tablet with sensor orally once a week  apixaban 5 mg oral tablet: 1 tab(s) orally once a day  atorvastatin 40 mg oral tablet: 1 tab(s) orally once a day  gabapentin 300 mg oral capsule: 1 cap(s) orally once a day  insulin glargine 100 units/mL subcutaneous solution: 25 unit(s) subcutaneous once a day (at bedtime)  Ofev 150 mg oral capsule: 1 cap(s) orally every 12 hours  traZODone 100 mg oral tablet: 1 tab(s) orally once a day (at bedtime)   Admelog 100 units/mL injectable solution: 15 unit(s) injectable 3 times a day (before meals)  alendronate 40 mg oral tablet: 1 tablet with sensor orally once a week  apixaban 5 mg oral tablet: 1 tab(s) orally once a day  atorvastatin 40 mg oral tablet: 1 tab(s) orally once a day  Flomax 0.4 mg oral capsule: 1 cap(s) orally once a day (at bedtime)  gabapentin 300 mg oral capsule: 1 cap(s) orally once a day  insulin glargine 100 units/mL subcutaneous solution: 25 unit(s) subcutaneous once a day (at bedtime)  Ofev 150 mg oral capsule: 1 cap(s) orally every 12 hours  traZODone 100 mg oral tablet: 1 tab(s) orally once a day (at bedtime)   acetaminophen 325 mg oral tablet: 2 tab(s) orally every 6 hours As needed Mild Pain (1 - 3)  Admelog 100 units/mL injectable solution: 15 unit(s) injectable 3 times a day (before meals)  alendronate 40 mg oral tablet: 1 tablet with sensor orally once a week  apixaban 5 mg oral tablet: 1 tab(s) orally once a day  atorvastatin 40 mg oral tablet: 1 tab(s) orally once a day  cyclobenzaprine 5 mg oral tablet: 1 tab(s) orally 3 times a day as needed for Muscle Spasm  Flomax 0.4 mg oral capsule: 1 cap(s) orally once a day (at bedtime)  gabapentin 300 mg oral capsule: 1 cap(s) orally once a day  guaiFENesin 100 mg/5 mL oral liquid: 10 milliliter(s) orally every 6 hours As needed Cough  insulin glargine 100 units/mL subcutaneous solution: 25 unit(s) subcutaneous once a day (at bedtime)  melatonin 3 mg oral tablet: 1 tab(s) orally once a day (at bedtime)  methylPREDNISolone acetate 40 mg/mL injectable suspension: 40 milligram(s) by intra-articular injection 2 times a day  Ofev 150 mg oral capsule: 1 cap(s) orally every 12 hours  pantoprazole 40 mg oral delayed release tablet: 1 tab(s) orally once a day (before a meal)  senna leaf extract oral tablet: 1 tab(s) orally once a day  Symbicort 160 mcg-4.5 mcg/inh inhalation aerosol: 1 puff(s) inhaled 2 times a day  traZODone 100 mg oral tablet: 1 tab(s) orally once a day (at bedtime)  verapamil 80 mg oral tablet: 1 tab(s) orally every 8 hours

## 2024-04-18 NOTE — PROGRESS NOTE ADULT - PROBLEM SELECTOR PLAN 3
CT chest - interstitial lung disease without significant interval progression. No evidence of pneumonia.                  Trace pericardial effusion with mild cardiomegaly.  c/w lasix + supplemental 02

## 2024-04-18 NOTE — DISCHARGE NOTE PROVIDER - NSDCCPCAREPLAN_GEN_ALL_CORE_FT
PRINCIPAL DISCHARGE DIAGNOSIS  Diagnosis: Acute respiratory failure with hypoxia  Assessment and Plan of Treatment: You were treated for respiratory failure likely triggerred by an exacerbation of your intersitatal lung disease. Acute respiratory failure is a condition that happens when your lungs cannot get enough oxygen into your blood. Call your local emergency number (911 in the [place]) or have someone call if: You have more trouble catching your breath. You have stopped breathing.  Manage ARF: Do not smoke. Nicotine and other chemicals in cigarettes and cigars can cause lung damage and make your symptoms worse. Prevent the spread of germs. Wash your hands often with soap and water. Cover your mouth when you cough. Cough into a tissue or your shirtsleeve so you do not spread germs from your hands. If you are sick, stay away from others as much as possible. Ask about vaccines you may need. Vaccines can help prevent some lung infections. Examples include pneumonia, COVID-19. Get a flu vaccine every year as soon as it is recommended, usually in September or October.  Follow up with your doctor        SECONDARY DISCHARGE DIAGNOSES  Diagnosis: HLD (hyperlipidemia)  Assessment and Plan of Treatment: You have a history of hyperlipidemia, which is when you have too much cholesterol in your blood. High amounts of cholesterol in your blood can put you at higher risks for heart attck, strokes and other health problems. Follow up with PCP for treatment goals, continue medication as prescribed, have liver function testing every 3 months as anti lipid medications can cause liver irritation, eat low fat meals, avoid red meat, butter, fried foods and cheese. Get daily exercise.      Diagnosis: Diabetes mellitus with hyperglycemia  Assessment and Plan of Treatment: Continue to follow with your primary care MD or your endocrinologist. Discuss what the goal hemoglobin A1C level is for you.  Follow a heart healthy diabetic diet. If you check your fingerstick glucose at home, call your MD if it is greater than 250mg/dL on 2 occasions or less than 100mg/dL on 2 occasions. Know signs of low blood sugar, such as: dizziness, shakiness, sweating, confusion, hunger, nervousness- drink 4 ounces apple juice if occurs and call your doctor. Know early signs of high blood sugar, such as: frequent urination, increased thirst, blurry vision, fatigue, headache - call your doctor if this occurs.      Diagnosis: History of pulmonary embolism  Assessment and Plan of Treatment: You have a history of pulmonary embolism. A pulmonary embolism is the sudden blockage of a blood vessel in the lungs by an embolus. A PE can become life-threatening. Go to follow-up appointments and take blood thinners as directed. Call your local emergency number (911 in the [place]) if: You feel lightheaded, short of breath, and have chest pain. You cough up blood.  Seek care immediately if: Your arm or leg feels warm, tender, and painful. It may look swollen and red.  Prevent another PE: Change your body position or move around often.   Maintain a healthy weight. Do not smoke. Nicotine and other chemicals in cigarettes and cigars can damage blood vessels and increase your risk for another PE.   Follow up with your doctor or hematologist      Diagnosis: Urinary retention  Assessment and Plan of Treatment: You were found to have acute urinary retention, a godwin catheter was placed to help drain your urine. Urinary retention is a condition that develops when your bladder does not empty completely when you urinate.  Godwin catheter care: You may need a Godwin catheter for up to 2 weeks at home. Healthcare providers will give you a smaller leg bag to collect urine. Keep the bag below your waist. This will prevent urine from flowing back into your bladder and causing an infection or other problems. Also, keep the tube free of kinks so the urine will drain properly. Do not pull on the catheter. This can cause pain and bleeding, and may cause the catheter to come out. Ask your healthcare provider or urologist for more information on Godwin catheter care.  Urinate regularly: When your catheter is removed, do not let your bladder become too full before you urinate. Urinate as soon as you feel the need or at least every 3 hours while you are awake. Do not drink liquids before you go to bed. Urinate right before you go to bed.  Follow up with your healthcare provider or urologist as directed  Return to the emergency department if:  You have severe abdominal pain.  You are breathing faster than usual.  Your heartbeat is faster than usual.  Your face, hands, feet, or ankles are swollen.      Diagnosis: ILD (interstitial lung disease)  Assessment and Plan of Treatment: You have a history of intersitial lung disease. You were seen by a pulmonologist who reccommends : ?????    Diagnosis: Pleural effusion  Assessment and Plan of Treatment: You were found to have pleural effusion on imaging. You were treated with diuretics to help removed the extra fluid from your lungs.     PRINCIPAL DISCHARGE DIAGNOSIS  Diagnosis: Acute respiratory failure with hypoxia  Assessment and Plan of Treatment: You were treated for respiratory failure likely triggerred by an exacerbation of your intersitatal lung disease. Acute respiratory failure is a condition that happens when your lungs cannot get enough oxygen into your blood. Call your local emergency number (911 in the [place]) or have someone call if: You have more trouble catching your breath. You have stopped breathing.  Manage ARF: Do not smoke. Nicotine and other chemicals in cigarettes and cigars can cause lung damage and make your symptoms worse. Prevent the spread of germs. Wash your hands often with soap and water. Cover your mouth when you cough. Cough into a tissue or your shirtsleeve so you do not spread germs from your hands. If you are sick, stay away from others as much as possible. Ask about vaccines you may need. Vaccines can help prevent some lung infections. Examples include pneumonia, COVID-19. Get a flu vaccine every year as soon as it is recommended, usually in September or October.  Follow up with your doctor        SECONDARY DISCHARGE DIAGNOSES  Diagnosis: Pleural effusion  Assessment and Plan of Treatment: You were found to have pleural effusion on imaging. You were treated with diuretics to help removed the extra fluid from your lungs.    Diagnosis: ILD (interstitial lung disease)  Assessment and Plan of Treatment: You have a history of intersitial lung disease. You were seen by a pulmonologist who reccommends : ?????    Diagnosis: Urinary retention  Assessment and Plan of Treatment: You were found to have acute urinary retention, a godwin catheter was placed to help drain your urine. You were not tolerated for trial of void and reinserted on 4/18. with 16Fr.   Can flush with 0.9 NS 10ml for occusion/sediments in the catheter as needed.   Follow up with urology outpatient for another trial of void after 2 weeks after discharge. Urinary retention is a condition that develops when your bladder does not empty completely when you urinate.  Godwin catheter care: You may need a Godwin catheter for up to 2 weeks at home. Healthcare providers will give you a smaller leg bag to collect urine. Keep the bag below your waist. This will prevent urine from flowing back into your bladder and causing an infection or other problems. Also, keep the tube free of kinks so the urine will drain properly. Do not pull on the catheter. This can cause pain and bleeding, and may cause the catheter to come out. Ask your healthcare provider or urologist for more information on Godwin catheter care.  Urinate regularly: When your catheter is removed, do not let your bladder become too full before you urinate. Urinate as soon as you feel the need or at least every 3 hours while you are awake. Do not drink liquids before you go to bed. Urinate right before you go to bed.  Follow up with your healthcare provider or urologist as directed  Return to the emergency department if:  You have severe abdominal pain.  You are breathing faster than usual.  Your heartbeat is faster than usual.  Your face, hands, feet, or ankles are swollen.      Diagnosis: HLD (hyperlipidemia)  Assessment and Plan of Treatment: You have a history of hyperlipidemia, which is when you have too much cholesterol in your blood. High amounts of cholesterol in your blood can put you at higher risks for heart attck, strokes and other health problems. Follow up with PCP for treatment goals, continue medication as prescribed, have liver function testing every 3 months as anti lipid medications can cause liver irritation, eat low fat meals, avoid red meat, butter, fried foods and cheese. Get daily exercise.      Diagnosis: Diabetes mellitus with hyperglycemia  Assessment and Plan of Treatment: Continue to follow with your primary care MD or your endocrinologist. Discuss what the goal hemoglobin A1C level is for you.  Follow a heart healthy diabetic diet. If you check your fingerstick glucose at home, call your MD if it is greater than 250mg/dL on 2 occasions or less than 100mg/dL on 2 occasions. Know signs of low blood sugar, such as: dizziness, shakiness, sweating, confusion, hunger, nervousness- drink 4 ounces apple juice if occurs and call your doctor. Know early signs of high blood sugar, such as: frequent urination, increased thirst, blurry vision, fatigue, headache - call your doctor if this occurs.      Diagnosis: History of pulmonary embolism  Assessment and Plan of Treatment: You have a history of pulmonary embolism. A pulmonary embolism is the sudden blockage of a blood vessel in the lungs by an embolus. A PE can become life-threatening. Go to follow-up appointments and take blood thinners as directed. Call your local emergency number (911 in the [place]) if: You feel lightheaded, short of breath, and have chest pain. You cough up blood.  Seek care immediately if: Your arm or leg feels warm, tender, and painful. It may look swollen and red.  Prevent another PE: Change your body position or move around often.   Maintain a healthy weight. Do not smoke. Nicotine and other chemicals in cigarettes and cigars can damage blood vessels and increase your risk for another PE.   Follow up with your doctor or hematologist       PRINCIPAL DISCHARGE DIAGNOSIS  Diagnosis: Acute respiratory failure with hypoxia  Assessment and Plan of Treatment: You were treated for respiratory failure likely triggered by an exacerbation of your intersitatal lung disease. Acute respiratory failure is a condition that happens when your lungs cannot get enough oxygen into your blood. Call your local emergency number (911 in the [place]) or have someone call if: You have more trouble catching your breath. You have stopped breathing.  Manage ARF: Do not smoke. Nicotine and other chemicals in cigarettes and cigars can cause lung damage and make your symptoms worse. Prevent the spread of germs. Wash your hands often with soap and water. Cover your mouth when you cough. Cough into a tissue or your shirtsleeve so you do not spread germs from your hands. If you are sick, stay away from others as much as possible. Ask about vaccines you may need. Vaccines can help prevent some lung infections. Examples include pneumonia, COVID-19. Get a flu vaccine every year as soon as it is recommended, usually in September or October.  YOU ARE BEING TRASNFERED TO [facility] FOR LUNG TRANSPLANT EVALAUTION        SECONDARY DISCHARGE DIAGNOSES  Diagnosis: ILD (interstitial lung disease)  Assessment and Plan of Treatment: You have a history of intersitial lung disease.   YOU ARE BEING TRASNFERED TO [facility] FOR LUNG TRANSPLANT EVALAUTION    Diagnosis: Pleural effusion  Assessment and Plan of Treatment: You were found to have pleural effusion on imaging. You were treated with diuretics to help removed the extra fluid from your lungs.  YOU ARE BEING TRASNFERED TO [facility] FOR LUNG TRANSPLANT EVALAUTION    Diagnosis: HLD (hyperlipidemia)  Assessment and Plan of Treatment: You have a history of hyperlipidemia, which is when you have too much cholesterol in your blood. High amounts of cholesterol in your blood can put you at higher risks for heart attck, strokes and other health problems. Follow up with PCP for treatment goals, continue medication as prescribed, have liver function testing every 3 months as anti lipid medications can cause liver irritation, eat low fat meals, avoid red meat, butter, fried foods and cheese. Get daily exercise.      Diagnosis: Diabetes mellitus with hyperglycemia  Assessment and Plan of Treatment: Continue to follow with your primary care MD or your endocrinologist. Discuss what the goal hemoglobin A1C level is for you.  Follow a heart healthy diabetic diet. If you check your fingerstick glucose at home, call your MD if it is greater than 250mg/dL on 2 occasions or less than 100mg/dL on 2 occasions. Know signs of low blood sugar, such as: dizziness, shakiness, sweating, confusion, hunger, nervousness- drink 4 ounces apple juice if occurs and call your doctor. Know early signs of high blood sugar, such as: frequent urination, increased thirst, blurry vision, fatigue, headache - call your doctor if this occurs.  YOU ARE BEING TRASNFERED TO [facility] FOR LUNG TRANSPLANT EVALAUTION      Diagnosis: History of pulmonary embolism  Assessment and Plan of Treatment: You have a history of pulmonary embolism. A pulmonary embolism is the sudden blockage of a blood vessel in the lungs by an embolus. A PE can become life-threatening. Go to follow-up appointments and take blood thinners as directed. Call your local emergency number (911 in the [place]) if: You feel lightheaded, short of breath, and have chest pain. You cough up blood.  Seek care immediately if: Your arm or leg feels warm, tender, and painful. It may look swollen and red.  Prevent another PE: Change your body position or move around often.   Maintain a healthy weight. Do not smoke. Nicotine and other chemicals in cigarettes and cigars can damage blood vessels and increase your risk for another PE.   Follow up with your doctor or hematologist  YOU ARE BEING TRASNFERED TO [facility] FOR LUNG TRANSPLANT EVALAUTION

## 2024-04-18 NOTE — DISCHARGE NOTE PROVIDER - NSFOLLOWUPCLINICS_GEN_ALL_ED_FT
[facility]  Urology  [address] [place] [address]  Phone: [phone]  Fax: [phone]  Follow Up Time: 2 weeks

## 2024-04-18 NOTE — PROGRESS NOTE ADULT - PROBLEM SELECTOR PLAN 5
p/w BUN and SCr 22 and 1.74 (Baseline 1.2-1.4)  s/p IV bolus in ED  avoid nephrotoxic agents such as ACE/ARBs, contrast agent   resolving-->> scr 0.88

## 2024-04-18 NOTE — PROGRESS NOTE ADULT - PROBLEM SELECTOR PLAN 7
pt c/o multiple episodes of loose stools  bowel regimen stopped  send stool studies ( gi pcr, o&P and stool cultures)  monitor lytes on bmp

## 2024-04-18 NOTE — DISCHARGE NOTE PROVIDER - CARE PROVIDER_API CALL
PCP Tam Perez  Phone: [phone]  Fax: (   )    -  Follow Up Time: 1 week    Kandi Zavaleta  Endocrinology/Metab/Diabetes  [address] [place] [id number]  Phone: [phone]  Fax: [phone]  Follow Up Time: 2 weeks

## 2024-04-18 NOTE — PROGRESS NOTE ADULT - ASSESSMENT
62yo woman w/ PMH advanced ILD/probable IPF admitted with acute on chronic hypoxemic respiratory failure with likely acute exacerbation of ILD as well as pulm edema.    High BNP, B lines pattern on initial ICU sonogram and interlobular septal thickening on imaging  Has had some improvement on combo of high dose steroids and diuresis  On eliquis with negative dopplers and clinical improvement suggest low prob of acute PE  Echo without any significant Cor pulmonale or pulm HTN  Clinically no evidence of PNA, viral panel and peripheral ID w/u negative    #Acute on chronic hypoxemic respiratory failure  #Advanced ILD, likely IPF and probable exacerbation of ILD  #Pulmonary edema  #H/o PE on Eliquis    Recommendations:  - on PO prednisone 40mg, ok to continue for now and favor gradual taper after 5-7d course  - maintain euvolemia  - cont eliquis  - TTE reviewed  - nebs probably not helping and possibly contributing to tachycardia and anxiety  - titrate O2 as tolerated to maintain normoxia   - cont home Ofev  - GOC discussion is appropriate as pt would have poor outcome if ever intubated    Follows with pulmonologist Dr. Primo Marlow ([facility]) and should have routine f/u within 1-2wks of discharge

## 2024-04-18 NOTE — PROGRESS NOTE ADULT - SUBJECTIVE AND OBJECTIVE BOX
NP Note discussed with  primary attending    Patient is a 63y old  Female who presents with a chief complaint of AHRF 2/2 ILD flair vs PNA (18 Apr 2024 09:54)      INTERVAL HPI/OVERNIGHT EVENTS: no new complaints    MEDICATIONS  (STANDING):  apixaban 5 milliGRAM(s) Oral every 12 hours  atorvastatin 40 milliGRAM(s) Oral at bedtime  benzocaine/menthol Lozenge 1 Lozenge Oral every 4 hours  budesonide 160 MICROgram(s)/formoterol 4.5 MICROgram(s) Inhaler 2 Puff(s) Inhalation two times a day  chlorhexidine 2% Cloths 1 Application(s) Topical <User Schedule>  furosemide    Tablet 20 milliGRAM(s) Oral daily  insulin glargine Injectable (LANTUS) 24 Unit(s) SubCutaneous at bedtime  insulin lispro (ADMELOG) corrective regimen sliding scale   SubCutaneous at bedtime  insulin lispro (ADMELOG) corrective regimen sliding scale   SubCutaneous three times a day before meals  insulin lispro Injectable (ADMELOG) 16 Unit(s) SubCutaneous three times a day before meals  pantoprazole    Tablet 40 milliGRAM(s) Oral before breakfast  predniSONE   Tablet 40 milliGRAM(s) Oral daily  senna 1 Tablet(s) Oral daily  traZODone 100 milliGRAM(s) Oral at bedtime    MEDICATIONS  (PRN):  acetaminophen     Tablet .. 650 milliGRAM(s) Oral every 6 hours PRN Mild Pain (1 - 3)  cyclobenzaprine 5 milliGRAM(s) Oral three times a day PRN Muscle Spasm  guaiFENesin Oral Liquid (Sugar-Free) 200 milliGRAM(s) Oral every 6 hours PRN Cough      __________________________________________________  REVIEW OF SYSTEMS:    CONSTITUTIONAL: No fever,   EYES: no acute visual disturbances  NECK: No pain or stiffness  RESPIRATORY: No cough; No shortness of breath  CARDIOVASCULAR: No chest pain, no palpitations  GASTROINTESTINAL: No pain. No nausea or vomiting; No diarrhea   NEUROLOGICAL: No headache or numbness, no tremors  MUSCULOSKELETAL: No joint pain, no muscle pain  GENITOURINARY: no dysuria, no frequency, no hesitancy  PSYCHIATRY: no depression , no anxiety  ALL OTHER  ROS negative        Vital Signs Last 24 Hrs  T(C): 37.1 (18 Apr 2024 05:09), Max: 37.6 (17 Apr 2024 20:51)  T(F): 98.8 (18 Apr 2024 05:09), Max: 99.7 (17 Apr 2024 20:51)  HR: 118 (18 Apr 2024 11:03) (109 - 126)  BP: 135/81 (18 Apr 2024 05:09) (131/69 - 135/81)  BP(mean): --  RR: 20 (18 Apr 2024 05:09) (20 - 30)  SpO2: 96% (18 Apr 2024 11:03) (96% - 99%)    Parameters below as of 18 Apr 2024 11:03  Patient On (Oxygen Delivery Method): nasal cannula  O2 Flow (L/min): 3      ________________________________________________  PHYSICAL EXAM:  GENERAL: NAD  HEENT: Normocephalic;  conjunctivae and sclerae clear; moist mucous membranes;   NECK : supple  CHEST/LUNG: Clear to ausculitation bilaterally with good air entry   HEART: S1 S2  regular; no murmurs, gallops or rubs  ABDOMEN: Soft, Nontender, Nondistended; Bowel sounds present  EXTREMITIES: no cyanosis; no edema; no calf tenderness  SKIN: warm and dry; no rash  NERVOUS SYSTEM:  Awake and alert; Oriented  to place, person and time ; no new deficits    _________________________________________________  LABS:                        10.4   9.34  )-----------( 335      ( 17 Apr 2024 06:55 )             33.9     04-17    138  |  104  |  22<H>  ----------------------------<  192<H>  4.2   |  28  |  0.88    Ca    7.9<L>      17 Apr 2024 06:55  Phos  2.5     04-17  Mg     2.1     04-17    TPro  5.8<L>  /  Alb  2.2<L>  /  TBili  0.3  /  DBili  x   /  AST  29  /  ALT  30  /  AlkPhos  81  04-17      Urinalysis Basic - ( 17 Apr 2024 06:55 )    Color: x / Appearance: x / SG: x / pH: x  Gluc: 192 mg/dL / Ketone: x  / Bili: x / Urobili: x   Blood: x / Protein: x / Nitrite: x   Leuk Esterase: x / RBC: x / WBC x   Sq Epi: x / Non Sq Epi: x / Bacteria: x      CAPILLARY BLOOD GLUCOSE      POCT Blood Glucose.: 129 mg/dL (18 Apr 2024 11:33)  POCT Blood Glucose.: 206 mg/dL (18 Apr 2024 07:39)  POCT Blood Glucose.: 275 mg/dL (17 Apr 2024 21:35)  POCT Blood Glucose.: 376 mg/dL (17 Apr 2024 16:26)        RADIOLOGY & ADDITIONAL TESTS:  < from: Xray Chest 1 View-PORTABLE IMMEDIATE (Xray Chest 1 View-PORTABLE IMMEDIATE .) (04.17.24 @ 17:27) >  IMPRESSION: Chronic interstitial lung disease again noted.    --- End of Report ---    < end of copied text >    Imaging Personally Reviewed:  YES    Consultant(s) Notes Reviewed:   YES    Care Discussed with Consultants :     Plan of care was discussed with patient and /or primary care giver; all questions and concerns were addressed and care was aligned with patient's wishes.

## 2024-04-18 NOTE — DISCHARGE NOTE PROVIDER - DETAILS OF MALNUTRITION DIAGNOSIS/DIAGNOSES
This patient has been assessed with a concern for Malnutrition and was treated during this hospitalization for the following Nutrition diagnosis/diagnoses:     -  04/15/2024: Moderate protein-calorie malnutrition

## 2024-04-19 NOTE — PROGRESS NOTE ADULT - SUBJECTIVE AND OBJECTIVE BOX
Interval Events:  pt in nad    Allergies    No Known Allergies    Intolerances      Endocrine/Metabolic Medications:  atorvastatin 40 milliGRAM(s) Oral at bedtime  insulin glargine Injectable (LANTUS) 24 Unit(s) SubCutaneous at bedtime  insulin lispro (ADMELOG) corrective regimen sliding scale   SubCutaneous three times a day before meals  insulin lispro (ADMELOG) corrective regimen sliding scale   SubCutaneous at bedtime  insulin lispro Injectable (ADMELOG) 16 Unit(s) SubCutaneous three times a day before meals      Vital Signs Last 24 Hrs  T(C): 37.1 (19 Apr 2024 05:09), Max: 37.2 (18 Apr 2024 14:04)  T(F): 98.7 (19 Apr 2024 05:09), Max: 99 (18 Apr 2024 14:04)  HR: 110 (19 Apr 2024 05:09) (107 - 118)  BP: 118/68 (19 Apr 2024 05:09) (115/69 - 149/85)  BP(mean): --  RR: 18 (19 Apr 2024 05:09) (18 - 20)  SpO2: 94% (19 Apr 2024 05:09) (94% - 98%)    Parameters below as of 19 Apr 2024 05:09  Patient On (Oxygen Delivery Method): room air  O2 Flow (L/min): 3        PHYSICAL EXAM  All physical exam findings normal, except those marked:  General:	Alert, active, cooperative, NAD, well hydrated  .		[] Abnormal:  Neck		Normal: supple, no cervical adenopathy, no palpable thyroid  .		[] Abnormal:  Cardiovascular	Normal: regular rate, normal S1, S2, no murmurs  .		[] Abnormal:  Respiratory	Normal: no chest wall deformity, normal respiratory pattern, CTA B/L  .		[] Abnormal:  Abdominal	Normal: soft, ND, NT, bowel sounds present, no masses, no organomegaly  .		[] Abnormal:  		Normal normal genitalia, testes descended, circumcised/uncircumcised  .		Eze stage:			Breast eze:  .		Menstrual history:  .		[] Abnormal:  Extremities	Normal: FROM x4  .		[] Abnormal:  Skin		Normal: intact and not indurated, no rash, no acanthosis nigricans  .		[] Abnormal:  Neurologic	Normal: grossly intact  .		[] Abnormal:    LABS        CAPILLARY BLOOD GLUCOSE      POCT Blood Glucose.: 92 mg/dL (19 Apr 2024 07:45)  POCT Blood Glucose.: 112 mg/dL (18 Apr 2024 21:37)  POCT Blood Glucose.: 200 mg/dL (18 Apr 2024 16:43)  POCT Blood Glucose.: 129 mg/dL (18 Apr 2024 11:33)        Assesment/plan    Patient is a 62 yo female with hx of Pulmonary fibrosis (diagnosed 2021), not on home oxygen, Pulmonary embolism on Eliquis, DM on Lantus and Metformin  presented with shortness of breath. Found to have uncont dm. Pt admits to taking lantus 30 at night and premeal 25ac tid ? injection technique reviewed- admits to noncompliance with diet. On and off on prednisone          Problem/Recommendation - 1:  Diabetes mellitus with hyperglycemia.   uncont with swings  on steroids- prednisone 30  hba1c- 11  dec lantus to 20  dec admelog to 14 and dec dose as steroids taper- hold if poor intake  moderate correction admelog doses  nutrition eval  fsg ac and hs  d/w prim team.     Problem/Recommendation - 2:  ·  Problem: ILD (interstitial lung disease).   ·  Recommendation: cont tx per pulm  steroid taper.

## 2024-04-19 NOTE — ED PROVIDER NOTE - INTERNATIONAL TRAVEL
Diagnosis:   Hypophonia (R49.8)       Referring Provider: Tamiko  Date of Evaluation:    10/9/23    Precautions:  None Next MD visit:   none scheduled  Date of Surgery: n/a   Insurance Primary/Secondary: MEDICARE / COMMERCIAL     # Auth Visits: NA            Subjective: No reported changes since last session    Pain: 0/10      Objective:     Date: 10/25/2023  TX#: 2/10 Date: 11/1/2023  TX#: 3/10 Date: 11/8/2023  TX#: 4/10 Date: 11/20/2023  TX#: 5/10 Date: 12/11/2023  Tx#: 6/10 Date: 12/18/2023  Tx#: 7/10   EMST-150 at 30 cmH20 WHITE MOUTHPIECE   25/25 reps  30-second rest break EMST-150 at 30 cmH20 WHITE MOUTHPIECE   25/25 reps  30-second rest break EMST-150 at 30 cmH20 WHITE MOUTHPIECE   25/25 reps  25-second rest break EMST-150 at 30 cmH20 WHITE MOUTHPIECE   25/25 reps  25-second rest break EMST-150 at 37.5 cmH20 WHITE MOUTHPIECE   25/25 reps  30-second rest break EMST-150 at 37.5 cmH20 WHITE MOUTHPIECE   25/25 reps  30-second rest break   Sustained phonation via \"ah\": x15 reps average 80 dB for 6.5 seconds Sustained phonation via \"ah\": x10 reps average 74 dB for 7.0 seconds Sustained phonation via \"ah\": x10 reps average 83 dB for 4.7 seconds Sustained phonation via \"ah\": x10 reps average 83 dB for 5.0 seconds Sustained phonation via \"ah\": x10 reps average 88 dB for 3.9 seconds Sustained phonation via \"ah\": x10 reps average 86 dB for 4.7 seconds     Ascending/descending pitch glide: x10 reps average 80 dB given min verbal cues Ascending/descending pitch glide: x10 reps average 80 dB given min verbal cues       Functional phrases: average 77 dB given min verbal cues  GOAL MET        Date: 1/3/2024  Tx#: 8/10 Date: 2/13/2024  Tx#: 9/10 Date: 3/6/2024       TX#: 10/10 Date: 3/15/2024         TX#: 1/10 Date: 3/18/2024  Tx#: 2/10 Date: 3/26/2024  Tx#: 3/10    EMST-150 at 52.5 cmH20 WHITE MOUTHPIECE   25/25 reps  30-second rest break EMST-150 at 52.5 cmH20 WHITE MOUTHPIECE   5/5 reps  30-second rest break EMST-150 at 52.5  + 1/8 cmH20 WHITE MOUTHPIECE   5/5 reps  30-second rest break  EMST-150 at 60 cmH20 WHITE MOUTHPIECE   4/5 reps  30-second rest break   IOPI lingual strength  MIPA: 14 kPa  MIPP: 15 kPa  Anterior @11 kPa: 50/50  Posterior @12 kPa: 50/50   IOPI lingual strength  MIPA: 19 kPa  MIPP: 19 kPa  Anterior @15 kPa: 50/50,   Posterior @15 kPa: 50/50  IOPI lingual strength  Anterior @15 kPa: 50/50,   Posterior @15 kPa: 50/50        IOPI labial strength  MIPL: 8 kPa  MIPR: 8 kPa  Left @ 6 kPa: 50/50  Right @ 6 kPa: 50/50      Sustained phonation via \"ah\": x10 reps average 80 dB for 6.1 seconds   Sustained phonation via \"ah\": x15 reps average 81 dB for 5.7 seconds        Ascending/descending pitch glide: x15 reps average 83 dB given min verbal cues        Oral reading sentences: 74 dB given min verbal cues      Spirometer HEP        Date: 4/5/2024  Tx#: 4/10 Date: 4/8/2024  TX#: 5/10 Date: 4/19/2024  TX#: 6/10 Date:          TX#:  Date:   TX#:  Date:   TX#:   EMST-150 at 63.75 cmH20 WHITE MOUTHPIECE   10/10 reps  30-second rest break EMST-150 at 63.75 cmH20 WHITE MOUTHPIECE   5/5 reps  30-second rest break EMST-150 at 67.5 cmH20 WHITE MOUTHPIECE   5/5 reps  30-second rest break      IOPI lingual strength  Anterior @15 kPa: 50/50,   Posterior @15 kPa: 50/50 IOPI lingual strength  MIPA: 19 kPa  MIPP: 19 kPa  Anterior @17 kPa: 50/50,   Posterior @17 kPa: 50/50 IOPI lingual strength  Anterior @17 kPa: 50/50,   Posterior @17 kPa: 50/50      IOPI labial strength  Left @ 6 kPa: 50/50  Right @ 6 kPa: 50/50 IOPI labial strength  MIPL: 9 kPa  MIPR: 8 kPa  Left @ 7 kPa: 50/50  Right @ 7 kPa: 50/50 IOPI labial strength  Left @ 7 kPa: 50/50  Right @ 7 kPa: 50/50        Assessment: EMST-150 resistance increased per pro tocol. Pt able to achieve audible airflow at this resistance across all trials suggesting improvements in expiratory muscle strength and endurance. Pt continued to benefit from rest breaks per EMST-150 protocol. In addition, Pt  benefited from use of nose clip d/t nasal emission as well as manually pressing his cheeks in d/t labial leakage. Progress noted as Pt continued to complete all 50 reps lingual at 90% MIP. Pt able to complete all 50 reps at increased resistance during left labial presses with no rest breaks required. In addition, Pt able to complete all 50 reps at 90% MIP during right labial presses.    Goals:     LTG    To improve breath support for improved vocal loudness in order to communicate wants, needs, and ideas to familiar and unfamiliar communication partners across settings - 3/6/2024: Progressing, continue    STGs  To perform vocal warm-up exercises, x5 reps given min verbal cues 3/6/2024: Progressing, continue   To perform sustained phonation exercise “ah” x10 reps given min verbal cues 3/6/2024: Progressing, continue   To perform ascending/descending pitch glide, x10 reps given min verbal cues 3/6/2024: Progressing, continue   To perform counting exercise x3 reps given min verbal cues 3/6/2024: Progressing, continue   To read a list of words/phrases given min verbal cues 11/8/2023: Goal met   To read a list of sentences given min verbal cues 3/6/2024: Progressing, continue   To perform cognitive-communicative exercises given min verbal cues 3/6/2024: Progressing, continue   To perform x5 sets of x5 reps via EMST-150 at 63.75 cmH20 with breaks per EMST-150 protocol 3/6/2024: Progressing, continue   To complete 2 sets of 25 anterior AND posterior lingual presses (for a total of 50) using [employer] Oral Performance Instrument  at 80% of maximum pressure as evidenced by achieving the green light indicator in 100% of trials 1/3/2024: MIPA: 14 kPa, MIPP: 15 kPa  3/6/2024: Progressing, continue   To complete 2 sets of 25 right AND left labial presses (for a total of 50) using [employer] Oral Performance Instrument  at 80% of maximum pressure as evidenced by achieving the green light indicator in 100% of trials 1/3/2024: MIPL: 7 kPa,  MIPR: 6 kPa  3/6/2024: Progressing, continue     Plan: Recommend continue POC as established    HEP: EMST-150, sustained phonation    Charges: [id number]       Total Treatment Time: 45 min     No

## 2024-04-19 NOTE — PROGRESS NOTE ADULT - PROBLEM SELECTOR PLAN 1
secondary to acute on chronic ILD, Uses O2 3L at home  c/w Prednisone from 4/16 for total 7 days of steroids until 4/22  Continue home ofev for ILD (family will bring from home)  continue oxygen therapy on 3l  c/w duonebs, symbicort  see CT chest - interstitial lung disease without significant interval progression. No evidence of pneumonia.                       Trace pericardial effusion with mild cardiomegaly.    pulm Dr. Dubois consulted, f/u reccs  outpatient follow up with Pulmonology Lung transplant  Pt Follows with pulmonologist Dr. Primo Marlow ([facility]) and should have routine f/u within 1-2wks of discharge secondary to acute on chronic ILD, Uses O2 3L at home  c/w Prednisone from 4/16 for total 7 days of steroids until 4/22,  then taper by 10mg every 3 days.  Continue home ofev for ILD (family will bring from home)  continue oxygen therapy on 3l  c/w duonebs, symbicort  see CT chest - interstitial lung disease without significant interval progression. No evidence of pneumonia.                       Trace pericardial effusion with mild cardiomegaly.    pulm Dr. Dubois consulted, f/u reccs  outpatient follow up with Pulmonology Lung transplant  Pt Follows with pulmonologist Dr. Primo Marlow ([facility]) and should have routine f/u within 1-2wks of discharge

## 2024-04-19 NOTE — PROGRESS NOTE ADULT - SUBJECTIVE AND OBJECTIVE BOX
NP Note discussed with  Primary Attending    INTERVAL HPI/OVERNIGHT EVENTS: seen at bedside, pt states feeling better today, c/o discomfort to pelvic area but improving after godwin reinsertion. No acute overnight event     MEDICATIONS  (STANDING):  apixaban 5 milliGRAM(s) Oral every 12 hours  atorvastatin 40 milliGRAM(s) Oral at bedtime  benzocaine/menthol Lozenge 1 Lozenge Oral every 4 hours  budesonide 160 MICROgram(s)/formoterol 4.5 MICROgram(s) Inhaler 2 Puff(s) Inhalation two times a day  chlorhexidine 2% Cloths 1 Application(s) Topical <User Schedule>  furosemide    Tablet 20 milliGRAM(s) Oral daily  insulin glargine Injectable (LANTUS) 20 Unit(s) SubCutaneous at bedtime  insulin lispro (ADMELOG) corrective regimen sliding scale   SubCutaneous three times a day before meals  insulin lispro (ADMELOG) corrective regimen sliding scale   SubCutaneous at bedtime  insulin lispro Injectable (ADMELOG) 14 Unit(s) SubCutaneous three times a day before meals  pantoprazole    Tablet 40 milliGRAM(s) Oral before breakfast  senna 1 Tablet(s) Oral daily  tamsulosin 0.8 milliGRAM(s) Oral at bedtime  traZODone 100 milliGRAM(s) Oral at bedtime    MEDICATIONS  (PRN):  acetaminophen     Tablet .. 650 milliGRAM(s) Oral every 6 hours PRN Mild Pain (1 - 3)  cyclobenzaprine 5 milliGRAM(s) Oral three times a day PRN Muscle Spasm  guaiFENesin Oral Liquid (Sugar-Free) 200 milliGRAM(s) Oral every 6 hours PRN Cough      __________________________________________________  REVIEW OF SYSTEMS:    CONSTITUTIONAL: No fever,   EYES: no acute visual disturbances  NECK: No pain or stiffness  RESPIRATORY: No cough; No shortness of breath  CARDIOVASCULAR: No chest pain, no palpitations  GASTROINTESTINAL: No pain. No nausea or vomiting; No diarrhea   NEUROLOGICAL: No headache or numbness, no tremors  MUSCULOSKELETAL: No joint pain, no muscle pain  GENITOURINARY: no dysuria, no frequency, no hesitancy, pelvic discomfort.   PSYCHIATRY: no depression , no anxiety  ALL OTHER  ROS negative        Vital Signs Last 24 Hrs  T(C): 37.1 (19 Apr 2024 05:09), Max: 37.2 (18 Apr 2024 14:04)  T(F): 98.7 (19 Apr 2024 05:09), Max: 99 (18 Apr 2024 14:04)  HR: 110 (19 Apr 2024 05:09) (107 - 116)  BP: 118/68 (19 Apr 2024 05:09) (115/69 - 149/85)  BP(mean): --  RR: 18 (19 Apr 2024 05:09) (18 - 20)  SpO2: 94% (19 Apr 2024 05:09) (94% - 98%)    Parameters below as of 19 Apr 2024 05:09  Patient On (Oxygen Delivery Method): room air  O2 Flow (L/min): 3    _______________________________________________  PHYSICAL EXAM:  GENERAL: NAD  HEENT: Normocephalic;  conjunctivae and sclerae clear; moist mucous membranes;   NECK : supple  CHEST/LUNG: Clear to auscultation bilaterally with good air entry   HEART: S1 S2  regular; no murmurs, gallops or rubs  ABDOMEN: Soft, Nontender, Nondistended; Bowel sounds present  EXTREMITIES: no cyanosis; no edema; no calf tenderness  : Godwin in place, clear urine draining  SKIN: warm and dry; no rash  NERVOUS SYSTEM:  Awake and alert; Oriented  to place, person and time ; no new deficits    _________________________________________________  LABS:    CAPILLARY BLOOD GLUCOSE  POCT Blood Glucose.: 296 mg/dL (19 Apr 2024 11:32)  POCT Blood Glucose.: 92 mg/dL (19 Apr 2024 07:45)  POCT Blood Glucose.: 112 mg/dL (18 Apr 2024 21:37)  POCT Blood Glucose.: 200 mg/dL (18 Apr 2024 16:43)    RADIOLOGY & ADDITIONAL TESTS:    Imaging  Reviewed:  YES  < from: Xray Chest 1 View-PORTABLE IMMEDIATE (Xray Chest 1 View-PORTABLE IMMEDIATE .) (04.17.24 @ 17:27) >    ACC: [id number] EXAM:  XR CHEST PORTABLE IMMED 1V   ORDERED BY: MICHAEL SANDOVAL     PROCEDURE DATE:  04/17/2024          INTERPRETATION:  AP chest on April 17, 2024 5:03 PM. Patient is short of   breath.    Heart magnified by technique.    Again noted is diffuse interstitial infiltration throughout all lung   fields rather similar to April 11 suggesting chronic interstitial lung   disease. Findings are also similar to February 27, 2023.    IMPRESSION: Chronic interstitial lung disease again noted.    --- End of Report ---            GATITO YUEN MD; Attending Radiologist  This document has been electronically signed. Apr 17 2024  5:28PM    < end of copied text >  < from: CT Chest No Cont (04.12.24 @ 05:24) >    ACC: [id number] EXAM:  CT CHEST   ORDERED BY: AYDEE FIELDS     PROCEDURE DATE:  04/12/2024          INTERPRETATION:  CLINICAL INFORMATION: Interstitial lung disease   progression versus pneumonia    COMPARISON: 2/28/2023    CONTRAST/COMPLICATIONS:  IV Contrast: NONE  Oral Contrast: NONE  Complications: None reported at time of study completion    PROCEDURE:  CT of the Chest was performed.  Sagittal and coronal reformats were performed.    FINDINGS:    LUNGS AND AIRWAYS: Patent central airways. There are extensive reticular   and groundglass opacities in both lungs associated with thickening of   interlobular septae with flexion bronchiectasis. There is no   consolidation or parenchymal infiltration suggesting pneumonia.  PLEURA: No pleural effusion.  MEDIASTINUM AND KAROLINE: No lymphadenopathy.  VESSELS: Prominent main pulmonary artery currently measuring 3.5 cm.  HEART: Mild cardiomegaly.  pericardial effusion.  CHEST WALL AND LOWER NECK: Within normal limits.  VISUALIZED UPPER ABDOMEN:Within normal limits.  BONES: Degenerative change.    IMPRESSION:  Findings suggesting interstitial lung disease without significant   interval progression. No evidence of pneumonia.  Trace pericardial effusion with mild cardiomegaly.  --- End ofReport ---    LUIS ALBERTO MICHAUD MD; Attending Radiologist  This document has been electronically signed. Apr 12 2024  5:41AM    < end of copied text >    Consultant(s) Notes Reviewed:   YES      Plan of care was discussed with patient and /or primary care giver; all questions and concerns were addressed

## 2024-04-19 NOTE — PROGRESS NOTE ADULT - ASSESSMENT
pending 62yo woman w/ PMH advanced ILD/probable IPF admitted with acute on chronic hypoxemic respiratory failure with likely acute exacerbation of ILD as well as pulm edema.    High BNP, B lines pattern on initial ICU sonogram and interlobular septal thickening on imaging  Has had some improvement on combo of high dose steroids and diuresis  On eliquis with negative dopplers and clinical improvement suggest low prob of acute PE  Echo without any significant Cor pulmonale or pulm HTN  Clinically no evidence of PNA, viral panel and peripheral ID w/u negative    #Acute on chronic hypoxemic respiratory failure  #Advanced ILD, likely IPF and probable exacerbation of ILD  #Pulmonary edema  #H/o PE on Eliquis    Recommendations:  - on PO prednisone 40mg, ok to continue for now and favor gradual taper after 5-7d course  - maintain euvolemia  - cont eliquis  - TTE reviewed  - nebs probably not helping and possibly contributing to tachycardia and anxiety  - titrate O2 as tolerated to maintain normoxia   - cont home Ofev  - GOC discussion is appropriate as pt would have poor outcome if ever intubated    Follows with pulmonologist Dr. Primo Marlow ([facility]) and should have routine f/u within 1-2wks of discharge

## 2024-04-19 NOTE — PROGRESS NOTE ADULT - PROBLEM SELECTOR PLAN 12
f/u pulm Dr. Dubois reccs, continue prednisone until 4/22.   Pt Follows with pulmonologist Dr. Primo Marlow ([facility]) and should have routine f/u within 1-2wks of discharge  Pike re-inserted 4/18 as failed TOV  PT recc [facility] pending choices f/u pulm Dr. Dubois reccs, continue prednisone 40 until 4/22 AND THEN gradual taper  Pt Follows with pulmonologist Dr. Primo Marlow ([facility]) and should have routine f/u within 1-2wks of discharge  Pike re-inserted 4/18 as failed TOV  PT recc [facility] pending choices f/u pulm Dr. Dubois reccs, continue prednisone 40 until 4/22 AND THEN gradual taper by 10mg every 3 days.  Pt Follows with pulmonologist Dr. Primo Marlow ([facility]) and should have routine f/u within 1-2wks of discharge  Pike re-inserted 4/18 as failed TOV  PT recc [facility] pending choices

## 2024-04-19 NOTE — PROGRESS NOTE ADULT - SUBJECTIVE AND OBJECTIVE BOX
PULMONARY CONSULT SERVICE FOLLOW-UP NOTE    INTERVAL HPI:  Reviewed chart and overnight events; patient seen and examined at bedside.    MEDICATIONS:  Pulmonary:  budesonide 160 MICROgram(s)/formoterol 4.5 MICROgram(s) Inhaler 2 Puff(s) Inhalation two times a day  guaiFENesin Oral Liquid (Sugar-Free) 200 milliGRAM(s) Oral every 6 hours PRN    Antimicrobials:    Anticoagulants:  apixaban 5 milliGRAM(s) Oral every 12 hours    Cardiac:  furosemide    Tablet 20 milliGRAM(s) Oral daily    Allergies    No Known Allergies    Intolerances    Vital Signs Last 24 Hrs  T(C): 37.1 (19 Apr 2024 05:09), Max: 37.2 (18 Apr 2024 14:04)  T(F): 98.7 (19 Apr 2024 05:09), Max: 99 (18 Apr 2024 14:04)  HR: 110 (19 Apr 2024 05:09) (107 - 118)  BP: 118/68 (19 Apr 2024 05:09) (115/69 - 149/85)  BP(mean): --  RR: 18 (19 Apr 2024 05:09) (18 - 20)  SpO2: 94% (19 Apr 2024 05:09) (94% - 98%)    Parameters below as of 19 Apr 2024 05:09  Patient On (Oxygen Delivery Method): room air  O2 Flow (L/min): 3      04-18 @ 07:01  -  04-19 @ 07:00  --------------------------------------------------------  IN: 0 mL / OUT: 1500 mL / NET: -1500 mL    PHYSICAL EXAM:  Constitutional: non-toxic appearing elderly woman resting in bed; NAD  HEENT: atraumatic; PERRL, anicteric sclera; MMM  Neck: supple  Cardiovascular: +S1/S2, RRR  Respiratory: scattered fine crackles mostly near bases; respirations overall non-labored   Gastrointestinal: soft, NT/ND  Extremities: WWP  Vascular: 2+ radial pulses B/L  Neurological: awake and alert; PADILLA    LABS:  Lab holiday    RADIOLOGY & ADDITIONAL STUDIES: PULMONARY CONSULT SERVICE FOLLOW-UP NOTE    INTERVAL HPI:  Reviewed chart and overnight events; patient seen and examined at bedside. Feels a little better today compared to yesterday. Was sitting in chair earlier in the day, now wants to rest. Has not been ambulating yet.     MEDICATIONS:  Pulmonary:  budesonide 160 MICROgram(s)/formoterol 4.5 MICROgram(s) Inhaler 2 Puff(s) Inhalation two times a day  guaiFENesin Oral Liquid (Sugar-Free) 200 milliGRAM(s) Oral every 6 hours PRN    Antimicrobials:    Anticoagulants:  apixaban 5 milliGRAM(s) Oral every 12 hours    Cardiac:  furosemide    Tablet 20 milliGRAM(s) Oral daily    Allergies    No Known Allergies    Intolerances    Vital Signs Last 24 Hrs  T(C): 37.1 (19 Apr 2024 05:09), Max: 37.2 (18 Apr 2024 14:04)  T(F): 98.7 (19 Apr 2024 05:09), Max: 99 (18 Apr 2024 14:04)  HR: 110 (19 Apr 2024 05:09) (107 - 118)  BP: 118/68 (19 Apr 2024 05:09) (115/69 - 149/85)  BP(mean): --  RR: 18 (19 Apr 2024 05:09) (18 - 20)  SpO2: 94% (19 Apr 2024 05:09) (94% - 98%)    Parameters below as of 19 Apr 2024 05:09  Patient On (Oxygen Delivery Method): room air  O2 Flow (L/min): 3    04-18 @ 07:01  -  04-19 @ 07:00  --------------------------------------------------------  IN: 0 mL / OUT: 1500 mL / NET: -1500 mL    PHYSICAL EXAM:  Constitutional: non-toxic appearing elderly woman resting in bed; NAD  HEENT: atraumatic; PERRL, anicteric sclera; MMM  Neck: supple  Cardiovascular: +S1/S2, RRR  Respiratory: scattered fine crackles mostly near bases; respirations overall non-labored   Gastrointestinal: soft, NT/ND  Extremities: WWP  Vascular: 2+ radial pulses B/L  Neurological: awake and alert; PADILLA    LABS:  Lab holiday    RADIOLOGY & ADDITIONAL STUDIES:  No interval chest study for review

## 2024-04-19 NOTE — PROGRESS NOTE ADULT - ASSESSMENT
63 year old F from home ambulates with walker w/ pmhx chronic hypoxic respiratory failure on 2-3L NC at home 2/2 ILD, currently on ofev , unprovoked? PE (dx  on 2022, currently on Eliquis 5 mg PO QD), severe pulmonary HTN (based on TTE from 2023), IDDM, cor pulmonale who came in with c/o SOB, associated with dry cough, not associated with wheezing, CP, LE edema. In the ED patient was placed on NRB. ICU was consulted for worsening hypoxia and transitioned to Venti mask 35% @ 12 L saturating 92-95% and tachypneic. Pt was then placed and HFNC and admitted to ICU for acute hypoxic respiratory failure 2/2 to ILD flair.     While in ICU patient was started on Levaquin (4/11-4/13) but it was discontinued given CT chest did not have signs of pneumonia. Pt was also started on IV steroids, Duoneb, Symbicort, and diuresed with IV Lasix. Patient has history of urinary retention and was retaining 2L urine and a Pike was placed. Patient was transitioned to PO steroids on 4/16. Lovenox transitioned to Eliquis BID. Patient's family has been asked to bring home dose of PO Ofirmev which will be continued. Patient was able to be weaned of HFNC and currently on 3L O2 NC with no respiratory distress. Patient echo on 4/12/24 showed EF 65% with normal right artery systolic pressure. Patient remains hemodynamically stable and downgraded to medicine.   Patient is optimized for d/c to [facility]. Pending choices.   Pt failed TOV on 4/18. re-inserted  Pike.   Endocrine following for uncontrolled DM with A1C 11.9. Adjusted insulin regimen.

## 2024-04-19 NOTE — PROGRESS NOTE ADULT - PROBLEM SELECTOR PLAN 4
HbA1c 11.9%, likely from steroid therapy outpatient  continue lantus and premeal, ISS       Decrease lantus 20U, inc admelog to 14U and dec dose as steroids taper- hold if poor intake       moderate correction admelog doses       nutrition eval       fsg ac and hs    Endocrine Dr. Zavaleta

## 2024-04-19 NOTE — PROGRESS NOTE ADULT - PROBLEM SELECTOR PLAN 3
CT chest - interstitial lung disease without significant interval progression. No evidence of pneumonia.                  Trace pericardial effusion with mild cardiomegaly.  c/w lasix + supplemental 02 3L home dose

## 2024-04-19 NOTE — PROGRESS NOTE ADULT - TIME BILLING
Discussion with patient and her daughter. Filling out and explanation of MOLST and HCP form. Review of records, discussion with consultant attendings- pulm and endo. Formulation of plan, coordination of care with case management. Documentation of encounter

## 2024-04-19 NOTE — PROGRESS NOTE ADULT - CONVERSATION DETAILS
Given patient's diagnosis of interstitial lung disease/pulm fibrosis, patient may have poorer outcomes with intubation.  I(Brendon) started conversation with patient and her daughter, Debbi(via telephone). They have not discussed healthcare proxies, living alaniz or advanced directives.  I explained heart stopping as natural death, CPR consists of chest compressions and shorty break ribs. This is followed by intubation(life support). Explained that given patient's lung disease, puts her at increased risk if she were ever to be intubated.  Answered all questions addressed all concerns.  No decision made yet. Patient to remain FULL CODE
Discussed resuscitation and intubation yesterday. They needed time to discuss. Ready for decision today. Decision made for do not resuscitate and allow natural death. Discussed intubation and NIV. Patient recently downgraded from ICU requiring use of NIV. Will continue NIV as it is non invasive and it did help her to return to her baseline. MOLST form signed, DNR/DNI/trial NIV

## 2024-04-20 NOTE — CHART NOTE - NSCHARTNOTEFT_GEN_A_CORE
EVENT: Received telephone call from RN that pt is c/o of generalized abdominal pain which she rated as a 10/10. Stated that Tylenol IV did not help her with the pain.    HPI: 63 year old F from home ambulates with walker w/ pmhx chronic hypoxic respiratory failure on 2-3L NC at home 2/2 ILD, currently on ofev , unprovoked? PE (dx  on 2022, currently on Eliquis 5 mg PO QD), severe pulmonary HTN (based on TTE from 2023), IDDM, cor pulmonale who came in with c/o SOB, associated with dry cough, not associated with wheezing, CP, LE edema. In the ED patient was placed on NRB. ICU was consulted for worsening hypoxia and transitioned to Venti mask 35% @ 12 L saturating 92-95% and tachypneic. Pt was then placed and HFNC and admitted to ICU for acute hypoxic respiratory failure 2/2 to ILD flair.    SUBJECTIVE: "The IV Tylenol is not that strong. I need something else"    OBJECTIVE:  Vital Signs Last 24 Hrs  T(C): 36.8 (20 Apr 2024 20:41), Max: 37.3 (20 Apr 2024 14:05)  T(F): 98.2 (20 Apr 2024 20:41), Max: 99.2 (20 Apr 2024 14:05)  HR: 113 (20 Apr 2024 20:41) (113 - 122)  BP: 124/75 (20 Apr 2024 20:41) (120/78 - 144/87)  BP(mean): --  RR: 17 (20 Apr 2024 20:41) (17 - 17)  SpO2: 95% (20 Apr 2024 20:41) (95% - 97%)    Parameters below as of 20 Apr 2024 20:41  Patient On (Oxygen Delivery Method): nasal cannula  O2 Flow (L/min): 3      FOCUSED PHYSICAL EXAM:  Neuro: awake, alert, oriented x 3. No neuro deficit  Cardiovascular: Pulses +2 B/L in lower and upper extremities, HR regular, BP stable, No edema.  Respiratory: Respirations regular, unlabored, breath sounds clear B/L.   GI: Abdomen soft, non-tender, positive bowel sounds.  : no bladder distention noted. No complaints at this time.  Skin: Dry, intact, no bruising, no diaphoresis.    LABS:                        10.8   14.81 )-----------( 408      ( 20 Apr 2024 07:05 )             34.6     04-20    139  |  106  |  21<H>  ----------------------------<  196<H>  4.7   |  28  |  0.77    Ca    8.5      20 Apr 2024 07:05        EKG:   IMAGING:    ASSESSMENT/PROBLEM: Generalized abdominal pain      PLAN:   1. Morphine 0.5 mg, IVP x 1 dose ordered  2. Monitor response to treatment  3. Cont present care/treatment  4. Supportive care

## 2024-04-20 NOTE — PROGRESS NOTE ADULT - ASSESSMENT
#Acute on Chronic hypoxic respiratory failure 2/2   #Pulmonary Fibrosis  #Acute Kidney Injury improved  #Sinus tachycardia  #Pulmonary Embolism  #Diabetes 2 Uncontrolled  # Urinary Retention with Gowdin catheter  # severe pulmonary hypertension resolved(?)  # Small Pericardial Effusion  #HLD    -Oxygenation at baseline- 3lpm nasal cannula saturating well  - Repeat echo this admission is showing a normal pulmonary systolic pressure, was previously severely elevated  - Continue treatment for underlying pulmonary Fibrosis- currently on po steroid taper. Probably needs a prolonged taper. Prednisone 40mg until 4/22 then gradual taper  - Pulm consult requested regarding severe pulm htn and pulm fibrosis  - Follows outpatient pulmonology- Dr. Primo Hudson of [facility].   - outpatient followup pulm  - DONOVAN improved at baseline creatinine. On furosemide 20mg po  - Tachycardia likely due to underlying pulmonary disease       DC duonebs due to tachy and anxiety  - small pericardial effusion is chronic and seen in previous TTEs  - Failed ToV 4/18, godwin replaced. She follows with urologist, dr. Cecilio Chua who she will followup with upon discharge. Dr. Chua had recommended godwin catheter previously, will dc with godwin  - Diabetes uncontrolled with a1c>10. Endocrine following need careful titration given use of steroids. Endo recs prior to dicharge  - diarrhea- stool cultures and pcr were negative, symptoms not consistnt with cdiff- only had ~3 days of levaquin. Will trial imodium  -Continue Eliquis for hx of PE  [facility] conversation- dnr/dni trial NIV  - dc planning monday home with home PT

## 2024-04-20 NOTE — CHART NOTE - NSCHARTNOTEFT_GEN_A_CORE
EVENT: Received telephone call from RN that pt is c/o of insomnia    HPI: 63 year old F from home ambulates with walker w/ pmhx chronic hypoxic respiratory failure on 2-3L NC at home 2/2 ILD, currently on ofev , unprovoked? PE (dx  on 2022, currently on Eliquis 5 mg PO QD), severe pulmonary HTN (based on TTE from 2023), IDDM, cor pulmonale who came in with c/o SOB, associated with dry cough, not associated with wheezing, CP, LE edema. In the ED patient was placed on NRB. ICU was consulted for worsening hypoxia and transitioned to Venti mask 35% @ 12 L saturating 92-95% and tachypneic. Pt was then placed and HFNC and admitted to ICU for acute hypoxic respiratory failure 2/2 to ILD flair.     SUBJECTIVE: n/a    OBJECTIVE:  Vital Signs Last 24 Hrs  T(C): 36.8 (19 Apr 2024 20:30), Max: 37.1 (19 Apr 2024 05:09)  T(F): 98.3 (19 Apr 2024 20:30), Max: 98.7 (19 Apr 2024 05:09)  HR: 111 (19 Apr 2024 20:30) (110 - 115)  BP: 139/83 (19 Apr 2024 20:30) (94/55 - 142/82)  BP(mean): 102 (19 Apr 2024 20:30) (102 - 102)  RR: 18 (19 Apr 2024 20:30) (18 - 20)  SpO2: 96% (19 Apr 2024 20:30) (94% - 97%)    Parameters below as of 19 Apr 2024 20:30  Patient On (Oxygen Delivery Method): nasal cannula  O2 Flow (L/min): 3    FOCUSED PHYSICAL EXAM:  Neuro: awake, alert, oriented x 3. No neuro deficit  Cardiovascular: Pulses +2 B/L in lower and upper extremities, HR regular, BP stable, No edema.  Respiratory: Respirations regular, unlabored, breath sounds clear B/L.   GI: Abdomen soft, non-tender, positive bowel sounds.  : no bladder distention noted. No complaints at this time.  Skin: Dry, intact, no bruising, no diaphoresis.    LABS:    EKG:   IMAGING:    ASSESSMENT/PROBLEM: Insomnia most likely 2/2 to hospitalization      PLAN:   1. Melatonin 3 mg, PO, QHS @ bedtime x 3 doses ordered  2. Monitor response to treatment  3. Cont present care/treatment  4. Supportive care

## 2024-04-21 NOTE — PROGRESS NOTE ADULT - ASSESSMENT
#Acute on Chronic hypoxic respiratory failure 2/2   #Pulmonary Fibrosis  #Acute Kidney Injury improved  #Sinus tachycardia  #Pulmonary Embolism  #Diabetes 2 Uncontrolled  # Urinary Retention with Godwin catheter  # severe pulmonary hypertension resolved(?)  # Small Pericardial Effusion  #HLD    -Oxygenation at baseline- 3lpm nasal cannula saturating well  - Repeat echo this admission is showing a normal pulmonary systolic pressure, was previously severely elevated  - Continue treatment for underlying pulmonary Fibrosis- currently on po steroid taper. Probably needs a prolonged taper. Prednisone 40mg until 4/22 then gradual taper  - Pulm consult requested regarding severe pulm htn and pulm fibrosis  - Follows outpatient pulmonology- Dr. Primo Hudson of [facility].   - outpatient followup pulm  - DONOVAN improved at baseline creatinine. On furosemide 20mg po  - Tachycardia likely due to underlying pulmonary disease       DC duonebs due to tachy and anxiety  - small pericardial effusion is chronic and seen in previous TTEs  - Failed ToV 4/18, godwin replaced. She follows with urologist, dr. Cecilio Chua who she will followup with upon discharge. Dr. Chua had recommended godwin catheter previously, will dc with godwin  - Diabetes uncontrolled with a1c>10. Endocrine following need careful titration given use of steroids. Endo recs prior to dicharge  - diarrhea- stool cultures and pcr were negative, symptoms not consistnt with cdiff- only had ~3 days of levaquin. Will trial imodium. Improvement of diarrhea with 1 2mg dose imodium, hold all further bowel modulators and observe  -Continue Eliquis for hx of PE  [facility] conversation- dnr/dni trial NIV  - patient and daughter now agreeable for [facility] due to inability to increase aid hours at home.

## 2024-04-21 NOTE — PROGRESS NOTE ADULT - SUBJECTIVE AND OBJECTIVE BOX
Interval Events:  pt in nad    Allergies    No Known Allergies    Intolerances      Endocrine/Metabolic Medications:  atorvastatin 40 milliGRAM(s) Oral at bedtime  insulin glargine Injectable (LANTUS) 20 Unit(s) SubCutaneous at bedtime  insulin lispro (ADMELOG) corrective regimen sliding scale   SubCutaneous at bedtime  insulin lispro (ADMELOG) corrective regimen sliding scale   SubCutaneous three times a day before meals  insulin lispro Injectable (ADMELOG) 14 Unit(s) SubCutaneous three times a day before meals  predniSONE   Tablet 40 milliGRAM(s) Oral daily      Vital Signs Last 24 Hrs  T(C): 37.3 (21 Apr 2024 05:30), Max: 37.3 (20 Apr 2024 14:05)  T(F): 99.1 (21 Apr 2024 05:30), Max: 99.2 (20 Apr 2024 14:05)  HR: 113 (21 Apr 2024 05:30) (113 - 117)  BP: 124/72 (21 Apr 2024 05:30) (120/78 - 124/75)  BP(mean): --  RR: 17 (21 Apr 2024 05:30) (17 - 17)  SpO2: 98% (21 Apr 2024 05:30) (95% - 98%)    Parameters below as of 21 Apr 2024 05:30  Patient On (Oxygen Delivery Method): nasal cannula  O2 Flow (L/min): 3        PHYSICAL EXAM  All physical exam findings normal, except those marked:  General:	Alert, active, cooperative, NAD, well hydrated  .		[] Abnormal:  Neck		Normal: supple, no cervical adenopathy, no palpable thyroid  .		[] Abnormal:  Cardiovascular	Normal: regular rate, normal S1, S2, no murmurs  .		[] Abnormal:  Respiratory	Normal: no chest wall deformity, normal respiratory pattern, CTA B/L  .		[] Abnormal:  Abdominal	Normal: soft, ND, NT, bowel sounds present, no masses, no organomegaly  .		[] Abnormal:  		Normal normal genitalia, testes descended, circumcised/uncircumcised  .		Eze stage:			Breast eze:  .		Menstrual history:  .		[] Abnormal:  Extremities	Normal: FROM x4  .		[] Abnormal:  Skin		Normal: intact and not indurated, no rash, no acanthosis nigricans  .		[] Abnormal:  Neurologic	Normal: grossly intact  .		[] Abnormal:    LABS                        10.1   11.75 )-----------( 411      ( 21 Apr 2024 07:05 )             32.6                               143    |  109    |  17                  Calcium: 7.9   / iCa: x      (04-21 @ 07:05)    ----------------------------<  89        Magnesium: x                                3.5     |  29     |  0.60             Phosphorous: x          CAPILLARY BLOOD GLUCOSE      POCT Blood Glucose.: 100 mg/dL (21 Apr 2024 07:35)  POCT Blood Glucose.: 138 mg/dL (20 Apr 2024 21:09)  POCT Blood Glucose.: 254 mg/dL (20 Apr 2024 16:40)  POCT Blood Glucose.: 201 mg/dL (20 Apr 2024 11:19)        Assesment/plan    Patient is a 62 yo female with hx of Pulmonary fibrosis (diagnosed 2021), not on home oxygen, Pulmonary embolism on Eliquis, DM on Lantus and Metformin  presented with shortness of breath. Found to have uncont dm. Pt admits to taking lantus 30 at night and premeal 25ac tid ? injection technique reviewed- admits to noncompliance with diet. On and off on prednisone          Problem/Recommendation - 1:  ·  Problem: Diabetes mellitus with hyperglycemia.   ·  Recommendation: uncont with hyperglycemia  on steroids  hba1c- 11  cont lantus 20  dec admelog to 14 and dec dose as steroids taper- hold if poor intake  d/w pt compliance with diet  moderate correction admelog doses  nutrition eval  fsg ac and hs  d/w prim team.     Problem/Recommendation - 2:  ·  Problem: ILD (interstitial lung disease).   ·  Recommendation: cont tx per pulm  steroid taper.

## 2024-04-21 NOTE — PROGRESS NOTE ADULT - SUBJECTIVE AND OBJECTIVE BOX
INTERVAL HPI/OVERNIGHT EVENTS:   No diarrhea, continues feeling improved. Some abdominal pain last night, requesting miralax. Was given imodium. no fever no chills    REVIEW OF SYSTEMS:  negative except per hpi    Vital Signs Last 24 Hrs  T(C): 37.3 (21 Apr 2024 05:30), Max: 37.3 (20 Apr 2024 14:05)  T(F): 99.1 (21 Apr 2024 05:30), Max: 99.2 (20 Apr 2024 14:05)  HR: 113 (21 Apr 2024 05:30) (113 - 117)  BP: 124/72 (21 Apr 2024 05:30) (120/78 - 124/75)  BP(mean): --  RR: 17 (21 Apr 2024 05:30) (17 - 17)  SpO2: 98% (21 Apr 2024 05:30) (95% - 98%)    Parameters below as of 21 Apr 2024 05:30  Patient On (Oxygen Delivery Method): nasal cannula  O2 Flow (L/min): 3      PHYSICAL EXAMINATION:  lungs mostly clear, some rhonchi/crackles, abd soft nontender, godwin clear yellow urine. lower ext no edema.                          10.1   11.75 )-----------( 411      ( 21 Apr 2024 07:05 )             32.6     04-21    143  |  109<H>  |  17  ----------------------------<  89  3.5   |  29  |  0.60    Ca    7.9<L>      21 Apr 2024 07:05                CAPILLARY BLOOD GLUCOSE      RADIOLOGY & ADDITIONAL TESTS:

## 2024-04-22 NOTE — PROGRESS NOTE ADULT - NS ATTEND OPT1A GEN_ALL_CORE
Exam/Medical decision making

## 2024-04-22 NOTE — CHART NOTE - NSCHARTNOTEFT_GEN_A_CORE
EVENT: Notified by RN that patient reports feeling constipated    HPI: 63 yrs. old F, from home, ambulating independently, PMH of ILD on 3L NC at baseline , DM, HDL, presented with shortness of breath. Pt is admitted for chronic hypoxic respiratory likely 2/2 ILD requiring HFNC. Now awaiting placement     SUBJECTIVE: Patient in NAD, complains of constipation. Last BM 2 days ago.     OBJECTIVE:  Vital Signs Last 24 Hrs  T(C): 37.4 (22 Apr 2024 20:33), Max: 37.4 (22 Apr 2024 20:33)  T(F): 99.3 (22 Apr 2024 20:33), Max: 99.3 (22 Apr 2024 20:33)  HR: 108 (22 Apr 2024 20:33) (101 - 111)  BP: 123/72 (22 Apr 2024 20:33) (112/68 - 137/78)  BP(mean): 89 (22 Apr 2024 20:33) (86 - 97)  RR: 18 (22 Apr 2024 20:33) (17 - 18)  SpO2: 92% (22 Apr 2024 20:33) (92% - 95%)    Parameters below as of 22 Apr 2024 20:33  Patient On (Oxygen Delivery Method): room air  O2 Flow (L/min): 3      PHYSICAL EXAM:  Neuro: Awake and alert, oriented to person, place, and time  Cardiovascular: + S1, S2, no murmurs, rubs, or bruits  Respiratory: clear to auscultation bilaterally with good air entry   GI: Abdomen soft, non-tender, bowel sounds present   : Non distended;   Skin: warm and dry; no rash      LABS:                        10.3   11.48 )-----------( 444      ( 22 Apr 2024 05:15 )             33.2     04-22    140  |  108  |  22<H>  ----------------------------<  344<H>  4.2   |  27  |  0.81    Ca    8.5      22 Apr 2024 05:15        Problem: Constipation    PLAN:   Polyethylene glycol 3350 17 Gram(s) Oral once.  Continue current treatment and supportive measures    FOLLOW UP / RESULT: Reasesss for effect of therapy

## 2024-04-22 NOTE — PROGRESS NOTE ADULT - TIME BILLING
Discussion with patient, review of overnight events, review of labs, discussion with cardiology attending. Respiratory distress requiring intervention. Medication management, formulation of plan, documentation of encounter

## 2024-04-22 NOTE — PROGRESS NOTE ADULT - ASSESSMENT
63 year old F from home ambulates with walker w/ pmhx chronic hypoxic respiratory failure on 2-3L NC at home 2/2 ILD, currently on ofev , unprovoked? PE (dx  on 2022, currently on Eliquis 5 mg PO QD), severe pulmonary HTN (based on TTE from 2023), IDDM, cor pulmonale who came in with c/o SOB, associated with dry cough, not associated with wheezing, CP, LE edema. In the ED patient was placed on NRB. ICU was consulted for worsening hypoxia and transitioned to Venti mask 35% @ 12 L saturating 92-95% and tachypneic. Pt was then placed and HFNC and admitted to ICU for acute hypoxic respiratory failure 2/2 to ILD flair.     While in ICU patient was started on Levaquin (4/11-4/13) but it was discontinued given CT chest did not have signs of pneumonia. Pt was also started on IV steroids, Duoneb, Symbicort, and diuresed with IV Lasix. Patient has history of urinary retention and was retaining 2L urine and a Pike was placed. Patient was transitioned to PO steroids on 4/16. Lovenox transitioned to Eliquis BID. Patient's family has been asked to bring home dose of PO Ofirmev which will be continued. Patient was able to be weaned of HFNC and currently on 3L O2 NC with no respiratory distress. Patient echo on 4/12/24 showed EF 65% with normal right artery systolic pressure. Patient remains hemodynamically stable and downgraded to medicine.   Patient is optimized for d/c to [facility]. Pending choices.   Pt failed TOV on 4/18. re-inserted  Pike.   Endocrine following for uncontrolled DM with A1C 11.9. Adjusted insulin regimen.     4/22: Steroid taper started. Pt was anxious today, ativan PRN added. Pending placement [facility] to [facility], SW following

## 2024-04-22 NOTE — PROGRESS NOTE ADULT - PROBLEM SELECTOR PLAN 10
hx of HLD on Atorvastatin   c/w home meds

## 2024-04-22 NOTE — PROGRESS NOTE ADULT - PROBLEM SELECTOR PLAN 11
DVT PPX: Eliquis  GI PPX: PPI

## 2024-04-22 NOTE — PROGRESS NOTE ADULT - PROBLEM SELECTOR PLAN 12
f/u pulm Dr. Dubois reccs, continue prednisone 40 until 4/22 AND THEN gradual taper by 10mg every 3 days.  Pt Follows with pulmonologist Dr. Primo Marlow ([facility]) and should have routine f/u within 1-2wks of discharge  Pike re-inserted 4/18 as failed TOV  PT recc [facility] pending choices

## 2024-04-22 NOTE — PROGRESS NOTE ADULT - PROBLEM SELECTOR PLAN 1
secondary to acute on chronic ILD, Uses O2 3L at home  c/w Prednisone from 4/16 for total 7 days of steroids until 4/22,  then taper by 10mg every 3 days.  Continue home ofev for ILD (family will bring from home)  continue oxygen therapy on 3l  c/w duonebs, symbicort  see CT chest - interstitial lung disease without significant interval progression. No evidence of pneumonia.                       Trace pericardial effusion with mild cardiomegaly.    pulm Dr. Dubois consulted, f/u reccs  outpatient follow up with Pulmonology Lung transplant  Pt Follows with pulmonologist Dr. Primo Marlow ([facility]) and should have routine f/u within 1-2wks of discharge

## 2024-04-22 NOTE — CHART NOTE - NSCHARTNOTEFT_GEN_A_CORE
EVENT: Notified by RN that patient is complaining of abdominal pain.    HPI: HPI:  63 year old F from home ambulates with walker w/ pmhx chronic hypoxic respiratory failure on 2-3L NC at home 2/2 ILD, currently on ofev , unprovoked? PE (dx  on 2022, currently on Eliquis 5 mg PO QD), severe pulmonary HTN (based on TTE from 2023), IDDM, cor pulmonale who came in with c/o SOB, associated with dry cough, not associated with wheezing, CP, LE edema. In the ED patient was placed on NRB. ICU was consulted for worsening hypoxia and transitioned to Venti mask 35% @ 12 L saturating 92-95% and tachypneic. Pt was then placed and HFNC and admitted to ICU for acute hypoxic respiratory failure 2/2 to ILD flair. Patient is optimized for d/c to [facility]. Pending choices.     SUBJECTIVE: Patient is in NAD, rated abdominal pain as 8/10.    OBJECTIVE:  Vital Signs Last 24 Hrs  T(C): 37.4 (22 Apr 2024 20:33), Max: 37.4 (22 Apr 2024 20:33)  T(F): 99.3 (22 Apr 2024 20:33), Max: 99.3 (22 Apr 2024 20:33)  HR: 108 (22 Apr 2024 20:33) (101 - 111)  BP: 123/72 (22 Apr 2024 20:33) (112/68 - 137/78)  BP(mean): 89 (22 Apr 2024 20:33) (86 - 97)  RR: 18 (22 Apr 2024 20:33) (17 - 18)  SpO2: 92% (22 Apr 2024 20:33) (92% - 95%)    Parameters below as of 22 Apr 2024 20:33  Patient On (Oxygen Delivery Method): room air  O2 Flow (L/min): 3      PHYSICAL EXAM:  Neuro: Awake and alert, oriented to person, place, and time  Cardiovascular: + S1, S2, no murmurs, rubs, or bruits  Respiratory: clear to auscultation bilaterally with good air entry   GI: Abdomen soft, non-tender, bowel sounds present   : Non distended;   Skin: warm and dry; no rash      LABS:                        10.3   11.48 )-----------( 444      ( 22 Apr 2024 05:15 )             33.2     04-22    140  |  108  |  22<H>  ----------------------------<  344<H>  4.2   |  27  |  0.81    Ca    8.5      22 Apr 2024 05:15    Problem: Abdominal pain     PLAN: MEDICATIONS  (STANDING):  1. Acetaminophen   IVPB 1000 usha GRAM(s) IV Intermittent once  2. Monitor response to treatment  3. Cont with present care/treatment  4. Supportive care.    FOLLOW UP / RESULT: Reassess for effect of therapy EVENT: Notified by RN that patient is complaining of abdominal pain.    HPI: HPI:  63 year old F from home ambulates with walker w/ PMH chronic hypoxic respiratory failure on 2-3L NC at home 2/2 ILD, currently on ofev , unprovoked? PE (dx  on 2022, currently on Eliquis 5 mg PO QD), severe pulmonary HTN (based on TTE from 2023), IDDM, cor pulmonale who came in with c/o SOB, associated with dry cough, not associated with wheezing, CP, LE edema. In the ED patient was placed on NRB. ICU was consulted for worsening hypoxia and transitioned to Venti mask 35% @ 12 L saturating 92-95% and tachypneic. Pt was then placed and HFNC and admitted to ICU for acute hypoxic respiratory failure 2/2 to ILD flair. Patient is optimized for d/c to [facility]. Pending choices.     SUBJECTIVE: Patient is in NAD, rated abdominal pain as 8/10.    OBJECTIVE:  Vital Signs Last 24 Hrs  T(C): 37.4 (22 Apr 2024 20:33), Max: 37.4 (22 Apr 2024 20:33)  T(F): 99.3 (22 Apr 2024 20:33), Max: 99.3 (22 Apr 2024 20:33)  HR: 108 (22 Apr 2024 20:33) (101 - 111)  BP: 123/72 (22 Apr 2024 20:33) (112/68 - 137/78)  BP(mean): 89 (22 Apr 2024 20:33) (86 - 97)  RR: 18 (22 Apr 2024 20:33) (17 - 18)  SpO2: 92% (22 Apr 2024 20:33) (92% - 95%)    Parameters below as of 22 Apr 2024 20:33  Patient On (Oxygen Delivery Method): room air  O2 Flow (L/min): 3      PHYSICAL EXAM:  Neuro: Awake and alert, oriented to person, place, and time  Cardiovascular: + S1, S2, no murmurs, rubs, or bruits  Respiratory: clear to auscultation bilaterally with good air entry   GI: Abdomen soft, non-tender, bowel sounds present   : Non distended;   Skin: warm and dry; no rash      LABS:                        10.3   11.48 )-----------( 444      ( 22 Apr 2024 05:15 )             33.2     04-22    140  |  108  |  22<H>  ----------------------------<  344<H>  4.2   |  27  |  0.81    Ca    8.5      22 Apr 2024 05:15    Problem: Abdominal pain     PLAN: MEDICATIONS  (STANDING):  1. Acetaminophen   IVPB 1000 usha GRAM(s) IV Intermittent once  2. Cont with present care/treatment  3. Supportive care.    FOLLOW UP / RESULT: Reassess for effect of therapy

## 2024-04-22 NOTE — CHART NOTE - NSCHARTNOTEFT_GEN_A_CORE
EVENT: Received telephone call from RN that pt is c/o of generalized abdominal pain which she rated as 8/10    HPI:  63 year old F from home ambulates with walker w/ pmhx chronic hypoxic respiratory failure on 2-3L NC at home 2/2 ILD, currently on ofev , unprovoked? PE (dx  on 2022, currently on Eliquis 5 mg PO QD), severe pulmonary HTN (based on TTE from 2023), IDDM, cor pulmonale who came in with c/o SOB, associated with dry cough, not associated with wheezing, CP, LE edema. In the ED patient was placed on NRB. ICU was consulted for worsening hypoxia and transitioned to Venti mask 35% @ 12 L saturating 92-95% and tachypneic. Pt was then placed and HFNC and admitted to ICU for acute hypoxic respiratory failure 2/2 to ILD flair.    SUBJECTIVE: "I think the IV Tylenol worked better than the morphine"    OBJECTIVE:  Vital Signs Last 24 Hrs  T(C): 36.7 (22 Apr 2024 00:35), Max: 37.6 (21 Apr 2024 13:05)  T(F): 98 (22 Apr 2024 00:35), Max: 99.6 (21 Apr 2024 13:05)  HR: 105 (22 Apr 2024 00:35) (105 - 128)  BP: 136/78 (22 Apr 2024 00:35) (123/72 - 136/78)  BP(mean): 86 (22 Apr 2024 00:35) (86 - 86)  RR: 17 (22 Apr 2024 00:35) (17 - 22)  SpO2: 95% (22 Apr 2024 00:35) (95% - 98%)    Parameters below as of 22 Apr 2024 00:35  Patient On (Oxygen Delivery Method): nasal cannula  O2 Flow (L/min): 3    FOCUSED PHYSICAL EXAM:  Neuro: awake, alert, oriented x 3. No neuro deficit  Cardiovascular: Pulses +2 B/L in lower and upper extremities, HR regular, BP stable, No edema.  Respiratory: Respirations regular, unlabored, breath sounds clear B/L.   GI: Abdomen soft, non-tender, positive bowel sounds.  : no bladder distention noted. No complaints at this time.  Skin: Dry, intact, no bruising, no diaphoresis.    LABS:                        10.1   11.75 )-----------( 411      ( 21 Apr 2024 07:05 )             32.6     04-21    143  |  109<H>  |  17  ----------------------------<  89  3.5   |  29  |  0.60    Ca    7.9<L>      21 Apr 2024 07:05        EKG:   IMAGING:    ASSESSMENT/PROBLEM: Generalized abdominal pain      PLAN:   1. Ofirmev 1000 mg, IV x 1 dose ordered at 21:12  2. Monitor response to treatment  3. Cont with present care/treatment  4. Supportive care

## 2024-04-22 NOTE — PROGRESS NOTE ADULT - SUBJECTIVE AND OBJECTIVE BOX
Patient is a 63y old  Female who presents with a chief complaint of AHRF 2/2 ILD flair vs PNA (22 Apr 2024 10:25)      OVERNIGHT EVENTS: none noted     REVIEW OF SYSTEMS:  CONSTITUTIONAL: No fever, chills  RESPIRATORY: No cough, SOB  CARDIOVASCULAR: No chest pain, palpitations  GASTROINTESTINAL: No abdominal pain. No nausea, vomiting, or diarrhea  GENITOURINARY: No dysuria  NEUROLOGICAL: No HA    T(C): 37.3 (04-22-24 @ 04:58), Max: 37.3 (04-22-24 @ 04:58)  HR: 101 (04-22-24 @ 10:17) (101 - 113)  BP: 137/78 (04-22-24 @ 04:58) (123/72 - 137/78)  RR: 17 (04-22-24 @ 04:58) (17 - 22)  SpO2: 94% (04-22-24 @ 10:17) (93% - 98%)  Wt(kg): --Vital Signs Last 24 Hrs  T(C): 37.3 (22 Apr 2024 04:58), Max: 37.3 (22 Apr 2024 04:58)  T(F): 99.1 (22 Apr 2024 04:58), Max: 99.1 (22 Apr 2024 04:58)  HR: 101 (22 Apr 2024 10:17) (101 - 113)  BP: 137/78 (22 Apr 2024 04:58) (123/72 - 137/78)  BP(mean): 97 (22 Apr 2024 04:58) (86 - 97)  RR: 17 (22 Apr 2024 04:58) (17 - 22)  SpO2: 94% (22 Apr 2024 10:17) (93% - 98%)    Parameters below as of 22 Apr 2024 10:17  Patient On (Oxygen Delivery Method): nasal cannula  O2 Flow (L/min): 3        PHYSICAL EXAM:  GENERAL: Anxious   CHEST/LUNG: Clear to auscultation bilaterally; No rales, rhonchi, wheezing, or rubs  HEART: S1, S2, Regular rate and rhythm  ABDOMEN: Soft, Nontender, Nondistended; Bowel sounds present  NEURO: Alert & Oriented X3    Consultant(s) Notes Reviewed:  [x ] YES  [ ] NO  Care Discussed with Consultants/Other Providers [ x] YES  [ ] NO  LABS:                        10.3   11.48 )-----------( 444      ( 22 Apr 2024 05:15 )             33.2     04-22    140  |  108  |  22<H>  ----------------------------<  344<H>  4.2   |  27  |  0.81    Ca    8.5      22 Apr 2024 05:15        CAPILLARY BLOOD GLUCOSE      POCT Blood Glucose.: 293 mg/dL (22 Apr 2024 11:30)  POCT Blood Glucose.: 349 mg/dL (22 Apr 2024 07:49)  POCT Blood Glucose.: 280 mg/dL (21 Apr 2024 21:02)  POCT Blood Glucose.: 321 mg/dL (21 Apr 2024 16:34)      Urinalysis Basic - ( 22 Apr 2024 05:15 )    Color: x / Appearance: x / SG: x / pH: x  Gluc: 344 mg/dL / Ketone: x  / Bili: x / Urobili: x   Blood: x / Protein: x / Nitrite: x   Leuk Esterase: x / RBC: x / WBC x   Sq Epi: x / Non Sq Epi: x / Bacteria: x        RADIOLOGY & ADDITIONAL TESTS:  Imaging Personally Reviewed:  [ ] YES  [ ] NO

## 2024-04-22 NOTE — PROGRESS NOTE ADULT - SUBJECTIVE AND OBJECTIVE BOX
Interval Events:  pt in nad    Allergies    No Known Allergies    Intolerances      Endocrine/Metabolic Medications:  atorvastatin 40 milliGRAM(s) Oral at bedtime  insulin glargine Injectable (LANTUS) 20 Unit(s) SubCutaneous at bedtime  insulin lispro (ADMELOG) corrective regimen sliding scale   SubCutaneous three times a day before meals  insulin lispro (ADMELOG) corrective regimen sliding scale   SubCutaneous at bedtime  insulin lispro Injectable (ADMELOG) 14 Unit(s) SubCutaneous three times a day before meals      Vital Signs Last 24 Hrs  T(C): 37.3 (22 Apr 2024 04:58), Max: 37.6 (21 Apr 2024 13:05)  T(F): 99.1 (22 Apr 2024 04:58), Max: 99.6 (21 Apr 2024 13:05)  HR: 101 (22 Apr 2024 10:17) (101 - 128)  BP: 137/78 (22 Apr 2024 04:58) (123/72 - 137/78)  BP(mean): 97 (22 Apr 2024 04:58) (86 - 97)  RR: 17 (22 Apr 2024 04:58) (17 - 22)  SpO2: 94% (22 Apr 2024 10:17) (93% - 98%)    Parameters below as of 22 Apr 2024 10:17  Patient On (Oxygen Delivery Method): nasal cannula  O2 Flow (L/min): 3        PHYSICAL EXAM  All physical exam findings normal, except those marked:  General:	Alert, active, cooperative, NAD, well hydrated  .		[] Abnormal:  Neck		Normal: supple, no cervical adenopathy, no palpable thyroid  .		[] Abnormal:  Cardiovascular	Normal: regular rate, normal S1, S2, no murmurs  .		[] Abnormal:  Respiratory	Normal: no chest wall deformity, normal respiratory pattern, CTA B/L  .		[] Abnormal:  Abdominal	Normal: soft, ND, NT, bowel sounds present, no masses, no organomegaly  .		[] Abnormal:  		Normal normal genitalia, testes descended, circumcised/uncircumcised  .		Eze stage:			Breast eze:  .		Menstrual history:  .		[] Abnormal:  Extremities	Normal: FROM x4  .		[] Abnormal:  Skin		Normal: intact and not indurated, no rash, no acanthosis nigricans  .		[] Abnormal:  Neurologic	Normal: grossly intact  .		[] Abnormal:    LABS                        10.3   11.48 )-----------( 444      ( 22 Apr 2024 05:15 )             33.2                               140    |  108    |  22                  Calcium: 8.5   / iCa: x      (04-22 @ 05:15)    ----------------------------<  344       Magnesium: x                                4.2     |  27     |  0.81             Phosphorous: x          CAPILLARY BLOOD GLUCOSE      POCT Blood Glucose.: 349 mg/dL (22 Apr 2024 07:49)  POCT Blood Glucose.: 280 mg/dL (21 Apr 2024 21:02)  POCT Blood Glucose.: 321 mg/dL (21 Apr 2024 16:34)  POCT Blood Glucose.: 199 mg/dL (21 Apr 2024 11:22)        Assesment/plan    Patient is a 64 yo female with hx of Pulmonary fibrosis (diagnosed 2021), not on home oxygen, Pulmonary embolism on Eliquis, DM on Lantus and Metformin  presented with shortness of breath. Found to have uncont dm. Pt admits to taking lantus 30 at night and premeal 25ac tid ? injection technique reviewed- admits to noncompliance with diet. On and off on prednisone          Problem/Recommendation - 1:  ·  Problem: Diabetes mellitus with hyperglycemia.   ·  Recommendation: uncont with hyperglycemia  hyperglycemic today- due to snacking overnight- crackers/glucerna  hba1c- 11  cont lantus 20  cont admelog to 14 and dec dose as steroids taper- hold if poor intake  d/w pt compliance with diet  moderate correction admelog doses  nutrition eval  fsg ac and hs  d/w prim team.     Problem/Recommendation - 2:  ·  Problem: ILD (interstitial lung disease).   ·  Recommendation: cont tx per pulm  steroid taper.

## 2024-04-23 NOTE — PROGRESS NOTE ADULT - SUBJECTIVE AND OBJECTIVE BOX
Interval Events:  pt in nad    Allergies    No Known Allergies    Intolerances      Endocrine/Metabolic Medications:  atorvastatin 40 milliGRAM(s) Oral at bedtime  insulin glargine Injectable (LANTUS) 20 Unit(s) SubCutaneous at bedtime  insulin lispro (ADMELOG) corrective regimen sliding scale   SubCutaneous three times a day before meals  insulin lispro (ADMELOG) corrective regimen sliding scale   SubCutaneous at bedtime  insulin lispro Injectable (ADMELOG) 14 Unit(s) SubCutaneous three times a day before meals  predniSONE   Tablet 30 milliGRAM(s) Oral daily      Vital Signs Last 24 Hrs  T(C): 36.7 (23 Apr 2024 05:09), Max: 37.4 (22 Apr 2024 20:33)  T(F): 98 (23 Apr 2024 05:09), Max: 99.3 (22 Apr 2024 20:33)  HR: 103 (23 Apr 2024 05:09) (101 - 108)  BP: 130/68 (23 Apr 2024 05:09) (112/68 - 130/68)  BP(mean): 89 (22 Apr 2024 20:33) (89 - 89)  RR: 17 (23 Apr 2024 05:09) (17 - 18)  SpO2: 97% (23 Apr 2024 05:09) (92% - 97%)    Parameters below as of 23 Apr 2024 05:09  Patient On (Oxygen Delivery Method): nasal cannula  O2 Flow (L/min): 3        PHYSICAL EXAM  All physical exam findings normal, except those marked:  General:	Alert, active, cooperative, NAD, well hydrated  .		[] Abnormal:  Neck		Normal: supple, no cervical adenopathy, no palpable thyroid  .		[] Abnormal:  Cardiovascular	Normal: regular rate, normal S1, S2, no murmurs  .		[] Abnormal:  Respiratory	Normal: no chest wall deformity, normal respiratory pattern, CTA B/L  .		[] Abnormal:  Abdominal	Normal: soft, ND, NT, bowel sounds present, no masses, no organomegaly  .		[] Abnormal:  		Normal normal genitalia, testes descended, circumcised/uncircumcised  .		Eze stage:			Breast eze:  .		Menstrual history:  .		[] Abnormal:  Extremities	Normal: FROM x4  .		[] Abnormal:  Skin		Normal: intact and not indurated, no rash, no acanthosis nigricans  .		[] Abnormal:  Neurologic	Normal: grossly intact  .		[] Abnormal:    LABS                        9.4    11.67 )-----------( 441      ( 23 Apr 2024 06:40 )             29.9                               141    |  108    |  36                  Calcium: 8.2   / iCa: x      (04-23 @ 06:40)    ----------------------------<  109       Magnesium: x                                4.1     |  28     |  0.78             Phosphorous: x          CAPILLARY BLOOD GLUCOSE      POCT Blood Glucose.: 114 mg/dL (23 Apr 2024 08:09)  POCT Blood Glucose.: 273 mg/dL (22 Apr 2024 21:49)  POCT Blood Glucose.: 236 mg/dL (22 Apr 2024 16:42)  POCT Blood Glucose.: 293 mg/dL (22 Apr 2024 11:30)        Assesment/plan    Patient is a 64 yo female with hx of Pulmonary fibrosis (diagnosed 2021), not on home oxygen, Pulmonary embolism on Eliquis, DM on Lantus and Metformin  presented with shortness of breath. Found to have uncont dm. Pt admits to taking lantus 30 at night and premeal 25ac tid ? injection technique reviewed- admits to noncompliance with diet. On and off on prednisone          Problem/Recommendation - 1:  ·  Problem: Diabetes mellitus with hyperglycemia.   ·  Recommendation: uncont with hyperglycemia  hba1c- 11  cont lantus 20  cont admelog to 14 and dec dose as steroids taper- hold if poor intake  d/w pt compliance with diet  moderate correction admelog doses  nutrition eval  fsg ac and hs  d/w prim team.     Problem/Recommendation - 2:  ·  Problem: ILD (interstitial lung disease).   ·  Recommendation: cont tx per pulm  steroid taper.

## 2024-04-23 NOTE — PROGRESS NOTE ADULT - SUBJECTIVE AND OBJECTIVE BOX
Patient is a 63y old  Female who presents with a chief complaint of AHRF (23 Apr 2024 10:53)      OVERNIGHT EVENTS:    REVIEW OF SYSTEMS:  CONSTITUTIONAL: No fever, chills  RESPIRATORY: No cough, SOB  CARDIOVASCULAR: No chest pain, palpitations  GASTROINTESTINAL: No abdominal pain. No nausea, vomiting, or diarrhea  GENITOURINARY: No dysuria  NEUROLOGICAL: No HA    T(C): 36.8 (04-23-24 @ 14:04), Max: 37.4 (04-22-24 @ 20:33)  HR: 114 (04-23-24 @ 14:04) (103 - 114)  BP: 111/67 (04-23-24 @ 14:04) (111/67 - 130/68)  RR: 18 (04-23-24 @ 14:04) (17 - 18)  SpO2: 94% (04-23-24 @ 14:04) (92% - 97%)  Wt(kg): --Vital Signs Last 24 Hrs  T(C): 36.8 (23 Apr 2024 14:04), Max: 37.4 (22 Apr 2024 20:33)  T(F): 98.2 (23 Apr 2024 14:04), Max: 99.3 (22 Apr 2024 20:33)  HR: 114 (23 Apr 2024 14:04) (103 - 114)  BP: 111/67 (23 Apr 2024 14:04) (111/67 - 130/68)  BP(mean): 89 (22 Apr 2024 20:33) (89 - 89)  RR: 18 (23 Apr 2024 14:04) (17 - 18)  SpO2: 94% (23 Apr 2024 14:04) (92% - 97%)    Parameters below as of 23 Apr 2024 14:04  Patient On (Oxygen Delivery Method): nasal cannula  O2 Flow (L/min): 3        PHYSICAL EXAM:  GENERAL: Anxious  CHEST/LUNG: Clear to auscultation bilaterally; No rales, rhonchi, wheezing, or rubs  HEART: S1, S2, Regular rate and rhythm  ABDOMEN: Soft, Nontender, Nondistended; Bowel sounds present  NEURO: Alert & Oriented X3  : Pike draining clear yellow urine  EXTREMITIES: No LE edema, no calf tenderness    Consultant(s) Notes Reviewed:  [x ] YES  [ ] NO  Care Discussed with Consultants/Other Providers [ x] YES  [ ] NO  LABS:                        9.4    11.67 )-----------( 441      ( 23 Apr 2024 06:40 )             29.9     04-23    141  |  108  |  36<H>  ----------------------------<  109<H>  4.1   |  28  |  0.78    Ca    8.2<L>      23 Apr 2024 06:40        CAPILLARY BLOOD GLUCOSE      POCT Blood Glucose.: 131 mg/dL (23 Apr 2024 11:33)  POCT Blood Glucose.: 114 mg/dL (23 Apr 2024 08:09)  POCT Blood Glucose.: 273 mg/dL (22 Apr 2024 21:49)  POCT Blood Glucose.: 236 mg/dL (22 Apr 2024 16:42)      Urinalysis Basic - ( 23 Apr 2024 06:40 )    Color: x / Appearance: x / SG: x / pH: x  Gluc: 109 mg/dL / Ketone: x  / Bili: x / Urobili: x   Blood: x / Protein: x / Nitrite: x   Leuk Esterase: x / RBC: x / WBC x   Sq Epi: x / Non Sq Epi: x / Bacteria: x        RADIOLOGY & ADDITIONAL TESTS:  Imaging Personally Reviewed:  [ ] YES  [ ] NO

## 2024-04-23 NOTE — PROGRESS NOTE ADULT - TIME BILLING
- Review of chart (interval documentation, labs/studies, VS trends)  - Medication reconciliation  - Bedside interview and physical examination  - Bedside SpO2 monitoring and supplemental O2 titration  - Coordination of care with primary team

## 2024-04-23 NOTE — PROGRESS NOTE ADULT - ASSESSMENT
62yo woman w/ PMH advanced ILD/probable IPF admitted with acute on chronic hypoxemic respiratory failure with likely acute exacerbation of ILD as well as pulm edema.    High BNP, B lines pattern on initial ICU sonogram and interlobular septal thickening on imaging  Has had some improvement on combo of high dose steroids and diuresis  On eliquis with negative dopplers and clinical improvement suggest low prob of acute PE  Echo without any significant Cor pulmonale or pulm HTN  Clinically no evidence of PNA, viral panel and peripheral ID w/u negative    #Acute on chronic hypoxemic respiratory failure  #Advanced ILD, likely IPF and probable exacerbation of ILD  #Pulmonary edema  #H/o PE on Eliquis    Recommendations:  - on PO prednisone 40mg, is on taper by 10mg q3d  - Monitor strict I/Os, daily weights; diuresis as per primary team as hemodynamics/renal fn allow to maintain euvolemia  - cont eliquis  - TTE reviewed  - nebs probably not helping and possibly contributing to tachycardia and anxiety; started on verapamil  - titrate O2 as tolerated to maintain normoxia   - cont home Ofev  - Pt is DNR/DNI, trial of NIPPV  - Please contact Pulmonology for further questions or concerns     Follows with pulmonologist Dr. Primo Marlow ([facility]) and should have routine f/u within 1-2wks of discharge 62yo woman w/ PMH advanced ILD/probable IPF admitted with acute on chronic hypoxemic respiratory failure with likely acute exacerbation of ILD as well as pulm edema.    High BNP, B lines pattern on initial ICU sonogram and interlobular septal thickening on imaging  Has had some improvement on combo of high dose steroids and diuresis  On eliquis with negative dopplers and clinical improvement suggest low prob of acute PE  Echo without any significant Cor pulmonale or pulm HTN  Clinically no evidence of PNA, viral panel and peripheral ID w/u negative    #Acute on chronic hypoxemic respiratory failure  #Advanced ILD, likely IPF and probable exacerbation of ILD  #Pulmonary edema  #H/o PE on Eliquis    Recommendations:  - on PO prednisone 40mg, is on taper by 10mg q3d  - Monitor strict I/Os, daily weights; diuresis as per primary team as hemodynamics/renal fn allow to maintain euvolemia  - cont eliquis  - TTE reviewed  - nebs probably not helping and possibly contributing to tachycardia and anxiety; started on verapamil  - titrate O2 as tolerated to maintain normoxia   - cont home Ofev  - Pt is DNR/DNI, trial of NIPPV    Follows with pulmonologist Dr. Primo Marlow ([facility]) and should have routine f/u within 1-2wks of discharge

## 2024-04-23 NOTE — PROGRESS NOTE ADULT - SUBJECTIVE AND OBJECTIVE BOX
PULMONARY CONSULT SERVICE FOLLOW-UP NOTE    INTERVAL HPI:  Reviewed chart and overnight events; patient seen and examined at bedside.    MEDICATIONS:  Pulmonary:  budesonide 160 MICROgram(s)/formoterol 4.5 MICROgram(s) Inhaler 2 Puff(s) Inhalation two times a day  guaiFENesin Oral Liquid (Sugar-Free) 200 milliGRAM(s) Oral every 6 hours PRN    Antimicrobials:    Anticoagulants:  apixaban 5 milliGRAM(s) Oral every 12 hours    Cardiac:  furosemide    Tablet 20 milliGRAM(s) Oral daily  verapamil 80 milliGRAM(s) Oral every 8 hours      Allergies    No Known Allergies    Intolerances        Vital Signs Last 24 Hrs  T(C): 36.7 (23 Apr 2024 05:09), Max: 37.4 (22 Apr 2024 20:33)  T(F): 98 (23 Apr 2024 05:09), Max: 99.3 (22 Apr 2024 20:33)  HR: 103 (23 Apr 2024 05:09) (103 - 108)  BP: 130/68 (23 Apr 2024 05:09) (112/68 - 130/68)  BP(mean): 89 (22 Apr 2024 20:33) (89 - 89)  RR: 17 (23 Apr 2024 05:09) (17 - 18)  SpO2: 97% (23 Apr 2024 05:09) (92% - 97%)    Parameters below as of 23 Apr 2024 05:09  Patient On (Oxygen Delivery Method): nasal cannula  O2 Flow (L/min): 3      04-22 @ 07:01  -  04-23 @ 07:00  --------------------------------------------------------  IN: 0 mL / OUT: 800 mL / NET: -800 mL          PHYSICAL EXAM:  GEN: NAD, well-appearing, comfortable upright/ semirecumbent in bed  HEENT: anicteric sclera; MMM  RESP: no respiratory distress, CTA B/L; no W/R/R  CV: +S1/S2, RRR, no m/g/r  GI: soft, NT/ND, +BS  EXTREM: WWP; no edema, clubbing or cyanosis  Vascular: 2+ radial pulses B/L  NEURO: alert and awake, moving limbs spontaneously    LABS:      CBC Full  -  ( 23 Apr 2024 06:40 )  WBC Count : 11.67 K/uL  RBC Count : 3.51 M/uL  Hemoglobin : 9.4 g/dL  Hematocrit : 29.9 %  Platelet Count - Automated : 441 K/uL  Mean Cell Volume : 85.2 fl  Mean Cell Hemoglobin : 26.8 pg  Mean Cell Hemoglobin Concentration : 31.4 gm/dL  Auto Neutrophil # : x  Auto Lymphocyte # : x  Auto Monocyte # : x  Auto Eosinophil # : x  Auto Basophil # : x  Auto Neutrophil % : x  Auto Lymphocyte % : x  Auto Monocyte % : x  Auto Eosinophil % : x  Auto Basophil % : x    04-23    141  |  108  |  36<H>  ----------------------------<  109<H>  4.1   |  28  |  0.78    Ca    8.2<L>      23 Apr 2024 06:40            Urinalysis Basic - ( 23 Apr 2024 06:40 )    Color: x / Appearance: x / SG: x / pH: x  Gluc: 109 mg/dL / Ketone: x  / Bili: x / Urobili: x   Blood: x / Protein: x / Nitrite: x   Leuk Esterase: x / RBC: x / WBC x   Sq Epi: x / Non Sq Epi: x / Bacteria: x              RADIOLOGY & ADDITIONAL STUDIES: PULMONARY CONSULT SERVICE FOLLOW-UP NOTE    INTERVAL HPI:  Reviewed chart and overnight events; patient seen and examined at bedside. Reports that her breathing has improved since admission and ICU stay, now roughly at baseline. Only acute c/o pelvic pain for which tylenol does not help. Says that it feels like the similar type of pain as compared to when she had urinary retention, though notably now has a Pike catheter in place draining well. Notes occasional non-troublesome dry cough. At this time pt denies anginal/pleuritic CP, SOB/HERNANDEZ, wheezing, nasal/chest congestion, stridor, orthopnea, hemoptysis, LE edema, f/c/n/v.     MEDICATIONS:  Pulmonary:  budesonide 160 MICROgram(s)/formoterol 4.5 MICROgram(s) Inhaler 2 Puff(s) Inhalation two times a day  guaiFENesin Oral Liquid (Sugar-Free) 200 milliGRAM(s) Oral every 6 hours PRN    Antimicrobials:    Anticoagulants:  apixaban 5 milliGRAM(s) Oral every 12 hours    Cardiac:  furosemide    Tablet 20 milliGRAM(s) Oral daily  verapamil 80 milliGRAM(s) Oral every 8 hours      Allergies    No Known Allergies    Intolerances        Vital Signs Last 24 Hrs  T(C): 36.7 (23 Apr 2024 05:09), Max: 37.4 (22 Apr 2024 20:33)  T(F): 98 (23 Apr 2024 05:09), Max: 99.3 (22 Apr 2024 20:33)  HR: 103 (23 Apr 2024 05:09) (103 - 108)  BP: 130/68 (23 Apr 2024 05:09) (112/68 - 130/68)  BP(mean): 89 (22 Apr 2024 20:33) (89 - 89)  RR: 17 (23 Apr 2024 05:09) (17 - 18)  SpO2: 97% (23 Apr 2024 05:09) (92% - 97%)    Parameters below as of 23 Apr 2024 05:09  Patient On (Oxygen Delivery Method): nasal cannula  O2 Flow (L/min): 3      04-22 @ 07:01  -  04-23 @ 07:00  --------------------------------------------------------  IN: 0 mL / OUT: 800 mL / NET: -800 mL          PHYSICAL EXAM:  GEN: NAD, mildly uncomfortable supine in bed  HEENT: anicteric sclera; MMM  RESP: no respiratory distress, bibasilar crackles  CV: +S1/S2, RRR, no m/g/r  GI: soft, NT/ND, +BS  EXTREM: WWP; no edema, clubbing or cyanosis  Vascular: 2+ radial pulses B/L  NEURO: alert and awake, moving limbs spontaneously    LABS:      CBC Full  -  ( 23 Apr 2024 06:40 )  WBC Count : 11.67 K/uL  RBC Count : 3.51 M/uL  Hemoglobin : 9.4 g/dL  Hematocrit : 29.9 %  Platelet Count - Automated : 441 K/uL  Mean Cell Volume : 85.2 fl  Mean Cell Hemoglobin : 26.8 pg  Mean Cell Hemoglobin Concentration : 31.4 gm/dL  Auto Neutrophil # : x  Auto Lymphocyte # : x  Auto Monocyte # : x  Auto Eosinophil # : x  Auto Basophil # : x  Auto Neutrophil % : x  Auto Lymphocyte % : x  Auto Monocyte % : x  Auto Eosinophil % : x  Auto Basophil % : x    04-23    141  |  108  |  36<H>  ----------------------------<  109<H>  4.1   |  28  |  0.78    Ca    8.2<L>      23 Apr 2024 06:40            Urinalysis Basic - ( 23 Apr 2024 06:40 )    Color: x / Appearance: x / SG: x / pH: x  Gluc: 109 mg/dL / Ketone: x  / Bili: x / Urobili: x   Blood: x / Protein: x / Nitrite: x   Leuk Esterase: x / RBC: x / WBC x   Sq Epi: x / Non Sq Epi: x / Bacteria: x              RADIOLOGY & ADDITIONAL STUDIES:  No interval chest imaging for review

## 2024-04-23 NOTE — PROGRESS NOTE ADULT - PROBLEM SELECTOR PLAN 1
secondary to acute on chronic ILD, Uses O2 3L at home  c/w Prednisone from 4/16 for total 7 days of steroids until 4/22,  then taper by 10mg every 3 days.  c/w oxygen therapy on 3l  c/w duonebs, symbicort  CT chest - interstitial lung disease without significant interval progression. No evidence of pneumonia.                       Trace pericardial effusion with mild cardiomegaly.  pulm Dr. Dubois following  outpatient follow up with Pulmonology Lung transplant  Pt Follows with pulmonologist Dr. Primo Marlow ([facility])

## 2024-04-23 NOTE — PROGRESS NOTE ADULT - PROBLEM SELECTOR PLAN 8
f/u pulm Dr. Dubois reccs, continue prednisone 40 until 4/22 AND THEN gradual taper by 10mg every 3 days.  Pt Follows with pulmonologist Dr. Primo Marlow ([facility]) and should have routine f/u within   PT recc LEVON pending Auth

## 2024-04-23 NOTE — PROGRESS NOTE ADULT - ASSESSMENT
63 year old F from home ambulates with walker w/ pmhx chronic hypoxic respiratory failure on 2-3L NC at home 2/2 ILD, currently on ofev , unprovoked? PE (dx  on 2022, currently on Eliquis 5 mg PO QD), severe pulmonary HTN (based on TTE from 2023), IDDM, cor pulmonale who came in with c/o SOB, associated with dry cough, not associated with wheezing, CP, LE edema. In the ED patient was placed on NRB. ICU was consulted for worsening hypoxia and transitioned to Venti mask 35% @ 12 L saturating 92-95% and tachypneic. Pt admitted to ICU for acute hypoxic respiratory failure 2/2 to ILD flair.   While in ICU patient was started on Levaquin (4/11-4/13) but it was discontinued given CT chest did not have signs of pneumonia. Pt was also started on IV steroids, Duoneb, Symbicort, and diuresed with IV Lasix. Patient has history of urinary retention and was retaining 2L urine and a Pike was placed. Patient was transitioned to PO steroids on 4/16.  Patient was able to be weaned of HFNC and currently on 3L O2 NC with no respiratory distress. Patient echo on 4/12/24 showed EF 65% with normal right artery systolic pressure.   Pt failed TOV on 4/18. re-inserted Pike.   Endocrine following for uncontrolled DM with A1C 11.9. Adjusted insulin regimen.   *of note Pt has a hx of DVT in 10/2022, Doppler studies negative    4/22: Steroid taper started. Pt was anxious today, ativan PRN added. Pending placement LEVON to LTC, SW following  4/23: c/w steroids, lab holiday. Auth pending, CNM following

## 2024-04-24 NOTE — PROGRESS NOTE ADULT - SUBJECTIVE AND OBJECTIVE BOX
Interval Events:  pt in nad    Allergies    No Known Allergies    Intolerances      Endocrine/Metabolic Medications:  atorvastatin 40 milliGRAM(s) Oral at bedtime  insulin glargine Injectable (LANTUS) 20 Unit(s) SubCutaneous at bedtime  insulin lispro (ADMELOG) corrective regimen sliding scale   SubCutaneous three times a day before meals  insulin lispro (ADMELOG) corrective regimen sliding scale   SubCutaneous at bedtime  insulin lispro Injectable (ADMELOG) 14 Unit(s) SubCutaneous three times a day before meals  methylPREDNISolone sodium succinate Injectable 40 milliGRAM(s) IV Push every 12 hours      Vital Signs Last 24 Hrs  T(C): 36.9 (24 Apr 2024 05:24), Max: 37.1 (23 Apr 2024 20:05)  T(F): 98.4 (24 Apr 2024 05:24), Max: 98.7 (23 Apr 2024 20:05)  HR: 118 (24 Apr 2024 06:46) (111 - 119)  BP: 120/76 (24 Apr 2024 06:46) (92/51 - 124/70)  BP(mean): --  RR: 20 (24 Apr 2024 06:46) (18 - 20)  SpO2: 93% (24 Apr 2024 06:46) (76% - 94%)    Parameters below as of 24 Apr 2024 06:46  Patient On (Oxygen Delivery Method): mask, nonrebreather  O2 Flow (L/min): 10        PHYSICAL EXAM  All physical exam findings normal, except those marked:  General:	Alert, active, cooperative, NAD, well hydrated  .		[] Abnormal:  Neck		Normal: supple, no cervical adenopathy, no palpable thyroid  .		[] Abnormal:  Cardiovascular	Normal: regular rate, normal S1, S2, no murmurs  .		[] Abnormal:  Respiratory	Normal: no chest wall deformity, normal respiratory pattern, CTA B/L  .		[] Abnormal:  Abdominal	Normal: soft, ND, NT, bowel sounds present, no masses, no organomegaly  .		[] Abnormal:  		Normal normal genitalia, testes descended, circumcised/uncircumcised  .		Eze stage:			Breast eze:  .		Menstrual history:  .		[] Abnormal:  Extremities	Normal: FROM x4  .		[] Abnormal:  Skin		Normal: intact and not indurated, no rash, no acanthosis nigricans  .		[] Abnormal:  Neurologic	Normal: grossly intact  .		[] Abnormal:    LABS        CAPILLARY BLOOD GLUCOSE      POCT Blood Glucose.: 261 mg/dL (24 Apr 2024 08:02)  POCT Blood Glucose.: 186 mg/dL (23 Apr 2024 21:24)  POCT Blood Glucose.: 91 mg/dL (23 Apr 2024 16:35)  POCT Blood Glucose.: 131 mg/dL (23 Apr 2024 11:33)        Assesment/plan    Patient is a 64 yo female with hx of Pulmonary fibrosis (diagnosed 2021), not on home oxygen, Pulmonary embolism on Eliquis, DM on Lantus and Metformin  presented with shortness of breath. Found to have uncont dm. Pt admits to taking lantus 30 at night and premeal 25ac tid ? injection technique reviewed- admits to noncompliance with diet. On and off on prednisone          Problem/Recommendation - 1:  ·  Problem: Diabetes mellitus with hyperglycemia.   ·  Recommendation: uncont with hyperglycemia  hba1c- 11  cont lantus 20  cont admelog to 14 and dec dose as steroids taper- hold if poor intake- now back on iv steroids for resp distress   d/w pt compliance with diet  moderate correction admelog doses  nutrition eval  fsg ac and hs  d/w prim team.     Problem/Recommendation - 2:  ·  Problem: ILD (interstitial lung disease).   ·  Recommendation: cont tx per pulm

## 2024-04-24 NOTE — CHART NOTE - NSCHARTNOTEFT_GEN_A_CORE
Pt. received on V/M 10L with O2 sat 88%, dyspnea 32BPM using accessory muscles for breathing, unable to complete sentences.  Contacted NP Pat from SCU for possible pendant.  Pt. seen and evaluated by NP Pat, placed on Pendant 10L with improving resp status, O2 sat 91%.  Pt. resting comfortably at this time, will need to be monitored on 4N.  ICU consult called for approval.  Pt. started on MSO4 1mg IVP q6hrs for dyspnea/air hunger.

## 2024-04-24 NOTE — PROGRESS NOTE ADULT - PROBLEM SELECTOR PLAN 3
hx of PE on Eliquis  -continue Eliquis 5mg q12  -Recurrent PE unlikely however will f/u CTA chest ISO acute worsening of resp status

## 2024-04-24 NOTE — PROGRESS NOTE ADULT - ASSESSMENT
62yo woman w/ PMH advanced ILD/probable IPF admitted with acute on chronic hypoxemic respiratory failure with likely acute exacerbation of ILD as well as pulm edema.    High BNP, B lines pattern on initial ICU sonogram and interlobular septal thickening on imaging  Has had some improvement on combo of high dose steroids and diuresis  On eliquis with negative dopplers and clinical improvement suggest low prob of acute PE  Echo without any significant Cor pulmonale or pulm HTN  Clinically no evidence of PNA, viral panel and peripheral ID w/u negative    #Acute on chronic hypoxemic respiratory failure  #Advanced ILD, likely IPF and probable exacerbation of ILD  #Pulmonary edema  #H/o PE on Eliquis    Recommendations:  - previously on PO prednisone 40mg with taper; this AM escalated to methylprednisolone 40mg IV BID  - Would consider escalation of diuresis given AM CXR findings  - Monitor strict I/Os, daily weights; diuresis as per primary team as hemodynamics/renal fn allow to maintain euvolemia  - cont eliquis  - TTE reviewed  - nebs probably not helping and possibly contributing to tachycardia and anxiety; started on verapamil  - titrate O2 as tolerated to maintain normoxia   - cont home Ofev  - Pt is DNR/DNI, trial of NIPPV    Follows with pulmonologist Dr. Primo Marlow ([facility]) and should have routine f/u within 1-2wks of discharge   64yo woman w/ PMH advanced ILD/probable IPF admitted with acute on chronic hypoxemic respiratory failure with likely acute exacerbation of ILD as well as pulm edema.    High BNP, B lines pattern on initial ICU sonogram and interlobular septal thickening on imaging  Has had some improvement on combo of high dose steroids and diuresis  On eliquis with negative dopplers and clinical improvement suggest low prob of acute PE  Echo without any significant Cor pulmonale or pulm HTN  Clinically no evidence of PNA, viral panel and peripheral ID w/u negative    #Acute on chronic hypoxemic respiratory failure  #Advanced ILD, likely IPF and probable exacerbation of ILD  #Pulmonary edema  #H/o PE on Eliquis    Recommendations:  - previously on PO prednisone 40mg with taper; this AM escalated to methylprednisolone 40mg IV BID  - Await CTA chest r/o PE though pt on Eliquis while admitted  - Would consider escalation of diuresis given AM CXR findings  - Monitor strict I/Os, daily weights; diuresis as per primary team as hemodynamics/renal fn allow to maintain euvolemia  - cont eliquis  - TTE reviewed  - Titrate supplemental O2 with goal SpO2 90-95%; admitted to ICU for WOB and hypoxemia necessitating HFNC  - cont home Ofev  - Pt is DNR/DNI, trial of NIPPV    Follows with pulmonologist Dr. Primo Marlow ([facility]) and should have f/u within 1-2wks of discharge. Pt and family not aware of discussion or workup re: lung transplant as outpatient.

## 2024-04-24 NOTE — PROGRESS NOTE ADULT - PROBLEM SELECTOR PROBLEM 2
Diabetes mellitus with hyperglycemia
ILD (interstitial lung disease)
Diabetes mellitus with hyperglycemia
Uncontrolled diabetes mellitus with hyperglycemia
ILD (interstitial lung disease)

## 2024-04-24 NOTE — PROGRESS NOTE ADULT - ASSESSMENT
63 year old F from home ambulates with walker w/ pmhx chronic hypoxic respiratory failure on 2-3L NC at home 2/2 ILD, currently on ofev , unprovoked? PE (dx  on 2022, currently on Eliquis 5 mg PO QD), severe pulmonary HTN (based on TTE from 2023), IDDM, cor pulmonale who came in with c/o SOB, associated with dry cough, not associated with wheezing, CP, LE edema. In the ED patient was placed on NRB. ICU was consulted for worsening hypoxia and transitioned to Venti mask 35% @ 12 L saturating 92-95% and tachypneic. Pt admitted to ICU for acute hypoxic respiratory failure 2/2 to ILD flair.   While in ICU patient was started on Levaquin (4/11-4/13) but it was discontinued given CT chest did not have signs of pneumonia. Pt was also started on IV steroids, Duoneb, Symbicort, and diuresed with IV Lasix. Patient has history of urinary retention and was retaining 2L urine and a Pike was placed. Patient was transitioned to PO steroids on 4/16.  Patient was able to be weaned of HFNC and currently on 3L O2 NC with no respiratory distress. Patient echo on 4/12/24 showed EF 65% with normal right artery systolic pressure.   Pt failed TOV on 4/18. re-inserted Pike.   Endocrine following for uncontrolled DM with A1C 11.9. Adjusted insulin regimen.   *of note Pt has a hx of DVT in 10/2022, Doppler studies negative    4/22: Steroid taper started. Pt was anxious today, ativan PRN added. Pending placement LEVON to LTC, SW following  4/23: c/w steroids, lab holiday. Auth pending, CNM following     4/24-Pt. with dyspnea associated with hypoxia, and increased WOB this am on V/M 10L.  SCU NP consulted, optimizer pendant therapy initiated at 10L with improved breathing pattern and oxygenation, now with sat 93% on 10L optimizer pendant, RR 18, appears comfortable.  CXR and CTA chest ordered.  Started MSO4 1mg q6hrs prn for air hunger (not yet given).

## 2024-04-24 NOTE — PROGRESS NOTE ADULT - SUBJECTIVE AND OBJECTIVE BOX
NP Note discussed with  Primary Attending    Patient is a 63y old  Female who presents with a chief complaint of AHRF 2/2 ILD flair vs PNA (24 Apr 2024 10:28).  Received pt. dyspneic on V/M 10L using accessory muscles for breathing, unable to complete sentences stating "its hard to breath".  Adventitious BS throughout.  Consulted SCU, placed on optimizer pendant 10L with improved resp status.  Awaiting bed availability in SCU.  As reported by pt. her ILD was diagnosed approx 1 year ago, believes cleaning detergents were cause of disease.      INTERVAL HPI/OVERNIGHT EVENTS: no new complaints    MEDICATIONS  (STANDING):  apixaban 5 milliGRAM(s) Oral every 12 hours  atorvastatin 40 milliGRAM(s) Oral at bedtime  benzocaine/menthol Lozenge 1 Lozenge Oral every 4 hours  budesonide 160 MICROgram(s)/formoterol 4.5 MICROgram(s) Inhaler 2 Puff(s) Inhalation two times a day  chlorhexidine 2% Cloths 1 Application(s) Topical <User Schedule>  furosemide    Tablet 20 milliGRAM(s) Oral daily  insulin glargine Injectable (LANTUS) 20 Unit(s) SubCutaneous at bedtime  insulin lispro (ADMELOG) corrective regimen sliding scale   SubCutaneous three times a day before meals  insulin lispro (ADMELOG) corrective regimen sliding scale   SubCutaneous at bedtime  insulin lispro Injectable (ADMELOG) 14 Unit(s) SubCutaneous three times a day before meals  melatonin 3 milliGRAM(s) Oral at bedtime  methylPREDNISolone sodium succinate Injectable 40 milliGRAM(s) IV Push every 12 hours  pantoprazole    Tablet 40 milliGRAM(s) Oral before breakfast  senna 1 Tablet(s) Oral daily  tamsulosin 0.8 milliGRAM(s) Oral at bedtime  traZODone 100 milliGRAM(s) Oral at bedtime  verapamil 80 milliGRAM(s) Oral every 8 hours    MEDICATIONS  (PRN):  acetaminophen     Tablet .. 650 milliGRAM(s) Oral every 6 hours PRN Mild Pain (1 - 3)  cyclobenzaprine 5 milliGRAM(s) Oral three times a day PRN Muscle Spasm  guaiFENesin Oral Liquid (Sugar-Free) 200 milliGRAM(s) Oral every 6 hours PRN Cough  ibuprofen  Tablet. 600 milliGRAM(s) Oral every 6 hours PRN Moderate Pain (4 - 6)  LORazepam     Tablet 0.5 milliGRAM(s) Oral every 6 hours PRN Anxiety  morphine  - Injectable 1 milliGRAM(s) IV Push every 6 hours PRN dyspnea/air hunger      __________________________________________________  REVIEW OF SYSTEMS:  CONSTITUTIONAL: No fever,   EYES: no acute visual disturbances  NECK: No pain or stiffness  RESPIRATORY: "Its hard to breath"  CARDIOVASCULAR: No chest pain, no palpitations  GASTROINTESTINAL: No pain. No nausea or vomiting; No diarrhea   NEUROLOGICAL: No headache or numbness, no tremors  MUSCULOSKELETAL: No joint pain, no muscle pain  GENITOURINARY: no dysuria, no frequency, no hesitancy  PSYCHIATRY: no depression , no anxiety  ALL OTHER  ROS negative        Vital Signs Last 24 Hrs  T(C): 36.9 (24 Apr 2024 05:24), Max: 37.1 (23 Apr 2024 20:05)  T(F): 98.4 (24 Apr 2024 05:24), Max: 98.7 (23 Apr 2024 20:05)  HR: 117 (24 Apr 2024 10:37) (111 - 119)  BP: 120/76 (24 Apr 2024 06:46) (92/51 - 124/70)  BP(mean): --  RR: 10 (24 Apr 2024 10:37) (10 - 20)  SpO2: 94% (24 Apr 2024 10:37) (76% - 94%)    Parameters below as of 24 Apr 2024 10:37  Patient On (Oxygen Delivery Method): optimizer pendant  O2 Flow (L/min): 10      ________________________________________________  PHYSICAL EXAM:  Well developed, tired  GENERAL: NAD  HEENT: Normocephalic;  conjunctivae and sclerae clear; moist mucous membranes;   NECK : supple  CHEST/LUNG: Adventitious BS throughout, On optimizer pendant 10L sat 90-93%,  HEART: S1 S2  regular; no murmurs, gallops or rubs  ABDOMEN: Soft, Nontender, Nondistended; Bowel sounds present  EXTREMITIES: no cyanosis; no edema; no calf tenderness  SKIN: warm and dry; no rash  NERVOUS SYSTEM:  Awake and alert; Oriented  to place, person and time ; no new deficits    _________________________________________________  LABS:                        9.4    11.67 )-----------( 441      ( 23 Apr 2024 06:40 )             29.9     04-23    141  |  108  |  36<H>  ----------------------------<  109<H>  4.1   |  28  |  0.78    Ca    8.2<L>      23 Apr 2024 06:40        Urinalysis Basic - ( 23 Apr 2024 06:40 )    Color: x / Appearance: x / SG: x / pH: x  Gluc: 109 mg/dL / Ketone: x  / Bili: x / Urobili: x   Blood: x / Protein: x / Nitrite: x   Leuk Esterase: x / RBC: x / WBC x   Sq Epi: x / Non Sq Epi: x / Bacteria: x      CAPILLARY BLOOD GLUCOSE      POCT Blood Glucose.: 261 mg/dL (24 Apr 2024 08:02)  POCT Blood Glucose.: 186 mg/dL (23 Apr 2024 21:24)  POCT Blood Glucose.: 91 mg/dL (23 Apr 2024 16:35)  POCT Blood Glucose.: 131 mg/dL (23 Apr 2024 11:33)      RADIOLOGY & ADDITIONAL TESTS:      < from: Xray Chest 1 View-PORTABLE IMMEDIATE (Xray Chest 1 View-PORTABLE IMMEDIATE .) (04.17.24 @ 17:27) >    ACC: [id number] EXAM:  XR CHEST PORTABLE IMMED 1V   ORDERED BY: MICHAEL SANDOVAL     PROCEDURE DATE:  04/17/2024          INTERPRETATION:  AP chest on April 17, 2024 5:03 PM. Patient is short of   breath.    Heart magnified by technique.    Again noted is diffuse interstitial infiltration throughout all lung   fields rather similar to April 11 suggesting chronic interstitial lung   disease. Findings are also similar to February 27, 2023.    IMPRESSION: Chronic interstitial lung disease again noted.    --- End of Report ---    < end of copied text >      < from: US Duplex Venous Lower Ext Complete, Bilateral (04.12.24 @ 11:28) >    ACC: [id number] EXAM:  US DPLX LWR EXT VEINS COMPL BI   ORDERED BY: AYDEE FIELDS     PROCEDURE DATE:  04/12/2024          INTERPRETATION:  CLINICAL INFORMATION: History of pulmonary embolism.   Right calf pain.    COMPARISON: Bilateral lower extremity venous Doppler 10/30/2022.    TECHNIQUE: Duplex sonography of the BILATERAL LOWER extremity veins with   color and spectral Doppler, with and without compression.    FINDINGS:    RIGHT:  Normal compressibility of the RIGHT common femoral, femoral and popliteal   veins.  Doppler examination shows normal spontaneous and phasic flow.  No RIGHT calf vein thrombosis is detected.    LEFT:  Normal compressibility of the LEFT common femoral, femoral and popliteal   veins.  Doppler examination showsnormal spontaneous and phasic flow.  No LEFT calf vein thrombosis is detected.    IMPRESSION:  No evidence of deep venous thrombosis in either lower extremity.    < end of copied text >    < from: CT Chest No Cont (04.12.24 @ 05:24) >    ACC: [id number] EXAM:  CT CHEST   ORDERED BY: AYDEE FIELDS     PROCEDURE DATE:  04/12/2024          INTERPRETATION:  CLINICAL INFORMATION: Interstitial lung disease   progression versus pneumonia    COMPARISON: 2/28/2023    CONTRAST/COMPLICATIONS:  IV Contrast: NONE  Oral Contrast: NONE  Complications: None reported at time of study completion    PROCEDURE:  CT of the Chest was performed.  Sagittal and coronal reformats were performed.    FINDINGS:    LUNGS AND AIRWAYS: Patent central airways. There are extensive reticular   and groundglass opacities in both lungs associated with thickening of   interlobular septae with flexion bronchiectasis. There is no   consolidation or parenchymal infiltration suggesting pneumonia.  PLEURA: No pleural effusion.  MEDIASTINUM AND KAROLINE: No lymphadenopathy.  VESSELS: Prominent main pulmonary artery currently measuring 3.5 cm.  HEART: Mild cardiomegaly.  pericardial effusion.  CHEST WALL AND LOWER NECK: Within normal limits.  VISUALIZED UPPER ABDOMEN:Within normal limits.  BONES: Degenerative change.    IMPRESSION:  Findings suggesting interstitial lung disease without significant   interval progression. No evidence of pneumonia.  Trace pericardial effusion with mild cardiomegaly.    < end of copied text >        Imaging Personally Reviewed:  YES/NO    Consultant(s) Notes Reviewed:   YES/ No    Care Discussed with Consultants :     Plan of care was discussed with patient and /or primary care giver; all questions and concerns were addressed and care was aligned with patient's wishes.

## 2024-04-24 NOTE — CONSULT NOTE ADULT - SUBJECTIVE AND OBJECTIVE BOX
Patient is a 63y old  Female who presents with a chief complaint of AHRF 2/2 ILD flair vs PNA (2024 15:14)      HPI:  63 year old F from home ambulates with walker w/ pmhx chronic hypoxic respiratory failure on 2-3L NC at home 2/2 ILD, currently on ofev , unprovoked? PE (dx  on , currently on Eliquis 5 mg PO QD), IDDM,  who came in with c/o SOB. As per pt since a week before admission she was experiencing intermittent SOB, however, since Tuesday of the week of admission endorsed becoming more dyspneic on exertion and at rest, associated with dry cough, not associated with wheezing, CP, LE edema. In the ED patient was placed on NRB. ICU was consulted for worsening hypoxia and transitioned to Venti mask 35% @ 12 L saturating 92-95% and tachypneic. Pt was then placed and HFNC and admitted to ICU for acute hypoxic respiratory failure. Patient admitted to ICU for acute on chronic hypoxic respiratory failure requiring HFNC 2/2 to ILD flair. While in ICU patient was started on Levaquin (-) but it was discontinued given CT chest did not have signs of pneumonia. Pt was also started on IV steroids, Duoneb, Symbicort, and diuresed with IV Lasix. Patient has history of urinary retention and was retaining 2L urine and a Pike was placed. Patient was transitioned to PO steroids on . Lovenox transitioned to Eliquis BID. Patient was able to be weaned of HFNC and currently on 3L O2 NC with no respiratory distress. Patient echo on 24 showed EF 65% with normal right artery systolic pressure. Patient remains hemodynamically stable and downgraded to medicine on .    Interval Hx: Pt respiratory sxs improved and was a tapering dose of  Lasix and prednisone. Patient was saturating 93 % on 3L, and overnight became hypoxic and now saturating 82% on 10L w/ Oxymizer. Patient was seen and exmained at bedside, in NAD. States she continues to feel SOB w/ no improvement from admission. Denies any fever, chills, nausea chest pain, abdominal pain, or change in bowel habits.     Allergies    No Known Allergies    Intolerances        MEDICATIONS  (STANDING):  apixaban 5 milliGRAM(s) Oral every 12 hours  atorvastatin 40 milliGRAM(s) Oral at bedtime  benzocaine/menthol Lozenge 1 Lozenge Oral every 4 hours  budesonide 160 MICROgram(s)/formoterol 4.5 MICROgram(s) Inhaler 2 Puff(s) Inhalation two times a day  chlorhexidine 2% Cloths 1 Application(s) Topical <User Schedule>  furosemide    Tablet 20 milliGRAM(s) Oral daily  insulin glargine Injectable (LANTUS) 20 Unit(s) SubCutaneous at bedtime  insulin lispro (ADMELOG) corrective regimen sliding scale   SubCutaneous at bedtime  insulin lispro (ADMELOG) corrective regimen sliding scale   SubCutaneous three times a day before meals  insulin lispro Injectable (ADMELOG) 14 Unit(s) SubCutaneous three times a day before meals  melatonin 3 milliGRAM(s) Oral at bedtime  pantoprazole    Tablet 40 milliGRAM(s) Oral before breakfast  predniSONE   Tablet 30 milliGRAM(s) Oral daily  senna 1 Tablet(s) Oral daily  tamsulosin 0.8 milliGRAM(s) Oral at bedtime  traZODone 100 milliGRAM(s) Oral at bedtime  verapamil 80 milliGRAM(s) Oral every 8 hours    MEDICATIONS  (PRN):  acetaminophen     Tablet .. 650 milliGRAM(s) Oral every 6 hours PRN Mild Pain (1 - 3)  cyclobenzaprine 5 milliGRAM(s) Oral three times a day PRN Muscle Spasm  guaiFENesin Oral Liquid (Sugar-Free) 200 milliGRAM(s) Oral every 6 hours PRN Cough  ibuprofen  Tablet. 600 milliGRAM(s) Oral every 6 hours PRN Moderate Pain (4 - 6)  LORazepam     Tablet 0.5 milliGRAM(s) Oral every 6 hours PRN Anxiety  morphine  - Injectable 1 milliGRAM(s) IV Push every 6 hours PRN dyspnea/air hunger      Daily     Daily Weight in k.6 (2024 05:24)    Drug Dosing Weight  Height (cm): 167.6 (2024 21:44)  Weight (kg): 54.7 (2024 04:13)  BMI (kg/m2): 19.5 (2024 04:13)  BSA (m2): 1.61 (2024 04:13)    PAST MEDICAL & SURGICAL HISTORY:  Diabetes mellitus      Hyperlipidemia      Pulmonary embolism      Pulmonary fibrosis      Cataract  right eye          FAMILY HISTORY:  Family history of MI (myocardial infarction) (Father)        SOCIAL HISTORY:    ADVANCE DIRECTIVES:    REVIEW OF SYSTEMS:    REVIEW OF SYSTEMS:    CONSTITUTIONAL: + weakness, no fevers or chills  EYES/ENT: No visual changes;  No vertigo or throat pain   NECK: No pain or stiffness  RESPIRATORY: No cough, wheezing, hemoptysis; + shortness of breath  CARDIOVASCULAR: No chest pain or palpitations  GASTROINTESTINAL: No abdominal or epigastric pain. No nausea, vomiting, or hematemesis; No diarrhea or constipation. No melena or hematochezia.  GENITOURINARY: No dysuria, frequency or hematuria  NEUROLOGICAL: No numbness or weakness  SKIN: No itching, burning, rashes, or lesions   All other review of systems is negative unless indicated above.        ICU Vital Signs Last 24 Hrs  T(C): 36.9 (2024 05:24), Max: 37.1 (2024 20:05)  T(F): 98.4 (2024 05:24), Max: 98.7 (2024 20:05)  HR: 118 (2024 06:46) (111 - 119)  BP: 120/76 (2024 06:46) (92/51 - 124/70)  BP(mean): --  ABP: --  ABP(mean): --  RR: 20 (2024 06:46) (18 - 20)  SpO2: 93% (2024 06:46) (76% - 94%)    O2 Parameters below as of 2024 06:46  Patient On (Oxygen Delivery Method): mask, nonrebreather  O2 Flow (L/min): 10              I&O's Detail    2024 07:01  -  2024 07:00  --------------------------------------------------------  IN:  Total IN: 0 mL    OUT:    Indwelling Catheter - Urethral (mL): 1025 mL  Total OUT: 1025 mL    Total NET: -1025 mL          PHYSICAL EXAM:    GENERAL: NAD,   HEAD:  Atraumatic, Normocephalic  EYES: EOMI, PERRLA, conjunctiva and sclera clear  ENMT: dry mucous menebranes   NECK: Supple, No JVD, Normal thyroid  NERVOUS SYSTEM:  Alert & Oriented  CHEST/LUNG: diffuse mild  insp crackles, no wheezing  HEART: Regular rate and rhythm; No murmurs, rubs, or gallops  ABDOMEN: Soft, Nontender, Nondistended; Bowel sounds present  EXTREMITIES:  2+ Peripheral Pulses, No clubbing, cyanosis, or edema  LYMPH: No lymphadenopathy noted  SKIN: No rashes or lesions    LABS:  CBC Full  -  ( 2024 06:40 )  WBC Count : 11.67 K/uL  RBC Count : 3.51 M/uL  Hemoglobin : 9.4 g/dL  Hematocrit : 29.9 %  Platelet Count - Automated : 441 K/uL  Mean Cell Volume : 85.2 fl  Mean Cell Hemoglobin : 26.8 pg  Mean Cell Hemoglobin Concentration : 31.4 gm/dL  Auto Neutrophil # : x  Auto Lymphocyte # : x  Auto Monocyte # : x  Auto Eosinophil # : x  Auto Basophil # : x  Auto Neutrophil % : x  Auto Lymphocyte % : x  Auto Monocyte % : x  Auto Eosinophil % : x  Auto Basophil % : x        141  |  108  |  36<H>  ----------------------------<  109<H>  4.1   |  28  |  0.78    Ca    8.2<L>      2024 06:40      CAPILLARY BLOOD GLUCOSE      POCT Blood Glucose.: 261 mg/dL (2024 08:02)      Urinalysis Basic - ( 2024 06:40 )    Color: x / Appearance: x / SG: x / pH: x  Gluc: 109 mg/dL / Ketone: x  / Bili: x / Urobili: x   Blood: x / Protein: x / Nitrite: x   Leuk Esterase: x / RBC: x / WBC x   Sq Epi: x / Non Sq Epi: x / Bacteria: x              EKG:    ECHO, US:    RADIOLOGY:    CRITICAL CARE TIME SPENT:

## 2024-04-24 NOTE — PROGRESS NOTE ADULT - PROBLEM SELECTOR PLAN 2
A1C 11.9, likely elevated partly due to steroids  -Steroids being tapered down  -Wendi Zavaleta following   -cont lantus 20  -cont admelog to 14 and dec dose as steroids taper- hold if poor intake  -moderate correction admelog doses
see plan as above
p/w blood glucose of 467, neg anion gap   s/p  Lantus 10 and Lispro 5 stat for now will titrate up to home dose   likely worsening hyperglycemia in the setting of chronic prednisone use   will given NPH insulin   c/w DM/Dash diet   f/u HbA1c
see plan as above

## 2024-04-24 NOTE — PROGRESS NOTE ADULT - NS ATTEND AMEND GEN_ALL_CORE FT
Patient seen and examined this AM. Feels better compared to admission, but still SOB and not at baseline. She has a godwin cath in place, Has some cough but no fever or chills.  Patient requested a call to daughter, Debbi. She was called and updated regarding hospital course thus far and plan.    Physical: No acute distress, speaking mostly complete sentences, but does have to take occasional breaks. Fine crackles on auscultation of lung. Godwin in place, clear yellow urine. Patient is pleasant, AAOx3 to person place time and situation. Heart regular rhythm but tachycardic. No lower extremity edema.    #Acute on Chronic hypoxic respiratory failure 2/2   #Pulmonary Fibrosis  #Acute Kidney Injury improved  #Sinus tachycardia  #Pulmonary Embolism  #Diabetes 2 Uncontrolled  # Urinary Retention with Godwin catheter  # severe pulmonary hypertension resolved(?)  # Small Pericardial Effusion  #HLD    -Oxygenation at baseline- 3lpm nasal cannula saturating 99%+. can probably be tapered further to 2liter  - Repeat echo is showing a normal pulmonary systolic pressure, was previously severely elevated  - Continue treatment for underlying pulmonary Fibrosis- currently on po steroid taper. Probably needs a prolonged taper.  - Follows outpatient pulmonology- Dr. Primo Hudson of [facility]. Talks in the ICU regarding lung transplant(?)  - DONOVAN improved at baseline creatinine. On furosemide 20mg po  - Tachycardia likely due to underlying pulmonary disease  - small pericardial effusion is chronic and seen in previous TTEs  - DC godwin cath tomorrow for trial of void after more ambulatory and out of bed to chair  - Diabetes uncontrolled with a1c>10. Endocrine following need careful titration given use of steroids.  -Continue Eliquis for hx of PE
#Acute on Chronic hypoxic respiratory failure 2/2   #Pulmonary Fibrosis  # Acute Kidney Injury improved  # Sinus tachycardia  # Pulmonary Embolism  # Diabetes 2 Uncontrolled  # Urinary Retention with Godwin catheter  # severe pulmonary hypertension resolved(?)  # Small Pericardial Effusion  #HLD  - Patient with significant dyspnea and increased work of breathing and worsening Hypoxia early AM today. Likely in setting of worsening pulmonary fibrosis vs possible fluid. - Given IV steroids, on venturi mask, giving IV diuresis in setting of possible fluid, plan discussed with pulm    - ICU consulted and will be transferred to ICU for HFNC and also possible discussion regarding lung transplant list  - Repeat echo this admission is showing a normal pulmonary systolic pressure, was previously severely elevated  - Follows outpatient pulmonology- Dr. Primo Hudson of [facility].   - Tachycardia likely due to underlying pulmonary disease- Cardio consulted started on verapamil, monitor for effect       DC duonebs due to tachy and anxiety  - small pericardial effusion is chronic and seen in previous TTEs  - Failed ToV 4/18, godwin replaced. She follows with urologist, dr. Cecilio Chua. Dr. Chua had recommended godwin catheter previously  - Diabetes uncontrolled with a1c>10. Endocrine following need careful titration given use of steroids. Endo recs prior to dicharge - Lantus 20U, Admelog 14, Decrease dose as steroids taper  - diarrhea improved  -Continue Eliquis for hx of PE  [facility] conversation- dnr/dni trial NIV
#Acute on Chronic hypoxic respiratory failure 2/2   #Pulmonary Fibrosis  # Acute Kidney Injury improved  # Sinus tachycardia  # Pulmonary Embolism  # Diabetes 2 Uncontrolled  # Urinary Retention with Godwin catheter  # severe pulmonary hypertension resolved(?)  # Small Pericardial Effusion  #HLD    -Oxygenation at baseline- 3lpm nasal cannula saturating well  - PRN ativan for anxiety No evidence of hypercapnia.  - Repeat echo this admission is showing a normal pulmonary systolic pressure, was previously severely elevated  - Continue treatment for underlying pulmonary Fibrosis  - Pulm consulted, discussed with Dr. Ritchie.  - Prednisone 40mg qd - taper 10mg q2d  - Follows outpatient pulmonology- Dr. Primo Hudson of [facility].   - outpatient followup pulm  - DONOVAN improved at baseline creatinine. On furosemide 20mg po  - Tachycardia likely due to underlying pulmonary disease- Cardio consulted started on verapamil, monitor for effect       DC duonebs due to tachy and anxiety  - small pericardial effusion is chronic and seen in previous TTEs  - Failed ToV 4/18, godwin replaced. She follows with urologist, dr. Cecilio Chua who she will followup with upon discharge. Dr. Chua had recommended godwin catheter previously, will dc with godwin    - Discussed TOV but patient would rather try to optimze at rehab first and then TOV there  - Diabetes uncontrolled with a1c>10. Endocrine following need careful titration given use of steroids. Endo recs prior to dicharge - Lantus 20U, Admelog 14, Decrease dose as steroids taper  - diarrhea improved  -Continue Eliquis for hx of PE  [facility] conversation- dnr/dni trial NIV  Discharge planning: short term rehab to LTC - pending auth
Complaints of loose bowel movements, no foul smelling. It was observed and noted to be undigested foods in the stool.    Physical: NAD, speaking complete sentences today, lungs fine crackles, lower ext no edema. Observed stool- watery with undigested food particles?    #Acute on Chronic hypoxic respiratory failure 2/2   #Pulmonary Fibrosis  #Acute Kidney Injury improved  #Sinus tachycardia  #Pulmonary Embolism  #Diabetes 2 Uncontrolled  # Urinary Retention with Godwin catheter  # severe pulmonary hypertension resolved(?)  # Small Pericardial Effusion  #HLD    -Oxygenation at baseline- 3lpm nasal cannula saturating 99%+. can probably be tapered further to 2liter  - Repeat echo this admission is showing a normal pulmonary systolic pressure, was previously severely elevated  - Continue treatment for underlying pulmonary Fibrosis- currently on po steroid taper. Probably needs a prolonged taper. Prednisone 40mg until 4/22 then gradual taper  - Pulm consult requested regarding severe pulm htn and pulm fibrosis  - Follows outpatient pulmonology- Dr. Primo Hudson of [facility].   - outpatient followup pulm  - DONOVAN improved at baseline creatinine. On furosemide 20mg po  - Tachycardia likely due to underlying pulmonary disease       DC duonebs due to tachy and anxiety  - small pericardial effusion is chronic and seen in previous TTEs  - Failed ToV 4/18, godwin replaced. She follows with urologist, dr. Cecilio Chua who she will followup with upon discharge  - Diabetes uncontrolled with a1c>10. Endocrine following need careful titration given use of steroids.  -Continue Eliquis for hx of PE  - Started goals of care conversation- not yet decided  - Likely dispo to [facility]  - [facility] conversation continued from yesterday- DNR DNI/trial of NIV
Seen and examined this AM, endorsing difficulty breathing. but oxygen remained stable, suspect component of anxiety.     Physical: lungs mostly clear no wheezing, mild crackles. abd soft nontender, godwin draining clear yellow urine    #Acute on Chronic hypoxic respiratory failure 2/2    #Pulmonary Fibrosis  # Acute Kidney Injury improved  # Sinus tachycardia  # Pulmonary Embolism  # Diabetes 2 Uncontrolled  # Urinary Retention with Godwin catheter  # severe pulmonary hypertension resolved(?)  # Small Pericardial Effusion  #HLD    -Oxygenation at baseline- 3lpm nasal cannula saturating well  - episodes of hypoxia- this improves with deep breathing, could be episodes of anxiety, will start low dose ativan. No evidence of hypercapnia.  - Repeat echo this admission is showing a normal pulmonary systolic pressure, was previously severely elevated  - Continue treatment for underlying pulmonary Fibrosis- currently on po steroid taper. Probably needs a prolonged taper. prednisone 40mg today(4/22/2024 then gradually taper  - Pulm consult requested regarding severe pulm htn and pulm fibrosis  - Follows outpatient pulmonology- Dr. Primo Hudson of [facility].   - outpatient followup pulm  - DONOVAN improved at baseline creatinine. On furosemide 20mg po  - Tachycardia likely due to underlying pulmonary disease- Cardio consulted started on verapamil, monitor for effect       DC duonebs due to tachy and anxiety  - small pericardial effusion is chronic and seen in previous TTEs  - Failed ToV 4/18, godwin replaced. She follows with urologist, dr. Cecilio Chua who she will followup with upon discharge. Dr. Chua had recommended godwin catheter previously, will dc with godwin  - Diabetes uncontrolled with a1c>10. Endocrine following need careful titration given use of steroids. Endo recs prior to dicharge  - diarrhea improved  -Continue Eliquis for hx of PE  [facility] conversation- dnr/dni trial NIV  Discharge planning: short term rehab to LTC
Complaints of loose bowel movements, no foul smelling. It was observed and noted to be undigested foods in the stool.    Physical: NAD, speaking complete sentences today, lungs fine crackles, lower ext no edema. Observed stool- watery with undigested food particles?    #Acute on Chronic hypoxic respiratory failure 2/2   #Pulmonary Fibrosis  #Acute Kidney Injury improved  #Sinus tachycardia  #Pulmonary Embolism  #Diabetes 2 Uncontrolled  # Urinary Retention with Godwin catheter  # severe pulmonary hypertension resolved(?)  # Small Pericardial Effusion  #HLD    -Oxygenation at baseline- 3lpm nasal cannula saturating 99%+. can probably be tapered further to 2liter  - Repeat echo this admission is showing a normal pulmonary systolic pressure, was previously severely elevated  - Continue treatment for underlying pulmonary Fibrosis- currently on po steroid taper. Probably needs a prolonged taper. As of 4/18, third dose of 40mg prednisone. Start 30mg starting tomorrow  - Pulm consult requested regarding severe pulm htn and pulm fibrosis  - Follows outpatient pulmonology- Dr. Primo Hudson of [facility].   - DONOVAN improved at baseline creatinine. On furosemide 20mg po  - Tachycardia likely due to underlying pulmonary disease       DC duonebs due to tachy and anxiety  - small pericardial effusion is chronic and seen in previous TTEs  - DC godwin cath today for trial of void after more ambulatory and out of bed to chair  - Diabetes uncontrolled with a1c>10. Endocrine following need careful titration given use of steroids.  -Continue Eliquis for hx of PE  - Started goals of care conversation- not yet decided  - Likely dispo to [facility]
Complaints of loose bowel movements, no foul smelling. It was observed and noted to be undigested foods in the stool.    Physical: NAD, speaking complete sentences today, lungs fine crackles, lower ext no edema. Observed stool- watery with undigested food particles?    #Acute on Chronic hypoxic respiratory failure 2/2   #Pulmonary Fibrosis  #Acute Kidney Injury improved  #Sinus tachycardia  #Pulmonary Embolism  #Diabetes 2 Uncontrolled  # Urinary Retention with Godwin catheter  # severe pulmonary hypertension resolved(?)  # Small Pericardial Effusion  #HLD    -Oxygenation at baseline- 3lpm nasal cannula saturating 99%+. can probably be tapered further to 2liter  - Repeat echo this admission is showing a normal pulmonary systolic pressure, was previously severely elevated  - Continue treatment for underlying pulmonary Fibrosis- currently on po steroid taper. Probably needs a prolonged taper. As of 4/17, second dose of 40mg prednisone  - Pulm consult requested regarding severe pulm htn and pulm fibrosis  - Follows outpatient pulmonology- Dr. Primo Hudson of [facility].   - DONOVAN improved at baseline creatinine. On furosemide 20mg po  - Tachycardia likely due to underlying pulmonary disease  - small pericardial effusion is chronic and seen in previous TTEs  - DC godwin cath tomorrow for trial of void after more ambulatory and out of bed to chair  - Diabetes uncontrolled with a1c>10. Endocrine following need careful titration given use of steroids.  -Continue Eliquis for hx of PE  - Likely dispo to [facility]

## 2024-04-24 NOTE — PROGRESS NOTE ADULT - PROBLEM SELECTOR PLAN 1
2/2 IDL exacerbation vs. PNA vs. PE  -CT chest 4/12--> interstitial lung disease without significant interval progression. No evidence of pneumonia.  Trace pericardial effusion with mild cardiomegaly.  -F/U repeat CTA chest  -c/w Prednisone from 4/16 for total 7 days of steroids until 4/22,  then taper by 10mg every 3 days.  -c/w duonekilo, symbicort  -pulm Dr. Dubois following  -Now (4/24) requiring optimizer pendant 10L  -Awaiting bed availability in [facility]  -Pt Follows with pulmonologist Dr. Primo Marlow ([facility])

## 2024-04-24 NOTE — CHART NOTE - NSCHARTNOTEFT_GEN_A_CORE
Called to evaluate patient for Acute respiratory distress with hypoxia.  63 year old female with known ILD most likely IPF who was admitted for acute exacerbation with  Acute on chronic respiratory failure with hypoxia. Was on HFNC in ICU.  On OFEV for ILD. Never was worked up for lung transplant.  Active respiratory distress. Desaturating to low 50s on nasal cannula.  Started on Optimizer Pendent at 10LPM which is 40LPM    Cardiovascular:  Heart Failure  Acute   Acute on Chronic  Chronic       furosemide    Tablet 20 milliGRAM(s) Oral daily  verapamil 80 milliGRAM(s) Oral every 8 hours    Pulmonary:  budesonide 160 MICROgram(s)/formoterol 4.5 MICROgram(s) Inhaler 2 Puff(s) Inhalation two times a day  guaiFENesin Oral Liquid (Sugar-Free) 200 milliGRAM(s) Oral every 6 hours PRN    Hematalogic:  apixaban 5 milliGRAM(s) Oral every 12 hours    Other:  acetaminophen     Tablet .. 650 milliGRAM(s) Oral every 6 hours PRN  atorvastatin 40 milliGRAM(s) Oral at bedtime  benzocaine/menthol Lozenge 1 Lozenge Oral every 4 hours  chlorhexidine 2% Cloths 1 Application(s) Topical <User Schedule>  cyclobenzaprine 5 milliGRAM(s) Oral three times a day PRN  ibuprofen  Tablet. 600 milliGRAM(s) Oral every 6 hours PRN  insulin glargine Injectable (LANTUS) 20 Unit(s) SubCutaneous at bedtime  insulin lispro (ADMELOG) corrective regimen sliding scale   SubCutaneous three times a day before meals  insulin lispro (ADMELOG) corrective regimen sliding scale   SubCutaneous at bedtime  insulin lispro Injectable (ADMELOG) 14 Unit(s) SubCutaneous three times a day before meals  LORazepam     Tablet 0.5 milliGRAM(s) Oral every 6 hours PRN  melatonin 3 milliGRAM(s) Oral at bedtime  morphine  - Injectable 1 milliGRAM(s) IV Push every 6 hours PRN  pantoprazole    Tablet 40 milliGRAM(s) Oral before breakfast  predniSONE   Tablet 30 milliGRAM(s) Oral daily  senna 1 Tablet(s) Oral daily  tamsulosin 0.8 milliGRAM(s) Oral at bedtime  traZODone 100 milliGRAM(s) Oral at bedtime    acetaminophen     Tablet .. 650 milliGRAM(s) Oral every 6 hours PRN  apixaban 5 milliGRAM(s) Oral every 12 hours  atorvastatin 40 milliGRAM(s) Oral at bedtime  benzocaine/menthol Lozenge 1 Lozenge Oral every 4 hours  budesonide 160 MICROgram(s)/formoterol 4.5 MICROgram(s) Inhaler 2 Puff(s) Inhalation two times a day  chlorhexidine 2% Cloths 1 Application(s) Topical <User Schedule>  cyclobenzaprine 5 milliGRAM(s) Oral three times a day PRN  furosemide    Tablet 20 milliGRAM(s) Oral daily  guaiFENesin Oral Liquid (Sugar-Free) 200 milliGRAM(s) Oral every 6 hours PRN  ibuprofen  Tablet. 600 milliGRAM(s) Oral every 6 hours PRN  insulin glargine Injectable (LANTUS) 20 Unit(s) SubCutaneous at bedtime  insulin lispro (ADMELOG) corrective regimen sliding scale   SubCutaneous three times a day before meals  insulin lispro (ADMELOG) corrective regimen sliding scale   SubCutaneous at bedtime  insulin lispro Injectable (ADMELOG) 14 Unit(s) SubCutaneous three times a day before meals  LORazepam     Tablet 0.5 milliGRAM(s) Oral every 6 hours PRN  melatonin 3 milliGRAM(s) Oral at bedtime  morphine  - Injectable 1 milliGRAM(s) IV Push every 6 hours PRN  pantoprazole    Tablet 40 milliGRAM(s) Oral before breakfast  predniSONE   Tablet 30 milliGRAM(s) Oral daily  senna 1 Tablet(s) Oral daily  tamsulosin 0.8 milliGRAM(s) Oral at bedtime  traZODone 100 milliGRAM(s) Oral at bedtime  verapamil 80 milliGRAM(s) Oral every 8 hours    Drug Dosing Weight  Height (cm): 167.6 (11 Apr 2024 21:44)  Weight (kg): 54.7 (12 Apr 2024 04:13)  BMI (kg/m2): 19.5 (12 Apr 2024 04:13)  BSA (m2): 1.61 (12 Apr 2024 04:13)    Focussed PHYSICAL EXAM:    GENERAL: Moderate respiratory distress.  NERVOUS SYSTEM:  Alert & Oriented X3, Follows commands, moving all extremities.  CHEST/LUNG: Moderate respiratory distress. Tachypneic RR 30-34. Diminished breath sounds bilaterally with course inspiratory crackles throughout.  HEART: tachycardic -130; No murmurs, rubs, or gallops      04-23    141  |  108  |  36<H>  ----------------------------<  109<H>  4.1   |  28  |  0.78    Ca    8.2<L>      23 Apr 2024 06:40     RADIOLOGY & ADDITIONAL STUDIES:  ***  < from: Xray Chest 1 View-PORTABLE IMMEDIATE (Xray Chest 1 View-PORTABLE IMMEDIATE .) (04.17.24 @ 17:27) >    Heart magnified by technique.    Again noted is diffuse interstitial infiltration throughout all lung   fields rather similar to April 11 suggesting chronic interstitial lung   disease. Findings are also similar to February 27, 2023.    IMPRESSION: Chronic interstitial lung disease again noted.    --- End of Report ---    < end of copied text >    < from: CT Chest No Cont (04.12.24 @ 05:24) >    PROCEDURE:  CT of the Chest was performed.  Sagittal and coronal reformats were performed.    FINDINGS:    LUNGS AND AIRWAYS: Patent central airways. There are extensive reticular   and groundglass opacities in both lungs associated with thickening of   interlobular septae with flexion bronchiectasis. There is no   consolidation or parenchymal infiltration suggesting pneumonia.  PLEURA: No pleural effusion.  MEDIASTINUM AND KAROLINE: No lymphadenopathy.  VESSELS: Prominent main pulmonary artery currently measuring 3.5 cm.  HEART: Mild cardiomegaly.  pericardial effusion.  CHEST WALL AND LOWER NECK: Within normal limits.  VISUALIZED UPPER ABDOMEN:Within normal limits.  BONES: Degenerative change.    IMPRESSION:  Findings suggesting interstitial lung disease without significant   interval progression. No evidence of pneumonia.  Trace pericardial effusion with mild cardiomegaly.      --- End of Report ---    < end of copied text >      Goals of Care Discussion with Family/Proxy/Other   - see note from 4/19/24    Assessment and Plan:    Acute on chronic Respiratory failure with Hypoxia.  End Stage ILD  DM Poorly controlled.  History of PE    Recommendations  Started on Optimizer Pendent at 10LPM  Monitor oxygen saturation. Keep saturation at or above 85%.  Start opioids for respiratory distress. Morphine 1mg IVP every 4-6 hours prn for air hunger.  Palliative consult.  Would continue steroids long term.  CT chest and CXR reviewed.  Overall prognosis poor. Would benefit from inpatient hospice.  [facility] to [facility] for Acute respiratory distress with hypoxia.  63 year old female with known ILD most likely IPF who was admitted for acute exacerbation with hypoxia.  Acute on chronic respiratory failure with hypoxia. Was on HFNC in ICU.  On OFEV for ILD. Never was worked up for lung transplant.  Active respiratory distress. Desaturating to low 50s on nasal cannula.  Started on Optimizer Pendent at 10LPM which is 40LPM    Cardiovascular:  Heart Failure  Acute   Acute on Chronic  Chronic       furosemide    Tablet 20 milliGRAM(s) Oral daily  verapamil 80 milliGRAM(s) Oral every 8 hours    Pulmonary:  budesonide 160 MICROgram(s)/formoterol 4.5 MICROgram(s) Inhaler 2 Puff(s) Inhalation two times a day  guaiFENesin Oral Liquid (Sugar-Free) 200 milliGRAM(s) Oral every 6 hours PRN    Hematalogic:  apixaban 5 milliGRAM(s) Oral every 12 hours    Other:  acetaminophen     Tablet .. 650 milliGRAM(s) Oral every 6 hours PRN  atorvastatin 40 milliGRAM(s) Oral at bedtime  benzocaine/menthol Lozenge 1 Lozenge Oral every 4 hours  chlorhexidine 2% Cloths 1 Application(s) Topical <User Schedule>  cyclobenzaprine 5 milliGRAM(s) Oral three times a day PRN  ibuprofen  Tablet. 600 milliGRAM(s) Oral every 6 hours PRN  insulin glargine Injectable (LANTUS) 20 Unit(s) SubCutaneous at bedtime  insulin lispro (ADMELOG) corrective regimen sliding scale   SubCutaneous three times a day before meals  insulin lispro (ADMELOG) corrective regimen sliding scale   SubCutaneous at bedtime  insulin lispro Injectable (ADMELOG) 14 Unit(s) SubCutaneous three times a day before meals  LORazepam     Tablet 0.5 milliGRAM(s) Oral every 6 hours PRN  melatonin 3 milliGRAM(s) Oral at bedtime  morphine  - Injectable 1 milliGRAM(s) IV Push every 6 hours PRN  pantoprazole    Tablet 40 milliGRAM(s) Oral before breakfast  predniSONE   Tablet 30 milliGRAM(s) Oral daily  senna 1 Tablet(s) Oral daily  tamsulosin 0.8 milliGRAM(s) Oral at bedtime  traZODone 100 milliGRAM(s) Oral at bedtime    acetaminophen     Tablet .. 650 milliGRAM(s) Oral every 6 hours PRN  apixaban 5 milliGRAM(s) Oral every 12 hours  atorvastatin 40 milliGRAM(s) Oral at bedtime  benzocaine/menthol Lozenge 1 Lozenge Oral every 4 hours  budesonide 160 MICROgram(s)/formoterol 4.5 MICROgram(s) Inhaler 2 Puff(s) Inhalation two times a day  chlorhexidine 2% Cloths 1 Application(s) Topical <User Schedule>  cyclobenzaprine 5 milliGRAM(s) Oral three times a day PRN  furosemide    Tablet 20 milliGRAM(s) Oral daily  guaiFENesin Oral Liquid (Sugar-Free) 200 milliGRAM(s) Oral every 6 hours PRN  ibuprofen  Tablet. 600 milliGRAM(s) Oral every 6 hours PRN  insulin glargine Injectable (LANTUS) 20 Unit(s) SubCutaneous at bedtime  insulin lispro (ADMELOG) corrective regimen sliding scale   SubCutaneous three times a day before meals  insulin lispro (ADMELOG) corrective regimen sliding scale   SubCutaneous at bedtime  insulin lispro Injectable (ADMELOG) 14 Unit(s) SubCutaneous three times a day before meals  LORazepam     Tablet 0.5 milliGRAM(s) Oral every 6 hours PRN  melatonin 3 milliGRAM(s) Oral at bedtime  morphine  - Injectable 1 milliGRAM(s) IV Push every 6 hours PRN  pantoprazole    Tablet 40 milliGRAM(s) Oral before breakfast  predniSONE   Tablet 30 milliGRAM(s) Oral daily  senna 1 Tablet(s) Oral daily  tamsulosin 0.8 milliGRAM(s) Oral at bedtime  traZODone 100 milliGRAM(s) Oral at bedtime  verapamil 80 milliGRAM(s) Oral every 8 hours    Drug Dosing Weight  Height (cm): 167.6 (11 Apr 2024 21:44)  Weight (kg): 54.7 (12 Apr 2024 04:13)  BMI (kg/m2): 19.5 (12 Apr 2024 04:13)  BSA (m2): 1.61 (12 Apr 2024 04:13)    Focussed PHYSICAL EXAM:    GENERAL: Moderate respiratory distress.  NERVOUS SYSTEM:  Alert & Oriented X3, Follows commands, moving all extremities.  CHEST/LUNG: Moderate respiratory distress. Tachypneic RR 30-34. Diminished breath sounds bilaterally with course inspiratory crackles throughout.  HEART: tachycardic -130; No murmurs, rubs, or gallops      04-23    141  |  108  |  36<H>  ----------------------------<  109<H>  4.1   |  28  |  0.78    Ca    8.2<L>      23 Apr 2024 06:40     RADIOLOGY & ADDITIONAL STUDIES:  ***  < from: Xray Chest 1 View-PORTABLE IMMEDIATE (Xray Chest 1 View-PORTABLE IMMEDIATE .) (04.17.24 @ 17:27) >    Heart magnified by technique.    Again noted is diffuse interstitial infiltration throughout all lung   fields rather similar to April 11 suggesting chronic interstitial lung   disease. Findings are also similar to February 27, 2023.    IMPRESSION: Chronic interstitial lung disease again noted.    --- End of Report ---    < end of copied text >    < from: CT Chest No Cont (04.12.24 @ 05:24) >    PROCEDURE:  CT of the Chest was performed.  Sagittal and coronal reformats were performed.    FINDINGS:    LUNGS AND AIRWAYS: Patent central airways. There are extensive reticular   and groundglass opacities in both lungs associated with thickening of   interlobular septae with flexion bronchiectasis. There is no   consolidation or parenchymal infiltration suggesting pneumonia.  PLEURA: No pleural effusion.  MEDIASTINUM AND KAROLINE: No lymphadenopathy.  VESSELS: Prominent main pulmonary artery currently measuring 3.5 cm.  HEART: Mild cardiomegaly.  pericardial effusion.  CHEST WALL AND LOWER NECK: Within normal limits.  VISUALIZED UPPER ABDOMEN:Within normal limits.  BONES: Degenerative change.    IMPRESSION:  Findings suggesting interstitial lung disease without significant   interval progression. No evidence of pneumonia.  Trace pericardial effusion with mild cardiomegaly.      --- End of Report ---    < end of copied text >      Goals of Care Discussion with Family/Proxy/Other   - see note from 4/19/24    Assessment and Plan:    Acute on chronic Respiratory failure with Hypoxia.  End Stage ILD  DM Poorly controlled.  History of PE    Recommendations  Started on Optimizer Pendent at 10LPM  Monitor oxygen saturation. Keep saturation at or above 85%.  Start opioids for respiratory distress. Morphine 1mg IVP every 4-6 hours prn for air hunger.  Palliative consult.  Would continue steroids long term.  Continue bronchodilators  CT chest and CXR reviewed.  Overall prognosis poor. Would benefit from inpatient hospice.  [facility]

## 2024-04-24 NOTE — PROGRESS NOTE ADULT - PROBLEM SELECTOR PLAN 6
f/u pulm Dr. Dubois reccs, continue prednisone 40 until 4/22 AND THEN gradual taper by 10mg every 3 days.  Pt Follows with pulmonologist Dr. Primo Marlow ([facility]) and should have routine f/u within   PT recc LEVON pending Auth  Mode of oxygenation prior to discharge per [facility] NP

## 2024-04-24 NOTE — PROGRESS NOTE ADULT - PROBLEM SELECTOR PROBLEM 1
ILD (interstitial lung disease)
Acute respiratory failure with hypoxia
ILD (interstitial lung disease)
Acute respiratory failure with hypoxia
Acute on chronic respiratory failure with hypoxia
Acute respiratory failure with hypoxia

## 2024-04-24 NOTE — PROGRESS NOTE ADULT - SUBJECTIVE AND OBJECTIVE BOX
PULMONARY CONSULT SERVICE FOLLOW-UP NOTE    INTERVAL HPI:  Reviewed chart and overnight events; patient seen and examined at bedside. This AM noted to desat to mid 70s%.    MEDICATIONS:  Pulmonary:  budesonide 160 MICROgram(s)/formoterol 4.5 MICROgram(s) Inhaler 2 Puff(s) Inhalation two times a day  guaiFENesin Oral Liquid (Sugar-Free) 200 milliGRAM(s) Oral every 6 hours PRN    Antimicrobials:    Anticoagulants:  apixaban 5 milliGRAM(s) Oral every 12 hours    Cardiac:  furosemide    Tablet 20 milliGRAM(s) Oral daily  verapamil 80 milliGRAM(s) Oral every 8 hours      Allergies    No Known Allergies    Intolerances        Vital Signs Last 24 Hrs  T(C): 36.9 (24 Apr 2024 05:24), Max: 37.1 (23 Apr 2024 20:05)  T(F): 98.4 (24 Apr 2024 05:24), Max: 98.7 (23 Apr 2024 20:05)  HR: 117 (24 Apr 2024 10:37) (111 - 119)  BP: 120/76 (24 Apr 2024 06:46) (92/51 - 124/70)  BP(mean): --  RR: 10 (24 Apr 2024 10:37) (10 - 20)  SpO2: 94% (24 Apr 2024 10:37) (76% - 94%)    Parameters below as of 24 Apr 2024 10:37  Patient On (Oxygen Delivery Method): optimizer pendant  O2 Flow (L/min): 10      04-23 @ 07:01  -  04-24 @ 07:00  --------------------------------------------------------  IN: 0 mL / OUT: 1025 mL / NET: -1025 mL          PHYSICAL EXAM:  GEN: NAD, well-appearing, comfortable upright/ semirecumbent in bed  HEENT: anicteric sclera; MMM  RESP: no respiratory distress, CTA B/L; no W/R/R  CV: +S1/S2, RRR, no m/g/r  GI: soft, NT/ND, +BS  EXTREM: WWP; no edema, clubbing or cyanosis  Vascular: 2+ radial pulses B/L  NEURO: alert and awake, moving limbs spontaneously    LABS:      CBC Full  -  ( 23 Apr 2024 06:40 )  WBC Count : 11.67 K/uL  RBC Count : 3.51 M/uL  Hemoglobin : 9.4 g/dL  Hematocrit : 29.9 %  Platelet Count - Automated : 441 K/uL  Mean Cell Volume : 85.2 fl  Mean Cell Hemoglobin : 26.8 pg  Mean Cell Hemoglobin Concentration : 31.4 gm/dL  Auto Neutrophil # : x  Auto Lymphocyte # : x  Auto Monocyte # : x  Auto Eosinophil # : x  Auto Basophil # : x  Auto Neutrophil % : x  Auto Lymphocyte % : x  Auto Monocyte % : x  Auto Eosinophil % : x  Auto Basophil % : x    04-23    141  |  108  |  36<H>  ----------------------------<  109<H>  4.1   |  28  |  0.78    Ca    8.2<L>      23 Apr 2024 06:40            Urinalysis Basic - ( 23 Apr 2024 06:40 )    Color: x / Appearance: x / SG: x / pH: x  Gluc: 109 mg/dL / Ketone: x  / Bili: x / Urobili: x   Blood: x / Protein: x / Nitrite: x   Leuk Esterase: x / RBC: x / WBC x   Sq Epi: x / Non Sq Epi: x / Bacteria: x              RADIOLOGY & ADDITIONAL STUDIES:  CXR 4/24/24 AM portable: diffuse airspace opacities bilaterally  PULMONARY CONSULT SERVICE FOLLOW-UP NOTE    INTERVAL HPI:  Reviewed chart and overnight events; patient seen and examined at bedside. This AM noted to desat to mid 70s%. Reports feeling SOB at rest, acutely worsened this AM, intermittent dry cough not significantly changed. No subjective wheezing or CP.    MEDICATIONS:  Pulmonary:  budesonide 160 MICROgram(s)/formoterol 4.5 MICROgram(s) Inhaler 2 Puff(s) Inhalation two times a day  guaiFENesin Oral Liquid (Sugar-Free) 200 milliGRAM(s) Oral every 6 hours PRN    Antimicrobials:    Anticoagulants:  apixaban 5 milliGRAM(s) Oral every 12 hours    Cardiac:  furosemide    Tablet 20 milliGRAM(s) Oral daily  verapamil 80 milliGRAM(s) Oral every 8 hours      Allergies    No Known Allergies    Intolerances        Vital Signs Last 24 Hrs  T(C): 36.9 (24 Apr 2024 05:24), Max: 37.1 (23 Apr 2024 20:05)  T(F): 98.4 (24 Apr 2024 05:24), Max: 98.7 (23 Apr 2024 20:05)  HR: 117 (24 Apr 2024 10:37) (111 - 119)  BP: 120/76 (24 Apr 2024 06:46) (92/51 - 124/70)  BP(mean): --  RR: 10 (24 Apr 2024 10:37) (10 - 20)  SpO2: 94% (24 Apr 2024 10:37) (76% - 94%)    Parameters below as of 24 Apr 2024 10:37  Patient On (Oxygen Delivery Method): optimizer pendant  O2 Flow (L/min): 10      04-23 @ 07:01  -  04-24 @ 07:00  --------------------------------------------------------  IN: 0 mL / OUT: 1025 mL / NET: -1025 mL          PHYSICAL EXAM:  GEN: NAD, mildly uncomfortable semirecumbent in bed  HEENT: anicteric sclera; MMM  RESP: no respiratory distress, bibasilar crackles  CV: +S1/S2, RRR, no m/g/r  GI: soft, NT/ND, +BS  EXTREM: WWP; no edema, clubbing or cyanosis  Vascular: 2+ radial pulses B/L  NEURO: alert and awake, moving limbs spontaneously    LABS:      CBC Full  -  ( 23 Apr 2024 06:40 )  WBC Count : 11.67 K/uL  RBC Count : 3.51 M/uL  Hemoglobin : 9.4 g/dL  Hematocrit : 29.9 %  Platelet Count - Automated : 441 K/uL  Mean Cell Volume : 85.2 fl  Mean Cell Hemoglobin : 26.8 pg  Mean Cell Hemoglobin Concentration : 31.4 gm/dL  Auto Neutrophil # : x  Auto Lymphocyte # : x  Auto Monocyte # : x  Auto Eosinophil # : x  Auto Basophil # : x  Auto Neutrophil % : x  Auto Lymphocyte % : x  Auto Monocyte % : x  Auto Eosinophil % : x  Auto Basophil % : x    04-23    141  |  108  |  36<H>  ----------------------------<  109<H>  4.1   |  28  |  0.78    Ca    8.2<L>      23 Apr 2024 06:40            Urinalysis Basic - ( 23 Apr 2024 06:40 )    Color: x / Appearance: x / SG: x / pH: x  Gluc: 109 mg/dL / Ketone: x  / Bili: x / Urobili: x   Blood: x / Protein: x / Nitrite: x   Leuk Esterase: x / RBC: x / WBC x   Sq Epi: x / Non Sq Epi: x / Bacteria: x              RADIOLOGY & ADDITIONAL STUDIES:  CXR 4/24/24 AM portable: diffuse airspace opacities bilaterally

## 2024-04-24 NOTE — CONSULT NOTE ADULT - ASSESSMENT
63 year old F from home ambulates with walker w/ pmhx chronic hypoxic respiratory failure on 2-3L NC at home 2/2 ILD, currently on ofev , unprovoked? PE (dx  on 2022, currently on Eliquis 5 mg PO QD), severe pulmonary HTN (based on TTE from 2023), IDDM, who came in with c/o SOB.  Patient was admitted to ICU and was placed on HFNC for possible ILD exacerbation and was downgraded on 4/16. Pt respiratory sxs improved and was started on  tapering dose of  Lasix and prednisone. Patient was saturating 93 % on 3L, and overnight became hypoxic and now saturating 82% on 10L w/ Oxymizer. Patient was seen and exmained at bedside, in NAD. Found to have Insp crackles with no signs of fluid overload. Likely decompensating due to advanced ILD, will admit to  for further management.     # Acute on Chronic Hypoxic Resp failure   # ILD Exacerbation  # Hx of PE  # Uncontrolled DM     ============Neuro================  # No active issues  - Continue home dose of trazodone 100mg oral at bedtime     =============Cards================    #Hx of cor pulmonale  - hx of cor pulmonale, with recent echo w/ normal RV function and LV (EF65%)  - on  PO Lasix 20mg qd   - pt does not appear fluid overloaded  - will get an xray to further evaluate and if needed will increase Lasix dose     #HLD  - resumed home dose of lipitor     ==============Pulm================  #Acute on Chronic Hypoxic Respiratory Failure likely 2/2 to ILD Flare  -CT Chest 4/12/2024: Findings suggesting interstitial lung disease without significant interval progression. No evidence of pneumonia.  - was on HFNC and downgraded  from ICU on 4/16 to 3L NC  - on prednisone taper 30mg and home ofev for ILD   -overnight became hypoxic and placed on Oxymizer 10L saturating 91%  - no clinical signs of fluid overload or infection   -likely 2/2 progressive advanced ILD  -Will start solumedrol 40mg BID  -f/u xray       # Hx of PE  on Eliquis    =============GI============  #GERD   #Constipation   - Regular Diet  - Protonix 40mg PO qd  - Bowel regimen with Miralax and Senna    =============Renal/===============  No issues     ============ID=====================  #PNA vs ILD exacerbation  - CT Chest 4/12/2024: Findings suggesting interstitial lung disease without significant interval progression. No evidence of pneumonia.  - WBC 11, afebrile  - RVP, Strep, mycoplasma, legionella negative was previously negative   - S/p 4 doses of Levaquin last dose 4/13  -no clinical signs of PNA  - will hold off on starting abx, will get an CXR to further evaluate     ==============Endo=============  #IDDM (A1c 11.9)  - at home on Basaglar 25 U QHS + Admelog 15U TID  - c/w  Lantus 20 + Admelog 14u TID + Mod SSI   -a1c 11.9  -Endo Dr. Zavaleta following   - FS AC HS  - adjust regimen as needed    ==============Heme===============  #Normocytic Anemia  - likely anemia of chronic disease  - H/H stable 9.4-11  - monitor CBC daily     ==============ppx================  - PPI QD   - Eliquis BID    ==============Dispo===============         63 year old F from home ambulates with walker w/ pmhx chronic hypoxic respiratory failure on 2-3L NC at home 2/2 ILD, currently on ofev , unprovoked? PE (dx  on 2022, currently on Eliquis 5 mg PO QD), severe pulmonary HTN (based on TTE from 2023), IDDM, who came in with c/o SOB.  Patient was admitted to ICU and was placed on HFNC for possible ILD exacerbation and was downgraded on 4/16. Pt respiratory sxs improved and was started on  tapering dose of  Lasix and prednisone. Patient was saturating 93 % on 3L, and overnight became hypoxic and now saturating 82% on 10L w/ Oxymizer. Patient was seen and exmained at bedside, in NAD. Found to have Insp crackles with no signs of fluid overload. Likely decompensating due to advanced ILD, pt willl req HFNC and will be transferred  to ICU for further management.     # Acute on Chronic Hypoxic Resp failure   # ILD Exacerbation  # Hx of PE  # Uncontrolled DM     ============Neuro================  # No active issues  - Continue home dose of trazodone 100mg oral at bedtime     =============Cards================    #Hx of cor pulmonale  - hx of cor pulmonale, with recent echo w/ normal RV function and LV (EF65%)  - on  PO Lasix 20mg qd   - pt does not appear fluid overloaded  - will get an xray to further evaluate and if needed will increase Lasix dose     #HLD  - resumed home dose of lipitor     ==============Pulm================  #Acute on Chronic Hypoxic Respiratory Failure likely 2/2 to ILD Flare  -CT Chest 4/12/2024: Findings suggesting interstitial lung disease without significant interval progression. No evidence of pneumonia.  - was on HFNC and downgraded  from ICU on 4/16 to 3L NC  - on prednisone taper 30mg and home ofev for ILD   -overnight became hypoxic and placed on Oxymizer 10L saturating 91%  - no clinical signs of fluid overload or infection   -likely 2/2 progressive advanced ILD  - will start HFNC  -Will start solumedrol 40mg BID  _ Lasix 40mg IV x1  -f/u xray       # Hx of PE  on Eliquis    =============GI============  #GERD   #Constipation   - Regular Diet  - Protonix 40mg PO qd  - Bowel regimen with Miralax and Senna    =============Renal/===============  No issues     ============ID=====================  #PNA vs ILD exacerbation  - CT Chest 4/12/2024: Findings suggesting interstitial lung disease without significant interval progression. No evidence of pneumonia.  - WBC 11, afebrile  - RVP, Strep, mycoplasma, legionella negative was previously negative   - S/p 4 doses of Levaquin last dose 4/13  -no clinical signs of PNA  - will hold off on starting abx, will get an CXR to further evaluate     ==============Endo=============  #IDDM (A1c 11.9)  - at home on Basaglar 25 U QHS + Admelog 15U TID  - c/w  Lantus 20 + Admelog 14u TID + Mod SSI   -a1c 11.9  -Endo Dr. Zavaleta following   - FS AC HS  - adjust regimen as needed    ==============Heme===============  #Normocytic Anemia  - likely anemia of chronic disease  - H/H stable 9.4-11  - monitor CBC daily     ==============ppx================  - PPI QD   - Eliquis BID    ==============Dispo===============  Transfer to ICU

## 2024-04-24 NOTE — CONSULT NOTE ADULT - ATTENDING COMMENTS
64yo woman w/ PMH advanced ILD/probable IPF admitted with acute on chronic hypoxemic respiratory failure with likely acute exacerbation of ILD as well as pulm edema. Initially in the ICU had some improvement on combo of high dose steroids and diuresis. Was on eliquis with negative dopplers and clinical improvement suggest low prob of acute PE. Echo without any significant Cor pulmonale or pulm HTN. Clinically no evidence of PNA, viral panel and peripheral ID w/u negative. Was transferred out of ICU and comfortable on NC oxygen support.     Overnight with worsening shortness of breath and hypoxia. Denies any chest pain, palpitations. States just feels short of breath. CXR suggestive of increasing pulmonary edema.     Assessment:   #Acute on chronic hypoxemic respiratory failure  #Advanced ILD, likely IPF and probable exacerbation of ILD  #Pulmonary edema  #H/o PE on Eliquis  #DM type 2    Recommendations:  - previously on PO prednisone 40mg with taper; this AM escalated to methylprednisolone 40mg IV BID  - Agree with diuresis and aim for negative fluid balance  - Monitor strict I/Os, daily weights  - Transitioned to HFNC support with 60% Fio2 and 40 Lpm flow  - Titrate down oxygen to maintain saturation > 92%   - cont eliquis  - TTE reviewed with out evidence of significant pulmonary hypertension   - Cont. symbicort   - restart home Ofev  - Pt is DNR/DNI, trial of NIPPV  - Transfer to ICU for HFNC support
Patient seen and examined with resident upon consultation request at 2.40AM. Data reviewed. Case and plan of care discussed with resident. Agree with resident's note.  This is 63F with h/o Pulmonary fibrosis/IPF (on home NC 3L), PE (on eliquis) presented to the ED with few days of SOB associated with dry cough, no fevers. In the ED she was noted to be more hypoxemic and initially required NC 6L and then NRM to venti mask. CXR unchanged. Patient may not be very compliant with her eliquis.   VS stable. Awake, alert, oriented x3, Lungs with bilateral crackles.  Labs/Imaging reviewed. CXR with bilateral interstitial infiltrates unchanged from prior.   A/P- Pulmonary Fibrosis with possible exacerbation, R/o ongoing pulmonary embolism. No evidence of heart failure or superimposed pneumonia.  -Will start IV solumedrol 40mg q6h, therapeutic lovenox for PE, empiric antibiotics with Levofloxacin and send blood cultures, gentle IV fluid hydration for DONOVAN on CKD, will change ventimask to HFNC. Accepted to ICU for further care and close monitoring.

## 2024-04-25 NOTE — PROGRESS NOTE ADULT - TIME BILLING
- Review of chart (interval documentation, labs/studies, VS trends)  - Personal review of chest imaging  - Medication reconciliation  - Bedside interview and physical examination  - Bedside SpO2 monitoring and supplemental O2 titration  - Coordination of care with ICU team

## 2024-04-25 NOTE — PROGRESS NOTE ADULT - NUTRITIONAL ASSESSMENT
This patient has been assessed with a concern for Malnutrition and has been determined to have a diagnosis/diagnoses of Moderate protein-calorie malnutrition.    This patient is being managed with:   Diet Regular-  Consistent Carbohydrate {Evening Snacks}  DASH/TLC {Sodium & Cholesterol Restricted}  1500mL Fluid Restriction ([id number])  Supplement Feeding Modality:  Oral  Ensure Enlive Cans or Servings Per Day:  1       Frequency:  Two Times a day  Entered: Apr 19 2024  3:49PM  
This patient has been assessed with a concern for Malnutrition and has been determined to have a diagnosis/diagnoses of Moderate protein-calorie malnutrition.    This patient is being managed with:   Diet Regular-  Consistent Carbohydrate {Evening Snacks}  DASH/TLC {Sodium & Cholesterol Restricted}  1500mL Fluid Restriction ([id number])  Supplement Feeding Modality:  Oral  Ensure Enlive Cans or Servings Per Day:  1       Frequency:  Two Times a day  Entered: Apr 19 2024  3:49PM  
This patient has been assessed with a concern for Malnutrition and has been determined to have a diagnosis/diagnoses of Moderate protein-calorie malnutrition.    This patient is being managed with:   Diet Regular-  Consistent Carbohydrate {Evening Snacks}  DASH/TLC {Sodium & Cholesterol Restricted}  1500mL Fluid Restriction ([id number])  Supplement Feeding Modality:  Oral  Ensure Enlive Cans or Servings Per Day:  1       Frequency:  Two Times a day  Entered: Apr 19 2024  3:49PM  
This patient has been assessed with a concern for Malnutrition and has been determined to have a diagnosis/diagnoses of Moderate protein-calorie malnutrition.    This patient is being managed with:   Diet Regular-  Consistent Carbohydrate {Evening Snacks}  DASH/TLC {Sodium & Cholesterol Restricted}  1500mL Fluid Restriction ([id number])  Supplement Feeding Modality:  Oral  Ensure Enlive Cans or Servings Per Day:  1       Frequency:  Two Times a day  Entered: Apr 19 2024  3:49PM  
This patient has been assessed with a concern for Malnutrition and has been determined to have a diagnosis/diagnoses of Moderate protein-calorie malnutrition.    This patient is being managed with:   Diet Regular-  Consistent Carbohydrate {Evening Snacks}  DASH/TLC {Sodium & Cholesterol Restricted}  1500mL Fluid Restriction ([id number])  Entered: Apr 12 2024  6:47AM  
This patient has been assessed with a concern for Malnutrition and has been determined to have a diagnosis/diagnoses of Moderate protein-calorie malnutrition.    This patient is being managed with:   Diet Regular-  Consistent Carbohydrate {Evening Snacks}  DASH/TLC {Sodium & Cholesterol Restricted}  1500mL Fluid Restriction ([id number])  Supplement Feeding Modality:  Oral  Ensure Enlive Cans or Servings Per Day:  1       Frequency:  Two Times a day  Entered: Apr 19 2024  3:49PM  
This patient has been assessed with a concern for Malnutrition and has been determined to have a diagnosis/diagnoses of Moderate protein-calorie malnutrition.    This patient is being managed with:   Diet Regular-  Consistent Carbohydrate {Evening Snacks}  DASH/TLC {Sodium & Cholesterol Restricted}  1500mL Fluid Restriction ([id number])  Supplement Feeding Modality:  Oral  Ensure Enlive Cans or Servings Per Day:  1       Frequency:  Two Times a day  Entered: Apr 19 2024  3:49PM  
This patient has been assessed with a concern for Malnutrition and has been determined to have a diagnosis/diagnoses of Moderate protein-calorie malnutrition.    This patient is being managed with:   Diet Regular-  Consistent Carbohydrate {Evening Snacks}  DASH/TLC {Sodium & Cholesterol Restricted}  1500mL Fluid Restriction ([id number])  Supplement Feeding Modality:  Oral  Ensure Enlive Cans or Servings Per Day:  1       Frequency:  Two Times a day  Entered: Apr 19 2024  3:49PM  
This patient has been assessed with a concern for Malnutrition and has been determined to have a diagnosis/diagnoses of Moderate protein-calorie malnutrition.    This patient is being managed with:   Diet Regular-  Consistent Carbohydrate {Evening Snacks}  DASH/TLC {Sodium & Cholesterol Restricted}  1500mL Fluid Restriction ([id number])  Supplement Feeding Modality:  Oral  Ensure Enlive Cans or Servings Per Day:  1       Frequency:  Two Times a day  Entered: Apr 19 2024  3:49PM  
This patient has been assessed with a concern for Malnutrition and has been determined to have a diagnosis/diagnoses of Moderate protein-calorie malnutrition.    This patient is being managed with:   Diet Regular-  Consistent Carbohydrate {Evening Snacks}  DASH/TLC {Sodium & Cholesterol Restricted}  1500mL Fluid Restriction ([id number])  Entered: Apr 12 2024  6:47AM  
This patient has been assessed with a concern for Malnutrition and has been determined to have a diagnosis/diagnoses of Moderate protein-calorie malnutrition.    This patient is being managed with:   Diet Regular-  Consistent Carbohydrate {Evening Snacks}  DASH/TLC {Sodium & Cholesterol Restricted}  1500mL Fluid Restriction ([id number])  Entered: Apr 12 2024  6:47AM  
This patient has been assessed with a concern for Malnutrition and has been determined to have a diagnosis/diagnoses of Moderate protein-calorie malnutrition.    This patient is being managed with:   Diet Regular-  Consistent Carbohydrate {Evening Snacks}  DASH/TLC {Sodium & Cholesterol Restricted}  1500mL Fluid Restriction ([id number])  Entered: Apr 12 2024  6:47AM

## 2024-04-25 NOTE — PROGRESS NOTE ADULT - ASSESSMENT
· Assessment	  63 year old F from home ambulates with walker w/ pmhx chronic hypoxic respiratory failure on 2-3L NC at home 2/2 ILD, currently on ofev , unprovoked? PE (dx  on 2022, currently on Eliquis 5 mg PO QD), severe pulmonary HTN (based on TTE from 2023), IDDM, who came in with c/o SOB.  Patient was admitted to ICU and was placed on HFNC for possible ILD exacerbation and was downgraded on 4/16. Pt respiratory sxs improved and was started on  tapering dose of  Lasix and prednisone. Patient was saturating 93 % on 3L, and overnight became hypoxic and now saturating 82% on 10L w/ Oxymizer. Patient was seen and exmained at bedside, in NAD. Found to have Insp crackles with no signs of fluid overload. Likely decompensating due to advanced ILD, pt willl req HFNC and will be transferred  to ICU for further management.     # Acute on Chronic Hypoxic Resp failure   # ILD Exacerbation  # Hx of PE  # Uncontrolled DM     ============Neuro================  # No active issues  - Continue home dose of trazodone 100mg oral at bedtime     =============Cards================    #Hx of cor pulmonale  - hx of cor pulmonale, with recent echo w/ normal RV function and LV (EF65%)  - on  PO Lasix 20mg qd   - pt does not appear fluid overloaded  - will get an xray to further evaluate and if needed will increase Lasix dose     #HLD  - resumed home dose of lipitor     ==============Pulm================  #Acute on Chronic Hypoxic Respiratory Failure likely 2/2 to ILD Flare  -CT Chest 4/12/2024: Findings suggesting interstitial lung disease without significant interval progression. No evidence of pneumonia.  - was on HFNC and downgraded  from ICU on 4/16 to 3L NC  - on prednisone taper 30mg and home ofev for ILD   -overnight became hypoxic and placed on Oxymizer 10L saturating 91%  - no clinical signs of fluid overload or infection   -likely 2/2 progressive advanced ILD  - will start HFNC  -Will start solumedrol 40mg BID  _ Lasix 40mg IV x1  -f/u xray       # Hx of PE  on Eliquis    =============GI============  #GERD   #Constipation   - Regular Diet  - Protonix 40mg PO qd  - Bowel regimen with Miralax and Senna    =============Renal/===============  No issues     ============ID=====================  #PNA vs ILD exacerbation  - CT Chest 4/12/2024: Findings suggesting interstitial lung disease without significant interval progression. No evidence of pneumonia.  - WBC 11, afebrile  - RVP, Strep, mycoplasma, legionella negative was previously negative   - S/p 4 doses of Levaquin last dose 4/13  -no clinical signs of PNA  - will hold off on starting abx, will get an CXR to further evaluate     ==============Endo=============  #IDDM (A1c 11.9)  - at home on Basaglar 25 U QHS + Admelog 15U TID  - c/w  Lantus 20 + Admelog 14u TID + Mod SSI   -a1c 11.9  -Endo Dr. Zavaleta following   - FS AC HS  - adjust regimen as needed    ==============Heme===============  #Normocytic Anemia  - likely anemia of chronic disease  - H/H stable 9.4-11  - monitor CBC daily     ==============ppx================  - PPI QD   - Eliquis BID    ==============Dispo===============  ICU · Assessment	  63 year old F from home ambulates with walker w/ pmhx chronic hypoxic respiratory failure on 2-3L NC at home 2/2 ILD, currently on ofev , unprovoked? PE (dx  on 2022, currently on Eliquis 5 mg PO QD), severe pulmonary HTN (based on TTE from 2023), IDDM, who came in with c/o SOB.  Patient was admitted to ICU and was placed on HFNC for possible ILD exacerbation and was downgraded on 4/16. Pt respiratory sxs improved and was started on  tapering dose of  Lasix and prednisone. Patient was saturating 93 % on 3L, and overnight became hypoxic and now saturating 82% on 10L w/ Oxymizer. Patient was seen and exmained at bedside, in NAD. Found to have Insp crackles with no signs of fluid overload. Likely decompensating due to advanced ILD, pt willl req HFNC and will be transferred  to ICU for further management.     # Acute on Chronic Hypoxic Resp failure   # ILD Exacerbation  # Hx of PE  # Uncontrolled DM     ============Neuro================  # No active issues  - Continue home dose of trazodone 100mg oral at bedtime     =============Cards================    #Hx of cor pulmonale  - hx of cor pulmonale, with recent echo w/ normal RV function and LV (EF65%)  - on  PO Lasix 20mg qd   - status post diuresis with furosemide 40mg IVP (4/24),   - will give another dose today, furosemide 20mg IVP x 1 today    #HLD  - resumed home dose of lipitor     ==============Pulm================  #Acute on Chronic Hypoxic Respiratory Failure likely 2/2 to ILD Flare  -CT Chest 4/12/2024: Findings suggesting interstitial lung disease without significant interval progression. No evidence of pneumonia.  - was on HFNC and downgraded  from ICU on 4/16 to 3L NC  - on prednisone taper 30mg and home ofev for ILD   -overnight became hypoxic and placed on Oxymizer 10L saturating 91%  - no clinical signs of fluid overload or infection   -likely 2/2 progressive advanced ILD  - will start HFNC  -Will start solumedrol 40mg BID  _ Lasix 40mg IV x1  -f/u xray       # Hx of PE  on Eliquis    =============GI============  #GERD   #Constipation   - Regular Diet  - Protonix 40mg PO qd  - Bowel regimen with Miralax and Senna    =============Renal/===============  No issues     ============ID=====================  #PNA vs ILD exacerbation  - CT Chest 4/12/2024: Findings suggesting interstitial lung disease without significant interval progression. No evidence of pneumonia.  - WBC 11, afebrile  - RVP, Strep, mycoplasma, legionella negative was previously negative   - S/p 4 doses of Levaquin last dose 4/13  -no clinical signs of PNA  - will hold off on starting abx, will get an CXR to further evaluate     ==============Endo=============  #IDDM (A1c 11.9)  - at home on Basaglar 25 U QHS + Admelog 15U TID  - c/w  Lantus 20 + Admelog 14u TID + Mod SSI   -a1c 11.9  -Endo Dr. Zavaleta following   - FS AC HS  - adjust regimen as needed    ==============Heme===============  #Normocytic Anemia  - likely anemia of chronic disease  - H/H stable 9.4-11  - monitor CBC daily     ==============ppx================  - PPI QD   - Eliquis BID    ==============Dispo===============  ICU · Assessment	  63 year old F from home ambulates with walker w/ pmhx chronic hypoxic respiratory failure on 2-3L NC at home 2/2 ILD, currently on ofev , unprovoked? PE (dx  on 2022, currently on Eliquis 5 mg PO QD), severe pulmonary HTN (based on TTE from 2023), IDDM, who came in with c/o SOB.  Patient was admitted to ICU and was placed on HFNC for possible ILD exacerbation and was downgraded on 4/16. Pt respiratory sxs improved and was started on  tapering dose of  Lasix and prednisone. Patient was saturating 93 % on 3L, and overnight became hypoxic and now saturating 82% on 10L w/ Oxymizer. Patient was seen and exmained at bedside, in NAD. Found to have Insp crackles with no signs of fluid overload. Likely decompensating due to advanced ILD, pt willl req HFNC and will be transferred  to ICU for further management.     # Acute on Chronic Hypoxic Resp failure   # ILD Exacerbation  # Hx of PE  # Uncontrolled DM     ============Neuro================  # No active issues  - Continue home dose of trazodone 100mg oral at bedtime   - continue melatonin 3mg qHS prn insomnia    =============Cards================    #Hx of cor pulmonale  - hx of cor pulmonale, with recent echo w/ normal RV function and LV (EF65%)  - on  PO Lasix 20mg qd   - status post diuresis with furosemide 40mg IVP (4/24),   - will give another dose today, furosemide 20mg IVP x 1 today    #HLD  - resumed home dose of lipitor     ==============Pulm================  #Acute on Chronic Hypoxic Respiratory Failure likely 2/2 to ILD Flare  -CT Chest 4/12/2024: Findings suggesting interstitial lung disease without significant interval progression. No evidence of pneumonia.  - was on HFNC and downgraded  from ICU on 4/16 to 3L NC,  - readmitted to ICU for hypoxia, placed on HFNC,  - no overt clinical signs of fluid overload or infection   - continue home Ofev 150mg BID, for ILD   - continue HFNC, wean as jeanmarie  - continue  solumedrol 40mg BID  - CXR 4/24 with Idiopathic pulmonary fibrosis.  Diffuse increased patchy opacities Right >Left, suspect edema    - status post diuresis with furosemide 40mg IV x1 (4/24),  - will continue gentle diuresis with furosemide 20mg IVP x 1 today  - will call [facility] for referral for lung transplant eval (outpt pulm Dr Marlow in agreement)    # Hx of PE  - continue Eliquis 5mg BID    =============GI============  #GERD   #Constipation   - Regular Diet  - Protonix 40mg PO qd  - continue Bowel regimen with Miralax and Senna    =============Renal/===============  No issues     ============ID=====================  #PNA vs ILD exacerbation  - CT Chest 4/12/2024: Findings suggesting interstitial lung disease without significant interval progression. No evidence of pneumonia.  - WBC 11, afebrile  - RVP, Strep, mycoplasma, legionella negative was previously negative   - S/p 4 doses of Levaquin last dose 4/13  - no clinical signs of PNA  - observe off abx    ==============Endo=============  #IDDM (A1c 11.9)  - at home on Basaglar 25 U QHS + Admelog 15U TID  - A1c 11.9  - c/w  Lantus 20 + Admelog 14u TID + Mod SSI   - FS AC HS  - adjust regimen as needed  - -Wendi Zavaleta following     ==============Heme===============  #Normocytic Anemia  - likely anemia of chronic disease  - H/H stable 9.4-11  - monitor CBC daily     ==========MSK=================  #muscle spasm  - continue home cyclobenzaprine 5mg q8 prn     ==============ppx================  - PPI QD   - Eliquis BID    ==============Dispo===============  ICU

## 2024-04-25 NOTE — DISCHARGE NOTE NURSING/CASE MANAGEMENT/SOCIAL WORK - PATIENT PORTAL LINK FT
You can access the FollowMyHealth Patient Portal offered by [facility] by registering at the following website: [email or web address]. By joining [facility] FollowMyHealth portal, you will also be able to view your health information using other applications (apps) compatible with our system.

## 2024-04-25 NOTE — CHART NOTE - NSCHARTNOTEFT_GEN_A_CORE
Assessment:   Patient is a 63y old  Female who presents with a chief complaint of AHRF 2/2 ILD flair vs PNA (2024 07:39). Pt transferred back to ICU  PM. Pt seen, on high flow, asleep. Discussed on ICU IDR, pt may be for transfer [facility] for lung transplant eval (Pt w/ ILD). Pt noted w/ pulmonary edema and uncontrolled DM. Noted w/ 50% intake at breakfast.      Factors impacting intake: [ ] none [ ] nausea  [ ] vomiting [ ] diarrhea [ ] constipation  [ ]chewing problems [ ] swallowing issues  [ ] other:     Diet Prescription: Diet, Regular:   Consistent Carbohydrate {Evening Snacks}  DASH/TLC {Sodium & Cholesterol Restricted}  1500mL Fluid Restriction ([id number])  Supplement Feeding Modality:  Oral  Ensure Enlive Cans or Servings Per Day:  1       Frequency:  Two Times a day (24 @ 15:49)    Intake:     Daily     Daily Weight in k (2024 08:00)  Weight in k.6 (2024 05:24)  Weight in k.9 (2024 05:09)  Weight in k (15 Apr 2024 08:00)  Weight in k.9 (2024 07:00)      Pertinent Medications: MEDICATIONS  (STANDING):  apixaban 5 milliGRAM(s) Oral every 12 hours  atorvastatin 40 milliGRAM(s) Oral at bedtime  benzocaine/menthol Lozenge 1 Lozenge Oral every 4 hours  budesonide 160 MICROgram(s)/formoterol 4.5 MICROgram(s) Inhaler 2 Puff(s) Inhalation two times a day  chlorhexidine 2% Cloths 1 Application(s) Topical <User Schedule>  insulin glargine Injectable (LANTUS) 22 Unit(s) SubCutaneous at bedtime  insulin lispro (ADMELOG) corrective regimen sliding scale   SubCutaneous three times a day before meals  insulin lispro Injectable (ADMELOG) 18 Unit(s) SubCutaneous three times a day before meals  melatonin 3 milliGRAM(s) Oral at bedtime  methylPREDNISolone sodium succinate Injectable 40 milliGRAM(s) IV Push every 12 hours  Nintedanib (Ofev) 150 milliGRAM(s) 150 milliGRAM(s) Oral every 12 hours  pantoprazole    Tablet 40 milliGRAM(s) Oral before breakfast  senna 1 Tablet(s) Oral daily  tamsulosin 0.8 milliGRAM(s) Oral at bedtime  traZODone 100 milliGRAM(s) Oral at bedtime  verapamil 80 milliGRAM(s) Oral every 8 hours    MEDICATIONS  (PRN):  acetaminophen     Tablet .. 650 milliGRAM(s) Oral every 6 hours PRN Mild Pain (1 - 3)  cyclobenzaprine 5 milliGRAM(s) Oral three times a day PRN Muscle Spasm  guaiFENesin Oral Liquid (Sugar-Free) 200 milliGRAM(s) Oral every 6 hours PRN Cough    Pertinent Labs:  Na140 mmol/L Glu 225 mg/dL<H> K+ 4.6 mmol/L Cr  0.93 mg/dL BUN 34 mg/dL<H>  Phos 3.9 mg/dL  Alb 2.0 g/dL<L>     CAPILLARY BLOOD GLUCOSE      POCT Blood Glucose.: 306 mg/dL (2024 11:49)  POCT Blood Glucose.: 233 mg/dL (2024 05:51)  POCT Blood Glucose.: 293 mg/dL (2024 17:13)        Estimated Needs:   [x ] no change since previous assessment  [ ] recalculated:       Previous Nutrition Diagnosis:   [ ] Altered GI function  [ ]Inadequate Oral Intake [ ] Swallowing Difficulty   [ ] Altered nutrition related labs [ ] Increased Nutrient Needs [ ] Overweight/Obesity   [ ] Unintended Weight Loss [ ] Food & Nutrition Related Knowledge Deficit [x ] Malnutrition (Moderate PCM )  [ ] Other:     Nutrition Diagnosis is [x ] ongoing  [ ] resolved [ ] not applicable     Interventions:   Recommend  [ ] Change Diet To:  [ ] Nutrition Supplement  [x ] Nutrition Support: If pt will accept and within fluid control, may consider Changing Ensure Plus HP to Ensure Plant Based TID . MD to monitor. RD available.   [ ] Other:     Monitoring and Evaluation:   [ ] PO intake [ x ] Tolerance to diet prescription [ x ] weights [ x ] labs[ x ] follow up per protocol  [ ] other:

## 2024-04-25 NOTE — PROGRESS NOTE ADULT - PROVIDER SPECIALTY LIST ADULT
Internal Medicine
Pulmonology
Critical Care
Endocrinology
Pulmonology
Critical Care
Endocrinology
Internal Medicine
Critical Care
Critical Care
Internal Medicine
Critical Care
Internal Medicine

## 2024-04-25 NOTE — ADVANCED PRACTICE NURSE CONSULT - ASSESSMENT
This is a 63yr old female patient admitted for Shortness of Breath, to which there is evidence of Moisture Associated Skin Damage to the Perianal area
This is a 63yr old female patient admitted for Shortness of Breath, to which upon assessment the patients skin is intact

## 2024-04-25 NOTE — PROGRESS NOTE ADULT - SUBJECTIVE AND OBJECTIVE BOX
INTERVAL HPI/OVERNIGHT EVENTS:       PRESSORS: [ ] YES [ ] NO  WHICH:    ANTIBIOTICS:                  DATE STARTED:  ANTIBIOTICS:                  DATE STARTED:    Antimicrobial:    Cardiovascular:  verapamil 80 milliGRAM(s) Oral every 8 hours    Pulmonary:  budesonide 160 MICROgram(s)/formoterol 4.5 MICROgram(s) Inhaler 2 Puff(s) Inhalation two times a day  guaiFENesin Oral Liquid (Sugar-Free) 200 milliGRAM(s) Oral every 6 hours PRN    Hematalogic:  apixaban 5 milliGRAM(s) Oral every 12 hours    Other:  acetaminophen     Tablet .. 650 milliGRAM(s) Oral every 6 hours PRN  atorvastatin 40 milliGRAM(s) Oral at bedtime  benzocaine/menthol Lozenge 1 Lozenge Oral every 4 hours  chlorhexidine 2% Cloths 1 Application(s) Topical <User Schedule>  cyclobenzaprine 5 milliGRAM(s) Oral three times a day PRN  insulin glargine Injectable (LANTUS) 22 Unit(s) SubCutaneous at bedtime  insulin lispro (ADMELOG) corrective regimen sliding scale   SubCutaneous three times a day before meals  insulin lispro Injectable (ADMELOG) 18 Unit(s) SubCutaneous three times a day before meals  melatonin 3 milliGRAM(s) Oral at bedtime  methylPREDNISolone sodium succinate Injectable 40 milliGRAM(s) IV Push every 12 hours  pantoprazole    Tablet 40 milliGRAM(s) Oral before breakfast  senna 1 Tablet(s) Oral daily  tamsulosin 0.8 milliGRAM(s) Oral at bedtime  traZODone 100 milliGRAM(s) Oral at bedtime    acetaminophen     Tablet .. 650 milliGRAM(s) Oral every 6 hours PRN  apixaban 5 milliGRAM(s) Oral every 12 hours  atorvastatin 40 milliGRAM(s) Oral at bedtime  benzocaine/menthol Lozenge 1 Lozenge Oral every 4 hours  budesonide 160 MICROgram(s)/formoterol 4.5 MICROgram(s) Inhaler 2 Puff(s) Inhalation two times a day  chlorhexidine 2% Cloths 1 Application(s) Topical <User Schedule>  cyclobenzaprine 5 milliGRAM(s) Oral three times a day PRN  guaiFENesin Oral Liquid (Sugar-Free) 200 milliGRAM(s) Oral every 6 hours PRN  insulin glargine Injectable (LANTUS) 22 Unit(s) SubCutaneous at bedtime  insulin lispro (ADMELOG) corrective regimen sliding scale   SubCutaneous three times a day before meals  insulin lispro Injectable (ADMELOG) 18 Unit(s) SubCutaneous three times a day before meals  melatonin 3 milliGRAM(s) Oral at bedtime  methylPREDNISolone sodium succinate Injectable 40 milliGRAM(s) IV Push every 12 hours  pantoprazole    Tablet 40 milliGRAM(s) Oral before breakfast  senna 1 Tablet(s) Oral daily  tamsulosin 0.8 milliGRAM(s) Oral at bedtime  traZODone 100 milliGRAM(s) Oral at bedtime  verapamil 80 milliGRAM(s) Oral every 8 hours    Drug Dosing Weight  Height (cm): 167.6 (11 Apr 2024 21:44)  Weight (kg): 54.7 (12 Apr 2024 04:13)  BMI (kg/m2): 19.5 (12 Apr 2024 04:13)  BSA (m2): 1.61 (12 Apr 2024 04:13)    PHYSICAL EXAM:  GENERAL: NAD  EYES: EOMI, PERRLA  NECK: Supple, No JVD; Trachea midline: No LAD   NERVOUS SYSTEM:  Alert & Oriented X3,  Motor Strength 5/5 B/L upper and lower extremities  CHEST/LUNG:  breath sounds present bilaterally, No rales, rhonchi, wheezing  HEART: Regular rate and rhythm; No murmurs, rubs, or gallops  ABDOMEN: Soft, Nontender, Nondistended; Bowel sounds present, no pain or masses on palpation  : voiding well, Baker in place  EXTREMITIES:  2+ Peripheral Pulses, No clubbing, cyanosis, or edema  SKIN: warm, intact, no lesions     LINES/DRAINS/DEVICES  CENTRAL LINE: [ ] YES [ ] NO  LOCATION:     BAKER: [ ] YES [ ] NO     A-LINE:  [ ] YES [ ] NO  LOCATION:       ICU Vital Signs Last 24 Hrs  T(C): 36.4 (25 Apr 2024 05:00), Max: 37.1 (24 Apr 2024 19:48)  T(F): 97.5 (25 Apr 2024 05:00), Max: 98.7 (24 Apr 2024 19:48)  HR: 101 (25 Apr 2024 06:00) (101 - 117)  BP: 100/71 (25 Apr 2024 06:00) (100/71 - 133/80)  BP(mean): 81 (25 Apr 2024 06:00) (81 - 98)  ABP: --  ABP(mean): --  RR: 21 (25 Apr 2024 06:00) (10 - 43)  SpO2: 99% (25 Apr 2024 06:00) (92% - 100%)    O2 Parameters below as of 25 Apr 2024 03:28  Patient On (Oxygen Delivery Method): nasal cannula, high flow  O2 Flow (L/min): 50  O2 Concentration (%): 60        ABG - ( 24 Apr 2024 19:05 )  pH, Arterial: 7.48  pH, Blood: x     /  pCO2: 37    /  pO2: 71    / HCO3: 28    / Base Excess: 4.0   /  SaO2: 95                    04-24 @ 07:01  -  04-25 @ 07:00  --------------------------------------------------------  IN: 0 mL / OUT: 1500 mL / NET: -1500 mL              LABS:  CBC Full  -  ( 25 Apr 2024 03:15 )  WBC Count : 10.69 K/uL  RBC Count : 3.89 M/uL  Hemoglobin : 10.2 g/dL  Hematocrit : 32.8 %  Platelet Count - Automated : 466 K/uL  Mean Cell Volume : 84.3 fl  Mean Cell Hemoglobin : 26.2 pg  Mean Cell Hemoglobin Concentration : 31.1 gm/dL  Auto Neutrophil # : 9.62 K/uL  Auto Lymphocyte # : 0.82 K/uL  Auto Monocyte # : 0.18 K/uL  Auto Eosinophil # : 0.00 K/uL  Auto Basophil # : 0.02 K/uL  Auto Neutrophil % : 89.9 %  Auto Lymphocyte % : 7.7 %  Auto Monocyte % : 1.7 %  Auto Eosinophil % : 0.0 %  Auto Basophil % : 0.2 %    04-25    140  |  108  |  34<H>  ----------------------------<  225<H>  4.6   |  26  |  0.93    Ca    8.7      25 Apr 2024 03:15  Phos  3.9     04-25  Mg     2.0     04-25    TPro  6.2  /  Alb  2.0<L>  /  TBili  0.3  /  DBili  x   /  AST  22  /  ALT  44  /  AlkPhos  108  04-25      Urinalysis Basic - ( 25 Apr 2024 03:15 )    Color: x / Appearance: x / SG: x / pH: x  Gluc: 225 mg/dL / Ketone: x  / Bili: x / Urobili: x   Blood: x / Protein: x / Nitrite: x   Leuk Esterase: x / RBC: x / WBC x   Sq Epi: x / Non Sq Epi: x / Bacteria: x          RADIOLOGY & ADDITIONAL STUDIES REVIEWED DURING TEAM ROUNDS    [ ]GOALS OF CARE DISCUSSION WITH PATIENT/FAMILY/PROXY:    CRITICAL CARE TIME SPENT: 35 minutes   INTERVAL HPI/OVERNIGHT EVENTS:       PRESSORS: [ ] YES [ ] NO  WHICH:    ANTIBIOTICS:                  DATE STARTED:  ANTIBIOTICS:                  DATE STARTED:    Antimicrobial:    Cardiovascular:  verapamil 80 milliGRAM(s) Oral every 8 hours    Pulmonary:  budesonide 160 MICROgram(s)/formoterol 4.5 MICROgram(s) Inhaler 2 Puff(s) Inhalation two times a day  guaiFENesin Oral Liquid (Sugar-Free) 200 milliGRAM(s) Oral every 6 hours PRN    Hematalogic:  apixaban 5 milliGRAM(s) Oral every 12 hours    Other:  acetaminophen     Tablet .. 650 milliGRAM(s) Oral every 6 hours PRN  atorvastatin 40 milliGRAM(s) Oral at bedtime  benzocaine/menthol Lozenge 1 Lozenge Oral every 4 hours  chlorhexidine 2% Cloths 1 Application(s) Topical <User Schedule>  cyclobenzaprine 5 milliGRAM(s) Oral three times a day PRN  insulin glargine Injectable (LANTUS) 22 Unit(s) SubCutaneous at bedtime  insulin lispro (ADMELOG) corrective regimen sliding scale   SubCutaneous three times a day before meals  insulin lispro Injectable (ADMELOG) 18 Unit(s) SubCutaneous three times a day before meals  melatonin 3 milliGRAM(s) Oral at bedtime  methylPREDNISolone sodium succinate Injectable 40 milliGRAM(s) IV Push every 12 hours  pantoprazole    Tablet 40 milliGRAM(s) Oral before breakfast  senna 1 Tablet(s) Oral daily  tamsulosin 0.8 milliGRAM(s) Oral at bedtime  traZODone 100 milliGRAM(s) Oral at bedtime    acetaminophen     Tablet .. 650 milliGRAM(s) Oral every 6 hours PRN  apixaban 5 milliGRAM(s) Oral every 12 hours  atorvastatin 40 milliGRAM(s) Oral at bedtime  benzocaine/menthol Lozenge 1 Lozenge Oral every 4 hours  budesonide 160 MICROgram(s)/formoterol 4.5 MICROgram(s) Inhaler 2 Puff(s) Inhalation two times a day  chlorhexidine 2% Cloths 1 Application(s) Topical <User Schedule>  cyclobenzaprine 5 milliGRAM(s) Oral three times a day PRN  guaiFENesin Oral Liquid (Sugar-Free) 200 milliGRAM(s) Oral every 6 hours PRN  insulin glargine Injectable (LANTUS) 22 Unit(s) SubCutaneous at bedtime  insulin lispro (ADMELOG) corrective regimen sliding scale   SubCutaneous three times a day before meals  insulin lispro Injectable (ADMELOG) 18 Unit(s) SubCutaneous three times a day before meals  melatonin 3 milliGRAM(s) Oral at bedtime  methylPREDNISolone sodium succinate Injectable 40 milliGRAM(s) IV Push every 12 hours  pantoprazole    Tablet 40 milliGRAM(s) Oral before breakfast  senna 1 Tablet(s) Oral daily  tamsulosin 0.8 milliGRAM(s) Oral at bedtime  traZODone 100 milliGRAM(s) Oral at bedtime  verapamil 80 milliGRAM(s) Oral every 8 hours    Drug Dosing Weight  Height (cm): 167.6 (11 Apr 2024 21:44)  Weight (kg): 54.7 (12 Apr 2024 04:13)  BMI (kg/m2): 19.5 (12 Apr 2024 04:13)  BSA (m2): 1.61 (12 Apr 2024 04:13)    PHYSICAL EXAM:  GENERAL: NAD,   HEAD:  Atraumatic, Normocephalic  EYES: EOMI, PERRLA, conjunctiva and sclera clear  NECK: Supple, No JVD, trachea midline  NERVOUS SYSTEM:  Alert & Oriented x3  CHEST/LUNG: decreased bilaterally, no rhonchi, rales, or wheezes  HEART: Regular rate and rhythm; No murmurs, rubs, or gallops  ABDOMEN: Soft, Nontender, Nondistended; Bowel sounds present  EXTREMITIES:  2+ Peripheral Pulses, mild clubbing noted, cyanosis, or edema  SKIN: No rashes or lesions    LINES/DRAINS/DEVICES  CENTRAL LINE: [ ] YES [ ] NO  LOCATION:     BAKER: [ ] YES [ ] NO     A-LINE:  [ ] YES [ ] NO  LOCATION:       ICU Vital Signs Last 24 Hrs  T(C): 36.4 (25 Apr 2024 05:00), Max: 37.1 (24 Apr 2024 19:48)  T(F): 97.5 (25 Apr 2024 05:00), Max: 98.7 (24 Apr 2024 19:48)  HR: 101 (25 Apr 2024 06:00) (101 - 117)  BP: 100/71 (25 Apr 2024 06:00) (100/71 - 133/80)  BP(mean): 81 (25 Apr 2024 06:00) (81 - 98)  ABP: --  ABP(mean): --  RR: 21 (25 Apr 2024 06:00) (10 - 43)  SpO2: 99% (25 Apr 2024 06:00) (92% - 100%)    O2 Parameters below as of 25 Apr 2024 03:28  Patient On (Oxygen Delivery Method): nasal cannula, high flow  O2 Flow (L/min): 50  O2 Concentration (%): 60        ABG - ( 24 Apr 2024 19:05 )  pH, Arterial: 7.48  pH, Blood: x     /  pCO2: 37    /  pO2: 71    / HCO3: 28    / Base Excess: 4.0   /  SaO2: 95                    04-24 @ 07:01  -  04-25 @ 07:00  --------------------------------------------------------  IN: 0 mL / OUT: 1500 mL / NET: -1500 mL              LABS:  CBC Full  -  ( 25 Apr 2024 03:15 )  WBC Count : 10.69 K/uL  RBC Count : 3.89 M/uL  Hemoglobin : 10.2 g/dL  Hematocrit : 32.8 %  Platelet Count - Automated : 466 K/uL  Mean Cell Volume : 84.3 fl  Mean Cell Hemoglobin : 26.2 pg  Mean Cell Hemoglobin Concentration : 31.1 gm/dL  Auto Neutrophil # : 9.62 K/uL  Auto Lymphocyte # : 0.82 K/uL  Auto Monocyte # : 0.18 K/uL  Auto Eosinophil # : 0.00 K/uL  Auto Basophil # : 0.02 K/uL  Auto Neutrophil % : 89.9 %  Auto Lymphocyte % : 7.7 %  Auto Monocyte % : 1.7 %  Auto Eosinophil % : 0.0 %  Auto Basophil % : 0.2 %    04-25    140  |  108  |  34<H>  ----------------------------<  225<H>  4.6   |  26  |  0.93    Ca    8.7      25 Apr 2024 03:15  Phos  3.9     04-25  Mg     2.0     04-25    TPro  6.2  /  Alb  2.0<L>  /  TBili  0.3  /  DBili  x   /  AST  22  /  ALT  44  /  AlkPhos  108  04-25      Urinalysis Basic - ( 25 Apr 2024 03:15 )    Color: x / Appearance: x / SG: x / pH: x  Gluc: 225 mg/dL / Ketone: x  / Bili: x / Urobili: x   Blood: x / Protein: x / Nitrite: x   Leuk Esterase: x / RBC: x / WBC x   Sq Epi: x / Non Sq Epi: x / Bacteria: x          RADIOLOGY & ADDITIONAL STUDIES REVIEWED DURING TEAM ROUNDS    [ ]GOALS OF CARE DISCUSSION WITH PATIENT/FAMILY/PROXY:    CRITICAL CARE TIME SPENT: 35 minutes

## 2024-04-25 NOTE — ADVANCED PRACTICE NURSE CONSULT - RECOMMEDATIONS
-Clean the Perianal area with warm water, mild soap, pat dry, and apply skin prep to the surrounding skin  -apply TRIAD Moisture Barrier Cream b.i.d. PRN  -Frequent toileting  -Elevate/float the patients heels using heel protectors and reposition the patient Q 2hrs using wedges or pillows

## 2024-04-25 NOTE — PROGRESS NOTE ADULT - CRITICAL CARE ATTENDING COMMENT
62yo woman w/ PMH advanced ILD/probable IPF admitted with acute on chronic hypoxemic respiratory failure with likely acute exacerbation of ILD as well as pulm edema. Initially in the ICU had some improvement on combo of high dose steroids and diuresis. Was on eliquis with negative dopplers and clinical improvement suggest low prob of acute PE. Echo without any significant Cor pulmonale or pulm HTN. Clinically no evidence of PNA, viral panel and peripheral ID w/u negative. Was transferred out of ICU and comfortable on NC oxygen support.     Overnight with worsening shortness of breath and hypoxia. Denies any chest pain, palpitations. States just feels short of breath. CXR suggestive of increasing pulmonary edema.     Assessment:   #Acute on chronic hypoxemic respiratory failure  #Advanced ILD, likely IPF and probable exacerbation of ILD  #Pulmonary edema  #H/o PE on Eliquis  #DM type 2    Recommendations:  - Appears comfortable this morning on HFNC support   - Cont. methylprednisolone 40mg IV BID  - Cont. diuresis and aim for negative fluid balance  - Monitor strict I/Os, daily weights  - Titrate down oxygen to maintain saturation > 92%   - cont eliquis  - TTE reviewed with out evidence of significant pulmonary hypertension   - Cont. symbicort   - restart home Ofev  - Discussed with patient's outpatient pulmonologist Dr. Marlow, patient has severe restrictive lung disease with decreased diffusion capacity. Transplant has been discussed with her previously but she has refused. He is agreeable to transplant evaluation at this time.   - Patient referred to [facility] lung transplant team  - Pt is DNR/DNI, trial of NIPPV  - Cont. ICU care for today

## 2024-04-25 NOTE — PROGRESS NOTE ADULT - ASSESSMENT
64yo woman w/ PMH advanced ILD/probable IPF admitted with acute on chronic hypoxemic respiratory failure with likely acute exacerbation of ILD as well as pulm edema.    High BNP, B lines pattern on initial ICU sonogram and interlobular septal thickening on imaging  Has had some improvement on combo of high dose steroids and diuresis  On eliquis with negative dopplers and clinical improvement suggest low prob of acute PE  Echo without any significant Cor pulmonale or pulm HTN  Clinically no evidence of PNA, viral panel and peripheral ID w/u negative    #Acute on chronic hypoxemic respiratory failure  #Advanced ILD, likely IPF and probable exacerbation of ILD  #Pulmonary edema  #H/o PE on Eliquis    Recommendations:  - previously on PO prednisone 40mg with taper; steroids escalated 4/24 to methylprednisolone 40mg IV BID for acute on chronic hypoxemic resp failure  - Await CTA chest r/o PE though pt on Eliquis while admitted  - Would consider escalation of diuresis; monitor strict I/Os, daily weights; diuresis as per primary team as hemodynamics/renal fn allow to maintain euvolemia  - cont eliquis  - TTE noted  - Titrate supplemental O2 with goal SpO2 90-95%; HFNC titrated to 40% at 40lpm  - cont home Ofev  - Pt is DNR/DNI, trial of NIPPV    Follows with pulmonologist Dr. Primo Marlow ([facility]); pending possible xfer to [facility] ICU for lung xplant eval.

## 2024-04-25 NOTE — PROGRESS NOTE ADULT - REASON FOR ADMISSION
AHRF 2/2 ILD flair vs PNA
AHRF
AHRF 2/2 ILD flair vs PNA

## 2024-04-25 NOTE — CHART NOTE - NSCHARTNOTEFT_GEN_A_CORE
Dr Trejo received call-back from patient's out-patient pulmonologist, Dr. Primo Marlow ([facility]) who shares that patient had been previously offered eval for lung transplant in 2022 but that patient had declined at that time, stating she was 'not ready for that.'  Following discussion with both patient's pulmonologist and patient regarding her current condition and plan of care, patient agreeable for referral for lung transplant eval.  Dr Marlow shares patient has not been back to his office in sometime and is also agreeable to referral for eval by [facility].    Called [facility] [place] and reviewed patient's case with Dr Mariscal from [facility].  Following presentation of case to Transplant Team by Dr Mariscal, team shares that they are agreeable to accept, pending verification of insurance/financial status.  Transplant Team to reach out tomorrow morning for probably transfer to [facility] ICU for lung transplant eval.

## 2024-04-25 NOTE — PROGRESS NOTE ADULT - SUBJECTIVE AND OBJECTIVE BOX
PULMONARY CONSULT SERVICE FOLLOW-UP NOTE    INTERVAL HPI:  Reviewed chart and overnight events; patient seen and examined at bedside.    MEDICATIONS:  Pulmonary:  budesonide 160 MICROgram(s)/formoterol 4.5 MICROgram(s) Inhaler 2 Puff(s) Inhalation two times a day  guaiFENesin Oral Liquid (Sugar-Free) 200 milliGRAM(s) Oral every 6 hours PRN    Antimicrobials:    Anticoagulants:  apixaban 5 milliGRAM(s) Oral every 12 hours    Cardiac:  verapamil 80 milliGRAM(s) Oral every 8 hours      Allergies    No Known Allergies    Intolerances        Vital Signs Last 24 Hrs  T(C): 36.4 (25 Apr 2024 09:00), Max: 37.1 (24 Apr 2024 19:48)  T(F): 97.5 (25 Apr 2024 09:00), Max: 98.7 (24 Apr 2024 19:48)  HR: 121 (25 Apr 2024 13:00) (99 - 121)  BP: 131/65 (25 Apr 2024 12:00) (100/71 - 133/80)  BP(mean): 83 (25 Apr 2024 12:00) (80 - 98)  RR: 23 (25 Apr 2024 13:00) (19 - 43)  SpO2: 89% (25 Apr 2024 13:00) (89% - 100%)    Parameters below as of 25 Apr 2024 13:00  Patient On (Oxygen Delivery Method): nasal cannula, high flow  O2 Flow (L/min): 40  O2 Concentration (%): 40    04-24 @ 07:01 - 04-25 @ 07:00  --------------------------------------------------------  IN: 0 mL / OUT: 1530 mL / NET: -1530 mL    04-25 @ 07:01  -  04-25 @ 13:03  --------------------------------------------------------  IN: 240 mL / OUT: 180 mL / NET: 60 mL          PHYSICAL EXAM:  GEN: NAD, well-appearing, comfortable upright/ semirecumbent in bed  HEENT: anicteric sclera; MMM  RESP: no respiratory distress, CTA B/L; no W/R/R  CV: +S1/S2, RRR, no m/g/r  GI: soft, NT/ND, +BS  EXTREM: WWP; no edema, clubbing or cyanosis  Vascular: 2+ radial pulses B/L  NEURO: alert and awake, moving limbs spontaneously    LABS:  ABG - ( 24 Apr 2024 19:05 )  pH, Arterial: 7.48  pH, Blood: x     /  pCO2: 37    /  pO2: 71    / HCO3: 28    / Base Excess: 4.0   /  SaO2: 95                  CBC Full  -  ( 25 Apr 2024 03:15 )  WBC Count : 10.69 K/uL  RBC Count : 3.89 M/uL  Hemoglobin : 10.2 g/dL  Hematocrit : 32.8 %  Platelet Count - Automated : 466 K/uL  Mean Cell Volume : 84.3 fl  Mean Cell Hemoglobin : 26.2 pg  Mean Cell Hemoglobin Concentration : 31.1 gm/dL  Auto Neutrophil # : 9.62 K/uL  Auto Lymphocyte # : 0.82 K/uL  Auto Monocyte # : 0.18 K/uL  Auto Eosinophil # : 0.00 K/uL  Auto Basophil # : 0.02 K/uL  Auto Neutrophil % : 89.9 %  Auto Lymphocyte % : 7.7 %  Auto Monocyte % : 1.7 %  Auto Eosinophil % : 0.0 %  Auto Basophil % : 0.2 %    04-25    140  |  108  |  34<H>  ----------------------------<  225<H>  4.6   |  26  |  0.93    Ca    8.7      25 Apr 2024 03:15  Phos  3.9     04-25  Mg     2.0     04-25    TPro  6.2  /  Alb  2.0<L>  /  TBili  0.3  /  DBili  x   /  AST  22  /  ALT  44  /  AlkPhos  108  04-25          Urinalysis Basic - ( 25 Apr 2024 03:15 )    Color: x / Appearance: x / SG: x / pH: x  Gluc: 225 mg/dL / Ketone: x  / Bili: x / Urobili: x   Blood: x / Protein: x / Nitrite: x   Leuk Esterase: x / RBC: x / WBC x   Sq Epi: x / Non Sq Epi: x / Bacteria: x              RADIOLOGY & ADDITIONAL STUDIES:  No interval chest imaging for review  PULMONARY CONSULT SERVICE FOLLOW-UP NOTE    INTERVAL HPI:  Reviewed chart and overnight events; patient seen and examined at bedside. Continues to report dyspnea at rest, intermittent dry cough.    MEDICATIONS:  Pulmonary:  budesonide 160 MICROgram(s)/formoterol 4.5 MICROgram(s) Inhaler 2 Puff(s) Inhalation two times a day  guaiFENesin Oral Liquid (Sugar-Free) 200 milliGRAM(s) Oral every 6 hours PRN    Antimicrobials:    Anticoagulants:  apixaban 5 milliGRAM(s) Oral every 12 hours    Cardiac:  verapamil 80 milliGRAM(s) Oral every 8 hours      Allergies    No Known Allergies    Intolerances        Vital Signs Last 24 Hrs  T(C): 36.4 (25 Apr 2024 09:00), Max: 37.1 (24 Apr 2024 19:48)  T(F): 97.5 (25 Apr 2024 09:00), Max: 98.7 (24 Apr 2024 19:48)  HR: 121 (25 Apr 2024 13:00) (99 - 121)  BP: 131/65 (25 Apr 2024 12:00) (100/71 - 133/80)  BP(mean): 83 (25 Apr 2024 12:00) (80 - 98)  RR: 23 (25 Apr 2024 13:00) (19 - 43)  SpO2: 89% (25 Apr 2024 13:00) (89% - 100%)    Parameters below as of 25 Apr 2024 13:00  Patient On (Oxygen Delivery Method): nasal cannula, high flow  O2 Flow (L/min): 40  O2 Concentration (%): 40    04-24 @ 07:01  -  04-25 @ 07:00  --------------------------------------------------------  IN: 0 mL / OUT: 1530 mL / NET: -1530 mL    04-25 @ 07:01  -  04-25 @ 13:03  --------------------------------------------------------  IN: 240 mL / OUT: 180 mL / NET: 60 mL          PHYSICAL EXAM:  GEN: NAD, mildly uncomfortable semirecumbent in bed  HEENT: anicteric sclera; MMM  RESP: no respiratory distress, bibasilar crackles  CV: +S1/S2, RRR, no m/g/r  GI: soft, NT/ND, +BS  EXTREM: WWP; no edema, clubbing or cyanosis  Vascular: 2+ radial pulses B/L  NEURO: alert and awake, moving limbs spontaneously    LABS:  ABG - ( 24 Apr 2024 19:05 )  pH, Arterial: 7.48  pH, Blood: x     /  pCO2: 37    /  pO2: 71    / HCO3: 28    / Base Excess: 4.0   /  SaO2: 95                  CBC Full  -  ( 25 Apr 2024 03:15 )  WBC Count : 10.69 K/uL  RBC Count : 3.89 M/uL  Hemoglobin : 10.2 g/dL  Hematocrit : 32.8 %  Platelet Count - Automated : 466 K/uL  Mean Cell Volume : 84.3 fl  Mean Cell Hemoglobin : 26.2 pg  Mean Cell Hemoglobin Concentration : 31.1 gm/dL  Auto Neutrophil # : 9.62 K/uL  Auto Lymphocyte # : 0.82 K/uL  Auto Monocyte # : 0.18 K/uL  Auto Eosinophil # : 0.00 K/uL  Auto Basophil # : 0.02 K/uL  Auto Neutrophil % : 89.9 %  Auto Lymphocyte % : 7.7 %  Auto Monocyte % : 1.7 %  Auto Eosinophil % : 0.0 %  Auto Basophil % : 0.2 %    04-25    140  |  108  |  34<H>  ----------------------------<  225<H>  4.6   |  26  |  0.93    Ca    8.7      25 Apr 2024 03:15  Phos  3.9     04-25  Mg     2.0     04-25    TPro  6.2  /  Alb  2.0<L>  /  TBili  0.3  /  DBili  x   /  AST  22  /  ALT  44  /  AlkPhos  108  04-25          Urinalysis Basic - ( 25 Apr 2024 03:15 )    Color: x / Appearance: x / SG: x / pH: x  Gluc: 225 mg/dL / Ketone: x  / Bili: x / Urobili: x   Blood: x / Protein: x / Nitrite: x   Leuk Esterase: x / RBC: x / WBC x   Sq Epi: x / Non Sq Epi: x / Bacteria: x              RADIOLOGY & ADDITIONAL STUDIES:  No interval chest imaging for review

## 2024-04-25 NOTE — DISCHARGE NOTE NURSING/CASE MANAGEMENT/SOCIAL WORK - NSDCPEFALRISK_GEN_ALL_CORE
For information on Fall & Injury Prevention, visit: [email or web address] OR  [email or web address] OR  https://www.cdc.gov/steadi/patient.html

## 2024-04-26 NOTE — H&P ADULT - HISTORY OF PRESENT ILLNESS
63-yo F with PMHx of chronic AHRF on 2-3L NC at b/l 2/2 ILD (Followed w/ Dr. Primo Marlow, [facility]), PE on Eliquis, severe pHTN, Cor pulmonale, DM, presented initially with SOB, dry cough, chest pain, LE edema, recent admission to [facility] in March for ILD flare, admitted to [facility]->ICU for AHRF 2/2 ILD flare, transferred to [facility] for lung transplant evaluation.     Was treated w/ IV steroids->PO taper, duonebs, symbicort, lasix. Pike placed for urinary retention. weaned to NC while  but was complicated by increased WOB, placed back on HFNC 50/60. Most recently escalated to methylprednisolone 40 mg IV BID. Was being treated w/ Ofev 150 mg BID, daily diuresis w/ Lasix 40->20 mg IVP based on volume assessment, 4/24 CXR w/ c/f R>L patchy opacities c/f pulmonary edema. Upon transfer here, admission vitals notable for temp afebrile, , /79, HFNC similar settings 45/60 100%. Labs notable for downtrending white count, stable anemia to 9-10, stable thrombocytosis to 400's. ABG 7.48 / 37 / 71 / 28, lactate 1.2.      Of note, pt is DNR/DNI trial of NIPPV 63-yo F with PMHx of chronic AHRF on 2-3L NC at b/l 2/2 ILD (Followed w/ Dr. Primo Marlow, [facility]), PE on Eliquis, severe pHTN, Cor pulmonale, DM, presented initially with SOB, dry cough, chest pain, LE edema, recent admission to [facility] in March for ILD flare, admitted to [facility]->ICU for AHRF 2/2 ILD flare, transferred to [facility] for lung transplant evaluation. Pt reporting mild SOB, no fever, no sputum, no SOB, no chest pain, no edema. Reports constipation, last BM 2 days ago, with some stomach pain.     At [facility] ICU, she was treated w/ IV steroids->PO taper, duonebs, symbicort, lasix. Pike placed for urinary retention, taken out prior to arrival although unclear if passed TOV at OSH. Had been weaned to NC while but was complicated by increased WOB, placed back on HFNC 50/60. Most recently escalated to methylprednisolone 40 mg IV BID. Was being treated w/ Ofev 150 mg BID, daily diuresis w/ Lasix 40->20 mg IVP on 4/25 based on volume assessment, 4/24 CXR w/ c/f R>L patchy opacities c/f pulmonary edema. Upon transfer here, admission vitals notable for temp afebrile, , /79, HFNC similar settings 45/60 100%. Labs notable for downtrending white count, stable anemia to 9-10, stable thrombocytosis to 400's. ABG 7.48 / 37 / 71 / 28, lactate 1.2.      Of note, pt is DNR/DNI trial of NIPPV - confirmed this with the patient and completed MOLST, in chart.

## 2024-04-26 NOTE — CONSULT NOTE ADULT - SUBJECTIVE AND OBJECTIVE BOX
Patient is a 63y old  Female who presents with a chief complaint of SOB (26 Apr 2024 11:46)      HPI:  63-yo F with PMHx of chronic AHRF on 2-3L NC at b/l 2/2 ILD (Followed w/ Dr. Primo Malrow, [facility]), PE on Eliquis, severe pHTN, Cor pulmonale, DM, presented initially with SOB, dry cough, chest pain, LE edema, recent admission to [facility] in March for ILD flare, admitted to [facility]->ICU for AHRF 2/2 ILD flare, transferred to [facility] for lung transplant evaluation. Pt reporting mild SOB, no fever, no sputum, no SOB, no chest pain, no edema. Reports constipation, last BM 2 days ago, with some stomach pain.     At [facility], she was treated w/ IV steroids->PO taper, duonebs, symbicort, lasix. Pike placed for urinary retention, taken out prior to arrival although unclear if passed TOV at OSH. Had been weaned to NC while but was complicated by increased WOB, placed back on HFNC 50/60. Most recently escalated to methylprednisolone 40 mg IV BID. Was being treated w/ Ofev 150 mg BID, daily diuresis w/ Lasix 40->20 mg IVP on 4/25 based on volume assessment, 4/24 CXR w/ c/f R>L patchy opacities c/f pulmonary edema. Upon transfer here, admission vitals notable for temp afebrile, , /79, HFNC similar settings 45/60 100%. Labs notable for downtrending white count, stable anemia to 9-10, stable thrombocytosis to 400's. ABG 7.48 / 37 / 71 / 28, lactate 1.2.      Of note, pt is DNR/DNI trial of NIPPV - confirmed this with the patient and completed MOLST, in chart.  (26 Apr 2024 01:17)      Pulmonology HPI:   63F hx ILD/ probable IPF (On 2-3LNC at home), PE 2022 on eliquis, severe pHTN w/ cor pulmonale, IDDM, presenting as a transfer from [place] for lung transplant eval iso SOB, dry cough, chest pain. She was admitted to OSH with acute on chronic hypoxemic respiratory failure, thought to be an acute exacerbation of ILD and with notable pulmonary edema. Treated at [facility] ICU w/ duonebs, symbicort, lasix, ofev and IV steroids. Intermittently during hospitalization she was on HFNC/BIPAP. Requiring HFNC after decompensation event on 4/24 where her oxygen requirements had increased from 3LNC -> 10LNC -> HFNC. TTE from 4/12 w/ PASP 28, normal LV/RV SF. CT Chest 4/12/2024: Findings suggesting interstitial lung disease without significant interval progression. No evidence of pneumonia. Patient states her breathing has been decompensating over the past year. States she could previously tolerate exercise however prior to admission was becoming very SOB with minimal exertion. Presently she feels too deconditioned to engage in exertion. States SOB and air-hunger are her pre-dominant symptoms. Denies changes in cough, denies sputum or hemoptysis, denies fevers/chills, denies chest pain. Endorses occasional chest pain. States she has not been having BMs for 3 days and notes poor urine output. States she is a life-time non-smoker. 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       Patient was offered lung transplant evaluation in 2022 and declined at that time.     Pulmonologist is Dr. Primo Marlow ([facility])        PAST MEDICAL & SURGICAL HISTORY:  Diabetes mellitus      Hyperlipidemia      Pulmonary embolism      Pulmonary fibrosis      Cataract  right eye          MEDICATIONS  (STANDING):  albuterol/ipratropium for Nebulization 3 milliLiter(s) Nebulizer every 6 hours  apixaban 5 milliGRAM(s) Oral two times a day  atorvastatin 40 milliGRAM(s) Oral at bedtime  budesonide 160 MICROgram(s)/formoterol 4.5 MICROgram(s) Inhaler 2 Puff(s) Inhalation two times a day  chlorhexidine 2% Cloths 1 Application(s) Topical daily  dextrose 10% Bolus 125 milliLiter(s) IV Bolus once  dextrose 5%. 1000 milliLiter(s) (100 mL/Hr) IV Continuous <Continuous>  dextrose 5%. 1000 milliLiter(s) (50 mL/Hr) IV Continuous <Continuous>  dextrose 50% Injectable 25 Gram(s) IV Push once  dextrose 50% Injectable 12.5 Gram(s) IV Push once  gabapentin 300 milliGRAM(s) Oral daily  glucagon  Injectable 1 milliGRAM(s) IntraMuscular once  insulin glargine Injectable (LANTUS) 20 Unit(s) SubCutaneous at bedtime  insulin lispro (ADMELOG) corrective regimen sliding scale   SubCutaneous at bedtime  insulin lispro (ADMELOG) corrective regimen sliding scale   SubCutaneous three times a day before meals  insulin lispro Injectable (ADMELOG) 14 Unit(s) SubCutaneous three times a day before meals  methylPREDNISolone sodium succinate Injectable 40 milliGRAM(s) IV Push two times a day  Ofev (Nintedanib) 150 milliGRAM(s) 150 milliGRAM(s) Oral two times a day  pantoprazole    Tablet 40 milliGRAM(s) Oral two times a day  piperacillin/tazobactam IVPB. 3.375 Gram(s) IV Intermittent once  piperacillin/tazobactam IVPB.- 3.375 Gram(s) IV Intermittent once  polyethylene glycol 3350 17 Gram(s) Oral two times a day  senna 2 Tablet(s) Oral at bedtime  traZODone 100 milliGRAM(s) Oral at bedtime    MEDICATIONS  (PRN):  acetaminophen     Tablet .. 650 milliGRAM(s) Oral every 6 hours PRN Temp greater or equal to 38C (100.4F), Mild Pain (1 - 3)  aluminum hydroxide/magnesium hydroxide/simethicone Suspension 30 milliLiter(s) Oral every 4 hours PRN Dyspepsia  cyclobenzaprine 5 milliGRAM(s) Oral three times a day PRN Muscle Spasm  dextrose Oral Gel 15 Gram(s) Oral once PRN Blood Glucose LESS THAN 70 milliGRAM(s)/deciliter  melatonin 3 milliGRAM(s) Oral at bedtime PRN Insomnia      Social History:  Lives w/ daughter, has aide 3x/week, cane/walker at home, no smoking, no drinking. (26 Apr 2024 01:17)      Vital Signs Last 24 Hrs  T(C): 36.5 (26 Apr 2024 11:33), Max: 37.1 (25 Apr 2024 23:50)  T(F): 97.7 (26 Apr 2024 11:33), Max: 98.7 (25 Apr 2024 23:50)  HR: 102 (26 Apr 2024 13:16) (102 - 125)  BP: 135/84 (26 Apr 2024 11:33) (115/70 - 141/84)  BP(mean): 91 (25 Apr 2024 22:45) (83 - 98)  RR: 22 (26 Apr 2024 13:16) (20 - 41)  SpO2: 100% (26 Apr 2024 13:16) (91% - 100%)    Parameters below as of 26 Apr 2024 13:16  Patient On (Oxygen Delivery Method): nasal cannula, high flow  O2 Flow (L/min): 45  O2 Concentration (%): 60    General: WN/WD NAD  Neurology: A&Ox3, nonfocal, PADILLA x 4  Eyes: PERRLA/ EOMI, Gross vision intact  ENT/Neck: Neck supple, trachea midline, No JVD, Gross hearing intact  Respiratory: CTA B/L, No wheezing, rales, rhonchi  CV: RRR, +S1/S2, -S3/S4, no murmurs, rubs or gallops  Abdominal: Soft, NT, ND +BS, No HSM  MSK: 5/5 strength UE/LE bilaterally  Extremities: No edema, 2+ peripheral pulses  Skin: No Rashes, Hematoma, Ecchymosis  Incisions:   Tubes:    ABG - ( 24 Apr 2024 19:05 )  pH, Arterial: 7.48  pH, Blood: x     /  pCO2: 37    /  pO2: 71    / HCO3: 28    / Base Excess: 4.0   /  SaO2: 95                04-26    132<L>  |  100  |  43<H>  ----------------------------<  372<H>  4.9   |  24  |  0.86    Ca    8.7      26 Apr 2024 06:25  Phos  3.5     04-26  Mg     2.2     04-26    TPro  5.8<L>  /  Alb  2.5<L>  /  TBili  0.1<L>  /  DBili  x   /  AST  38  /  ALT  47<H>  /  AlkPhos  101  04-26                            9.3    14.23 )-----------( 497      ( 26 Apr 2024 06:25 )             31.1     Urinalysis Basic - ( 26 Apr 2024 06:25 )    Color: x / Appearance: x / SG: x / pH: x  Gluc: 372 mg/dL / Ketone: x  / Bili: x / Urobili: x   Blood: x / Protein: x / Nitrite: x   Leuk Esterase: x / RBC: x / WBC x   Sq Epi: x / Non Sq Epi: x / Bacteria: x        SARS-CoV-2: NotDetec (11 Apr 2024 22:46)

## 2024-04-26 NOTE — DIETITIAN INITIAL EVALUATION ADULT - PROBLEM SELECTOR PLAN 3
- Pt w/ hx of severe pHTN, RVSP 68 on 10/2022  - [facility] PAP 28, "normal PAP"   - Maintain euvolemia, as above

## 2024-04-26 NOTE — DIETITIAN INITIAL EVALUATION ADULT - ADD RECOMMEND
[X] Consider changing diet to consistent carbohydrate to optimize glycemic control; Defer texture/consistency to Speech Language Pathologist prn.   [X] Consider adding multivitamin once daily for micronutrient coverage, pending no medical contraindications.   [X] ***MONITOR FOR MALNUTRITION***  [X] Provide DM nutrition therapy education on f/u as able/appropriate.   [X] RD will continue to monitor PO intake, GI tolerance, weight trends, skin integrity, BMs, labs/electrolytes prn.   RD remains available upon request.

## 2024-04-26 NOTE — DIETITIAN INITIAL EVALUATION ADULT - PROBLEM SELECTOR PLAN 5
- Pt was seeing Dr. Chua, urologist. At [facility] had failed TOV w/ godwin replaced. Arrives to [facility] w/o Godwin. Pt on outpt med list listed Tamsulosin 0.4 mg, unclear if started at [facility], pt saying she does not take this outpatient.   - q8h Bladder scans, place godwin if 3 straight caths

## 2024-04-26 NOTE — H&P ADULT - ASSESSMENT
63-yo F with PMHx of chronic AHRF on 2-3L NC at b/l 2/2 ILD (Followed w/ Dr. Primo Marlow, [facility]), PE on Eliquis, severe pHTN, Cor pulmonale, DM, presented initially with SOB, dry cough, chest pain, LE edema, recent admission to [facility] in March for ILD flare, admitted to outside hospital->ICU for AHRF 2/2 ILD flare, transferred to [facility] for lung transplant evaluation.

## 2024-04-26 NOTE — PATIENT PROFILE ADULT - NSPROGENPREVTRANSF_GEN_A_NUR
PCL emailed referrals to unit secretary and spoke to RN caring for patient who will provide therapy resources for patient. PCL stated she would call patient in her room, however RN stated that patient is currently being discharged and ready to leave. PCL stated to RN to let patient know to call any of these therapists and they will meet with her telephonically or via another media platform during this time, but that they will be able to provide  services even during the \"stay at home order\".  
no history of blood product transfusion

## 2024-04-26 NOTE — PROGRESS NOTE ADULT - CRITICAL CARE ATTENDING COMMENT
Pt was in extreme respiratory distress while on bipap 10/5, 50% FiO2 - SaO2 97% with a RR of 50 and -600. Confirmed patient's code status (DNR and DNI). Pt experiencing anxiety, which improved within minutes after administration of valium 5 mg IV x 1. RR decreased to 20s- low 30s. Time spent bedside assessing patient and ordering medications.

## 2024-04-26 NOTE — H&P ADULT - PROBLEM SELECTOR PLAN 7
- Pt w/ hx of uncontrolled T2DM, a1c 11.9  - At home, Basaglar 25 U QHS + Admelog 15U TID  - Endocrine was consulted at last admission  - Was on  Lantus 20 + Admelog 14u TID + Mod SSI - At OSH Hgb stable, thought to be anemia of chronic disease.   - Hgb last 10.2  - CTM Hgb

## 2024-04-26 NOTE — H&P ADULT - PROBLEM SELECTOR PLAN 1
- Pt w/ hx of ILD w/ c/f IPF. Followed w/ Dr. Primo Marlow, [facility]. On HFNC 50/60 when leaving [place], now 45/60 on admit, b/l 2-3L NC oxygen. Pneumonia ruled out as cause of AHRF, s/p 4 doses of Levaquin last dose 4/13. Wbc downtrending, pt afebrile so far.   - Transferred to [facility] for lung transplant eval.  - Maintain euvolemia, I's and O's, daily weights.  - f/u AM CXR for eval of pulm edema  - Pulmonary consult in AM.  - Continue home Ofev 150mg BID  - Continue Methylprednisolone 40 mg IVP BID - Pt w/ hx of ILD w/ c/f IPF. Followed w/ Dr. Primo Marlow, [place]. On HFNC 50/60 when leaving [place], now 45/60 on admit, b/l 2-3L NC oxygen. Pneumonia ruled out as cause of AHRF, s/p 4 doses of Levaquin last dose 4/13. Wbc downtrending, pt afebrile so far.   - Transferred to [facility] for lung transplant eval.  - Maintain euvolemia, I's and O's, daily weights.  - f/u AM CXR for eval of pulm edema  - Pulmonary consult in AM.  - Continue home Ofev 150mg BID  - Geovanni Loyacort  - Continue Methylprednisolone 40 mg IVP BID - Pt w/ hx of ILD w/ c/f IPF. Followed w/ Dr. Primo Marlow, [facility]. On HFNC 50/60 when leaving [place], now 45/60 on admit, b/l 2-3L NC oxygen. Pneumonia ruled out as cause of AHRF, s/p 4 doses of Levaquin last dose 4/13. Wbc downtrending, pt afebrile so far.   - Transferred to [facility] for lung transplant eval.  - Maintain euvolemia, I's and O's, daily weights.  - f/u AM CXR for eval of pulm edema  - Pulmonary consult in AM.  - Continue home Ofev 150mg BID  - Duonebs, Symbicort  - Continue Methylprednisolone 40 mg IVP BID - while on steroids, Pantoprazole 40 mg QD - Pt w/ hx of ILD w/ c/f IPF. Followed w/ Dr. Primo Marlow, [facility]. On HFNC 50/60 when leaving [place], now 45/60 on admit, b/l 2-3L NC oxygen. Pneumonia ruled out as cause of AHRF, s/p 4 doses of Levaquin last dose 4/13. Wbc downtrending, pt afebrile so far.   - Transferred to [facility] for lung transplant eval  - Pt wanting to be DNR/DNI - MOLST completed, however unclear if code status may affect transplantation candidacy - may need to  pt further re: GoC - palliative vs. transplantation + ?DNR/DNI reversal  - Maintain euvolemia, I's and O's, daily weights.  - Pulmonary consult in AM.  - Continue home Ofev 150mg BID  - Duonebs, Symbicort  - Continue Methylprednisolone 40 mg IVP BID - while on steroids, Pantoprazole 40 mg QD - Pt w/ hx of ILD w/ c/f IPF. Followed w/ Dr. Primo Marlow, [facility]. On HFNC 50/60 when leaving [place], now 45/60 on admit, b/l 2-3L NC oxygen. Pneumonia ruled out as cause of AHRF, s/p 4 doses of Levaquin last dose 4/13. Wbc downtrending, pt afebrile so far.   - Transferred to [facility] for lung transplant eval  - Maintain euvolemia, I's and O's, daily weights.  - Pulmonary consult in AM.  - Continue home Ofev 150mg BID  - Duonekilo, Symbicort  - Continue Methylprednisolone 40 mg IVP BID - while on steroids, Pantoprazole 40 mg QD

## 2024-04-26 NOTE — H&P ADULT - PROBLEM SELECTOR PLAN 9
DVT PPx: Improve Score: Heparin 5000 subq Q8h/Lovenox 40mg QD   Diet: Regular/DASH/CC/Renal/NPO  Bowel Regimen: Last BM:    Miralax/Senna/Mag Citrate/ Lactulose/Enema  Code: Full/DNR/DNI   Dispo: PT/OT Pending Hospital Course  Pharmacy:  PCP:   Communication: - Was on home cyclobenzaprine 5mg q8 prn, continue

## 2024-04-26 NOTE — CONSULT NOTE ADULT - ASSESSMENT
63-yo F with PMHx of chronic AHRF on 2-3L NC at b/l 2/2 ILD (Followed w/ Dr. Primo Marlow, [facility]), PE on Eliquis, severe pHTN, Cor pulmonale, DM, presented initially with SOB, dry cough, chest pain, LE edema, recent admission to [facility] in March for ILD flare, admitted to outside hospital->ICU for AHRF 2/2 ILD flare, transferred to [facility] for lung transplant evaluation.     We went over the risks and benefits of lung transplantation including one and five year survival. The median survival after a double lung transplant is about 6.5 years and this was explained in detail. We also went over the risks of opportunistic infections and the risks of calcineurin inhibitor use after the transplant with inherent risks of neoplasms and opportunistic infections and other complications. The post-operative recovery period was explained in detail including the need for mechanical ventilation and other means of cardiopulmonary support including extracorporal membrane oxygenation use and the types of incisions and chest tubes that are required.     At this time, the patient would like to discuss the decision to consent with her family.  Education provided and all questions were answered.  Will follow up with patient tomorrow.

## 2024-04-26 NOTE — H&P ADULT - NSHPREVIEWOFSYSTEMS_GEN_ALL_CORE
REVIEW OF SYSTEMS:    CONSTITUTIONAL: No weakness, fevers or chills  EYES: No vision changes  ENT: No vertigo or throat pain   NECK: No pain or stiffness  RESPIRATORY: No cough, wheezing, hemoptysis; No shortness of breath  CARDIOVASCULAR: No chest pain or palpitations  GASTROINTESTINAL: No abdominal or epigastric pain. No nausea, vomiting, or hematemesis; No diarrhea or constipation. No melena or hematochezia.  GENITOURINARY: No dysuria, frequency or hematuria  NEUROLOGICAL: No numbness or weakness  PSYCH: No anxiety, depression, or behavior changes  All other review of systems is negative unless indicated above. REVIEW OF SYSTEMS:    CONSTITUTIONAL: No weakness, fevers or chills  EYES: No vision changes  ENT: No vertigo or throat pain   NECK: No pain or stiffness  RESPIRATORY: No cough, wheezing, hemoptysis; +mild SOB  CARDIOVASCULAR: No chest pain or palpitations  GASTROINTESTINAL: No abdominal or epigastric pain. No nausea, vomiting, or hematemesis; No diarrhea or constipation. No melena or hematochezia.  GENITOURINARY: No dysuria, frequency or hematuria  NEUROLOGICAL: No numbness or weakness  PSYCH: No anxiety, depression, or behavior changes  All other review of systems is negative unless indicated above.

## 2024-04-26 NOTE — DIETITIAN INITIAL EVALUATION ADULT - ENERGY INTAKE
- Per team, endorsed pt with fair appetite/PO intake, consuming ~50% of meal this AM. Noted on previous RD assessment (4/15/24), pt ordered for plant-based oral nutrition supplementation; trial [facility] Glucose Support Protein Shake (per serving provides 16 g PRO, 300 kcal) 1x/day to optimize protein intake.  Fair (50-75%)

## 2024-04-26 NOTE — DIETITIAN INITIAL EVALUATION ADULT - ETIOLOGY
suspected lack of exposure/practice with DM medical nutrition therapy inability to meet estimated nutrient needs secondary to ILD

## 2024-04-26 NOTE — PHYSICAL THERAPY INITIAL EVALUATION ADULT - NSPTDISCHREC_GEN_A_CORE
if patient/family declines LEVON; rec is Home PT, patient would required RW, Commode, Transport/ Poly-fly Chair, shower chair/tub bench, assist with all activities, and ambulance transport home; Patient will require a rolling walker at home due to their Dx of------ to help complete MRADL's. (Mobility related Activity for daily living) ; Patient will require a commode upon discharge secondary to patient confined to single room/first floor without a bathroom./Sub-acute Rehab if patient/family declines LEVON; rec is Home PT, patient would required RW, Commode, Transport/ Poly-fly Chair, shower chair/tub bench, assist with all activities, and ambulance transport home; Patient will require a rolling walker at home due to their Dx of ILD to help complete MRADL's. (Mobility related Activity for daily living) ; Patient will require a commode upon discharge secondary to patient confined to single room/first floor without a bathroom./Sub-acute Rehab

## 2024-04-26 NOTE — PROGRESS NOTE ADULT - ASSESSMENT
63-yo F with PMHx of chronic AHRF on 2-3L NC at b/l 2/2 ILD (Followed w/ Dr. Primo Marlow, [facility]), PE on Eliquis, severe pHTN, Cor pulmonale, DM, presented initially with SOB, dry cough, chest pain, LE edema, recent admission to [facility] in March for ILD flare, admitted to outside hospital->ICU for AHRF 2/2 ILD flare, transferred to [facility] for lung transplant evaluation.

## 2024-04-26 NOTE — PHYSICAL THERAPY INITIAL EVALUATION ADULT - ADDITIONAL COMMENTS
prior to admission at [facility], patient lives with her daughter in a 4th floor walk up apartment. patient reports independent with ADLs and ambulation without assistive device

## 2024-04-26 NOTE — H&P ADULT - NSHPLABSRESULTS_GEN_ALL_CORE
10.2   10.69 )-----------( 466      ( 25 Apr 2024 03:15 )             32.8     04-25    140  |  108  |  34<H>  ----------------------------<  225<H>  4.6   |  26  |  0.93    Ca    8.7      25 Apr 2024 03:15  Phos  3.9     04-25  Mg     2.0     04-25    TPro  6.2  /  Alb  2.0<L>  /  TBili  0.3  /  DBili  x   /  AST  22  /  ALT  44  /  AlkPhos  108  04-25        ABG - ( 24 Apr 2024 19:05 )  pH, Arterial: 7.48  pH, Blood: x     /  pCO2: 37    /  pO2: 71    / HCO3: 28    / Base Excess: 4.0   /  SaO2: 95                LIVER FUNCTIONS - ( 25 Apr 2024 03:15 )  Alb: 2.0 g/dL / Pro: 6.2 g/dL / ALK PHOS: 108 U/L / ALT: 44 U/L DA / AST: 22 U/L / GGT: x           Urinalysis Basic - ( 25 Apr 2024 03:15 )    Color: x / Appearance: x / SG: x / pH: x  Gluc: 225 mg/dL / Ketone: x  / Bili: x / Urobili: x   Blood: x / Protein: x / Nitrite: x   Leuk Esterase: x / RBC: x / WBC x   Sq Epi: x / Non Sq Epi: x / Bacteria: x

## 2024-04-26 NOTE — H&P ADULT - PROBLEM SELECTOR PLAN 4
- Pt w/ hx of cor pulmonale - 4/12/24 Echo w/ LVEF normal EF 65%, normal RV function.  - CTM, maintain euvolemia.

## 2024-04-26 NOTE — DIETITIAN INITIAL EVALUATION ADULT - ORAL NUTRITION SUPPLEMENTS
Trial [facility] Glucose Support Protein Shake (per serving provides 16 g PRO, 300 kcal) 1x/day to optimize protein intake; RD to f.u with pt's oral nutrition supplementation preference.

## 2024-04-26 NOTE — PROGRESS NOTE ADULT - PROBLEM SELECTOR PLAN 8
- Pt w/ hx of uncontrolled T2DM, a1c 11.9. At home, Basaglar 25 U QHS + Admelog 15U TID. Endocrine was consulted at [facility] prior to transfer. Was on  Lantus 20 + Admelog 14u TID + Mod SSI  - Pt given 22U Lantus @ [facility] at 9 PM, at 1 AM noted to have Glucose increased 240->300's, gave 10U Lantus, in AM continue Lantus 20U at bedtime + Admelog 14U TID and MDSSI.  - Endocrine consulted via email

## 2024-04-26 NOTE — DIETITIAN INITIAL EVALUATION ADULT - PROBLEM SELECTOR PLAN 8
- Pt w/ hx of uncontrolled T2DM, a1c 11.9. At home, Basaglar 25 U QHS + Admelog 15U TID. [facility]. Was on  Lantus 20 + Admelog 14u TID + Mod SSI  - Pt given 22U Lantus [facility] at [facility] PM, at 1 AM noted to have Glucose increased 240->300's, gave 10U Lantus, in AM continue Lantus 20U at bedtime + Admelog 14U TID and MDSSI.   - Endocrine consulted via email

## 2024-04-26 NOTE — H&P ADULT - PROBLEM SELECTOR PLAN 3
- Pt w/ hx of severe pHTN, RVSP 68 on 10/2022  - Repeat 4/12/24 at [facility] noted PAP 28, "normal PAP"   - Maintain euvolemia, as above

## 2024-04-26 NOTE — PROGRESS NOTE ADULT - PROBLEM SELECTOR PLAN 2
Hx of PE on CTA 10/29/22 w/ RLL segmental/subsegmental pulmonary embolism  - Repeat CTA 2/28/23 w/o PE. Was at [facility] on Lovenox->Eliquis while pending repeat CTA to r/o PE given new hypoxia. DVT studies at OSH 4/22/24 negative  - Unable to perform repeat CTA given degree of hypoxemia  - Can continue Eliquis for now

## 2024-04-26 NOTE — CONSULT NOTE ADULT - ASSESSMENT
63 F with history of AHRF, T2D, PE, HTN here for SOB secondary to interstital lung disease. Patient currently on solumedrol 40 mg BID. Endocrinology consulted for diabetes management.   Patient is high risk with high level decision making due to uncontrolled diabetes with A1C>10 which places patient at high risk for cardiovascular and cerebrovascular events. Patient with lability of glucose requiring close monitoring and insulin adjustments.    # T2DM  with hyperglycemia   - Most recent Hemoglobin A1C 11.9  - Current FS ranges from 200-300  - Current diet: regular   - Please monitor blood glucose values TID AC & QHS while eating regular meals and Q6H while NPO  - Blood glucose goals pre-meal less than 140 mg/dL and random blood glucose less than 180 mg/dL  - Recommendations:  - Please switch to carb consistent diet   - For now continue with insulin Glargine 20 units QHS  - increase insulin Lispro to 18 units TID with meals, hold if NPO or if eating less than 50% of meals  - Continue with mod dose correctional scale TID with meals  - Continue with mod dose correctional scale QHS    Discharge planning:   - Home DM medications: metformin 500 mg BID, + glimepride 1 mg daily + lantus 35 units QHS+ admelog 25 units TIDAC  - Discharge DM medications:   - Continue with metformin 500 mg BID  - STOP glimepride due to recurrent hypoglycemia at home   - Basal/bolus, dose will depend on insulin requirement and steroid plan   - Patient will follow up at  [facility]:  [address] [place] [address]. [address]. [place] [address]  Tel: [phone]  - Patient will need opthalmology and podiatry follow up as outpatient     # HTN  - BP goal 130/80  - Manage per primary team     # HLD  - Continue with atorvastatin 40 mg QHS  - Goal LDL<70  - Manage as outpatient       Thank you for the consult. Recs related to the team Will continue to monitor. Please inform the endocrinology team at least 24 hours prior to intended discharge date.     Contact via pager or [employer] Teams during business hours. For follow up questions, discharge recommendations, or new consults please call answering service at [phone] (weekdays), [phone] (nights/weekends). For nonurgent matters, please email  [email or web address].     Mary Hopper MD  Department of Endocrinology, Diabetes and metabolism   Pager [phone]

## 2024-04-26 NOTE — DIETITIAN INITIAL EVALUATION ADULT - REASON INDICATOR FOR ASSESSMENT
RD Consult Inidcated for MST Score 2 or >  Source: Team, Previous RD notes, Electronic Medical Record; Unable to interview pt at this time secondary to BIPAP, not appropriate at this time.   Chart reviewed, events noted.

## 2024-04-26 NOTE — DIETITIAN INITIAL EVALUATION ADULT - PERTINENT LABORATORY DATA
04-26    132<L>  |  100  |  43<H>  ----------------------------<  372<H>  4.9   |  24  |  0.86    Ca    8.7      26 Apr 2024 06:25  Phos  3.5     04-26  Mg     2.2     04-26    TPro  5.8<L>  /  Alb  2.5<L>  /  TBili  0.1<L>  /  DBili  x   /  AST  38  /  ALT  47<H>  /  AlkPhos  101  04-26  POCT Blood Glucose.: 315 mg/dL (04-26-24 @ 12:08)  A1C with Estimated Average Glucose Result: 11.9 % (04-13-24 @ 03:53)

## 2024-04-26 NOTE — DIETITIAN INITIAL EVALUATION ADULT - NSFNSGIIOFT_GEN_A_CORE
I&O's Detail    25 Apr 2024 07:01  -  26 Apr 2024 07:00  --------------------------------------------------------  IN:  Total IN: 0 mL    OUT:    Intermittent Catheterization - Urethral (mL): 600 mL  Total OUT: 600 mL    Total NET: -600 mL

## 2024-04-26 NOTE — PHYSICAL THERAPY INITIAL EVALUATION ADULT - TRANSFER TRAINING, PT EVAL
GOAL: Patient will perform sit to stand transfers with contact guard assist and rolling walker with proper hand placement in 2 weeks

## 2024-04-26 NOTE — H&P ADULT - PROBLEM SELECTOR PLAN 10
DVT PPx: Eliquis  Diet: Regular  Bowel Regimen: Miralax, Senna - pt w/ last bm 2 days ago  Code: DNR/DNI, molst completed in chart   Dispo: PT/OT  Pharmacy:  PCP:   Communication:  Continue home Trazodone 100 mg qd + Melatonin 3 PRN for sleep difficulties    Aspiration precautions  Fall precautions - Continue Gabapentin 300 mg qd for neuropathic pain.

## 2024-04-26 NOTE — CHART NOTE - NSCHARTNOTEFT_GEN_A_CORE
Pt reporting burning/sharp chest pain and abdominal pain. ECG done at bedside w/ new TWIs in V2-V3. Maalox ordered for possible abdominal component, troponins ordered. Plan to trend serial troponins, ECGs. Pt reporting burning/sharp chest pain and abdominal pain. ECG done at bedside w/ new TWIs in V2-V3. Maalox ordered for possible abdominal component, troponins ordered. Plan to trend serial troponins, ECGs.     Discussed verbally plan w/ ACP Pancho Escobedo

## 2024-04-26 NOTE — H&P ADULT - NSHPPHYSICALEXAM_GEN_ALL_CORE
VITALS:   T(C): 37.1 (04-25-24 @ 23:50), Max: 37.1 (04-25-24 @ 23:50)  HR: 116 (04-25-24 @ 23:50) (99 - 125)  BP: 128/79 (04-25-24 @ 23:50) (100/71 - 139/80)  RR: 25 (04-25-24 @ 23:51) (19 - 41)  SpO2: 100% (04-25-24 @ 23:51) (89% - 100%)    GENERAL: NAD, lying in bed comfortably  HEAD:  Atraumatic, Normocephalic  EYES: EOMI, PERRLA, conjunctiva and sclera clear  ENT: Moist mucous membranes  NECK: Supple, No JVD  CHEST/LUNG: CTABL; No rales, rhonchi, wheezing, or rubs. Unlabored respirations  HEART: RRR. No M/R/G  ABDOMEN: Soft, nontender, non-distended, normoactive BS. No hepatomegaly  EXTREMITIES:  2+ Peripheral Pulses, brisk capillary refill. No clubbing, cyanosis, or edema  NERVOUS SYSTEM:  Alert & Oriented X3, speech clear. No deficits, CN II-XII intact. Normal sensation   MSK: FROM all 4 extremities, full and equal strength  PSYCH: Normal affect, normal speech, normal behavior  SKIN: No rashes or lesions VITALS:   T(C): 37.1 (04-25-24 @ 23:50), Max: 37.1 (04-25-24 @ 23:50)  HR: 116 (04-25-24 @ 23:50) (99 - 125)  BP: 128/79 (04-25-24 @ 23:50) (100/71 - 139/80)  RR: 25 (04-25-24 @ 23:51) (19 - 41)  SpO2: 100% (04-25-24 @ 23:51) (89% - 100%)    GENERAL: NAD, lying in bed comfortably  HEAD:  Atraumatic, Normocephalic  EYES: EOMI, PERRLA, conjunctiva and sclera clear  ENT: Moist mucous membranes  NECK: Supple, No JVD  CHEST/LUNG: Diffuse crackles  HEART: RRR. No M/R/G  ABDOMEN: Soft, nontender, non-distended, normoactive BS. No hepatomegaly  EXTREMITIES:  2+ Peripheral Pulses, brisk capillary refill. No clubbing, cyanosis, or edema  NERVOUS SYSTEM:  Alert & Oriented X3, speech clear. No deficits, CN II-XII intact. Normal sensation   MSK: FROM all 4 extremities, full and equal strength  PSYCH: Normal affect, normal speech, normal behavior  SKIN: No rashes or lesions

## 2024-04-26 NOTE — PROGRESS NOTE ADULT - PROBLEM SELECTOR PLAN 3
- Pt w/ hx of severe pHTN, RVSP 68 on 10/2022  - Repeat 4/12/24 at [facility] noted PAP 28, "normal PAP"   - Maintain euvolemia, as above

## 2024-04-26 NOTE — DIETITIAN INITIAL EVALUATION ADULT - PROBLEM SELECTOR PROBLEM 10
Carlos Mayo is an extremely pleasant 65 year old year old female patient here today for evaluation and managment of basal cell carcinoma on right frontal scalp.  Patient has no other skin complaints today.  Remainder of the HPI, Meds, PMH, Allergies, FH, and SH was reviewed in chart.      Past Medical History:   Diagnosis Date     Basal cell carcinoma      Esophageal reflux     Resolved     Palpitations     Holter monitor cleared per patient.      Unspecified acute reaction to stress        Past Surgical History:   Procedure Laterality Date     COLONOSCOPY  09/14/2009    Procedure: Colonoscopy Providers: Grey Paulino MD, Isis Dorantes RN     OPEN REDUCTION INTERNAL FIXATION WRIST Right 9/27/2016    Procedure: OPEN REDUCTION INTERNAL FIXATION WRIST;  Surgeon: Sammie Peterson MD;  Location: [facility] OR     REMOVE HARDWARE WRIST Right 9/14/2017    Procedure: REMOVE HARDWARE WRIST;  Right distal radius removal of hardware;  Surgeon: Sammie Peterson MD;  Location: [facility] OR     SURGICAL HISTORY OF -   1980    Extraction of [place] teeth        Family History   Problem Relation Age of Onset     Hypertension Mother      Depression/Anxiety Mother      Hypertension Father      Cerebrovascular Disease Maternal Grandfather      Anesthesia Reaction No family hx of        Social History     Socioeconomic History     Marital status:      Spouse name: Not on file     Number of children: Not on file     Years of education: Not on file     Highest education level: Not on file   Occupational History     Not on file   Tobacco Use     Smoking status: Never Smoker     Smokeless tobacco: Never Used   Substance and Sexual Activity     Alcohol use: Yes     Alcohol/week: 1.7 standard drinks     Comment: 2-3 times a month     Drug use: No     Sexual activity: Not Currently     Partners: Male     Birth control/protection: None     Comment: spouse vas   Other Topics Concern     Parent/sibling w/ CABG, MI or angioplasty before  65F 55M? No   Social History Narrative     Not on file     Social Determinants of Health     Financial Resource Strain:      Difficulty of Paying Living Expenses:    Food Insecurity:      Worried About Running Out of Food in the Last Year:      Ran Out of Food in the Last Year:    Transportation Needs:      Lack of Transportation (Medical):      Lack of Transportation (Non-Medical):    Physical Activity:      Days of Exercise per Week:      Minutes of Exercise per Session:    Stress:      Feeling of Stress :    Social Connections:      Frequency of Communication with Friends and Family:      Frequency of Social Gatherings with Friends and Family:      Attends [employer] Services:      Active Member of Clubs or Organizations:      Attends Club or Organization Meetings:      Marital Status:    Intimate Partner Violence:      Fear of Current or Ex-Partner:      Emotionally Abused:      Physically Abused:      Sexually Abused:        Outpatient Encounter Medications as of 8/31/2021   Medication Sig Dispense Refill     acetaminophen (TYLENOL) 325 MG tablet Take 2 tablets (650 mg) by mouth every 6 hours 100 tablet 0     Ascorbic Acid (VITAMIN C PO)        Cholecalciferol (VITAMIN D) 2000 UNITS CAPS        vitamin  B complex with vitamin C (VITAMIN  B COMPLEX) TABS Take 1 tablet by mouth daily       No facility-administered encounter medications on file as of 8/31/2021.             O:   NAD, WDWN, Alert & Oriented, Mood & Affect wnl, Vitals stable   Here today alone   /79 (BP Location: Right arm, Patient Position: Sitting, Cuff Size: Adult Large)   Pulse 86   LMP 11/01/2007 (LMP Unknown)   SpO2 100%    General appearance normal   Vitals stable   Alert, oriented and in no acute distress     R frontal scalp 3mm scaly papule       Eyes: Conjunctivae/lids:Normal     ENT: Lips, buccal mucosa, tongue: normal    MSK:Normal    Cardiovascular: peripheral edema none    Pulm: Breathing Normal    Neuro/Psych: Orientation:Alert  and Orientedx3 ; Mood/Affect:normal       A/P:  1. R frontal scalp basal cell carcinoma   MOHS:   Location    The rationale for Mohs surgery was discussed with the patient and consent was obtained.  The risks and benefits as well as alternatives to therapy were discussed, in detail.  Specifically, the risks of infection, scarring, bleeding, prolonged wound healing, incomplete removal, allergy to anesthesia, nerve injury and recurrence were addressed.  Indication for Mohs was Location. Prior to the procedure, the treatment site was clearly identified and, if available, confirmed with previous photos and confirmed by the patient   All components of the Universal Protocol/PAUSE rule were completed.  The Mohs surgeon operated in two distinct and integrated capacities as the surgeon and pathologist.      The area was prepped with Betasept.  A rim of normal appearing skin was marked circumferentially around the lesion.  The area was infiltrated with local anesthesia.  The tumor was first debulked to remove all clinically apparent tumor.  An incision following the standard Mohs approach was done and the specimen was oriented,mapped and placed in 1 block(s).  Each specimen was then chromacoded and processed in the Mohs laboratory using standard Mohs technique and submitted for frozen section histology.  Frozen section analysis showed  residual tumor but CLEAR MARGINS.    First stage:Orthokeratosis of epidermis with a proliferation of nests of basaloid cells, with peripheral palisading and a haphazard arrangement in the center extending into the dermis, forming nodules.  The tumor cells have hyperchromatic nuclei. Poor cytoplasm and intercellular bridging.      The tumor was excised using standard Mohs technique in 1 stages(s).  CLEAR MARGINS OBTAINED and Final defect size was 0.8 cm.     We discussed the options for wound management in full with the patient including risks/benefits/ possible outcomes.        REPAIR SECOND  INTENT: We discussed the options for wound management in full with the patient including risks/benefits/possible outcomes. Decision made to allow the wound to heal by second intention. Cautery was used for for hemostasis. EBL minimal; complications none; wound care routine.  The patient was discharged in good condition and will return in one month or prn for wound evaluation.  It was a pleasure speaking to Carlos Mayo today.  Previous clinic notes and pertinent laboratory tests were reviewed prior to Carlos Mayo's visit.  Signs and Symptoms of skin cancer discussed with patient.  Patient encouraged to perform monthly skin exams.  UV precautions reviewed with patient.  Risks of non-melanoma skin cancer discussed with patient   Return to clinic 6 months     Neuropathic pain

## 2024-04-26 NOTE — H&P ADULT - PROBLEM SELECTOR PLAN 6
- At OSH Hgb stable, thought to be anemia of chronic disease.   - Hgb last 10.2  - CTM Hgb - Continue home Atorvastatin 40 mg QD

## 2024-04-26 NOTE — PHYSICAL THERAPY INITIAL EVALUATION ADULT - PERTINENT HX OF CURRENT PROBLEM, REHAB EVAL
63-yo F with PMHx of chronic AHRF on 2-3L NC at b/l 2/2 ILD (Followed w/ Dr. Primo Marlow, [facility]), PE on Eliquis, severe pHTN, Cor pulmonale, DM, presented initially with SOB, dry cough, chest pain, LE edema, recent admission to [facility] in March for ILD flare, admitted to outside hospital->ICU for AHRF 2/2 ILD flare, transferred to [facility] for lung transplant evaluation. 63-yo F with PMHx of chronic AHRF on 2-3L NC at b/l 2/2 ILD (Followed w/ Dr. Primo Marlow, [facility]), PE on Eliquis, severe pHTN, Cor pulmonale, DM, presented initially with SOB, dry cough, chest pain, LE edema, recent admission to [facility] in March for ILD flare, admitted to outside hospital->ICU for AHRF 2/2 ILD flare, transferred to [facility] for lung transplant evaluation. (4/29) Pt. seen by neurology;  Pt. demonstrating urinary/fecal incontinence; Neurology plans to order MRI L/S spine w/ and w/o contrast. 63-yo F with PMHx of chronic AHRF on 2-3L NC at b/l 2/2 ILD (Followed w/ Dr. Primo Marlow, [facility]), PE on Eliquis, severe pHTN, Cor pulmonale, DM, presented initially with SOB, dry cough, chest pain, LE edema, recent admission to [facility] in March for ILD flare, admitted to outside hospital->ICU for AHRF 2/2 ILD flare, transferred to [facility] for lung transplant evaluation. (4/29)   Hospital Course:  4/27: TTE: severe RH dysfunction with elevated pulmonary pressure, grade ii diastolic dysfunction (?from RH failure), normal LVSF  4/30: MRI BRAIN: No acute infarction.; MRI CERVICAL SPINE: Mild diffuse volume loss of the cervical and upper thoracic spinal cord. Subtle diffuse central cord T2/FLAIR hyperintensity. No mass effect or cord swelling. No abnormal enhancement.

## 2024-04-26 NOTE — DIETITIAN INITIAL EVALUATION ADULT - NSFNSGIASSESSMENTFT_GEN_A_CORE
No N/V nor diarrhea/constipation reported at this time; last BM PTA. Ordered for bowel regimen: Miralax, Senna.  Ordered for PPI, Aluminum Hydroxide; Magnesium Hydroxide; Simethicone Suspension.

## 2024-04-26 NOTE — H&P ADULT - PROBLEM SELECTOR PLAN 2
- CTA 10/29/22 w/ Right lower lobe segmental/subsegmental pulmonary embolism.  - Repeat CTA 2/28/23 w/o PE. Was at [facility] on Lovenox->Eliquis while pending repeat CTA to r/o PE given new hypoxia. ?unclear if provoked or not provoked.   - Repeat CTA to r/o PE. DVT studies at OSH 4/22/24 negative.   - Continue Eliquis but discontinue if negative scan.

## 2024-04-26 NOTE — DIETITIAN INITIAL EVALUATION ADULT - OTHER CALCULATIONS
Used current dosing wt of 56.6 kg (4/25) for caloric/protein needs in consideration of age.  Defer fluid needs to team.

## 2024-04-26 NOTE — PHYSICAL THERAPY INITIAL EVALUATION ADULT - GAIT DEVIATIONS NOTED, PT EVAL
decreased km/increased time in double stance/decreased step length/decreased stride length/decreased weight-shifting ability

## 2024-04-26 NOTE — CONSULT NOTE ADULT - SUBJECTIVE AND OBJECTIVE BOX
HPI: 63 F with history of AHRF, T2D, PE, HTN here for SOB secondary to interstital lung disease. Patient currently on solumedrol 40 mg BID. Endocrinology consulted for diabetes management.     Diabetes history:  Seen at the bedside with patient on high flow. Diagnosed with T2D about >10 years ago. She is on a combination of oral meds and insulin at home (see below). Gluocse at home  usually 200s. does endorse having hypoglycemia <70 at least once weekly. Hypoglycemic can be either fasting or postprandial related when she skips a meal.     Denies having retinopathy. Does endorse having neuropathy. Denies history of CAD or CVA.      Most recent A1C 11.9    Home DM medications:  - Glimepride 1 mg daily   - Lantus 35 units QHS  - 25 units TIDAC  - Metformin 500 mg daily     Current inpatient DM Meds:   - Lantus 20 units QHS  - Admelog 14 units TIDAC  - MOd iss     FH:  DM: denies    SH:  Smoking: denies  Etoh: denies  Recreational Drugs: denies    PAST MEDICAL & SURGICAL HISTORY:  Diabetes mellitus      Hyperlipidemia      Pulmonary embolism      Pulmonary fibrosis      Cataract  right eye              Current Meds:  acetaminophen     Tablet .. 650 milliGRAM(s) Oral every 6 hours PRN  albuterol/ipratropium for Nebulization 3 milliLiter(s) Nebulizer every 6 hours  aluminum hydroxide/magnesium hydroxide/simethicone Suspension 30 milliLiter(s) Oral every 4 hours PRN  apixaban 5 milliGRAM(s) Oral two times a day  atorvastatin 40 milliGRAM(s) Oral at bedtime  budesonide 160 MICROgram(s)/formoterol 4.5 MICROgram(s) Inhaler 2 Puff(s) Inhalation two times a day  chlorhexidine 2% Cloths 1 Application(s) Topical daily  cyclobenzaprine 5 milliGRAM(s) Oral three times a day PRN  dextrose 10% Bolus 125 milliLiter(s) IV Bolus once  dextrose 5%. 1000 milliLiter(s) IV Continuous <Continuous>  dextrose 5%. 1000 milliLiter(s) IV Continuous <Continuous>  dextrose 50% Injectable 25 Gram(s) IV Push once  dextrose 50% Injectable 12.5 Gram(s) IV Push once  dextrose Oral Gel 15 Gram(s) Oral once PRN  gabapentin 300 milliGRAM(s) Oral daily  glucagon  Injectable 1 milliGRAM(s) IntraMuscular once  insulin glargine Injectable (LANTUS) 20 Unit(s) SubCutaneous at bedtime  insulin lispro (ADMELOG) corrective regimen sliding scale   SubCutaneous three times a day before meals  insulin lispro (ADMELOG) corrective regimen sliding scale   SubCutaneous at bedtime  insulin lispro Injectable (ADMELOG) 14 Unit(s) SubCutaneous three times a day before meals  melatonin 3 milliGRAM(s) Oral at bedtime PRN  methylPREDNISolone sodium succinate Injectable 40 milliGRAM(s) IV Push two times a day  multivitamin 1 Tablet(s) Oral daily  Ofev (Nintedanib) 150 milliGRAM(s) 150 milliGRAM(s) Oral two times a day  pantoprazole    Tablet 40 milliGRAM(s) Oral two times a day  piperacillin/tazobactam IVPB.- 3.375 Gram(s) IV Intermittent once  polyethylene glycol 3350 17 Gram(s) Oral two times a day  senna 2 Tablet(s) Oral at bedtime  traZODone 100 milliGRAM(s) Oral at bedtime      Allergies:  No Known Allergies      ROS:     Constitutional: No fever, good appetite/po intake  Eyes: No blurry vision, diplopia  Neuro: No tremors  HEENT: No pain  Cardiovascular: No chest pain, palpitations  Respiratory: No SOB, no cough  GI: No nausea, vomiting,   : No dysuria, hematuria  Skin: no rash  Psych: no depression  Endocrine: no polyuria, polydipsia  Hem/lymph: no swelling  Osteoporosis: no fractures     Vital Signs Last 24 Hrs  T(C): 36.5 (26 Apr 2024 11:33), Max: 37.1 (25 Apr 2024 23:50)  T(F): 97.7 (26 Apr 2024 11:33), Max: 98.7 (25 Apr 2024 23:50)  HR: 100 (26 Apr 2024 16:11) (100 - 120)  BP: 135/84 (26 Apr 2024 11:33) (115/70 - 141/84)  BP(mean): 91 (25 Apr 2024 22:45) (83 - 98)  RR: 23 (26 Apr 2024 16:11) (20 - 41)  SpO2: 100% (26 Apr 2024 16:11) (91% - 100%)    Parameters below as of 26 Apr 2024 16:11  Patient On (Oxygen Delivery Method): nasal cannula, high flow  O2 Flow (L/min): 55  O2 Concentration (%): 60  Height (cm): 167.6 (04-25 @ 23:50)  Weight (kg): 56.6 (04-25 @ 23:50)  BMI (kg/m2): 20.1 (04-25 @ 23:50)    VITALS: T(C): 36.5 (04-26-24 @ 11:33)  T(F): 97.7 (04-26-24 @ 11:33), Max: 98.7 (04-25-24 @ 23:50)  HR: 100 (04-26-24 @ 16:11) (100 - 120)  BP: 135/84 (04-26-24 @ 11:33) (115/70 - 141/84)  RR:  (20 - 41)  SpO2:  (91% - 100%)  Wt(kg): --  GENERAL: mild distress on high flow, well-groomed, well-developed  EYES: No proptosis, no lid lag, anicteric, extraocular movements intact  HEENT:  Atraumatic, Normocephalic, moist mucous membranes  THYROID: Normal size, no palpable nodules, no thyromegaly  RESPIRATORY: labored breathing  CARDIOVASCULAR: non tachycardic, no peripheral edema  GI: Soft, nontender, non distended   SKIN: Dry, intact, No rashes or lesions  NEURO: sensation intact, no tremor  EXTREMITIES: no foot ulcers and bilateral distal pedal pulses intact  PSYCH: reactive affect, euthymic mood      LABS:                        9.3    14.23 )-----------( 497      ( 26 Apr 2024 06:25 )             31.1     04-26    132<L>  |  100  |  43<H>  ----------------------------<  372<H>  4.9   |  24  |  0.86    Ca    8.7      26 Apr 2024 06:25  Phos  3.5     04-26  Mg     2.2     04-26    TPro  5.8<L>  /  Alb  2.5<L>  /  TBili  0.1<L>  /  DBili  x   /  AST  38  /  ALT  47<H>  /  AlkPhos  101  04-26      Urinalysis Basic - ( 26 Apr 2024 06:25 )    Color: x / Appearance: x / SG: x / pH: x  Gluc: 372 mg/dL / Ketone: x  / Bili: x / Urobili: x   Blood: x / Protein: x / Nitrite: x   Leuk Esterase: x / RBC: x / WBC x   Sq Epi: x / Non Sq Epi: x / Bacteria: x            RADIOLOGY & ADDITIONAL STUDIES:  CAPILLARY BLOOD GLUCOSE      POCT Blood Glucose.: 237 mg/dL (26 Apr 2024 17:03)  POCT Blood Glucose.: 315 mg/dL (26 Apr 2024 12:08)  POCT Blood Glucose.: 422 mg/dL (26 Apr 2024 12:07)  POCT Blood Glucose.: 377 mg/dL (26 Apr 2024 08:14)  POCT Blood Glucose.: 363 mg/dL (26 Apr 2024 08:13)  POCT Blood Glucose.: 327 mg/dL (26 Apr 2024 01:23)  POCT Blood Glucose.: 244 mg/dL (25 Apr 2024 21:13)

## 2024-04-26 NOTE — H&P ADULT - PROBLEM SELECTOR PLAN 12
DVT PPx: Eliquis  Diet: Regular  Bowel Regimen: Miralax, Senna - pt w/ last bm 2 days ago  Code: DNR/DNI, molst completed in chart   Dispo: PT/OT  Pharmacy:  PCP:   Communication:    Aspiration precautions  Fall precautions

## 2024-04-26 NOTE — DIETITIAN INITIAL EVALUATION ADULT - REASON
Unable to conduct Nutrition Focused Physical Exam as advised per team inappropriate to interview at this time; RD will continue to reassess as able/appropriate.

## 2024-04-26 NOTE — DIETITIAN INITIAL EVALUATION ADULT - ORAL INTAKE PTA/DIET HISTORY
Pt known to RD service with previous moderate protein calorie malnutrition dx (4/15/24). Unable to interview pt at this time secondary to BIPAP/inappropraite at this time; RD to f/u to obtain information as appropriate. Per pt profile (4/26), pt with decreased appetite/PO intake PTA; per previous RD note (4/15/24), pt with fair PO intake at [facility]. No known food allergies/intolerances documented per chart. No micronutrient supplementation reported.

## 2024-04-26 NOTE — DIETITIAN INITIAL EVALUATION ADULT - PROBLEM SELECTOR PLAN 1
- Pt w/ hx of ILD w/ c/f IPF. Followed w/ Dr. Primo Marlow, [facility]. On HFNC 50/60 when leaving [place], now 45/60 on admit, b/l 2-3L NC oxygen. Pneumonia ruled out as cause of AHRF, s/p 4 doses of Levaquin last dose 4/13. Wbc downtrending, pt afebrile so far.   - Transferred to [facility] for lung transplant eval  - Maintain euvolemia, I's and O's, daily weights.  - Pulmonary consult in AM.  - Continue home Ofev 150mg BID  - Duonekilo, Symbicort  - Continue Methylprednisolone 40 mg IVP BID - while on steroids, Pantoprazole 40 mg QD

## 2024-04-26 NOTE — CONSULT NOTE ADULT - ASSESSMENT
63F hx ILD/ probable IPF (On 2-3LNC at home), PE 2022 on eliquis, severe pHTN w/ prior cor pulmonale (RVSP 68 - 10/22 w/ TTE from 4/12 w/ PASP 28, normal LV/RV SF), IDDM, presenting as a transfer from [place] for lung transplant eval iso SOB, dry cough, chest pain. She has been receiving duonebs, symbicort, lasix, ofev and IV steroids. Continuing with AC. Solu-medrol is ordered for 40 IV BID. Pending TTE and CXR. CT Chest 4/12/2024: Findings suggesting interstitial lung disease without significant interval progression. No evidence of pneumonia.     Recommendations  Continue with Symbicort  Continue with duonebs  Continue with Ofev  Continue with Eliquis  f/u repeat Cxr  f/u TTE  Decision on solu-medrol to be discussed with my attending    Pending discussion with attending physician.  63F hx ILD/ probable IPF (On 2-3LNC at home), PE 2022 on eliquis, severe pHTN w/ prior cor pulmonale (RVSP 68 - 10/22 w/ TTE from 4/12 w/ PASP 28, normal LV/RV SF), IDDM, presenting as a transfer from [place] for lung transplant eval iso SOB, dry cough, chest pain. She has been receiving duonebs, symbicort, lasix, ofev and IV steroids. Continuing with AC. Solu-medrol is ordered for 40 IV BID. Pending TTE and CXR. CT Chest 4/12/2024: Findings suggesting interstitial lung disease without significant interval progression. No evidence of pneumonia.   CT scan from 4/12/2024 when compared with CT scan from 2020    Recommendations  Continue with Symbicort, duonebs, Ofev, Eliquis, Solu-medrol 40 IV BID  Continue with diuresis - of note patient endorsing that she is not urinating, had godwin for urinary retention at OSH, consider bladder scans and godwin placement if retaining  Can continue to use vallium prn for anxiety  Please give patient laxative/bowel regimen, has endorsed no BMs over 3 days   c/w protonix - esophageal dilatation noted on CT scan  f/u repeat Cxr  f/u TTE WITH BUBBLE STUDY  Please send sputum culture - can continue with zosyn for now, though unlikely clinical picture related to pna  Repeat Collagen vascular disease work-up: please send RF, anti-ccp, anti-centromere, CK, aldolase, MyoMarker Panel 3 Plus, ANCA, SCL-70 ab, HOLLIE, DsDNA, anti-RO, anti-LA, Sjogren antibodies, IgG4, Anti-RNP, Agnes-1 antibodies, and HIV antibodies.   Start inhaled nitric oxide - there may be a component of pulmonary hypertension contributing to clinical picture despite recent PASP of 28.     Case discussed with attending.  63F hx ILD/ probable IPF (On 2-3LNC at home), PE 2022 on eliquis, severe pHTN w/ prior cor pulmonale (RVSP 68 - 10/22 w/ TTE from 4/12 w/ PASP 28, normal LV/RV SF), IDDM, presenting as a transfer from [place] for lung transplant eval iso SOB, dry cough, chest pain. She has been receiving duonebs, symbicort, lasix, ofev and IV steroids. Continuing with AC. Solu-medrol is ordered for 40 IV BID. Pending TTE and CXR. CT Chest 4/12/2024: Findings suggesting interstitial lung disease without significant interval progression. No evidence of pneumonia.   CT scan from 4/12/2024 when compared with CT scan from 2020 has shown significant progression.      Recommendations  Continue with Symbicort, duonebs, Ofev, Eliquis, Solu-medrol 40 IV BID  Continue with diuresis - of note patient endorsing that she is not urinating, had godwin for urinary retention at OSH, consider bladder scans and godwin placement if retaining  Can continue to use vallium prn for anxiety  Please give patient laxative/bowel regimen, has endorsed no BMs over 3 days   c/w protonix - esophageal dilatation noted on CT scan  f/u repeat Cxr  f/u TTE WITH BUBBLE STUDY  Please send sputum culture - can continue with zosyn for now, though unlikely clinical picture related to pna  Repeat Collagen vascular disease work-up: please send RF, anti-ccp, anti-centromere, CK, aldolase, MyoMarker Panel 3 Plus, ANCA, SCL-70 ab, HOLLIE, DsDNA, anti-RO, anti-LA, Sjogren antibodies, IgG4, Anti-RNP, Agnes-1 antibodies, and HIV antibodies.   Start inhaled nitric oxide - there may be a component of pulmonary hypertension contributing to clinical picture despite recent PASP of 28.     Case discussed with attending.

## 2024-04-26 NOTE — PATIENT PROFILE ADULT - FALL HARM RISK - HARM RISK INTERVENTIONS
Assistance with ambulation/Assistance OOB with selected safe patient handling equipment/Communicate Risk of Fall with Harm to all staff/Discuss with provider need for PT consult/Monitor for mental status changes/Monitor gait and stability/Provide patient with walking aids - walker, cane, crutches/Reinforce activity limits and safety measures with patient and family/Reorient to person, place and time as needed/Tailored Fall Risk Interventions/Use of alarms - bed, chair and/or voice tab/Visual Cue: Yellow wristband and red socks/Bed in lowest position, wheels locked, appropriate side rails in place/Call bell, personal items and telephone in reach/Instruct patient to call for assistance before getting out of bed or chair/Non-slip footwear when patient is out of bed/[place] to call system/Physically safe environment - no spills, clutter or unnecessary equipment/Purposeful Proactive Rounding/Room/bathroom lighting operational, light cord in reach

## 2024-04-26 NOTE — CONSULT NOTE ADULT - SUBJECTIVE AND OBJECTIVE BOX
CHIEF COMPLAINT:    HPI:    PAST MEDICAL & SURGICAL HISTORY:  Diabetes mellitus      Hyperlipidemia      Pulmonary embolism      Pulmonary fibrosis      Cataract  right eye          FAMILY HISTORY:  Family history of MI (myocardial infarction) (Father)        SOCIAL HISTORY:  Smoking: __ packs x ___ years  EtOH Use:  Marital Status:  Occupation:  Recent Travel:  Country of Birth:  Advance Directives:    Allergies    No Known Allergies    Intolerances        HOME MEDICATIONS:    REVIEW OF SYSTEMS:  Constitutional:   Eyes:  ENT:  CV:  Resp:  GI:  :  MSK:  Integumentary:  Neurological:  Psychiatric:  Endocrine:  Hematologic/Lymphatic:  Allergic/Immunologic:  [ ] All other systems negative  [ ] Unable to assess ROS because ________    OBJECTIVE:  ICU Vital Signs Last 24 Hrs  T(C): 36.7 (26 Apr 2024 05:04), Max: 37.1 (25 Apr 2024 23:50)  T(F): 98.1 (26 Apr 2024 05:04), Max: 98.7 (25 Apr 2024 23:50)  HR: 117 (26 Apr 2024 05:04) (103 - 125)  BP: 141/84 (26 Apr 2024 05:04) (115/66 - 141/84)  BP(mean): 91 (25 Apr 2024 22:45) (80 - 98)  ABP: --  ABP(mean): --  RR: 23 (26 Apr 2024 05:04) (20 - 41)  SpO2: 96% (26 Apr 2024 05:04) (89% - 100%)    O2 Parameters below as of 26 Apr 2024 05:04  Patient On (Oxygen Delivery Method): nasal cannula, high flow  O2 Flow (L/min): 45  O2 Concentration (%): 60          04-25 @ 07:01  -  04-26 @ 07:00  --------------------------------------------------------  IN: 0 mL / OUT: 600 mL / NET: -600 mL      CAPILLARY BLOOD GLUCOSE      POCT Blood Glucose.: 377 mg/dL (26 Apr 2024 08:14)      PHYSICAL EXAM:  General:  AAOx3  HEENT: EOMI, PERRL  Lymph Nodes: No LAD  Neck: JVP 4-6cm  Respiratory:   Cardiovascular: RRR, no m/r/g  Abdomen: soft, NT/ND, no HSM  Extremities: no c/c/e  Skin: nl. skin turgor  Neurological: no deficitis      HOSPITAL MEDICATIONS:  MEDICATIONS  (STANDING):  albuterol/ipratropium for Nebulization 3 milliLiter(s) Nebulizer every 6 hours  apixaban 5 milliGRAM(s) Oral two times a day  atorvastatin 40 milliGRAM(s) Oral at bedtime  budesonide 160 MICROgram(s)/formoterol 4.5 MICROgram(s) Inhaler 2 Puff(s) Inhalation two times a day  dextrose 10% Bolus 125 milliLiter(s) IV Bolus once  dextrose 5%. 1000 milliLiter(s) (100 mL/Hr) IV Continuous <Continuous>  dextrose 5%. 1000 milliLiter(s) (50 mL/Hr) IV Continuous <Continuous>  dextrose 50% Injectable 12.5 Gram(s) IV Push once  dextrose 50% Injectable 25 Gram(s) IV Push once  gabapentin 300 milliGRAM(s) Oral daily  glucagon  Injectable 1 milliGRAM(s) IntraMuscular once  insulin glargine Injectable (LANTUS) 20 Unit(s) SubCutaneous at bedtime  insulin lispro (ADMELOG) corrective regimen sliding scale   SubCutaneous at bedtime  insulin lispro (ADMELOG) corrective regimen sliding scale   SubCutaneous three times a day before meals  insulin lispro Injectable (ADMELOG) 14 Unit(s) SubCutaneous three times a day before meals  methylPREDNISolone sodium succinate Injectable 40 milliGRAM(s) IV Push two times a day  Ofev (Nintedanib) 150 milliGRAM(s) 150 milliGRAM(s) Oral two times a day  pantoprazole    Tablet 40 milliGRAM(s) Oral two times a day  polyethylene glycol 3350 17 Gram(s) Oral two times a day  senna 2 Tablet(s) Oral at bedtime  traZODone 100 milliGRAM(s) Oral at bedtime    MEDICATIONS  (PRN):  acetaminophen     Tablet .. 650 milliGRAM(s) Oral every 6 hours PRN Temp greater or equal to 38C (100.4F), Mild Pain (1 - 3)  aluminum hydroxide/magnesium hydroxide/simethicone Suspension 30 milliLiter(s) Oral every 4 hours PRN Dyspepsia  cyclobenzaprine 5 milliGRAM(s) Oral three times a day PRN Muscle Spasm  dextrose Oral Gel 15 Gram(s) Oral once PRN Blood Glucose LESS THAN 70 milliGRAM(s)/deciliter  melatonin 3 milliGRAM(s) Oral at bedtime PRN Insomnia      LABS:                        9.3    14.23 )-----------( 497      ( 26 Apr 2024 06:25 )             31.1     04-26    132<L>  |  100  |  43<H>  ----------------------------<  372<H>  4.9   |  24  |  0.86    Ca    8.7      26 Apr 2024 06:25  Phos  3.5     04-26  Mg     2.2     04-26    TPro  5.8<L>  /  Alb  2.5<L>  /  TBili  0.1<L>  /  DBili  x   /  AST  38  /  ALT  47<H>  /  AlkPhos  101  04-26      Urinalysis Basic - ( 26 Apr 2024 06:25 )    Color: x / Appearance: x / SG: x / pH: x  Gluc: 372 mg/dL / Ketone: x  / Bili: x / Urobili: x   Blood: x / Protein: x / Nitrite: x   Leuk Esterase: x / RBC: x / WBC x   Sq Epi: x / Non Sq Epi: x / Bacteria: x      Arterial Blood Gas:  04-24 @ 19:05  7.48/37/71/28/95/4.0  ABG lactate: --        MICROBIOLOGY:     RADIOLOGY:  [ ] Reviewed and interpreted by me    PULMONARY FUNCTION TESTS:    EKG: CHIEF COMPLAINT: SOB    HPI:  63-yo F with PMHx of chronic AHRF on 2-3L NC at b/l 2/2 ILD (Followed w/ Dr. Primo Marlow, [facility]), PE on Eliquis, severe pHTN, Cor pulmonale, DM, presented initially with SOB, dry cough, chest pain, LE edema, recent admission to [facility] in March for ILD flare, admitted to [facility]->ICU for AHRF 2/2 ILD flare, transferred to [facility] for lung transplant evaluation. Pt reporting mild SOB, no fever, no sputum, no SOB, no chest pain, no edema. Reports constipation, last BM 2 days ago, with some stomach pain.     At [facility] ICU, she was treated w/ IV steroids->PO taper, duonebs, symbicort, lasix. Pike placed for urinary retention, taken out prior to arrival although unclear if passed TOV at OSH. Had been weaned to NC while but was complicated by increased WOB, placed back on HFNC 50/60. Most recently escalated to methylprednisolone 40 mg IV BID. Was being treated w/ Ofev 150 mg BID, daily diuresis w/ Lasix 40->20 mg IVP on 4/25 based on volume assessment, 4/24 CXR w/ c/f R>L patchy opacities c/f pulmonary edema. Upon transfer here, admission vitals notable for temp afebrile, , /79, HFNC similar settings 45/60 100%. Labs notable for downtrending white count, stable anemia to 9-10, stable thrombocytosis to 400's. ABG 7.48 / 37 / 71 / 28, lactate 1.2.      Of note, pt is DNR/DNI trial of NIPPV - confirmed this with the patient and completed MOLST, in chart.     PAST MEDICAL & SURGICAL HISTORY:  Diabetes mellitus      Hyperlipidemia      Pulmonary embolism      Pulmonary fibrosis      Cataract  right eye          FAMILY HISTORY:  Family history of MI (myocardial infarction) (Father)        SOCIAL HISTORY:  Smoking: denies  EtOH Use: denies      Allergies    No Known Allergies    Intolerances        HOME MEDICATIONS:  · 	pantoprazole 40 mg oral delayed release tablet: Last Dose Taken:  , 1 tab(s) orally once a day (before a meal)  · 	cyclobenzaprine 5 mg oral tablet: Last Dose Taken:  , 1 tab(s) orally 3 times a day as needed for Muscle Spasm  · 	melatonin 3 mg oral tablet: Last Dose Taken:  , 1 tab(s) orally once a day (at bedtime)  · 	acetaminophen 325 mg oral tablet: Last Dose Taken:  , 2 tab(s) orally every 6 hours As needed Mild Pain (1 - 3)  · 	insulin glargine 100 units/mL subcutaneous solution: Last Dose Taken:  , 25 unit(s) subcutaneous once a day (at bedtime)  · 	traZODone 100 mg oral tablet: Last Dose Taken:  , 1 tab(s) orally once a day (at bedtime)  · 	gabapentin 300 mg oral capsule: Last Dose Taken:  , 1 cap(s) orally once a day  · 	atorvastatin 40 mg oral tablet: Last Dose Taken:  , 1 tab(s) orally once a day  · 	Admelog 100 units/mL injectable solution: Last Dose Taken:  , 15 unit(s) injectable 3 times a day (before meals)  · 	apixaban 5 mg oral tablet: Last Dose Taken:  , 1 tab(s) orally once a day  · 	Ofev 150 mg oral capsule: Last Dose Taken:  , 1 cap(s) orally every 12 hours    REVIEW OF SYSTEMS:  Constitutional:   Eyes:  ENT:  CV:  Resp: sob/paul  GI:  :  MSK:  Integumentary:  Neurological:  Psychiatric:  Endocrine:  Hematologic/Lymphatic:  Allergic/Immunologic:  [ ] All other systems negative  [ ] Unable to assess ROS because ________    OBJECTIVE:  ICU Vital Signs Last 24 Hrs  T(C): 36.7 (26 Apr 2024 05:04), Max: 37.1 (25 Apr 2024 23:50)  T(F): 98.1 (26 Apr 2024 05:04), Max: 98.7 (25 Apr 2024 23:50)  HR: 117 (26 Apr 2024 05:04) (103 - 125)  BP: 141/84 (26 Apr 2024 05:04) (115/66 - 141/84)  BP(mean): 91 (25 Apr 2024 22:45) (80 - 98)  ABP: --  ABP(mean): --  RR: 23 (26 Apr 2024 05:04) (20 - 41)  SpO2: 96% (26 Apr 2024 05:04) (89% - 100%)    O2 Parameters below as of 26 Apr 2024 05:04  Patient On (Oxygen Delivery Method): nasal cannula, high flow  O2 Flow (L/min): 45  O2 Concentration (%): 60          04-25 @ 07:01  -  04-26 @ 07:00  --------------------------------------------------------  IN: 0 mL / OUT: 600 mL / NET: -600 mL      CAPILLARY BLOOD GLUCOSE      POCT Blood Glucose.: 377 mg/dL (26 Apr 2024 08:14)      PHYSICAL EXAM:  General:  AAOx3  HEENT: EOMI, PERRL  Lymph Nodes: No LAD  Neck: JVP 4-6cm  Respiratory: crackles at the bases  Cardiovascular: RRR, no m/r/g  Abdomen: soft, NT/ND, no HSM  Extremities: no c/c/e  Skin: nl. skin turgor  Neurological: no deficitis      HOSPITAL MEDICATIONS:  MEDICATIONS  (STANDING):  albuterol/ipratropium for Nebulization 3 milliLiter(s) Nebulizer every 6 hours  apixaban 5 milliGRAM(s) Oral two times a day  atorvastatin 40 milliGRAM(s) Oral at bedtime  budesonide 160 MICROgram(s)/formoterol 4.5 MICROgram(s) Inhaler 2 Puff(s) Inhalation two times a day  dextrose 10% Bolus 125 milliLiter(s) IV Bolus once  dextrose 5%. 1000 milliLiter(s) (100 mL/Hr) IV Continuous <Continuous>  dextrose 5%. 1000 milliLiter(s) (50 mL/Hr) IV Continuous <Continuous>  dextrose 50% Injectable 12.5 Gram(s) IV Push once  dextrose 50% Injectable 25 Gram(s) IV Push once  gabapentin 300 milliGRAM(s) Oral daily  glucagon  Injectable 1 milliGRAM(s) IntraMuscular once  insulin glargine Injectable (LANTUS) 20 Unit(s) SubCutaneous at bedtime  insulin lispro (ADMELOG) corrective regimen sliding scale   SubCutaneous at bedtime  insulin lispro (ADMELOG) corrective regimen sliding scale   SubCutaneous three times a day before meals  insulin lispro Injectable (ADMELOG) 14 Unit(s) SubCutaneous three times a day before meals  methylPREDNISolone sodium succinate Injectable 40 milliGRAM(s) IV Push two times a day  Ofev (Nintedanib) 150 milliGRAM(s) 150 milliGRAM(s) Oral two times a day  pantoprazole    Tablet 40 milliGRAM(s) Oral two times a day  polyethylene glycol 3350 17 Gram(s) Oral two times a day  senna 2 Tablet(s) Oral at bedtime  traZODone 100 milliGRAM(s) Oral at bedtime    MEDICATIONS  (PRN):  acetaminophen     Tablet .. 650 milliGRAM(s) Oral every 6 hours PRN Temp greater or equal to 38C (100.4F), Mild Pain (1 - 3)  aluminum hydroxide/magnesium hydroxide/simethicone Suspension 30 milliLiter(s) Oral every 4 hours PRN Dyspepsia  cyclobenzaprine 5 milliGRAM(s) Oral three times a day PRN Muscle Spasm  dextrose Oral Gel 15 Gram(s) Oral once PRN Blood Glucose LESS THAN 70 milliGRAM(s)/deciliter  melatonin 3 milliGRAM(s) Oral at bedtime PRN Insomnia      LABS:                        9.3    14.23 )-----------( 497      ( 26 Apr 2024 06:25 )             31.1     04-26    132<L>  |  100  |  43<H>  ----------------------------<  372<H>  4.9   |  24  |  0.86    Ca    8.7      26 Apr 2024 06:25  Phos  3.5     04-26  Mg     2.2     04-26    TPro  5.8<L>  /  Alb  2.5<L>  /  TBili  0.1<L>  /  DBili  x   /  AST  38  /  ALT  47<H>  /  AlkPhos  101  04-26      Urinalysis Basic - ( 26 Apr 2024 06:25 )    Color: x / Appearance: x / SG: x / pH: x  Gluc: 372 mg/dL / Ketone: x  / Bili: x / Urobili: x   Blood: x / Protein: x / Nitrite: x   Leuk Esterase: x / RBC: x / WBC x   Sq Epi: x / Non Sq Epi: x / Bacteria: x      Arterial Blood Gas:  04-24 @ 19:05  7.48/37/71/28/95/4.0  ABG lactate: --        MICROBIOLOGY:   Reviewed    RADIOLOGY:  [x] Reviewed and interpreted by me         EKG:

## 2024-04-26 NOTE — PHYSICAL THERAPY INITIAL EVALUATION ADULT - GENERAL OBSERVATIONS, REHAB EVAL
patient received semi-reclined in bed with +HFNC, godwin IVL, and telemetry. Patient agreeable to participate in PT

## 2024-04-26 NOTE — H&P ADULT - PROBLEM SELECTOR PLAN 8
- Was on home cyclobenzaprine 5mg q8 prn, continue - Pt w/ hx of uncontrolled T2DM, a1c 11.9. At home, Basaglar 25 U QHS + Admelog 15U TID. Endocrine was consulted at [facility] prior to transfer. Was on  Lantus 20 + Admelog 14u TID + Mod SSI  - Gave 10U Lantus overnight, in AM continue Lantus 20U at bedtime + Admelog 14U TID and MDSSI.   - Consider endocrine consult in AM - Pt w/ hx of uncontrolled T2DM, a1c 11.9. At home, Basaglar 25 U QHS + Admelog 15U TID. Endocrine was consulted at [facility] prior to transfer. Was on  Lantus 20 + Admelog 14u TID + Mod SSI  - Pt given 22U Lantus @ [facility] at 9 PM, at 1 AM noted to have Glucose increased 240->300's, gave 10U Lantus, in AM continue Lantus 20U at bedtime + Admelog 14U TID and MDSSI.   - Endocrine consulted via e-mail - Pt w/ hx of uncontrolled T2DM, a1c 11.9. At home, Basaglar 25 U QHS + Admelog 15U TID. Endocrine was consulted at [facility] prior to transfer. Was on  Lantus 20 + Admelog 14u TID + Mod SSI  - Pt given 22U Lantus @ [facility] at 9 PM, at 1 AM noted to have Glucose increased 240->300's, gave 10U Lantus, in AM continue Lantus 20U at bedtime + Admelog 14U TID and MDSSI.   - Endocrine consult in AM - Pt w/ hx of uncontrolled T2DM, a1c 11.9. At home, Basaglar 25 U QHS + Admelog 15U TID. Endocrine was consulted at [facility] prior to transfer. Was on  Lantus 20 + Admelog 14u TID + Mod SSI  - Pt given 22U Lantus @ [facility] at 9 PM, at 1 AM noted to have Glucose increased 240->300's, gave 10U Lantus, in AM continue Lantus 20U at bedtime + Admelog 14U TID and MDSSI.   - Endocrine consulted via email

## 2024-04-26 NOTE — PROGRESS NOTE ADULT - PROBLEM SELECTOR PLAN 1
Baseline 2-3L NC. Hx of ILD w/ c/f IPF. Followed w/ DOMENICO Ding. On HFNC 50/60 when leaving [place], now on bipap prn for wob. Received 4 doses of Levaquin last dose 4/13 at [facility].  - Transferred to [facility] for lung transplant eval  - Maintain euvolemia, I's and O's, daily weights.  - Transplant pulm consult appreciated  - Pulmonary consult  - Continue home Ofev 150mg BID  - Duonebs, Symbicort  - Continue Methylprednisolone 40 mg IVP BID - while on steroids, Pantoprazole 40 mg QD  - Empiric coverage of PNA given her significant respiratory distress and WOB -start zosyn 4/26

## 2024-04-26 NOTE — DIETITIAN INITIAL EVALUATION ADULT - PERTINENT MEDS FT
MEDICATIONS  (STANDING):  albuterol/ipratropium for Nebulization 3 milliLiter(s) Nebulizer every 6 hours  apixaban 5 milliGRAM(s) Oral two times a day  atorvastatin 40 milliGRAM(s) Oral at bedtime  budesonide 160 MICROgram(s)/formoterol 4.5 MICROgram(s) Inhaler 2 Puff(s) Inhalation two times a day  chlorhexidine 2% Cloths 1 Application(s) Topical daily  dextrose 10% Bolus 125 milliLiter(s) IV Bolus once  dextrose 5%. 1000 milliLiter(s) (100 mL/Hr) IV Continuous <Continuous>  dextrose 5%. 1000 milliLiter(s) (50 mL/Hr) IV Continuous <Continuous>  dextrose 50% Injectable 25 Gram(s) IV Push once  dextrose 50% Injectable 12.5 Gram(s) IV Push once  gabapentin 300 milliGRAM(s) Oral daily  glucagon  Injectable 1 milliGRAM(s) IntraMuscular once  insulin glargine Injectable (LANTUS) 20 Unit(s) SubCutaneous at bedtime  insulin lispro (ADMELOG) corrective regimen sliding scale   SubCutaneous at bedtime  insulin lispro (ADMELOG) corrective regimen sliding scale   SubCutaneous three times a day before meals  insulin lispro Injectable (ADMELOG) 14 Unit(s) SubCutaneous three times a day before meals  methylPREDNISolone sodium succinate Injectable 40 milliGRAM(s) IV Push two times a day  Ofev (Nintedanib) 150 milliGRAM(s) 150 milliGRAM(s) Oral two times a day  pantoprazole    Tablet 40 milliGRAM(s) Oral two times a day  piperacillin/tazobactam IVPB.- 3.375 Gram(s) IV Intermittent once  polyethylene glycol 3350 17 Gram(s) Oral two times a day  senna 2 Tablet(s) Oral at bedtime  traZODone 100 milliGRAM(s) Oral at bedtime    MEDICATIONS  (PRN):  acetaminophen     Tablet .. 650 milliGRAM(s) Oral every 6 hours PRN Temp greater or equal to 38C (100.4F), Mild Pain (1 - 3)  aluminum hydroxide/magnesium hydroxide/simethicone Suspension 30 milliLiter(s) Oral every 4 hours PRN Dyspepsia  cyclobenzaprine 5 milliGRAM(s) Oral three times a day PRN Muscle Spasm  dextrose Oral Gel 15 Gram(s) Oral once PRN Blood Glucose LESS THAN 70 milliGRAM(s)/deciliter  melatonin 3 milliGRAM(s) Oral at bedtime PRN Insomnia

## 2024-04-26 NOTE — CONSULT NOTE ADULT - ATTENDING COMMENTS
63F hx ILD/ probable IPF (On 2-3LNC at home), PE 2022 on eliquis, severe pHTN w/ prior cor pulmonale (RVSP 68 - 10/22 w/ TTE from 4/12 w/ PASP 28, normal LV/RV SF), IDDM, presenting as a transfer from [place] for lung transplant eval iso SOB, dry cough, chest pain. She has been receiving duonebs, symbicort, lasix, ofev and IV steroids. Continuing with AC. Solu-medrol is ordered for 40 IV BID. Pending TTE and CXR. CT Chest 4/12/2024: Findings suggesting interstitial lung disease without significant interval progression. No evidence of pneumonia.   CT scan from 4/12/2024 when compared with CT scan from 2020 has shown significant progression.      63 year old male with history of ILD, previously maintained on 2-3L NC at home, reported rheum workup by outside pulmonologist negative, History of PE in 2022 on eliquis with pulmonary hypertension documented at that time (RVSP 68) on ICS-laba, ofev at home.  Admitted to [facility] approximately 10 days ago where steroids were increased to medrol 40 IV BID, started on diuretics in attempt to improve oxygenation.  She denies febrile illness or recent weight loss.  Transferred to [facility] for lung transplant evaluation.  WE were asked to see as patient is still deciding wether she will agree fo undergo transplant evaluation.  SHe is awake alert, tachypneic on high flow 45L 60% FiO2, with sats 93%. She has basilar rales on exam no peripheral edema and no rashes.  Labs notable for Hb 9.3.  Repeat echo at [facility] was normal.  CT from 4/12/24 was compared to a prior CT fro 2020 and she currently has traction bronchiectasis with areas of air trapping and fibrotic appearing lung.  IMpression:  IPF, advanced, with hypoxemic respiratory failure.  Has been treated with steroids, diuretics, bronchodilators without improvement in her oxygen requirements.  Lung transplant evaluation is strongly recommended and her only hope for improvement. Patient is considering this.  Rec:  - Continue current medical regimen  - please repeat echo with bubble study  - consider adding NO to high flow to see if oxygenation improves  - please send repeat serologies for scleroderma, sjogrenns, RF, myomarker panel   - can use benzos as needed for her anxiety which contributes to tachypnea.    AGree with plan as outlined above.

## 2024-04-26 NOTE — H&P ADULT - IN ACCORDANCE WITH [place] STATE LAW, WE OFFER EVERY PATIENT A HEPATITIS C TEST. WOULD YOU LIKE TO BE TESTED TODAY?
Physical Therapy  Visit Type: treatment  Precautions:  Medical precautions:  fall risk; droplet precautions and contact precautions.    Lines:     Basic: capped IV and telemetry      Lines in chart and on patient reviewed, precautions maintained throughout session.  Safety Measures: bed alarm    SUBJECTIVE  Patient agreed to participate in therapy this date.  \"better\"  Patient / Family Goal: maximize function and return home     OBJECTIVE     On Room Air throughout session, sats 96% after ambulation in room.    Bed Mobility:        Supine to sit: modified independent    Sit to supine: modified independent  Training completed:    Tasks: all aspects of bed mobility    Education details: body mechanics, patient safety and patient requires additional training    Head of bed partially elevated; cues for positioning      Transfers:    Assistive devices: gait belt    Sit to stand: supervision    Stand to sit: supervision    Stand pivot: supervision    Toilet: supervision  Training completed:    Tasks: sit to stand, stand to sit and stand pivot    Education details: body mechanics, patient safety and patient requires additional training    Supervision to ensure safety; steady transfers.      Gait/Ambulation:     Assistance: supervision   Assistive device: gait belt and 2-wheeled walker    Distance (ft): 60  Training Completed:      Education details: body mechanics, patient safety and patient requires additional training    Supervision to ensure safety; no loss of balance; mild fatigue with ambulation.                ASSESSMENT    Impairments: strength and activity tolerance  Functional Limitations: all functional mobility    80 year old female patient seen on 2W nursing unit. Today's treatment focused on activity progression, functional mobility skills and gait training. The patient is demonstrating good progress as evidenced by today's function noted in objective section.    Collaborated with AMY Robertson regarding discharge  recommendations, patient's status, mobility recommendations and safety recommendations.    Recommended Consults: PT: OT    Discharge Recommendations  Recommendation for Discharge: PT WI: Home             PT/OT Mobility Equipment for Discharge: none at this time   PT/OT ADL Equipment for Discharge: none anticipated      Skilled therapy is required to address these limitations in attempt to maximize the patient's independence.  Progress: progressing toward goals    End of Session:   Location: in bed  Safety measures: call light within reach (RN in room hooking up IV; states she will activate alarm prior to her leaving the room)  Handoff to: nurse    PLAN    Suggestions for next session as indicated: lower extremity exercises, stair training, transfer training and gait training.    PT Frequency: 5 days/week  Agreement to plan and goals: patient agrees with goals and treatment plan        GOALS  Long Term Goals: (to be met by time of discharge from hospital)  Sit to supine: Patient will complete sit to supine modified independent.  Supine to sit: Patient will complete supine to sit modified independent.  Sit to stand: Patient will complete sit to stand transfer with 2-wheeled walker, modified independent.   Stand to sit: Patient will complete stand to sit transfer with 2-wheeled walker, modified independent.   Stand pivot: Patient will complete stand pivot transfer with 2-wheeled walker, modified independent.   Ambulation (even): Patient will ambulate on even surface for 100 feet with 2-wheeled walker, modified independent.   Flight of stairs: Patient will ambulate a flight of stairs with using 1 rail, modified independent.     Documented in the chart in the following areas: Assessment.      Therapy procedure time and total treatment time can be found documented on the Time Entry flowsheet   Opt out

## 2024-04-26 NOTE — H&P ADULT - CONVERSATION DETAILS
spent 17 minutes discussing goals of care.   Discussed patient's current prognosis and treatment options.  Discussed different life-sustaining measures including but not limited to noninvasive and/or invasive ventilatory support, cardiac resuscitation efforts, dialysis, IV fluid, IV antibiotics.     Patient AGREES to: noninvasive ventilatory support    Patient DECLINES: invasive ventilatory support, and cardiac resuscitation.     No other limitation on medical intervention at this time.     DNR/DNI with trial NIV

## 2024-04-26 NOTE — PROGRESS NOTE ADULT - PROBLEM SELECTOR PLAN 5
- Pt was seeing Dr. Chua, urologist. At [facility] had failed TOV w/ godwin replaced. Arrives to [facility] w/o Godwin. Pt on outpt med list listed Tamsulosin 0.4 mg, unclear if started at [facility], pt saying she does not take this outpatient.   - q8h Bladder scans, place godwin if 3 straight caths

## 2024-04-26 NOTE — H&P ADULT - ATTENDING COMMENTS
63F MHx chronic hypoxic resp failure on 2-3L at baseline d/t ILD, h/o PE on eliquis, severe pHTN, cor pulmonale, DM. Initially presented to [facility] w/ SOB, found in ILD flare requiring high dose steroid and HFNC and transferred to [facility] for lung transplant eval.     #Acute on chronic hypoxic resp failure  #ILD flare  -unclear trigger, but was started on high dose steroid at [facility] as well as daily assessment of fluid volume and diuresis as needed. Now here, appears dry. Still on HFNC.   -c/w solumedrol 40 bid, increase ppi to BID given symptomatic GERD.   -pulm c/s in the morning    #H/o PE  -will have to r/o PE as trigger for current flare. Discuss w/ pulm re: discontinuing eliquis if negative.    #Acute urinary retention  -reportedly failed TOV at [facility] and had godwin inserted but currently not with it.   -q8h post void scan, godwin if fail x3    Mgnt of chronic illnesses per resident note above. 63F MHx chronic hypoxic resp failure on 2-3L at baseline d/t ILD, h/o PE on eliquis, severe pHTN, cor pulmonale, DM. Initially presented to [facility] w/ SOB, found in ILD flare requiring high dose steroid and HFNC and transferred to [facility] for lung transplant eval.     #Acute on chronic hypoxic resp failure  #ILD flare  -unclear trigger, but was started on high dose steroid at [facility] as well as daily assessment of fluid volume and diuresis as needed. Now here, appears dry. Still on HFNC.   -c/w solumedrol 40 bid, increase ppi to BID given symptomatic GERD.   -pulm c/s in the morning    #H/o PE  -will have to r/o PE as trigger for current flare. Discuss w/ pulm re: discontinuing eliquis if negative.    #Acute urinary retention  -reportedly failed TOV at [facility] and had godwin inserted but currently not with it.   -q8h post void scan, godwin if fail x3    #GOC  I personally spent 17 minutes discussing goals of care, face to face with patient.   Discussed patient's current prognosis and treatment options.  Discussed different life-sustaining measures including but not limited to noninvasive and/or invasive ventilatory support, cardiac resuscitation efforts, dialysis, IV fluid, IV antibiotics.   Patient AGREES to: noninvasive ventilatory support  Patient DECLINES: invasive ventilatory support, and cardiac resuscitation.   No other limitation on medical intervention at this time.   DNR/DNI with trial NIV      Mgnt of chronic illnesses per resident note above.

## 2024-04-26 NOTE — H&P ADULT - PROBLEM SELECTOR PLAN 5
- Continue home Atorvastatin - Pt was seeing Dr. Chua, urologist. At [facility] had failed TOV w/ godwin replaced. Arrives to [facility] w/o Godwin  - q8h Bladder scans, place godwin if 3 straight caths - Pt was seeing Dr. Chua, urologist. At [facility] had failed TOV w/ godwin replaced. Arrives to [facility] w/o Godwin. Pt on outpt med list listed Tamsulosin 0.4 mg, unclear if started at [facility], pt saying she does not take this outpatient.   - q8h Bladder scans, place godwin if 3 straight caths

## 2024-04-26 NOTE — PROGRESS NOTE ADULT - SUBJECTIVE AND OBJECTIVE BOX
[facility] Division of Hospital Medicine  Héctor Lozano MD  Available via MS Teams    SUBJECTIVE / OVERNIGHT EVENTS:    ADDITIONAL REVIEW OF SYSTEMS:    MEDICATIONS  (STANDING):  albuterol/ipratropium for Nebulization 3 milliLiter(s) Nebulizer every 6 hours  apixaban 5 milliGRAM(s) Oral two times a day  atorvastatin 40 milliGRAM(s) Oral at bedtime  budesonide 160 MICROgram(s)/formoterol 4.5 MICROgram(s) Inhaler 2 Puff(s) Inhalation two times a day  chlorhexidine 2% Cloths 1 Application(s) Topical daily  dextrose 10% Bolus 125 milliLiter(s) IV Bolus once  dextrose 5%. 1000 milliLiter(s) (100 mL/Hr) IV Continuous <Continuous>  dextrose 5%. 1000 milliLiter(s) (50 mL/Hr) IV Continuous <Continuous>  dextrose 50% Injectable 25 Gram(s) IV Push once  dextrose 50% Injectable 12.5 Gram(s) IV Push once  gabapentin 300 milliGRAM(s) Oral daily  glucagon  Injectable 1 milliGRAM(s) IntraMuscular once  insulin glargine Injectable (LANTUS) 20 Unit(s) SubCutaneous at bedtime  insulin lispro (ADMELOG) corrective regimen sliding scale   SubCutaneous at bedtime  insulin lispro (ADMELOG) corrective regimen sliding scale   SubCutaneous three times a day before meals  insulin lispro Injectable (ADMELOG) 14 Unit(s) SubCutaneous three times a day before meals  methylPREDNISolone sodium succinate Injectable 40 milliGRAM(s) IV Push two times a day  Ofev (Nintedanib) 150 milliGRAM(s) 150 milliGRAM(s) Oral two times a day  pantoprazole    Tablet 40 milliGRAM(s) Oral two times a day  polyethylene glycol 3350 17 Gram(s) Oral two times a day  senna 2 Tablet(s) Oral at bedtime  traZODone 100 milliGRAM(s) Oral at bedtime    MEDICATIONS  (PRN):  acetaminophen     Tablet .. 650 milliGRAM(s) Oral every 6 hours PRN Temp greater or equal to 38C (100.4F), Mild Pain (1 - 3)  aluminum hydroxide/magnesium hydroxide/simethicone Suspension 30 milliLiter(s) Oral every 4 hours PRN Dyspepsia  cyclobenzaprine 5 milliGRAM(s) Oral three times a day PRN Muscle Spasm  dextrose Oral Gel 15 Gram(s) Oral once PRN Blood Glucose LESS THAN 70 milliGRAM(s)/deciliter  melatonin 3 milliGRAM(s) Oral at bedtime PRN Insomnia      I&O's Summary    25 Apr 2024 07:01  -  26 Apr 2024 07:00  --------------------------------------------------------  IN: 0 mL / OUT: 600 mL / NET: -600 mL        PHYSICAL EXAM:  Vital Signs Last 24 Hrs  T(C): 36.5 (26 Apr 2024 11:33), Max: 37.1 (25 Apr 2024 23:50)  T(F): 97.7 (26 Apr 2024 11:33), Max: 98.7 (25 Apr 2024 23:50)  HR: 111 (26 Apr 2024 11:33) (103 - 125)  BP: 135/84 (26 Apr 2024 11:33) (115/70 - 141/84)  BP(mean): 91 (25 Apr 2024 22:45) (83 - 98)  RR: 22 (26 Apr 2024 11:33) (20 - 41)  SpO2: 100% (26 Apr 2024 11:33) (89% - 100%)    Parameters below as of 26 Apr 2024 11:33  Patient On (Oxygen Delivery Method): BiPAP/CPAP      CONSTITUTIONAL: NAD, well-developed, well-groomed  EYES: PERRL; conjunctiva and sclera clear  ENMT: Moist oral mucosa, no pharyngeal injection or exudates; normal dentition  NECK: Supple, no palpable masses; no thyromegaly  RESPIRATORY: Normal respiratory effort; lungs are clear to auscultation bilaterally  CARDIOVASCULAR: Regular rate and rhythm, normal S1 and S2, no murmur/rub/gallop; No lower extremity edema; Peripheral pulses are 2+ bilaterally  ABDOMEN: Nontender to palpation, normoactive bowel sounds, no rebound/guarding; No hepatosplenomegaly  MUSCULOSKELETAL: no clubbing or cyanosis of digits; no joint swelling or tenderness to palpation  PSYCH: A+O to person, place, and time; affect appropriate  NEUROLOGY: CN 2-12 are intact and symmetric; no gross sensory deficits   SKIN: No rashes; no palpable lesions    LABS:                        9.3    14.23 )-----------( 497      ( 26 Apr 2024 06:25 )             31.1     04-26    132<L>  |  100  |  43<H>  ----------------------------<  372<H>  4.9   |  24  |  0.86    Ca    8.7      26 Apr 2024 06:25  Phos  3.5     04-26  Mg     2.2     04-26    TPro  5.8<L>  /  Alb  2.5<L>  /  TBili  0.1<L>  /  DBili  x   /  AST  38  /  ALT  47<H>  /  AlkPhos  101  04-26          Urinalysis Basic - ( 26 Apr 2024 06:25 )    Color: x / Appearance: x / SG: x / pH: x  Gluc: 372 mg/dL / Ketone: x  / Bili: x / Urobili: x   Blood: x / Protein: x / Nitrite: x   Leuk Esterase: x / RBC: x / WBC x   Sq Epi: x / Non Sq Epi: x / Bacteria: x        SARS-CoV-2: NotDetec (11 Apr 2024 22:46)    ABG - ( 24 Apr 2024 19:05 )  pH, Arterial: 7.48  pH, Blood: x     /  pCO2: 37    /  pO2: 71    / HCO3: 28    / Base Excess: 4.0   /  SaO2: 95                      RADIOLOGY & ADDITIONAL TESTS:  New Results Reviewed Today:   New Imaging Personally Reviewed Today:  New Electrocardiogram Personally Reviewed Today:  Prior or Outpatient Records Reviewed Today:    COMMUNICATION:  Care Discussed with Consultants/Other Providers and Details of Discussion: Discussed with ACP  Discussions with Patient/Family:  PCP Communication: [facility] Division of Hospital Medicine  Héctor Lozano MD  Available via MS Teams    SUBJECTIVE / OVERNIGHT EVENTS: Admitted overnight. Significant SOB/WOB this AM with anxiety. Denies chest pain.     ADDITIONAL REVIEW OF SYSTEMS:    MEDICATIONS  (STANDING):  albuterol/ipratropium for Nebulization 3 milliLiter(s) Nebulizer every 6 hours  apixaban 5 milliGRAM(s) Oral two times a day  atorvastatin 40 milliGRAM(s) Oral at bedtime  budesonide 160 MICROgram(s)/formoterol 4.5 MICROgram(s) Inhaler 2 Puff(s) Inhalation two times a day  chlorhexidine 2% Cloths 1 Application(s) Topical daily  dextrose 10% Bolus 125 milliLiter(s) IV Bolus once  dextrose 5%. 1000 milliLiter(s) (100 mL/Hr) IV Continuous <Continuous>  dextrose 5%. 1000 milliLiter(s) (50 mL/Hr) IV Continuous <Continuous>  dextrose 50% Injectable 25 Gram(s) IV Push once  dextrose 50% Injectable 12.5 Gram(s) IV Push once  gabapentin 300 milliGRAM(s) Oral daily  glucagon  Injectable 1 milliGRAM(s) IntraMuscular once  insulin glargine Injectable (LANTUS) 20 Unit(s) SubCutaneous at bedtime  insulin lispro (ADMELOG) corrective regimen sliding scale   SubCutaneous at bedtime  insulin lispro (ADMELOG) corrective regimen sliding scale   SubCutaneous three times a day before meals  insulin lispro Injectable (ADMELOG) 14 Unit(s) SubCutaneous three times a day before meals  methylPREDNISolone sodium succinate Injectable 40 milliGRAM(s) IV Push two times a day  Ofev (Nintedanib) 150 milliGRAM(s) 150 milliGRAM(s) Oral two times a day  pantoprazole    Tablet 40 milliGRAM(s) Oral two times a day  polyethylene glycol 3350 17 Gram(s) Oral two times a day  senna 2 Tablet(s) Oral at bedtime  traZODone 100 milliGRAM(s) Oral at bedtime    MEDICATIONS  (PRN):  acetaminophen     Tablet .. 650 milliGRAM(s) Oral every 6 hours PRN Temp greater or equal to 38C (100.4F), Mild Pain (1 - 3)  aluminum hydroxide/magnesium hydroxide/simethicone Suspension 30 milliLiter(s) Oral every 4 hours PRN Dyspepsia  cyclobenzaprine 5 milliGRAM(s) Oral three times a day PRN Muscle Spasm  dextrose Oral Gel 15 Gram(s) Oral once PRN Blood Glucose LESS THAN 70 milliGRAM(s)/deciliter  melatonin 3 milliGRAM(s) Oral at bedtime PRN Insomnia      I&O's Summary    25 Apr 2024 07:01  -  26 Apr 2024 07:00  --------------------------------------------------------  IN: 0 mL / OUT: 600 mL / NET: -600 mL        PHYSICAL EXAM:  Vital Signs Last 24 Hrs  T(C): 36.5 (26 Apr 2024 11:33), Max: 37.1 (25 Apr 2024 23:50)  T(F): 97.7 (26 Apr 2024 11:33), Max: 98.7 (25 Apr 2024 23:50)  HR: 111 (26 Apr 2024 11:33) (103 - 125)  BP: 135/84 (26 Apr 2024 11:33) (115/70 - 141/84)  BP(mean): 91 (25 Apr 2024 22:45) (83 - 98)  RR: 22 (26 Apr 2024 11:33) (20 - 41)  SpO2: 100% (26 Apr 2024 11:33) (89% - 100%)    Parameters below as of 26 Apr 2024 11:33  Patient On (Oxygen Delivery Method): BiPAP/CPAP    CONSTITUTIONAL: respiratory distress, tachypnea, sob  NECK: Supple, no palpable masses; no thyromegaly  RESPIRATORY: tachypnea, crackles  CARDIOVASCULAR: RRR, s1, s2, no m/r/g, no LE edema  ABDOMEN: soft, nt, nd   PSYCH: A+O to person, place, and time; affect appropriate  NEUROLOGY: CN 2-12 are intact and symmetric; no gross sensory deficits   SKIN: No rashes; no palpable lesions    LABS:                        9.3    14.23 )-----------( 497      ( 26 Apr 2024 06:25 )             31.1     04-26    132<L>  |  100  |  43<H>  ----------------------------<  372<H>  4.9   |  24  |  0.86    Ca    8.7      26 Apr 2024 06:25  Phos  3.5     04-26  Mg     2.2     04-26    TPro  5.8<L>  /  Alb  2.5<L>  /  TBili  0.1<L>  /  DBili  x   /  AST  38  /  ALT  47<H>  /  AlkPhos  101  04-26          Urinalysis Basic - ( 26 Apr 2024 06:25 )    Color: x / Appearance: x / SG: x / pH: x  Gluc: 372 mg/dL / Ketone: x  / Bili: x / Urobili: x   Blood: x / Protein: x / Nitrite: x   Leuk Esterase: x / RBC: x / WBC x   Sq Epi: x / Non Sq Epi: x / Bacteria: x        SARS-CoV-2: NotDetec (11 Apr 2024 22:46)    ABG - ( 24 Apr 2024 19:05 )  pH, Arterial: 7.48  pH, Blood: x     /  pCO2: 37    /  pO2: 71    / HCO3: 28    / Base Excess: 4.0   /  SaO2: 95                      RADIOLOGY & ADDITIONAL TESTS:  New Results Reviewed Today:   New Imaging Personally Reviewed Today:  New Electrocardiogram Personally Reviewed Today:  Prior or Outpatient Records Reviewed Today:    COMMUNICATION:  Care Discussed with Consultants/Other Providers and Details of Discussion: Discussed with ACP  Discussions with Patient/Family:  PCP Communication:

## 2024-04-26 NOTE — DIETITIAN INITIAL EVALUATION ADULT - OTHER INFO
- Per chart, tx from [facility] for Lung Txp Evaluation; Hx of ILD; ordered for solumedrol. Noted pt on BIPAP at time of visit.     Endo:  - DM. A1C of 11.9% (4/13) reflects poor glycemic control. Elevated POCTs x 24 hrs: 244 - 394 mg/dL - noted pt ordered for steroids in-house which may elevate blood glucose levels prn. Ordered for Lantus 20 units, ADMELOG 14 units 3x/day, SSI. RD will continue to monitor blood glucose levels prn.     Wt Hx:  - Per previous RD note (4/17), pt's UBW of 59.1 kg. Per pt profile (4/26), reports wt loss of 2-13 lbs PTA. Per chart, dosing wt of 56.6 kg (4/25).   - Wt hx unavailable per Arleth BRIGGS. **Trend wts and monitor for malnutrition** RD will continue to monitor weight trends as available/able.   - IBW: 59.1 kg (based on ht of 66 in)

## 2024-04-26 NOTE — DIETITIAN INITIAL EVALUATION ADULT - PROBLEM SELECTOR PLAN 2
- CTA 10/29/22 w/ Right lower lobe segmental/subsegmental pulmonary embolism.  - Repeat CTA 2/28/23 w/o PE. Was at [facility] on Lovenox->Eliquis while pending repeat CTA to r/o PE given new hypoxia. ?unclear if provoked or not provoked.   - Repeat CTA to r/o PE. DVT studies at OSH 4/22/24 negative.   - Continue Eliquis but discontinue if negative scan.

## 2024-04-27 NOTE — PROGRESS NOTE ADULT - PROBLEM SELECTOR PLAN 2
Hx of PE on CTA 10/29/22 w/ RLL segmental/subsegmental pulmonary embolism  - Repeat CTA 2/28/23 w/o PE. Was at [facility] on Lovenox->Eliquis while pending repeat CTA to r/o PE given new hypoxia. DVT studies at OSH 4/22/24 negative  - Unable to perform repeat CTA given degree of hypoxemia  - Can continue Eliquis for now

## 2024-04-27 NOTE — PROGRESS NOTE ADULT - ASSESSMENT
63F hx ILD/ probable IPF (On 2-3LNC at home), PE 2022 on eliquis, severe pHTN w/ prior cor pulmonale (RVSP 68 - 10/22 w/ TTE from 4/12 w/ PASP 28, normal LV/RV SF), IDDM, presenting as a transfer from [place] for lung transplant eval iso SOB, dry cough, chest pain. She has been receiving duonebs, symbicort, lasix, ofev and IV steroids. Continuing with AC. Solu-medrol is ordered for 40 IV BID.  CT Chest 4/12/2024: Findings suggesting interstitial lung disease without significant interval progression. No evidence of pneumonia.   CT scan from 4/12/2024 when compared with CT scan from 2020 has shown significant progression.      Recommendations  tolerating HFNC, continue today   Continue with Symbicort, duonebs, Ofev, Eliquis, Solu-medrol 40 IV BID  Continue with diuresis -goal net neg 1L    Can continue to use benzo prn for anxiety  Please give patient laxative/bowel regimen,   c/w protonix - esophageal dilatation noted on CT scan  TTE results appreciated  fu sputum cx - can continue with zosyn for now, though unlikely clinical picture related to pna  Repeat Collagen vascular disease work-up: please send RF, anti-ccp, anti-centromere, CK, aldolase, MyoMarker Panel 3 Plus, ANCA, SCL-70 ab, HOLLIE, DsDNA, anti-RO, anti-LA, Sjogren antibodies, IgG4, Anti-RNP, Agnes-1 antibodies, and HIV antibodies.   During the week team recommended inhaled nitric oxide - to address pulmonary hypertension contributing to clinical picture. However unable to pursue this on medical floor. If any worsening mental status, oxygenation, work of breathing consult ICU

## 2024-04-27 NOTE — PROGRESS NOTE ADULT - PROBLEM SELECTOR PLAN 12
DVT PPx: Eliquis  Diet: Regular  Bowel Regimen: Miralax, Senna, dulcolax suppository/enema   Code: DNR/DNI, molst completed in chart   Dispo: PT/OT  Pharmacy:  PCP:   Communication:    Aspiration precautions  Fall precautions

## 2024-04-27 NOTE — PROGRESS NOTE ADULT - PROBLEM SELECTOR PLAN 8
T2DM: Home DM medications: metformin 500 mg BID, + glimepride 1 mg daily + lantus 35 units QHS+ admelog 25 units TIDAC  - Pt w/ hx of uncontrolled T2DM, a1c 11.9. At home, Basaglar 25 U QHS + Admelog 15U TID. Endocrine was consulted at [facility] prior to transfer. Was on  Lantus 20 + Admelog 14u TID + Mod SSI  - Endocrine consulted via email, recs appreciated  - Continue glargine 20 units qhs, lispro 18u TID qac, hold if NPO or eating < 50% of meals  - MISS FS tid qac qhs    - Discharge DM medications:   - Continue with metformin 500 mg BID  - STOP glimepride due to recurrent hypoglycemia at home   - Basal/bolus, dose will depend on insulin requirement and steroid plan   - Patient will follow up at  [facility]:  [address] [place] [address]. [address]. [place] [address]  Tel: [phone]

## 2024-04-27 NOTE — PROGRESS NOTE ADULT - PROBLEM SELECTOR PLAN 5
- Pt was seeing Dr. Chua, urologist. At [facility] had failed TOV w/ godwin replaced. Arrives to [facility] w/o Godwin. Pt on outpt med list listed Tamsulosin 0.4 mg, unclear if started at [facility], pt saying she does not take this outpatient.   - q8h Bladder scans, place godwin if 3 straight caths

## 2024-04-27 NOTE — PROGRESS NOTE ADULT - PROBLEM SELECTOR PLAN 3
- Pt w/ hx of severe pHTN, RVSP 68 on 10/2022  - Repeat 4/12/24 at [facility] noted PAP 28, "normal PAP"   - Maintain euvolemia, as above

## 2024-04-27 NOTE — PROGRESS NOTE ADULT - PROBLEM SELECTOR PLAN 1
Baseline 2-3L NC. Hx of ILD w/ c/f IPF. Followed w/ DOMENICO Ding. On HFNC 50/60 when leaving [place], now on bipap prn for wob. Received 4 doses of Levaquin last dose 4/13 at [facility].  - Transferred to [facility] for lung transplant eval  - Maintain euvolemia, I's and O's, daily weights.  - Transplant pulm consult appreciated  - Pulmonary consult  - Continue home Ofev 150mg BID  - Andreas Loyart  - Continue Methylprednisolone 40 mg IVP BID - while on steroids, Pantoprazole 40 mg QD  - pulm recs appreciated: start NO to high flow, repeat serologies for scleroderma, sjogrens, RF, myomarker  - repeat TTE with bubble study  - Zosyn empirically 4/26 - 4/27, no evidence of PNA, can hold off on abx  - valium 2.5 mg q12 PRN anxiety as she becomes very tachypneic Baseline 2-3L NC. Hx of ILD w/ c/f IPF. Followed w/ DOMENICO Ding. On HFNC 50/60 when leaving [place], now on bipap prn for wob. Received 4 doses of Levaquin last dose 4/13 at [facility].  - Transferred to [facility] for lung transplant eval  - Maintain euvolemia, I's and O's, daily weights.  - Transplant pulm consult appreciated  - Pulmonary consult  - Continue home Ofev 150mg BID  - Andreas Loyart  - Continue Methylprednisolone 40 mg IVP BID - while on steroids, Pantoprazole 40 mg QD  - pulm recs appreciated: consider adding NO to high flow, repeat serologies for scleroderma, sjogrens, RF, myomarker  - repeat TTE with bubble study  - Zosyn empirically 4/26 - 4/27, no evidence of PNA, can hold off on abx  - valium 2.5 mg q12 PRN anxiety as she becomes very tachypneic

## 2024-04-27 NOTE — PROGRESS NOTE ADULT - SUBJECTIVE AND OBJECTIVE BOX
Patient is a 63y old  Female who presents with a chief complaint of SOB (26 Apr 2024 11:46)        63-yo F with PMHx of chronic AHRF on 2-3L NC at b/l 2/2 ILD (Followed w/ Dr. Primo Marlow, [facility]), PE on Eliquis, severe pHTN, Cor pulmonale, DM, presented initially with SOB, dry cough, chest pain, LE edema, recent admission to [facility] in March for ILD flare, admitted to [facility]->ICU for AHRF 2/2 ILD flare, transferred to [facility] for lung transplant evaluation.     Subj: dyspneic at rest, on HFNC, states breathing unchanged today     PAST MEDICAL & SURGICAL HISTORY:  Diabetes mellitus      Hyperlipidemia      Pulmonary embolism      Pulmonary fibrosis      Cataract  right eye          MEDICATIONS  (STANDING):  albuterol/ipratropium for Nebulization 3 milliLiter(s) Nebulizer every 6 hours  apixaban 5 milliGRAM(s) Oral two times a day  atorvastatin 40 milliGRAM(s) Oral at bedtime  budesonide 160 MICROgram(s)/formoterol 4.5 MICROgram(s) Inhaler 2 Puff(s) Inhalation two times a day  chlorhexidine 2% Cloths 1 Application(s) Topical daily  dextrose 10% Bolus 125 milliLiter(s) IV Bolus once  dextrose 5%. 1000 milliLiter(s) (100 mL/Hr) IV Continuous <Continuous>  dextrose 5%. 1000 milliLiter(s) (50 mL/Hr) IV Continuous <Continuous>  dextrose 50% Injectable 25 Gram(s) IV Push once  dextrose 50% Injectable 12.5 Gram(s) IV Push once  gabapentin 300 milliGRAM(s) Oral daily  glucagon  Injectable 1 milliGRAM(s) IntraMuscular once  insulin glargine Injectable (LANTUS) 20 Unit(s) SubCutaneous at bedtime  insulin lispro (ADMELOG) corrective regimen sliding scale   SubCutaneous at bedtime  insulin lispro (ADMELOG) corrective regimen sliding scale   SubCutaneous three times a day before meals  insulin lispro Injectable (ADMELOG) 14 Unit(s) SubCutaneous three times a day before meals  methylPREDNISolone sodium succinate Injectable 40 milliGRAM(s) IV Push two times a day  Ofev (Nintedanib) 150 milliGRAM(s) 150 milliGRAM(s) Oral two times a day  pantoprazole    Tablet 40 milliGRAM(s) Oral two times a day  piperacillin/tazobactam IVPB. 3.375 Gram(s) IV Intermittent once  piperacillin/tazobactam IVPB.- 3.375 Gram(s) IV Intermittent once  polyethylene glycol 3350 17 Gram(s) Oral two times a day  senna 2 Tablet(s) Oral at bedtime  traZODone 100 milliGRAM(s) Oral at bedtime    MEDICATIONS  (PRN):  acetaminophen     Tablet .. 650 milliGRAM(s) Oral every 6 hours PRN Temp greater or equal to 38C (100.4F), Mild Pain (1 - 3)  aluminum hydroxide/magnesium hydroxide/simethicone Suspension 30 milliLiter(s) Oral every 4 hours PRN Dyspepsia  cyclobenzaprine 5 milliGRAM(s) Oral three times a day PRN Muscle Spasm  dextrose Oral Gel 15 Gram(s) Oral once PRN Blood Glucose LESS THAN 70 milliGRAM(s)/deciliter  melatonin 3 milliGRAM(s) Oral at bedtime PRN Insomnia      Social History:  Lives w/ daughter, has aide 3x/week, cane/walker at home, no smoking, no drinking. (26 Apr 2024 01:17)      Vital Signs Last 24 Hrs  T(C): 36.5 (26 Apr 2024 11:33), Max: 37.1 (25 Apr 2024 23:50)  T(F): 97.7 (26 Apr 2024 11:33), Max: 98.7 (25 Apr 2024 23:50)  HR: 102 (26 Apr 2024 13:16) (102 - 125)  BP: 135/84 (26 Apr 2024 11:33) (115/70 - 141/84)  BP(mean): 91 (25 Apr 2024 22:45) (83 - 98)  RR: 22 (26 Apr 2024 13:16) (20 - 41)  SpO2: 100% (26 Apr 2024 13:16) (91% - 100%)    Parameters below as of 26 Apr 2024 13:16  Patient On (Oxygen Delivery Method): nasal cannula, high flow  O2 Flow (L/min): 45  O2 Concentration (%): 60    General: WN/WD NAD  Neurology: A&Ox3, nonfocal, PADILLA x 4  Eyes: PERRLA/ EOMI, Gross vision intact  ENT/Neck: Neck supple, trachea midline, No JVD, Gross hearing intact  Respiratory: CTA B/L, No wheezing, rales, rhonchi  CV: RRR, +S1/S2, -S3/S4, no murmurs, rubs or gallops  Abdominal: Soft, NT, ND +BS, No HSM  MSK: 5/5 strength UE/LE bilaterally  Extremities: No edema, 2+ peripheral pulses  Skin: No Rashes, Hematoma, Ecchymosis  Incisions:   Tubes:    ABG - ( 24 Apr 2024 19:05 )  pH, Arterial: 7.48  pH, Blood: x     /  pCO2: 37    /  pO2: 71    / HCO3: 28    / Base Excess: 4.0   /  SaO2: 95                04-26    132<L>  |  100  |  43<H>  ----------------------------<  372<H>  4.9   |  24  |  0.86    Ca    8.7      26 Apr 2024 06:25  Phos  3.5     04-26  Mg     2.2     04-26    TPro  5.8<L>  /  Alb  2.5<L>  /  TBili  0.1<L>  /  DBili  x   /  AST  38  /  ALT  47<H>  /  AlkPhos  101  04-26                            9.3    14.23 )-----------( 497      ( 26 Apr 2024 06:25 )             31.1     Urinalysis Basic - ( 26 Apr 2024 06:25 )    Color: x / Appearance: x / SG: x / pH: x  Gluc: 372 mg/dL / Ketone: x  / Bili: x / Urobili: x   Blood: x / Protein: x / Nitrite: x   Leuk Esterase: x / RBC: x / WBC x   Sq Epi: x / Non Sq Epi: x / Bacteria: x        SARS-CoV-2: NotDetec (11 Apr 2024 22:46)

## 2024-04-27 NOTE — PROGRESS NOTE ADULT - SUBJECTIVE AND OBJECTIVE BOX
seen earlier today     Chief Complaint: Diabetes Mellitus follow up    INTERVAL HX: Bg mostly above goal, patient reports finishing >50% of meals but not completing them, daughter and cousin at bedside  , lung transplant eval noted       Review of Systems:  General: As above  GI: No nausea, vomiting  Endocrine: no  S&Sx of hypoglycemia    Allergies    No Known Allergies    Intolerances      MEDICATIONS  (STANDING):  albuterol/ipratropium for Nebulization 3 milliLiter(s) Nebulizer every 6 hours  apixaban 5 milliGRAM(s) Oral two times a day  atorvastatin 40 milliGRAM(s) Oral at bedtime  bisacodyl Suppository 10 milliGRAM(s) Rectal daily  budesonide 160 MICROgram(s)/formoterol 4.5 MICROgram(s) Inhaler 2 Puff(s) Inhalation two times a day  chlorhexidine 2% Cloths 1 Application(s) Topical daily  dextrose 10% Bolus 125 milliLiter(s) IV Bolus once  dextrose 5%. 1000 milliLiter(s) (100 mL/Hr) IV Continuous <Continuous>  dextrose 5%. 1000 milliLiter(s) (50 mL/Hr) IV Continuous <Continuous>  dextrose 50% Injectable 25 Gram(s) IV Push once  dextrose 50% Injectable 12.5 Gram(s) IV Push once  gabapentin 300 milliGRAM(s) Oral daily  glucagon  Injectable 1 milliGRAM(s) IntraMuscular once  insulin glargine Injectable (LANTUS) 20 Unit(s) SubCutaneous at bedtime  insulin lispro (ADMELOG) corrective regimen sliding scale   SubCutaneous three times a day before meals  insulin lispro (ADMELOG) corrective regimen sliding scale   SubCutaneous at bedtime  insulin lispro Injectable (ADMELOG) 18 Unit(s) SubCutaneous three times a day before meals  methylPREDNISolone sodium succinate Injectable 40 milliGRAM(s) IV Push two times a day  multivitamin 1 Tablet(s) Oral daily  Ofev (Nintedanib) 150 milliGRAM(s) 150 milliGRAM(s) Oral two times a day  pantoprazole    Tablet 40 milliGRAM(s) Oral two times a day  polyethylene glycol 3350 17 Gram(s) Oral two times a day  senna 2 Tablet(s) Oral at bedtime  traZODone 100 milliGRAM(s) Oral at bedtime      atorvastatin   40 milliGRAM(s) Oral (04-26-24 @ 22:18)    insulin glargine Injectable (LANTUS)   20 Unit(s) SubCutaneous (04-26-24 @ 22:19)    insulin lispro (ADMELOG) corrective regimen sliding scale   6 Unit(s) SubCutaneous (04-27-24 @ 08:26)   4  SubCutaneous (04-26-24 @ 17:29)    insulin lispro Injectable (ADMELOG)   18 Unit(s) SubCutaneous (04-27-24 @ 08:25)   18 Unit(s) SubCutaneous (04-26-24 @ 17:26)    methylPREDNISolone sodium succinate Injectable   40 milliGRAM(s) IV Push (04-27-24 @ 06:11)   40 milliGRAM(s) IV Push (04-26-24 @ 18:43)        PHYSICAL EXAM:  VITALS: T(C): 36.9 (04-27-24 @ 11:36)  T(F): 98.4 (04-27-24 @ 11:36), Max: 98.4 (04-27-24 @ 11:36)  HR: 126 (04-27-24 @ 11:36) (100 - 129)  BP: 135/86 (04-27-24 @ 11:36) (135/83 - 155/95)  RR:  (20 - 23)  SpO2:  (89% - 100%)  Wt(kg): --  GENERAL: NAD  Respiratory: Respirations unlabored on HFNC   Extremities: Warm, no edema  NEURO: Alert , appropriate     LABS:  POCT Blood Glucose.: 242 mg/dL (04-27-24 @ 11:45)  POCT Blood Glucose.: 282 mg/dL (04-27-24 @ 08:01)  POCT Blood Glucose.: 154 mg/dL (04-26-24 @ 21:28)  POCT Blood Glucose.: 237 mg/dL (04-26-24 @ 17:03)  POCT Blood Glucose.: 315 mg/dL (04-26-24 @ 12:08)  POCT Blood Glucose.: 422 mg/dL (04-26-24 @ 12:07)  POCT Blood Glucose.: 377 mg/dL (04-26-24 @ 08:14)  POCT Blood Glucose.: 363 mg/dL (04-26-24 @ 08:13)  POCT Blood Glucose.: 327 mg/dL (04-26-24 @ 01:23)  POCT Blood Glucose.: 244 mg/dL (04-25-24 @ 21:13)  POCT Blood Glucose.: 394 mg/dL (04-25-24 @ 16:03)  POCT Blood Glucose.: 306 mg/dL (04-25-24 @ 11:49)  POCT Blood Glucose.: 233 mg/dL (04-25-24 @ 05:51)  POCT Blood Glucose.: 293 mg/dL (04-24-24 @ 17:13)                          9.7    11.13 )-----------( 547      ( 27 Apr 2024 06:57 )             31.5     04-27    136  |  99  |  28<H>  ----------------------------<  243<H>  4.8   |  29  |  0.74    Ca    9.0      27 Apr 2024 06:57  Phos  2.6     04-27  Mg     2.3     04-27    TPro  5.8<L>  /  Alb  2.5<L>  /  TBili  0.1<L>  /  DBili  x   /  AST  38  /  ALT  47<H>  /  AlkPhos  101  04-26    eGFR: 92 mL/min/1.73m2 (27 Apr 2024 06:57)      Thyroid Function Tests:      A1C with Estimated Average Glucose Result: 11.9 % (04-13-24 @ 03:53)    Estimated Average Glucose: 295 mg/dL (04-13-24 @ 03:53)        Diet, Regular:   Consistent Carbohydrate No Snacks (CSTCHO) (04-26-24 @ 15:14) [Active]

## 2024-04-27 NOTE — PROGRESS NOTE ADULT - ASSESSMENT
63 F with history of AHRF, T2D, PE, HTN here for SOB secondary to interstital lung disease. Patient currently on solumedrol 40 mg BID. Endocrinology consulted for diabetes management.   Patient is high risk with high level decision making due to uncontrolled diabetes with A1C>10 which places patient at high risk for cardiovascular and cerebrovascular events. Patient with lability of glucose requiring close monitoring and insulin adjustments.    # T2DM  with hyperglycemia   - Most recent Hemoglobin A1C 11.9  - Current FS ranges from 200-300  - Current diet: regular   - Please monitor blood glucose values TID AC & QHS while eating regular meals and Q6H while NPO  - Blood glucose goals pre-meal less than 140 mg/dL and random blood glucose less than 180 mg/dL  - currently on MTP 40 mg BID   - Recommendations:  - Please switch to carb consistent diet   -  increase to  insulin Glargine 24 units QHS  - increase insulin Lispro to 22 units TID with meals, hold if NPO or if eating less than 50% of meals  - Continue with mod dose correctional scale TID with meals  - Continue with mod dose correctional scale QHS    Discharge planning:   - Home DM medications: metformin 500 mg BID, + glimepride 1 mg daily + lantus 35 units QHS+ admelog 25 units TIDAC  - Discharge DM medications:   - Continue with metformin 500 mg BID  - STOP glimepride due to recurrent hypoglycemia at home   - Basal/bolus, dose will depend on insulin requirement and steroid plan   - Patient will follow up at  [facility]:  [address] [place] [address]. [address]. [place] [address]  Tel: [phone]  - Patient will need opthalmology and podiatry follow up as outpatient     # HTN  - BP goal 130/80  - Manage per primary team     # HLD  - Continue with atorvastatin 40 mg QHS  - Goal LDL<70  - Manage as outpatient         Contact via Microsoft Teams during business hours  To reach covering provider access AMION via sunrise tools  For Urgent matters/after-hours/weekends/holidays please page endocrine fellow on call   For nonurgent matters please email [email or web address]    Please note that this patient may be followed by different provider tomorrow.  Notify endocrine 24 hours prior to discharge for final recommendations

## 2024-04-27 NOTE — PROGRESS NOTE ADULT - ASSESSMENT
63-yo F with PMHx of chronic AHRF on 2-3L NC at b/l 2/2 ILD (Followed w/ Dr. Primo Marlow, [facility]), PE on Eliquis, severe pHTN, Cor pulmonale, DM, presented initially with SOB, dry cough, chest pain, LE edema, recent admission to [facility] in March for ILD flare, admitted to outside hospital->ICU for AHRF 2/2 ILD flare, transferred to [facility] for lung transplant evaluation.

## 2024-04-27 NOTE — PROGRESS NOTE ADULT - SUBJECTIVE AND OBJECTIVE BOX
[facility] Division of Hospital Medicine  Héctor Lozano MD  Available via MS Teams    SUBJECTIVE / OVERNIGHT EVENTS: Admitted overnight. Significant SOB/WOB this AM with anxiety. Denies chest pain.     ADDITIONAL REVIEW OF SYSTEMS:    MEDICATIONS  (STANDING):  albuterol/ipratropium for Nebulization 3 milliLiter(s) Nebulizer every 6 hours  apixaban 5 milliGRAM(s) Oral two times a day  atorvastatin 40 milliGRAM(s) Oral at bedtime  budesonide 160 MICROgram(s)/formoterol 4.5 MICROgram(s) Inhaler 2 Puff(s) Inhalation two times a day  chlorhexidine 2% Cloths 1 Application(s) Topical daily  dextrose 10% Bolus 125 milliLiter(s) IV Bolus once  dextrose 5%. 1000 milliLiter(s) (100 mL/Hr) IV Continuous <Continuous>  dextrose 5%. 1000 milliLiter(s) (50 mL/Hr) IV Continuous <Continuous>  dextrose 50% Injectable 25 Gram(s) IV Push once  dextrose 50% Injectable 12.5 Gram(s) IV Push once  gabapentin 300 milliGRAM(s) Oral daily  glucagon  Injectable 1 milliGRAM(s) IntraMuscular once  insulin glargine Injectable (LANTUS) 20 Unit(s) SubCutaneous at bedtime  insulin lispro (ADMELOG) corrective regimen sliding scale   SubCutaneous at bedtime  insulin lispro (ADMELOG) corrective regimen sliding scale   SubCutaneous three times a day before meals  insulin lispro Injectable (ADMELOG) 14 Unit(s) SubCutaneous three times a day before meals  methylPREDNISolone sodium succinate Injectable 40 milliGRAM(s) IV Push two times a day  Ofev (Nintedanib) 150 milliGRAM(s) 150 milliGRAM(s) Oral two times a day  pantoprazole    Tablet 40 milliGRAM(s) Oral two times a day  polyethylene glycol 3350 17 Gram(s) Oral two times a day  senna 2 Tablet(s) Oral at bedtime  traZODone 100 milliGRAM(s) Oral at bedtime    MEDICATIONS  (PRN):  acetaminophen     Tablet .. 650 milliGRAM(s) Oral every 6 hours PRN Temp greater or equal to 38C (100.4F), Mild Pain (1 - 3)  aluminum hydroxide/magnesium hydroxide/simethicone Suspension 30 milliLiter(s) Oral every 4 hours PRN Dyspepsia  cyclobenzaprine 5 milliGRAM(s) Oral three times a day PRN Muscle Spasm  dextrose Oral Gel 15 Gram(s) Oral once PRN Blood Glucose LESS THAN 70 milliGRAM(s)/deciliter  melatonin 3 milliGRAM(s) Oral at bedtime PRN Insomnia      I&O's Summary    25 Apr 2024 07:01  -  26 Apr 2024 07:00  --------------------------------------------------------  IN: 0 mL / OUT: 600 mL / NET: -600 mL        PHYSICAL EXAM:  Vital Signs Last 24 Hrs  T(C): 36.5 (26 Apr 2024 11:33), Max: 37.1 (25 Apr 2024 23:50)  T(F): 97.7 (26 Apr 2024 11:33), Max: 98.7 (25 Apr 2024 23:50)  HR: 111 (26 Apr 2024 11:33) (103 - 125)  BP: 135/84 (26 Apr 2024 11:33) (115/70 - 141/84)  BP(mean): 91 (25 Apr 2024 22:45) (83 - 98)  RR: 22 (26 Apr 2024 11:33) (20 - 41)  SpO2: 100% (26 Apr 2024 11:33) (89% - 100%)    Parameters below as of 26 Apr 2024 11:33  Patient On (Oxygen Delivery Method): BiPAP/CPAP    CONSTITUTIONAL: comfortable on HF, tachypnea, sob  NECK: Supple, no palpable masses; no thyromegaly  RESPIRATORY: tachypnea, crackles  CARDIOVASCULAR: regular, tachycardic to low 120s, s1, s2, no m/r/g, no LE edema  ABDOMEN: soft, nt, nd   PSYCH: A+O to person, place, and time; anxious appropriate  NEUROLOGY: CN 2-12 are intact and symmetric; no gross sensory deficits   SKIN: No rashes; no palpable lesions    LABS:                        9.3    14.23 )-----------( 497      ( 26 Apr 2024 06:25 )             31.1     04-26    132<L>  |  100  |  43<H>  ----------------------------<  372<H>  4.9   |  24  |  0.86    Ca    8.7      26 Apr 2024 06:25  Phos  3.5     04-26  Mg     2.2     04-26    TPro  5.8<L>  /  Alb  2.5<L>  /  TBili  0.1<L>  /  DBili  x   /  AST  38  /  ALT  47<H>  /  AlkPhos  101  04-26          Urinalysis Basic - ( 26 Apr 2024 06:25 )    Color: x / Appearance: x / SG: x / pH: x  Gluc: 372 mg/dL / Ketone: x  / Bili: x / Urobili: x   Blood: x / Protein: x / Nitrite: x   Leuk Esterase: x / RBC: x / WBC x   Sq Epi: x / Non Sq Epi: x / Bacteria: x        SARS-CoV-2: NotDetec (11 Apr 2024 22:46)    ABG - ( 24 Apr 2024 19:05 )  pH, Arterial: 7.48  pH, Blood: x     /  pCO2: 37    /  pO2: 71    / HCO3: 28    / Base Excess: 4.0   /  SaO2: 95          RADIOLOGY & ADDITIONAL TESTS:  New Results Reviewed Today:   New Imaging Personally Reviewed Today:  New Electrocardiogram Personally Reviewed Today:  Prior or Outpatient Records Reviewed Today:    COMMUNICATION:  Care Discussed with Consultants/Other Providers and Details of Discussion: Discussed with ACP  Discussions with Patient/Family:  PCP Communication:

## 2024-04-28 NOTE — PROGRESS NOTE ADULT - ASSESSMENT
63F hx ILD/ probable IPF (On 2-3LNC at home), PE 2022 on eliquis, severe pHTN w/ prior cor pulmonale (RVSP 68 - 10/22 w/ TTE from 4/12 w/ PASP 28, normal LV/RV SF), IDDM, presenting as a transfer from [place] for lung transplant eval iso SOB, dry cough, chest pain. She has been receiving duonebs, symbicort, lasix, ofev and IV steroids. Continuing with AC. Solu-medrol is ordered for 40 IV BID.  CT Chest 4/12/2024: Findings suggesting interstitial lung disease without significant interval progression. No evidence of pneumonia.   CT scan from 4/12/2024 when compared with CT scan from 2020 has shown significant progression.      Recommendations  tolerating HFNC, continue today   Continue with Symbicort, duonebs, Ofev, Eliquis, Solu-medrol 40 IV BID  Continue with diuresis -goal net neg 1L    Can continue to use benzo prn for anxiety  Please give patient laxative/bowel regimen,   c/w protonix - esophageal dilatation noted on CT scan  TTE results appreciated  fu sputum cx - can continue with zosyn for now, though unlikely clinical picture related to pna  Repeat Collagen vascular disease work-up: please send RF, anti-ccp, anti-centromere, CK, aldolase, MyoMarker Panel 3 Plus, ANCA, SCL-70 ab, HOLLIE, DsDNA, anti-RO, anti-LA, Sjogren antibodies, IgG4, Anti-RNP, Agnes-1 antibodies, and HIV antibodies.   During the week team recommended inhaled nitric oxide - to address pulmonary hypertension contributing to clinical picture. However unable to pursue this on medical floor. If any worsening mental status, oxygenation, work of breathing consult ICU

## 2024-04-28 NOTE — PROGRESS NOTE ADULT - PROBLEM SELECTOR PLAN 1
Baseline 2-3L NC. Hx of ILD w/ c/f IPF. Followed w/ DOMENICO Ding. On HFNC 50/60 when leaving [place], now on bipap prn for wob. Received 4 doses of Levaquin last dose 4/13 at [facility].  - Transferred to [facility] for lung transplant eval  - Maintain euvolemia, I's and O's, daily weights.  - Transplant pulm consult appreciated  - Pulmonary consult  - Continue home Ofev 150mg BID  - Geovanni Loyacort  - Continue Methylprednisolone 40 mg IVP BID - while on steroids, Pantoprazole 40 mg QD  - pulm recs appreciated: consider adding NO to high flow, repeat serologies for scleroderma, sjogrens, RF, myomarker (cannot be done on floor, will need ICU consult if decompensates)  - repeat TTE with bubble study - severe RH dysfunction with elevated pulmonary pressure, grade ii diastolic dysfunction (?from RH failure), normal LVSF  - diuresis - lasix 40 iv  - Zosyn empirically 4/26 - 4/27, no evidence of PNA, can hold off on abx  - valium 2.5 mg q12 PRN anxiety as she becomes very tachypneic

## 2024-04-28 NOTE — PROGRESS NOTE ADULT - SUBJECTIVE AND OBJECTIVE BOX
[facility] Division of Hospital Medicine  Héctor Lozano MD  Available via MS Teams    SUBJECTIVE / OVERNIGHT EVENTS: No acute events overnight. Pt seen and examined at bedside. Still anxious, had bowel movement with enema yesterday.     ADDITIONAL REVIEW OF SYSTEMS:    MEDICATIONS  (STANDING):  albuterol/ipratropium for Nebulization 3 milliLiter(s) Nebulizer every 6 hours  apixaban 5 milliGRAM(s) Oral two times a day  atorvastatin 40 milliGRAM(s) Oral at bedtime  bisacodyl Suppository 10 milliGRAM(s) Rectal daily  budesonide 160 MICROgram(s)/formoterol 4.5 MICROgram(s) Inhaler 2 Puff(s) Inhalation two times a day  chlorhexidine 2% Cloths 1 Application(s) Topical daily  dextrose 10% Bolus 125 milliLiter(s) IV Bolus once  dextrose 5%. 1000 milliLiter(s) (100 mL/Hr) IV Continuous <Continuous>  dextrose 5%. 1000 milliLiter(s) (50 mL/Hr) IV Continuous <Continuous>  dextrose 50% Injectable 25 Gram(s) IV Push once  dextrose 50% Injectable 12.5 Gram(s) IV Push once  furosemide   Injectable 40 milliGRAM(s) IV Push daily  gabapentin 300 milliGRAM(s) Oral daily  glucagon  Injectable 1 milliGRAM(s) IntraMuscular once  insulin glargine Injectable (LANTUS) 22 Unit(s) SubCutaneous at bedtime  insulin lispro (ADMELOG) corrective regimen sliding scale   SubCutaneous three times a day before meals  insulin lispro (ADMELOG) corrective regimen sliding scale   SubCutaneous at bedtime  insulin lispro Injectable (ADMELOG) 20 Unit(s) SubCutaneous three times a day before meals  methylPREDNISolone sodium succinate Injectable 40 milliGRAM(s) IV Push two times a day  multivitamin 1 Tablet(s) Oral daily  Ofev (Nintedanib) 150 milliGRAM(s) 150 milliGRAM(s) Oral two times a day  pantoprazole    Tablet 40 milliGRAM(s) Oral two times a day  polyethylene glycol 3350 17 Gram(s) Oral two times a day  senna 2 Tablet(s) Oral at bedtime  traZODone 100 milliGRAM(s) Oral at bedtime    MEDICATIONS  (PRN):  acetaminophen     Tablet .. 650 milliGRAM(s) Oral every 6 hours PRN Temp greater or equal to 38C (100.4F), Mild Pain (1 - 3)  aluminum hydroxide/magnesium hydroxide/simethicone Suspension 30 milliLiter(s) Oral every 4 hours PRN Dyspepsia  cyclobenzaprine 5 milliGRAM(s) Oral three times a day PRN Muscle Spasm  dextrose Oral Gel 15 Gram(s) Oral once PRN Blood Glucose LESS THAN 70 milliGRAM(s)/deciliter  diazepam  Injectable 2.5 milliGRAM(s) IV Push every 12 hours PRN anxiety  melatonin 3 milliGRAM(s) Oral at bedtime PRN Insomnia      I&O's Summary    27 Apr 2024 07:01  -  28 Apr 2024 07:00  --------------------------------------------------------  IN: 480 mL / OUT: 4150 mL / NET: -3670 mL        T(C): 37.1 (04-28-24 @ 10:57), Max: 37.1 (04-28-24 @ 10:57)  HR: 98 (04-28-24 @ 10:57) (98 - 129)  BP: 127/81 (04-28-24 @ 10:57) (127/81 - 155/92)  RR: 19 (04-28-24 @ 10:57) (18 - 25)  SpO2: 97% (04-28-24 @ 10:57) (94% - 99%)        LABS:                        11.0   11.72 )-----------( 554      ( 28 Apr 2024 06:20 )             35.2     04-28    139  |  102  |  23  ----------------------------<  96  4.3   |  29  |  0.65    Ca    9.5      28 Apr 2024 06:21  Phos  2.6     04-27  Mg     2.3     04-27        CARDIAC MARKERS ( 27 Apr 2024 06:57 )  x     / x     / 43 U/L / x     / x          Urinalysis Basic - ( 28 Apr 2024 06:21 )    Color: x / Appearance: x / SG: x / pH: x  Gluc: 96 mg/dL / Ketone: x  / Bili: x / Urobili: x   Blood: x / Protein: x / Nitrite: x   Leuk Esterase: x / RBC: x / WBC x   Sq Epi: x / Non Sq Epi: x / Bacteria: x        Culture - Blood (collected 26 Apr 2024 12:25)  Source: .Blood Blood-Peripheral  Preliminary Report (27 Apr 2024 18:02):    No growth at 24 hours    Culture - Blood (collected 26 Apr 2024 12:13)  Source: .Blood Blood-Peripheral  Preliminary Report (27 Apr 2024 18:02):    No growth at 24 hours      SARS-CoV-2: NotDetec (11 Apr 2024 22:46)    RADIOLOGY & ADDITIONAL TESTS:  New Results Reviewed Today:   New Imaging Personally Reviewed Today:  New Electrocardiogram Personally Reviewed Today:  Prior or Outpatient Records Reviewed Today:    COMMUNICATION:  Care Discussed with Consultants/Other Providers and Details of Discussion: Discussed with ACP  Discussions with Patient/Family:  PCP Communication:    CONSTITUTIONAL: comfortable on HF, tachypnea, sob  NECK: Supple, no palpable masses; no thyromegaly  RESPIRATORY: tachypnea, crackles  CARDIOVASCULAR: regular, tachycardic to low 100s, s1, s2, no m/r/g, no LE edema  ABDOMEN: soft, nt, nd   PSYCH: A+O to person, place, and time; anxious appropriate  NEUROLOGY: CN 2-12 are intact and symmetric; no gross sensory deficits   SKIN: No rashes; no palpable lesions

## 2024-04-28 NOTE — PROGRESS NOTE ADULT - SUBJECTIVE AND OBJECTIVE BOX
Patient is a 63y old  Female who presents with a chief complaint of SOB (26 Apr 2024 11:46)    Subj: dyspneic at rest, on HFNC, states breathing unchanged today. Seen by transplant pulmonary today. Agreed for evaluation for lung transplant.     PAST MEDICAL & SURGICAL HISTORY:  Diabetes mellitus      Hyperlipidemia      Pulmonary embolism      Pulmonary fibrosis      Cataract  right eye          MEDICATIONS  (STANDING):  albuterol/ipratropium for Nebulization 3 milliLiter(s) Nebulizer every 6 hours  apixaban 5 milliGRAM(s) Oral two times a day  atorvastatin 40 milliGRAM(s) Oral at bedtime  budesonide 160 MICROgram(s)/formoterol 4.5 MICROgram(s) Inhaler 2 Puff(s) Inhalation two times a day  chlorhexidine 2% Cloths 1 Application(s) Topical daily  dextrose 10% Bolus 125 milliLiter(s) IV Bolus once  dextrose 5%. 1000 milliLiter(s) (100 mL/Hr) IV Continuous <Continuous>  dextrose 5%. 1000 milliLiter(s) (50 mL/Hr) IV Continuous <Continuous>  dextrose 50% Injectable 25 Gram(s) IV Push once  dextrose 50% Injectable 12.5 Gram(s) IV Push once  gabapentin 300 milliGRAM(s) Oral daily  glucagon  Injectable 1 milliGRAM(s) IntraMuscular once  insulin glargine Injectable (LANTUS) 20 Unit(s) SubCutaneous at bedtime  insulin lispro (ADMELOG) corrective regimen sliding scale   SubCutaneous at bedtime  insulin lispro (ADMELOG) corrective regimen sliding scale   SubCutaneous three times a day before meals  insulin lispro Injectable (ADMELOG) 14 Unit(s) SubCutaneous three times a day before meals  methylPREDNISolone sodium succinate Injectable 40 milliGRAM(s) IV Push two times a day  Ofev (Nintedanib) 150 milliGRAM(s) 150 milliGRAM(s) Oral two times a day  pantoprazole    Tablet 40 milliGRAM(s) Oral two times a day  piperacillin/tazobactam IVPB. 3.375 Gram(s) IV Intermittent once  piperacillin/tazobactam IVPB.- 3.375 Gram(s) IV Intermittent once  polyethylene glycol 3350 17 Gram(s) Oral two times a day  senna 2 Tablet(s) Oral at bedtime  traZODone 100 milliGRAM(s) Oral at bedtime    MEDICATIONS  (PRN):  acetaminophen     Tablet .. 650 milliGRAM(s) Oral every 6 hours PRN Temp greater or equal to 38C (100.4F), Mild Pain (1 - 3)  aluminum hydroxide/magnesium hydroxide/simethicone Suspension 30 milliLiter(s) Oral every 4 hours PRN Dyspepsia  cyclobenzaprine 5 milliGRAM(s) Oral three times a day PRN Muscle Spasm  dextrose Oral Gel 15 Gram(s) Oral once PRN Blood Glucose LESS THAN 70 milliGRAM(s)/deciliter  melatonin 3 milliGRAM(s) Oral at bedtime PRN Insomnia      Social History:  Lives w/ daughter, has aide 3x/week, cane/walker at home, no smoking, no drinking. (26 Apr 2024 01:17)      Vital Signs Last 24 Hrs  T(C): 36.5 (26 Apr 2024 11:33), Max: 37.1 (25 Apr 2024 23:50)  T(F): 97.7 (26 Apr 2024 11:33), Max: 98.7 (25 Apr 2024 23:50)  HR: 102 (26 Apr 2024 13:16) (102 - 125)  BP: 135/84 (26 Apr 2024 11:33) (115/70 - 141/84)  BP(mean): 91 (25 Apr 2024 22:45) (83 - 98)  RR: 22 (26 Apr 2024 13:16) (20 - 41)  SpO2: 100% (26 Apr 2024 13:16) (91% - 100%)    Parameters below as of 26 Apr 2024 13:16  Patient On (Oxygen Delivery Method): nasal cannula, high flow  O2 Flow (L/min): 45  O2 Concentration (%): 60    General: WN/WD NAD  Neurology: A&Ox3, nonfocal, PADILLA x 4  Eyes: PERRLA/ EOMI, Gross vision intact  ENT/Neck: Neck supple, trachea midline, No JVD, Gross hearing intact  Respiratory: CTA B/L, No wheezing, rales, rhonchi  CV: RRR, +S1/S2, -S3/S4, no murmurs, rubs or gallops  Abdominal: Soft, NT, ND +BS, No HSM  MSK: 5/5 strength UE/LE bilaterally  Extremities: No edema, 2+ peripheral pulses  Skin: No Rashes, Hematoma, Ecchymosis  Incisions:   Tubes:    ABG - ( 24 Apr 2024 19:05 )  pH, Arterial: 7.48  pH, Blood: x     /  pCO2: 37    /  pO2: 71    / HCO3: 28    / Base Excess: 4.0   /  SaO2: 95                04-26    132<L>  |  100  |  43<H>  ----------------------------<  372<H>  4.9   |  24  |  0.86    Ca    8.7      26 Apr 2024 06:25  Phos  3.5     04-26  Mg     2.2     04-26    TPro  5.8<L>  /  Alb  2.5<L>  /  TBili  0.1<L>  /  DBili  x   /  AST  38  /  ALT  47<H>  /  AlkPhos  101  04-26                            9.3    14.23 )-----------( 497      ( 26 Apr 2024 06:25 )             31.1     Urinalysis Basic - ( 26 Apr 2024 06:25 )    Color: x / Appearance: x / SG: x / pH: x  Gluc: 372 mg/dL / Ketone: x  / Bili: x / Urobili: x   Blood: x / Protein: x / Nitrite: x   Leuk Esterase: x / RBC: x / WBC x   Sq Epi: x / Non Sq Epi: x / Bacteria: x        SARS-CoV-2: NotDetemahad (11 Apr 2024 22:46)

## 2024-04-28 NOTE — CHART NOTE - NSCHARTNOTEFT_GEN_A_CORE
Age: 63y    Gender: Female    POCT Blood Glucose:  111 mg/dL (04-28-24 @ 07:42)  105 mg/dL (04-27-24 @ 21:26)  124 mg/dL (04-27-24 @ 16:43)  248 mg/dL (04-27-24 @ 13:19)  242 mg/dL (04-27-24 @ 11:45)      eMAR:atorvastatin   40 milliGRAM(s) Oral (04-27-24 @ 21:29)    insulin glargine Injectable (LANTUS)   24 Unit(s) SubCutaneous (04-27-24 @ 21:29)    insulin lispro (ADMELOG) corrective regimen sliding scale   4 Unit(s) SubCutaneous (04-27-24 @ 13:55)    insulin lispro Injectable (ADMELOG)   22 Unit(s) SubCutaneous (04-28-24 @ 08:12)   22 Unit(s) SubCutaneous (04-27-24 @ 17:13)    insulin lispro Injectable (ADMELOG).   22 Unit(s) SubCutaneous (04-27-24 @ 13:54)    methylPREDNISolone sodium succinate Injectable   40 milliGRAM(s) IV Push (04-28-24 @ 05:41)   40 milliGRAM(s) IV Push (04-27-24 @ 17:05)     POC glucose, insulin requirements, lab values reviewed.  FBG tightly controlled reduce lantus to 22 units sq qhs  Post prandial bg at goal, C/wAdmelog 22 units tid ac  C/W admelog moderate correctional scales   remains on  Solu-medrol 40 IV BID, NOTIFY ENDOCRINE IF STEROID DOSE IS CHANGED PT WILL NEED REAL TIME ADJUSTMENTS TO INSULIN REGIMEN TO PREVENT HYPOGLYCEMIA!!!!!!!

## 2024-04-28 NOTE — PROGRESS NOTE ADULT - PROBLEM SELECTOR PLAN 2
Hx of PE on CTA 10/29/22 w/ RLL segmental/subsegmental pulmonary embolism  - Repeat CTA 2/28/23 w/o PE. Was at [facility] on Lovenox->Eliquis while pending repeat CTA to r/o PE given new hypoxia. DVT studies at OSH 4/22/24 negative  - Unable to perform repeat CTA given degree of hypoxemia  - Can continue Eliquis for now

## 2024-04-28 NOTE — PROGRESS NOTE ADULT - SUBJECTIVE AND OBJECTIVE BOX
Interval Events:  Stable, no events overnight  Remains on HiFlo  Interested in launching lung transplant eval    REVIEW OF SYSTEMS:  Constitutional:   Eyes:  ENT:  CV:  Resp: dyspnea  GI:  :  MSK:  Integumentary:  Neurological:  Psychiatric:  Endocrine:  Hematologic/Lymphatic:  Allergic/Immunologic:  [x] All other systems negative  [ ] Unable to assess ROS because ________    OBJECTIVE:  ICU Vital Signs Last 24 Hrs  T(C): 36.6 (28 Apr 2024 04:26), Max: 36.9 (27 Apr 2024 11:36)  T(F): 97.9 (28 Apr 2024 04:26), Max: 98.4 (27 Apr 2024 11:36)  HR: 129 (28 Apr 2024 09:27) (111 - 129)  BP: 134/84 (28 Apr 2024 09:27) (134/84 - 155/92)  BP(mean): --  ABP: --  ABP(mean): --  RR: 25 (28 Apr 2024 09:27) (18 - 25)  SpO2: 97% (28 Apr 2024 09:27) (94% - 99%)    O2 Parameters below as of 28 Apr 2024 09:27  Patient On (Oxygen Delivery Method): nasal cannula, high flow  O2 Flow (L/min): 60  O2 Concentration (%): 55          04-27 @ 07:01  -  04-28 @ 07:00  --------------------------------------------------------  IN: 480 mL / OUT: 4150 mL / NET: -3670 mL      CAPILLARY BLOOD GLUCOSE      POCT Blood Glucose.: 111 mg/dL (28 Apr 2024 07:42)      PHYSICAL EXAM:  General: AAOx3  HEENT: EOMI, PERRL  Lymph Nodes: No LAD  Neck: Supple  Respiratory: crackles at the bases  Cardiovascular: RRR   Abdomen: soft, NT/ND  Extremities: no c/c/e    HOSPITAL MEDICATIONS:  MEDICATIONS  (STANDING):  albuterol/ipratropium for Nebulization 3 milliLiter(s) Nebulizer every 6 hours  apixaban 5 milliGRAM(s) Oral two times a day  atorvastatin 40 milliGRAM(s) Oral at bedtime  bisacodyl Suppository 10 milliGRAM(s) Rectal daily  budesonide 160 MICROgram(s)/formoterol 4.5 MICROgram(s) Inhaler 2 Puff(s) Inhalation two times a day  chlorhexidine 2% Cloths 1 Application(s) Topical daily  dextrose 10% Bolus 125 milliLiter(s) IV Bolus once  dextrose 5%. 1000 milliLiter(s) (100 mL/Hr) IV Continuous <Continuous>  dextrose 5%. 1000 milliLiter(s) (50 mL/Hr) IV Continuous <Continuous>  dextrose 50% Injectable 25 Gram(s) IV Push once  dextrose 50% Injectable 12.5 Gram(s) IV Push once  furosemide   Injectable 40 milliGRAM(s) IV Push daily  gabapentin 300 milliGRAM(s) Oral daily  glucagon  Injectable 1 milliGRAM(s) IntraMuscular once  insulin glargine Injectable (LANTUS) 22 Unit(s) SubCutaneous at bedtime  insulin lispro (ADMELOG) corrective regimen sliding scale   SubCutaneous at bedtime  insulin lispro (ADMELOG) corrective regimen sliding scale   SubCutaneous three times a day before meals  insulin lispro Injectable (ADMELOG) 22 Unit(s) SubCutaneous three times a day before meals  methylPREDNISolone sodium succinate Injectable 40 milliGRAM(s) IV Push two times a day  multivitamin 1 Tablet(s) Oral daily  Ofev (Nintedanib) 150 milliGRAM(s) 150 milliGRAM(s) Oral two times a day  pantoprazole    Tablet 40 milliGRAM(s) Oral two times a day  polyethylene glycol 3350 17 Gram(s) Oral two times a day  senna 2 Tablet(s) Oral at bedtime  traZODone 100 milliGRAM(s) Oral at bedtime    MEDICATIONS  (PRN):  acetaminophen     Tablet .. 650 milliGRAM(s) Oral every 6 hours PRN Temp greater or equal to 38C (100.4F), Mild Pain (1 - 3)  aluminum hydroxide/magnesium hydroxide/simethicone Suspension 30 milliLiter(s) Oral every 4 hours PRN Dyspepsia  cyclobenzaprine 5 milliGRAM(s) Oral three times a day PRN Muscle Spasm  dextrose Oral Gel 15 Gram(s) Oral once PRN Blood Glucose LESS THAN 70 milliGRAM(s)/deciliter  diazepam  Injectable 2.5 milliGRAM(s) IV Push every 12 hours PRN anxiety  melatonin 3 milliGRAM(s) Oral at bedtime PRN Insomnia      LABS:                        11.0   11.72 )-----------( 554      ( 28 Apr 2024 06:20 )             35.2     04-28    139  |  102  |  23  ----------------------------<  96  4.3   |  29  |  0.65    Ca    9.5      28 Apr 2024 06:21  Phos  2.6     04-27  Mg     2.3     04-27        Urinalysis Basic - ( 28 Apr 2024 06:21 )    Color: x / Appearance: x / SG: x / pH: x  Gluc: 96 mg/dL / Ketone: x  / Bili: x / Urobili: x   Blood: x / Protein: x / Nitrite: x   Leuk Esterase: x / RBC: x / WBC x   Sq Epi: x / Non Sq Epi: x / Bacteria: x            RADIOLOGY:  [x] Reviewed and interpreted by me

## 2024-04-28 NOTE — CHART NOTE - NSCHARTNOTEFT_GEN_A_CORE
Lung Transplant Evaluation Consent: Obtained independently by Dr. Malone on 4/28/24.       Dr. Malone met with patient to discuss consent for lung transplant evaluation. Reviewed evaluation process including testing, labs, appointments, and consults with the transplant team. Discussed surgical, medical, and psycho-social risks and benefits, selection process, UNOS waitlist information, and follow-up care. Discussed potential EVLP, DCD donors, and donors with PHS increased risk behaviors, including option of a hepatitis C lung.    Patient ACCEPTS Hepatitis C NAAT positive donors  Patient ACCEPTS DCD Donors    Patient and caregivers questions were answered.    Patient signed consent and was supplied copies of consent, patient handbook, UNOS “Questions and Answers for transplant candidates regarding multiple listing and wait time transfer”, along with contact phone number if they have any additional questions or needs.    Patient is aware we are available should they or their family have any additional questions or concerns.

## 2024-04-28 NOTE — PROGRESS NOTE ADULT - ASSESSMENT
63-yo F with PMHx of chronic AHRF on 2-3L NC at b/l 2/2 ILD (Followed w/ Dr. Primo Marlow, [facility]), PE on Eliquis, severe pHTN, Cor pulmonale, DM, presented initially with SOB, dry cough, chest pain, LE edema, recent admission to [facility] in March for ILD flare, admitted to outside hospital->ICU for AHRF 2/2 ILD flare, transferred to [facility] for lung transplant evaluation.     We went over the risks and benefits of lung transplantation including one and five year survival. The median survival after a double lung transplant is about 6.5 years and this was explained in detail. We also went over the risks of opportunistic infections and the risks of calcineurin inhibitor use after the transplant with inherent risks of neoplasms and opportunistic infections and other complications. The post-operative recovery period was explained in detail including the need for mechanical ventilation and other means of cardiopulmonary support including extracorporal membrane oxygenation use and the types of incisions and chest tubes that are required.     At this time, patient agrees to proceed with comprehensive evaluations for lung transplant, which includes a battery of test, including cardiac catherization and consultations by a number of team members, such as Transplant surgeon, , , psychiatry, and Nutrition. Further tests, procedures and consultations with specialist may be required once initial work up is complete. Patient and support person given opportunity to ask questions and understands provided information. Patient and support person understood that evaluation can be closed at any point if no longer deemed a candidate by the multidisciplinary team. Patient signed consent for evaluation on 4/28/2024. Patient and support person encouraged to reach out to Lung Transplant team with any questions following this discussion.    Please send the following labs:     63-yo F with PMHx of chronic AHRF on 2-3L NC at b/l 2/2 ILD (Followed w/ Dr. Primo Marlow, [facility]), PE on Eliquis, severe pHTN, Cor pulmonale, DM, presented initially with SOB, dry cough, chest pain, LE edema, recent admission to [facility] in March for ILD flare, admitted to outside hospital->ICU for AHRF 2/2 ILD flare, transferred to [facility] for lung transplant evaluation.     We went over the risks and benefits of lung transplantation including one and five year survival. The median survival after a double lung transplant is about 6.5 years and this was explained in detail. We also went over the risks of opportunistic infections and the risks of calcineurin inhibitor use after the transplant with inherent risks of neoplasms and opportunistic infections and other complications. The post-operative recovery period was explained in detail including the need for mechanical ventilation and other means of cardiopulmonary support including extracorporal membrane oxygenation use and the types of incisions and chest tubes that are required.     At this time, patient agrees to proceed with comprehensive evaluations for lung transplant, which includes a battery of test, including cardiac catherization and consultations by a number of team members, such as Transplant surgeon, , , psychiatry, and Nutrition. Further tests, procedures and consultations with specialist may be required once initial work up is complete. Patient and support person given opportunity to ask questions and understands provided information. Patient and support person understood that evaluation can be closed at any point if no longer deemed a candidate by the multidisciplinary team. Patient signed consent for evaluation on 4/28/2024. Patient and support person encouraged to reach out to Lung Transplant team with any questions following this discussion.    Please send the following labs for now:    BLOOD WORK    ABO x 1   ABO x 2   ABG or VBG     CARDIOLOGY  BNP  Cholesterol  Triglycerides  HDL  LDL    GASTROINTESTINAL  Pre- albumin  Albumin  Amylase  Lipase  GGT  Bilirubin- Total  Bilirubin- Direct  Alk Phos  AST  ALT          GENITOURINARY  PSA (males < 40)      HEMATOLOGY    IRON  TIBC  PT/INR  PTT  FERRITIN          HYPERCOAGULABILITY   Antithrombin III Assay with Reflex  Protein C Activity/Antigen with  with Reflex  Protein S Free Activity Assay  Molecular Genetics- Factor V Leiden  Molecular Genetics- Prothrombin  Homocysteine, Serum  Beta 2 Glycoprotein 1 Antibody Screen  Anticardiolipin Antibody Level, Total           ENDOCRINOLOGY  Hemoglobin A1C or glucose intolerance test (CF)  Vitamin D level  TSH  T3  T4        RHEUMATOLOGY  CRP  ESR  dsDNA (double-stranded DNA)  EMILY-1  CK  Myomarker Panel 3  ANCA with refelex MPO and PROT3  HOLLIE  Centromere ab   Rheumatoid Factor Quant, serum  Scleroderma Ab   Sjogren's Syndrome Abs  Anti-RNP order as TOBY antibody screening test  C3 complement, serum  c4 complement, serum  Aldolase serum  Immunoglobulin subclass          URINE STUDIES  Urine Nicotine Screen  Urine Drug Screen  Urine EtG (alcohol)  Urine histoplasma  Urine legionella   Urinalysis/urine culture  uric acid           SEROLOGY/Infectious disease  CMV IgG  CMV IgM  EBV serology   Toxoplasma  IgG  Lyme IgM/IgG  Syphilis screen  HSV 1 IgG  HSV 2 IgG  HIV 1/2 antigen/AB  HIV RNA viral load   Hepatitis A AB  Hepatitis B surface antigen  Hepatitis B surface antibodies quantitative   Hepatitis B core antibodies   Hepatitis C virus RNA detection by PCR  Hepatitis C Antibodies  Hepatitis E IgG  TB Blood Test, quantiferon gold  Toxocara IgG  Measles IgG  Mumps IgG  Rubella IgG  Parvovirus IgM/IgM  Varicella IgG  Trypanosoma cruzi antibodies   Coccidioides  Strongyloides IgG  COVID 19 spike domain antibodies  COVID 19 nucleocapsid antibody

## 2024-04-28 NOTE — PROGRESS NOTE ADULT - PROBLEM SELECTOR PLAN 3
- Pt w/ hx of severe pHTN, RVSP 68 on 10/2022  - Repeat 4/12/24 at [facility] noted PAP 28, "normal PAP"   - Maintain euvolemia, as above

## 2024-04-28 NOTE — PROGRESS NOTE ADULT - PROBLEM SELECTOR PLAN 8
T2DM: Home DM medications: metformin 500 mg BID, + glimepride 1 mg daily + lantus 35 units QHS+ admelog 25 units TIDAC  - Pt w/ hx of uncontrolled T2DM, a1c 11.9. At home, Basaglar 25 U QHS + Admelog 15U TID. Endocrine was consulted at [facility] prior to transfer. Was on  Lantus 20 + Admelog 14u TID + Mod SSI  - Endocrine consulted via email, recs appreciated  - Continue glargine 20 units qhs, lispro 18u TID qac, hold if NPO or eating < 50% of meals  - MISS FS tid qac qhs    - Discharge DM medications:   - Continue with metformin 500 mg BID  - STOP glimepride due to recurrent hypoglycemia at home   - Basal/bolus, dose will depend on insulin requirement and steroid plan   - Patient will follow up at  [facility]:  [address] [place] [address]. [address]. [place] [address]  Tel: [phone]

## 2024-04-28 NOTE — PROGRESS NOTE ADULT - PROBLEM SELECTOR PLAN 5
- Pt was seeing Dr. Chua, urologist. At [facility] had failed TOV w/ godwin replaced. Arrives to [facility] w/o Godwin. Pt on outpt med list listed Tamsulosin 0.4 mg, unclear if started at [facility], pt saying she does not take this outpatient.   - q8h Bladder scans, place godwin if 3 straight caths

## 2024-04-29 NOTE — PROGRESS NOTE ADULT - PROBLEM SELECTOR PLAN 2
Hx of PE on CTA 10/29/22 w/ RLL segmental/subsegmental pulmonary embolism  - Repeat CTA 2/28/23 w/o PE. Was at [facility] on Lovenox->Eliquis while pending repeat CTA to r/o PE given new hypoxia. DVT studies at OSH 4/22/24 negative  - Unable to perform repeat CTA given degree of hypoxemia  - Can continue Eliquis for now

## 2024-04-29 NOTE — CHART NOTE - NSCHARTNOTEFT_GEN_A_CORE
Predinner blood sugar is 80  Reduced predinner admelog to 10 units (hold if NPO and if eats < 50%) and to hold 20 units admelog today    [id number]

## 2024-04-29 NOTE — PROGRESS NOTE ADULT - ATTENDING COMMENTS
63 year old female with history of chronic hypoxemic respiratory failure on 2-3 L NC secondary to ILD, PE on Eliquis, severe pulmonary hypertension, cor pulmonale, and poorly controlled diabetes (HbA1c 11.9) who presented as transfer for lung transplant evaluation. She initially presented with worsening shortness of breath and acute on chronic hypoxemic respiratory failure. Patient is on HFNC 60 LPM 55%. Consented 4/28/24 for transplant evaluation. S    Concern given echo findings of elevated filling pressures and worsening RV function. Patient is also profoundly weak.    Recommend:  PT to assess 6MWT  Continue daily diuretic. Repeat TTE to assess diastolic dysfunction / RV function.  Neuro input appreciated regarding lower extremity weakness  Will continue to follow

## 2024-04-29 NOTE — CONSULT NOTE ADULT - ASSESSMENT
A/P:63-yo F with PMHx of chronic AHRF on 2-3L NC at b/l 2/2 ILD (Followed w/ Dr. Primo Marlow, [facility]), PE on Eliquis, severe pHTN, Cor pulmonale, DM, presented initially with SOB, dry cough, chest pain, LE edema, recent admission to [facility] in March for ILD flare, admitted to [facility]->ICU for AHRF 2/2 ILD flare, transferred to [facility] for lung transplant evaluation. Pt reporting mild SOB, no fever, no sputum, no SOB, no chest pain, no edema. Reports constipation, last BM 2 days ago, with some stomach pain.     Wound Consult requested to assist w/ management of  Sacral region, BL buttocks DTPI    Recommendations:   Buttocks/ Sacrum Dominique/BID and prn soiling     Consider MOLLY/PVR  Moisturize intact skin w/ SWEEN cream BID  Nutrition Consult for optimization in pt w/ Increased nutritional needs            encourage high quality protein, miryam/ prosource, MVI & Vit C to promote wound healing  Continue turning and positioning w/ offloading assistive devices as per protocol       encourage patient to reposition self       Continue w/ attends under pads and Pericare w/ godwin care as per protocol  Waffle Cushion to chair when oob to chair  Continue w/ low air loss pressure redistribution bed surface   Pt will need Group 2 mattress on hospital bed and ROHO cushion for wheel chair upon discharge home  Care as per medicine, will follow w/ you  Upon discharge f/u as outpatient at [facility] [address] [facility] [phone]  Seen and D/w  RN  Thank you for this consult,  SUSSY Ponce-BC, [facility]  [phone]  Nights/ Weekends/ Holidays please call:  General Surgery Consult pager [phone]) for emergencies  Wound PT for multilayer leg wrapping or VAC issues ([phone])

## 2024-04-29 NOTE — PROGRESS NOTE ADULT - SUBJECTIVE AND OBJECTIVE BOX
seen earlier today     Chief Complaint: Diabetes Mellitus follow up    INTERVAL HX:  patient seen at bedside on high flow oxygen. She is eating well. Her speech is effortful. FBG this morning was 330mg/dl after hypoglycemic event at 2200 on 4/28.     Review of Systems:  General: As above  GI: No nausea, vomiting  Endocrine: no  S&Sx of hypoglycemia    Allergies    No Known Allergies    Intolerances      MEDICATIONS  (STANDING):  albuterol/ipratropium for Nebulization 3 milliLiter(s) Nebulizer every 6 hours  apixaban 5 milliGRAM(s) Oral two times a day  atorvastatin 40 milliGRAM(s) Oral at bedtime  bisacodyl Suppository 10 milliGRAM(s) Rectal daily  budesonide 160 MICROgram(s)/formoterol 4.5 MICROgram(s) Inhaler 2 Puff(s) Inhalation two times a day  chlorhexidine 2% Cloths 1 Application(s) Topical daily  dextrose 10% Bolus 125 milliLiter(s) IV Bolus once  dextrose 5%. 1000 milliLiter(s) (100 mL/Hr) IV Continuous <Continuous>  dextrose 5%. 1000 milliLiter(s) (50 mL/Hr) IV Continuous <Continuous>  dextrose 50% Injectable 12.5 Gram(s) IV Push once  dextrose 50% Injectable 25 Gram(s) IV Push once  furosemide   Injectable 40 milliGRAM(s) IV Push daily  gabapentin 300 milliGRAM(s) Oral daily  glucagon  Injectable 1 milliGRAM(s) IntraMuscular once  insulin glargine Injectable (LANTUS) 22 Unit(s) SubCutaneous at bedtime  insulin lispro (ADMELOG) corrective regimen sliding scale   SubCutaneous at bedtime  insulin lispro (ADMELOG) corrective regimen sliding scale   SubCutaneous three times a day before meals  insulin lispro Injectable (ADMELOG) 20 Unit(s) SubCutaneous three times a day before meals  methylPREDNISolone sodium succinate Injectable 40 milliGRAM(s) IV Push two times a day  multivitamin 1 Tablet(s) Oral daily  Ofev (Nintedanib) 150 milliGRAM(s) 150 milliGRAM(s) Oral two times a day  pantoprazole    Tablet 40 milliGRAM(s) Oral two times a day  polyethylene glycol 3350 17 Gram(s) Oral two times a day  senna 2 Tablet(s) Oral at bedtime  traZODone 100 milliGRAM(s) Oral at bedtime      atorvastatin   40 milliGRAM(s) Oral (04-28-24 @ 22:13)    insulin glargine Injectable (LANTUS)   22 Unit(s) SubCutaneous (04-28-24 @ 22:28)    insulin lispro (ADMELOG) corrective regimen sliding scale   4 Unit(s) SubCutaneous (04-29-24 @ 12:11)   8 Unit(s) SubCutaneous (04-29-24 @ 08:43)   4 Unit(s) SubCutaneous (04-28-24 @ 17:06)    insulin lispro Injectable (ADMELOG)   20 Unit(s) SubCutaneous (04-29-24 @ 12:11)   20 Unit(s) SubCutaneous (04-29-24 @ 08:43)   20 Unit(s) SubCutaneous (04-28-24 @ 17:07)    methylPREDNISolone sodium succinate Injectable   40 milliGRAM(s) IV Push (04-29-24 @ 05:57)   40 milliGRAM(s) IV Push (04-28-24 @ 18:06)        PHYSICAL EXAM:  VITALS: T(C): 36.9 (04-29-24 @ 11:05)  T(F): 98.5 (04-29-24 @ 11:05), Max: 99.2 (04-28-24 @ 19:51)  HR: 125 (04-29-24 @ 11:05) (100 - 130)  BP: 135/86 (04-29-24 @ 11:05) (130/82 - 138/92)  RR:  (18 - 21)  SpO2:  (95% - 99%)  Wt(kg): --  GENERAL: NAD  Respiratory: Respirations unlabored   Extremities: Warm, no edema  NEURO: Alert , appropriate     LABS:  POCT Blood Glucose.: 208 mg/dL (04-29-24 @ 11:47)  POCT Blood Glucose.: 330 mg/dL (04-29-24 @ 08:22)  POCT Blood Glucose.: 115 mg/dL (04-28-24 @ 22:25)  POCT Blood Glucose.: 90 mg/dL (04-28-24 @ 21:56)  POCT Blood Glucose.: 73 mg/dL (04-28-24 @ 21:26)  POCT Blood Glucose.: 222 mg/dL (04-28-24 @ 17:04)  POCT Blood Glucose.: 86 mg/dL (04-28-24 @ 11:24)  POCT Blood Glucose.: 111 mg/dL (04-28-24 @ 07:42)  POCT Blood Glucose.: 105 mg/dL (04-27-24 @ 21:26)  POCT Blood Glucose.: 124 mg/dL (04-27-24 @ 16:43)  POCT Blood Glucose.: 248 mg/dL (04-27-24 @ 13:19)  POCT Blood Glucose.: 242 mg/dL (04-27-24 @ 11:45)  POCT Blood Glucose.: 282 mg/dL (04-27-24 @ 08:01)  POCT Blood Glucose.: 154 mg/dL (04-26-24 @ 21:28)  POCT Blood Glucose.: 237 mg/dL (04-26-24 @ 17:03)                          11.0   11.72 )-----------( 554      ( 28 Apr 2024 06:20 )             35.2     04-28    139  |  102  |  23  ----------------------------<  96  4.3   |  29  |  0.65    Ca    9.5      28 Apr 2024 06:21          Thyroid Function Tests:      A1C with Estimated Average Glucose Result: 11.9 % (04-13-24 @ 03:53)    Estimated Average Glucose: 295 mg/dL (04-13-24 @ 03:53)        Diet, Regular:   Consistent Carbohydrate No Snacks (CSTCHO) (04-26-24 @ 15:14) [Active]

## 2024-04-29 NOTE — PROGRESS NOTE ADULT - PROBLEM SELECTOR PLAN 5
- Pt was seeing Dr. Chua, urologist. At [facility] had failed TOV w/ godwin replaced. Arrives to [facility] w/o Godwin. Pt on outpt med list listed Tamsulosin 0.4 mg, unclear if started at [facility], pt saying she does not take this outpatient.   - q8h Bladder scans, place godwin if 3 straight caths

## 2024-04-29 NOTE — CONSULT NOTE ADULT - ATTENDING COMMENTS
I interviewed the patient, discussed the case with the Resident and agree with the findings and plan as documented in the Resident's note except below.    Continue medical management, neuro- check and fall precaution.  GI and DVT prophylaxis.  I discussed the diagnosis and treatment plan with the patient.  All questions and concerns were addressed. The patient demonstrated good understanding of the treatment plan.  My cumulative time taking care of this patient is 80 minutes  If you have any further questions, please do not hesitate to contact our consult service.  Thank you for allowing us to participate in this patient care. I interviewed the patient, discussed the case with the Resident and agree with the findings and plan as documented in the Resident's note except below.  Ms. Quiñonez is a 63 year old right handed woman with ILD presenting w/ acute on chronic respiratory failure, neurology consulted for subacute presentation of urinary/fecal retention and bilateral lower extremity weakness in the setting of SOB.  Weakness limited due to the SOB and non focal exam including reflexes 2+ and non clonus and -ve Babinski.   Patient will benefit from further work up to rule out treatable etiologies.     MRI Brain, Cervical, T and L spine with and without contrast.   Continue medical management, neuro- check and fall precaution.  GI and DVT prophylaxis.  PT and OT evaluation  I discussed the diagnosis and treatment plan with the patient.  All questions and concerns were addressed. The patient demonstrated good understanding of the treatment plan.  My cumulative time taking care of this patient is 80 minutes  If you have any further questions, please do not hesitate to contact our consult service.  Thank you for allowing us to participate in this patient care.

## 2024-04-29 NOTE — PROGRESS NOTE ADULT - PROBLEM SELECTOR PLAN 1
Baseline 2-3L NC. Hx of ILD w/ c/f IPF. Followed w/ DOMENICO Ding. On HFNC 50/60 when leaving [place], now on bipap prn for wob. Received 4 doses of Levaquin last dose 4/13 at [facility].  - Transferred to [facility] for lung transplant eval  - Maintain euvolemia, I's and O's, daily weights.  - Transplant pulm consult appreciated  - Pulmonary consult  - Continue home Ofev 150mg BID  - Geovanni Loyacort  - Continue Methylprednisolone 40 mg IVP BID - while on steroids, Pantoprazole 40 mg QD  - pulm recs appreciated: consider adding NO to high flow, repeat serologies for scleroderma, sjogrens, RF, myomarker (cannot be done on floor, will need ICU consult if decompensates)  - repeat TTE with bubble study - severe RH dysfunction with elevated pulmonary pressure, grade ii diastolic dysfunction (?from RH failure), normal LVSF  - diuresis - lasix 40 iv  - Zosyn empirically 4/26 - 4/27, no evidence of PNA, can hold off on abx  - valium 2.5 mg q12 PRN anxiety as she becomes very tachypneic

## 2024-04-29 NOTE — PROGRESS NOTE ADULT - ASSESSMENT
63F hx ILD/ probable IPF (On 2-3LNC at home), PE 2022 on eliquis, severe pHTN w/ prior cor pulmonale (RVSP 68 - 10/22 w/ TTE from 4/12 w/ PASP 28, normal LV/RV SF), IDDM, presenting as a transfer from [place] for lung transplant eval iso SOB, dry cough, chest pain. She has been receiving duonebs, symbicort, lasix, ofev and IV steroids. Continuing with AC. Solu-medrol is ordered for 40 IV BID.  CT Chest 4/12/2024: Findings suggesting interstitial lung disease without significant interval progression. No evidence of pneumonia.   CT scan from 4/12/2024 when compared with CT scan from 2020 has shown significant progression.      Recommendations  tolerating HFNC, continue today   Continue with Symbicort, duonebs, Ofev, Eliquis, Solu-medrol 40 IV BID  Continue with diuresis -goal net neg 1L    Can continue to use benzo prn for anxiety  TTE results appreciated - significantyl different PASP on TTE now compared to prior TTE as well as not previously seen PFO  Please obtain CT PA  fu sputum cx - can continue with zosyn for now, though unlikely clinical picture related to pna  Repeat Collagen vascular disease work-up: awaiting results for RF, anti-ccp, anti-centromere, CK, aldolase, MyoMarker Panel 3 Plus, ANCA, SCL-70 ab, HOLLIE, DsDNA, anti-RO, anti-LA, Sjogren antibodies, IgG4, Anti-RNP, Agnes-1 antibodies, and HIV antibodies. -- Of studies that have returned - no e/o auto-immune cause of ILD  During the week team recommended inhaled nitric oxide - to address pulmonary hypertension contributing to clinical picture. However unable to pursue this on medical floor. If any worsening mental status, oxygenation, work of breathing consult ICU

## 2024-04-29 NOTE — CONSULT NOTE ADULT - SUBJECTIVE AND OBJECTIVE BOX
Neurology - Consult Note - 04-29-24  -  Erick Gtz MD  PGY-3 Neurology  -    Name: ESTEBAN BRANHAM; 63y (1960)    Reason for Admission: Status post lung transplantation        Chief Complaint:   HPI:  63-yo F with PMHx of chronic AHRF on 2-3L NC at b/l 2/2 ILD (Followed w/ Dr. Primo Marlow, [facility]), PE on Eliquis, severe pHTN, Cor pulmonale, DM, admitted for acute on chronic respiratory failure, neurology consulted for bilateral lower extremity weakness, urinary/fecal retention. Patient states that while at [facility] a few days ago she developed bilateral lower extremity weakness that has been constant since then. She states the weakness is generalized and bilateral not worsened or improved by anything. She also endorses that since presentation to the hospital that she has not had a bowel movement, though she has felt the urge to go on occasion. Around the same time patient endorses that she has had issues urinating, stating that she has felt the urge to go but has been unable to do so, now requiring a godwin. She also endorses tingling in bilateral feet that has been chronic, and denies any upper extremity symptoms. She denies any sensory changes in lower extremities or saddle distribution. No recent fevers, no preceding illnesses. 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     At [place] [facility], she was treated w/ IV steroids->PO taper, duonebs, symbicort, lasix. Godwin placed for urinary retention, taken out prior to arrival although unclear if passed TOV at OSH. Had been weaned to NC while but was complicated by increased WOB, placed back on HFNC 50/60. Most recently escalated to methylprednisolone 40 mg IV BID. Was being treated w/ Ofev 150 mg BID, daily diuresis w/ Lasix 40->20 mg IVP on 4/25 based on volume assessment, 4/24 CXR w/ c/f R>L patchy opacities c/f pulmonary edema. Upon transfer here, admission vitals notable for temp afebrile, , /79, HFNC similar settings 45/60 100%. Labs notable for downtrending white count, stable anemia to 9-10, stable thrombocytosis to 400's. ABG 7.48 / 37 / 71 / 28, lactate 1.2.      Of note, pt is DNR/DNI trial of NIPPV - confirmed this with the patient and completed MOLST, in chart.  (26 Apr 2024 01:17)      Allergies:  No Known Allergies      PMHx:   Diabetes mellitus    Hyperlipidemia    Localized pulmonary fibrosis    Pulmonary embolism    Pulmonary fibrosis      PFHx:   Family history of MI (myocardial infarction) (Father)      PSuHx:   No significant past surgical history    No significant past surgical history    Cataract        Medications:  MEDICATIONS  (STANDING):  albuterol/ipratropium for Nebulization 3 milliLiter(s) Nebulizer every 6 hours  apixaban 5 milliGRAM(s) Oral two times a day  atorvastatin 40 milliGRAM(s) Oral at bedtime  bisacodyl Suppository 10 milliGRAM(s) Rectal daily  budesonide 160 MICROgram(s)/formoterol 4.5 MICROgram(s) Inhaler 2 Puff(s) Inhalation two times a day  chlorhexidine 2% Cloths 1 Application(s) Topical daily  dextrose 10% Bolus 125 milliLiter(s) IV Bolus once  dextrose 5%. 1000 milliLiter(s) (100 mL/Hr) IV Continuous <Continuous>  dextrose 5%. 1000 milliLiter(s) (50 mL/Hr) IV Continuous <Continuous>  dextrose 50% Injectable 12.5 Gram(s) IV Push once  dextrose 50% Injectable 25 Gram(s) IV Push once  furosemide   Injectable 40 milliGRAM(s) IV Push daily  gabapentin 300 milliGRAM(s) Oral daily  glucagon  Injectable 1 milliGRAM(s) IntraMuscular once  insulin glargine Injectable (LANTUS) 22 Unit(s) SubCutaneous at bedtime  insulin lispro (ADMELOG) corrective regimen sliding scale   SubCutaneous at bedtime  insulin lispro (ADMELOG) corrective regimen sliding scale   SubCutaneous three times a day before meals  insulin lispro Injectable (ADMELOG) 20 Unit(s) SubCutaneous three times a day before meals  methylPREDNISolone sodium succinate Injectable 40 milliGRAM(s) IV Push two times a day  multivitamin 1 Tablet(s) Oral daily  Ofev (Nintedanib) 150 milliGRAM(s) 150 milliGRAM(s) Oral two times a day  pantoprazole    Tablet 40 milliGRAM(s) Oral two times a day  polyethylene glycol 3350 17 Gram(s) Oral two times a day  senna 2 Tablet(s) Oral at bedtime  traZODone 100 milliGRAM(s) Oral at bedtime    MEDICATIONS  (PRN):  acetaminophen     Tablet .. 650 milliGRAM(s) Oral every 6 hours PRN Temp greater or equal to 38C (100.4F), Mild Pain (1 - 3)  aluminum hydroxide/magnesium hydroxide/simethicone Suspension 30 milliLiter(s) Oral every 4 hours PRN Dyspepsia  cyclobenzaprine 5 milliGRAM(s) Oral three times a day PRN Muscle Spasm  dextrose Oral Gel 15 Gram(s) Oral once PRN Blood Glucose LESS THAN 70 milliGRAM(s)/deciliter  diazepam  Injectable 2.5 milliGRAM(s) IV Push every 12 hours PRN anxiety  melatonin 3 milliGRAM(s) Oral at bedtime PRN Insomnia      Vitals:  T(C): 36.5 (04-29-24 @ 04:23), Max: 37.3 (04-28-24 @ 19:51)  HR: 120 (04-29-24 @ 08:50) (100 - 130)  BP: 138/92 (04-29-24 @ 04:23) (130/82 - 138/92)  RR: 21 (04-29-24 @ 05:36) (20 - 21)  SpO2: 99% (04-29-24 @ 08:50) (95% - 99%)    Physical Examination:     Constitutional: well-developed, not in acute distress.   Cardiovascular: no swelling, warm-to-touch    Neurological Examination:    - Mental Status: Eyes open, attends to examiner; oriented to person, aware of relevant past medical history.     - Cranial Nerves:  II: blind in L eye, full visual field in R eye. No pupillary reaction to light in L eye.  III, IV, VI: Extraocular movements are intact without nystagmus  V: Facial sensation is intact in the V1-V3 distribution bilaterally  VII: Face is symmetric with normal eye closure and smile  VIII: Hearing is intact to conversation  IX, X: Uvula is midline and soft palate rises symmetrically  XI: Shoulder shrug intact  XII: Tongue protrudes in the midline    - Motor/Strength Testing:  - No drifts x 4 extremities.                                   Right           Left  Deltoid                     5                 5  Biceps                      5                 5  Triceps                     5                 5  Wrist Ext (radial)       5                 5  Hand                  5                 5    Hip Flex                   4                  4  Knee Ext	      4                  4  Dorsiflex                 5                  5  Plantarflex               4                  4    - There is no pronator drift.   - Normal muscle bulk and tone throughout.    - Reflexes:   Bicep (C5/C6):                  R 2+ --- L 2+   Brachioradialis (C5/C6) :   R 2+ --- L 2+   Patella (L3/L4) :                 R 2+ --- L 2+   Ankle (S1) :                       R 2+ --- L 2+     - Plant responses mute bilaterally.    - Sensory: Intact throughout to light touch x 4 extremities. vibration sense intact in bilateral feet and knees, no saddle anesthesia.   - Coordination: Finger-nose-finger intact without ataxia or dysmetria.    - Gait: did not assess.     Labs:                        11.0   11.72 )-----------( 554      ( 28 Apr 2024 06:20 )             35.2     04-28    139  |  102  |  23  ----------------------------<  96  4.3   |  29  |  0.65    Ca    9.5      28 Apr 2024 06:21      CAPILLARY BLOOD GLUCOSE      POCT Blood Glucose.: 330 mg/dL (29 Apr 2024 08:22)        Culture - Urine (collected 26 Apr 2024 22:49)  Source: Clean Catch Clean Catch (Midstream)  Preliminary Report (28 Apr 2024 22:16):    50,000 - 99,000 CFU/mL Enterococcus species    Culture - Blood (collected 26 Apr 2024 12:25)  Source: .Blood Blood-Peripheral  Preliminary Report (28 Apr 2024 18:02):    No growth at 48 Hours    Culture - Blood (collected 26 Apr 2024 12:13)  Source: .Blood Blood-Peripheral  Preliminary Report (28 Apr 2024 18:02):    No growth at 48 Hours        CSF:                  Radiology:     Neurology - Consult Note - 04-29-24  -  Erick Gtz MD  PGY-3 Neurology  -    Name: ESTEBAN BRANHAM; 63y (1960)    Reason for Admission: Status post lung transplantation        Chief Complaint:   HPI:  63-yo F with PMHx of chronic AHRF on 2-3L NC at b/l 2/2 ILD (Followed w/ Dr. Primo Marlow, [facility]), PE on Eliquis, severe pHTN, Cor pulmonale, DM, admitted for acute on chronic respiratory failure, neurology consulted for bilateral lower extremity weakness, urinary/fecal retention. Patient states that while at [facility] 2 weeks ago she developed bilateral lower extremity weakness that has been constant since then. She states the weakness is generalized and bilateral not worsened or improved by anything. She also endorses that since presentation to the hospital that she has not had a bowel movement, though she has felt the urge to go on occasion. Around the same time patient endorses that she has had issues urinating, stating that she has felt the urge to go but has been unable to do so, now requiring a godwin. She also endorses tingling in bilateral feet that has been chronic, and denies any upper extremity symptoms. She denies any sensory changes in lower extremities or saddle distribution. No recent fevers, no preceding illnesses. 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     At [place] ICU, she was treated w/ IV steroids->PO taper, duonebs, symbicort, lasix. Godwin placed for urinary retention, taken out prior to arrival although unclear if passed TOV at OSH. Had been weaned to NC while but was complicated by increased WOB, placed back on HFNC 50/60. Most recently escalated to methylprednisolone 40 mg IV BID. Was being treated w/ Ofev 150 mg BID, daily diuresis w/ Lasix 40->20 mg IVP on 4/25 based on volume assessment, 4/24 CXR w/ c/f R>L patchy opacities c/f pulmonary edema. Upon transfer here, admission vitals notable for temp afebrile, , /79, HFNC similar settings 45/60 100%. Labs notable for downtrending white count, stable anemia to 9-10, stable thrombocytosis to 400's. ABG 7.48 / 37 / 71 / 28, lactate 1.2.      Of note, pt is DNR/DNI trial of NIPPV - confirmed this with the patient and completed MOLST, in chart.  (26 Apr 2024 01:17)      Allergies:  No Known Allergies      PMHx:   Diabetes mellitus    Hyperlipidemia    Localized pulmonary fibrosis    Pulmonary embolism    Pulmonary fibrosis      PFHx:   Family history of MI (myocardial infarction) (Father)      PSuHx:   No significant past surgical history    No significant past surgical history    Cataract        Medications:  MEDICATIONS  (STANDING):  albuterol/ipratropium for Nebulization 3 milliLiter(s) Nebulizer every 6 hours  apixaban 5 milliGRAM(s) Oral two times a day  atorvastatin 40 milliGRAM(s) Oral at bedtime  bisacodyl Suppository 10 milliGRAM(s) Rectal daily  budesonide 160 MICROgram(s)/formoterol 4.5 MICROgram(s) Inhaler 2 Puff(s) Inhalation two times a day  chlorhexidine 2% Cloths 1 Application(s) Topical daily  dextrose 10% Bolus 125 milliLiter(s) IV Bolus once  dextrose 5%. 1000 milliLiter(s) (100 mL/Hr) IV Continuous <Continuous>  dextrose 5%. 1000 milliLiter(s) (50 mL/Hr) IV Continuous <Continuous>  dextrose 50% Injectable 12.5 Gram(s) IV Push once  dextrose 50% Injectable 25 Gram(s) IV Push once  furosemide   Injectable 40 milliGRAM(s) IV Push daily  gabapentin 300 milliGRAM(s) Oral daily  glucagon  Injectable 1 milliGRAM(s) IntraMuscular once  insulin glargine Injectable (LANTUS) 22 Unit(s) SubCutaneous at bedtime  insulin lispro (ADMELOG) corrective regimen sliding scale   SubCutaneous at bedtime  insulin lispro (ADMELOG) corrective regimen sliding scale   SubCutaneous three times a day before meals  insulin lispro Injectable (ADMELOG) 20 Unit(s) SubCutaneous three times a day before meals  methylPREDNISolone sodium succinate Injectable 40 milliGRAM(s) IV Push two times a day  multivitamin 1 Tablet(s) Oral daily  Ofev (Nintedanib) 150 milliGRAM(s) 150 milliGRAM(s) Oral two times a day  pantoprazole    Tablet 40 milliGRAM(s) Oral two times a day  polyethylene glycol 3350 17 Gram(s) Oral two times a day  senna 2 Tablet(s) Oral at bedtime  traZODone 100 milliGRAM(s) Oral at bedtime    MEDICATIONS  (PRN):  acetaminophen     Tablet .. 650 milliGRAM(s) Oral every 6 hours PRN Temp greater or equal to 38C (100.4F), Mild Pain (1 - 3)  aluminum hydroxide/magnesium hydroxide/simethicone Suspension 30 milliLiter(s) Oral every 4 hours PRN Dyspepsia  cyclobenzaprine 5 milliGRAM(s) Oral three times a day PRN Muscle Spasm  dextrose Oral Gel 15 Gram(s) Oral once PRN Blood Glucose LESS THAN 70 milliGRAM(s)/deciliter  diazepam  Injectable 2.5 milliGRAM(s) IV Push every 12 hours PRN anxiety  melatonin 3 milliGRAM(s) Oral at bedtime PRN Insomnia      Vitals:  T(C): 36.5 (04-29-24 @ 04:23), Max: 37.3 (04-28-24 @ 19:51)  HR: 120 (04-29-24 @ 08:50) (100 - 130)  BP: 138/92 (04-29-24 @ 04:23) (130/82 - 138/92)  RR: 21 (04-29-24 @ 05:36) (20 - 21)  SpO2: 99% (04-29-24 @ 08:50) (95% - 99%)    Physical Examination:     Constitutional: well-developed, not in acute distress.   Cardiovascular: no swelling, warm-to-touch    Neurological Examination:    - Mental Status: Eyes open, attends to examiner; oriented to person, aware of relevant past medical history.     - Cranial Nerves:  II: blind in L eye, full visual field in R eye. No pupillary reaction to light in L eye.  III, IV, VI: Extraocular movements are intact without nystagmus  V: Facial sensation is intact in the V1-V3 distribution bilaterally  VII: Face is symmetric with normal eye closure and smile  VIII: Hearing is intact to conversation  IX, X: Uvula is midline and soft palate rises symmetrically  XI: Shoulder shrug intact  XII: Tongue protrudes in the midline    - Motor/Strength Testing:  - No drifts x 4 extremities.                                   Right           Left  Deltoid                     5                 5  Biceps                      5                 5  Triceps                     5                 5  Wrist Ext (radial)       5                 5  Hand                  5                 5    Hip Flex                   4                  4  Knee Ext	      4                  4  Dorsiflex                 5                  5  Plantarflex               4                  4    - There is no pronator drift.   - Normal muscle bulk and tone throughout.    - Reflexes:   Bicep (C5/C6):                  R 2+ --- L 2+   Brachioradialis (C5/C6) :   R 2+ --- L 2+   Patella (L3/L4) :                 R 2+ --- L 2+   Ankle (S1) :                       R 2+ --- L 2+     - Plant responses mute bilaterally.    - Sensory: Intact throughout to light touch x 4 extremities. vibration sense intact in bilateral feet and knees, no saddle anesthesia.   - Coordination: Finger-nose-finger intact without ataxia or dysmetria.    - Gait: did not assess.     Labs:                        11.0   11.72 )-----------( 554      ( 28 Apr 2024 06:20 )             35.2     04-28    139  |  102  |  23  ----------------------------<  96  4.3   |  29  |  0.65    Ca    9.5      28 Apr 2024 06:21      CAPILLARY BLOOD GLUCOSE      POCT Blood Glucose.: 330 mg/dL (29 Apr 2024 08:22)        Culture - Urine (collected 26 Apr 2024 22:49)  Source: Clean Catch Clean Catch (Midstream)  Preliminary Report (28 Apr 2024 22:16):    50,000 - 99,000 CFU/mL Enterococcus species    Culture - Blood (collected 26 Apr 2024 12:25)  Source: .Blood Blood-Peripheral  Preliminary Report (28 Apr 2024 18:02):    No growth at 48 Hours    Culture - Blood (collected 26 Apr 2024 12:13)  Source: .Blood Blood-Peripheral  Preliminary Report (28 Apr 2024 18:02):    No growth at 48 Hours        CSF:                  Radiology:     Neurology - Consult Note - 04-29-24  -  Erick Gtz MD  PGY-3 Neurology  -    Name: ESTEBAN BRANHAM; 63y (1960)    Reason for Admission: Status post lung transplantation        Chief Complaint:   HPI:  63-yo F with PMHx of chronic AHRF on 2-3L NC at b/l 2/2 ILD (Followed w/ Dr. Primo Marlow, [facility]), PE on Eliquis, severe pHTN, Cor pulmonale, DM, admitted for acute on chronic respiratory failure, neurology consulted for bilateral lower extremity weakness, urinary/fecal retention. Patient states that while at [facility] 2 weeks ago she developed bilateral lower extremity weakness that has been constant since then. She states the weakness is generalized and bilateral not worsened or improved by anything. She also endorses that since presentation to the hospital that she has not had a bowel movement, though she has felt the urge to go on occasion. Around the same time patient endorses that she has had issues urinating, stating that she has felt the urge to go but has been unable to do so, now requiring a godwin. She also endorses tingling in bilateral feet that has been chronic, and denies any upper extremity symptoms. She denies any sensory changes in lower extremities or saddle distribution. No recent fevers, no preceding illnesses. 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     At [place] ICU, she was treated w/ IV steroids->PO taper, duonebs, symbicort, lasix. Godwin placed for urinary retention, taken out prior to arrival although unclear if passed TOV at OSH. Had been weaned to NC while but was complicated by increased WOB, placed back on HFNC 50/60. Most recently escalated to methylprednisolone 40 mg IV BID. Was being treated w/ Ofev 150 mg BID, daily diuresis w/ Lasix 40->20 mg IVP on 4/25 based on volume assessment, 4/24 CXR w/ c/f R>L patchy opacities c/f pulmonary edema. Upon transfer here, admission vitals notable for temp afebrile, , /79, HFNC similar settings 45/60 100%. Labs notable for downtrending white count, stable anemia to 9-10, stable thrombocytosis to 400's. ABG 7.48 / 37 / 71 / 28, lactate 1.2.      Of note, pt is DNR/DNI trial of NIPPV - confirmed this with the patient and completed MOLST, in chart.  (26 Apr 2024 01:17)      Allergies:  No Known Allergies      PMHx:   Diabetes mellitus    Hyperlipidemia    Localized pulmonary fibrosis    Pulmonary embolism    Pulmonary fibrosis      PFHx:   Family history of MI (myocardial infarction) (Father)      PSuHx:   No significant past surgical history    No significant past surgical history    Cataract        Medications:  MEDICATIONS  (STANDING):  albuterol/ipratropium for Nebulization 3 milliLiter(s) Nebulizer every 6 hours  apixaban 5 milliGRAM(s) Oral two times a day  atorvastatin 40 milliGRAM(s) Oral at bedtime  bisacodyl Suppository 10 milliGRAM(s) Rectal daily  budesonide 160 MICROgram(s)/formoterol 4.5 MICROgram(s) Inhaler 2 Puff(s) Inhalation two times a day  chlorhexidine 2% Cloths 1 Application(s) Topical daily  dextrose 10% Bolus 125 milliLiter(s) IV Bolus once  dextrose 5%. 1000 milliLiter(s) (100 mL/Hr) IV Continuous <Continuous>  dextrose 5%. 1000 milliLiter(s) (50 mL/Hr) IV Continuous <Continuous>  dextrose 50% Injectable 12.5 Gram(s) IV Push once  dextrose 50% Injectable 25 Gram(s) IV Push once  furosemide   Injectable 40 milliGRAM(s) IV Push daily  gabapentin 300 milliGRAM(s) Oral daily  glucagon  Injectable 1 milliGRAM(s) IntraMuscular once  insulin glargine Injectable (LANTUS) 22 Unit(s) SubCutaneous at bedtime  insulin lispro (ADMELOG) corrective regimen sliding scale   SubCutaneous at bedtime  insulin lispro (ADMELOG) corrective regimen sliding scale   SubCutaneous three times a day before meals  insulin lispro Injectable (ADMELOG) 20 Unit(s) SubCutaneous three times a day before meals  methylPREDNISolone sodium succinate Injectable 40 milliGRAM(s) IV Push two times a day  multivitamin 1 Tablet(s) Oral daily  Ofev (Nintedanib) 150 milliGRAM(s) 150 milliGRAM(s) Oral two times a day  pantoprazole    Tablet 40 milliGRAM(s) Oral two times a day  polyethylene glycol 3350 17 Gram(s) Oral two times a day  senna 2 Tablet(s) Oral at bedtime  traZODone 100 milliGRAM(s) Oral at bedtime    MEDICATIONS  (PRN):  acetaminophen     Tablet .. 650 milliGRAM(s) Oral every 6 hours PRN Temp greater or equal to 38C (100.4F), Mild Pain (1 - 3)  aluminum hydroxide/magnesium hydroxide/simethicone Suspension 30 milliLiter(s) Oral every 4 hours PRN Dyspepsia  cyclobenzaprine 5 milliGRAM(s) Oral three times a day PRN Muscle Spasm  dextrose Oral Gel 15 Gram(s) Oral once PRN Blood Glucose LESS THAN 70 milliGRAM(s)/deciliter  diazepam  Injectable 2.5 milliGRAM(s) IV Push every 12 hours PRN anxiety  melatonin 3 milliGRAM(s) Oral at bedtime PRN Insomnia      Vitals:  T(C): 36.5 (04-29-24 @ 04:23), Max: 37.3 (04-28-24 @ 19:51)  HR: 120 (04-29-24 @ 08:50) (100 - 130)  BP: 138/92 (04-29-24 @ 04:23) (130/82 - 138/92)  RR: 21 (04-29-24 @ 05:36) (20 - 21)  SpO2: 99% (04-29-24 @ 08:50) (95% - 99%)    Physical Examination:     Constitutional: well-developed, not in acute distress.   Cardiovascular: no swelling, warm-to-touch    Neurological Examination:    - Mental Status: Eyes open, attends to examiner; oriented to person, aware of relevant past medical history.     - Cranial Nerves:  II: blind in L eye, full visual field in R eye. No pupillary reaction to light in L eye.  III, IV, VI: Extraocular movements are intact without nystagmus  V: Facial sensation is intact in the V1-V3 distribution bilaterally  VII: Face is symmetric with normal eye closure and smile  VIII: Hearing is intact to conversation  IX, X: Uvula is midline and soft palate rises symmetrically  XI: Shoulder shrug intact  XII: Tongue protrudes in the midline    - Motor/Strength Testing:  - No drifts x 4 extremities.                                   Right           Left  Deltoid                     5                 5  Biceps                      5                 5  Triceps                     5                 5  Wrist Ext (radial)       5                 5  Hand                  5                 5    Hip Flex                   4                  4  Knee Ext	      4                  4  Dorsiflex                 5                  5  Plantarflex               5                  5    - There is no pronator drift.   - Normal muscle bulk and tone throughout.    - Reflexes:   Bicep (C5/C6):                  R 2+ --- L 2+   Brachioradialis (C5/C6) :   R 2+ --- L 2+   Patella (L3/L4) :                 R 2+ --- L 2+   Ankle (S1) :                       R 2+ --- L 2+     - Plant responses mute bilaterally.    - Sensory: Intact throughout to light touch x 4 extremities. vibration sense intact in bilateral feet and knees, no saddle anesthesia.   - Coordination: Finger-nose-finger intact without ataxia or dysmetria.    - Gait: did not assess.     Labs:                        11.0   11.72 )-----------( 554      ( 28 Apr 2024 06:20 )             35.2     04-28    139  |  102  |  23  ----------------------------<  96  4.3   |  29  |  0.65    Ca    9.5      28 Apr 2024 06:21      CAPILLARY BLOOD GLUCOSE      POCT Blood Glucose.: 330 mg/dL (29 Apr 2024 08:22)        Culture - Urine (collected 26 Apr 2024 22:49)  Source: Clean Catch Clean Catch (Midstream)  Preliminary Report (28 Apr 2024 22:16):    50,000 - 99,000 CFU/mL Enterococcus species    Culture - Blood (collected 26 Apr 2024 12:25)  Source: .Blood Blood-Peripheral  Preliminary Report (28 Apr 2024 18:02):    No growth at 48 Hours    Culture - Blood (collected 26 Apr 2024 12:13)  Source: .Blood Blood-Peripheral  Preliminary Report (28 Apr 2024 18:02):    No growth at 48 Hours        CSF:                  Radiology:

## 2024-04-29 NOTE — PROGRESS NOTE ADULT - ASSESSMENT
63-yo F with PMHx of chronic AHRF on 2-3L NC at b/l 2/2 ILD (Followed w/ Dr. Primo Marlow, [facility]), PE on Eliquis, severe pHTN, Cor pulmonale, DM, presented initially with SOB, dry cough, chest pain, LE edema, recent admission to [facility] in March for ILD flare, admitted to outside hospital->ICU for AHRF 2/2 ILD flare, transferred to [facility] for lung transplant evaluation.

## 2024-04-29 NOTE — PROGRESS NOTE ADULT - ATTENDING COMMENTS
63F hx ILD with advanced fibrosis, PE 2022 on eliquis with severe pHTN and RV failure, who presented to [facility] for SOB, and worsening hypoxemic respiratory failure. Patient was transferred to for lung transplant eval.     Patient with negative CTrILD outpatient, repeat here NTD. TTE at [facility] with normal RV function but here with Severe pHTN and RV dysfunction. Also noted to have PFO with possible shunting. Currently being evaluated for transplant.     # ILD with adv fibrosis  # Severe pHTN with RV failure  # Hx of PE  # Acute on chronic hypoxemic respiratory failure  - Multifactorial etiology for respiratory failure, Adv ILD with fibrosis, RV failure, PHTN with possible shunting.   - Transplant eval pending, will discuss candidacy for evaluation tomorrow  - CTrILD work up negative in the past, negative here to date.  - Continue with diuresis, C/W HFNC and wean as tolerated. Will trial a dose of sildenafil given severe PTN and shunting. If hemodynamically stable will up titrate  - Would check CTA to assess for possible PE. C/W full A/C  - s/p course of abx, continue to monitor.   - Overall patient with poor prognosis given adv fibrosis seen on imaging, o2 requirement, severe pHTN and RV failure ashley if patient is not a good transplant candidate. Was previously offered 2 years ago but declined at that time. Will need ongoing GOC with patient and family.

## 2024-04-29 NOTE — PROGRESS NOTE ADULT - SUBJECTIVE AND OBJECTIVE BOX
Interval Events:  Saturating well on HFNC 60 LPM 55%  Tachypneic. Feels SOB with minimal exertion.    REVIEW OF SYSTEMS:  [x] All other systems negative  [ ] Unable to assess ROS because ________    OBJECTIVE:  ICU Vital Signs Last 24 Hrs  T(C): 36.5 (29 Apr 2024 04:23), Max: 37.3 (28 Apr 2024 19:51)  T(F): 97.7 (29 Apr 2024 04:23), Max: 99.2 (28 Apr 2024 19:51)  HR: 120 (29 Apr 2024 08:50) (98 - 130)  BP: 138/92 (29 Apr 2024 04:23) (127/81 - 138/92)  BP(mean): --  ABP: --  ABP(mean): --  RR: 21 (29 Apr 2024 05:36) (19 - 21)  SpO2: 99% (29 Apr 2024 08:50) (95% - 99%)    O2 Parameters below as of 29 Apr 2024 05:36  Patient On (Oxygen Delivery Method): nasal cannula, high flow  O2 Flow (L/min): 60  O2 Concentration (%): 55          04-28 @ 07:01 - 04-29 @ 07:00  --------------------------------------------------------  IN: 0 mL / OUT: 1550 mL / NET: -1550 mL    04-29 @ 07:01 - 04-29 @ 10:22  --------------------------------------------------------  IN: 360 mL / OUT: 0 mL / NET: 360 mL      CAPILLARY BLOOD GLUCOSE      POCT Blood Glucose.: 330 mg/dL (29 Apr 2024 08:22)      PHYSICAL EXAM:  General: Tachypneic.  HEENT: NC/AT   Respiratory: Inspiratory crackles. No wheezing. Using accessory muscles.  Cardiovascular: RRR. No edema.  Abdomen: Soft, nontender, nondistended.  Extremities: Warm  Skin: Intact  Neurological: [address] MEDICATIONS:  apixaban 5 milliGRAM(s) Oral two times a day      furosemide   Injectable 40 milliGRAM(s) IV Push daily    atorvastatin 40 milliGRAM(s) Oral at bedtime  dextrose 50% Injectable 12.5 Gram(s) IV Push once  dextrose 50% Injectable 25 Gram(s) IV Push once  dextrose Oral Gel 15 Gram(s) Oral once PRN  glucagon  Injectable 1 milliGRAM(s) IntraMuscular once  insulin glargine Injectable (LANTUS) 22 Unit(s) SubCutaneous at bedtime  insulin lispro (ADMELOG) corrective regimen sliding scale   SubCutaneous three times a day before meals  insulin lispro (ADMELOG) corrective regimen sliding scale   SubCutaneous at bedtime  insulin lispro Injectable (ADMELOG) 20 Unit(s) SubCutaneous three times a day before meals  methylPREDNISolone sodium succinate Injectable 40 milliGRAM(s) IV Push two times a day    albuterol/ipratropium for Nebulization 3 milliLiter(s) Nebulizer every 6 hours  budesonide 160 MICROgram(s)/formoterol 4.5 MICROgram(s) Inhaler 2 Puff(s) Inhalation two times a day    acetaminophen     Tablet .. 650 milliGRAM(s) Oral every 6 hours PRN  cyclobenzaprine 5 milliGRAM(s) Oral three times a day PRN  diazepam  Injectable 2.5 milliGRAM(s) IV Push every 12 hours PRN  gabapentin 300 milliGRAM(s) Oral daily  melatonin 3 milliGRAM(s) Oral at bedtime PRN  traZODone 100 milliGRAM(s) Oral at bedtime    aluminum hydroxide/magnesium hydroxide/simethicone Suspension 30 milliLiter(s) Oral every 4 hours PRN  bisacodyl Suppository 10 milliGRAM(s) Rectal daily  pantoprazole    Tablet 40 milliGRAM(s) Oral two times a day  polyethylene glycol 3350 17 Gram(s) Oral two times a day  senna 2 Tablet(s) Oral at bedtime        dextrose 10% Bolus 125 milliLiter(s) IV Bolus once  dextrose 5%. 1000 milliLiter(s) IV Continuous <Continuous>  dextrose 5%. 1000 milliLiter(s) IV Continuous <Continuous>  multivitamin 1 Tablet(s) Oral daily      chlorhexidine 2% Cloths 1 Application(s) Topical daily    Ofev (Nintedanib) 150 milliGRAM(s) 150 milliGRAM(s) Oral two times a day      LABS:                        11.0   11.72 )-----------( 554      ( 28 Apr 2024 06:20 )             35.2     Hgb Trend: 11.0<--, 9.7<--, 9.3<--, 10.2<--, 9.4<--  04-28    139  |  102  |  23  ----------------------------<  96  4.3   |  29  |  0.65    Ca    9.5      28 Apr 2024 06:21      Creatinine Trend: 0.65<--, 0.74<--, 0.86<--, 0.93<--, 0.78<--, 0.81<--    Urinalysis Basic - ( 28 Apr 2024 06:21 )    Color: x / Appearance: x / SG: x / pH: x  Gluc: 96 mg/dL / Ketone: x  / Bili: x / Urobili: x   Blood: x / Protein: x / Nitrite: x   Leuk Esterase: x / RBC: x / WBC x   Sq Epi: x / Non Sq Epi: x / Bacteria: x            MICROBIOLOGY:     RADIOLOGY:  [ ] Reviewed and interpreted by me    ASSESSMENT AND RECOMMENDATION:

## 2024-04-29 NOTE — CONSULT NOTE ADULT - ASSESSMENT
63-yo F with PMHx of chronic AHRF on 2-3L NC at b/l 2/2 ILD (Followed w/ Dr. Primo Marlow, [facility]), PE on Eliquis, severe pHTN, Cor pulmonale, DM, admitted for acute on chronic respiratory failure, neurology consulted for bilateral lower extremity weakness, urinary/fecal retention. Patient currently ill appearing w/ significant shortness of breath limiting history and exam. Patient exam significant for proximal lower extremity weakness bilaterally which seems effort dependent (patient tires out due to respiratory condition). Otherwise her exam shows intact sensation and intact reflexes in the lower extremities. Presentation of bilateral lower extremity weakness w/ urinary/fecal incontinence would suggest a lumbar myelopathy, however exam is less concerning. Time course of symptoms is unusual, as she presented w/ urinary retention prior to onset of leg weakness and fecal retention. Suspect that current presentation is likely non-neurologic in origin given exam findings. Other neurologic differential includes AIDP, spinal stenosis which are less likely.        Impression: 63y F w/ ILD presenting w/ acute on chronic respiratory failure, neurology consulted for subacute presentation of urinary/fecal retention and bilateral lower extremity weakness in the setting of illness, likely non-neurologic in origin.       PLAN:      ***note not finalized until attending cosign.  63-yo F with PMHx of chronic AHRF on 2-3L NC at b/l 2/2 ILD (Followed w/ Dr. Primo Marlow, [facility]), PE on Eliquis, severe pHTN, Cor pulmonale, DM, admitted for acute on chronic respiratory failure, neurology consulted for bilateral lower extremity weakness, urinary/fecal retention. Patient currently ill appearing w/ significant shortness of breath limiting history and exam. Patient exam significant for proximal lower extremity weakness bilaterally which seems effort dependent (patient tires out due to respiratory condition). Otherwise her exam shows intact sensation and intact reflexes in the lower extremities. Presentation of bilateral lower extremity weakness w/ urinary/fecal incontinence would suggest a lumbar myelopathy, however exam is less concerning. Time course of symptoms is unusual, as she presented w/ urinary retention prior to onset of leg weakness and fecal retention. Suspect that current presentation is likely non-neurologic in origin given exam findings. Other neurologic differential includes AIDP, spinal stenosis which are less likely.        Impression: 63y F w/ ILD presenting w/ acute on chronic respiratory failure, neurology consulted for subacute presentation of urinary/fecal retention and bilateral lower extremity weakness in the setting of illness, likely non-neurologic in origin.       PLAN:  [ ] MRI L/S spine w/ and w/o contrast  [ ] CK, Vitamin B12  [ ] Rest of care per primary team    ***note not finalized until attending cosign.  63-yo F with PMHx of chronic AHRF on 2-3L NC at b/l 2/2 ILD (Followed w/ Dr. Primo Marlow, [facility]), PE on Eliquis, severe pHTN, Cor pulmonale, DM, admitted for acute on chronic respiratory failure, neurology consulted for bilateral lower extremity weakness, urinary/fecal retention. Patient currently ill appearing w/ significant shortness of breath limiting history and exam. Patient exam significant for proximal lower extremity weakness bilaterally which seems effort dependent (patient tires out due to respiratory condition). Otherwise her exam shows intact sensation and intact reflexes in the lower extremities. Presentation of bilateral lower extremity weakness w/ urinary/fecal incontinence would suggest a lumbar myelopathy, however exam is less concerning. Time course of symptoms is unusual, as she presented w/ urinary retention prior to onset of leg weakness and fecal retention. Suspect that current presentation is likely non-neurologic in origin given exam findings. Other less likely etiologies on differential include cord compression, and NMJ disease.       Impression: 63y F w/ ILD presenting w/ acute on chronic respiratory failure, neurology consulted for subacute presentation of urinary/fecal retention and bilateral lower extremity weakness in the setting of illness, likely non-neurologic in origin.       PLAN:  [ ] MRI brain, C/T/L spine w/ and w/o contrast  [ ] CK, Vitamin B12, folate  [ ] serum ACHr-ab, serum anti-MuSK ab  [ ] Rest of care per primary team    ***note not finalized until attending cosign.

## 2024-04-29 NOTE — PROGRESS NOTE ADULT - ASSESSMENT
63 F with history of AHRF, T2D, PE, HTN here for SOB secondary to interstital lung disease. Patient currently on solumedrol 40 mg BID. Endocrinology consulted for diabetes management.   Patient is high risk with high level decision making due to uncontrolled diabetes with A1C>10 which places patient at high risk for cardiovascular and cerebrovascular events. Patient with lability of glucose requiring close monitoring and insulin adjustments.    # T2DM  with hyperglycemia   - Most recent Hemoglobin A1C 11.9  - Current FS ranges from 200-300  - Current diet: regular   - Please monitor blood glucose values TID AC & QHS while eating regular meals and Q6H while NPO  - Blood glucose goals pre-meal less than 140 mg/dL and random blood glucose less than 180 mg/dL  - currently on MTP 40 mg BID   - Recommendations:  - Continue Lantus 22 units QHS  - Continue Admelog 20 units TID with meals, hold if NPO or if eating less than 50% of meals  - Continue with mod dose correctional scale TID with meals  - Continue with mod dose correctional scale QHS    Discharge planning:   - Home DM medications: metformin 500 mg BID, + glimepride 1 mg daily + lantus 35 units QHS+ admelog 25 units TIDAC  - Discharge DM medications:   - Continue with metformin 500 mg BID  - STOP glimepride due to recurrent hypoglycemia at home   - Basal/bolus, dose will depend on insulin requirement and steroid plan   - Patient will follow up at  [facility]:  [address] [place] [address]. [address]. [place] [address]  Tel: [phone]  - Patient will need opthalmology and podiatry follow up as outpatient     # HTN  - BP goal 130/80  - Manage per primary team     # HLD  - Continue with atorvastatin 40 mg QHS  - Goal LDL<70  - Manage as outpatient         Contact via Microsoft Teams during business hours  To reach covering provider access AMION via sunrise tools  For Urgent matters/after-hours/weekends/holidays please page endocrine fellow on call   For nonurgent matters please email [email or web address]    Please note that this patient may be followed by different provider tomorrow.  Notify endocrine 24 hours prior to discharge for final recommendations

## 2024-04-29 NOTE — PROGRESS NOTE ADULT - SUBJECTIVE AND OBJECTIVE BOX
Patient is a 63y old  Female who presents with a chief complaint of SOB (29 Apr 2024 11:06)      INTERVAL HPI/OVERNIGHT EVENTS: No acute events overnight. Pt seen and examined at bedside. Patient denies any complaints overnight. The patient denies any fevers, chills, nausea, vomiting, or increased pain.  Remains on HFNC, breathing has been unchanged per patient. Not tolerating gettign out of bed.      MEDICATIONS  (STANDING):  albuterol/ipratropium for Nebulization 3 milliLiter(s) Nebulizer every 6 hours  apixaban 5 milliGRAM(s) Oral two times a day  atorvastatin 40 milliGRAM(s) Oral at bedtime  bisacodyl Suppository 10 milliGRAM(s) Rectal daily  budesonide 160 MICROgram(s)/formoterol 4.5 MICROgram(s) Inhaler 2 Puff(s) Inhalation two times a day  chlorhexidine 2% Cloths 1 Application(s) Topical daily  dextrose 10% Bolus 125 milliLiter(s) IV Bolus once  dextrose 5%. 1000 milliLiter(s) (100 mL/Hr) IV Continuous <Continuous>  dextrose 5%. 1000 milliLiter(s) (50 mL/Hr) IV Continuous <Continuous>  dextrose 50% Injectable 12.5 Gram(s) IV Push once  dextrose 50% Injectable 25 Gram(s) IV Push once  furosemide   Injectable 40 milliGRAM(s) IV Push daily  gabapentin 300 milliGRAM(s) Oral daily  glucagon  Injectable 1 milliGRAM(s) IntraMuscular once  insulin glargine Injectable (LANTUS) 22 Unit(s) SubCutaneous at bedtime  insulin lispro (ADMELOG) corrective regimen sliding scale   SubCutaneous three times a day before meals  insulin lispro (ADMELOG) corrective regimen sliding scale   SubCutaneous at bedtime  insulin lispro Injectable (ADMELOG) 20 Unit(s) SubCutaneous three times a day before meals  methylPREDNISolone sodium succinate Injectable 40 milliGRAM(s) IV Push two times a day  multivitamin 1 Tablet(s) Oral daily  Ofev (Nintedanib) 150 milliGRAM(s) 150 milliGRAM(s) Oral two times a day  pantoprazole    Tablet 40 milliGRAM(s) Oral two times a day  polyethylene glycol 3350 17 Gram(s) Oral two times a day  senna 2 Tablet(s) Oral at bedtime  traZODone 100 milliGRAM(s) Oral at bedtime      MEDICATIONS  (PRN):  acetaminophen     Tablet .. 650 milliGRAM(s) Oral every 6 hours PRN Temp greater or equal to 38C (100.4F), Mild Pain (1 - 3)  aluminum hydroxide/magnesium hydroxide/simethicone Suspension 30 milliLiter(s) Oral every 4 hours PRN Dyspepsia  cyclobenzaprine 5 milliGRAM(s) Oral three times a day PRN Muscle Spasm  dextrose Oral Gel 15 Gram(s) Oral once PRN Blood Glucose LESS THAN 70 milliGRAM(s)/deciliter  diazepam  Injectable 2.5 milliGRAM(s) IV Push every 12 hours PRN anxiety  melatonin 3 milliGRAM(s) Oral at bedtime PRN Insomnia      Allergies    No Known Allergies    Intolerances        PAST MEDICAL & SURGICAL HISTORY:  Diabetes mellitus      Hyperlipidemia      Pulmonary embolism      Pulmonary fibrosis      Cataract  right eye          Vital Signs Last 24 Hrs  T(C): 36.9 (29 Apr 2024 11:05), Max: 37.3 (28 Apr 2024 19:51)  T(F): 98.5 (29 Apr 2024 11:05), Max: 99.2 (28 Apr 2024 19:51)  HR: 125 (29 Apr 2024 11:05) (100 - 130)  BP: 135/86 (29 Apr 2024 11:05) (130/82 - 138/92)  BP(mean): --  RR: 18 (29 Apr 2024 11:05) (18 - 21)  SpO2: 97% (29 Apr 2024 11:05) (95% - 99%)    Parameters below as of 29 Apr 2024 11:05  Patient On (Oxygen Delivery Method): nasal cannula, high flow        General: WN/WD NAD  Neurology: A&Ox3, nonfocal, PADILLA x 4  Eyes: PERRLA/ EOMI, Gross vision intact  ENT/Neck: Neck supple, trachea midline, No JVD, Gross hearing intact  Respiratory: b/l fine inspiratory crackles  CV: RRR, +S1/S2, -S3/S4, no murmurs, rubs or gallops  Abdominal: Soft, NT, ND +BS, No HSM  MSK: 5/5 strength UE/LE bilaterally  Extremities: No edema, 2+ peripheral pulses  Skin: No Rashes, Hematoma, Ecchymosis  Incisions:   Tubes:    LABS:                        11.0   11.72 )-----------( 554      ( 28 Apr 2024 06:20 )             35.2     04-28    139  |  102  |  23  ----------------------------<  96  4.3   |  29  |  0.65    Ca    9.5      28 Apr 2024 06:21        Urinalysis Basic - ( 28 Apr 2024 06:21 )    Color: x / Appearance: x / SG: x / pH: x  Gluc: 96 mg/dL / Ketone: x  / Bili: x / Urobili: x   Blood: x / Protein: x / Nitrite: x   Leuk Esterase: x / RBC: x / WBC x   Sq Epi: x / Non Sq Epi: x / Bacteria: x                      MICROBIOLOGY:  Culture Results:   50,000 - 99,000 CFU/mL Enterococcus species (04-26 @ 22:49)  Culture Results:   No growth at 48 Hours (04-26 @ 12:25)  Culture Results:   No growth at 48 Hours (04-26 @ 12:13)      RADIOLOGY & ADDITIONAL STUDIES:

## 2024-04-29 NOTE — PROGRESS NOTE ADULT - PROBLEM SELECTOR PLAN 12
DVT PPx: Eliquis  Diet: Regular  Bowel Regimen: Miralax, Senna, dulcolax suppository/enema   Code: DNR/DNI, molst completed in chart   Aspiration precautions  Fall precautions

## 2024-04-29 NOTE — PROGRESS NOTE ADULT - SUBJECTIVE AND OBJECTIVE BOX
SUBJECTIVE / OVERNIGHT EVENTS:  Today is hospital day 4d. There are no new issues or overnight events.   Did not endorse any headache, lightheadedness, vertigo, shortness of breathe, cough, chest pain, palpitations, tachycardia, abdominal pain, nausea, vomiting, diarrhea or constipation currently    HPI:  63-yo F with PMHx of chronic AHRF on 2-3L NC at b/l 2/2 ILD (Followed w/ Dr. Primo Marlow, [facility]), PE on Eliquis, severe pHTN, Cor pulmonale, DM, presented initially with SOB, dry cough, chest pain, LE edema, recent admission to [facility] in March for ILD flare, admitted to [facility]->ICU for AHRF 2/2 ILD flare, transferred to [facility] for lung transplant evaluation. Pt reporting mild SOB, no fever, no sputum, no SOB, no chest pain, no edema. Reports constipation, last BM 2 days ago, with some stomach pain.     At [facility] ICU, she was treated w/ IV steroids->PO taper, duonebs, symbicort, lasix. Pike placed for urinary retention, taken out prior to arrival although unclear if passed TOV at OSH. Had been weaned to NC while but was complicated by increased WOB, placed back on HFNC 50/60. Most recently escalated to methylprednisolone 40 mg IV BID. Was being treated w/ Ofev 150 mg BID, daily diuresis w/ Lasix 40->20 mg IVP on 4/25 based on volume assessment, 4/24 CXR w/ c/f R>L patchy opacities c/f pulmonary edema. Upon transfer here, admission vitals notable for temp afebrile, , /79, HFNC similar settings 45/60 100%. Labs notable for downtrending white count, stable anemia to 9-10, stable thrombocytosis to 400's. ABG 7.48 / 37 / 71 / 28, lactate 1.2.      Of note, pt is DNR/DNI trial of NIPPV - confirmed this with the patient and completed MOLST, in chart.  (26 Apr 2024 01:17)    MEDICATIONS  (STANDING):  albuterol/ipratropium for Nebulization 3 milliLiter(s) Nebulizer every 6 hours  apixaban 5 milliGRAM(s) Oral two times a day  atorvastatin 40 milliGRAM(s) Oral at bedtime  bisacodyl Suppository 10 milliGRAM(s) Rectal daily  budesonide 160 MICROgram(s)/formoterol 4.5 MICROgram(s) Inhaler 2 Puff(s) Inhalation two times a day  chlorhexidine 2% Cloths 1 Application(s) Topical daily  dextrose 10% Bolus 125 milliLiter(s) IV Bolus once  dextrose 5%. 1000 milliLiter(s) (100 mL/Hr) IV Continuous <Continuous>  dextrose 5%. 1000 milliLiter(s) (50 mL/Hr) IV Continuous <Continuous>  dextrose 50% Injectable 12.5 Gram(s) IV Push once  dextrose 50% Injectable 25 Gram(s) IV Push once  furosemide   Injectable 40 milliGRAM(s) IV Push daily  gabapentin 300 milliGRAM(s) Oral daily  glucagon  Injectable 1 milliGRAM(s) IntraMuscular once  insulin glargine Injectable (LANTUS) 22 Unit(s) SubCutaneous at bedtime  insulin lispro (ADMELOG) corrective regimen sliding scale   SubCutaneous at bedtime  insulin lispro (ADMELOG) corrective regimen sliding scale   SubCutaneous three times a day before meals  insulin lispro Injectable (ADMELOG) 20 Unit(s) SubCutaneous three times a day before meals  methylPREDNISolone sodium succinate Injectable 40 milliGRAM(s) IV Push two times a day  multivitamin 1 Tablet(s) Oral daily  Ofev (Nintedanib) 150 milliGRAM(s) 150 milliGRAM(s) Oral two times a day  pantoprazole    Tablet 40 milliGRAM(s) Oral two times a day  polyethylene glycol 3350 17 Gram(s) Oral two times a day  senna 2 Tablet(s) Oral at bedtime  traZODone 100 milliGRAM(s) Oral at bedtime    MEDICATIONS  (PRN):  acetaminophen     Tablet .. 650 milliGRAM(s) Oral every 6 hours PRN Temp greater or equal to 38C (100.4F), Mild Pain (1 - 3)  aluminum hydroxide/magnesium hydroxide/simethicone Suspension 30 milliLiter(s) Oral every 4 hours PRN Dyspepsia  cyclobenzaprine 5 milliGRAM(s) Oral three times a day PRN Muscle Spasm  dextrose Oral Gel 15 Gram(s) Oral once PRN Blood Glucose LESS THAN 70 milliGRAM(s)/deciliter  diazepam  Injectable 2.5 milliGRAM(s) IV Push every 12 hours PRN anxiety  melatonin 3 milliGRAM(s) Oral at bedtime PRN Insomnia    HOME MEDICATIONS:  acetaminophen 325 mg oral tablet: 2 tab(s) orally every 6 hours As needed Mild Pain (1 - 3)  Admelog 100 units/mL injectable solution: 15 unit(s) injectable 3 times a day (before meals)  apixaban 5 mg oral tablet: 1 tab(s) orally once a day  atorvastatin 40 mg oral tablet: 1 tab(s) orally once a day  cyclobenzaprine 5 mg oral tablet: 1 tab(s) orally 3 times a day as needed for Muscle Spasm  gabapentin 300 mg oral capsule: 1 cap(s) orally once a day  insulin glargine 100 units/mL subcutaneous solution: 25 unit(s) subcutaneous once a day (at bedtime)  melatonin 3 mg oral tablet: 1 tab(s) orally once a day (at bedtime)  Ofev 150 mg oral capsule: 1 cap(s) orally every 12 hours  pantoprazole 40 mg oral delayed release tablet: 1 tab(s) orally once a day (before a meal)  traZODone 100 mg oral tablet: 1 tab(s) orally once a day (at bedtime)    PHYSICAL EXAM  Vital Signs Last 24 Hrs  T(C): 36.9 (29 Apr 2024 11:05), Max: 37.3 (28 Apr 2024 19:51)  T(F): 98.5 (29 Apr 2024 11:05), Max: 99.2 (28 Apr 2024 19:51)  HR: 123 (29 Apr 2024 15:20) (100 - 130)  BP: 135/86 (29 Apr 2024 11:05) (130/82 - 138/92)  BP(mean): --  RR: 19 (29 Apr 2024 15:20) (18 - 21)  SpO2: 97% (29 Apr 2024 15:20) (95% - 99%)    Parameters below as of 29 Apr 2024 15:20  Patient On (Oxygen Delivery Method): nasal cannula, high flow  O2 Flow (L/min): 60  O2 Concentration (%): 55    04-28-24 @ 07:01  -  04-29-24 @ 07:00  --------------------------------------------------------  IN: 0 mL / OUT: 1550 mL / NET: -1550 mL    04-29-24 @ 07:01  -  04-29-24 @ 15:48  --------------------------------------------------------  IN: 910 mL / OUT: 1900 mL / NET: -990 mL      CONSTITUTIONAL: Well-groomed, in no apparent distress;  EYES: No conjunctival or scleral injection, non-icteric;  ENMT: No external nasal lesions; Normal outer ears;  NECK: Trachea midline;  RESPIRATORY: Normal respiratory effort; Decreased breathe sounds bilaterally without wheeze/rhonchi/rales;  CARDIOVASCULAR: Regular rate and rhythm;  GASTROINTESTINAL: Non-distended; No palpable masses; No rebound/guarding;  EXTREMITIES:  No lower extremity edema;  NEUROLOGY: A+O to person, place, and time; Does respond to commands appropriately;  PSYCHIATRY: Mood and Affect appropriate    LABS:                        11.0   11.72 )-----------( 554      ( 28 Apr 2024 06:20 )             35.2     04-28    139  |  102  |  23  ----------------------------<  96  4.3   |  29  |  0.65    Ca    9.5      28 Apr 2024 06:21            Urinalysis Basic - ( 28 Apr 2024 06:21 )    Color: x / Appearance: x / SG: x / pH: x  Gluc: 96 mg/dL / Ketone: x  / Bili: x / Urobili: x   Blood: x / Protein: x / Nitrite: x   Leuk Esterase: x / RBC: x / WBC x   Sq Epi: x / Non Sq Epi: x / Bacteria: x        Culture - Urine (collected 26 Apr 2024 22:49)  Source: Clean Catch Clean Catch (Midstream)  Preliminary Report (28 Apr 2024 22:16):    50,000 - 99,000 CFU/mL Enterococcus species      SARS-CoV-2: NotDetec (11 Apr 2024 22:46)      RADIOLOGY & ADDITIONAL TESTS:  EKG  12 Lead ECG:   Ventricular Rate 108 BPM    Atrial Rate 108 BPM    P-R Interval 132 ms    QRS Duration 68 ms    Q-T Interval 326 ms    QTC Calculation(Bazett) 436 ms    P Axis 27 degrees    R Axis -39 degrees    T Axis 12 degrees    Diagnosis Line SINUS TACHYCARDIA WITH FUSION COMPLEXES  LEFT AXIS DEVIATION  CANNOT RULE OUT ANTERIOR INFARCT  ABNORMAL ECG  WHEN COMPARED WITH ECG OF  04-  Confirmed by MD MYERS JONATHAN ([id number]) on 4/27/2024 5:25:32 PM (04-26-24 @ 11:09)  12 Lead ECG:   Ventricular Rate 117 BPM    Atrial Rate 117 BPM    P-R Interval 140 ms    QRS Duration 70 ms    Q-T Interval 302 ms    QTC Calculation(Bazett) 421 ms    P Axis 33 degrees    R Axis -40 degrees    T Axis 10 degrees    Diagnosis Line SINUS TACHYCARDIA  LEFT AXIS DEVIATION  POSSIBLE ANTEROLATERAL INFARCT , AGE UNDETERMINED  ABNORMAL ECG  NO PREVIOUS ECGS AVAILABLE  Confirmed by MD Rodriguez Ronald ([id number]) on 4/29/2024 12:43:02 PM (04-26-24 @ 05:05)    Xray Chest 1 View- PORTABLE-Urgent:   ACC: [id number] EXAM:  XR CHEST PORTABLE URGENT 1V   ORDERED BY: RAMONE PICKETT     PROCEDURE DATE:  04/26/2024          INTERPRETATION:  CLINICAL INFORMATION: Leukocytosis.    TECHNIQUE: Frontal radiograph of the chest.    COMPARISON: Chest radiograph 4/24/2024.    FINDINGS:    Similar-appearing bilateral reticular opacities, consistent with known   interstitial lung disease. No new focal consolidation.  No pleural effusion or pneumothorax.  There is limited evaluation of the heart size and mediastinum on portable   technique.  No acute abnormality within visible osseous structures.      IMPRESSION:    Similar-appearing bilateral reticular opacities, consistent with known   interstitial lung disease. No new focal consolidation.    --- Endof Report ---           NILTON MULLER MD; Resident Radiologist  This document has been electronically signed.  GIANNA JENKINS MD; Attending Interventional Radiologist  This document has been electronically signed. Apr 27 2024  9:23AM (04-26-24 @ 14:22)  Xray Chest 1 View- PORTABLE-Urgent:   ACC: [id number] EXAM:  XR CHEST PORTABLE URGENT 1V   ORDERED BY: JAY HEWITT     PROCEDURE DATE:  04/24/2024          INTERPRETATION:  Portable AP chest radiograph    COMPARISON: 4/17/2024 chest x-ray and CT chest 4/12/2024.  .    CLINICAL INFORMATION: Dyspnea. Increasing shortness of breath. History of   interstitial lung disease..    FINDINGS:  CATHETERS AND TUBES: None    PULMONARY: Diffuse interstitial idiopathic pulmonary fibrosis.   Superimposed infectious process cannot be excluded.    No pneumothorax..    HEART/VASCULAR: The  heart is enlarged in transverse diameter. .    BONES: The visualized osseous thorax is intact.    IMPRESSION:    Idiopathic pulmonary fibrosis.  No significant change from prior exams.  Superimposed infectiousprocess cannot be excluded.    --- End of Report ---            GATITO NEWELL MD; Attending Radiologist  This document has been electronically signed. Apr 25 2024  2:41PM (04-24-24 @ 09:55)  Xray Chest 1 View-PORTABLE IMMEDIATE:   ACC: [id number] EXAM:  XR CHEST PORTABLE IMMED 1V   ORDERED BY: MICHAEL SANDOVAL     PROCEDURE DATE:  04/17/2024          INTERPRETATION:  AP chest on April 17, 2024 5:03 PM. Patient is short of   breath.    Heart magnified by technique.    Again noted is diffuse interstitial infiltration throughout all lung   fields rather similar to April 11 suggesting chronic interstitial lung   disease. Findings are also similar to February 27, 2023.    IMPRESSION: Chronic interstitial lung disease again noted.    --- End of Report ---            GATITO YUEN MD; Attending Radiologist  This document has been electronically signed. Apr 17 2024  5:28PM (04-17-24 @ 17:27)

## 2024-04-29 NOTE — CONSULT NOTE ADULT - SUBJECTIVE AND OBJECTIVE BOX
Wound SURGERY CONSULT NOTE    HPI:  63-yo F with PMHx of chronic AHRF on 2-3L NC at b/l 2/2 ILD (Followed w/ Dr. Primo Marlow, [facility]), PE on Eliquis, severe pHTN, Cor pulmonale, DM, presented initially with SOB, dry cough, chest pain, LE edema, recent admission to [facility] in March for ILD flare, admitted to [facility]->ICU for AHRF 2/2 ILD flare, transferred to [facility] for lung transplant evaluation. Pt reporting mild SOB, no fever, no sputum, no SOB, no chest pain, no edema. Reports constipation, last BM 2 days ago, with some stomach pain.     At [place] ICU, she was treated w/ IV steroids->PO taper, duonebs, symbicort, lasix. Godwin placed for urinary retention, taken out prior to arrival although unclear if passed TOV at OSH. Had been weaned to NC while but was complicated by increased WOB, placed back on HFNC 50/60. Most recently escalated to methylprednisolone 40 mg IV BID. Was being treated w/ Ofev 150 mg BID, daily diuresis w/ Lasix 40->20 mg IVP on 4/25 based on volume assessment, 4/24 CXR w/ c/f R>L patchy opacities c/f pulmonary edema. Upon transfer here, admission vitals notable for temp afebrile, , /79, HFNC similar settings 45/60 100%. Labs notable for downtrending white count, stable anemia to 9-10, stable thrombocytosis to 400's. ABG 7.48 / 37 / 71 / 28, lactate 1.2.      Of note, pt is DNR/DNI trial of NIPPV - confirmed this with the patient and completed MOLST, in chart.  (26 Apr 2024 01:17)        Wound consult requested by team to assist w/ management of sacral region pressure injury.   Pt c/o pain at sacral region and w/o c/o, drainage, odor, color change,  or worsening swelling. Offloading and pericare initiated upon admission as pt Increasingly sedentary 2/2 to illness. Pt is with urine retention (+)godwin. (+) HFNC. All questions asked and answered to pt's expressed understanding and satisfaction.    Current Diet: Diet, Regular:   Consistent Carbohydrate No Snacks (CSTCHO) (04-26-24 @ 15:14)      PAST MEDICAL & SURGICAL HISTORY:  Diabetes mellitus      Hyperlipidemia      Pulmonary embolism      Pulmonary fibrosis      Cataract  right eye    REVIEW OF SYSTEMS:   General/ Breast/ Skin/Vasc/ Neuro/ MSK: see HPI  All other systems negative    MEDICATIONS  (STANDING):  albuterol/ipratropium for Nebulization 3 milliLiter(s) Nebulizer every 6 hours  apixaban 5 milliGRAM(s) Oral two times a day  atorvastatin 40 milliGRAM(s) Oral at bedtime  bisacodyl Suppository 10 milliGRAM(s) Rectal daily  budesonide 160 MICROgram(s)/formoterol 4.5 MICROgram(s) Inhaler 2 Puff(s) Inhalation two times a day  chlorhexidine 2% Cloths 1 Application(s) Topical daily  dextrose 10% Bolus 125 milliLiter(s) IV Bolus once  dextrose 5%. 1000 milliLiter(s) (100 mL/Hr) IV Continuous <Continuous>  dextrose 5%. 1000 milliLiter(s) (50 mL/Hr) IV Continuous <Continuous>  dextrose 50% Injectable 12.5 Gram(s) IV Push once  dextrose 50% Injectable 25 Gram(s) IV Push once  furosemide   Injectable 40 milliGRAM(s) IV Push daily  gabapentin 300 milliGRAM(s) Oral daily  glucagon  Injectable 1 milliGRAM(s) IntraMuscular once  insulin glargine Injectable (LANTUS) 22 Unit(s) SubCutaneous at bedtime  insulin lispro (ADMELOG) corrective regimen sliding scale   SubCutaneous at bedtime  insulin lispro (ADMELOG) corrective regimen sliding scale   SubCutaneous three times a day before meals  insulin lispro Injectable (ADMELOG). 10 Unit(s) SubCutaneous once  methylPREDNISolone sodium succinate Injectable 40 milliGRAM(s) IV Push two times a day  multivitamin 1 Tablet(s) Oral daily  Ofev (Nintedanib) 150 milliGRAM(s) 150 milliGRAM(s) Oral two times a day  pantoprazole    Tablet 40 milliGRAM(s) Oral two times a day  polyethylene glycol 3350 17 Gram(s) Oral two times a day  senna 2 Tablet(s) Oral at bedtime  traZODone 100 milliGRAM(s) Oral at bedtime    MEDICATIONS  (PRN):  acetaminophen     Tablet .. 650 milliGRAM(s) Oral every 6 hours PRN Temp greater or equal to 38C (100.4F), Mild Pain (1 - 3)  aluminum hydroxide/magnesium hydroxide/simethicone Suspension 30 milliLiter(s) Oral every 4 hours PRN Dyspepsia  cyclobenzaprine 5 milliGRAM(s) Oral three times a day PRN Muscle Spasm  dextrose Oral Gel 15 Gram(s) Oral once PRN Blood Glucose LESS THAN 70 milliGRAM(s)/deciliter  diazepam  Injectable 2.5 milliGRAM(s) IV Push every 12 hours PRN anxiety  melatonin 3 milliGRAM(s) Oral at bedtime PRN Insomnia      Allergies    No Known Allergies    Intolerances        SOCIAL HISTORY:  Lives w/ daughter, has aide 3x/week, cane/walker at home, no smoking, no drinking.    FAMILY HISTORY:  Family history of MI (myocardial infarction) (Father)      PHYSICAL EXAM:  Vital Signs Last 24 Hrs  T(C): 36.9 (29 Apr 2024 11:05), Max: 37.3 (28 Apr 2024 19:51)  T(F): 98.5 (29 Apr 2024 11:05), Max: 99.2 (28 Apr 2024 19:51)  HR: 123 (29 Apr 2024 15:20) (100 - 130)  BP: 135/86 (29 Apr 2024 11:05) (130/82 - 138/92)  BP(mean): --  RR: 19 (29 Apr 2024 15:20) (18 - 21)  SpO2: 97% (29 Apr 2024 15:20) (95% - 99%)    Parameters below as of 29 Apr 2024 15:20  Patient On (Oxygen Delivery Method): nasal cannula, high flow  O2 Flow (L/min): 60  O2 Concentration (%): 55    NAD,  A&Ox3/ Alert/ thin/ frail,  Versa Care P500      HEENT:  sclera clear, mucosa moist, throat clear, trachea midline, neck supple  Respiratory: nonlabored w/ equal chest rise  Gastrointestinal: soft NT/ND   : (+)godwin  Neurology:  verbal,  follows commands  Psych: calm/ appropriate  Musculoskeletal:   no deformities/ contractures  Vascular: BLE equally warm no cyanosis, clubbing, edema nor acute ischemia                BLE DP pulses not palpable                    Toes darkened, tender without touch POD to consult  Skin:  dry, frail       Sacral region, BL buttocks 4.5cm x 5.5cm x 0.1cm  persistently darkened area           TTP, with partial thickness tissue loss there is no blistering, drainage               No odor, no increase in warmth, no induration, fluctuance, nor crepitus    LABS/ CULTURES/ RADIOLOGY:                        11.0   11.72 )-----------( 554      ( 28 Apr 2024 06:20 )             35.2       139  |  102  |  23  ----------------------------<  96      [04-28-24 @ 06:21]  4.3   |  29  |  0.65        Ca     9.5     [04-28-24 @ 06:21]      Phos  2.6     [04-27-24 @ 06:57]              A1C with Estimated Average Glucose Result: 11.9 % (04-13-24 @ 03:53)        Culture - Blood (collected 04-26-24 @ 12:25)  Source: .Blood Blood-Peripheral  Preliminary Report (04-28-24 @ 18:02):    No growth at 48 Hours    Culture - Blood (collected 04-26-24 @ 12:13)  Source: .Blood Blood-Peripheral  Preliminary Report (04-28-24 @ 18:02):    No growth at 48 Hours

## 2024-04-30 NOTE — CONSULT NOTE ADULT - ASSESSMENT
62 y/o female with PMHx of chronic hypoxemic respiratory failure on 2-3 L NC secondary to ILD, PE (on Eliquis), severe pHTN, cor pulmonale, and poorly controlled diabetes (HbA1c 11.9) presented as transfer for lung transplant evaluation. Initially presented with worsening shortness of breath, acute on chronic hypoxemic respiratory failure. Patient currently on HFNC 60 LPM 55%. Consented for transplant eval on 4/28/24.    #pre-lung transplant evaluation  #ILD    - Pt transferred to [facility] on 4/25/24.  - Consented for lung txp work up on 4/28/24.  - Pt was presented at an Ad-hoc on 4/30/24  ·	Barriers to transplantation were identified, at this time pt deemed too high risk for transplant surgery: Poorly controlled diabetes, pt's deconditioned functional status, limited mobility, and worsening diastolic dysfunction. 62 y/o female with PMHx of chronic hypoxemic respiratory failure on 2-3 L NC secondary to ILD, PE (on Eliquis), severe pHTN, cor pulmonale, and poorly controlled diabetes (HbA1c 11.9) presented as transfer for lung transplant evaluation. Initially presented with worsening shortness of breath, acute on chronic hypoxemic respiratory failure. Patient currently on HFNC 60 LPM 55%. Consented for transplant eval on 4/28/24.  Of note, she had limited activity prior to presentation to [facility] and also reports urinary retention, still currently requiring a godwin in the hospital, and also reported fecal incontinence.  She has been unable to mobilize at all since transfer here.      #pre-lung transplant evaluation  #ILD    - Pt transferred to [facility] on 4/25/24.  - Consented for lung txp work up on 4/28/24.

## 2024-04-30 NOTE — PROGRESS NOTE ADULT - ASSESSMENT
63 F with history of AHRF, T2D, PE, HTN here for SOB secondary to interstital lung disease. Patient currently on solumedrol 40 mg BID. Endocrinology consulted for diabetes management.   Patient is high risk with high level decision making due to uncontrolled diabetes with A1C>10 which places patient at high risk for cardiovascular and cerebrovascular events. Patient with lability of glucose requiring close monitoring and insulin adjustments.  BG continues to be variable.  # T2DM  with hyperglycemia   - Most recent Hemoglobin A1C 11.9  - Current FS ranges from 200-300  - Current diet: regular   - Please monitor blood glucose values TID AC & QHS while eating regular meals and Q6H while NPO  - Blood glucose goals pre-meal less than 140 mg/dL and random blood glucose less than 180 mg/dL  - Recommendations:  -Continue with insulin Glargine 22 units QHS  - Continue  insulin Lispro 7 units TID with meals, hold if NPO or if eating less than 50% of meals; agree with teams adjustment.  - Continue with mod dose correctional scale TID with meals  - Continue with mod dose correctional scale QHS    Discharge planning:   - Home DM medications: metformin 500 mg BID, + glimepride 1 mg daily + lantus 35 units QHS+ admelog 25 units TIDAC  - Discharge DM medications:   - Continue with metformin 500 mg BID  - STOP glimepride due to recurrent hypoglycemia at home   - Basal/bolus, dose will depend on insulin requirement and steroid plan   - Patient will follow up at  [facility]:  [address] [place] [address]. [address]. [place] [address]  Tel: [phone]  - Patient will need opthalmology and podiatry follow up as outpatient     # HTN  - BP goal 130/80  - Manage per primary team     # HLD  - Continue with atorvastatin 40 mg QHS  - Goal LDL<70  - Manage as outpatient       Contact via Microsoft Teams during business hours  To reach covering provider access AMION via sunrise tools  For Urgent matters/after-hours/weekends/holidays please page endocrine fellow on call   For nonurgent matters please email [email or web address]    Please note that this patient may be followed by different provider tomorrow.  Notify endocrine 24 hours prior to discharge for final recommendations

## 2024-04-30 NOTE — PROGRESS NOTE ADULT - PROBLEM SELECTOR PLAN 1
Baseline 2-3L NC. Hx of ILD w/ c/f IPF. Followed w/ DOMENICO Ding. On HFNC 50/60 when leaving [place], now on bipap prn for wob. Received 4 doses of Levaquin last dose 4/13 at [facility].  - Transferred to [facility] for lung transplant eval  - Maintain euvolemia, I's and O's, daily weights.  - Transplant pulm consult appreciated  - Pulmonary consult  - Continue home Ofev 150mg BID  - Geovanni Loyacort  - Continue Methylprednisolone 40 mg IVP BID - while on steroids, Pantoprazole 40 mg QD  - pulm recs appreciated: consider adding NO to high flow, repeat serologies for scleroderma, sjogrens, RF, myomarker (cannot be done on floor, will need ICU consult if decompensates)  - repeat TTE with bubble study - severe RH dysfunction with elevated pulmonary pressure, grade ii diastolic dysfunction (?from RH failure), normal LVSF  - diuresis - lasix 40 iv  - Zosyn empirically 4/26 - 4/27, no evidence of PNA, can hold off on abx  - valium 2.5 mg q12 PRN anxiety as she becomes very tachypneic

## 2024-04-30 NOTE — PROGRESS NOTE ADULT - SUBJECTIVE AND OBJECTIVE BOX
Interval Events:  Remains on HFNC  Tachypneic. Feels SOB with minimal exertion.    REVIEW OF SYSTEMS:  [x] All other systems negative  [ ] Unable to assess ROS because ________      OBJECTIVE:  ICU Vital Signs Last 24 Hrs  T(C): 36.3 (30 Apr 2024 10:53), Max: 36.6 (29 Apr 2024 21:14)  T(F): 97.3 (30 Apr 2024 10:53), Max: 97.9 (30 Apr 2024 05:00)  HR: 125 (30 Apr 2024 14:38) (112 - 125)  BP: 154/93 (30 Apr 2024 14:10) (127/81 - 154/93)  BP(mean): --  ABP: --  ABP(mean): --  RR: 20 (30 Apr 2024 14:38) (18 - 20)  SpO2: 100% (30 Apr 2024 14:38) (97% - 100%)    O2 Parameters below as of 30 Apr 2024 14:38  Patient On (Oxygen Delivery Method): mask, nonrebreather  O2 Flow (L/min): 15  O2 Concentration (%): 100          04-29 @ 07:01  -  04-30 @ 07:00  --------------------------------------------------------  IN: 910 mL / OUT: 2800 mL / NET: -1890 mL    04-30 @ 07:01 - 04-30 @ 14:40  --------------------------------------------------------  IN: 640 mL / OUT: 2200 mL / NET: -1560 mL      CAPILLARY BLOOD GLUCOSE      POCT Blood Glucose.: 109 mg/dL (30 Apr 2024 11:20)      PHYSICAL EXAM:  General: Tachypneic.  HEENT: NC/AT   Respiratory: Inspiratory crackles. No wheezing. Using accessory muscles.  Cardiovascular: RRR. No edema.  Abdomen: Soft, nontender, nondistended.  Extremities: Warm  Skin: Intact  Neurological: [address] MEDICATIONS:  apixaban 5 milliGRAM(s) Oral two times a day      furosemide   Injectable 40 milliGRAM(s) IV Push daily    atorvastatin 40 milliGRAM(s) Oral at bedtime  dextrose 50% Injectable 25 Gram(s) IV Push once  dextrose 50% Injectable 12.5 Gram(s) IV Push once  dextrose Oral Gel 15 Gram(s) Oral once PRN  glucagon  Injectable 1 milliGRAM(s) IntraMuscular once  insulin glargine Injectable (LANTUS) 22 Unit(s) SubCutaneous at bedtime  insulin lispro (ADMELOG) corrective regimen sliding scale   SubCutaneous three times a day before meals  insulin lispro (ADMELOG) corrective regimen sliding scale   SubCutaneous at bedtime  insulin lispro Injectable (ADMELOG) 7 Unit(s) SubCutaneous three times a day before meals  methylPREDNISolone sodium succinate Injectable 40 milliGRAM(s) IV Push two times a day    albuterol/ipratropium for Nebulization 3 milliLiter(s) Nebulizer every 6 hours  budesonide 160 MICROgram(s)/formoterol 4.5 MICROgram(s) Inhaler 2 Puff(s) Inhalation two times a day    acetaminophen     Tablet .. 650 milliGRAM(s) Oral every 6 hours PRN  cyclobenzaprine 5 milliGRAM(s) Oral three times a day PRN  diazepam  Injectable 2.5 milliGRAM(s) IV Push every 12 hours PRN  gabapentin 300 milliGRAM(s) Oral daily  melatonin 3 milliGRAM(s) Oral at bedtime PRN  traZODone 100 milliGRAM(s) Oral at bedtime    aluminum hydroxide/magnesium hydroxide/simethicone Suspension 30 milliLiter(s) Oral every 4 hours PRN  pantoprazole    Tablet 40 milliGRAM(s) Oral two times a day  polyethylene glycol 3350 17 Gram(s) Oral two times a day  senna 2 Tablet(s) Oral at bedtime        dextrose 10% Bolus 125 milliLiter(s) IV Bolus once  dextrose 5%. 1000 milliLiter(s) IV Continuous <Continuous>  dextrose 5%. 1000 milliLiter(s) IV Continuous <Continuous>  multivitamin 1 Tablet(s) Oral daily      chlorhexidine 2% Cloths 1 Application(s) Topical daily    Ofev (Nintedanib) 150 milliGRAM(s) 150 milliGRAM(s) Oral two times a day      LABS:    Hgb Trend: 11.0<--, 9.7<--, 9.3<--, 10.2<--        Creatinine Trend: 0.65<--, 0.74<--, 0.86<--, 0.93<--, 0.78<--, 0.81<--

## 2024-04-30 NOTE — PROGRESS NOTE ADULT - ASSESSMENT
63-yo F with PMHx of chronic AHRF on 2-3L NC at b/l 2/2 ILD (Followed w/ Dr. Primo Marlow, [facility]), PE on Eliquis, severe pHTN, Cor pulmonale, DM, presented initially with SOB, dry cough, chest pain, LE edema, recent admission to [facility] in March for ILD flare, admitted to outside hospital->ICU for AHRF 2/2 ILD flare, transferred to [facility] for lung transplant evaluation.

## 2024-04-30 NOTE — PROGRESS NOTE ADULT - ASSESSMENT
63F hx ILD/ probable IPF (On 2-3LNC at home), PE 2022 on eliquis, severe pHTN w/ prior cor pulmonale (RVSP 68 - 10/22 w/ TTE from 4/12 w/ PASP 28, normal LV/RV SF), IDDM, presenting as a transfer from [place] for lung transplant eval iso SOB, dry cough, chest pain. She has been receiving duonebs, symbicort, lasix, ofev and IV steroids. Continuing with AC. Solu-medrol is ordered for 40 IV BID.  CT Chest 4/12/2024: Findings suggesting interstitial lung disease without significant interval progression. No evidence of pneumonia.   CT scan from 4/12/2024 when compared with CT scan from 2020 has shown significant progression.      Recommendations  tolerating HFNC, continue today   Continue with Symbicort, duonebs, Ofev, Eliquis, Solu-medrol 40 IV BID  Continue with diuresis -goal net neg 1L    Can continue to use benzo prn for anxiety  TTE results appreciated - significantly different PASP on TTE now compared to prior TTE as well as not previously seen PFO  follow up CTPA  fu sputum cx - can continue with zosyn for now, though unlikely clinical picture related to pna  Repeat Collagen vascular disease work-up: awaiting results for RF, anti-ccp, anti-centromere, CK, aldolase, MyoMarker Panel 3 Plus, ANCA, SCL-70 ab, HOLLIE, DsDNA, anti-RO, anti-LA, Sjogren antibodies, IgG4, Anti-RNP, Agnes-1 antibodies, and HIV antibodies. -- Of studies that have returned - no e/o auto-immune cause of ILD  trial sildenafil 10mg and assess tolerance (watch for hypotension, worsened hypoxemia). If tolerates, can start same dose TID tomorrow

## 2024-04-30 NOTE — PROGRESS NOTE ADULT - PROBLEM SELECTOR PLAN 8
T2DM: Home DM medications: metformin 500 mg BID, + glimepride 1 mg daily + lantus 35 units QHS+ admelog 25 units TIDAC  - Pt w/ hx of uncontrolled T2DM, a1c 11.9. At home, Basaglar 25 U QHS + Admelog 15U TID. Endocrine was consulted at [facility] prior to transfer. Was on  Lantus 20 + Admelog 14u TID + Mod SSI  - Endocrine consulted via email, recs appreciated  - Continue glargine 22 units qhs, lispro 7u TID qac, hold if NPO or eating < 50% of meals  - MISS FS tid qac qhs  - Discharge DM medications:   - Continue with metformin 500 mg BID  - STOP glimepride due to recurrent hypoglycemia at home   - Basal/bolus, dose will depend on insulin requirement and steroid plan   - Patient will follow up at  [facility]:  [address] [place] [address]. [address]. [place] [address]  Tel: [phone]

## 2024-04-30 NOTE — CONSULT NOTE ADULT - NS ATTEND AMEND GEN_ALL_CORE FT
Long discussion had with patient and her family and discussed the risk factors that make her extremely high if not prohibitive risk for lung transplant at this time:  Poorly controlled diabetes (HgbA1c 11) with self-reported blood sugars 200-300 at home, pt's severe deconditioning and  functional status, limited mobility with associated urinary retention and fecal incontinence, and concerning LV diastolic dysfunction on echo in addition to worsening RV function on echo on 4/27.  Would recommend ongoing discussion with family about goals of care and follow up with neurology and repeat echo but without considerable change in her functional status and understanding of these other limitations, unlikely to be a candidate for lung transplant.

## 2024-04-30 NOTE — PROGRESS NOTE ADULT - SUBJECTIVE AND OBJECTIVE BOX
seen earlier today     Chief Complaint: Diabetes Mellitus follow up    INTERVAL HX:  Patient continues on High flow oxygen.  She is eating most food on her tray.  Her BG over last 24hrs has been variable post prandially, with FBG hyperglycemic.    Review of Systems:  General: As above  GI: No nausea, vomiting  Endocrine: no  S&Sx of hypoglycemia    Allergies    No Known Allergies    Intolerances      MEDICATIONS  (STANDING):  albuterol/ipratropium for Nebulization 3 milliLiter(s) Nebulizer every 6 hours  apixaban 5 milliGRAM(s) Oral two times a day  atorvastatin 40 milliGRAM(s) Oral at bedtime  budesonide 160 MICROgram(s)/formoterol 4.5 MICROgram(s) Inhaler 2 Puff(s) Inhalation two times a day  chlorhexidine 2% Cloths 1 Application(s) Topical daily  dextrose 10% Bolus 125 milliLiter(s) IV Bolus once  dextrose 5%. 1000 milliLiter(s) (100 mL/Hr) IV Continuous <Continuous>  dextrose 5%. 1000 milliLiter(s) (50 mL/Hr) IV Continuous <Continuous>  dextrose 50% Injectable 25 Gram(s) IV Push once  dextrose 50% Injectable 12.5 Gram(s) IV Push once  furosemide   Injectable 40 milliGRAM(s) IV Push daily  gabapentin 300 milliGRAM(s) Oral daily  glucagon  Injectable 1 milliGRAM(s) IntraMuscular once  insulin glargine Injectable (LANTUS) 22 Unit(s) SubCutaneous at bedtime  insulin lispro (ADMELOG) corrective regimen sliding scale   SubCutaneous at bedtime  insulin lispro (ADMELOG) corrective regimen sliding scale   SubCutaneous three times a day before meals  insulin lispro Injectable (ADMELOG) 7 Unit(s) SubCutaneous three times a day before meals  methylPREDNISolone sodium succinate Injectable 40 milliGRAM(s) IV Push two times a day  multivitamin 1 Tablet(s) Oral daily  Ofev (Nintedanib) 150 milliGRAM(s) 150 milliGRAM(s) Oral two times a day  pantoprazole    Tablet 40 milliGRAM(s) Oral two times a day  polyethylene glycol 3350 17 Gram(s) Oral two times a day  senna 2 Tablet(s) Oral at bedtime  traZODone 100 milliGRAM(s) Oral at bedtime      atorvastatin   40 milliGRAM(s) Oral (04-29-24 @ 22:18)    insulin glargine Injectable (LANTUS)   22 Unit(s) SubCutaneous (04-29-24 @ 22:17)    insulin lispro (ADMELOG) corrective regimen sliding scale   4 Unit(s) SubCutaneous (04-30-24 @ 08:19)    insulin lispro Injectable (ADMELOG)   15 Unit(s) SubCutaneous (04-30-24 @ 08:19)    insulin lispro Injectable (ADMELOG)   7 Unit(s) SubCutaneous (04-30-24 @ 11:39)    insulin lispro Injectable (ADMELOG).   10 Unit(s) SubCutaneous (04-29-24 @ 17:43)    methylPREDNISolone sodium succinate Injectable   40 milliGRAM(s) IV Push (04-30-24 @ 05:34)   40 milliGRAM(s) IV Push (04-29-24 @ 17:44)        PHYSICAL EXAM:  VITALS: T(C): 36.3 (04-30-24 @ 10:53)  T(F): 97.3 (04-30-24 @ 10:53), Max: 97.9 (04-30-24 @ 05:00)  HR: 125 (04-30-24 @ 14:38) (112 - 125)  BP: 154/93 (04-30-24 @ 14:10) (127/81 - 154/93)  RR:  (18 - 20)  SpO2:  (97% - 100%)  Wt(kg): --  GENERAL: NAD  Respiratory: Respirations unlabored   Extremities: Warm, no edema  NEURO: Alert , appropriate     LABS:  POCT Blood Glucose.: 109 mg/dL (04-30-24 @ 11:20)  POCT Blood Glucose.: 208 mg/dL (04-30-24 @ 08:07)  POCT Blood Glucose.: 247 mg/dL (04-29-24 @ 22:10)  POCT Blood Glucose.: 80 mg/dL (04-29-24 @ 16:49)  POCT Blood Glucose.: 208 mg/dL (04-29-24 @ 11:47)  POCT Blood Glucose.: 330 mg/dL (04-29-24 @ 08:22)  POCT Blood Glucose.: 115 mg/dL (04-28-24 @ 22:25)  POCT Blood Glucose.: 90 mg/dL (04-28-24 @ 21:56)  POCT Blood Glucose.: 73 mg/dL (04-28-24 @ 21:26)  POCT Blood Glucose.: 222 mg/dL (04-28-24 @ 17:04)  POCT Blood Glucose.: 86 mg/dL (04-28-24 @ 11:24)  POCT Blood Glucose.: 111 mg/dL (04-28-24 @ 07:42)  POCT Blood Glucose.: 105 mg/dL (04-27-24 @ 21:26)  POCT Blood Glucose.: 124 mg/dL (04-27-24 @ 16:43)                          11.0   11.72 )-----------( 554      ( 28 Apr 2024 06:20 )             35.2               Thyroid Function Tests:      A1C with Estimated Average Glucose Result: 11.9 % (04-13-24 @ 03:53)    Estimated Average Glucose: 295 mg/dL (04-13-24 @ 03:53)        Diet, Regular:   Consistent Carbohydrate No Snacks (CSTCHO) (04-26-24 @ 15:14) [Active]

## 2024-04-30 NOTE — PROGRESS NOTE ADULT - SUBJECTIVE AND OBJECTIVE BOX
CHIEF COMPLAINT:    Interval Events:    NAEON.    REVIEW OF SYSTEMS:  [ x ] All other systems negative  [ ] Unable to assess ROS because ________    OBJECTIVE:  ICU Vital Signs Last 24 Hrs  T(C): 36.6 (30 Apr 2024 05:00), Max: 36.9 (29 Apr 2024 11:05)  T(F): 97.9 (30 Apr 2024 05:00), Max: 98.5 (29 Apr 2024 11:05)  HR: 112 (30 Apr 2024 05:00) (112 - 125)  BP: 142/87 (30 Apr 2024 05:00) (135/86 - 152/94)  BP(mean): --  ABP: --  ABP(mean): --  RR: 18 (30 Apr 2024 05:00) (18 - 19)  SpO2: 98% (30 Apr 2024 05:00) (97% - 99%)    O2 Parameters below as of 30 Apr 2024 05:00  Patient On (Oxygen Delivery Method): nasal cannula, high flow  O2 Flow (L/min): 60  O2 Concentration (%): 55          04-29 @ 07:01  -  04-30 @ 07:00  --------------------------------------------------------  IN: 910 mL / OUT: 2800 mL / NET: -1890 mL      CAPILLARY BLOOD GLUCOSE      POCT Blood Glucose.: 208 mg/dL (30 Apr 2024 08:07)      PHYSICAL EXAM:  General: WN/WD NAD  Neurology: A&Ox3, nonfocal, PADILLA x 4  Eyes: PERRLA/ EOMI, Gross vision intact  ENT/Neck: Neck supple, trachea midline, No JVD, Gross hearing intact  Respiratory: b/l fine inspiratory crackles  CV: RRR, +S1/S2, -S3/S4, no murmurs, rubs or gallops  Abdominal: Soft, NT, ND +BS, No HSM  MSK: 5/5 strength UE/LE bilaterally  Extremities: No edema, 2+ peripheral pulses  Skin: No Rashes, Hematoma, Ecchymosis    HOSPITAL MEDICATIONS:  MEDICATIONS  (STANDING):  albuterol/ipratropium for Nebulization 3 milliLiter(s) Nebulizer every 6 hours  apixaban 5 milliGRAM(s) Oral two times a day  atorvastatin 40 milliGRAM(s) Oral at bedtime  bisacodyl Suppository 10 milliGRAM(s) Rectal daily  budesonide 160 MICROgram(s)/formoterol 4.5 MICROgram(s) Inhaler 2 Puff(s) Inhalation two times a day  chlorhexidine 2% Cloths 1 Application(s) Topical daily  dextrose 10% Bolus 125 milliLiter(s) IV Bolus once  dextrose 5%. 1000 milliLiter(s) (100 mL/Hr) IV Continuous <Continuous>  dextrose 5%. 1000 milliLiter(s) (50 mL/Hr) IV Continuous <Continuous>  dextrose 50% Injectable 12.5 Gram(s) IV Push once  dextrose 50% Injectable 25 Gram(s) IV Push once  furosemide   Injectable 40 milliGRAM(s) IV Push daily  gabapentin 300 milliGRAM(s) Oral daily  glucagon  Injectable 1 milliGRAM(s) IntraMuscular once  insulin glargine Injectable (LANTUS) 22 Unit(s) SubCutaneous at bedtime  insulin lispro (ADMELOG) corrective regimen sliding scale   SubCutaneous at bedtime  insulin lispro (ADMELOG) corrective regimen sliding scale   SubCutaneous three times a day before meals  insulin lispro Injectable (ADMELOG) 15 Unit(s) SubCutaneous three times a day before meals  methylPREDNISolone sodium succinate Injectable 40 milliGRAM(s) IV Push two times a day  multivitamin 1 Tablet(s) Oral daily  Ofev (Nintedanib) 150 milliGRAM(s) 150 milliGRAM(s) Oral two times a day  pantoprazole    Tablet 40 milliGRAM(s) Oral two times a day  polyethylene glycol 3350 17 Gram(s) Oral two times a day  senna 2 Tablet(s) Oral at bedtime  traZODone 100 milliGRAM(s) Oral at bedtime    MEDICATIONS  (PRN):  acetaminophen     Tablet .. 650 milliGRAM(s) Oral every 6 hours PRN Temp greater or equal to 38C (100.4F), Mild Pain (1 - 3)  aluminum hydroxide/magnesium hydroxide/simethicone Suspension 30 milliLiter(s) Oral every 4 hours PRN Dyspepsia  cyclobenzaprine 5 milliGRAM(s) Oral three times a day PRN Muscle Spasm  dextrose Oral Gel 15 Gram(s) Oral once PRN Blood Glucose LESS THAN 70 milliGRAM(s)/deciliter  diazepam  Injectable 2.5 milliGRAM(s) IV Push every 12 hours PRN anxiety  melatonin 3 milliGRAM(s) Oral at bedtime PRN Insomnia      LABS:    Hgb Trend: 11.0<--, 9.7<--, 9.3<--, 10.2<--        Creatinine Trend: 0.65<--, 0.74<--, 0.86<--, 0.93<--, 0.78<--, 0.81<--            MICROBIOLOGY:       RADIOLOGY:  [ x ] Reviewed and interpreted by me    PULMONARY FUNCTION TESTS:    EKG: CHIEF COMPLAINT:    Interval Events:    NAEON. Symptoms unchanged.    REVIEW OF SYSTEMS:  [ x ] All other systems negative  [ ] Unable to assess ROS because ________    OBJECTIVE:  ICU Vital Signs Last 24 Hrs  T(C): 36.6 (30 Apr 2024 05:00), Max: 36.9 (29 Apr 2024 11:05)  T(F): 97.9 (30 Apr 2024 05:00), Max: 98.5 (29 Apr 2024 11:05)  HR: 112 (30 Apr 2024 05:00) (112 - 125)  BP: 142/87 (30 Apr 2024 05:00) (135/86 - 152/94)  BP(mean): --  ABP: --  ABP(mean): --  RR: 18 (30 Apr 2024 05:00) (18 - 19)  SpO2: 98% (30 Apr 2024 05:00) (97% - 99%)    O2 Parameters below as of 30 Apr 2024 05:00  Patient On (Oxygen Delivery Method): nasal cannula, high flow  O2 Flow (L/min): 60  O2 Concentration (%): 55          04-29 @ 07:01  -  04-30 @ 07:00  --------------------------------------------------------  IN: 910 mL / OUT: 2800 mL / NET: -1890 mL      CAPILLARY BLOOD GLUCOSE      POCT Blood Glucose.: 208 mg/dL (30 Apr 2024 08:07)      PHYSICAL EXAM:  General: WN/WD NAD  Neurology: A&Ox3, nonfocal, PADILLA x 4  Eyes: PERRLA/ EOMI, Gross vision intact  ENT/Neck: Neck supple, trachea midline, No JVD, Gross hearing intact  Respiratory: b/l fine inspiratory crackles  CV: RRR, +S1/S2, -S3/S4, no murmurs, rubs or gallops  Abdominal: Soft, NT, ND +BS, No HSM  MSK: 5/5 strength UE/LE bilaterally  Extremities: No edema, 2+ peripheral pulses  Skin: No Rashes, Hematoma, Ecchymosis    HOSPITAL MEDICATIONS:  MEDICATIONS  (STANDING):  albuterol/ipratropium for Nebulization 3 milliLiter(s) Nebulizer every 6 hours  apixaban 5 milliGRAM(s) Oral two times a day  atorvastatin 40 milliGRAM(s) Oral at bedtime  bisacodyl Suppository 10 milliGRAM(s) Rectal daily  budesonide 160 MICROgram(s)/formoterol 4.5 MICROgram(s) Inhaler 2 Puff(s) Inhalation two times a day  chlorhexidine 2% Cloths 1 Application(s) Topical daily  dextrose 10% Bolus 125 milliLiter(s) IV Bolus once  dextrose 5%. 1000 milliLiter(s) (100 mL/Hr) IV Continuous <Continuous>  dextrose 5%. 1000 milliLiter(s) (50 mL/Hr) IV Continuous <Continuous>  dextrose 50% Injectable 12.5 Gram(s) IV Push once  dextrose 50% Injectable 25 Gram(s) IV Push once  furosemide   Injectable 40 milliGRAM(s) IV Push daily  gabapentin 300 milliGRAM(s) Oral daily  glucagon  Injectable 1 milliGRAM(s) IntraMuscular once  insulin glargine Injectable (LANTUS) 22 Unit(s) SubCutaneous at bedtime  insulin lispro (ADMELOG) corrective regimen sliding scale   SubCutaneous at bedtime  insulin lispro (ADMELOG) corrective regimen sliding scale   SubCutaneous three times a day before meals  insulin lispro Injectable (ADMELOG) 15 Unit(s) SubCutaneous three times a day before meals  methylPREDNISolone sodium succinate Injectable 40 milliGRAM(s) IV Push two times a day  multivitamin 1 Tablet(s) Oral daily  Ofev (Nintedanib) 150 milliGRAM(s) 150 milliGRAM(s) Oral two times a day  pantoprazole    Tablet 40 milliGRAM(s) Oral two times a day  polyethylene glycol 3350 17 Gram(s) Oral two times a day  senna 2 Tablet(s) Oral at bedtime  traZODone 100 milliGRAM(s) Oral at bedtime    MEDICATIONS  (PRN):  acetaminophen     Tablet .. 650 milliGRAM(s) Oral every 6 hours PRN Temp greater or equal to 38C (100.4F), Mild Pain (1 - 3)  aluminum hydroxide/magnesium hydroxide/simethicone Suspension 30 milliLiter(s) Oral every 4 hours PRN Dyspepsia  cyclobenzaprine 5 milliGRAM(s) Oral three times a day PRN Muscle Spasm  dextrose Oral Gel 15 Gram(s) Oral once PRN Blood Glucose LESS THAN 70 milliGRAM(s)/deciliter  diazepam  Injectable 2.5 milliGRAM(s) IV Push every 12 hours PRN anxiety  melatonin 3 milliGRAM(s) Oral at bedtime PRN Insomnia      LABS:    Hgb Trend: 11.0<--, 9.7<--, 9.3<--, 10.2<--        Creatinine Trend: 0.65<--, 0.74<--, 0.86<--, 0.93<--, 0.78<--, 0.81<--            MICROBIOLOGY:       RADIOLOGY:  [ x ] Reviewed and interpreted by me    PULMONARY FUNCTION TESTS:    EKG:

## 2024-04-30 NOTE — PROGRESS NOTE ADULT - PROBLEM SELECTOR PROBLEM 6
Daily Note     Today's date: 12/15/2023  Patient name: Octavio Walls  : 1961  MRN: [id number]  Referring provider: Akash Dodson DO  Dx:   Encounter Diagnosis     ICD-10-CM    1. Primary osteoarthritis of left hip  M16.12       2. Pain in left hip  M25.552           Start Time: 1015  Stop Time: 1102  Total time in clinic (min): 47 minutes    Subjective: Patient reports to physical therapy today stating 4/10 currently in her L hip. She notes she feels as though the LAD and hip mobilizations with the belt, as well as manual piriformis stretching has helped the most thus far with her pain. Objective: See treatment diary below      Assessment: Tolerated treatment well. Patient demonstrated fatigue post treatment, exhibited good technique with therapeutic exercises, and would benefit from continued PT. Patient continues to provide good effort during performance of therapeutic exercises. Added LTR to R side this visit to which patient responded well. Will continue to assess patient tolerance and progress next visit, as able. Plan: Continue per plan of care. Precautions: Hx L hip OA, pelvic pain  POC expires Unit limit Auth Expiration date PT/OT + Visit Limit?    24 N/A 24 BOMN                 Visit/Unit Tracking  Date 11/30 12/7 12/11 12/15                                           FOTO:       Manuals 11/30 12/7 12/11 12/15         L hip lat distraction mob c belt TB TB TB TB         LAD  LQ TB TB         L piriformis stretching    TB                      Neuro Re-Ed             PT education about HEP, POC, dx 5' 5' 5' 5'         Quad sets  2x10 3" 2x10 3" 2x10 3"  3x10 3"          Glute sets 2x10 3" 2x10 3" 2x10 3" 2x10 3"                                                             Ther Ex             Hip add squeze 5" x 15 5"x20 10" x 20 10" x 20         SLR  4x5 AAROM Mod>Min 2x10 L LE 2x10 L LE          Paloff press  5 against wall,  5 no weight  X5 ea YTB Nv time           LTR X15 3" to R                                                             Ther Activity             Bike Hip knee mobility and strength  5 min Lvl 0 5 min Lvl 0 6 min lvl 2         Step ups  2x5 ea 2x10 ea 6" 2x10 ea 6"         Gait Training                                       Modalities Hyperlipidemia

## 2024-04-30 NOTE — PROGRESS NOTE ADULT - ASSESSMENT
Assessment/plan:    Tinea pedis plantarly bilateral.  Early signs of heel DTI right lower extremity: Stable, noninfected present on admission.  Diabetes mellitus    Recommend topical antifungal daily.  Recommend continued decubitus precautions and use of Xeroflo boots while in house.  Apply Cavilon to right heel daily.  Recommend patient continue routine podiatric footcare as an outpatient.  Reconsult podiatry as needed

## 2024-04-30 NOTE — PROGRESS NOTE ADULT - SUBJECTIVE AND OBJECTIVE BOX
SUBJECTIVE / OVERNIGHT EVENTS:  Today is hospital day 5d. There are no new issues or overnight events.   Did not endorse any headache, lightheadedness, vertigo, shortness of breathe, cough, chest pain, palpitations, tachycardia, abdominal pain, nausea, vomiting, diarrhea or constipation currently    HPI:  63-yo F with PMHx of chronic AHRF on 2-3L NC at b/l 2/2 ILD (Followed w/ Dr. Primo Marlow, [facility]), PE on Eliquis, severe pHTN, Cor pulmonale, DM, presented initially with SOB, dry cough, chest pain, LE edema, recent admission to [facility] in March for ILD flare, admitted to [facility]->ICU for AHRF 2/2 ILD flare, transferred to [facility] for lung transplant evaluation. Pt reporting mild SOB, no fever, no sputum, no SOB, no chest pain, no edema. Reports constipation, last BM 2 days ago, with some stomach pain.     At [facility] ICU, she was treated w/ IV steroids->PO taper, duonebs, symbicort, lasix. Pike placed for urinary retention, taken out prior to arrival although unclear if passed TOV at OSH. Had been weaned to NC while but was complicated by increased WOB, placed back on HFNC 50/60. Most recently escalated to methylprednisolone 40 mg IV BID. Was being treated w/ Ofev 150 mg BID, daily diuresis w/ Lasix 40->20 mg IVP on 4/25 based on volume assessment, 4/24 CXR w/ c/f R>L patchy opacities c/f pulmonary edema. Upon transfer here, admission vitals notable for temp afebrile, , /79, HFNC similar settings 45/60 100%. Labs notable for downtrending white count, stable anemia to 9-10, stable thrombocytosis to 400's. ABG 7.48 / 37 / 71 / 28, lactate 1.2.      Of note, pt is DNR/DNI trial of NIPPV - confirmed this with the patient and completed MOLST, in chart.  (26 Apr 2024 01:17)    MEDICATIONS  (STANDING):  albuterol/ipratropium for Nebulization 3 milliLiter(s) Nebulizer every 6 hours  apixaban 5 milliGRAM(s) Oral two times a day  atorvastatin 40 milliGRAM(s) Oral at bedtime  budesonide 160 MICROgram(s)/formoterol 4.5 MICROgram(s) Inhaler 2 Puff(s) Inhalation two times a day  chlorhexidine 2% Cloths 1 Application(s) Topical daily  dextrose 10% Bolus 125 milliLiter(s) IV Bolus once  dextrose 5%. 1000 milliLiter(s) (100 mL/Hr) IV Continuous <Continuous>  dextrose 5%. 1000 milliLiter(s) (50 mL/Hr) IV Continuous <Continuous>  dextrose 50% Injectable 25 Gram(s) IV Push once  dextrose 50% Injectable 12.5 Gram(s) IV Push once  furosemide   Injectable 40 milliGRAM(s) IV Push daily  gabapentin 300 milliGRAM(s) Oral daily  glucagon  Injectable 1 milliGRAM(s) IntraMuscular once  insulin glargine Injectable (LANTUS) 22 Unit(s) SubCutaneous at bedtime  insulin lispro (ADMELOG) corrective regimen sliding scale   SubCutaneous at bedtime  insulin lispro (ADMELOG) corrective regimen sliding scale   SubCutaneous three times a day before meals  insulin lispro Injectable (ADMELOG) 7 Unit(s) SubCutaneous three times a day before meals  methylPREDNISolone sodium succinate Injectable 40 milliGRAM(s) IV Push two times a day  multivitamin 1 Tablet(s) Oral daily  Ofev (Nintedanib) 150 milliGRAM(s) 150 milliGRAM(s) Oral two times a day  pantoprazole    Tablet 40 milliGRAM(s) Oral two times a day  polyethylene glycol 3350 17 Gram(s) Oral two times a day  senna 2 Tablet(s) Oral at bedtime  traZODone 100 milliGRAM(s) Oral at bedtime    MEDICATIONS  (PRN):  acetaminophen     Tablet .. 650 milliGRAM(s) Oral every 6 hours PRN Temp greater or equal to 38C (100.4F), Mild Pain (1 - 3)  aluminum hydroxide/magnesium hydroxide/simethicone Suspension 30 milliLiter(s) Oral every 4 hours PRN Dyspepsia  cyclobenzaprine 5 milliGRAM(s) Oral three times a day PRN Muscle Spasm  dextrose Oral Gel 15 Gram(s) Oral once PRN Blood Glucose LESS THAN 70 milliGRAM(s)/deciliter  diazepam  Injectable 2.5 milliGRAM(s) IV Push every 12 hours PRN anxiety  melatonin 3 milliGRAM(s) Oral at bedtime PRN Insomnia    HOME MEDICATIONS:  acetaminophen 325 mg oral tablet: 2 tab(s) orally every 6 hours As needed Mild Pain (1 - 3)  Admelog 100 units/mL injectable solution: 15 unit(s) injectable 3 times a day (before meals)  apixaban 5 mg oral tablet: 1 tab(s) orally once a day  atorvastatin 40 mg oral tablet: 1 tab(s) orally once a day  cyclobenzaprine 5 mg oral tablet: 1 tab(s) orally 3 times a day as needed for Muscle Spasm  gabapentin 300 mg oral capsule: 1 cap(s) orally once a day  insulin glargine 100 units/mL subcutaneous solution: 25 unit(s) subcutaneous once a day (at bedtime)  melatonin 3 mg oral tablet: 1 tab(s) orally once a day (at bedtime)  Ofev 150 mg oral capsule: 1 cap(s) orally every 12 hours  pantoprazole 40 mg oral delayed release tablet: 1 tab(s) orally once a day (before a meal)  traZODone 100 mg oral tablet: 1 tab(s) orally once a day (at bedtime)    PHYSICAL EXAM  Vital Signs Last 24 Hrs  T(C): 36.3 (30 Apr 2024 10:53), Max: 36.6 (29 Apr 2024 21:14)  T(F): 97.3 (30 Apr 2024 10:53), Max: 97.9 (30 Apr 2024 05:00)  HR: 125 (30 Apr 2024 14:38) (112 - 125)  BP: 154/93 (30 Apr 2024 14:10) (127/81 - 154/93)  BP(mean): --  RR: 20 (30 Apr 2024 14:38) (18 - 20)  SpO2: 100% (30 Apr 2024 14:38) (97% - 100%)    Parameters below as of 30 Apr 2024 14:38  Patient On (Oxygen Delivery Method): mask, nonrebreather  O2 Flow (L/min): 15  O2 Concentration (%): 100    04-29-24 @ 07:01  -  04-30-24 @ 07:00  --------------------------------------------------------  IN: 910 mL / OUT: 2800 mL / NET: -1890 mL    04-30-24 @ 07:01  -  04-30-24 @ 15:22  --------------------------------------------------------  IN: 1000 mL / OUT: 2200 mL / NET: -1200 mL      CONSTITUTIONAL: Well-groomed, in no apparent distress;  EYES: No conjunctival or scleral injection, non-icteric;  ENMT: No external nasal lesions; Normal outer ears;  NECK: Trachea midline;  RESPIRATORY: Normal respiratory effort; Decreased breathe sounds bilaterally without wheeze/rhonchi/rales;  CARDIOVASCULAR: Regular rate and rhythm;  GASTROINTESTINAL: Non-distended; No palpable masses; No rebound/guarding;  EXTREMITIES:  No lower extremity edema;  NEUROLOGY: A+O to person, place, and time; Does respond to commands appropriately;  PSYCHIATRY: Mood and Affect appropriate    LABS:            CARDIAC MARKERS ( 30 Apr 2024 12:10 )  x     / x     / 53 U/L / x     / x              SARS-CoV-2: NotDetec (11 Apr 2024 22:46)      RADIOLOGY & ADDITIONAL TESTS:  EKG  12 Lead ECG:   Ventricular Rate 108 BPM    Atrial Rate 108 BPM    P-R Interval 132 ms    QRS Duration 68 ms    Q-T Interval 326 ms    QTC Calculation(Bazett) 436 ms    P Axis 27 degrees    R Axis -39 degrees    T Axis 12 degrees    Diagnosis Line SINUS TACHYCARDIA WITH FUSION COMPLEXES  LEFT AXIS DEVIATION  CANNOT RULE OUT ANTERIOR INFARCT  ABNORMAL ECG  WHEN COMPARED WITH ECG OF  04-  Confirmed by MD MYERS JONATHAN ([id number]) on 4/27/2024 5:25:32 PM (04-26-24 @ 11:09)  12 Lead ECG:   Ventricular Rate 117 BPM    Atrial Rate 117 BPM    P-R Interval 140 ms    QRS Duration 70 ms    Q-T Interval 302 ms    QTC Calculation(Bazett) 421 ms    P Axis 33 degrees    R Axis -40 degrees    T Axis 10 degrees    Diagnosis Line SINUS TACHYCARDIA  LEFT AXIS DEVIATION  POSSIBLE ANTEROLATERAL INFARCT , AGE UNDETERMINED  ABNORMAL ECG  NO PREVIOUS ECGS AVAILABLE  Confirmed by MD Rodriguez Ronald ([id number]) on 4/29/2024 12:43:02 PM (04-26-24 @ 05:05)    Xray Chest 1 View- PORTABLE-Urgent:   ACC: [id number] EXAM:  XR CHEST PORTABLE URGENT 1V   ORDERED BY: RAMONE PICKETT     PROCEDURE DATE:  04/26/2024          INTERPRETATION:  CLINICAL INFORMATION: Leukocytosis.    TECHNIQUE: Frontal radiograph of the chest.    COMPARISON: Chest radiograph 4/24/2024.    FINDINGS:    Similar-appearing bilateral reticular opacities, consistent with known   interstitial lung disease. No new focal consolidation.  No pleural effusion or pneumothorax.  There is limited evaluation of the heart size and mediastinum on portable   technique.  No acute abnormality within visible osseous structures.      IMPRESSION:    Similar-appearing bilateral reticular opacities, consistent with known   interstitial lung disease. No new focal consolidation.    --- Endof Report ---           NILTON MULLER MD; Resident Radiologist  This document has been electronically signed.  GIANNA JENKINS MD; Attending Interventional Radiologist  This document has been electronically signed. Apr 27 2024  9:23AM (04-26-24 @ 14:22)  Xray Chest 1 View- PORTABLE-Urgent:   ACC: [id number] EXAM:  XR CHEST PORTABLE URGENT 1V   ORDERED BY: JAY HEWITT     PROCEDURE DATE:  04/24/2024          INTERPRETATION:  Portable AP chest radiograph    COMPARISON: 4/17/2024 chest x-ray and CT chest 4/12/2024.  .    CLINICAL INFORMATION: Dyspnea. Increasing shortness of breath. History of   interstitial lung disease..    FINDINGS:  CATHETERS AND TUBES: None    PULMONARY: Diffuse interstitial idiopathic pulmonary fibrosis.   Superimposed infectious process cannot be excluded.    No pneumothorax..    HEART/VASCULAR: The  heart is enlarged in transverse diameter. .    BONES: The visualized osseous thorax is intact.    IMPRESSION:    Idiopathic pulmonary fibrosis.  No significant change from prior exams.  Superimposed infectiousprocess cannot be excluded.    --- End of Report ---            GATITO NEWELL MD; Attending Radiologist  This document has been electronically signed. Apr 25 2024  2:41PM (04-24-24 @ 09:55)  Xray Chest 1 View-PORTABLE IMMEDIATE:   ACC: [id number] EXAM:  XR CHEST PORTABLE IMMED 1V   ORDERED BY: MICHAEL SANDOVAL     PROCEDURE DATE:  04/17/2024          INTERPRETATION:  AP chest on April 17, 2024 5:03 PM. Patient is short of   breath.    Heart magnified by technique.    Again noted is diffuse interstitial infiltration throughout all lung   fields rather similar to April 11 suggesting chronic interstitial lung   disease. Findings are also similar to February 27, 2023.    IMPRESSION: Chronic interstitial lung disease again noted.    --- End of Report ---            GATITO YUEN MD; Attending Radiologist  This document has been electronically signed. Apr 17 2024  5:28PM (04-17-24 @ 17:27)

## 2024-04-30 NOTE — PROGRESS NOTE ADULT - PROBLEM SELECTOR PLAN 5
- Pt was seeing Dr. Chua, urologist. At [facility] had failed TOV w/ godwin replaced. Arrives to [facility] w/o Godwin. Pt on outpt med list listed Tamsulosin 0.4 mg, unclear if started at [facility], pt saying she does not take this outpatient.   - q8h Bladder scans, place godwin if 3 straight caths

## 2024-04-30 NOTE — CHART NOTE - NSCHARTNOTEFT_GEN_A_CORE
[facility] DECISION 4/30/24    Patient was presented to [facility] on 4/30/24. At this time patient is not a lung transplant candidate due to:    Deconditioning  Cardiac dysfunction  Poorly controlled diabetes    Patient understands the above. All questions answered and concerns addressed. A formal letter is to be sent. [facility] DECISION 4/30/24    Patient was presented to an Ad-Hoc meeting on 4/30/24. At this time patient is not a lung transplant candidate due to:    Deconditioning  Cardiac dysfunction  Poorly controlled diabetes    Case will be discussed at [facility] on 5/2/24.  Patient understands the above. All questions answered and concerns addressed. A formal letter is to be sent.

## 2024-04-30 NOTE — PROGRESS NOTE ADULT - PROBLEM SELECTOR PLAN 2
Hx of PE on CTA 10/29/22 w/ RLL segmental/subsegmental pulmonary embolism  - Repeat CTA 2/28/23 w/o PE. Was at [facility] on Lovenox->Eliquis while pending repeat CTA to r/o PE given new hypoxia. DVT studies at OSH 4/22/24 negative  - Unable to perform repeat CTA given degree of hypoxemia  - Can continue Eliquis for now

## 2024-04-30 NOTE — CONSULT NOTE ADULT - SUBJECTIVE AND OBJECTIVE BOX
Transplant Surgery Consult Note  =====================================================  HPI: 63-yo F with PMHx of chronic AHRF on 2-3L NC at b/l 2/2 ILD (Followed w/ Dr. Primo Marlow, [facility]), PE on Eliquis, severe pHTN, Cor pulmonale, DM, presented initially with SOB, dry cough, chest pain, LE edema, recent admission to [facility] in March for ILD flare, admitted to [facility]->ICU for AHRF 2/2 ILD flare, transferred to [facility] for lung transplant evaluation. Pt reporting mild SOB, no fever, no sputum, no SOB, no chest pain, no edema. Reports constipation, last BM 2 days ago, with some stomach pain.     At [place] ICU, she was treated w/ IV steroids->PO taper, duonebs, symbicort, lasix. Pike placed for urinary retention, taken out prior to arrival although unclear if passed TOV at OSH. Had been weaned to NC while but was complicated by increased WOB, placed back on HFNC 50/60. Most recently escalated to methylprednisolone 40 mg IV BID. Was being treated w/ Ofev 150 mg BID, daily diuresis w/ Lasix 40->20 mg IVP on 4/25 based on volume assessment, 4/24 CXR w/ c/f R>L patchy opacities c/f pulmonary edema. Upon transfer here, admission vitals notable for temp afebrile, , /79, HFNC similar settings 45/60 100%. Labs notable for downtrending white count, stable anemia to 9-10, stable thrombocytosis to 400's. ABG 7.48 / 37 / 71 / 28, lactate 1.2.    Of note, pt is DNR/DNI trial of NIPPV - confirmed this with the patient and completed MOLST, in chart.  (26 Apr 2024 01:17)      PAST MEDICAL & SURGICAL HISTORY:  Diabetes mellitus  Hyperlipidemia  Pulmonary embolism  Pulmonary fibrosis  Cataract  right eye      Home Meds: Home Medications:  acetaminophen 325 mg oral tablet: 2 tab(s) orally every 6 hours As needed Mild Pain (1 - 3) (26 Apr 2024 02:14)  Admelog 100 units/mL injectable solution: 15 unit(s) injectable 3 times a day (before meals) (26 Apr 2024 02:14)  apixaban 5 mg oral tablet: 1 tab(s) orally once a day (26 Apr 2024 02:14)  atorvastatin 40 mg oral tablet: 1 tab(s) orally once a day (26 Apr 2024 02:14)  cyclobenzaprine 5 mg oral tablet: 1 tab(s) orally 3 times a day as needed for Muscle Spasm (26 Apr 2024 02:14)  gabapentin 300 mg oral capsule: 1 cap(s) orally once a day (26 Apr 2024 02:14)  insulin glargine 100 units/mL subcutaneous solution: 25 unit(s) subcutaneous once a day (at bedtime) (26 Apr 2024 02:14)  melatonin 3 mg oral tablet: 1 tab(s) orally once a day (at bedtime) (26 Apr 2024 02:14)  Ofev 150 mg oral capsule: 1 cap(s) orally every 12 hours (26 Apr 2024 02:14)  pantoprazole 40 mg oral delayed release tablet: 1 tab(s) orally once a day (before a meal) (26 Apr 2024 02:14)  traZODone 100 mg oral tablet: 1 tab(s) orally once a day (at bedtime) (26 Apr 2024 02:14)    Allergies: Allergies  No Known Allergies    Soc:   Advanced Directives: Presumed Full Code     REVIEW OF SYSTEMS  [x] A ten-point review of systems was otherwise negative except as noted.  [ ] Due to altered mental status/intubation, subjective information were not able to be obtained from the patient. History was obtained, to the extent possible, from review of the chart and collateral sources of information.    CURRENT MEDICATIONS:   --------------------------------------------------------------------------------------  Neurologic Medications  acetaminophen     Tablet .. 650 milliGRAM(s) Oral every 6 hours PRN Temp greater or equal to 38C (100.4F), Mild Pain (1 - 3)  cyclobenzaprine 5 milliGRAM(s) Oral three times a day PRN Muscle Spasm  diazepam  Injectable 2.5 milliGRAM(s) IV Push every 12 hours PRN anxiety  gabapentin 300 milliGRAM(s) Oral daily  melatonin 3 milliGRAM(s) Oral at bedtime PRN Insomnia  traZODone 100 milliGRAM(s) Oral at bedtime    Respiratory Medications  albuterol/ipratropium for Nebulization 3 milliLiter(s) Nebulizer every 6 hours  budesonide 160 MICROgram(s)/formoterol 4.5 MICROgram(s) Inhaler 2 Puff(s) Inhalation two times a day    Cardiovascular Medications  furosemide   Injectable 40 milliGRAM(s) IV Push daily    Gastrointestinal Medications  aluminum hydroxide/magnesium hydroxide/simethicone Suspension 30 milliLiter(s) Oral every 4 hours PRN Dyspepsia  dextrose 10% Bolus 125 milliLiter(s) IV Bolus once  dextrose 5%. 1000 milliLiter(s) IV Continuous <Continuous>  dextrose 5%. 1000 milliLiter(s) IV Continuous <Continuous>  multivitamin 1 Tablet(s) Oral daily  pantoprazole    Tablet 40 milliGRAM(s) Oral two times a day  polyethylene glycol 3350 17 Gram(s) Oral two times a day  senna 2 Tablet(s) Oral at bedtime    Genitourinary Medications    Hematologic/Oncologic Medications  apixaban 5 milliGRAM(s) Oral two times a day    Antimicrobial/Immunologic Medications    Endocrine/Metabolic Medications  atorvastatin 40 milliGRAM(s) Oral at bedtime  dextrose 50% Injectable 25 Gram(s) IV Push once  dextrose 50% Injectable 12.5 Gram(s) IV Push once  dextrose Oral Gel 15 Gram(s) Oral once PRN Blood Glucose LESS THAN 70 milliGRAM(s)/deciliter  glucagon  Injectable 1 milliGRAM(s) IntraMuscular once  insulin glargine Injectable (LANTUS) 22 Unit(s) SubCutaneous at bedtime  insulin lispro (ADMELOG) corrective regimen sliding scale   SubCutaneous three times a day before meals  insulin lispro (ADMELOG) corrective regimen sliding scale   SubCutaneous at bedtime  insulin lispro Injectable (ADMELOG) 7 Unit(s) SubCutaneous three times a day before meals  methylPREDNISolone sodium succinate Injectable 40 milliGRAM(s) IV Push two times a day    Topical/Other Medications  chlorhexidine 2% Cloths 1 Application(s) Topical daily  Ofev (Nintedanib) 150 milliGRAM(s) 150 milliGRAM(s) Oral two times a day    --------------------------------------------------------------------------------------  VITAL SIGNS, INS/OUTS (last 24 hours):  --------------------------------------------------------------------------------------  ICU Vital Signs Last 24 Hrs  T(C): 36.3 (30 Apr 2024 10:53), Max: 36.6 (29 Apr 2024 21:14)  T(F): 97.3 (30 Apr 2024 10:53), Max: 97.9 (30 Apr 2024 05:00)  HR: 121 (30 Apr 2024 15:15) (112 - 125)  BP: 137/91 (30 Apr 2024 15:15) (127/81 - 154/93)  BP(mean): --  ABP: --  ABP(mean): --  RR: 20 (30 Apr 2024 14:38) (18 - 20)  SpO2: 100% (30 Apr 2024 14:38) (97% - 100%)    O2 Parameters below as of 30 Apr 2024 14:38  Patient On (Oxygen Delivery Method): mask, nonrebreather  O2 Flow (L/min): 15  O2 Concentration (%): 100    I&O's Summary    29 Apr 2024 07:01  -  30 Apr 2024 07:00  --------------------------------------------------------  IN: 910 mL / OUT: 2800 mL / NET: -1890 mL    30 Apr 2024 07:01  -  30 Apr 2024 15:43  --------------------------------------------------------  IN: 1000 mL / OUT: 2200 mL / NET: -1200 mL  --------------------------------------------------------------------------------------  PHYSICAL EXAM:  GENERAL: NAD, resting comfortably in bed  HEAD:  Atraumatic, normocephalic, on HFNC 60/55  EYES: EOMI, PERRLA, conjunctiva and sclera clear  NECK: Supple  CHEST/LUNG: nonlabored respirations, diminished breath sounds in b/l bases  HEART: RRR. +S1/S2.  ABDOMEN: Soft, nondistended, nontender  EXTREMITIES:  2+ peripheral pulses, no clubbing, cyanosis, or edema noted  PSYCH: A&O x3  NEUROLOGY: Non-focal, motor & sensory grossly intact  SKIN: Warm & dry, DTI b/l buttocks  --------------------------------------------------------------------------------------         Transplant Surgery Consult Note  =====================================================  HPI: 63-yo F with PMHx of chronic AHRF on 2-3L NC at b/l 2/2 ILD (Followed w/ Dr. Primo Marlow, [facility]), PE on Eliquis, severe pHTN, Cor pulmonale, DM, presented initially with SOB, dry cough, chest pain, LE edema, recent admission to [facility] in March for ILD flare, admitted to [facility]->ICU for AHRF 2/2 ILD flare, transferred to [facility] for lung transplant evaluation. Pt reporting mild SOB, no fever, no sputum, no SOB, no chest pain, no edema. Reports constipation, last BM 2 days ago, with some stomach pain.     At [place] ICU, she was treated w/ IV steroids->PO taper, duonebs, symbicort, lasix. Pike placed for urinary retention, taken out prior to arrival although unclear if passed TOV at OSH. Had been weaned to NC while but was complicated by increased WOB, placed back on HFNC 50/60. Most recently escalated to methylprednisolone 40 mg IV BID. Was being treated w/ Ofev 150 mg BID, daily diuresis w/ Lasix 40->20 mg IVP on 4/25 based on volume assessment, 4/24 CXR w/ c/f R>L patchy opacities c/f pulmonary edema. Upon transfer here, admission vitals notable for temp afebrile, , /79, HFNC similar settings 45/60 100%. Labs notable for downtrending white count, stable anemia to 9-10, stable thrombocytosis to 400's. ABG 7.48 / 37 / 71 / 28, lactate 1.2.    Of note, pt is DNR/DNI trial of NIPPV - confirmed this with the patient and completed MOLST, in chart.  (26 Apr 2024 01:17)      PAST MEDICAL & SURGICAL HISTORY:  Diabetes mellitus  Hyperlipidemia  Pulmonary embolism  Pulmonary fibrosis  Cataract  right eye      Home Meds: Home Medications:  acetaminophen 325 mg oral tablet: 2 tab(s) orally every 6 hours As needed Mild Pain (1 - 3) (26 Apr 2024 02:14)  Admelog 100 units/mL injectable solution: 15 unit(s) injectable 3 times a day (before meals) (26 Apr 2024 02:14)  apixaban 5 mg oral tablet: 1 tab(s) orally once a day (26 Apr 2024 02:14)  atorvastatin 40 mg oral tablet: 1 tab(s) orally once a day (26 Apr 2024 02:14)  cyclobenzaprine 5 mg oral tablet: 1 tab(s) orally 3 times a day as needed for Muscle Spasm (26 Apr 2024 02:14)  gabapentin 300 mg oral capsule: 1 cap(s) orally once a day (26 Apr 2024 02:14)  insulin glargine 100 units/mL subcutaneous solution: 25 unit(s) subcutaneous once a day (at bedtime) (26 Apr 2024 02:14)  melatonin 3 mg oral tablet: 1 tab(s) orally once a day (at bedtime) (26 Apr 2024 02:14)  Ofev 150 mg oral capsule: 1 cap(s) orally every 12 hours (26 Apr 2024 02:14)  pantoprazole 40 mg oral delayed release tablet: 1 tab(s) orally once a day (before a meal) (26 Apr 2024 02:14)  traZODone 100 mg oral tablet: 1 tab(s) orally once a day (at bedtime) (26 Apr 2024 02:14)    Allergies: Allergies  No Known Allergies    Soc:   Advanced Directives: Presumed Full Code     REVIEW OF SYSTEMS  [x] A ten-point review of systems was otherwise negative except as noted.  [ ] Due to altered mental status/intubation, subjective information were not able to be obtained from the patient. History was obtained, to the extent possible, from review of the chart and collateral sources of information.    CURRENT MEDICATIONS:   --------------------------------------------------------------------------------------  Neurologic Medications  acetaminophen     Tablet .. 650 milliGRAM(s) Oral every 6 hours PRN Temp greater or equal to 38C (100.4F), Mild Pain (1 - 3)  cyclobenzaprine 5 milliGRAM(s) Oral three times a day PRN Muscle Spasm  diazepam  Injectable 2.5 milliGRAM(s) IV Push every 12 hours PRN anxiety  gabapentin 300 milliGRAM(s) Oral daily  melatonin 3 milliGRAM(s) Oral at bedtime PRN Insomnia  traZODone 100 milliGRAM(s) Oral at bedtime    Respiratory Medications  albuterol/ipratropium for Nebulization 3 milliLiter(s) Nebulizer every 6 hours  budesonide 160 MICROgram(s)/formoterol 4.5 MICROgram(s) Inhaler 2 Puff(s) Inhalation two times a day    Cardiovascular Medications  furosemide   Injectable 40 milliGRAM(s) IV Push daily    Gastrointestinal Medications  aluminum hydroxide/magnesium hydroxide/simethicone Suspension 30 milliLiter(s) Oral every 4 hours PRN Dyspepsia  dextrose 10% Bolus 125 milliLiter(s) IV Bolus once  dextrose 5%. 1000 milliLiter(s) IV Continuous <Continuous>  dextrose 5%. 1000 milliLiter(s) IV Continuous <Continuous>  multivitamin 1 Tablet(s) Oral daily  pantoprazole    Tablet 40 milliGRAM(s) Oral two times a day  polyethylene glycol 3350 17 Gram(s) Oral two times a day  senna 2 Tablet(s) Oral at bedtime    Genitourinary Medications    Hematologic/Oncologic Medications  apixaban 5 milliGRAM(s) Oral two times a day    Antimicrobial/Immunologic Medications    Endocrine/Metabolic Medications  atorvastatin 40 milliGRAM(s) Oral at bedtime  dextrose 50% Injectable 25 Gram(s) IV Push once  dextrose 50% Injectable 12.5 Gram(s) IV Push once  dextrose Oral Gel 15 Gram(s) Oral once PRN Blood Glucose LESS THAN 70 milliGRAM(s)/deciliter  glucagon  Injectable 1 milliGRAM(s) IntraMuscular once  insulin glargine Injectable (LANTUS) 22 Unit(s) SubCutaneous at bedtime  insulin lispro (ADMELOG) corrective regimen sliding scale   SubCutaneous three times a day before meals  insulin lispro (ADMELOG) corrective regimen sliding scale   SubCutaneous at bedtime  insulin lispro Injectable (ADMELOG) 7 Unit(s) SubCutaneous three times a day before meals  methylPREDNISolone sodium succinate Injectable 40 milliGRAM(s) IV Push two times a day    Topical/Other Medications  chlorhexidine 2% Cloths 1 Application(s) Topical daily  Ofev (Nintedanib) 150 milliGRAM(s) 150 milliGRAM(s) Oral two times a day    --------------------------------------------------------------------------------------  VITAL SIGNS, INS/OUTS (last 24 hours):  --------------------------------------------------------------------------------------  ICU Vital Signs Last 24 Hrs  T(C): 36.3 (30 Apr 2024 10:53), Max: 36.6 (29 Apr 2024 21:14)  T(F): 97.3 (30 Apr 2024 10:53), Max: 97.9 (30 Apr 2024 05:00)  HR: 121 (30 Apr 2024 15:15) (112 - 125)  BP: 137/91 (30 Apr 2024 15:15) (127/81 - 154/93)  BP(mean): --  ABP: --  ABP(mean): --  RR: 20 (30 Apr 2024 14:38) (18 - 20)  SpO2: 100% (30 Apr 2024 14:38) (97% - 100%)    O2 Parameters below as of 30 Apr 2024 14:38  Patient On (Oxygen Delivery Method): mask, nonrebreather  O2 Flow (L/min): 15  O2 Concentration (%): 100    I&O's Summary    29 Apr 2024 07:01  -  30 Apr 2024 07:00  --------------------------------------------------------  IN: 910 mL / OUT: 2800 mL / NET: -1890 mL    30 Apr 2024 07:01  -  30 Apr 2024 15:43  --------------------------------------------------------  IN: 1000 mL / OUT: 2200 mL / NET: -1200 mL  --------------------------------------------------------------------------------------  PHYSICAL EXAM:  GENERAL: Increased work of breathing   HEAD:  Atraumatic, normocephalic,   EYES: EOMI, PERRLA, conjunctiva and sclera clear  NECK: Supple  CHEST/LUNG:on HFNC 60/55, diminished breath sounds in b/l bases  HEART: RRR. +S1/S2.  ABDOMEN: Soft, nondistended, nontender  EXTREMITIES:  2+ peripheral pulses, no clubbing, cyanosis, or edema noted  PSYCH: A&O x3  NEUROLOGY: Able to move all extremities against gravity, unable to do hip flexion against resistance   SKIN: Warm & dry, DTI b/l buttocks  --------------------------------------------------------------------------------------

## 2024-04-30 NOTE — PROGRESS NOTE ADULT - ASSESSMENT
63 year old female with history of chronic hypoxemic respiratory failure on 2-3 L NC secondary to ILD, PE on Eliquis, severe pulmonary hypertension, cor pulmonale, and poorly controlled diabetes (HbA1c 11.9) who presented as transfer for lung transplant evaluation. She initially presented with worsening shortness of breath and acute on chronic hypoxemic respiratory failure. Patient is on HFNC 60 LPM 55%. Consented 4/28/24 for transplant evaluation.     Concern given echo findings of elevated filling pressures and worsening RV function. Patient is also profoundly weak.    Recommend:  Continue daily diuretic. Repeat TTE to assess diastolic dysfunction / RV function.  Neuro input appreciated regarding lower extremity weakness  General pulmonary following - plan to trial Sildenafil

## 2024-04-30 NOTE — PROGRESS NOTE ADULT - ATTENDING COMMENTS
63F hx ILD with advanced fibrosis, PE 2022 on eliquis with severe pHTN and RV failure, who presented to [facility] for SOB, and worsening hypoxemic respiratory failure. Patient was transferred to for lung transplant eval.     Patient with negative CTrILD outpatient, repeat here NTD. TTE at [facility] with normal RV function but here with Severe pHTN and RV dysfunction. Also noted to have PFO with possible shunting. Currently being evaluated for transplant.     # ILD with adv fibrosis  # Severe pHTN with RV failure  # Hx of PE  # Acute on chronic hypoxemic respiratory failure  - Multifactorial etiology for respiratory failure, Adv ILD with fibrosis, RV failure, PHTN with possible shunting.   - Transplant eval pending, ADHOC meeting today. F/U with transplant.   - CTrILD work up negative in the past, negative here to date.  - Continue with diuresis, C/W HFNC and wean as tolerated. Will trial a dose of sildenafil given severe PTN and shunting. If hemodynamically stable will up titrate tomorrow.   - Would check CTA of chest to assess for possible PE given worsening RV failure. C/W full A/C  - s/p course of abx, continue to monitor.   - Overall patient with poor prognosis given adv fibrosis seen on imaging, o2 requirement, severe pHTN and RV failure ashley if patient is not a good transplant candidate. Was previously offered 2 years ago but declined at that time. Will need ongoing GOC with patient and family.  - If patient is not a candidate for transplant is open to palliative care eval/consultation.

## 2024-04-30 NOTE — PROGRESS NOTE ADULT - SUBJECTIVE AND OBJECTIVE BOX
Podiatry pager #: [phone]/ [phone]    Patient is a 63y old  Female who presents with a chief complaint of SOB (01 May 2024 11:33) Podiatry consult for evaluation of bilateral lower extremities.      HPI:  63-yo F with PMHx of chronic AHRF on 2-3L NC at b/l 2/2 ILD (Followed w/ Dr. Primo Marlow, [facility]), PE on Eliquis, severe pHTN, Cor pulmonale, DM, presented initially with SOB, dry cough, chest pain, LE edema, recent admission to [facility] in March for ILD flare, admitted to [facility]ICU for AHRF 2/2 ILD flare, transferred to [facility] for lung transplant evaluation. Pt reporting mild SOB, no fever, no sputum, no SOB, no chest pain, no edema. Reports constipation, last BM 2 days ago, with some stomach pain.     At [place] ICU, she was treated w/ IV steroids->PO taper, duonebs, symbicort, lasix. Pike placed for urinary retention, taken out prior to arrival although unclear if passed TOV at OSH. Had been weaned to NC while but was complicated by increased WOB, placed back on HFNC 50/60. Most recently escalated to methylprednisolone 40 mg IV BID. Was being treated w/ Ofev 150 mg BID, daily diuresis w/ Lasix 40->20 mg IVP on 4/25 based on volume assessment, 4/24 CXR w/ c/f R>L patchy opacities c/f pulmonary edema. Upon transfer here, admission vitals notable for temp afebrile, , /79, HFNC similar settings 45/60 100%. Labs notable for downtrending white count, stable anemia to 9-10, stable thrombocytosis to 400's. ABG 7.48 / 37 / 71 / 28, lactate 1.2.      Of note, pt is DNR/DNI trial of NIPPV - confirmed this with the patient and completed MOLST, in chart.  (26 Apr 2024 01:17)      PAST MEDICAL & SURGICAL HISTORY:  Diabetes mellitus      Hyperlipidemia      Pulmonary embolism      Pulmonary fibrosis      Cataract  right eye          MEDICATIONS  (STANDING):  albuterol/ipratropium for Nebulization 3 milliLiter(s) Nebulizer every 6 hours  apixaban 5 milliGRAM(s) Oral two times a day  atorvastatin 40 milliGRAM(s) Oral at bedtime  budesonide 160 MICROgram(s)/formoterol 4.5 MICROgram(s) Inhaler 2 Puff(s) Inhalation two times a day  chlorhexidine 2% Cloths 1 Application(s) Topical daily  dextrose 10% Bolus 125 milliLiter(s) IV Bolus once  dextrose 5%. 1000 milliLiter(s) (100 mL/Hr) IV Continuous <Continuous>  dextrose 5%. 1000 milliLiter(s) (50 mL/Hr) IV Continuous <Continuous>  dextrose 50% Injectable 12.5 Gram(s) IV Push once  dextrose 50% Injectable 25 Gram(s) IV Push once  furosemide   Injectable 40 milliGRAM(s) IV Push daily  gabapentin 300 milliGRAM(s) Oral daily  glucagon  Injectable 1 milliGRAM(s) IntraMuscular once  insulin glargine Injectable (LANTUS) 22 Unit(s) SubCutaneous at bedtime  insulin lispro (ADMELOG) corrective regimen sliding scale   SubCutaneous at bedtime  insulin lispro (ADMELOG) corrective regimen sliding scale   SubCutaneous three times a day before meals  insulin lispro Injectable (ADMELOG) 7 Unit(s) SubCutaneous three times a day before meals  methylPREDNISolone sodium succinate Injectable 40 milliGRAM(s) IV Push two times a day  multivitamin 1 Tablet(s) Oral daily  Ofev (Nintedanib) 150 milliGRAM(s) 150 milliGRAM(s) Oral two times a day  pantoprazole    Tablet 40 milliGRAM(s) Oral two times a day  polyethylene glycol 3350 17 Gram(s) Oral two times a day  senna 2 Tablet(s) Oral at bedtime  sildenafil (REVATIO) 10 milliGRAM(s) Oral three times a day  traZODone 100 milliGRAM(s) Oral at bedtime    MEDICATIONS  (PRN):  acetaminophen     Tablet .. 650 milliGRAM(s) Oral every 6 hours PRN Temp greater or equal to 38C (100.4F), Mild Pain (1 - 3)  aluminum hydroxide/magnesium hydroxide/simethicone Suspension 30 milliLiter(s) Oral every 4 hours PRN Dyspepsia  cyclobenzaprine 5 milliGRAM(s) Oral three times a day PRN Muscle Spasm  dextrose Oral Gel 15 Gram(s) Oral once PRN Blood Glucose LESS THAN 70 milliGRAM(s)/deciliter  diazepam  Injectable 2.5 milliGRAM(s) IV Push every 12 hours PRN anxiety  melatonin 3 milliGRAM(s) Oral at bedtime PRN Insomnia      Allergies    No Known Allergies    Intolerances        VITALS:    Vital Signs Last 24 Hrs  T(C): 36.7 (01 May 2024 12:04), Max: 37.2 (01 May 2024 04:40)  T(F): 98.1 (01 May 2024 12:04), Max: 99 (01 May 2024 04:40)  HR: 125 (01 May 2024 12:04) (111 - 125)  BP: 135/75 (01 May 2024 12:04) (126/81 - 154/93)  BP(mean): --  RR: 18 (01 May 2024 12:04) (18 - 20)  SpO2: 98% (01 May 2024 12:04) (97% - 100%)    Parameters below as of 01 May 2024 12:04  Patient On (Oxygen Delivery Method): nasal cannula, high flow  O2 Flow (L/min): 50  O2 Concentration (%): 55    LABS:                          10.8   12.05 )-----------( 516      ( 01 May 2024 08:25 )             36.6       05-01    140  |  99  |  35<H>  ----------------------------<  140<H>  4.1   |  31  |  0.89    Ca    9.0      01 May 2024 08:25        CAPILLARY BLOOD GLUCOSE      POCT Blood Glucose.: 230 mg/dL (01 May 2024 12:34)  POCT Blood Glucose.: 204 mg/dL (01 May 2024 08:14)  POCT Blood Glucose.: 158 mg/dL (30 Apr 2024 21:22)  POCT Blood Glucose.: 345 mg/dL (30 Apr 2024 16:09)          LOWER EXTREMITY PHYSICAL EXAM:    Vasular: DP/PT 1_/4, B/L, CFT <_2 seconds B/L, Temperature gradient wnl_, B/L.   Neuro: Epicritic sensation intact_ to the level of toes_, B/L.  Skin: Positive scaling fissuring and pruritus in a moccasin type distribution bilateral.  On the right heel early signs of DTI with blanchable erythema.  No bulla fluctuance malodor open ulcerations or clinical signs of cellulitis.      RADIOLOGY & ADDITIONAL STUDIES:

## 2024-04-30 NOTE — CONSULT NOTE ADULT - NSCONSULTADDITIONALINFOA_GEN_ALL_CORE
Recommendation:  I reviewed the patients imaging, medical records, reports and discussed the case. I discussed the risks, benefits and alternatives to lung transplant surgery. Pros, cons and risks associated with transplant also discussed in detail.     We discussed medications and specifics associated with transplant as well as transplant eligibility/listing and the differences with lung transplantation. We also discussed PHS donors including Hepatitis C, Hepatitis B, NRP-DCD and high-risk donors discussed in detail with patient as well.    The surgical procedure double or single, unilateral anterolateral thoracotomy/ bilateral anterolateral thoracotomy or clamshell thoracotomy for lung transplantation will last approximately six to twelve hours. You will be cared for right after surgery in the Cardiothoracic ICU (CTU). How long you stay in the hospital depends on how quickly you recover, your medical history and any complications you may have. The stay is usually around 3-4 weeks. After surgery, you will be taken straight to the CTU. You will stay asleep from the anesthesia and slowly wake up over the next day or two. You will be on a machine to help you breathe until you can breathe on your own. You will have many IVs and monitoring lines, several drains and a tube to collect urine from your bladder. While the breathing tube is in place, you will be unable to talk, but a nurse will be close by at all times.    As you recover, many of the IVs and tubes will be removed, and the nurses will increase your activity. They will also teach you how to care for yourself at home. You will learn about your medications and how to take care of your surgical incision (wound). Your family or friends will be asked to also learn these things so they can help you at home. During your recovery period, you will slowly be able to return to normal activities, how quickly this happens will be dependent on your medical history and activity level prior to the transplant.   After the postoperative recovery, most recipients are able to resume a normal lifestyle with improved health. Over 80 percent report no activity limitations and almost 40 percent of five-year survivors are working at least part-time.   I discussed the risks, benefits and alternatives to transplant surgery. Risks included but not limited to bleeding and stroke (given her history of bleeding), re-operation, arrhythmia, myocardial Infarction, primary graft dysfunction, kidney problems, liver problems, lungs problems, blood transfusion, tracheostomy, infections, mechanical support and death. I also discussed the various approaches in detail.   Patient consent is in the chart. We discussed the differences in quality of life and patient survival. We discussed the one and five year national survival described in evaluation consent form.  Patient is aware that they may withdraw their consent for transplantation at any time.     I feel that the patient will not benefit and is NOT a candidate for lung transplantation at this time. All questions and concerns were addressed.    Above scribed for Dr. Lozada.

## 2024-05-01 NOTE — PROGRESS NOTE ADULT - PROBLEM SELECTOR PLAN 8
T2DM: Home DM medications: metformin 500 mg BID, + glimepride 1 mg daily + lantus 35 units QHS+ admelog 25 units TIDAC  - Pt w/ hx of uncontrolled T2DM, a1c 11.9. At home, Basaglar 25 U QHS + Admelog 15U TID. Endocrine was consulted at [facility] prior to transfer. Was on  Lantus 20 + Admelog 14u TID + Mod SSI  - Endocrine consulted via email, recs appreciated  - Continue glargine 22 units qhs, lispro 7u TID qac, hold if NPO or eating < 50% of meals  - MISS FS tid qac qhs  - Discharge DM medications:   - Continue with metformin 500 mg BID  - STOP glimepride due to recurrent hypoglycemia at home   - Basal/bolus, dose will depend on insulin requirement and steroid plan   - Patient will follow up at  [facility]:  [address] [place] [address]. [address]. [place] [address]  Tel: [phone]

## 2024-05-01 NOTE — PROGRESS NOTE ADULT - ASSESSMENT
Assessment and Recommendation:   Assessment      A/P:63-yo F with PMHx of chronic AHRF on 2-3L NC at b/l 2/2 ILD (Followed w/ Dr. Primo Marlow, [facility]), PE on Eliquis, severe pHTN, Cor pulmonale, DM, presented initially with SOB, dry cough, chest pain, LE edema, recent admission to [facility] in March for ILD flare, admitted to [facility]->ICU for AHRF 2/2 ILD flare, transferred to [facility] for lung transplant evaluation.     Wound Consult requested to assist w/ management of  Sacral region, BL buttocks DTPI  Incontinent bowel      Recommendations:   Buttocks/ Sacrum Dominique/BID and prn soiling     Moisturize intact skin w/ SWEEN cream BID  Nutrition Consult for optimization in pt w/ Increased nutritional needs            encourage high quality protein, miryam/ prosource, MVI & Vit C to promote wound healing  Continue turning and positioning w/ offloading assistive devices as per protocol       encourage patient to reposition self       Continue w/ attends under pads and Pericare w/ godwin care as per protocol  Waffle Cushion to chair when oob to chair  Continue w/ low air loss pressure redistribution bed surface   Pt will need Group 2 mattress on hospital bed and ROHO cushion for wheel chair upon discharge home  Care as per medicine, will follow w/ you  Upon discharge f/u as outpatient at [facility] [address] [place] [address] [phone]  Seen and D/w  RN  Thank you for this consult,  Natalee Sierra, [facility], [facility]  [phone]  Nights/ Weekends/ Holidays please call:  General Surgery Consult pager [phone]) for emergencies  Wound PT for multilayer leg wrapping or VAC issues ([phone])

## 2024-05-01 NOTE — PROGRESS NOTE ADULT - PROBLEM SELECTOR PROBLEM 3
----- Message from Ev Marmolejo sent at 9/2/2020 12:21 PM CDT -----  Contact: [phone]  Pt called in states she was not informed of COVID-19 TESTING AND HAS MISSED APPT. Pt has procedure schedule for tomorrow and would like someone to call her.     Hyperlipidemia

## 2024-05-01 NOTE — CONSULT NOTE ADULT - TIME BILLING
Personal review of data, imaging and discussion with medical team. Documented time excludes time spent teaching resident.
reviewing chart and coordinating care with primary team/staff, as well as reviewing vitals, radiology, medication list, recent labs, and prior records.
Symptom assessment and management, supportive counseling, coordination of care

## 2024-05-01 NOTE — CONSULT NOTE ADULT - PROBLEM SELECTOR RECOMMENDATION 3
see GOC note above  DNR/I trial of NIV  discussed hospice, home palliative care: as per patient needs time to process all new information and speak with her family  plan to f/u tomorrow to answer any questions regarding today's conversation

## 2024-05-01 NOTE — PROGRESS NOTE ADULT - PROBLEM SELECTOR PLAN 3
LDL goal ,70 due to DM  Pt LDL NA  Order fasting lipid if not recently done  Statin as noted above  Manage per primary team   F/u levels as out pt

## 2024-05-01 NOTE — PROGRESS NOTE ADULT - ASSESSMENT
63-yo F with PMHx of chronic AHRF on 2-3L NC at b/l 2/2 ILD (Followed w/ Dr. Primo Marlow, [facility]), PE on Eliquis, severe pHTN, Cor pulmonale, DM, presented initially with SOB, dry cough, chest pain, LE edema, recent admission to [facility] in March for ILD flare, admitted to outside hospital->ICU for AHRF 2/2 ILD flare, transferred to [facility] for lung transplant evaluation.

## 2024-05-01 NOTE — PROGRESS NOTE ADULT - ATTENDING COMMENTS
63F hx ILD with advanced fibrosis, PE 2022 on eliquis with severe pHTN and RV failure, who presented to [facility] for SOB, and worsening hypoxemic respiratory failure. Patient was transferred to for lung transplant eval.     Patient with negative CTrILD outpatient, repeat here NTD. TTE at [facility] with normal RV function but here with Severe pHTN and RV dysfunction. Also noted to have PFO with possible shunting.     Patient currently not a candidate for transplant.     # ILD with adv fibrosis  # Severe pHTN with RV failure  # Hx of PE  # Acute on chronic hypoxemic respiratory failure  - Multifactorial etiology for respiratory failure, Adv ILD with fibrosis, RV failure, PHTN with possible shunting.   - Patient evaluated by lung transplant team. Not a good candidate.   - CTrILD work up negative in the past, negative here to date.  - Continue with diuresis, C/W HFNC and wean as tolerated.   - Can start sildenafil 10mg TID.   - Would check CTA of chest to assess for possible PE given worsening RV failure. Patient tolerated MRI with NRB.  - Will start to taper steroids as not likely helping, Can decrease to 30mg BID tomorrow. Will taper off.   - C/W full A/C  - s/p course of abx, continue to monitor.  - Overall patient with poor prognosis given adv fibrosis seen on imaging, o2 requirement, severe pHTN and RV failure ashley if patient is not a good transplant candidate. Was previously offered 2 years ago but declined at that time. Will need ongoing GOC with patient and family. Patient previously interested in palliative care. Today would like to defer for now.   - Pulmonary will follow.

## 2024-05-01 NOTE — CONSULT NOTE ADULT - CONSULT REQUESTED DATE/TIME
29-Apr-2024 10:00
26-Apr-2024 13:56
26-Apr-2024 08:33
29-Apr-2024 11:06
26-Apr-2024 17:10
29-Apr-2024 17:43
01-May-2024

## 2024-05-01 NOTE — PROGRESS NOTE ADULT - PROBLEM SELECTOR PLAN 1
Baseline 2-3L NC. Hx of ILD w/ c/f IPF. Followed w/ DOMENICO Ding. On HFNC 50/60 when leaving [place], now on bipap prn for wob. Received 4 doses of Levaquin last dose 4/13 at [facility].  - Transferred to [facility] for lung transplant eval  - Maintain euvolemia, I's and O's, daily weights.  - Transplant pulm consult appreciated  - Pulmonary consult  - Continue home Ofev 150mg BID  - Geovanni Loyacort  - Continue Methylprednisolone 40 mg IVP BID - while on steroids, Pantoprazole 40 mg QD  - pulm recs appreciated: consider adding NO to high flow, repeat serologies for scleroderma, sjogrens, RF, myomarker (cannot be done on floor, will need ICU consult if decompensates)  - repeat TTE with bubble study - severe RH dysfunction with elevated pulmonary pressure, grade ii diastolic dysfunction (?from RH failure), normal LVSF  - diuresis - lasix 40 iv  - Zosyn empirically 4/26 - 4/27, no evidence of PNA, can hold off on abx  - valium 2.5 mg q12 PRN anxiety as she becomes very tachypneic  - not a transplant candidate 4/30  - f/u repeat TTE for RV function

## 2024-05-01 NOTE — PROGRESS NOTE ADULT - SUBJECTIVE AND OBJECTIVE BOX
Patient is a 63y old  Female who presents with a chief complaint of SOB (30 Apr 2024 15:42)    INTERVAL HPI/OVERNIGHT EVENTS: No acute events overnight. Pt seen and examined at bedside.  Patient denies any complaints overnight. The patient denies any fevers, chills, nausea, vomiting, or increased pain.  Oxygen being weaned. Reporting suprapubic discomfort.      MEDICATIONS  (STANDING):  albuterol/ipratropium for Nebulization 3 milliLiter(s) Nebulizer every 6 hours  apixaban 5 milliGRAM(s) Oral two times a day  atorvastatin 40 milliGRAM(s) Oral at bedtime  budesonide 160 MICROgram(s)/formoterol 4.5 MICROgram(s) Inhaler 2 Puff(s) Inhalation two times a day  chlorhexidine 2% Cloths 1 Application(s) Topical daily  dextrose 10% Bolus 125 milliLiter(s) IV Bolus once  dextrose 5%. 1000 milliLiter(s) (100 mL/Hr) IV Continuous <Continuous>  dextrose 5%. 1000 milliLiter(s) (50 mL/Hr) IV Continuous <Continuous>  dextrose 50% Injectable 12.5 Gram(s) IV Push once  dextrose 50% Injectable 25 Gram(s) IV Push once  furosemide   Injectable 40 milliGRAM(s) IV Push daily  gabapentin 300 milliGRAM(s) Oral daily  glucagon  Injectable 1 milliGRAM(s) IntraMuscular once  insulin glargine Injectable (LANTUS) 22 Unit(s) SubCutaneous at bedtime  insulin lispro (ADMELOG) corrective regimen sliding scale   SubCutaneous three times a day before meals  insulin lispro (ADMELOG) corrective regimen sliding scale   SubCutaneous at bedtime  insulin lispro Injectable (ADMELOG) 7 Unit(s) SubCutaneous three times a day before meals  methylPREDNISolone sodium succinate Injectable 40 milliGRAM(s) IV Push two times a day  multivitamin 1 Tablet(s) Oral daily  Ofev (Nintedanib) 150 milliGRAM(s) 150 milliGRAM(s) Oral two times a day  pantoprazole    Tablet 40 milliGRAM(s) Oral two times a day  polyethylene glycol 3350 17 Gram(s) Oral two times a day  senna 2 Tablet(s) Oral at bedtime  sildenafil (REVATIO) 10 milliGRAM(s) Oral three times a day  traZODone 100 milliGRAM(s) Oral at bedtime      MEDICATIONS  (PRN):  acetaminophen     Tablet .. 650 milliGRAM(s) Oral every 6 hours PRN Temp greater or equal to 38C (100.4F), Mild Pain (1 - 3)  aluminum hydroxide/magnesium hydroxide/simethicone Suspension 30 milliLiter(s) Oral every 4 hours PRN Dyspepsia  cyclobenzaprine 5 milliGRAM(s) Oral three times a day PRN Muscle Spasm  dextrose Oral Gel 15 Gram(s) Oral once PRN Blood Glucose LESS THAN 70 milliGRAM(s)/deciliter  diazepam  Injectable 2.5 milliGRAM(s) IV Push every 12 hours PRN anxiety  melatonin 3 milliGRAM(s) Oral at bedtime PRN Insomnia      Allergies    No Known Allergies    Intolerances        PAST MEDICAL & SURGICAL HISTORY:  Diabetes mellitus      Hyperlipidemia      Pulmonary embolism      Pulmonary fibrosis      Cataract  right eye          Vital Signs Last 24 Hrs  T(C): 37.2 (01 May 2024 04:40), Max: 37.2 (01 May 2024 04:40)  T(F): 99 (01 May 2024 04:40), Max: 99 (01 May 2024 04:40)  HR: 119 (01 May 2024 08:58) (111 - 125)  BP: 135/82 (01 May 2024 04:40) (126/81 - 154/93)  BP(mean): --  RR: 18 (01 May 2024 08:58) (18 - 20)  SpO2: 97% (01 May 2024 08:58) (97% - 100%)    Parameters below as of 01 May 2024 08:58  Patient On (Oxygen Delivery Method): nasal cannula, high flow  O2 Flow (L/min): 50  O2 Concentration (%): 55    General: NAD  Neurology: A&Ox3, nonfocal, PADILLA x 4  Eyes: PERRLA/ EOMI, Gross vision intact  ENT/Neck: Neck supple, trachea midline, No JVD, Gross hearing intact  Respiratory: CTA B/L, No wheezing, rales, rhonchi  CV: RRR, +S1/S2, -S3/S4, no murmurs, rubs or gallops  Abdominal: Soft, NT, ND +BS, No HSM  MSK: 5/5 strength UE/LE bilaterally  Extremities: No edema, 2+ peripheral pulses  Skin: No Rashes, Hematoma, Ecchymosis  Incisions:   Tubes:    LABS:                        10.8   12.05 )-----------( 516      ( 01 May 2024 08:25 )             36.6     05-01    140  |  99  |  35<H>  ----------------------------<  140<H>  4.1   |  31  |  0.89    Ca    9.0      01 May 2024 08:25        Urinalysis Basic - ( 01 May 2024 08:25 )    Color: x / Appearance: x / SG: x / pH: x  Gluc: 140 mg/dL / Ketone: x  / Bili: x / Urobili: x   Blood: x / Protein: x / Nitrite: x   Leuk Esterase: x / RBC: x / WBC x   Sq Epi: x / Non Sq Epi: x / Bacteria: x        CARDIAC MARKERS ( 30 Apr 2024 12:10 )  x     / x     / 53 U/L / x     / x                    MICROBIOLOGY:      RADIOLOGY & ADDITIONAL STUDIES:

## 2024-05-01 NOTE — PROGRESS NOTE ADULT - PROBLEM SELECTOR PLAN 5
- Pt was seeing Dr. Chua, urologist. At [facility] had failed TOV w/ godwin replaced. Arrives to [facility] w/o Godwin. Pt on outpt med list listed Tamsulosin 0.4 mg, unclear if started at [facility], pt saying she does not take this outpatient.   - q8h Bladder scans, place godwin if 3 straight caths

## 2024-05-01 NOTE — PROGRESS NOTE ADULT - ASSESSMENT
63-yo F with PMHx of chronic AHRF on 2-3L NC at b/l 2/2 ILD (Followed w/ Dr. Primo Marlow, [facility]), PE on Eliquis, severe pHTN, Cor pulmonale, DM, admitted for acute on chronic respiratory failure, neurology consulted for bilateral lower extremity weakness, urinary/fecal retention. Patient currently ill appearing w/ significant shortness of breath limiting history and exam. Patient exam significant for proximal lower extremity weakness bilaterally which seems effort dependent (patient tires out due to respiratory condition). Otherwise her exam shows intact sensation and intact reflexes in the lower extremities. Presentation of bilateral lower extremity weakness w/ urinary/fecal incontinence would suggest a lumbar myelopathy, however exam is less concerning. Time course of symptoms is unusual, as she presented w/ urinary retention prior to onset of leg weakness and fecal retention. Suspect that current presentation is likely non-neurologic in origin given exam findings. Other less likely etiologies on differential include cord compression, and NMJ disease.     premRS: 0  LKN: >24 hours  NIHSS: currently 0     MR BRAIN WAW IC 04/30/2024 Chronic left corona radiata lacunar infarctions. No abnormal leptomeningeal or parenchymal enhancement. There is no abnormal restricted diffusion to suggest acute infarction.   MRI C spine:  Mild disc desiccation at C5/C6. Mild diffuse volume loss of the cervical and upper thoracic spinal cord. Subtle diffuse central cord T2/FLAIR hyperintensity. No mass effect or cord swelling. Differential diagnosis includes infection, inflammation, autoimmune, vascular, and demyelinating disease etiologies. Unremarkable T1 marrow signal. No evidence of cord compression.  No abnormal enhancement.  MR SPINE LUMBAR WAW IC    Disc space narrowing at L5-S1 with type II Modic endplate degenerative changes. No abnormal enhancement. The conus is normal in size, position, and signal characteristics, ending at L2-L3. 4-L5 moderate thecal sac compression and mild to moderate left neural foraminal narrowing.     Impression: 63y F w/ ILD presenting w/ acute on chronic respiratory failure, neurology consulted for subacute presentation of urinary/fecal retention and bilateral lower extremity weakness in the setting of respiratory illness. 5/1: patient seems to be doing well today in terms of neuro exam    Recommend:  [x] MRI brain, C/T/L spine w/ and w/o contrast  [x] CK 53, Vitamin B12 942, folate 10.4  [ ] serum ACHr-ab, serum anti-MuSK ab  [ ] continue with home eliquis if no medical contraindications   [ ] Lipid panel, HbA1c   [ ] Atorvastatin 80mg (titrate to LDL < 70)   [ ] Neurological checks and vital signs per unit protocol  [ ] BGM goals 140-180  [ ] PT/OT; S/S evaluation  [ ] Follow up with vascular neurology after discharge for incidental chronic infarcts on imaging.     * Case and plan not final until Attending attestation *    63-yo F with PMHx of chronic AHRF on 2-3L NC at b/l 2/2 ILD (Followed w/ Dr. Primo Marlow, [facility]), PE on Eliquis, severe pHTN, Cor pulmonale, DM, admitted for acute on chronic respiratory failure, neurology consulted for bilateral lower extremity weakness, urinary/fecal retention. Patient currently ill appearing w/ significant shortness of breath limiting history and exam. Patient exam significant for proximal lower extremity weakness bilaterally which seems effort dependent (patient tires out due to respiratory condition). Otherwise her exam shows intact sensation and intact reflexes in the lower extremities. Presentation of bilateral lower extremity weakness w/ urinary/fecal incontinence would suggest a lumbar myelopathy, however exam is less concerning. Time course of symptoms is unusual, as she presented w/ urinary retention prior to onset of leg weakness and fecal retention. Suspect that current presentation is likely non-neurologic in origin given exam findings. Other less likely etiologies on differential include cord compression, and NMJ disease.     premRS: 0  LKN: >24 hours  NIHSS: currently 0     MR BRAIN WAW IC 04/30/2024 Chronic left corona radiata lacunar infarctions. No abnormal leptomeningeal or parenchymal enhancement. There is no abnormal restricted diffusion to suggest acute infarction.   MRI C spine:  Mild disc desiccation at C5/C6. Mild diffuse volume loss of the cervical and upper thoracic spinal cord. Subtle diffuse central cord T2/FLAIR hyperintensity. No mass effect or cord swelling. Differential diagnosis includes infection, inflammation, autoimmune, vascular, and demyelinating disease etiologies. Unremarkable T1 marrow signal. No evidence of cord compression.  No abnormal enhancement.  MR SPINE LUMBAR WAW IC    Disc space narrowing at L5-S1 with type II Modic endplate degenerative changes. No abnormal enhancement. The conus is normal in size, position, and signal characteristics, ending at L2-L3. 4-L5 moderate thecal sac compression and mild to moderate left neural foraminal narrowing.     Impression: 63y F w/ ILD presenting w/ acute on chronic respiratory failure, neurology consulted for subacute presentation of urinary/fecal retention and bilateral lower extremity weakness in the setting of respiratory illness. 5/1: patient seems to be doing well today in terms of neuro exam    Recommend:  [x] MRI brain, C/T/L spine w/ and w/o contrast  [ ] recommend spine service to evaluate MRI spine findings  [x] CK 53, Vitamin B12 942, folate 10.4  [ ] serum ACHr-ab, serum anti-MuSK ab  [ ] hold home eliquis for now, will discuss w attending if need for LP, resume prior to discharge if no medical contraindications   [ ] Lipid panel, HbA1c   [ ] Atorvastatin 80mg (titrate to LDL < 70)   [ ] Neurological checks and vital signs per unit protocol  [ ] BGM goals 140-180  [ ] PT/OT; S/S evaluation  [ ] Follow up with vascular neurology after discharge for incidental chronic infarcts on imaging.     * Case and plan not final until Attending attestation *    63-yo F with PMHx of chronic AHRF on 2-3L NC at b/l 2/2 ILD (Followed w/ Dr. Primo Marlow, [facility]), PE on Eliquis, severe pHTN, Cor pulmonale, DM, admitted for acute on chronic respiratory failure, neurology consulted for bilateral lower extremity weakness, urinary/fecal retention. Patient currently ill appearing w/ significant shortness of breath limiting history and exam. Patient exam significant for proximal lower extremity weakness bilaterally which seems effort dependent (patient tires out due to respiratory condition). Otherwise her exam shows intact sensation and intact reflexes in the lower extremities. Presentation of bilateral lower extremity weakness w/ urinary/fecal incontinence would suggest a lumbar myelopathy, however exam is less concerning. Time course of symptoms is unusual, as she presented w/ urinary retention prior to onset of leg weakness and fecal retention. Suspect that current presentation is likely non-neurologic in origin given exam findings. Other less likely etiologies on differential include cord compression, and NMJ disease.     premRS: 0  LKN: >24 hours  NIHSS: currently 0     MR BRAIN WAW IC 04/30/2024 Chronic left corona radiata lacunar infarctions. No abnormal leptomeningeal or parenchymal enhancement. There is no abnormal restricted diffusion to suggest acute infarction.   MRI C spine:  Mild disc desiccation at C5/C6. Mild diffuse volume loss of the cervical and upper thoracic spinal cord. Subtle diffuse central cord T2/FLAIR hyperintensity. No mass effect or cord swelling. Differential diagnosis includes infection, inflammation, autoimmune, vascular, and demyelinating disease etiologies. Unremarkable T1 marrow signal. No evidence of cord compression.  No abnormal enhancement.  MR SPINE LUMBAR WAW IC    Disc space narrowing at L5-S1 with type II Modic endplate degenerative changes. No abnormal enhancement. The conus is normal in size, position, and signal characteristics, ending at L2-L3. 4-L5 moderate thecal sac compression and mild to moderate left neural foraminal narrowing.     Impression: 63y F w/ ILD presenting w/ acute on chronic respiratory failure, neurology consulted for subacute presentation of urinary/fecal retention and bilateral lower extremity weakness in the setting of respiratory illness. 5/1: patient seems to be doing well today in terms of neuro exam    Recommend:  [x] MRI brain, C/T/L spine w/ and w/o contrast  [x] CK 53, Vitamin B12 942, folate 10.4  [ ] serum ACHr-ab, serum anti-MuSK ab  [ ] hold home eliquis for now, due to patient will benefit from LP, resume prior to discharge if no medical contraindications   [ ] Lipid panel, HbA1c   [ ] Atorvastatin 80mg (titrate to LDL < 70)   [ ] Neurological checks and vital signs per unit protocol  [ ] BGM goals 140-180  [ ] PT/OT; S/S evaluation  [ ] Follow up with vascular neurology after discharge for incidental chronic infarcts on imaging.     * Case and plan not final until Attending attestation *

## 2024-05-01 NOTE — CONSULT NOTE ADULT - CONVERSATION DETAILS
Met with patient at bedside. Introduced myself and the role of palliative care. Discussed HPI and coping regarding new information that she is currently  not a transplant candidate. She shared she is processing the information slowly but it has been very difficult. Explored who her supports were: she identified her daughter and sister, who she has not yet seen since finding out new clinical information. She identified no symptoms at this time but was open to talking about different options moving forward. Discussed code status, she confirmed DNR/I. Explained HFNC is a barrier to d/c at this time based on the settings. She understood. We discussed if able to be weaned, the hospice benefit, a more comfort based approach to care. Extensive education provided on inpatient and home hospice criteria. Discussed if unable to wean HFNC we may have to discuss a shorter timeline and potential transfer to PCU. Discussed option of palliative home care, but provided guarded prognosis of recurrent hospitalizations without the safety net of a comfort based approach. She verbalized understanding and feels as though she needs more time to process and speak with family. She was amenable to follow up tomorrow. Extensive emotional support provided throughout visit.

## 2024-05-01 NOTE — PROGRESS NOTE ADULT - ASSESSMENT
63 F w/h/o uncontrolled T2DM (A1C 11.9%) while on Lantus,admelog,  metformin, glimepiride. Unknown DM complications. Also h/o AHRF, ILD, PE, HTN. Here with SOB secondary to interstital lung disease. Patient currently on solumedrol 40 mg BID. Endocrinology consulted for diabetes management. Tolerating POs with BG levels above goal (200s) after pt takes steroid in am. Will adjust premeal insulin to BG goal 100 to 180s. No hypoglycemia.

## 2024-05-01 NOTE — PROGRESS NOTE ADULT - PROBLEM SELECTOR PLAN 3
- Pt w/ hx of severe pHTN, RVSP 68 on 10/2022  - Repeat 4/12/24 at [facility] noted PAP 28, "normal PAP"   - Maintain euvolemia, as above

## 2024-05-01 NOTE — PROGRESS NOTE ADULT - SUBJECTIVE AND OBJECTIVE BOX
SUBJECTIVE / OVERNIGHT EVENTS:  Today is hospital day 6d. There are no new issues or overnight events.   Did not endorse any headache, lightheadedness, vertigo, shortness of breathe, cough, chest pain, palpitations, tachycardia, abdominal pain, nausea, vomiting, diarrhea or constipation currently    HPI:  63-yo F with PMHx of chronic AHRF on 2-3L NC at b/l 2/2 ILD (Followed w/ Dr. Primo Marlow, [facility]), PE on Eliquis, severe pHTN, Cor pulmonale, DM, presented initially with SOB, dry cough, chest pain, LE edema, recent admission to Replaced by [facility] in March for ILD flare, admitted to [facility]->ICU for AHRF 2/2 ILD flare, transferred to [facility] for lung transplant evaluation. Pt reporting mild SOB, no fever, no sputum, no SOB, no chest pain, no edema. Reports constipation, last BM 2 days ago, with some stomach pain.     At [place] ICU, she was treated w/ IV steroids->PO taper, duonebs, symbicort, lasix. Pike placed for urinary retention, taken out prior to arrival although unclear if passed TOV at OSH. Had been weaned to NC while but was complicated by increased WOB, placed back on HFNC 50/60. Most recently escalated to methylprednisolone 40 mg IV BID. Was being treated w/ Ofev 150 mg BID, daily diuresis w/ Lasix 40->20 mg IVP on 4/25 based on volume assessment, 4/24 CXR w/ c/f R>L patchy opacities c/f pulmonary edema. Upon transfer here, admission vitals notable for temp afebrile, , /79, HFNC similar settings 45/60 100%. Labs notable for downtrending white count, stable anemia to 9-10, stable thrombocytosis to 400's. ABG 7.48 / 37 / 71 / 28, lactate 1.2.      Of note, pt is DNR/DNI trial of NIPPV - confirmed this with the patient and completed MOLST, in chart.  (26 Apr 2024 01:17)    MEDICATIONS  (STANDING):  albuterol/ipratropium for Nebulization 3 milliLiter(s) Nebulizer every 6 hours  apixaban 5 milliGRAM(s) Oral two times a day  atorvastatin 40 milliGRAM(s) Oral at bedtime  budesonide 160 MICROgram(s)/formoterol 4.5 MICROgram(s) Inhaler 2 Puff(s) Inhalation two times a day  chlorhexidine 2% Cloths 1 Application(s) Topical daily  clotrimazole/betamethasone Cream 1 Application(s) Topical once  dextrose 10% Bolus 125 milliLiter(s) IV Bolus once  dextrose 5%. 1000 milliLiter(s) (100 mL/Hr) IV Continuous <Continuous>  dextrose 5%. 1000 milliLiter(s) (50 mL/Hr) IV Continuous <Continuous>  dextrose 50% Injectable 12.5 Gram(s) IV Push once  dextrose 50% Injectable 25 Gram(s) IV Push once  furosemide   Injectable 40 milliGRAM(s) IV Push daily  gabapentin 300 milliGRAM(s) Oral daily  glucagon  Injectable 1 milliGRAM(s) IntraMuscular once  insulin glargine Injectable (LANTUS) 22 Unit(s) SubCutaneous at bedtime  insulin lispro (ADMELOG) corrective regimen sliding scale   SubCutaneous at bedtime  insulin lispro (ADMELOG) corrective regimen sliding scale   SubCutaneous three times a day before meals  insulin lispro Injectable (ADMELOG) 7 Unit(s) SubCutaneous three times a day before meals  methylPREDNISolone sodium succinate Injectable 40 milliGRAM(s) IV Push two times a day  multivitamin 1 Tablet(s) Oral daily  Ofev (Nintedanib) 150 milliGRAM(s) 150 milliGRAM(s) Oral two times a day  pantoprazole    Tablet 40 milliGRAM(s) Oral two times a day  polyethylene glycol 3350 17 Gram(s) Oral two times a day  senna 2 Tablet(s) Oral at bedtime  sildenafil (REVATIO) 10 milliGRAM(s) Oral three times a day  traZODone 100 milliGRAM(s) Oral at bedtime    MEDICATIONS  (PRN):  acetaminophen     Tablet .. 650 milliGRAM(s) Oral every 6 hours PRN Temp greater or equal to 38C (100.4F), Mild Pain (1 - 3)  aluminum hydroxide/magnesium hydroxide/simethicone Suspension 30 milliLiter(s) Oral every 4 hours PRN Dyspepsia  cyclobenzaprine 5 milliGRAM(s) Oral three times a day PRN Muscle Spasm  dextrose Oral Gel 15 Gram(s) Oral once PRN Blood Glucose LESS THAN 70 milliGRAM(s)/deciliter  diazepam  Injectable 2.5 milliGRAM(s) IV Push every 12 hours PRN anxiety  melatonin 3 milliGRAM(s) Oral at bedtime PRN Insomnia    HOME MEDICATIONS:  acetaminophen 325 mg oral tablet: 2 tab(s) orally every 6 hours As needed Mild Pain (1 - 3)  Admelog 100 units/mL injectable solution: 15 unit(s) injectable 3 times a day (before meals)  apixaban 5 mg oral tablet: 1 tab(s) orally once a day  atorvastatin 40 mg oral tablet: 1 tab(s) orally once a day  cyclobenzaprine 5 mg oral tablet: 1 tab(s) orally 3 times a day as needed for Muscle Spasm  gabapentin 300 mg oral capsule: 1 cap(s) orally once a day  insulin glargine 100 units/mL subcutaneous solution: 25 unit(s) subcutaneous once a day (at bedtime)  melatonin 3 mg oral tablet: 1 tab(s) orally once a day (at bedtime)  Ofev 150 mg oral capsule: 1 cap(s) orally every 12 hours  pantoprazole 40 mg oral delayed release tablet: 1 tab(s) orally once a day (before a meal)  traZODone 100 mg oral tablet: 1 tab(s) orally once a day (at bedtime)    PHYSICAL EXAM  Vital Signs Last 24 Hrs  T(C): 36.7 (01 May 2024 12:04), Max: 37.2 (01 May 2024 04:40)  T(F): 98.1 (01 May 2024 12:04), Max: 99 (01 May 2024 04:40)  HR: 125 (01 May 2024 12:04) (111 - 125)  BP: 135/75 (01 May 2024 12:04) (126/81 - 135/82)  BP(mean): --  RR: 18 (01 May 2024 12:04) (18 - 18)  SpO2: 98% (01 May 2024 12:04) (97% - 100%)    Parameters below as of 01 May 2024 12:04  Patient On (Oxygen Delivery Method): nasal cannula, high flow  O2 Flow (L/min): 50  O2 Concentration (%): 55    04-30-24 @ 07:01  -  05-01-24 @ 07:00  --------------------------------------------------------  IN: 1200 mL / OUT: 3100 mL / NET: -1900 mL    05-01-24 @ 07:01  -  05-01-24 @ 15:28  --------------------------------------------------------  IN: 120 mL / OUT: 1200 mL / NET: -1080 mL      CONSTITUTIONAL: Well-groomed, in no apparent distress;  EYES: No conjunctival or scleral injection, non-icteric;  ENMT: No external nasal lesions; Normal outer ears;  NECK: Trachea midline;  RESPIRATORY: Normal respiratory effort; Decreased breathe sounds bilaterally without wheeze/rhonchi/rales;  CARDIOVASCULAR: Regular rate and rhythm;  GASTROINTESTINAL: Non-distended; No palpable masses; No rebound/guarding;  EXTREMITIES:  No lower extremity edema;  NEUROLOGY: A+O to person, place, and time; Does respond to commands appropriately;  PSYCHIATRY: Mood and Affect appropriate    LABS:                        10.8   12.05 )-----------( 516      ( 01 May 2024 08:25 )             36.6     05-01    140  |  99  |  35<H>  ----------------------------<  140<H>  4.1   |  31  |  0.89    Ca    9.0      01 May 2024 08:25        CARDIAC MARKERS ( 30 Apr 2024 12:10 )  x     / x     / 53 U/L / x     / x          Urinalysis Basic - ( 01 May 2024 08:25 )    Color: x / Appearance: x / SG: x / pH: x  Gluc: 140 mg/dL / Ketone: x  / Bili: x / Urobili: x   Blood: x / Protein: x / Nitrite: x   Leuk Esterase: x / RBC: x / WBC x   Sq Epi: x / Non Sq Epi: x / Bacteria: x        SARS-CoV-2: NotDetec (11 Apr 2024 22:46)      RADIOLOGY & ADDITIONAL TESTS:  EKG  12 Lead ECG:   Ventricular Rate 108 BPM    Atrial Rate 108 BPM    P-R Interval 132 ms    QRS Duration 68 ms    Q-T Interval 326 ms    QTC Calculation(Bazett) 436 ms    P Axis 27 degrees    R Axis -39 degrees    T Axis 12 degrees    Diagnosis Line SINUS TACHYCARDIA WITH FUSION COMPLEXES  LEFT AXIS DEVIATION  CANNOT RULE OUT ANTERIOR INFARCT  ABNORMAL ECG  WHEN COMPARED WITH ECG OF  04-  Confirmed by MD MYERS JONATHAN ([id number]) on 4/27/2024 5:25:32 PM (04-26-24 @ 11:09)  12 Lead ECG:   Ventricular Rate 117 BPM    Atrial Rate 117 BPM    P-R Interval 140 ms    QRS Duration 70 ms    Q-T Interval 302 ms    QTC Calculation(Bazett) 421 ms    P Axis 33 degrees    R Axis -40 degrees    T Axis 10 degrees    Diagnosis Line SINUS TACHYCARDIA  LEFT AXIS DEVIATION  POSSIBLE ANTEROLATERAL INFARCT , AGE UNDETERMINED  ABNORMAL ECG  NO PREVIOUS ECGS AVAILABLE  Confirmed by MD Rodriguez Ronald ([id number]) on 4/29/2024 12:43:02 PM (04-26-24 @ 05:05)    MR Head w/wo IV Cont:   ACC: [id number] EXAM:  MR BRAIN WAW IC   ORDERED BY: ELEN WELCH     ACC: [id number] EXAM:  MR SPINE CERVICAL WAW IC   ORDERED BY: ELEN WELCH     PROCEDURE DATE:  04/30/2024          INTERPRETATION:  CLINICAL INDICATIONS: weakness    COMPARISON: Head CT dated 1/14/2019    TECHNIQUE: MRI brain: Multiplanar, multisequence MR imaging of the brain   are obtained with and without the administration of 6 cc intravenous   Gadavist contrast. 1 cc of contrast was discarded.    FINDINGS:  Chronic left corona radiata lacunar infarctions. No abnormal   leptomeningeal or parenchymal enhancement.  There is no abnormal restricted diffusion to suggest acute infarction.   Scattered periventricular and subcortical white matter T2 /FLAIR   hyperintensities areseen without mass effect, nonspecific, likely   representing moderate chronic microvascular changes. Normal T2 flow-voids   are seen within  the intracranial vasculature. The lateral ventricles and   cortical sulci are age-appropriate in size and configuration. There is no   mass, mass effect, or extra-axial fluid collection. There is no   susceptibility artifact to suggest hemorrhage. Midline structures are   normal.  The patient is status post bilateral ocular lens replacement   surgery. The visualized paranasal sinuses, mastoid air cells and orbits   are unremarkable.      ==============    CLINICAL HISTORY: weakness    COMPARISON: None.    TECHNIQUE: Cervical spine MRI: Multiplanar, multisequence MR images of   the cervical spine are obtained with and without the administration of 6   cc intravenous Gadavist contrast. 1 cc of contrast was discarded.    FINDINGS:  Mild motion artifact limits interpretation.  No abnormal enhancement. Mild disc desiccation at C5/C6. Mild diffuse   volumeloss of the cervical and upper thoracic spinal cord. Subtle   diffuse central cord T2/FLAIR hyperintensity. No mass effect or cord   swelling. Differential diagnosis includes infection, inflammation,   autoimmune, vascular, and demyelinating disease etiologies.  Unremarkable T1 marrow signal. No evidence of cord compression. No bone   marrow edema. There is no evidence for acute fracture. A normal lordosis   is noted. Craniocervical junction is normal. The cervicovertebral body   heights and remaining intervertebral disc spaces are preserved. There is   no prevertebral soft tissue abnormality.      Evaluation of the individual levels:  C2/C3 level: No spinal canal stenosis or foraminal narrowing.  C3/C4 level: Mild left-sided foraminal narrowing. No spinal canal   stenosis or right-sided foraminal narrowing.  C4/C5 level: Broad-based displays complex causing mild right-sided   foraminal narrowing. No spinal canal stenosis or left-sided foraminal   narrowing  C5/C6 level: Left-sided discharge complex. Moderate right-sided foraminal   narrowing. No spinal canal stenosis or left-sided foraminal narrowing.  C6/C7 level: Broad-based disc bulge complex. Mild bilateral foraminal   narrowing. No spinal canal stenosis  C7/T1 level:  Mild right-sided foraminal narrowing. No spinal canal   stenosis or left-sided foraminal narrowing.      ==============      IMPRESSION:    MRI BRAIN: No acute infarction.    MRI CERVICAL SPINE: Mild diffuse volume loss of the cervical and upper   thoracic spinal cord. Subtle diffuse central cord T2/FLAIR   hyperintensity. No mass effect or cord swelling. No abnormal enhancement.    --- End of Report ---            MARISOL CORDOVA MD; Attending Radiologist  This document has been electronically signed. Apr 30 2024  7:53PM (04-30-24 @ 18:59)  Xray Chest 1 View- PORTABLE-Urgent:   ACC: [id number] EXAM:  XR CHEST PORTABLE URGENT 1V   ORDERED BY: RAMONE PICKETT     PROCEDURE DATE:  04/26/2024          INTERPRETATION:  CLINICAL INFORMATION: Leukocytosis.    TECHNIQUE: Frontal radiograph of the chest.    COMPARISON: Chest radiograph 4/24/2024.    FINDINGS:    Similar-appearing bilateral reticular opacities, consistent with known   interstitial lung disease. No new focal consolidation.  No pleural effusion or pneumothorax.  There is limited evaluation of the heart size and mediastinum on portable   technique.  No acute abnormality within visible osseous structures.      IMPRESSION:    Similar-appearing bilateral reticular opacities, consistent with known   interstitial lung disease. No new focal consolidation.    --- Endof Report ---           NILTON MULLER MD; Resident Radiologist  This document has been electronically signed.  GIANNA JENKINS MD; Attending Interventional Radiologist  This document has been electronically signed. Apr 27 2024  9:23AM (04-26-24 @ 14:22)  Xray Chest 1 View- PORTABLE-Urgent:   ACC: [id number] EXAM:  XR CHEST PORTABLE URGENT 1V   ORDERED BY: JAY HEWITT     PROCEDURE DATE:  04/24/2024          INTERPRETATION:  Portable AP chest radiograph    COMPARISON: 4/17/2024 chest x-ray and CT chest 4/12/2024.  .    CLINICAL INFORMATION: Dyspnea. Increasing shortness of breath. History of   interstitial lung disease..    FINDINGS:  CATHETERS AND TUBES: None    PULMONARY: Diffuse interstitial idiopathic pulmonary fibrosis.   Superimposed infectious process cannot be excluded.    No pneumothorax..    HEART/VASCULAR: The  heart is enlarged in transverse diameter. .    BONES: The visualized osseous thorax is intact.    IMPRESSION:    Idiopathic pulmonary fibrosis.  No significant change from prior exams.  Superimposed infectiousprocess cannot be excluded.    --- End of Report ---            GATITO NEWELL MD; Attending Radiologist  This document has been electronically signed. Apr 25 2024  2:41PM (04-24-24 @ 09:55)

## 2024-05-01 NOTE — CONSULT NOTE ADULT - ASSESSMENT
63F hx ILD with advanced fibrosis, PE 2022 on eliquis with severe pHTN and RV failure, who presented to [facility] for SOB, and worsening hypoxemic respiratory failure. Patient was transferred to for lung transplant eval. No longer a candidate at this time as per transplant. Palliative care consulted for assistance with GOC.

## 2024-05-01 NOTE — CONSULT NOTE ADULT - PROBLEM SELECTOR RECOMMENDATION 4
will continue to follow for goc  palliative sw consult placed for support and anticipatory grief  refused chaplaincy consult at this time  Can be reached by TEAMS M-F 9-5 Nargis Sorensen Any other time please page [phone] if needed

## 2024-05-01 NOTE — PROGRESS NOTE ADULT - ASSESSMENT
63F hx ILD/ probable IPF (On 2-3LNC at home), PE 2022 on eliquis, severe pHTN w/ prior cor pulmonale (RVSP 68 - 10/22 w/ TTE from 4/12 w/ PASP 28, normal LV/RV SF), IDDM, presenting as a transfer from [place] for lung transplant eval iso SOB, dry cough, chest pain. She has been receiving duonebs, symbicort, lasix, ofev and IV steroids. Continuing with AC. Solu-medrol is ordered for 40 IV BID.  CT Chest 4/12/2024: Findings suggesting interstitial lung disease without significant interval progression. No evidence of pneumonia.   CT scan from 4/12/2024 when compared with CT scan from 2020 has shown significant progression.      Recommendations  tolerating HFNC, continue weaning patient    Continue with Symbicort, duonebs, Ofev, Eliquis,   Adjust solu-medrol to Solu-medrol 30 IV BID  Continue with diuresis - goal net neg 1L    Can continue to use benzo prn for anxiety  TTE results appreciated - significantly different PASP on TTE now compared to prior TTE as well as not previously seen PFO  Repeat TTE  follow up CTPA  fu sputum cx  sildenafil 10mg TID - Side effects may include hypotension, nausea, light-headedness, let us know if patient experiences these symptoms  Patient is endorsing supra-pubic abdominal pain, please evaluate

## 2024-05-01 NOTE — PROGRESS NOTE ADULT - PROBLEM SELECTOR PLAN 1
- Please monitor blood glucose values TID AC & QHS while eating regular meals and Q6H while NPO  -Continue with insulin Glargine 22 units QHS  -Adjust  insulin Lispro to 12 units TID with meals, hold if NPO or if eating less than 50% of meals; agree with teams adjustment. Pt instructed to report to staff if not eating  - Continue with mod dose correctional scale TID with meals and QHS  -Will follow  -Please notify endo team with any changes on steroid doses  Discharge planning:   - Home DM medications: metformin 500 mg BID, + glimepride 1 mg daily + lantus 35 units QHS+ admelog 25 units TIDAC  - Discharge DM medications:   - Continue with metformin 500 mg BID  - STOP glimepride due to recurrent hypoglycemia at home   - Basal/bolus, dose will depend on insulin requirement and steroid plan   - Make sure pt has Rx for all DM supplies and insulin/ DM meds.  - Can follow at endo practice. [address] [place] [address] [address]. Phone . Call for apt closer to discharge- - Patient will need opthalmology and podiatry follow up as outpatient

## 2024-05-01 NOTE — PROGRESS NOTE ADULT - PROBLEM SELECTOR PLAN 2
Hx of PE on CTA 10/29/22 w/ RLL segmental/subsegmental pulmonary embolism  - Repeat CTA 2/28/23 w/o PE. Was at [facility] on Lovenox->Eliquis while pending repeat CTA to r/o PE given new hypoxia. DVT studies at OSH 4/22/24 negative  - Unable to perform repeat CTA given degree of hypoxemia  - continue Eliquis for now

## 2024-05-01 NOTE — PROGRESS NOTE ADULT - SUBJECTIVE AND OBJECTIVE BOX
[facility]-- WOUND TEAM -- FOLLOW UP NOTE  --------------------------------------------------------------------------------    24 hour events/subjective:    Spouse at bedside   Afebrile  Tolerating po w/o n/v  Chart reviewed  Hx of   63-yo F with PMHx of chronic AHRF on 2-3L NC at b/l 2/2 ILD (Followed w/ Dr. Primo Marlow, [facility]), PE on Eliquis, severe pHTN, Cor pulmonale, DM, presented initially with SOB, dry cough, chest pain, LE edema, recent admission to [facility] in March for ILD flare, admitted to [facility]->ICU for AHRF 2/2 ILD flare, transferred to [facility] for lung transplant evaluationPatient is not a candidate for lung transplant 2/2 cardiac and diabetic comorbidities.         Diet:  Diet, Regular:   Consistent Carbohydrate No Snacks (CSTCHO) (04-26-24 @ 15:14)      ROS: General/ Skin/ Msk/ Neuro/ GI see HPI  all other systems negative      ALLERGIES & MEDICATIONS  --------------------------------------------------------------------------------  Allergies    No Known Allergies    Intolerances          STANDING INPATIENT MEDICATIONS    albuterol/ipratropium for Nebulization 3 milliLiter(s) Nebulizer every 6 hours  apixaban 5 milliGRAM(s) Oral two times a day  atorvastatin 40 milliGRAM(s) Oral at bedtime  budesonide 160 MICROgram(s)/formoterol 4.5 MICROgram(s) Inhaler 2 Puff(s) Inhalation two times a day  chlorhexidine 2% Cloths 1 Application(s) Topical daily  clotrimazole/betamethasone Cream 1 Application(s) Topical once  dextrose 10% Bolus 125 milliLiter(s) IV Bolus once  dextrose 5%. 1000 milliLiter(s) IV Continuous <Continuous>  dextrose 5%. 1000 milliLiter(s) IV Continuous <Continuous>  dextrose 50% Injectable 25 Gram(s) IV Push once  dextrose 50% Injectable 12.5 Gram(s) IV Push once  furosemide   Injectable 40 milliGRAM(s) IV Push daily  gabapentin 300 milliGRAM(s) Oral daily  glucagon  Injectable 1 milliGRAM(s) IntraMuscular once  insulin glargine Injectable (LANTUS) 22 Unit(s) SubCutaneous at bedtime  insulin lispro (ADMELOG) corrective regimen sliding scale   SubCutaneous at bedtime  insulin lispro (ADMELOG) corrective regimen sliding scale   SubCutaneous three times a day before meals  insulin lispro Injectable (ADMELOG) 7 Unit(s) SubCutaneous three times a day before meals  methylPREDNISolone sodium succinate Injectable 40 milliGRAM(s) IV Push two times a day  multivitamin 1 Tablet(s) Oral daily  Ofev (Nintedanib) 150 milliGRAM(s) 150 milliGRAM(s) Oral two times a day  pantoprazole    Tablet 40 milliGRAM(s) Oral two times a day  polyethylene glycol 3350 17 Gram(s) Oral two times a day  senna 2 Tablet(s) Oral at bedtime  sildenafil (REVATIO) 10 milliGRAM(s) Oral three times a day  traZODone 100 milliGRAM(s) Oral at bedtime      PRN INPATIENT MEDICATION  acetaminophen     Tablet .. 650 milliGRAM(s) Oral every 6 hours PRN  aluminum hydroxide/magnesium hydroxide/simethicone Suspension 30 milliLiter(s) Oral every 4 hours PRN  cyclobenzaprine 5 milliGRAM(s) Oral three times a day PRN  dextrose Oral Gel 15 Gram(s) Oral once PRN  diazepam  Injectable 2.5 milliGRAM(s) IV Push every 12 hours PRN  melatonin 3 milliGRAM(s) Oral at bedtime PRN        VITALS/PHYSICAL EXAM  --------------------------------------------------------------------------------  T(C): 36.7 (05-01-24 @ 12:04), Max: 37.2 (05-01-24 @ 04:40)  HR: 123 (05-01-24 @ 14:45) (111 - 125)  BP: 135/75 (05-01-24 @ 12:04) (126/81 - 135/82)  RR: 18 (05-01-24 @ 14:45) (18 - 18)  SpO2: 99% (05-01-24 @ 14:45) (97% - 100%)  Wt(kg): --        04-30-24 @ 07:01  -  05-01-24 @ 07:00  --------------------------------------------------------  IN: 1200 mL / OUT: 3100 mL / NET: -1900 mL    05-01-24 @ 07:01  -  05-01-24 @ 16:15  --------------------------------------------------------  IN: 120 mL / OUT: 1200 mL / NET: -1080 mL    HEENT:  sclera clear, mucosa moist, throat clear, trachea midline, neck supple  Respiratory: nonlabored w/ equal chest rise  Gastrointestinal: soft NT/ND   : (+)godwin- urine retention  Neurology:  verbal,  follows commands  Psych: calm/ appropriate  Musculoskeletal:   no deformities/ contractures  Vascular: BLE equally warm no cyanosis, clubbing, edema nor acute ischemia                BLE DP pulses not palpable                    Toes darkened, tender without touch appreciate POD  Skin:  dry, frail       Sacral region, BL buttocks 4.7cm x 5.5cm x 0.1cm  persistently darkened area           TTP, with partial thickness tissue loss there is no blistering, drainage               No odor, no increase in warmth, no induration, fluctuance, nor crepitus        LABS/ CULTURES/ RADIOLOGY:                     10.8   12.05 >-----------<  516      [05-01-24 @ 08:25]              36.6     140  |  99  |  35  ----------------------------<  140      [05-01-24 @ 08:25]  4.1   |  31  |  0.89        Ca     9.0     [05-01-24 @ 08:25]          CK 53      [04-30-24 @ 12:10]        CAPILLARY BLOOD GLUCOSE      POCT Blood Glucose.: 230 mg/dL (01 May 2024 12:34)  POCT Blood Glucose.: 204 mg/dL (01 May 2024 08:14)  POCT Blood Glucose.: 158 mg/dL (30 Apr 2024 21:22)                  Culture - Blood (collected 04-26-24 @ 12:25)  Source: .Blood Blood-Peripheral  Preliminary Report (04-30-24 @ 18:01):    No growth at 4 days    Culture - Blood (collected 04-26-24 @ 12:13)  Source: .Blood Blood-Peripheral  Preliminary Report (04-30-24 @ 18:01):    No growth at 4 days          A1C with Estimated Average Glucose Result: 11.9 % (04-13-24 @ 03:53)

## 2024-05-01 NOTE — PROGRESS NOTE ADULT - SUBJECTIVE AND OBJECTIVE BOX
DIABETES FOLLOW UP NOTE: Saw pt earlier today    Chief Complaint: Endocrine consult requested for management of T2DM    INTERVAL HX: Pt states having on/off SOB> on high flow NC. Reports tolerating POs with BG levels above ac meals after team lowered meal time insulin.  today per BMP. No hypoglycemia      Review of Systems:  General: As above  Cardiovascular: No chest pain, palpitations  Respiratory: No SOB, no cough  GI: No nausea, vomiting, abdominal pain  Endocrine: No polyuria, polydipsia or S&Sx of hypoglycemia    Allergies    No Known Allergies    Intolerances      MEDICATIONS :  atorvastatin 40 milliGRAM(s) Oral at bedtime  insulin glargine Injectable (LANTUS) 27 Unit(s) SubCutaneous at bedtime  insulin lispro (ADMELOG) corrective regimen sliding scale   SubCutaneous three times a day before meals  insulin lispro (ADMELOG) corrective regimen sliding scale   SubCutaneous at bedtime  insulin lispro Injectable (ADMELOG) 9 Unit(s) SubCutaneous three times a day before meals  methylPREDNISolone sodium succinate Injectable 40 milliGRAM(s) IV Push two times a day      PHYSICAL EXAM:  VITALS: T(C): 36.7 (05-01-24 @ 12:04)  T(F): 98.1 (05-01-24 @ 12:04), Max: 99 (05-01-24 @ 04:40)  HR: 123 (05-01-24 @ 14:45) (111 - 125)  BP: 135/75 (05-01-24 @ 12:04) (126/81 - 135/82)  RR:  (18 - 18)  SpO2:  (97% - 100%)  Wt(kg): --  GENERAL: Female laying in bed in NAD  HEENT: High flow NC  Abdomen: Soft, nontender, non distended  Extremities: Warm, no edema in all 4 exts  NEURO: A&O X3    LABS:  POCT Blood Glucose.: 266 mg/dL (05-01-24 @ 16:57)  POCT Blood Glucose.: 230 mg/dL (05-01-24 @ 12:34)  POCT Blood Glucose.: 204 mg/dL (05-01-24 @ 08:14)  POCT Blood Glucose.: 158 mg/dL (04-30-24 @ 21:22)  POCT Blood Glucose.: 345 mg/dL (04-30-24 @ 16:09)  POCT Blood Glucose.: 109 mg/dL (04-30-24 @ 11:20)  POCT Blood Glucose.: 208 mg/dL (04-30-24 @ 08:07)  POCT Blood Glucose.: 247 mg/dL (04-29-24 @ 22:10)  POCT Blood Glucose.: 80 mg/dL (04-29-24 @ 16:49)  POCT Blood Glucose.: 208 mg/dL (04-29-24 @ 11:47)  POCT Blood Glucose.: 330 mg/dL (04-29-24 @ 08:22)  POCT Blood Glucose.: 115 mg/dL (04-28-24 @ 22:25)  POCT Blood Glucose.: 90 mg/dL (04-28-24 @ 21:56)  POCT Blood Glucose.: 73 mg/dL (04-28-24 @ 21:26)                            10.8   12.05 )-----------( 516      ( 01 May 2024 08:25 )             36.6       05-01    140  |  99  |  35<H>  ----------------------------<  140<H>  4.1   |  31  |  0.89    eGFR: 74    Ca    9.0      05-01      A1C with Estimated Average Glucose Result: 11.9 % (04-13-24 @ 03:53)      Estimated Average Glucose: 295 mg/dL (04-13-24 @ 03:53)

## 2024-05-01 NOTE — PROGRESS NOTE ADULT - ATTENDING COMMENTS
Patient seen and examined at bedside.  I agree with the findings and plan as documented in the Resident's note except below.  Ms. Quiñonez is a 63 year old right handed woman with ILD presenting w/ acute on chronic respiratory failure, neurology consulted for subacute presentation of urinary/fecal retention and bilateral lower extremity weakness in the setting of SOB.  Weakness limited due to the SOB and non focal exam including reflexes 2+ and non clonus and -ve Babinski.     MRI brain did not show any acute pathology but consistent with chronic non specific white matter disease.   MRI C spine showed Subtle diffuse central cord T2/FLAIR hyperintensity. No mass effect or cord swelling and or enchantment.   Patient would benefit from LP to rule out treatable etiologies.     Continue medical management, neuro- check and fall precaution.  GI and DVT prophylaxis.  PT and OT evaluation.   I discussed the diagnosis and treatment plan with the patient.  All questions and concerns were addressed. The patient demonstrated good understanding of the treatment plan.  My cumulative time taking care of this patient is 50 minutes  If you have any further questions, please do not hesitate to contact our consult service.  Thank you for allowing us to participate in this patient care. Patient seen and examined at bedside.  I agree with the findings and plan as documented in the Resident's note except below.  Ms. Quiñonez is a 63 year old right handed woman with ILD presenting w/ acute on chronic respiratory failure, neurology consulted for subacute presentation of urinary/fecal retention and bilateral lower extremity weakness in the setting of SOB.  Weakness limited due to the SOB and non focal exam including reflexes 2+ and non clonus and -ve Babinski.     MRI brain did not show any acute pathology but consistent with chronic non specific white matter disease.   MRI C spine showed Subtle diffuse central cord T2/FLAIR hyperintensity. No mass effect or cord swelling and or enchantment.   Patient would benefit from LP to rule out treatable etiologies.     Continue medical management, neuro- check and fall precaution.  GI and DVT prophylaxis.  PT and OT evaluation.   I discussed the diagnosis and treatment plan with the patient.  All questions and concerns were addressed. The patient demonstrated good understanding of the treatment plan.  My cumulative time taking care of this patient is 55 minutes  If you have any further questions, please do not hesitate to contact our consult service.  Thank you for allowing us to participate in this patient care.

## 2024-05-01 NOTE — CONSULT NOTE ADULT - SUBJECTIVE AND OBJECTIVE BOX
Date of Service: 05-01-24 @ 16:24    HPI: 63F hx ILD with advanced fibrosis, PE 2022 on eliquis with severe pHTN and RV failure, who presented to [facility] for SOB, and worsening hypoxemic respiratory failure. Patient was transferred to for lung transplant eval. No longer a candidate at this time as per transplant. Palliative care consulted for assistance with GOC.         PERTINENT PM/SXH:   Diabetes mellitus    Hyperlipidemia    Localized pulmonary fibrosis    Pulmonary embolism    Pulmonary fibrosis      No significant past surgical history    No significant past surgical history    Cataract      FAMILY HISTORY:  Family history of MI (myocardial infarction) (Father)        ITEMS NOT CHECKED ARE NOT PRESENT    SOCIAL HISTORY:   Significant other/partner[ ]  Children[x ]  Christian/Spirituality:  Substance hx:  [ ]   Tobacco hx:  [ ]   Alcohol hx: [ ]   Home Opioid hx:  [ ] I-Stop Reference No:  Living Situation: [x ]Home  [ ]Long term care  [ ]Rehab [ ]Other    ADVANCE DIRECTIVES:    DNR/MOLST  [x ]  Living Will  [ ]   DECISION MAKER(s):  [ ] Health Care Proxy(s)  [ ] Surrogate(s)  [ ] Guardian           Name(s): Phone Number(s):    BASELINE (I)ADL(s) (prior to admission):  [place]: [ ]Total  [x ] Moderate [ ]Dependent    Allergies    No Known Allergies    Intolerances    MEDICATIONS  (STANDING):  albuterol/ipratropium for Nebulization 3 milliLiter(s) Nebulizer every 6 hours  apixaban 5 milliGRAM(s) Oral two times a day  atorvastatin 40 milliGRAM(s) Oral at bedtime  budesonide 160 MICROgram(s)/formoterol 4.5 MICROgram(s) Inhaler 2 Puff(s) Inhalation two times a day  chlorhexidine 2% Cloths 1 Application(s) Topical daily  clotrimazole/betamethasone Cream 1 Application(s) Topical once  dextrose 10% Bolus 125 milliLiter(s) IV Bolus once  dextrose 5%. 1000 milliLiter(s) (100 mL/Hr) IV Continuous <Continuous>  dextrose 5%. 1000 milliLiter(s) (50 mL/Hr) IV Continuous <Continuous>  dextrose 50% Injectable 25 Gram(s) IV Push once  dextrose 50% Injectable 12.5 Gram(s) IV Push once  furosemide   Injectable 40 milliGRAM(s) IV Push daily  gabapentin 300 milliGRAM(s) Oral daily  glucagon  Injectable 1 milliGRAM(s) IntraMuscular once  insulin glargine Injectable (LANTUS) 22 Unit(s) SubCutaneous at bedtime  insulin lispro (ADMELOG) corrective regimen sliding scale   SubCutaneous at bedtime  insulin lispro (ADMELOG) corrective regimen sliding scale   SubCutaneous three times a day before meals  insulin lispro Injectable (ADMELOG) 7 Unit(s) SubCutaneous three times a day before meals  methylPREDNISolone sodium succinate Injectable 40 milliGRAM(s) IV Push two times a day  multivitamin 1 Tablet(s) Oral daily  Ofev (Nintedanib) 150 milliGRAM(s) 150 milliGRAM(s) Oral two times a day  pantoprazole    Tablet 40 milliGRAM(s) Oral two times a day  polyethylene glycol 3350 17 Gram(s) Oral two times a day  senna 2 Tablet(s) Oral at bedtime  sildenafil (REVATIO) 10 milliGRAM(s) Oral three times a day  traZODone 100 milliGRAM(s) Oral at bedtime    MEDICATIONS  (PRN):  acetaminophen     Tablet .. 650 milliGRAM(s) Oral every 6 hours PRN Temp greater or equal to 38C (100.4F), Mild Pain (1 - 3)  aluminum hydroxide/magnesium hydroxide/simethicone Suspension 30 milliLiter(s) Oral every 4 hours PRN Dyspepsia  cyclobenzaprine 5 milliGRAM(s) Oral three times a day PRN Muscle Spasm  dextrose Oral Gel 15 Gram(s) Oral once PRN Blood Glucose LESS THAN 70 milliGRAM(s)/deciliter  diazepam  Injectable 2.5 milliGRAM(s) IV Push every 12 hours PRN anxiety  melatonin 3 milliGRAM(s) Oral at bedtime PRN Insomnia    PRESENT SYMPTOMS: [ ]Unable to self-report see CPOT, PAINADs, RDOS  Source if other than patient:  [ ]Family   [ ]Team     Pain: [ ]yes [ x]no  QOL impact -   Location -                    Aggravating factors -  Quality -  Radiation -  Timing-  Severity (0-10 scale):  Minimal acceptable level (0-10 scale):       Dyspnea:                           [ ]Mild [x ]Moderate [ ]Severe  Anxiety:                             [ ]Mild [ ]Moderate [ ]Severe  Fatigue:                             [ ]Mild [ ]Moderate [ ]Severe  Nausea:                             [ ]Mild [ ]Moderate [ ]Severe  Loss of appetite:              [ ]Mild [ ]Moderate [ ]Severe  Constipation:                    [ ]Mild [ ]Moderate [ ]Severe    PCSSQ [Palliative Care Spiritual Screening Question]   Severity (0-10):  Score of 4 or > indicate consideration of Chaplaincy referral.  Chaplaincy Referral: [ ] yes [x ] refused [ ] following    Caregiver [place]? : [x ] yes [ ] no [ ] deferred:  Social work referral [ x] Patient & Family Centered Care Referral [ ]     Anticipatory Grief Present?: [x ] yes [ ] no  [ ] deferred: Palliative Social work referral [ x]  Patient & Family Centered Care Referral [ ]       Other Symptoms:  [ x]All other review of systems negative   [ ] Unable to obtain due to poor mentation    PHYSICAL EXAM:  Vital Signs Last 24 Hrs  T(C): 36.7 (01 May 2024 12:04), Max: 37.2 (01 May 2024 04:40)  T(F): 98.1 (01 May 2024 12:04), Max: 99 (01 May 2024 04:40)  HR: 123 (01 May 2024 14:45) (111 - 125)  BP: 135/75 (01 May 2024 12:04) (126/81 - 135/82)  BP(mean): --  RR: 18 (01 May 2024 14:45) (18 - 18)  SpO2: 99% (01 May 2024 14:45) (97% - 100%)    Parameters below as of 01 May 2024 14:45  Patient On (Oxygen Delivery Method): nasal cannula, high flow  O2 Flow (L/min): 40  O2 Concentration (%): 40 I&O's Summary    30 Apr 2024 07:01  -  01 May 2024 07:00  --------------------------------------------------------  IN: 1200 mL / OUT: 3100 mL / NET: -1900 mL    01 May 2024 07:01  -  01 May 2024 16:24  --------------------------------------------------------  IN: 120 mL / OUT: 1200 mL / NET: -1080 mL        GENERAL:  [x]Alert  [x]Oriented x 3  [ ]Lethargic  [ ]Cachexia  [ ]Unarousable  [x]Verbal  [ ]Non-Verbal  Behavioral:   [ ]Anxiety  [ ]Delirium [ ]Agitation [ ]Other  HEENT:  [x]Normal   [ ]Dry mouth   [ ]ET Tube/Trach  [ ]Oral lesions  PULMONARY:   [x]Clear [ x]Tachypnea  [ ]Audible excessive secretions   [ ]Rhonchi        [ ]Right [ ]Left [ ]Bilateral  [ ]Crackles        [ ]Right [ ]Left [ ]Bilateral  [ ]Wheezing     [ ]Right [ ]Left [ ]Bilateral  [ ]Diminished BS [ ] Right [ ]Left [ ]Bilateral  CARDIOVASCULAR:    [x]Regular [ ]Irregular [ ]Tachy  [ ]Jared [ ]Murmur [ ]Other  GASTROINTESTINAL:  [x]Soft  [ ]Distended   [x]+BS  [x]Non tender [ ]Tender  [ ]PEG [ ]OGT/ NGT   Last BM:    GENITOURINARY:  [x]Normal [ ]Incontinent   [ ]Oliguria/Anuria   [ ][place]  MUSCULOSKELETAL:   [ ]Normal   [x]Weakness  [ ]Bed/Wheelchair bound [ ]Edema  NEUROLOGIC:   [x]No focal deficits  [ ] Cognitive impairment  [ ] Dysphagia [ ]Dysarthria [ ] Paresis [ ]Other   SKIN:   [x]Normal  [ ]Rash   [ ]Pressure ulcer(s) [ ]y [ ]n present on admission    CRITICAL CARE:  [ ] Shock Present  [ ]Septic [ ]Cardiogenic [ ]Neurologic [ ]Hypovolemic  [ ]  Vasopressors [ ]  Inotropes   [ ]Respiratory failure present [ ]Mechanical ventilation [ ]Non-invasive ventilatory support [ ]High flow    [ ]Acute  [ ]Chronic [ ]Hypoxic  [ ]Hypercarbic [ ]Other  [ ]Other organ failure     LABS:                        10.8   12.05 )-----------( 516      ( 01 May 2024 08:25 )             36.6   05-01    140  |  99  |  35<H>  ----------------------------<  140<H>  4.1   |  31  |  0.89    Ca    9.0      01 May 2024 08:25        Urinalysis Basic - ( 01 May 2024 08:25 )    Color: x / Appearance: x / SG: x / pH: x  Gluc: 140 mg/dL / Ketone: x  / Bili: x / Urobili: x   Blood: x / Protein: x / Nitrite: x   Leuk Esterase: x / RBC: x / WBC x   Sq Epi: x / Non Sq Epi: x / Bacteria: x      RADIOLOGY & ADDITIONAL STUDIES:  < from: Xray Chest 1 View- PORTABLE-Urgent (Xray Chest 1 View- PORTABLE-Urgent .) (04.26.24 @ 14:22) >  ACC: [id number] EXAM:  XR CHEST PORTABLE URGENT 1V   ORDERED BY: RAMONE PICKETT     PROCEDURE DATE:  04/26/2024          INTERPRETATION:  CLINICAL INFORMATION: Leukocytosis.    TECHNIQUE: Frontal radiograph of the chest.    COMPARISON: Chest radiograph 4/24/2024.    FINDINGS:    Similar-appearing bilateral reticular opacities, consistent with known   interstitial lung disease. No new focal consolidation.  No pleural effusion or pneumothorax.  There is limited evaluation of the heart size and mediastinum on portable   technique.  No acute abnormality within visible osseous structures.      IMPRESSION:    Similar-appearing bilateral reticular opacities, consistent with known   interstitial lung disease. No new focal consolidation.    --- Endof Report ---           NILTON MULLER MD; Resident Radiologist  This document has been electronically signed.  GIANNA JENKINS MD; Attending Interventional Radiologist  This document has been electronically signed. Apr 27 2024  9:23AM    < end of copied text >    PROTEIN CALORIE MALNUTRITION PRESENT: [ ]mild [ ]moderate [ ]severe [ ]underweight [ ]morbid obesity  https://www.andeal.org/vault/2440/web/files/ONC/Table_Clinical%20Characteristics%20to%20Document%20Malnutrition-White%20JV%20et%20al%202012.pdf    Height (cm): 167.6 (04-25-24 @ 23:50), 167.6 (04-11-24 @ 21:44)  Weight (kg): 56.6 (04-25-24 @ 23:50), 54.7 (04-12-24 @ 04:13)  BMI (kg/m2): 20.1 (04-25-24 @ 23:50), 19.5 (04-12-24 @ 04:13)    [x ]PPSV2 < or = to 30% [ ]significant weight loss  [ ]poor nutritional intake  [ ]anasarca[ ]Artificial Nutrition      Other REFERRALS:  [ ]Hospice  [ ]Child Life  [ ]Social Work  [ ]Case management [ ]Holistic Therapy     Care Coordination Assessment 201 [C. Provider] (04-27-24 @ 13:46)      Palliative Performance Scale:  http://npcrc.org/files/news/palliative_performance_scale_ppsv2.pdf  (Ctrl +  left click to view)  Respiratory Distress Observation Tool:  https://homecareinformation.net/handouts/hen/Respiratory_Distress_Observation_Scale.pdf (Ctrl +  left click to view)  PAINAD Score:  http://geriatrictoolkit.[place].[facility]/cog/painad.pdf (Ctrl +  left click to view)

## 2024-05-01 NOTE — PROGRESS NOTE ADULT - SUBJECTIVE AND OBJECTIVE BOX
Neurology Progress Note    SUBJECTIVE/OBJECTIVE/INTERVAL EVENTS: Patient seen and examined at bedside w/ neuro attending and team.     INTERVAL HISTORY: No acute overnight events reported.     REVIEW OF SYSTEMS: Otherwise denies new fever, new headaches, new vision changes,  new nausea,  newfocal weakness, new focal numbness, new parasthesias, new bowel/ bladder incontinence.     VITALS & EXAMINATION:  Vital Signs Last 24 Hrs  T(C): 36.7 (01 May 2024 12:04), Max: 37.2 (01 May 2024 04:40)  T(F): 98.1 (01 May 2024 12:04), Max: 99 (01 May 2024 04:40)  HR: 125 (01 May 2024 12:04) (111 - 125)  BP: 135/75 (01 May 2024 12:04) (126/81 - 137/91)  BP(mean): --  RR: 18 (01 May 2024 12:04) (18 - 18)  SpO2: 98% (01 May 2024 12:04) (97% - 100%)    Parameters below as of 01 May 2024 12:04  Patient On (Oxygen Delivery Method): nasal cannula, high flow  O2 Flow (L/min): 50  O2 Concentration (%): 55    Constitutional: well-developed, not in acute distress.   Cardiovascular: no swelling, warm-to-touch    Neurological Examination:    - Mental Status: Eyes open, attends to examiner; oriented to person, aware of relevant past medical history.     - Cranial Nerves:  II: blind in L eye, full visual field in R eye to finger counting. No pupillary reaction to light in L eye.  III, IV, VI: Extraocular movements are grossly intact without nystagmus  V: Facial sensation is intact in the V1-V3 distribution bilaterally  VII: Face is symmetric with normal eye closure and smile  VIII: Hearing is intact to conversation  IX, X: Uvula is midline    XI: Shoulder shrug intact  XII: Tongue protrudes in the midline    - Motor/Strength Testing:  - No drifts x 4 extremities  - There is no pronator drift.   - Normal muscle bulk and tone throughout.    - Reflexes:   Bicep (C5/C6):                  R 2+ --- L 2+   Brachioradialis (C5/C6) :   R 2+ --- L 2+   Patella (L3/L4) :                 R 2+ --- L 2+   Ankle (S1) :                       R 2+ --- L 2+   - Plantar responses mute bilaterally.    - Sensory: Intact throughout to light touch x 4 extremities.   - Coordination: Finger-nose-finger intact without  dysmetria.    - Gait: did not assess.       LABORATORY:  CBC                       10.8   12.05 )-----------( 516      ( 01 May 2024 08:25 )             36.6     Chem 05-01    140  |  99  |  35<H>  ----------------------------<  140<H>  4.1   |  31  |  0.89    Ca    9.0      01 May 2024 08:25      LFTs   Coagulopathy   Lipid Panel   A1c   Cardiac enzymes CARDIAC MARKERS ( 30 Apr 2024 12:10 )  x     / x     / 53 U/L / x     / x          U/A Urinalysis Basic - ( 01 May 2024 08:25 )    Color: x / Appearance: x / SG: x / pH: x  Gluc: 140 mg/dL / Ketone: x  / Bili: x / Urobili: x   Blood: x / Protein: x / Nitrite: x   Leuk Esterase: x / RBC: x / WBC x   Sq Epi: x / Non Sq Epi: x / Bacteria: x      CSF  Immunological  Other    STUDIES & IMAGING: (EEG, CT, MR, U/S, TTE/TIMOTHY):    MR BRAIN WAW  04/30/2024  Chronic left corona radiata lacunar infarctions. No abnormal leptomeningeal or parenchymal enhancement.  There is no abnormal restricted diffusion to suggest acute infarction. Scattered periventricular and subcortical white matter T2 /FLAIR hyperintensities are seen without mass effect, nonspecific, likely representing moderate chronic microvascular changes. Normal T2 flow-voids are seen within the intracranial vasculature. The lateral ventricles and cortical sulci are age-appropriate in size and configuration. There is no mass, mass effect, or extra-axial fluid collection. There is no susceptibility artifact to suggest hemorrhage. Midline structures are normal. The patient is status post bilateral ocular lens replacement surgery. The visualized paranasal sinuses, mastoid air cells and orbits are unremarkable.    Mild motion artifact limits interpretation.  No abnormal enhancement. Mild disc desiccation at C5/C6. Mild diffuse volume loss of the cervical and upper thoracic spinal cord. Subtle diffuse central cord T2/FLAIR hyperintensity. No mass effect or cord swelling. Differential diagnosis includes infection, inflammation, autoimmune, vascular, and demyelinating disease etiologies.  Unremarkable T1 marrow signal. No evidence of cord compression. No bone marrow edema. There is no evidence for acute fracture. A normal lordosis is noted. Craniocervical junction is normal. The cervicovertebral body heights and remaining intervertebral disc spaces are preserved. There is no prevertebral soft tissue abnormality.    Evaluation of the individual levels:  C2/C3 level: No spinal canal stenosis or foraminal narrowing.  C3/C4 level: Mild left-sided foraminal narrowing. No spinal canal stenosis or right-sided foraminal narrowing.  C4/C5 level: Broad-based displays complex causing mild right-sided foraminal narrowing. No spinal canal stenosis or left-sided foraminal narrowing  C5/C6 level: Left-sided discharge complex. Moderate right-sided foraminal narrowing. No spinal canal stenosis or left-sided foraminal narrowing.  C6/C7 level: Broad-based disc bulge complex. Mild bilateral foraminal narrowing. No spinal canal stenosis  C7/T1 level: Mild right-sided foraminal narrowing. No spinal canal stenosis or left-sided foraminal narrowing.    MRI BRAIN: No acute infarction.    MRI CERVICAL SPINE: Mild diffuse volume loss of the cervical and upper thoracic spinal cord. Subtle diffuse central cord T2/FLAIR hyperintensity. No mass effect or cord swelling. No abnormal enhancement.    MR SPINE LUMBAR WAW IC 04/30/2024  Disc space narrowing at L5-S1 with type II Modic endplate degenerative changes.  No abnormal enhancement.  The conus is normal in size, position, and signal characteristics, ending at L2-L3.  L4-L5: Disc bulge and bilateral facet/ligamentous hypertrophy. Moderate thecal sac compression. Mild to moderate left and mild right neural foraminal narrowing.  L5-S1 disc bulge and bilateral facet/ligamentous hypertrophy. No mass effect upon the thecal sac or descending bilateral S1 nerve roots in the lateral recesses. Moderate right and mild left neural foraminal narrowing.  Remaining visualized spinal levels demonstrate no significant spinal canal stenosis or neural foraminal narrowing.    IMPRESSION:  Vertebral body height, alignment, and marrow signal homogeneity maintained.  No abnormal enhancement.  Degenerative changes as detailed in the body the report.  L4-L5 moderate thecal sac compression and mild to moderate left neural foraminal narrowing.  L5-S1 moderate right neural foraminal narrowing.

## 2024-05-01 NOTE — CONSULT NOTE ADULT - CONSULT REASON
Wound Consult
Lung transplant evaluation.
lower extremity weakness
diabetes management
GOC
ILD
Lung transplant eval, CTS

## 2024-05-02 NOTE — PROGRESS NOTE ADULT - ASSESSMENT
63 F w/h/o uncontrolled T2DM (A1C 11.9%) while on Lantus,admelog,  metformin, glimepiride. Unknown DM complications. Also h/o AHRF, ILD, PE, HTN. Here with SOB secondary to interstital lung disease. Patient currently on solumedrol 30 mg BID.     Endocrinology consulted for diabetes management. Tolerating POs with BG levels above goal (200s) both fasting and postprandial. Insulin doses will be adjusted.

## 2024-05-02 NOTE — PROGRESS NOTE ADULT - SUBJECTIVE AND OBJECTIVE BOX
Neurology Progress Note    SUBJECTIVE/OBJECTIVE/INTERVAL EVENTS: Patient seen and examined at bedside w/ neuro attending and team.     INTERVAL HISTORY:  No acute overnight events reported.     REVIEW OF SYSTEMS: Otherwise denies new fever, new headaches, new vision changes,  new nausea,  newfocal weakness, new focal numbness, new parasthesias, new bowel/ bladder incontinence.     VITALS & EXAMINATION:  Vital Signs Last 24 Hrs  T(C): 36.7 (02 May 2024 11:47), Max: 36.9 (01 May 2024 20:42)  T(F): 98 (02 May 2024 11:47), Max: 98.4 (01 May 2024 20:42)  HR: 108 (02 May 2024 11:47) (108 - 125)  BP: 125/85 (02 May 2024 11:47) (125/85 - 150/90)  BP(mean): --  RR: 18 (02 May 2024 11:47) (18 - 20)  SpO2: 100% (02 May 2024 11:47) (93% - 100%)    Parameters below as of 02 May 2024 11:47  Patient On (Oxygen Delivery Method): nasal cannula, high flow  O2 Flow (L/min): 40  O2 Concentration (%): 40    General:      Constitutional: well-developed, not in acute distress.   Cardiovascular: no swelling, warm-to-touch    Neurological Examination:    - Mental Status: Eyes open, attends to examiner; oriented to person, aware of relevant past medical history.     - Cranial Nerves:  II: blind in L eye, full visual field in R eye to finger counting. No pupillary reaction to light in L eye.  III, IV, VI: Extraocular movements are grossly intact without nystagmus  V: Facial sensation is intact in the V1-V3 distribution bilaterally  VII: Face is symmetric with normal eye closure and smile  VIII: Hearing is intact to conversation  IX, X: Uvula is midline    XI: Shoulder shrug intact  XII: Tongue protrudes in the midline    - Motor/Strength Testing:  - No drifts x 4 extremities  - There is no pronator drift.   - Normal muscle bulk and tone throughout.    - Reflexes:   Bicep (C5/C6):                  R 2+ --- L 2+   Brachioradialis (C5/C6) :   R 2+ --- L 2+   Patella (L3/L4) :                 R 2+ --- L 2+   Ankle (S1) :                       R 2+ --- L 2+   - Plantar responses mute bilaterally.    - Sensory: Intact throughout to light touch x 4 extremities.   - Coordination: Finger-nose-finger intact without  dysmetria.    - Gait: did not assess.           LABORATORY:  CBC                       10.1   10.08 )-----------( 460      ( 02 May 2024 06:03 )             32.9     Chem 05-01    140  |  99  |  35<H>  ----------------------------<  140<H>  4.1   |  31  |  0.89    Ca    9.0      01 May 2024 08:25      LFTs   Coagulopathy PT/INR - ( 02 May 2024 10:48 )   PT: 10.8 sec;   INR: 1.03 ratio         PTT - ( 02 May 2024 10:48 )  PTT:26.0 sec  Lipid Panel   A1c   Cardiac enzymes CARDIAC MARKERS ( 02 May 2024 10:48 )  x     / x     / 45 U/L / x     / x      CARDIAC MARKERS ( 30 Apr 2024 12:10 )  x     / x     / 53 U/L / x     / x          U/A Urinalysis Basic - ( 01 May 2024 08:25 )    Color: x / Appearance: x / SG: x / pH: x  Gluc: 140 mg/dL / Ketone: x  / Bili: x / Urobili: x   Blood: x / Protein: x / Nitrite: x   Leuk Esterase: x / RBC: x / WBC x   Sq Epi: x / Non Sq Epi: x / Bacteria: x      CSF  Immunological  Other    STUDIES & IMAGING: (EEG, CT, MR, U/S, TTE/TIMOTHY):      MR BRAIN WAW  04/30/2024  Chronic left corona radiata lacunar infarctions. No abnormal leptomeningeal or parenchymal enhancement.  There is no abnormal restricted diffusion to suggest acute infarction. Scattered periventricular and subcortical white matter T2 /FLAIR hyperintensities are seen without mass effect, nonspecific, likely representing moderate chronic microvascular changes. Normal T2 flow-voids are seen within the intracranial vasculature. The lateral ventricles and cortical sulci are age-appropriate in size and configuration. There is no mass, mass effect, or extra-axial fluid collection. There is no susceptibility artifact to suggest hemorrhage. Midline structures are normal. The patient is status post bilateral ocular lens replacement surgery. The visualized paranasal sinuses, mastoid air cells and orbits are unremarkable.    Mild motion artifact limits interpretation.  No abnormal enhancement. Mild disc desiccation at C5/C6. Mild diffuse volume loss of the cervical and upper thoracic spinal cord. Subtle diffuse central cord T2/FLAIR hyperintensity. No mass effect or cord swelling. Differential diagnosis includes infection, inflammation, autoimmune, vascular, and demyelinating disease etiologies.  Unremarkable T1 marrow signal. No evidence of cord compression. No bone marrow edema. There is no evidence for acute fracture. A normal lordosis is noted. Craniocervical junction is normal. The cervicovertebral body heights and remaining intervertebral disc spaces are preserved. There is no prevertebral soft tissue abnormality.    Evaluation of the individual levels:  C2/C3 level: No spinal canal stenosis or foraminal narrowing.  C3/C4 level: Mild left-sided foraminal narrowing. No spinal canal stenosis or right-sided foraminal narrowing.  C4/C5 level: Broad-based displays complex causing mild right-sided foraminal narrowing. No spinal canal stenosis or left-sided foraminal narrowing  C5/C6 level: Left-sided discharge complex. Moderate right-sided foraminal narrowing. No spinal canal stenosis or left-sided foraminal narrowing.  C6/C7 level: Broad-based disc bulge complex. Mild bilateral foraminal narrowing. No spinal canal stenosis  C7/T1 level: Mild right-sided foraminal narrowing. No spinal canal stenosis or left-sided foraminal narrowing.    MRI BRAIN: No acute infarction.    MRI CERVICAL SPINE: Mild diffuse volume loss of the cervical and upper thoracic spinal cord. Subtle diffuse central cord T2/FLAIR hyperintensity. No mass effect or cord swelling. No abnormal enhancement.    MR SPINE LUMBAR WAW IC 04/30/2024  Disc space narrowing at L5-S1 with type II Modic endplate degenerative changes.  No abnormal enhancement.  The conus is normal in size, position, and signal characteristics, ending at L2-L3.  L4-L5: Disc bulge and bilateral facet/ligamentous hypertrophy. Moderate thecal sac compression. Mild to moderate left and mild right neural foraminal narrowing.  L5-S1 disc bulge and bilateral facet/ligamentous hypertrophy. No mass effect upon the thecal sac or descending bilateral S1 nerve roots in the lateral recesses. Moderate right and mild left neural foraminal narrowing.  Remaining visualized spinal levels demonstrate no significant spinal canal stenosis or neural foraminal narrowing.    IMPRESSION:  Vertebral body height, alignment, and marrow signal homogeneity maintained.  No abnormal enhancement.  Degenerative changes as detailed in the body the report.  L4-L5 moderate thecal sac compression and mild to moderate left neural foraminal narrowing.  L5-S1 moderate right neural foraminal narrowing.        MR BRAIN WAW IC 04/30/2024 Chronic left corona radiata lacunar infarctions. No abnormal leptomeningeal or parenchymal enhancement. There is no abnormal restricted diffusion to suggest acute infarction.   MRI C spine:  Mild disc desiccation at C5/C6. Mild diffuse volume loss of the cervical and upper thoracic spinal cord. Subtle diffuse central cord T2/FLAIR hyperintensity. No mass effect or cord swelling. Differential diagnosis includes infection, inflammation, autoimmune, vascular, and demyelinating disease etiologies. Unremarkable T1 marrow signal. No evidence of cord compression.  No abnormal enhancement.  MR SPINE LUMBAR WAW IC    Disc space narrowing at L5-S1 with type II Modic endplate degenerative changes. No abnormal enhancement. The conus is normal in size, position, and signal characteristics, ending at L2-L3. 4-L5 moderate thecal sac compression and mild to moderate left neural foraminal narrowing.

## 2024-05-02 NOTE — PROGRESS NOTE ADULT - PROBLEM SELECTOR PLAN 1
- Please monitor blood glucose values TID AC & QHS while eating regular meals and Q6H while NPO  -Increase insulin Glargine to 24 units QHS  -Continue  insulin Lispro to 12 units TID with meals as it was just increased yesterday, , hold if NPO or if eating less than 50% of meals; Pt instructed to report to staff if not eating  - Continue with mod dose correctional scale TID with meals and QHS  -Please notify endo team with any changes on steroid doses  Discharge planning:   - Home DM medications: metformin 500 mg BID, + glimepride 1 mg daily + lantus 35 units QHS+ admelog 25 units TIDAC  - Discharge DM medications:   - Continue with metformin 500 mg BID  - STOP glimepride due to recurrent hypoglycemia at home   - Basal/bolus, dose will depend on insulin requirement and steroid plan   - Make sure pt has Rx for all DM supplies and insulin/ DM meds.  - Can follow at endo practice. [address] [place] [address] [address]. Phone . Call for apt closer to discharge- - Patient will need opthalmology and podiatry follow up as outpatient

## 2024-05-02 NOTE — CHART NOTE - NSCHARTNOTEFT_GEN_A_CORE
Attempted to meet with patient with palliative social work team. Patient not amenable to a visit today. Was amenable to f/u tomorrow. Can be reached by TEAMS M-F 9-5 Nargis Sorensen Any other time please page [phone] if needed

## 2024-05-02 NOTE — PROGRESS NOTE ADULT - PROBLEM SELECTOR PLAN 3
- Pt w/ hx of severe pHTN, RVSP 68 on 10/2022  - Repeat 4/12/24 at [facility] noted PAP 28, "normal PAP"   - Maintain euvolemia, as above

## 2024-05-02 NOTE — PROGRESS NOTE ADULT - ASSESSMENT
63-yo F with PMHx of chronic AHRF on 2-3L NC at b/l 2/2 ILD (Followed w/ Dr. Primo Marlow, [facility]), PE on Eliquis, severe pHTN, Cor pulmonale, DM, presented initially with SOB, dry cough, chest pain, LE edema, [facility], admitted to outside hospital->ICU for AHRF 2/2 ILD flare, transferred to [facility] for lung transplant evaluation.

## 2024-05-02 NOTE — PROGRESS NOTE ADULT - SUBJECTIVE AND OBJECTIVE BOX
seen earlier today     Chief Complaint: Diabetes Mellitus follow up    INTERVAL HX:  Patient continues on High flow oxygen.  She states she is eating 1/2 to all of the food on her tray depending on taste.   her BG values continue to be hyperglycemic both fasting and postprandial. She continues on steroids.     Review of Systems:  General: As above  GI: No nausea, vomiting  Endocrine: no  S&Sx of hypoglycemia    Allergies    No Known Allergies    Intolerances      MEDICATIONS  (STANDING):  albuterol/ipratropium for Nebulization 3 milliLiter(s) Nebulizer every 6 hours  atorvastatin 40 milliGRAM(s) Oral at bedtime  budesonide 160 MICROgram(s)/formoterol 4.5 MICROgram(s) Inhaler 2 Puff(s) Inhalation two times a day  chlorhexidine 2% Cloths 1 Application(s) Topical daily  dextrose 10% Bolus 125 milliLiter(s) IV Bolus once  dextrose 5%. 1000 milliLiter(s) (100 mL/Hr) IV Continuous <Continuous>  dextrose 5%. 1000 milliLiter(s) (50 mL/Hr) IV Continuous <Continuous>  dextrose 50% Injectable 12.5 Gram(s) IV Push once  dextrose 50% Injectable 25 Gram(s) IV Push once  furosemide   Injectable 40 milliGRAM(s) IV Push daily  gabapentin 300 milliGRAM(s) Oral daily  glucagon  Injectable 1 milliGRAM(s) IntraMuscular once  insulin glargine Injectable (LANTUS) 24 Unit(s) SubCutaneous at bedtime  insulin lispro (ADMELOG) corrective regimen sliding scale   SubCutaneous three times a day before meals  insulin lispro (ADMELOG) corrective regimen sliding scale   SubCutaneous at bedtime  insulin lispro Injectable (ADMELOG) 12 Unit(s) SubCutaneous three times a day before meals  methylPREDNISolone sodium succinate Injectable 30 milliGRAM(s) IV Push two times a day  multivitamin 1 Tablet(s) Oral daily  Ofev (Nintedanib) 150 milliGRAM(s) 150 milliGRAM(s) Oral two times a day  pantoprazole    Tablet 40 milliGRAM(s) Oral two times a day  polyethylene glycol 3350 17 Gram(s) Oral two times a day  senna 2 Tablet(s) Oral at bedtime  sildenafil (REVATIO) 10 milliGRAM(s) Oral three times a day  traZODone 100 milliGRAM(s) Oral at bedtime      atorvastatin   40 milliGRAM(s) Oral (05-01-24 @ 22:02)    insulin glargine Injectable (LANTUS)   22 Unit(s) SubCutaneous (05-01-24 @ 23:07)    insulin lispro (ADMELOG) corrective regimen sliding scale   4 Unit(s) SubCutaneous (05-02-24 @ 09:10)   6 Unit(s) SubCutaneous (05-01-24 @ 17:11)    insulin lispro (ADMELOG) corrective regimen sliding scale   2 Unit(s) SubCutaneous (05-01-24 @ 23:12)    insulin lispro Injectable (ADMELOG)   9 Unit(s) SubCutaneous (05-01-24 @ 17:24)    insulin lispro Injectable (ADMELOG)   12 Unit(s) SubCutaneous (05-02-24 @ 12:20)   12 Unit(s) SubCutaneous (05-02-24 @ 09:10)    methylPREDNISolone sodium succinate Injectable   40 milliGRAM(s) IV Push (05-02-24 @ 06:02)   40 milliGRAM(s) IV Push (05-01-24 @ 17:12)        PHYSICAL EXAM:  VITALS: T(C): 36.7 (05-02-24 @ 11:47)  T(F): 98 (05-02-24 @ 11:47), Max: 98.4 (05-01-24 @ 20:42)  HR: 108 (05-02-24 @ 11:47) (108 - 123)  BP: 125/85 (05-02-24 @ 11:47) (125/85 - 150/90)  RR:  (18 - 20)  SpO2:  (93% - 100%)  Wt(kg): --  GENERAL: NAD  Respiratory: Respirations unlabored   Extremities: Warm, no edema  NEURO: Alert , appropriate     LABS:  POCT Blood Glucose.: 141 mg/dL (05-02-24 @ 11:42)  POCT Blood Glucose.: 234 mg/dL (05-02-24 @ 08:26)  POCT Blood Glucose.: 275 mg/dL (05-01-24 @ 23:05)  POCT Blood Glucose.: 175 mg/dL (05-01-24 @ 21:37)  POCT Blood Glucose.: 266 mg/dL (05-01-24 @ 16:57)  POCT Blood Glucose.: 230 mg/dL (05-01-24 @ 12:34)  POCT Blood Glucose.: 204 mg/dL (05-01-24 @ 08:14)  POCT Blood Glucose.: 158 mg/dL (04-30-24 @ 21:22)  POCT Blood Glucose.: 345 mg/dL (04-30-24 @ 16:09)  POCT Blood Glucose.: 109 mg/dL (04-30-24 @ 11:20)  POCT Blood Glucose.: 208 mg/dL (04-30-24 @ 08:07)  POCT Blood Glucose.: 247 mg/dL (04-29-24 @ 22:10)  POCT Blood Glucose.: 80 mg/dL (04-29-24 @ 16:49)                          10.1   10.08 )-----------( 460      ( 02 May 2024 06:03 )             32.9     05-01    140  |  99  |  35<H>  ----------------------------<  140<H>  4.1   |  31  |  0.89    Ca    9.0      01 May 2024 08:25          Thyroid Function Tests:      A1C with Estimated Average Glucose Result: 11.9 % (04-13-24 @ 03:53)    Estimated Average Glucose: 295 mg/dL (04-13-24 @ 03:53)        Diet, Regular:   Consistent Carbohydrate No Snacks (CSTCHO) (04-26-24 @ 15:14) [Active]

## 2024-05-02 NOTE — PROGRESS NOTE ADULT - SUBJECTIVE AND OBJECTIVE BOX
SUBJECTIVE / OVERNIGHT EVENTS:  Today is hospital day 7d. There are no new issues or overnight events.   Did not endorse any headache, lightheadedness, vertigo, shortness of breathe, cough, chest pain, palpitations, tachycardia, abdominal pain, nausea, vomiting, diarrhea or constipation currently    HPI:  63-yo F with PMHx of chronic AHRF on 2-3L NC at b/l 2/2 ILD (Followed w/ Dr. Primo Marlow, [facility]), PE on Eliquis, severe pHTN, Cor pulmonale, DM, presented initially with SOB, dry cough, chest pain, LE edema, recent admission to [facility] in March for ILD flare, admitted to [facility]->ICU for AHRF 2/2 ILD flare, transferred to [facility] for lung transplant evaluation. Pt reporting mild SOB, no fever, no sputum, no SOB, no chest pain, no edema. Reports constipation, last BM 2 days ago, with some stomach pain.     At [facility], she was treated w/ IV steroids->PO taper, duonebs, symbicort, lasix. Pike placed for urinary retention, taken out prior to arrival although unclear if passed TOV at OSH. Had been weaned to NC while but was complicated by increased WOB, placed back on HFNC 50/60. Most recently escalated to methylprednisolone 40 mg IV BID. Was being treated w/ Ofev 150 mg BID, daily diuresis w/ Lasix 40->20 mg IVP on 4/25 based on volume assessment, 4/24 CXR w/ c/f R>L patchy opacities c/f pulmonary edema. Upon transfer here, admission vitals notable for temp afebrile, , /79, HFNC similar settings 45/60 100%. Labs notable for downtrending white count, stable anemia to 9-10, stable thrombocytosis to 400's. ABG 7.48 / 37 / 71 / 28, lactate 1.2.      Of note, pt is DNR/DNI trial of NIPPV - confirmed this with the patient and completed MOLST, in chart.  (26 Apr 2024 01:17)    MEDICATIONS  (STANDING):  albuterol/ipratropium for Nebulization 3 milliLiter(s) Nebulizer every 6 hours  atorvastatin 40 milliGRAM(s) Oral at bedtime  budesonide 160 MICROgram(s)/formoterol 4.5 MICROgram(s) Inhaler 2 Puff(s) Inhalation two times a day  chlorhexidine 2% Cloths 1 Application(s) Topical daily  dextrose 10% Bolus 125 milliLiter(s) IV Bolus once  dextrose 5%. 1000 milliLiter(s) (100 mL/Hr) IV Continuous <Continuous>  dextrose 5%. 1000 milliLiter(s) (50 mL/Hr) IV Continuous <Continuous>  dextrose 50% Injectable 12.5 Gram(s) IV Push once  dextrose 50% Injectable 25 Gram(s) IV Push once  furosemide   Injectable 40 milliGRAM(s) IV Push daily  gabapentin 300 milliGRAM(s) Oral daily  glucagon  Injectable 1 milliGRAM(s) IntraMuscular once  insulin glargine Injectable (LANTUS) 24 Unit(s) SubCutaneous at bedtime  insulin lispro (ADMELOG) corrective regimen sliding scale   SubCutaneous three times a day before meals  insulin lispro (ADMELOG) corrective regimen sliding scale   SubCutaneous at bedtime  insulin lispro Injectable (ADMELOG) 12 Unit(s) SubCutaneous three times a day before meals  methylPREDNISolone sodium succinate Injectable 30 milliGRAM(s) IV Push two times a day  multivitamin 1 Tablet(s) Oral daily  Ofev (Nintedanib) 150 milliGRAM(s) 150 milliGRAM(s) Oral two times a day  pantoprazole    Tablet 40 milliGRAM(s) Oral two times a day  polyethylene glycol 3350 17 Gram(s) Oral two times a day  senna 2 Tablet(s) Oral at bedtime  sildenafil (REVATIO) 10 milliGRAM(s) Oral three times a day  traZODone 100 milliGRAM(s) Oral at bedtime    MEDICATIONS  (PRN):  acetaminophen     Tablet .. 650 milliGRAM(s) Oral every 6 hours PRN Temp greater or equal to 38C (100.4F), Mild Pain (1 - 3)  aluminum hydroxide/magnesium hydroxide/simethicone Suspension 30 milliLiter(s) Oral every 4 hours PRN Dyspepsia  cyclobenzaprine 5 milliGRAM(s) Oral three times a day PRN Muscle Spasm  dextrose Oral Gel 15 Gram(s) Oral once PRN Blood Glucose LESS THAN 70 milliGRAM(s)/deciliter  diazepam  Injectable 2.5 milliGRAM(s) IV Push every 12 hours PRN anxiety  melatonin 3 milliGRAM(s) Oral at bedtime PRN Insomnia    HOME MEDICATIONS:  acetaminophen 325 mg oral tablet: 2 tab(s) orally every 6 hours As needed Mild Pain (1 - 3)  Admelog 100 units/mL injectable solution: 15 unit(s) injectable 3 times a day (before meals)  apixaban 5 mg oral tablet: 1 tab(s) orally once a day  atorvastatin 40 mg oral tablet: 1 tab(s) orally once a day  cyclobenzaprine 5 mg oral tablet: 1 tab(s) orally 3 times a day as needed for Muscle Spasm  gabapentin 300 mg oral capsule: 1 cap(s) orally once a day  insulin glargine 100 units/mL subcutaneous solution: 25 unit(s) subcutaneous once a day (at bedtime)  melatonin 3 mg oral tablet: 1 tab(s) orally once a day (at bedtime)  Ofev 150 mg oral capsule: 1 cap(s) orally every 12 hours  pantoprazole 40 mg oral delayed release tablet: 1 tab(s) orally once a day (before a meal)  traZODone 100 mg oral tablet: 1 tab(s) orally once a day (at bedtime)    PHYSICAL EXAM  Vital Signs Last 24 Hrs  T(C): 36.7 (02 May 2024 11:47), Max: 36.9 (01 May 2024 20:42)  T(F): 98 (02 May 2024 11:47), Max: 98.4 (01 May 2024 20:42)  HR: 112 (02 May 2024 15:19) (108 - 118)  BP: 125/85 (02 May 2024 11:47) (125/85 - 150/90)  BP(mean): --  RR: 18 (02 May 2024 15:19) (18 - 20)  SpO2: 98% (02 May 2024 15:19) (93% - 100%)    Parameters below as of 02 May 2024 15:19  Patient On (Oxygen Delivery Method): nasal cannula, high flow  O2 Flow (L/min): 40  O2 Concentration (%): 40    05-01-24 @ 07:01  -  05-02-24 @ 07:00  --------------------------------------------------------  IN: 120 mL / OUT: 2100 mL / NET: -1980 mL    05-02-24 @ 07:01  -  05-02-24 @ 16:13  --------------------------------------------------------  IN: 240 mL / OUT: 1200 mL / NET: -960 mL      CONSTITUTIONAL: Well-groomed, in no apparent distress;  EYES: No conjunctival or scleral injection, non-icteric;  ENMT: No external nasal lesions; Normal outer ears;  NECK: Trachea midline;  RESPIRATORY: Normal respiratory effort; Decreased breathe sounds bilaterally without wheeze/rhonchi/rales;  CARDIOVASCULAR: Regular rate and rhythm;  GASTROINTESTINAL: Non-distended; No palpable masses; No rebound/guarding;  EXTREMITIES:  No lower extremity edema;  NEUROLOGY: A+O to person, place, and time; Does respond to commands appropriately;  PSYCHIATRY: Mood and Affect appropriate    LABS:                        10.1   10.08 )-----------( 460      ( 02 May 2024 06:03 )             32.9     05-01    140  |  99  |  35<H>  ----------------------------<  140<H>  4.1   |  31  |  0.89    Ca    9.0      01 May 2024 08:25      PT/INR - ( 02 May 2024 10:48 )   PT: 10.8 sec;   INR: 1.03 ratio         PTT - ( 02 May 2024 10:48 )  PTT:26.0 sec  CARDIAC MARKERS ( 02 May 2024 10:48 )  x     / x     / 45 U/L / x     / x          Urinalysis Basic - ( 01 May 2024 08:25 )    Color: x / Appearance: x / SG: x / pH: x  Gluc: 140 mg/dL / Ketone: x  / Bili: x / Urobili: x   Blood: x / Protein: x / Nitrite: x   Leuk Esterase: x / RBC: x / WBC x   Sq Epi: x / Non Sq Epi: x / Bacteria: x        SARS-CoV-2: NotDetec (11 Apr 2024 22:46)      RADIOLOGY & ADDITIONAL TESTS:  EKG  12 Lead ECG:   Ventricular Rate 108 BPM    Atrial Rate 108 BPM    P-R Interval 132 ms    QRS Duration 68 ms    Q-T Interval 326 ms    QTC Calculation(Bazett) 436 ms    P Axis 27 degrees    R Axis -39 degrees    T Axis 12 degrees    Diagnosis Line SINUS TACHYCARDIA WITH FUSION COMPLEXES  LEFT AXIS DEVIATION  CANNOT RULE OUT ANTERIOR INFARCT  ABNORMAL ECG  WHEN COMPARED WITH ECG OF  04-  Confirmed by MD MYERS JONATHAN ([id number]) on 4/27/2024 5:25:32 PM (04-26-24 @ 11:09)  12 Lead ECG:   Ventricular Rate 117 BPM    Atrial Rate 117 BPM    P-R Interval 140 ms    QRS Duration 70 ms    Q-T Interval 302 ms    QTC Calculation(Bazett) 421 ms    P Axis 33 degrees    R Axis -40 degrees    T Axis 10 degrees    Diagnosis Line SINUS TACHYCARDIA  LEFT AXIS DEVIATION  POSSIBLE ANTEROLATERAL INFARCT , AGE UNDETERMINED  ABNORMAL ECG  NO PREVIOUS ECGS AVAILABLE  Confirmed by MD Rodriguez Ronald ([id number]) on 4/29/2024 12:43:02 PM (04-26-24 @ 05:05)    MR Head w/wo IV Cont:   ACC: [id number] EXAM:  MR BRAIN WAW IC   ORDERED BY: ELEN WELCH     ACC: [id number] EXAM:  MR SPINE CERVICAL WAW IC   ORDERED BY: ELEN WELCH     PROCEDURE DATE:  04/30/2024          INTERPRETATION:  CLINICAL INDICATIONS: weakness    COMPARISON: Head CT dated 1/14/2019    TECHNIQUE: MRI brain: Multiplanar, multisequence MR imaging of the brain   are obtained with and without the administration of 6 cc intravenous   Gadavist contrast. 1 cc of contrast was discarded.    FINDINGS:  Chronic left corona radiata lacunar infarctions. No abnormal   leptomeningeal or parenchymal enhancement.  There is no abnormal restricted diffusion to suggest acute infarction.   Scattered periventricular and subcortical white matter T2 /FLAIR   hyperintensities areseen without mass effect, nonspecific, likely   representing moderate chronic microvascular changes. Normal T2 flow-voids   are seen within  the intracranial vasculature. The lateral ventricles and   cortical sulci are age-appropriate in size and configuration. There is no   mass, mass effect, or extra-axial fluid collection. There is no   susceptibility artifact to suggest hemorrhage. Midline structures are   normal.  The patient is status post bilateral ocular lens replacement   surgery. The visualized paranasal sinuses, mastoid air cells and orbits   are unremarkable.      ==============    CLINICAL HISTORY: weakness    COMPARISON: None.    TECHNIQUE: Cervical spine MRI: Multiplanar, multisequence MR images of   the cervical spine are obtained with and without the administration of 6   cc intravenous Gadavist contrast. 1 cc of contrast was discarded.    FINDINGS:  Mild motion artifact limits interpretation.  No abnormal enhancement. Mild disc desiccation at C5/C6. Mild diffuse   volumeloss of the cervical and upper thoracic spinal cord. Subtle   diffuse central cord T2/FLAIR hyperintensity. No mass effect or cord   swelling. Differential diagnosis includes infection, inflammation,   autoimmune, vascular, and demyelinating disease etiologies.  Unremarkable T1 marrow signal. No evidence of cord compression. No bone   marrow edema. There is no evidence for acute fracture. A normal lordosis   is noted. Craniocervical junction is normal. The cervicovertebral body   heights and remaining intervertebral disc spaces are preserved. There is   no prevertebral soft tissue abnormality.      Evaluation of the individual levels:  C2/C3 level: No spinal canal stenosis or foraminal narrowing.  C3/C4 level: Mild left-sided foraminal narrowing. No spinal canal   stenosis or right-sided foraminal narrowing.  C4/C5 level: Broad-based displays complex causing mild right-sided   foraminal narrowing. No spinal canal stenosis or left-sided foraminal   narrowing  C5/C6 level: Left-sided discharge complex. Moderate right-sided foraminal   narrowing. No spinal canal stenosis or left-sided foraminal narrowing.  C6/C7 level: Broad-based disc bulge complex. Mild bilateral foraminal   narrowing. No spinal canal stenosis  C7/T1 level:  Mild right-sided foraminal narrowing. No spinal canal   stenosis or left-sided foraminal narrowing.      ==============      IMPRESSION:    MRI BRAIN: No acute infarction.    MRI CERVICAL SPINE: Mild diffuse volume loss of the cervical and upper   thoracic spinal cord. Subtle diffuse central cord T2/FLAIR   hyperintensity. No mass effect or cord swelling. No abnormal enhancement.    --- End of Report ---            MARISOL CORDOVA MD; Attending Radiologist  This document has been electronically signed. Apr 30 2024  7:53PM (04-30-24 @ 18:59)  Xray Chest 1 View- PORTABLE-Urgent:   ACC: [id number] EXAM:  XR CHEST PORTABLE URGENT 1V   ORDERED BY: RAMONE PICKETT     PROCEDURE DATE:  04/26/2024          INTERPRETATION:  CLINICAL INFORMATION: Leukocytosis.    TECHNIQUE: Frontal radiograph of the chest.    COMPARISON: Chest radiograph 4/24/2024.    FINDINGS:    Similar-appearing bilateral reticular opacities, consistent with known   interstitial lung disease. No new focal consolidation.  No pleural effusion or pneumothorax.  There is limited evaluation of the heart size and mediastinum on portable   technique.  No acute abnormality within visible osseous structures.      IMPRESSION:    Similar-appearing bilateral reticular opacities, consistent with known   interstitial lung disease. No new focal consolidation.    --- Endof Report ---           NILTON MULLER MD; Resident Radiologist  This document has been electronically signed.  GIANNA JENKINS MD; Attending Interventional Radiologist  This document has been electronically signed. Apr 27 2024  9:23AM (04-26-24 @ 14:22)  Xray Chest 1 View- PORTABLE-Urgent:   ACC: [id number] EXAM:  XR CHEST PORTABLE URGENT 1V   ORDERED BY: JAY HEWITT     PROCEDURE DATE:  04/24/2024          INTERPRETATION:  Portable AP chest radiograph    COMPARISON: 4/17/2024 chest x-ray and CT chest 4/12/2024.  .    CLINICAL INFORMATION: Dyspnea. Increasing shortness of breath. History of   interstitial lung disease..    FINDINGS:  CATHETERS AND TUBES: None    PULMONARY: Diffuse interstitial idiopathic pulmonary fibrosis.   Superimposed infectious process cannot be excluded.    No pneumothorax..    HEART/VASCULAR: The  heart is enlarged in transverse diameter. .    BONES: The visualized osseous thorax is intact.    IMPRESSION:    Idiopathic pulmonary fibrosis.  No significant change from prior exams.  Superimposed infectiousprocess cannot be excluded.    --- End of Report ---            GATITO NEWELL MD; Attending Radiologist  This document has been electronically signed. Apr 25 2024  2:41PM (04-24-24 @ 09:55)

## 2024-05-02 NOTE — PROGRESS NOTE ADULT - PROBLEM SELECTOR PLAN 8
T2DM: Home DM medications: metformin 500 mg BID, + glimepride 1 mg daily + lantus 35 units QHS+ admelog 25 units TIDAC  - Pt w/ hx of uncontrolled T2DM, a1c 11.9. At home, Basaglar 25 U QHS + Admelog 15U TID. Endocrine was consulted at [facility] prior to transfer. Was on  Lantus 20 + Admelog 14u TID + Mod SSI  - Endocrine consulted via email, recs appreciated  - Continue glargine 22 units qhs, lispro 7u TID qac, hold if NPO or eating < 50% of meals  - MISS FS tid qac qhs  - Discharge DM medications:   - Continue with metformin 500 mg BID  - STOP glimepride due to recurrent hypoglycemia at home   - Basal/bolus, dose will depend on insulin requirement and steroid plan   - Patient will follow up at  [facility]:  [address] [place] [address]. [address]. [place] [address]  Tel: [phone]

## 2024-05-02 NOTE — PROGRESS NOTE ADULT - PROBLEM SELECTOR PLAN 1
Baseline 2-3L NC. Hx of ILD w/ c/f IPF. Followed w/ DOMENICO Ding. On HFNC 50/60 when leaving [facility], now on bipap prn for wob. Received 4 doses of Levaquin last dose 4/13 at [facility].  - Transferred to [facility] for lung transplant eval  - Maintain euvolemia, I's and O's, daily weights.  - Transplant pulm consult appreciated  - Pulmonary consult  - Continue home Ofev 150mg BID  - Geovanni Loyacort  - Continue Methylprednisolone 40 mg IVP BID - while on steroids, Pantoprazole 40 mg QD  - pulm recs appreciated: consider adding NO to high flow, repeat serologies for scleroderma, sjogrens, RF, myomarker (cannot be done on floor, will need ICU consult if decompensates)  - repeat TTE with bubble study - severe RH dysfunction with elevated pulmonary pressure, grade ii diastolic dysfunction (?from RH failure), normal LVSF  - diuresis - lasix 40 iv  - Zosyn empirically 4/26 - 4/27, no evidence of PNA, can hold off on abx  - valium 2.5 mg q12 PRN anxiety as she becomes very tachypneic  - not a transplant candidate 4/30  - f/u repeat TTE for RV function

## 2024-05-02 NOTE — PROGRESS NOTE ADULT - ASSESSMENT
63-yo F with PMHx of chronic AHRF on 2-3L NC at b/l 2/2 ILD (Followed w/ Dr. Primo Marlow, [facility]), PE on Eliquis, severe pHTN, Cor pulmonale, DM, admitted for acute on chronic respiratory failure, neurology consulted for bilateral lower extremity weakness, urinary/fecal retention. Patient currently ill appearing w/ significant shortness of breath limiting history and exam. Patient exam significant for proximal lower extremity weakness bilaterally which seems effort dependent (patient tires out due to respiratory condition). Otherwise her exam shows intact sensation and intact reflexes in the lower extremities. Presentation of bilateral lower extremity weakness w/ urinary/fecal incontinence would suggest a lumbar myelopathy, however exam is less concerning. Time course of symptoms is unusual, as she presented w/ urinary retention prior to onset of leg weakness and fecal retention. Suspect that current presentation is likely non-neurologic in origin given exam findings. Other less likely etiologies on differential include cord compression, and NMJ disease.     premRS: 0  LKN: >24 hours  NIHSS: currently 0     MRI brain did not show any acute pathology but consistent with chronic non specific white matter disease. MRI C spine showed Subtle diffuse central cord T2/FLAIR hyperintensity. No mass effect or cord swelling and or enchantment.     Impression: 63y F w/ ILD presenting w/ acute on chronic respiratory failure, neurology consulted for subacute presentation of urinary/fecal retention and bilateral lower extremity weakness in the setting of respiratory illness. 5/1: patient seems to be doing well today in terms of neuro exam    Recommend:  [x] MRI brain, C/T/L spine w/ and w/o contrast  [x] CK 53, Vitamin B12 942, folate 10.4  [ ] serum ACHr-ab, serum anti-MuSK ab  [ ] patient hesitant to undergo LP after risk benefit discussion at bedside with team.   Can continue eliquis if medically indicated; please reach out to neuro team if patient decides to pursue LP.   [ ] Lipid panel, HbA1c   [ ] Atorvastatin 80mg (titrate to LDL < 70)   [ ] Neurological checks and vital signs per unit protocol  [ ] BGM goals 140-180  [ ] PT/OT; S/S evaluation  [ ] Follow up with vascular neurology after discharge for incidental chronic infarcts on imaging.   [] GOC discussions per primary team  [] GI and DVT prophylaxis per primary team      Patient seen on AM neurology rounds  * Case and plan not final until Attending attestation *

## 2024-05-02 NOTE — PROGRESS NOTE ADULT - PROBLEM SELECTOR PLAN 5
- Pt was seeing Dr. Chua, urologist. At [facility] had failed TOV w/ godwin replaced. Arrives to [facility] w/o Godwin. Pt on outpt med list listed Tamsulosin 0.4 mg, unclear if started at [facility], pt saying she does not take this outpatient.   - q8h Bladder scans, place godwin if 3 straight caths

## 2024-05-02 NOTE — PROGRESS NOTE ADULT - ATTENDING COMMENTS
63F hx ILD with advanced fibrosis, PE 2022 on eliquis with severe pHTN and RV failure, who presented to [facility] for SOB, and worsening hypoxemic respiratory failure. Patient was transferred to for lung transplant eval.     Patient with negative CTrILD outpatient, repeat here NTD. TTE at [facility] with normal RV function but here with Severe pHTN and RV dysfunction. Also noted to have PFO with possible shunting.     Patient currently not a candidate for transplant.     # ILD with adv fibrosis  # Severe pHTN with RV failure  # Hx of PE  # Acute on chronic hypoxemic respiratory failure  - Multifactorial etiology for respiratory failure, Adv ILD with fibrosis, RV failure, PHTN with possible shunting.   - Patient evaluated by lung transplant team. Not a good candidate.   - CTrILD work up negative in the past, negative here to date.  - Continue with diuresis, C/W HFNC and wean as tolerated. Now down to 40/40, can attempt to change to NC at 5-6 L.   - Can start sildenafil 10mg TID. Will consider upstarting if patient tolerates. Given Left sided dysfunction will need to maintain euvolemia on sildenafil.  - Would check CTA of chest to assess for possible PE given worsening RV failure. Patient tolerated MRI with NRB.  - Will start to taper steroids as not likely helping, Can decrease to 30mg BID today. Will taper off.   - C/W full A/C  - s/p course of abx, continue to monitor.  - Overall patient with poor prognosis given adv fibrosis seen on imaging, o2 requirement, severe pHTN and RV failure ashley if patient is not a good transplant candidate. Was previously offered 2 years ago but declined at that time. Will need ongoing GOC with patient and family. Palliative eval ongoing.  - Pulmonary will follow.

## 2024-05-02 NOTE — PROGRESS NOTE ADULT - PROBLEM SELECTOR PLAN 2
Hx of PE on CTA 10/29/22 w/ RLL segmental/subsegmental pulmonary embolism  - Repeat CTA 2/28/23 w/o PE. Was at [facility] on Lovenox->Eliquis while pending repeat CTA to r/o PE given new hypoxia. DVT studies at OSH 4/22/24 negative  - Unable to perform repeat CTA given degree of hypoxemia  - continue Eliquis for now

## 2024-05-02 NOTE — PROGRESS NOTE ADULT - ASSESSMENT
63F hx ILD/ probable IPF (On 2-3LNC at home), PE 2022 on eliquis, severe pHTN w/ prior cor pulmonale (RVSP 68 - 10/22 w/ TTE from 4/12 w/ PASP 28, normal LV/RV SF), IDDM, presenting as a transfer from [place] for lung transplant eval iso SOB, dry cough, chest pain. She has been receiving duonebs, symbicort, lasix, ofev and IV steroids. Continuing with AC. Solu-medrol is ordered for 40 IV BID.  CT Chest 4/12/2024: Findings suggesting interstitial lung disease without significant interval progression. No evidence of pneumonia.   CT scan from 4/12/2024 when compared with CT scan from 2020 has shown significant progression.      Recommendations  tolerating HFNC, continue weaning patient  - Trial of NC today 5/2  Continue with Symbicort, duonebs, Ofev, Eliquis,   Adjust solu-medrol to Solu-medrol 30 IV BID- will maintain dose for 3 days then consider decreasing to solumedrol 20 BID  Continue with diuresis - goal net neg 1L    Can continue to use benzo prn for anxiety  TTE results appreciated - significantly different PASP on TTE now compared to prior TTE as well as not previously seen PFO  Repeat TTE  Please obtain CTPA - She can be on nrb/venturi mask to receive scan  fu sputum cx  sildenafil 10mg TID - Side effects may include hypotension, nausea, light-headedness, let us know if patient experiences these symptoms  Patient is endorsing supra-pubic abdominal pain, please evaluate

## 2024-05-02 NOTE — PROGRESS NOTE ADULT - SUBJECTIVE AND OBJECTIVE BOX
Patient is a 63y old  Female who presents with a chief complaint of SOB (01 May 2024 20:04)      INTERVAL HPI/OVERNIGHT EVENTS: No acute events overnight. Pt seen and examined at bedside.  Patient denies any complaints overnight. The patient denies any fevers, chills, nausea, vomiting, or increased pain.  Tolerating wean of HFNC while on sildenafil    MEDICATIONS  (STANDING):  albuterol/ipratropium for Nebulization 3 milliLiter(s) Nebulizer every 6 hours  atorvastatin 40 milliGRAM(s) Oral at bedtime  budesonide 160 MICROgram(s)/formoterol 4.5 MICROgram(s) Inhaler 2 Puff(s) Inhalation two times a day  chlorhexidine 2% Cloths 1 Application(s) Topical daily  dextrose 10% Bolus 125 milliLiter(s) IV Bolus once  dextrose 5%. 1000 milliLiter(s) (100 mL/Hr) IV Continuous <Continuous>  dextrose 5%. 1000 milliLiter(s) (50 mL/Hr) IV Continuous <Continuous>  dextrose 50% Injectable 12.5 Gram(s) IV Push once  dextrose 50% Injectable 25 Gram(s) IV Push once  furosemide   Injectable 40 milliGRAM(s) IV Push daily  gabapentin 300 milliGRAM(s) Oral daily  glucagon  Injectable 1 milliGRAM(s) IntraMuscular once  insulin glargine Injectable (LANTUS) 22 Unit(s) SubCutaneous at bedtime  insulin lispro (ADMELOG) corrective regimen sliding scale   SubCutaneous three times a day before meals  insulin lispro (ADMELOG) corrective regimen sliding scale   SubCutaneous at bedtime  insulin lispro Injectable (ADMELOG) 12 Unit(s) SubCutaneous three times a day before meals  methylPREDNISolone sodium succinate Injectable 30 milliGRAM(s) IV Push two times a day  multivitamin 1 Tablet(s) Oral daily  Ofev (Nintedanib) 150 milliGRAM(s) 150 milliGRAM(s) Oral two times a day  pantoprazole    Tablet 40 milliGRAM(s) Oral two times a day  polyethylene glycol 3350 17 Gram(s) Oral two times a day  senna 2 Tablet(s) Oral at bedtime  sildenafil (REVATIO) 10 milliGRAM(s) Oral three times a day  traZODone 100 milliGRAM(s) Oral at bedtime      MEDICATIONS  (PRN):  acetaminophen     Tablet .. 650 milliGRAM(s) Oral every 6 hours PRN Temp greater or equal to 38C (100.4F), Mild Pain (1 - 3)  aluminum hydroxide/magnesium hydroxide/simethicone Suspension 30 milliLiter(s) Oral every 4 hours PRN Dyspepsia  cyclobenzaprine 5 milliGRAM(s) Oral three times a day PRN Muscle Spasm  dextrose Oral Gel 15 Gram(s) Oral once PRN Blood Glucose LESS THAN 70 milliGRAM(s)/deciliter  diazepam  Injectable 2.5 milliGRAM(s) IV Push every 12 hours PRN anxiety  melatonin 3 milliGRAM(s) Oral at bedtime PRN Insomnia      Allergies    No Known Allergies    Intolerances        PAST MEDICAL & SURGICAL HISTORY:  Diabetes mellitus      Hyperlipidemia      Pulmonary embolism      Pulmonary fibrosis      Cataract  right eye          Vital Signs Last 24 Hrs  T(C): 36.9 (02 May 2024 04:13), Max: 36.9 (01 May 2024 20:42)  T(F): 98.4 (02 May 2024 04:13), Max: 98.4 (01 May 2024 20:42)  HR: 117 (02 May 2024 08:34) (114 - 125)  BP: 148/88 (02 May 2024 04:13) (135/75 - 150/90)  BP(mean): --  RR: 18 (02 May 2024 08:34) (18 - 20)  SpO2: 96% (02 May 2024 08:34) (93% - 99%)    Parameters below as of 02 May 2024 08:34  Patient On (Oxygen Delivery Method): nasal cannula, high flow, Temp @31  O2 Flow (L/min): 40  O2 Concentration (%): 40    General: WN/WD NAD  Neurology: A&Ox3, nonfocal, PADILLA x 4  Eyes: PERRLA/ EOMI, Gross vision intact  ENT/Neck: Neck supple, trachea midline, No JVD, Gross hearing intact  Respiratory: CTA B/L, No wheezing, rales, rhonchi  CV: RRR, +S1/S2, -S3/S4, no murmurs, rubs or gallops  Abdominal: Soft, NT, ND +BS, No HSM  MSK: 5/5 strength UE/LE bilaterally  Extremities: No edema, 2+ peripheral pulses  Skin: No Rashes, Hematoma, Ecchymosis  Incisions:   Tubes:    LABS:                        10.1   10.08 )-----------( 460      ( 02 May 2024 06:03 )             32.9     05-01    140  |  99  |  35<H>  ----------------------------<  140<H>  4.1   |  31  |  0.89    Ca    9.0      01 May 2024 08:25        Urinalysis Basic - ( 01 May 2024 08:25 )    Color: x / Appearance: x / SG: x / pH: x  Gluc: 140 mg/dL / Ketone: x  / Bili: x / Urobili: x   Blood: x / Protein: x / Nitrite: x   Leuk Esterase: x / RBC: x / WBC x   Sq Epi: x / Non Sq Epi: x / Bacteria: x        CARDIAC MARKERS ( 30 Apr 2024 12:10 )  x     / x     / 53 U/L / x     / x                    MICROBIOLOGY:      RADIOLOGY & ADDITIONAL STUDIES:

## 2024-05-02 NOTE — PROGRESS NOTE ADULT - ATTENDING COMMENTS
Patient seen and examined at bedside. Patient reported feeling fine and no new neurologic symptoms reported.     I agree with the findings and plan as documented in the Resident's note except below.  Ms. Quiñonez is a 63 year old right handed woman with ILD presenting w/ acute on chronic respiratory failure, neurology consulted for subacute presentation of urinary/fecal retention and bilateral lower extremity weakness in the setting of SOB.  Weakness limited due to the SOB and non focal exam including reflexes 2+ and no clonus and -ve Babinski. Clinically patient remains stable.   I discussed with patient regarding LP and she will think about it.     MRI brain did not show any acute pathology but consistent with chronic non specific white matter disease.   MRI C spine showed Subtle diffuse central cord T2/FLAIR hyperintensity. No mass effect or cord swelling and or enchantment.   Patient would benefit from LP to rule out treatable etiologies.     Continue medical management, neuro- check and fall precaution.  GI and DVT prophylaxis.  PT and OT evaluation.   I discussed the diagnosis and treatment plan with the patient.  All questions and concerns were addressed. The patient demonstrated good understanding of the treatment plan.  My cumulative time taking care of this patient is 40 minutes  If you have any further questions, please do not hesitate to contact our consult service.  Thank you for allowing us to participate in this patient care.
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
## 2024-05-03 NOTE — PROGRESS NOTE ADULT - SUBJECTIVE AND OBJECTIVE BOX
Patient is a 63y old  Female who presents with a chief complaint of SOB (02 May 2024 13:39)      INTERVAL HPI/OVERNIGHT EVENTS: No acute events overnight. Pt seen and examined at bedside.  Patient denies any complaints overnight. The patient denies any fevers, chills, nausea, vomiting, or increased pain.  Worked with PT yesterday, tolerating sildenafil.     MEDICATIONS  (STANDING):  albuterol/ipratropium for Nebulization 3 milliLiter(s) Nebulizer every 6 hours  apixaban 5 milliGRAM(s) Oral two times a day  atorvastatin 40 milliGRAM(s) Oral at bedtime  budesonide 160 MICROgram(s)/formoterol 4.5 MICROgram(s) Inhaler 2 Puff(s) Inhalation two times a day  chlorhexidine 2% Cloths 1 Application(s) Topical daily  dextrose 10% Bolus 125 milliLiter(s) IV Bolus once  dextrose 5%. 1000 milliLiter(s) (100 mL/Hr) IV Continuous <Continuous>  dextrose 5%. 1000 milliLiter(s) (50 mL/Hr) IV Continuous <Continuous>  dextrose 50% Injectable 12.5 Gram(s) IV Push once  dextrose 50% Injectable 25 Gram(s) IV Push once  furosemide   Injectable 40 milliGRAM(s) IV Push daily  gabapentin 300 milliGRAM(s) Oral daily  glucagon  Injectable 1 milliGRAM(s) IntraMuscular once  insulin glargine Injectable (LANTUS) 24 Unit(s) SubCutaneous at bedtime  insulin lispro (ADMELOG) corrective regimen sliding scale   SubCutaneous three times a day before meals  insulin lispro (ADMELOG) corrective regimen sliding scale   SubCutaneous at bedtime  insulin lispro Injectable (ADMELOG) 12 Unit(s) SubCutaneous three times a day before meals  methylPREDNISolone sodium succinate Injectable 30 milliGRAM(s) IV Push two times a day  multivitamin 1 Tablet(s) Oral daily  Ofev (Nintedanib) 150 milliGRAM(s) 150 milliGRAM(s) Oral two times a day  pantoprazole    Tablet 40 milliGRAM(s) Oral two times a day  polyethylene glycol 3350 17 Gram(s) Oral two times a day  senna 2 Tablet(s) Oral at bedtime  sildenafil (REVATIO) 10 milliGRAM(s) Oral three times a day  traZODone 100 milliGRAM(s) Oral at bedtime      MEDICATIONS  (PRN):  acetaminophen     Tablet .. 650 milliGRAM(s) Oral every 6 hours PRN Temp greater or equal to 38C (100.4F), Mild Pain (1 - 3)  aluminum hydroxide/magnesium hydroxide/simethicone Suspension 30 milliLiter(s) Oral every 4 hours PRN Dyspepsia  cyclobenzaprine 5 milliGRAM(s) Oral three times a day PRN Muscle Spasm  dextrose Oral Gel 15 Gram(s) Oral once PRN Blood Glucose LESS THAN 70 milliGRAM(s)/deciliter  diazepam  Injectable 2.5 milliGRAM(s) IV Push every 12 hours PRN anxiety  melatonin 3 milliGRAM(s) Oral at bedtime PRN Insomnia      Allergies    No Known Allergies    Intolerances        PAST MEDICAL & SURGICAL HISTORY:  Diabetes mellitus      Hyperlipidemia      Pulmonary embolism      Pulmonary fibrosis      Cataract  right eye          Vital Signs Last 24 Hrs  T(C): 36.9 (03 May 2024 05:21), Max: 36.9 (02 May 2024 21:18)  T(F): 98.4 (03 May 2024 05:21), Max: 98.5 (02 May 2024 21:18)  HR: 116 (03 May 2024 09:40) (108 - 120)  BP: 116/71 (03 May 2024 05:21) (116/71 - 157/93)  BP(mean): --  RR: 20 (03 May 2024 09:40) (16 - 21)  SpO2: 94% (03 May 2024 09:40) (94% - 100%)    Parameters below as of 03 May 2024 09:40  Patient On (Oxygen Delivery Method): nasal cannula, high flow  O2 Flow (L/min): 40  O2 Concentration (%): 40    General: NAD  Neurology: A&Ox3, nonfocal, PADILLA x 4  Eyes: PERRLA/ EOMI, Gross vision intact  ENT/Neck: Neck supple, trachea midline, No JVD, Gross hearing intact  Respiratory: fine b/l inspiratory crackles  CV: RRR, +S1/S2, -S3/S4, no murmurs, rubs or gallops  Abdominal: Soft, NT, ND +BS, No HSM  MSK: 5/5 strength UE/LE bilaterally  Extremities: No edema, 2+ peripheral pulses  Skin: No Rashes, Hematoma, Ecchymosis  Incisions:   Tubes:    LABS:                        9.9    9.31  )-----------( 452      ( 03 May 2024 06:24 )             32.6     05-03    138  |  101  |  34<H>  ----------------------------<  247<H>  3.8   |  28  |  0.66    Ca    8.7      03 May 2024 06:24      PT/INR - ( 03 May 2024 06:24 )   PT: 11.8 sec;   INR: 1.13 ratio         PTT - ( 03 May 2024 06:24 )  PTT:25.8 sec  Urinalysis Basic - ( 03 May 2024 06:24 )    Color: x / Appearance: x / SG: x / pH: x  Gluc: 247 mg/dL / Ketone: x  / Bili: x / Urobili: x   Blood: x / Protein: x / Nitrite: x   Leuk Esterase: x / RBC: x / WBC x   Sq Epi: x / Non Sq Epi: x / Bacteria: x        CARDIAC MARKERS ( 02 May 2024 10:48 )  x     / x     / 45 U/L / x     / x                    MICROBIOLOGY:      RADIOLOGY & ADDITIONAL STUDIES:

## 2024-05-03 NOTE — PROGRESS NOTE ADULT - ASSESSMENT
63-yo F with PMHx of chronic AHRF on 2-3L NC at b/l 2/2 ILD (Followed w/ Dr. Primo Marlow, [facility]), PE on Eliquis, severe pHTN, Cor pulmonale, DM, presented initially with SOB, dry cough, chest pain, LE edema, recent admission to [facility] in March for ILD flare, admitted to outside hospital->ICU for AHRF 2/2 ILD flare, transferred to [facility] for lung transplant evaluation.

## 2024-05-03 NOTE — PROGRESS NOTE ADULT - PROBLEM SELECTOR PLAN 1
Baseline 2-3L NC. Hx of ILD w/ c/f IPF. Followed w/ DOMENICO Ding. On HFNC 50/60 when leaving [place], now on bipap prn for wob. Received 4 doses of Levaquin last dose 4/13 at [facility].  - Transferred to [facility] for lung transplant eval  - Maintain euvolemia, I's and O's, daily weights.  - Transplant pulm consult appreciated  - Continue home Ofev 150mg BID  - Duonebs, Symbicort  - Continue Methylprednisolone 40 mg IVP BID - while on steroids, Pantoprazole 40 mg QD  - pulm recs appreciated: consider adding NO to high flow, repeat serologies for scleroderma, sjogrens, RF, myomarker (cannot be done on floor, will need ICU consult if decompensates)  - repeat TTE with bubble study - severe RH dysfunction with elevated pulmonary pressure, grade ii diastolic dysfunction (?from RH failure), normal LVSF  - diuresis - lasix 40 iv  - Zosyn empirically 4/26 - 4/27, no evidence of PNA, can hold off on abx  - valium 2.5 mg q12 PRN anxiety as she becomes very tachypneic  - not a transplant candidate 4/30

## 2024-05-03 NOTE — PROGRESS NOTE ADULT - PROBLEM SELECTOR PLAN 3
- Pt w/ hx of severe pHTN, RVSP 68 on 10/2022  - Repeat 4/12/24 at [facility] noted PAP 28, "normal PAP"   - Maintain euvolemia, as above

## 2024-05-03 NOTE — CHART NOTE - NSCHARTNOTEFT_GEN_A_CORE
Palliative care consulted for GOC. Family not amenable to palliative visit over past several days. See previous GOC note regarding discussion. Can be reached by TEAMS M-F 9-5 Nargis Sorensen Any other time please page [phone] if needed Palliative care consulted for GOC. Patient not amenable to palliative visit over past several days. See previous GOC note regarding discussion. Can be reached by TEAMS M-F 9-5 Nargis Soresnen Any other time please page [phone] if needed

## 2024-05-03 NOTE — CHART NOTE - NSCHARTNOTEFT_GEN_A_CORE
Age: 63y    Gender: Female    POCT Blood Glucose:  101 mg/dL (05-03-24 @ 12:21)  207 mg/dL (05-03-24 @ 07:55)  161 mg/dL (05-02-24 @ 21:30)  144 mg/dL (05-02-24 @ 16:41)      eMAR:atorvastatin   40 milliGRAM(s) Oral (05-02-24 @ 21:48)    insulin glargine Injectable (LANTUS)   24 Unit(s) SubCutaneous (05-02-24 @ 21:47)    insulin lispro (ADMELOG) corrective regimen sliding scale   4 Unit(s) SubCutaneous (05-03-24 @ 08:45)    insulin lispro Injectable (ADMELOG)   12 Unit(s) SubCutaneous (05-03-24 @ 12:43)   12 Unit(s) SubCutaneous (05-03-24 @ 08:46)   12 Unit(s) SubCutaneous (05-02-24 @ 17:08)    methylPREDNISolone sodium succinate Injectable   30 milliGRAM(s) IV Push (05-03-24 @ 05:03)   30 milliGRAM(s) IV Push (05-02-24 @ 17:06)    POC glucose, insulin requirements, lab values reviewed.     Assessment and Plan:   63 F w/h/o uncontrolled T2DM (A1C 11.9%) while on Lantus, admelog,  metformin, glimepiride. Unknown DM complications. Also h/o AHRF, ILD, PE, HTN.   Here with SOB secondary to interstital lung disease.     Endocrinology consulted for diabetes management. Tolerating POs with BG levels mostly at  goal 100-180mg/dl with mildly elevated FBG 202mg/dl. Remains on IVP methylprednisolone 30mg twice daily.      Problem/Plan:    - Problem: Uncontrolled type 2 diabetes mellitus with hyperglycemia, with long-term current use of insulin.    - Plan: - Please monitor blood glucose values TID AC & QHS while eating regular meals and Q6H while NPO  - Increase insulin Glargine to 26 units QHS  - Continue  insulin Lispro to 12 units TID with meals, hold if NPO or if eating less than 50% of meals  - Continue with mod dose correctional scale TID with meals and QHS  - Please notify endo team with any changes on steroid doses  Discharge planning:   - Home DM medications: metformin 500 mg BID, + glimepride 1 mg daily + lantus 35 units QHS+ admelog 25 units TIDAC  - Discharge DM medications:   - Continue with metformin 500 mg BID  - STOP glimepride due to recurrent hypoglycemia at home   - Basal/bolus, dose will depend on insulin requirement and steroid plan   - Make sure pt has Rx for all DM supplies and insulin/ DM meds.  - Can follow at [facility] [address] [place] [address] [address]. Phone . Call for apt closer to discharge-   - Patient will need opthalmology and podiatry follow up as outpatient.    Contact via [employer] Teams during business hours  To reach covering provider access AMION via sunrise tools  For Urgent matters/after-hours/weekends/holidays please page endocrine fellow on call   For nonurgent matters please email [email or web address]    Please note that this patient may be followed by different provider tomorrow.  Notify endocrine 24 hours prior to discharge for final recommendations

## 2024-05-03 NOTE — PROGRESS NOTE ADULT - PROBLEM SELECTOR PLAN 2
Hx of PE on CTA 10/29/22 w/ RLL segmental/subsegmental pulmonary embolism  - Repeat CTA 2/28/23 w/o PE. Was at [facility] on Lovenox->Eliquis while pending repeat CTA to r/o PE given new hypoxia. DVT studies at OSH 4/22/24 negative  - Unable to perform repeat CTA given degree of hypoxemia  - continue Eliquis for now

## 2024-05-03 NOTE — PROGRESS NOTE ADULT - PROBLEM SELECTOR PLAN 5
- Pt was seeing Dr. Chua, urologist. At [facility] had failed TOV w/ godwin replaced. Arrives to [facility] w/o Godwin. Pt on outpt med list listed Tamsulosin 0.4 mg, unclear if started at [facility], pt saying she does not take this outpatient.   - q8h Bladder scans, place godwin if 3 straight caths

## 2024-05-03 NOTE — PROGRESS NOTE ADULT - ATTENDING COMMENTS
63F hx ILD with advanced fibrosis, PE 2022 on eliquis with severe pHTN and RV failure, who presented to [facility] for SOB, and worsening hypoxemic respiratory failure. Patient was transferred to for lung transplant eval.     Patient with negative CTrILD outpatient, repeat here NTD. TTE at [facility] with normal RV function but here with Severe pHTN and RV dysfunction. Also noted to have PFO with possible shunting.     Patient currently not a candidate for transplant.     # ILD with adv fibrosis  # Severe pHTN with RV failure  # Hx of PE  # Acute on chronic hypoxemic respiratory failure  - Multifactorial etiology for respiratory failure, Adv ILD with fibrosis, RV failure, PHTN with possible shunting.   - Patient evaluated by lung transplant team. Not a good candidate.   - CTrILD work up negative in the past, negative here to date.  - Continue with diuresis, C/W HFNC and wean as tolerated. Now down to 40/40, please trial NC at 5-6 L.   - Can increase dose of sildenafil to 20mg TID. Monitor for side effects. Given Left sided dysfunction will need to maintain euvolemia on sildenafil.  - Would check CTA of chest to assess for possible PE given worsening RV failure. Patient tolerated MRI with NRB.  - Will start to taper steroids as not likely helping, Can decrease to 40mg daily on 5/5. Will taper down..   - C/W full A/C  - s/p course of abx, continue to monitor.  - Overall patient with poor prognosis given adv fibrosis seen on imaging, o2 requirement, severe pHTN and RV failure ashley if patient is not a good transplant candidate. Was previously offered 2 years ago but declined at that time. Will need ongoing GOC with patient and family. Palliative eval ongoing.  - Modest improvement in respiratory status likely with improved RA pressures/shunting with diuresis and sildenafil.   - Pulmonary will follow.

## 2024-05-03 NOTE — PROGRESS NOTE ADULT - PROBLEM SELECTOR PLAN 8
T2DM: Home DM medications: metformin 500 mg BID, + glimepride 1 mg daily + lantus 35 units QHS+ admelog 25 units TIDAC  - Pt w/ hx of uncontrolled T2DM, a1c 11.9. At home, Basaglar 25 U QHS + Admelog 15U TID. Endocrine was consulted at [facility] prior to transfer. Was on  Lantus 20 + Admelog 14u TID + Mod SSI  - Endocrine consulted via email, recs appreciated  - Continue glargine 22 units qhs, lispro 7u TID qac, hold if NPO or eating < 50% of meals  - MISS FS tid qac qhs  - Discharge DM medications:   - Continue with metformin 500 mg BID  - STOP glimepride due to recurrent hypoglycemia at home   - Basal/bolus, dose will depend on insulin requirement and steroid plan   - Patient will follow up at  [facility]:  [address] [place] [address]. [address]. [place] [address]  Tel: [phone]

## 2024-05-03 NOTE — PROGRESS NOTE ADULT - ASSESSMENT
63F hx ILD/ probable IPF (On 2-3LNC at home), PE 2022 on eliquis, severe pHTN w/ prior cor pulmonale (RVSP 68 - 10/22 w/ TTE from 4/12 w/ PASP 28, normal LV/RV SF), IDDM, presenting as a transfer from [place] for lung transplant eval iso SOB, dry cough, chest pain. She has been receiving duonebs, symbicort, lasix, ofev and IV steroids. Continuing with AC. Solu-medrol is ordered for 40 IV BID.  CT Chest 4/12/2024: Findings suggesting interstitial lung disease without significant interval progression. No evidence of pneumonia.   CT scan from 4/12/2024 when compared with CT scan from 2020 has shown significant progression.      Recommendations  tolerating HFNC, continue weaning patient  - Please place patient on 5-6LNC today  Continue with Symbicort, duonebs, Ofev, Eliquis,   Solu-medrol 30 IV BID- decrease to solumedrol 40 qD on 5/5  Continue with diuresis - goal net neg 1L    Can continue to use benzo prn for anxiety  TTE results appreciated - PFO + e/o decreased Right ventricular function  Please obtain CTPA - She can be on nrb/venturi mask to receive scan  fu sputum cx  Increase sildenafil to 20mg TID - Side effects may include hypotension, nausea, light-headedness, let us know if patient experiences these symptoms and decrease dosage to sildenafil 10 tid if experiencing any symptoms

## 2024-05-04 NOTE — PROGRESS NOTE ADULT - PROBLEM SELECTOR PLAN 5
- Pt was seeing Dr. Chua, urologist. At [facility] had failed TOV w/ godwin replaced. Arrives to [facility] w/o Godwin. Pt on outpt med list listed Tamsulosin 0.4 mg, unclear if started at [facility], pt saying she does not take this outpatient.

## 2024-05-04 NOTE — PROGRESS NOTE ADULT - PROBLEM SELECTOR PLAN 3
- Pt w/ hx of severe pHTN, RVSP 68 on 10/2022  - Repeat 4/12/24 at [facility] noted PAP 28, "normal PAP"   - Maintain euvolemia, as above  - Increased sildenafil to 20 mg TID

## 2024-05-04 NOTE — PROGRESS NOTE ADULT - SUBJECTIVE AND OBJECTIVE BOX
SUBJECTIVE / OVERNIGHT EVENTS:  Today is hospital day 9d. There are no new issues or overnight events.   Did not endorse any headache, lightheadedness, vertigo, shortness of breathe, cough, chest pain, palpitations, tachycardia, abdominal pain, nausea, vomiting, diarrhea or constipation currently    HPI:  63-yo F with PMHx of chronic AHRF on 2-3L NC at b/l 2/2 ILD (Followed w/ Dr. Primo Marlow, [facility]), PE on Eliquis, severe pHTN, Cor pulmonale, DM, presented initially with SOB, dry cough, chest pain, LE edema, recent admission to [facility] in March for ILD flare, admitted to [facility]->ICU for AHRF 2/2 ILD flare, transferred to [facility] for lung transplant evaluation. Pt reporting mild SOB, no fever, no sputum, no SOB, no chest pain, no edema. Reports constipation, last BM 2 days ago, with some stomach pain.     At [facility] ICU, she was treated w/ IV steroids->PO taper, duonebs, symbicort, lasix. Pike placed for urinary retention, taken out prior to arrival although unclear if passed TOV at OSH. Had been weaned to NC while but was complicated by increased WOB, placed back on HFNC 50/60. Most recently escalated to methylprednisolone 40 mg IV BID. Was being treated w/ Ofev 150 mg BID, daily diuresis w/ Lasix 40->20 mg IVP on 4/25 based on volume assessment, 4/24 CXR w/ c/f R>L patchy opacities c/f pulmonary edema. Upon transfer here, admission vitals notable for temp afebrile, , /79, HFNC similar settings 45/60 100%. Labs notable for downtrending white count, stable anemia to 9-10, stable thrombocytosis to 400's. ABG 7.48 / 37 / 71 / 28, lactate 1.2.      Of note, pt is DNR/DNI trial of NIPPV - confirmed this with the patient and completed MOLST, in chart.  (26 Apr 2024 01:17)    MEDICATIONS  (STANDING):  albuterol/ipratropium for Nebulization 3 milliLiter(s) Nebulizer every 6 hours  apixaban 5 milliGRAM(s) Oral two times a day  atorvastatin 40 milliGRAM(s) Oral at bedtime  budesonide 160 MICROgram(s)/formoterol 4.5 MICROgram(s) Inhaler 2 Puff(s) Inhalation two times a day  chlorhexidine 2% Cloths 1 Application(s) Topical daily  dextrose 10% Bolus 125 milliLiter(s) IV Bolus once  dextrose 5%. 1000 milliLiter(s) (100 mL/Hr) IV Continuous <Continuous>  dextrose 5%. 1000 milliLiter(s) (50 mL/Hr) IV Continuous <Continuous>  dextrose 50% Injectable 25 Gram(s) IV Push once  dextrose 50% Injectable 12.5 Gram(s) IV Push once  furosemide   Injectable 40 milliGRAM(s) IV Push daily  gabapentin 300 milliGRAM(s) Oral daily  glucagon  Injectable 1 milliGRAM(s) IntraMuscular once  insulin glargine Injectable (LANTUS) 26 Unit(s) SubCutaneous at bedtime  insulin lispro (ADMELOG) corrective regimen sliding scale   SubCutaneous three times a day before meals  insulin lispro (ADMELOG) corrective regimen sliding scale   SubCutaneous at bedtime  insulin lispro Injectable (ADMELOG) 12 Unit(s) SubCutaneous three times a day before meals  methylPREDNISolone sodium succinate Injectable 30 milliGRAM(s) IV Push two times a day  multivitamin 1 Tablet(s) Oral daily  Ofev (Nintedanib) 150 milliGRAM(s) 150 milliGRAM(s) Oral two times a day  pantoprazole    Tablet 40 milliGRAM(s) Oral two times a day  sildenafil (REVATIO) 20 milliGRAM(s) Oral every 8 hours  traZODone 100 milliGRAM(s) Oral at bedtime    MEDICATIONS  (PRN):  acetaminophen     Tablet .. 650 milliGRAM(s) Oral every 6 hours PRN Temp greater or equal to 38C (100.4F), Mild Pain (1 - 3)  aluminum hydroxide/magnesium hydroxide/simethicone Suspension 30 milliLiter(s) Oral every 4 hours PRN Dyspepsia  cyclobenzaprine 5 milliGRAM(s) Oral three times a day PRN Muscle Spasm  dextrose Oral Gel 15 Gram(s) Oral once PRN Blood Glucose LESS THAN 70 milliGRAM(s)/deciliter  diazepam  Injectable 2.5 milliGRAM(s) IV Push every 12 hours PRN anxiety  melatonin 3 milliGRAM(s) Oral at bedtime PRN Insomnia    HOME MEDICATIONS:  acetaminophen 325 mg oral tablet: 2 tab(s) orally every 6 hours As needed Mild Pain (1 - 3)  Admelog 100 units/mL injectable solution: 15 unit(s) injectable 3 times a day (before meals)  apixaban 5 mg oral tablet: 1 tab(s) orally once a day  atorvastatin 40 mg oral tablet: 1 tab(s) orally once a day  cyclobenzaprine 5 mg oral tablet: 1 tab(s) orally 3 times a day as needed for Muscle Spasm  gabapentin 300 mg oral capsule: 1 cap(s) orally once a day  insulin glargine 100 units/mL subcutaneous solution: 25 unit(s) subcutaneous once a day (at bedtime)  melatonin 3 mg oral tablet: 1 tab(s) orally once a day (at bedtime)  Ofev 150 mg oral capsule: 1 cap(s) orally every 12 hours  pantoprazole 40 mg oral delayed release tablet: 1 tab(s) orally once a day (before a meal)  traZODone 100 mg oral tablet: 1 tab(s) orally once a day (at bedtime)    PHYSICAL EXAM  Vital Signs Last 24 Hrs  T(C): 36.9 (04 May 2024 12:01), Max: 37.1 (03 May 2024 20:58)  T(F): 98.4 (04 May 2024 12:01), Max: 98.8 (03 May 2024 20:58)  HR: 110 (04 May 2024 12:01) (106 - 120)  BP: 120/75 (04 May 2024 12:01) (120/75 - 133/77)  BP(mean): --  RR: 19 (04 May 2024 12:01) (19 - 20)  SpO2: 99% (04 May 2024 12:01) (93% - 99%)    Parameters below as of 04 May 2024 12:01  Patient On (Oxygen Delivery Method): nasal cannula  O2 Flow (L/min): 6      05-03-24 @ 07:01  -  05-04-24 @ 07:00  --------------------------------------------------------  IN: 720 mL / OUT: 2650 mL / NET: -1930 mL    05-04-24 @ 07:01  -  05-04-24 @ 14:30  --------------------------------------------------------  IN: 0 mL / OUT: 900 mL / NET: -900 mL      CONSTITUTIONAL: Well-groomed, in no apparent distress;  EYES: No conjunctival or scleral injection, non-icteric;  ENMT: No external nasal lesions; Normal outer ears;  NECK: Trachea midline;  RESPIRATORY: Normal respiratory effort; Decreased breathe sounds bilaterally without wheeze/rhonchi/rales;  CARDIOVASCULAR: Regular rate and rhythm;  GASTROINTESTINAL: Non-distended; No palpable masses; No rebound/guarding;  EXTREMITIES:  No lower extremity edema;  NEUROLOGY: A+O to person, place, and time; Does respond to commands appropriately;  PSYCHIATRY: Mood and Affect appropriate    LABS:                        9.9    9.31  )-----------( 452      ( 03 May 2024 06:24 )             32.6     05-03    138  |  101  |  34<H>  ----------------------------<  247<H>  3.8   |  28  |  0.66    Ca    8.7      03 May 2024 06:24      PT/INR - ( 03 May 2024 06:24 )   PT: 11.8 sec;   INR: 1.13 ratio         PTT - ( 03 May 2024 06:24 )  PTT:25.8 sec      Urinalysis Basic - ( 03 May 2024 06:24 )    Color: x / Appearance: x / SG: x / pH: x  Gluc: 247 mg/dL / Ketone: x  / Bili: x / Urobili: x   Blood: x / Protein: x / Nitrite: x   Leuk Esterase: x / RBC: x / WBC x   Sq Epi: x / Non Sq Epi: x / Bacteria: x        SARS-CoV-2: NotDetec (11 Apr 2024 22:46)      RADIOLOGY & ADDITIONAL TESTS:  EKG  12 Lead ECG:   Ventricular Rate 108 BPM    Atrial Rate 108 BPM    P-R Interval 132 ms    QRS Duration 68 ms    Q-T Interval 326 ms    QTC Calculation(Bazett) 436 ms    P Axis 27 degrees    R Axis -39 degrees    T Axis 12 degrees    Diagnosis Line SINUS TACHYCARDIA WITH FUSION COMPLEXES  LEFT AXIS DEVIATION  CANNOT RULE OUT ANTERIOR INFARCT  ABNORMAL ECG  WHEN COMPARED WITH ECG OF  04-  Confirmed by MD MYERS JONATHAN ([id number]) on 4/27/2024 5:25:32 PM (04-26-24 @ 11:09)  12 Lead ECG:   Ventricular Rate 117 BPM    Atrial Rate 117 BPM    P-R Interval 140 ms    QRS Duration 70 ms    Q-T Interval 302 ms    QTC Calculation(Bazett) 421 ms    P Axis 33 degrees    R Axis -40 degrees    T Axis 10 degrees    Diagnosis Line SINUS TACHYCARDIA  LEFT AXIS DEVIATION  POSSIBLE ANTEROLATERAL INFARCT , AGE UNDETERMINED  ABNORMAL ECG  NO PREVIOUS ECGS AVAILABLE  Confirmed by MD Rodriguez Ronald ([id number]) on 4/29/2024 12:43:02 PM (04-26-24 @ 05:05)    MR Head w/wo IV Cont:   ACC: [id number] EXAM:  MR BRAIN WAW IC   ORDERED BY: ELEN WELCH     ACC: [id number] EXAM:  MR SPINE CERVICAL WAW IC   ORDERED BY: ELEN WELCH     PROCEDURE DATE:  04/30/2024          INTERPRETATION:  CLINICAL INDICATIONS: weakness    COMPARISON: Head CT dated 1/14/2019    TECHNIQUE: MRI brain: Multiplanar, multisequence MR imaging of the brain   are obtained with and without the administration of 6 cc intravenous   Gadavist contrast. 1 cc of contrast was discarded.    FINDINGS:  Chronic left corona radiata lacunar infarctions. No abnormal   leptomeningeal or parenchymal enhancement.  There is no abnormal restricted diffusion to suggest acute infarction.   Scattered periventricular and subcortical white matter T2 /FLAIR   hyperintensities areseen without mass effect, nonspecific, likely   representing moderate chronic microvascular changes. Normal T2 flow-voids   are seen within  the intracranial vasculature. The lateral ventricles and   cortical sulci are age-appropriate in size and configuration. There is no   mass, mass effect, or extra-axial fluid collection. There is no   susceptibility artifact to suggest hemorrhage. Midline structures are   normal.  The patient is status post bilateral ocular lens replacement   surgery. The visualized paranasal sinuses, mastoid air cells and orbits   are unremarkable.      ==============    CLINICAL HISTORY: weakness    COMPARISON: None.    TECHNIQUE: Cervical spine MRI: Multiplanar, multisequence MR images of   the cervical spine are obtained with and without the administration of 6   cc intravenous Gadavist contrast. 1 cc of contrast was discarded.    FINDINGS:  Mild motion artifact limits interpretation.  No abnormal enhancement. Mild disc desiccation at C5/C6. Mild diffuse   volumeloss of the cervical and upper thoracic spinal cord. Subtle   diffuse central cord T2/FLAIR hyperintensity. No mass effect or cord   swelling. Differential diagnosis includes infection, inflammation,   autoimmune, vascular, and demyelinating disease etiologies.  Unremarkable T1 marrow signal. No evidence of cord compression. No bone   marrow edema. There is no evidence for acute fracture. A normal lordosis   is noted. Craniocervical junction is normal. The cervicovertebral body   heights and remaining intervertebral disc spaces are preserved. There is   no prevertebral soft tissue abnormality.      Evaluation of the individual levels:  C2/C3 level: No spinal canal stenosis or foraminal narrowing.  C3/C4 level: Mild left-sided foraminal narrowing. No spinal canal   stenosis or right-sided foraminal narrowing.  C4/C5 level: Broad-based displays complex causing mild right-sided   foraminal narrowing. No spinal canal stenosis or left-sided foraminal   narrowing  C5/C6 level: Left-sided discharge complex. Moderate right-sided foraminal   narrowing. No spinal canal stenosis or left-sided foraminal narrowing.  C6/C7 level: Broad-based disc bulge complex. Mild bilateral foraminal   narrowing. No spinal canal stenosis  C7/T1 level:  Mild right-sided foraminal narrowing. No spinal canal   stenosis or left-sided foraminal narrowing.      ==============      IMPRESSION:    MRI BRAIN: No acute infarction.    MRI CERVICAL SPINE: Mild diffuse volume loss of the cervical and upper   thoracic spinal cord. Subtle diffuse central cord T2/FLAIR   hyperintensity. No mass effect or cord swelling. No abnormal enhancement.    --- End of Report ---            MARISOL CORDOVA MD; Attending Radiologist  This document has been electronically signed. Apr 30 2024  7:53PM (04-30-24 @ 18:59)  Xray Chest 1 View- PORTABLE-Urgent:   ACC: [id number] EXAM:  XR CHEST PORTABLE URGENT 1V   ORDERED BY: RAMONE PICKETT     PROCEDURE DATE:  04/26/2024          INTERPRETATION:  CLINICAL INFORMATION: Leukocytosis.    TECHNIQUE: Frontal radiograph of the chest.    COMPARISON: Chest radiograph 4/24/2024.    FINDINGS:    Similar-appearing bilateral reticular opacities, consistent with known   interstitial lung disease. No new focal consolidation.  No pleural effusion or pneumothorax.  There is limited evaluation of the heart size and mediastinum on portable   technique.  No acute abnormality within visible osseous structures.      IMPRESSION:    Similar-appearing bilateral reticular opacities, consistent with known   interstitial lung disease. No new focal consolidation.    --- Endof Report ---           NILTON MULLER MD; Resident Radiologist  This document has been electronically signed.  GIANNA JENKINS MD; Attending Interventional Radiologist  This document has been electronically signed. Apr 27 2024  9:23AM (04-26-24 @ 14:22)  Xray Chest 1 View- PORTABLE-Urgent:   ACC: [id number] EXAM:  XR CHEST PORTABLE URGENT 1V   ORDERED BY: JAY HEWITT     PROCEDURE DATE:  04/24/2024          INTERPRETATION:  Portable AP chest radiograph    COMPARISON: 4/17/2024 chest x-ray and CT chest 4/12/2024.  .    CLINICAL INFORMATION: Dyspnea. Increasing shortness of breath. History of   interstitial lung disease..    FINDINGS:  CATHETERS AND TUBES: None    PULMONARY: Diffuse interstitial idiopathic pulmonary fibrosis.   Superimposed infectious process cannot be excluded.    No pneumothorax..    HEART/VASCULAR: The  heart is enlarged in transverse diameter. .    BONES: The visualized osseous thorax is intact.    IMPRESSION:    Idiopathic pulmonary fibrosis.  No significant change from prior exams.  Superimposed infectiousprocess cannot be excluded.    --- End of Report ---            GATITO NEWELL MD; Attending Radiologist  This document has been electronically signed. Apr 25 2024  2:41PM (04-24-24 @ 09:55)

## 2024-05-04 NOTE — PROGRESS NOTE ADULT - PROBLEM SELECTOR PLAN 2
Hx of PE on CTA 10/29/22 w/ RLL segmental/subsegmental pulmonary embolism  - Repeat CTA 2/28/23 w/o PE. Was at [facility] on Lovenox->Eliquis while pending repeat CTA to r/o PE given new hypoxia. DVT studies at OSH 4/22/24 negative  - Unable to perform repeat CTA given degree of hypoxemia  - continue Eliquis for now

## 2024-05-04 NOTE — PROGRESS NOTE ADULT - ASSESSMENT
63-yo F with PMHx of chronic AHRF on 2-3L NC at b/l 2/2 ILD (Followed w/ Dr. Primo Marlow, [facility]), PE on Eliquis, severe pHTN, Cor pulmonale, DM, presented initially with SOB, dry cough, chest pain, LE edema, recent admission to [facility] in March for ILD flare, admitted to outside hospital->ICU for AHRF 2/2 ILD flare, transferred to [facility] for lung transplant evaluation.

## 2024-05-04 NOTE — PROGRESS NOTE ADULT - PROBLEM SELECTOR PLAN 1
Baseline 2-3L NC. Hx of ILD w/ c/f IPF. Followed w/ DOMENICO Ding. On HFNC 50/60 when leaving [place], now on bipap prn for wob. Received 4 doses of Levaquin last dose 4/13 at [facility].  - Transferred to [facility] for lung transplant eval  - Maintain euvolemia, I's and O's, daily weights.  - Transplant pulm consult appreciated  - Continue home Ofev 150mg BID  - Geovanni Loyacort  - Continue Methylprednisolone 30 mg IVP BID > solumedrol 40 mg Qd on 5/5  - while on steroids, Pantoprazole 40 mg QD  - pulm recs appreciated: consider adding NO to high flow, repeat serologies for scleroderma, sjogrens, RF, myomarker (cannot be done on floor, will need ICU consult if decompensates)  - repeat TTE with bubble study - severe RH dysfunction with elevated pulmonary pressure, grade ii diastolic dysfunction (?from RH failure), normal LVSF  - diuresis - lasix 40 iv QD. Transition to PO when euvolemic  - Zosyn empirically 4/26 - 4/27, no evidence of PNA, can hold off on abx  - valium 2.5 mg q12 PRN anxiety as she becomes very tachypneic  - not a transplant candidate 4/30  - transitioned to NC O2. When requirements ~4-5L, obtain ambulatory O2 requirements for d.c planning  - Ordered CTPE

## 2024-05-04 NOTE — PROGRESS NOTE ADULT - PROBLEM SELECTOR PLAN 8
T2DM: Home DM medications: metformin 500 mg BID, + glimepride 1 mg daily + lantus 35 units QHS+ admelog 25 units TIDAC  - Pt w/ hx of uncontrolled T2DM, a1c 11.9. At home, Basaglar 25 U QHS + Admelog 15U TID. Endocrine was consulted at [facility] prior to transfer. Was on  Lantus 20 + Admelog 14u TID + Mod SSI  - Endocrine consulted via email, recs appreciated  - Continue glargine 22 units qhs, lispro 7u TID qac, hold if NPO or eating < 50% of meals  - MISS FS tid qac qhs  - Discharge DM medications:   - Continue with metformin 500 mg BID  - STOP glimepride due to recurrent hypoglycemia at home   - Basal/bolus, dose will depend on insulin requirement and steroid plan   - Patient will follow up at  [facility]:  [address] [place] [address]. [address]. [place] [address]  Tel: [phone]

## 2024-05-05 NOTE — PROGRESS NOTE ADULT - SUBJECTIVE AND OBJECTIVE BOX
SUBJECTIVE / OVERNIGHT EVENTS:  Today is hospital day 10d. There are no new issues or overnight events.   Did not endorse any headache, lightheadedness, vertigo, shortness of breathe, cough, chest pain, palpitations, tachycardia, abdominal pain, nausea, vomiting, diarrhea or constipation currently    HPI:  63-yo F with PMHx of chronic AHRF on 2-3L NC at b/l 2/2 ILD (Followed w/ Dr. Primo Marlow, [facility]), PE on Eliquis, severe pHTN, Cor pulmonale, DM, presented initially with SOB, dry cough, chest pain, LE edema, recent admission to [facility] in March for ILD flare, admitted to [facility]->ICU for AHRF 2/2 ILD flare, transferred to [facility] for lung transplant evaluation. Pt reporting mild SOB, no fever, no sputum, no SOB, no chest pain, no edema. Reports constipation, last BM 2 days ago, with some stomach pain.     At [place] ICU, she was treated w/ IV steroids->PO taper, duonebs, symbicort, lasix. Pike placed for urinary retention, taken out prior to arrival although unclear if passed TOV at OSH. Had been weaned to NC while but was complicated by increased WOB, placed back on HFNC 50/60. Most recently escalated to methylprednisolone 40 mg IV BID. Was being treated w/ Ofev 150 mg BID, daily diuresis w/ Lasix 40->20 mg IVP on 4/25 based on volume assessment, 4/24 CXR w/ c/f R>L patchy opacities c/f pulmonary edema. Upon transfer here, admission vitals notable for temp afebrile, , /79, HFNC similar settings 45/60 100%. Labs notable for downtrending white count, stable anemia to 9-10, stable thrombocytosis to 400's. ABG 7.48 / 37 / 71 / 28, lactate 1.2.      Of note, pt is DNR/DNI trial of NIPPV - confirmed this with the patient and completed MOLST, in chart.  (26 Apr 2024 01:17)    MEDICATIONS  (STANDING):  albuterol/ipratropium for Nebulization 3 milliLiter(s) Nebulizer every 6 hours  apixaban 5 milliGRAM(s) Oral two times a day  atorvastatin 40 milliGRAM(s) Oral at bedtime  budesonide 160 MICROgram(s)/formoterol 4.5 MICROgram(s) Inhaler 2 Puff(s) Inhalation two times a day  chlorhexidine 2% Cloths 1 Application(s) Topical daily  dextrose 10% Bolus 125 milliLiter(s) IV Bolus once  dextrose 5%. 1000 milliLiter(s) (100 mL/Hr) IV Continuous <Continuous>  dextrose 5%. 1000 milliLiter(s) (50 mL/Hr) IV Continuous <Continuous>  dextrose 50% Injectable 12.5 Gram(s) IV Push once  dextrose 50% Injectable 25 Gram(s) IV Push once  furosemide   Injectable 40 milliGRAM(s) IV Push daily  gabapentin 300 milliGRAM(s) Oral daily  glucagon  Injectable 1 milliGRAM(s) IntraMuscular once  insulin glargine Injectable (LANTUS) 26 Unit(s) SubCutaneous at bedtime  insulin lispro (ADMELOG) corrective regimen sliding scale   SubCutaneous three times a day before meals  insulin lispro (ADMELOG) corrective regimen sliding scale   SubCutaneous at bedtime  insulin lispro Injectable (ADMELOG) 12 Unit(s) SubCutaneous three times a day before meals  methylPREDNISolone sodium succinate Injectable 30 milliGRAM(s) IV Push two times a day  multivitamin 1 Tablet(s) Oral daily  Ofev (Nintedanib) 150 milliGRAM(s) 150 milliGRAM(s) Oral two times a day  pantoprazole    Tablet 40 milliGRAM(s) Oral two times a day  sildenafil (REVATIO) 20 milliGRAM(s) Oral every 8 hours  traZODone 100 milliGRAM(s) Oral at bedtime    MEDICATIONS  (PRN):  acetaminophen     Tablet .. 650 milliGRAM(s) Oral every 6 hours PRN Temp greater or equal to 38C (100.4F), Mild Pain (1 - 3)  aluminum hydroxide/magnesium hydroxide/simethicone Suspension 30 milliLiter(s) Oral every 4 hours PRN Dyspepsia  cyclobenzaprine 5 milliGRAM(s) Oral three times a day PRN Muscle Spasm  dextrose Oral Gel 15 Gram(s) Oral once PRN Blood Glucose LESS THAN 70 milliGRAM(s)/deciliter  melatonin 3 milliGRAM(s) Oral at bedtime PRN Insomnia    HOME MEDICATIONS:  acetaminophen 325 mg oral tablet: 2 tab(s) orally every 6 hours As needed Mild Pain (1 - 3)  Admelog 100 units/mL injectable solution: 15 unit(s) injectable 3 times a day (before meals)  apixaban 5 mg oral tablet: 1 tab(s) orally once a day  atorvastatin 40 mg oral tablet: 1 tab(s) orally once a day  cyclobenzaprine 5 mg oral tablet: 1 tab(s) orally 3 times a day as needed for Muscle Spasm  gabapentin 300 mg oral capsule: 1 cap(s) orally once a day  insulin glargine 100 units/mL subcutaneous solution: 25 unit(s) subcutaneous once a day (at bedtime)  melatonin 3 mg oral tablet: 1 tab(s) orally once a day (at bedtime)  Ofev 150 mg oral capsule: 1 cap(s) orally every 12 hours  pantoprazole 40 mg oral delayed release tablet: 1 tab(s) orally once a day (before a meal)  traZODone 100 mg oral tablet: 1 tab(s) orally once a day (at bedtime)    PHYSICAL EXAM  Vital Signs Last 24 Hrs  T(C): 37.2 (05 May 2024 11:54), Max: 37.2 (05 May 2024 11:54)  T(F): 99 (05 May 2024 11:54), Max: 99 (05 May 2024 11:54)  HR: 114 (05 May 2024 11:54) (114 - 120)  BP: 133/82 (05 May 2024 11:54) (112/71 - 133/82)  BP(mean): --  RR: 18 (05 May 2024 11:54) (18 - 20)  SpO2: 98% (05 May 2024 11:54) (91% - 98%)    Parameters below as of 05 May 2024 11:54  Patient On (Oxygen Delivery Method): nasal cannula  O2 Flow (L/min): 6      05-04-24 @ 07:01  -  05-05-24 @ 07:00  --------------------------------------------------------  IN: 240 mL / OUT: 1900 mL / NET: -1660 mL    05-05-24 @ 07:01 - 05-05-24 @ 14:45  --------------------------------------------------------  IN: 0 mL / OUT: 1800 mL / NET: -1800 mL      CONSTITUTIONAL: Well-groomed, in no apparent distress;  EYES: No conjunctival or scleral injection, non-icteric;  ENMT: No external nasal lesions; Normal outer ears;  NECK: Trachea midline;  RESPIRATORY: Normal respiratory effort; Decreased breathe sounds bilaterally without wheeze/rhonchi/rales;  CARDIOVASCULAR: Regular rate and rhythm;  GASTROINTESTINAL: Non-distended; No palpable masses; No rebound/guarding;  EXTREMITIES:  No lower extremity edema;  NEUROLOGY: A+O to person, place, and time; Does respond to commands appropriately;  PSYCHIATRY: Mood and Affect appropriate    LABS:                    SARS-CoV-2: NotDetec (11 Apr 2024 22:46)      RADIOLOGY & ADDITIONAL TESTS:  EKG  12 Lead ECG:   Ventricular Rate 108 BPM    Atrial Rate 108 BPM    P-R Interval 132 ms    QRS Duration 68 ms    Q-T Interval 326 ms    QTC Calculation(Bazett) 436 ms    P Axis 27 degrees    R Axis -39 degrees    T Axis 12 degrees    Diagnosis Line SINUS TACHYCARDIA WITH FUSION COMPLEXES  LEFT AXIS DEVIATION  CANNOT RULE OUT ANTERIOR INFARCT  ABNORMAL ECG  WHEN COMPARED WITH ECG OF  04-  Confirmed by MD MYERS JONATHAN ([id number]) on 4/27/2024 5:25:32 PM (04-26-24 @ 11:09)  12 Lead ECG:   Ventricular Rate 117 BPM    Atrial Rate 117 BPM    P-R Interval 140 ms    QRS Duration 70 ms    Q-T Interval 302 ms    QTC Calculation(Bazett) 421 ms    P Axis 33 degrees    R Axis -40 degrees    T Axis 10 degrees    Diagnosis Line SINUS TACHYCARDIA  LEFT AXIS DEVIATION  POSSIBLE ANTEROLATERAL INFARCT , AGE UNDETERMINED  ABNORMAL ECG  NO PREVIOUS ECGS AVAILABLE  Confirmed by MD Rodriguez Ronald ([id number]) on 4/29/2024 12:43:02 PM (04-26-24 @ 05:05)    MR Head w/wo IV Cont:   ACC: [id number] EXAM:  MR BRAIN WAW IC   ORDERED BY: ELEN WELCH     ACC: [id number] EXAM:  MR SPINE CERVICAL WAW IC   ORDERED BY: ELEN WELCH     PROCEDURE DATE:  04/30/2024          INTERPRETATION:  CLINICAL INDICATIONS: weakness    COMPARISON: Head CT dated 1/14/2019    TECHNIQUE: MRI brain: Multiplanar, multisequence MR imaging of the brain   are obtained with and without the administration of 6 cc intravenous   Gadavist contrast. 1 cc of contrast was discarded.    FINDINGS:  Chronic left corona radiata lacunar infarctions. No abnormal   leptomeningeal or parenchymal enhancement.  There is no abnormal restricted diffusion to suggest acute infarction.   Scattered periventricular and subcortical white matter T2 /FLAIR   hyperintensities areseen without mass effect, nonspecific, likely   representing moderate chronic microvascular changes. Normal T2 flow-voids   are seen within  the intracranial vasculature. The lateral ventricles and   cortical sulci are age-appropriate in size and configuration. There is no   mass, mass effect, or extra-axial fluid collection. There is no   susceptibility artifact to suggest hemorrhage. Midline structures are   normal.  The patient is status post bilateral ocular lens replacement   surgery. The visualized paranasal sinuses, mastoid air cells and orbits   are unremarkable.      ==============    CLINICAL HISTORY: weakness    COMPARISON: None.    TECHNIQUE: Cervical spine MRI: Multiplanar, multisequence MR images of   the cervical spine are obtained with and without the administration of 6   cc intravenous Gadavist contrast. 1 cc of contrast was discarded.    FINDINGS:  Mild motion artifact limits interpretation.  No abnormal enhancement. Mild disc desiccation at C5/C6. Mild diffuse   volumeloss of the cervical and upper thoracic spinal cord. Subtle   diffuse central cord T2/FLAIR hyperintensity. No mass effect or cord   swelling. Differential diagnosis includes infection, inflammation,   autoimmune, vascular, and demyelinating disease etiologies.  Unremarkable T1 marrow signal. No evidence of cord compression. No bone   marrow edema. There is no evidence for acute fracture. A normal lordosis   is noted. Craniocervical junction is normal. The cervicovertebral body   heights and remaining intervertebral disc spaces are preserved. There is   no prevertebral soft tissue abnormality.      Evaluation of the individual levels:  C2/C3 level: No spinal canal stenosis or foraminal narrowing.  C3/C4 level: Mild left-sided foraminal narrowing. No spinal canal   stenosis or right-sided foraminal narrowing.  C4/C5 level: Broad-based displays complex causing mild right-sided   foraminal narrowing. No spinal canal stenosis or left-sided foraminal   narrowing  C5/C6 level: Left-sided discharge complex. Moderate right-sided foraminal   narrowing. No spinal canal stenosis or left-sided foraminal narrowing.  C6/C7 level: Broad-based disc bulge complex. Mild bilateral foraminal   narrowing. No spinal canal stenosis  C7/T1 level:  Mild right-sided foraminal narrowing. No spinal canal   stenosis or left-sided foraminal narrowing.      ==============      IMPRESSION:    MRI BRAIN: No acute infarction.    MRI CERVICAL SPINE: Mild diffuse volume loss of the cervical and upper   thoracic spinal cord. Subtle diffuse central cord T2/FLAIR   hyperintensity. No mass effect or cord swelling. No abnormal enhancement.    --- End of Report ---            MARISOL CORDOVA MD; Attending Radiologist  This document has been electronically signed. Apr 30 2024  7:53PM (04-30-24 @ 18:59)  Xray Chest 1 View- PORTABLE-Urgent:   ACC: [id number] EXAM:  XR CHEST PORTABLE URGENT 1V   ORDERED BY: RAMONE PICKETT     PROCEDURE DATE:  04/26/2024          INTERPRETATION:  CLINICAL INFORMATION: Leukocytosis.    TECHNIQUE: Frontal radiograph of the chest.    COMPARISON: Chest radiograph 4/24/2024.    FINDINGS:    Similar-appearing bilateral reticular opacities, consistent with known   interstitial lung disease. No new focal consolidation.  No pleural effusion or pneumothorax.  There is limited evaluation of the heart size and mediastinum on portable   technique.  No acute abnormality within visible osseous structures.      IMPRESSION:    Similar-appearing bilateral reticular opacities, consistent with known   interstitial lung disease. No new focal consolidation.    --- Endof Report ---           NILTON MULLER MD; Resident Radiologist  This document has been electronically signed.  GIANNA JENKINS MD; Attending Interventional Radiologist  This document has been electronically signed. Apr 27 2024  9:23AM (04-26-24 @ 14:22)  Xray Chest 1 View- PORTABLE-Urgent:   ACC: [id number] EXAM:  XR CHEST PORTABLE URGENT 1V   ORDERED BY: JAY HEWITT     PROCEDURE DATE:  04/24/2024          INTERPRETATION:  Portable AP chest radiograph    COMPARISON: 4/17/2024 chest x-ray and CT chest 4/12/2024.  .    CLINICAL INFORMATION: Dyspnea. Increasing shortness of breath. History of   interstitial lung disease..    FINDINGS:  CATHETERS AND TUBES: None    PULMONARY: Diffuse interstitial idiopathic pulmonary fibrosis.   Superimposed infectious process cannot be excluded.    No pneumothorax..    HEART/VASCULAR: The  heart is enlarged in transverse diameter. .    BONES: The visualized osseous thorax is intact.    IMPRESSION:    Idiopathic pulmonary fibrosis.  No significant change from prior exams.  Superimposed infectiousprocess cannot be excluded.    --- End of Report ---            GATITO NEWELL MD; Attending Radiologist  This document has been electronically signed. Apr 25 2024  2:41PM (04-24-24 @ 09:55)

## 2024-05-05 NOTE — PROGRESS NOTE ADULT - ASSESSMENT
63-yo F with PMHx of chronic AHRF on 2-3L NC at b/l 2/2 ILD (Followed w/ Dr. Primo Marlow, [facility]), PE on Eliquis, severe pHTN, Cor pulmonale, DM, presented initially with SOB, dry cough, chest pain, LE edema, recent admission to [facility] in March for ILD flare, admitted to outside hospital->ICU for AHRF 2/2 ILD flare, transferred to [facility] for lung transplant evaluation.

## 2024-05-05 NOTE — PROGRESS NOTE ADULT - PROBLEM SELECTOR PLAN 1
Baseline 2-3L NC. Hx of ILD w/ c/f IPF. Followed w/ DOMENICO Ding. On HFNC 50/60 when leaving [place], now on bipap prn for wob. Received 4 doses of Levaquin last dose 4/13 at [facility].  - Transferred to [facility] for lung transplant eval  - Continue home Ofev 150mg BID  - Duonebs, Symbicort  - Continue solumedrol 40 mg Qd. While on steroids, Pantoprazole 40 mg QD  - repeat TTE with bubble study - severe RH dysfunction with elevated pulmonary pressure, grade ii diastolic dysfunction (?from RH failure), normal LVSF  - diuresis - lasix 40 iv QD. Transition to PO when euvolemic  - valium 2.5 mg q12 PRN anxiety as she becomes very tachypneic  - not a transplant candidate 4/30  - transitioned to NC O2. When requirements ~4-5L, obtain ambulatory O2 requirements for d.c planning

## 2024-05-05 NOTE — PROGRESS NOTE ADULT - PROBLEM SELECTOR PLAN 8
T2DM: Home DM medications: metformin 500 mg BID, + glimepride 1 mg daily + lantus 35 units QHS+ admelog 25 units TIDAC  - Pt w/ hx of uncontrolled T2DM, a1c 11.9. At home, Basaglar 25 U QHS + Admelog 15U TID. [facility]. Was on  Lantus 20 + Admelog 14u TID + Mod SSI  - Endocrine consulted via email, recs appreciated  - Continue glargine 22 units qhs, lispro 7u TID qac, hold if NPO or eating < 50% of meals  - MISS FS tid qac qhs  - Discharge DM medications:   - Continue with metformin 500 mg BID  - STOP glimepride due to recurrent hypoglycemia at home   - Basal/bolus, dose will depend on insulin requirement and steroid plan   - Patient will follow up at  [facility]:  [address] [place] [address]. [address]. [place] [address]  Tel: [phone]

## 2024-05-05 NOTE — CHART NOTE - NSCHARTNOTEFT_GEN_A_CORE
62 y/o female with uncontrolled T2DM (A1C 11.9%) while on Lantus, admelog,  metformin, glimepiride. Unknown DM complications. Also h/o AHRF, ILD, PE, HTN.  Here with SOB secondary to interstital lung disease. Endocrinology consulted for diabetes management.   Chart reviewed and patient seen babak bedside. noted fasting BGs in 200s.   Tolerating POs, but having diarrhea every time she eats.   Remains on Methylprednisolone 30 mg BID      POCT Blood Glucose:  146 mg/dL (05-05-24 @ 12:13)  272 mg/dL (05-05-24 @ 08:44)  194 mg/dL (05-04-24 @ 21:23)  150 mg/dL (05-04-24 @ 16:43)      eMAR:atorvastatin   40 milliGRAM(s) Oral (05-04-24 @ 21:30)    insulin glargine Injectable (LANTUS)   26 Unit(s) SubCutaneous (05-04-24 @ 21:29)    insulin lispro (ADMELOG) corrective regimen sliding scale   6 Unit(s) SubCutaneous (05-05-24 @ 08:48)    insulin lispro Injectable (ADMELOG)   12 Unit(s) SubCutaneous (05-05-24 @ 12:59)   12 Unit(s) SubCutaneous (05-05-24 @ 08:49)   12 Unit(s) SubCutaneous (05-04-24 @ 17:02)    methylPREDNISolone sodium succinate Injectable   30 milliGRAM(s) IV Push (05-05-24 @ 06:27)   30 milliGRAM(s) IV Push (05-04-24 @ 17:14)    # Uncontrolled type 2 diabetes mellitus with hyperglycemia, with long-term current use of insulin.    - Please monitor blood glucose values TID AC & QHS while eating regular meals and Q6H while NPO  - Increase insulin Glargine 30 units QHS  - Continue  insulin Lispro to 12 units TID with meals, hold if NPO or if eating less than 50% of meals  - Continue with mod dose correctional scale TID with meals and QHS, add 2 am BG  - Please notify endo team with any changes on steroid doses        Contact via [employer] Teams during business hours  To reach covering provider access AMION via sunrise tools  For Urgent matters/after-hours/weekends/holidays please page endocrine fellow on call   For nonurgent matters please email [email or web address]    Please note that this patient may be followed by different provider tomorrow.  Notify endocrine 24 hours prior to discharge for final recommendations

## 2024-05-05 NOTE — PROGRESS NOTE ADULT - PROBLEM SELECTOR PLAN 5
- Pt was seeing Dr. Chua, urologist. At [facility] had failed TOV w/ godwin replaced. Arrives to [facility] w/o Godwin. Pt on outpt med list listed Tamsulosin 0.4 mg, unclear if started at [facility], pt saying she does not take this outpatient.

## 2024-05-05 NOTE — PROGRESS NOTE ADULT - PROBLEM SELECTOR PLAN 3
- Pt w/ hx of severe pHTN, RVSP 68 on 10/2022  - Repeat 4/12/24 at [facility] noted PAP 28, "normal PAP"   - Maintain euvolemia, as above  - c/w sildenafil 20 mg TID

## 2024-05-05 NOTE — PROGRESS NOTE ADULT - PROBLEM SELECTOR PLAN 2
Hx of PE on CTA 10/29/22 w/ RLL segmental/subsegmental pulmonary embolism  - Repeat CTA 2/28/23 w/o PE. Was at [facility] on Lovenox->Eliquis while pending repeat CTA to r/o PE given new hypoxia. DVT studies at OSH 4/22/24 negative  - continue Eliquis for now  - Ordered CTPE

## 2024-05-06 NOTE — PROGRESS NOTE ADULT - PROBLEM SELECTOR PLAN 8
T2DM: Home DM medications: metformin 500 mg BID, + glimepride 1 mg daily + lantus 35 units QHS+ admelog 25 units TIDAC  - Pt w/ hx of uncontrolled T2DM, a1c 11.9.   - Endocrine consulted via email, recs appreciated  - On glargine 30 units qhs, lispro 12u TID qac, hold if NPO or eating < 50% of meals  - MISS FS tid qac qhs  - Discharge DM medications:   - Continue with metformin 500 mg BID  - STOP glimepride due to recurrent hypoglycemia at home   - Basal/bolus, dose will depend on insulin requirement and steroid plan   - Patient will follow up at  [facility]:  [address] [place] [address]. [address]. [place] [address]  Tel: [phone] c/w home cyclobenzaprine 5mg q8 prn

## 2024-05-06 NOTE — PROGRESS NOTE ADULT - PROBLEM SELECTOR PLAN 1
Baseline 2-3L NC. Hx of ILD w/ c/f IPF. Followed w/ DOMENICO Ding. Transferred to [facility] for lung transplant eval  - Currently on 6L NC, wean as tolerated. When requirements ~4-5L, obtain ambulatory O2 requirements for d.c planning  - Continue home Ofev 150mg BID  - Duonebs, Symbicort  - Continue solumedrol 40 mg Qd. While on steroids, continue Pantoprazole 40 mg QD  - repeat TTE with bubble study revealed severe RH dysfunction with elevated pulmonary pressure, grade II diastolic dysfunction   - Remains on Lasix 40 iv QD. Transition to PO when euvolemic  - valium 2.5 mg q12 PRN anxiety   - Deemed not to be a transplant candidate 4/30 Baseline 2-3L NC. Hx of ILD w/ c/f IPF. Followed w/ DOMENICO Ding. Transferred to [facility] for lung transplant eval  - Currently on 6L NC, wean as tolerated. When requirements ~4-5L, obtain ambulatory O2 requirements for d.c planning  - Continue home Ofev 150mg BID  - Duonebs, Symbicort  - Transition to PO Prednisone 40mg, plan to taper by 10mg every 7 days.   - repeat TTE with bubble study revealed severe RH dysfunction with elevated pulmonary pressure, grade II diastolic dysfunction   - c/w Lasix 40 iv QD. Transition to PO when euvolemic  - valium 2.5 mg q12 PRN anxiety   - Deemed not to be a transplant candidate 4/30

## 2024-05-06 NOTE — PROGRESS NOTE ADULT - ASSESSMENT
63 F w/h/o uncontrolled T2DM (A1C 11.9%) while on Lantus,admelog,  metformin, glimepiride. Unknown DM complications. Also h/o AHRF, ILD, PE, HTN. Here with SOB secondary to interstital lung disease. Patient currently on solumedrol 30 mg BID.     Endocrinology consulted for diabetes management. Tolerating POs with BG levels above goal (200s) usually fasting (post steroid dose) Postprandial BG at goal 100-180mg/dl.   Steroid dose will be changed to prednisone 40mg daily from MTP 40mg daily. Will adjust insulin doses.

## 2024-05-06 NOTE — PROGRESS NOTE ADULT - PROBLEM SELECTOR PLAN 2
BP goal <130/80  Manage per primary team pt currently not lung transplant candidate  at time of exam on NC, dyspnea with exertion  discussed symptom management- as per pt not interested in symptom management for dyspnea

## 2024-05-06 NOTE — PROGRESS NOTE ADULT - SUBJECTIVE AND OBJECTIVE BOX
Berhane Tobar MD  Division of Hospital Medicine  Available via MS teams  ---------------------------------------------------------    ESTEBAN BRANHAM  63y  Female      Patient is a 63y old  Female who presents with a chief complaint of SOB (05 May 2024 13:44)      INTERVAL HPI/OVERNIGHT EVENTS:  INCOMPLETE      REVIEW OF SYSTEMS: 10 point ROS negative unless listed above    T(C): 37 (05-06-24 @ 05:16), Max: 37.2 (05-05-24 @ 11:54)  HR: 110 (05-06-24 @ 05:16) (110 - 119)  BP: 132/77 (05-06-24 @ 05:16) (132/77 - 133/82)  RR: 18 (05-06-24 @ 05:16) (18 - 18)  SpO2: 98% (05-06-24 @ 05:16) (95% - 98%)  Wt(kg): --Vital Signs Last 24 Hrs  T(C): 37 (06 May 2024 05:16), Max: 37.2 (05 May 2024 11:54)  T(F): 98.6 (06 May 2024 05:16), Max: 99 (05 May 2024 11:54)  HR: 110 (06 May 2024 05:16) (110 - 119)  BP: 132/77 (06 May 2024 05:16) (132/77 - 133/82)  BP(mean): --  RR: 18 (06 May 2024 05:16) (18 - 18)  SpO2: 98% (06 May 2024 05:16) (95% - 98%)    Parameters below as of 06 May 2024 05:16  Patient On (Oxygen Delivery Method): nasal cannula  O2 Flow (L/min): 6      PHYSICAL EXAM:  GENERAL: NAD, well-groomed, well-developed  NECK: Supple, No JVD  CHEST/LUNG:   HEART: Tachycardic  ABDOMEN: Soft, Nontender, Nondistended; Bowel sounds present.    EXTREMITIES:  2+ Peripheral Pulses, No clubbing, cyanosis, or edema  SKIN: No rashes or lesions  PSYCH: Alert & Oriented x3          LABS:              CAPILLARY BLOOD GLUCOSE      POCT Blood Glucose.: 101 mg/dL (05 May 2024 21:07)  POCT Blood Glucose.: 153 mg/dL (05 May 2024 18:21)  POCT Blood Glucose.: 103 mg/dL (05 May 2024 16:41)  POCT Blood Glucose.: 146 mg/dL (05 May 2024 12:13)  POCT Blood Glucose.: 272 mg/dL (05 May 2024 08:44)            RADIOLOGY & ADDITIONAL TESTS:    Imaging Personally Reviewed:  [ ] YES  [ ] NO Berhane Tobar MD  Division of Hospital Medicine  Available via MS teams  ---------------------------------------------------------    ESTEBAN BRANHAM  63y  Female      Patient is a 63y old  Female who presents with a chief complaint of SOB (05 May 2024 13:44)      INTERVAL HPI/OVERNIGHT EVENTS:  Seen at bedside. Having abdominal pain. Described as gas pain.       REVIEW OF SYSTEMS: 10 point ROS negative unless listed above    T(C): 37 (05-06-24 @ 05:16), Max: 37.2 (05-05-24 @ 11:54)  HR: 110 (05-06-24 @ 05:16) (110 - 119)  BP: 132/77 (05-06-24 @ 05:16) (132/77 - 133/82)  RR: 18 (05-06-24 @ 05:16) (18 - 18)  SpO2: 98% (05-06-24 @ 05:16) (95% - 98%)  Wt(kg): --Vital Signs Last 24 Hrs  T(C): 37 (06 May 2024 05:16), Max: 37.2 (05 May 2024 11:54)  T(F): 98.6 (06 May 2024 05:16), Max: 99 (05 May 2024 11:54)  HR: 110 (06 May 2024 05:16) (110 - 119)  BP: 132/77 (06 May 2024 05:16) (132/77 - 133/82)  BP(mean): --  RR: 18 (06 May 2024 05:16) (18 - 18)  SpO2: 98% (06 May 2024 05:16) (95% - 98%)    Parameters below as of 06 May 2024 05:16  Patient On (Oxygen Delivery Method): nasal cannula  O2 Flow (L/min): 6      PHYSICAL EXAM:  GENERAL: NAD, well-groomed, well-developed  NECK: Supple, No JVD  CHEST/LUNG: CTA   HEART: Tachycardic  ABDOMEN: Soft, tender, Nondistended; Bowel sounds present.    EXTREMITIES:  2+ Peripheral Pulses, No clubbing, cyanosis, or edema  SKIN: No rashes or lesions  PSYCH: Alert & Oriented x3          LABS:              CAPILLARY BLOOD GLUCOSE      POCT Blood Glucose.: 101 mg/dL (05 May 2024 21:07)  POCT Blood Glucose.: 153 mg/dL (05 May 2024 18:21)  POCT Blood Glucose.: 103 mg/dL (05 May 2024 16:41)  POCT Blood Glucose.: 146 mg/dL (05 May 2024 12:13)  POCT Blood Glucose.: 272 mg/dL (05 May 2024 08:44)            RADIOLOGY & ADDITIONAL TESTS:    Imaging Personally Reviewed:  [ ] YES  [ ] NO

## 2024-05-06 NOTE — PROGRESS NOTE ADULT - PROBLEM SELECTOR PLAN 3
LDL goal ,70 due to DM  Pt LDL NA  Order fasting lipid if not recently done  Statin as noted above  Manage per primary team   F/u levels as out pt see GOC note above  DNR/I Trial of NIV  disposition: [facility]

## 2024-05-06 NOTE — PROGRESS NOTE ADULT - PROBLEM SELECTOR PLAN 10
- Continue Gabapentin 300 mg qd for neuropathic pain - Continue home Trazodone 100 mg qd + Melatonin 3 PRN for sleep difficulties

## 2024-05-06 NOTE — PROGRESS NOTE ADULT - PROBLEM SELECTOR PLAN 4
will continue to follow for goc  case discussed with primary team  Can be reached by TEAMS M-F 9-5 Nargis Sorensen Any other time please page [phone] if needed

## 2024-05-06 NOTE — PROGRESS NOTE ADULT - SUBJECTIVE AND OBJECTIVE BOX
Pulmonary Consult Follow up     Interval Events:    Doing well. On 6L NC. Having some suprapubic abdominal pain.    REVIEW OF SYSTEMS:  [x] All other systems negative except per HPI   [ ] Unable to assess ROS because ________    OBJECTIVE:  ICU Vital Signs Last 24 Hrs  T(C): 37 (06 May 2024 05:16), Max: 37.2 (05 May 2024 11:54)  T(F): 98.6 (06 May 2024 05:16), Max: 99 (05 May 2024 11:54)  HR: 113 (06 May 2024 08:40) (110 - 119)  BP: 109/66 (06 May 2024 08:40) (109/66 - 133/82)  BP(mean): --  ABP: --  ABP(mean): --  RR: 18 (06 May 2024 05:16) (18 - 18)  SpO2: 97% (06 May 2024 08:40) (95% - 98%)    O2 Parameters below as of 06 May 2024 08:40  Patient On (Oxygen Delivery Method): nasal cannula  O2 Flow (L/min): 6        05-05 @ 07:01  -  05-06 @ 07:00  --------------------------------------------------------  IN: 0 mL / OUT: 1800 mL / NET: -1800 mL      PHYSICAL EXAM:  GENERAL: NAD   HEAD:  Atraumatic, Normocephalic  EYES: EOMI, conjunctiva and sclera clear  ENMT: Moist mucous membranes  CHEST/LUNG: Clear to auscultation bilaterally   HEART: Regular rate and rhythm; No murmurs, rubs, or gallops  ABDOMEN: suprapubic abdominal tenderness to palpation. .   VASCULAR: No  cyanosis   SKIN: No rashes or lesions  NERVOUS SYSTEM:  Alert & Oriented X3, Good concentration    HOSPITAL MEDICATIONS:  MEDICATIONS  (STANDING):  albuterol/ipratropium for Nebulization 3 milliLiter(s) Nebulizer every 6 hours  apixaban 5 milliGRAM(s) Oral two times a day  atorvastatin 40 milliGRAM(s) Oral at bedtime  budesonide 160 MICROgram(s)/formoterol 4.5 MICROgram(s) Inhaler 2 Puff(s) Inhalation two times a day  chlorhexidine 2% Cloths 1 Application(s) Topical daily  dextrose 10% Bolus 125 milliLiter(s) IV Bolus once  dextrose 5%. 1000 milliLiter(s) (50 mL/Hr) IV Continuous <Continuous>  dextrose 5%. 1000 milliLiter(s) (100 mL/Hr) IV Continuous <Continuous>  dextrose 50% Injectable 12.5 Gram(s) IV Push once  dextrose 50% Injectable 25 Gram(s) IV Push once  furosemide   Injectable 40 milliGRAM(s) IV Push daily  gabapentin 300 milliGRAM(s) Oral daily  glucagon  Injectable 1 milliGRAM(s) IntraMuscular once  insulin glargine Injectable (LANTUS) 30 Unit(s) SubCutaneous at bedtime  insulin lispro (ADMELOG) corrective regimen sliding scale   SubCutaneous three times a day before meals  insulin lispro (ADMELOG) corrective regimen sliding scale   SubCutaneous at bedtime  insulin lispro Injectable (ADMELOG) 12 Unit(s) SubCutaneous three times a day before meals  methylPREDNISolone sodium succinate Injectable 40 milliGRAM(s) IV Push every 24 hours  multivitamin 1 Tablet(s) Oral daily  Ofev (Nintedanib) 150 milliGRAM(s) 150 milliGRAM(s) Oral two times a day  pantoprazole    Tablet 40 milliGRAM(s) Oral two times a day  sildenafil (REVATIO) 20 milliGRAM(s) Oral every 8 hours  traZODone 100 milliGRAM(s) Oral at bedtime    MEDICATIONS  (PRN):  acetaminophen     Tablet .. 650 milliGRAM(s) Oral every 6 hours PRN Temp greater or equal to 38C (100.4F), Mild Pain (1 - 3)  aluminum hydroxide/magnesium hydroxide/simethicone Suspension 30 milliLiter(s) Oral every 4 hours PRN Dyspepsia  cyclobenzaprine 5 milliGRAM(s) Oral three times a day PRN Muscle Spasm  dextrose Oral Gel 15 Gram(s) Oral once PRN Blood Glucose LESS THAN 70 milliGRAM(s)/deciliter  melatonin 3 milliGRAM(s) Oral at bedtime PRN Insomnia      LABS:                                  CAPILLARY BLOOD GLUCOSE      POCT Blood Glucose.: 320 mg/dL (06 May 2024 08:41)  POCT Blood Glucose.: 101 mg/dL (05 May 2024 21:07)  POCT Blood Glucose.: 153 mg/dL (05 May 2024 18:21)  POCT Blood Glucose.: 103 mg/dL (05 May 2024 16:41)  POCT Blood Glucose.: 146 mg/dL (05 May 2024 12:13)

## 2024-05-06 NOTE — PROGRESS NOTE ADULT - PROBLEM SELECTOR PLAN 3
- Pt w/ hx of severe pHTN, RVSP 68 on 10/2022  - Repeat 4/12/24 at [facility] noted PAP 28, "normal PAP"   - Maintain euvolemia, as above  - c/w sildenafil 20 mg TID - Pt w/ hx of severe pHTN, RVSP 68 on 10/2022  - Repeat 4/12/24 at [facility] noted PAP 28, "normal PAP"   - Maintain euvolemia, as above  - c/w sildenafil 20 mg TID, currently tolerating

## 2024-05-06 NOTE — PROGRESS NOTE ADULT - PROBLEM SELECTOR PLAN 2
Hx of PE on CTA 10/29/22 w/ RLL segmental/subsegmental pulmonary embolism  - Repeat CTA 2/28/23 w/o PE.   - DVT studies at OSH 4/22/24 negative  - continue Eliquis for now  - CTA pending to r/o PE given increased O2 requirement

## 2024-05-06 NOTE — PROGRESS NOTE ADULT - PROBLEM SELECTOR PLAN 1
- Please monitor blood glucose values TID AC & QHS while eating regular meals and Q6H while NPO  -Adjust insulin Glargine to 24 units QHS  -Adjust insulin Lispro to 8 units TID with meals, hold if NPO or if eating less than 50% of meals; Pt instructed to report to staff if not eating  - Continue with mod dose correctional scale TID with meals and QHS  -Please notify endo team with any changes on steroid doses  Discharge planning:   - Home DM medications: metformin 500 mg BID, + glimepride 1 mg daily + lantus 35 units QHS+ admelog 25 units TIDAC  - Discharge DM medications:   - Continue with metformin 500 mg BID  - STOP glimepride due to recurrent hypoglycemia at home   - Basal/bolus, dose will depend on insulin requirement and steroid plan   - Make sure pt has Rx for all DM supplies and insulin/ DM meds.  - Can follow at endo practice. [address]. Phone . Call for apt closer to discharge- - Patient will need opthalmology and podiatry follow up as outpatient ppsv 50%: pt requires assistance with ambulation and adls

## 2024-05-06 NOTE — PROGRESS NOTE ADULT - PROBLEM SELECTOR PLAN 5
- Pt was seeing Dr. Chua, urologist. At [facility] had failed TOV w/ godwin replaced. Arrives to [facility] w/o Godwin. Pt on outpt med list listed Tamsulosin 0.4 mg, unclear if started at [facility], pt saying she does not take this outpatient. - Continue home Atorvastatin 40 mg QD

## 2024-05-06 NOTE — PROGRESS NOTE ADULT - PROBLEM SELECTOR PLAN 6
- Continue home Atorvastatin 40 mg QD - At OSH Hgb stable, thought to be anemia of chronic disease.   - Continue to trend CBC

## 2024-05-06 NOTE — RAPID RESPONSE TEAM SUMMARY - NSSITUATIONBACKGROUNDRRT_GEN_ALL_CORE
63F hx ILD with advanced fibrosis, PE 2022 on eliquis with severe pHTN and RV failure, who presented to [facility] for SOB, and worsening hypoxemic respiratory failure. Patient was transferred to for lung transplant eval. No longer a candidate at this time as per transplant. RRT called for desat to 84% on baseline 6L NC.    On arrival pt on NRB satting 100%, tachypneic to 40s, , /80, temp 97.6. Moving all extremities, anisocoria after receiving duonebs an hour earlier, lungs with crackles at lung bases. No LE edema. Pt grunting, states she feels short of breath. . NRB taken off, on 6L NC pt satting 89-91% so initiated on HFNC. After HFNC pt satting 100% on 60L/70%, continuing to wean down still. Pt appears much more comfortable on HFNC, no longer tachypneic. CXR obtained during rapid with b/l increased hazy opacities on my read. Prior TTE with pHTN and RV failure. Pending CTA chest to r/o PE. Pt has been tachycardic at 110s over past few days per primary team. Labs drawn for CBC, CMP, VBG w/lytes. Pt is DNR/DNI with trial of NIV.    Ddx includes progression of ILD, pulmonary edema,  63F hx ILD with advanced fibrosis, PE 2022 on eliquis with severe pHTN and RV failure, who presented to [facility] for SOB, and worsening hypoxemic respiratory failure. Patient was transferred to for lung transplant eval. No longer a candidate at this time as per transplant. RRT called for desat to 84% on baseline 6L NC.    On arrival pt on NRB satting 100%, tachypneic to 40s, , /80, temp 97.6. Moving all extremities, anisocoria after receiving duonebs an hour earlier, lungs with crackles at lung bases. No LE edema. Pt grunting, states she feels short of breath. . NRB taken off, on 6L NC pt satting 89-91% so initiated on HFNC. After HFNC pt satting 100% on 60L/70%, continuing to wean down still. Pt appears much more comfortable on HFNC, no longer tachypneic. CXR obtained during rapid with b/l increased hazy opacities on my read. Prior TTE with pHTN and RV failure. Pending CTA chest to r/o PE. Pt has been tachycardic at 110s over past few days per primary team. Labs drawn for CBC, CMP, VBG w/lytes. Pt is DNR/DNI with trial of NIV.    Ddx includes progression of ILD, pulmonary edema, PNA, or PE. Pending CXR read and CTA chest to r/o PE. Based on this can determine whether pt needs additional diuresis. Pt now hemodynamically stable and much more comfortable on HFNC--can continue to wean as tolerated. Primary team to followup labs and imaging.

## 2024-05-06 NOTE — PROGRESS NOTE ADULT - SUBJECTIVE AND OBJECTIVE BOX
seen earlier today     Chief Complaint: Diabetes Mellitus follow up    INTERVAL HX:  Patient was feeling cold and wanted to warm up under her blankets before eating lunch. Lunchtime BG 73mg/dl.  FBG hyperglycemia in 200s and 300s however patient has already received her steroid dose.  Will adjust insulin to try to keep BG between 100-180mg/dl.    Review of Systems:  General: As above  GI: No nausea, vomiting  Endocrine: no  S&Sx of hypoglycemia    Allergies    No Known Allergies    Intolerances      MEDICATIONS  (STANDING):  albuterol/ipratropium for Nebulization 3 milliLiter(s) Nebulizer every 6 hours  apixaban 5 milliGRAM(s) Oral two times a day  atorvastatin 40 milliGRAM(s) Oral at bedtime  budesonide 160 MICROgram(s)/formoterol 4.5 MICROgram(s) Inhaler 2 Puff(s) Inhalation two times a day  chlorhexidine 2% Cloths 1 Application(s) Topical daily  dextrose 10% Bolus 125 milliLiter(s) IV Bolus once  dextrose 5%. 1000 milliLiter(s) (100 mL/Hr) IV Continuous <Continuous>  dextrose 5%. 1000 milliLiter(s) (50 mL/Hr) IV Continuous <Continuous>  dextrose 50% Injectable 12.5 Gram(s) IV Push once  dextrose 50% Injectable 25 Gram(s) IV Push once  furosemide   Injectable 40 milliGRAM(s) IV Push daily  gabapentin 300 milliGRAM(s) Oral daily  glucagon  Injectable 1 milliGRAM(s) IntraMuscular once  insulin glargine Injectable (LANTUS) 30 Unit(s) SubCutaneous at bedtime  insulin lispro (ADMELOG) corrective regimen sliding scale   SubCutaneous three times a day before meals  insulin lispro (ADMELOG) corrective regimen sliding scale   SubCutaneous at bedtime  insulin lispro Injectable (ADMELOG) 12 Unit(s) SubCutaneous three times a day before meals  multivitamin 1 Tablet(s) Oral daily  Ofev (Nintedanib) 150 milliGRAM(s) 150 milliGRAM(s) Oral two times a day  pantoprazole    Tablet 40 milliGRAM(s) Oral two times a day  sildenafil (REVATIO) 20 milliGRAM(s) Oral every 8 hours  traZODone 100 milliGRAM(s) Oral at bedtime      atorvastatin   40 milliGRAM(s) Oral (05-05-24 @ 22:21)    insulin glargine Injectable (LANTUS)   30 Unit(s) SubCutaneous (05-05-24 @ 21:42)    insulin lispro (ADMELOG) corrective regimen sliding scale   8 Unit(s) SubCutaneous (05-06-24 @ 08:53)    insulin lispro Injectable (ADMELOG)   12 Unit(s) SubCutaneous (05-06-24 @ 08:54)   12 Unit(s) SubCutaneous (05-05-24 @ 18:42)    methylPREDNISolone sodium succinate Injectable   40 milliGRAM(s) IV Push (05-05-24 @ 18:40)        PHYSICAL EXAM:  VITALS: T(C): 36.7 (05-06-24 @ 13:05)  T(F): 98.1 (05-06-24 @ 13:05), Max: 98.6 (05-06-24 @ 05:16)  HR: 104 (05-06-24 @ 13:05) (104 - 119)  BP: 114/73 (05-06-24 @ 13:05) (109/66 - 133/77)  RR:  (18 - 18)  SpO2:  (95% - 98%)  Wt(kg): --  GENERAL: NAD  Respiratory: Respirations unlabored   Extremities: Warm, no edema  NEURO: Alert , appropriate     LABS:  POCT Blood Glucose.: 73 mg/dL (05-06-24 @ 12:23)  POCT Blood Glucose.: 320 mg/dL (05-06-24 @ 08:41)  POCT Blood Glucose.: 101 mg/dL (05-05-24 @ 21:07)  POCT Blood Glucose.: 153 mg/dL (05-05-24 @ 18:21)  POCT Blood Glucose.: 103 mg/dL (05-05-24 @ 16:41)  POCT Blood Glucose.: 146 mg/dL (05-05-24 @ 12:13)  POCT Blood Glucose.: 272 mg/dL (05-05-24 @ 08:44)  POCT Blood Glucose.: 194 mg/dL (05-04-24 @ 21:23)  POCT Blood Glucose.: 150 mg/dL (05-04-24 @ 16:43)  POCT Blood Glucose.: 193 mg/dL (05-04-24 @ 13:30)  POCT Blood Glucose.: 232 mg/dL (05-04-24 @ 08:09)  POCT Blood Glucose.: 168 mg/dL (05-03-24 @ 21:28)  POCT Blood Glucose.: 122 mg/dL (05-03-24 @ 16:43)                Thyroid Function Tests:      A1C with Estimated Average Glucose Result: 11.9 % (04-13-24 @ 03:53)    Estimated Average Glucose: 295 mg/dL (04-13-24 @ 03:53)        Diet, Regular:   Consistent Carbohydrate No Snacks (CSTCHO) (04-26-24 @ 15:14) [Active]             SUBJECTIVE AND OBJECTIVE: pt seen and examined at bedside. pt awake and alert, able to participate in exam   Indication for Geriatrics and Palliative Care Services/INTERVAL HPI: goc    OVERNIGHT EVENTS: no acute events o/n     DNR on chart:DNI: Trial NIV  DNI: Trial NIV      Allergies    No Known Allergies    Intolerances    MEDICATIONS  (STANDING):  albuterol/ipratropium for Nebulization 3 milliLiter(s) Nebulizer every 6 hours  apixaban 5 milliGRAM(s) Oral two times a day  atorvastatin 40 milliGRAM(s) Oral at bedtime  budesonide 160 MICROgram(s)/formoterol 4.5 MICROgram(s) Inhaler 2 Puff(s) Inhalation two times a day  chlorhexidine 2% Cloths 1 Application(s) Topical daily  dextrose 10% Bolus 125 milliLiter(s) IV Bolus once  dextrose 5%. 1000 milliLiter(s) (100 mL/Hr) IV Continuous <Continuous>  dextrose 5%. 1000 milliLiter(s) (50 mL/Hr) IV Continuous <Continuous>  dextrose 50% Injectable 25 Gram(s) IV Push once  dextrose 50% Injectable 12.5 Gram(s) IV Push once  furosemide   Injectable 40 milliGRAM(s) IV Push daily  gabapentin 300 milliGRAM(s) Oral daily  glucagon  Injectable 1 milliGRAM(s) IntraMuscular once  insulin glargine Injectable (LANTUS) 24 Unit(s) SubCutaneous at bedtime  insulin lispro (ADMELOG) corrective regimen sliding scale   SubCutaneous at bedtime  insulin lispro (ADMELOG) corrective regimen sliding scale   SubCutaneous three times a day before meals  insulin lispro Injectable (ADMELOG) 8 Unit(s) SubCutaneous three times a day before meals  multivitamin 1 Tablet(s) Oral daily  Ofev (Nintedanib) 150 milliGRAM(s) 150 milliGRAM(s) Oral two times a day  pantoprazole    Tablet 40 milliGRAM(s) Oral two times a day  predniSONE   Tablet 40 milliGRAM(s) Oral daily  sildenafil (REVATIO) 20 milliGRAM(s) Oral every 8 hours  traZODone 100 milliGRAM(s) Oral at bedtime    MEDICATIONS  (PRN):  acetaminophen     Tablet .. 650 milliGRAM(s) Oral every 6 hours PRN Temp greater or equal to 38C (100.4F), Mild Pain (1 - 3)  aluminum hydroxide/magnesium hydroxide/simethicone Suspension 30 milliLiter(s) Oral every 4 hours PRN Dyspepsia  cyclobenzaprine 5 milliGRAM(s) Oral three times a day PRN Muscle Spasm  dextrose Oral Gel 15 Gram(s) Oral once PRN Blood Glucose LESS THAN 70 milliGRAM(s)/deciliter  melatonin 3 milliGRAM(s) Oral at bedtime PRN Insomnia  simethicone 80 milliGRAM(s) Chew every 6 hours PRN Gas      ITEMS UNCHECKED ARE NOT PRESENT    PRESENT SYMPTOMS: [ ]Unable to self-report - see [ ] CPOT [ ] PAINADS [ ] RDOS  Source if other than patient:  [ ]Family   [ ]Team     Pain:  [ ]yes [ x]no  QOL impact -   Location -                    Aggravating factors -  Quality -  Radiation -  Timing-  Severity (0-10 scale):  Minimal acceptable level (0-10 scale):     CPOT:    https://www.[facility].org/getattachment/[email or web address]/Critical-Care-Pain-Observation-Tool-(CPOT)    PAINAD Score: See PAINAD tool and score below       Dyspnea:                           [ ]Mild [x ]Moderate [ ]Severe    RDOS: See RDOS tool and score below   0 to 2  minimal or no respiratory distress   3  mild distress  4 to 6 moderate distress  >7 severe distress      Anxiety:                             [ ]Mild [ ]Moderate [ ]Severe  Fatigue:                             [ ]Mild [ ]Moderate [ ]Severe  Nausea:                             [ ]Mild [ ]Moderate [ ]Severe  Loss of appetite:              [ ]Mild [ ]Moderate [ ]Severe  Constipation:                    [ ]Mild [ ]Moderate [ ]Severe    PCSSQ[Palliative Care Spiritual Screening Question]   Severity (0-10):  Score of 4 or > indicate consideration of Chaplaincy referral.  Chaplaincy Referral: [ ] yes [ ] refused [ ] following [ ] Deferred     Caregiver Morton? : [ ] yes [ ] no [ ] Deferred [ ] Declined             Social work referral [ ] Patient & Family Centered Care Referral [ ]     Anticipatory Grief present?:  [ ] yes [ ] no  [ ] Deferred                  Social work referral [ ] Chaplaincy Referral [ ]    		  Other Symptoms:  [ x]All other review of systems negative     Palliative Performance Status Version 2:   See PPSv2 tool and score below         PHYSICAL EXAM:  Vital Signs Last 24 Hrs  T(C): 36.6 (07 May 2024 05:13), Max: 37.1 (06 May 2024 20:52)  T(F): 97.9 (07 May 2024 05:13), Max: 98.7 (06 May 2024 20:52)  HR: 109 (07 May 2024 05:44) (104 - 121)  BP: 111/52 (07 May 2024 05:13) (111/52 - 126/81)  BP(mean): --  RR: 19 (07 May 2024 05:44) (18 - 19)  SpO2: 92% (07 May 2024 08:17) (92% - 100%)    Parameters below as of 07 May 2024 08:17  Patient On (Oxygen Delivery Method): nasal cannula  O2 Flow (L/min): 6   I&O's Summary    06 May 2024 07:01  -  07 May 2024 07:00  --------------------------------------------------------  IN: 580 mL / OUT: 3500 mL / NET: -2920 mL       GENERAL: [ ]Cachexia    [ x]Alert  [x ]Oriented x3   [ ]Lethargic  [ ]Unarousable  [ ]Verbal  [ ]Non-Verbal  Behavioral:   [ ]Anxiety  [ ]Delirium [ ]Agitation [ ]Other  HEENT:  [ ]Normal   [x ]Dry mouth   [ ]ET Tube/Trach  [ ]Oral lesions  PULMONARY:   [ ]Clear [ ]Tachypnea  [ ]Audible excessive secretions   [ ]Rhonchi        [ ]Right [ ]Left [ ]Bilateral  [ ]Crackles        [ ]Right [ ]Left [ ]Bilateral  [ ]Wheezing     [ ]Right [ ]Left [ ]Bilateral  [x ]Diminished BS [ ] Right [ ]Left [x ]Bilateral  CARDIOVASCULAR:    [x ]Regular [ ]Irregular [ ]Tachy  [ ]Jared [ ]Murmur [ ]Other  GASTROINTESTINAL:  [x ]Soft  [ ]Distended   [x ]+BS  [x ]Non tender [ ]Tender  [ ]Other [ ]PEG [ ]OGT/ NGT   Last BM:   GENITOURINARY:  [ ]Normal [ x]Incontinent   [ ]Oliguria/Anuria   [ ]Pike  MUSCULOSKELETAL:   [ ]Normal   [x ]Weakness  [x ]Bed/Wheelchair bound [ ]Edema  NEUROLOGIC:   [x]No focal deficits  [ ] Cognitive impairment  [ ] Dysphagia [ ]Dysarthria [ ] Paresis [ ]Other   SKIN:   [x ]Normal  [ ]Rash  [ ]Other  [ ]Pressure ulcer(s) [ ]y [ ]n present on admission    CRITICAL CARE:  [ ]Shock Present  [ ]Septic [ ]Cardiogenic [ ]Neurologic [ ]Hypovolemic  [ ]Vasopressors [ ]Inotropes  [ ]Respiratory failure present [ ]Mechanical Ventilation [ ]Non-invasive ventilatory support [ ]High-Flow   [ ]Acute  [ ]Chronic [ ]Hypoxic  [ ]Hypercarbic [ ]Other  [ ]Other organ failure     LABS:                        9.6    8.20  )-----------( 375      ( 07 May 2024 07:05 )             31.4   05-07    142  |  105  |  30<H>  ----------------------------<  107<H>  4.1   |  29  |  0.64    Ca    8.2<L>      07 May 2024 07:05  Phos  1.8     05-06  Mg     1.9     05-06    TPro  5.8<L>  /  Alb  2.8<L>  /  TBili  0.2  /  DBili  x   /  AST  63<H>  /  ALT  66<H>  /  AlkPhos  98  05-06      Urinalysis Basic - ( 07 May 2024 07:05 )    Color: x / Appearance: x / SG: x / pH: x  Gluc: 107 mg/dL / Ketone: x  / Bili: x / Urobili: x   Blood: x / Protein: x / Nitrite: x   Leuk Esterase: x / RBC: x / WBC x   Sq Epi: x / Non Sq Epi: x / Bacteria: x      RADIOLOGY & ADDITIONAL STUDIES:  < from: CT Angio Chest PE Protocol w/ IV Cont (05.06.24 @ 22:52) >    ******PRELIMINARY REPORT******      ******PRELIMINARY REPORT******         ACC: [id number] EXAM:  CT ANGIO CHEST PULM ART WAWI   ORDERED BY: JASON FLANAGAN     PROCEDURE DATE:  05/06/2024    ******PRELIMINARY REPORT******      ******PRELIMINARY REPORT******           INTERPRETATION:  CLINICAL INFORMATION: ILD, increasing shortness of breath    COMPARISON: Multiple prior CT chest, most recent in 4/12/2024.    CONTRAST/COMPLICATIONS:  IV Contrast: Omnipaque 350  64 cc administered   36 cc discarded  Oral Contrast: NONE  Complications: None reported at time of study completion        PRELIMINARY  IMPRESSION:  This is a preliminary report. Please follow-up the final report.    No acute pulmonary embolism in the main, right and left, lumbar, and some   segmental pulmonary arteries. Limited evaluation of some segmental and   most subsegmental pulmonary arteries.    A questionable chronic eccentric filling defect in the right lower lobe   segmental/subsegmental pulmonary artery is present since 1/14/2020.    Increased bilateral groundglass opacity which can be seen in the setting   of infection/inflammation or pulmonary edema.    Redemonstrated reticular opacities with traction bronchiectasis.          ******PRELIMINARY REPORT******      ******PRELIMINARY REPORT******        GT BELL MD; Resident Radiologist  This document is a PRELIMINARY interpretation and is pending final   attending approval. May  6 2024 11:12PM    < end of copied text >    Protein Calorie Malnutrition Present: [ ]mild [ ]moderate [ ]severe [ ]underweight [ ]morbid obesity  https://www.andeal.org/vault/2440/web/files/ONC/Table_Clinical%20Characteristics%20to%20Document%20Malnutrition-White%20JV%20et%20al%202012.pdf    Height (cm): 167.6 (04-25-24 @ 23:50), 167.6 (04-11-24 @ 21:44)  Weight (kg): 56.6 (04-25-24 @ 23:50), 54.7 (04-12-24 @ 04:13)  BMI (kg/m2): 20.1 (04-25-24 @ 23:50), 19.5 (04-12-24 @ 04:13)    [x ]PPSV2 < or = 30%  [ ]significant weight loss [ ]poor nutritional intake [ ]anasarca[ ]Artificial Nutrition    Other REFERRALS:  [ ]Hospice  [ ]Child Life  [ ]Social Work  [ ]Case management [ ]Holistic Therapy     Goals of Care Document:

## 2024-05-06 NOTE — PROGRESS NOTE ADULT - ASSESSMENT
63-yo F with PMHx of chronic AHRF on 2-3L NC at b/l 2/2 ILD (Followed w/ Dr. Primo Marlow, [facility]), PE on Eliquis, severe pHTN, Cor pulmonale, DM, presented initially with SOB, dry cough, chest pain, LE edema, recent admission to [facility] in March for ILD flare, admitted to outside hospital->ICU for AHRF 2/2 ILD flare, transferred to [facility] for lung transplant evaluation.

## 2024-05-06 NOTE — PROGRESS NOTE ADULT - PROBLEM SELECTOR PLAN 11
"  CONSULT NOTE    Requested by:   Aleta Batres APRN Williams, Derrick H, DO      Chief Complaint   Patient presents with   • Consult   • Sleeping Problem       Subjective:  Rikki Stewart is a 63 y.o. male.     History of Present Illness   Patient came in today for evaluation of possible sleep apnea. Patient endorses no known snoring. he also says that he has been tired and has fatigue during the day, for the past few years. he occasionally wakes up gasping for breath.     The patient says that he rarely gets restful night sleep and his quality has diminished considerably. he does have a tendency to feel sleepy while watching TV and reading a book.      he is not complaining of occasional headaches. There is no known family history of sleep apnea.     Patient suffers from hypertension. he drinks 10 cups/cans of caffeinated drinks per day.      The following portions of the patient's history were reviewed and updated as appropriate: allergies, current medications, past family history, past medical history, past social history and past surgical history.    Review of Systems   Constitutional: Negative for chills, fatigue and fever.   HENT: Negative for sinus pressure, sneezing and sore throat.    Respiratory: Negative for cough, chest tightness, shortness of breath and wheezing.    Cardiovascular: Negative for palpitations and leg swelling.   Psychiatric/Behavioral: Positive for sleep disturbance.   All other systems reviewed and are negative.      Past Medical History:   Diagnosis Date   • Bell's palsy    • Fracture     R elbow; R ankle    • Hypertension    • Pancreatitis        Social History     Tobacco Use   • Smoking status: Never Smoker   • Smokeless tobacco: Never Used   Substance Use Topics   • Alcohol use: No         Objective:  Visit Vitals  /74   Pulse 54   Resp 20   Ht 177.8 cm (70\")   Wt 135 kg (297 lb)   SpO2 96%   BMI 42.62 kg/m²       Physical Exam  Vitals reviewed.   Constitutional:  "      Appearance: He is well-developed.   HENT:      Head: Atraumatic.      Mouth/Throat:      Comments: Oropharynx was crowded.  Eyes:      Pupils: Pupils are equal, round, and reactive to light.   Neck:      Thyroid: No thyromegaly.      Vascular: No JVD.      Trachea: No tracheal deviation.      Comments: Increased neck circumference noted.  Cardiovascular:      Rate and Rhythm: Normal rate and regular rhythm.   Pulmonary:      Effort: Pulmonary effort is normal. No respiratory distress.      Breath sounds: Normal breath sounds.   Musculoskeletal:      Right lower leg: No edema.      Left lower leg: No edema.      Comments: Gait was normal.   Skin:     General: Skin is warm and dry.   Neurological:      Mental Status: He is alert and oriented to person, place, and time.   Psychiatric:         Mood and Affect: Mood normal.         Behavior: Behavior normal.           Assessment/Plan:  Diagnoses and all orders for this visit:    1. Obstructive sleep apnea (Primary)  -     Home Sleep Study; Future    2. Snoring  -     Home Sleep Study; Future    3. Excessive daytime sleepiness  -     Home Sleep Study; Future    4. Morbid obesity, unspecified obesity type (HCC)  -     Home Sleep Study; Future        Return in about 4 months (around 7/8/2022) for Melissa/Blanquita, ....Also 11 mths w/ Dr. Collins.    DISCUSSION(if any):  Laboratory workup was also reviewed which showed hemoglobin of 17.5 with hematocrit 52.5.  Bicarbonate in the serum was 25.    Referring physicians office note was also reviewed that did mention fatigue & possible sleep apnea    ===========================  ===========================    Sleep questionnaire was reviewed with the patient    The pathophysiology of sleep apnea was discussed, with the patient.     We will encourage him to schedule the sleep study soon.     The patient was made aware of the limitation of the home sleep study, whereby it may underestimate the true AHI and also carries a low  sensitivity.  I have informed him that even if the home sleep study is negative, we may suggest an in lab sleep study to completely and definitively rule out/in sleep apnea.  The patient has understood.  This was communicated to the patient, in case home study is to be requested.    The patient is agreeable to try CPAP/BiPAP, if needed.     Patient was educated on good sleep hygiene measures and voiced understanding of the same.     Patient was given reading material regarding sleep apnea    Patient was counseled regarding weight loss.         Dictated utilizing Dragon dictation.    This document was electronically signed by Consuelo Collins MD on 03/08/22 at 14:51 EST     - Continue home Trazodone 100 mg qd + Melatonin 3 PRN for sleep difficulties DVT PPx: Eliquis  Diet: Regular  Bowel Regimen: Miralax, Senna, dulcolax suppository/enema   Code: DNR/DNI, molst completed in chart   Aspiration precautions  Fall precautions

## 2024-05-06 NOTE — PROGRESS NOTE ADULT - ATTENDING COMMENTS
62 y/o F w/hx as above including chronic hypoxemic respiratory failure seconadry to ILD (unclear etiology) and severe pHTN w/cor pulmonale, and prior PE admitted to OSH for acute on chronic hypoxemic respiratory failure transferred to [facility] for lung transplant eval. Unclear etiology of worsening, presumed ILD exacerbation. O2 requirements currently coming down.    - Supplemental O2 as needed goal O2 sat >= 90%  - Steroid taper as detailed above, resume prior dose if any worsening with decrease in dosage  - Continue diuresis  - Bronchodilators  - Continue sildenafil 20mg TID  - Continue therapeutic AC  - Transplant candidacy as per transplant pulm

## 2024-05-06 NOTE — PROGRESS NOTE ADULT - PROBLEM SELECTOR PLAN 7
- At OSH Hgb stable, thought to be anemia of chronic disease.   - Hgb last 10.2  - Continue to trend CBC T2DM: Home DM medications: metformin 500 mg BID, + glimepride 1 mg daily + lantus 35 units QHS+ admelog 25 units TIDAC  - Pt w/ hx of uncontrolled T2DM, a1c 11.9.   - Endocrine consulted via email, recs appreciated  - On glargine 30 units qhs, lispro 12u TID qac, hold if NPO or eating < 50% of meals  - MISS FS tid qac qhs  - Discharge DM medications:   - Continue with metformin 500 mg BID  - STOP glimepride due to recurrent hypoglycemia at home   - Basal/bolus, dose will depend on insulin requirement and steroid plan   - Patient will follow up at  [facility]:  [address] [place] [address]. [address]. [place] [address]  Tel: [phone]

## 2024-05-06 NOTE — PROGRESS NOTE ADULT - ASSESSMENT
63F hx ILD/ probable IPF (On 2-3LNC at home), PE 2022 on eliquis, severe pHTN w/ prior cor pulmonale (RVSP 68 - 10/22 w/ TTE from 4/12 w/ PASP 28, normal LV/RV SF), IDDM, presenting as a transfer from [place] for lung transplant eval iso SOB, dry cough, chest pain. She has been receiving duonebs, symbicort, lasix, ofev and IV steroids. Continuing with AC. Solu-medrol is ordered for 40 IV BID.  CT Chest 4/12/2024: Findings suggesting interstitial lung disease without significant interval progression. No evidence of pneumonia.   CT scan from 4/12/2024 when compared with CT scan from 2020 has shown significant progression.      Recommendations  - c/w weaning O2 as able - On 5.5L NC currently.   - Continue with Symbicort, duonebs, Ofev, Eliquis,   - c/w steroids - can switch to oral prednisone 40mg - taper by 10mg every week.   - Continue with diuresis - goal net neg 1-2L  - Can continue to use benzo prn for anxiety  - TTE results appreciated - PFO + e/o decreased Right ventricular function  - fu sputum cx  - c/w sildenafil 20mg TID - Side effects may include hypotension, nausea, light-headedness, let us know if patient experiences these symptoms and decrease dosage to sildenafil 10 tid if experiencing any symptoms    Pulm to follow

## 2024-05-07 NOTE — PROGRESS NOTE ADULT - PROBLEM SELECTOR PLAN 4
will sign off as goals are established  palliative contact number provided to pt   case discussed with primary team  Can be reached by TEAMS M-F 9-5 Nargis Sorensen Any other time please page [phone] if needed

## 2024-05-07 NOTE — PROGRESS NOTE ADULT - PROBLEM SELECTOR PLAN 2
Hx of PE on CTA 10/29/22 w/ RLL segmental/subsegmental pulmonary embolism  - Repeat CTA 2/28/23 w/o PE.   - DVT studies at OSH 4/22/24 negative  - continue Eliquis for now  - CTA reviewed, appears negative for acute PE, f/u final read

## 2024-05-07 NOTE — PROGRESS NOTE ADULT - PROBLEM SELECTOR PLAN 2
pt currently not lung transplant candidate, RRT o/n for hypoxia   at time of exam on NC, dyspnea with exertion  discussed symptom management- as per pt not interested in symptom management for dyspnea

## 2024-05-07 NOTE — PROGRESS NOTE ADULT - ATTENDING COMMENTS
62 y/o F w/hx as above including chronic hypoxemic respiratory failure seconadry to ILD (unclear etiology) and severe pHTN w/cor pulmonale, and prior PE admitted to OSH for acute on chronic hypoxemic respiratory failure transferred to [facility] for lung transplant eval. Unclear etiology of worsening, presumed ILD exacerbation. O2 requirements currently coming down.    - Supplemental O2 as needed goal O2 sat >= 90%  - Steroid taper as detailed above, resume prior dose if any worsening with decrease in dosage  - Continue diuresis  - Bronchodilators  - Continue sildenafil 20mg TID  - Continue therapeutic AC  - Transplant candidacy as per transplant pulm

## 2024-05-07 NOTE — PROGRESS NOTE ADULT - ASSESSMENT
63-yo F with PMHx of chronic AHRF on 2-3L NC at b/l 2/2 ILD (Followed w/ Dr. Primo Marlow, [facility]), PE on Eliquis, severe pHTN, Cor pulmonale, DM, presented initially with SOB, dry cough, chest pain, LE edema, recent admission to [facility] in March for ILD flare, admitted to outside hospital->ICU for AHRF 2/2 ILD flare, transferred to [facility] for lung transplant evaluation.

## 2024-05-07 NOTE — PROVIDER CONTACT NOTE (HYPOGLYCEMIA EVENT) - NS PROVIDER CONTACT BACKGROUND-HYPO
Age: 63y    Gender: Female    POCT Blood Glucose:  73 mg/dL (05-07-24 @ 08:30)  63 mg/dL (05-07-24 @ 08:07)  61 mg/dL (05-07-24 @ 08:05)  157 mg/dL (05-07-24 @ 00:35)  101 mg/dL (05-06-24 @ 21:43)  107 mg/dL (05-06-24 @ 20:05)  135 mg/dL (05-06-24 @ 16:52)  73 mg/dL (05-06-24 @ 12:23)      eMAR:atorvastatin   40 milliGRAM(s) Oral (05-06-24 @ 22:59)    insulin glargine Injectable (LANTUS)   24 Unit(s) SubCutaneous (05-07-24 @ 00:38)    insulin lispro (ADMELOG) corrective regimen sliding scale   8 Unit(s) SubCutaneous (05-06-24 @ 08:53)    insulin lispro Injectable (ADMELOG)   12 Unit(s) SubCutaneous (05-06-24 @ 08:54)    insulin lispro Injectable (ADMELOG)   8 Unit(s) SubCutaneous (05-06-24 @ 17:41)    predniSONE   Tablet   40 milliGRAM(s) Oral (05-07-24 @ 05:43)

## 2024-05-07 NOTE — PROGRESS NOTE ADULT - PROBLEM SELECTOR PLAN 1
Baseline 2-3L NC. Hx of ILD w/ c/f IPF. Followed w/ DOMENICO Ding. Transferred to [facility] for lung transplant eval  - Currently on 6L NC, wean as tolerated. When requirements ~4-5L, obtain ambulatory O2 requirements for d.c planning  - Continue home Ofev 150mg BID  - Duonebs, Symbicort  - Transitioned to PO Prednisone 40mg, plan to taper by 10mg every 7 days.   - repeat TTE with bubble study revealed severe RH dysfunction with elevated pulmonary pressure, grade II diastolic dysfunction   - c/w Lasix 40 iv QD. Was net neg over 2L over last 24 hours. Transition to PO when euvolemic  - valium 2.5 mg q12 PRN anxiety   - Deemed not to be a transplant candidate 4/30

## 2024-05-07 NOTE — CHART NOTE - NSCHARTNOTEFT_GEN_A_CORE
Age: 63y    Gender: Female    POCT Blood Glucose:  135 mg/dL (05-07-24 @ 12:44)  151 mg/dL (05-07-24 @ 08:51)  73 mg/dL (05-07-24 @ 08:30)  63 mg/dL (05-07-24 @ 08:07)  61 mg/dL (05-07-24 @ 08:05)  157 mg/dL (05-07-24 @ 00:35)  101 mg/dL (05-06-24 @ 21:43)  107 mg/dL (05-06-24 @ 20:05)      eMAR:atorvastatin   40 milliGRAM(s) Oral (05-06-24 @ 22:59)    insulin glargine Injectable (LANTUS)   24 Unit(s) SubCutaneous (05-07-24 @ 00:38)    insulin lispro Injectable (ADMELOG)   8 Unit(s) SubCutaneous (05-07-24 @ 13:20)   8 Unit(s) SubCutaneous (05-07-24 @ 08:55)   8 Unit(s) SubCutaneous (05-06-24 @ 17:41)    predniSONE   Tablet   40 milliGRAM(s) Oral (05-07-24 @ 05:43)     POC glucose, insulin requirements, lab values reviewed.     Patient with hypoglycemia this morning, treated up to 151mg/dl.   Will decrease Lantus dose to 22units QHS.  Continue admelog 8 units with each meal   Continue low dose admelog scale.     Contact via [employer] Teams during business hours  To reach covering provider access AMION via sunrise tools  For Urgent matters/after-hours/weekends/holidays please page endocrine fellow on call   For nonurgent matters please email [email or web address]    Please note that this patient may be followed by different provider tomorrow.  Notify endocrine 24 hours prior to discharge for final recommendations

## 2024-05-07 NOTE — PROGRESS NOTE ADULT - PROBLEM SELECTOR PLAN 7
T2DM: Home DM medications: metformin 500 mg BID, + glimepride 1 mg daily + lantus 35 units QHS+ admelog 25 units TIDAC  - Pt w/ hx of uncontrolled T2DM, a1c 11.9.   - Endocrine consulted via email, recs appreciated  - On glargine 24 units qhs, lispro 8u TID qac, hold if NPO or eating < 50% of meals  - MISS FS tid qac qhs  - Discharge DM medications:   - Continue with metformin 500 mg BID  - STOP glimepride due to recurrent hypoglycemia at home   - Basal/bolus, dose will depend on insulin requirement and steroid plan   - Patient will follow up at  [facility]:  [address] [place] [address]. [address]. [place] [address]  Tel: [phone]

## 2024-05-07 NOTE — PROGRESS NOTE ADULT - SUBJECTIVE AND OBJECTIVE BOX
Pulmonary Consult Follow up     Interval Events:    RRT overnight for hypoxia and shortness of breath - switched to HFNC with improvement. Weaned back to NC this morning. Still with some dyspnea and tachypnea.     CTA done overnight with increased GGO on top of her underlying lung disease. Suspect pulmonary edema.     REVIEW OF SYSTEMS:  [x] All other systems negative except per HPI   [ ] Unable to assess ROS because ________    OBJECTIVE:  ICU Vital Signs Last 24 Hrs  T(C): 36.6 (07 May 2024 05:13), Max: 37.1 (06 May 2024 20:52)  T(F): 97.9 (07 May 2024 05:13), Max: 98.7 (06 May 2024 20:52)  HR: 110 (07 May 2024 08:30) (104 - 121)  BP: 111/52 (07 May 2024 05:13) (111/52 - 126/81)  BP(mean): --  ABP: --  ABP(mean): --  RR: 20 (07 May 2024 08:30) (18 - 20)  SpO2: 94% (07 May 2024 08:30) (92% - 100%)    O2 Parameters below as of 07 May 2024 08:30  Patient On (Oxygen Delivery Method): nasal cannula w/ humidification  O2 Flow (L/min): 6            05-06 @ 07:01  -  05-07 @ 07:00  --------------------------------------------------------  IN: 580 mL / OUT: 3500 mL / NET: -2920 mL        PHYSICAL EXAM:  GENERAL: NAD, well-groomed, well-developed  HEAD:  Atraumatic, Normocephalic  EYES: EOMI, conjunctiva and sclera clear  ENMT: Moist mucous membranes  CHEST/LUNG: Clear to auscultation bilaterally;  HEART: Regular rate and rhythm; No murmurs, rubs, or gallops  ABDOMEN: Nondistended  VASCULAR: No  cyanosis   SKIN: No rashes or lesions  NERVOUS SYSTEM:  Alert & Oriented X3, Good concentration    HOSPITAL MEDICATIONS:  MEDICATIONS  (STANDING):  albuterol/ipratropium for Nebulization 3 milliLiter(s) Nebulizer every 6 hours  apixaban 5 milliGRAM(s) Oral two times a day  atorvastatin 40 milliGRAM(s) Oral at bedtime  budesonide 160 MICROgram(s)/formoterol 4.5 MICROgram(s) Inhaler 2 Puff(s) Inhalation two times a day  chlorhexidine 2% Cloths 1 Application(s) Topical daily  dextrose 10% Bolus 125 milliLiter(s) IV Bolus once  dextrose 5%. 1000 milliLiter(s) (100 mL/Hr) IV Continuous <Continuous>  dextrose 5%. 1000 milliLiter(s) (50 mL/Hr) IV Continuous <Continuous>  dextrose 50% Injectable 12.5 Gram(s) IV Push once  dextrose 50% Injectable 25 Gram(s) IV Push once  furosemide   Injectable 40 milliGRAM(s) IV Push daily  gabapentin 300 milliGRAM(s) Oral daily  glucagon  Injectable 1 milliGRAM(s) IntraMuscular once  insulin glargine Injectable (LANTUS) 24 Unit(s) SubCutaneous at bedtime  insulin lispro (ADMELOG) corrective regimen sliding scale   SubCutaneous at bedtime  insulin lispro (ADMELOG) corrective regimen sliding scale   SubCutaneous three times a day before meals  insulin lispro Injectable (ADMELOG) 8 Unit(s) SubCutaneous three times a day before meals  multivitamin 1 Tablet(s) Oral daily  Ofev (Nintedanib) 150 milliGRAM(s) 150 milliGRAM(s) Oral two times a day  pantoprazole    Tablet 40 milliGRAM(s) Oral two times a day  predniSONE   Tablet 40 milliGRAM(s) Oral daily  sildenafil (REVATIO) 20 milliGRAM(s) Oral every 8 hours  traZODone 100 milliGRAM(s) Oral at bedtime    MEDICATIONS  (PRN):  acetaminophen     Tablet .. 650 milliGRAM(s) Oral every 6 hours PRN Temp greater or equal to 38C (100.4F), Mild Pain (1 - 3)  aluminum hydroxide/magnesium hydroxide/simethicone Suspension 30 milliLiter(s) Oral every 4 hours PRN Dyspepsia  cyclobenzaprine 5 milliGRAM(s) Oral three times a day PRN Muscle Spasm  dextrose Oral Gel 15 Gram(s) Oral once PRN Blood Glucose LESS THAN 70 milliGRAM(s)/deciliter  melatonin 3 milliGRAM(s) Oral at bedtime PRN Insomnia  simethicone 80 milliGRAM(s) Chew every 6 hours PRN Gas      LABS:  05-07    142  |  105  |  30<H>  ----------------------------<  107<H>  4.1   |  29  |  0.64  05-06    146<H>  |  109<H>  |  33<H>  ----------------------------<  91  3.9   |  28  |  0.78    Ca    8.2<L>      07 May 2024 07:05  Ca    8.3<L>      06 May 2024 20:19  Phos  1.8     05-06  Mg     1.9     05-06    TPro  5.8<L>  /  Alb  2.8<L>  /  TBili  0.2  /  DBili  x   /  AST  63<H>  /  ALT  66<H>  /  AlkPhos  98  05-06    Magnesium: 1.9 mg/dL (05-06-24 @ 20:19)    Phosphorus: 1.8 mg/dL (05-06-24 @ 20:19)                    Urinalysis Basic - ( 07 May 2024 07:05 )    Color: x / Appearance: x / SG: x / pH: x  Gluc: 107 mg/dL / Ketone: x  / Bili: x / Urobili: x   Blood: x / Protein: x / Nitrite: x   Leuk Esterase: x / RBC: x / WBC x   Sq Epi: x / Non Sq Epi: x / Bacteria: x                              9.6    8.20  )-----------( 375      ( 07 May 2024 07:05 )             31.4                         9.8    10.76 )-----------( 395      ( 06 May 2024 20:19 )             31.5     CAPILLARY BLOOD GLUCOSE      POCT Blood Glucose.: 151 mg/dL (07 May 2024 08:51)  POCT Blood Glucose.: 73 mg/dL (07 May 2024 08:30)  POCT Blood Glucose.: 63 mg/dL (07 May 2024 08:07)  POCT Blood Glucose.: 61 mg/dL (07 May 2024 08:05)  POCT Blood Glucose.: 157 mg/dL (07 May 2024 00:35)  POCT Blood Glucose.: 101 mg/dL (06 May 2024 21:43)  POCT Blood Glucose.: 107 mg/dL (06 May 2024 20:05)  POCT Blood Glucose.: 135 mg/dL (06 May 2024 16:52)  POCT Blood Glucose.: 73 mg/dL (06 May 2024 12:23)

## 2024-05-07 NOTE — CHART NOTE - NSCHARTNOTEFT_GEN_A_CORE
HPI:  63-yo F with PMHx of chronic AHRF on 2-3L NC at b/l 2/2 ILD (Followed w/ Dr. Primo Marlow, [facility]), PE on Eliquis, severe pHTN, Cor pulmonale, DM, presented initially with SOB, dry cough, chest pain, LE edema, recent admission to [facility] in March for ILD flare, admitted to [facility]->ICU for AHRF 2/2 ILD flare, transferred to [facility] for lung transplant evaluation. Pt reporting mild SOB, no fever, no sputum, no SOB, no chest pain, no edema. Reports constipation, last BM 2 days ago, with some stomach pain.     At [place] ICU, she was treated w/ IV steroids->PO taper, duonebs, symbicort, lasix. Pike placed for urinary retention, taken out prior to arrival although unclear if passed TOV at OSH. Had been weaned to NC while but was complicated by increased WOB, placed back on HFNC 50/60. Most recently escalated to methylprednisolone 40 mg IV BID. Was being treated w/ Ofev 150 mg BID, daily diuresis w/ Lasix 40->20 mg IVP on 4/25 based on volume assessment, 4/24 CXR w/ c/f R>L patchy opacities c/f pulmonary edema. Upon transfer here, admission vitals notable for temp afebrile, , /79, HFNC similar settings 45/60 100%. Labs notable for downtrending white count, stable anemia to 9-10, stable thrombocytosis to 400's. ABG 7.48 / 37 / 71 / 28, lactate 1.2.      Of note, pt is DNR/DNI trial of NIPPV - confirmed this with the patient and completed MOLST, in chart.  (26 Apr 2024 01:17)     Patient is s/p RRT for hypoxia. See RRT note for full detail.  Post RRT, patient placed on HFNC  F/u with Labs  Discussed with RN  Continue to monitor  Will endorse to primary team in AM.      Mary Kovacs PA-C  Department of Medicine  Spectra [phone]

## 2024-05-07 NOTE — PROGRESS NOTE ADULT - SUBJECTIVE AND OBJECTIVE BOX
Berhane Tobar MD  Division of Hospital Medicine  Available via MS teams  ---------------------------------------------------------    ESTEBAN BRANHAM  63y  Female      Patient is a 63y old  Female who presents with a chief complaint of SOB (07 May 2024 10:54)      INTERVAL HPI/OVERNIGHT EVENTS:  Seen at bedside. RRT overnight for hypoxia, now back on NC      REVIEW OF SYSTEMS: 10 point ROS negative unless listed above    T(C): 36.6 (05-07-24 @ 05:13), Max: 37.1 (05-06-24 @ 20:52)  HR: 110 (05-07-24 @ 08:30) (104 - 121)  BP: 111/52 (05-07-24 @ 05:13) (111/52 - 126/81)  RR: 20 (05-07-24 @ 08:30) (18 - 20)  SpO2: 94% (05-07-24 @ 08:30) (92% - 100%)  Wt(kg): --Vital Signs Last 24 Hrs  T(C): 36.6 (07 May 2024 05:13), Max: 37.1 (06 May 2024 20:52)  T(F): 97.9 (07 May 2024 05:13), Max: 98.7 (06 May 2024 20:52)  HR: 110 (07 May 2024 08:30) (104 - 121)  BP: 111/52 (07 May 2024 05:13) (111/52 - 126/81)  BP(mean): --  RR: 20 (07 May 2024 08:30) (18 - 20)  SpO2: 94% (07 May 2024 08:30) (92% - 100%)    Parameters below as of 07 May 2024 08:30  Patient On (Oxygen Delivery Method): nasal cannula w/ humidification  O2 Flow (L/min): 6      PHYSICAL EXAM:  GENERAL: NAD, well-groomed, well-developed  NECK: Supple, No JVD  CHEST/LUNG: CTA   HEART: Tachycardic  ABDOMEN: Soft, tender, Nondistended; Bowel sounds present.    EXTREMITIES:  2+ Peripheral Pulses, No clubbing, cyanosis, or edema  SKIN: No rashes or lesions  PSYCH: Alert & Oriented x3        LABS:                        9.6    8.20  )-----------( 375      ( 07 May 2024 07:05 )             31.4     05-07    142  |  105  |  30<H>  ----------------------------<  107<H>  4.1   |  29  |  0.64    Ca    8.2<L>      07 May 2024 07:05  Phos  1.8     05-06  Mg     1.9     05-06    TPro  5.8<L>  /  Alb  2.8<L>  /  TBili  0.2  /  DBili  x   /  AST  63<H>  /  ALT  66<H>  /  AlkPhos  98  05-06      Urinalysis Basic - ( 07 May 2024 07:05 )    Color: x / Appearance: x / SG: x / pH: x  Gluc: 107 mg/dL / Ketone: x  / Bili: x / Urobili: x   Blood: x / Protein: x / Nitrite: x   Leuk Esterase: x / RBC: x / WBC x   Sq Epi: x / Non Sq Epi: x / Bacteria: x      CAPILLARY BLOOD GLUCOSE      POCT Blood Glucose.: 151 mg/dL (07 May 2024 08:51)  POCT Blood Glucose.: 73 mg/dL (07 May 2024 08:30)  POCT Blood Glucose.: 63 mg/dL (07 May 2024 08:07)  POCT Blood Glucose.: 61 mg/dL (07 May 2024 08:05)  POCT Blood Glucose.: 157 mg/dL (07 May 2024 00:35)  POCT Blood Glucose.: 101 mg/dL (06 May 2024 21:43)  POCT Blood Glucose.: 107 mg/dL (06 May 2024 20:05)  POCT Blood Glucose.: 135 mg/dL (06 May 2024 16:52)  POCT Blood Glucose.: 73 mg/dL (06 May 2024 12:23)        Urinalysis Basic - ( 07 May 2024 07:05 )    Color: x / Appearance: x / SG: x / pH: x  Gluc: 107 mg/dL / Ketone: x  / Bili: x / Urobili: x   Blood: x / Protein: x / Nitrite: x   Leuk Esterase: x / RBC: x / WBC x   Sq Epi: x / Non Sq Epi: x / Bacteria: x        RADIOLOGY & ADDITIONAL TESTS:    Imaging Personally Reviewed:  [ ] YES  [ ] NO

## 2024-05-07 NOTE — PROGRESS NOTE ADULT - SUBJECTIVE AND OBJECTIVE BOX
SUBJECTIVE AND OBJECTIVE: Pt seen and examined at bedside. pt appeared dyspneic at rest, remains on NC. Able to participate in exam.   Indication for Geriatrics and Palliative Care Services/INTERVAL HPI: GOC    OVERNIGHT EVENTS: RRT o/n for hypoxia requiring HFNC.     DNR on chart:DNI: Trial NIV  DNI: Trial NIV      Allergies    No Known Allergies    Intolerances    MEDICATIONS  (STANDING):  albuterol/ipratropium for Nebulization 3 milliLiter(s) Nebulizer every 6 hours  apixaban 5 milliGRAM(s) Oral two times a day  atorvastatin 40 milliGRAM(s) Oral at bedtime  budesonide 160 MICROgram(s)/formoterol 4.5 MICROgram(s) Inhaler 2 Puff(s) Inhalation two times a day  chlorhexidine 2% Cloths 1 Application(s) Topical daily  dextrose 10% Bolus 125 milliLiter(s) IV Bolus once  dextrose 5%. 1000 milliLiter(s) (100 mL/Hr) IV Continuous <Continuous>  dextrose 5%. 1000 milliLiter(s) (50 mL/Hr) IV Continuous <Continuous>  dextrose 50% Injectable 12.5 Gram(s) IV Push once  dextrose 50% Injectable 25 Gram(s) IV Push once  furosemide   Injectable 40 milliGRAM(s) IV Push daily  furosemide   Injectable 20 milliGRAM(s) IV Push once  gabapentin 300 milliGRAM(s) Oral daily  glucagon  Injectable 1 milliGRAM(s) IntraMuscular once  insulin glargine Injectable (LANTUS) 24 Unit(s) SubCutaneous at bedtime  insulin lispro (ADMELOG) corrective regimen sliding scale   SubCutaneous at bedtime  insulin lispro (ADMELOG) corrective regimen sliding scale   SubCutaneous three times a day before meals  insulin lispro Injectable (ADMELOG) 8 Unit(s) SubCutaneous three times a day before meals  multivitamin 1 Tablet(s) Oral daily  Ofev (Nintedanib) 150 milliGRAM(s) 150 milliGRAM(s) Oral two times a day  pantoprazole    Tablet 40 milliGRAM(s) Oral two times a day  predniSONE   Tablet 40 milliGRAM(s) Oral daily  sildenafil (REVATIO) 20 milliGRAM(s) Oral every 8 hours  traZODone 100 milliGRAM(s) Oral at bedtime    MEDICATIONS  (PRN):  acetaminophen     Tablet .. 650 milliGRAM(s) Oral every 6 hours PRN Temp greater or equal to 38C (100.4F), Mild Pain (1 - 3)  aluminum hydroxide/magnesium hydroxide/simethicone Suspension 30 milliLiter(s) Oral every 4 hours PRN Dyspepsia  cyclobenzaprine 5 milliGRAM(s) Oral three times a day PRN Muscle Spasm  dextrose Oral Gel 15 Gram(s) Oral once PRN Blood Glucose LESS THAN 70 milliGRAM(s)/deciliter  melatonin 3 milliGRAM(s) Oral at bedtime PRN Insomnia  simethicone 80 milliGRAM(s) Chew every 6 hours PRN Gas      ITEMS UNCHECKED ARE NOT PRESENT    PRESENT SYMPTOMS: [ ]Unable to self-report - see [ ] CPOT [ ] PAINADS [ ] RDOS  Source if other than patient:  [ ]Family   [ ]Team     Pain:  [ ]yes [ x]no  QOL impact -   Location -                    Aggravating factors -  Quality -  Radiation -  Timing-  Severity (0-10 scale):  Minimal acceptable level (0-10 scale):     CPOT:    https://www.[email or web address].org/getattachment/[email or web address]/Critical-Care-Pain-Observation-Tool-(CPOT)    PAINAD Score: See PAINAD tool and score below       Dyspnea:                           [ ]Mild [ x]Moderate [ ]Severe    RDOS: See RDOS tool and score below   0 to 2  minimal or no respiratory distress   3  mild distress  4 to 6 moderate distress  >7 severe distress      Anxiety:                             [ ]Mild [ ]Moderate [ ]Severe  Fatigue:                             [ ]Mild [ ]Moderate [ ]Severe  Nausea:                             [ ]Mild [ ]Moderate [ ]Severe  Loss of appetite:              [ ]Mild [ ]Moderate [ ]Severe  Constipation:                    [ ]Mild [ ]Moderate [ ]Severe    PCSSQ[Palliative Care Spiritual Screening Question]   Severity (0-10):  Score of 4 or > indicate consideration of Chaplaincy referral.  Chaplaincy Referral: [ ] yes [ ] refused [ ] following [ ] Deferred     Caregiver Vilas? : [ ] yes [ ] no [ ] Deferred [ ] Declined             Social work referral [ ] Patient & Family Centered Care Referral [ ]     Anticipatory Grief present?:  [ ] yes [ ] no  [ ] Deferred                  Social work referral [ ] Chaplaincy Referral [ ]    		  Other Symptoms:  [ x]All other review of systems negative     Palliative Performance Status Version 2:   See PPSv2 tool and score below         PHYSICAL EXAM:  Vital Signs Last 24 Hrs  T(C): 36.6 (07 May 2024 05:13), Max: 37.1 (06 May 2024 20:52)  T(F): 97.9 (07 May 2024 05:13), Max: 98.7 (06 May 2024 20:52)  HR: 110 (07 May 2024 08:30) (104 - 121)  BP: 111/52 (07 May 2024 05:13) (111/52 - 126/81)  BP(mean): --  RR: 20 (07 May 2024 08:30) (18 - 20)  SpO2: 94% (07 May 2024 08:30) (92% - 100%)    Parameters below as of 07 May 2024 08:30  Patient On (Oxygen Delivery Method): nasal cannula w/ humidification  O2 Flow (L/min): 6   I&O's Summary    06 May 2024 07:01  -  07 May 2024 07:00  --------------------------------------------------------  IN: 580 mL / OUT: 3500 mL / NET: -2920 mL       GENERAL: [ ]Cachexia    [x ]Alert  [ x]Oriented x  3 [ ]Lethargic  [ ]Unarousable  [ ]Verbal  [ ]Non-Verbal  Behavioral:   [ ]Anxiety  [ ]Delirium [ ]Agitation [ ]Other  HEENT:  [ ]Normal   [x ]Dry mouth   [ ]ET Tube/Trach  [ ]Oral lesions  PULMONARY:   [ ]Clear [ x]Tachypnea  [ ]Audible excessive secretions   [ ]Rhonchi        [ ]Right [ ]Left [ ]Bilateral  [ ]Crackles        [ ]Right [ ]Left [ ]Bilateral  [ ]Wheezing     [ ]Right [ ]Left [ ]Bilateral  [x ]Diminished BS [ ] Right [ ]Left [ x]Bilateral  CARDIOVASCULAR:    [x ]Regular [ ]Irregular [ ]Tachy  [ ]Jared [ ]Murmur [ ]Other  GASTROINTESTINAL:  [ ]Soft  [ ]Distended   [x ]+BS  [ x]Non tender [ ]Tender  [ ]Other [ ]PEG [ ]OGT/ NGT   Last BM:   GENITOURINARY:  [ ]Normal [ x]Incontinent   [ ]Oliguria/Anuria   [ ]Pike  MUSCULOSKELETAL:   [ ]Normal   [ x]Weakness  [ x]Bed/Wheelchair bound [ ]Edema  NEUROLOGIC:   [x ]No focal deficits  [ ] Cognitive impairment  [ ] Dysphagia [ ]Dysarthria [ ] Paresis [ ]Other   SKIN:   [ ]Normal  [ ]Rash  [ ]Other  [ ]Pressure ulcer(s) [ ]y [ ]n present on admission    CRITICAL CARE:  [ ]Shock Present  [ ]Septic [ ]Cardiogenic [ ]Neurologic [ ]Hypovolemic  [ ]Vasopressors [ ]Inotropes  [ ]Respiratory failure present [ ]Mechanical Ventilation [ ]Non-invasive ventilatory support [ ]High-Flow   [ ]Acute  [ ]Chronic [ ]Hypoxic  [ ]Hypercarbic [ ]Other  [ ]Other organ failure     LABS:                        9.6    8.20  )-----------( 375      ( 07 May 2024 07:05 )             31.4   05-07    142  |  105  |  30<H>  ----------------------------<  107<H>  4.1   |  29  |  0.64    Ca    8.2<L>      07 May 2024 07:05  Phos  1.8     05-06  Mg     1.9     05-06    TPro  5.8<L>  /  Alb  2.8<L>  /  TBili  0.2  /  DBili  x   /  AST  63<H>  /  ALT  66<H>  /  AlkPhos  98  05-06      Urinalysis Basic - ( 07 May 2024 07:05 )    Color: x / Appearance: x / SG: x / pH: x  Gluc: 107 mg/dL / Ketone: x  / Bili: x / Urobili: x   Blood: x / Protein: x / Nitrite: x   Leuk Esterase: x / RBC: x / WBC x   Sq Epi: x / Non Sq Epi: x / Bacteria: x      RADIOLOGY & ADDITIONAL STUDIES:  < from: CT Angio Chest PE Protocol w/ IV Cont (05.06.24 @ 22:52) >    ACC: [id number] EXAM:  CT ANGIO CHEST PULM ART [facility]   ORDERED BY: JASON FLANAGAN     PROCEDURE DATE:  05/06/2024          INTERPRETATION:  INDICATION: Interstitial lung disease, increasing   shortness of breath    TECHNIQUE: Helical acquisition ofthe chest after the administration of   64 mL of Omnipaque 350. Maximum intensity projection images were   generated.    COMPARISON: CT chest 4/12/2024    FINDINGS:    HEART/VASCULATURE: No acute pulmonary embolus through the segmental   branches. Multiple obscured subsegmental branches by respiratory motion.   Unchanged thin web within a segmental pulmonary artery of the right lower   lobe. Unchanged dilated pulmonary artery, larger than the ascending   aorta. Dilated right ventricle. Small pericardial effusion.    LUNGS/AIRWAYS/PLEURA: Unchanged lower lobe predominant fibrosis largely   characterized by groundglass and traction bronchiectasis. No pleural   effusion.    LYMPH NODES/MEDIASTINUM: Unchanged bilateral mediastinal and hilar   lymphadenopathy. Unchanged diffusely dilated esophagus.    UPPER ABDOMEN: Unremarkable.    BONES/SOFT TISSUES: Unremarkable.      IMPRESSION:    Since 4/12/2024:    No acute pulmonary embolus. Unchanged thin web within a right lower lobe   segmental branch, which may be sequela of prior pulmonary embolus.    Dilated pulmonary artery and right ventricle, suggestive of pulmonary   hypertension.    Unchanged fibrotic interstitial lung disease most consistent with an NSIP   pattern. Consider scleroderma given dilated esophagus.    --- End of Report ---           GT BELL MD; Resident Radiologist  This document has been electronically signed.  MICK COX M.D., ATTENDING RADIOLOGIST  This document has been electronically signed. May  7 2024 11:21AM    < end of copied text >    Protein Calorie Malnutrition Present: [ ]mild [ ]moderate [ ]severe [ ]underweight [ ]morbid obesity  https://www.andeal.org/vault/2440/web/files/ONC/Table_Clinical%20Characteristics%20to%20Document%20Malnutrition-White%20JV%20et%20al%202012.pdf    Height (cm): 167.6 (04-25-24 @ 23:50), 167.6 (04-11-24 @ 21:44)  Weight (kg): 56.6 (04-25-24 @ 23:50), 54.7 (04-12-24 @ 04:13)  BMI (kg/m2): 20.1 (04-25-24 @ 23:50), 19.5 (04-12-24 @ 04:13)    [ x]PPSV2 < or = 30%  [ ]significant weight loss [ ]poor nutritional intake [ ]anasarca[ ]Artificial Nutrition    Other REFERRALS:  [ ]Hospice  [ ]Child Life  [ ]Social Work  [ ]Case management [ ]Holistic Therapy     Goals of Care Document:

## 2024-05-07 NOTE — PROGRESS NOTE ADULT - ASSESSMENT
63F hx ILD/ probable IPF (On 2-3LNC at home), PE 2022 on eliquis, severe pHTN w/ prior cor pulmonale (RVSP 68 - 10/22 w/ TTE from 4/12 w/ PASP 28, normal LV/RV SF), IDDM, presenting as a transfer from [place] for lung transplant eval iso SOB, dry cough, chest pain. She has been receiving duonebs, symbicort, lasix, ofev and IV steroids. Continuing with AC. Solu-medrol is ordered for 40 IV BID.  CT Chest 4/12/2024: Findings suggesting interstitial lung disease without significant interval progression. No evidence of pneumonia.   CT scan from 4/12/2024 when compared with CT scan from 2020 has shown significant progression.      Recommendations  - c/w weaning O2 as able - On 5.5L NC currently.   - Continue with Symbicort, duonebs, Ofev, Eliquis,   - c/w steroids - can switch to oral prednisone 40mg - taper by 10mg every week.   - Continue with diuresis - goal net neg 1-2L  - Can continue to use benzo prn for anxiety  - TTE results appreciated - PFO + e/o decreased Right ventricular function  - fu sputum cx  - c/w sildenafil 20mg TID - Side effects may include hypotension, nausea, light-headedness, let us know if patient experiences these symptoms and decrease dosage to sildenafil 10 tid if experiencing any symptoms  - CT with increased GGO consistent with pulmonary edema.   - Please Diurese the pt.    Pulm to follow

## 2024-05-07 NOTE — PROGRESS NOTE ADULT - PROBLEM SELECTOR PLAN 3
- Pt w/ hx of severe pHTN, RVSP 68 on 10/2022  - Repeat 4/12/24 at [facility] noted PAP 28, "normal PAP"   - Maintain euvolemia, as above  - c/w sildenafil 20 mg TID, currently tolerating

## 2024-05-08 NOTE — PROGRESS NOTE ADULT - ASSESSMENT
63F hx ILD/ probable IPF (On 2-3LNC at home), PE 2022 on eliquis, severe pHTN w/ prior cor pulmonale (RVSP 68 - 10/22 w/ TTE from 4/12 w/ PASP 28, normal LV/RV SF), IDDM, presenting as a transfer from [place] for lung transplant eval iso SOB, dry cough, chest pain. She has been receiving duonebs, symbicort, lasix, ofev and IV steroids. Continuing with AC. Solu-medrol is ordered for 40 IV BID.  CT Chest 4/12/2024: Findings suggesting interstitial lung disease without significant interval progression. No evidence of pneumonia.   CT scan from 4/12/2024 when compared with CT scan from 2020 has shown significant progression.      Recommendations  - c/w weaning O2 as able - On 5.5L NC currently.   - Continue with Symbicort, duonebs, Ofev, Eliquis,   - c/w steroids - can switch to oral prednisone 40mg - taper by 10mg every week.   - Continue with diuresis - goal net neg 1-2L  - Can continue to use benzo prn for anxiety  - TTE results appreciated - PFO + e/o decreased Right ventricular function  - fu sputum cx  - c/w sildenafil 20mg TID - Side effects may include hypotension, nausea, light-headedness, let us know if patient experiences these symptoms and decrease dosage to sildenafil 10 tid if experiencing any symptoms  - CT with increased GGO consistent with pulmonary edema.   - Please Diurese the pt. Would give additional doses of lasix as needed to achieve -1L daily.    Pulm to follow

## 2024-05-08 NOTE — PROGRESS NOTE ADULT - PROBLEM SELECTOR PLAN 2
Hx of PE on CTA 10/29/22 w/ RLL segmental/subsegmental pulmonary embolism  - Repeat CTA 2/28/23 w/o PE.   - DVT studies at OSH 4/22/24 negative  - continue Eliquis for now  - CTA reviewed, appears negative for acute PE

## 2024-05-08 NOTE — PROGRESS NOTE ADULT - ATTENDING COMMENTS
64 y/o F w/hx as above including chronic hypoxemic respiratory failure seconadry to ILD (unclear etiology) and severe pHTN w/cor pulmonale, and prior PE admitted to OSH for acute on chronic hypoxemic respiratory failure transferred to [facility] for lung transplant eval. Unclear etiology of worsening, presumed ILD exacerbation. O2 requirements currently coming down.    - Supplemental O2 as needed goal O2 sat >= 90%  - Steroid taper as detailed above, resume prior dose if any worsening with decrease in dosage  - Continue diuresis  - Bronchodilators  - Continue sildenafil 20mg TID  - Continue therapeutic AC  - Transplant candidacy as per transplant pulm

## 2024-05-08 NOTE — PROGRESS NOTE ADULT - PROBLEM SELECTOR PLAN 1
- Please monitor blood glucose values TID AC & QHS while eating regular meals and Q6H while NPO  -Increase insulin Glargine to 17 units QHS  -Continue  insulin Lispro 8 units TID with meals as it was just increased yesterday, , hold if NPO or if eating less than 50% of meals; Pt instructed to report to staff if not eating  - Continue with mod dose correctional scale TID with meals and QHS  -Please notify endo team with any changes on steroid doses  Discharge planning:   - Home DM medications: metformin 500 mg BID, + glimepride 1 mg daily + lantus 35 units QHS+ admelog 25 units TIDAC  - Discharge DM medications:   - Continue with metformin 500 mg BID  - STOP glimepride due to recurrent hypoglycemia at home   - Basal/bolus, dose will depend on insulin requirement and steroid plan   - Make sure pt has Rx for all DM supplies and insulin/ DM meds.  - Can follow at endo practice. [address] [place] [address] [address]. Phone . Call for apt closer to discharge- - Patient will need opthalmology and podiatry follow up as outpatient

## 2024-05-08 NOTE — PROGRESS NOTE ADULT - SUBJECTIVE AND OBJECTIVE BOX
Pulmonary Consult Follow up     Interval Events:    Doing well. Dyspnea improved but still having some. abd pain improved. -1L yesterday.     REVIEW OF SYSTEMS:  [x] All other systems negative except per HPI   [ ] Unable to assess ROS because ________    OBJECTIVE:  ICU Vital Signs Last 24 Hrs  T(C): 36.7 (08 May 2024 04:27), Max: 36.8 (07 May 2024 20:31)  T(F): 98.1 (08 May 2024 04:27), Max: 98.3 (07 May 2024 20:31)  HR: 119 (08 May 2024 09:08) (102 - 128)  BP: 102/62 (08 May 2024 09:08) (102/62 - 137/87)  BP(mean): --  ABP: --  ABP(mean): --  RR: 18 (08 May 2024 08:50) (18 - 20)  SpO2: 97% (08 May 2024 09:08) (91% - 100%)    O2 Parameters below as of 08 May 2024 09:08  Patient On (Oxygen Delivery Method): nasal cannula  O2 Flow (L/min): 6            05-07 @ 07:01  -  05-08 @ 07:00  --------------------------------------------------------  IN: 660 mL / OUT: 1650 mL / NET: -990 mL        PHYSICAL EXAM:  GENERAL: NAD, well-groomed, well-developed  HEAD:  Atraumatic, Normocephalic  EYES: EOMI, conjunctiva and sclera clear  ENMT: Moist mucous membranes  CHEST/LUNG: Clear to auscultation bilaterally   HEART: Regular rate and rhythm   ABDOMEN: Nondistended  VASCULAR: No  cyanosis, or edema  SKIN: No rashes or lesions  NERVOUS SYSTEM:  Alert & Oriented X3, Good concentration    HOSPITAL MEDICATIONS:  MEDICATIONS  (STANDING):  albuterol/ipratropium for Nebulization 3 milliLiter(s) Nebulizer every 6 hours  apixaban 5 milliGRAM(s) Oral two times a day  atorvastatin 40 milliGRAM(s) Oral at bedtime  budesonide 160 MICROgram(s)/formoterol 4.5 MICROgram(s) Inhaler 2 Puff(s) Inhalation two times a day  chlorhexidine 2% Cloths 1 Application(s) Topical daily  dextrose 10% Bolus 125 milliLiter(s) IV Bolus once  dextrose 5%. 1000 milliLiter(s) (100 mL/Hr) IV Continuous <Continuous>  dextrose 5%. 1000 milliLiter(s) (50 mL/Hr) IV Continuous <Continuous>  dextrose 50% Injectable 25 Gram(s) IV Push once  dextrose 50% Injectable 12.5 Gram(s) IV Push once  furosemide   Injectable 40 milliGRAM(s) IV Push two times a day  gabapentin 300 milliGRAM(s) Oral daily  glucagon  Injectable 1 milliGRAM(s) IntraMuscular once  insulin glargine Injectable (LANTUS) 17 Unit(s) SubCutaneous at bedtime  insulin lispro (ADMELOG) corrective regimen sliding scale   SubCutaneous <User Schedule>  insulin lispro (ADMELOG) corrective regimen sliding scale   SubCutaneous three times a day before meals  insulin lispro Injectable (ADMELOG) 8 Unit(s) SubCutaneous three times a day before meals  multivitamin 1 Tablet(s) Oral daily  Ofev (Nintedanib) 150 milliGRAM(s) 150 milliGRAM(s) Oral two times a day  pantoprazole    Tablet 40 milliGRAM(s) Oral two times a day  predniSONE   Tablet 40 milliGRAM(s) Oral daily  sildenafil (REVATIO) 20 milliGRAM(s) Oral every 8 hours  traZODone 100 milliGRAM(s) Oral at bedtime    MEDICATIONS  (PRN):  acetaminophen     Tablet .. 650 milliGRAM(s) Oral every 6 hours PRN Temp greater or equal to 38C (100.4F), Mild Pain (1 - 3)  aluminum hydroxide/magnesium hydroxide/simethicone Suspension 30 milliLiter(s) Oral every 4 hours PRN Dyspepsia  cyclobenzaprine 5 milliGRAM(s) Oral three times a day PRN Muscle Spasm  dextrose Oral Gel 15 Gram(s) Oral once PRN Blood Glucose LESS THAN 70 milliGRAM(s)/deciliter  melatonin 3 milliGRAM(s) Oral at bedtime PRN Insomnia  simethicone 80 milliGRAM(s) Chew every 6 hours PRN Gas      LABS:  05-07    142  |  105  |  30<H>  ----------------------------<  107<H>  4.1   |  29  |  0.64  05-06    146<H>  |  109<H>  |  33<H>  ----------------------------<  91  3.9   |  28  |  0.78    Ca    8.2<L>      07 May 2024 07:05  Ca    8.3<L>      06 May 2024 20:19  Phos  1.8     05-06  Mg     1.9     05-06    TPro  5.8<L>  /  Alb  2.8<L>  /  TBili  0.2  /  DBili  x   /  AST  63<H>  /  ALT  66<H>  /  AlkPhos  98  05-06    Magnesium: 1.9 mg/dL (05-06-24 @ 20:19)    Phosphorus: 1.8 mg/dL (05-06-24 @ 20:19)                    Urinalysis Basic - ( 07 May 2024 07:05 )    Color: x / Appearance: x / SG: x / pH: x  Gluc: 107 mg/dL / Ketone: x  / Bili: x / Urobili: x   Blood: x / Protein: x / Nitrite: x   Leuk Esterase: x / RBC: x / WBC x   Sq Epi: x / Non Sq Epi: x / Bacteria: x                              9.6    8.20  )-----------( 375      ( 07 May 2024 07:05 )             31.4                         9.8    10.76 )-----------( 395      ( 06 May 2024 20:19 )             31.5     CAPILLARY BLOOD GLUCOSE      POCT Blood Glucose.: 105 mg/dL (08 May 2024 08:59)  POCT Blood Glucose.: 67 mg/dL (08 May 2024 08:13)  POCT Blood Glucose.: 65 mg/dL (08 May 2024 08:12)  POCT Blood Glucose.: 298 mg/dL (07 May 2024 21:17)  POCT Blood Glucose.: 173 mg/dL (07 May 2024 16:24)  POCT Blood Glucose.: 135 mg/dL (07 May 2024 12:44)

## 2024-05-08 NOTE — PROGRESS NOTE ADULT - PROBLEM SELECTOR PLAN 7
T2DM: Home DM medications: metformin 500 mg BID, + glimepride 1 mg daily + lantus 35 units QHS+ admelog 25 units TIDAC  - Pt w/ hx of uncontrolled T2DM, a1c 11.9.   - Endocrine consulted via email, recs appreciated  - On glargine 22 units qhs, lispro 8u TID qac, hold if NPO or eating < 50% of meals  - MISS FS tid qac qhs  - Discharge DM medications:   - Continue with metformin 500 mg BID  - STOP glimepride due to recurrent hypoglycemia at home   - Basal/bolus, dose will depend on insulin requirement and steroid plan   - Patient will follow up at  [facility]:  [address] [place] [address]. [address]. [place] [address]  Tel: [phone]

## 2024-05-08 NOTE — PROGRESS NOTE ADULT - PROBLEM SELECTOR PROBLEM 3
Using a micropuncture needle with the Modified Seldinger technique and ultrasound (Superprotonic) the left femoral artery was succesfully accessed in a retrograde fashion over the guidewire, under fluoroscopic guidance using a Kit Micro Intrd 5f 10cm. Ultrasound found the vessel was patent.  Pulmonary hypertension

## 2024-05-08 NOTE — PROGRESS NOTE ADULT - PROBLEM SELECTOR PLAN 3
LDL goal ,70 due to DM  Pt LDL NA  Order fasting lipid if not recently done  Statin as noted above  Manage per primary team   F/u levels as out pt      Contact via Microsoft Teams during business hours  To reach covering provider access AMION via sunrise tools  For Urgent matters/after-hours/weekends/holidays please page endocrine fellow on call   For nonurgent matters please email [email or web address]    Please note that this patient may be followed by different provider tomorrow.  Notify endocrine 24 hours prior to discharge for final recommendations

## 2024-05-08 NOTE — PROGRESS NOTE ADULT - ASSESSMENT
63-yo F with PMHx of chronic AHRF on 2-3L NC at b/l 2/2 ILD (Followed w/ Dr. Primo Marlow, [facility]), PE on Eliquis, severe pHTN, Cor pulmonale, DM, presented initially with SOB, dry cough, chest pain, LE edema, recent admission to [facility] in March for ILD flare, admitted to outside hospital->ICU for AHRF 2/2 ILD flare, transferred to [facility] for lung transplant evaluation.

## 2024-05-08 NOTE — PROGRESS NOTE ADULT - PROBLEM SELECTOR PLAN 1
Baseline 3-4L NC. Hx of ILD w/ c/f IPF. Followed w/ DOMENICO Ding. Transferred to [facility] for lung transplant eval  - Currently on 6L NC, wean as tolerated.   - Diurese with lasix now on Lasix 40mg IV BID  - Continue home Ofev 150mg BID  - Duonebs, Symbicort  - Transitioned to PO Prednisone 40mg, plan to taper by 10mg every 7 days.   - repeat TTE with bubble study revealed severe RH dysfunction with elevated pulmonary pressure, grade II diastolic dysfunction   - valium 2.5 mg q12 PRN anxiety   - Deemed not to be a transplant candidate 4/30 Baseline 3-4L NC. Hx of ILD w/ c/f IPF. Followed w/ DOMENICO Ding. Transferred to [facility] for lung transplant eval  - Currently on 6L NC, wean as tolerated.   - Diurese with lasix, now on Lasix 40mg IV BID. Goal net neg 1-2L  - Continue home Ofev 150mg BID  - Duonebs, Symbicort  - Transitioned to PO Prednisone 40mg, plan to taper by 10mg every 7 days.   - repeat TTE with bubble study revealed severe RH dysfunction with elevated pulmonary pressure, grade II diastolic dysfunction   - valium 2.5 mg q12 PRN anxiety   - Deemed not to be a transplant candidate 4/30

## 2024-05-08 NOTE — PROGRESS NOTE ADULT - SUBJECTIVE AND OBJECTIVE BOX
Berhane Tobar MD  Division of Hospital Medicine  Available via MS teams  ---------------------------------------------------------    ESTEBAN BRANHAM  63y  Female      Patient is a 63y old  Female who presents with a chief complaint of SOB (07 May 2024 12:04)      INTERVAL HPI/OVERNIGHT EVENTS:  INCOMPLETE      REVIEW OF SYSTEMS: 10 point ROS negative unless listed above    T(C): 36.7 (05-08-24 @ 04:27), Max: 36.8 (05-07-24 @ 20:31)  HR: 109 (05-08-24 @ 04:27) (107 - 128)  BP: 104/70 (05-08-24 @ 04:27) (104/70 - 137/87)  RR: 18 (05-08-24 @ 04:27) (18 - 20)  SpO2: 96% (05-08-24 @ 04:27) (91% - 100%)  Wt(kg): --Vital Signs Last 24 Hrs  T(C): 36.7 (08 May 2024 04:27), Max: 36.8 (07 May 2024 20:31)  T(F): 98.1 (08 May 2024 04:27), Max: 98.3 (07 May 2024 20:31)  HR: 109 (08 May 2024 04:27) (107 - 128)  BP: 104/70 (08 May 2024 04:27) (104/70 - 137/87)  BP(mean): --  RR: 18 (08 May 2024 04:27) (18 - 20)  SpO2: 96% (08 May 2024 04:27) (91% - 100%)    Parameters below as of 08 May 2024 04:27  Patient On (Oxygen Delivery Method): nasal cannula  O2 Flow (L/min): 6      PHYSICAL EXAM:  GENERAL: NAD, well-groomed, well-developed  NECK: Supple, No JVD  CHEST/LUNG: CTA   HEART: Tachycardic  ABDOMEN: Soft, tender, Nondistended; Bowel sounds present.    EXTREMITIES:  2+ Peripheral Pulses, No clubbing, cyanosis, or edema  SKIN: No rashes or lesions  PSYCH: Alert & Oriented x3        LABS:                        9.6    8.20  )-----------( 375      ( 07 May 2024 07:05 )             31.4     05-07    142  |  105  |  30<H>  ----------------------------<  107<H>  4.1   |  29  |  0.64    Ca    8.2<L>      07 May 2024 07:05  Phos  1.8     05-06  Mg     1.9     05-06    TPro  5.8<L>  /  Alb  2.8<L>  /  TBili  0.2  /  DBili  x   /  AST  63<H>  /  ALT  66<H>  /  AlkPhos  98  05-06      Urinalysis Basic - ( 07 May 2024 07:05 )    Color: x / Appearance: x / SG: x / pH: x  Gluc: 107 mg/dL / Ketone: x  / Bili: x / Urobili: x   Blood: x / Protein: x / Nitrite: x   Leuk Esterase: x / RBC: x / WBC x   Sq Epi: x / Non Sq Epi: x / Bacteria: x      CAPILLARY BLOOD GLUCOSE      POCT Blood Glucose.: 298 mg/dL (07 May 2024 21:17)  POCT Blood Glucose.: 173 mg/dL (07 May 2024 16:24)  POCT Blood Glucose.: 135 mg/dL (07 May 2024 12:44)  POCT Blood Glucose.: 151 mg/dL (07 May 2024 08:51)  POCT Blood Glucose.: 73 mg/dL (07 May 2024 08:30)  POCT Blood Glucose.: 63 mg/dL (07 May 2024 08:07)  POCT Blood Glucose.: 61 mg/dL (07 May 2024 08:05)        Urinalysis Basic - ( 07 May 2024 07:05 )    Color: x / Appearance: x / SG: x / pH: x  Gluc: 107 mg/dL / Ketone: x  / Bili: x / Urobili: x   Blood: x / Protein: x / Nitrite: x   Leuk Esterase: x / RBC: x / WBC x   Sq Epi: x / Non Sq Epi: x / Bacteria: x        RADIOLOGY & ADDITIONAL TESTS:    Imaging Personally Reviewed:  [ ] YES  [ ] NO Berhane Tobar MD  Division of Hospital Medicine  Available via MS teams  ---------------------------------------------------------    ESTEBAN BRANHAM  63y  Female      Patient is a 63y old  Female who presents with a chief complaint of SOB (07 May 2024 12:04)      INTERVAL HPI/OVERNIGHT EVENTS:  Seen at bedside. No issues breathing overnight. Remained on NC. Did have some chest pain that resolved      REVIEW OF SYSTEMS: 10 point ROS negative unless listed above    T(C): 36.7 (05-08-24 @ 04:27), Max: 36.8 (05-07-24 @ 20:31)  HR: 109 (05-08-24 @ 04:27) (107 - 128)  BP: 104/70 (05-08-24 @ 04:27) (104/70 - 137/87)  RR: 18 (05-08-24 @ 04:27) (18 - 20)  SpO2: 96% (05-08-24 @ 04:27) (91% - 100%)  Wt(kg): --Vital Signs Last 24 Hrs  T(C): 36.7 (08 May 2024 04:27), Max: 36.8 (07 May 2024 20:31)  T(F): 98.1 (08 May 2024 04:27), Max: 98.3 (07 May 2024 20:31)  HR: 109 (08 May 2024 04:27) (107 - 128)  BP: 104/70 (08 May 2024 04:27) (104/70 - 137/87)  BP(mean): --  RR: 18 (08 May 2024 04:27) (18 - 20)  SpO2: 96% (08 May 2024 04:27) (91% - 100%)    Parameters below as of 08 May 2024 04:27  Patient On (Oxygen Delivery Method): nasal cannula  O2 Flow (L/min): 6      PHYSICAL EXAM:  GENERAL: NAD, well-groomed, well-developed  NECK: Supple, No JVD  CHEST/LUNG: CTA   HEART: Tachycardic  ABDOMEN: Soft, tender, Nondistended; Bowel sounds present.    EXTREMITIES:  2+ Peripheral Pulses, No clubbing, cyanosis, or edema  SKIN: No rashes or lesions  PSYCH: Alert & Oriented x3        LABS:                        9.6    8.20  )-----------( 375      ( 07 May 2024 07:05 )             31.4     05-07    142  |  105  |  30<H>  ----------------------------<  107<H>  4.1   |  29  |  0.64    Ca    8.2<L>      07 May 2024 07:05  Phos  1.8     05-06  Mg     1.9     05-06    TPro  5.8<L>  /  Alb  2.8<L>  /  TBili  0.2  /  DBili  x   /  AST  63<H>  /  ALT  66<H>  /  AlkPhos  98  05-06      Urinalysis Basic - ( 07 May 2024 07:05 )    Color: x / Appearance: x / SG: x / pH: x  Gluc: 107 mg/dL / Ketone: x  / Bili: x / Urobili: x   Blood: x / Protein: x / Nitrite: x   Leuk Esterase: x / RBC: x / WBC x   Sq Epi: x / Non Sq Epi: x / Bacteria: x      CAPILLARY BLOOD GLUCOSE      POCT Blood Glucose.: 298 mg/dL (07 May 2024 21:17)  POCT Blood Glucose.: 173 mg/dL (07 May 2024 16:24)  POCT Blood Glucose.: 135 mg/dL (07 May 2024 12:44)  POCT Blood Glucose.: 151 mg/dL (07 May 2024 08:51)  POCT Blood Glucose.: 73 mg/dL (07 May 2024 08:30)  POCT Blood Glucose.: 63 mg/dL (07 May 2024 08:07)  POCT Blood Glucose.: 61 mg/dL (07 May 2024 08:05)        Urinalysis Basic - ( 07 May 2024 07:05 )    Color: x / Appearance: x / SG: x / pH: x  Gluc: 107 mg/dL / Ketone: x  / Bili: x / Urobili: x   Blood: x / Protein: x / Nitrite: x   Leuk Esterase: x / RBC: x / WBC x   Sq Epi: x / Non Sq Epi: x / Bacteria: x        RADIOLOGY & ADDITIONAL TESTS:    Imaging Personally Reviewed:  [ ] YES  [ ] NO

## 2024-05-08 NOTE — PROGRESS NOTE ADULT - ASSESSMENT
63 F w/h/o uncontrolled T2DM (A1C 11.9%) while on Lantus,admelog,  metformin, glimepiride. Unknown DM complications. Also h/o AHRF, ILD, PE, HTN. Here with SOB secondary to interstitial lung disease. Endocrinology consulted for diabetes management.     s/p solumedrol 30 mg BID, now on Prednisone 40 mg daily, plan for taper by 10 mg weekly.   Prednisone 40 mg daily ( 5/7- 5/13)  Prednisone 30 mg daily ( 5/14- 5/20)  Prednisone 20 mg daily ( 5/21-5/27)  Prednisone 10 mg daily ( 5/28--    Fasting Hypoglycemia for 2 days and prandial BGs variable 100s-300s. Will decrease Lantus dose to prevent hypoglycemia in am

## 2024-05-08 NOTE — PROGRESS NOTE ADULT - SUBJECTIVE AND OBJECTIVE BOX
Seen earlier today     Chief Complaint: Diabetes Mellitus follow up    INTERVAL HX: Hypoglycemia this am ( BG 65/ 67), felt weak and dizzy as per pt. treated with breakfast to . Prelunch  after not receiving meal coverage insulin for breakfast. Tolerating POs, eat full meals.       Review of Systems:  General: As above  GI: No nausea, vomiting  Endocrine: no  S&Sx of hypoglycemia    Allergies    No Known Allergies    Intolerances      MEDICATIONS  (STANDING):  albuterol/ipratropium for Nebulization 3 milliLiter(s) Nebulizer every 6 hours  apixaban 5 milliGRAM(s) Oral two times a day  atorvastatin 40 milliGRAM(s) Oral at bedtime  budesonide 160 MICROgram(s)/formoterol 4.5 MICROgram(s) Inhaler 2 Puff(s) Inhalation two times a day  chlorhexidine 2% Cloths 1 Application(s) Topical daily  dextrose 10% Bolus 125 milliLiter(s) IV Bolus once  dextrose 5%. 1000 milliLiter(s) (100 mL/Hr) IV Continuous <Continuous>  dextrose 5%. 1000 milliLiter(s) (50 mL/Hr) IV Continuous <Continuous>  dextrose 50% Injectable 12.5 Gram(s) IV Push once  dextrose 50% Injectable 25 Gram(s) IV Push once  furosemide   Injectable 40 milliGRAM(s) IV Push two times a day  gabapentin 300 milliGRAM(s) Oral daily  glucagon  Injectable 1 milliGRAM(s) IntraMuscular once  insulin glargine Injectable (LANTUS) 17 Unit(s) SubCutaneous at bedtime  insulin lispro (ADMELOG) corrective regimen sliding scale   SubCutaneous three times a day before meals  insulin lispro (ADMELOG) corrective regimen sliding scale   SubCutaneous <User Schedule>  insulin lispro Injectable (ADMELOG) 8 Unit(s) SubCutaneous three times a day before meals  multivitamin 1 Tablet(s) Oral daily  Ofev (Nintedanib) 150 milliGRAM(s) 150 milliGRAM(s) Oral two times a day  pantoprazole    Tablet 40 milliGRAM(s) Oral two times a day  predniSONE   Tablet 40 milliGRAM(s) Oral daily  sildenafil (REVATIO) 20 milliGRAM(s) Oral every 8 hours  traZODone 100 milliGRAM(s) Oral at bedtime      atorvastatin   40 milliGRAM(s) Oral (05-07-24 @ 21:32)    insulin glargine Injectable (LANTUS)   22 Unit(s) SubCutaneous (05-07-24 @ 21:40)    insulin lispro (ADMELOG) corrective regimen sliding scale   2 Unit(s) SubCutaneous (05-07-24 @ 21:40)    insulin lispro (ADMELOG) corrective regimen sliding scale   8 Unit(s) SubCutaneous (05-08-24 @ 12:03)   2 Unit(s) SubCutaneous (05-07-24 @ 16:50)    insulin lispro Injectable (ADMELOG)   8 Unit(s) SubCutaneous (05-08-24 @ 12:03)   8 Unit(s) SubCutaneous (05-07-24 @ 16:50)    predniSONE   Tablet   40 milliGRAM(s) Oral (05-08-24 @ 05:08)        PHYSICAL EXAM:  VITALS: T(C): 36.7 (05-08-24 @ 11:10)  T(F): 98.1 (05-08-24 @ 11:10), Max: 98.3 (05-07-24 @ 20:31)  HR: 118 (05-08-24 @ 11:10) (102 - 128)  BP: 124/76 (05-08-24 @ 14:39) (102/62 - 134/86)  RR:  (18 - 20)  SpO2:  (91% - 100%)  Wt(kg): --  GENERAL: Female sitting in chair, in NAD  Respiratory: Respirations unlabored   Extremities: Warm, no edema  NEURO: Alert , appropriate     LABS:  POCT Blood Glucose.: 348 mg/dL (05-08-24 @ 11:57)  POCT Blood Glucose.: 105 mg/dL (05-08-24 @ 08:59)  POCT Blood Glucose.: 67 mg/dL (05-08-24 @ 08:13)  POCT Blood Glucose.: 65 mg/dL (05-08-24 @ 08:12)  POCT Blood Glucose.: 298 mg/dL (05-07-24 @ 21:17)  POCT Blood Glucose.: 173 mg/dL (05-07-24 @ 16:24)  POCT Blood Glucose.: 135 mg/dL (05-07-24 @ 12:44)  POCT Blood Glucose.: 151 mg/dL (05-07-24 @ 08:51)  POCT Blood Glucose.: 73 mg/dL (05-07-24 @ 08:30)  POCT Blood Glucose.: 63 mg/dL (05-07-24 @ 08:07)  POCT Blood Glucose.: 61 mg/dL (05-07-24 @ 08:05)  POCT Blood Glucose.: 157 mg/dL (05-07-24 @ 00:35)  POCT Blood Glucose.: 101 mg/dL (05-06-24 @ 21:43)  POCT Blood Glucose.: 107 mg/dL (05-06-24 @ 20:05)  POCT Blood Glucose.: 135 mg/dL (05-06-24 @ 16:52)  POCT Blood Glucose.: 73 mg/dL (05-06-24 @ 12:23)  POCT Blood Glucose.: 320 mg/dL (05-06-24 @ 08:41)  POCT Blood Glucose.: 101 mg/dL (05-05-24 @ 21:07)  POCT Blood Glucose.: 153 mg/dL (05-05-24 @ 18:21)  POCT Blood Glucose.: 103 mg/dL (05-05-24 @ 16:41)                          9.6    8.20  )-----------( 375      ( 07 May 2024 07:05 )             31.4     05-07    142  |  105  |  30<H>  ----------------------------<  107<H>  4.1   |  29  |  0.64    Ca    8.2<L>      07 May 2024 07:05  Phos  1.8     05-06  Mg     1.9     05-06    TPro  5.8<L>  /  Alb  2.8<L>  /  TBili  0.2  /  DBili  x   /  AST  63<H>  /  ALT  66<H>  /  AlkPhos  98  05-06        Thyroid Function Tests:      A1C with Estimated Average Glucose Result: 11.9 % (04-13-24 @ 03:53)    Estimated Average Glucose: 295 mg/dL (04-13-24 @ 03:53)        Diet, Regular:   Consistent Carbohydrate No Snacks (CSTCHO) (04-26-24 @ 15:14) [Active]

## 2024-05-09 NOTE — PROGRESS NOTE ADULT - SUBJECTIVE AND OBJECTIVE BOX
Pulmonary Consult Follow up     Interval Events:    Dyspnea improved significantly. Diuresing well. Still with abd pain and constipation.    REVIEW OF SYSTEMS:  [x] All other systems negative except per HPI   [ ] Unable to assess ROS because ________    OBJECTIVE:  ICU Vital Signs Last 24 Hrs  T(C): 36.7 (08 May 2024 04:27), Max: 36.8 (07 May 2024 20:31)  T(F): 98.1 (08 May 2024 04:27), Max: 98.3 (07 May 2024 20:31)  HR: 119 (08 May 2024 09:08) (102 - 128)  BP: 102/62 (08 May 2024 09:08) (102/62 - 137/87)  BP(mean): --  ABP: --  ABP(mean): --  RR: 18 (08 May 2024 08:50) (18 - 20)  SpO2: 97% (08 May 2024 09:08) (91% - 100%)    O2 Parameters below as of 08 May 2024 09:08  Patient On (Oxygen Delivery Method): nasal cannula  O2 Flow (L/min): 6            05-07 @ 07:01  -  05-08 @ 07:00  --------------------------------------------------------  IN: 660 mL / OUT: 1650 mL / NET: -990 mL        PHYSICAL EXAM:  GENERAL: NAD, well-groomed, well-developed  HEAD:  Atraumatic, Normocephalic  EYES: EOMI, conjunctiva and sclera clear  ENMT: Moist mucous membranes  CHEST/LUNG: Clear to auscultation bilaterally   HEART: Regular rate and rhythm   ABDOMEN: Nondistended  VASCULAR: No  cyanosis, or edema  SKIN: No rashes or lesions  NERVOUS SYSTEM:  Alert & Oriented X3, Good concentration    HOSPITAL MEDICATIONS:  MEDICATIONS  (STANDING):  albuterol/ipratropium for Nebulization 3 milliLiter(s) Nebulizer every 6 hours  apixaban 5 milliGRAM(s) Oral two times a day  atorvastatin 40 milliGRAM(s) Oral at bedtime  budesonide 160 MICROgram(s)/formoterol 4.5 MICROgram(s) Inhaler 2 Puff(s) Inhalation two times a day  chlorhexidine 2% Cloths 1 Application(s) Topical daily  dextrose 10% Bolus 125 milliLiter(s) IV Bolus once  dextrose 5%. 1000 milliLiter(s) (100 mL/Hr) IV Continuous <Continuous>  dextrose 5%. 1000 milliLiter(s) (50 mL/Hr) IV Continuous <Continuous>  dextrose 50% Injectable 25 Gram(s) IV Push once  dextrose 50% Injectable 12.5 Gram(s) IV Push once  furosemide   Injectable 40 milliGRAM(s) IV Push two times a day  gabapentin 300 milliGRAM(s) Oral daily  glucagon  Injectable 1 milliGRAM(s) IntraMuscular once  insulin glargine Injectable (LANTUS) 17 Unit(s) SubCutaneous at bedtime  insulin lispro (ADMELOG) corrective regimen sliding scale   SubCutaneous <User Schedule>  insulin lispro (ADMELOG) corrective regimen sliding scale   SubCutaneous three times a day before meals  insulin lispro Injectable (ADMELOG) 8 Unit(s) SubCutaneous three times a day before meals  multivitamin 1 Tablet(s) Oral daily  Ofev (Nintedanib) 150 milliGRAM(s) 150 milliGRAM(s) Oral two times a day  pantoprazole    Tablet 40 milliGRAM(s) Oral two times a day  predniSONE   Tablet 40 milliGRAM(s) Oral daily  sildenafil (REVATIO) 20 milliGRAM(s) Oral every 8 hours  traZODone 100 milliGRAM(s) Oral at bedtime    MEDICATIONS  (PRN):  acetaminophen     Tablet .. 650 milliGRAM(s) Oral every 6 hours PRN Temp greater or equal to 38C (100.4F), Mild Pain (1 - 3)  aluminum hydroxide/magnesium hydroxide/simethicone Suspension 30 milliLiter(s) Oral every 4 hours PRN Dyspepsia  cyclobenzaprine 5 milliGRAM(s) Oral three times a day PRN Muscle Spasm  dextrose Oral Gel 15 Gram(s) Oral once PRN Blood Glucose LESS THAN 70 milliGRAM(s)/deciliter  melatonin 3 milliGRAM(s) Oral at bedtime PRN Insomnia  simethicone 80 milliGRAM(s) Chew every 6 hours PRN Gas      LABS:  05-07    142  |  105  |  30<H>  ----------------------------<  107<H>  4.1   |  29  |  0.64  05-06    146<H>  |  109<H>  |  33<H>  ----------------------------<  91  3.9   |  28  |  0.78    Ca    8.2<L>      07 May 2024 07:05  Ca    8.3<L>      06 May 2024 20:19  Phos  1.8     05-06  Mg     1.9     05-06    TPro  5.8<L>  /  Alb  2.8<L>  /  TBili  0.2  /  DBili  x   /  AST  63<H>  /  ALT  66<H>  /  AlkPhos  98  05-06    Magnesium: 1.9 mg/dL (05-06-24 @ 20:19)    Phosphorus: 1.8 mg/dL (05-06-24 @ 20:19)                    Urinalysis Basic - ( 07 May 2024 07:05 )    Color: x / Appearance: x / SG: x / pH: x  Gluc: 107 mg/dL / Ketone: x  / Bili: x / Urobili: x   Blood: x / Protein: x / Nitrite: x   Leuk Esterase: x / RBC: x / WBC x   Sq Epi: x / Non Sq Epi: x / Bacteria: x                              9.6    8.20  )-----------( 375      ( 07 May 2024 07:05 )             31.4                         9.8    10.76 )-----------( 395      ( 06 May 2024 20:19 )             31.5     CAPILLARY BLOOD GLUCOSE      POCT Blood Glucose.: 105 mg/dL (08 May 2024 08:59)  POCT Blood Glucose.: 67 mg/dL (08 May 2024 08:13)  POCT Blood Glucose.: 65 mg/dL (08 May 2024 08:12)  POCT Blood Glucose.: 298 mg/dL (07 May 2024 21:17)  POCT Blood Glucose.: 173 mg/dL (07 May 2024 16:24)  POCT Blood Glucose.: 135 mg/dL (07 May 2024 12:44)

## 2024-05-09 NOTE — PROGRESS NOTE ADULT - ASSESSMENT
63-yo F with PMHx of chronic AHRF on 2-3L NC at b/l 2/2 ILD (Followed w/ Dr. Primo Marlow, [facility]), PE on Eliquis, severe pHTN, Cor pulmonale, DM, presented initially with SOB, dry cough, chest pain, LE edema, recent admission to [facility] in March for ILD flare, admitted to outside hospital->ICU for AHRF 2/2 ILD flare, transferred to [facility] for lung transplant evaluation.

## 2024-05-09 NOTE — PROGRESS NOTE ADULT - ASSESSMENT
63F hx ILD/ probable IPF (On 2-3LNC at home), PE 2022 on eliquis, severe pHTN w/ prior cor pulmonale (RVSP 68 - 10/22 w/ TTE from 4/12 w/ PASP 28, normal LV/RV SF), IDDM, presenting as a transfer from [place] for lung transplant eval iso SOB, dry cough, chest pain. She has been receiving duonebs, symbicort, lasix, ofev and IV steroids. Continuing with AC. Solu-medrol is ordered for 40 IV BID.  CT Chest 4/12/2024: Findings suggesting interstitial lung disease without significant interval progression. No evidence of pneumonia.   CT scan from 4/12/2024 when compared with CT scan from 2020 has shown significant progression.      Recommendations  - c/w weaning O2 as able - On 5.5L NC currently.   - Continue with Symbicort, duonebs, Ofev, Eliquis,   - c/w steroids - can switch to oral prednisone 40mg - taper by 10mg every week.   - Continue with diuresis - goal net neg 1-2L  - Can continue to use benzo prn for anxiety  - TTE results appreciated - PFO + e/o decreased Right ventricular function  - fu sputum cx  - c/w sildenafil 20mg TID - Side effects may include hypotension, nausea, light-headedness, let us know if patient experiences these symptoms and decrease dosage to sildenafil 10 tid if experiencing any symptoms  - CT with increased GGO consistent with pulmonary edema.   - Please continue Diurese the pt. Would give additional doses of lasix as needed to achieve minimum -1-2L daily.    Pulm to follow

## 2024-05-09 NOTE — PROGRESS NOTE ADULT - PROBLEM SELECTOR PLAN 7
T2DM: Home DM medications: metformin 500 mg BID, + glimepride 1 mg daily + lantus 35 units QHS+ admelog 25 units TIDAC  - Pt w/ hx of uncontrolled T2DM, a1c 11.9.   - Endocrine consulted via email, recs appreciated  - On glargine 17 units qhs, lispro 10u TID qac, hold if NPO or eating < 50% of meals  - MISS FS tid qac qhs  - Discharge DM medications:   - Continue with metformin 500 mg BID  - STOP glimepride due to recurrent hypoglycemia at home   - Basal/bolus, dose will depend on insulin requirement and steroid plan   - Patient will follow up at  [facility]:  [address] [place] [address]. [address]. [place] [address]  Tel: [phone]

## 2024-05-09 NOTE — PROGRESS NOTE ADULT - SUBJECTIVE AND OBJECTIVE BOX
Berhane Tobar MD  Division of Hospital Medicine  Available via MS teams  ---------------------------------------------------------    ESTEBAN BRANHAM  63y  Female      Patient is a 63y old  Female who presents with a chief complaint of SOB (08 May 2024 15:50)      INTERVAL HPI/OVERNIGHT EVENTS:  INCOMPLETE      REVIEW OF SYSTEMS: 10 point ROS negative unless listed above    T(C): 36.7 (05-09-24 @ 04:51), Max: 36.7 (05-08-24 @ 11:10)  HR: 112 (05-09-24 @ 04:51) (102 - 120)  BP: 123/74 (05-09-24 @ 04:51) (102/62 - 125/77)  RR: 18 (05-09-24 @ 04:51) (18 - 18)  SpO2: 95% (05-09-24 @ 04:51) (95% - 98%)  Wt(kg): --Vital Signs Last 24 Hrs  T(C): 36.7 (09 May 2024 04:51), Max: 36.7 (08 May 2024 11:10)  T(F): 98.1 (09 May 2024 04:51), Max: 98.1 (08 May 2024 11:10)  HR: 112 (09 May 2024 04:51) (102 - 120)  BP: 123/74 (09 May 2024 04:51) (102/62 - 125/77)  BP(mean): --  RR: 18 (09 May 2024 04:51) (18 - 18)  SpO2: 95% (09 May 2024 04:51) (95% - 98%)    Parameters below as of 09 May 2024 04:51  Patient On (Oxygen Delivery Method): nasal cannula  O2 Flow (L/min): 6      PHYSICAL EXAM:  GENERAL: NAD, well-groomed, well-developed  NECK: Supple, No JVD  CHEST/LUNG: CTA   HEART: Tachycardic  ABDOMEN: Soft, tender, Nondistended; Bowel sounds present.    EXTREMITIES:  2+ Peripheral Pulses, No clubbing, cyanosis, or edema  SKIN: No rashes or lesions  PSYCH: Alert & Oriented x3        LABS:              CAPILLARY BLOOD GLUCOSE      POCT Blood Glucose.: 157 mg/dL (09 May 2024 02:04)  POCT Blood Glucose.: 146 mg/dL (08 May 2024 21:16)  POCT Blood Glucose.: 259 mg/dL (08 May 2024 17:15)  POCT Blood Glucose.: 348 mg/dL (08 May 2024 11:57)  POCT Blood Glucose.: 105 mg/dL (08 May 2024 08:59)  POCT Blood Glucose.: 67 mg/dL (08 May 2024 08:13)  POCT Blood Glucose.: 65 mg/dL (08 May 2024 08:12)            RADIOLOGY & ADDITIONAL TESTS:    Imaging Personally Reviewed:  [ ] YES  [ ] NO Berhane Tobar MD  Division of Hospital Medicine  Available via MS teams  ---------------------------------------------------------    ESTEBAN BRANHAM  63y  Female      Patient is a 63y old  Female who presents with a chief complaint of SOB (08 May 2024 15:50)      INTERVAL HPI/OVERNIGHT EVENTS:  Seen at bedside. Just finished breakfast. Having intermittent nosebleeds, mild      REVIEW OF SYSTEMS: 10 point ROS negative unless listed above    T(C): 36.7 (05-09-24 @ 04:51), Max: 36.7 (05-08-24 @ 11:10)  HR: 112 (05-09-24 @ 04:51) (102 - 120)  BP: 123/74 (05-09-24 @ 04:51) (102/62 - 125/77)  RR: 18 (05-09-24 @ 04:51) (18 - 18)  SpO2: 95% (05-09-24 @ 04:51) (95% - 98%)  Wt(kg): --Vital Signs Last 24 Hrs  T(C): 36.7 (09 May 2024 04:51), Max: 36.7 (08 May 2024 11:10)  T(F): 98.1 (09 May 2024 04:51), Max: 98.1 (08 May 2024 11:10)  HR: 112 (09 May 2024 04:51) (102 - 120)  BP: 123/74 (09 May 2024 04:51) (102/62 - 125/77)  BP(mean): --  RR: 18 (09 May 2024 04:51) (18 - 18)  SpO2: 95% (09 May 2024 04:51) (95% - 98%)    Parameters below as of 09 May 2024 04:51  Patient On (Oxygen Delivery Method): nasal cannula  O2 Flow (L/min): 6      PHYSICAL EXAM:  GENERAL: NAD, well-groomed, well-developed  NECK: Supple, No JVD  CHEST/LUNG: CTA   HEART: Tachycardic  ABDOMEN: Soft, tender, Nondistended; Bowel sounds present.    EXTREMITIES:  2+ Peripheral Pulses, No clubbing, cyanosis, or edema  SKIN: No rashes or lesions  PSYCH: Alert & Oriented x3        LABS:              CAPILLARY BLOOD GLUCOSE      POCT Blood Glucose.: 157 mg/dL (09 May 2024 02:04)  POCT Blood Glucose.: 146 mg/dL (08 May 2024 21:16)  POCT Blood Glucose.: 259 mg/dL (08 May 2024 17:15)  POCT Blood Glucose.: 348 mg/dL (08 May 2024 11:57)  POCT Blood Glucose.: 105 mg/dL (08 May 2024 08:59)  POCT Blood Glucose.: 67 mg/dL (08 May 2024 08:13)  POCT Blood Glucose.: 65 mg/dL (08 May 2024 08:12)            RADIOLOGY & ADDITIONAL TESTS:    Imaging Personally Reviewed:  [ ] YES  [ ] NO

## 2024-05-09 NOTE — PROGRESS NOTE ADULT - PROBLEM SELECTOR PLAN 4
The patient feels that the cataract is significantly impacting daily activities and has elected cataract surgery. The risks, benefits, and alternatives to surgery were discussed. The patient elects to proceed with surgery. Do not recommend MF IOL. RH failure on most recent echo  - diuresis as above  - strict I&Os  - daily standing weights

## 2024-05-09 NOTE — PROGRESS NOTE ADULT - PROBLEM SELECTOR PLAN 1
Baseline 3-4L NC. Hx of ILD w/ c/f IPF. Followed w/ DOMENICO Ding. Transferred to [facility] for lung transplant eval  - Currently on 6L NC, wean as tolerated.   - Diurese with lasix, now on Lasix 40mg IV BID. Goal net neg 1-2L  - Continue home Ofev 150mg BID  - Duonebs, Symbicort  - Transitioned to PO Prednisone 40mg, plan to taper by 10mg every 7 days.   - repeat TTE with bubble study revealed severe RH dysfunction with elevated pulmonary pressure, grade II diastolic dysfunction   - valium 2.5 mg q12 PRN anxiety   - Deemed not to be a transplant candidate 4/30 Baseline 3-4L NC. Hx of ILD w/ c/f IPF. Followed w/ DOMENICO Ding. Transferred to [facility] for lung transplant eval  - Currently on 6L NC, wean as tolerated.   - Diurese with lasix, now on Lasix 40mg IV BID. Was net neg 1.2L over last 24 hours. Will give additional dose of IV lasix today. Goal net neg 1-2L  - Continue home Ofev 150mg BID  - Duonebs, Symbicort  - Transitioned to PO Prednisone 40mg, plan to taper by 10mg every 7 days.   - repeat TTE with bubble study revealed severe RH dysfunction with elevated pulmonary pressure, grade II diastolic dysfunction   - valium 2.5 mg q12 PRN anxiety   - Deemed not to be a transplant candidate 4/30

## 2024-05-09 NOTE — PROGRESS NOTE ADULT - PROBLEM SELECTOR PLAN 3
- Pt w/ hx of severe pHTN, RVSP 68 on 10/2022  - Repeat 4/12/24 at [facility] noted PAP 28, "normal PAP"   - Maintain euvolemia, as above  - c/w sildenafil 20 mg TID, currently tolerating

## 2024-05-09 NOTE — PROGRESS NOTE ADULT - ATTENDING COMMENTS
62 y/o F w/hx as above including chronic hypoxemic respiratory failure seconadry to ILD (unclear etiology) and severe pHTN w/cor pulmonale, and prior PE admitted to OSH for acute on chronic hypoxemic respiratory failure transferred to [facility] for lung transplant eval. Unclear etiology of worsening, presumed ILD exacerbation. O2 requirements currently coming down.    - Supplemental O2 as needed goal O2 sat >= 90%  - Steroid taper as detailed above, resume prior dose if any worsening with decrease in dosage  - Continue diuresis  - Bronchodilators  - Continue sildenafil 20mg TID  - Continue therapeutic AC  - Transplant candidacy as per transplant pulm

## 2024-05-10 NOTE — PROGRESS NOTE ADULT - ATTENDING COMMENTS
64 y/o F w/hx as above including chronic hypoxemic respiratory failure seconadry to ILD (unclear etiology) and severe pHTN w/cor pulmonale, and prior PE admitted to OSH for acute on chronic hypoxemic respiratory failure transferred to [facility] for lung transplant eval. Unclear etiology of worsening, presumed ILD exacerbation. O2 requirements currently coming down.    - Supplemental O2 as needed goal O2 sat >= 90%  - Steroid taper as detailed above, resume prior dose if any worsening with decrease in dosage  - Continue diuresis  - Quantitative shunt study  - Please check BNP, ABG  - repeat CXR  - Bronchodilators  - Continue sildenafil 20mg TID  - Continue therapeutic AC

## 2024-05-10 NOTE — PROGRESS NOTE ADULT - PROBLEM SELECTOR PLAN 1
Baseline 3-4L NC. Hx of ILD w/ c/f IPF. Followed w/ DOMENICO Ding. Transferred to [facility] for lung transplant eval  - Currently on 6L NC, wean as tolerated.   - Diurese with lasix, now on Lasix 40mg IV BID. Was net neg 1.8L over last 24 hours. . Goal net neg 1-2L  - Continue home Ofev 150mg BID  - Duonebs, Symbicort  - Transitioned to PO Prednisone 40mg, plan to taper by 10mg every 7 days.   - repeat TTE with bubble study revealed severe RH dysfunction with elevated pulmonary pressure, grade II diastolic dysfunction   - valium 2.5 mg q12 PRN anxiety   - Deemed not to be a transplant candidate 4/30 Baseline 3-4L NC. Hx of ILD w/ c/f IPF. Followed w/ DOMENICO Ding. Transferred to [facility] for lung transplant eval  - Currently on 6L NC, wean as tolerated.   - Diurese with lasix, on Lasix 40mg IV BID. Was net neg 1.8L over last 24 hours. To give additional dose of IV Lasix 40mg today again. Goal net neg 1-2L  - Continue home Ofev 150mg BID  - Duonebs, Symbicort  - Transitioned to PO Prednisone 40mg, plan to taper by 10mg every 7 days.   - repeat TTE with bubble study revealed severe RH dysfunction with elevated pulmonary pressure, grade II diastolic dysfunction   - valium 2.5 mg q12 PRN anxiety   - Deemed not to be a transplant candidate 4/30

## 2024-05-10 NOTE — PROGRESS NOTE ADULT - SUBJECTIVE AND OBJECTIVE BOX
Pulmonary Consult Follow up     Interval Events:    more dyspneic in am, but better in pm - pt reports having BM helped relieve her dyspnea.     REVIEW OF SYSTEMS:  [x] All other systems negative except per HPI   [ ] Unable to assess ROS because ________    OBJECTIVE:  ICU Vital Signs Last 24 Hrs  T(C): 36.8 (10 May 2024 11:31), Max: 37 (10 May 2024 04:36)  T(F): 98.2 (10 May 2024 11:31), Max: 98.6 (10 May 2024 04:36)  HR: 121 (10 May 2024 15:20) (71 - 124)  BP: 151/91 (10 May 2024 11:31) (108/66 - 151/91)  BP(mean): --  ABP: --  ABP(mean): --  RR: 18 (10 May 2024 15:20) (18 - 19)  SpO2: 94% (10 May 2024 15:20) (92% - 98%)    O2 Parameters below as of 10 May 2024 15:20  Patient On (Oxygen Delivery Method): nasal cannula w/ humidification  O2 Flow (L/min): 6            05-09 @ 07:01  -  05-10 @ 07:00  --------------------------------------------------------  IN: 720 mL / OUT: 2600 mL / NET: -1880 mL    05-10 @ 07:01  -  05-10 @ 16:36  --------------------------------------------------------  IN: 720 mL / OUT: 1400 mL / NET: -680 mL        PHYSICAL EXAM:  GENERAL: NAD, well-groomed, well-developed  HEAD:  Atraumatic, Normocephalic  EYES: EOMI, conjunctiva and sclera clear  ENMT: Moist mucous membranes  CHEST/LUNG: Clear to auscultation bilaterally   HEART: Regular rate and rhythm; No murmurs, rubs, or gallops  ABDOMEN: Nondistended  VASCULAR: No  cyanosis, or edema  SKIN: No rashes or lesions  NERVOUS SYSTEM:  Alert & Oriented X3, Good concentration    HOSPITAL MEDICATIONS:  MEDICATIONS  (STANDING):  albuterol/ipratropium for Nebulization 3 milliLiter(s) Nebulizer every 6 hours  apixaban 5 milliGRAM(s) Oral two times a day  atorvastatin 40 milliGRAM(s) Oral at bedtime  budesonide 160 MICROgram(s)/formoterol 4.5 MICROgram(s) Inhaler 2 Puff(s) Inhalation two times a day  chlorhexidine 2% Cloths 1 Application(s) Topical daily  dextrose 10% Bolus 125 milliLiter(s) IV Bolus once  dextrose 5%. 1000 milliLiter(s) (50 mL/Hr) IV Continuous <Continuous>  dextrose 5%. 1000 milliLiter(s) (100 mL/Hr) IV Continuous <Continuous>  dextrose 50% Injectable 25 Gram(s) IV Push once  dextrose 50% Injectable 12.5 Gram(s) IV Push once  furosemide   Injectable 40 milliGRAM(s) IV Push two times a day  gabapentin 300 milliGRAM(s) Oral daily  glucagon  Injectable 1 milliGRAM(s) IntraMuscular once  insulin glargine Injectable (LANTUS) 17 Unit(s) SubCutaneous at bedtime  insulin lispro (ADMELOG) corrective regimen sliding scale   SubCutaneous <User Schedule>  insulin lispro (ADMELOG) corrective regimen sliding scale   SubCutaneous three times a day before meals  insulin lispro Injectable (ADMELOG) 10 Unit(s) SubCutaneous three times a day before meals  multivitamin 1 Tablet(s) Oral daily  Ofev (Nintedanib) 150 milliGRAM(s) 150 milliGRAM(s) Oral two times a day  oxymetazoline 0.05% Nasal Spray 2 Spray(s) Both Nostrils two times a day  pantoprazole    Tablet 40 milliGRAM(s) Oral two times a day  polyethylene glycol 3350 17 Gram(s) Oral daily  predniSONE   Tablet 40 milliGRAM(s) Oral daily  senna 2 Tablet(s) Oral at bedtime  sildenafil (REVATIO) 20 milliGRAM(s) Oral every 8 hours  traZODone 100 milliGRAM(s) Oral at bedtime    MEDICATIONS  (PRN):  acetaminophen     Tablet .. 650 milliGRAM(s) Oral every 6 hours PRN Temp greater or equal to 38C (100.4F), Mild Pain (1 - 3)  aluminum hydroxide/magnesium hydroxide/simethicone Suspension 30 milliLiter(s) Oral every 4 hours PRN Dyspepsia  bisacodyl Suppository 10 milliGRAM(s) Rectal daily PRN Constipation  cyclobenzaprine 5 milliGRAM(s) Oral three times a day PRN Muscle Spasm  dextrose Oral Gel 15 Gram(s) Oral once PRN Blood Glucose LESS THAN 70 milliGRAM(s)/deciliter  melatonin 3 milliGRAM(s) Oral at bedtime PRN Insomnia  simethicone 80 milliGRAM(s) Chew every 6 hours PRN Gas      LABS:  05-10    141  |  103  |  35<H>  ----------------------------<  173<H>  3.7   |  28  |  0.74  05-09    143  |  105  |  38<H>  ----------------------------<  140<H>  3.9   |  29  |  1.02    Ca    8.4      10 May 2024 06:10  Ca    8.9      09 May 2024 07:33  Mg     2.1     05-10      Magnesium: 2.1 mg/dL (05-10-24 @ 06:10)                      Urinalysis Basic - ( 10 May 2024 06:10 )    Color: x / Appearance: x / SG: x / pH: x  Gluc: 173 mg/dL / Ketone: x  / Bili: x / Urobili: x   Blood: x / Protein: x / Nitrite: x   Leuk Esterase: x / RBC: x / WBC x   Sq Epi: x / Non Sq Epi: x / Bacteria: x                              9.2    6.84  )-----------( 342      ( 10 May 2024 06:10 )             30.0                         9.0    8.52  )-----------( 370      ( 09 May 2024 07:34 )             29.4     CAPILLARY BLOOD GLUCOSE      POCT Blood Glucose.: 116 mg/dL (10 May 2024 11:58)  POCT Blood Glucose.: 121 mg/dL (10 May 2024 08:20)  POCT Blood Glucose.: 221 mg/dL (10 May 2024 02:04)  POCT Blood Glucose.: 257 mg/dL (09 May 2024 21:14)

## 2024-05-10 NOTE — PROGRESS NOTE ADULT - PROBLEM SELECTOR PLAN 1
- Please monitor blood glucose values TID AC & QHS while eating regular meals and Q6H while NPO  -Decrease insulin Glargine to 15 units QHS  -Continue  insulin Lispro 10 units TID with meals. Hold if NPO or if eating less than 50% of meals; Pt instructed to report to staff if not eating  - Continue with mod dose correctional scale TID with meals and QHS while on steroids  -Please notify endo team with any changes on steroid doses  Discharge planning:   - Home DM medications: metformin 500 mg BID, + glimepride 1 mg daily + lantus 35 units QHS+ admelog 25 units TIDAC  - Discharge DM medications:   - Continue with metformin 500 mg BID  - STOP glimepride due to recurrent hypoglycemia at home   - Basal/bolus, dose will depend on insulin requirement and steroid plan   - Make sure pt has Rx for all DM supplies and insulin/ DM meds.  - Can follow at endo practice. [address] [facility] [address]. Phone . Call for apt closer to discharge- - Patient will need opthalmology and podiatry follow up as outpatient

## 2024-05-10 NOTE — PROGRESS NOTE ADULT - ASSESSMENT
63-yo F with PMHx of chronic AHRF on 2-3L NC at b/l 2/2 ILD (Followed w/ Dr. Primo Marlow, [facility]), PE on Eliquis, severe pHTN, Cor pulmonale, DM, presented initially with SOB, dry cough, chest pain, LE edema, recent admission to [facility] in March for ILD flare, admitted to outside hospital->ICU for AHRF 2/2 ILD flare, transferred to [facility] for lung transplant evaluation.

## 2024-05-10 NOTE — PROGRESS NOTE ADULT - SUBJECTIVE AND OBJECTIVE BOX
DIABETES FOLLOW UP NOTE: Saw pt earlier today    Chief Complaint: Endocrine consult requested for management of DM    INTERVAL HX: Pt stable, sleepy at time of visit but able to answer questions. Reports tolerating POs with variable PO intake making BG levels also variable between low 100s to high 300s today. Noted BG elevated ac dinner to 300s but pt didn't receive meal time insulin for late lunch causing rebound hyperglycemia. No hypoglycemia. On Prednisone 40mg  for ILD.       Review of Systems:  General: As above  Cardiovascular: No chest pain, palpitations  Respiratory: No SOB, no cough  GI: No nausea, vomiting, abdominal pain  Endocrine: No polyuria, polydipsia or S&Sx of hypoglycemia    Allergies    No Known Allergies    Intolerances      MEDICATIONS:  atorvastatin 40 milliGRAM(s) Oral at bedtime  insulin glargine Injectable (LANTUS) 17 Unit(s) SubCutaneous at bedtime  insulin lispro (ADMELOG) corrective regimen sliding scale   SubCutaneous <User Schedule>  insulin lispro (ADMELOG) corrective regimen sliding scale   SubCutaneous three times a day before meals  insulin lispro Injectable (ADMELOG) 10 Unit(s) SubCutaneous three times a day before meals  predniSONE   Tablet 40 milliGRAM(s) Oral daily      PHYSICAL EXAM:  VITALS: T(C): 36.8 (05-10-24 @ 11:31)  T(F): 98.2 (05-10-24 @ 11:31), Max: 98.6 (05-10-24 @ 04:36)  HR: 121 (05-10-24 @ 15:20) (71 - 124)  BP: 151/91 (05-10-24 @ 11:31) (108/66 - 151/91)  RR:  (18 - 19)  SpO2:  (92% - 98%)  Wt(kg): --  GENERAL: Female laying in bed in NAD  Abdomen: Soft, nontender, non distended  Extremities: Warm, no edema in all 4 exts  NEURO: Alert but sleepy at time of visit. Able to answer all questions and follow commands    LABS:  POCT Blood Glucose.: 372 mg/dL (05-10-24 @ 16:44)  POCT Blood Glucose.: 116 mg/dL (05-10-24 @ 11:58)  POCT Blood Glucose.: 121 mg/dL (05-10-24 @ 08:20)  POCT Blood Glucose.: 221 mg/dL (05-10-24 @ 02:04)  POCT Blood Glucose.: 257 mg/dL (05-09-24 @ 21:14)  POCT Blood Glucose.: 218 mg/dL (05-09-24 @ 16:36)  POCT Blood Glucose.: 102 mg/dL (05-09-24 @ 12:12)  POCT Blood Glucose.: 130 mg/dL (05-09-24 @ 07:44)  POCT Blood Glucose.: 157 mg/dL (05-09-24 @ 02:04)  POCT Blood Glucose.: 146 mg/dL (05-08-24 @ 21:16)  POCT Blood Glucose.: 259 mg/dL (05-08-24 @ 17:15)  POCT Blood Glucose.: 348 mg/dL (05-08-24 @ 11:57)  POCT Blood Glucose.: 105 mg/dL (05-08-24 @ 08:59)  POCT Blood Glucose.: 67 mg/dL (05-08-24 @ 08:13)  POCT Blood Glucose.: 65 mg/dL (05-08-24 @ 08:12)  POCT Blood Glucose.: 298 mg/dL (05-07-24 @ 21:17)                            9.2    6.84  )-----------( 342      ( 10 May 2024 06:10 )             30.0       05-10    141  |  103  |  35<H>  ----------------------------<  173<H>  3.7   |  28  |  0.74    eGFR: 92    Ca    8.4      05-10  Mg     2.1     05-10      A1C with Estimated Average Glucose Result: 11.9 % (04-13-24 @ 03:53)      Estimated Average Glucose: 295 mg/dL (04-13-24 @ 03:53)

## 2024-05-10 NOTE — PROGRESS NOTE ADULT - SUBJECTIVE AND OBJECTIVE BOX
Berhane Tobar MD  Division of Hospital Medicine  Available via MS teams  ---------------------------------------------------------    ESTEBAN BRANHAM  63y  Female      Patient is a 63y old  Female who presents with a chief complaint of SOB (09 May 2024 13:37)      INTERVAL HPI/OVERNIGHT EVENTS:  INCOMPLETE      REVIEW OF SYSTEMS: 10 point ROS negative unless listed above    T(C): 37 (05-10-24 @ 04:36), Max: 37.5 (05-09-24 @ 12:41)  HR: 71 (05-10-24 @ 04:36) (71 - 122)  BP: 108/66 (05-10-24 @ 04:36) (108/66 - 143/83)  RR: 18 (05-10-24 @ 04:36) (18 - 19)  SpO2: 98% (05-10-24 @ 04:36) (91% - 98%)  Wt(kg): --Vital Signs Last 24 Hrs  T(C): 37 (10 May 2024 04:36), Max: 37.5 (09 May 2024 12:41)  T(F): 98.6 (10 May 2024 04:36), Max: 99.5 (09 May 2024 12:41)  HR: 71 (10 May 2024 04:36) (71 - 122)  BP: 108/66 (10 May 2024 04:36) (108/66 - 143/83)  BP(mean): --  RR: 18 (10 May 2024 04:36) (18 - 19)  SpO2: 98% (10 May 2024 04:36) (91% - 98%)    Parameters below as of 10 May 2024 04:36  Patient On (Oxygen Delivery Method): nasal cannula  O2 Flow (L/min): 6      PHYSICAL EXAM:  GENERAL: NAD, well-groomed, well-developed  NECK: Supple, No JVD  CHEST/LUNG: CTA   HEART: Tachycardic  ABDOMEN: Soft, tender, Nondistended; Bowel sounds present.    EXTREMITIES:  2+ Peripheral Pulses, No clubbing, cyanosis, or edema  SKIN: No rashes or lesions  PSYCH: Alert & Oriented x3  t        LABS:                        9.2    6.84  )-----------( 342      ( 10 May 2024 06:10 )             30.0     05-10    141  |  103  |  35<H>  ----------------------------<  173<H>  3.7   |  28  |  0.74    Ca    8.4      10 May 2024 06:10  Mg     2.1     05-10        Urinalysis Basic - ( 10 May 2024 06:10 )    Color: x / Appearance: x / SG: x / pH: x  Gluc: 173 mg/dL / Ketone: x  / Bili: x / Urobili: x   Blood: x / Protein: x / Nitrite: x   Leuk Esterase: x / RBC: x / WBC x   Sq Epi: x / Non Sq Epi: x / Bacteria: x      CAPILLARY BLOOD GLUCOSE      POCT Blood Glucose.: 221 mg/dL (10 May 2024 02:04)  POCT Blood Glucose.: 257 mg/dL (09 May 2024 21:14)  POCT Blood Glucose.: 218 mg/dL (09 May 2024 16:36)  POCT Blood Glucose.: 102 mg/dL (09 May 2024 12:12)        Urinalysis Basic - ( 10 May 2024 06:10 )    Color: x / Appearance: x / SG: x / pH: x  Gluc: 173 mg/dL / Ketone: x  / Bili: x / Urobili: x   Blood: x / Protein: x / Nitrite: x   Leuk Esterase: x / RBC: x / WBC x   Sq Epi: x / Non Sq Epi: x / Bacteria: x        RADIOLOGY & ADDITIONAL TESTS:    Imaging Personally Reviewed:  [ ] YES  [ ] NO Berhane Tobar MD  Division of Hospital Medicine  Available via MS teams  ---------------------------------------------------------    ESTEBAN BRANHAM  63y  Female      Patient is a 63y old  Female who presents with a chief complaint of SOB (09 May 2024 13:37)      INTERVAL HPI/OVERNIGHT EVENTS:  Seen at bedside. A little SOB this am while eating      REVIEW OF SYSTEMS: 10 point ROS negative unless listed above    T(C): 37 (05-10-24 @ 04:36), Max: 37.5 (05-09-24 @ 12:41)  HR: 71 (05-10-24 @ 04:36) (71 - 122)  BP: 108/66 (05-10-24 @ 04:36) (108/66 - 143/83)  RR: 18 (05-10-24 @ 04:36) (18 - 19)  SpO2: 98% (05-10-24 @ 04:36) (91% - 98%)  Wt(kg): --Vital Signs Last 24 Hrs  T(C): 37 (10 May 2024 04:36), Max: 37.5 (09 May 2024 12:41)  T(F): 98.6 (10 May 2024 04:36), Max: 99.5 (09 May 2024 12:41)  HR: 71 (10 May 2024 04:36) (71 - 122)  BP: 108/66 (10 May 2024 04:36) (108/66 - 143/83)  BP(mean): --  RR: 18 (10 May 2024 04:36) (18 - 19)  SpO2: 98% (10 May 2024 04:36) (91% - 98%)    Parameters below as of 10 May 2024 04:36  Patient On (Oxygen Delivery Method): nasal cannula  O2 Flow (L/min): 6      PHYSICAL EXAM:  GENERAL: NAD, well-groomed, well-developed  NECK: Supple, No JVD  CHEST/LUNG: CTA   HEART: Tachycardic  ABDOMEN: Soft, tender, Nondistended; Bowel sounds present.    EXTREMITIES:  2+ Peripheral Pulses, No clubbing, cyanosis, or edema  SKIN: No rashes or lesions  PSYCH: Alert & Oriented x3  t        LABS:                        9.2    6.84  )-----------( 342      ( 10 May 2024 06:10 )             30.0     05-10    141  |  103  |  35<H>  ----------------------------<  173<H>  3.7   |  28  |  0.74    Ca    8.4      10 May 2024 06:10  Mg     2.1     05-10        Urinalysis Basic - ( 10 May 2024 06:10 )    Color: x / Appearance: x / SG: x / pH: x  Gluc: 173 mg/dL / Ketone: x  / Bili: x / Urobili: x   Blood: x / Protein: x / Nitrite: x   Leuk Esterase: x / RBC: x / WBC x   Sq Epi: x / Non Sq Epi: x / Bacteria: x      CAPILLARY BLOOD GLUCOSE      POCT Blood Glucose.: 221 mg/dL (10 May 2024 02:04)  POCT Blood Glucose.: 257 mg/dL (09 May 2024 21:14)  POCT Blood Glucose.: 218 mg/dL (09 May 2024 16:36)  POCT Blood Glucose.: 102 mg/dL (09 May 2024 12:12)        Urinalysis Basic - ( 10 May 2024 06:10 )    Color: x / Appearance: x / SG: x / pH: x  Gluc: 173 mg/dL / Ketone: x  / Bili: x / Urobili: x   Blood: x / Protein: x / Nitrite: x   Leuk Esterase: x / RBC: x / WBC x   Sq Epi: x / Non Sq Epi: x / Bacteria: x        RADIOLOGY & ADDITIONAL TESTS:    Imaging Personally Reviewed:  [ ] YES  [ ] NO

## 2024-05-10 NOTE — PROGRESS NOTE ADULT - ASSESSMENT
63 F w/h/o uncontrolled T2DM (A1C 11.9%) while on Lantus,admelog,  metformin, glimepiride. Unknown DM complications. Also h/o AHRF, ILD, PE, HTN. Here with SOB secondary to interstitial lung disease on steroid taper. Endocrinology consulted for diabetes management. Tolerating POs with unpredictable PO intake. Noted elevated BG at dinner time today but this was due to pt not getting meal time insulin with lunch. Unable to make further meal time insulin dose adjustments until able to identify a glycemic pattern while on present insulin doses. Pt is insulin sensitive with on/off hypoglycemic events. Will continue to decrease basal insulin since bedtime BG dropper over 100 pts from bedtime/2am BG levels. Bg goal 100 to 180s. No hypoglycemia in last 24 hours.     Steroid taper:  Prednisone 40 mg daily ( 5/7- 5/13)  Prednisone 30 mg daily ( 5/14- 5/20)  Prednisone 20 mg daily ( 5/21-5/27)  Prednisone 10 mg daily ( 5/28--

## 2024-05-10 NOTE — PROGRESS NOTE ADULT - ASSESSMENT
63F hx ILD/ probable IPF (On 2-3LNC at home), PE 2022 on eliquis, severe pHTN w/ prior cor pulmonale (RVSP 68 - 10/22 w/ TTE from 4/12 w/ PASP 28, normal LV/RV SF), IDDM, presenting as a transfer from [place] for lung transplant eval iso SOB, dry cough, chest pain. She has been receiving duonebs, symbicort, lasix, ofev and IV steroids. Continuing with AC. Solu-medrol is ordered for 40 IV BID.  CT Chest 4/12/2024: Findings suggesting interstitial lung disease without significant interval progression. No evidence of pneumonia.   CT scan from 4/12/2024 when compared with CT scan from 2020 has shown significant progression.      Recommendations  - c/w weaning O2 as able - On 5.5L NC currently.   - Continue with Symbicort, duonebs, Ofev, Eliquis,   - c/w prednisone 40mg - taper by 10mg every week.   - Continue with diuresis - goal net neg 1-2L  - Can continue to use benzo prn for anxiety  - TTE results appreciated - PFO + e/o decreased Right ventricular function  - fu sputum cx  - c/w sildenafil 20mg TID - Side effects may include hypotension, nausea, light-headedness, let us know if patient experiences these symptoms and decrease dosage to sildenafil 10 tid if experiencing any symptoms  - CT with increased GGO consistent with pulmonary edema.   - Please continue Diurese the pt. Would give additional doses of lasix as needed to achieve minimum -1-2L daily.  - Please check BNP every other day  - Please get shunt study - I have placed the order   - Please get cxr today     Pulm to follow

## 2024-05-10 NOTE — PROGRESS NOTE ADULT - PROBLEM SELECTOR PLAN 3
- Pt w/ hx of severe pHTN, RVSP 68 on 10/2022  - Repeat 4/12/24 at [facility] noted PAP 28, "normal PAP"   - Maintain euvolemia, as above  - c/w sildenafil 20 mg TID, currently tolerating

## 2024-05-10 NOTE — PROGRESS NOTE ADULT - PROBLEM SELECTOR PLAN 7
T2DM: Home DM medications: metformin 500 mg BID, + glimepride 1 mg daily + lantus 35 units QHS+ admelog 25 units TIDAC  - Pt w/ hx of uncontrolled T2DM, a1c 11.9.   - Endocrine consulted via email, recs appreciated  - On glargine 17 units qhs, lispro 10u TID qac, hold if NPO or eating < 50% of meals  - MISS FS tid qac qhs  - Discharge DM medications:   - Continue with metformin 500 mg BID  - STOP glimepride due to recurrent hypoglycemia at home   - Basal/bolus, dose will depend on insulin requirement and steroid plan   - Patient will follow up at  [facility]:  [address] [place] [address]. [address]. [place] [address]  Tel: [phone]

## 2024-05-11 NOTE — PROGRESS NOTE ADULT - PROBLEM SELECTOR PLAN 1
- Please monitor blood glucose values TID AC & QHS while eating regular meals and Q6H while NPO  -Increase insulin Glargine to 17 units QHS  -Continue  insulin Lispro 8 units TID with meals as it was just increased yesterday, , hold if NPO or if eating less than 50% of meals; Pt instructed to report to staff if not eating  - Continue with mod dose correctional scale TID with meals and QHS  -Please notify endo team with any changes on steroid doses  Discharge planning:   - Home DM medications: metformin 500 mg BID, + glimepride 1 mg daily + lantus 35 units QHS+ admelog 25 units TIDAC  - Discharge DM medications:   - Continue with metformin 500 mg BID  - STOP glimepride due to recurrent hypoglycemia at home   - Basal/bolus, dose will depend on insulin requirement and steroid plan   - Make sure pt has Rx for all DM supplies and insulin/ DM meds.  - Can follow at endo practice. [address] [place] [address] [address]. Phone . Call for apt closer to discharge- - Patient will need opthalmology and podiatry follow up as outpatient - Please monitor blood glucose values TID AC & QHS while eating regular meals and Q6H while NPO  -adjust insulin Glargine to 16units QHS  -Continue  insulin Lispro 10 units TID with meals,hold if NPO or if eating less than 50% of meals; Pt instructed to report to staff if not eating  - Continue with mod dose correctional scale TID with meals and QHS  -Please notify endo team with any changes on steroid doses  Discharge planning:   - Home DM medications: metformin 500 mg BID, + glimepride 1 mg daily + lantus 35 units QHS+ admelog 25 units TIDAC  - Discharge DM medications:   - Continue with metformin 500 mg BID  - STOP glimepride due to recurrent hypoglycemia at home   - Basal/bolus, dose will depend on insulin requirement and steroid plan   - Make sure pt has Rx for all DM supplies and insulin/ DM meds.  - Can follow at endo practice. [address] [facility] [address]. Phone . Call for apt closer to discharge- - Patient will need opthalmology and podiatry follow up as outpatient

## 2024-05-11 NOTE — PROGRESS NOTE ADULT - ASSESSMENT
63-yo F with PMHx of chronic AHRF on 2-3L NC at b/l 2/2 ILD (Followed w/ Dr. Primo Marlow, [facility]), PE on Eliquis, severe pHTN, Cor pulmonale, DM, presented initially with SOB, dry cough, chest pain, LE edema, recent admission to [facility] in March for ILD flare, admitted to outside hospital->ICU for AHRF 2/2 ILD flare, transferred to [facility] for lung transplant evaluation.

## 2024-05-11 NOTE — PROGRESS NOTE ADULT - PROBLEM SELECTOR PLAN 3
LDL goal ,70 due to DM  Pt LDL NA  Order fasting lipid if not recently done  Statin as noted above  Manage per primary team   F/u levels as out pt      Contact via [employer] Teams during business hours  To reach covering provider access AMION via sunrise tools  For Urgent matters/after-hours/weekends/holidays please page endocrine fellow on call   For nonurgent matters please email [email or web address]    Please note that this patient may be followed by different provider tomorrow.  Notify endocrine 24 hours prior to discharge for final recommendations

## 2024-05-11 NOTE — PROGRESS NOTE ADULT - SUBJECTIVE AND OBJECTIVE BOX
seen earlier today     Chief Complaint: Diabetes Mellitus follow up    INTERVAL HX:  Patient seen at bedside this morning. Continues on oxygen therapy. States she ate something prior to having her BG checked this morning. POC BG was 340mg/dl  BG have been variable over the last 24hrs. Continues on prednisone 40mg daily.     Review of Systems:  General: As above  GI: No nausea, vomiting  Endocrine: no  S&Sx of hypoglycemia    Allergies    No Known Allergies    Intolerances      MEDICATIONS  (STANDING):  albuterol/ipratropium for Nebulization 3 milliLiter(s) Nebulizer every 6 hours  apixaban 5 milliGRAM(s) Oral two times a day  atorvastatin 40 milliGRAM(s) Oral at bedtime  budesonide 160 MICROgram(s)/formoterol 4.5 MICROgram(s) Inhaler 2 Puff(s) Inhalation two times a day  chlorhexidine 2% Cloths 1 Application(s) Topical daily  dextrose 10% Bolus 125 milliLiter(s) IV Bolus once  dextrose 5%. 1000 milliLiter(s) (100 mL/Hr) IV Continuous <Continuous>  dextrose 5%. 1000 milliLiter(s) (50 mL/Hr) IV Continuous <Continuous>  dextrose 50% Injectable 25 Gram(s) IV Push once  dextrose 50% Injectable 12.5 Gram(s) IV Push once  furosemide   Injectable 40 milliGRAM(s) IV Push two times a day  gabapentin 300 milliGRAM(s) Oral daily  glucagon  Injectable 1 milliGRAM(s) IntraMuscular once  insulin glargine Injectable (LANTUS) 16 Unit(s) SubCutaneous at bedtime  insulin lispro (ADMELOG) corrective regimen sliding scale   SubCutaneous <User Schedule>  insulin lispro (ADMELOG) corrective regimen sliding scale   SubCutaneous three times a day before meals  insulin lispro Injectable (ADMELOG) 10 Unit(s) SubCutaneous three times a day before meals  multivitamin 1 Tablet(s) Oral daily  Ofev (Nintedanib) 150 milliGRAM(s) 150 milliGRAM(s) Oral two times a day  pantoprazole    Tablet 40 milliGRAM(s) Oral two times a day  polyethylene glycol 3350 17 Gram(s) Oral daily  predniSONE   Tablet 40 milliGRAM(s) Oral daily  senna 2 Tablet(s) Oral at bedtime  sildenafil (REVATIO) 20 milliGRAM(s) Oral every 8 hours  traZODone 100 milliGRAM(s) Oral at bedtime      atorvastatin   40 milliGRAM(s) Oral (05-10-24 @ 21:42)    insulin glargine Injectable (LANTUS)   15 Unit(s) SubCutaneous (05-10-24 @ 22:15)    insulin lispro (ADMELOG) corrective regimen sliding scale   4 Unit(s) SubCutaneous (05-11-24 @ 12:40)   8 Unit(s) SubCutaneous (05-11-24 @ 08:48)   10 Unit(s) SubCutaneous (05-10-24 @ 17:38)    insulin lispro Injectable (ADMELOG)   10 Unit(s) SubCutaneous (05-11-24 @ 12:40)   10 Unit(s) SubCutaneous (05-11-24 @ 08:48)   10 Unit(s) SubCutaneous (05-10-24 @ 17:39)    predniSONE   Tablet   40 milliGRAM(s) Oral (05-11-24 @ 05:33)        PHYSICAL EXAM:  VITALS: T(C): 36.9 (05-11-24 @ 12:06)  T(F): 98.4 (05-11-24 @ 12:06), Max: 98.6 (05-10-24 @ 20:59)  HR: 115 (05-11-24 @ 12:06) (103 - 121)  BP: 135/62 (05-11-24 @ 12:06) (111/68 - 144/83)  RR:  (18 - 19)  SpO2:  (90% - 100%)  Wt(kg): --  GENERAL: NAD  Respiratory: Respirations unlabored on oyxgen for ILD.   Extremities: Warm, no edema  NEURO: Alert , appropriate     LABS:  POCT Blood Glucose.: 248 mg/dL (05-11-24 @ 12:22)  POCT Blood Glucose.: 340 mg/dL (05-11-24 @ 08:19)  POCT Blood Glucose.: 232 mg/dL (05-11-24 @ 02:23)  POCT Blood Glucose.: 207 mg/dL (05-10-24 @ 22:11)  POCT Blood Glucose.: 372 mg/dL (05-10-24 @ 16:44)  POCT Blood Glucose.: 116 mg/dL (05-10-24 @ 11:58)  POCT Blood Glucose.: 121 mg/dL (05-10-24 @ 08:20)  POCT Blood Glucose.: 221 mg/dL (05-10-24 @ 02:04)  POCT Blood Glucose.: 257 mg/dL (05-09-24 @ 21:14)  POCT Blood Glucose.: 218 mg/dL (05-09-24 @ 16:36)  POCT Blood Glucose.: 102 mg/dL (05-09-24 @ 12:12)  POCT Blood Glucose.: 130 mg/dL (05-09-24 @ 07:44)  POCT Blood Glucose.: 157 mg/dL (05-09-24 @ 02:04)  POCT Blood Glucose.: 146 mg/dL (05-08-24 @ 21:16)  POCT Blood Glucose.: 259 mg/dL (05-08-24 @ 17:15)                          9.2    6.84  )-----------( 342      ( 10 May 2024 06:10 )             30.0     05-10    141  |  103  |  35<H>  ----------------------------<  173<H>  3.7   |  28  |  0.74    Ca    8.4      10 May 2024 06:10  Mg     2.1     05-10          Thyroid Function Tests:      A1C with Estimated Average Glucose Result: 11.9 % (04-13-24 @ 03:53)    Estimated Average Glucose: 295 mg/dL (04-13-24 @ 03:53)        Diet, Regular:   Consistent Carbohydrate No Snacks (CSTCHO) (04-26-24 @ 15:14) [Active]

## 2024-05-11 NOTE — PROGRESS NOTE ADULT - PROBLEM SELECTOR PLAN 1
Baseline 3-4L NC. Hx of ILD w/ c/f IPF. Followed w/ DOMENICO Ding. Transferred to [facility] for lung transplant eval  - Currently on 6L NC, wean as tolerated.   - Diurese with lasix, on Lasix 40mg IV BID. Was net neg 1.9L over last 24 hours. To give additional dose of IV Lasix 40mg today again. Goal net neg closer to 2L  - NM cardiac shunt performed on 5/10 was negative  - Check BNP every other day  - Continue home Ofev 150mg BID  - Duonebs, Symbicort  - Transitioned to PO Prednisone 40mg, plan to taper by 10mg every 7 days.   - repeat TTE with bubble study revealed severe RH dysfunction with elevated pulmonary pressure, grade II diastolic dysfunction   - valium 2.5 mg q12 PRN anxiety   - Deemed not to be a transplant candidate 4/30 Baseline 3-4L NC. Hx of ILD w/ c/f IPF. Followed w/ DOMENICO Ding. Transferred to [facility] for lung transplant eval  - Currently on 6L NC, wean as tolerated.   - Diurese with lasix, on Lasix 40mg IV BID. Was net neg 1.9L over last 24 hours. To give additional dose of IV Lasix 40mg today again. Goal net neg closer to 2L  - NM cardiac shunt performed on 5/10 was negative  - Check BNP every other day  - Continue home Ofev 150mg BID  - Vincenzo Symbicort  - Transitioned to PO Prednisone 40mg, plan to taper by 10mg every 7 days. If worsening hypoxia would increase steroids  - repeat TTE with bubble study revealed severe RH dysfunction with elevated pulmonary pressure, grade II diastolic dysfunction   - valium 2.5 mg q12 PRN anxiety   - Deemed not to be a transplant candidate 4/30

## 2024-05-11 NOTE — PROGRESS NOTE ADULT - PROBLEM SELECTOR PLAN 3
- Pt w/ hx of severe pHTN, RVSP 68 on 10/2022  - Repeat 4/12/24 at [facility] noted PAP 28, "normal PAP"   - Maintain euvolemia, as above  - c/w sildenafil 20 mg TID, currently tolerating

## 2024-05-11 NOTE — PROGRESS NOTE ADULT - SUBJECTIVE AND OBJECTIVE BOX
Berhane Tobar MD  Division of Hospital Medicine  Available via MS teams  ---------------------------------------------------------    ESTEBAN BRANHAM  63y  Female      Patient is a 63y old  Female who presents with a chief complaint of SOB (10 May 2024 19:18)      INTERVAL HPI/OVERNIGHT EVENTS:  INCOMPLETE      REVIEW OF SYSTEMS: 10 point ROS negative unless listed above    T(C): 37 (05-11-24 @ 05:21), Max: 37 (05-10-24 @ 20:59)  HR: 111 (05-11-24 @ 05:21) (100 - 124)  BP: 111/68 (05-11-24 @ 05:21) (111/68 - 151/91)  RR: 18 (05-11-24 @ 05:21) (18 - 19)  SpO2: 90% (05-11-24 @ 05:21) (90% - 94%)  Wt(kg): --Vital Signs Last 24 Hrs  T(C): 37 (11 May 2024 05:21), Max: 37 (10 May 2024 20:59)  T(F): 98.6 (11 May 2024 05:21), Max: 98.6 (10 May 2024 20:59)  HR: 111 (11 May 2024 05:21) (100 - 124)  BP: 111/68 (11 May 2024 05:21) (111/68 - 151/91)  BP(mean): --  RR: 18 (11 May 2024 05:21) (18 - 19)  SpO2: 90% (11 May 2024 05:21) (90% - 94%)    Parameters below as of 11 May 2024 05:21  Patient On (Oxygen Delivery Method): nasal cannula w/ humidification  O2 Flow (L/min): 6      PHYSICAL EXAM:  GENERAL: NAD, well-groomed, well-developed  NECK: Supple, No JVD  CHEST/LUNG: CTA   HEART: Tachycardic  ABDOMEN: Soft, tender, Nondistended; Bowel sounds present.    EXTREMITIES:  2+ Peripheral Pulses, No clubbing, cyanosis, or edema  SKIN: No rashes or lesions  PSYCH: Alert & Oriented x3        LABS:                        9.2    6.84  )-----------( 342      ( 10 May 2024 06:10 )             30.0     05-10    141  |  103  |  35<H>  ----------------------------<  173<H>  3.7   |  28  |  0.74    Ca    8.4      10 May 2024 06:10  Mg     2.1     05-10        Urinalysis Basic - ( 10 May 2024 06:10 )    Color: x / Appearance: x / SG: x / pH: x  Gluc: 173 mg/dL / Ketone: x  / Bili: x / Urobili: x   Blood: x / Protein: x / Nitrite: x   Leuk Esterase: x / RBC: x / WBC x   Sq Epi: x / Non Sq Epi: x / Bacteria: x      CAPILLARY BLOOD GLUCOSE      POCT Blood Glucose.: 232 mg/dL (11 May 2024 02:23)  POCT Blood Glucose.: 207 mg/dL (10 May 2024 22:11)  POCT Blood Glucose.: 372 mg/dL (10 May 2024 16:44)  POCT Blood Glucose.: 116 mg/dL (10 May 2024 11:58)  POCT Blood Glucose.: 121 mg/dL (10 May 2024 08:20)        Urinalysis Basic - ( 10 May 2024 06:10 )    Color: x / Appearance: x / SG: x / pH: x  Gluc: 173 mg/dL / Ketone: x  / Bili: x / Urobili: x   Blood: x / Protein: x / Nitrite: x   Leuk Esterase: x / RBC: x / WBC x   Sq Epi: x / Non Sq Epi: x / Bacteria: x        RADIOLOGY & ADDITIONAL TESTS:    Imaging Personally Reviewed:  [ ] YES  [ ] NO Berhane Tobar MD  Division of Hospital Medicine  Available via MS teams  ---------------------------------------------------------    ESTEBAN BRANHAM  63y  Female      Patient is a 63y old  Female who presents with a chief complaint of SOB (10 May 2024 19:18)      INTERVAL HPI/OVERNIGHT EVENTS:  Seen eating breakfast. States breathing is the same. No further nose bleeding      REVIEW OF SYSTEMS: 10 point ROS negative unless listed above    T(C): 37 (05-11-24 @ 05:21), Max: 37 (05-10-24 @ 20:59)  HR: 111 (05-11-24 @ 05:21) (100 - 124)  BP: 111/68 (05-11-24 @ 05:21) (111/68 - 151/91)  RR: 18 (05-11-24 @ 05:21) (18 - 19)  SpO2: 90% (05-11-24 @ 05:21) (90% - 94%)  Wt(kg): --Vital Signs Last 24 Hrs  T(C): 37 (11 May 2024 05:21), Max: 37 (10 May 2024 20:59)  T(F): 98.6 (11 May 2024 05:21), Max: 98.6 (10 May 2024 20:59)  HR: 111 (11 May 2024 05:21) (100 - 124)  BP: 111/68 (11 May 2024 05:21) (111/68 - 151/91)  BP(mean): --  RR: 18 (11 May 2024 05:21) (18 - 19)  SpO2: 90% (11 May 2024 05:21) (90% - 94%)    Parameters below as of 11 May 2024 05:21  Patient On (Oxygen Delivery Method): nasal cannula w/ humidification  O2 Flow (L/min): 6      PHYSICAL EXAM:  GENERAL: NAD, well-groomed, well-developed  NECK: Supple, No JVD  CHEST/LUNG: CTA   HEART: Tachycardic  ABDOMEN: Soft, tender, Nondistended; Bowel sounds present.    EXTREMITIES:  2+ Peripheral Pulses, No clubbing, cyanosis, or edema  SKIN: No rashes or lesions  PSYCH: Alert & Oriented x3        LABS:                        9.2    6.84  )-----------( 342      ( 10 May 2024 06:10 )             30.0     05-10    141  |  103  |  35<H>  ----------------------------<  173<H>  3.7   |  28  |  0.74    Ca    8.4      10 May 2024 06:10  Mg     2.1     05-10        Urinalysis Basic - ( 10 May 2024 06:10 )    Color: x / Appearance: x / SG: x / pH: x  Gluc: 173 mg/dL / Ketone: x  / Bili: x / Urobili: x   Blood: x / Protein: x / Nitrite: x   Leuk Esterase: x / RBC: x / WBC x   Sq Epi: x / Non Sq Epi: x / Bacteria: x      CAPILLARY BLOOD GLUCOSE      POCT Blood Glucose.: 232 mg/dL (11 May 2024 02:23)  POCT Blood Glucose.: 207 mg/dL (10 May 2024 22:11)  POCT Blood Glucose.: 372 mg/dL (10 May 2024 16:44)  POCT Blood Glucose.: 116 mg/dL (10 May 2024 11:58)  POCT Blood Glucose.: 121 mg/dL (10 May 2024 08:20)        Urinalysis Basic - ( 10 May 2024 06:10 )    Color: x / Appearance: x / SG: x / pH: x  Gluc: 173 mg/dL / Ketone: x  / Bili: x / Urobili: x   Blood: x / Protein: x / Nitrite: x   Leuk Esterase: x / RBC: x / WBC x   Sq Epi: x / Non Sq Epi: x / Bacteria: x        RADIOLOGY & ADDITIONAL TESTS:    Imaging Personally Reviewed:  [ ] YES  [ ] NO

## 2024-05-11 NOTE — PROGRESS NOTE ADULT - PROBLEM SELECTOR PLAN 7
T2DM: Home DM medications: metformin 500 mg BID, + glimepride 1 mg daily + lantus 35 units QHS+ admelog 25 units TIDAC  - Pt w/ hx of uncontrolled T2DM, a1c 11.9.   - Endocrine consulted via email, recs appreciated  - On glargine 17 units qhs, lispro 10u TID qac, hold if NPO or eating < 50% of meals  - MISS FS tid qac qhs  - Discharge DM medications:   - Continue with metformin 500 mg BID  - STOP glimepride due to recurrent hypoglycemia at home   - Basal/bolus, dose will depend on insulin requirement and steroid plan   - Patient will follow up at  [facility]:  [address] [place] [address]. [address]. [place] [address]  Tel: [phone] T2DM: Home DM medications: metformin 500 mg BID, + glimepride 1 mg daily + lantus 35 units QHS+ admelog 25 units TIDAC  - Pt w/ hx of uncontrolled T2DM, a1c 11.9.   - Endocrine consulted via email, recs appreciated  - On glargine 15 units qhs, lispro 10u TID qac, hold if NPO or eating < 50% of meals  - MISS FS tid qac qhs  - Discharge DM medications:   - Continue with metformin 500 mg BID  - STOP glimipride due to recurrent hypoglycemia at home   - Basal/bolus, dose will depend on insulin requirement and steroid plan   - Patient will follow up at  [facility]:  [address] [place] [address]. [address]. [place] [address]  Tel: [phone]

## 2024-05-12 NOTE — PROGRESS NOTE ADULT - PROBLEM SELECTOR PLAN 7
T2DM: Home DM medications: metformin 500 mg BID, + glimepride 1 mg daily + lantus 35 units QHS+ admelog 25 units TIDAC  - Pt w/ hx of uncontrolled T2DM, a1c 11.9.   - Endocrine consulted via email, recs appreciated  - On glargine 15 units qhs, lispro 10u TID qac, hold if NPO or eating < 50% of meals  - MISS FS tid qac qhs  - Discharge DM medications:   - Continue with metformin 500 mg BID  - STOP glimipride due to recurrent hypoglycemia at home   - Basal/bolus, dose will depend on insulin requirement and steroid plan   - Patient will follow up at  [facility]:  [address] [place] [address]. [address]. [place] [address]  Tel: [phone]

## 2024-05-12 NOTE — PROGRESS NOTE ADULT - PROBLEM SELECTOR PLAN 1
Baseline 3-4L NC. Hx of ILD w/ c/f IPF. Followed w/ DOMENICO Ding. Transferred to [facility] for lung transplant eval  - Was on on 6L NC, increased back to NC for increased WOB   - c/w diuresis with lasix, on Lasix 40mg IV BID. Was net neg ___L over last 24 hours. To give additional dose of IV Lasix 40mg today again. Goal net neg closer to 2L  - NM cardiac shunt performed on 5/10 was negative  - CXR on 5/10 still with pulmonary edema  - Check BNP every other day  - Continue home Ofev 150mg BID  - Duonebs, Symbicort  - Transitioned to PO Prednisone 40mg, plan to taper by 10mg every 7 days. If worsening hypoxia would increase steroids  - repeat TTE with bubble study revealed severe RH dysfunction with elevated pulmonary pressure, grade II diastolic dysfunction   - valium 2.5 mg q12 PRN anxiety   - Deemed not to be a transplant candidate 4/30 Baseline 3-4L NC. Hx of ILD w/ c/f IPF. Followed w/ DOMENICO Ding. Transferred to [facility] for lung transplant eval  - Was on on 6L NC, increased back to HFNC for increased WOB   - c/w diuresis with lasix, on Lasix 40mg IV BID. Was net neg 800L over last 24 hours. To give additional dose of IV Lasix 40mg today again. Goal net neg closer to 2L  - NM cardiac shunt performed on 5/10 was negative  - CXR on 5/10 still with pulmonary edema  - Check BNP every other day  - Continue home Ofev 150mg BID  - Duonebs, Symbicort  - Transitioned to PO Prednisone 40mg, plan to taper by 10mg every 7 days. If worsening hypoxia would increase steroids  - repeat TTE with bubble study revealed severe RH dysfunction with elevated pulmonary pressure, grade II diastolic dysfunction   - valium 2.5 mg q12 PRN anxiety   - Deemed not to be a transplant candidate 4/30

## 2024-05-12 NOTE — PROGRESS NOTE ADULT - PROBLEM SELECTOR PLAN 3
- Pt w/ hx of severe pHTN, RVSP 68 on 10/2022  - Repeat 4/12/24 at [facility] noted PAP 28, "normal PAP"   - Maintain euvolemia, as above  - c/w sildenafil 20 mg TID, currently tolerating

## 2024-05-12 NOTE — PROGRESS NOTE ADULT - ASSESSMENT
63-yo F with PMHx of chronic AHRF on 2-3L NC at b/l 2/2 ILD (Followed w/ Dr. Primo Marlow, [facility]), PE on Eliquis, severe pHTN, Cor pulmonale, DM, presented initially with SOB, dry cough, chest pain, LE edema, recent admission to [facility] in March for ILD flare, admitted to outside hospital->ICU for AHRF 2/2 ILD flare, transferred to [facility] for lung transplant evaluation.    63-yo F with PMHx of chronic AHRF on 2-3L NC at b/l 2/2 ILD (Followed w/ Dr. Primo Marlow, [facility]), PE on Eliquis, severe pHTN, Cor pulmonale, DM, presented initially with SOB, dry cough, chest pain, LE edema, recent admission to [facility] in March for ILD flare, admitted to outside hospital->ICU for AHRF 2/2 ILD flare, transferred to [facility] for lung transplant evaluation.

## 2024-05-12 NOTE — PROGRESS NOTE ADULT - SUBJECTIVE AND OBJECTIVE BOX
Berhane Tobar MD  Division of Hospital Medicine  Available via MS teams  ---------------------------------------------------------    ESTEBAN BRANHAM  63y  Female      Patient is a 63y old  Female who presents with a chief complaint of SOB (11 May 2024 14:43)      INTERVAL HPI/OVERNIGHT EVENTS:        REVIEW OF SYSTEMS: 10 point ROS negative unless listed above    T(C): 37 (05-12-24 @ 04:06), Max: 37.3 (05-11-24 @ 20:48)  HR: 112 (05-12-24 @ 04:06) (110 - 126)  BP: 117/74 (05-12-24 @ 04:06) (117/74 - 143/84)  RR: 18 (05-12-24 @ 04:06) (18 - 19)  SpO2: 98% (05-12-24 @ 04:06) (94% - 100%)  Wt(kg): --Vital Signs Last 24 Hrs  T(C): 37 (12 May 2024 04:06), Max: 37.3 (11 May 2024 20:48)  T(F): 98.6 (12 May 2024 04:06), Max: 99.1 (11 May 2024 20:48)  HR: 112 (12 May 2024 04:06) (110 - 126)  BP: 117/74 (12 May 2024 04:06) (117/74 - 143/84)  BP(mean): --  RR: 18 (12 May 2024 04:06) (18 - 19)  SpO2: 98% (12 May 2024 04:06) (94% - 100%)    Parameters below as of 12 May 2024 03:30  Patient On (Oxygen Delivery Method): nasal cannula, high flow  O2 Flow (L/min): 50  O2 Concentration (%): 60    PHYSICAL EXAM:  GENERAL: NAD, well-groomed, well-developed  NECK: Supple, No JVD  CHEST/LUNG: CTA   HEART: Tachycardic  ABDOMEN: Soft, tender, Nondistended; Bowel sounds present.    EXTREMITIES:  2+ Peripheral Pulses, No clubbing, cyanosis, or edema  SKIN: No rashes or lesions  PSYCH: Alert & Oriented x3          LABS:              CAPILLARY BLOOD GLUCOSE      POCT Blood Glucose.: 170 mg/dL (12 May 2024 02:10)  POCT Blood Glucose.: 276 mg/dL (11 May 2024 21:27)  POCT Blood Glucose.: 199 mg/dL (11 May 2024 16:35)  POCT Blood Glucose.: 248 mg/dL (11 May 2024 12:22)  POCT Blood Glucose.: 340 mg/dL (11 May 2024 08:19)      ABG - ( 11 May 2024 10:11 )  pH, Arterial: 7.44  pH, Blood: x     /  pCO2: 42    /  pO2: 58    / HCO3: 28    / Base Excess: 3.9   /  SaO2: 89.8                  RADIOLOGY & ADDITIONAL TESTS:    Imaging Personally Reviewed:  [ ] YES  [ ] NO Berhane Tobar MD  Division of Hospital Medicine  Available via MS teams  ---------------------------------------------------------    ESTEBAN BRANHAM  63y  Female      Patient is a 63y old  Female who presents with a chief complaint of SOB (11 May 2024 14:43)      INTERVAL HPI/OVERNIGHT EVENTS:  Seen at bedside. had some increased WOB yesterday, placed back on HFNC. Now back on NC      REVIEW OF SYSTEMS: 10 point ROS negative unless listed above    T(C): 37 (05-12-24 @ 04:06), Max: 37.3 (05-11-24 @ 20:48)  HR: 112 (05-12-24 @ 04:06) (110 - 126)  BP: 117/74 (05-12-24 @ 04:06) (117/74 - 143/84)  RR: 18 (05-12-24 @ 04:06) (18 - 19)  SpO2: 98% (05-12-24 @ 04:06) (94% - 100%)  Wt(kg): --Vital Signs Last 24 Hrs  T(C): 37 (12 May 2024 04:06), Max: 37.3 (11 May 2024 20:48)  T(F): 98.6 (12 May 2024 04:06), Max: 99.1 (11 May 2024 20:48)  HR: 112 (12 May 2024 04:06) (110 - 126)  BP: 117/74 (12 May 2024 04:06) (117/74 - 143/84)  BP(mean): --  RR: 18 (12 May 2024 04:06) (18 - 19)  SpO2: 98% (12 May 2024 04:06) (94% - 100%)    Parameters below as of 12 May 2024 03:30  Patient On (Oxygen Delivery Method): nasal cannula, high flow  O2 Flow (L/min): 50  O2 Concentration (%): 60    PHYSICAL EXAM:  GENERAL: NAD, well-groomed, well-developed  NECK: Supple, No JVD  CHEST/LUNG: CTA   HEART: Tachycardic  ABDOMEN: Soft, tender, Nondistended; Bowel sounds present.    EXTREMITIES:  2+ Peripheral Pulses, No clubbing, cyanosis, or edema  SKIN: No rashes or lesions  PSYCH: Alert & Oriented x3          LABS:              CAPILLARY BLOOD GLUCOSE      POCT Blood Glucose.: 170 mg/dL (12 May 2024 02:10)  POCT Blood Glucose.: 276 mg/dL (11 May 2024 21:27)  POCT Blood Glucose.: 199 mg/dL (11 May 2024 16:35)  POCT Blood Glucose.: 248 mg/dL (11 May 2024 12:22)  POCT Blood Glucose.: 340 mg/dL (11 May 2024 08:19)      ABG - ( 11 May 2024 10:11 )  pH, Arterial: 7.44  pH, Blood: x     /  pCO2: 42    /  pO2: 58    / HCO3: 28    / Base Excess: 3.9   /  SaO2: 89.8                  RADIOLOGY & ADDITIONAL TESTS:    Imaging Personally Reviewed:  [ ] YES  [ ] NO

## 2024-05-13 NOTE — PROGRESS NOTE ADULT - PROBLEM SELECTOR PLAN 7
T2DM: Home DM medications: metformin 500 mg BID, + glimepride 1 mg daily + lantus 35 units QHS+ admelog 25 units TIDAC  - Pt w/ hx of uncontrolled T2DM, a1c 11.9.   - Endocrine consulted via email, recs appreciated  - On glargine 15 units qhs, lispro 10u TID qac, hold if NPO or eating < 50% of meals  - MISS FS tid qac qhs  - Discharge DM medications:   - Continue with metformin 500 mg BID  - STOP glimipride due to recurrent hypoglycemia at home   - Basal/bolus, dose will depend on insulin requirement and steroid plan   - Patient will follow up at  [facility]:  [address] [place] [address]. [address]. [place] [address]  Tel: [phone]

## 2024-05-13 NOTE — PROGRESS NOTE ADULT - PROBLEM SELECTOR PLAN 1
Baseline 3-4L NC. Hx of ILD w/ c/f IPF. Followed w/ DOMENICO Ding. Transferred to [facility] for lung transplant eval  - Was on on 6L NC, increased back to HFNC for increased WOB   - c/w diuresis with lasix, on Lasix 40mg IV BID. Was net neg 800L over last 24 hours. To give additional dose of IV Lasix 40mg today again. Goal net neg closer to 2L  - NM cardiac shunt performed on 5/10 was negative  - CXR on 5/10 still with pulmonary edema  - Check BNP every other day  - Continue home Ofev 150mg BID  - Duonebs, Symbicort  - Transitioned to PO Prednisone 40mg, plan to taper by 10mg every 7 days. If worsening hypoxia would increase steroids  - repeat TTE with bubble study revealed severe RH dysfunction with elevated pulmonary pressure, grade II diastolic dysfunction   - valium 2.5 mg q12 PRN anxiety   - Deemed not to be a transplant candidate 4/30

## 2024-05-13 NOTE — PROGRESS NOTE ADULT - SUBJECTIVE AND OBJECTIVE BOX
Pulmonary Consult Follow up     Interval Events:    Still dyspneic and tachypneic. complaining of gas pain.      REVIEW OF SYSTEMS:  [x] All other systems negative except per HPI   [ ] Unable to assess ROS because ________    OBJECTIVE:  ICU Vital Signs Last 24 Hrs  T(C): 37 (13 May 2024 11:55), Max: 37 (13 May 2024 11:55)  T(F): 98.6 (13 May 2024 11:55), Max: 98.6 (13 May 2024 11:55)  HR: 115 (13 May 2024 11:55) (103 - 124)  BP: 121/76 (13 May 2024 11:55) (121/76 - 155/88)  BP(mean): --  ABP: --  ABP(mean): --  RR: 16 (13 May 2024 11:55) (16 - 18)  SpO2: 96% (13 May 2024 11:55) (91% - 100%)    O2 Parameters below as of 13 May 2024 11:55  Patient On (Oxygen Delivery Method): nasal cannula  O2 Flow (L/min): 6        05-12 @ 07:01  -  05-13 @ 07:00  --------------------------------------------------------  IN: 870 mL / OUT: 2800 mL / NET: -1930 mL        PHYSICAL EXAM:  GENERAL: NAD, well-groomed, well-developed  HEAD:  Atraumatic, Normocephalic  EYES: EOMI, conjunctiva and sclera clear  ENMT: Moist mucous membranes  CHEST/LUNG: crackles at the base.   HEART: Regular rate and rhythm; No murmurs, rubs, or gallops  ABDOMEN: Nondistended  VASCULAR: No  cyanosis, or edema  SKIN: No rashes or lesions  NERVOUS SYSTEM:  Alert & Oriented X3, Good concentration    HOSPITAL MEDICATIONS:  MEDICATIONS  (STANDING):  albuterol/ipratropium for Nebulization 3 milliLiter(s) Nebulizer every 6 hours  apixaban 5 milliGRAM(s) Oral two times a day  atorvastatin 40 milliGRAM(s) Oral at bedtime  budesonide 160 MICROgram(s)/formoterol 4.5 MICROgram(s) Inhaler 2 Puff(s) Inhalation two times a day  chlorhexidine 2% Cloths 1 Application(s) Topical daily  dextrose 10% Bolus 125 milliLiter(s) IV Bolus once  dextrose 5%. 1000 milliLiter(s) (100 mL/Hr) IV Continuous <Continuous>  dextrose 5%. 1000 milliLiter(s) (50 mL/Hr) IV Continuous <Continuous>  dextrose 50% Injectable 25 Gram(s) IV Push once  dextrose 50% Injectable 12.5 Gram(s) IV Push once  furosemide   Injectable 40 milliGRAM(s) IV Push two times a day  gabapentin 300 milliGRAM(s) Oral daily  glucagon  Injectable 1 milliGRAM(s) IntraMuscular once  insulin glargine Injectable (LANTUS) 18 Unit(s) SubCutaneous at bedtime  insulin lispro (ADMELOG) corrective regimen sliding scale   SubCutaneous <User Schedule>  insulin lispro (ADMELOG) corrective regimen sliding scale   SubCutaneous three times a day before meals  insulin lispro Injectable (ADMELOG) 12 Unit(s) SubCutaneous three times a day before meals  multivitamin 1 Tablet(s) Oral daily  Ofev (Nintedanib) 150 milliGRAM(s) 150 milliGRAM(s) Oral two times a day  pantoprazole    Tablet 40 milliGRAM(s) Oral two times a day  polyethylene glycol 3350 17 Gram(s) Oral daily  senna 2 Tablet(s) Oral at bedtime  sildenafil (REVATIO) 20 milliGRAM(s) Oral every 8 hours  simethicone 80 milliGRAM(s) Chew two times a day  traZODone 100 milliGRAM(s) Oral at bedtime    MEDICATIONS  (PRN):  acetaminophen     Tablet .. 650 milliGRAM(s) Oral every 6 hours PRN Temp greater or equal to 38C (100.4F), Mild Pain (1 - 3)  aluminum hydroxide/magnesium hydroxide/simethicone Suspension 30 milliLiter(s) Oral every 4 hours PRN Dyspepsia  bisacodyl Suppository 10 milliGRAM(s) Rectal daily PRN Constipation  cyclobenzaprine 5 milliGRAM(s) Oral three times a day PRN Muscle Spasm  dextrose Oral Gel 15 Gram(s) Oral once PRN Blood Glucose LESS THAN 70 milliGRAM(s)/deciliter  melatonin 3 milliGRAM(s) Oral at bedtime PRN Insomnia      LABS:  05-13    141  |  101  |  45<H>  ----------------------------<  200<H>  3.9   |  28  |  0.85  05-12    141  |  103  |  39<H>  ----------------------------<  214<H>  4.3   |  30  |  1.01    Ca    9.2      13 May 2024 07:08  Ca    8.7      12 May 2024 06:36  Mg     2.1     05-13      Magnesium: 2.1 mg/dL (05-13-24 @ 07:08)  Magnesium: 2.1 mg/dL (05-12-24 @ 06:36)                      Urinalysis Basic - ( 13 May 2024 07:08 )    Color: x / Appearance: x / SG: x / pH: x  Gluc: 200 mg/dL / Ketone: x  / Bili: x / Urobili: x   Blood: x / Protein: x / Nitrite: x   Leuk Esterase: x / RBC: x / WBC x   Sq Epi: x / Non Sq Epi: x / Bacteria: x                              10.0   7.65  )-----------( 389      ( 13 May 2024 07:08 )             32.7     CAPILLARY BLOOD GLUCOSE      POCT Blood Glucose.: 90 mg/dL (13 May 2024 12:24)  POCT Blood Glucose.: 227 mg/dL (13 May 2024 08:22)  POCT Blood Glucose.: 285 mg/dL (13 May 2024 02:07)  POCT Blood Glucose.: 253 mg/dL (12 May 2024 21:29)  POCT Blood Glucose.: 244 mg/dL (12 May 2024 16:51)

## 2024-05-13 NOTE — PROGRESS NOTE ADULT - PROBLEM SELECTOR PLAN 3
- Pt w/ hx of severe pHTN, RVSP 68 on 10/2022  - Repeat 4/12/24 at [facility] noted PAP 28, "normal PAP"   - Maintain euvolemia, as above  - c/w sildenafil 20 mg TID, currently tolerating

## 2024-05-13 NOTE — PROGRESS NOTE ADULT - PROBLEM SELECTOR PLAN 11
DVT PPx: Eliquis  Diet: Regular  Bowel Regimen: Miralax, Senna, dulcolax suppository/enema   Code: DNR/DNI, molst completed in chart   Aspiration precautions  Fall precautions  Pend - respiratory improvement, structural cards eval

## 2024-05-13 NOTE — PROGRESS NOTE ADULT - PROBLEM SELECTOR PLAN 4
-- DO NOT REPLY / DO NOT REPLY ALL --  -- Message is from the [facility]--      Patient is requesting a medication refill - medication is on active list    Was Medication Pended? Yes.    Rx Name and Dose:    irbesartan (AVAPRO) 75 MG tablet 75 mg, Oral, NIGHTLY         Duration: 30 days    Pharmacy  [id number] - [place], [place] - [address] [facility] [address] [address]    Patient confirmed the above pharmacy as correct?  Yes    Caller Information       Type Contact Phone    12/11/2019 04:20 PM Phone (Incoming) [facility] #[id number] - [place], [place] - [address] AT [facility] [address] (Pharmacy) [phone]     Uma          Alternative phone number: n/a    Turnaround time given to caller:   \"This message will be sent to [state Provider's name]. The clinical team will fulfill your request as soon as they review your message when the office opens tomorrow.\"   Hx of PE on CTA 10/29/22 w/ RLL segmental/subsegmental pulmonary embolism  - Repeat CTA 2/28/23 w/o PE.   - DVT studies at OSH 4/22/24 negative  - continue Eliquis for now  - CTA reviewed, appears negative for acute PE

## 2024-05-13 NOTE — PROGRESS NOTE ADULT - PROBLEM SELECTOR PLAN 2
RH failure on most recent echo  - diuresis as above  - strict I&Os  - daily standing weights  - consult structural cardio for PFO

## 2024-05-13 NOTE — PROGRESS NOTE ADULT - ASSESSMENT
63-yo F with PMHx of chronic AHRF on 2-3L NC at b/l 2/2 ILD (Followed w/ Dr. Primo Marlow, [facility]), PE on Eliquis, severe pHTN, Cor pulmonale, DM, presented initially with SOB, dry cough, chest pain, LE edema, recent admission to [facility] in March for ILD flare, admitted to outside hospital->ICU for AHRF 2/2 ILD flare, transferred to [facility] for lung transplant evaluation.

## 2024-05-13 NOTE — PROGRESS NOTE ADULT - ASSESSMENT
63F hx ILD/ probable IPF (On 2-3LNC at home), PE 2022 on eliquis, severe pHTN w/ prior cor pulmonale (RVSP 68 - 10/22 w/ TTE from 4/12 w/ PASP 28, normal LV/RV SF), IDDM, presenting as a transfer from [place] for lung transplant eval iso SOB, dry cough, chest pain. She has been receiving duonebs, symbicort, lasix, ofev and IV steroids. Continuing with AC. Solu-medrol is ordered for 40 IV BID.  CT Chest 4/12/2024: Findings suggesting interstitial lung disease without significant interval progression. No evidence of pneumonia.   CT scan from 4/12/2024 when compared with CT scan from 2020 has shown significant progression.      Recommendations  - c/w weaning O2 as able - On 5.5L NC currently.   - Continue with Symbicort, duonebs, Ofev, Eliquis,   - c/w prednisone 40mg - taper by 10mg every week. Can lower to 30mg starting 5/14.   - Continue with diuresis - goal net neg 1-2L  - Can continue to use benzo prn for anxiety  - TTE results appreciated - PFO + e/o decreased Right ventricular function  - fu sputum cx  - c/w sildenafil 20mg TID - Side effects may include hypotension, nausea, light-headedness, let us know if patient experiences these symptoms and decrease dosage to sildenafil 10 tid if experiencing any symptoms  - Shunt study negative.   - Please continue Diurese the pt. Would give additional doses of lasix as needed to achieve minimum -1-2L daily.  - Please check BNP every other day  - Please consult structural heart to evaluate for PFO closure.    Pulm to follow

## 2024-05-13 NOTE — PROGRESS NOTE ADULT - ATTENDING COMMENTS
63F hx ILD/ probable IPF (On 2-3LNC at home), PE 2022 on eliquis, severe pHTN w/ prior cor pulmonale (RVSP 68 - 10/22 w/ TTE from 4/12 w/ PASP 28, normal LV/RV SF), IDDM, presenting as a transfer from [place] for lung transplant eval iso SOB, dry cough, chest pain. She has been receiving duonebs, symbicort, lasix, ofev and IV steroids. Continuing with AC. Solu-medrol is ordered for 40 IV BID.  CT Chest 4/12/2024: Findings suggesting interstitial lung disease without significant interval progression. No evidence of pneumonia.   CT scan from 4/12/2024 when compared with CT scan from 2020 has shown significant progression.    She is overall improved clinically with decrease in oxygen requirements as she is being diuresed, likely secondary to decrease in shunt across PFO.  Oxygen saturation today is 95% on 6 L NC, however she is mildly tachypneic and tachycardic today.  She is tolerating Sildenafil and Creatinine is improving overall.  BUN is rising likely secondary to steroids.  Agree with plan as outlined above.  Would consult structural heart team for evaluation and opinion regarding PFO closure.

## 2024-05-13 NOTE — PROGRESS NOTE ADULT - SUBJECTIVE AND OBJECTIVE BOX
SUBJECTIVE / OVERNIGHT EVENTS:  Today is hospital day 18d. There are no new issues or overnight events.   Did not endorse any headache, lightheadedness, vertigo, shortness of breathe, cough, chest pain, palpitations, tachycardia, abdominal pain, nausea, vomiting, diarrhea or constipation currently    HPI:  63-yo F with PMHx of chronic AHRF on 2-3L NC at b/l 2/2 ILD (Followed w/ Dr. Primo Marlow, [facility]), PE on Eliquis, severe pHTN, Cor pulmonale, DM, presented initially with SOB, dry cough, chest pain, LE edema, recent admission to [facility] in March for ILD flare, admitted to [facility]->ICU for AHRF 2/2 ILD flare, transferred to [facility] for lung transplant evaluation. Pt reporting mild SOB, no fever, no sputum, no SOB, no chest pain, no edema. Reports constipation, last BM 2 days ago, with some stomach pain.     At [facility] ICU, she was treated w/ IV steroids->PO taper, duonebs, symbicort, lasix. Pike placed for urinary retention, taken out prior to arrival although unclear if passed TOV at OSH. Had been weaned to NC while but was complicated by increased WOB, placed back on HFNC 50/60. Most recently escalated to methylprednisolone 40 mg IV BID. Was being treated w/ Ofev 150 mg BID, daily diuresis w/ Lasix 40->20 mg IVP on 4/25 based on volume assessment, 4/24 CXR w/ c/f R>L patchy opacities c/f pulmonary edema. Upon transfer here, admission vitals notable for temp afebrile, , /79, HFNC similar settings 45/60 100%. Labs notable for downtrending white count, stable anemia to 9-10, stable thrombocytosis to 400's. ABG 7.48 / 37 / 71 / 28, lactate 1.2.      Of note, pt is DNR/DNI trial of NIPPV - confirmed this with the patient and completed MOLST, in chart.  (26 Apr 2024 01:17)    MEDICATIONS  (STANDING):  albuterol/ipratropium for Nebulization 3 milliLiter(s) Nebulizer every 6 hours  apixaban 5 milliGRAM(s) Oral two times a day  atorvastatin 40 milliGRAM(s) Oral at bedtime  budesonide 160 MICROgram(s)/formoterol 4.5 MICROgram(s) Inhaler 2 Puff(s) Inhalation two times a day  chlorhexidine 2% Cloths 1 Application(s) Topical daily  dextrose 10% Bolus 125 milliLiter(s) IV Bolus once  dextrose 5%. 1000 milliLiter(s) (100 mL/Hr) IV Continuous <Continuous>  dextrose 5%. 1000 milliLiter(s) (50 mL/Hr) IV Continuous <Continuous>  dextrose 50% Injectable 25 Gram(s) IV Push once  dextrose 50% Injectable 12.5 Gram(s) IV Push once  furosemide   Injectable 40 milliGRAM(s) IV Push two times a day  gabapentin 300 milliGRAM(s) Oral daily  glucagon  Injectable 1 milliGRAM(s) IntraMuscular once  insulin glargine Injectable (LANTUS) 18 Unit(s) SubCutaneous at bedtime  insulin lispro (ADMELOG) corrective regimen sliding scale   SubCutaneous <User Schedule>  insulin lispro (ADMELOG) corrective regimen sliding scale   SubCutaneous three times a day before meals  insulin lispro Injectable (ADMELOG) 12 Unit(s) SubCutaneous three times a day before meals  multivitamin 1 Tablet(s) Oral daily  Ofev (Nintedanib) 150 milliGRAM(s) 150 milliGRAM(s) Oral two times a day  pantoprazole    Tablet 40 milliGRAM(s) Oral two times a day  polyethylene glycol 3350 17 Gram(s) Oral daily  senna 2 Tablet(s) Oral at bedtime  sildenafil (REVATIO) 20 milliGRAM(s) Oral every 8 hours  simethicone 80 milliGRAM(s) Chew two times a day  traZODone 100 milliGRAM(s) Oral at bedtime    MEDICATIONS  (PRN):  acetaminophen     Tablet .. 650 milliGRAM(s) Oral every 6 hours PRN Temp greater or equal to 38C (100.4F), Mild Pain (1 - 3)  aluminum hydroxide/magnesium hydroxide/simethicone Suspension 30 milliLiter(s) Oral every 4 hours PRN Dyspepsia  bisacodyl Suppository 10 milliGRAM(s) Rectal daily PRN Constipation  cyclobenzaprine 5 milliGRAM(s) Oral three times a day PRN Muscle Spasm  dextrose Oral Gel 15 Gram(s) Oral once PRN Blood Glucose LESS THAN 70 milliGRAM(s)/deciliter  melatonin 3 milliGRAM(s) Oral at bedtime PRN Insomnia    HOME MEDICATIONS:  acetaminophen 325 mg oral tablet: 2 tab(s) orally every 6 hours As needed Mild Pain (1 - 3)  Admelog 100 units/mL injectable solution: 15 unit(s) injectable 3 times a day (before meals)  apixaban 5 mg oral tablet: 1 tab(s) orally once a day  atorvastatin 40 mg oral tablet: 1 tab(s) orally once a day  cyclobenzaprine 5 mg oral tablet: 1 tab(s) orally 3 times a day as needed for Muscle Spasm  gabapentin 300 mg oral capsule: 1 cap(s) orally once a day  insulin glargine 100 units/mL subcutaneous solution: 25 unit(s) subcutaneous once a day (at bedtime)  melatonin 3 mg oral tablet: 1 tab(s) orally once a day (at bedtime)  Ofev 150 mg oral capsule: 1 cap(s) orally every 12 hours  pantoprazole 40 mg oral delayed release tablet: 1 tab(s) orally once a day (before a meal)  traZODone 100 mg oral tablet: 1 tab(s) orally once a day (at bedtime)    PHYSICAL EXAM  Vital Signs Last 24 Hrs  T(C): 37 (13 May 2024 11:55), Max: 37 (13 May 2024 11:55)  T(F): 98.6 (13 May 2024 11:55), Max: 98.6 (13 May 2024 11:55)  HR: 109 (13 May 2024 15:28) (103 - 124)  BP: 121/76 (13 May 2024 11:55) (121/76 - 155/88)  BP(mean): --  RR: 16 (13 May 2024 15:28) (16 - 18)  SpO2: 100% (13 May 2024 15:28) (91% - 100%)    Parameters below as of 13 May 2024 15:28  Patient On (Oxygen Delivery Method): nasal cannula  O2 Flow (L/min): 6      05-12-24 @ 07:01  -  05-13-24 @ 07:00  --------------------------------------------------------  IN: 870 mL / OUT: 2800 mL / NET: -1930 mL      CONSTITUTIONAL: Well-groomed, in no apparent distress;  EYES: No conjunctival or scleral injection, non-icteric;  ENMT: No external nasal lesions; Normal outer ears;  NECK: Trachea midline;  RESPIRATORY: Normal respiratory effort; Decreased breathe sounds bilaterally without wheeze/rhonchi/rales;  CARDIOVASCULAR: Regular rate and rhythm;  GASTROINTESTINAL: Non-distended; No palpable masses; No rebound/guarding;  EXTREMITIES:  No lower extremity edema;  NEUROLOGY: A+O to person, place, and time; Does respond to commands appropriately;  PSYCHIATRY: Mood and Affect appropriate    LABS:                        10.0   7.65  )-----------( 389      ( 13 May 2024 07:08 )             32.7     05-13    141  |  101  |  45<H>  ----------------------------<  200<H>  3.9   |  28  |  0.85    Ca    9.2      13 May 2024 07:08  Mg     2.1     05-13            Urinalysis Basic - ( 13 May 2024 07:08 )    Color: x / Appearance: x / SG: x / pH: x  Gluc: 200 mg/dL / Ketone: x  / Bili: x / Urobili: x   Blood: x / Protein: x / Nitrite: x   Leuk Esterase: x / RBC: x / WBC x   Sq Epi: x / Non Sq Epi: x / Bacteria: x        SARS-CoV-2: NotDetec (11 Apr 2024 22:46)      RADIOLOGY & ADDITIONAL TESTS:  EKG  12 Lead ECG:   Ventricular Rate 108 BPM    Atrial Rate 108 BPM    P-R Interval 132 ms    QRS Duration 68 ms    Q-T Interval 326 ms    QTC Calculation(Bazett) 436 ms    P Axis 27 degrees    R Axis -39 degrees    T Axis 12 degrees    Diagnosis Line SINUS TACHYCARDIA WITH FUSION COMPLEXES  LEFT AXIS DEVIATION  CANNOT RULE OUT ANTERIOR INFARCT  ABNORMAL ECG  WHEN COMPARED WITH ECG OF  04-  Confirmed by MD MYERS JONATHAN ([id number]) on 4/27/2024 5:25:32 PM (04-26-24 @ 11:09)  12 Lead ECG:   Ventricular Rate 117 BPM    Atrial Rate 117 BPM    P-R Interval 140 ms    QRS Duration 70 ms    Q-T Interval 302 ms    QTC Calculation(Bazett) 421 ms    P Axis 33 degrees    R Axis -40 degrees    T Axis 10 degrees    Diagnosis Line SINUS TACHYCARDIA  LEFT AXIS DEVIATION  POSSIBLE ANTEROLATERAL INFARCT , AGE UNDETERMINED  ABNORMAL ECG  NO PREVIOUS ECGS AVAILABLE  Confirmed by MD Rodriguez Ronald ([id number]) on 4/29/2024 12:43:02 PM (04-26-24 @ 05:05)    Xray Chest 1 View- PORTABLE-Urgent:   ACC: [id number] EXAM:  XR CHEST PORTABLE URGENT 1V   ORDERED BY:  MAREKLL LOWRY     PROCEDURE DATE:  05/10/2024          INTERPRETATION:  EXAMINATION: XR CHEST URGENT    CLINICAL INDICATION: sob    TECHNIQUE: Single frontal, portable view of the chest was obtained.    COMPARISON: Chest x-ray 5/6/2024. No CT dated 5/6/2024    FINDINGS:    The heart is enlarged.  Diffuse bilateral fibrotic/chronic interstitial lung disease. . Mild   pulmonary edema.  There is no pneumothorax. Trace bilateral pleural effusion.  No acute bony abnormality.    IMPRESSION:  Diffuse bilateral fibrotic/chronic interstitial lung disease. Mild   pulmonary edema.    --- End of Report ---          GALI GARCIA MD; Resident Radiologist  This document has been electronically signed.  MARISOL CORDOVA MD; Attending Radiologist  This document has been electronically signed. May 11 2024  2:30PM (05-10-24 @ 18:18)  CT Angio Chest PE Protocol w/ IV Cont:   ACC: [id number] [facility]  [facility]   ORDERED BY: JASON FLANAGAN     PROCEDURE DATE:  05/06/2024          INTERPRETATION:  INDICATION: Interstitial lung disease, increasing   shortness of breath    TECHNIQUE: Helical acquisition ofthe chest after the administration of   64 mL of Omnipaque 350. Maximum intensity projection images were   generated.    COMPARISON: CT chest 4/12/2024    FINDINGS:    HEART/VASCULATURE: No acute pulmonary embolus through the segmental   branches. Multiple obscured subsegmental branches by respiratory motion.   Unchanged thin web within a segmental pulmonary artery of the right lower   lobe. Unchanged dilated pulmonary artery, larger than the ascending   aorta. Dilated right ventricle. Small pericardial effusion.    LUNGS/AIRWAYS/PLEURA: Unchanged lower lobe predominant fibrosis largely   characterized by groundglass and traction bronchiectasis. No pleural   effusion.    LYMPH NODES/MEDIASTINUM: Unchanged bilateral mediastinal and hilar   lymphadenopathy. Unchanged diffusely dilated esophagus.    UPPER ABDOMEN: Unremarkable.    BONES/SOFT TISSUES: Unremarkable.      IMPRESSION:    Since 4/12/2024:    No acute pulmonary embolus. Unchanged thin web within a right lower lobe   segmental branch, which may be sequela of prior pulmonary embolus.    Dilated pulmonary artery and right ventricle, suggestive of pulmonary   hypertension.    Unchanged fibrotic interstitial lung disease most consistent with an NSIP   pattern. Consider scleroderma given dilated esophagus.    --- End of Report ---           GT BELL MD; Resident Radiologist  This document has been electronically signed.  MICK COX M.D., ATTENDING RADIOLOGIST  This document has been electronically signed. May  7 2024 11:21AM *!* (05-06-24 @ 22:52)  Xray Chest 1 View- PORTABLE-Urgent:   ACC: [id number] EXAM:  XR CHEST PORTABLE URGENT 1V   ORDERED BY:  FRANCOIS CHAPPELL     PROCEDURE DATE:  05/06/2024          INTERPRETATION:  TECHNIQUE: A single AP view of the chest was obtained.   Ordered time:   5/6/2024 8:47 PM    COMPARISON: 4/26/2024    CLINICAL INFORMATION: RRT. Hypoxia    FINDINGS:    The heart is not well assessed on an AP film.  There is increased bilateral interstitial opacities.  There are small bilateral pleural effusions.  There is no pneumothorax.    IMPRESSION:    Pulmonary edema.  : New obscuration of the bilateral hemidiaphragms, likely small effusions.    --- End of Report ---           ROGERIO CONTI MD; Resident Radiologist  This document has been electronically signed.  CRISELDA ROSARIO MD; Attending Radiologist  This document has been electronically signed. May  7 2024 12:15PM (05-06-24 @ 20:47)

## 2024-05-14 NOTE — PROGRESS NOTE ADULT - PROBLEM SELECTOR PLAN 3
LDL goal ,70 due to DM  Pt LDL NA  Order fasting lipid if not recently done  Statin as noted above  Manage per primary team   F/u levels as out pt      Contact via [employer] Teams during business hours  To reach covering provider access AMION via sunrise tools  For Urgent matters/after-hours/weekends/holidays please page endocrine fellow on call   For nonurgent matters please email [email or web address]    Please note that this patient may be followed by different provider tomorrow.  Notify endocrine 24 hours prior to discharge for final recommendations

## 2024-05-14 NOTE — PROGRESS NOTE ADULT - PROBLEM SELECTOR PLAN 7
T2DM: Home DM medications: metformin 500 mg BID, + glimepride 1 mg daily + lantus 35 units QHS+ admelog 25 units TIDAC  - Pt w/ hx of uncontrolled T2DM, a1c 11.9.   - Endocrine consulted via email, recs appreciated  - On glargine 15 units qhs, lispro 10u TID qac, hold if NPO or eating < 50% of meals  - MISS FS tid qac qhs  - Discharge DM medications:   - Continue with metformin 500 mg BID  - STOP glimipride due to recurrent hypoglycemia at home   - Basal/bolus, dose will depend on insulin requirement and steroid plan   - Patient will follow up at  [facility]:  [address] [place] [address]. [address]. [place] [address]  Tel: [phone]

## 2024-05-14 NOTE — PROGRESS NOTE ADULT - ASSESSMENT
63F hx ILD/ probable IPF (On 2-3LNC at home), PE 2022 on eliquis, severe pHTN w/ prior cor pulmonale (RVSP 68 - 10/22 w/ TTE from 4/12 w/ PASP 28, normal LV/RV SF), IDDM, presenting as a transfer from [place] for lung transplant eval iso SOB, dry cough, chest pain. She has been receiving duonebs, symbicort, lasix, ofev and IV steroids. Continuing with AC. Solu-medrol is ordered for 40 IV BID.  CT Chest 4/12/2024: Findings suggesting interstitial lung disease without significant interval progression. No evidence of pneumonia.   CT scan from 4/12/2024 when compared with CT scan from 2020 has shown significant progression.      Recommendations  - c/w weaning O2 as able - On 6L NC currently.   - Continue with Symbicort, duonebs, Ofev, Eliquis,   - c/w prednisone 40mg - taper by 10mg every week. Can lower to 30mg starting 5/14.   - Continue with diuresis - goal net neg 1-2L  - TTE results appreciated - PFO + e/o decreased Right ventricular function  - Shunt study negative.   - c/w sildenafil 20mg TID - Side effects may include hypotension, nausea, light-headedness, let us know if patient experiences these symptoms and decrease dosage to sildenafil 10 tid if experiencing any symptoms  - Please continue to diurese the pt. Would give additional doses of lasix as needed to achieve minimum -1-2L daily.  - Please check BNP every other day  - Please consult structural heart to evaluate for PFO closure.    Pulm to follow 63F hx ILD/ probable IPF (On 2-3LNC at home), PE 2022 on eliquis, severe pHTN w/ prior cor pulmonale (RVSP 68 - 10/22 w/ TTE from 4/12 w/ PASP 28, normal LV/RV SF), IDDM, presenting as a transfer from [place] for lung transplant eval iso SOB, dry cough, chest pain. She has been receiving duonebs, symbicort, lasix, ofev and IV steroids. Continuing with AC. Solu-medrol is ordered for 40 IV BID.  CT Chest 4/12/2024: Findings suggesting interstitial lung disease without significant interval progression. No evidence of pneumonia.   CT scan from 4/12/2024 when compared with CT scan from 2020 has shown significant progression.      Recommendations  - c/w weaning O2 as able - On 6L NC currently.   - Continue with Symbicort, duonebs, Ofev, Eliquis,   - c/w prednisone 40mg - taper by 10mg every week. Can lower to 30mg starting 5/14.   - Continue with diuresis - goal net neg 1-2L  - TTE results appreciated - PFO + e/o decreased Right ventricular function  - Shunt study negative.   - c/w sildenafil 20mg TID - Side effects may include hypotension, nausea, light-headedness, let us know if patient experiences these symptoms and decrease dosage to sildenafil 10 tid if experiencing any symptoms  - Please continue to diurese the pt. Would give additional doses of lasix as needed to achieve minimum -1-2L daily.  - Please check BNP every other day  - d/w structural heart - will hold off on further eval for pfo closure  - Please consult psych for anxiety management  - Please try to walk pt daily to improve walking distance - this is a major barrier to transplant for this pt.     Pulm to follow 63F hx ILD/ probable IPF (On 2-3LNC at home), PE 2022 on eliquis, severe pHTN w/ prior cor pulmonale (RVSP 68 - 10/22 w/ TTE from 4/12 w/ PASP 28, normal LV/RV SF), IDDM, presenting as a transfer from [place] for lung transplant eval iso SOB, dry cough, chest pain. She has been receiving duonebs, symbicort, lasix, ofev and IV steroids. Continuing with AC. Solu-medrol is ordered for 40 IV BID.  CT Chest 4/12/2024: Findings suggesting interstitial lung disease without significant interval progression. No evidence of pneumonia.   CT scan from 4/12/2024 when compared with CT scan from 2020 has shown significant progression.  Most recent CT is unchanged compared with 4/12/24 study.     Recommendations  - c/w weaning O2 as able - On 6L NC currently.   - Continue with Symbicort, duonebs, Ofev, Eliquis,   - c/w prednisone 40mg - taper by 10mg every week. Can lower to 30mg starting 5/14.   - Continue with diuresis - goal net neg 1-2L  - TTE results appreciated - PFO + e/o decreased Right ventricular function  - Shunt study negative.   - c/w sildenafil 20mg TID - Side effects may include hypotension, nausea, light-headedness, let us know if patient experiences these symptoms and decrease dosage to sildenafil 10 tid if experiencing any symptoms  - Please continue to diurese the pt. Would give additional doses of lasix as needed to achieve minimum -1-2L daily.  - Please check BNP every other day  - d/w structural heart - will hold off on further eval for pfo closure  - Please consult psych for anxiety management  - Please try to walk pt daily to improve walking distance - this is a major barrier to transplant for this pt.     Pulm to follow

## 2024-05-14 NOTE — PROGRESS NOTE ADULT - ATTENDING COMMENTS
63F hx ILD/ probable IPF (On 2-3LNC at home), PE 2022 on eliquis, severe pHTN w/ prior cor pulmonale (RVSP 68 - 10/22 w/ TTE from 4/12 w/ PASP 28, normal LV/RV SF), IDDM, presenting as a transfer from [place] for lung transplant eval iso SOB, dry cough, chest pain.   She is overall improved clinically with decrease in oxygen requirements as she is being diuresed.   Oxygen saturation today is 95% on 6 L NC,   She is tachypneic and appears anxious.  She has fine rales bibasilar, tachycardia, no edema.  Sitting up in a chair today and reports that she walked with PT.  She is tolerating Sildenafil and Creatinine is improving overall.  BUN is rising likely secondary to steroids.    Nuclear shunt study on 5/10 was negative for shunt.    Does not appear that she has any shunting across PFO that was noted on echo. Discussed with Dr. Grover and agree that closing of PFO would not benefit her at this time.  Agree with plan as outlined above.

## 2024-05-14 NOTE — PROGRESS NOTE ADULT - PROBLEM SELECTOR PLAN 2
RH failure on most recent echo  - diuresis as above  - strict I&Os  - daily standing weights  - consulted structural cardio for PFO

## 2024-05-14 NOTE — PROGRESS NOTE ADULT - PROBLEM SELECTOR PLAN 3
- Pt w/ hx of severe pHTN, RVSP 68 on 10/2022  - Repeat 4/12/24 at [facility] noted PAP 28, "normal PAP"   - Maintain euvolemia, as above  - c/w sildenafil 20 mg TID, currently tolerating

## 2024-05-14 NOTE — PROGRESS NOTE ADULT - PROBLEM SELECTOR PLAN 1
- Please monitor blood glucose values TID AC & QHS while eating regular meals and Q6H while NPO  - Continue insulin Glargine to 18units QHS  -Continue  insulin Lispro 12 units TID with meals,hold if NPO or if eating less than 50% of meals; Pt instructed to report to staff if not eating  - Continue with mod dose correctional scale TID with meals and QHS  -Please notify endo team with any changes on steroid doses  Discharge planning:   - Home DM medications: metformin 500 mg BID, + glimepride 1 mg daily + lantus 35 units QHS+ admelog 25 units TIDAC  - Discharge DM medications:   - Continue with metformin 500 mg BID  - STOP glimepride due to recurrent hypoglycemia at home   - Basal/bolus, dose will depend on insulin requirement and steroid plan   - Make sure pt has Rx for all DM supplies and insulin/ DM meds.  - Can follow at endo practice. [address] [facility] [address]. Phone . Call for apt closer to discharge- - Patient will need opthalmology and podiatry follow up as outpatient

## 2024-05-14 NOTE — PROGRESS NOTE ADULT - PROBLEM SELECTOR PLAN 1
Baseline 3-4L NC. Hx of ILD w/ c/f IPF. Followed w/ DOMENICO Ding. Transferred to [facility] for lung transplant eval  - Was on on 6L NC, increased back to HFNC for increased WOB   - c/w diuresis with lasix, on Lasix 40mg IV BID. Was net neg 800L over last 24 hours. To give additional dose of IV Lasix 40mg today again. Goal net neg closer to 2L  - NM cardiac shunt performed on 5/10 was negative  - CXR on 5/10 still with pulmonary edema  - Check BNP every other day  - Continue home Ofev 150mg BID  - Duonebs, Symbicort  - Transitioned to PO Prednisone 40mg, plan to taper by 10mg every 7 days. If worsening hypoxia would increase steroids  - repeat TTE with bubble study revealed severe RH dysfunction with elevated pulmonary pressure, grade II diastolic dysfunction   - valium 2.5 mg q12 PRN anxiety   - Deemed not to be a transplant candidate 4/30

## 2024-05-14 NOTE — PROGRESS NOTE ADULT - ASSESSMENT
63-yo F with PMHx of chronic AHRF on 2-3L NC at b/l 2/2 ILD (Followed w/ Dr. Primo Marlow, [facility]), PE on Eliquis, severe pHTN, Cor pulmonale, DM, presented initially with SOB, dry cough, chest pain, LE edema, recent admission to [facility] in March for ILD flare, admitted to outside hospital->ICU for AHRF 2/2 ILD flare, transferred to [facility] for lung transplant evaluation.

## 2024-05-14 NOTE — PROGRESS NOTE ADULT - SUBJECTIVE AND OBJECTIVE BOX
Pulmonary Consult Follow up     Interval Events:    appears more comfortable today.       REVIEW OF SYSTEMS:  [x] All other systems negative except per HPI   [ ] Unable to assess ROS because ________    OBJECTIVE:  ICU Vital Signs Last 24 Hrs  T(C): 36.7 (14 May 2024 05:06), Max: 37 (13 May 2024 11:55)  T(F): 98.1 (14 May 2024 05:06), Max: 98.6 (13 May 2024 11:55)  HR: 101 (14 May 2024 05:06) (99 - 117)  BP: 129/83 (14 May 2024 05:06) (121/76 - 134/81)  BP(mean): --  ABP: --  ABP(mean): --  RR: 18 (14 May 2024 05:06) (16 - 18)  SpO2: 98% (14 May 2024 05:06) (90% - 100%)    O2 Parameters below as of 14 May 2024 05:06  Patient On (Oxygen Delivery Method): nasal cannula  O2 Flow (L/min): 6            05-13 @ 07:01  -  05-14 @ 07:00  --------------------------------------------------------  IN: 400 mL / OUT: 1750 mL / NET: -1350 mL        PHYSICAL EXAM:  GENERAL: NAD, well-groomed, well-developed  HEAD:  Atraumatic, Normocephalic  EYES: EOMI, conjunctiva and sclera clear  ENMT: Moist mucous membranes  CHEST/LUNG: fine crackles at the base.  HEART: Regular rate and rhythm; No murmurs, rubs, or gallops  ABDOMEN: Nondistended  VASCULAR: No  cyanosis, or edema  SKIN: No rashes or lesions  NERVOUS SYSTEM:  Alert & Oriented X3, Good concentration    HOSPITAL MEDICATIONS:  MEDICATIONS  (STANDING):  albuterol/ipratropium for Nebulization 3 milliLiter(s) Nebulizer every 6 hours  apixaban 5 milliGRAM(s) Oral two times a day  atorvastatin 40 milliGRAM(s) Oral at bedtime  budesonide 160 MICROgram(s)/formoterol 4.5 MICROgram(s) Inhaler 2 Puff(s) Inhalation two times a day  chlorhexidine 2% Cloths 1 Application(s) Topical daily  dextrose 10% Bolus 125 milliLiter(s) IV Bolus once  dextrose 5%. 1000 milliLiter(s) (100 mL/Hr) IV Continuous <Continuous>  dextrose 5%. 1000 milliLiter(s) (50 mL/Hr) IV Continuous <Continuous>  dextrose 50% Injectable 25 Gram(s) IV Push once  dextrose 50% Injectable 12.5 Gram(s) IV Push once  furosemide   Injectable 40 milliGRAM(s) IV Push two times a day  gabapentin 300 milliGRAM(s) Oral daily  glucagon  Injectable 1 milliGRAM(s) IntraMuscular once  insulin glargine Injectable (LANTUS) 18 Unit(s) SubCutaneous at bedtime  insulin lispro (ADMELOG) corrective regimen sliding scale   SubCutaneous <User Schedule>  insulin lispro (ADMELOG) corrective regimen sliding scale   SubCutaneous three times a day before meals  insulin lispro Injectable (ADMELOG) 12 Unit(s) SubCutaneous three times a day before meals  multivitamin 1 Tablet(s) Oral daily  Ofev (Nintedanib) 150 milliGRAM(s) 150 milliGRAM(s) Oral two times a day  pantoprazole    Tablet 40 milliGRAM(s) Oral two times a day  polyethylene glycol 3350 17 Gram(s) Oral daily  predniSONE   Tablet 30 milliGRAM(s) Oral daily  senna 2 Tablet(s) Oral at bedtime  sildenafil (REVATIO) 20 milliGRAM(s) Oral every 8 hours  simethicone 80 milliGRAM(s) Chew two times a day  traZODone 100 milliGRAM(s) Oral at bedtime    MEDICATIONS  (PRN):  acetaminophen     Tablet .. 650 milliGRAM(s) Oral every 6 hours PRN Temp greater or equal to 38C (100.4F), Mild Pain (1 - 3)  aluminum hydroxide/magnesium hydroxide/simethicone Suspension 30 milliLiter(s) Oral every 4 hours PRN Dyspepsia  bisacodyl Suppository 10 milliGRAM(s) Rectal daily PRN Constipation  cyclobenzaprine 5 milliGRAM(s) Oral three times a day PRN Muscle Spasm  dextrose Oral Gel 15 Gram(s) Oral once PRN Blood Glucose LESS THAN 70 milliGRAM(s)/deciliter  melatonin 3 milliGRAM(s) Oral at bedtime PRN Insomnia      LABS:  05-13    141  |  101  |  45<H>  ----------------------------<  200<H>  3.9   |  28  |  0.85  05-12    141  |  103  |  39<H>  ----------------------------<  214<H>  4.3   |  30  |  1.01    Ca    9.2      13 May 2024 07:08  Ca    8.7      12 May 2024 06:36  Mg     2.1     05-13      Magnesium: 2.1 mg/dL (05-13-24 @ 07:08)  Magnesium: 2.1 mg/dL (05-12-24 @ 06:36)                      Urinalysis Basic - ( 13 May 2024 07:08 )    Color: x / Appearance: x / SG: x / pH: x  Gluc: 200 mg/dL / Ketone: x  / Bili: x / Urobili: x   Blood: x / Protein: x / Nitrite: x   Leuk Esterase: x / RBC: x / WBC x   Sq Epi: x / Non Sq Epi: x / Bacteria: x                              10.0   7.65  )-----------( 389      ( 13 May 2024 07:08 )             32.7     CAPILLARY BLOOD GLUCOSE      POCT Blood Glucose.: 79 mg/dL (14 May 2024 08:27)  POCT Blood Glucose.: 84 mg/dL (14 May 2024 03:12)  POCT Blood Glucose.: 190 mg/dL (13 May 2024 21:29)  POCT Blood Glucose.: 424 mg/dL (13 May 2024 18:33)  POCT Blood Glucose.: 412 mg/dL (13 May 2024 18:28)  POCT Blood Glucose.: 412 mg/dL (13 May 2024 16:36)  POCT Blood Glucose.: 90 mg/dL (13 May 2024 12:24)

## 2024-05-14 NOTE — PROGRESS NOTE ADULT - ASSESSMENT
63 F w/h/o uncontrolled T2DM (A1C 11.9%) while on Lantus, admelog,  metformin, glimepiride. Unknown DM complications. Also h/o AHRF, ILD, PE, HTN. Here with SOB secondary to interstitial lung disease.     Endocrinology consulted for diabetes management.     s/p solumedrol 30 mg BID, now on Prednisone 40 mg daily, plan for taper by 10 mg weekly.   Prednisone 40 mg daily ( 5/7- 5/13)  Prednisone 30 mg daily ( 5/14- 5/20)  Prednisone 20 mg daily ( 5/21-5/27)  Prednisone 10 mg daily ( 5/28--    BGs continue to be extremely variable 70s-400s. This in part is due to patients variable oral intake.   Suggested to patient that she eat when the meal arrives, let staff know if she ate something within 1 hr of FS.   she continues on steroid taper.

## 2024-05-14 NOTE — PROGRESS NOTE ADULT - SUBJECTIVE AND OBJECTIVE BOX
SUBJECTIVE / OVERNIGHT EVENTS:  Today is hospital day 19d. There are no new issues or overnight events.   Did not endorse any headache, lightheadedness, vertigo, shortness of breathe, cough, chest pain, palpitations, tachycardia, abdominal pain, nausea, vomiting, diarrhea or constipation currently    HPI:  63-yo F with PMHx of chronic AHRF on 2-3L NC at b/l 2/2 ILD (Followed w/ Dr. Primo Marlow, [facility]), PE on Eliquis, severe pHTN, Cor pulmonale, DM, presented initially with SOB, dry cough, chest pain, LE edema, recent admission to [facility] in March for ILD flare, admitted to [facility]->ICU for AHRF 2/2 ILD flare, transferred to [facility] for lung transplant evaluation. Pt reporting mild SOB, no fever, no sputum, no SOB, no chest pain, no edema. Reports constipation, last BM 2 days ago, with some stomach pain.     At [facility] ICU, she was treated w/ IV steroids->PO taper, duonebs, symbicort, lasix. Pike placed for urinary retention, taken out prior to arrival although unclear if passed TOV at OSH. Had been weaned to NC while but was complicated by increased WOB, placed back on HFNC 50/60. Most recently escalated to methylprednisolone 40 mg IV BID. Was being treated w/ Ofev 150 mg BID, daily diuresis w/ Lasix 40->20 mg IVP on 4/25 based on volume assessment, 4/24 CXR w/ c/f R>L patchy opacities c/f pulmonary edema. Upon transfer here, admission vitals notable for temp afebrile, , /79, HFNC similar settings 45/60 100%. Labs notable for downtrending white count, stable anemia to 9-10, stable thrombocytosis to 400's. ABG 7.48 / 37 / 71 / 28, lactate 1.2.      Of note, pt is DNR/DNI trial of NIPPV - confirmed this with the patient and completed MOLST, in chart.  (26 Apr 2024 01:17)    MEDICATIONS  (STANDING):  albuterol/ipratropium for Nebulization 3 milliLiter(s) Nebulizer every 6 hours  apixaban 5 milliGRAM(s) Oral two times a day  atorvastatin 40 milliGRAM(s) Oral at bedtime  budesonide 160 MICROgram(s)/formoterol 4.5 MICROgram(s) Inhaler 2 Puff(s) Inhalation two times a day  chlorhexidine 2% Cloths 1 Application(s) Topical daily  dextrose 10% Bolus 125 milliLiter(s) IV Bolus once  dextrose 5%. 1000 milliLiter(s) (100 mL/Hr) IV Continuous <Continuous>  dextrose 5%. 1000 milliLiter(s) (50 mL/Hr) IV Continuous <Continuous>  dextrose 50% Injectable 12.5 Gram(s) IV Push once  dextrose 50% Injectable 25 Gram(s) IV Push once  furosemide   Injectable 40 milliGRAM(s) IV Push two times a day  gabapentin 300 milliGRAM(s) Oral daily  glucagon  Injectable 1 milliGRAM(s) IntraMuscular once  insulin glargine Injectable (LANTUS) 18 Unit(s) SubCutaneous at bedtime  insulin lispro (ADMELOG) corrective regimen sliding scale   SubCutaneous <User Schedule>  insulin lispro (ADMELOG) corrective regimen sliding scale   SubCutaneous three times a day before meals  insulin lispro Injectable (ADMELOG) 12 Unit(s) SubCutaneous three times a day before meals  multivitamin 1 Tablet(s) Oral daily  Ofev (Nintedanib) 150 milliGRAM(s) 150 milliGRAM(s) Oral two times a day  pantoprazole    Tablet 40 milliGRAM(s) Oral two times a day  polyethylene glycol 3350 17 Gram(s) Oral daily  predniSONE   Tablet 30 milliGRAM(s) Oral daily  senna 2 Tablet(s) Oral at bedtime  sildenafil (REVATIO) 20 milliGRAM(s) Oral every 8 hours  simethicone 80 milliGRAM(s) Chew two times a day  traZODone 100 milliGRAM(s) Oral at bedtime    MEDICATIONS  (PRN):  acetaminophen     Tablet .. 650 milliGRAM(s) Oral every 6 hours PRN Temp greater or equal to 38C (100.4F), Mild Pain (1 - 3)  aluminum hydroxide/magnesium hydroxide/simethicone Suspension 30 milliLiter(s) Oral every 4 hours PRN Dyspepsia  bisacodyl Suppository 10 milliGRAM(s) Rectal daily PRN Constipation  cyclobenzaprine 5 milliGRAM(s) Oral three times a day PRN Muscle Spasm  dextrose Oral Gel 15 Gram(s) Oral once PRN Blood Glucose LESS THAN 70 milliGRAM(s)/deciliter  melatonin 3 milliGRAM(s) Oral at bedtime PRN Insomnia    HOME MEDICATIONS:  acetaminophen 325 mg oral tablet: 2 tab(s) orally every 6 hours As needed Mild Pain (1 - 3)  Admelog 100 units/mL injectable solution: 15 unit(s) injectable 3 times a day (before meals)  apixaban 5 mg oral tablet: 1 tab(s) orally once a day  atorvastatin 40 mg oral tablet: 1 tab(s) orally once a day  cyclobenzaprine 5 mg oral tablet: 1 tab(s) orally 3 times a day as needed for Muscle Spasm  gabapentin 300 mg oral capsule: 1 cap(s) orally once a day  insulin glargine 100 units/mL subcutaneous solution: 25 unit(s) subcutaneous once a day (at bedtime)  melatonin 3 mg oral tablet: 1 tab(s) orally once a day (at bedtime)  Ofev 150 mg oral capsule: 1 cap(s) orally every 12 hours  pantoprazole 40 mg oral delayed release tablet: 1 tab(s) orally once a day (before a meal)  traZODone 100 mg oral tablet: 1 tab(s) orally once a day (at bedtime)    PHYSICAL EXAM  Vital Signs Last 24 Hrs  T(C): 36.7 (14 May 2024 05:06), Max: 36.7 (13 May 2024 20:57)  T(F): 98.1 (14 May 2024 05:06), Max: 98.1 (13 May 2024 20:57)  HR: 116 (14 May 2024 16:07) (88 - 120)  BP: 107/70 (14 May 2024 13:46) (107/70 - 134/81)  BP(mean): --  RR: 18 (14 May 2024 16:07) (16 - 18)  SpO2: 95% (14 May 2024 16:07) (90% - 100%)    Parameters below as of 14 May 2024 16:07  Patient On (Oxygen Delivery Method): nasal cannula w/ humidification  O2 Flow (L/min): 6      05-13-24 @ 07:01  -  05-14-24 @ 07:00  --------------------------------------------------------  IN: 400 mL / OUT: 1750 mL / NET: -1350 mL    05-14-24 @ 07:01  -  05-14-24 @ 17:02  --------------------------------------------------------  IN: 480 mL / OUT: 350 mL / NET: 130 mL      CONSTITUTIONAL: Well-groomed, in no apparent distress;  EYES: No conjunctival or scleral injection, non-icteric;  ENMT: No external nasal lesions; Normal outer ears;  NECK: Trachea midline;  RESPIRATORY: Normal respiratory effort; Decreased breathe sounds bilaterally without wheeze/rhonchi/rales;  CARDIOVASCULAR: Regular rate and rhythm;  GASTROINTESTINAL: Non-distended; No palpable masses; No rebound/guarding;  EXTREMITIES:  No lower extremity edema;  NEUROLOGY: A+O to person, place, and time; Does respond to commands appropriately;  PSYCHIATRY: Mood and Affect appropriate    LABS:                        10.9   6.42  )-----------( 414      ( 14 May 2024 16:12 )             36.3     05-14    136  |  97  |  43<H>  ----------------------------<  285<H>  4.5   |  23  |  0.97    Ca    9.3      14 May 2024 16:12  Mg     2.3     05-14            Urinalysis Basic - ( 14 May 2024 16:12 )    Color: x / Appearance: x / SG: x / pH: x  Gluc: 285 mg/dL / Ketone: x  / Bili: x / Urobili: x   Blood: x / Protein: x / Nitrite: x   Leuk Esterase: x / RBC: x / WBC x   Sq Epi: x / Non Sq Epi: x / Bacteria: x        SARS-CoV-2: NotDetec (11 Apr 2024 22:46)      RADIOLOGY & ADDITIONAL TESTS:  EKG  12 Lead ECG:   Ventricular Rate 108 BPM    Atrial Rate 108 BPM    P-R Interval 132 ms    QRS Duration 68 ms    Q-T Interval 326 ms    QTC Calculation(Bazett) 436 ms    P Axis 27 degrees    R Axis -39 degrees    T Axis 12 degrees    Diagnosis Line SINUS TACHYCARDIA WITH FUSION COMPLEXES  LEFT AXIS DEVIATION  CANNOT RULE OUT ANTERIOR INFARCT  ABNORMAL ECG  WHEN COMPARED WITH ECG OF  04-  Confirmed by MD MYERS JONATHAN ([id number]) on 4/27/2024 5:25:32 PM (04-26-24 @ 11:09)  12 Lead ECG:   Ventricular Rate 117 BPM    Atrial Rate 117 BPM    P-R Interval 140 ms    QRS Duration 70 ms    Q-T Interval 302 ms    QTC Calculation(Bazett) 421 ms    P Axis 33 degrees    R Axis -40 degrees    T Axis 10 degrees    Diagnosis Line SINUS TACHYCARDIA  LEFT AXIS DEVIATION  POSSIBLE ANTEROLATERAL INFARCT , AGE UNDETERMINED  ABNORMAL ECG  NO PREVIOUS ECGS AVAILABLE  Confirmed by MD Rodriguez Ronald ([id number]) on 4/29/2024 12:43:02 PM (04-26-24 @ 05:05)    Xray Chest 1 View- PORTABLE-Urgent:   ACC: [id number] EXAM:  XR CHEST PORTABLE URGENT 1V   ORDERED BY:  MARKELL LOWRY     PROCEDURE DATE:  05/10/2024          INTERPRETATION:  EXAMINATION: XR CHEST URGENT    CLINICAL INDICATION: sob    TECHNIQUE: Single frontal, portable view of the chest was obtained.    COMPARISON: Chest x-ray 5/6/2024. No CT dated 5/6/2024    FINDINGS:    The heart is enlarged.  Diffuse bilateral fibrotic/chronic interstitial lung disease. . Mild   pulmonary edema.  There is no pneumothorax. Trace bilateral pleural effusion.  No acute bony abnormality.    IMPRESSION:  Diffuse bilateral fibrotic/chronic interstitial lung disease. Mild   pulmonary edema.    --- End of Report ---          GALI GARCIA MD; Resident Radiologist  This document has been electronically signed.  MARISOL CORDOVA MD; Attending Radiologist  This document has been electronically signed. May 11 2024  2:30PM (05-10-24 @ 18:18)  CT Angio Chest PE Protocol w/ IV Cont:   ACC: [id number] EXAM:  CT ANGIO CHEST PULM ART [facility]   ORDERED BY: JASON FLANAGAN     PROCEDURE DATE:  05/06/2024          INTERPRETATION:  INDICATION: Interstitial lung disease, increasing   shortness of breath    TECHNIQUE: Helical acquisition ofthe chest after the administration of   64 mL of Omnipaque 350. Maximum intensity projection images were   generated.    COMPARISON: CT chest 4/12/2024    FINDINGS:    HEART/VASCULATURE: No acute pulmonary embolus through the segmental   branches. Multiple obscured subsegmental branches by respiratory motion.   Unchanged thin web within a segmental pulmonary artery of the right lower   lobe. Unchanged dilated pulmonary artery, larger than the ascending   aorta. Dilated right ventricle. Small pericardial effusion.    LUNGS/AIRWAYS/PLEURA: Unchanged lower lobe predominant fibrosis largely   characterized by groundglass and traction bronchiectasis. No pleural   effusion.    LYMPH NODES/MEDIASTINUM: Unchanged bilateral mediastinal and hilar   lymphadenopathy. Unchanged diffusely dilated esophagus.    UPPER ABDOMEN: Unremarkable.    BONES/SOFT TISSUES: Unremarkable.      IMPRESSION:    Since 4/12/2024:    No acute pulmonary embolus. Unchanged thin web within a right lower lobe   segmental branch, which may be sequela of prior pulmonary embolus.    Dilated pulmonary artery and right ventricle, suggestive of pulmonary   hypertension.    Unchanged fibrotic interstitial lung disease most consistent with an NSIP   pattern. Consider scleroderma given dilated esophagus.    --- End of Report ---           GT BELL MD; Resident Radiologist  This document has been electronically signed.  MICK COX M.D., ATTENDING RADIOLOGIST  This document has been electronically signed. May  7 2024 11:21AM *!* (05-06-24 @ 22:52)  Xray Chest 1 View- PORTABLE-Urgent:   ACC: [id number] EXAM:  XR CHEST PORTABLE URGENT 1V   ORDERED BY:  FRANCOIS CHAPPELL     PROCEDURE DATE:  05/06/2024          INTERPRETATION:  TECHNIQUE: A single AP view of the chest was obtained.   Ordered time:   5/6/2024 8:47 PM    COMPARISON: 4/26/2024    CLINICAL INFORMATION: RRT. Hypoxia    FINDINGS:    The heart is not well assessed on an AP film.  There is increased bilateral interstitial opacities.  There are small bilateral pleural effusions.  There is no pneumothorax.    IMPRESSION:    Pulmonary edema.  : New obscuration of the bilateral hemidiaphragms, likely small effusions.    --- End of Report ---           ROGERIO CONTI MD; Resident Radiologist  This document has been electronically signed.  CRISELDA ROSARIO MD; Attending Radiologist  This document has been electronically signed. May  7 2024 12:15PM (05-06-24 @ 20:47)

## 2024-05-14 NOTE — PROGRESS NOTE ADULT - SUBJECTIVE AND OBJECTIVE BOX
seen earlier today     Chief Complaint: Diabetes Mellitus follow up    INTERVAL HX:  Saw patient at bedside with her sister present. States she is feeling a bit better. She did not eat breakfast. BG values are extremely variable   ranging from 79--412mg/dl.     Review of Systems:  General: As above  GI: No nausea, vomiting  Endocrine: no  S&Sx of hypoglycemia    Allergies    No Known Allergies    Intolerances      MEDICATIONS  (STANDING):  albuterol/ipratropium for Nebulization 3 milliLiter(s) Nebulizer every 6 hours  apixaban 5 milliGRAM(s) Oral two times a day  atorvastatin 40 milliGRAM(s) Oral at bedtime  budesonide 160 MICROgram(s)/formoterol 4.5 MICROgram(s) Inhaler 2 Puff(s) Inhalation two times a day  chlorhexidine 2% Cloths 1 Application(s) Topical daily  dextrose 10% Bolus 125 milliLiter(s) IV Bolus once  dextrose 5%. 1000 milliLiter(s) (100 mL/Hr) IV Continuous <Continuous>  dextrose 5%. 1000 milliLiter(s) (50 mL/Hr) IV Continuous <Continuous>  dextrose 50% Injectable 25 Gram(s) IV Push once  dextrose 50% Injectable 12.5 Gram(s) IV Push once  furosemide   Injectable 40 milliGRAM(s) IV Push two times a day  gabapentin 300 milliGRAM(s) Oral daily  glucagon  Injectable 1 milliGRAM(s) IntraMuscular once  insulin glargine Injectable (LANTUS) 18 Unit(s) SubCutaneous at bedtime  insulin lispro (ADMELOG) corrective regimen sliding scale   SubCutaneous <User Schedule>  insulin lispro (ADMELOG) corrective regimen sliding scale   SubCutaneous three times a day before meals  insulin lispro Injectable (ADMELOG) 12 Unit(s) SubCutaneous three times a day before meals  multivitamin 1 Tablet(s) Oral daily  Ofev (Nintedanib) 150 milliGRAM(s) 150 milliGRAM(s) Oral two times a day  pantoprazole    Tablet 40 milliGRAM(s) Oral two times a day  polyethylene glycol 3350 17 Gram(s) Oral daily  predniSONE   Tablet 30 milliGRAM(s) Oral daily  senna 2 Tablet(s) Oral at bedtime  sildenafil (REVATIO) 20 milliGRAM(s) Oral every 8 hours  simethicone 80 milliGRAM(s) Chew two times a day  traZODone 100 milliGRAM(s) Oral at bedtime      atorvastatin   40 milliGRAM(s) Oral (05-13-24 @ 21:56)    insulin glargine Injectable (LANTUS)   18 Unit(s) SubCutaneous (05-13-24 @ 21:55)    insulin lispro (ADMELOG) corrective regimen sliding scale   6 Unit(s) SubCutaneous (05-14-24 @ 12:05)   12 Unit(s) SubCutaneous (05-13-24 @ 18:30)    insulin lispro Injectable (ADMELOG)   12 Unit(s) SubCutaneous (05-14-24 @ 12:06)   12 Unit(s) SubCutaneous (05-13-24 @ 18:31)   12 Unit(s) SubCutaneous (05-13-24 @ 13:05)    predniSONE   Tablet   30 milliGRAM(s) Oral (05-14-24 @ 05:31)        PHYSICAL EXAM:  VITALS: T(C): 36.7 (05-14-24 @ 05:06)  T(F): 98.1 (05-14-24 @ 05:06), Max: 98.1 (05-13-24 @ 20:57)  HR: 113 (05-14-24 @ 11:29) (88 - 117)  BP: 121/81 (05-14-24 @ 11:29) (121/81 - 134/81)  RR:  (16 - 18)  SpO2:  (90% - 100%)  Wt(kg): --  GENERAL: NAD  Respiratory: Respirations unlabored on oxygen  Extremities: Warm, no edema  NEURO: Alert , appropriate     LABS:  POCT Blood Glucose.: 290 mg/dL (05-14-24 @ 11:37)  POCT Blood Glucose.: 79 mg/dL (05-14-24 @ 08:27)  POCT Blood Glucose.: 84 mg/dL (05-14-24 @ 03:12)  POCT Blood Glucose.: 190 mg/dL (05-13-24 @ 21:29)  POCT Blood Glucose.: 424 mg/dL (05-13-24 @ 18:33)  POCT Blood Glucose.: 412 mg/dL (05-13-24 @ 18:28)  POCT Blood Glucose.: 412 mg/dL (05-13-24 @ 16:36)  POCT Blood Glucose.: 90 mg/dL (05-13-24 @ 12:24)  POCT Blood Glucose.: 227 mg/dL (05-13-24 @ 08:22)  POCT Blood Glucose.: 285 mg/dL (05-13-24 @ 02:07)  POCT Blood Glucose.: 253 mg/dL (05-12-24 @ 21:29)  POCT Blood Glucose.: 244 mg/dL (05-12-24 @ 16:51)  POCT Blood Glucose.: 255 mg/dL (05-12-24 @ 12:24)  POCT Blood Glucose.: 280 mg/dL (05-12-24 @ 08:10)  POCT Blood Glucose.: 170 mg/dL (05-12-24 @ 02:10)  POCT Blood Glucose.: 276 mg/dL (05-11-24 @ 21:27)  POCT Blood Glucose.: 199 mg/dL (05-11-24 @ 16:35)                          10.0   7.65  )-----------( 389      ( 13 May 2024 07:08 )             32.7     05-13    141  |  101  |  45<H>  ----------------------------<  200<H>  3.9   |  28  |  0.85    Ca    9.2      13 May 2024 07:08  Mg     2.1     05-13          Thyroid Function Tests:      A1C with Estimated Average Glucose Result: 11.9 % (04-13-24 @ 03:53)    Estimated Average Glucose: 295 mg/dL (04-13-24 @ 03:53)        Diet, Regular:   Consistent Carbohydrate No Snacks (CSTCHO) (04-26-24 @ 15:14) [Active]

## 2024-05-15 NOTE — BH CONSULTATION LIAISON ASSESSMENT NOTE - RISK ASSESSMENT
Risk factors: poor physical health  Protective factors: no current SIIP/HIIP, no h/o SA/SIB, no h/o psych admissions, no access to weapons, no active substance abuse, spirituality, domiciled, supportive family, resilience  Pt identified as low acute risk of harm.

## 2024-05-15 NOTE — BH CONSULTATION LIAISON ASSESSMENT NOTE - NSBHCHARTREVIEWLAB_PSY_A_CORE FT
CBC Full  -  ( 15 May 2024 07:09 )  WBC Count : 6.78 K/uL  RBC Count : 3.63 M/uL  Hemoglobin : 9.4 g/dL  Hematocrit : 30.5 %  Platelet Count - Automated : 330 K/uL  Mean Cell Volume : 84.0 fl  Mean Cell Hemoglobin : 25.9 pg  Mean Cell Hemoglobin Concentration : 30.8 gm/dL  Auto Neutrophil # : x  Auto Lymphocyte # : x  Auto Monocyte # : x  Auto Eosinophil # : x  Auto Basophil # : x  Auto Neutrophil % : x  Auto Lymphocyte % : x  Auto Monocyte % : x  Auto Eosinophil % : x  Auto Basophil % : x    05-15    139  |  102  |  43<H>  ----------------------------<  133<H>  3.6   |  29  |  0.90    Ca    9.1      15 May 2024 07:07  Mg     2.3     05-14    BNP 5/15: 2826 from 6919 on 5/13

## 2024-05-15 NOTE — PROGRESS NOTE ADULT - PROBLEM SELECTOR PLAN 11
DVT PPx: Eliquis  Diet: Regular  Bowel Regimen: Miralax, Senna, dulcolax suppository/enema   Code: DNR/DNI, molst completed in chart   Aspiration precautions  Fall precautions  Pend - d/c planning - LEVON

## 2024-05-15 NOTE — PROGRESS NOTE ADULT - ATTENDING COMMENTS
63F hx ILD/ probable IPF (On 2-3LNC at home), PE 2022 on eliquis, severe pHTN w/ prior cor pulmonale (RVSP 68 - 10/22 w/ TTE from 4/12 w/ PASP 28, normal LV/RV SF), IDDM, presenting as a transfer from [place] for lung transplant eval iso SOB, dry cough, chest pain.   She is overall improved clinically with decrease in oxygen requirements as she is being diuresed.   Oxygen saturation today is 95% on 6 L NC,   She has fine rales bibasilar, tachycardia, no edema. Tachypneic when we enter the room.    She is tolerating Sildenafil and Creatinine continues to improve, pro BNP continues to decrease.  Nuclear shunt study on 5/10 was negative for shunt.    PRednisone is being tapered, currently on 30mg daily.  Overall improving slowly.  Agree with plan as outlined above.  Continue PT.

## 2024-05-15 NOTE — PROGRESS NOTE ADULT - ASSESSMENT
63-yo F with PMHx of chronic AHRF on 2-3L NC at b/l 2/2 ILD (Followed w/ Dr. Primo Marlow, [facility]), PE on Eliquis, severe pHTN, Cor pulmonale, DM, presented initially with SOB, dry cough, chest pain, LE edema, recent admission to [facility] in March for ILD flare, admitted to outside hospital->ICU for AHRF 2/2 ILD flare, transferred to [facility] for lung transplant evaluation.

## 2024-05-15 NOTE — BH CONSULTATION LIAISON ASSESSMENT NOTE - HPI (INCLUDE ILLNESS QUALITY, SEVERITY, DURATION, TIMING, CONTEXT, MODIFYING FACTORS, ASSOCIATED SIGNS AND SYMPTOMS)
63-yo F with PMHx of chronic AHRF on 2-3L NC at b/l 2/2 ILD (Followed w/ Dr. Primo Marlow, [facility]), PE on Eliquis, severe pHTN, Cor pulmonale, DM, presented initially with SOB, dry cough, chest pain, LE edema, recent admission to [facility] in March for ILD flare, admitted to [facility]->ICU for AHRF 2/2 ILD flare, transferred to [facility] for lung transplant evaluation. Psychiatry consulted for anxiety management recs.    Patient seen and evaluated at bedside. Pt awake, alert, calm, comfortable, and cooperative on 6L NC. Pt stated she was unaware that psychiatry was consulted to see her. Pt denies psychiatric history. Denies h/o psychiatric disorder, psychiatric hospitalizations, SA/SIB, substance use, or psychiatric outpatient treatment. Denies family psychiatric history. Pt denies feelings of depression. Denies depressive symptoms including, anhedonia, guilt, decreased energy/concentration, SI. Pt reports good appetite. Denies having thoughts of wanting to be dead or feelings of hopelessness or helplessness. Protective factors include wanting to live, "having my life ahead of me," and family. Denies HIIP. Denies paranoid thoughts, AVH, or delusions. Pt endorses episodes of anxiety in the context of difficulty breathing. Pt denies h/o anxiety or feelings of anxiety outside of recently in the hospital in the context of her worsening cardiopulmonary function. Pt expresses that she does not feel she needs pharmacological intervention at this time for anxiety or depression. Pt does endorse some difficulty sleeping of which she has been taking trazodone 100mg at bedtime for as needed, which helps; though, pt does admit that sleeping in the hospital is overall not ideal.    Collateral obtained from pt's daughter, Debbi. Daughter lives in [place] and have been very involved in patient's care. Daughter states that pt has been "good" the past couple of days after getting of high flow supplemental oxygen and transitioning to NC. States pt is much more comfortable on NC. Daughter reports that pt's anxiety is related to cardiopulmonary pathology. Also reports that if pt is constipated and eats, the fullness affects her breathing resulting in some anxiety; however, pt is able to calm down. Daughter otherwise without psychiatric concerns. Denies psychiatric history.

## 2024-05-15 NOTE — PROGRESS NOTE ADULT - PROBLEM SELECTOR PLAN 3
- Pt w/ hx of severe pHTN, RVSP 68 on 10/2022  - Repeat 4/12/24 at [facility] noted PAP 28, "normal PAP"   - Maintain euvolemia, as above  - c/w sildenafil 20 mg TID, currently tolerating

## 2024-05-15 NOTE — BH CONSULTATION LIAISON ASSESSMENT NOTE - DIFFERENTIAL
anxiety secondary to medical condition and worsening cardiopulmonary function anxiety secondary to medical condition and worsening cardiopulmonary function,adjustment d/o

## 2024-05-15 NOTE — PROGRESS NOTE ADULT - ASSESSMENT
63F hx ILD/ probable IPF (On 2-3LNC at home), PE 2022 on eliquis, severe pHTN w/ prior cor pulmonale (RVSP 68 - 10/22 w/ TTE from 4/12 w/ PASP 28, normal LV/RV SF), IDDM, presenting as a transfer from [place] for lung transplant eval iso SOB, dry cough, chest pain. She has been receiving duonebs, symbicort, lasix, ofev and IV steroids. Continuing with AC. Solu-medrol is ordered for 40 IV BID.  CT Chest 4/12/2024: Findings suggesting interstitial lung disease without significant interval progression. No evidence of pneumonia.   CT scan from 4/12/2024 when compared with CT scan from 2020 has shown significant progression.  Most recent CT is unchanged compared with 4/12/24 study.     Recommendations  - c/w weaning O2 as able - On 6L NC currently.   - Continue with Symbicort, duonebs, Ofev, Eliquis,   - c/w prednisone 40mg - taper by 10mg every week. Can lower to 30mg starting 5/14.   - Continue with diuresis - goal net neg 1-2L  - TTE results appreciated - PFO + e/o decreased Right ventricular function  - Shunt study negative.   - c/w sildenafil 20mg TID - Side effects may include hypotension, nausea, light-headedness, let us know if patient experiences these symptoms and decrease dosage to sildenafil 10 tid if experiencing any symptoms  - Please continue to diurese the pt. Would give additional doses of lasix as needed to achieve minimum -1-2L daily.  - Please check BNP every other day  - d/w structural heart - will hold off on further eval for pfo closure  - Please consult psych for anxiety management  - Please try to walk pt daily to improve walking distance - this is a major barrier to transplant for this pt.     Pulm to follow 63F hx ILD/ probable IPF (On 2-3LNC at home), PE 2022 on eliquis, severe pHTN w/ prior cor pulmonale (RVSP 68 - 10/22 w/ TTE from 4/12 w/ PASP 28, normal LV/RV SF), IDDM, presenting as a transfer from [place] for lung transplant eval iso SOB, dry cough, chest pain. She has been receiving duonebs, symbicort, lasix, ofev and IV steroids. Continuing with AC. Solu-medrol is ordered for 40 IV BID.  CT Chest 4/12/2024: Findings suggesting interstitial lung disease without significant interval progression. No evidence of pneumonia.   CT scan from 4/12/2024 when compared with CT scan from 2020 has shown significant progression.  Most recent CT is unchanged compared with 4/12/24 study.     Recommendations  - c/w weaning O2 as able - On 6L NC currently.   - Continue with Symbicort, duonebs, Ofev, Eliquis,   - c/w prednisone 40mg - taper by 10mg every week. Can lower to 30mg starting 5/14.   - Please ensure pt is on PCP prophylaxis with bactrim    - Continue with diuresis - goal net neg 1-2L  - TTE results appreciated - PFO + e/o decreased Right ventricular function  - Shunt study negative.   - c/w sildenafil 20mg TID - Side effects may include hypotension, nausea, light-headedness, let us know if patient experiences these symptoms and decrease dosage to sildenafil 10 tid if experiencing any symptoms  - Please continue to diurese the pt. Would give additional doses of lasix as needed to achieve minimum -1-2L daily.  - Please check BNP every other day  - d/w structural heart - will hold off on further eval for pfo closure  - Please consult psych for anxiety management  - Please try to walk pt daily to improve walking distance - this is a major barrier to transplant for this pt.     Pulm to follow

## 2024-05-15 NOTE — PROGRESS NOTE ADULT - SUBJECTIVE AND OBJECTIVE BOX
HPI:     Patient is a 63y old  Female who presents with a chief complaint of SOB (14 May 2024 15:52)    Endocrinology consulted for diabetes management.   Most recent HbA1C:     INTERVAL HPI/OVERNIGHT EVENTS:    Currently denies polydipsia, polyuria , visual changes, numbness in feet.    atorvastatin   40 milliGRAM(s) Oral (05-14-24 @ 21:25)   40 milliGRAM(s) Oral (05-13-24 @ 21:56)    insulin glargine Injectable (LANTUS)   18 Unit(s) SubCutaneous (05-14-24 @ 21:24)   18 Unit(s) SubCutaneous (05-13-24 @ 21:55)    insulin lispro (ADMELOG) corrective regimen sliding scale   4 Unit(s) SubCutaneous (05-14-24 @ 16:44)   6 Unit(s) SubCutaneous (05-14-24 @ 12:05)   12 Unit(s) SubCutaneous (05-13-24 @ 18:30)    insulin lispro Injectable (ADMELOG)   12 Unit(s) SubCutaneous (05-15-24 @ 09:13)   12 Unit(s) SubCutaneous (05-14-24 @ 16:45)   12 Unit(s) SubCutaneous (05-14-24 @ 12:06)   12 Unit(s) SubCutaneous (05-13-24 @ 18:31)   12 Unit(s) SubCutaneous (05-13-24 @ 13:05)    predniSONE   Tablet   30 milliGRAM(s) Oral (05-15-24 @ 06:23)   30 milliGRAM(s) Oral (05-14-24 @ 05:31)      Review of systems:   CONSTITUTIONAL:  Feels well, good appetite  CARDIOVASCULAR:  Negative for chest pain or palpitations  RESPIRATORY:  Negative for cough, or SOB   GASTROINTESTINAL:  Negative for nausea, vomiting, or abdominal pain  GENITOURINARY:  Negative frequency, urgency or dysuria     CAPILLARY BLOOD GLUCOSE  POCT Blood Glucose.: 121 mg/dL (15 May 2024 08:15)  POCT Blood Glucose.: 143 mg/dL (15 May 2024 02:03)  POCT Blood Glucose.: 136 mg/dL (14 May 2024 20:33)  POCT Blood Glucose.: 242 mg/dL (14 May 2024 16:36)       MEDICATIONS  (STANDING):  albuterol/ipratropium for Nebulization 3 milliLiter(s) Nebulizer every 6 hours  apixaban 5 milliGRAM(s) Oral two times a day  atorvastatin 40 milliGRAM(s) Oral at bedtime  budesonide 160 MICROgram(s)/formoterol 4.5 MICROgram(s) Inhaler 2 Puff(s) Inhalation two times a day  chlorhexidine 2% Cloths 1 Application(s) Topical daily  dextrose 10% Bolus 125 milliLiter(s) IV Bolus once  dextrose 5%. 1000 milliLiter(s) (100 mL/Hr) IV Continuous <Continuous>  dextrose 5%. 1000 milliLiter(s) (50 mL/Hr) IV Continuous <Continuous>  dextrose 50% Injectable 25 Gram(s) IV Push once  dextrose 50% Injectable 12.5 Gram(s) IV Push once  furosemide   Injectable 40 milliGRAM(s) IV Push two times a day  gabapentin 300 milliGRAM(s) Oral daily  glucagon  Injectable 1 milliGRAM(s) IntraMuscular once  insulin glargine Injectable (LANTUS) 18 Unit(s) SubCutaneous at bedtime  insulin lispro (ADMELOG) corrective regimen sliding scale   SubCutaneous <User Schedule>  insulin lispro (ADMELOG) corrective regimen sliding scale   SubCutaneous three times a day before meals  insulin lispro Injectable (ADMELOG) 12 Unit(s) SubCutaneous three times a day before meals  multivitamin 1 Tablet(s) Oral daily  Ofev (Nintedanib) 150 milliGRAM(s) 150 milliGRAM(s) Oral two times a day  pantoprazole    Tablet 40 milliGRAM(s) Oral two times a day  polyethylene glycol 3350 17 Gram(s) Oral daily  predniSONE   Tablet 30 milliGRAM(s) Oral daily  senna 2 Tablet(s) Oral at bedtime  sildenafil (REVATIO) 20 milliGRAM(s) Oral every 8 hours  simethicone 80 milliGRAM(s) Chew two times a day  traZODone 100 milliGRAM(s) Oral at bedtime    MEDICATIONS  (PRN):  acetaminophen     Tablet .. 650 milliGRAM(s) Oral every 6 hours PRN Temp greater or equal to 38C (100.4F), Mild Pain (1 - 3)  aluminum hydroxide/magnesium hydroxide/simethicone Suspension 30 milliLiter(s) Oral every 4 hours PRN Dyspepsia  bisacodyl Suppository 10 milliGRAM(s) Rectal daily PRN Constipation  cyclobenzaprine 5 milliGRAM(s) Oral three times a day PRN Muscle Spasm  dextrose Oral Gel 15 Gram(s) Oral once PRN Blood Glucose LESS THAN 70 milliGRAM(s)/deciliter  melatonin 3 milliGRAM(s) Oral at bedtime PRN Insomnia      PHYSICAL EXAM  Vital Signs Last 24 Hrs  T(C): 36.8 (15 May 2024 10:59), Max: 37.2 (15 May 2024 05:22)  T(F): 98.2 (15 May 2024 10:59), Max: 99 (15 May 2024 05:22)  HR: 114 (15 May 2024 10:59) (102 - 120)  BP: 114/72 (15 May 2024 10:59) (107/70 - 129/77)  BP(mean): --  RR: 18 (15 May 2024 10:59) (14 - 20)  SpO2: 95% (15 May 2024 10:59) (94% - 99%)    Parameters below as of 15 May 2024 10:59  Patient On (Oxygen Delivery Method): nasal cannula  O2 Flow (L/min): 6      GENERAL: laying in bed in NAD  RESPIRATORY: nonlabored breathing, no accessory muscle use  Extremities: Warm, no edema in all 4 exts   NEURO: A&O X3    LABS:                        9.4    6.78  )-----------( 330      ( 15 May 2024 07:09 )             30.5     05-15    139  |  102  |  43<H>  ----------------------------<  133<H>  3.6   |  29  |  0.90    Ca    9.1      15 May 2024 07:07  Mg     2.3     05-14        Urinalysis Basic - ( 15 May 2024 07:07 )    Color: x / Appearance: x / SG: x / pH: x  Gluc: 133 mg/dL / Ketone: x  / Bili: x / Urobili: x   Blood: x / Protein: x / Nitrite: x   Leuk Esterase: x / RBC: x / WBC x   Sq Epi: x / Non Sq Epi: x / Bacteria: x                HPI:     Patient is a 63y old  Female who presents with a chief complaint of SOB (14 May 2024 15:52)    Endocrinology consulted for diabetes management.   Most recent HbA1C: 11.9% in April 2024  INTERVAL HPI/OVERNIGHT EVENTS:  Patient reports that her appetite is better.    Currently on Prednisone 30 mg once daily   Currently denies polydipsia, polyuria , visual changes, numbness in feet.    atorvastatin   40 milliGRAM(s) Oral (05-14-24 @ 21:25)   40 milliGRAM(s) Oral (05-13-24 @ 21:56)    insulin glargine Injectable (LANTUS)   18 Unit(s) SubCutaneous (05-14-24 @ 21:24)   18 Unit(s) SubCutaneous (05-13-24 @ 21:55)    insulin lispro (ADMELOG) corrective regimen sliding scale   4 Unit(s) SubCutaneous (05-14-24 @ 16:44)   6 Unit(s) SubCutaneous (05-14-24 @ 12:05)   12 Unit(s) SubCutaneous (05-13-24 @ 18:30)    insulin lispro Injectable (ADMELOG)   12 Unit(s) SubCutaneous (05-15-24 @ 09:13)   12 Unit(s) SubCutaneous (05-14-24 @ 16:45)   12 Unit(s) SubCutaneous (05-14-24 @ 12:06)   12 Unit(s) SubCutaneous (05-13-24 @ 18:31)   12 Unit(s) SubCutaneous (05-13-24 @ 13:05)    predniSONE   Tablet   30 milliGRAM(s) Oral (05-15-24 @ 06:23)   30 milliGRAM(s) Oral (05-14-24 @ 05:31)      Review of systems:   CONSTITUTIONAL:  Feels well, good appetite  CARDIOVASCULAR:  Negative for chest pain or palpitations  RESPIRATORY:  Negative for cough, or SOB   GASTROINTESTINAL:  Negative for nausea, vomiting, or abdominal pain  GENITOURINARY:  Negative frequency, urgency or dysuria     CAPILLARY BLOOD GLUCOSE  POCT Blood Glucose.: 121 mg/dL (15 May 2024 08:15)  POCT Blood Glucose.: 143 mg/dL (15 May 2024 02:03)  POCT Blood Glucose.: 136 mg/dL (14 May 2024 20:33)  POCT Blood Glucose.: 242 mg/dL (14 May 2024 16:36)       MEDICATIONS  (STANDING):  albuterol/ipratropium for Nebulization 3 milliLiter(s) Nebulizer every 6 hours  apixaban 5 milliGRAM(s) Oral two times a day  atorvastatin 40 milliGRAM(s) Oral at bedtime  budesonide 160 MICROgram(s)/formoterol 4.5 MICROgram(s) Inhaler 2 Puff(s) Inhalation two times a day  chlorhexidine 2% Cloths 1 Application(s) Topical daily  dextrose 10% Bolus 125 milliLiter(s) IV Bolus once  dextrose 5%. 1000 milliLiter(s) (100 mL/Hr) IV Continuous <Continuous>  dextrose 5%. 1000 milliLiter(s) (50 mL/Hr) IV Continuous <Continuous>  dextrose 50% Injectable 25 Gram(s) IV Push once  dextrose 50% Injectable 12.5 Gram(s) IV Push once  furosemide   Injectable 40 milliGRAM(s) IV Push two times a day  gabapentin 300 milliGRAM(s) Oral daily  glucagon  Injectable 1 milliGRAM(s) IntraMuscular once  insulin glargine Injectable (LANTUS) 18 Unit(s) SubCutaneous at bedtime  insulin lispro (ADMELOG) corrective regimen sliding scale   SubCutaneous <User Schedule>  insulin lispro (ADMELOG) corrective regimen sliding scale   SubCutaneous three times a day before meals  insulin lispro Injectable (ADMELOG) 12 Unit(s) SubCutaneous three times a day before meals  multivitamin 1 Tablet(s) Oral daily  Ofev (Nintedanib) 150 milliGRAM(s) 150 milliGRAM(s) Oral two times a day  pantoprazole    Tablet 40 milliGRAM(s) Oral two times a day  polyethylene glycol 3350 17 Gram(s) Oral daily  predniSONE   Tablet 30 milliGRAM(s) Oral daily  senna 2 Tablet(s) Oral at bedtime  sildenafil (REVATIO) 20 milliGRAM(s) Oral every 8 hours  simethicone 80 milliGRAM(s) Chew two times a day  traZODone 100 milliGRAM(s) Oral at bedtime    MEDICATIONS  (PRN):  acetaminophen     Tablet .. 650 milliGRAM(s) Oral every 6 hours PRN Temp greater or equal to 38C (100.4F), Mild Pain (1 - 3)  aluminum hydroxide/magnesium hydroxide/simethicone Suspension 30 milliLiter(s) Oral every 4 hours PRN Dyspepsia  bisacodyl Suppository 10 milliGRAM(s) Rectal daily PRN Constipation  cyclobenzaprine 5 milliGRAM(s) Oral three times a day PRN Muscle Spasm  dextrose Oral Gel 15 Gram(s) Oral once PRN Blood Glucose LESS THAN 70 milliGRAM(s)/deciliter  melatonin 3 milliGRAM(s) Oral at bedtime PRN Insomnia      PHYSICAL EXAM  Vital Signs Last 24 Hrs  T(C): 36.8 (15 May 2024 10:59), Max: 37.2 (15 May 2024 05:22)  T(F): 98.2 (15 May 2024 10:59), Max: 99 (15 May 2024 05:22)  HR: 114 (15 May 2024 10:59) (102 - 120)  BP: 114/72 (15 May 2024 10:59) (107/70 - 129/77)  BP(mean): --  RR: 18 (15 May 2024 10:59) (14 - 20)  SpO2: 95% (15 May 2024 10:59) (94% - 99%)    Parameters below as of 15 May 2024 10:59  Patient On (Oxygen Delivery Method): nasal cannula  O2 Flow (L/min): 6      GENERAL: laying in bed in NAD  RESPIRATORY: nonlabored breathing, no accessory muscle use  Extremities: Warm, no edema in all 4 exts   NEURO: A&O X3    LABS:                        9.4    6.78  )-----------( 330      ( 15 May 2024 07:09 )             30.5     05-15    139  |  102  |  43<H>  ----------------------------<  133<H>  3.6   |  29  |  0.90    Ca    9.1      15 May 2024 07:07  Mg     2.3     05-14        Urinalysis Basic - ( 15 May 2024 07:07 )    Color: x / Appearance: x / SG: x / pH: x  Gluc: 133 mg/dL / Ketone: x  / Bili: x / Urobili: x   Blood: x / Protein: x / Nitrite: x   Leuk Esterase: x / RBC: x / WBC x   Sq Epi: x / Non Sq Epi: x / Bacteria: x

## 2024-05-15 NOTE — BH CONSULTATION LIAISON ASSESSMENT NOTE - NSBHATTESTCOMMENTATTENDFT_PSY_A_CORE
63-yo F with PMHx of chronic AHRF on 2-3L NC at b/l 2/2 ILD (Followed w/ Dr. Primo Marlow, [facility]), PE on Eliquis, severe pHTN, Cor pulmonale, DM, presented initially with SOB, dry cough, chest pain, LE edema, recent admission to [facility] in March for ILD flare, admitted to [facility]->ICU for AHRF 2/2 ILD flare, transferred to [facility] for lung transplant evaluation. Psychiatry consulted for anxiety management recs.    Patient seen and evaluated at bedside. Pt awake, alert, calm, comfortable, and cooperative on 6L NC. Pt stated she was unaware that psychiatry was consulted to see her. Pt denies psychiatric history. Denies h/o psychiatric disorder, psychiatric hospitalizations, SA/SIB, substance use, or psychiatric outpatient treatment. Denies family psychiatric history. Pt denies feelings of depression. Denies depressive symptoms including, anhedonia, guilt, decreased energy/concentration, SI. Pt reports good appetite. Denies having thoughts of wanting to be dead or feelings of hopelessness or helplessness. Protective factors include wanting to live, "having my life ahead of me," and family. Denies HIIP. Denies paranoid thoughts, AVH, or delusions. Pt endorses episodes of anxiety in the context of difficulty breathing. Pt denies h/o anxiety or feelings of anxiety outside of recently in the hospital in the context of her worsening cardiopulmonary function. Pt expresses that she does not feel she needs pharmacological intervention at this time for anxiety or depression. Pt does endorse some difficulty sleeping of which she has been taking trazodone 100mg at bedtime for as needed, which helps; though, pt does admit that sleeping in the hospital is overall not ideal.  rec inc trazodone to 150mg po qhs, can give ativan  0.5mg po q6hr prn anxiety severe

## 2024-05-15 NOTE — PROGRESS NOTE ADULT - PROBLEM SELECTOR PLAN 7
T2DM: Home DM medications: metformin 500 mg BID, + glimepride 1 mg daily + lantus 35 units QHS+ admelog 25 units TIDAC  - Pt w/ hx of uncontrolled T2DM, a1c 11.9.   - Endocrine consulted via email, recs appreciated  - On glargine 15 units qhs, lispro 10u TID qac, hold if NPO or eating < 50% of meals  - MISS FS tid qac qhs  - Discharge DM medications:   - Continue with metformin 500 mg BID  - STOP glimipride due to recurrent hypoglycemia at home   - Basal/bolus, dose will depend on insulin requirement and steroid plan   - Patient will follow up at  [facility]:  [address] [place] [address]. [address]. [place] [address]  Tel: [phone]

## 2024-05-15 NOTE — BH CONSULTATION LIAISON ASSESSMENT NOTE - NSBHCHARTREVIEWVS_PSY_A_CORE FT
Vital Signs Last 24 Hrs  T(C): 37.2 (15 May 2024 05:22), Max: 37.2 (15 May 2024 05:22)  T(F): 99 (15 May 2024 05:22), Max: 99 (15 May 2024 05:22)  HR: 102 (15 May 2024 05:22) (88 - 120)  BP: 116/68 (15 May 2024 05:22) (107/70 - 129/77)  BP(mean): --  RR: 18 (15 May 2024 05:22) (14 - 20)  SpO2: 99% (15 May 2024 05:22) (94% - 99%)    Parameters below as of 15 May 2024 05:22  Patient On (Oxygen Delivery Method): nasal cannula  O2 Flow (L/min): 6

## 2024-05-15 NOTE — PROGRESS NOTE ADULT - SUBJECTIVE AND OBJECTIVE BOX
Pulmonary Consult Follow up     Interval Events:          REVIEW OF SYSTEMS:  [x] All other systems negative except per HPI   [ ] Unable to assess ROS because ________    OBJECTIVE:  ICU Vital Signs Last 24 Hrs  T(C): 36.8 (15 May 2024 10:59), Max: 37.2 (15 May 2024 05:22)  T(F): 98.2 (15 May 2024 10:59), Max: 99 (15 May 2024 05:22)  HR: 114 (15 May 2024 10:59) (102 - 116)  BP: 114/72 (15 May 2024 10:59) (114/72 - 129/77)  BP(mean): --  ABP: --  ABP(mean): --  RR: 18 (15 May 2024 10:59) (14 - 20)  SpO2: 95% (15 May 2024 10:59) (94% - 99%)    O2 Parameters below as of 15 May 2024 10:59  Patient On (Oxygen Delivery Method): nasal cannula  O2 Flow (L/min): 6            05-14 @ 07:01  -  05-15 @ 07:00  --------------------------------------------------------  IN: 1200 mL / OUT: 1400 mL / NET: -200 mL    05-15 @ 07:01  -  05-15 @ 14:01  --------------------------------------------------------  IN: 240 mL / OUT: 1200 mL / NET: -960 mL        PHYSICAL EXAM:  GENERAL: NAD, well-groomed, well-developed  HEAD:  Atraumatic, Normocephalic  EYES: EOMI, conjunctiva and sclera clear  ENMT: Moist mucous membranes  CHEST/LUNG: Clear to auscultation bilaterally; No rales, rhonchi, wheezing, or rubs  HEART: Regular rate and rhythm; No murmurs, rubs, or gallops  ABDOMEN: Nondistended  VASCULAR: No  cyanosis, or edema  SKIN: No rashes or lesions  NERVOUS SYSTEM:  Alert & Oriented X3, Good concentration    HOSPITAL MEDICATIONS:  MEDICATIONS  (STANDING):  albuterol/ipratropium for Nebulization 3 milliLiter(s) Nebulizer every 6 hours  apixaban 5 milliGRAM(s) Oral two times a day  atorvastatin 40 milliGRAM(s) Oral at bedtime  budesonide 160 MICROgram(s)/formoterol 4.5 MICROgram(s) Inhaler 2 Puff(s) Inhalation two times a day  chlorhexidine 2% Cloths 1 Application(s) Topical daily  dextrose 10% Bolus 125 milliLiter(s) IV Bolus once  dextrose 5%. 1000 milliLiter(s) (100 mL/Hr) IV Continuous <Continuous>  dextrose 5%. 1000 milliLiter(s) (50 mL/Hr) IV Continuous <Continuous>  dextrose 50% Injectable 12.5 Gram(s) IV Push once  dextrose 50% Injectable 25 Gram(s) IV Push once  furosemide   Injectable 40 milliGRAM(s) IV Push two times a day  gabapentin 300 milliGRAM(s) Oral daily  glucagon  Injectable 1 milliGRAM(s) IntraMuscular once  insulin glargine Injectable (LANTUS) 18 Unit(s) SubCutaneous at bedtime  insulin lispro (ADMELOG) corrective regimen sliding scale   SubCutaneous <User Schedule>  insulin lispro (ADMELOG) corrective regimen sliding scale   SubCutaneous three times a day before meals  insulin lispro Injectable (ADMELOG) 12 Unit(s) SubCutaneous three times a day before meals  multivitamin 1 Tablet(s) Oral daily  Ofev (Nintedanib) 150 milliGRAM(s) 150 milliGRAM(s) Oral two times a day  pantoprazole    Tablet 40 milliGRAM(s) Oral two times a day  polyethylene glycol 3350 17 Gram(s) Oral daily  predniSONE   Tablet 30 milliGRAM(s) Oral daily  senna 2 Tablet(s) Oral at bedtime  sildenafil (REVATIO) 20 milliGRAM(s) Oral every 8 hours  simethicone 80 milliGRAM(s) Chew two times a day  traZODone 100 milliGRAM(s) Oral at bedtime    MEDICATIONS  (PRN):  acetaminophen     Tablet .. 650 milliGRAM(s) Oral every 6 hours PRN Temp greater or equal to 38C (100.4F), Mild Pain (1 - 3)  aluminum hydroxide/magnesium hydroxide/simethicone Suspension 30 milliLiter(s) Oral every 4 hours PRN Dyspepsia  bisacodyl Suppository 10 milliGRAM(s) Rectal daily PRN Constipation  cyclobenzaprine 5 milliGRAM(s) Oral three times a day PRN Muscle Spasm  dextrose Oral Gel 15 Gram(s) Oral once PRN Blood Glucose LESS THAN 70 milliGRAM(s)/deciliter  melatonin 3 milliGRAM(s) Oral at bedtime PRN Insomnia      LABS:  05-15    139  |  102  |  43<H>  ----------------------------<  133<H>  3.6   |  29  |  0.90  05-14    136  |  97  |  43<H>  ----------------------------<  285<H>  4.5   |  23  |  0.97  05-13    141  |  101  |  45<H>  ----------------------------<  200<H>  3.9   |  28  |  0.85    Ca    9.1      15 May 2024 07:07  Ca    9.3      14 May 2024 16:12  Ca    9.2      13 May 2024 07:08  Mg     2.3     05-14      Magnesium: 2.3 mg/dL (05-14-24 @ 16:12)  Magnesium: 2.1 mg/dL (05-13-24 @ 07:08)                      Urinalysis Basic - ( 15 May 2024 07:07 )    Color: x / Appearance: x / SG: x / pH: x  Gluc: 133 mg/dL / Ketone: x  / Bili: x / Urobili: x   Blood: x / Protein: x / Nitrite: x   Leuk Esterase: x / RBC: x / WBC x   Sq Epi: x / Non Sq Epi: x / Bacteria: x                              9.4    6.78  )-----------( 330      ( 15 May 2024 07:09 )             30.5                         10.9   6.42  )-----------( 414      ( 14 May 2024 16:12 )             36.3                         10.0   7.65  )-----------( 389      ( 13 May 2024 07:08 )             32.7     CAPILLARY BLOOD GLUCOSE      POCT Blood Glucose.: 214 mg/dL (15 May 2024 12:19)  POCT Blood Glucose.: 121 mg/dL (15 May 2024 08:15)  POCT Blood Glucose.: 143 mg/dL (15 May 2024 02:03)  POCT Blood Glucose.: 136 mg/dL (14 May 2024 20:33)  POCT Blood Glucose.: 242 mg/dL (14 May 2024 16:36)

## 2024-05-15 NOTE — PROGRESS NOTE ADULT - PROBLEM SELECTOR PLAN 1
Please monitor blood glucose values TID AC & QHS while eating regular meals and Q6H while NPO  Continue insulin Glargine to 18units QHS  Continue  insulin Lispro 12 units TID with meals,hold if NPO or if eating less than 50% of meals; Pt instructed to report to staff if not eating  Continue with mod dose correctional scale TID with meals and QHS  Please notify endo team with any changes on steroid doses  Discharge planning:   Home DM medications: metformin 500 mg BID, + glimepride 1 mg daily + lantus 35 units QHS+ admelog 25 units TIDAC  Discharge DM medications:   Continue with metformin 500 mg BID  STOP glimepiride due to recurrent hypoglycemia at home   Basal/bolus, dose will depend on insulin requirement and steroid plan   Make sure pt has Rx for all DM supplies and insulin/ DM meds.  Can follow at endo practice. [address] [place] [address] [address]. Phone . Call for apt closer to discharge- - Patient will need opthalmology and podiatry follow up as outpatient

## 2024-05-15 NOTE — BH CONSULTATION LIAISON ASSESSMENT NOTE - SUMMARY
63-yo F with PMHx of chronic AHRF on 2-3L NC at b/l 2/2 ILD (Followed w/ Dr. Primo Marlow, [place]), PE on Eliquis, severe pHTN, Cor pulmonale, DM, presented initially with SOB, dry cough, chest pain, LE edema, recent admission to [facility] in March for ILD flare, admitted to [facility]ICU for AHRF 2/2 ILD flare, transferred to [facility] for lung transplant evaluation. Psychiatry consulted for anxiety management recs.    Pt endorsing anxiety 2/2 SOB from worsening ILD. Pt with no psychiatric hx. Pt states that anxiety has not preceded episodes of increased work of breathing, but rather result from difficulty breathing. Pt had required prn diazepam 4/26 @ 10:45, 4/27 @ 09:59, 4/29 @ 03:54, and 5/3 @ 20:56. Pt also on gabapentin 300mg daily for neuropathic pain and trazodone 100mg for insomnia. Pt currently denies anxiety or depression. Pt calm and comfortable throughout assessment. Denies SIIP, HIIP, AVH. Does endorse some difficulty sleeping. Do not suspect symptoms attributable to psychiatric etiology. Given the extent of patient's cardiopulmonary pathology in the setting of AHRF, severe pHTN, Cor pulmonale and ILD, would prioritize addressing SOB.

## 2024-05-15 NOTE — BH CONSULTATION LIAISON ASSESSMENT NOTE - NSSUICPROTFACT_PSY_ALL_CORE
Responsibility to children, family, or others/Identifies reasons for living/Supportive social network of family or friends/Fear of death or the actual act of killing self/Cultural, spiritual and/or moral attitudes against suicide/Ability to cope with stress

## 2024-05-15 NOTE — PROGRESS NOTE ADULT - PROBLEM SELECTOR PLAN 1
Baseline 3-4L NC. Hx of ILD w/ c/f IPF. Followed w/ DOMENICO Ding. Transferred to [facility] for lung transplant eval  - NM cardiac shunt performed on 5/10 was negative for PFO  - Check BNP every other day  - Continue home Ofev 150mg BID  - Duonebs, Symbicort  - Transitioned to PO Prednisone 40mg, plan to taper by 10mg every 7 days. If worsening hypoxia would increase steroids  - repeat TTE with bubble study revealed severe RH dysfunction with elevated pulmonary pressure, grade II diastolic dysfunction   - valium 2.5 mg q12 PRN anxiety   - Deemed not to be a transplant candidate 4/30

## 2024-05-15 NOTE — PROGRESS NOTE ADULT - SUBJECTIVE AND OBJECTIVE BOX
SUBJECTIVE / OVERNIGHT EVENTS:  Today is hospital day 20d. There are no new issues or overnight events.   Did not endorse any headache, lightheadedness, vertigo, shortness of breathe, cough, chest pain, palpitations, tachycardia, abdominal pain, nausea, vomiting, diarrhea or constipation currently    HPI:  63-yo F with PMHx of chronic AHRF on 2-3L NC at b/l 2/2 ILD (Followed w/ Dr. Primo Marlow, [facility]), PE on Eliquis, severe pHTN, Cor pulmonale, DM, presented initially with SOB, dry cough, chest pain, LE edema, recent admission to [facility] in March for ILD flare, admitted to [facility]->ICU for AHRF 2/2 ILD flare, transferred to [facility] for lung transplant evaluation. Pt reporting mild SOB, no fever, no sputum, no SOB, no chest pain, no edema. Reports constipation, last BM 2 days ago, with some stomach pain.     At [facility] ICU, she was treated w/ IV steroids->PO taper, duonebs, symbicort, lasix. Pike placed for urinary retention, taken out prior to arrival although unclear if passed TOV at OSH. Had been weaned to NC while but was complicated by increased WOB, placed back on HFNC 50/60. Most recently escalated to methylprednisolone 40 mg IV BID. Was being treated w/ Ofev 150 mg BID, daily diuresis w/ Lasix 40->20 mg IVP on 4/25 based on volume assessment, 4/24 CXR w/ c/f R>L patchy opacities c/f pulmonary edema. Upon transfer here, admission vitals notable for temp afebrile, , /79, HFNC similar settings 45/60 100%. Labs notable for downtrending white count, stable anemia to 9-10, stable thrombocytosis to 400's. ABG 7.48 / 37 / 71 / 28, lactate 1.2.      Of note, pt is DNR/DNI trial of NIPPV - confirmed this with the patient and completed MOLST, in chart.  (26 Apr 2024 01:17)    MEDICATIONS  (STANDING):  albuterol/ipratropium for Nebulization 3 milliLiter(s) Nebulizer every 6 hours  apixaban 5 milliGRAM(s) Oral two times a day  atorvastatin 40 milliGRAM(s) Oral at bedtime  budesonide 160 MICROgram(s)/formoterol 4.5 MICROgram(s) Inhaler 2 Puff(s) Inhalation two times a day  chlorhexidine 2% Cloths 1 Application(s) Topical daily  dextrose 10% Bolus 125 milliLiter(s) IV Bolus once  dextrose 5%. 1000 milliLiter(s) (100 mL/Hr) IV Continuous <Continuous>  dextrose 5%. 1000 milliLiter(s) (50 mL/Hr) IV Continuous <Continuous>  dextrose 50% Injectable 25 Gram(s) IV Push once  dextrose 50% Injectable 12.5 Gram(s) IV Push once  furosemide   Injectable 40 milliGRAM(s) IV Push two times a day  gabapentin 300 milliGRAM(s) Oral daily  glucagon  Injectable 1 milliGRAM(s) IntraMuscular once  insulin glargine Injectable (LANTUS) 18 Unit(s) SubCutaneous at bedtime  insulin lispro (ADMELOG) corrective regimen sliding scale   SubCutaneous <User Schedule>  insulin lispro (ADMELOG) corrective regimen sliding scale   SubCutaneous three times a day before meals  insulin lispro Injectable (ADMELOG) 12 Unit(s) SubCutaneous three times a day before meals  multivitamin 1 Tablet(s) Oral daily  Ofev (Nintedanib) 150 milliGRAM(s) 150 milliGRAM(s) Oral two times a day  pantoprazole    Tablet 40 milliGRAM(s) Oral two times a day  polyethylene glycol 3350 17 Gram(s) Oral daily  predniSONE   Tablet 30 milliGRAM(s) Oral daily  senna 2 Tablet(s) Oral at bedtime  sildenafil (REVATIO) 20 milliGRAM(s) Oral every 8 hours  simethicone 80 milliGRAM(s) Chew two times a day  traZODone 100 milliGRAM(s) Oral at bedtime  trimethoprim   80 mG/sulfamethoxazole 400 mG 1 Tablet(s) Oral daily    MEDICATIONS  (PRN):  acetaminophen     Tablet .. 650 milliGRAM(s) Oral every 6 hours PRN Temp greater or equal to 38C (100.4F), Mild Pain (1 - 3)  aluminum hydroxide/magnesium hydroxide/simethicone Suspension 30 milliLiter(s) Oral every 4 hours PRN Dyspepsia  bisacodyl Suppository 10 milliGRAM(s) Rectal daily PRN Constipation  cyclobenzaprine 5 milliGRAM(s) Oral three times a day PRN Muscle Spasm  dextrose Oral Gel 15 Gram(s) Oral once PRN Blood Glucose LESS THAN 70 milliGRAM(s)/deciliter  melatonin 3 milliGRAM(s) Oral at bedtime PRN Insomnia    HOME MEDICATIONS:  acetaminophen 325 mg oral tablet: 2 tab(s) orally every 6 hours As needed Mild Pain (1 - 3)  Admelog 100 units/mL injectable solution: 15 unit(s) injectable 3 times a day (before meals)  apixaban 5 mg oral tablet: 1 tab(s) orally once a day  atorvastatin 40 mg oral tablet: 1 tab(s) orally once a day  cyclobenzaprine 5 mg oral tablet: 1 tab(s) orally 3 times a day as needed for Muscle Spasm  gabapentin 300 mg oral capsule: 1 cap(s) orally once a day  insulin glargine 100 units/mL subcutaneous solution: 25 unit(s) subcutaneous once a day (at bedtime)  melatonin 3 mg oral tablet: 1 tab(s) orally once a day (at bedtime)  Ofev 150 mg oral capsule: 1 cap(s) orally every 12 hours  pantoprazole 40 mg oral delayed release tablet: 1 tab(s) orally once a day (before a meal)  traZODone 100 mg oral tablet: 1 tab(s) orally once a day (at bedtime)    PHYSICAL EXAM  Vital Signs Last 24 Hrs  T(C): 36.8 (15 May 2024 10:59), Max: 37.2 (15 May 2024 05:22)  T(F): 98.2 (15 May 2024 10:59), Max: 99 (15 May 2024 05:22)  HR: 117 (15 May 2024 14:07) (102 - 117)  BP: 121/71 (15 May 2024 14:07) (114/72 - 129/77)  BP(mean): --  RR: 18 (15 May 2024 14:07) (14 - 20)  SpO2: 93% (15 May 2024 14:07) (93% - 99%)    Parameters below as of 15 May 2024 14:07  Patient On (Oxygen Delivery Method): nasal cannula  O2 Flow (L/min): 6      05-14-24 @ 07:01  -  05-15-24 @ 07:00  --------------------------------------------------------  IN: 1200 mL / OUT: 1400 mL / NET: -200 mL    05-15-24 @ 07:01  -  05-15-24 @ 16:59  --------------------------------------------------------  IN: 240 mL / OUT: 1800 mL / NET: -1560 mL      CONSTITUTIONAL: Well-groomed, in no apparent distress;  EYES: No conjunctival or scleral injection, non-icteric;  ENMT: No external nasal lesions; Normal outer ears;  NECK: Trachea midline;  RESPIRATORY: Normal respiratory effort; Decreased breathe sounds bilaterally without wheeze/rhonchi/rales;  CARDIOVASCULAR: Regular rate and rhythm;  GASTROINTESTINAL: Non-distended; No palpable masses; No rebound/guarding;  EXTREMITIES:  No lower extremity edema;  NEUROLOGY: A+O to person, place, and time; Does respond to commands appropriately;  PSYCHIATRY: Mood and Affect appropriate    LABS:                        9.4    6.78  )-----------( 330      ( 15 May 2024 07:09 )             30.5     05-15    139  |  102  |  43<H>  ----------------------------<  133<H>  3.6   |  29  |  0.90    Ca    9.1      15 May 2024 07:07  Mg     2.3     05-14            Urinalysis Basic - ( 15 May 2024 07:07 )    Color: x / Appearance: x / SG: x / pH: x  Gluc: 133 mg/dL / Ketone: x  / Bili: x / Urobili: x   Blood: x / Protein: x / Nitrite: x   Leuk Esterase: x / RBC: x / WBC x   Sq Epi: x / Non Sq Epi: x / Bacteria: x        SARS-CoV-2: NotDetec (11 Apr 2024 22:46)      RADIOLOGY & ADDITIONAL TESTS:  EKG  12 Lead ECG:   Ventricular Rate 108 BPM    Atrial Rate 108 BPM    P-R Interval 132 ms    QRS Duration 68 ms    Q-T Interval 326 ms    QTC Calculation(Bazett) 436 ms    P Axis 27 degrees    R Axis -39 degrees    T Axis 12 degrees    Diagnosis Line SINUS TACHYCARDIA WITH FUSION COMPLEXES  LEFT AXIS DEVIATION  CANNOT RULE OUT ANTERIOR INFARCT  ABNORMAL ECG  WHEN COMPARED WITH ECG OF  04-  Confirmed by MD LOUIS, HAMLET ([id number]) on 4/27/2024 5:25:32 PM (04-26-24 @ 11:09)  12 Lead ECG:   Ventricular Rate 117 BPM    Atrial Rate 117 BPM    P-R Interval 140 ms    QRS Duration 70 ms    Q-T Interval 302 ms    QTC Calculation(Bazett) 421 ms    P Axis 33 degrees    R Axis -40 degrees    T Axis 10 degrees    Diagnosis Line SINUS TACHYCARDIA  LEFT AXIS DEVIATION  POSSIBLE ANTEROLATERAL INFARCT , AGE UNDETERMINED  ABNORMAL ECG  NO PREVIOUS ECGS AVAILABLE  Confirmed by MD Rodriguez Ronald ([id number]) on 4/29/2024 12:43:02 PM (04-26-24 @ 05:05)    Xray Chest 1 View- PORTABLE-Urgent:   ACC: [id number] EXAM:  XR CHEST PORTABLE URGENT 1V   ORDERED BY:  MARKELL LOWRY     PROCEDURE DATE:  05/10/2024          INTERPRETATION:  EXAMINATION: XR CHEST URGENT    CLINICAL INDICATION: sob    TECHNIQUE: Single frontal, portable view of the chest was obtained.    COMPARISON: Chest x-ray 5/6/2024. No CT dated 5/6/2024    FINDINGS:    The heart is enlarged.  Diffuse bilateral fibrotic/chronic interstitial lung disease. . Mild   pulmonary edema.  There is no pneumothorax. Trace bilateral pleural effusion.  No acute bony abnormality.    IMPRESSION:  Diffuse bilateral fibrotic/chronic interstitial lung disease. Mild   pulmonary edema.    --- End of Report ---          GALI GARCIA MD; Resident Radiologist  This document has been electronically signed.  MARISOL CORDOVA MD; Attending Radiologist  This document has been electronically signed. May 11 2024  2:30PM (05-10-24 @ 18:18)  CT Angio Chest PE Protocol w/ IV Cont:   ACC: [id number] EXAM:  CT ANGIO CHEST PULM ART [facility]   ORDERED BY: JASON FLANAGAN     PROCEDURE DATE:  05/06/2024          INTERPRETATION:  INDICATION: Interstitial lung disease, increasing   shortness of breath    TECHNIQUE: Helical acquisition ofthe chest after the administration of   64 mL of Omnipaque 350. Maximum intensity projection images were   generated.    COMPARISON: CT chest 4/12/2024    FINDINGS:    HEART/VASCULATURE: No acute pulmonary embolus through the segmental   branches. Multiple obscured subsegmental branches by respiratory motion.   Unchanged thin web within a segmental pulmonary artery of the right lower   lobe. Unchanged dilated pulmonary artery, larger than the ascending   aorta. Dilated right ventricle. Small pericardial effusion.    LUNGS/AIRWAYS/PLEURA: Unchanged lower lobe predominant fibrosis largely   characterized by groundglass and traction bronchiectasis. No pleural   effusion.    LYMPH NODES/MEDIASTINUM: Unchanged bilateral mediastinal and hilar   lymphadenopathy. Unchanged diffusely dilated esophagus.    UPPER ABDOMEN: Unremarkable.    BONES/SOFT TISSUES: Unremarkable.      IMPRESSION:    Since 4/12/2024:    No acute pulmonary embolus. Unchanged thin web within a right lower lobe   segmental branch, which may be sequela of prior pulmonary embolus.    Dilated pulmonary artery and right ventricle, suggestive of pulmonary   hypertension.    Unchanged fibrotic interstitial lung disease most consistent with an NSIP   pattern. Consider scleroderma given dilated esophagus.    --- End of Report ---           GT BELL MD; Resident Radiologist  This document has been electronically signed.  MICK COX M.D., ATTENDING RADIOLOGIST  This document has been electronically signed. May  7 2024 11:21AM *!* (05-06-24 @ 22:52)  Xray Chest 1 View- PORTABLE-Urgent:   ACC: [id number] EXAM:  XR CHEST PORTABLE URGENT 1V   ORDERED BY:  FRANCOIS CHAPPELL     PROCEDURE DATE:  05/06/2024          INTERPRETATION:  TECHNIQUE: A single AP view of the chest was obtained.   Ordered time:   5/6/2024 8:47 PM    COMPARISON: 4/26/2024    CLINICAL INFORMATION: RRT. Hypoxia    FINDINGS:    The heart is not well assessed on an AP film.  There is increased bilateral interstitial opacities.  There are small bilateral pleural effusions.  There is no pneumothorax.    IMPRESSION:    Pulmonary edema.  : New obscuration of the bilateral hemidiaphragms, likely small effusions.    --- End of Report ---           ROGERIO CONTI MD; Resident Radiologist  This document has been electronically signed.  CRISELDA ROSARIO MD; Attending Radiologist  This document has been electronically signed. May  7 2024 12:15PM (05-06-24 @ 20:47)

## 2024-05-15 NOTE — BH CONSULTATION LIAISON ASSESSMENT NOTE - CURRENT MEDICATION
MEDICATIONS  (STANDING):  albuterol/ipratropium for Nebulization 3 milliLiter(s) Nebulizer every 6 hours  apixaban 5 milliGRAM(s) Oral two times a day  atorvastatin 40 milliGRAM(s) Oral at bedtime  budesonide 160 MICROgram(s)/formoterol 4.5 MICROgram(s) Inhaler 2 Puff(s) Inhalation two times a day  chlorhexidine 2% Cloths 1 Application(s) Topical daily  dextrose 10% Bolus 125 milliLiter(s) IV Bolus once  dextrose 5%. 1000 milliLiter(s) (100 mL/Hr) IV Continuous <Continuous>  dextrose 5%. 1000 milliLiter(s) (50 mL/Hr) IV Continuous <Continuous>  dextrose 50% Injectable 25 Gram(s) IV Push once  dextrose 50% Injectable 12.5 Gram(s) IV Push once  furosemide   Injectable 40 milliGRAM(s) IV Push two times a day  gabapentin 300 milliGRAM(s) Oral daily  glucagon  Injectable 1 milliGRAM(s) IntraMuscular once  insulin glargine Injectable (LANTUS) 18 Unit(s) SubCutaneous at bedtime  insulin lispro (ADMELOG) corrective regimen sliding scale   SubCutaneous <User Schedule>  insulin lispro (ADMELOG) corrective regimen sliding scale   SubCutaneous three times a day before meals  insulin lispro Injectable (ADMELOG) 12 Unit(s) SubCutaneous three times a day before meals  multivitamin 1 Tablet(s) Oral daily  Ofev (Nintedanib) 150 milliGRAM(s) 150 milliGRAM(s) Oral two times a day  pantoprazole    Tablet 40 milliGRAM(s) Oral two times a day  polyethylene glycol 3350 17 Gram(s) Oral daily  predniSONE   Tablet 30 milliGRAM(s) Oral daily  senna 2 Tablet(s) Oral at bedtime  sildenafil (REVATIO) 20 milliGRAM(s) Oral every 8 hours  simethicone 80 milliGRAM(s) Chew two times a day  traZODone 100 milliGRAM(s) Oral at bedtime    MEDICATIONS  (PRN):  acetaminophen     Tablet .. 650 milliGRAM(s) Oral every 6 hours PRN Temp greater or equal to 38C (100.4F), Mild Pain (1 - 3)  aluminum hydroxide/magnesium hydroxide/simethicone Suspension 30 milliLiter(s) Oral every 4 hours PRN Dyspepsia  bisacodyl Suppository 10 milliGRAM(s) Rectal daily PRN Constipation  cyclobenzaprine 5 milliGRAM(s) Oral three times a day PRN Muscle Spasm  dextrose Oral Gel 15 Gram(s) Oral once PRN Blood Glucose LESS THAN 70 milliGRAM(s)/deciliter  melatonin 3 milliGRAM(s) Oral at bedtime PRN Insomnia

## 2024-05-16 NOTE — PROGRESS NOTE ADULT - ASSESSMENT
63F hx ILD/ probable IPF (On 2-3LNC at home), PE 2022 on eliquis, severe pHTN w/ prior cor pulmonale (RVSP 68 - 10/22 w/ TTE from 4/12 w/ PASP 28, normal LV/RV SF), IDDM, presenting as a transfer from [place] for lung transplant eval iso SOB, dry cough, chest pain. She has been receiving duonebs, symbicort, lasix, ofev and IV steroids. Continuing with AC. Solu-medrol is ordered for 40 IV BID.  CT Chest 4/12/2024: Findings suggesting interstitial lung disease without significant interval progression. No evidence of pneumonia.   CT scan from 4/12/2024 when compared with CT scan from 2020 has shown significant progression.  Most recent CT is unchanged compared with 4/12/24 study.     Recommendations  - c/w weaning O2 as able - On 6L NC currently.   - Continue with Symbicort, duonebs, Ofev, Eliquis,   - c/w prednisone 30mg - taper by 10mg every week.   - Please ensure pt is on PCP prophylaxis with bactrim    - Continue with diuresis - goal net neg 1-2L  - TTE results appreciated - PFO + e/o decreased Right ventricular function  - Shunt study negative.   - c/w sildenafil 20mg TID - Side effects may include hypotension, nausea, light-headedness, let us know if patient experiences these symptoms and decrease dosage to sildenafil 10 tid if experiencing any symptoms  - Please continue to diurese the pt. Would give additional doses of lasix as needed to achieve minimum -1-2L daily.  - Daily standing weights  - Strict I&O  - Please check BNP every other day  - d/w structural heart - will hold off on further eval for pfo closure  - Please consult psych for anxiety management  - Please ambulate pt daily with goal to improve walking distance - this is a major barrier to transplant for this pt.

## 2024-05-16 NOTE — BH CONSULTATION LIAISON PROGRESS NOTE - NSBHCHARTREVIEWLAB_PSY_A_CORE FT
CBC Full  -  ( 15 May 2024 07:09 )  WBC Count : 6.78 K/uL  RBC Count : 3.63 M/uL  Hemoglobin : 9.4 g/dL  Hematocrit : 30.5 %  Platelet Count - Automated : 330 K/uL  Mean Cell Volume : 84.0 fl  Mean Cell Hemoglobin : 25.9 pg  Mean Cell Hemoglobin Concentration : 30.8 gm/dL  Auto Neutrophil # : x  Auto Lymphocyte # : x  Auto Monocyte # : x  Auto Eosinophil # : x  Auto Basophil # : x  Auto Neutrophil % : x  Auto Lymphocyte % : x  Auto Monocyte % : x  Auto Eosinophil % : x  Auto Basophil % : x    05-15    139  |  102  |  43<H>  ----------------------------<  133<H>  3.6   |  29  |  0.90    Ca    9.1      15 May 2024 07:07  Mg     2.3     05-14

## 2024-05-16 NOTE — BH CONSULTATION LIAISON PROGRESS NOTE - NSBHASSESSMENTFT_PSY_ALL_CORE
63-yo F with PMHx of chronic AHRF on 2-3L NC at b/l 2/2 ILD (Followed w/ Dr. Primo Marlow, [facility]), PE on Eliquis, severe pHTN, Cor pulmonale, DM, presented initially with SOB, dry cough, chest pain, LE edema, recent admission to [facility] in March for ILD flare, admitted to [facility]ICU for AHRF 2/2 ILD flare, transferred to [facility] for lung transplant evaluation. Psychiatry consulted for anxiety management recs.    Pt endorsing anxiety 2/2 SOB from worsening ILD. Pt with no psychiatric hx. Pt states that anxiety has not preceded episodes of increased work of breathing, but rather result from difficulty breathing. Pt had required prn diazepam 4/26 @ 10:45, 4/27 @ 09:59, 4/29 @ 03:54, and 5/3 @ 20:56. Pt also on gabapentin 300mg daily for neuropathic pain and trazodone 100mg for insomnia. Pt currently denies anxiety or depression. Pt calm and comfortable throughout assessment. Denies SIIP, HIIP, AVH. Does endorse some difficulty sleeping. Do not suspect symptoms attributable to psychiatric etiology. Given the extent of patient's cardiopulmonary pathology in the setting of AHRF, severe pHTN, Cor pulmonale and ILD, would prioritize addressing SOB.    Update 5/16:  Pt continues to endorse poor sleep despite increase of trazodone to 150mg. Denies feeling anxious or depressed. Discussed option of prn ativan 0.5mg at bedtime to help with sleep.

## 2024-05-16 NOTE — BH CONSULTATION LIAISON PROGRESS NOTE - NSBHATTESTCOMMENTATTENDFT_PSY_A_CORE
63-yo F with PMHx of chronic AHRF on 2-3L NC at b/l 2/2 ILD (Followed w/ Dr. Primo Marlow, [facility]), PE on Eliquis, severe pHTN, Cor pulmonale, DM, presented initially with SOB, dry cough, chest pain, LE edema, recent admission to [facility] in March for ILD flare, admitted to [facility]-ICU for AHRF 2/2 ILD flare, transferred to [facility] for lung transplant evaluation. Psychiatry consulted for anxiety management recs.    Pt endorsing anxiety 2/2 SOB from worsening ILD. Pt with no psychiatric hx. Pt states that anxiety has not preceded episodes of increased work of breathing, but rather result from difficulty breathing. Pt had required prn diazepam 4/26 @ 10:45, 4/27 @ 09:59, 4/29 @ 03:54, and 5/3 @ 20:56. Pt also on gabapentin 300mg daily for neuropathic pain and trazodone 100mg for insomnia. Pt currently denies anxiety or depression. Pt calm and comfortable throughout assessment. Denies SIIP, HIIP, AVH. Does endorse some difficulty sleeping. Do not suspect symptoms attributable to psychiatric etiology. Given the extent of patient's cardiopulmonary pathology in the setting of AHRF, severe pHTN, Cor pulmonale and ILD, would prioritize addressing SOB.    Update 5/16:  Pt continues to endorse poor sleep despite increase of trazodone to 150mg. Denies feeling anxious or depressed. Discussed option of prn ativan 0.5mg at bedtime to help with sleep. rec inc trazodone to 200mg qhs

## 2024-05-16 NOTE — PROGRESS NOTE ADULT - PROBLEM SELECTOR PLAN 1
Baseline 3-4L NC. Hx of ILD w/ c/f IPF. Followed w/ DOMENICO Ding. Transferred to [facility] for lung transplant eval  - NM cardiac shunt performed on 5/10 was negative for PFO  - Continue home Ofev 150mg BID  - Duonebs, Symbicort  - Transitioned to PO Prednisone 40mg, plan to taper by 10mg every 7 days. If worsening hypoxia would increase steroids  - valium 2.5 mg q12 PRN anxiety   - Deemed not to be a transplant candidate 4/30

## 2024-05-16 NOTE — BH CONSULTATION LIAISON PROGRESS NOTE - NSBHCONSULTRECOMMENDOTHER_PSY_A_CORE FT
- c/w lorazepam 0.5mg q6hr prn in acute episodes of sob for comfort measures (can take at bedtime if needed for sleep)  - c/w trazodone 150mg at bedtime for sleep     - c/w lorazepam 0.5mg q6hr prn in acute episodes of sob for comfort measures (can take at bedtime if needed for sleep)  -can icn trazodone to 200mg o qhs at bedtime for sleep

## 2024-05-16 NOTE — PROGRESS NOTE ADULT - SUBJECTIVE AND OBJECTIVE BOX
Chief Complaint: Diabetes Mellitus follow up    INTERVAL HX:  Patient seen at bedside, continues on oxygen. BG variable over the last 24hrs are elevated after meals.   BG ranges from 160-270mg/dl.     Review of Systems:  General: As above  GI: No nausea, vomiting  Endocrine: no  S&Sx of hypoglycemia    Allergies    No Known Allergies    Intolerances      MEDICATIONS  (STANDING):  albuterol/ipratropium for Nebulization 3 milliLiter(s) Nebulizer every 6 hours  apixaban 5 milliGRAM(s) Oral two times a day  atorvastatin 40 milliGRAM(s) Oral at bedtime  budesonide 160 MICROgram(s)/formoterol 4.5 MICROgram(s) Inhaler 2 Puff(s) Inhalation two times a day  chlorhexidine 2% Cloths 1 Application(s) Topical daily  dextrose 10% Bolus 125 milliLiter(s) IV Bolus once  dextrose 5%. 1000 milliLiter(s) (100 mL/Hr) IV Continuous <Continuous>  dextrose 5%. 1000 milliLiter(s) (50 mL/Hr) IV Continuous <Continuous>  dextrose 50% Injectable 25 Gram(s) IV Push once  dextrose 50% Injectable 12.5 Gram(s) IV Push once  furosemide   Injectable 40 milliGRAM(s) IV Push two times a day  gabapentin 300 milliGRAM(s) Oral daily  glucagon  Injectable 1 milliGRAM(s) IntraMuscular once  insulin glargine Injectable (LANTUS) 18 Unit(s) SubCutaneous at bedtime  insulin lispro (ADMELOG) corrective regimen sliding scale   SubCutaneous three times a day before meals  insulin lispro (ADMELOG) corrective regimen sliding scale   SubCutaneous <User Schedule>  insulin lispro Injectable (ADMELOG) 12 Unit(s) SubCutaneous three times a day before meals  multivitamin 1 Tablet(s) Oral daily  Ofev (Nintedanib) 150 milliGRAM(s) 150 milliGRAM(s) Oral two times a day  pantoprazole    Tablet 40 milliGRAM(s) Oral two times a day  polyethylene glycol 3350 17 Gram(s) Oral daily  predniSONE   Tablet 30 milliGRAM(s) Oral daily  senna 2 Tablet(s) Oral at bedtime  sildenafil (REVATIO) 20 milliGRAM(s) Oral every 8 hours  simethicone 80 milliGRAM(s) Chew two times a day  traZODone 150 milliGRAM(s) Oral at bedtime  trimethoprim   80 mG/sulfamethoxazole 400 mG 1 Tablet(s) Oral daily      atorvastatin   40 milliGRAM(s) Oral (05-15-24 @ 21:51)    insulin glargine Injectable (LANTUS)   18 Unit(s) SubCutaneous (05-15-24 @ 21:54)    insulin lispro (ADMELOG) corrective regimen sliding scale   4 Unit(s) SubCutaneous (05-16-24 @ 12:37)   6 Unit(s) SubCutaneous (05-16-24 @ 08:48)   2 Unit(s) SubCutaneous (05-15-24 @ 17:11)    insulin lispro Injectable (ADMELOG)   12 Unit(s) SubCutaneous (05-16-24 @ 12:36)   12 Unit(s) SubCutaneous (05-16-24 @ 08:47)   12 Unit(s) SubCutaneous (05-15-24 @ 17:12)    predniSONE   Tablet   30 milliGRAM(s) Oral (05-16-24 @ 05:31)        PHYSICAL EXAM:  VITALS: T(C): 36.6 (05-16-24 @ 11:12)  T(F): 97.9 (05-16-24 @ 11:12), Max: 98.7 (05-15-24 @ 20:42)  HR: 111 (05-16-24 @ 11:12) (102 - 118)  BP: 133/79 (05-16-24 @ 11:12) (116/71 - 133/79)  RR:  (18 - 18)  SpO2:  (93% - 98%)  Wt(kg): --  GENERAL: NAD  Respiratory: Respirations unlabored on NC.   Extremities: Warm, no edema  NEURO: Alert , appropriate     LABS:  POCT Blood Glucose.: 209 mg/dL (05-16-24 @ 12:18)  POCT Blood Glucose.: 269 mg/dL (05-16-24 @ 08:32)  POCT Blood Glucose.: 219 mg/dL (05-16-24 @ 02:04)  POCT Blood Glucose.: 159 mg/dL (05-15-24 @ 21:39)  POCT Blood Glucose.: 194 mg/dL (05-15-24 @ 16:39)  POCT Blood Glucose.: 214 mg/dL (05-15-24 @ 12:19)  POCT Blood Glucose.: 121 mg/dL (05-15-24 @ 08:15)  POCT Blood Glucose.: 143 mg/dL (05-15-24 @ 02:03)  POCT Blood Glucose.: 136 mg/dL (05-14-24 @ 20:33)  POCT Blood Glucose.: 242 mg/dL (05-14-24 @ 16:36)  POCT Blood Glucose.: 290 mg/dL (05-14-24 @ 11:37)  POCT Blood Glucose.: 79 mg/dL (05-14-24 @ 08:27)  POCT Blood Glucose.: 84 mg/dL (05-14-24 @ 03:12)  POCT Blood Glucose.: 190 mg/dL (05-13-24 @ 21:29)  POCT Blood Glucose.: 424 mg/dL (05-13-24 @ 18:33)  POCT Blood Glucose.: 412 mg/dL (05-13-24 @ 18:28)  POCT Blood Glucose.: 412 mg/dL (05-13-24 @ 16:36)                          9.4    6.78  )-----------( 330      ( 15 May 2024 07:09 )             30.5     05-15    139  |  102  |  43<H>  ----------------------------<  133<H>  3.6   |  29  |  0.90    Ca    9.1      15 May 2024 07:07  Mg     2.3     05-14          Thyroid Function Tests:      A1C with Estimated Average Glucose Result: 11.9 % (04-13-24 @ 03:53)    Estimated Average Glucose: 295 mg/dL (04-13-24 @ 03:53)        Diet, Regular:   Consistent Carbohydrate No Snacks (CSTCHO) (04-26-24 @ 15:14) [Active]

## 2024-05-16 NOTE — PROGRESS NOTE ADULT - PROBLEM SELECTOR PLAN 7
T2DM: Home DM medications: metformin 500 mg BID, + glimepride 1 mg daily + lantus 35 units QHS+ admelog 25 units TIDAC  - Pt w/ hx of uncontrolled T2DM, a1c 11.9.   - Endocrine consulted via email, recs appreciated  - On glargine 15 units qhs, lispro 10u TID qac, hold if NPO or eating < 50% of meals  - MISS FS tid qac qhs  - Discharge DM medications:   - Continue with metformin 500 mg BID  - STOP glimipride due to recurrent hypoglycemia at home   - Basal/bolus, dose will depend on insulin requirement and steroid plan   - Patient will follow up at  [facility]:  [address] [place] [address]. [address]. [place] [address]  Tel: [phone]

## 2024-05-16 NOTE — PROGRESS NOTE ADULT - ASSESSMENT
63-yo F with PMHx of chronic AHRF on 2-3L NC at b/l 2/2 ILD (Followed w/ Dr. Primo Marlow, [facility]), PE on Eliquis, severe pHTN, Cor pulmonale, DM, presented initially with SOB, dry cough, chest pain, LE edema, recent admission to [facility] in March for ILD flare, admitted to outside hospital->ICU for AHRF 2/2 ILD flare, transferred to [facility] for lung transplant evaluation.

## 2024-05-16 NOTE — PROGRESS NOTE ADULT - PROBLEM SELECTOR PLAN 1
Please monitor blood glucose values TID AC & QHS while eating regular meals and Q6H while NPO  Continue insulin Glargine to 18units QHS  Increase insulin Lispro 14 units with breakfast and lunch, 12 units with dinner. ,hold if NPO or if eating less than 50% of meals; Pt instructed to report to staff if not eating  Continue with mod dose correctional scale TID with meals and QHS  Please notify endo team with any changes on steroid doses  Discharge planning:   Home DM medications: metformin 500 mg BID, + glimepride 1 mg daily + lantus 35 units QHS+ admelog 25 units TIDAC  Discharge DM medications:   Continue with metformin 500 mg BID  STOP glimepiride due to recurrent hypoglycemia at home   Basal/bolus, dose will depend on insulin requirement and steroid plan   Make sure pt has Rx for all DM supplies and insulin/ DM meds.  Can follow at endo practice. [address] [facility] [address]. Phone . Call for apt closer to discharge- - Patient will need opthalmology and podiatry follow up as outpatient

## 2024-05-16 NOTE — PROGRESS NOTE ADULT - ASSESSMENT
63 F w/h/o uncontrolled T2DM (A1C 11.9%) while on Lantus, admelog,  metformin, glimepiride. Unknown DM complications. Also h/o AHRF, ILD, PE, HTN. Here with SOB secondary to interstitial lung disease.     Endocrinology consulted for diabetes management.     s/p solumedrol 30 mg BID, now on Prednisone 40 mg daily, plan for taper by 10 mg weekly.   Prednisone 40 mg daily ( 5/7- 5/13)  Prednisone 30 mg daily ( 5/14- 5/20)  Prednisone 20 mg daily ( 5/21-5/27)  Prednisone 10 mg daily ( 5/28--    BGs continue to be variable 160-270mg/dl. This in part is due to patients variable oral intake.   Suggested to patient that she eat when the meal arrives, let staff know if she ate something within 1 hr of FS.   she continues on steroid taper.

## 2024-05-16 NOTE — BH CONSULTATION LIAISON PROGRESS NOTE - NSBHCONSULTSUBST_PSY_A_CORE
Chief complaint:   Chief Complaint   Patient presents with    Ear Problem     Bilateral ear pain for a week.  Recent congestion, low grade fevers.        Vitals:  Visit Vitals  Pulse 126   Temp 99.3 °F (37.4 °C) (Tympanic)   Resp 36   Wt 11.7 kg (25 lb 14.4 oz)   SpO2 98%       HISTORY OF PRESENT ILLNESS     Ear Problem   The current episode started 5 to 7 days ago. The onset was gradual. The problem occurs continuously. The problem has been rapidly worsening. The ear pain is moderate. There is pain in both ears. There is no abnormality behind the ear. She has Been pulling at the affected ear. Nothing relieves the symptoms. The symptoms are aggravated by activity. Associated symptoms include a fever (t max 101), ear pain and swollen glands. Pertinent negatives include no orthopnea, no eye itching, no photophobia, no abdominal pain, no diarrhea, no nausea, no vomiting, no congestion, no ear discharge, no rhinorrhea, no sore throat, no stridor, no neck stiffness, no cough, no URI, no wheezing, no rash, no eye discharge, no eye pain and no eye redness. She has been Sleeping poorly. She has been Eating and drinking normally. Urine output has been normal. She has received no recent medical care.     Has had more than 3 episodes of otitis media in the last 3 months. Was on augmentin one month ago after amoxicillin failure. Referred to ENT and did not receive a call to set up appointment. Referral was with [facility]. Mother asked for an Joan Referral.    Other significant problems:  Patient Active Problem List    Diagnosis Date Noted    Single liveborn, born in hospital, delivered by vaginal delivery 2022     Priority: Medium     of maternal carrier of group B Streptococcus, mother treated prophylactically 2022     Priority: Medium    Extra nipple: left inframammary 2022     Priority: Low    Family history of anxiety / depression in mom; on sertraline.  2022     Priority: Low       PAST  MEDICAL, FAMILY AND SOCIAL HISTORY     Medications:  Current Outpatient Medications   Medication Sig Dispense Refill    cefdinir (OMNICEF) 250 MG/5ML suspension Take 1.6 mLs by mouth in the morning and 1.6 mLs in the evening. Do all this for 7 days. Discard the remainder. 60 mL 0     No current facility-administered medications for this visit.       Allergies:  ALLERGIES:  No Known Allergies    Past Medical  History/Surgeries:  Past Medical History:   Diagnosis Date    Extra nipple: left inframammary 2022    Family history of anxiety / depression in mom; on sertraline.  2022       No past surgical history on file.    Family History:  No family history on file.    Social History:  Social History     Tobacco Use    Smoking status: Not on file    Smokeless tobacco: Not on file   Substance Use Topics    Alcohol use: Not on file       REVIEW OF SYSTEMS     Review of Systems   Constitutional:  Positive for fever (t max 101).   HENT:  Positive for ear pain. Negative for congestion, ear discharge, rhinorrhea and sore throat.    Eyes:  Negative for photophobia, pain, discharge, redness and itching.   Respiratory:  Negative for cough, wheezing and stridor.    Cardiovascular:  Negative for orthopnea.   Gastrointestinal:  Negative for abdominal pain, diarrhea, nausea and vomiting.   Skin:  Negative for rash.       PHYSICAL EXAM     Physical Exam  Vitals and nursing note reviewed.   Constitutional:       General: She is vigorous. She is not in acute distress.She regards caregiver.      Appearance: Normal appearance. She is well-developed. She is ill-appearing (mild). She is not toxic-appearing or diaphoretic.   HENT:      Head: Normocephalic and atraumatic.      Right Ear: Ear canal and external ear normal. Tympanic membrane is erythematous and bulging (clear fluid).      Left Ear: Ear canal and external ear normal. Tympanic membrane is erythematous and bulging (clear fluid).      Nose: Mucosal edema (moderate  erythema), congestion and rhinorrhea present. Rhinorrhea is clear.      Mouth/Throat:      Lips: Pink. No lesions.      Mouth: Mucous membranes are moist. No oral lesions or angioedema.      Tongue: No lesions. Tongue does not deviate from midline.      Palate: No mass and lesions.      Pharynx: Posterior oropharyngeal erythema (mild) present. No pharyngeal vesicles, pharyngeal swelling, oropharyngeal exudate, pharyngeal petechiae, cleft palate or uvula swelling.      Tonsils: No tonsillar exudate or tonsillar abscesses. 0 on the right. 0 on the left.      Neck: Full passive range of motion without pain and neck supple.   Eyes:      General: Lids are normal.      Conjunctiva/sclera: Conjunctivae normal.   Cardiovascular:      Rate and Rhythm: Normal rate and regular rhythm.      Heart sounds: Normal heart sounds.   Pulmonary:      Effort: Pulmonary effort is normal.      Breath sounds: Normal breath sounds and air entry.   Lymphadenopathy:      Cervical: Cervical adenopathy present.      Right cervical: Superficial cervical adenopathy present. No deep or posterior cervical adenopathy.     Left cervical: Superficial cervical adenopathy present. No deep or posterior cervical adenopathy.      Upper Body:      Right upper body: No supraclavicular adenopathy.      Left upper body: No supraclavicular adenopathy.   Skin:     General: Skin is warm and dry.      Coloration: Skin is not ashen, cyanotic, jaundiced, mottled, pale or sallow.      Findings: No rash.   Neurological:      Mental Status: She is alert and oriented for age.         ASSESSMENT/PLAN     Rosmery was seen today for ear problem.    Diagnoses and all orders for this visit:    Recurrent acute serous otitis media of both ears  -     cefdinir (OMNICEF) 250 MG/5ML suspension; Take 1.6 mLs by mouth in the morning and 1.6 mLs in the evening. Do all this for 7 days. Discard the remainder.  -     SERVICE TO ENT        Referral to ENT. If not improving in 3 days, or  if rapidly worse, follow up with pcp or uc. If severe headache, neck stiffness, facial swelling go to ER.    All questions answered and patient (parent if applicable) in agreement with treatment and discharge plan. Patient appropriately stable at time of discharge from urgent care clinic. The provisional diagnosis that the patient is discharged with today was based on the history taken, presenting symptoms, physical exam, and/or any ancillary testing.  Parameters for following up with Primary Care Provider and/or going to the ER were reviewed verbally and in writing, as discussed in the After Visit Summary. If new symptoms occur or worsen, patient should seek immediate medical attention for re-evaluation. Patient (parent/guardian if applicable) states understanding that often times the diagnosis can change.     no

## 2024-05-16 NOTE — PROGRESS NOTE ADULT - ATTENDING COMMENTS
63F hx ILD/ probable IPF (On 2-3LNC at home), PE 2022 on eliquis, severe pHTN w/ prior cor pulmonale (RVSP 68 - 10/22 w/ TTE from 4/12 w/ PASP 28, normal LV/RV SF), IDDM, presenting as a transfer from [place] for lung transplant eval iso SOB, dry cough, chest pain.   She is overall improved clinically with decrease in oxygen requirements as she is being diuresed.   Oxygen saturation today is 97% on 6 L NC,   She is tolerating Sildenafil and Creatinine continues to improve, pro BNP continues to decrease.  Nuclear shunt study on 5/10 was negative for shunt.    PRednisone is being tapered, currently on 30mg daily.  Currently on trazodone and loratidine.   Overall improving slowly.  Agree with plan as outlined above.  Continue PT.

## 2024-05-16 NOTE — PROGRESS NOTE ADULT - SUBJECTIVE AND OBJECTIVE BOX
CHIEF COMPLAINT:Patient is a 63y old  Female who presents with a chief complaint of SOB (15 May 2024 14:59)      Interval Events:  c/w 6L NC  Net negative I&O recorded, bed weight increased yesterday from prior day    REVIEW OF SYSTEMS:  [x] All other systems negative except per HPI   [ ] Unable to assess ROS because ________    OBJECTIVE:  ICU Vital Signs Last 24 Hrs  T(C): 36.9 (16 May 2024 04:10), Max: 37.1 (15 May 2024 20:42)  T(F): 98.5 (16 May 2024 04:10), Max: 98.7 (15 May 2024 20:42)  HR: 103 (16 May 2024 04:10) (102 - 118)  BP: 116/71 (16 May 2024 04:10) (114/72 - 121/71)  BP(mean): --  ABP: --  ABP(mean): --  RR: 18 (16 May 2024 04:10) (18 - 18)  SpO2: 97% (16 May 2024 04:10) (93% - 97%)    O2 Parameters below as of 16 May 2024 04:10  Patient On (Oxygen Delivery Method): nasal cannula  O2 Flow (L/min): 6            05-15 @ 07:01  -  05-16 @ 07:00  --------------------------------------------------------  IN: 720 mL / OUT: 2600 mL / NET: -1880 mL        PHYSICAL EXAM:  GENERAL: NAD, well-groomed, well-developed  HEAD:  Atraumatic, Normocephalic  EYES: EOMI, conjunctiva and sclera clear  ENMT: Moist mucous membranes  CHEST/LUNG: Clear to auscultation bilaterally; No rales, rhonchi, wheezing, or rubs  HEART: Regular rate and rhythm; No murmurs, rubs, or gallops  ABDOMEN: Nondistended  VASCULAR: No  cyanosis, or edema  SKIN: No rashes or lesions  NERVOUS SYSTEM:  Alert & Oriented X3, Good concentration    HOSPITAL MEDICATIONS:  MEDICATIONS  (STANDING):  albuterol/ipratropium for Nebulization 3 milliLiter(s) Nebulizer every 6 hours  apixaban 5 milliGRAM(s) Oral two times a day  atorvastatin 40 milliGRAM(s) Oral at bedtime  budesonide 160 MICROgram(s)/formoterol 4.5 MICROgram(s) Inhaler 2 Puff(s) Inhalation two times a day  chlorhexidine 2% Cloths 1 Application(s) Topical daily  dextrose 10% Bolus 125 milliLiter(s) IV Bolus once  dextrose 5%. 1000 milliLiter(s) (100 mL/Hr) IV Continuous <Continuous>  dextrose 5%. 1000 milliLiter(s) (50 mL/Hr) IV Continuous <Continuous>  dextrose 50% Injectable 12.5 Gram(s) IV Push once  dextrose 50% Injectable 25 Gram(s) IV Push once  furosemide   Injectable 40 milliGRAM(s) IV Push two times a day  gabapentin 300 milliGRAM(s) Oral daily  glucagon  Injectable 1 milliGRAM(s) IntraMuscular once  insulin glargine Injectable (LANTUS) 18 Unit(s) SubCutaneous at bedtime  insulin lispro (ADMELOG) corrective regimen sliding scale   SubCutaneous <User Schedule>  insulin lispro (ADMELOG) corrective regimen sliding scale   SubCutaneous three times a day before meals  insulin lispro Injectable (ADMELOG) 12 Unit(s) SubCutaneous three times a day before meals  multivitamin 1 Tablet(s) Oral daily  Ofev (Nintedanib) 150 milliGRAM(s) 150 milliGRAM(s) Oral two times a day  pantoprazole    Tablet 40 milliGRAM(s) Oral two times a day  polyethylene glycol 3350 17 Gram(s) Oral daily  predniSONE   Tablet 30 milliGRAM(s) Oral daily  senna 2 Tablet(s) Oral at bedtime  sildenafil (REVATIO) 20 milliGRAM(s) Oral every 8 hours  simethicone 80 milliGRAM(s) Chew two times a day  traZODone 150 milliGRAM(s) Oral at bedtime  trimethoprim   80 mG/sulfamethoxazole 400 mG 1 Tablet(s) Oral daily    MEDICATIONS  (PRN):  acetaminophen     Tablet .. 650 milliGRAM(s) Oral every 6 hours PRN Temp greater or equal to 38C (100.4F), Mild Pain (1 - 3)  aluminum hydroxide/magnesium hydroxide/simethicone Suspension 30 milliLiter(s) Oral every 4 hours PRN Dyspepsia  bisacodyl Suppository 10 milliGRAM(s) Rectal daily PRN Constipation  cyclobenzaprine 5 milliGRAM(s) Oral three times a day PRN Muscle Spasm  dextrose Oral Gel 15 Gram(s) Oral once PRN Blood Glucose LESS THAN 70 milliGRAM(s)/deciliter  LORazepam     Tablet 0.5 milliGRAM(s) Oral every 6 hours PRN Severe Anxiety  melatonin 3 milliGRAM(s) Oral at bedtime PRN Insomnia      LABS:    The Labs were reviewed by me   The Radiology was reviewed by me    EKG tracing reviewed by me    05-15    139  |  102  |  43<H>  ----------------------------<  133<H>  3.6   |  29  |  0.90  05-14    136  |  97  |  43<H>  ----------------------------<  285<H>  4.5   |  23  |  0.97    Ca    9.1      15 May 2024 07:07  Ca    9.3      14 May 2024 16:12  Mg     2.3     05-14      Magnesium: 2.3 mg/dL (05-14-24 @ 16:12)                      Urinalysis Basic - ( 15 May 2024 07:07 )    Color: x / Appearance: x / SG: x / pH: x  Gluc: 133 mg/dL / Ketone: x  / Bili: x / Urobili: x   Blood: x / Protein: x / Nitrite: x   Leuk Esterase: x / RBC: x / WBC x   Sq Epi: x / Non Sq Epi: x / Bacteria: x                              9.4    6.78  )-----------( 330      ( 15 May 2024 07:09 )             30.5                         10.9   6.42  )-----------( 414      ( 14 May 2024 16:12 )             36.3     CAPILLARY BLOOD GLUCOSE      POCT Blood Glucose.: 219 mg/dL (16 May 2024 02:04)  POCT Blood Glucose.: 159 mg/dL (15 May 2024 21:39)  POCT Blood Glucose.: 194 mg/dL (15 May 2024 16:39)  POCT Blood Glucose.: 214 mg/dL (15 May 2024 12:19)  POCT Blood Glucose.: 121 mg/dL (15 May 2024 08:15)        MICROBIOLOGY:     RADIOLOGY:  [ ] Reviewed and interpreted by me    Point of Care Ultrasound Findings:    PFT:    EKG:

## 2024-05-16 NOTE — PROGRESS NOTE ADULT - PROBLEM SELECTOR PLAN 2
RH failure on most recent echo  - repeat TTE with bubble study revealed severe RH dysfunction with elevated pulmonary pressure, grade II diastolic dysfunction   - Check BNP every other day  - diuresis - lasix IV BID > transition to PO if pulm ok  - strict I&Os  - daily standing weights

## 2024-05-16 NOTE — BH CONSULTATION LIAISON PROGRESS NOTE - NSBHCHARTREVIEWVS_PSY_A_CORE FT
Vital Signs Last 24 Hrs  T(C): 36.9 (16 May 2024 04:10), Max: 37.1 (15 May 2024 20:42)  T(F): 98.5 (16 May 2024 04:10), Max: 98.7 (15 May 2024 20:42)  HR: 103 (16 May 2024 04:10) (102 - 118)  BP: 116/71 (16 May 2024 04:10) (114/72 - 121/71)  BP(mean): --  RR: 18 (16 May 2024 04:10) (18 - 18)  SpO2: 97% (16 May 2024 04:10) (93% - 97%)    Parameters below as of 16 May 2024 04:10  Patient On (Oxygen Delivery Method): nasal cannula  O2 Flow (L/min): 6

## 2024-05-16 NOTE — PROGRESS NOTE ADULT - PROBLEM SELECTOR PLAN 3
- Pt w/ hx of severe pHTN, RVSP 68 on 10/2022  - Repeat 4/12/24 at [facility] noted PAP 28, "normal PAP"   - Maintain euvolemia, as above  - c/w sildenafil 20 mg TID, currently tolerating

## 2024-05-16 NOTE — BH CONSULTATION LIAISON PROGRESS NOTE - NSBHFUPINTERVALHXFT_PSY_A_CORE
Patient seen and evaluated at bedside this morning. Pt endorsing poor sleep with difficulty falling asleep and staying asleep. Denies feelings of anxiety or thoughts racing. Pt unsure what is resulting in difficulty sleeping. States she just feels uncomfortable, endorsing constipation and feeling full/bloated. Pt states she usually sleeps well at home with trazodone 100mg. Pt does not feel the increase in dose last night was helpful, but will continue taking it. Discussed adding ativan 0.5mg at bedtime tonight to help with sleep, but pt states she wants to talk to her daughter about it first. Denies SI. Denies depressed or anxious mood.

## 2024-05-16 NOTE — PROGRESS NOTE ADULT - SUBJECTIVE AND OBJECTIVE BOX
SUBJECTIVE / OVERNIGHT EVENTS:  Today is hospital day 21d. There are no new issues or overnight events.   Did not endorse any headache, lightheadedness, vertigo, shortness of breathe, cough, chest pain, palpitations, tachycardia, abdominal pain, nausea, vomiting, diarrhea or constipation currently    HPI:  63-yo F with PMHx of chronic AHRF on 2-3L NC at b/l 2/2 ILD (Followed w/ Dr. Primo Marlow, [facility]), PE on Eliquis, severe pHTN, Cor pulmonale, DM, presented initially with SOB, dry cough, chest pain, LE edema, recent admission to [facility] in March for ILD flare, admitted to [facility]->ICU for AHRF 2/2 ILD flare, transferred to [facility] for lung transplant evaluation. Pt reporting mild SOB, no fever, no sputum, no SOB, no chest pain, no edema. Reports constipation, last BM 2 days ago, with some stomach pain.     At [facility] ICU, she was treated w/ IV steroids->PO taper, duonebs, symbicort, lasix. Pike placed for urinary retention, taken out prior to arrival although unclear if passed TOV at OSH. Had been weaned to NC while but was complicated by increased WOB, placed back on HFNC 50/60. Most recently escalated to methylprednisolone 40 mg IV BID. Was being treated w/ Ofev 150 mg BID, daily diuresis w/ Lasix 40->20 mg IVP on 4/25 based on volume assessment, 4/24 CXR w/ c/f R>L patchy opacities c/f pulmonary edema. Upon transfer here, admission vitals notable for temp afebrile, , /79, HFNC similar settings 45/60 100%. Labs notable for downtrending white count, stable anemia to 9-10, stable thrombocytosis to 400's. ABG 7.48 / 37 / 71 / 28, lactate 1.2.      Of note, pt is DNR/DNI trial of NIPPV - confirmed this with the patient and completed MOLST, in chart.  (26 Apr 2024 01:17)    MEDICATIONS  (STANDING):  albuterol/ipratropium for Nebulization 3 milliLiter(s) Nebulizer every 6 hours  apixaban 5 milliGRAM(s) Oral two times a day  atorvastatin 40 milliGRAM(s) Oral at bedtime  budesonide 160 MICROgram(s)/formoterol 4.5 MICROgram(s) Inhaler 2 Puff(s) Inhalation two times a day  chlorhexidine 2% Cloths 1 Application(s) Topical daily  dextrose 10% Bolus 125 milliLiter(s) IV Bolus once  dextrose 5%. 1000 milliLiter(s) (100 mL/Hr) IV Continuous <Continuous>  dextrose 5%. 1000 milliLiter(s) (50 mL/Hr) IV Continuous <Continuous>  dextrose 50% Injectable 12.5 Gram(s) IV Push once  dextrose 50% Injectable 25 Gram(s) IV Push once  furosemide   Injectable 40 milliGRAM(s) IV Push two times a day  gabapentin 300 milliGRAM(s) Oral daily  glucagon  Injectable 1 milliGRAM(s) IntraMuscular once  insulin glargine Injectable (LANTUS) 18 Unit(s) SubCutaneous at bedtime  insulin lispro (ADMELOG) corrective regimen sliding scale   SubCutaneous <User Schedule>  insulin lispro (ADMELOG) corrective regimen sliding scale   SubCutaneous three times a day before meals  insulin lispro Injectable (ADMELOG) 12 Unit(s) SubCutaneous before dinner  multivitamin 1 Tablet(s) Oral daily  Ofev (Nintedanib) 150 milliGRAM(s) 150 milliGRAM(s) Oral two times a day  pantoprazole    Tablet 40 milliGRAM(s) Oral two times a day  polyethylene glycol 3350 17 Gram(s) Oral daily  predniSONE   Tablet 30 milliGRAM(s) Oral daily  senna 2 Tablet(s) Oral at bedtime  sildenafil (REVATIO) 20 milliGRAM(s) Oral every 8 hours  simethicone 80 milliGRAM(s) Chew two times a day  traZODone 150 milliGRAM(s) Oral at bedtime  trimethoprim   80 mG/sulfamethoxazole 400 mG 1 Tablet(s) Oral daily    MEDICATIONS  (PRN):  acetaminophen     Tablet .. 650 milliGRAM(s) Oral every 6 hours PRN Temp greater or equal to 38C (100.4F), Mild Pain (1 - 3)  aluminum hydroxide/magnesium hydroxide/simethicone Suspension 30 milliLiter(s) Oral every 4 hours PRN Dyspepsia  bisacodyl Suppository 10 milliGRAM(s) Rectal daily PRN Constipation  cyclobenzaprine 5 milliGRAM(s) Oral three times a day PRN Muscle Spasm  dextrose Oral Gel 15 Gram(s) Oral once PRN Blood Glucose LESS THAN 70 milliGRAM(s)/deciliter  LORazepam     Tablet 0.5 milliGRAM(s) Oral every 6 hours PRN Severe Anxiety  melatonin 3 milliGRAM(s) Oral at bedtime PRN Insomnia    HOME MEDICATIONS:  acetaminophen 325 mg oral tablet: 2 tab(s) orally every 6 hours As needed Mild Pain (1 - 3)  Admelog 100 units/mL injectable solution: 15 unit(s) injectable 3 times a day (before meals)  apixaban 5 mg oral tablet: 1 tab(s) orally once a day  atorvastatin 40 mg oral tablet: 1 tab(s) orally once a day  cyclobenzaprine 5 mg oral tablet: 1 tab(s) orally 3 times a day as needed for Muscle Spasm  gabapentin 300 mg oral capsule: 1 cap(s) orally once a day  insulin glargine 100 units/mL subcutaneous solution: 25 unit(s) subcutaneous once a day (at bedtime)  melatonin 3 mg oral tablet: 1 tab(s) orally once a day (at bedtime)  Ofev 150 mg oral capsule: 1 cap(s) orally every 12 hours  pantoprazole 40 mg oral delayed release tablet: 1 tab(s) orally once a day (before a meal)  traZODone 100 mg oral tablet: 1 tab(s) orally once a day (at bedtime)    PHYSICAL EXAM  Vital Signs Last 24 Hrs  T(C): 36.6 (16 May 2024 11:12), Max: 37.1 (15 May 2024 20:42)  T(F): 97.9 (16 May 2024 11:12), Max: 98.7 (15 May 2024 20:42)  HR: 107 (16 May 2024 14:20) (102 - 118)  BP: 133/79 (16 May 2024 11:12) (116/71 - 133/79)  BP(mean): --  RR: 18 (16 May 2024 14:20) (18 - 18)  SpO2: 96% (16 May 2024 14:20) (94% - 98%)    Parameters below as of 16 May 2024 14:20  Patient On (Oxygen Delivery Method): nasal cannula w/ humidification  O2 Flow (L/min): 6      05-15-24 @ 07:01  -  05-16-24 @ 07:00  --------------------------------------------------------  IN: 720 mL / OUT: 2600 mL / NET: -1880 mL    05-16-24 @ 07:01  -  05-16-24 @ 16:01  --------------------------------------------------------  IN: 240 mL / OUT: 800 mL / NET: -560 mL      CONSTITUTIONAL: Well-groomed, in no apparent distress;  EYES: No conjunctival or scleral injection, non-icteric;  ENMT: No external nasal lesions; Normal outer ears;  NECK: Trachea midline;  RESPIRATORY: Normal respiratory effort; Decreased breathe sounds bilaterally without wheeze/rhonchi/rales;  CARDIOVASCULAR: Regular rate and rhythm;  GASTROINTESTINAL: Non-distended; No palpable masses; No rebound/guarding;  EXTREMITIES:  No lower extremity edema;  NEUROLOGY: A+O to person, place, and time; Does respond to commands appropriately;  PSYCHIATRY: Mood and Affect appropriate    LABS:                        9.4    6.78  )-----------( 330      ( 15 May 2024 07:09 )             30.5     05-15    139  |  102  |  43<H>  ----------------------------<  133<H>  3.6   |  29  |  0.90    Ca    9.1      15 May 2024 07:07  Mg     2.3     05-14            Urinalysis Basic - ( 15 May 2024 07:07 )    Color: x / Appearance: x / SG: x / pH: x  Gluc: 133 mg/dL / Ketone: x  / Bili: x / Urobili: x   Blood: x / Protein: x / Nitrite: x   Leuk Esterase: x / RBC: x / WBC x   Sq Epi: x / Non Sq Epi: x / Bacteria: x        SARS-CoV-2: NotDetec (11 Apr 2024 22:46)      RADIOLOGY & ADDITIONAL TESTS:  EKG  12 Lead ECG:   Ventricular Rate 108 BPM    Atrial Rate 108 BPM    P-R Interval 132 ms    QRS Duration 68 ms    Q-T Interval 326 ms    QTC Calculation(Bazett) 436 ms    P Axis 27 degrees    R Axis -39 degrees    T Axis 12 degrees    Diagnosis Line SINUS TACHYCARDIA WITH FUSION COMPLEXES  LEFT AXIS DEVIATION  CANNOT RULE OUT ANTERIOR INFARCT  ABNORMAL ECG  WHEN COMPARED WITH ECG OF  04-  Confirmed by MD MYERS JONATHAN ([id number]) on 4/27/2024 5:25:32 PM (04-26-24 @ 11:09)  12 Lead ECG:   Ventricular Rate 117 BPM    Atrial Rate 117 BPM    P-R Interval 140 ms    QRS Duration 70 ms    Q-T Interval 302 ms    QTC Calculation(Bazett) 421 ms    P Axis 33 degrees    R Axis -40 degrees    T Axis 10 degrees    Diagnosis Line SINUS TACHYCARDIA  LEFT AXIS DEVIATION  POSSIBLE ANTEROLATERAL INFARCT , AGE UNDETERMINED  ABNORMAL ECG  NO PREVIOUS ECGS AVAILABLE  Confirmed by MD Rodriguez Ronald ([id number]) on 4/29/2024 12:43:02 PM (04-26-24 @ 05:05)    Xray Chest 1 View- PORTABLE-Urgent:   ACC: [id number] EXAM:  XR CHEST PORTABLE URGENT 1V   ORDERED BY:  MARKELL LOWRY     PROCEDURE DATE:  05/10/2024          INTERPRETATION:  EXAMINATION: XR CHEST URGENT    CLINICAL INDICATION: sob    TECHNIQUE: Single frontal, portable view of the chest was obtained.    COMPARISON: Chest x-ray 5/6/2024. No CT dated 5/6/2024    FINDINGS:    The heart is enlarged.  Diffuse bilateral fibrotic/chronic interstitial lung disease. . Mild   pulmonary edema.  There is no pneumothorax. Trace bilateral pleural effusion.  No acute bony abnormality.    IMPRESSION:  Diffuse bilateral fibrotic/chronic interstitial lung disease. Mild   pulmonary edema.    --- End of Report ---          GALI GARCIA MD; Resident Radiologist  This document has been electronically signed.  MARISOL CORDOVA MD; Attending Radiologist  This document has been electronically signed. May 11 2024  2:30PM (05-10-24 @ 18:18)  CT Angio Chest PE Protocol w/ IV Cont:   ACC: [id number] EXAM:  CT ANGIO CHEST PULM [facility]   ORDERED BY: JASON FLANAGAN     PROCEDURE DATE:  05/06/2024          INTERPRETATION:  INDICATION: Interstitial lung disease, increasing   shortness of breath    TECHNIQUE: Helical acquisition ofthe chest after the administration of   64 mL of Omnipaque 350. Maximum intensity projection images were   generated.    COMPARISON: CT chest 4/12/2024    FINDINGS:    HEART/VASCULATURE: No acute pulmonary embolus through the segmental   branches. Multiple obscured subsegmental branches by respiratory motion.   Unchanged thin web within a segmental pulmonary artery of the right lower   lobe. Unchanged dilated pulmonary artery, larger than the ascending   aorta. Dilated right ventricle. Small pericardial effusion.    LUNGS/AIRWAYS/PLEURA: Unchanged lower lobe predominant fibrosis largely   characterized by groundglass and traction bronchiectasis. No pleural   effusion.    LYMPH NODES/MEDIASTINUM: Unchanged bilateral mediastinal and hilar   lymphadenopathy. Unchanged diffusely dilated esophagus.    UPPER ABDOMEN: Unremarkable.    BONES/SOFT TISSUES: Unremarkable.      IMPRESSION:    Since 4/12/2024:    No acute pulmonary embolus. Unchanged thin web within a right lower lobe   segmental branch, which may be sequela of prior pulmonary embolus.    Dilated pulmonary artery and right ventricle, suggestive of pulmonary   hypertension.    Unchanged fibrotic interstitial lung disease most consistent with an NSIP   pattern. Consider scleroderma given dilated esophagus.    --- End of Report ---           GT BELL MD; Resident Radiologist  This document has been electronically signed.  MICK COX M.D., ATTENDING RADIOLOGIST  This document has been electronically signed. May  7 2024 11:21AM *!* (05-06-24 @ 22:52)  Xray Chest 1 View- PORTABLE-Urgent:   ACC: [id number] EXAM:  XR CHEST PORTABLE URGENT 1V   ORDERED BY:  FRANCOIS CHAPPELL     PROCEDURE DATE:  05/06/2024          INTERPRETATION:  TECHNIQUE: A single AP view of the chest was obtained.   Ordered time:   5/6/2024 8:47 PM    COMPARISON: 4/26/2024    CLINICAL INFORMATION: RRT. Hypoxia    FINDINGS:    The heart is not well assessed on an AP film.  There is increased bilateral interstitial opacities.  There are small bilateral pleural effusions.  There is no pneumothorax.    IMPRESSION:    Pulmonary edema.  : New obscuration of the bilateral hemidiaphragms, likely small effusions.    --- End of Report ---           ROGERIO CONTI MD; Resident Radiologist  This document has been electronically signed.  CRISELDA ROSARIO MD; Attending Radiologist  This document has been electronically signed. May  7 2024 12:15PM (05-06-24 @ 20:47)

## 2024-05-16 NOTE — BH CONSULTATION LIAISON PROGRESS NOTE - CURRENT MEDICATION
MEDICATIONS  (STANDING):  albuterol/ipratropium for Nebulization 3 milliLiter(s) Nebulizer every 6 hours  apixaban 5 milliGRAM(s) Oral two times a day  atorvastatin 40 milliGRAM(s) Oral at bedtime  budesonide 160 MICROgram(s)/formoterol 4.5 MICROgram(s) Inhaler 2 Puff(s) Inhalation two times a day  chlorhexidine 2% Cloths 1 Application(s) Topical daily  dextrose 10% Bolus 125 milliLiter(s) IV Bolus once  dextrose 5%. 1000 milliLiter(s) (100 mL/Hr) IV Continuous <Continuous>  dextrose 5%. 1000 milliLiter(s) (50 mL/Hr) IV Continuous <Continuous>  dextrose 50% Injectable 12.5 Gram(s) IV Push once  dextrose 50% Injectable 25 Gram(s) IV Push once  furosemide   Injectable 40 milliGRAM(s) IV Push two times a day  gabapentin 300 milliGRAM(s) Oral daily  glucagon  Injectable 1 milliGRAM(s) IntraMuscular once  insulin glargine Injectable (LANTUS) 18 Unit(s) SubCutaneous at bedtime  insulin lispro (ADMELOG) corrective regimen sliding scale   SubCutaneous three times a day before meals  insulin lispro (ADMELOG) corrective regimen sliding scale   SubCutaneous <User Schedule>  insulin lispro Injectable (ADMELOG) 12 Unit(s) SubCutaneous three times a day before meals  multivitamin 1 Tablet(s) Oral daily  Ofev (Nintedanib) 150 milliGRAM(s) 150 milliGRAM(s) Oral two times a day  pantoprazole    Tablet 40 milliGRAM(s) Oral two times a day  polyethylene glycol 3350 17 Gram(s) Oral daily  predniSONE   Tablet 30 milliGRAM(s) Oral daily  senna 2 Tablet(s) Oral at bedtime  sildenafil (REVATIO) 20 milliGRAM(s) Oral every 8 hours  simethicone 80 milliGRAM(s) Chew two times a day  traZODone 150 milliGRAM(s) Oral at bedtime  trimethoprim   80 mG/sulfamethoxazole 400 mG 1 Tablet(s) Oral daily    MEDICATIONS  (PRN):  acetaminophen     Tablet .. 650 milliGRAM(s) Oral every 6 hours PRN Temp greater or equal to 38C (100.4F), Mild Pain (1 - 3)  aluminum hydroxide/magnesium hydroxide/simethicone Suspension 30 milliLiter(s) Oral every 4 hours PRN Dyspepsia  bisacodyl Suppository 10 milliGRAM(s) Rectal daily PRN Constipation  cyclobenzaprine 5 milliGRAM(s) Oral three times a day PRN Muscle Spasm  dextrose Oral Gel 15 Gram(s) Oral once PRN Blood Glucose LESS THAN 70 milliGRAM(s)/deciliter  LORazepam     Tablet 0.5 milliGRAM(s) Oral every 6 hours PRN Severe Anxiety  melatonin 3 milliGRAM(s) Oral at bedtime PRN Insomnia

## 2024-05-17 NOTE — PROGRESS NOTE ADULT - PROBLEM SELECTOR PLAN 1
Test BG TID AC & QHS while eating regular meals and Q6H while NPO  C/w Lantus 18units QHS  C/w insulin Lispro 14 units with breakfast and lunch, 12 units with dinner. Hold if NPO or if eating less than 50% of meals; Pt instructed to report to staff if not eating  Add Admelog 5 units at bedtime if pt eating snack at night. PLEASE ASK PT.   Continue with mod dose correctional scale TID with meals and QHS  Please notify endo team with any further changes on steroid doses  Discharge planning:   Home DM medications: metformin 500 mg BID, + glimepride 1 mg daily + lantus 35 units QHS+ admelog 25 units TIDAC  Discharge DM medications:   Continue with metformin 500 mg BID  STOP glimepiride due to recurrent hypoglycemia at home   Basal/bolus, dose will depend on insulin requirement and steroid plan   Make sure pt has Rx for all DM supplies and insulin/ DM meds.  Can follow at endo practice. [address] [place] [address] [address]. Phone . Call for apt closer to discharge- - Patient will need opthalmology and podiatry follow up as outpatient

## 2024-05-17 NOTE — DISCHARGE NOTE PROVIDER - DISCHARGE DATE
AO x 4 , no SOB , denies any pain , clear speech , no arm and leg drift , no generalized weakness noted , denies blurry vision , no labored breathing , no confusion noted , no syncopal episode , denies headache , denies dizziness , no facial droop 31-May-2024

## 2024-05-17 NOTE — PROGRESS NOTE ADULT - SUBJECTIVE AND OBJECTIVE BOX
DIABETES FOLLOW UP NOTE: Saw pt earlier today    Chief Complaint: Endocrine consult requested for management of DM    INTERVAL HX: Pt stable, rpeorts improved PO intake  with BG between 100s to 200s. Pt reports eating snack at night on and off with BG 200s overnight and am for the past 2 days. No hypoglycemia.   Feels better. Breathing better now. Remains on Prednisone 30mg daily      Review of Systems:  General: As above  Cardiovascular: No chest pain, palpitations  Respiratory: No SOB, no cough  GI: No nausea, vomiting, abdominal pain  Endocrine: no polyuria, polydipsia or S&Sx of hypoglycemia    Allergies    No Known Allergies    Intolerances      MEDICATIONS:  atorvastatin 40 milliGRAM(s) Oral at bedtime  insulin glargine Injectable (LANTUS) 18 Unit(s) SubCutaneous at bedtime  insulin lispro (ADMELOG) corrective regimen sliding scale   SubCutaneous <User Schedule>  insulin lispro (ADMELOG) corrective regimen sliding scale   SubCutaneous three times a day before meals  insulin lispro Injectable (ADMELOG) 14 Unit(s) SubCutaneous before lunch  insulin lispro Injectable (ADMELOG) 12 Unit(s) SubCutaneous before dinner  insulin lispro Injectable (ADMELOG) 14 Unit(s) SubCutaneous before breakfast  predniSONE   Tablet 30 milliGRAM(s) Oral daily  trimethoprim   80 mG/sulfamethoxazole 400 mG 1 Tablet(s) Oral daily      PHYSICAL EXAM:  VITALS: T(C): 36.4 (05-17-24 @ 12:11)  T(F): 97.6 (05-17-24 @ 12:11), Max: 98.9 (05-16-24 @ 20:43)  HR: 118 (05-17-24 @ 15:55) (106 - 131)  BP: 136/79 (05-17-24 @ 14:19) (118/75 - 136/79)  RR:  (16 - 25)  SpO2:  (92% - 100%)  Wt(kg): --  GENERAL: Female laying in bed in NAD  HEENT: On O2 via NC  Abdomen: Soft, nontender, non distended  Extremities: Warm, no edema in all 4 exts  NEURO: A&O X3    LABS:  POCT Blood Glucose.: 196 mg/dL (05-17-24 @ 17:08)  POCT Blood Glucose.: 146 mg/dL (05-17-24 @ 11:49)  POCT Blood Glucose.: 208 mg/dL (05-17-24 @ 08:21)  POCT Blood Glucose.: 241 mg/dL (05-17-24 @ 02:29)  POCT Blood Glucose.: 221 mg/dL (05-16-24 @ 21:29)  POCT Blood Glucose.: 132 mg/dL (05-16-24 @ 16:30)  POCT Blood Glucose.: 209 mg/dL (05-16-24 @ 12:18)  POCT Blood Glucose.: 269 mg/dL (05-16-24 @ 08:32)  POCT Blood Glucose.: 219 mg/dL (05-16-24 @ 02:04)  POCT Blood Glucose.: 159 mg/dL (05-15-24 @ 21:39)  POCT Blood Glucose.: 194 mg/dL (05-15-24 @ 16:39)  POCT Blood Glucose.: 214 mg/dL (05-15-24 @ 12:19)  POCT Blood Glucose.: 121 mg/dL (05-15-24 @ 08:15)  POCT Blood Glucose.: 143 mg/dL (05-15-24 @ 02:03)  POCT Blood Glucose.: 136 mg/dL (05-14-24 @ 20:33)                            9.4    6.78  )-----------( 330      ( 15 May 2024 07:09 )             30.5       05-15    139  |  102  |  43<H>  ----------------------------<  133<H>  3.6   |  29  |  0.90    eGFR: 73    Ca    9.1      05-15    A1C with Estimated Average Glucose Result: 11.9 % (04-13-24 @ 03:53)      Estimated Average Glucose: 295 mg/dL (04-13-24 @ 03:53)

## 2024-05-17 NOTE — DISCHARGE NOTE PROVIDER - NSDCMRMEDTOKEN_GEN_ALL_CORE_FT
acetaminophen 325 mg oral tablet: 2 tab(s) orally every 6 hours As needed Mild Pain (1 - 3)  Admelog 100 units/mL injectable solution: 15 unit(s) injectable 3 times a day (before meals)  apixaban 5 mg oral tablet: 1 tab(s) orally once a day  atorvastatin 40 mg oral tablet: 1 tab(s) orally once a day  cyclobenzaprine 5 mg oral tablet: 1 tab(s) orally 3 times a day as needed for Muscle Spasm  gabapentin 300 mg oral capsule: 1 cap(s) orally once a day  insulin glargine 100 units/mL subcutaneous solution: 25 unit(s) subcutaneous once a day (at bedtime)  melatonin 3 mg oral tablet: 1 tab(s) orally once a day (at bedtime)  Ofev 150 mg oral capsule: 1 cap(s) orally every 12 hours  pantoprazole 40 mg oral delayed release tablet: 1 tab(s) orally once a day (before a meal)  traZODone 100 mg oral tablet: 1 tab(s) orally once a day (at bedtime)

## 2024-05-17 NOTE — PROGRESS NOTE ADULT - ASSESSMENT
63-yo F with PMHx of chronic AHRF on 2-3L NC at b/l 2/2 ILD (Followed w/ Dr. Primo Marlow, [facility]), PE on Eliquis, severe pHTN, Cor pulmonale, DM, presented initially with SOB, dry cough, chest pain, LE edema, recent admission to [facility] in March for ILD flare, admitted to outside hospital->ICU for AHRF 2/2 ILD flare, transferred to [facility] for lung transplant evaluation.

## 2024-05-17 NOTE — PROGRESS NOTE ADULT - PROBLEM SELECTOR PLAN 3
- Pt w/ hx of severe pHTN, RVSP 68 on 10/2022  - [facility] PAP [facility], "normal PAP"   - Maintain euvolemia, as above  - c/w sildenafil 20 mg TID, currently tolerating

## 2024-05-17 NOTE — PROGRESS NOTE ADULT - SUBJECTIVE AND OBJECTIVE BOX
CHIEF COMPLAINT:Patient is a 63y old  Female who presents with a chief complaint of SOB (17 May 2024 15:53)      Interval Events:    REVIEW OF SYSTEMS:  [x] All other systems negative except per HPI   [ ] Unable to assess ROS because ________    OBJECTIVE:  ICU Vital Signs Last 24 Hrs  T(C): 36.4 (17 May 2024 12:11), Max: 37.2 (16 May 2024 20:43)  T(F): 97.6 (17 May 2024 12:11), Max: 98.9 (16 May 2024 20:43)  HR: 131 (17 May 2024 15:25) (106 - 131)  BP: 136/79 (17 May 2024 14:19) (118/75 - 136/79)  BP(mean): 90 (17 May 2024 14:19) (90 - 90)  ABP: --  ABP(mean): --  RR: 25 (17 May 2024 15:25) (16 - 25)  SpO2: 93% (17 May 2024 15:25) (92% - 100%)    O2 Parameters below as of 17 May 2024 15:25  Patient On (Oxygen Delivery Method): nasal cannula  O2 Flow (L/min): 6            05-16 @ 07:01  -  05-17 @ 07:00  --------------------------------------------------------  IN: 900 mL / OUT: 1600 mL / NET: -700 mL    05-17 @ 07:01 - 05-17 @ 16:33  --------------------------------------------------------  IN: 720 mL / OUT: 1000 mL / NET: -280 mL        PHYSICAL EXAM:  GENERAL: NAD, well-groomed, well-developed  HEAD:  Atraumatic, Normocephalic  EYES: EOMI, PERRLA, conjunctiva and sclera clear  ENMT: No tonsillar erythema, exudates, or enlargement; Moist mucous membranes, Good dentition, No lesions  NECK: Supple, No JVD, Normal thyroid  CHEST/LUNG: Clear to auscultation bilaterally; No rales, rhonchi, wheezing, or rubs  HEART: Regular rate and rhythm; No murmurs, rubs, or gallops  ABDOMEN: Soft, Nontender, Nondistended; Bowel sounds present  VASCULAR:  2+ Peripheral Pulses, No clubbing, cyanosis, or edema  LYMPH: No lymphadenopathy noted  SKIN: No rashes or lesions  NERVOUS SYSTEM:  Alert & Oriented X3, Good concentration; Motor Strength 5/5 B/L upper and lower extremities; DTRs 2+ intact and symmetric    HOSPITAL MEDICATIONS:  MEDICATIONS  (STANDING):  albuterol/ipratropium for Nebulization 3 milliLiter(s) Nebulizer every 6 hours  apixaban 5 milliGRAM(s) Oral two times a day  atorvastatin 40 milliGRAM(s) Oral at bedtime  budesonide 160 MICROgram(s)/formoterol 4.5 MICROgram(s) Inhaler 2 Puff(s) Inhalation two times a day  chlorhexidine 2% Cloths 1 Application(s) Topical daily  dextrose 10% Bolus 125 milliLiter(s) IV Bolus once  dextrose 5%. 1000 milliLiter(s) (100 mL/Hr) IV Continuous <Continuous>  dextrose 5%. 1000 milliLiter(s) (50 mL/Hr) IV Continuous <Continuous>  dextrose 50% Injectable 12.5 Gram(s) IV Push once  dextrose 50% Injectable 25 Gram(s) IV Push once  furosemide   Injectable 40 milliGRAM(s) IV Push two times a day  gabapentin 300 milliGRAM(s) Oral daily  glucagon  Injectable 1 milliGRAM(s) IntraMuscular once  insulin glargine Injectable (LANTUS) 18 Unit(s) SubCutaneous at bedtime  insulin lispro (ADMELOG) corrective regimen sliding scale   SubCutaneous <User Schedule>  insulin lispro (ADMELOG) corrective regimen sliding scale   SubCutaneous three times a day before meals  insulin lispro Injectable (ADMELOG) 14 Unit(s) SubCutaneous before lunch  insulin lispro Injectable (ADMELOG) 14 Unit(s) SubCutaneous before breakfast  insulin lispro Injectable (ADMELOG) 12 Unit(s) SubCutaneous before dinner  multivitamin 1 Tablet(s) Oral daily  Ofev (Nintedanib) 150 milliGRAM(s) 150 milliGRAM(s) Oral two times a day  pantoprazole    Tablet 40 milliGRAM(s) Oral two times a day  polyethylene glycol 3350 17 Gram(s) Oral daily  predniSONE   Tablet 30 milliGRAM(s) Oral daily  senna 2 Tablet(s) Oral at bedtime  sildenafil (REVATIO) 20 milliGRAM(s) Oral every 8 hours  simethicone 80 milliGRAM(s) Chew two times a day  traZODone 150 milliGRAM(s) Oral at bedtime  trimethoprim   80 mG/sulfamethoxazole 400 mG 1 Tablet(s) Oral daily    MEDICATIONS  (PRN):  acetaminophen     Tablet .. 650 milliGRAM(s) Oral every 6 hours PRN Temp greater or equal to 38C (100.4F), Mild Pain (1 - 3)  aluminum hydroxide/magnesium hydroxide/simethicone Suspension 30 milliLiter(s) Oral every 4 hours PRN Dyspepsia  bisacodyl Suppository 10 milliGRAM(s) Rectal daily PRN Constipation  cyclobenzaprine 5 milliGRAM(s) Oral three times a day PRN Muscle Spasm  dextrose Oral Gel 15 Gram(s) Oral once PRN Blood Glucose LESS THAN 70 milliGRAM(s)/deciliter  LORazepam     Tablet 0.5 milliGRAM(s) Oral every 6 hours PRN Severe Anxiety  melatonin 3 milliGRAM(s) Oral at bedtime PRN Insomnia      LABS:    The Labs were reviewed by me   The Radiology was reviewed by me    EKG tracing reviewed by me    05-15    139  |  102  |  43<H>  ----------------------------<  133<H>  3.6   |  29  |  0.90    Ca    9.1      15 May 2024 07:07                                                    9.4    6.78  )-----------( 330      ( 15 May 2024 07:09 )             30.5     CAPILLARY BLOOD GLUCOSE      POCT Blood Glucose.: 146 mg/dL (17 May 2024 11:49)  POCT Blood Glucose.: 208 mg/dL (17 May 2024 08:21)  POCT Blood Glucose.: 241 mg/dL (17 May 2024 02:29)  POCT Blood Glucose.: 221 mg/dL (16 May 2024 21:29)        MICROBIOLOGY:     RADIOLOGY:  [ ] Reviewed and interpreted by me    Point of Care Ultrasound Findings:    PFT:    EKG: CHIEF COMPLAINT:Patient is a 63y old  Female who presents with a chief complaint of SOB (17 May 2024 15:53)      Interval Events:  maintained on 6L NC  PRN ativan ordered yesterday but did not request any yesterday    REVIEW OF SYSTEMS:  [x] All other systems negative except per HPI   [ ] Unable to assess ROS because ________    OBJECTIVE:  ICU Vital Signs Last 24 Hrs  T(C): 36.4 (17 May 2024 12:11), Max: 37.2 (16 May 2024 20:43)  T(F): 97.6 (17 May 2024 12:11), Max: 98.9 (16 May 2024 20:43)  HR: 131 (17 May 2024 15:25) (106 - 131)  BP: 136/79 (17 May 2024 14:19) (118/75 - 136/79)  BP(mean): 90 (17 May 2024 14:19) (90 - 90)  ABP: --  ABP(mean): --  RR: 25 (17 May 2024 15:25) (16 - 25)  SpO2: 93% (17 May 2024 15:25) (92% - 100%)    O2 Parameters below as of 17 May 2024 15:25  Patient On (Oxygen Delivery Method): nasal cannula  O2 Flow (L/min): 6            05-16 @ 07:01  -  05-17 @ 07:00  --------------------------------------------------------  IN: 900 mL / OUT: 1600 mL / NET: -700 mL    05-17 @ 07:01  -  05-17 @ 16:33  --------------------------------------------------------  IN: 720 mL / OUT: 1000 mL / NET: -280 mL        PHYSICAL EXAM:  GENERAL: NAD, well-groomed, well-developed  HEAD:  Atraumatic, Normocephalic  EYES: EOMI, conjunctiva and sclera clear  ENMT: Moist mucous membranes  CHEST/LUNG: bibasilar rales  HEART: Regular rate and rhythm; No murmurs, rubs, or gallops  ABDOMEN: Nondistended  VASCULAR: No  cyanosis, or edema  SKIN: No rashes or lesions  NERVOUS SYSTEM:  Alert & Oriented X3, Good concentration    HOSPITAL MEDICATIONS:  MEDICATIONS  (STANDING):  albuterol/ipratropium for Nebulization 3 milliLiter(s) Nebulizer every 6 hours  apixaban 5 milliGRAM(s) Oral two times a day  atorvastatin 40 milliGRAM(s) Oral at bedtime  budesonide 160 MICROgram(s)/formoterol 4.5 MICROgram(s) Inhaler 2 Puff(s) Inhalation two times a day  chlorhexidine 2% Cloths 1 Application(s) Topical daily  dextrose 10% Bolus 125 milliLiter(s) IV Bolus once  dextrose 5%. 1000 milliLiter(s) (100 mL/Hr) IV Continuous <Continuous>  dextrose 5%. 1000 milliLiter(s) (50 mL/Hr) IV Continuous <Continuous>  dextrose 50% Injectable 12.5 Gram(s) IV Push once  dextrose 50% Injectable 25 Gram(s) IV Push once  furosemide   Injectable 40 milliGRAM(s) IV Push two times a day  gabapentin 300 milliGRAM(s) Oral daily  glucagon  Injectable 1 milliGRAM(s) IntraMuscular once  insulin glargine Injectable (LANTUS) 18 Unit(s) SubCutaneous at bedtime  insulin lispro (ADMELOG) corrective regimen sliding scale   SubCutaneous <User Schedule>  insulin lispro (ADMELOG) corrective regimen sliding scale   SubCutaneous three times a day before meals  insulin lispro Injectable (ADMELOG) 14 Unit(s) SubCutaneous before lunch  insulin lispro Injectable (ADMELOG) 14 Unit(s) SubCutaneous before breakfast  insulin lispro Injectable (ADMELOG) 12 Unit(s) SubCutaneous before dinner  multivitamin 1 Tablet(s) Oral daily  Ofev (Nintedanib) 150 milliGRAM(s) 150 milliGRAM(s) Oral two times a day  pantoprazole    Tablet 40 milliGRAM(s) Oral two times a day  polyethylene glycol 3350 17 Gram(s) Oral daily  predniSONE   Tablet 30 milliGRAM(s) Oral daily  senna 2 Tablet(s) Oral at bedtime  sildenafil (REVATIO) 20 milliGRAM(s) Oral every 8 hours  simethicone 80 milliGRAM(s) Chew two times a day  traZODone 150 milliGRAM(s) Oral at bedtime  trimethoprim   80 mG/sulfamethoxazole 400 mG 1 Tablet(s) Oral daily    MEDICATIONS  (PRN):  acetaminophen     Tablet .. 650 milliGRAM(s) Oral every 6 hours PRN Temp greater or equal to 38C (100.4F), Mild Pain (1 - 3)  aluminum hydroxide/magnesium hydroxide/simethicone Suspension 30 milliLiter(s) Oral every 4 hours PRN Dyspepsia  bisacodyl Suppository 10 milliGRAM(s) Rectal daily PRN Constipation  cyclobenzaprine 5 milliGRAM(s) Oral three times a day PRN Muscle Spasm  dextrose Oral Gel 15 Gram(s) Oral once PRN Blood Glucose LESS THAN 70 milliGRAM(s)/deciliter  LORazepam     Tablet 0.5 milliGRAM(s) Oral every 6 hours PRN Severe Anxiety  melatonin 3 milliGRAM(s) Oral at bedtime PRN Insomnia      LABS:    The Labs were reviewed by me   The Radiology was reviewed by me    EKG tracing reviewed by me    05-15    139  |  102  |  43<H>  ----------------------------<  133<H>  3.6   |  29  |  0.90    Ca    9.1      15 May 2024 07:07                                                    9.4    6.78  )-----------( 330      ( 15 May 2024 07:09 )             30.5     CAPILLARY BLOOD GLUCOSE      POCT Blood Glucose.: 146 mg/dL (17 May 2024 11:49)  POCT Blood Glucose.: 208 mg/dL (17 May 2024 08:21)  POCT Blood Glucose.: 241 mg/dL (17 May 2024 02:29)  POCT Blood Glucose.: 221 mg/dL (16 May 2024 21:29)        MICROBIOLOGY:     RADIOLOGY:  [ ] Reviewed and interpreted by me    Point of Care Ultrasound Findings:    PFT:    EKG:

## 2024-05-17 NOTE — PROGRESS NOTE ADULT - ATTENDING COMMENTS
63F hx ILD/ probable IPF (On 2-3LNC at home), PE 2022 on eliquis, severe pHTN w/ prior cor pulmonale (RVSP 68 - 10/22 w/ TTE from 4/12 w/ PASP 28, normal LV/RV SF), IDDM, presenting as a transfer from [place] for lung transplant eval iso SOB, dry cough, chest pain.   She is overall improved clinically with decrease in oxygen requirements as she is being diuresed.   Oxygen saturation today is 97% on 6 L NC,   She is tolerating Sildenafil   Nuclear shunt study on 5/10 was negative for shunt.    PRednisone is being tapered, currently on 30mg daily.  Currently on trazodone and loratidine.   Please check CMP tomorrow as she is being actively diuresed, as well as pro BNP.  Will advise on furosemide dosing after labs result.  Agree with plan as outlined above.  Will f/U with Dr. Emery once she is released from rehab.

## 2024-05-17 NOTE — PROGRESS NOTE ADULT - ASSESSMENT
63F hx ILD/ probable IPF (On 2-3LNC at home), PE 2022 on eliquis, severe pHTN w/ prior cor pulmonale (RVSP 68 - 10/22 w/ TTE from 4/12 w/ PASP 28, normal LV/RV SF), IDDM, presenting as a transfer from [facility] for lung transplant eval iso SOB, dry cough, chest pain. She has been receiving duonebs, symbicort, lasix, ofev and IV steroids. Continuing with AC. Solu-medrol is ordered for 40 IV BID.  CT Chest 4/12/2024: Findings suggesting interstitial lung disease without significant interval progression. No evidence of pneumonia.   CT scan from 4/12/2024 when compared with CT scan from 2020 has shown significant progression.  Most recent CT is unchanged compared with 4/12/24 study.     Recommendations  - c/w weaning O2 as able - On 6L NC currently.   - Continue with Symbicort, duonebs, Ofev, Eliquis,   - c/w prednisone 30mg - taper by 10mg every week.   - Please ensure pt is on PCP prophylaxis with bactrim    - TTE results appreciated - PFO + e/o decreased Right ventricular function  - Shunt study negative.   - c/w sildenafil 20mg TID - Side effects may include hypotension, nausea, light-headedness, let us know if patient experiences these symptoms and decrease dosage to sildenafil 10 tid if experiencing any symptoms  - Please continue to diurese the pt  -- if Cr continues to rise can decrease to once daily dosing of diuretics  - Daily standing weights  - Strict I&O  - Please check BNP every other day  - check chem 7 at least once daily while actively diuresing  - d/w structural heart - will hold off on further eval for pfo closure  - Please ambulate pt daily with goal to improve walking distance - this is a major barrier to transplant for this pt.

## 2024-05-17 NOTE — DISCHARGE NOTE PROVIDER - HOSPITAL COURSE
HPI:  63-yo F with PMHx of chronic AHRF on 2-3L NC at b/l 2/2 ILD (Followed w/ DOMENICO Ding), PE on Eliquis, severe pHTN, Cor pulmonale, DM, presented initially with SOB, dry cough, chest pain, LE edema, recent admission to [facility] in March for ILD flare, admitted to [facility]->ICU for AHRF 2/2 ILD flare, transferred to [facility] for lung transplant evaluation. Pt reporting mild SOB, no fever, no sputum, no SOB, no chest pain, no edema. Reports constipation, last BM 2 days ago, with some stomach pain.     At [facility] ICU, she was treated w/ IV steroids->PO taper, duonebs, symbicort, lasix. Pike placed for urinary retention, taken out prior to arrival although unclear if passed TOV at OSH. Had been weaned to NC while but was complicated by increased WOB, placed back on HFNC 50/60. Most recently escalated to methylprednisolone 40 mg IV BID. Was being treated w/ Ofev 150 mg BID, daily diuresis w/ Lasix 40->20 mg IVP on 4/25 based on volume assessment, 4/24 CXR w/ c/f R>L patchy opacities c/f pulmonary edema. Upon transfer here, admission vitals notable for temp afebrile, , /79, HFNC similar settings 45/60 100%. Labs notable for downtrending white count, stable anemia to 9-10, stable thrombocytosis to 400's. ABG 7.48 / 37 / 71 / 28, lactate 1.2.      Of note, pt is DNR/DNI trial of NIPPV - confirmed this with the patient and completed MOLST, in chart.  (26 Apr 2024 01:17)    Hospital Course: 63-yo F with PMHx of chronic AHRF on 2-3L NC at b/l 2/2 ILD (Followed w/ DOMENICO Ding), PE on Eliquis, severe pHTN, Cor pulmonale, DM, presented initially with SOB, dry cough, chest pain, LE edema, recent admission to [facility] in March for ILD flare, admitted to outside hospital->ICU for AHRF 2/2 ILD flare, transferred to [facility] for lung transplant evaluation.     ILD (interstitial lung disease).   Baseline 3-4L NC. Hx of ILD w/ c/f IPF. Followed w/ RM Ding. Transferred to [facility] for lung transplant eval  - NM cardiac shunt performed on 5/10 was negative for PFO  - Continue home Ofev 150mg BID  - Duonebs, Symbicort  - Transitioned to PO Prednisone 40mg, plan to taper by 10mg every 7 days. If worsening hypoxia would increase steroids  - valium 2.5 mg q12 PRN anxiety   - Deemed not to be a transplant candidate 4/30.    Cor pulmonale.   RH failure on most recent echo  - repeat TTE with bubble study revealed severe RH dysfunction with elevated pulmonary pressure, grade II diastolic dysfunction   - Check BNP every other day  - diuresis - lasix IV BID > transition to PO on discharge  - strict I&Os  - daily standing weights.    Pulmonary hypertension. Pt w/ hx of severe pHTN, RVSP 68 on 10/2022  - Repeat 4/12/24 at [facility] noted PAP 28, "normal PAP"   - Maintain euvolemia, as above  - c/w sildenafil 20 mg TID, currently tolerating.    Pulmonary embolism. Hx of PE on CTA 10/29/22 w/ RLL segmental/subsegmental pulmonary embolism  - Repeat CTA 2/28/23 w/o PE.   - DVT studies at OSH 4/22/24 negative  - continue Eliquis for now  - CTA reviewed, appears negative for acute PE.    T2DM: Home DM medications: metformin 500 mg BID, + glimepride 1 mg daily + lantus 35 units QHS+ admelog 25 units TIDAC  - Pt w/ hx of uncontrolled T2DM, a1c 11.9.   - Endocrine consulted via email, recs appreciated  - On glargine 15 units qhs, lispro 10u TID qac, hold if NPO or eating < 50% of meals  - MISS FS tid qac qhs  - Discharge DM medications:   - Continue with metformin 500 mg BID  - STOP glimipride due to recurrent hypoglycemia at home   - Basal/bolus, dose will depend on insulin requirement and steroid plan   - Patient will follow up at  [facility]:  [address] [place] [address]. [address]. [place] [address]  Tel: [phone].        Important Medication Changes and Reason:    Active or Pending Issues Requiring Follow-up:  Follow-up with Primary Care Doctor, Pulmonary, Cardiology (Heart Failure), Endocrinology     Advanced Directives:   [ X] Full code  [ ] DNR  [ ] Hospice    Discharge Diagnoses:  ILD  Right heart failure  pulmonary htn  DM2  PE         HPI:  63-yo F with PMHx of chronic AHRF on 2-3L NC at b/l 2/2 ILD (Followed w/ DOMENICO Ding), PE on Eliquis, severe pHTN, Cor pulmonale, DM, presented initially with SOB, dry cough, chest pain, LE edema, recent admission to [facility] in March for ILD flare, admitted to [facility]->ICU for AHRF 2/2 ILD flare, transferred to [facility] for lung transplant evaluation. Pt reporting mild SOB, no fever, no sputum, no SOB, no chest pain, no edema. Reports constipation, last BM 2 days ago, with some stomach pain.     At [facility] ICU, she was treated w/ IV steroids->PO taper, duonebs, symbicort, lasix. Pike placed for urinary retention, taken out prior to arrival although unclear if passed TOV at OSH. Had been weaned to NC while but was complicated by increased WOB, placed back on HFNC 50/60. Most recently escalated to methylprednisolone 40 mg IV BID. Was being treated w/ Ofev 150 mg BID, daily diuresis w/ Lasix 40->20 mg IVP on  based on volume assessment,  CXR w/ c/f R>L patchy opacities c/f pulmonary edema. Upon transfer here, admission vitals notable for temp afebrile, , /79, HFNC similar settings 45/60 100%. Labs notable for downtrending white count, stable anemia to 9-10, stable thrombocytosis to 400's. ABG 7.48 / 37 / 71 / 28, lactate 1.2.      Of note, pt is DNR/DNI trial of NIPPV - confirmed this with the patient and completed MOLST, in chart.  (2024 01:17)    Hospital Course: 63-yo F with PMHx of chronic AHRF on 2-3L NC at b/l 2/2 ILD (Followed w/ DOMENICO Ding), PE on Eliquis, severe pHTN, Cor pulmonale, DM, presented initially with SOB, dry cough, chest pain, LE edema, recent admission to [facility] in March for ILD flare, admitted to outside hospital->ICU for AHRF 2/2 ILD flare, transferred to [facility] for lung transplant evaluation.     ILD (interstitial lung disease).   Baseline 3-4L NC. Hx of ILD w/ c/f IPF. Followed w/ RM Ding. Transferred to [facility] for lung transplant eval  - NM cardiac shunt performed on 5/10 was negative for PFO  - Continue home Ofev 150mg BID  - Duonebs, Symbicort  - Transitioned to PO Prednisone 40mg, plan to taper by 10mg every 7 days. If worsening hypoxia would increase steroids  - valium 2.5 mg q12 PRN anxiety   - Deemed not to be a transplant candidate .    Cor pulmonale.   RH failure on most recent echo  - repeat TTE with bubble study revealed severe RH dysfunction with elevated pulmonary pressure, grade II diastolic dysfunction   - Check BNP every other day  - diuresis - lasix IV BID > transition to PO on discharge  - strict I&Os  - daily standing weights.    Pulmonary hypertension. Pt w/ hx of severe pHTN, RVSP 68 on 10/2022  - Repeat 24 at [facility] noted PAP 28, "normal PAP"   - Maintain euvolemia, as above  - c/w sildenafil 20 mg TID, currently tolerating.    Pulmonary embolism. Hx of PE on CTA 10/29/22 w/ RLL segmental/subsegmental pulmonary embolism  - Repeat CTA 23 w/o PE.   - DVT studies at OSH 24 negative  - continue Eliquis for now  - CTA reviewed, appears negative for acute PE.    T2DM: Home DM medications: metformin 500 mg BID, + glimepride 1 mg daily + lantus 35 units QHS+ admelog 25 units TIDAC  - Pt w/ hx of uncontrolled T2DM, a1c 11.9.   - Endocrine consulted via email, recs appreciated  - On glargine 15 units qhs, lispro 10u TID qac, hold if NPO or eating < 50% of meals  - MISS FS tid qac qhs  - Discharge DM medications:   - Continue with metformin 500 mg BID  - STOP glimipride due to recurrent hypoglycemia at home   - Basal/bolus, dose will depend on insulin requirement and steroid plan   - Patient will follow up at  [facility]:  [address] [place] [address]. [address]. [place] [address]  Tel: [phone].        Important Medication Changes and Reason:    Active or Pending Issues Requiring Follow-up:  Follow-up with Primary Care Doctor, Pulmonary, Cardiology (Heart Failure), Endocrinology     Advanced Directives:   [ ] Full code  [X ] DNR  [ ] Hospice    Discharge Diagnoses:  ILD  Right heart failure  pulmonary htn  DM2  PE    Pt  24 at 1129pm

## 2024-05-17 NOTE — DISCHARGE NOTE PROVIDER - DETAILS OF MALNUTRITION DIAGNOSIS/DIAGNOSES
This patient has been assessed with a concern for Malnutrition and was treated during this hospitalization for the following Nutrition diagnosis/diagnoses:     -  05/20/2024: Mild protein-calorie malnutrition

## 2024-05-17 NOTE — PROGRESS NOTE ADULT - ASSESSMENT
63 F w/h/o uncontrolled T2DM (A1C 11.9%) while on Lantus, admelog,  metformin, glimepiride. Unknown DM complications. Also h/o AHRF, ILD, PE, HTN. Here with SOB secondary to interstitial lung disease. Endocrinology consulted for diabetes management. Improved PO intake and eating a sanck at night on and off with BG 200s overnight and am for the past 2 days. Will add bedtime snack insulin to prevent hyperglycemia at night and morning. Pt instructed to let staff know if she is eating to get extra insulin. Pt verbalized understanding and agreed with plan.       Prednisone 30 mg daily ( 5/14- 5/20)  Prednisone 20 mg daily ( 5/21-5/27)  Prednisone 10 mg daily ( 5/28--

## 2024-05-17 NOTE — PROGRESS NOTE ADULT - PROBLEM SELECTOR PLAN 7
T2DM: Home DM medications: metformin 500 mg BID, + glimepride 1 mg daily + lantus 35 units QHS+ admelog 25 units TIDAC  - Pt w/ hx of uncontrolled T2DM, a1c 11.9.   - Endocrine consulted via email, recs appreciated  - On glargine 15 units qhs, lispro 10u TID qac, hold if NPO or eating < 50% of meals  - MISS FS tid qac qhs  - Discharge DM medications:   - Continue with metformin 500 mg BID  - STOP glimipride due to recurrent hypoglycemia at home   - Basal/bolus, dose will depend on insulin requirement and steroid plan   - Patient will follow up at  [facility]:  [address] [place] [address]. [address]. [place] [address]  Tel: [phone]

## 2024-05-17 NOTE — PROGRESS NOTE ADULT - SUBJECTIVE AND OBJECTIVE BOX
SUBJECTIVE / OVERNIGHT EVENTS:  Today is hospital day 22d. There are no new issues or overnight events.   Did not endorse any headache, lightheadedness, vertigo, shortness of breathe, cough, chest pain, palpitations, tachycardia, abdominal pain, nausea, vomiting, diarrhea or constipation currently    HPI:  63-yo F with PMHx of chronic AHRF on 2-3L NC at b/l 2/2 ILD (Followed w/ Dr. Primo Marlow, [facility]), PE on Eliquis, severe pHTN, Cor pulmonale, DM, presented initially with SOB, dry cough, chest pain, LE edema, recent admission to [facility] in March for ILD flare, admitted to [facility]->ICU for AHRF 2/2 ILD flare, transferred to [facility] for lung transplant evaluation. Pt reporting mild SOB, no fever, no sputum, no SOB, no chest pain, no edema. Reports constipation, last BM 2 days ago, with some stomach pain.     At [facility] ICU, she was treated w/ IV steroids->PO taper, duonebs, symbicort, lasix. Pike placed for urinary retention, taken out prior to arrival although unclear if passed TOV at OSH. Had been weaned to NC while but was complicated by increased WOB, placed back on HFNC 50/60. Most recently escalated to methylprednisolone 40 mg IV BID. Was being treated w/ Ofev 150 mg BID, daily diuresis w/ Lasix 40->20 mg IVP on 4/25 based on volume assessment, 4/24 CXR w/ c/f R>L patchy opacities c/f pulmonary edema. Upon transfer here, admission vitals notable for temp afebrile, , /79, HFNC similar settings 45/60 100%. Labs notable for downtrending white count, stable anemia to 9-10, stable thrombocytosis to 400's. ABG 7.48 / 37 / 71 / 28, lactate 1.2.      Of note, pt is DNR/DNI trial of NIPPV - confirmed this with the patient and completed MOLST, in chart.  (26 Apr 2024 01:17)    MEDICATIONS  (STANDING):  albuterol/ipratropium for Nebulization 3 milliLiter(s) Nebulizer every 6 hours  apixaban 5 milliGRAM(s) Oral two times a day  atorvastatin 40 milliGRAM(s) Oral at bedtime  budesonide 160 MICROgram(s)/formoterol 4.5 MICROgram(s) Inhaler 2 Puff(s) Inhalation two times a day  chlorhexidine 2% Cloths 1 Application(s) Topical daily  dextrose 10% Bolus 125 milliLiter(s) IV Bolus once  dextrose 5%. 1000 milliLiter(s) (100 mL/Hr) IV Continuous <Continuous>  dextrose 5%. 1000 milliLiter(s) (50 mL/Hr) IV Continuous <Continuous>  dextrose 50% Injectable 12.5 Gram(s) IV Push once  dextrose 50% Injectable 25 Gram(s) IV Push once  furosemide   Injectable 40 milliGRAM(s) IV Push two times a day  gabapentin 300 milliGRAM(s) Oral daily  glucagon  Injectable 1 milliGRAM(s) IntraMuscular once  insulin glargine Injectable (LANTUS) 18 Unit(s) SubCutaneous at bedtime  insulin lispro (ADMELOG) corrective regimen sliding scale   SubCutaneous three times a day before meals  insulin lispro (ADMELOG) corrective regimen sliding scale   SubCutaneous <User Schedule>  insulin lispro Injectable (ADMELOG) 12 Unit(s) SubCutaneous before dinner  insulin lispro Injectable (ADMELOG) 14 Unit(s) SubCutaneous before breakfast  insulin lispro Injectable (ADMELOG) 14 Unit(s) SubCutaneous before lunch  multivitamin 1 Tablet(s) Oral daily  Ofev (Nintedanib) 150 milliGRAM(s) 150 milliGRAM(s) Oral two times a day  pantoprazole    Tablet 40 milliGRAM(s) Oral two times a day  polyethylene glycol 3350 17 Gram(s) Oral daily  predniSONE   Tablet 30 milliGRAM(s) Oral daily  senna 2 Tablet(s) Oral at bedtime  sildenafil (REVATIO) 20 milliGRAM(s) Oral every 8 hours  simethicone 80 milliGRAM(s) Chew two times a day  traZODone 150 milliGRAM(s) Oral at bedtime  trimethoprim   80 mG/sulfamethoxazole 400 mG 1 Tablet(s) Oral daily    MEDICATIONS  (PRN):  acetaminophen     Tablet .. 650 milliGRAM(s) Oral every 6 hours PRN Temp greater or equal to 38C (100.4F), Mild Pain (1 - 3)  aluminum hydroxide/magnesium hydroxide/simethicone Suspension 30 milliLiter(s) Oral every 4 hours PRN Dyspepsia  bisacodyl Suppository 10 milliGRAM(s) Rectal daily PRN Constipation  cyclobenzaprine 5 milliGRAM(s) Oral three times a day PRN Muscle Spasm  dextrose Oral Gel 15 Gram(s) Oral once PRN Blood Glucose LESS THAN 70 milliGRAM(s)/deciliter  LORazepam     Tablet 0.5 milliGRAM(s) Oral every 6 hours PRN Severe Anxiety  melatonin 3 milliGRAM(s) Oral at bedtime PRN Insomnia    HOME MEDICATIONS:  acetaminophen 325 mg oral tablet: 2 tab(s) orally every 6 hours As needed Mild Pain (1 - 3)  Admelog 100 units/mL injectable solution: 15 unit(s) injectable 3 times a day (before meals)  apixaban 5 mg oral tablet: 1 tab(s) orally once a day  atorvastatin 40 mg oral tablet: 1 tab(s) orally once a day  cyclobenzaprine 5 mg oral tablet: 1 tab(s) orally 3 times a day as needed for Muscle Spasm  gabapentin 300 mg oral capsule: 1 cap(s) orally once a day  insulin glargine 100 units/mL subcutaneous solution: 25 unit(s) subcutaneous once a day (at bedtime)  melatonin 3 mg oral tablet: 1 tab(s) orally once a day (at bedtime)  Ofev 150 mg oral capsule: 1 cap(s) orally every 12 hours  pantoprazole 40 mg oral delayed release tablet: 1 tab(s) orally once a day (before a meal)  traZODone 100 mg oral tablet: 1 tab(s) orally once a day (at bedtime)    PHYSICAL EXAM  Vital Signs Last 24 Hrs  T(C): 36.4 (17 May 2024 12:11), Max: 37.2 (16 May 2024 20:43)  T(F): 97.6 (17 May 2024 12:11), Max: 98.9 (16 May 2024 20:43)  HR: 131 (17 May 2024 15:25) (106 - 131)  BP: 136/79 (17 May 2024 14:19) (118/75 - 136/79)  BP(mean): 90 (17 May 2024 14:19) (90 - 90)  RR: 25 (17 May 2024 15:25) (16 - 25)  SpO2: 93% (17 May 2024 15:25) (92% - 100%)    Parameters below as of 17 May 2024 15:25  Patient On (Oxygen Delivery Method): nasal cannula  O2 Flow (L/min): 6      05-16-24 @ 07:01  -  05-17-24 @ 07:00  --------------------------------------------------------  IN: 900 mL / OUT: 1600 mL / NET: -700 mL    05-17-24 @ 07:01  -  05-17-24 @ 15:54  --------------------------------------------------------  IN: 720 mL / OUT: 1000 mL / NET: -280 mL      CONSTITUTIONAL: Well-groomed, in no apparent distress;  EYES: No conjunctival or scleral injection, non-icteric;  ENMT: No external nasal lesions; Normal outer ears;  NECK: Trachea midline;  RESPIRATORY: Normal respiratory effort; Decreased breathe sounds bilaterally without wheeze/rhonchi/rales;  CARDIOVASCULAR: Regular rate and rhythm;  GASTROINTESTINAL: Non-distended; No palpable masses; No rebound/guarding;  EXTREMITIES:  No lower extremity edema;  NEUROLOGY: A+O to person, place, and time; Does respond to commands appropriately;  PSYCHIATRY: Mood and Affect appropriate    LABS:                    SARS-CoV-2: NotDetec (11 Apr 2024 22:46)      RADIOLOGY & ADDITIONAL TESTS:  EKG  12 Lead ECG:   Ventricular Rate 108 BPM    Atrial Rate 108 BPM    P-R Interval 132 ms    QRS Duration 68 ms    Q-T Interval 326 ms    QTC Calculation(Bazett) 436 ms    P Axis 27 degrees    R Axis -39 degrees    T Axis 12 degrees    Diagnosis Line SINUS TACHYCARDIA WITH FUSION COMPLEXES  LEFT AXIS DEVIATION  CANNOT RULE OUT ANTERIOR INFARCT  ABNORMAL ECG  WHEN COMPARED WITH ECG OF  04-  Confirmed by MD MYERS JONATHAN ([id number]) on 4/27/2024 5:25:32 PM (04-26-24 @ 11:09)  12 Lead ECG:   Ventricular Rate 117 BPM    Atrial Rate 117 BPM    P-R Interval 140 ms    QRS Duration 70 ms    Q-T Interval 302 ms    QTC Calculation(Bazett) 421 ms    P Axis 33 degrees    R Axis -40 degrees    T Axis 10 degrees    Diagnosis Line SINUS TACHYCARDIA  LEFT AXIS DEVIATION  POSSIBLE ANTEROLATERAL INFARCT , AGE UNDETERMINED  ABNORMAL ECG  NO PREVIOUS ECGS AVAILABLE  Confirmed by MD Rodriguez Ronald ([id number]) on 4/29/2024 12:43:02 PM (04-26-24 @ 05:05)    Xray Chest 1 View- PORTABLE-Urgent:   ACC: [id number] EXAM:  XR CHEST PORTABLE URGENT 1V   ORDERED BY:  MARKELL LOWRY     PROCEDURE DATE:  05/10/2024          INTERPRETATION:  EXAMINATION: XR CHEST URGENT    CLINICAL INDICATION: sob    TECHNIQUE: Single frontal, portable view of the chest was obtained.    COMPARISON: Chest x-ray 5/6/2024. No CT dated 5/6/2024    FINDINGS:    The heart is enlarged.  Diffuse bilateral fibrotic/chronic interstitial lung disease. . Mild   pulmonary edema.  There is no pneumothorax. Trace bilateral pleural effusion.  No acute bony abnormality.    IMPRESSION:  Diffuse bilateral fibrotic/chronic interstitial lung disease. Mild   pulmonary edema.    --- End of Report ---          GALI GARCIA MD; Resident Radiologist  This document has been electronically signed.  MARISOL CORDOVA MD; Attending Radiologist  This document has been electronically signed. May 11 2024  2:30PM (05-10-24 @ 18:18)  CT Angio Chest PE Protocol w/ IV Cont:   ACC: [id number] EXAM:  [facility]   ORDERED BY: JASON FLANAGAN     PROCEDURE DATE:  05/06/2024          INTERPRETATION:  INDICATION: Interstitial lung disease, increasing   shortness of breath    TECHNIQUE: Helical acquisition ofthe chest after the administration of   64 mL of Omnipaque 350. Maximum intensity projection images were   generated.    COMPARISON: CT chest 4/12/2024    FINDINGS:    HEART/VASCULATURE: No acute pulmonary embolus through the segmental   branches. Multiple obscured subsegmental branches by respiratory motion.   Unchanged thin web within a segmental pulmonary artery of the right lower   lobe. Unchanged dilated pulmonary artery, larger than the ascending   aorta. Dilated right ventricle. Small pericardial effusion.    LUNGS/AIRWAYS/PLEURA: Unchanged lower lobe predominant fibrosis largely   characterized by groundglass and traction bronchiectasis. No pleural   effusion.    LYMPH NODES/MEDIASTINUM: Unchanged bilateral mediastinal and hilar   lymphadenopathy. Unchanged diffusely dilated esophagus.    UPPER ABDOMEN: Unremarkable.    BONES/SOFT TISSUES: Unremarkable.      IMPRESSION:    Since 4/12/2024:    No acute pulmonary embolus. Unchanged thin web within a right lower lobe   segmental branch, which may be sequela of prior pulmonary embolus.    Dilated pulmonary artery and right ventricle, suggestive of pulmonary   hypertension.    Unchanged fibrotic interstitial lung disease most consistent with an NSIP   pattern. Consider scleroderma given dilated esophagus.    --- End of Report ---           GT BELL MD; Resident Radiologist  This document has been electronically signed.  MICK COX M.D., ATTENDING RADIOLOGIST  This document has been electronically signed. May  7 2024 11:21AM *!* (05-06-24 @ 22:52)  Xray Chest 1 View- PORTABLE-Urgent:   ACC: [id number] EXAM:  XR CHEST PORTABLE URGENT 1V   ORDERED BY:  FRANCOIS CHAPPELL     PROCEDURE DATE:  05/06/2024          INTERPRETATION:  TECHNIQUE: A single AP view of the chest was obtained.   Ordered time:   5/6/2024 8:47 PM    COMPARISON: 4/26/2024    CLINICAL INFORMATION: RRT. Hypoxia    FINDINGS:    The heart is not well assessed on an AP film.  There is increased bilateral interstitial opacities.  There are small bilateral pleural effusions.  There is no pneumothorax.    IMPRESSION:    Pulmonary edema.  : New obscuration of the bilateral hemidiaphragms, likely small effusions.    --- End of Report ---           ROGERIO CONTI MD; Resident Radiologist  This document has been electronically signed.  CRISELDA ROSARIO MD; Attending Radiologist  This document has been electronically signed. May  7 2024 12:15PM (05-06-24 @ 20:47)

## 2024-05-18 NOTE — PROGRESS NOTE ADULT - PROBLEM SELECTOR PLAN 2
RH failure on most recent echo  - repeat TTE with bubble study revealed severe RH dysfunction with elevated pulmonary pressure, grade II diastolic dysfunction   - Check BNP every other day  - diuresis - lasix IV BID for now, will need to discuss with pulmonology prior to transition to PO   - strict I&Os  - daily standing weights

## 2024-05-18 NOTE — PROGRESS NOTE ADULT - ASSESSMENT
63F PMH chronic AHRF on 2-3L NC at b/l 2/2 ILD (Followed w/ Dr. Primo Marlow, [facility]), PE on Eliquis, severe pHTN, Cor pulmonale, DM, presented initially with SOB, dry cough, chest pain, LE edema, [facility], admitted to outside hospital->ICU for AHRF 2/2 ILD flare, transferred to [facility] for lung transplant evaluation.

## 2024-05-18 NOTE — PROGRESS NOTE ADULT - SUBJECTIVE AND OBJECTIVE BOX
CHIEF COMPLAINT:Patient is a 63y old  Female who presents with a chief complaint of SOB (17 May 2024 15:53)      Interval Events:  maintained on nasal cannula  Having diarrhea - states 3 episodes of watery diarrhea       REVIEW OF SYSTEMS:  [x] All other systems negative except per HPI   [ ] Unable to assess ROS because ________    OBJECTIVE:  ICU Vital Signs Last 24 Hrs  T(C): 36.4 (17 May 2024 12:11), Max: 37.2 (16 May 2024 20:43)  T(F): 97.6 (17 May 2024 12:11), Max: 98.9 (16 May 2024 20:43)  HR: 131 (17 May 2024 15:25) (106 - 131)  BP: 136/79 (17 May 2024 14:19) (118/75 - 136/79)  BP(mean): 90 (17 May 2024 14:19) (90 - 90)  ABP: --  ABP(mean): --  RR: 25 (17 May 2024 15:25) (16 - 25)  SpO2: 93% (17 May 2024 15:25) (92% - 100%)    O2 Parameters below as of 17 May 2024 15:25  Patient On (Oxygen Delivery Method): nasal cannula  O2 Flow (L/min): 6            05-16 @ 07:01 - 05-17 @ 07:00  --------------------------------------------------------  IN: 900 mL / OUT: 1600 mL / NET: -700 mL    05-17 @ 07:01  -  05-17 @ 16:33  --------------------------------------------------------  IN: 720 mL / OUT: 1000 mL / NET: -280 mL        PHYSICAL EXAM:  GENERAL: NAD, well-groomed, well-developed  HEAD:  Atraumatic, Normocephalic  EYES: EOMI, conjunctiva and sclera clear  ENMT: Moist mucous membranes  CHEST/LUNG: bibasilar rales  HEART: Regular rate and rhythm; No murmurs, rubs, or gallops  ABDOMEN: Nondistended, non tender   VASCULAR: No  cyanosis, or edema  SKIN: No rashes or lesions  NERVOUS SYSTEM:  Alert & Oriented X3, Good concentration    HOSPITAL MEDICATIONS:  MEDICATIONS  (STANDING):  albuterol/ipratropium for Nebulization 3 milliLiter(s) Nebulizer every 6 hours  apixaban 5 milliGRAM(s) Oral two times a day  atorvastatin 40 milliGRAM(s) Oral at bedtime  budesonide 160 MICROgram(s)/formoterol 4.5 MICROgram(s) Inhaler 2 Puff(s) Inhalation two times a day  chlorhexidine 2% Cloths 1 Application(s) Topical daily  dextrose 10% Bolus 125 milliLiter(s) IV Bolus once  dextrose 5%. 1000 milliLiter(s) (100 mL/Hr) IV Continuous <Continuous>  dextrose 5%. 1000 milliLiter(s) (50 mL/Hr) IV Continuous <Continuous>  dextrose 50% Injectable 12.5 Gram(s) IV Push once  dextrose 50% Injectable 25 Gram(s) IV Push once  furosemide   Injectable 40 milliGRAM(s) IV Push two times a day  gabapentin 300 milliGRAM(s) Oral daily  glucagon  Injectable 1 milliGRAM(s) IntraMuscular once  insulin glargine Injectable (LANTUS) 18 Unit(s) SubCutaneous at bedtime  insulin lispro (ADMELOG) corrective regimen sliding scale   SubCutaneous <User Schedule>  insulin lispro (ADMELOG) corrective regimen sliding scale   SubCutaneous three times a day before meals  insulin lispro Injectable (ADMELOG) 14 Unit(s) SubCutaneous before lunch  insulin lispro Injectable (ADMELOG) 14 Unit(s) SubCutaneous before breakfast  insulin lispro Injectable (ADMELOG) 12 Unit(s) SubCutaneous before dinner  multivitamin 1 Tablet(s) Oral daily  Ofev (Nintedanib) 150 milliGRAM(s) 150 milliGRAM(s) Oral two times a day  pantoprazole    Tablet 40 milliGRAM(s) Oral two times a day  polyethylene glycol 3350 17 Gram(s) Oral daily  predniSONE   Tablet 30 milliGRAM(s) Oral daily  senna 2 Tablet(s) Oral at bedtime  sildenafil (REVATIO) 20 milliGRAM(s) Oral every 8 hours  simethicone 80 milliGRAM(s) Chew two times a day  traZODone 150 milliGRAM(s) Oral at bedtime  trimethoprim   80 mG/sulfamethoxazole 400 mG 1 Tablet(s) Oral daily    MEDICATIONS  (PRN):  acetaminophen     Tablet .. 650 milliGRAM(s) Oral every 6 hours PRN Temp greater or equal to 38C (100.4F), Mild Pain (1 - 3)  aluminum hydroxide/magnesium hydroxide/simethicone Suspension 30 milliLiter(s) Oral every 4 hours PRN Dyspepsia  bisacodyl Suppository 10 milliGRAM(s) Rectal daily PRN Constipation  cyclobenzaprine 5 milliGRAM(s) Oral three times a day PRN Muscle Spasm  dextrose Oral Gel 15 Gram(s) Oral once PRN Blood Glucose LESS THAN 70 milliGRAM(s)/deciliter  LORazepam     Tablet 0.5 milliGRAM(s) Oral every 6 hours PRN Severe Anxiety  melatonin 3 milliGRAM(s) Oral at bedtime PRN Insomnia      LABS:    The Labs were reviewed by me   The Radiology was reviewed by me    EKG tracing reviewed by me    05-15    139  |  102  |  43<H>  ----------------------------<  133<H>  3.6   |  29  |  0.90    Ca    9.1      15 May 2024 07:07                                                    9.4    6.78  )-----------( 330      ( 15 May 2024 07:09 )             30.5     CAPILLARY BLOOD GLUCOSE      POCT Blood Glucose.: 146 mg/dL (17 May 2024 11:49)  POCT Blood Glucose.: 208 mg/dL (17 May 2024 08:21)  POCT Blood Glucose.: 241 mg/dL (17 May 2024 02:29)  POCT Blood Glucose.: 221 mg/dL (16 May 2024 21:29)        MICROBIOLOGY:     RADIOLOGY:  [ ] Reviewed and interpreted by me    Point of Care Ultrasound Findings:    PFT:    EKG:

## 2024-05-18 NOTE — PROGRESS NOTE ADULT - PROBLEM SELECTOR PLAN 3
- Pt w/ hx of severe pHTN, RVSP 68 on 10/2022  - Repeat 4/12/24 at [facility] noted PAP 28, "normal PAP"   - Maintain euvolemia, as above  - c/w sildenafil 20 mg TID, currently tolerating

## 2024-05-18 NOTE — PROGRESS NOTE ADULT - ASSESSMENT
63F hx ILD/ probable IPF (On 2-3LNC at home), PE 2022 on eliquis, severe pHTN w/ prior cor pulmonale (RVSP 68 - 10/22 w/ TTE from 4/12 w/ PASP 28, normal LV/RV SF), IDDM, presenting as a transfer from [place] for lung transplant eval iso SOB, dry cough, chest pain. She has been receiving duonebs, symbicort, lasix, ofev and IV steroids. Continuing with AC. Solu-medrol is ordered for 40 IV BID.  CT Chest 4/12/2024: Findings suggesting interstitial lung disease without significant interval progression. No evidence of pneumonia.   CT scan from 4/12/2024 when compared with CT scan from 2020 has shown significant progression.  Most recent CT is unchanged compared with 4/12/24 study.     Recommendations  - c/w weaning O2 as able - On 6L NC currently.   - Continue with Symbicort, duonebs, Ofev, Eliquis,   - c/w prednisone 30mg - taper by 10mg every week.   - Please ensure pt is on PCP prophylaxis with bactrim    - TTE results appreciated - PFO + e/o decreased Right ventricular function  - Shunt study negative.   -decrease sildenafil to 10mg TID -  pt having diarrhea, flushing - may be due to sildenafil side effect - decrease the dose to 10 mg tid   - Please continue to diurese the pt - Creatinine improved today. Continue lasix bid   - please repeat BMP tomorrow   -- if Cr continues to rise can decrease to once daily dosing of diuretics  - Daily standing weights  - Strict I&O  - Please check BNP every other day  - d/w structural heart - will hold off on further eval for pfo closure  - Please ambulate pt daily with goal to improve walking distance - this is a major barrier to transplant for this pt.

## 2024-05-18 NOTE — PROGRESS NOTE ADULT - PROBLEM SELECTOR PLAN 7
T2DM: Home DM medications: metformin 500 mg BID, + glimepride 1 mg daily + lantus 35 units QHS+ admelog 25 units TIDAC  - Pt w/ hx of uncontrolled T2DM, a1c 11.9.   - Endocrine consulted via email, recs appreciated  - On glargine 15 units qhs, lispro 10u TID qac, hold if NPO or eating < 50% of meals  - MISS FS tid qac qhs  - Discharge DM medications:   - Continue with metformin 500 mg BID  - STOP glimipride due to recurrent hypoglycemia at home   - Basal/bolus, dose will depend on insulin requirement and steroid plan   - Patient will follow up at  [facility]:  [address] [place] [address]. [address]. [place] [address]  Tel: [phone]

## 2024-05-18 NOTE — PROGRESS NOTE ADULT - SUBJECTIVE AND OBJECTIVE BOX
[facility] Division of Hospital Medicine  Kenn Persaud MD  Available via MS Teams    SUBJECTIVE / OVERNIGHT EVENTS: Patient seen and examined at bedside, resting comfortably and in no acute distress. Denies chest pain, palpitations. Denies shortness of breath or cough. Discussed with patients daughter over phone at time of exam. ROS otherwise noncontributory.     MEDICATIONS  (STANDING):  albuterol/ipratropium for Nebulization 3 milliLiter(s) Nebulizer every 6 hours  apixaban 5 milliGRAM(s) Oral two times a day  atorvastatin 40 milliGRAM(s) Oral at bedtime  budesonide 160 MICROgram(s)/formoterol 4.5 MICROgram(s) Inhaler 2 Puff(s) Inhalation two times a day  chlorhexidine 2% Cloths 1 Application(s) Topical daily  dextrose 10% Bolus 125 milliLiter(s) IV Bolus once  dextrose 5%. 1000 milliLiter(s) (100 mL/Hr) IV Continuous <Continuous>  dextrose 5%. 1000 milliLiter(s) (50 mL/Hr) IV Continuous <Continuous>  dextrose 50% Injectable 12.5 Gram(s) IV Push once  dextrose 50% Injectable 25 Gram(s) IV Push once  furosemide   Injectable 40 milliGRAM(s) IV Push two times a day  gabapentin 300 milliGRAM(s) Oral daily  glucagon  Injectable 1 milliGRAM(s) IntraMuscular once  insulin glargine Injectable (LANTUS) 18 Unit(s) SubCutaneous at bedtime  insulin lispro (ADMELOG) corrective regimen sliding scale   SubCutaneous <User Schedule>  insulin lispro (ADMELOG) corrective regimen sliding scale   SubCutaneous three times a day before meals  insulin lispro Injectable (ADMELOG) 14 Unit(s) SubCutaneous before lunch  insulin lispro Injectable (ADMELOG) 5 Unit(s) SubCutaneous at bedtime  insulin lispro Injectable (ADMELOG) 12 Unit(s) SubCutaneous before dinner  insulin lispro Injectable (ADMELOG) 14 Unit(s) SubCutaneous before breakfast  multivitamin 1 Tablet(s) Oral daily  Ofev (Nintedanib) 150 milliGRAM(s) 150 milliGRAM(s) Oral two times a day  pantoprazole    Tablet 40 milliGRAM(s) Oral two times a day  polyethylene glycol 3350 17 Gram(s) Oral daily  predniSONE   Tablet 30 milliGRAM(s) Oral daily  senna 2 Tablet(s) Oral at bedtime  sildenafil (REVATIO) 20 milliGRAM(s) Oral every 8 hours  simethicone 80 milliGRAM(s) Chew two times a day  traZODone 150 milliGRAM(s) Oral at bedtime  trimethoprim   80 mG/sulfamethoxazole 400 mG 1 Tablet(s) Oral daily    MEDICATIONS  (PRN):  acetaminophen     Tablet .. 650 milliGRAM(s) Oral every 6 hours PRN Temp greater or equal to 38C (100.4F), Mild Pain (1 - 3)  aluminum hydroxide/magnesium hydroxide/simethicone Suspension 30 milliLiter(s) Oral every 4 hours PRN Dyspepsia  bisacodyl Suppository 10 milliGRAM(s) Rectal daily PRN Constipation  cyclobenzaprine 5 milliGRAM(s) Oral three times a day PRN Muscle Spasm  dextrose Oral Gel 15 Gram(s) Oral once PRN Blood Glucose LESS THAN 70 milliGRAM(s)/deciliter  LORazepam     Tablet 0.5 milliGRAM(s) Oral every 6 hours PRN Severe Anxiety  melatonin 3 milliGRAM(s) Oral at bedtime PRN Insomnia      I&O's Summary    17 May 2024 07:01  -  18 May 2024 07:00  --------------------------------------------------------  IN: 720 mL / OUT: 2200 mL / NET: -1480 mL        PHYSICAL EXAM:  Vital Signs Last 24 Hrs  T(C): 36.9 (18 May 2024 13:08), Max: 36.9 (18 May 2024 13:08)  T(F): 98.4 (18 May 2024 13:08), Max: 98.4 (18 May 2024 13:08)  HR: 110 (18 May 2024 13:08) (99 - 136)  BP: 119/74 (18 May 2024 13:08) (118/75 - 136/87)  BP(mean): 90 (17 May 2024 14:19) (90 - 90)  RR: 18 (18 May 2024 13:08) (18 - 25)  SpO2: 95% (18 May 2024 13:08) (93% - 99%)    Parameters below as of 18 May 2024 13:08  Patient On (Oxygen Delivery Method): nasal cannula  O2 Flow (L/min): 6    CONSTITUTIONAL: NAD, well-groomed  EYES: PERRLA; conjunctiva and sclera clear  ENMT: Moist oral mucosa, no pharyngeal injection or exudates; normal dentition  NECK: Supple, no palpable masses; no thyromegaly  RESPIRATORY: Normal respiratory effort; lungs are clear to auscultation bilaterally  CARDIOVASCULAR: normal S1 and S2, no murmur/rub/gallop; No lower extremity edema  ABDOMEN: Nontender to palpation, normoactive bowel sounds, no rebound/guarding; No hepatosplenomegaly  MUSCULOSKELETAL:  no clubbing or cyanosis of digits; no joint swelling or tenderness to palpation  PSYCH: A+O to person, place, and time; affect appropriate  NEUROLOGY: CN 2-12 are intact and symmetric; no gross sensory deficits   SKIN: No rashes; no palpable lesions    LABS:                        9.7    7.15  )-----------( 399      ( 18 May 2024 08:44 )             31.8     05-18    141  |  104  |  31<H>  ----------------------------<  149<H>  4.0   |  29  |  0.85    Ca    8.8      18 May 2024 08:44    TPro  6.0  /  Alb  2.9<L>  /  TBili  0.2  /  DBili  x   /  AST  38  /  ALT  47<H>  /  AlkPhos  101  05-18    Urinalysis Basic - ( 18 May 2024 08:44 )    Color: x / Appearance: x / SG: x / pH: x  Gluc: 149 mg/dL / Ketone: x  / Bili: x / Urobili: x   Blood: x / Protein: x / Nitrite: x   Leuk Esterase: x / RBC: x / WBC x   Sq Epi: x / Non Sq Epi: x / Bacteria: x    SARS-CoV-2: NotDetec (11 Apr 2024 22:46)

## 2024-05-18 NOTE — PROGRESS NOTE ADULT - PROBLEM SELECTOR PLAN 1
Baseline 3-4L NC. Hx of ILD w/ c/f IPF. Followed w/ DOMENICO Ding. Transferred to [facility] for lung transplant eval  - NM cardiac shunt performed on 5/10 was negative for PFO  - Continue home Ofev 150mg BID  - Duonebs, Symbicort  - Transitioned to PO Prednisone 40mg, plan to taper by 10mg every 7 days. If worsening hypoxia would increase steroids. Bactrim ordered for ppx.   - valium 2.5 mg q12 PRN anxiety   - Deemed not to be a transplant candidate 4/30

## 2024-05-19 NOTE — CHART NOTE - NSCHARTNOTEFT_GEN_A_CORE
Age: 63y    Gender: Female    POCT Blood Glucose:  326 mg/dL (05-19-24 @ 12:04)  269 mg/dL (05-19-24 @ 08:22)  298 mg/dL (05-19-24 @ 02:02)  236 mg/dL (05-18-24 @ 22:10)  119 mg/dL (05-18-24 @ 16:51)      eMAR:atorvastatin   40 milliGRAM(s) Oral (05-18-24 @ 22:13)    insulin glargine Injectable (LANTUS)   18 Unit(s) SubCutaneous (05-18-24 @ 22:13)    insulin lispro (ADMELOG) corrective regimen sliding scale   2 Unit(s) SubCutaneous (05-19-24 @ 02:06)    insulin lispro (ADMELOG) corrective regimen sliding scale   8 Unit(s) SubCutaneous (05-19-24 @ 12:31)   6 Unit(s) SubCutaneous (05-19-24 @ 08:34)    insulin lispro Injectable (ADMELOG)   14 Unit(s) SubCutaneous (05-19-24 @ 12:31)    insulin lispro Injectable (ADMELOG)   5 Unit(s) SubCutaneous (05-18-24 @ 22:18)    insulin lispro Injectable (ADMELOG)   14 Unit(s) SubCutaneous (05-19-24 @ 08:35)    insulin lispro Injectable (ADMELOG)   12 Unit(s) SubCutaneous (05-18-24 @ 17:13)    predniSONE   Tablet   30 milliGRAM(s) Oral (05-19-24 @ 06:08)    Diet, Regular:   Consistent Carbohydrate {No Snacks} (CSTCHO) (04-26-24 @ 15:14) [Active]     POC glucose, insulin requirements, lab values reviewed.  AC Lunch BG above goal to 326 , AC Lunch bg previously at goal with 16-18 units of admelog with breakfast- increase breakfast admelog  , requiring more insulin with breakfast due to steroid effect  2AM BG above goal after snack insulin , increase qhs snack insulin         Uncontrolled type 2 diabetes mellitus with hyperglycemia, with long-term current use of insulin.   Test BG TID AC & QHS while eating regular meals and Q6H while NPO  C/w Lantus 18units QHS  Adjust to  insulin Lispro 18 units with breakfast , 14 units with lunch, 12 units with dinner. Hold if NPO or if eating less than 50% of meals; Pt instructed to report to staff if not eating  Increase Admelog 6 units at bedtime if pt eating snack at night. PLEASE ASK PT.   Continue with mod dose correctional scale TID with meals and QHS  Please notify endo team with any further changes on steroid doses

## 2024-05-19 NOTE — PROGRESS NOTE ADULT - PROBLEM SELECTOR PLAN 1
Baseline 3-4L NC. Hx of ILD w/ c/f IPF. Followed w/ DOMENICO Ding. Transferred to [facility] for lung transplant eval  - NM cardiac shunt performed on 5/10 was negative for PFO  - Continue home Ofev 150mg BID  - Duonebs, Symbicort  - Transitioned to PO Prednisone 40mg, plan to taper by 10mg every 7 days. If worsening hypoxia would increase steroids. Bactrim ordered for ppx.   - valium 2.5 mg q12 PRN anxiety   - Deemed not to be a transplant candidate 4/30

## 2024-05-19 NOTE — PROGRESS NOTE ADULT - PROBLEM SELECTOR PLAN 3
- Pt w/ hx of severe pHTN, RVSP 68 on 10/2022  - Repeat 4/12/24 at [facility] noted PAP 28, "normal PAP"   - Maintain euvolemia, as above  - c/w sildenafil 20 mg TID, currently tolerating

## 2024-05-19 NOTE — PROGRESS NOTE ADULT - SUBJECTIVE AND OBJECTIVE BOX
[facility] Division of Hospital Medicine  Kenn Persaud MD  Available via MS Teams    SUBJECTIVE / OVERNIGHT EVENTS: Patient seen and examined at bedside, resting comfortably and in no acute distress. Denies chest pain, palpitations. Denies shortness of breath when at rest. Reports indigestion has resolved. ROS otherwise noncontributory.     MEDICATIONS  (STANDING):  albuterol/ipratropium for Nebulization 3 milliLiter(s) Nebulizer every 6 hours  apixaban 5 milliGRAM(s) Oral two times a day  atorvastatin 40 milliGRAM(s) Oral at bedtime  budesonide 160 MICROgram(s)/formoterol 4.5 MICROgram(s) Inhaler 2 Puff(s) Inhalation two times a day  chlorhexidine 2% Cloths 1 Application(s) Topical daily  dextrose 10% Bolus 125 milliLiter(s) IV Bolus once  dextrose 5%. 1000 milliLiter(s) (50 mL/Hr) IV Continuous <Continuous>  dextrose 5%. 1000 milliLiter(s) (100 mL/Hr) IV Continuous <Continuous>  dextrose 50% Injectable 25 Gram(s) IV Push once  dextrose 50% Injectable 12.5 Gram(s) IV Push once  furosemide   Injectable 40 milliGRAM(s) IV Push two times a day  gabapentin 300 milliGRAM(s) Oral daily  glucagon  Injectable 1 milliGRAM(s) IntraMuscular once  insulin glargine Injectable (LANTUS) 18 Unit(s) SubCutaneous at bedtime  insulin lispro (ADMELOG) corrective regimen sliding scale   SubCutaneous <User Schedule>  insulin lispro (ADMELOG) corrective regimen sliding scale   SubCutaneous three times a day before meals  insulin lispro Injectable (ADMELOG) 14 Unit(s) SubCutaneous before breakfast  insulin lispro Injectable (ADMELOG) 12 Unit(s) SubCutaneous before dinner  insulin lispro Injectable (ADMELOG) 14 Unit(s) SubCutaneous before lunch  insulin lispro Injectable (ADMELOG) 5 Unit(s) SubCutaneous at bedtime  multivitamin 1 Tablet(s) Oral daily  Ofev (Nintedanib) 150 milliGRAM(s) 150 milliGRAM(s) Oral two times a day  pantoprazole    Tablet 40 milliGRAM(s) Oral two times a day  polyethylene glycol 3350 17 Gram(s) Oral daily  predniSONE   Tablet 30 milliGRAM(s) Oral daily  senna 2 Tablet(s) Oral at bedtime  sildenafil (REVATIO) 10 milliGRAM(s) Oral every 8 hours  simethicone 80 milliGRAM(s) Chew two times a day  traZODone 150 milliGRAM(s) Oral at bedtime  trimethoprim   80 mG/sulfamethoxazole 400 mG 1 Tablet(s) Oral daily    MEDICATIONS  (PRN):  acetaminophen     Tablet .. 650 milliGRAM(s) Oral every 6 hours PRN Temp greater or equal to 38C (100.4F), Mild Pain (1 - 3)  aluminum hydroxide/magnesium hydroxide/simethicone Suspension 30 milliLiter(s) Oral every 4 hours PRN Dyspepsia  bisacodyl Suppository 10 milliGRAM(s) Rectal daily PRN Constipation  cyclobenzaprine 5 milliGRAM(s) Oral three times a day PRN Muscle Spasm  dextrose Oral Gel 15 Gram(s) Oral once PRN Blood Glucose LESS THAN 70 milliGRAM(s)/deciliter  LORazepam     Tablet 0.5 milliGRAM(s) Oral every 6 hours PRN Severe Anxiety  melatonin 3 milliGRAM(s) Oral at bedtime PRN Insomnia      I&O's Summary    18 May 2024 07:01  -  19 May 2024 07:00  --------------------------------------------------------  IN: 1320 mL / OUT: 2050 mL / NET: -730 mL        PHYSICAL EXAM:  Vital Signs Last 24 Hrs  T(C): 36.8 (19 May 2024 11:45), Max: 37.2 (18 May 2024 20:45)  T(F): 98.3 (19 May 2024 11:45), Max: 98.9 (18 May 2024 20:45)  HR: 111 (19 May 2024 11:45) (104 - 112)  BP: 144/84 (19 May 2024 11:45) (114/74 - 147/82)  BP(mean): --  RR: 18 (19 May 2024 11:45) (18 - 19)  SpO2: 99% (19 May 2024 11:45) (94% - 99%)    Parameters below as of 19 May 2024 11:45  Patient On (Oxygen Delivery Method): nasal cannula  O2 Flow (L/min): 6    CONSTITUTIONAL: NAD, well-groomed  EYES: PERRLA; conjunctiva and sclera clear  ENMT: Moist oral mucosa, no pharyngeal injection or exudates; normal dentition  NECK: Supple, no palpable masses; no thyromegaly  RESPIRATORY: Normal respiratory effort; lungs are clear to auscultation bilaterally  CARDIOVASCULAR: normal S1 and S2, no murmur/rub/gallop; No lower extremity edema  ABDOMEN: Nontender to palpation, normoactive bowel sounds, no rebound/guarding; No hepatosplenomegaly  MUSCULOSKELETAL:  no clubbing or cyanosis of digits; no joint swelling or tenderness to palpation  PSYCH: A+O to person, place, and time; affect appropriate  NEUROLOGY: CN 2-12 are intact and symmetric; no gross sensory deficits   SKIN: No rashes; no palpable lesions    LABS:                      9.2    7.83  )-----------( 399      ( 19 May 2024 07:14 )             30.4     140  |  103  |  32<H>  ----------------------------<  244<H>  4.3   |  27  |  0.73    Ca    8.7      19 May 2024 07:14  TPro  5.8<L>  /  Alb  2.8<L>  /  TBili  0.2  /  DBili  x   /  AST  42<H>  /  ALT  50<H>  /  AlkPhos  102  05-19    Urinalysis Basic - ( 19 May 2024 07:14 )    Color: x / Appearance: x / SG: x / pH: x  Gluc: 244 mg/dL / Ketone: x  / Bili: x / Urobili: x   Blood: x / Protein: x / Nitrite: x   Leuk Esterase: x / RBC: x / WBC x   Sq Epi: x / Non Sq Epi: x / Bacteria: x    SARS-CoV-2: NotDetec (11 Apr 2024 22:46)

## 2024-05-19 NOTE — PROGRESS NOTE ADULT - ASSESSMENT
63F hx ILD/ probable IPF (On 2-3LNC at home), PE 2022 on eliquis, severe pHTN w/ prior cor pulmonale (RVSP 68 - 10/22 w/ TTE from 4/12 w/ PASP 28, normal LV/RV SF), IDDM, presenting as a transfer from [place] for lung transplant eval iso SOB, dry cough, chest pain. She has been receiving duonebs, symbicort, lasix, ofev and IV steroids. Continuing with AC. Solu-medrol is ordered for 40 IV BID.  CT Chest 4/12/2024: Findings suggesting interstitial lung disease without significant interval progression. No evidence of pneumonia.   CT scan from 4/12/2024 when compared with CT scan from 2020 has shown significant progression.  Most recent CT is unchanged compared with 4/12/24 study.     Recommendations  - c/w weaning O2 as able - On 6L NC currently.   - Continue with Symbicort, duonebs, Ofev, Eliquis,   - c/w prednisone 30mg - taper by 10mg every week.   - Please ensure pt is on PCP prophylaxis with bactrim    - TTE results appreciated - PFO + e/o decreased Right ventricular function  - Shunt study negative.   - sildenafil to 10mg TID -  pt having diarrhea, flushing and headache - may be due to sildenafil side effect - decrease the dose to 10 mg tid   - Please continue to diurese the pt - Creatinine improved today. Continue lasix IV bid   - please repeat BMP tomorrow   -- if Cr continues to rise can decrease to once daily dosing of diuretics  - Daily standing weights  - Strict I&O  - Please check BNP every other day  - d/w structural heart - will hold off on further eval for pfo closure  - Please ambulate pt daily with goal to improve walking distance - this is a major barrier to transplant for this pt.

## 2024-05-19 NOTE — PROGRESS NOTE ADULT - SUBJECTIVE AND OBJECTIVE BOX
CHIEF COMPLAINT:Patient is a 63y old  Female who presents with a chief complaint of SOB (17 May 2024 15:53)      Interval Events:  maintained on nasal cannula  Patient complaining of headache and diarrhea. Sildenafil lowered to 10 mg tid. Of note - was also on laxatives. Will dc Miralax, if diarrhea and headaches resolve, increase sildenafil back to 20 mg po tid       REVIEW OF SYSTEMS:  [x] All other systems negative except per HPI   [ ] Unable to assess ROS because ________    OBJECTIVE:  ICU Vital Signs Last 24 Hrs  T(C): 36.4 (17 May 2024 12:11), Max: 37.2 (16 May 2024 20:43)  T(F): 97.6 (17 May 2024 12:11), Max: 98.9 (16 May 2024 20:43)  HR: 131 (17 May 2024 15:25) (106 - 131)  BP: 136/79 (17 May 2024 14:19) (118/75 - 136/79)  BP(mean): 90 (17 May 2024 14:19) (90 - 90)  ABP: --  ABP(mean): --  RR: 25 (17 May 2024 15:25) (16 - 25)  SpO2: 93% (17 May 2024 15:25) (92% - 100%)    O2 Parameters below as of 17 May 2024 15:25  Patient On (Oxygen Delivery Method): nasal cannula  O2 Flow (L/min): 6            05-16 @ 07:01  -  05-17 @ 07:00  --------------------------------------------------------  IN: 900 mL / OUT: 1600 mL / NET: -700 mL    05-17 @ 07:01  -  05-17 @ 16:33  --------------------------------------------------------  IN: 720 mL / OUT: 1000 mL / NET: -280 mL        PHYSICAL EXAM:  GENERAL: NAD, well-groomed, well-developed  HEAD:  Atraumatic, Normocephalic  EYES: EOMI, conjunctiva and sclera clear  ENMT: Moist mucous membranes  CHEST/LUNG: bibasilar rales  HEART: Regular rate and rhythm; No murmurs, rubs, or gallops  ABDOMEN: Nondistended, non tender   VASCULAR: No  cyanosis, or edema  SKIN: No rashes or lesions  NERVOUS SYSTEM:  Alert & Oriented X3, Good concentration    HOSPITAL MEDICATIONS:  MEDICATIONS  (STANDING):  albuterol/ipratropium for Nebulization 3 milliLiter(s) Nebulizer every 6 hours  apixaban 5 milliGRAM(s) Oral two times a day  atorvastatin 40 milliGRAM(s) Oral at bedtime  budesonide 160 MICROgram(s)/formoterol 4.5 MICROgram(s) Inhaler 2 Puff(s) Inhalation two times a day  chlorhexidine 2% Cloths 1 Application(s) Topical daily  dextrose 10% Bolus 125 milliLiter(s) IV Bolus once  dextrose 5%. 1000 milliLiter(s) (100 mL/Hr) IV Continuous <Continuous>  dextrose 5%. 1000 milliLiter(s) (50 mL/Hr) IV Continuous <Continuous>  dextrose 50% Injectable 12.5 Gram(s) IV Push once  dextrose 50% Injectable 25 Gram(s) IV Push once  furosemide   Injectable 40 milliGRAM(s) IV Push two times a day  gabapentin 300 milliGRAM(s) Oral daily  glucagon  Injectable 1 milliGRAM(s) IntraMuscular once  insulin glargine Injectable (LANTUS) 18 Unit(s) SubCutaneous at bedtime  insulin lispro (ADMELOG) corrective regimen sliding scale   SubCutaneous <User Schedule>  insulin lispro (ADMELOG) corrective regimen sliding scale   SubCutaneous three times a day before meals  insulin lispro Injectable (ADMELOG) 14 Unit(s) SubCutaneous before lunch  insulin lispro Injectable (ADMELOG) 14 Unit(s) SubCutaneous before breakfast  insulin lispro Injectable (ADMELOG) 12 Unit(s) SubCutaneous before dinner  multivitamin 1 Tablet(s) Oral daily  Ofev (Nintedanib) 150 milliGRAM(s) 150 milliGRAM(s) Oral two times a day  pantoprazole    Tablet 40 milliGRAM(s) Oral two times a day  polyethylene glycol 3350 17 Gram(s) Oral daily  predniSONE   Tablet 30 milliGRAM(s) Oral daily  senna 2 Tablet(s) Oral at bedtime  sildenafil (REVATIO) 20 milliGRAM(s) Oral every 8 hours  simethicone 80 milliGRAM(s) Chew two times a day  traZODone 150 milliGRAM(s) Oral at bedtime  trimethoprim   80 mG/sulfamethoxazole 400 mG 1 Tablet(s) Oral daily    MEDICATIONS  (PRN):  acetaminophen     Tablet .. 650 milliGRAM(s) Oral every 6 hours PRN Temp greater or equal to 38C (100.4F), Mild Pain (1 - 3)  aluminum hydroxide/magnesium hydroxide/simethicone Suspension 30 milliLiter(s) Oral every 4 hours PRN Dyspepsia  bisacodyl Suppository 10 milliGRAM(s) Rectal daily PRN Constipation  cyclobenzaprine 5 milliGRAM(s) Oral three times a day PRN Muscle Spasm  dextrose Oral Gel 15 Gram(s) Oral once PRN Blood Glucose LESS THAN 70 milliGRAM(s)/deciliter  LORazepam     Tablet 0.5 milliGRAM(s) Oral every 6 hours PRN Severe Anxiety  melatonin 3 milliGRAM(s) Oral at bedtime PRN Insomnia      LABS:    The Labs were reviewed by me   The Radiology was reviewed by me    EKG tracing reviewed by me    05-15    139  |  102  |  43<H>  ----------------------------<  133<H>  3.6   |  29  |  0.90    Ca    9.1      15 May 2024 07:07                                                    9.4    6.78  )-----------( 330      ( 15 May 2024 07:09 )             30.5     CAPILLARY BLOOD GLUCOSE      POCT Blood Glucose.: 146 mg/dL (17 May 2024 11:49)  POCT Blood Glucose.: 208 mg/dL (17 May 2024 08:21)  POCT Blood Glucose.: 241 mg/dL (17 May 2024 02:29)  POCT Blood Glucose.: 221 mg/dL (16 May 2024 21:29)        MICROBIOLOGY:     RADIOLOGY:  [ ] Reviewed and interpreted by me    Point of Care Ultrasound Findings:    PFT:    EKG:

## 2024-05-19 NOTE — PROGRESS NOTE ADULT - PROBLEM SELECTOR PLAN 7
T2DM: Home DM medications: metformin 500 mg BID, + glimepride 1 mg daily + lantus 35 units QHS+ admelog 25 units TIDAC  - Pt w/ hx of uncontrolled T2DM, a1c 11.9.   - Endocrine consulted via email, recs appreciated  - On glargine 15 units qhs, lispro 10u TID qac, hold if NPO or eating < 50% of meals  - MISS FS tid qac qhs  - Discharge DM medications:   - Continue with metformin 500 mg BID  - STOP glimipride due to recurrent hypoglycemia at home   - Basal/bolus, dose will depend on insulin requirement and steroid plan   - Patient will follow up at  [facility]:  [address] [place] [address]. [address]. [place] [address]  Tel: [phone]

## 2024-05-19 NOTE — PROGRESS NOTE ADULT - ASSESSMENT
63F PMH chronic AHRF on 2-3L NC at b/l 2/2 ILD (Followed w/ Dr. Primo Marlow, [facility]), PE on Eliquis, severe pHTN, Cor pulmonale, DM, presented initially with SOB, dry cough, chest pain, LE edema, recent admission to [facility] in March for ILD flare, admitted to outside hospital->ICU for AHRF 2/2 ILD flare, transferred to [facility] for lung transplant evaluation and found not to be a transplant candidate.

## 2024-05-20 NOTE — PROGRESS NOTE ADULT - PROBLEM SELECTOR PLAN 1
Baseline 3-4L NC. Hx of ILD w/ c/f IPF. Followed w/ DOMENICO Ding. Transferred to [facility] for lung transplant eval  - NM cardiac shunt performed on 5/10 was negative for PFO  - Continue home Ofev 150mg BID  - Duonebs, Symbicort  - Transitioned to PO Prednisone 40mg, plan to taper by 10mg every 7 days. If worsening hypoxia would increase steroids. Bactrim ordered for ppx.   - valium 2.5 mg q12 PRN anxiety   - Deemed not to be a transplant candidate 4/30

## 2024-05-20 NOTE — PROGRESS NOTE ADULT - ATTENDING COMMENTS
Patient seen and examined. Patient is a 63F with extensive medical history including probable IPF with chronic hypoxemic respiratory failure on O2, and pulmonary hypertension on Sildenafil presenting from [facility] for lung transplant evaluation in the setting of shortness of breath, chest pain, and dry cough.    She has had clinical improvement with diuresis and is currently on 6L O2 (she uses 4-5L O2 at home). During this hospitalization her Sildenafil dose was decreased in the setting of flushing, lightheadedness, and diarrhea. She continues to have diarrhea but denies flushing or lightheadedness today. She also feels the diarrhea is NOT related to her Ofev which she is continuing to take.    #Interstitial Lung Disease  - Continue Ofev  - Steroid taper with PCP prophylaxis  - Outpatient follow-up with her primary Pulmonologist Dr. Marlow.  - Consider outpatient evaluation with Dr. Emery as well for ILD and pulmonary hypertension co-management if Dr. Marlow feels this would be helpful  - Has been seen by the Lung Transplant team - reportedly deemed not a candidate for transplant  #Pulmonary Hypertension  - Continue Sildenafil 10mg TID and monitor  - severe pulmonary hypertension by echo but no RHC noted - this may become necessary if her symptoms do not improve  - Continue diuresis with goal negative fluid balance  - Nuclear medicine cardiac shunt study was negative - patient has a history of PFO  #Acute on Chronic Hypoxemic Respiratory Failure  - Continue supplemental O2 with goal O2 sat > 90%  - Incentive spirometer and acapella to facilitate breathing  - OOB and deep breathing  #DVT proph - continue AC given history of PE from 2022  #GOC - DNR/DNI    Discussed with patient and all questions answered. Needs PT. Long term prognosis guarded. Patient seen and examined. Patient is a 63F with extensive medical history including suspected IPF with chronic hypoxemic respiratory failure on O2, and pulmonary hypertension on Sildenafil presenting from [facility] for lung transplant evaluation in the setting of shortness of breath, chest pain, and dry cough.    She has had clinical improvement with diuresis and is currently on 6L O2 (she uses 4-5L O2 at home). During this hospitalization her Sildenafil dose was decreased in the setting of flushing, lightheadedness, and diarrhea. She continues to have diarrhea but denies flushing or lightheadedness today. She also feels the diarrhea is NOT related to her Ofev which she is continuing to take.    #Interstitial Lung Disease  - Continue Ofev  - Patient with suspected IPF though most recent CT more consistent with fibrotic NSIP  - Steroid taper with PCP prophylaxis  - Outpatient follow-up with her primary Pulmonologist Dr. Marlow.  - Consider outpatient evaluation with Dr. Emery as well for ILD and pulmonary hypertension co-management if Dr. Marlow feels this would be helpful  - Has been seen by the Lung Transplant team - reportedly deemed not a candidate for transplant  #Pulmonary Hypertension  - Continue Sildenafil 10mg TID and monitor  - severe pulmonary hypertension by echo but no RHC noted - this may become necessary if her symptoms do not improve  - Continue diuresis with goal negative fluid balance  - Nuclear medicine cardiac shunt study was negative - patient has a history of PFO  #Acute on Chronic Hypoxemic Respiratory Failure  - Continue supplemental O2 with goal O2 sat > 90%  - Incentive spirometer and acapella to facilitate breathing  - OOB and deep breathing  #DVT proph - continue AC given history of PE from 2022  #GOC - DNR/DNI    Discussed with patient and all questions answered. Needs PT. Long term prognosis guarded.

## 2024-05-20 NOTE — PROGRESS NOTE ADULT - PROBLEM SELECTOR PLAN 2
RH failure on most recent echo  - repeat TTE with bubble study revealed severe RH dysfunction with elevated pulmonary pressure, grade II diastolic dysfunction   - Check BNP every other day  - diuresis - lasix IV BID for now, will need to discuss with pulmonology prior to transition to PO  - Cr mildly elevated 5/20 - if Cr continues to rise, decrease lasix to daily per pulmonology   - strict I&Os  - daily standing weights

## 2024-05-20 NOTE — PROGRESS NOTE ADULT - ASSESSMENT
63 F w/h/o uncontrolled T2DM (A1C 11.9%) while on Lantus, admelog,  metformin, glimepiride. Unknown DM complications. Also h/o AHRF, ILD, PE, HTN. Here with SOB secondary to interstitial lung disease.     Endocrinology consulted for diabetes management. Improved PO intake and eating a snack at night on and off. Now receiving insulin when she has bedtime snack to help with hyperglycemia at night and morning. Pt instructed to let staff know if she is eating to get extra insulin.   Continues on steroid taper:  Prednisone 30 mg daily ( 5/14- 5/20)  Prednisone 20 mg daily ( 5/21-5/27)  Prednisone 10 mg daily ( 5/28--

## 2024-05-20 NOTE — PROGRESS NOTE ADULT - PROBLEM SELECTOR PLAN 3
- Pt w/ hx of severe pHTN, RVSP 68 on 10/2022  - Repeat 4/12/24 at [facility] noted PAP 28, "normal PAP"   - Maintain euvolemia, as above  - sildenafil reduced to 10 mg TID as patient experienced diarrhea, which may be a side effect. increase to 20 tid if her diarrhea resolves

## 2024-05-20 NOTE — PROGRESS NOTE ADULT - SUBJECTIVE AND OBJECTIVE BOX
[facility] Division of Hospital Medicine  Héctor Lozano MD  Available via MS Teams     SUBJECTIVE / OVERNIGHT EVENTS: Breathing comfortably on NC, still has diarrhea, 3 loose/watery episodes in 24 hrs. Denies any chest pain.    ADDITIONAL REVIEW OF SYSTEMS:    MEDICATIONS  (STANDING):  albuterol/ipratropium for Nebulization 3 milliLiter(s) Nebulizer every 6 hours  apixaban 5 milliGRAM(s) Oral two times a day  atorvastatin 40 milliGRAM(s) Oral at bedtime  budesonide 160 MICROgram(s)/formoterol 4.5 MICROgram(s) Inhaler 2 Puff(s) Inhalation two times a day  chlorhexidine 2% Cloths 1 Application(s) Topical daily  dextrose 10% Bolus 125 milliLiter(s) IV Bolus once  dextrose 5%. 1000 milliLiter(s) (100 mL/Hr) IV Continuous <Continuous>  dextrose 5%. 1000 milliLiter(s) (50 mL/Hr) IV Continuous <Continuous>  dextrose 50% Injectable 25 Gram(s) IV Push once  dextrose 50% Injectable 12.5 Gram(s) IV Push once  furosemide   Injectable 40 milliGRAM(s) IV Push two times a day  gabapentin 300 milliGRAM(s) Oral daily  glucagon  Injectable 1 milliGRAM(s) IntraMuscular once  insulin glargine Injectable (LANTUS) 18 Unit(s) SubCutaneous at bedtime  insulin lispro (ADMELOG) corrective regimen sliding scale   SubCutaneous <User Schedule>  insulin lispro (ADMELOG) corrective regimen sliding scale   SubCutaneous three times a day before meals  insulin lispro Injectable (ADMELOG) 14 Unit(s) SubCutaneous before lunch  insulin lispro Injectable (ADMELOG) 12 Unit(s) SubCutaneous before dinner  insulin lispro Injectable (ADMELOG) 18 Unit(s) SubCutaneous before breakfast  insulin lispro Injectable (ADMELOG) 6 Unit(s) SubCutaneous at bedtime  multivitamin 1 Tablet(s) Oral daily  Ofev (Nintedanib) 150 milliGRAM(s) 150 milliGRAM(s) Oral two times a day  pantoprazole    Tablet 40 milliGRAM(s) Oral two times a day  sildenafil (REVATIO) 10 milliGRAM(s) Oral every 8 hours  simethicone 80 milliGRAM(s) Chew two times a day  traZODone 150 milliGRAM(s) Oral at bedtime  trimethoprim   80 mG/sulfamethoxazole 400 mG 1 Tablet(s) Oral daily    MEDICATIONS  (PRN):  acetaminophen     Tablet .. 650 milliGRAM(s) Oral every 6 hours PRN Temp greater or equal to 38C (100.4F), Mild Pain (1 - 3)  aluminum hydroxide/magnesium hydroxide/simethicone Suspension 30 milliLiter(s) Oral every 4 hours PRN Dyspepsia  cyclobenzaprine 5 milliGRAM(s) Oral three times a day PRN Muscle Spasm  dextrose Oral Gel 15 Gram(s) Oral once PRN Blood Glucose LESS THAN 70 milliGRAM(s)/deciliter  loperamide 2 milliGRAM(s) Oral four times a day PRN Diarrhea  LORazepam     Tablet 0.5 milliGRAM(s) Oral every 6 hours PRN Severe Anxiety  melatonin 3 milliGRAM(s) Oral at bedtime PRN Insomnia      I&O's Summary    19 May 2024 07:01  -  20 May 2024 07:00  --------------------------------------------------------  IN: 840 mL / OUT: 2300 mL / NET: -1460 mL        PHYSICAL EXAM:  Vital Signs Last 24 Hrs  T(C): 36.7 (20 May 2024 12:51), Max: 36.8 (19 May 2024 20:55)  T(F): 98.1 (20 May 2024 12:51), Max: 98.2 (19 May 2024 20:55)  HR: 109 (20 May 2024 12:51) (106 - 110)  BP: 126/80 (20 May 2024 12:51) (125/78 - 127/77)  BP(mean): --  RR: 18 (20 May 2024 12:51) (18 - 18)  SpO2: 94% (20 May 2024 12:51) (92% - 94%)    Parameters below as of 20 May 2024 12:51  Patient On (Oxygen Delivery Method): nasal cannula  O2 Flow (L/min): 6    CONSTITUTIONAL: NAD, well appearing on NC  RESPIRATORY: Normal respiratory effort; lungs are clear to auscultation bilaterally  CARDIOVASCULAR: Regular rate and rhythm, normal S1 and S2, no murmur/rub/gallop; No lower extremity edema  ABDOMEN: Nontender to palpation, normoactive bowel sounds, no rebound/guarding  MUSCULOSKELETAL: no clubbing or cyanosis of digits; no joint swelling or tenderness to palpation  PSYCH: A+O to person, place, and time; affect appropriate  NEUROLOGY: CN 2-12 are intact and symmetric; no gross sensory deficits   SKIN: No rashes; no palpable lesions    LABS:                        9.2    7.83  )-----------( 399      ( 19 May 2024 07:14 )             30.4     05-20    141  |  103  |  32<H>  ----------------------------<  83  3.8   |  27  |  0.97    Ca    8.7      20 May 2024 07:01  Mg     2.3     05-20    TPro  6.1  /  Alb  2.9<L>  /  TBili  0.2  /  DBili  x   /  AST  39  /  ALT  55<H>  /  AlkPhos  106  05-20          Urinalysis Basic - ( 20 May 2024 07:01 )    Color: x / Appearance: x / SG: x / pH: x  Gluc: 83 mg/dL / Ketone: x  / Bili: x / Urobili: x   Blood: x / Protein: x / Nitrite: x   Leuk Esterase: x / RBC: x / WBC x   Sq Epi: x / Non Sq Epi: x / Bacteria: x    SARS-CoV-2: NotDetec (11 Apr 2024 22:46)    RADIOLOGY & ADDITIONAL TESTS:  New Results Reviewed Today:   New Imaging Personally Reviewed Today:  New Electrocardiogram Personally Reviewed Today:  Prior or Outpatient Records Reviewed Today:    COMMUNICATION:  Care Discussed with Consultants/Other Providers and Details of Discussion: Discussed with ACP  Discussions with Patient/Family:  PCP Communication:

## 2024-05-20 NOTE — PROGRESS NOTE ADULT - SUBJECTIVE AND OBJECTIVE BOX
CHIEF COMPLAINT:Patient is a 63y old  Female who presents with a chief complaint of SOB (19 May 2024 23:07)      Interval Events:  dyspnea subjectively improved but remains on 6L NC  now with diarrhea, sildenafil was decreased to 10mg over the weekend    REVIEW OF SYSTEMS:  [x] All other systems negative except per HPI   [ ] Unable to assess ROS because ________    OBJECTIVE:  ICU Vital Signs Last 24 Hrs  T(C): 36.7 (20 May 2024 12:51), Max: 36.8 (19 May 2024 20:55)  T(F): 98.1 (20 May 2024 12:51), Max: 98.2 (19 May 2024 20:55)  HR: 109 (20 May 2024 12:51) (106 - 110)  BP: 126/80 (20 May 2024 12:51) (125/78 - 127/77)  BP(mean): --  ABP: --  ABP(mean): --  RR: 18 (20 May 2024 12:51) (18 - 18)  SpO2: 94% (20 May 2024 12:51) (92% - 94%)    O2 Parameters below as of 20 May 2024 12:51  Patient On (Oxygen Delivery Method): nasal cannula  O2 Flow (L/min): 6            05-19 @ 07:01  -  05-20 @ 07:00  --------------------------------------------------------  IN: 840 mL / OUT: 2300 mL / NET: -1460 mL        PHYSICAL EXAM:  GENERAL: NAD, well-groomed, well-developed  HEAD:  Atraumatic, Normocephalic  EYES: EOMI, conjunctiva and sclera clear  ENMT: Moist mucous membranes  CHEST/LUNG: bibasilar rales  HEART: Regular rate and rhythm; No murmurs, rubs, or gallops  ABDOMEN: Nondistended, non tender   VASCULAR: No  cyanosis, or edema  SKIN: No rashes or lesions  NERVOUS SYSTEM:  Alert & Oriented X3, Good concentration    HOSPITAL MEDICATIONS:  MEDICATIONS  (STANDING):  albuterol/ipratropium for Nebulization 3 milliLiter(s) Nebulizer every 6 hours  apixaban 5 milliGRAM(s) Oral two times a day  atorvastatin 40 milliGRAM(s) Oral at bedtime  budesonide 160 MICROgram(s)/formoterol 4.5 MICROgram(s) Inhaler 2 Puff(s) Inhalation two times a day  chlorhexidine 2% Cloths 1 Application(s) Topical daily  dextrose 10% Bolus 125 milliLiter(s) IV Bolus once  dextrose 5%. 1000 milliLiter(s) (100 mL/Hr) IV Continuous <Continuous>  dextrose 5%. 1000 milliLiter(s) (50 mL/Hr) IV Continuous <Continuous>  dextrose 50% Injectable 25 Gram(s) IV Push once  dextrose 50% Injectable 12.5 Gram(s) IV Push once  furosemide   Injectable 40 milliGRAM(s) IV Push two times a day  gabapentin 300 milliGRAM(s) Oral daily  glucagon  Injectable 1 milliGRAM(s) IntraMuscular once  insulin glargine Injectable (LANTUS) 18 Unit(s) SubCutaneous at bedtime  insulin lispro (ADMELOG) corrective regimen sliding scale   SubCutaneous <User Schedule>  insulin lispro (ADMELOG) corrective regimen sliding scale   SubCutaneous three times a day before meals  insulin lispro Injectable (ADMELOG) 12 Unit(s) SubCutaneous before dinner  insulin lispro Injectable (ADMELOG) 18 Unit(s) SubCutaneous before breakfast  insulin lispro Injectable (ADMELOG) 6 Unit(s) SubCutaneous at bedtime  insulin lispro Injectable (ADMELOG) 14 Unit(s) SubCutaneous before lunch  multivitamin 1 Tablet(s) Oral daily  Ofev (Nintedanib) 150 milliGRAM(s) 150 milliGRAM(s) Oral two times a day  pantoprazole    Tablet 40 milliGRAM(s) Oral two times a day  sildenafil (REVATIO) 10 milliGRAM(s) Oral every 8 hours  simethicone 80 milliGRAM(s) Chew two times a day  traZODone 150 milliGRAM(s) Oral at bedtime  trimethoprim   80 mG/sulfamethoxazole 400 mG 1 Tablet(s) Oral daily    MEDICATIONS  (PRN):  acetaminophen     Tablet .. 650 milliGRAM(s) Oral every 6 hours PRN Temp greater or equal to 38C (100.4F), Mild Pain (1 - 3)  aluminum hydroxide/magnesium hydroxide/simethicone Suspension 30 milliLiter(s) Oral every 4 hours PRN Dyspepsia  cyclobenzaprine 5 milliGRAM(s) Oral three times a day PRN Muscle Spasm  dextrose Oral Gel 15 Gram(s) Oral once PRN Blood Glucose LESS THAN 70 milliGRAM(s)/deciliter  loperamide 2 milliGRAM(s) Oral four times a day PRN Diarrhea  LORazepam     Tablet 0.5 milliGRAM(s) Oral every 6 hours PRN Severe Anxiety  melatonin 3 milliGRAM(s) Oral at bedtime PRN Insomnia      LABS:    The Labs were reviewed by me   The Radiology was reviewed by me    EKG tracing reviewed by me    05-20    141  |  103  |  32<H>  ----------------------------<  83  3.8   |  27  |  0.97  05-19    140  |  103  |  32<H>  ----------------------------<  244<H>  4.3   |  27  |  0.73  05-18    141  |  104  |  31<H>  ----------------------------<  149<H>  4.0   |  29  |  0.85    Ca    8.7      20 May 2024 07:01  Ca    8.7      19 May 2024 07:14  Ca    8.8      18 May 2024 08:44  Mg     2.3     05-20    TPro  6.1  /  Alb  2.9<L>  /  TBili  0.2  /  DBili  x   /  AST  39  /  ALT  55<H>  /  AlkPhos  106  05-20  TPro  5.8<L>  /  Alb  2.8<L>  /  TBili  0.2  /  DBili  x   /  AST  42<H>  /  ALT  50<H>  /  AlkPhos  102  05-19  TPro  6.0  /  Alb  2.9<L>  /  TBili  0.2  /  DBili  x   /  AST  38  /  ALT  47<H>  /  AlkPhos  101  05-18    Magnesium: 2.3 mg/dL (05-20-24 @ 07:01)                      Urinalysis Basic - ( 20 May 2024 07:01 )    Color: x / Appearance: x / SG: x / pH: x  Gluc: 83 mg/dL / Ketone: x  / Bili: x / Urobili: x   Blood: x / Protein: x / Nitrite: x   Leuk Esterase: x / RBC: x / WBC x   Sq Epi: x / Non Sq Epi: x / Bacteria: x                              9.2    7.83  )-----------( 399      ( 19 May 2024 07:14 )             30.4                         9.7    7.15  )-----------( 399      ( 18 May 2024 08:44 )             31.8     CAPILLARY BLOOD GLUCOSE      POCT Blood Glucose.: 161 mg/dL (20 May 2024 12:31)  POCT Blood Glucose.: 108 mg/dL (20 May 2024 08:09)  POCT Blood Glucose.: 167 mg/dL (20 May 2024 02:01)  POCT Blood Glucose.: 104 mg/dL (19 May 2024 22:07)  POCT Blood Glucose.: 198 mg/dL (19 May 2024 16:58)        MICROBIOLOGY:     RADIOLOGY:  [ ] Reviewed and interpreted by me    Point of Care Ultrasound Findings:    PFT:    EKG:

## 2024-05-20 NOTE — CHART NOTE - NSCHARTNOTEFT_GEN_A_CORE
NUTRITION FOLLOW UP NOTE    PATIENT SEEN FOR: Nutrition Follow Up    SOURCE: [X] Patient  [x] Current Medical Record  [X] RN  [] Family/support person at bedside  [] Patient unavailable/inappropriate  [] Other:    CHART REVIEWED/EVENTS NOTED.  [] No changes to nutrition care plan to note  [X] Nutrition Status:   - Noted pt not a candidate for lung txp s/p evaluation (); noted per palliative care, updated GOC conversation (); current plan to d/c pt to [facility]. Consider addressing pt's/family's nutrition GOC regarding long term nutrition support as able/appropriate.   - Hx of ILD; noted pt previously on prednisone taper ( - ).     DIET ORDER:   Diet, Regular:   Consistent Carbohydrate {No Snacks} (CSTCHO) (24)    CURRENT DIET ORDER IS:  [X] Inadequate: See recommendations below    NUTRITION INTAKE/PROVISION:  [X] PO: Pt reports fair appetite/PO intake, consuming between 50 and 75% of majority of meals in-house, however, occasionally reports good PO intake, >/= 75% of meal. Currently ordered for [employer] Glucose Support Protein Shake (per serving provides 16 g PRO, 300 kcal) 1x/day, however, pt dislikes flavor and requesting for [employer] Protein Shake (per serving provides 10 g PRO, 220 efraín) 1x/day.     ANTHROPOMETRICS:  Drug Dosing Weight  Height (cm): 167.6 (2024 23:50)  Weight (kg): 56.6 (2024 23:50)  BMI (kg/m2): 20.1 (2024 23:50)    - Per previous RD note (), pt stated UBW of 59.1 kg. Per pt profile (), reports wt loss of 2-13 lbs PTA.  - Daily Weight in k.1 (-), 53.6 (-), 54 (-), 54.7 (-18), 54.4 (-17), 54.2 (-15), 53.7 (), 55.1 (), 58.7 (), 53.5 (5/3), 54.4 (), 57.1 (), 56 (). Noted ongoing weight fluctuations with overall suspected wt loss of 9.3% x ~ 2 mos (clinically significant, based on stated UBW and  wt) vs wt loss of 4.1% x ~3 weeks (clinically significant, based on wts from  and ). RD will continue to monitor wts as able/available.   - Of note, per nursing documentation, pt no edema documented throughout current admission; ordered for lasix.     NUTRITIONALLY PERTINENT MEDICATIONS:  MEDICATIONS  (STANDING):  atorvastatin  dextrose 10% Bolus  dextrose 5%.  dextrose 5%.  dextrose 50% Injectable  dextrose 50% Injectable  furosemide   Injectable  glucagon  Injectable  insulin glargine Injectable (LANTUS)  insulin lispro (ADMELOG) corrective regimen sliding scale  insulin lispro (ADMELOG) corrective regimen sliding scale  insulin lispro Injectable (ADMELOG)  insulin lispro Injectable (ADMELOG)  insulin lispro Injectable (ADMELOG)  insulin lispro Injectable (ADMELOG)  multivitamin  pantoprazole    Tablet  sildenafil (REVATIO)  simethicone  trimethoprim   80 mG/sulfamethoxazole 400 mG       NUTRITIONALLY PERTINENT LABS:   Na141 mmol/L Glu 83 mg/dL K+ 3.8 mmol/L Cr  0.97 mg/dL BUN 32 mg/dL<H>  Alb 2.9 g/dL<L> ALT 55 U/L<H> AST 39 U/L Alkaline Phosphatase 106 U/L    A1C with Estimated Average Glucose Result: 11.9 % (24 @ 03:53)      Finger Sticks:  POCT Blood Glucose.: 161 mg/dL ( @ 12:31)  POCT Blood Glucose.: 108 mg/dL ( @ 08:09)  POCT Blood Glucose.: 167 mg/dL ( @ 02:01)  POCT Blood Glucose.: 104 mg/dL ( @ 22:07)  POCT Blood Glucose.: 198 mg/dL ( @ 16:58)      NUTRITIONALLY PERTINENT MEDICATIONS/LABS:  [x] Reviewed  [X] Relevant notes on medications/labs: Noted A1C of 11.9% () reflects poor glycemic control; Elevated POCTs x 24 hrs.     EDEMA:  [x] Reviewed    GI/ I&O:  [x] Reviewed  [X] Relevant notes: No N/V nor diarrhea/constipation reported; last BM on . Not ordered for bowel regimen.   [X] Other: Ordered for Mylicon, Aluminum Hydroxide; Magnesium Hydroxide; Simethicone Suspension, PPI.     SKIN:   [x] Change in pressure injury documentation: Per wound care (), pt with b/l sacral/buttocks DTPI.     ESTIMATED NEEDS:  [x] No change  Energy:  1698 - 1981 kcal/day (30 - 35 kcal/kg)  Protein:  68 - 79.2 g/day (1.2 - 1.4 g/kg)  Fluid:   ml/day or [X] defer to team  Based on: dosing wt of 56.6 kg ()    NUTRITION DIAGNOSIS:  [X] Prior Dx: Inadequate protein energy intake (upgraded); Food and Nutrition Related Knowledge Deficit  [X] New Dx: Mild protein calorie malnutrition in context of acute illness related to inability to meet estimated increased nutrient needs secondary to ILD as evidenced by sudden wt loss during hospitalization, <75% EER x >7 days  Goal: Pt will consume >/= 75% of estimated nutrient needs via best tolerated route.     EDUCATION:  [X] Yes: Provided education on Nutrition Therapy for Type 2 Diabetes. Educated on a consistent carbohydrate diet; emphasized the importance of utilizing portion control. Emphasis on what foods contain carbohydrates, importance of pairing carbohydrates with protein and healthy fats for glycemic control, and consuming sufficient dietary fiber. Discussed the importance of limiting sugar sweetened beverages; provided beverage alternatives. Discussed the importance of measuring blood sugar levels before/after meals; recommended documenting average blood glucose levels. Educated on the importance of consuming a diet adequate in protein rich food sources; encouraged prioritizing protein foods first at meal times; recommended small, frequent nutrient dense meals. Discussed the benefits of oral nutrition supplementation; recommended having small sips in between meals to optimize protein intake. Pt made aware RD to remain available for any questions.     DM Handouts Provided: Counting Carbohydrates for DM, DM Nutrition Label Reading, DM MyPlate    NUTRITION CARE PLAN:  1. Diet: Continue with Consistent Carbohydrate Diet; Defer diet texture/consistency to speech language pathologist prn.   2. Supplements: Add Glucerna Protein Shake (per serving provides 10 g PRO, 220 efraín) 2x/day to optimize PO intake.   3. Multivitamin/mineral supplementation: Continue with multivitamin once daily; consider adding vitamin C once daily to promote wound healing, pending no medical contraindications.   4: Consider addressing nutrition GOC regarding long term nutrition support as medically appropriate/able.   5: Malnutrition sticker placed in chart    [X] Achieved - Continue current nutrition intervention(s)  [] Current medical condition precludes nutrition intervention at this time.    MONITORING AND EVALUATION:   RD remains available upon request and will follow up per protocol.    Vania Irwin RD  Available on MS TEAMS NUTRITION FOLLOW UP NOTE    PATIENT SEEN FOR: Nutrition Follow Up    SOURCE: [X] Patient  [x] Current Medical Record  [X] RN  [] Family/support person at bedside  [] Patient unavailable/inappropriate  [] Other:    CHART REVIEWED/EVENTS NOTED.  [] No changes to nutrition care plan to note  [X] Nutrition Status:   - Noted pt not a candidate for lung txp s/p evaluation (); noted per palliative care, updated GOC conversation (); current plan to d/c pt to [facility]. Consider addressing pt's/family's nutrition GOC regarding long term nutrition support as able/appropriate.   - Hx of ILD; noted pt previously on prednisone taper ( - ).     DIET ORDER:   Diet, Regular:   Consistent Carbohydrate {No Snacks} (CSTCHO) (24)    CURRENT DIET ORDER IS:  [X] Inadequate: See recommendations below    NUTRITION INTAKE/PROVISION:  [X] PO: Pt reports fair appetite/PO intake, consuming between 50 and 75% of majority of meals in-house, however, occasionally reports good PO intake, >/= 75% of meal. Currently ordered for [employer] Glucose Support Protein Shake (per serving provides 16 g PRO, 300 kcal) 1x/day, however, pt dislikes flavor and requesting for [employer] Protein Shake (per serving provides 10 g PRO, 220 efraín) 1x/day.     ANTHROPOMETRICS:  Drug Dosing Weight  Height (cm): 167.6 (2024 23:50)  Weight (kg): 56.6 (2024 23:50)  BMI (kg/m2): 20.1 (2024 23:50)    Updated BMI (based on lowest wt ): 19.1    - Per previous RD note (), pt stated UBW of 59.1 kg. Per pt profile (), reports wt loss of 2-13 lbs PTA.  - Daily Weight in k.1 (-), 53.6 (-), 54 (-), 54.7 (-18), 54.4 (-), 54.2 (-15), 53.7 (), 55.1 (), 58.7 (), 53.5 (5/3), 54.4 (), 57.1 (), 56 (). Noted ongoing weight fluctuations with overall suspected wt loss of 9.3% x ~ 2 mos (clinically significant, based on stated UBW and  wt) vs wt loss of 4.1% x ~3 weeks (clinically significant, based on wts from  and ). RD will continue to monitor wts as able/available.   - Of note, per nursing documentation, pt no edema documented throughout current admission; ordered for lasix.     NUTRITIONALLY PERTINENT MEDICATIONS:  MEDICATIONS  (STANDING):  atorvastatin  dextrose 10% Bolus  dextrose 5%.  dextrose 5%.  dextrose 50% Injectable  dextrose 50% Injectable  furosemide   Injectable  glucagon  Injectable  insulin glargine Injectable (LANTUS)  insulin lispro (ADMELOG) corrective regimen sliding scale  insulin lispro (ADMELOG) corrective regimen sliding scale  insulin lispro Injectable (ADMELOG)  insulin lispro Injectable (ADMELOG)  insulin lispro Injectable (ADMELOG)  insulin lispro Injectable (ADMELOG)  multivitamin  pantoprazole    Tablet  sildenafil (REVATIO)  simethicone  trimethoprim   80 mG/sulfamethoxazole 400 mG       NUTRITIONALLY PERTINENT LABS:   Na141 mmol/L Glu 83 mg/dL K+ 3.8 mmol/L Cr  0.97 mg/dL BUN 32 mg/dL<H>  Alb 2.9 g/dL<L> ALT 55 U/L<H> AST 39 U/L Alkaline Phosphatase 106 U/L    A1C with Estimated Average Glucose Result: 11.9 % (24 @ 03:53)      Finger Sticks:  POCT Blood Glucose.: 161 mg/dL ( @ 12:31)  POCT Blood Glucose.: 108 mg/dL ( @ 08:09)  POCT Blood Glucose.: 167 mg/dL ( @ 02:01)  POCT Blood Glucose.: 104 mg/dL ( @ 22:07)  POCT Blood Glucose.: 198 mg/dL ( @ 16:58)      NUTRITIONALLY PERTINENT MEDICATIONS/LABS:  [x] Reviewed  [X] Relevant notes on medications/labs: Noted A1C of 11.9% () reflects poor glycemic control; Elevated POCTs x 24 hrs.     EDEMA:  [x] Reviewed    GI/ I&O:  [x] Reviewed  [X] Relevant notes: No N/V nor diarrhea/constipation reported; last BM on . Not ordered for bowel regimen.   [X] Other: Ordered for Mylicon, Aluminum Hydroxide; Magnesium Hydroxide; Simethicone Suspension, PPI.     SKIN:   [x] Change in pressure injury documentation: Per wound care (), pt with b/l sacral/buttocks DTPI.     ESTIMATED NEEDS:  [x] No change  Energy:  1698 - 1981 kcal/day (30 - 35 kcal/kg)  Protein:  68 - 79.2 g/day (1.2 - 1.4 g/kg)  Fluid:   ml/day or [X] defer to team  Based on: dosing wt of 56.6 kg ()    NUTRITION DIAGNOSIS:  [X] Prior Dx: Inadequate protein energy intake (upgraded); Food and Nutrition Related Knowledge Deficit  [X] New Dx: Mild protein calorie malnutrition in context of acute illness related to inability to meet estimated increased nutrient needs secondary to ILD as evidenced by sudden wt loss during hospitalization, <75% EER x >7 days  Goal: Pt will consume >/= 75% of estimated nutrient needs via best tolerated route.     EDUCATION:  [X] Yes: Provided education on Nutrition Therapy for Type 2 Diabetes. Educated on a consistent carbohydrate diet; emphasized the importance of utilizing portion control. Emphasis on what foods contain carbohydrates, importance of pairing carbohydrates with protein and healthy fats for glycemic control, and consuming sufficient dietary fiber. Discussed the importance of limiting sugar sweetened beverages; provided beverage alternatives. Discussed the importance of measuring blood sugar levels before/after meals; recommended documenting average blood glucose levels. Educated on the importance of consuming a diet adequate in protein rich food sources; encouraged prioritizing protein foods first at meal times; recommended small, frequent nutrient dense meals. Discussed the benefits of oral nutrition supplementation; recommended having small sips in between meals to optimize protein intake. Pt made aware RD to remain available for any questions.     DM Handouts Provided: Counting Carbohydrates for DM, DM Nutrition Label Reading, DM MyPlate    NUTRITION CARE PLAN:  1. Diet: Continue with Consistent Carbohydrate Diet; Defer diet texture/consistency to speech language pathologist prn.   2. Supplements: Add Glucerna Protein Shake (per serving provides 10 g PRO, 220 efraín) 2x/day to optimize PO intake.   3. Multivitamin/mineral supplementation: Continue with multivitamin once daily; consider adding vitamin C once daily to promote wound healing, pending no medical contraindications.   4: Consider addressing nutrition GOC regarding long term nutrition support as medically appropriate/able.   5: Malnutrition sticker placed in chart    [X] Achieved - Continue current nutrition intervention(s)  [] Current medical condition precludes nutrition intervention at this time.    MONITORING AND EVALUATION:   RD remains available upon request and will follow up per protocol.    Vania Irwin RD  Available on MS TEAMS

## 2024-05-20 NOTE — PROGRESS NOTE ADULT - ASSESSMENT
63F hx ILD/ probable IPF (On 2-3LNC at home), PE 2022 on eliquis, severe pHTN w/ prior cor pulmonale (RVSP 68 - 10/22 w/ TTE from 4/12 w/ PASP 28, normal LV/RV SF), IDDM, presenting as a transfer from [place] for lung transplant eval iso SOB, dry cough, chest pain. She has been receiving duonebs, symbicort, lasix, ofev and IV steroids. Continuing with AC. Solu-medrol is ordered for 40 IV BID.  CT Chest 4/12/2024: Findings suggesting interstitial lung disease without significant interval progression. No evidence of pneumonia.   CT scan from 4/12/2024 when compared with CT scan from 2020 has shown significant progression.  Most recent CT is unchanged compared with 4/12/24 study.     Recommendations  - c/w weaning O2 as able - On 6L NC currently.   - Continue with Symbicort, duonebs, Ofev, Eliquis,   - c/w prednisone taper, decrease 10mg every week until off.   - Please ensure pt is on PCP prophylaxis with bactrim    - TTE results appreciated - PFO + e/o decreased Right ventricular function  - Shunt study negative.   - sildenafil to 10mg TID, will consider increasing to 20mg pending diarrhea sx  - please repeat BMP tomorrow   -- if Cr continues to rise can decrease to once daily dosing of diuretics  - variable bed weights, check standing weights if able  - Strict I&O  - Please ambulate pt daily with goal to improve walking distance - this is a major barrier to transplant for this pt.

## 2024-05-20 NOTE — PROGRESS NOTE ADULT - ASSESSMENT
63F PMH chronic AHRF on 2-3L NC at b/l 2/2 ILD (Followed w/ Dr. Primo Marlow, [facility]), PE on Eliquis, severe pHTN, Cor pulmonale, DM, presented initially with SOB, dry cough, chest pain, LE edema, recent admission to [facility] in March for ILD flare, admitted to outside hospital->ICU for AHRF 2/2 ILD flare, transferred to [facility] for lung transplant evaluation and found not to be a transplant candidate.

## 2024-05-20 NOTE — PROGRESS NOTE ADULT - SUBJECTIVE AND OBJECTIVE BOX
Chief Complaint: Diabetes Mellitus follow up    INTERVAL HX:  patient seen at bedside, with oxygen in place. states she did eat a snack at night and did receive insulin. Still eats prior to BG checking. BG continues to be variable 104-326mg/dl.     Review of Systems:  General: As above  GI: No nausea, vomiting  Endocrine: no  S&Sx of hypoglycemia    Allergies    No Known Allergies    Intolerances      MEDICATIONS  (STANDING):  albuterol/ipratropium for Nebulization 3 milliLiter(s) Nebulizer every 6 hours  apixaban 5 milliGRAM(s) Oral two times a day  atorvastatin 40 milliGRAM(s) Oral at bedtime  budesonide 160 MICROgram(s)/formoterol 4.5 MICROgram(s) Inhaler 2 Puff(s) Inhalation two times a day  chlorhexidine 2% Cloths 1 Application(s) Topical daily  dextrose 10% Bolus 125 milliLiter(s) IV Bolus once  dextrose 5%. 1000 milliLiter(s) (100 mL/Hr) IV Continuous <Continuous>  dextrose 5%. 1000 milliLiter(s) (50 mL/Hr) IV Continuous <Continuous>  dextrose 50% Injectable 12.5 Gram(s) IV Push once  dextrose 50% Injectable 25 Gram(s) IV Push once  furosemide   Injectable 40 milliGRAM(s) IV Push two times a day  gabapentin 300 milliGRAM(s) Oral daily  glucagon  Injectable 1 milliGRAM(s) IntraMuscular once  insulin glargine Injectable (LANTUS) 18 Unit(s) SubCutaneous at bedtime  insulin lispro (ADMELOG) corrective regimen sliding scale   SubCutaneous three times a day before meals  insulin lispro (ADMELOG) corrective regimen sliding scale   SubCutaneous <User Schedule>  insulin lispro Injectable (ADMELOG) 12 Unit(s) SubCutaneous before dinner  insulin lispro Injectable (ADMELOG) 18 Unit(s) SubCutaneous before breakfast  insulin lispro Injectable (ADMELOG) 6 Unit(s) SubCutaneous at bedtime  insulin lispro Injectable (ADMELOG) 14 Unit(s) SubCutaneous before lunch  multivitamin 1 Tablet(s) Oral daily  Ofev (Nintedanib) 150 milliGRAM(s) 150 milliGRAM(s) Oral two times a day  pantoprazole    Tablet 40 milliGRAM(s) Oral two times a day  sildenafil (REVATIO) 10 milliGRAM(s) Oral every 8 hours  simethicone 80 milliGRAM(s) Chew two times a day  traZODone 150 milliGRAM(s) Oral at bedtime  trimethoprim   80 mG/sulfamethoxazole 400 mG 1 Tablet(s) Oral daily      atorvastatin   40 milliGRAM(s) Oral (05-19-24 @ 22:10)    insulin glargine Injectable (LANTUS)   18 Unit(s) SubCutaneous (05-19-24 @ 22:24)    insulin lispro (ADMELOG) corrective regimen sliding scale   2 Unit(s) SubCutaneous (05-20-24 @ 12:35)   2 Unit(s) SubCutaneous (05-19-24 @ 17:31)    insulin lispro Injectable (ADMELOG)   12 Unit(s) SubCutaneous (05-19-24 @ 17:31)    insulin lispro Injectable (ADMELOG)   6 Unit(s) SubCutaneous (05-19-24 @ 22:25)    insulin lispro Injectable (ADMELOG)   14 Unit(s) SubCutaneous (05-20-24 @ 12:36)    insulin lispro Injectable (ADMELOG).   10 Unit(s) SubCutaneous (05-20-24 @ 08:39)    predniSONE   Tablet   30 milliGRAM(s) Oral (05-20-24 @ 05:13)        PHYSICAL EXAM:  VITALS: T(C): 36.7 (05-20-24 @ 12:51)  T(F): 98.1 (05-20-24 @ 12:51), Max: 98.2 (05-19-24 @ 20:55)  HR: 109 (05-20-24 @ 12:51) (106 - 110)  BP: 126/80 (05-20-24 @ 12:51) (125/78 - 127/77)  RR:  (18 - 18)  SpO2:  (92% - 94%)  Wt(kg): --  GENERAL: NAD  Respiratory: Respirations unlabored on oxygen  Extremities: Warm, no edema  NEURO: Alert , appropriate     LABS:  POCT Blood Glucose.: 161 mg/dL (05-20-24 @ 12:31)  POCT Blood Glucose.: 108 mg/dL (05-20-24 @ 08:09)  POCT Blood Glucose.: 167 mg/dL (05-20-24 @ 02:01)  POCT Blood Glucose.: 104 mg/dL (05-19-24 @ 22:07)  POCT Blood Glucose.: 198 mg/dL (05-19-24 @ 16:58)  POCT Blood Glucose.: 326 mg/dL (05-19-24 @ 12:04)  POCT Blood Glucose.: 269 mg/dL (05-19-24 @ 08:22)  POCT Blood Glucose.: 298 mg/dL (05-19-24 @ 02:02)  POCT Blood Glucose.: 236 mg/dL (05-18-24 @ 22:10)  POCT Blood Glucose.: 119 mg/dL (05-18-24 @ 16:51)  POCT Blood Glucose.: 169 mg/dL (05-18-24 @ 12:06)  POCT Blood Glucose.: 154 mg/dL (05-18-24 @ 08:28)  POCT Blood Glucose.: 104 mg/dL (05-18-24 @ 01:39)  POCT Blood Glucose.: 126 mg/dL (05-17-24 @ 21:34)  POCT Blood Glucose.: 196 mg/dL (05-17-24 @ 17:08)                          9.2    7.83  )-----------( 399      ( 19 May 2024 07:14 )             30.4     05-20    141  |  103  |  32<H>  ----------------------------<  83  3.8   |  27  |  0.97    Ca    8.7      20 May 2024 07:01  Mg     2.3     05-20    TPro  6.1  /  Alb  2.9<L>  /  TBili  0.2  /  DBili  x   /  AST  39  /  ALT  55<H>  /  AlkPhos  106  05-20    eGFR: 66 mL/min/1.73m2 (20 May 2024 07:01)      Thyroid Function Tests:      A1C with Estimated Average Glucose Result: 11.9 % (04-13-24 @ 03:53)    Estimated Average Glucose: 295 mg/dL (04-13-24 @ 03:53)        Diet, Regular:   Consistent Carbohydrate No Snacks (CSTCHO) (04-26-24 @ 15:14) [Active]

## 2024-05-20 NOTE — PROGRESS NOTE ADULT - PROBLEM SELECTOR PLAN 7
T2DM: Home DM medications: metformin 500 mg BID, + glimepride 1 mg daily + lantus 35 units QHS+ admelog 25 units TIDAC  - Pt w/ hx of uncontrolled T2DM, a1c 11.9.   - Endocrine consulted via email, recs appreciated  - On glargine 15 units qhs, lispro 10u TID qac, hold if NPO or eating < 50% of meals  - MISS FS tid qac qhs  - Discharge DM medications:   - Continue with metformin 500 mg BID  - STOP glimipride due to recurrent hypoglycemia at home   - Basal/bolus, dose will depend on insulin requirement and steroid plan   - Patient will follow up at  [facility]:  [address] [place] [address]. [address]. [place] [address]  Tel: [phone]

## 2024-05-20 NOTE — PROGRESS NOTE ADULT - PROBLEM SELECTOR PLAN 1
Test BG TID AC & QHS while eating regular meals and Q6H while NPO  C/w Lantus 18units QHS  C/w insulin Lispro 18 units with breakfast 14 units with lunch, 12 units with dinner. Hold if NPO or if eating less than 50% of meals; Pt instructed to report to staff if not eating  Continue Admelog 6 units at bedtime if pt eating snack at night. PLEASE ASK PT. if she is having a snack.   Continue with mod dose correctional scale TID with meals and QHS  Please notify endo team with any further changes on steroid doses  Discharge planning:   Home DM medications: metformin 500 mg BID, + glimepride 1 mg daily + lantus 35 units QHS+ admelog 25 units TIDAC  Discharge DM medications:   Continue with metformin 500 mg BID  STOP glimepiride due to recurrent hypoglycemia at home   Basal/bolus, dose will depend on insulin requirement and steroid plan   Make sure pt has Rx for all DM supplies and insulin/ DM meds.  Can follow at endo practice. [address] [place] [address] [address]. Phone . Call for apt closer to discharge- - Patient will need opthalmology and podiatry follow up as outpatient

## 2024-05-20 NOTE — PROGRESS NOTE ADULT - PROBLEM SELECTOR PLAN 11
DVT PPx: Eliquis  Diet: Regular  Bowel Regimen: Miralax, Senna, dulcolax suppository/enema   Code: DNR/DNI, molst completed in chart   Aspiration precautions  Fall precautions  Discharge to [facility] pending plan for monitoring diarrhea/sildenafil dosing and creatinine

## 2024-05-20 NOTE — PROGRESS NOTE ADULT - PROBLEM SELECTOR PROBLEM 6
Anemia Received a tiger text from Mercedes Tian LPN from [facility] wanted to know if we had any updates on the referral I sent over to [facility], [facility]  Advised that I have not heard anything yet  Kanika Powell states he also called [facility] for an update as well  Spoke to him about going downstairs tomorrow when the quarantine is over and he said that outpatient would be better for him and that he did not need to go down there  I advised that groups are an integral part of the therapeutic process and he just hung up the phone

## 2024-05-21 NOTE — PROGRESS NOTE ADULT - PROBLEM SELECTOR PLAN 11
DVT PPx: Eliquis  Diet: Regular  Code: DNR/DNI, molst completed in chart     Aspiration precautions  Speech and swallow evaluation pending to rule out aspiration  Fall precautions    Discharge to [facility] pending plan for monitoring diarrhea/sildenafil dosing and creatinine

## 2024-05-21 NOTE — PROGRESS NOTE ADULT - ASSESSMENT
63 F w/h/o uncontrolled T2DM (A1C 11.9%) while on Lantus, admelog, metformin, glimepiride. Unknown DM complications. Also h/o AHRF, ILD, PE, HTN. Here with SOB secondary to interstitial lung disease. Continues with SOB and tachypnea, managed by primary medicine team.    Endocrinology consulted for diabetes management. Improved PO intake and eating a snack at night on and off. Now receiving insulin when she has bedtime snack to help with hyperglycemia at night and morning. Pt instructed to let staff know if she is eating to get extra insulin. She continues on steroid taper, no at 20mg once daily, note near hypoglycemia BG today.     Continues on steroid taper:  Prednisone 30 mg daily ( 5/14- 5/20)  Prednisone 20 mg daily ( 5/21-5/27)  Prednisone 10 mg daily ( 5/28--

## 2024-05-21 NOTE — PROGRESS NOTE ADULT - ATTENDING COMMENTS
Patient seen and examined. Patient is a 63F with extensive medical history including suspected IPF with chronic hypoxemic respiratory failure on O2, and pulmonary hypertension on Sildenafil presenting from [facility] for lung transplant evaluation in the setting of shortness of breath, chest pain, and dry cough.    She has had clinical improvement with diuresis and is currently on 6L O2 (she uses 4-5L O2 at home). During this hospitalization her Sildenafil dose was decreased in the setting of flushing, lightheadedness, and diarrhea. Her diarrhea is improved today and she denies flushing. She does state that she finds her breathing more difficult immediately after eating.      #Interstitial Lung Disease  - Continue Ofev  - Patient with suspected IPF though most recent CT more consistent with fibrotic NSIP  - Steroid taper with PCP prophylaxis  - Outpatient follow-up with her primary Pulmonologist Dr. Marlow.  - Consider outpatient evaluation with Dr. Emery as well for ILD and pulmonary hypertension co-management if Dr. Marlow feels this would be helpful  - Has been seen by the Lung Transplant team - and patient deemed to not be a candidate for lung transplant. I spoke with the lung transplant team today and they confirmed that patient is not a candidate for lung transplant evaluation at this time.  #Pulmonary Hypertension  - Continue Sildenafil 10mg TID and monitor  - severe pulmonary hypertension by echo but no RHC noted - this may become necessary if her symptoms do not improve  - Continue diuresis with goal negative fluid balance  - Nuclear medicine cardiac shunt study was negative - patient has a history of PFO  #Acute on Chronic Hypoxemic Respiratory Failure  - Continue supplemental O2 with goal O2 sat > 90%  - Incentive spirometer and acapella to facilitate breathing  - OOB and deep breathing  - Given worsening of symptoms after eating would suggest swallow evaluation to see if patient would benefit from modification of diet  #DVT proph - continue AC given history of PE from 2022  #GOC - DNR/DNI    Discussed with patient and medicine attending and all questions answered. Long term prognosis guarded.

## 2024-05-21 NOTE — PROGRESS NOTE ADULT - PROBLEM SELECTOR PLAN 1
- Test BG TID AC & QHS while eating regular meals and Q6H while NPO  - Change Lantus 16 units QHS  - Change insulin Admelog to 14 units with breakfast, 12 units with lunch, 10 units with dinner. Hold if NPO or if eating less than 50% of meals; Pt instructed to report to staff if not eating  - Continue Admelog 6 units at bedtime if pt eating snack at night. PLEASE ASK PT if she is having a snack.   - Continue with mod dose correctional scale TID with meals and QHS    Please notify endo team with any further changes on steroid doses.    Discharge planning:   Home DM medications: metformin 500 mg BID, + glimepride 1 mg daily + lantus 35 units QHS+ admelog 25 units TIDAC  Discharge DM medications:   Continue with metformin 500 mg BID  STOP glimepiride due to recurrent hypoglycemia at home   Basal/bolus, dose will depend on insulin requirement and steroid plan   Make sure pt has Rx for all DM supplies and insulin/ DM meds.  Can follow at endo practice. [address] [place] [address] [address]. Phone . Call for apt closer to discharge- - Patient will need opthalmology and podiatry follow up as outpatient

## 2024-05-21 NOTE — PROGRESS NOTE ADULT - SUBJECTIVE AND OBJECTIVE BOX
CHIEF COMPLAINT:Patient is a 63y old  Female who presents with a chief complaint of SOB (20 May 2024 15:18)      Interval Events:  she reports breathing is similar to slightly worse than yesterday, she reports it is usually worse following a meal.     REVIEW OF SYSTEMS:  [x] All other systems negative except per HPI   [ ] Unable to assess ROS because ________    OBJECTIVE:  ICU Vital Signs Last 24 Hrs  T(C): 37 (21 May 2024 10:55), Max: 37 (21 May 2024 10:55)  T(F): 98.6 (21 May 2024 10:55), Max: 98.6 (21 May 2024 10:55)  HR: 117 (21 May 2024 10:55) (109 - 118)  BP: 111/67 (21 May 2024 10:55) (111/67 - 132/79)  BP(mean): --  ABP: --  ABP(mean): --  RR: 18 (21 May 2024 10:55) (18 - 18)  SpO2: 96% (21 May 2024 10:55) (93% - 96%)    O2 Parameters below as of 21 May 2024 10:55  Patient On (Oxygen Delivery Method): nasal cannula  O2 Flow (L/min): 6            05-20 @ 07:01  -  05-21 @ 07:00  --------------------------------------------------------  IN: 840 mL / OUT: 1500 mL / NET: -660 mL        PHYSICAL EXAM:  GENERAL: NAD, well-groomed, well-developed  HEAD:  Atraumatic, Normocephalic  EYES: EOMI, conjunctiva and sclera clear  ENMT: Moist mucous membranes  CHEST/LUNG: bibasilar rales  HEART: Regular rate and rhythm; No murmurs, rubs, or gallops  ABDOMEN: Nondistended, non tender   VASCULAR: No  cyanosis, or edema  SKIN: No rashes or lesions  NERVOUS SYSTEM:  Alert & Oriented X3, Good concentration    HOSPITAL MEDICATIONS:  MEDICATIONS  (STANDING):  albuterol/ipratropium for Nebulization 3 milliLiter(s) Nebulizer every 6 hours  apixaban 5 milliGRAM(s) Oral two times a day  atorvastatin 40 milliGRAM(s) Oral at bedtime  budesonide 160 MICROgram(s)/formoterol 4.5 MICROgram(s) Inhaler 2 Puff(s) Inhalation two times a day  chlorhexidine 2% Cloths 1 Application(s) Topical daily  dextrose 10% Bolus 125 milliLiter(s) IV Bolus once  dextrose 5%. 1000 milliLiter(s) (50 mL/Hr) IV Continuous <Continuous>  dextrose 5%. 1000 milliLiter(s) (100 mL/Hr) IV Continuous <Continuous>  dextrose 50% Injectable 25 Gram(s) IV Push once  dextrose 50% Injectable 12.5 Gram(s) IV Push once  furosemide   Injectable 40 milliGRAM(s) IV Push two times a day  gabapentin 300 milliGRAM(s) Oral daily  glucagon  Injectable 1 milliGRAM(s) IntraMuscular once  insulin glargine Injectable (LANTUS) 18 Unit(s) SubCutaneous at bedtime  insulin lispro (ADMELOG) corrective regimen sliding scale   SubCutaneous <User Schedule>  insulin lispro (ADMELOG) corrective regimen sliding scale   SubCutaneous three times a day before meals  insulin lispro Injectable (ADMELOG) 12 Unit(s) SubCutaneous before dinner  insulin lispro Injectable (ADMELOG) 18 Unit(s) SubCutaneous before breakfast  insulin lispro Injectable (ADMELOG) 6 Unit(s) SubCutaneous at bedtime  insulin lispro Injectable (ADMELOG) 14 Unit(s) SubCutaneous before lunch  multivitamin 1 Tablet(s) Oral daily  Ofev (Nintedanib) 150 milliGRAM(s) 150 milliGRAM(s) Oral two times a day  pantoprazole    Tablet 40 milliGRAM(s) Oral two times a day  predniSONE   Tablet 20 milliGRAM(s) Oral daily  sildenafil (REVATIO) 10 milliGRAM(s) Oral every 8 hours  simethicone 80 milliGRAM(s) Chew two times a day  traZODone 150 milliGRAM(s) Oral at bedtime  trimethoprim   80 mG/sulfamethoxazole 400 mG 1 Tablet(s) Oral daily    MEDICATIONS  (PRN):  acetaminophen     Tablet .. 650 milliGRAM(s) Oral every 6 hours PRN Temp greater or equal to 38C (100.4F), Mild Pain (1 - 3)  aluminum hydroxide/magnesium hydroxide/simethicone Suspension 30 milliLiter(s) Oral every 4 hours PRN Dyspepsia  cyclobenzaprine 5 milliGRAM(s) Oral three times a day PRN Muscle Spasm  dextrose Oral Gel 15 Gram(s) Oral once PRN Blood Glucose LESS THAN 70 milliGRAM(s)/deciliter  loperamide 2 milliGRAM(s) Oral four times a day PRN Diarrhea  LORazepam     Tablet 0.5 milliGRAM(s) Oral every 6 hours PRN Severe Anxiety  melatonin 3 milliGRAM(s) Oral at bedtime PRN Insomnia      LABS:    The Labs were reviewed by me   The Radiology was reviewed by me    EKG tracing reviewed by me    05-21    137  |  101  |  30<H>  ----------------------------<  129<H>  4.1   |  27  |  0.87  05-20    141  |  103  |  32<H>  ----------------------------<  83  3.8   |  27  |  0.97  05-19    140  |  103  |  32<H>  ----------------------------<  244<H>  4.3   |  27  |  0.73    Ca    8.6      21 May 2024 06:49  Ca    8.7      20 May 2024 07:01  Ca    8.7      19 May 2024 07:14  Phos  3.1     05-21  Mg     2.3     05-21    TPro  6.1  /  Alb  2.9<L>  /  TBili  0.2  /  DBili  x   /  AST  39  /  ALT  55<H>  /  AlkPhos  106  05-20  TPro  5.8<L>  /  Alb  2.8<L>  /  TBili  0.2  /  DBili  x   /  AST  42<H>  /  ALT  50<H>  /  AlkPhos  102  05-19    Magnesium: 2.3 mg/dL (05-21-24 @ 06:49)  Magnesium: 2.3 mg/dL (05-20-24 @ 07:01)    Phosphorus: 3.1 mg/dL (05-21-24 @ 06:49)                    Urinalysis Basic - ( 21 May 2024 06:49 )    Color: x / Appearance: x / SG: x / pH: x  Gluc: 129 mg/dL / Ketone: x  / Bili: x / Urobili: x   Blood: x / Protein: x / Nitrite: x   Leuk Esterase: x / RBC: x / WBC x   Sq Epi: x / Non Sq Epi: x / Bacteria: x                              9.4    8.64  )-----------( 411      ( 21 May 2024 06:47 )             30.8                         9.2    7.83  )-----------( 399      ( 19 May 2024 07:14 )             30.4     CAPILLARY BLOOD GLUCOSE      POCT Blood Glucose.: 89 mg/dL (21 May 2024 12:04)  POCT Blood Glucose.: 80 mg/dL (21 May 2024 11:37)  POCT Blood Glucose.: 181 mg/dL (21 May 2024 07:50)  POCT Blood Glucose.: 131 mg/dL (21 May 2024 01:50)  POCT Blood Glucose.: 112 mg/dL (20 May 2024 21:30)  POCT Blood Glucose.: 176 mg/dL (20 May 2024 16:33)        MICROBIOLOGY:     RADIOLOGY:  [ ] Reviewed and interpreted by me    Point of Care Ultrasound Findings:    PFT:    EKG:

## 2024-05-21 NOTE — PROGRESS NOTE ADULT - PROBLEM SELECTOR PLAN 7
T2DM: Home DM medications: metformin 500 mg BID, + glimepride 1 mg daily + lantus 35 units QHS+ admelog 25 units TIDAC  - Pt w/ hx of uncontrolled T2DM, a1c 11.9.   - Endocrine consulted via email, recs appreciated  - On glargine 15 units qhs, lispro 10u TID qac, hold if NPO or eating < 50% of meals  - MISS FS tid qac qhs  - Discharge DM medications:   - Continue with metformin 500 mg BID  - STOP glimipride due to recurrent hypoglycemia at home   - Basal/bolus, dose will depend on insulin requirement and steroid plan   - Patient will follow up at  [facility]:  [address] [place] [address]. [address]. [place] [address]  Tel: [phone]

## 2024-05-21 NOTE — PHARMACOTHERAPY INTERVENTION NOTE - COMMENTS
62 YO F with PMHx of IPF, PE on Eliquis, severe pHTN, DM, transferred to [facility] for lung transplant evaluation. Pulmonary is following patient and recommended a prednisone taper every 7 days. Patient completed prednisone 40mg daily x 7 days with last dose given this morning and without additional orders. Discussed during IDRs, recommend to reorder for 30mg daily for 7 days (as per pulm, taper by 10mg every 7 days).    Annette Brown, PharmD, BCPS  Clinical Pharmacy Specialist  Teams (preferred) or [phone] 
Annita Quiñonez, 62 yo F with PMHx ILD, PE 2022, severe pHTN, IDDM, presenting as a transfer from [place] for lung transplant eval iso SOB, dry cough, chest pain.    Recommended ordering prednisone 20mg QD x 7 days (until 5/27), then decrease to 10mg QD to continue steroid taper regimen per pulmonology recs. Communicated with medicine NP.    Riley Iwrin, PharmD  PGY1 Pharmacy Resident  Available on Teams

## 2024-05-21 NOTE — PROGRESS NOTE ADULT - ASSESSMENT
63F PMH chronic AHRF on 2-3L NC at b/l 2/2 ILD (Followed w/ Dr. Primo Marlow, [facility]), PE on Eliquis, severe pHTN, Cor pulmonale, DM, presented initially with SOB, dry cough, chest pain, LE edema, recent admission to [facility] in March for ILD flare, admitted to outside hospital->ICU for AHRF 2/2 ILD flare, transferred to [facility] for lung transplant evaluation and found not to be a transplant candidate.

## 2024-05-21 NOTE — PROGRESS NOTE ADULT - PROBLEM SELECTOR PLAN 1
Baseline 3-4L NC. Hx of ILD w/ c/f IPF. Followed w/ DOMENICO Ding. Transferred to [facility] for lung transplant eval  - NM cardiac shunt performed on 5/10 was negative for PFO  - Continue home Ofev 150mg BID  - Duonebs, Symbicort  - Transitioned to PO Prednisone 40mg, plan to taper by 10mg every 7 days. Prednisone 20 mg 5/21. If worsening hypoxia would increase steroids. Bactrim ordered for ppx.   - valium 2.5 mg q12 PRN anxiety   - Deemed not to be a transplant candidate 4/30

## 2024-05-21 NOTE — PROGRESS NOTE ADULT - SUBJECTIVE AND OBJECTIVE BOX
[facility] Division of Hospital Medicine  Héctor Lozano MD  Available via MS Teams    SUBJECTIVE / OVERNIGHT EVENTS: This AM pt had some tachypnea and coughing after eating. Seen with pulm at bedside. Pt states she is very anxious. Diarrhea resolved. Received 1 dose of imodium 5/20 around noon.     ADDITIONAL REVIEW OF SYSTEMS:    MEDICATIONS  (STANDING):  albuterol/ipratropium for Nebulization 3 milliLiter(s) Nebulizer every 6 hours  apixaban 5 milliGRAM(s) Oral two times a day  atorvastatin 40 milliGRAM(s) Oral at bedtime  budesonide 160 MICROgram(s)/formoterol 4.5 MICROgram(s) Inhaler 2 Puff(s) Inhalation two times a day  chlorhexidine 2% Cloths 1 Application(s) Topical daily  dextrose 10% Bolus 125 milliLiter(s) IV Bolus once  dextrose 5%. 1000 milliLiter(s) (100 mL/Hr) IV Continuous <Continuous>  dextrose 5%. 1000 milliLiter(s) (50 mL/Hr) IV Continuous <Continuous>  dextrose 50% Injectable 25 Gram(s) IV Push once  dextrose 50% Injectable 12.5 Gram(s) IV Push once  furosemide   Injectable 40 milliGRAM(s) IV Push two times a day  gabapentin 300 milliGRAM(s) Oral daily  glucagon  Injectable 1 milliGRAM(s) IntraMuscular once  insulin glargine Injectable (LANTUS) 18 Unit(s) SubCutaneous at bedtime  insulin lispro (ADMELOG) corrective regimen sliding scale   SubCutaneous <User Schedule>  insulin lispro (ADMELOG) corrective regimen sliding scale   SubCutaneous three times a day before meals  insulin lispro Injectable (ADMELOG) 12 Unit(s) SubCutaneous before dinner  insulin lispro Injectable (ADMELOG) 18 Unit(s) SubCutaneous before breakfast  insulin lispro Injectable (ADMELOG) 6 Unit(s) SubCutaneous at bedtime  insulin lispro Injectable (ADMELOG) 14 Unit(s) SubCutaneous before lunch  multivitamin 1 Tablet(s) Oral daily  Ofev (Nintedanib) 150 milliGRAM(s) 150 milliGRAM(s) Oral two times a day  pantoprazole    Tablet 40 milliGRAM(s) Oral two times a day  predniSONE   Tablet 20 milliGRAM(s) Oral daily  sildenafil (REVATIO) 10 milliGRAM(s) Oral every 8 hours  simethicone 80 milliGRAM(s) Chew two times a day  traZODone 150 milliGRAM(s) Oral at bedtime  trimethoprim   80 mG/sulfamethoxazole 400 mG 1 Tablet(s) Oral daily    MEDICATIONS  (PRN):  acetaminophen     Tablet .. 650 milliGRAM(s) Oral every 6 hours PRN Temp greater or equal to 38C (100.4F), Mild Pain (1 - 3)  aluminum hydroxide/magnesium hydroxide/simethicone Suspension 30 milliLiter(s) Oral every 4 hours PRN Dyspepsia  cyclobenzaprine 5 milliGRAM(s) Oral three times a day PRN Muscle Spasm  dextrose Oral Gel 15 Gram(s) Oral once PRN Blood Glucose LESS THAN 70 milliGRAM(s)/deciliter  loperamide 2 milliGRAM(s) Oral four times a day PRN Diarrhea  LORazepam     Tablet 0.5 milliGRAM(s) Oral every 6 hours PRN Severe Anxiety  melatonin 3 milliGRAM(s) Oral at bedtime PRN Insomnia      I&O's Summary    20 May 2024 07:01  -  21 May 2024 07:00  --------------------------------------------------------  IN: 840 mL / OUT: 1500 mL / NET: -660 mL        T(C): 37 (05-21-24 @ 10:55), Max: 37 (05-21-24 @ 10:55)  HR: 117 (05-21-24 @ 10:55) (110 - 118)  BP: 111/67 (05-21-24 @ 10:55) (111/67 - 132/79)  RR: 18 (05-21-24 @ 10:55) (18 - 18)  SpO2: 96% (05-21-24 @ 10:55) (93% - 96%)        LABS:                        9.4    8.64  )-----------( 411      ( 21 May 2024 06:47 )             30.8     05-21    137  |  101  |  30<H>  ----------------------------<  129<H>  4.1   |  27  |  0.87    Ca    8.6      21 May 2024 06:49  Phos  3.1     05-21  Mg     2.3     05-21    TPro  6.1  /  Alb  2.9<L>  /  TBili  0.2  /  DBili  x   /  AST  39  /  ALT  55<H>  /  AlkPhos  106  05-20          Urinalysis Basic - ( 21 May 2024 06:49 )    Color: x / Appearance: x / SG: x / pH: x  Gluc: 129 mg/dL / Ketone: x  / Bili: x / Urobili: x   Blood: x / Protein: x / Nitrite: x   Leuk Esterase: x / RBC: x / WBC x   Sq Epi: x / Non Sq Epi: x / Bacteria: x        SARS-CoV-2: NotDetec (11 Apr 2024 22:46)          RADIOLOGY & ADDITIONAL TESTS:  New Results Reviewed Today:   New Imaging Personally Reviewed Today:  New Electrocardiogram Personally Reviewed Today:  Prior or Outpatient Records Reviewed Today:    COMMUNICATION:  Care Discussed with Consultants/Other Providers and Details of Discussion: Discussed with ACP, pulm team at bedside  Discussions with Patient/Family:   PCP Communication:    CONSTITUTIONAL: NAD, receiving nebulizer treatment  RESPIRATORY: tachypneic, short shallow breaths  CARDIOVASCULAR: regular, tachycardic, normal S1 and S2, no LE edema  ABDOMEN: soft, nt, nd   MUSCULOSKELETAL: no clubbing or cyanosis of digits; no joint swelling or tenderness to palpation  PSYCH: A+O to person, place, and time; affect appropriate  NEUROLOGY: CN 2-12 are intact and symmetric; no gross sensory deficits   SKIN: No rashes; no palpable lesions

## 2024-05-21 NOTE — PROGRESS NOTE ADULT - SUBJECTIVE AND OBJECTIVE BOX
Patient seen today for follow up inpatient Diabetes Mellitus management.    Chief Complaint: Diabetes Mellitus II     INTERVAL HX:  Patient seen in [facility] 4MON 417 D1 . Patient is alert and oriented, resting in bed. Remains on oxygen, nasal cannula. BG have been stable and mostly at goal 100-180mg/dL while on a Consistent Carbohydrate Diet. Intermittently eats a snack at night. Today she states she is not eating as much. Blood glucose levels in the last 24hrs have been 80-176mg/dL. Notes near hypoglycemia r/t prednisone taper.    Review of Systems:  General: As above  Respiratory: Denies any cough. Positive for tachypnea/SOB since admission  Gastrointestinal: Denies any n/v/d or abdominal pain.   Endocrine: Denies any polyuria, polydypsia, polyphagia, visual changes, or numbness in feet.     Allergies  No Known Allergies    Intolerances  None.     MEDICATIONS  (STANDING):  acetaminophen     Tablet .. 650 milliGRAM(s) Oral every 6 hours PRN  albuterol/ipratropium for Nebulization 3 milliLiter(s) Nebulizer every 6 hours  aluminum hydroxide/magnesium hydroxide/simethicone Suspension 30 milliLiter(s) Oral every 4 hours PRN  apixaban 5 milliGRAM(s) Oral two times a day  atorvastatin 40 milliGRAM(s) Oral at bedtime  budesonide 160 MICROgram(s)/formoterol 4.5 MICROgram(s) Inhaler 2 Puff(s) Inhalation two times a day  chlorhexidine 2% Cloths 1 Application(s) Topical daily  cyclobenzaprine 5 milliGRAM(s) Oral three times a day PRN  dextrose 10% Bolus 125 milliLiter(s) IV Bolus once  dextrose 5%. 1000 milliLiter(s) IV Continuous <Continuous>  dextrose 5%. 1000 milliLiter(s) IV Continuous <Continuous>  dextrose 50% Injectable 12.5 Gram(s) IV Push once  dextrose 50% Injectable 25 Gram(s) IV Push once  dextrose Oral Gel 15 Gram(s) Oral once PRN  furosemide   Injectable 40 milliGRAM(s) IV Push two times a day  gabapentin 300 milliGRAM(s) Oral daily  glucagon  Injectable 1 milliGRAM(s) IntraMuscular once  insulin glargine Injectable (LANTUS) 18 Unit(s) SubCutaneous at bedtime  insulin lispro (ADMELOG) corrective regimen sliding scale   SubCutaneous <User Schedule>  insulin lispro (ADMELOG) corrective regimen sliding scale   SubCutaneous three times a day before meals  insulin lispro Injectable (ADMELOG) 6 Unit(s) SubCutaneous at bedtime  insulin lispro Injectable (ADMELOG) 10 Unit(s) SubCutaneous before dinner  loperamide 2 milliGRAM(s) Oral four times a day PRN  LORazepam     Tablet 0.5 milliGRAM(s) Oral every 6 hours PRN  melatonin 3 milliGRAM(s) Oral at bedtime PRN  multivitamin 1 Tablet(s) Oral daily  Ofev (Nintedanib) 150 milliGRAM(s) 150 milliGRAM(s) Oral two times a day  pantoprazole    Tablet 40 milliGRAM(s) Oral two times a day  predniSONE   Tablet 20 milliGRAM(s) Oral daily  sildenafil (REVATIO) 10 milliGRAM(s) Oral every 8 hours  simethicone 80 milliGRAM(s) Chew two times a day  traZODone 150 milliGRAM(s) Oral at bedtime  trimethoprim   80 mG/sulfamethoxazole 400 mG 1 Tablet(s) Oral daily      atorvastatin 40 milliGRAM(s) Oral at bedtime  dextrose 50% Injectable 12.5 Gram(s) IV Push once  dextrose 50% Injectable 25 Gram(s) IV Push once  dextrose Oral Gel 15 Gram(s) Oral once PRN  glucagon  Injectable 1 milliGRAM(s) IntraMuscular once  insulin glargine Injectable (LANTUS) 18 Unit(s) SubCutaneous at bedtime  insulin lispro (ADMELOG) corrective regimen sliding scale   SubCutaneous three times a day before meals  insulin lispro (ADMELOG) corrective regimen sliding scale   SubCutaneous <User Schedule>  insulin lispro Injectable (ADMELOG) 10 Unit(s) SubCutaneous before dinner  insulin lispro Injectable (ADMELOG) 6 Unit(s) SubCutaneous at bedtime  predniSONE   Tablet 20 milliGRAM(s) Oral daily      insulin lispro (ADMELOG) corrective regimen sliding scale   SubCutaneous three times a day before meals  insulin lispro (ADMELOG) corrective regimen sliding scale   SubCutaneous <User Schedule>  insulin lispro Injectable (ADMELOG) 6 Unit(s) SubCutaneous at bedtime  insulin lispro Injectable (ADMELOG) 10 Unit(s) SubCutaneous before dinner      PHYSICAL EXAM:  VITALS:   T(C): 37 (05-21-24 @ 10:55), Max: 37 (05-21-24 @ 10:55)  HR: 117 (05-21-24 @ 10:55) (110 - 118)  BP: 111/67 (05-21-24 @ 10:55) (111/67 - 132/79)  RR: 18 (05-21-24 @ 10:55) (18 - 18)  SpO2: 96% (05-21-24 @ 10:55) (93% - 96%)    GENERAL: In no acute distress.   Respiratory: Tachypnea with talking  Extremities: Warm and dry, no edema  NEURO: Alert and orineted, appropriate     LABS:  POCT Blood Glucose.: 89 mg/dL (05-21-24 @ 12:04)  POCT Blood Glucose.: 80 mg/dL (05-21-24 @ 11:37)  POCT Blood Glucose.: 181 mg/dL (05-21-24 @ 07:50)  POCT Blood Glucose.: 131 mg/dL (05-21-24 @ 01:50)  POCT Blood Glucose.: 112 mg/dL (05-20-24 @ 21:30)  POCT Blood Glucose.: 176 mg/dL (05-20-24 @ 16:33)  POCT Blood Glucose.: 161 mg/dL (05-20-24 @ 12:31)  POCT Blood Glucose.: 108 mg/dL (05-20-24 @ 08:09)  POCT Blood Glucose.: 167 mg/dL (05-20-24 @ 02:01)  POCT Blood Glucose.: 104 mg/dL (05-19-24 @ 22:07)  POCT Blood Glucose.: 198 mg/dL (05-19-24 @ 16:58)  POCT Blood Glucose.: 326 mg/dL (05-19-24 @ 12:04)  POCT Blood Glucose.: 269 mg/dL (05-19-24 @ 08:22)  POCT Blood Glucose.: 298 mg/dL (05-19-24 @ 02:02)  POCT Blood Glucose.: 236 mg/dL (05-18-24 @ 22:10)  POCT Blood Glucose.: 119 mg/dL (05-18-24 @ 16:51)                          9.4    8.64  )-----------( 411      ( 21 May 2024 06:47 )             30.8     05-21    137  |  101  |  30<H>  ----------------------------<  129<H>  4.1   |  27  |  0.87    Ca    8.6      21 May 2024 06:49  Phos  3.1     05-21  Mg     2.3     05-21    TPro  6.1  /  Alb  2.9<L>  /  TBili  0.2  /  DBili  x   /  AST  39  /  ALT  55<H>  /  AlkPhos  106  05-20    LIVER FUNCTIONS - ( 20 May 2024 07:01 )  Alb: 2.9 g/dL / Pro: 6.1 g/dL / ALK PHOS: 106 U/L / ALT: 55 U/L / AST: 39 U/L / GGT: x           A1C with Estimated Average Glucose Result: A1C with Estimated Average Glucose Result: 11.9 % (04-13-24 @ 03:53)

## 2024-05-21 NOTE — PROGRESS NOTE ADULT - ASSESSMENT
63F hx ILD/ probable IPF (On 2-3LNC at home), PE 2022 on eliquis, severe pHTN w/ prior cor pulmonale (RVSP 68 - 10/22 w/ TTE from 4/12 w/ PASP 28, normal LV/RV SF), IDDM, presenting as a transfer from [place] for lung transplant eval iso SOB, dry cough, chest pain. She has been receiving duonebs, symbicort, lasix, ofev and IV steroids. Continuing with AC. Solu-medrol is ordered for 40 IV BID.  CT Chest 4/12/2024: Findings suggesting interstitial lung disease without significant interval progression. No evidence of pneumonia.   CT scan from 4/12/2024 when compared with CT scan from 2020 has shown significant progression.  Most recent CT is unchanged compared with 4/12/24 study.     Recommendations  - c/w weaning O2 as able - On 6L NC currently.   - Continue with Symbicort, duonebs, Ofev, Eliquis,   - c/w prednisone taper, decrease 10mg every week until off.   - Please ensure pt is on PCP prophylaxis with bactrim    - TTE results appreciated - PFO + e/o decreased Right ventricular function  - Shunt study negative.   - c/w sildenafil 10mg TID, will consider increasing to 20mg pending diarrhea sx  - please repeat BMP tomorrow   - pt with report of worsening dyspnea following meals, recommend SLP evaluation  -- if Cr continues to rise can decrease to once daily dosing of diuretics  - variable bed weights, check standing weights if able  - Strict I&O  - Please ambulate pt daily with goal to improve walking distance - this is a major barrier to transplant for this pt.

## 2024-05-21 NOTE — PROGRESS NOTE ADULT - PROBLEM SELECTOR PLAN 3
- Pt w/ hx of severe pHTN, RVSP 68 on 10/2022  - Repeat 4/12/24 at [facility] noted PAP 28, "normal PAP"   - Maintain euvolemia, as above  - sildenafil reduced to 10 mg TID as patient experienced diarrhea, which may be a side effect. increase to 20 tid if her diarrhea resolves

## 2024-05-22 NOTE — SWALLOW BEDSIDE ASSESSMENT ADULT - SLP PERTINENT HISTORY OF CURRENT PROBLEM
63y F with PMHx of chronic AHRF (baseline 2-3L O2 NC) 2/2 ILD, PE, severe pHTN, Cor pulmonale, and DM presented initially to OSH with SOB, dry cough, chest pain, and LE edema. Required ICU care at [facility] for AHFR iso ILD. Transferred to [facility] for lung transplant evaluation. On admission, Pt requiring high dose steroid and 45/60 HFNC. Neurology consulted for subacute presentation of urinary/fecal retention and b/l LE weakness iso illness, likely non-neurologic in origin. ECHO findings of elevated filling pressures and worsening RV function. Pulm following for management throughout this hospitalization. Pt deemed not a lung transplant candidate due to deconditioning, cardiac dysfunction, and poorly controlled diabetes. Able to be weaned from HFNC to NC. S/p RRT 5/6 for hypoxia requiring HFNC; CXR obtained during rapid with b/l increased hazy opacities on my read. Weaned to NC again. Pt failed TOV. Pt noted w/ tachypnea and coughing after eating; S&S evaluation ordered. 63y F with PMHx of chronic AHRF (baseline 2-3L O2 NC) 2/2 ILD, PE, severe pHTN, Cor pulmonale, and DM presented initially to OSH with SOB, dry cough, chest pain, and LE edema. Required ICU care at [facility] for AHFR iso ILD. Transferred to [facility] for lung transplant evaluation. On admission, Pt requiring high dose steroid and 45/60 HFNC. Neurology consulted for subacute presentation of urinary/fecal retention and b/l LE weakness iso illness, likely non-neurologic in origin. ECHO findings of elevated filling pressures and worsening RV function. Pulm following for management throughout this hospitalization. Pt deemed not a lung transplant candidate due to deconditioning, cardiac dysfunction, and poorly controlled diabetes. Able to be weaned from HFNC to NC. S/p RRT 5/6 for hypoxia requiring HFNC; CXR obtained during rapid with b/l increased hazy opacities on my read. Weaned to NC again. Pt failed TOV. Pt noted w/ tachypnea and coughing after eating; S&S evaluation ordered. Persistent diarrhea.

## 2024-05-22 NOTE — PROGRESS NOTE ADULT - PROBLEM SELECTOR PLAN 12
DVT PPx: Eliquis  Diet: Regular  Code: DNR/DNI, molst completed in chart     Aspiration precautions  Speech and swallow evaluation pending to rule out aspiration  Fall precautions    Discharge to [facility] pending plan for monitoring diarrhea/sildenafil dosing and creatinine

## 2024-05-22 NOTE — PROGRESS NOTE ADULT - PROBLEM SELECTOR PLAN 11
DVT PPx: Eliquis  Diet: Regular  Code: DNR/DNI, molst completed in chart     Aspiration precautions  Speech and swallow evaluation pending to rule out aspiration  Fall precautions    Discharge to [facility] pending plan for monitoring diarrhea/sildenafil dosing and creatinine - Continue home Trazodone 150 mg qd + Melatonin 3 PRN for sleep difficulties

## 2024-05-22 NOTE — SWALLOW BEDSIDE ASSESSMENT ADULT - SLP GENERAL OBSERVATIONS
Encountered Pt sitting upright in bed on 6L O2 via NC and holding NRB over mouth/nose. RN reports Pt w/ high anxiety re: respiratory status and intermittent use of NRB despite stable vital signs. On encounter, Pt reports breathing "feels better now" and removed NRB. RR 19. VSS. Pt is A&Ox4, verbally responsive, and able to follow simple commands.

## 2024-05-22 NOTE — PROGRESS NOTE ADULT - PROBLEM SELECTOR PLAN 1
- Test BG TID AC & QHS while eating regular meals and Q6H while NPO  - C/w Lantus 16 units QHS for now  - C/w insulin Admelog 14 units with breakfast and adjust  to 14 units with lunch, and 10 units with dinner for now. Hold if NPO or if eating less than 50% of meals; Pt instructed to report to staff if not eating  - Continue Admelog 6 units at bedtime if pt eating snack at night. PLEASE ASK PT if she is having a snack.   - Continue with mod dose correctional scale TID with meals and QHS  - Please notify endo team with any further changes on steroid doses.  Discharge planning:   Home DM medications: metformin 500 mg BID, + glimepride 1 mg daily + lantus 35 units QHS+ admelog 25 units TIDAC  Discharge DM medications:   Continue with metformin 500 mg BID  STOP glimepiride due to recurrent hypoglycemia at home   Basal/bolus, dose will depend on insulin requirement and steroid plan   Make sure pt has Rx for all DM supplies and insulin/ DM meds.  Can follow at endo practice. [address] [place] [address] [address]. Phone . Call for apt closer to discharge- - Patient will need opthalmology and podiatry follow up as outpatient  Pt will likely need rehab due to deconditioning

## 2024-05-22 NOTE — PROGRESS NOTE ADULT - PROBLEM SELECTOR PLAN 7
T2DM: Home DM medications: metformin 500 mg BID, + glimepride 1 mg daily + lantus 35 units QHS+ admelog 25 units TIDAC  - Pt w/ hx of uncontrolled T2DM, a1c 11.9.   - Endocrine consulted via email, recs appreciated  - On glargine 15 units qhs, lispro 10u TID qac, hold if NPO or eating < 50% of meals  - MISS FS tid qac qhs  - Discharge DM medications:   - Continue with metformin 500 mg BID  - STOP glimipride due to recurrent hypoglycemia at home   - Basal/bolus, dose will depend on insulin requirement and steroid plan   - Patient will follow up at  [facility]:  [address] [place] [address]. [address]. [place] [address]  Tel: [phone] Hx of urinary retention; follows Dr. Chua, urologist. At [facility] had failed TOV w/ godwin replaced. Arrives to [facility] w/o Godwin. Pt on outpt med list listed Tamsulosin 0.4 mg, unclear if started at [facility], pt saying she does not take this outpatient  - failed TOV here, godwin placed

## 2024-05-22 NOTE — PROGRESS NOTE ADULT - SUBJECTIVE AND OBJECTIVE BOX
CHIEF COMPLAINT:    Interval Events: clau nd examined at bedside while eating breakfast.     REVIEW OF SYSTEMS:  Constitutional: No fevers or chills. No weight loss. No fatigue or generalised malaise.  Eyes: No itching or discharge from the eyes  ENT: No ear pain. No ear discharge. No nasal congestion. No post nasal drip. No epistaxis. No throat pain. No sore throat. No difficulty swallowing.   CV: No chest pain. No palpitations. No lightheadedness or dizziness.       OBJECTIVE:  ICU Vital Signs Last 24 Hrs  T(C): 36.8 (22 May 2024 10:57), Max: 36.8 (22 May 2024 10:57)  T(F): 98.2 (22 May 2024 10:57), Max: 98.2 (22 May 2024 10:57)  HR: 120 (22 May 2024 13:40) (95 - 120)  BP: 134/82 (22 May 2024 13:40) (108/69 - 134/82)  BP(mean): --  ABP: --  ABP(mean): --  RR: 18 (22 May 2024 10:57) (18 - 25)  SpO2: 100% (22 May 2024 10:57) (97% - 100%)    O2 Parameters below as of 22 May 2024 10:57  Patient On (Oxygen Delivery Method): nasal cannula  O2 Flow (L/min): 6            05-21 @ 07:01  -  05-22 @ 07:00  --------------------------------------------------------  IN: 540 mL / OUT: 1900 mL / NET: -1360 mL    05-22 @ 07:01  -  05-22 @ 15:15  --------------------------------------------------------  IN: 440 mL / OUT: 0 mL / NET: 440 mL      CAPILLARY BLOOD GLUCOSE      POCT Blood Glucose.: 150 mg/dL (22 May 2024 11:50)      PHYSICAL EXAM:  General: Awake, alert, oriented X 3.   HEENT: Atraumatic, normocephalic.                 Mallampatti Grade                 No nasal congestion.                No tonsillar or pharyngeal exudates.  Lymph Nodes: No palpable lymphadenopathy  Neck: No JVD. No carotid bruit.   Respiratory: Normal chest expansion                         Normal percussion                         Normal and equal air entry                         No wheeze, rhonchi or rales.  Cardiovascular: S1 S2 normal. No murmurs, rubs or gallops.   Abdomen: Soft, non-tender, non-distended. No organomegaly.      HOSPITAL MEDICATIONS:  MEDICATIONS  (STANDING):  albuterol/ipratropium for Nebulization 3 milliLiter(s) Nebulizer every 6 hours  apixaban 5 milliGRAM(s) Oral two times a day  atorvastatin 40 milliGRAM(s) Oral at bedtime  budesonide 160 MICROgram(s)/formoterol 4.5 MICROgram(s) Inhaler 2 Puff(s) Inhalation two times a day  chlorhexidine 2% Cloths 1 Application(s) Topical daily  dextrose 10% Bolus 125 milliLiter(s) IV Bolus once  dextrose 5%. 1000 milliLiter(s) (100 mL/Hr) IV Continuous <Continuous>  dextrose 5%. 1000 milliLiter(s) (50 mL/Hr) IV Continuous <Continuous>  dextrose 50% Injectable 12.5 Gram(s) IV Push once  dextrose 50% Injectable 25 Gram(s) IV Push once  furosemide   Injectable 40 milliGRAM(s) IV Push two times a day  gabapentin 300 milliGRAM(s) Oral daily  glucagon  Injectable 1 milliGRAM(s) IntraMuscular once  insulin glargine Injectable (LANTUS) 16 Unit(s) SubCutaneous at bedtime  insulin lispro (ADMELOG) corrective regimen sliding scale   SubCutaneous <User Schedule>  insulin lispro (ADMELOG) corrective regimen sliding scale   SubCutaneous three times a day before meals  insulin lispro Injectable (ADMELOG) 14 Unit(s) SubCutaneous before breakfast  insulin lispro Injectable (ADMELOG) 10 Unit(s) SubCutaneous before dinner  insulin lispro Injectable (ADMELOG) 12 Unit(s) SubCutaneous before lunch  insulin lispro Injectable (ADMELOG) 6 Unit(s) SubCutaneous at bedtime  multivitamin 1 Tablet(s) Oral daily  Ofev (Nintedanib) 150 milliGRAM(s) 150 milliGRAM(s) Oral two times a day  pantoprazole    Tablet 40 milliGRAM(s) Oral two times a day  predniSONE   Tablet 20 milliGRAM(s) Oral daily  sildenafil (REVATIO) 10 milliGRAM(s) Oral every 8 hours  simethicone 80 milliGRAM(s) Chew two times a day  traZODone 150 milliGRAM(s) Oral at bedtime  trimethoprim   80 mG/sulfamethoxazole 400 mG 1 Tablet(s) Oral daily    MEDICATIONS  (PRN):  acetaminophen     Tablet .. 650 milliGRAM(s) Oral every 6 hours PRN Temp greater or equal to 38C (100.4F), Mild Pain (1 - 3)  aluminum hydroxide/magnesium hydroxide/simethicone Suspension 30 milliLiter(s) Oral every 4 hours PRN Dyspepsia  cyclobenzaprine 5 milliGRAM(s) Oral three times a day PRN Muscle Spasm  dextrose Oral Gel 15 Gram(s) Oral once PRN Blood Glucose LESS THAN 70 milliGRAM(s)/deciliter  loperamide 2 milliGRAM(s) Oral four times a day PRN Diarrhea  LORazepam     Tablet 0.5 milliGRAM(s) Oral every 6 hours PRN Severe Anxiety  melatonin 3 milliGRAM(s) Oral at bedtime PRN Insomnia      LABS:                        9.2    7.60  )-----------( 418      ( 22 May 2024 07:18 )             30.1     05-22    135  |  100  |  30<H>  ----------------------------<  183<H>  4.7   |  26  |  0.81    Ca    8.8      22 May 2024 07:11  Phos  3.5     05-22  Mg     2.4     05-22        Urinalysis Basic - ( 22 May 2024 07:11 )    Color: x / Appearance: x / SG: x / pH: x  Gluc: 183 mg/dL / Ketone: x  / Bili: x / Urobili: x   Blood: x / Protein: x / Nitrite: x   Leuk Esterase: x / RBC: x / WBC x   Sq Epi: x / Non Sq Epi: x / Bacteria: x            MICROBIOLOGY:     RADIOLOGY:  [ ] Reviewed and interpreted by me    Point of Care Ultrasound Findings:    PFT:    EKG:

## 2024-05-22 NOTE — PROGRESS NOTE ADULT - PROBLEM SELECTOR PLAN 10
- Continue home Trazodone 150 mg qd + Melatonin 3 PRN for sleep difficulties - Continue Gabapentin 300 mg qd for neuropathic pain

## 2024-05-22 NOTE — SWALLOW BEDSIDE ASSESSMENT ADULT - SWALLOW EVAL: PATIENT/FAMILY GOALS STATEMENT
Pt endorses c/o food feeling "stuck and coming back up" in her chest; additionally, reports feeling SOB and fatigued following intake; denies cough w/ intake or odynophagia; ***Pt reports fear of eating due to current respiratory status and c/o globus sensation***

## 2024-05-22 NOTE — PROGRESS NOTE ADULT - ASSESSMENT
63F PMH chronic AHRF on 2-3L NC at b/l 2/2 ILD (Followed w/ Dr. Primo Marlow, [facility]), PE on Eliquis, severe pHTN, Cor pulmonale, DM, presented initially with SOB, dry cough, chest pain, LE edema, [facility] flare, admitted to outside hospital->ICU for AHRF 2/2 ILD flare, transferred to [facility] for lung transplant evaluation and found not to be a transplant candidate.

## 2024-05-22 NOTE — PROGRESS NOTE ADULT - SUBJECTIVE AND OBJECTIVE BOX
DIABETES FOLLOW UP NOTE: Saw pt earlier today    Chief Complaint: Endocrine consult requested for management of DM    INTERVAL HX: Pt stable, reports tolerating POs but per staff variable PO intake. Sometimes pt eats well and another times not much.> RN states pt gets easily anxious about her breathing and that affects her PO intake. BGs are still very variable between 100s to 300s in the last 24 hours. Pt refused insulin ac lunch yesterday with f/u BG >300s that lingered until 2am last night. Also eating snack at night on and off > reports not eating any last night. No hypoglycemia.   On Prednisone 20mg daily til 5/27      Review of Systems:  General: As above  Cardiovascular: No chest pain, palpitations  Respiratory: on and off SOB, on and off cough  GI: No nausea, vomiting, abdominal pain  Endocrine: no polyuria, polydipsia or S&Sx of hypoglycemia    Allergies    No Known Allergies    Intolerances      MEDICATIONS:  atorvastatin 40 milliGRAM(s) Oral at bedtime  insulin glargine Injectable (LANTUS) 16 Unit(s) SubCutaneous at bedtime  insulin lispro (ADMELOG) corrective regimen sliding scale   SubCutaneous <User Schedule>  insulin lispro (ADMELOG) corrective regimen sliding scale   SubCutaneous three times a day before meals  insulin lispro Injectable (ADMELOG) 14 Unit(s) SubCutaneous before breakfast  insulin lispro Injectable (ADMELOG) 10 Unit(s) SubCutaneous before dinner  insulin lispro Injectable (ADMELOG) 6 Unit(s) SubCutaneous at bedtime  insulin lispro Injectable (ADMELOG) 12 Unit(s) SubCutaneous before lunch  predniSONE   Tablet 20 milliGRAM(s) Oral daily  trimethoprim   80 mG/sulfamethoxazole 400 mG 1 Tablet(s) Oral daily      PHYSICAL EXAM:  VITALS: T(C): 36.8 (05-22-24 @ 10:57)  T(F): 98.2 (05-22-24 @ 10:57), Max: 98.2 (05-22-24 @ 10:57)  HR: 120 (05-22-24 @ 13:40) (95 - 120)  BP: 134/82 (05-22-24 @ 13:40) (108/69 - 134/82)  RR:  (18 - 25)  SpO2:  (97% - 100%)  Wt(kg): --  GENERAL: Female laying in bed in NAD  HEENT: On O2 via NC  Abdomen: Soft, nontender, non distended  Extremities: Warm, no edema in all 4 exts  NEURO: A&O X3    LABS:  POCT Blood Glucose.: 358 mg/dL (05-22-24 @ 16:30)  POCT Blood Glucose.: 150 mg/dL (05-22-24 @ 11:50)  POCT Blood Glucose.: 186 mg/dL (05-22-24 @ 07:46)  POCT Blood Glucose.: 304 mg/dL (05-22-24 @ 02:02)  POCT Blood Glucose.: 325 mg/dL (05-21-24 @ 21:22)  POCT Blood Glucose.: 349 mg/dL (05-21-24 @ 16:21)  POCT Blood Glucose.: 89 mg/dL (05-21-24 @ 12:04)  POCT Blood Glucose.: 80 mg/dL (05-21-24 @ 11:37)  POCT Blood Glucose.: 181 mg/dL (05-21-24 @ 07:50)  POCT Blood Glucose.: 131 mg/dL (05-21-24 @ 01:50)  POCT Blood Glucose.: 112 mg/dL (05-20-24 @ 21:30)  POCT Blood Glucose.: 176 mg/dL (05-20-24 @ 16:33)  POCT Blood Glucose.: 161 mg/dL (05-20-24 @ 12:31)  POCT Blood Glucose.: 108 mg/dL (05-20-24 @ 08:09)  POCT Blood Glucose.: 167 mg/dL (05-20-24 @ 02:01)  POCT Blood Glucose.: 104 mg/dL (05-19-24 @ 22:07)                            9.2    7.60  )-----------( 418      ( 22 May 2024 07:18 )             30.1       05-22    135  |  100  |  30<H>  ----------------------------<  183<H>  4.7   |  26  |  0.81    eGFR: 83    Ca    8.8      05-22  Mg     2.4     05-22  Phos  3.5     05-22    TPro  6.1  /  Alb  2.9<L>  /  TBili  0.2  /  DBili  x   /  AST  39  /  ALT  55<H>  /  AlkPhos  106  05-20      A1C with Estimated Average Glucose Result: 11.9 % (04-13-24 @ 03:53)      Estimated Average Glucose: 295 mg/dL (04-13-24 @ 03:53)

## 2024-05-22 NOTE — PROGRESS NOTE ADULT - ATTENDING COMMENTS
Patient seen and examined. Patient is a 63F with extensive medical history including suspected IPF with chronic hypoxemic respiratory failure on O2, and pulmonary hypertension on Sildenafil presenting from [facility] for lung transplant evaluation in the setting of shortness of breath, chest pain, and dry cough.    She has had clinical improvement with diuresis and is currently on 6L O2 (she uses 4-5L O2 at home). During this hospitalization her Sildenafil dose was decreased in the setting of flushing, lightheadedness, and diarrhea. Her diarrhea has improved and she denies flushing. She does state that she finds her breathing more difficult immediately after eating and she is awaiting a swallow evaluation.      #Interstitial Lung Disease  - Continue Ofev  - Patient with suspected IPF though most recent CT more consistent with fibrotic NSIP  - Steroid taper with PCP prophylaxis  - Outpatient follow-up with her primary Pulmonologist Dr. Marlow.  - Consider outpatient evaluation with Dr. Emery as well for ILD and pulmonary hypertension co-management if Dr. Marlow feels this would be helpful  - Has been seen by the Lung Transplant team - and patient deemed to not be a candidate for lung transplant. I spoke with the lung transplant team today and they confirmed that patient is not a candidate for lung transplant evaluation at this time.  #Pulmonary Hypertension  - Continue Sildenafil 10mg TID and monitor  - severe pulmonary hypertension by echo but no RHC noted - this may become necessary if her symptoms do not improve  - Continue diuresis with goal negative fluid balance  - Nuclear medicine cardiac shunt study was negative - patient has a history of PFO  #Acute on Chronic Hypoxemic Respiratory Failure  - Continue supplemental O2 with goal O2 sat > 90%  - Incentive spirometer and acapella to facilitate breathing  - OOB and deep breathing  - Given worsening of symptoms after eating would suggest swallow evaluation to see if patient would benefit from modification of diet  #DVT proph - continue AC given history of PE from 2022  #GOC - DNR/DNI    Discussed with patient and medicine attending and all questions answered. Long term prognosis guarded. Discharge planning to rehab will be important as patient needs to improve physical exercise tolerance/capacity to hopefully have more meaningful improvements from a pulmonary standpoint.

## 2024-05-22 NOTE — SWALLOW BEDSIDE ASSESSMENT ADULT - NS SPL SWALLOW CLINIC TRIAL FT
Pt required encouragement to accept PO trials due to reports of globus sensation in chest area; amenable to "smooth" consistencies Pt required encouragement to accept PO trials due to reports of globus sensation in chest area; amenable to "smooth" consistencies; only accepted x1 tsp of puree and 3-5 sips of thin liquids via water bottle

## 2024-05-22 NOTE — PROGRESS NOTE ADULT - PROBLEM SELECTOR PLAN 8
c/w home cyclobenzaprine 5mg q8 prn T2DM: Home DM medications: metformin 500 mg BID, + glimepride 1 mg daily + lantus 35 units QHS+ admelog 25 units TIDAC  - Pt w/ hx of uncontrolled T2DM, a1c 11.9.   - Endocrine consulted via email, recs appreciated  - On glargine 15 units qhs, lispro 10u TID qac, hold if NPO or eating < 50% of meals  - MISS FS tid qac qhs  - Discharge DM medications:   - Continue with metformin 500 mg BID  - STOP glimipride due to recurrent hypoglycemia at home   - Basal/bolus, dose will depend on insulin requirement and steroid plan   - Patient will follow up at  [facility]:  [address] [place] [address]. [address]. [place] [address]  Tel: [phone]

## 2024-05-22 NOTE — PROGRESS NOTE ADULT - PROBLEM SELECTOR PLAN 1
Baseline 3-4L NC. Hx of ILD w/ c/f IPF. Followed w/ DOMENICO Ding. Transferred to [facility] for lung transplant eval  - NM cardiac shunt performed on 5/10 was negative for PFO  - Continue home Ofev 150mg BID  - Duonebs, Symbicort  - Transitioned to PO Prednisone 40mg, plan to taper by 10mg every 7 days. Prednisone 20 mg 5/21. If worsening hypoxia would increase steroids. Bactrim ordered for ppx.   - valium 2.5 mg q12 PRN anxiety   - Deemed not to be a transplant candidate 4/30

## 2024-05-22 NOTE — SWALLOW BEDSIDE ASSESSMENT ADULT - SWALLOW EVAL: DIAGNOSIS
63y F with PMHx of chronic AHRF (baseline 2-3L O2 NC) 2/2 ILD, severe pHTN, Cor pulmonale, and DM presented initially to OSH with SOB, dry cough, chest pain, and LE edema. Transferred to [facility] for lung transplant evaluation and deemed not a candidate. This exam is limited 2/2 Pt participation and acceptance of PO trials. Pt presents w/ a functional oral/pharyngeal swallow w/ limited trials of puree and thin liquids. No overt s/s of laryngeal penetration/aspiration noted across PO trials. Upon palpation, hyolaryngeal excursion and onset of pharyngeal swallow are WNL. Given limited exam, this service will follow-up for further evaluation. Defer diet to medical team at this time. Consider GI consult due to Pt complaints of food feeling "stuck and coming back up". 63y F with PMHx of chronic AHRF (baseline 2-3L O2 NC) 2/2 ILD, severe pHTN, Cor pulmonale, and DM presented initially to OSH with SOB, dry cough, chest pain, and LE edema. Transferred to [facility] for lung transplant evaluation and deemed not a candidate. This exam is limited 2/2 Pt participation and acceptance of PO trials. Pt presents w/ a functional oral/pharyngeal swallow w/ limited trials of puree and thin liquids. No overt s/s of laryngeal penetration/aspiration noted across PO trials. Upon palpation, hyolaryngeal excursion and onset of pharyngeal swallow are WNL. Of note, Pt reports preference for puree consistencies at this time 2/2 substernal globus sensation. Given limited exam, this service will follow-up for further evaluation. Defer diet to medical team at this time. Consider GI consult due to Pt complaints of food feeling "stuck and coming back up".

## 2024-05-22 NOTE — SWALLOW BEDSIDE ASSESSMENT ADULT - COMMENTS
IMAGIN/10 CXR: IMPRESSION:Diffuse bilateral fibrotic/chronic interstitial lung disease. Mild pulmonary edema.    ***Pt is not previously known to this service and no prior swallow studies noted in PACS. IMAGIN/10 CXR: IMPRESSION: Diffuse bilateral fibrotic/chronic interstitial lung disease. Mild pulmonary edema.    ***Pt is not previously known to this service and no prior swallow studies noted in PACS.

## 2024-05-22 NOTE — PROGRESS NOTE ADULT - SUBJECTIVE AND OBJECTIVE BOX
[facility] Division of Hospital Medicine  Héctor Lozano MD  Available via MS Teams    SUBJECTIVE / OVERNIGHT EVENTS: No acute events overnight. Pt seen and examined at bedside. Feeling less anxious today and breathing better. No diarrhea.     ADDITIONAL REVIEW OF SYSTEMS:    MEDICATIONS  (STANDING):  albuterol/ipratropium for Nebulization 3 milliLiter(s) Nebulizer every 6 hours  apixaban 5 milliGRAM(s) Oral two times a day  atorvastatin 40 milliGRAM(s) Oral at bedtime  budesonide 160 MICROgram(s)/formoterol 4.5 MICROgram(s) Inhaler 2 Puff(s) Inhalation two times a day  chlorhexidine 2% Cloths 1 Application(s) Topical daily  dextrose 10% Bolus 125 milliLiter(s) IV Bolus once  dextrose 5%. 1000 milliLiter(s) (100 mL/Hr) IV Continuous <Continuous>  dextrose 5%. 1000 milliLiter(s) (50 mL/Hr) IV Continuous <Continuous>  dextrose 50% Injectable 25 Gram(s) IV Push once  dextrose 50% Injectable 12.5 Gram(s) IV Push once  furosemide   Injectable 40 milliGRAM(s) IV Push two times a day  gabapentin 300 milliGRAM(s) Oral daily  glucagon  Injectable 1 milliGRAM(s) IntraMuscular once  insulin glargine Injectable (LANTUS) 16 Unit(s) SubCutaneous at bedtime  insulin lispro (ADMELOG) corrective regimen sliding scale   SubCutaneous <User Schedule>  insulin lispro (ADMELOG) corrective regimen sliding scale   SubCutaneous three times a day before meals  insulin lispro Injectable (ADMELOG) 12 Unit(s) SubCutaneous before lunch  insulin lispro Injectable (ADMELOG) 6 Unit(s) SubCutaneous at bedtime  insulin lispro Injectable (ADMELOG) 14 Unit(s) SubCutaneous before breakfast  insulin lispro Injectable (ADMELOG) 10 Unit(s) SubCutaneous before dinner  multivitamin 1 Tablet(s) Oral daily  Ofev (Nintedanib) 150 milliGRAM(s) 150 milliGRAM(s) Oral two times a day  pantoprazole    Tablet 40 milliGRAM(s) Oral two times a day  predniSONE   Tablet 20 milliGRAM(s) Oral daily  sildenafil (REVATIO) 10 milliGRAM(s) Oral every 8 hours  simethicone 80 milliGRAM(s) Chew two times a day  traZODone 150 milliGRAM(s) Oral at bedtime  trimethoprim   80 mG/sulfamethoxazole 400 mG 1 Tablet(s) Oral daily    MEDICATIONS  (PRN):  acetaminophen     Tablet .. 650 milliGRAM(s) Oral every 6 hours PRN Temp greater or equal to 38C (100.4F), Mild Pain (1 - 3)  aluminum hydroxide/magnesium hydroxide/simethicone Suspension 30 milliLiter(s) Oral every 4 hours PRN Dyspepsia  cyclobenzaprine 5 milliGRAM(s) Oral three times a day PRN Muscle Spasm  dextrose Oral Gel 15 Gram(s) Oral once PRN Blood Glucose LESS THAN 70 milliGRAM(s)/deciliter  loperamide 2 milliGRAM(s) Oral four times a day PRN Diarrhea  LORazepam     Tablet 0.5 milliGRAM(s) Oral every 6 hours PRN Severe Anxiety  melatonin 3 milliGRAM(s) Oral at bedtime PRN Insomnia      I&O's Summary    21 May 2024 07:01  -  22 May 2024 07:00  --------------------------------------------------------  IN: 540 mL / OUT: 1900 mL / NET: -1360 mL        T(C): 36.8 (05-22-24 @ 10:57), Max: 36.8 (05-22-24 @ 10:57)  HR: 114 (05-22-24 @ 10:57) (95 - 120)  BP: 126/83 (05-22-24 @ 10:57) (108/69 - 133/83)  RR: 18 (05-22-24 @ 10:57) (18 - 25)  SpO2: 100% (05-22-24 @ 10:57) (97% - 100%)        LABS:                        9.2    7.60  )-----------( 418      ( 22 May 2024 07:18 )             30.1     05-22    135  |  100  |  30<H>  ----------------------------<  183<H>  4.7   |  26  |  0.81    Ca    8.8      22 May 2024 07:11  Phos  3.5     05-22  Mg     2.4     05-22            Urinalysis Basic - ( 22 May 2024 07:11 )    Color: x / Appearance: x / SG: x / pH: x  Gluc: 183 mg/dL / Ketone: x  / Bili: x / Urobili: x   Blood: x / Protein: x / Nitrite: x   Leuk Esterase: x / RBC: x / WBC x   Sq Epi: x / Non Sq Epi: x / Bacteria: x        SARS-CoV-2: NotDetec (11 Apr 2024 22:46)          RADIOLOGY & ADDITIONAL TESTS:  New Results Reviewed Today:   New Imaging Personally Reviewed Today:  New Electrocardiogram Personally Reviewed Today:  Prior or Outpatient Records Reviewed Today:    COMMUNICATION:  Care Discussed with Consultants/Other Providers and Details of Discussion: Discussed with ACP  Discussions with Patient/Family:  PCP Communication:    82% on 4L NC -> 90% on 6L NC    CONSTITUTIONAL: NAD  RESPIRATORY: tachypneic, short shallow breaths  CARDIOVASCULAR: regular, tachycardic, normal S1 and S2, no LE edema  ABDOMEN: soft, nt, nd   MUSCULOSKELETAL: no clubbing or cyanosis of digits; no joint swelling or tenderness to palpation  PSYCH: A+O to person, place, and time; affect appropriate  NEUROLOGY: CN 2-12 are intact and symmetric; no gross sensory deficits   SKIN: No rashes; no palpable lesions

## 2024-05-22 NOTE — PROGRESS NOTE ADULT - PROBLEM SELECTOR PLAN 3
- Pt w/ hx of severe pHTN, RVSP 68 on 10/2022  - Repeat 4/12/24 at [facility] noted PAP 28, "normal PAP"   - Maintain euvolemia, as above  - sildenafil reduced to 10 mg TID as patient experienced diarrhea, which may be a side effect. increase to 20 tid if her diarrhea resolves

## 2024-05-22 NOTE — PROGRESS NOTE ADULT - ASSESSMENT
63 F w/h/o uncontrolled T2DM (A1C 11.9%) while on Lantus, admelog, metformin, glimepiride. Unknown DM complications. Also h/o AHRF, ILD, PE, HTN. Here with SOB secondary to interstitial lung disease. Continues with SOB and tachypnea, managed by primary medicine team.Endocrinology consulted for diabetes management. Tolerating POs with BG up to 300s in the last 24 hours after pt refused insulin ac lunch yesterday for BG in 80s>also eating snack at night on and off. On steroid taper. BG improved today but noted BG 300s again this pm. Will adjust lunch time insulin for tomorrow to try to keep BG goal 100 to 180s without hypoglycemia.     Continues on steroid taper:  Prednisone 20 mg daily ( 5/21-5/27)  Prednisone 10 mg daily ( 5/28--

## 2024-05-22 NOTE — PROGRESS NOTE ADULT - ASSESSMENT
63F hx ILD/ probable IPF (On 2-3LNC at home), PE 2022 on eliquis, severe pHTN w/ prior cor pulmonale (RVSP 68 - 10/22 w/ TTE from 4/12 w/ PASP 28, normal LV/RV SF), IDDM, presenting as a transfer from [place] for lung transplant eval iso SOB, dry cough, chest pain. She has been receiving duonebs, symbicort, lasix, ofev and IV steroids.     CT Chest 4/12/2024: Findings suggesting interstitial lung disease without significant interval progression. No evidence of pneumonia.   CT scan from 4/12/2024 when compared with CT scan from 2020 has shown significant progression.  Most recent CT is unchanged compared with 4/12/24 study.     Recommendations  - c/w weaning O2 as able - On 6L NC currently.   - Continue with Symbicort, duonebs, Ofev, Eliquis,   - c/w prednisone taper, decrease 10mg every week until off. Currently on   - PCP prophylaxis with bactrim    - Shunt study negative.   - c/w sildenafil 10mg TID, will consider increasing to 20mg pending diarrhea sx  - pt with report of worsening dyspnea following meals, recommend SLP evaluation  - if Cr continues to rise can decrease to once daily dosing of diuretics  - variable bed weights, check standing weights if able  - Strict I&O  - Please ambulate pt daily with goal to improve walking distance - this is a major barrier to transplant for this pt.

## 2024-05-23 NOTE — PROGRESS NOTE ADULT - SUBJECTIVE AND OBJECTIVE BOX
[facility]-- WOUND TEAM -- FOLLOW UP NOTE  --------------------------------------------------------------------------------    24 hour events/subjective:     Afebrile  Tolerating po w/o n/v  pt on 6L O2 therapy  Chart reviewed  Hx of   63-yo F with PMHx of chronic AHRF on 2-3L NC at b/l 2/2 ILD (Followed w/ Dr. Primo Marlow, [facility]), PE on Eliquis, severe pHTN, Cor pulmonale, DM, presented initially with SOB, dry cough, chest pain, LE edema, recent admission to [facility] in March for ILD flare, admitted to [facility]->ICU for AHRF 2/2 ILD flare, transferred to [facility] for lung transplant evaluation. Patient is not a candidate for lung transplant 2/2 cardiac and diabetic comorbidities.       Diet:  Diet, Regular:   Consistent Carbohydrate No Snacks (CSTCHO)  Supplement Feeding Modality:  Oral  Glucerna Shake Cans or Servings Per Day:  2       Frequency:  Daily (05-20-24 @ 17:15)      ROS: General/ Skin/ Msk/ Neuro/ GI see HPI  all other systems negative      ALLERGIES & MEDICATIONS  --------------------------------------------------------------------------------  Allergies    No Known Allergies    Intolerances          STANDING INPATIENT MEDICATIONS    albuterol/ipratropium for Nebulization 3 milliLiter(s) Nebulizer every 6 hours  apixaban 5 milliGRAM(s) Oral two times a day  atorvastatin 40 milliGRAM(s) Oral at bedtime  buDESOnide    Inhalation Suspension 0.5 milliGRAM(s) Nebulizer two times a day  chlorhexidine 2% Cloths 1 Application(s) Topical daily  dextrose 10% Bolus 125 milliLiter(s) IV Bolus once  dextrose 5%. 1000 milliLiter(s) IV Continuous <Continuous>  dextrose 5%. 1000 milliLiter(s) IV Continuous <Continuous>  dextrose 50% Injectable 25 Gram(s) IV Push once  dextrose 50% Injectable 12.5 Gram(s) IV Push once  furosemide   Injectable 40 milliGRAM(s) IV Push two times a day  gabapentin 300 milliGRAM(s) Oral daily  glucagon  Injectable 1 milliGRAM(s) IntraMuscular once  insulin glargine Injectable (LANTUS) 16 Unit(s) SubCutaneous at bedtime  insulin lispro (ADMELOG) corrective regimen sliding scale   SubCutaneous three times a day before meals  insulin lispro (ADMELOG) corrective regimen sliding scale   SubCutaneous <User Schedule>  insulin lispro Injectable (ADMELOG) 6 Unit(s) SubCutaneous at bedtime  insulin lispro Injectable (ADMELOG) 14 Unit(s) SubCutaneous before breakfast  insulin lispro Injectable (ADMELOG) 10 Unit(s) SubCutaneous before dinner  insulin lispro Injectable (ADMELOG) 14 Unit(s) SubCutaneous before lunch  multivitamin 1 Tablet(s) Oral daily  Ofev (Nintedanib) 150 milliGRAM(s) 150 milliGRAM(s) Oral two times a day  pantoprazole    Tablet 40 milliGRAM(s) Oral two times a day  predniSONE   Tablet 20 milliGRAM(s) Oral daily  sildenafil (REVATIO) 10 milliGRAM(s) Oral every 8 hours  simethicone 80 milliGRAM(s) Chew two times a day  traZODone 150 milliGRAM(s) Oral at bedtime  trimethoprim   80 mG/sulfamethoxazole 400 mG 1 Tablet(s) Oral daily      PRN INPATIENT MEDICATION  acetaminophen     Tablet .. 650 milliGRAM(s) Oral every 6 hours PRN  aluminum hydroxide/magnesium hydroxide/simethicone Suspension 30 milliLiter(s) Oral every 4 hours PRN  cyclobenzaprine 5 milliGRAM(s) Oral three times a day PRN  dextrose Oral Gel 15 Gram(s) Oral once PRN  loperamide 2 milliGRAM(s) Oral four times a day PRN  LORazepam     Tablet 0.5 milliGRAM(s) Oral every 6 hours PRN  melatonin 3 milliGRAM(s) Oral at bedtime PRN        VITALS/PHYSICAL EXAM  --------------------------------------------------------------------------------  T(C): 36.7 (05-23-24 @ 11:34), Max: 36.9 (05-22-24 @ 19:57)  HR: 112 (05-23-24 @ 11:34) (103 - 118)  BP: 122/78 (05-23-24 @ 11:34) (122/78 - 132/80)  RR: 18 (05-23-24 @ 11:34) (18 - 18)  SpO2: 98% (05-23-24 @ 11:34) (94% - 99%)  Wt(kg): --        05-22-24 @ 07:01  -  05-23-24 @ 07:00  --------------------------------------------------------  IN: 440 mL / OUT: 2200 mL / NET: -1760 mL    05-23-24 @ 07:01  -  05-23-24 @ 16:56  --------------------------------------------------------  IN: 540 mL / OUT: 420 mL / NET: 120 mL    HEENT:  sclera clear, mucosa moist, throat clear, trachea midline, neck supple  Respiratory: nonlabored w/ equal chest rise  Gastrointestinal: soft NT/ND   : (+)godwin- urine retention  Neurology:  verbal,  follows commands  Psych: calm/ appropriate  Musculoskeletal:   no deformities/ contractures  Vascular: BLE equally warm no cyanosis, clubbing, edema nor acute ischemia                BLE DP pulses not palpable                    Toes darkened, tender without touch appreciate POD  Skin:  dry, frail       Sacral region, BL buttocks 4.7cm x 5.5cm x 0.1cm  persistently darkened area           TTP, with partial thickness tissue loss there is no blistering, drainage               No odor, no increase in warmth, no induration, fluctuance, nor crepitus          LABS/ CULTURES/ RADIOLOGY:                     9.2    7.60  >-----------<  418      [05-22-24 @ 07:18]              30.1     135  |  100  |  30  ----------------------------<  183      [05-22-24 @ 07:11]  4.7   |  26  |  0.81        Ca     8.8     [05-22-24 @ 07:11]      Mg     2.4     [05-22-24 @ 07:11]      Phos  3.5     [05-22-24 @ 07:11]                CAPILLARY BLOOD GLUCOSE      POCT Blood Glucose.: 213 mg/dL (23 May 2024 16:44)  POCT Blood Glucose.: 158 mg/dL (23 May 2024 11:53)  POCT Blood Glucose.: 122 mg/dL (23 May 2024 08:11)  POCT Blood Glucose.: 144 mg/dL (23 May 2024 02:30)  POCT Blood Glucose.: 175 mg/dL (23 May 2024 02:06)  POCT Blood Glucose.: 263 mg/dL (22 May 2024 21:31)                      A1C with Estimated Average Glucose Result: 11.9 % (04-13-24 @ 03:53)             [facility]-- WOUND TEAM -- FOLLOW UP NOTE  --------------------------------------------------------------------------------    24 hour events/subjective:     Afebrile  Tolerating po w/o n/v  pt on 6L O2 therapy  Chart reviewed  Hx of   63-yo F with PMHx of chronic AHRF on 2-3L NC at b/l 2/2 ILD (Followed w/ Dr. Primo Marlow, [facility]), PE on Eliquis, severe pHTN, Cor pulmonale, DM, presented initially with SOB, dry cough, chest pain, LE edema, recent admission to [facility] March for ILD flare, admitted to [facility]->ICU for AHRF 2/2 ILD flare, transferred to [facility] for lung transplant evaluation. Patient is not a candidate for lung transplant 2/2 cardiac and diabetic comorbidities.       Diet:  Diet, Regular:   Consistent Carbohydrate No Snacks (CSTCHO)  Supplement Feeding Modality:  Oral  Glucerna Shake Cans or Servings Per Day:  2       Frequency:  Daily (05-20-24 @ 17:15)      ROS: General/ Skin/ Msk/ Neuro/ GI see HPI  all other systems negative      ALLERGIES & MEDICATIONS  --------------------------------------------------------------------------------  Allergies    No Known Allergies    Intolerances          STANDING INPATIENT MEDICATIONS    albuterol/ipratropium for Nebulization 3 milliLiter(s) Nebulizer every 6 hours  apixaban 5 milliGRAM(s) Oral two times a day  atorvastatin 40 milliGRAM(s) Oral at bedtime  buDESOnide    Inhalation Suspension 0.5 milliGRAM(s) Nebulizer two times a day  chlorhexidine 2% Cloths 1 Application(s) Topical daily  dextrose 10% Bolus 125 milliLiter(s) IV Bolus once  dextrose 5%. 1000 milliLiter(s) IV Continuous <Continuous>  dextrose 5%. 1000 milliLiter(s) IV Continuous <Continuous>  dextrose 50% Injectable 25 Gram(s) IV Push once  dextrose 50% Injectable 12.5 Gram(s) IV Push once  furosemide   Injectable 40 milliGRAM(s) IV Push two times a day  gabapentin 300 milliGRAM(s) Oral daily  glucagon  Injectable 1 milliGRAM(s) IntraMuscular once  insulin glargine Injectable (LANTUS) 16 Unit(s) SubCutaneous at bedtime  insulin lispro (ADMELOG) corrective regimen sliding scale   SubCutaneous three times a day before meals  insulin lispro (ADMELOG) corrective regimen sliding scale   SubCutaneous <User Schedule>  insulin lispro Injectable (ADMELOG) 6 Unit(s) SubCutaneous at bedtime  insulin lispro Injectable (ADMELOG) 14 Unit(s) SubCutaneous before breakfast  insulin lispro Injectable (ADMELOG) 10 Unit(s) SubCutaneous before dinner  insulin lispro Injectable (ADMELOG) 14 Unit(s) SubCutaneous before lunch  multivitamin 1 Tablet(s) Oral daily  Ofev (Nintedanib) 150 milliGRAM(s) 150 milliGRAM(s) Oral two times a day  pantoprazole    Tablet 40 milliGRAM(s) Oral two times a day  predniSONE   Tablet 20 milliGRAM(s) Oral daily  sildenafil (REVATIO) 10 milliGRAM(s) Oral every 8 hours  simethicone 80 milliGRAM(s) Chew two times a day  traZODone 150 milliGRAM(s) Oral at bedtime  trimethoprim   80 mG/sulfamethoxazole 400 mG 1 Tablet(s) Oral daily      PRN INPATIENT MEDICATION  acetaminophen     Tablet .. 650 milliGRAM(s) Oral every 6 hours PRN  aluminum hydroxide/magnesium hydroxide/simethicone Suspension 30 milliLiter(s) Oral every 4 hours PRN  cyclobenzaprine 5 milliGRAM(s) Oral three times a day PRN  dextrose Oral Gel 15 Gram(s) Oral once PRN  loperamide 2 milliGRAM(s) Oral four times a day PRN  LORazepam     Tablet 0.5 milliGRAM(s) Oral every 6 hours PRN  melatonin 3 milliGRAM(s) Oral at bedtime PRN        VITALS/PHYSICAL EXAM  --------------------------------------------------------------------------------  T(C): 36.7 (05-23-24 @ 11:34), Max: 36.9 (05-22-24 @ 19:57)  HR: 112 (05-23-24 @ 11:34) (103 - 118)  BP: 122/78 (05-23-24 @ 11:34) (122/78 - 132/80)  RR: 18 (05-23-24 @ 11:34) (18 - 18)  SpO2: 98% (05-23-24 @ 11:34) (94% - 99%)  Wt(kg): --        05-22-24 @ 07:01  -  05-23-24 @ 07:00  --------------------------------------------------------  IN: 440 mL / OUT: 2200 mL / NET: -1760 mL    05-23-24 @ 07:01  -  05-23-24 @ 16:56  --------------------------------------------------------  IN: 540 mL / OUT: 420 mL / NET: 120 mL    HEENT:  sclera clear, mucosa moist, throat clear, trachea midline, neck supple  Respiratory: nonlabored w/ equal chest rise  Gastrointestinal: soft NT/ND   : (+)godwin- urine retention  Neurology:  verbal,  follows commands  Psych: calm/ appropriate  Musculoskeletal:   no deformities/ contractures  Vascular: BLE equally warm no cyanosis, clubbing, edema nor acute ischemia                BLE DP pulses not palpable                    Toes darkened, tender without touch appreciate POD  Skin:  dry, frail       Sacral region, BL buttocks 4.0cm x 5.5cm x 0.1cm  persistently darkened area           TTP, with scattered partial thickness tissue loss there is no blistering, drainage               No odor, no increase in warmth, no induration, fluctuance, nor crepitus          LABS/ CULTURES/ RADIOLOGY:                     9.2    7.60  >-----------<  418      [05-22-24 @ 07:18]              30.1     135  |  100  |  30  ----------------------------<  183      [05-22-24 @ 07:11]  4.7   |  26  |  0.81        Ca     8.8     [05-22-24 @ 07:11]      Mg     2.4     [05-22-24 @ 07:11]      Phos  3.5     [05-22-24 @ 07:11]                CAPILLARY BLOOD GLUCOSE      POCT Blood Glucose.: 213 mg/dL (23 May 2024 16:44)  POCT Blood Glucose.: 158 mg/dL (23 May 2024 11:53)  POCT Blood Glucose.: 122 mg/dL (23 May 2024 08:11)  POCT Blood Glucose.: 144 mg/dL (23 May 2024 02:30)  POCT Blood Glucose.: 175 mg/dL (23 May 2024 02:06)  POCT Blood Glucose.: 263 mg/dL (22 May 2024 21:31)                      A1C with Estimated Average Glucose Result: 11.9 % (04-13-24 @ 03:53)

## 2024-05-23 NOTE — PROGRESS NOTE ADULT - SUBJECTIVE AND OBJECTIVE BOX
[facility] Division of Hospital Medicine  Héctor Lozano MD  Available via MS Teams    SUBJECTIVE / OVERNIGHT EVENTS: No acute events overnight. Pt seen and examined at bedside. Dyspnea this AM after eating. Denies chest pain.     ADDITIONAL REVIEW OF SYSTEMS:    MEDICATIONS  (STANDING):  albuterol/ipratropium for Nebulization 3 milliLiter(s) Nebulizer every 6 hours  apixaban 5 milliGRAM(s) Oral two times a day  atorvastatin 40 milliGRAM(s) Oral at bedtime  budesonide 160 MICROgram(s)/formoterol 4.5 MICROgram(s) Inhaler 2 Puff(s) Inhalation two times a day  chlorhexidine 2% Cloths 1 Application(s) Topical daily  dextrose 10% Bolus 125 milliLiter(s) IV Bolus once  dextrose 5%. 1000 milliLiter(s) (100 mL/Hr) IV Continuous <Continuous>  dextrose 5%. 1000 milliLiter(s) (50 mL/Hr) IV Continuous <Continuous>  dextrose 50% Injectable 12.5 Gram(s) IV Push once  dextrose 50% Injectable 25 Gram(s) IV Push once  furosemide   Injectable 40 milliGRAM(s) IV Push two times a day  gabapentin 300 milliGRAM(s) Oral daily  glucagon  Injectable 1 milliGRAM(s) IntraMuscular once  insulin glargine Injectable (LANTUS) 16 Unit(s) SubCutaneous at bedtime  insulin lispro (ADMELOG) corrective regimen sliding scale   SubCutaneous <User Schedule>  insulin lispro (ADMELOG) corrective regimen sliding scale   SubCutaneous three times a day before meals  insulin lispro Injectable (ADMELOG) 14 Unit(s) SubCutaneous before breakfast  insulin lispro Injectable (ADMELOG) 10 Unit(s) SubCutaneous before dinner  insulin lispro Injectable (ADMELOG) 14 Unit(s) SubCutaneous before lunch  insulin lispro Injectable (ADMELOG) 6 Unit(s) SubCutaneous at bedtime  multivitamin 1 Tablet(s) Oral daily  Ofev (Nintedanib) 150 milliGRAM(s) 150 milliGRAM(s) Oral two times a day  pantoprazole    Tablet 40 milliGRAM(s) Oral two times a day  predniSONE   Tablet 20 milliGRAM(s) Oral daily  sildenafil (REVATIO) 10 milliGRAM(s) Oral every 8 hours  simethicone 80 milliGRAM(s) Chew two times a day  traZODone 150 milliGRAM(s) Oral at bedtime  trimethoprim   80 mG/sulfamethoxazole 400 mG 1 Tablet(s) Oral daily    MEDICATIONS  (PRN):  acetaminophen     Tablet .. 650 milliGRAM(s) Oral every 6 hours PRN Temp greater or equal to 38C (100.4F), Mild Pain (1 - 3)  aluminum hydroxide/magnesium hydroxide/simethicone Suspension 30 milliLiter(s) Oral every 4 hours PRN Dyspepsia  cyclobenzaprine 5 milliGRAM(s) Oral three times a day PRN Muscle Spasm  dextrose Oral Gel 15 Gram(s) Oral once PRN Blood Glucose LESS THAN 70 milliGRAM(s)/deciliter  loperamide 2 milliGRAM(s) Oral four times a day PRN Diarrhea  LORazepam     Tablet 0.5 milliGRAM(s) Oral every 6 hours PRN Severe Anxiety  melatonin 3 milliGRAM(s) Oral at bedtime PRN Insomnia      I&O's Summary    22 May 2024 07:01  -  23 May 2024 07:00  --------------------------------------------------------  IN: 440 mL / OUT: 2200 mL / NET: -1760 mL    23 May 2024 07:01  -  23 May 2024 11:39  --------------------------------------------------------  IN: 240 mL / OUT: 420 mL / NET: -180 mL        T(C): 36.7 (05-23-24 @ 11:34), Max: 36.9 (05-22-24 @ 19:57)  HR: 112 (05-23-24 @ 11:34) (103 - 120)  BP: 122/78 (05-23-24 @ 11:34) (122/78 - 134/82)  RR: 18 (05-23-24 @ 11:34) (18 - 18)  SpO2: 98% (05-23-24 @ 11:34) (94% - 99%)        LABS:                        9.2    7.60  )-----------( 418      ( 22 May 2024 07:18 )             30.1     05-22    135  |  100  |  30<H>  ----------------------------<  183<H>  4.7   |  26  |  0.81    Ca    8.8      22 May 2024 07:11  Phos  3.5     05-22  Mg     2.4     05-22            Urinalysis Basic - ( 22 May 2024 07:11 )    Color: x / Appearance: x / SG: x / pH: x  Gluc: 183 mg/dL / Ketone: x  / Bili: x / Urobili: x   Blood: x / Protein: x / Nitrite: x   Leuk Esterase: x / RBC: x / WBC x   Sq Epi: x / Non Sq Epi: x / Bacteria: x        SARS-CoV-2: NotDetec (11 Apr 2024 22:46)          RADIOLOGY & ADDITIONAL TESTS:  New Results Reviewed Today:   New Imaging Personally Reviewed Today:  New Electrocardiogram Personally Reviewed Today:  Prior or Outpatient Records Reviewed Today:    COMMUNICATION:  Care Discussed with Consultants/Other Providers and Details of Discussion: Discussed with ACP  Discussions with Patient/Family:  PCP Communication:    Became tachypneic on 6L NC, SaO2 90%. NRB on top of 6L NC SaO2 99% and breathing improved.   CONSTITUTIONAL: NAD  RESPIRATORY: tachypneic, short shallow breaths  CARDIOVASCULAR: regular, tachycardic, normal S1 and S2, no LE edema  ABDOMEN: soft, nt, nd   MUSCULOSKELETAL: no clubbing or cyanosis of digits; no joint swelling or tenderness to palpation  PSYCH: A+O to person, place, and time; affect appropriate  NEUROLOGY: CN 2-12 are intact and symmetric; no gross sensory deficits   SKIN: No rashes; no palpable lesions

## 2024-05-23 NOTE — CHART NOTE - NSCHARTNOTEFT_GEN_A_CORE
63y F with PMHx of chronic AHRF (baseline 2-3L O2 NC) 2/2 ILD, PE, severe pHTN, Cor pulmonale, and DM presented initially to OSH with SOB, dry cough, chest pain, and LE edema. Required ICU care at [facility] for AHFR iso ILD. Transferred to [facility] for lung transplant evaluation. On admission, Pt requiring high dose steroid and 45/60 HFNC. Neurology consulted for subacute presentation of urinary/fecal retention and b/l LE weakness iso illness, likely non-neurologic in origin. ECHO findings of elevated filling pressures and worsening RV function. Pulm following for management throughout this hospitalization. Pt deemed not a lung transplant candidate due to deconditioning, cardiac dysfunction, and poorly controlled diabetes. Able to be weaned from HFNC to NC. S/p RRT 5/6 for hypoxia requiring HFNC; CXR obtained during rapid with b/l increased hazy opacities on my read. Weaned to NC again. Pt failed TOV. Pt noted w/ tachypnea and coughing after eating; S&S evaluation ordered. Persistent diarrhea.    5/22: s/p initial bedside swallow exam w/ diet deferred to team given limited trials. Of note, pt using NRB for comfort (pt w/ anxiety) despite VSS on 6LNC.    TODAY: seen for re-evaluation of swallow. Attending MD Christian reported c/f possible aspiration. Pt noted with anxiety and noted with NRB on, CAROLINE Mcarthur reporting that VSS and that pt does not require NRB from a pulmonary standpoint, but that pt primarily uses it for subjective comfort. Pt also will express anxiety after p.o. given sub-sternal globus sensation. Pt on 6LNC. Pt noted to be tachypneic, per patient, she is anxious, but when encouraged to visualize a calm place and breath in nares and out mouth, pt with more regulated paced breathing pattern and comfortably removed NRB.     Pt comfortably tolerated 2oz thin liquids and 2oz puree (thick and thin) w functional oral management w/ no overt s/sx of aspiration/penetration and no changes in breathing pattern. Pt declining solids i/s/o fear of substernal globus sensation.    IMPRESSION: Pt p/w evidence of esophageal dysphagia given sub-sternal globus sensation post p.o. Although w/ no evidence of overt s/sx of aspiration/penetration, given pt pulmonary status and c/f possible aspiration, objective swallow testing indicated to r/o possible silent aspiration.      > Puree and thin liquids  > FEES indicated as pt likely not a candidate for MBS given tachypnea and respiratory status  > Would recommend GI c/s for pt c/o of substernal globus sensation post p.o.  >Maintain strict aspiration precautions  >Maintain oral hygiene  >Monitor for s/sx of aspiration/laryngeal penetration. If noted, d/c P.O. intake, provide non-oral nutrition/hydration/meds and consult this service [phone]  >D/C rehab    D/W CAROLINE MCARTHUR; SUHAS MATSON; MD CHRISTIAN  SPEECH PATHOLOGY  Florian Iyer CCC-SLP *Teams preferred* ([phone])

## 2024-05-23 NOTE — PROGRESS NOTE ADULT - PROBLEM SELECTOR PLAN 8
T2DM: Home DM medications: metformin 500 mg BID, + glimepride 1 mg daily + lantus 35 units QHS+ admelog 25 units TIDAC  - Pt w/ hx of uncontrolled T2DM, a1c 11.9.   - Endocrine consulted via email, recs appreciated  - insulin adjustments per endocrine  - MISS FS tid qac qhs  - Discharge DM medications:   - Continue with metformin 500 mg BID  - STOP glimipride due to recurrent hypoglycemia at home   - Basal/bolus, dose will depend on insulin requirement and steroid plan   - Patient will follow up at  [facility]:  [address] [place] [address]. [address]. [place] [address]  Tel: [phone]

## 2024-05-23 NOTE — PROGRESS NOTE ADULT - PROBLEM SELECTOR PLAN 12
DVT PPx: Eliquis  Diet: Regular  Code: DNR/DNI, molst completed in chart     Aspiration precautions  Speech and swallow evaluation pending to rule out aspiration  Fall precautions    Discharge to [facility] pending plan for monitoring diarrhea/sildenafil dosing and creatinine

## 2024-05-23 NOTE — PROGRESS NOTE ADULT - PROBLEM SELECTOR PLAN 1
Baseline 3-4L NC. Hx of ILD w/ c/f IPF. Followed w/ DOMENICO Ding. Transferred to [facility] for lung transplant eval  - NM cardiac shunt performed on 5/10 was negative for PFO  - Continue home Ofev 150mg BID  - Duonebs, Symbicort  - Transitioned to PO Prednisone 40mg, plan to taper by 10mg every 7 days. Prednisone 20 mg 5/21. If worsening hypoxia would increase steroids. Bactrim ordered for ppx.   - valium 2.5 mg q12 PRN anxiety   - Deemed not to be a transplant candidate 4/30

## 2024-05-23 NOTE — PROGRESS NOTE ADULT - ASSESSMENT
63F PMH chronic AHRF on 2-3L NC at b/l 2/2 ILD (Followed w/ Dr. Primo Marlow, [facility]), PE on Eliquis, severe pHTN, Cor pulmonale, DM, presented initially with SOB, dry cough, chest pain, LE edema, recent admission to [facility] in March for ILD flare, admitted to outside hospital->ICU for AHRF 2/2 ILD flare, transferred to [facility] for lung transplant evaluation and found not to be a transplant candidate.

## 2024-05-23 NOTE — PROGRESS NOTE ADULT - SUBJECTIVE AND OBJECTIVE BOX
CHIEF COMPLAINT:    Interval Events: seen and examined at bedside, no events overnight.     REVIEW OF SYSTEMS:  Constitutional: No fevers or chills. No weight loss. No fatigue or generalised malaise.  Eyes: No itching or discharge from the eyes  ENT: No ear pain. No ear discharge. No nasal congestion. No post nasal drip. No epistaxis. No throat pain. No sore throat. No difficulty swallowing.   CV: No chest pain. No palpitations. No lightheadedness or dizziness.   Resp: No dyspnea at rest. No dyspnea on exertion. No orthopnea. No wheezing. No cough. No stridor. No     OBJECTIVE:  ICU Vital Signs Last 24 Hrs  T(C): 36.5 (23 May 2024 04:47), Max: 36.9 (22 May 2024 19:57)  T(F): 97.7 (23 May 2024 04:47), Max: 98.4 (22 May 2024 19:57)  HR: 103 (23 May 2024 04:47) (103 - 120)  BP: 126/80 (23 May 2024 04:47) (126/80 - 134/82)  BP(mean): --  ABP: --  ABP(mean): --  RR: 18 (23 May 2024 04:47) (18 - 18)  SpO2: 99% (23 May 2024 04:47) (94% - 99%)    O2 Parameters below as of 22 May 2024 19:57  Patient On (Oxygen Delivery Method): nasal cannula  O2 Flow (L/min): 6            05-22 @ 07:01 - 05-23 @ 07:00  --------------------------------------------------------  IN: 440 mL / OUT: 2200 mL / NET: -1760 mL    05-23 @ 07:01 - 05-23 @ 11:17  --------------------------------------------------------  IN: 240 mL / OUT: 420 mL / NET: -180 mL      CAPILLARY BLOOD GLUCOSE      POCT Blood Glucose.: 122 mg/dL (23 May 2024 08:11)      PHYSICAL EXAM:  General: Awake, alert, oriented X 3.   HEENT: Atraumatic, normocephalic.                 Mallampatti Grade                 No nasal congestion.                No tonsillar or pharyngeal exudates.  Lymph Nodes: No palpable lymphadenopathy  Neck: No JVD. No carotid bruit.   Respiratory: Normal chest expansion                         Normal percussion                         Normal and equal air entry                         No wheeze, rhonchi or rales.  Cardiovascular: S1 S2 normal. No murmurs, rubs or gallops.   Abdomen: Soft, non-tender, non-distended. No organomegaly.  Extremities: Warm to touch. Peripheral pulse palpable. No pedal edema.   Skin: No rashes or skin lesions  Neurological: Motor and sensory examination equal and normal in all four extremities.  Psychiatry: Appropriate mood and affect.    HOSPITAL MEDICATIONS:  MEDICATIONS  (STANDING):  albuterol/ipratropium for Nebulization 3 milliLiter(s) Nebulizer every 6 hours  apixaban 5 milliGRAM(s) Oral two times a day  atorvastatin 40 milliGRAM(s) Oral at bedtime  budesonide 160 MICROgram(s)/formoterol 4.5 MICROgram(s) Inhaler 2 Puff(s) Inhalation two times a day  chlorhexidine 2% Cloths 1 Application(s) Topical daily  dextrose 10% Bolus 125 milliLiter(s) IV Bolus once  dextrose 5%. 1000 milliLiter(s) (100 mL/Hr) IV Continuous <Continuous>  dextrose 5%. 1000 milliLiter(s) (50 mL/Hr) IV Continuous <Continuous>  dextrose 50% Injectable 25 Gram(s) IV Push once  dextrose 50% Injectable 12.5 Gram(s) IV Push once  furosemide   Injectable 40 milliGRAM(s) IV Push two times a day  gabapentin 300 milliGRAM(s) Oral daily  glucagon  Injectable 1 milliGRAM(s) IntraMuscular once  insulin glargine Injectable (LANTUS) 16 Unit(s) SubCutaneous at bedtime  insulin lispro (ADMELOG) corrective regimen sliding scale   SubCutaneous three times a day before meals  insulin lispro (ADMELOG) corrective regimen sliding scale   SubCutaneous <User Schedule>  insulin lispro Injectable (ADMELOG) 6 Unit(s) SubCutaneous at bedtime  insulin lispro Injectable (ADMELOG) 14 Unit(s) SubCutaneous before breakfast  insulin lispro Injectable (ADMELOG) 10 Unit(s) SubCutaneous before dinner  insulin lispro Injectable (ADMELOG) 14 Unit(s) SubCutaneous before lunch  multivitamin 1 Tablet(s) Oral daily  Ofev (Nintedanib) 150 milliGRAM(s) 150 milliGRAM(s) Oral two times a day  pantoprazole    Tablet 40 milliGRAM(s) Oral two times a day  predniSONE   Tablet 20 milliGRAM(s) Oral daily  sildenafil (REVATIO) 10 milliGRAM(s) Oral every 8 hours  simethicone 80 milliGRAM(s) Chew two times a day  traZODone 150 milliGRAM(s) Oral at bedtime  trimethoprim   80 mG/sulfamethoxazole 400 mG 1 Tablet(s) Oral daily    MEDICATIONS  (PRN):  acetaminophen     Tablet .. 650 milliGRAM(s) Oral every 6 hours PRN Temp greater or equal to 38C (100.4F), Mild Pain (1 - 3)  aluminum hydroxide/magnesium hydroxide/simethicone Suspension 30 milliLiter(s) Oral every 4 hours PRN Dyspepsia  cyclobenzaprine 5 milliGRAM(s) Oral three times a day PRN Muscle Spasm  dextrose Oral Gel 15 Gram(s) Oral once PRN Blood Glucose LESS THAN 70 milliGRAM(s)/deciliter  loperamide 2 milliGRAM(s) Oral four times a day PRN Diarrhea  LORazepam     Tablet 0.5 milliGRAM(s) Oral every 6 hours PRN Severe Anxiety  melatonin 3 milliGRAM(s) Oral at bedtime PRN Insomnia      LABS:                        9.2    7.60  )-----------( 418      ( 22 May 2024 07:18 )             30.1     05-22    135  |  100  |  30<H>  ----------------------------<  183<H>  4.7   |  26  |  0.81    Ca    8.8      22 May 2024 07:11  Phos  3.5     05-22  Mg     2.4     05-22        Urinalysis Basic - ( 22 May 2024 07:11 )    Color: x / Appearance: x / SG: x / pH: x  Gluc: 183 mg/dL / Ketone: x  / Bili: x / Urobili: x   Blood: x / Protein: x / Nitrite: x   Leuk Esterase: x / RBC: x / WBC x   Sq Epi: x / Non Sq Epi: x / Bacteria: x            MICROBIOLOGY:     RADIOLOGY:  [ ] Reviewed and interpreted by me    Point of Care Ultrasound Findings:    PFT:    EKG:

## 2024-05-23 NOTE — PROGRESS NOTE ADULT - SUBJECTIVE AND OBJECTIVE BOX
HPI:     Patient is a 63y old  Female who presents with a chief complaint of SOB (23 May 2024 11:38)  Endocrinology consulted for diabetes management.   Most recent HbA1C:  A1C with Estimated Average Glucose Result: 11.9 % (04-13-24 @ 03:53)  INTERVAL HPI/OVERNIGHT EVENTS:  She reports feeling well.  Currently on prednisone 20mg once daily     Currently denies polydipsia, polyuria , visual changes, numbness in feet.    atorvastatin   40 milliGRAM(s) Oral (05-22-24 @ 22:20)   40 milliGRAM(s) Oral (05-21-24 @ 21:54)    insulin glargine Injectable (LANTUS)   16 Unit(s) SubCutaneous (05-22-24 @ 22:19)   16 Unit(s) SubCutaneous (05-21-24 @ 21:57)    insulin lispro (ADMELOG) corrective regimen sliding scale   2 Unit(s) SubCutaneous (05-22-24 @ 22:17)   4 Unit(s) SubCutaneous (05-22-24 @ 02:14)   4 Unit(s) SubCutaneous (05-21-24 @ 22:03)    insulin lispro (ADMELOG) corrective regimen sliding scale   2 Unit(s) SubCutaneous (05-23-24 @ 12:39)   10 Unit(s) SubCutaneous (05-22-24 @ 17:02)   2 Unit(s) SubCutaneous (05-22-24 @ 08:31)   8 Unit(s) SubCutaneous (05-21-24 @ 16:21)    insulin lispro Injectable (ADMELOG)   14 Unit(s) SubCutaneous (05-23-24 @ 08:45)   14 Unit(s) SubCutaneous (05-22-24 @ 08:32)    insulin lispro Injectable (ADMELOG)   10 Unit(s) SubCutaneous (05-22-24 @ 17:03)   10 Unit(s) SubCutaneous (05-21-24 @ 16:21)    insulin lispro Injectable (ADMELOG)   14 Unit(s) SubCutaneous (05-23-24 @ 12:39)    insulin lispro Injectable (ADMELOG)   12 Unit(s) SubCutaneous (05-22-24 @ 12:20)    predniSONE   Tablet   20 milliGRAM(s) Oral (05-23-24 @ 06:06)   20 milliGRAM(s) Oral (05-22-24 @ 06:39)      Review of systems:   CONSTITUTIONAL:  Feels well, good appetite  CARDIOVASCULAR:  Negative for chest pain or palpitations  RESPIRATORY:  Negative for cough, or SOB   GASTROINTESTINAL:  Negative for nausea, vomiting, or abdominal pain  GENITOURINARY:  Negative frequency, urgency or dysuria     CAPILLARY BLOOD GLUCOSE      POCT Blood Glucose.: 158 mg/dL (23 May 2024 11:53)  POCT Blood Glucose.: 122 mg/dL (23 May 2024 08:11)  POCT Blood Glucose.: 144 mg/dL (23 May 2024 02:30)  POCT Blood Glucose.: 175 mg/dL (23 May 2024 02:06)  POCT Blood Glucose.: 263 mg/dL (22 May 2024 21:31)  POCT Blood Glucose.: 358 mg/dL (22 May 2024 16:30)       MEDICATIONS  (STANDING):  albuterol/ipratropium for Nebulization 3 milliLiter(s) Nebulizer every 6 hours  apixaban 5 milliGRAM(s) Oral two times a day  atorvastatin 40 milliGRAM(s) Oral at bedtime  buDESOnide    Inhalation Suspension 0.5 milliGRAM(s) Nebulizer two times a day  chlorhexidine 2% Cloths 1 Application(s) Topical daily  dextrose 10% Bolus 125 milliLiter(s) IV Bolus once  dextrose 5%. 1000 milliLiter(s) (100 mL/Hr) IV Continuous <Continuous>  dextrose 5%. 1000 milliLiter(s) (50 mL/Hr) IV Continuous <Continuous>  dextrose 50% Injectable 25 Gram(s) IV Push once  dextrose 50% Injectable 12.5 Gram(s) IV Push once  furosemide   Injectable 40 milliGRAM(s) IV Push two times a day  gabapentin 300 milliGRAM(s) Oral daily  glucagon  Injectable 1 milliGRAM(s) IntraMuscular once  insulin glargine Injectable (LANTUS) 16 Unit(s) SubCutaneous at bedtime  insulin lispro (ADMELOG) corrective regimen sliding scale   SubCutaneous three times a day before meals  insulin lispro (ADMELOG) corrective regimen sliding scale   SubCutaneous <User Schedule>  insulin lispro Injectable (ADMELOG) 6 Unit(s) SubCutaneous at bedtime  insulin lispro Injectable (ADMELOG) 14 Unit(s) SubCutaneous before breakfast  insulin lispro Injectable (ADMELOG) 10 Unit(s) SubCutaneous before dinner  insulin lispro Injectable (ADMELOG) 14 Unit(s) SubCutaneous before lunch  multivitamin 1 Tablet(s) Oral daily  Ofev (Nintedanib) 150 milliGRAM(s) 150 milliGRAM(s) Oral two times a day  pantoprazole    Tablet 40 milliGRAM(s) Oral two times a day  predniSONE   Tablet 20 milliGRAM(s) Oral daily  sildenafil (REVATIO) 10 milliGRAM(s) Oral every 8 hours  simethicone 80 milliGRAM(s) Chew two times a day  traZODone 150 milliGRAM(s) Oral at bedtime  trimethoprim   80 mG/sulfamethoxazole 400 mG 1 Tablet(s) Oral daily    MEDICATIONS  (PRN):  acetaminophen     Tablet .. 650 milliGRAM(s) Oral every 6 hours PRN Temp greater or equal to 38C (100.4F), Mild Pain (1 - 3)  aluminum hydroxide/magnesium hydroxide/simethicone Suspension 30 milliLiter(s) Oral every 4 hours PRN Dyspepsia  cyclobenzaprine 5 milliGRAM(s) Oral three times a day PRN Muscle Spasm  dextrose Oral Gel 15 Gram(s) Oral once PRN Blood Glucose LESS THAN 70 milliGRAM(s)/deciliter  loperamide 2 milliGRAM(s) Oral four times a day PRN Diarrhea  LORazepam     Tablet 0.5 milliGRAM(s) Oral every 6 hours PRN Severe Anxiety  melatonin 3 milliGRAM(s) Oral at bedtime PRN Insomnia      PHYSICAL EXAM  Vital Signs Last 24 Hrs  T(C): 36.7 (23 May 2024 11:34), Max: 36.9 (22 May 2024 19:57)  T(F): 98.1 (23 May 2024 11:34), Max: 98.4 (22 May 2024 19:57)  HR: 112 (23 May 2024 11:34) (103 - 118)  BP: 122/78 (23 May 2024 11:34) (122/78 - 132/80)  BP(mean): --  RR: 18 (23 May 2024 11:34) (18 - 18)  SpO2: 98% (23 May 2024 11:34) (94% - 99%)    Parameters below as of 23 May 2024 11:34  Patient On (Oxygen Delivery Method): nasal cannula  O2 Flow (L/min): 6      GENERAL: laying in bed in NAD  RESPIRATORY: nonlabored breathing, no accessory muscle use  Extremities: Warm, no edema in all 4 exts   NEURO: A&O X3    LABS:                        9.2    7.60  )-----------( 418      ( 22 May 2024 07:18 )             30.1     05-22    135  |  100  |  30<H>  ----------------------------<  183<H>  4.7   |  26  |  0.81    Ca    8.8      22 May 2024 07:11  Phos  3.5     05-22  Mg     2.4     05-22        Urinalysis Basic - ( 22 May 2024 07:11 )    Color: x / Appearance: x / SG: x / pH: x  Gluc: 183 mg/dL / Ketone: x  / Bili: x / Urobili: x   Blood: x / Protein: x / Nitrite: x   Leuk Esterase: x / RBC: x / WBC x   Sq Epi: x / Non Sq Epi: x / Bacteria: x

## 2024-05-23 NOTE — PROGRESS NOTE ADULT - PROBLEM SELECTOR PLAN 3
- Pt w/ hx of severe pHTN, RVSP 68 on 10/2022  - Repeat 4/12/24 at [facility] noted PAP 28, "normal PAP"   - Maintain euvolemia, as above  - sildenafil reduced to 10 mg TID as patient experienced diarrhea, which may be a side effect. will continue this dose for now

## 2024-05-23 NOTE — PROGRESS NOTE ADULT - PROBLEM SELECTOR PLAN 7
Hx of urinary retention; follows Dr. Chua, urologist. [facility] w/ godwin replaced. Arrives to [facility] w/o Godwin. Pt on outpt med list listed Tamsulosin 0.4 mg, unclear if [facility], pt saying she does not take this outpatient  - failed TOV here, godwin placed

## 2024-05-23 NOTE — PROGRESS NOTE ADULT - ATTENDING COMMENTS
Patient seen and examined. Patient is a 63F with extensive medical history including suspected IPF with chronic hypoxemic respiratory failure on O2, and pulmonary hypertension on Sildenafil presenting from [facility] for lung transplant evaluation in the setting of shortness of breath, chest pain, and dry cough.    She has had clinical improvement with diuresis and is currently on 6L O2 (she uses 4-5L O2 at home). During this hospitalization her Sildenafil dose was decreased in the setting of flushing, lightheadedness, and diarrhea. Her diarrhea has improved and she denies flushing. She does state that she finds her breathing more difficult immediately after eating.  --- Of note, patient frequently places herself on NRB mask for subjective dyspnea though reportedly her saturations are normal at the time and this frequently occurs after eating.      #Interstitial Lung Disease  - Continue Ofev  - Patient with suspected IPF though most recent CT more consistent with fibrotic NSIP  - Steroid taper with PCP prophylaxis  - Outpatient follow-up with her primary Pulmonologist Dr. Marlow.  - Consider outpatient evaluation with Dr. Emery as well for ILD and pulmonary hypertension co-management if Dr. Marlow feels this would be helpful  - Has been seen by the Lung Transplant team - and patient deemed to not be a candidate for lung transplant. I spoke with the lung transplant team today and they confirmed that patient is not a candidate for lung transplant evaluation at this time.  #Pulmonary Hypertension  - Continue Sildenafil 10mg TID and monitor  - severe pulmonary hypertension by echo but no RHC noted - this may become necessary if her symptoms do not improve  - Continue diuresis with goal negative fluid balance  - Nuclear medicine cardiac shunt study was negative - patient has a history of PFO  #Acute on Chronic Hypoxemic Respiratory Failure  - Continue supplemental O2 with goal O2 sat > 90%  - Incentive spirometer and acapella to facilitate breathing  - OOB and deep breathing  - Given worsening of symptoms after eating would suggest swallow follow-up for modification of diet and/or barium swallow for sensation of food "getting stuck" which frequently worsens her breathing  #Anxiety - may benefit from Behavioral Health evaluation  #DVT proph - continue AC given history of PE from 2022  #GOC - DNR/DNI    Discussed with patient and medicine attending and all questions answered. Long term prognosis guarded. Discharge planning to rehab will be important as patient needs to improve physical exercise tolerance/capacity to hopefully have more meaningful improvements from a pulmonary standpoint. Patient seen and examined. Patient is a 63F with extensive medical history including suspected IPF with chronic hypoxemic respiratory failure on O2, and pulmonary hypertension on Sildenafil presenting from [facility] for lung transplant evaluation in the setting of shortness of breath, chest pain, and dry cough.    She has had clinical improvement with diuresis and is currently on 6L O2 (she uses 4-5L O2 at home). During this hospitalization her Sildenafil dose was decreased in the setting of flushing, lightheadedness, and diarrhea. Her diarrhea has improved and she denies flushing. She does state that she finds her breathing more difficult immediately after eating.  --- Of note, patient frequently places herself on NRB mask for subjective dyspnea though reportedly her saturations are normal at the time and this frequently occurs after eating.      #Interstitial Lung Disease  - Continue Ofev  - Patient with suspected IPF though most recent CT more consistent with fibrotic NSIP  - Steroid taper with PCP prophylaxis  - Patient does not find Symbicort helpful and would prefer nebulized therapy - will transition to Budesonide. Patient aware to rinse mouth after use. She also prefers her home Ventolin.  - Outpatient follow-up with her primary Pulmonologist Dr. Marlow.  - Consider outpatient evaluation with Dr. Emery as well for ILD and pulmonary hypertension co-management if Dr. Marlow feels this would be helpful  - Has been seen by the Lung Transplant team - and patient deemed to not be a candidate for lung transplant. I spoke with the lung transplant team today and they confirmed that patient is not a candidate for lung transplant evaluation at this time.  #Pulmonary Hypertension  - Continue Sildenafil 10mg TID and monitor  - severe pulmonary hypertension by echo but no RHC noted - this may become necessary if her symptoms do not improve  - Continue diuresis with goal negative fluid balance  - Nuclear medicine cardiac shunt study was negative - patient has a history of PFO  #Acute on Chronic Hypoxemic Respiratory Failure  - Continue supplemental O2 with goal O2 sat > 90%  - Incentive spirometer and acapella to facilitate breathing  - OOB and deep breathing  - Given worsening of symptoms after eating would suggest swallow follow-up for modification of diet and/or barium swallow for sensation of food "getting stuck" which frequently worsens her breathing  #Anxiety - may benefit from Behavioral Health evaluation  #DVT proph - continue AC given history of PE from 2022  #GOC - DNR/DNI    Discussed with patient and medicine attending and all questions answered. Long term prognosis guarded. Discharge planning to rehab will be important as patient needs to improve physical exercise tolerance/capacity to hopefully have more meaningful improvements from a pulmonary standpoint.

## 2024-05-23 NOTE — PROGRESS NOTE ADULT - ASSESSMENT
Assessment and Recommendation:   Assessment      A/P:63-yo F with PMHx of chronic AHRF on 2-3L NC at b/l 2/2 ILD (Followed w/ Dr. Primo Marlow, [facility]), PE on Eliquis, severe pHTN, Cor pulmonale, DM, presented initially with SOB, dry cough, chest pain, LE edema, recent admission to [facility] in March for ILD flare, admitted to [facility]->ICU for AHRF 2/2 ILD flare, transferred to [facility] for lung transplant evaluation.     Wound Consult requested to assist w/ management of  Sacral region, BL buttocks DTPI  Incontinent bowel      Recommendations:   Buttocks/ Sacrum Dominique/BID and prn soiling     Moisturize intact skin w/ SWEEN cream BID  Nutrition Consult for optimization in pt w/ Increased nutritional needs            encourage high quality protein, miryam/ prosource, MVI & Vit C to promote wound healing  Continue turning and positioning w/ offloading assistive devices as per protocol       encourage patient to reposition self       Continue w/ attends under pads and Pericare w/ godwin care as per protocol  Waffle Cushion to chair when oob to chair  Continue w/ low air loss pressure redistribution bed surface   Pt will need Group 2 mattress on hospital bed and ROHO cushion for wheel chair upon discharge home  Care as per medicine, will follow w/ you  Upon discharge f/u as outpatient at [facility] [address] [place] [address] [phone]  Seen and D/w  RN  Thank you for this consult,  Natalee Sierra, [facility], [facility]  [phone]  Nights/ Weekends/ Holidays please call:  General Surgery Consult pager [phone]) for emergencies  Wound PT for multilayer leg wrapping or VAC issues ([phone])

## 2024-05-23 NOTE — PROGRESS NOTE ADULT - PROBLEM SELECTOR PLAN 1
- Test BG TID AC & QHS while eating regular meals and Q6H while NPO  - C/w Lantus 16 units QHS for now  - C/w insulin Admelog 14 units with breakfast and adjust  to 14 units with lunch, and 10 units with dinner for now. Hold if NPO or if eating less than 50% of meals; Pt instructed to report to staff if not eating  - Continue Admelog 6 units at bedtime if pt eating snack at night. PLEASE ASK PT if she is having a snack.   - Continue with mod dose correctional scale TID with meals and QHS  - Please notify endo team with any further changes on steroid doses.  Discharge planning:   Home DM medications: metformin 500 mg BID, + glimepride 1 mg daily + lantus 35 units QHS+ admelog 25 units TIDAC  Discharge DM medications:   Continue with metformin 500 mg BID  STOP glimepiride due to recurrent hypoglycemia at home   Basal/bolus, dose will depend on insulin requirement and steroid plan   Make sure pt has Rx for all DM supplies and insulin/ DM meds.  Can follow at endo practice. [address] [place] [address] [address]. Phone . Call for apt closer to discharge- - Patient will need opthalmology and podiatry follow up as outpatient  Pt will likely need rehab due to deconditioning

## 2024-05-23 NOTE — PROGRESS NOTE ADULT - ASSESSMENT
63F hx ILD/ probable IPF (On 2-3LNC at home), PE 2022 on eliquis, severe pHTN w/ prior cor pulmonale (RVSP 68 - 10/22 w/ TTE from 4/12 w/ PASP 28, normal LV/RV SF), IDDM, presenting as a transfer from [place] for lung transplant eval iso SOB, dry cough, chest pain. She has been receiving duonebs, symbicort, lasix, ofev and IV steroids.     CT Chest 4/12/2024: Findings suggesting interstitial lung disease without significant interval progression. No evidence of pneumonia.   CT scan from 4/12/2024 when compared with CT scan from 2020 has shown significant progression.  Most recent CT is unchanged compared with 4/12/24 study.     Recommendations  - c/w weaning O2 as able - On 6L NC currently.   - Continue with Symbicort, duonebs, Ofev, Eliquis. Patient is refusing symbicort - can change to inhaled budesonide BID   - c/w prednisone taper, decrease 10mg every week until off. Currently on   - PCP prophylaxis with bactrim    - Shunt study negative.   - c/w sildenafil 10mg TID, will consider increasing to 20mg pending diarrhea sx  - pt with report of worsening dyspnea following meals, SLP evaluation unrevealing   - if Cr continues to rise can decrease to once daily dosing of diuretics  - variable bed weights, check standing weights if able  - Strict I&O  - Please ambulate pt daily with goal to improve walking distance - this is a major barrier to transplant for this pt.

## 2024-05-24 NOTE — CHART NOTE - NSCHARTNOTEFT_GEN_A_CORE
Alerted by RN patient began desatting to the upper 60's and lower 70's on 6L NC.  Pt was placed on a non-rebreather and oxygen saturation returned to 97%. Pt seen and assessed at beside, A&Ox3, resting comfortably on the non-rebreather. Pt states she tried to get up and move when her oxygen saturation started to decrease.  Attempted to put patient back on 6L NC, however pt continued to desaturate. Pt admits to SOB, which was resolved after the non-rebreather was placed. Denies chest pain, palpitations, dizziness HA, NVDC.      Vital Signs Last 24 Hrs  T(C): 36.9 (23 May 2024 21:16), Max: 36.9 (23 May 2024 21:16)  T(F): 98.5 (23 May 2024 21:16), Max: 98.5 (23 May 2024 21:16)  HR: 119 (23 May 2024 21:16) (103 - 119)  BP: 134/89 (23 May 2024 21:16) (122/78 - 134/89)  BP(mean): --  RR: 18 (23 May 2024 21:16) (18 - 18)  SpO2: 93% (23 May 2024 21:16) (93% - 99%)    Parameters below as of 23 May 2024 21:16  Patient On (Oxygen Delivery Method): nasal cannula  O2 Flow (L/min): 6    Labs                        9.2    7.60  )-----------( 418      ( 22 May 2024 07:18 )             30.1     05-22    135  |  100  |  30<H>  ----------------------------<  183<H>  4.7   |  26  |  0.81    Ca    8.8      22 May 2024 07:11  Phos  3.5     05-22  Mg     2.4     05-22      Physical Exam:  General: WN/WD, NAD, nontoxic appearing  Neurology: A&Ox3, nonfocal, PADILLA x 4  Head:  Normocephalic, atraumatic  Respiratory: +Tachypnea, CTA B/L  CV: RRR, S1S2, no murmur  Abdominal: Soft, NT, ND no palpable mass  MSK: No edema, + peripheral pulses, FROM all 4 extremity    Assessment & Plan:  HPI:  63-yo F with PMHx of chronic AHRF on 2-3L NC at b/l 2/2 ILD (Followed w/ Dr. Primo Marlow, [facility]), PE on Eliquis, severe pHTN, Cor pulmonale, DM, presented initially with SOB, dry cough, chest pain, LE edema, recent admission to [facility] in March for ILD flare, admitted to [facility]->ICU for AHRF 2/2 ILD flare, transferred to [facility] for lung transplant evaluation. Pt reporting mild SOB, no fever, no sputum, no SOB, no chest pain, no edema. Reports constipation, last BM 2 days ago, with some stomach pain.     At [facility] ICU, she was treated w/ IV steroids->PO taper, duonebs, symbicort, lasix. Pike placed for urinary retention, taken out prior to arrival although unclear if passed TOV at OSH. Had been weaned to NC while but was complicated by increased WOB, placed back on HFNC 50/60. Most recently escalated to methylprednisolone 40 mg IV BID. Was being treated w/ Ofev 150 mg BID, daily diuresis w/ Lasix 40->20 mg IVP on 4/25 based on volume assessment, 4/24 CXR w/ c/f R>L patchy opacities c/f pulmonary edema. Upon transfer here, admission vitals notable for temp afebrile, , /79, HFNC similar settings 45/60 100%. Labs notable for downtrending white count, stable anemia to 9-10, stable thrombocytosis to 400's. ABG 7.48 / 37 / 71 / 28, lactate 1.2.      Of note, pt is DNR/DNI trial of NIPPV - confirmed this with the patient and completed MOLST, in chart.  (26 Apr 2024 01:17)    Plan  SOB/Hypoxia 2/2 ILD  >Attempted to place to patient on venturi mask and wean back to NC however, pt unable to tolerate. Respiratory therapy called to see patient at beside. Pt placed on HiFlow NC 60/60 w/ SpO2 >90%  > VSS otherwise, will continue to monitor and observe patient  >C/w Lasix BID, duonebs PRN  >Continue to monitor and observe patient  >Will endorse to primary team in AM    Carolina Wilks PA-C  Dept of Medicine

## 2024-05-24 NOTE — CHART NOTE - NSCHARTNOTEFT_GEN_A_CORE
Patient was scheduled for FEES exam today, unable to tolerate 2/2 fluctuation of oxygen needs (HFNC increased).  Discussed aspiration risk with patient, as she is currently recommended for NPO pending further recommendations following FEES exam. Exam cannot be re-attempted until earliest Tuesday, assuming she is stable from a respiratory standpoint (lower HFNC, or ideally NC.)  Patient understands and accepts the risk of aspiration: including (but not limited to) pneumonia, pneumonitis and death, and requests a soft diet.    Will trial pureed conservatively for now and advance as tolerated    Autumn Alejandra PA-C  Department of Medicine  [phone]

## 2024-05-24 NOTE — CHART NOTE - NSCHARTNOTEFT_GEN_A_CORE
63y F with PMHx of chronic AHRF (baseline 2-3L O2 NC) 2/2 ILD, PE, severe pHTN, Cor pulmonale, and DM presented initially to OSH with SOB, dry cough, chest pain, and LE edema. Required ICU care at [facility] for AHFR iso ILD. Transferred to [facility] for lung transplant evaluation. On admission, Pt requiring high dose steroid and 45/60 HFNC. Neurology consulted for subacute presentation of urinary/fecal retention and b/l LE weakness iso illness, likely non-neurologic in origin. ECHO findings of elevated filling pressures and worsening RV function. Pulm following for management throughout this hospitalization. Pt deemed not a lung transplant candidate due to deconditioning, cardiac dysfunction, and poorly controlled diabetes. Able to be weaned from HFNC to NC. S/p RRT 5/6 for hypoxia requiring HFNC; CXR obtained during rapid with b/l increased hazy opacities on my read. Weaned to NC again. Pt noted w/ tachypnea and coughing after eating; S&S evaluation ordered. Persistent diarrhea. Pt has been on a Regular texture solids/thin liquids since admission.    5/22: s/p initial bedside swallow exam w/ diet deferred to team given limited trials. Of note, pt using NRB for comfort (pt w/ anxiety) despite VSS on 6LNC.  5/23 re-evaluation of swallow completed. Pt w/ no overt s/sx of aspiration/penetration on puree and thin liquids, however, FEES indicated given team c/f aspiration and pt multiple pulmonary risk factors. Of note, pt using NRB despite VSS on 6L NC, per RN, pt appears to be using it for comfort. Pt also reported to be tachypneic partially due to anxiety and pt reports substernal global sensation occasionally after meals, contributing to significant anxiety.    TODAY, this AM, chart reviewed and pt noted to now require HFNC; recommendations made for NPO, with non-oral nutrition/hydration/medications until re-evaluation of swallow and candidacy for FEES.    In afternoon, SLP at bedside with SUHAS Leyva and attending MD Lozano; pt expressing subjective difficulty breathing and SUHAS Leyva stating that VSS on 60L 60% HFNC. However, after pt taking medications (cleared by MD Lozano), pt began to desaturate to Spo2 of mid 80's and MD Lozano increased HFNC to max 60L 100%. No p.o. trials administered. FEES exam deferred at this time.    IMPRESSION: At this time, given unstable respiratory status and multiple pulmonary risk factors, pt deemed to be at chronic risk of possible aspiration. Would not recommend oral diet and encourage GOC conversation at this time. May reconsider FEES IF pt respiratory status improves and remains stable.    RECOMMENDATIONS  > NPO, with non-oral nutrition/hydration/medications if within pt GOC.  > Would also encourage GOC conversation regarding nutrition/hydration given chronic nature of diagnosis and risk of aspiration given tenuous respiratory status  >Maintain strict aspiration precautions for secretions and enteral feeds, if placed  >Maintain oral hygiene  >D/C pending course    d/w pt; CAROLINE Dean, SUHAS Leyva, attending MD Lozano  SPEECH PATHOLOGY  Florian Iyer [facility]SLP *Teams preferred* ([phone])

## 2024-05-24 NOTE — PROGRESS NOTE ADULT - PROBLEM SELECTOR PLAN 8
T2DM: Home DM medications: metformin 500 mg BID, + glimepride 1 mg daily + lantus 35 units QHS+ admelog 25 units TIDAC  - Pt w/ hx of uncontrolled T2DM, a1c 11.9.   - Endocrine consulted via email, recs appreciated  - insulin adjustments per endocrine  - MISS FS tid qac qhs  - Discharge DM medications:   - Continue with metformin 500 mg BID  - STOP glimipride due to recurrent hypoglycemia at home   - Basal/bolus, dose will depend on insulin requirement and steroid plan   - Patient will follow up at  [facility]:  [address] [place] [address]. [address]. [place] [address]  Tel: [phone]

## 2024-05-24 NOTE — PROGRESS NOTE ADULT - ASSESSMENT
63 F w/h/o uncontrolled T2DM (A1C 11.9%) while on Lantus, Admelog, metformin, glimepiride. Unknown DM complications. Also h/o AHRF, ILD, PE, HTN. Here with SOB secondary to interstitial lung disease. Continues with SOB and tachypnea, managed by primary medicine team. Endocrinology consulted for diabetes management. Tolerating POs with elevated BG at bedtime to 234mg/dL noted, will adjust insulin to prevent hyperglycemia. Noted near hypoglycemia at breakfast, will adjust Lantus to prevent hypoglycemia Patient is having increased O2 needs. CXR done this am, pending results. Medicine NP Autumn @bedside. Plan for patient to be NPO until FEES study done given concern for aspiration causing increased O2 needs overnight; FEES ordered but not scheduled. Continues on oral prednisone taper, at 20mg once daily now until 5/27. Follow insulin orders carefully for NPO status until FEES study done. Endocrine will closely monitor BG levels while NPO and when starting back on feedings based on FEES results.     Continues on steroid taper:  Prednisone 20 mg daily ( 5/21-5/27)  Prednisone 10 mg daily ( 5/28--     63 F w/h/o uncontrolled T2DM (A1C 11.9%) while on Lantus, Admelog, metformin, glimepiride. Unknown DM complications. Also h/o AHRF, ILD, PE, HTN. Here with SOB secondary to interstitial lung disease. Continues with SOB and tachypnea, managed by primary medicine team. Endocrinology consulted for diabetes management. Tolerating POs with elevated BG at bedtime to 234mg/dL noted, will adjust insulin to prevent hyperglycemia. Noted near hypoglycemia at breakfast, will adjust Lantus to prevent hypoglycemia Patient is having increased O2 needs. CXR done this am, pending results. Medicine NP Autumn @bedside. Plan for patient to be NPO until FEES study done given concern for aspiration causing increased O2 needs overnight; FEES ordered but not scheduled. Continues on oral prednisone taper, at 20mg once daily now until 5/27. Follow insulin orders carefully for NPO status until FEES study done. Endocrine will closely monitor BG levels while NPO and when starting back on feedings based on FEES results. Monitor BGs closely given holding pre-meal for NPO status, giving diuretics and increasing steroids.     Continues on steroid taper:  Prednisone 20 mg daily ( 5/21-5/27)  Prednisone 10 mg daily ( 5/28--

## 2024-05-24 NOTE — PROGRESS NOTE ADULT - SUBJECTIVE AND OBJECTIVE BOX
Patient seen today for follow up inpatient Diabetes Mellitus management.    Chief Complaint: Diabetes Mellitus II    INTERVAL HX:  Patient seen in [facility] 4MON 417 D1. Patient is alert and oriented, noted to be in acute distress. ACP medicine team at bedside and RN. Patient in the last 12hrs has been de-sating requiring increased O2 needs- went from 6L NC to HFNC 60L/60% now. Noted to have NRB on in addition for tachypnea and desaturation this am. Patient reports she has been eating well and her meals although noted increased SOB. Concerns for silent aspiration per medicine team. BG have been stable and mostly at goal 100-180mg/dL while on a Consistent Carbohydrate Diet with a few episodes of hyperglycemia in the 200s. Blood glucose levels in the last 24hrs have been 94-234mg/dL.     Review of Systems:  General: As above  Respiratory: Positive for SOB and HERNANDEZ.  Gastrointestinal: Denies any n/v/d or abdominal pain.   Endocrine: Denies any polyuria, polydipsia polyphagia, visual changes, or numbness in feet.     Allergies  No Known Allergies    Intolerances  None.     MEDICATIONS  (STANDING):  acetaminophen     Tablet .. 650 milliGRAM(s) Oral every 6 hours PRN  albuterol/ipratropium for Nebulization 3 milliLiter(s) Nebulizer every 6 hours  aluminum hydroxide/magnesium hydroxide/simethicone Suspension 30 milliLiter(s) Oral every 4 hours PRN  apixaban 5 milliGRAM(s) Oral two times a day  atorvastatin 40 milliGRAM(s) Oral at bedtime  buDESOnide    Inhalation Suspension 0.5 milliGRAM(s) Nebulizer two times a day  chlorhexidine 2% Cloths 1 Application(s) Topical daily  cyclobenzaprine 5 milliGRAM(s) Oral three times a day PRN  dextrose 10% Bolus 125 milliLiter(s) IV Bolus once  dextrose 5%. 1000 milliLiter(s) IV Continuous <Continuous>  dextrose 5%. 1000 milliLiter(s) IV Continuous <Continuous>  dextrose 50% Injectable 12.5 Gram(s) IV Push once  dextrose 50% Injectable 25 Gram(s) IV Push once  dextrose Oral Gel 15 Gram(s) Oral once PRN  furosemide   Injectable 40 milliGRAM(s) IV Push two times a day  gabapentin 300 milliGRAM(s) Oral daily  glucagon  Injectable 1 milliGRAM(s) IntraMuscular once  insulin glargine Injectable (LANTUS) 14 Unit(s) SubCutaneous at bedtime  insulin lispro (ADMELOG) corrective regimen sliding scale   SubCutaneous <User Schedule>  insulin lispro (ADMELOG) corrective regimen sliding scale   SubCutaneous three times a day before meals  insulin lispro Injectable (ADMELOG) 6 Unit(s) SubCutaneous at bedtime  insulin lispro Injectable (ADMELOG) 12 Unit(s) SubCutaneous before dinner  insulin lispro Injectable (ADMELOG) 14 Unit(s) SubCutaneous before lunch  insulin lispro Injectable (ADMELOG) 14 Unit(s) SubCutaneous before breakfast  loperamide 2 milliGRAM(s) Oral four times a day PRN  LORazepam     Tablet 0.5 milliGRAM(s) Oral every 6 hours PRN  melatonin 3 milliGRAM(s) Oral at bedtime PRN  multivitamin 1 Tablet(s) Oral daily  Ofev (Nintedanib) 150 milliGRAM(s) 150 milliGRAM(s) Oral two times a day  pantoprazole    Tablet 40 milliGRAM(s) Oral two times a day  predniSONE   Tablet 20 milliGRAM(s) Oral daily  sildenafil (REVATIO) 10 milliGRAM(s) Oral every 8 hours  simethicone 80 milliGRAM(s) Chew two times a day  traMADol 25 milliGRAM(s) Oral every 6 hours PRN  traZODone 150 milliGRAM(s) Oral at bedtime  trimethoprim   80 mG/sulfamethoxazole 400 mG 1 Tablet(s) Oral daily      atorvastatin 40 milliGRAM(s) Oral at bedtime  dextrose 50% Injectable 12.5 Gram(s) IV Push once  dextrose 50% Injectable 25 Gram(s) IV Push once  dextrose Oral Gel 15 Gram(s) Oral once PRN  glucagon  Injectable 1 milliGRAM(s) IntraMuscular once  insulin glargine Injectable (LANTUS) 14 Unit(s) SubCutaneous at bedtime  insulin lispro (ADMELOG) corrective regimen sliding scale   SubCutaneous three times a day before meals  insulin lispro (ADMELOG) corrective regimen sliding scale   SubCutaneous <User Schedule>  insulin lispro Injectable (ADMELOG) 14 Unit(s) SubCutaneous before lunch  insulin lispro Injectable (ADMELOG) 14 Unit(s) SubCutaneous before breakfast  insulin lispro Injectable (ADMELOG) 12 Unit(s) SubCutaneous before dinner  insulin lispro Injectable (ADMELOG) 6 Unit(s) SubCutaneous at bedtime  predniSONE   Tablet 20 milliGRAM(s) Oral daily      insulin lispro (ADMELOG) corrective regimen sliding scale   SubCutaneous <User Schedule>  insulin lispro (ADMELOG) corrective regimen sliding scale   SubCutaneous three times a day before meals  insulin lispro Injectable (ADMELOG) 14 Unit(s) SubCutaneous before breakfast  insulin lispro Injectable (ADMELOG) 14 Unit(s) SubCutaneous before lunch  insulin lispro Injectable (ADMELOG) 6 Unit(s) SubCutaneous at bedtime  insulin lispro Injectable (ADMELOG) 12 Unit(s) SubCutaneous before dinner      PHYSICAL EXAM:  VITALS:   T(C): 36.7 (05-24-24 @ 11:01), Max: 36.9 (05-23-24 @ 21:16)  HR: 106 (05-24-24 @ 09:50) (106 - 119)  BP: 113/68 (05-24-24 @ 11:01) (113/68 - 134/89)  RR: 18 (05-24-24 @ 11:01) (18 - 18)  SpO2: 98% (05-24-24 @ 11:01) (92% - 98%)    GENERAL: In no acute distress.   Respiratory: Respirations unlabored  Extremities: Warm and dry, no edema  NEURO: Alert and orineted, appropriate     LABS:  POCT Blood Glucose.: 139 mg/dL (05-24-24 @ 09:48)  POCT Blood Glucose.: 94 mg/dL (05-24-24 @ 07:59)  POCT Blood Glucose.: 203 mg/dL (05-24-24 @ 01:16)  POCT Blood Glucose.: 234 mg/dL (05-23-24 @ 21:31)  POCT Blood Glucose.: 213 mg/dL (05-23-24 @ 16:44)  POCT Blood Glucose.: 158 mg/dL (05-23-24 @ 11:53)  POCT Blood Glucose.: 122 mg/dL (05-23-24 @ 08:11)  POCT Blood Glucose.: 144 mg/dL (05-23-24 @ 02:30)  POCT Blood Glucose.: 175 mg/dL (05-23-24 @ 02:06)  POCT Blood Glucose.: 263 mg/dL (05-22-24 @ 21:31)  POCT Blood Glucose.: 358 mg/dL (05-22-24 @ 16:30)  POCT Blood Glucose.: 150 mg/dL (05-22-24 @ 11:50)  POCT Blood Glucose.: 186 mg/dL (05-22-24 @ 07:46)  POCT Blood Glucose.: 304 mg/dL (05-22-24 @ 02:02)  POCT Blood Glucose.: 325 mg/dL (05-21-24 @ 21:22)  POCT Blood Glucose.: 349 mg/dL (05-21-24 @ 16:21)  POCT Blood Glucose.: 89 mg/dL (05-21-24 @ 12:04)                          9.7    8.81  )-----------( 499      ( 24 May 2024 07:19 )             32.2     05-24    140  |  102  |  35<H>  ----------------------------<  99  4.6   |  28  |  1.01    Ca    9.3      24 May 2024 07:15  Phos  3.2     05-24  Mg     2.2     05-24      A1C with Estimated Average Glucose Result: A1C with Estimated Average Glucose Result: 11.9 % (04-13-24 @ 03:53)    Estimated Average Glucose:    Patient seen today for follow up inpatient Diabetes Mellitus management.    Chief Complaint: Diabetes Mellitus II    INTERVAL HX:  Patient seen in [facility] 4MON 417 D1. Patient is alert and oriented, noted to be in acute distress. ACP medicine team at bedside and RN. Patient in the last 12hrs has been de-sating requiring increased O2 needs- went from 6L NC to HFNC 60L/60% now. Noted to have NRB on in addition for tachypnea and desaturation this am. Patient reports she has been eating well and her meals although noted increased SOB. Concerns for silent aspiration per medicine team. BG have been stable and mostly at goal 100-180mg/dL while on a Consistent Carbohydrate Diet with a few episodes of hyperglycemia in the 200s. Blood glucose levels in the last 24hrs have been 94-234mg/dL.     Review of Systems:  General: As above  Respiratory: Positive for SOB and tachypnea.   Gastrointestinal: Denies any n/v/d or abdominal pain.   Endocrine: Denies any polyuria, polydipsia polyphagia, visual changes, or numbness in feet.     Allergies  No Known Allergies    Intolerances  None.     MEDICATIONS  (STANDING):  acetaminophen     Tablet .. 650 milliGRAM(s) Oral every 6 hours PRN  albuterol/ipratropium for Nebulization 3 milliLiter(s) Nebulizer every 6 hours  aluminum hydroxide/magnesium hydroxide/simethicone Suspension 30 milliLiter(s) Oral every 4 hours PRN  apixaban 5 milliGRAM(s) Oral two times a day  atorvastatin 40 milliGRAM(s) Oral at bedtime  buDESOnide    Inhalation Suspension 0.5 milliGRAM(s) Nebulizer two times a day  chlorhexidine 2% Cloths 1 Application(s) Topical daily  cyclobenzaprine 5 milliGRAM(s) Oral three times a day PRN  dextrose 10% Bolus 125 milliLiter(s) IV Bolus once  dextrose 5%. 1000 milliLiter(s) IV Continuous <Continuous>  dextrose 5%. 1000 milliLiter(s) IV Continuous <Continuous>  dextrose 50% Injectable 12.5 Gram(s) IV Push once  dextrose 50% Injectable 25 Gram(s) IV Push once  dextrose Oral Gel 15 Gram(s) Oral once PRN  furosemide   Injectable 40 milliGRAM(s) IV Push two times a day  gabapentin 300 milliGRAM(s) Oral daily  glucagon  Injectable 1 milliGRAM(s) IntraMuscular once  insulin glargine Injectable (LANTUS) 14 Unit(s) SubCutaneous at bedtime  insulin lispro (ADMELOG) corrective regimen sliding scale   SubCutaneous <User Schedule>  insulin lispro (ADMELOG) corrective regimen sliding scale   SubCutaneous three times a day before meals  insulin lispro Injectable (ADMELOG) 6 Unit(s) SubCutaneous at bedtime  insulin lispro Injectable (ADMELOG) 12 Unit(s) SubCutaneous before dinner  insulin lispro Injectable (ADMELOG) 14 Unit(s) SubCutaneous before lunch  insulin lispro Injectable (ADMELOG) 14 Unit(s) SubCutaneous before breakfast  loperamide 2 milliGRAM(s) Oral four times a day PRN  LORazepam     Tablet 0.5 milliGRAM(s) Oral every 6 hours PRN  melatonin 3 milliGRAM(s) Oral at bedtime PRN  multivitamin 1 Tablet(s) Oral daily  Ofev (Nintedanib) 150 milliGRAM(s) 150 milliGRAM(s) Oral two times a day  pantoprazole    Tablet 40 milliGRAM(s) Oral two times a day  predniSONE   Tablet 20 milliGRAM(s) Oral daily  sildenafil (REVATIO) 10 milliGRAM(s) Oral every 8 hours  simethicone 80 milliGRAM(s) Chew two times a day  traMADol 25 milliGRAM(s) Oral every 6 hours PRN  traZODone 150 milliGRAM(s) Oral at bedtime  trimethoprim   80 mG/sulfamethoxazole 400 mG 1 Tablet(s) Oral daily      atorvastatin 40 milliGRAM(s) Oral at bedtime  dextrose 50% Injectable 12.5 Gram(s) IV Push once  dextrose 50% Injectable 25 Gram(s) IV Push once  dextrose Oral Gel 15 Gram(s) Oral once PRN  glucagon  Injectable 1 milliGRAM(s) IntraMuscular once  insulin glargine Injectable (LANTUS) 14 Unit(s) SubCutaneous at bedtime  insulin lispro (ADMELOG) corrective regimen sliding scale   SubCutaneous three times a day before meals  insulin lispro (ADMELOG) corrective regimen sliding scale   SubCutaneous <User Schedule>  insulin lispro Injectable (ADMELOG) 14 Unit(s) SubCutaneous before lunch  insulin lispro Injectable (ADMELOG) 14 Unit(s) SubCutaneous before breakfast  insulin lispro Injectable (ADMELOG) 12 Unit(s) SubCutaneous before dinner  insulin lispro Injectable (ADMELOG) 6 Unit(s) SubCutaneous at bedtime  predniSONE   Tablet 20 milliGRAM(s) Oral daily      insulin lispro (ADMELOG) corrective regimen sliding scale   SubCutaneous <User Schedule>  insulin lispro (ADMELOG) corrective regimen sliding scale   SubCutaneous three times a day before meals  insulin lispro Injectable (ADMELOG) 14 Unit(s) SubCutaneous before breakfast  insulin lispro Injectable (ADMELOG) 14 Unit(s) SubCutaneous before lunch  insulin lispro Injectable (ADMELOG) 6 Unit(s) SubCutaneous at bedtime  insulin lispro Injectable (ADMELOG) 12 Unit(s) SubCutaneous before dinner      PHYSICAL EXAM:  VITALS:   T(C): 36.7 (05-24-24 @ 11:01), Max: 36.9 (05-23-24 @ 21:16)  HR: 106 (05-24-24 @ 09:50) (106 - 119)  BP: 113/68 (05-24-24 @ 11:01) (113/68 - 134/89)  RR: 18 (05-24-24 @ 11:01) (18 - 18)  SpO2: 98% (05-24-24 @ 11:01) (92% - 98%)    GENERAL: In acute respiratory distress.   Respiratory: Respirations tachypenic  Extremities: Warm and dry, no edema  NEURO: Alert and oriented appropriate     LABS:  POCT Blood Glucose.: 139 mg/dL (05-24-24 @ 09:48)  POCT Blood Glucose.: 94 mg/dL (05-24-24 @ 07:59)  POCT Blood Glucose.: 203 mg/dL (05-24-24 @ 01:16)  POCT Blood Glucose.: 234 mg/dL (05-23-24 @ 21:31)  POCT Blood Glucose.: 213 mg/dL (05-23-24 @ 16:44)  POCT Blood Glucose.: 158 mg/dL (05-23-24 @ 11:53)  POCT Blood Glucose.: 122 mg/dL (05-23-24 @ 08:11)  POCT Blood Glucose.: 144 mg/dL (05-23-24 @ 02:30)  POCT Blood Glucose.: 175 mg/dL (05-23-24 @ 02:06)  POCT Blood Glucose.: 263 mg/dL (05-22-24 @ 21:31)  POCT Blood Glucose.: 358 mg/dL (05-22-24 @ 16:30)  POCT Blood Glucose.: 150 mg/dL (05-22-24 @ 11:50)  POCT Blood Glucose.: 186 mg/dL (05-22-24 @ 07:46)  POCT Blood Glucose.: 304 mg/dL (05-22-24 @ 02:02)  POCT Blood Glucose.: 325 mg/dL (05-21-24 @ 21:22)  POCT Blood Glucose.: 349 mg/dL (05-21-24 @ 16:21)  POCT Blood Glucose.: 89 mg/dL (05-21-24 @ 12:04)                          9.7    8.81  )-----------( 499      ( 24 May 2024 07:19 )             32.2     05-24    140  |  102  |  35<H>  ----------------------------<  99  4.6   |  28  |  1.01    Ca    9.3      24 May 2024 07:15  Phos  3.2     05-24  Mg     2.2     05-24      A1C with Estimated Average Glucose Result: A1C with Estimated Average Glucose Result: 11.9 % (04-13-24 @ 03:53)    Estimated Average Glucose:

## 2024-05-24 NOTE — PROGRESS NOTE ADULT - ATTENDING COMMENTS
Patient seen and examined. Patient is a 63F with extensive medical history including suspected IPF with chronic hypoxemic respiratory failure on O2, and pulmonary hypertension on Sildenafil presenting from [facility] for lung transplant evaluation in the setting of shortness of breath, chest pain, and dry cough.    She has had clinical improvement with diuresis initially and was tapered to 6L NC (she uses 4-5L O2 at home). During this hospitalization her Sildenafil dose was decreased in the setting of flushing, lightheadedness, and diarrhea.   --- Unfortunately patient has had a deterioration over the last 24 hours and was placed on HFNC 60L/60% with borderline O2 saturations. She is noted to be volume overloaded and has coarse BS on exam.       #Interstitial Lung Disease  - Continue Ofev  - Patient with suspected IPF though most recent CT more consistent with fibrotic NSIP  - May need to increase steroids given acute deterioration and continue PCP prophy  - Patient does not find Symbicort helpful and would prefer nebulized therapy - transitioned to Budesonide.   - Outpatient follow-up with her primary Pulmonologist Dr. Marlow.  - Consider outpatient evaluation with Dr. Emery as well for ILD and pulmonary hypertension co-management if Dr. Marlow feels this would be helpful  - Has been seen by the Lung Transplant team - and patient deemed to not be a candidate for lung transplant. I spoke with the lung transplant team today and they confirmed that patient is not a candidate for lung transplant evaluation at this time.  #Pulmonary Hypertension  - Patient appears more volume overloaded on exam today - check pro-BNP and increase diuresis with monitoring of I/O  - Continue Sildenafil 10mg TID and monitor  - severe pulmonary hypertension by echo but no RHC noted - this may become necessary if her symptoms do not improve  - Nuclear medicine cardiac shunt study was negative - patient has a history of PFO  #Acute on Chronic Hypoxemic Respiratory Failure  - Continue supplemental O2 with goal O2 sat > 90% - back on HFNC  - Incentive spirometer and acapella to facilitate breathing  - OOB and deep breathing  - Given worsening of symptoms after eating will need swallow evaluation - was planned for FEES but this will need to be held given acute deterioration  #Anxiety - may benefit from Behavioral Health evaluation  #DVT proph - continue AC given history of PE from 2022  #GOC - DNR/DNI    Discussed with patient and medicine attending and all questions answered. Long term prognosis guarded.

## 2024-05-24 NOTE — PROGRESS NOTE ADULT - ASSESSMENT
63F hx ILD/ probable IPF (On 2-3LNC at home), PE 2022 on eliquis, severe pHTN w/ prior cor pulmonale (RVSP 68 - 10/22 w/ TTE from 4/12 w/ PASP 28, normal LV/RV SF), IDDM, presenting as a transfer from [place] for lung transplant eval iso SOB, dry cough, chest pain. She has been receiving duonebs, symbicort, lasix, ofev and IV steroids.     CT Chest 4/12/2024: Findings suggesting interstitial lung disease without significant interval progression. No evidence of pneumonia.   CT scan from 4/12/2024 when compared with CT scan from 2020 has shown significant progression.  Most recent CT is unchanged compared with 4/12/24 study.     Recommendations  - c/w weaning O2 as able - On HFNC  - Continue with Symbicort, duonebs, Ofev, Eliquis. Patient is refusing symbicort - changed to inhaled budesonide BID   - c/w prednisone taper, decrease 10mg every week until off.   - PCP prophylaxis with bactrim    - Shunt study negative.   - c/w sildenafil 10mg TID, will consider increasing to 20mg pending diarrhea sx  - pt with report of worsening dyspnea following meals, SLP evaluation unrevealing   - if Cr continues to rise can decrease to once daily dosing of diuretics  - variable bed weights, check standing weights if able  - Strict I&O  - Please ambulate pt daily with goal to improve walking distance - this is a major barrier to transplant for this pt.

## 2024-05-24 NOTE — PROGRESS NOTE ADULT - PROBLEM SELECTOR PLAN 12
DVT PPx: Eliquis  Diet: Regular  Code: DNR/DNI, molst completed in chart     Aspiration precautions  Speech and swallow evaluation pending to rule out aspiration  Fall precautions    Discharge to [facility] pending plan for monitoring diarrhea/sildenafil dosing and creatinine

## 2024-05-24 NOTE — CHART NOTE - NSCHARTNOTEFT_GEN_A_CORE
30 minutes of critical care time provided due to hypoxemic respiratory failure. Pt assessed at bedside with worsening shortness of breath on 60L/60% FiO2. Pt de-sat to 82%, requiring increase to 80%/60L. Discussed with speech and swallow at bedside; will defer FEES for now. Discussed with patient regarding risk of aspiration. Pt will remain NPO until respiratory status stable enough to perform FEES. Pt also borderline hypoglycemic to 62. D/C premeal insulin. 30 minutes of critical care time provided due to hypoxemic respiratory failure. Pt assessed at bedside with worsening shortness of breath on 60L/60% FiO2. Pt de-sat to 82%, requiring increase to 100%/60L SaO2 93-96%. Discussed with speech and swallow at bedside; will defer FEES for now. Discussed with patient regarding risk of aspiration. Pt will remain NPO until respiratory status stable enough to perform FEES. Pt also borderline hypoglycemic to 62. D/C premeal insulin. Discussed with pulmonology - will increase steroids and give additional diuretic.

## 2024-05-24 NOTE — PROGRESS NOTE ADULT - SUBJECTIVE AND OBJECTIVE BOX
CHIEF COMPLAINT:    Interval Events: transitioned to HFNC overnight because of episode of hypoxemia     REVIEW OF SYSTEMS:  Constitutional: No fevers or chills. No weight loss. No fatigue or generalised malaise.  Eyes: No itching or discharge from the eyes  ENT: No ear pain. No ear discharge. No nasal congestion. No post nasal drip. No epistaxis. No throat pain. No sore throat. No difficulty swallowing.   CV: No chest pain. No palpitations. No lightheadedness or dizziness.   Resp: No dyspnea at rest. No dyspnea on exertion. No orthopnea. No wheezing. No cough. No stridor. No sputum production. No chest pain with respiration.    OBJECTIVE:  ICU Vital Signs Last 24 Hrs  T(C): 36.4 (24 May 2024 04:46), Max: 36.9 (23 May 2024 21:16)  T(F): 97.5 (24 May 2024 04:46), Max: 98.5 (23 May 2024 21:16)  HR: 115 (24 May 2024 04:46) (106 - 119)  BP: 133/80 (24 May 2024 04:46) (122/78 - 134/89)  BP(mean): --  ABP: --  ABP(mean): --  RR: 18 (24 May 2024 04:46) (18 - 18)  SpO2: 92% (24 May 2024 04:46) (92% - 98%)    O2 Parameters below as of 24 May 2024 04:46  Patient On (Oxygen Delivery Method): nasal cannula, high flow  O2 Flow (L/min): 60  O2 Concentration (%): 60          05-23 @ 07:01  -  05-24 @ 07:00  --------------------------------------------------------  IN: 790 mL / OUT: 2120 mL / NET: -1330 mL      CAPILLARY BLOOD GLUCOSE      POCT Blood Glucose.: 203 mg/dL (24 May 2024 01:16)      PHYSICAL EXAM:  General: Awake, alert, oriented X 3.   HEENT: Atraumatic, normocephalic.                 Mallampatti Grade                 No nasal congestion.                No tonsillar or pharyngeal exudates.  Lymph Nodes: No palpable lymphadenopathy  Neck: No JVD. No carotid bruit.   Respiratory: Normal chest expansion                         Normal percussion                         Normal and equal air entry                         No wheeze, rhonchi or rales.  Cardiovascular: S1 S2 normal. No murmurs, rubs or gallops.   Abdomen: Soft, non-tender, non-distended. No organomegaly.  Extremities: Warm to touch. Peripheral pulse palpable. No pedal edema.       HOSPITAL MEDICATIONS:  MEDICATIONS  (STANDING):  albuterol/ipratropium for Nebulization 3 milliLiter(s) Nebulizer every 6 hours  apixaban 5 milliGRAM(s) Oral two times a day  atorvastatin 40 milliGRAM(s) Oral at bedtime  buDESOnide    Inhalation Suspension 0.5 milliGRAM(s) Nebulizer two times a day  chlorhexidine 2% Cloths 1 Application(s) Topical daily  dextrose 10% Bolus 125 milliLiter(s) IV Bolus once  dextrose 5%. 1000 milliLiter(s) (100 mL/Hr) IV Continuous <Continuous>  dextrose 5%. 1000 milliLiter(s) (50 mL/Hr) IV Continuous <Continuous>  dextrose 50% Injectable 12.5 Gram(s) IV Push once  dextrose 50% Injectable 25 Gram(s) IV Push once  furosemide   Injectable 40 milliGRAM(s) IV Push two times a day  gabapentin 300 milliGRAM(s) Oral daily  glucagon  Injectable 1 milliGRAM(s) IntraMuscular once  insulin glargine Injectable (LANTUS) 16 Unit(s) SubCutaneous at bedtime  insulin lispro (ADMELOG) corrective regimen sliding scale   SubCutaneous <User Schedule>  insulin lispro (ADMELOG) corrective regimen sliding scale   SubCutaneous three times a day before meals  insulin lispro Injectable (ADMELOG) 14 Unit(s) SubCutaneous before lunch  insulin lispro Injectable (ADMELOG) 14 Unit(s) SubCutaneous before breakfast  insulin lispro Injectable (ADMELOG) 10 Unit(s) SubCutaneous before dinner  insulin lispro Injectable (ADMELOG) 6 Unit(s) SubCutaneous at bedtime  multivitamin 1 Tablet(s) Oral daily  Ofev (Nintedanib) 150 milliGRAM(s) 150 milliGRAM(s) Oral two times a day  pantoprazole    Tablet 40 milliGRAM(s) Oral two times a day  predniSONE   Tablet 20 milliGRAM(s) Oral daily  sildenafil (REVATIO) 10 milliGRAM(s) Oral every 8 hours  simethicone 80 milliGRAM(s) Chew two times a day  traZODone 150 milliGRAM(s) Oral at bedtime  trimethoprim   80 mG/sulfamethoxazole 400 mG 1 Tablet(s) Oral daily    MEDICATIONS  (PRN):  acetaminophen     Tablet .. 650 milliGRAM(s) Oral every 6 hours PRN Temp greater or equal to 38C (100.4F), Mild Pain (1 - 3)  aluminum hydroxide/magnesium hydroxide/simethicone Suspension 30 milliLiter(s) Oral every 4 hours PRN Dyspepsia  cyclobenzaprine 5 milliGRAM(s) Oral three times a day PRN Muscle Spasm  dextrose Oral Gel 15 Gram(s) Oral once PRN Blood Glucose LESS THAN 70 milliGRAM(s)/deciliter  loperamide 2 milliGRAM(s) Oral four times a day PRN Diarrhea  LORazepam     Tablet 0.5 milliGRAM(s) Oral every 6 hours PRN Severe Anxiety  melatonin 3 milliGRAM(s) Oral at bedtime PRN Insomnia  traMADol 25 milliGRAM(s) Oral every 6 hours PRN Moderate Pain (4 - 6)      LABS:                    MICROBIOLOGY:     RADIOLOGY:  [ ] Reviewed and interpreted by me    Point of Care Ultrasound Findings:    PFT:    EKG:

## 2024-05-24 NOTE — PROGRESS NOTE ADULT - PROBLEM SELECTOR PROBLEM 5
Independent MLP    HPI:  4/1/21,   Time: 1:20 PM EDT         Tavia Wray is a 42 y.o. male presenting to the ED for dizziness, beginning prior to arrival ago. The complaint has been intermittent, mild in severity, and worsened by nothing. States approximately 10 minutes after receiving his first dose of the Maderna vaccine he began feeling lightheaded and dizzy. He states approximately 20 minutes after receiving the dose his symptoms did resolve however family insisted that he come to the ER for evaluation. He has no complaints of symptoms at this time. He does have a past medical history of hypertension. He denies any chest pain or shortness of breath. ROS:   Pertinent positives and negatives are stated within HPI, all other systems reviewed and are negative.  --------------------------------------------- PAST HISTORY ---------------------------------------------  Past Medical History:  has a past medical history of Hypertension. Past Surgical History:  has no past surgical history on file. Social History:  reports that he has never smoked. He does not have any smokeless tobacco history on file. He reports previous alcohol use. He reports previous drug use. Family History: family history is not on file. The patients home medications have been reviewed. Allergies: Patient has no known allergies.     -------------------------------------------------- RESULTS -------------------------------------------------  All laboratory and radiology results have been personally reviewed by myself   LABS:  Results for orders placed or performed during the hospital encounter of 04/01/21   EKG 12 Lead   Result Value Ref Range    Ventricular Rate 57 BPM    Atrial Rate 57 BPM    P-R Interval 162 ms    QRS Duration 102 ms    Q-T Interval 388 ms    QTc Calculation (Bazett) 377 ms    P Axis 7 degrees    R Axis 50 degrees    T Axis 24 degrees       RADIOLOGY:  Interpreted by Radiologist.  No orders to display EKG:  This EKG is signed and interpreted by ep. Rate: 57  Rhythm: Sinus and bradycardia  Interpretation: no acute changes  Comparison: no previous EKG available    ------------------------- NURSING NOTES AND VITALS REVIEWED ---------------------------   The nursing notes within the ED encounter and vital signs as below have been reviewed. /79   Pulse 54   Temp 97 °F (36.1 °C) (Temporal)   Resp 14   Ht 6' (1.829 m)   Wt (!) 320 lb (145.2 kg)   SpO2 97%   BMI 43.40 kg/m²   Oxygen Saturation Interpretation: Normal      ---------------------------------------------------PHYSICAL EXAM--------------------------------------      Constitutional/General: Alert and oriented x3, well appearing, non toxic in NAD  Head: NC/AT  Eyes: PERRL, EOMI  Mouth: Oropharynx clear, handling secretions, no trismus  Neck: Supple, full ROM, no meningeal signs  Pulmonary: Lungs clear to auscultation bilaterally, no wheezes, rales, or rhonchi. Not in respiratory distress  Cardiovascular:  Regular rate and rhythm, no murmurs, gallops, or rubs. 2+ distal pulses  Abdomen: Soft, non tender, non distended,   Extremities: Moves all extremities x 4. Warm and well perfused  Skin: warm and dry without rash  Neurologic: GCS 15,  Psych: Normal Affect      ------------------------------ ED COURSE/MEDICAL DECISION MAKING----------------------  Medications - No data to display      Medical Decision Making:    Time: [id number]  Re-evaluation. Patients symptoms are improving  Repeat physical examination is significantly improved, orthostatic vital signs are negative. Patient remains asymptomatic. He denies any dizziness lightheadedness. His twelve-lead EKG on evaluation was normal.  Advised to follow-up with primary care physician for further recommendation regarding receiving the second vaccine dose. If any change or worsening symptoms advised to return back to the emergency room. Counseling:    The emergency provider has spoken with the patient and discussed todays results, in addition to providing specific details for the plan of care and counseling regarding the diagnosis and prognosis. Questions are answered at this time and they are agreeable with the plan.      --------------------------------- IMPRESSION AND DISPOSITION ---------------------------------    IMPRESSION  1.  Adverse effect of vaccine, initial encounter        DISPOSITION  Disposition: Discharge to home  Patient condition is good                 KRISTEN Jones CNP  04/01/21 9132 Hyperlipidemia

## 2024-05-24 NOTE — PROGRESS NOTE ADULT - ASSESSMENT
63F PMH chronic AHRF on 2-3L NC at b/l 2/2 ILD (Followed w/ Dr. Primo Marlow, [facility]), PE on Eliquis, severe pHTN, Cor pulmonale, DM, presented initially with SOB, dry cough, chest pain, LE edema, recent admission to [facility] in March for ILD flare, admitted to outside hospital->ICU for AHRF 2/2 ILD flare, transferred to [facility] for lung transplant evaluation and found not to be a transplant candidate.

## 2024-05-24 NOTE — PROGRESS NOTE ADULT - PROBLEM SELECTOR PLAN 3
- Pt w/ hx of severe pHTN, RVSP 68 on 10/2022  - [facility] PAP 28, "normal PAP"   - Maintain euvolemia, as above  - sildenafil reduced to 10 mg TID as patient experienced diarrhea, which may be a side effect. will continue this dose for now

## 2024-05-24 NOTE — PROGRESS NOTE ADULT - PROBLEM SELECTOR PLAN 7
Hx of urinary retention; follows Dr. Chua, urologist. At [facility] had failed TOV w/ godwin replaced. Arrives to [facility] w/o Godwin. Pt on outpt med list listed Tamsulosin 0.4 mg, unclear if started at [facility], pt saying she does not take this outpatient  - failed TOV here, godwin placed

## 2024-05-24 NOTE — PROGRESS NOTE ADULT - PROBLEM SELECTOR PLAN 1
Baseline 3-4L NC. Hx of ILD w/ c/f IPF. Followed w/ DOMENICO Ding. Transferred to [facility] for lung transplant eval  - NM cardiac shunt performed on 5/10 was negative for PFO  - Continue home Ofev 150mg BID  - Duonebs, Symbicort  - Transitioned to PO Prednisone 40mg, plan to taper by 10mg every 7 days. Prednisone 20 mg 5/21. - Bactrim ordered for ppx.   - valium 2.5 mg q12 PRN anxiety   - Deemed not to be a transplant candidate 4/30

## 2024-05-24 NOTE — PROGRESS NOTE ADULT - SUBJECTIVE AND OBJECTIVE BOX
[facility] Division of Hospital Medicine  Héctor Lozano MD  Available via MS Teams    SUBJECTIVE / OVERNIGHT EVENTS: Escalated to HFNC overnight due to worsening hypoxemia. Denies any chest pain.     ADDITIONAL REVIEW OF SYSTEMS:    MEDICATIONS  (STANDING):  albuterol/ipratropium for Nebulization 3 milliLiter(s) Nebulizer every 6 hours  apixaban 5 milliGRAM(s) Oral two times a day  atorvastatin 40 milliGRAM(s) Oral at bedtime  buDESOnide    Inhalation Suspension 0.5 milliGRAM(s) Nebulizer two times a day  chlorhexidine 2% Cloths 1 Application(s) Topical daily  dextrose 10% Bolus 125 milliLiter(s) IV Bolus once  dextrose 5%. 1000 milliLiter(s) (100 mL/Hr) IV Continuous <Continuous>  dextrose 5%. 1000 milliLiter(s) (50 mL/Hr) IV Continuous <Continuous>  dextrose 50% Injectable 25 Gram(s) IV Push once  dextrose 50% Injectable 12.5 Gram(s) IV Push once  furosemide   Injectable 40 milliGRAM(s) IV Push two times a day  gabapentin 300 milliGRAM(s) Oral daily  glucagon  Injectable 1 milliGRAM(s) IntraMuscular once  insulin glargine Injectable (LANTUS) 14 Unit(s) SubCutaneous at bedtime  insulin lispro (ADMELOG) corrective regimen sliding scale   SubCutaneous three times a day before meals  insulin lispro (ADMELOG) corrective regimen sliding scale   SubCutaneous <User Schedule>  insulin lispro Injectable (ADMELOG) 12 Unit(s) SubCutaneous before dinner  insulin lispro Injectable (ADMELOG) 6 Unit(s) SubCutaneous at bedtime  insulin lispro Injectable (ADMELOG) 14 Unit(s) SubCutaneous before breakfast  insulin lispro Injectable (ADMELOG) 14 Unit(s) SubCutaneous before lunch  multivitamin 1 Tablet(s) Oral daily  Ofev (Nintedanib) 150 milliGRAM(s) 150 milliGRAM(s) Oral two times a day  pantoprazole    Tablet 40 milliGRAM(s) Oral two times a day  predniSONE   Tablet 20 milliGRAM(s) Oral daily  sildenafil (REVATIO) 10 milliGRAM(s) Oral every 8 hours  simethicone 80 milliGRAM(s) Chew two times a day  traZODone 150 milliGRAM(s) Oral at bedtime  trimethoprim   80 mG/sulfamethoxazole 400 mG 1 Tablet(s) Oral daily    MEDICATIONS  (PRN):  acetaminophen     Tablet .. 650 milliGRAM(s) Oral every 6 hours PRN Temp greater or equal to 38C (100.4F), Mild Pain (1 - 3)  aluminum hydroxide/magnesium hydroxide/simethicone Suspension 30 milliLiter(s) Oral every 4 hours PRN Dyspepsia  cyclobenzaprine 5 milliGRAM(s) Oral three times a day PRN Muscle Spasm  dextrose Oral Gel 15 Gram(s) Oral once PRN Blood Glucose LESS THAN 70 milliGRAM(s)/deciliter  loperamide 2 milliGRAM(s) Oral four times a day PRN Diarrhea  LORazepam     Tablet 0.5 milliGRAM(s) Oral every 6 hours PRN Severe Anxiety  melatonin 3 milliGRAM(s) Oral at bedtime PRN Insomnia  traMADol 25 milliGRAM(s) Oral every 6 hours PRN Moderate Pain (4 - 6)      I&O's Summary    23 May 2024 07:01  -  24 May 2024 07:00  --------------------------------------------------------  IN: 790 mL / OUT: 2120 mL / NET: -1330 mL        T(C): 36.7 (05-24-24 @ 11:01), Max: 36.9 (05-23-24 @ 21:16)  HR: 106 (05-24-24 @ 09:50) (106 - 119)  BP: 113/68 (05-24-24 @ 11:01) (113/68 - 134/89)  RR: 18 (05-24-24 @ 11:01) (18 - 18)  SpO2: 98% (05-24-24 @ 11:01) (92% - 98%)        LABS:                        9.7    8.81  )-----------( 499      ( 24 May 2024 07:19 )             32.2     05-24    140  |  102  |  35<H>  ----------------------------<  99  4.6   |  28  |  1.01    Ca    9.3      24 May 2024 07:15  Phos  3.2     05-24  Mg     2.2     05-24            Urinalysis Basic - ( 24 May 2024 07:15 )    Color: x / Appearance: x / SG: x / pH: x  Gluc: 99 mg/dL / Ketone: x  / Bili: x / Urobili: x   Blood: x / Protein: x / Nitrite: x   Leuk Esterase: x / RBC: x / WBC x   Sq Epi: x / Non Sq Epi: x / Bacteria: x        SARS-CoV-2: NotDetec (11 Apr 2024 22:46)          RADIOLOGY & ADDITIONAL TESTS:  New Results Reviewed Today:   New Imaging Personally Reviewed Today:  New Electrocardiogram Personally Reviewed Today:  Prior or Outpatient Records Reviewed Today:    COMMUNICATION:  Care Discussed with Consultants/Other Providers and Details of Discussion: Discussed with ACP  Discussions with Patient/Family:  PCP Communication:    CONSTITUTIONAL: NAD  RESPIRATORY: on HFNC, no wheezing, no increased respiratory effort  CARDIOVASCULAR: regular, tachycardic, normal S1 and S2, no LE edema  ABDOMEN: soft, nt, nd   MUSCULOSKELETAL: no clubbing or cyanosis of digits; no joint swelling or tenderness to palpation  PSYCH: A+O to person, place, and time; affect appropriate  NEUROLOGY: CN 2-12 are intact and symmetric; no gross sensory deficits   SKIN: No rashes; no palpable lesions

## 2024-05-24 NOTE — CHART NOTE - NSCHARTNOTEFT_GEN_A_CORE
63y F with PMHx of chronic AHRF (baseline 2-3L O2 NC) 2/2 ILD, PE, severe pHTN, Cor pulmonale, and DM presented initially to OSH with SOB, dry cough, chest pain, and LE edema. Required ICU care at [facility] for AHFR iso ILD. Transferred to [facility] for lung transplant evaluation. On admission, Pt requiring high dose steroid and 45/60 HFNC. Neurology consulted for subacute presentation of urinary/fecal retention and b/l LE weakness iso illness, likely non-neurologic in origin. ECHO findings of elevated filling pressures and worsening RV function. Pulm following for management throughout this hospitalization. Pt deemed not a lung transplant candidate due to deconditioning, cardiac dysfunction, and poorly controlled diabetes. Able to be weaned from HFNC to NC. S/p RRT 5/6 for hypoxia requiring HFNC; CXR obtained during rapid with b/l increased hazy opacities on my read. Weaned to NC again. Pt failed TOV. Pt noted w/ tachypnea and coughing after eating; S&S evaluation ordered. Persistent diarrhea. Pt has been on a Regular texture solids/thin liquids since admission.    5/22: s/p initial bedside swallow exam w/ diet deferred to team given limited trials. Of note, pt using NRB for comfort (pt w/ anxiety) despite VSS on 6LNC.  5/23 re-evaluation of swallow completed. Pt w/ no overt s/sx of aspiration/penetration on puree and thin liquids, however, FEES indicated given team c/f aspiration and pt multiple pulmonary risk factors. Of note, pt using NRB despite VSS on 6L NC, per RN, pt appears to be using it for comfort. Pt also reported to be tachypneic partially due to anxiety and pt reports substernal global sensation occasionally after meals, contributing to significant anxiety.    TODAY, pt planned for FEES. Noted in chart review that pt unable to tolerate 6L NC overnight and now on HFNC 60L 60%. Discussion held w/ ACP Federico for NPO, with non-oral nutrition/hydration/medications until re-evaluation of swallow and will assess if still a candidate for FEES exam.    D/W ACP FEDERICO  RECOMMENDATIONS  > NPO, with non-oral nutrition/hydration/medications.   >Maintain strict aspiration precautions  >Maintain oral hygiene  >D/C rehab    SPEECH PATHOLOGY  Florian Iyer CCC-SLP *Teams preferred* ([id number])

## 2024-05-25 NOTE — PROGRESS NOTE ADULT - SUBJECTIVE AND OBJECTIVE BOX
[facility] Division of Hospital Medicine  Héctor Lozano MD  Available via MS Teams    SUBJECTIVE / OVERNIGHT EVENTS: No acute events overnight. Pt seen and examined at bedside. Very dyspneic when attempting to turn down FiO2 on high flow; anxious.     ADDITIONAL REVIEW OF SYSTEMS:    MEDICATIONS  (STANDING):  albuterol/ipratropium for Nebulization 3 milliLiter(s) Nebulizer every 6 hours  apixaban 5 milliGRAM(s) Oral two times a day  buDESOnide    Inhalation Suspension 0.5 milliGRAM(s) Nebulizer two times a day  buMETAnide IVPB 4 milliGRAM(s) IV Intermittent every 8 hours  chlorhexidine 2% Cloths 1 Application(s) Topical daily  dextrose 10% Bolus 125 milliLiter(s) IV Bolus once  dextrose 5%. 1000 milliLiter(s) (100 mL/Hr) IV Continuous <Continuous>  dextrose 5%. 1000 milliLiter(s) (50 mL/Hr) IV Continuous <Continuous>  dextrose 50% Injectable 25 Gram(s) IV Push once  dextrose 50% Injectable 12.5 Gram(s) IV Push once  glucagon  Injectable 1 milliGRAM(s) IntraMuscular once  insulin glargine Injectable (LANTUS) 14 Unit(s) SubCutaneous at bedtime  insulin lispro (ADMELOG) corrective regimen sliding scale   SubCutaneous <User Schedule>  insulin lispro (ADMELOG) corrective regimen sliding scale   SubCutaneous three times a day before meals  insulin lispro Injectable (ADMELOG) 14 Unit(s) SubCutaneous before breakfast  insulin lispro Injectable (ADMELOG) 12 Unit(s) SubCutaneous before dinner  insulin lispro Injectable (ADMELOG) 14 Unit(s) SubCutaneous before lunch  methylPREDNISolone sodium succinate Injectable 40 milliGRAM(s) IV Push daily  Ofev (Nintedanib) 150 milliGRAM(s) 150 milliGRAM(s) Oral two times a day  pantoprazole  Injectable 40 milliGRAM(s) IV Push every 12 hours  sildenafil (REVATIO) 10 milliGRAM(s) Oral every 8 hours  simethicone 80 milliGRAM(s) Chew two times a day  traZODone 150 milliGRAM(s) Oral at bedtime  trimethoprim   80 mG/sulfamethoxazole 400 mG 1 Tablet(s) Oral daily    MEDICATIONS  (PRN):  acetaminophen     Tablet .. 650 milliGRAM(s) Oral every 6 hours PRN Temp greater or equal to 38C (100.4F), Mild Pain (1 - 3)  aluminum hydroxide/magnesium hydroxide/simethicone Suspension 30 milliLiter(s) Oral every 4 hours PRN Dyspepsia  cyclobenzaprine 5 milliGRAM(s) Oral three times a day PRN Muscle Spasm  dextrose Oral Gel 15 Gram(s) Oral once PRN Blood Glucose LESS THAN 70 milliGRAM(s)/deciliter  loperamide 2 milliGRAM(s) Oral four times a day PRN Diarrhea  melatonin 3 milliGRAM(s) Oral at bedtime PRN Insomnia  traMADol 25 milliGRAM(s) Oral every 6 hours PRN Moderate Pain (4 - 6)      I&O's Summary    24 May 2024 07:01  -  25 May 2024 07:00  --------------------------------------------------------  IN: 360 mL / OUT: 2550 mL / NET: -2190 mL        T(C): 36.3 (05-25-24 @ 11:08), Max: 36.9 (05-25-24 @ 04:44)  HR: 119 (05-25-24 @ 11:08) (102 - 123)  BP: 118/76 (05-25-24 @ 11:08) (114/71 - 130/86)  RR: 19 (05-25-24 @ 11:08) (18 - 22)  SpO2: 100% (05-25-24 @ 11:08) (98% - 100%)        LABS:                        10.6   8.09  )-----------( 516      ( 25 May 2024 09:39 )             34.2     05-25    140  |  100  |  34<H>  ----------------------------<  197<H>  4.4   |  31  |  0.87    Ca    9.4      25 May 2024 09:39  Phos  3.6     05-25  Mg     2.2     05-25            Urinalysis Basic - ( 25 May 2024 09:39 )    Color: x / Appearance: x / SG: x / pH: x  Gluc: 197 mg/dL / Ketone: x  / Bili: x / Urobili: x   Blood: x / Protein: x / Nitrite: x   Leuk Esterase: x / RBC: x / WBC x   Sq Epi: x / Non Sq Epi: x / Bacteria: x    SARS-CoV-2: NotDetec (11 Apr 2024 22:46)    RADIOLOGY & ADDITIONAL TESTS:  New Results Reviewed Today:   New Imaging Personally Reviewed Today:  New Electrocardiogram Personally Reviewed Today:  Prior or Outpatient Records Reviewed Today:    COMMUNICATION:  Care Discussed with Consultants/Other Providers and Details of Discussion: Discussed with ACP  Discussions with Patient/Family:  PCP Communication:    CONSTITUTIONAL: NAD  RESPIRATORY: on HFNC, no wheezing, tachypneic   CARDIOVASCULAR: regular, tachycardic, normal S1 and S2, no LE edema  ABDOMEN: soft, nt, nd   MUSCULOSKELETAL: no clubbing or cyanosis of digits; no joint swelling or tenderness to palpation  PSYCH: A+O to person, place, and time; affect appropriate  NEUROLOGY: CN 2-12 are intact and symmetric; no gross sensory deficits   SKIN: No rashes; no palpable lesions

## 2024-05-25 NOTE — CHART NOTE - NSCHARTNOTEFT_GEN_A_CORE
63 F w/h/o uncontrolled T2DM (A1C 11.9%) while on Lantus, Admelog, metformin, glimepiride. Unknown DM complications. Also h/o AHRF, ILD, PE, HTN. Here with SOB secondary to interstitial lung. Endocrinology consulted for diabetes management.   Chart reviewed and patient was seen at bedside  patient was hypoxic yesterday, now on HFNC and methylprednisolone 40 mg daily. Was NPO while waiting FEES yesterday, but unable to have swallowing due to respiratory status. Noted pureed diet started yesterday after GOC discussion. .   pt tolerating purred diet but not eating much due to dislike of food texture.   BGs variable 169-300s, fasting  acceptable with severe prandial hyperglycemia ( BGs in 300s). Noted meal coverage insulin orders were stopped yesterday for hypoglycemia and NPO status, and restarted today by primary team.     POCT Blood Glucose:  305 mg/dL (05-25-24 @ 16:44)  372 mg/dL (05-25-24 @ 13:01)  272 mg/dL (05-25-24 @ 11:28)  169 mg/dL (05-25-24 @ 08:04)  213 mg/dL (05-25-24 @ 02:14)  333 mg/dL (05-24-24 @ 21:25)      eMAR:atorvastatin   40 milliGRAM(s) Oral (05-24-24 @ 22:06)    insulin glargine Injectable (LANTUS)   14 Unit(s) SubCutaneous (05-24-24 @ 22:04)    insulin lispro (ADMELOG) corrective regimen sliding scale   4 Unit(s) SubCutaneous (05-24-24 @ 22:03)    insulin lispro (ADMELOG) corrective regimen sliding scale   8 Unit(s) SubCutaneous (05-25-24 @ 17:05)   6  SubCutaneous (05-25-24 @ 12:27)   2 Unit(s) SubCutaneous (05-25-24 @ 08:38)    insulin lispro Injectable (ADMELOG)   14 Unit(s) SubCutaneous (05-25-24 @ 13:07)    methylPREDNISolone sodium succinate Injectable   40 milliGRAM(s) IV Push (05-25-24 @ 05:55)      Diet, Pureed:   Consistent Carbohydrate {No Snacks} (CSTCHO)  Supplement Feeding Modality:  Oral  Glucerna Shake Cans or Servings Per Day:  2       Frequency:  Daily (05-24-24 @ 18:37) [Active]         # Uncontrolled type 2 diabetes mellitus with hyperglycemia, with long-term current use of insulin.   - Check BG TID AC & QHS while eating regular meals and Q6H while NPO  - FOLLOW INSULIN ORDERS FOR NPO STATUS UNTIL FEES STUDY DONE  - fasting BG close to goal, continue with Lantus to 14 units QHS   - Agree with restarting premeal insulin. Adjust insulin Admelog to 14 units with breakfast and adjust to14 units with lunch, and 10 units with dinner for now. Hold for NPO status or if eating less than 50% of meals  - C/w mod dose correctional scale TID with meals and QHS  - Please notify endo team with any further changes on steroid doses.      Contact via [employer] Teams during business hours  To reach covering provider access AMION via sunrise tools  For Urgent matters/after-hours/weekends/holidays please page endocrine fellow on call   For nonurgent matters please email [email or web address]    Please note that this patient may be followed by different provider tomorrow.  Notify endocrine 24 hours prior to discharge for final recommendations

## 2024-05-25 NOTE — CHART NOTE - NSCHARTNOTEFT_GEN_A_CORE
Epigastric pain  Patient complained of epigastric pain, states pain is burning pain, happened at home in the past, also happened Thursday for which she received her PRN Maalox and also TUMS which she had relief from. Patient denied left sided chest pain, shortness of breath, palpitations, headache, lower abdominal pain    Vital Signs Last 24 Hrs  T(C): 36.9 (25 May 2024 04:44), Max: 36.9 (25 May 2024 04:44)  T(F): 98.5 (25 May 2024 04:44), Max: 98.5 (25 May 2024 04:44)  HR: 106 (25 May 2024 04:44) (103 - 125)  BP: 119/82 (25 May 2024 04:44) (113/68 - 143/91)  BP(mean): --  RR: 18 (25 May 2024 04:44) (18 - 20)  SpO2: 99% (25 May 2024 04:44) (84% - 100%)    Parameters below as of 25 May 2024 04:44  Patient On (Oxygen Delivery Method): nasal cannula, high flow  O2 Flow (L/min): 60  O2 Concentration (%): 100    Physical exam  no acute distress non toxic in appearance  abdomen soft NT tender,       Plan  TUMS given twice over shift  c/w Maalox  EKG no changes  consider GI    Will endorse to primary day team, attending to follow    Cheli Mohamud NP  [facility] [id number]

## 2024-05-25 NOTE — PROGRESS NOTE ADULT - ATTENDING COMMENTS
Patient seen and examined. Patient is a 63F with extensive medical history including suspected IPF vs. fibrotic NSIP with chronic hypoxemic respiratory failure on O2, and pulmonary hypertension on Sildenafil presenting from [facility] for lung transplant evaluation in the setting of shortness of breath, chest pain, and dry cough thought to be 2/2 acute ILD exacerbation vs. progression of underlying advanced disease. Today pt c/o dyspnea with any weaning of HFNC although O2 saturation remains >90%.     -Recommend initiation of Bilevel NIV prn for WOB  -Can introduce hydromorphone or other morphine equivalent for tachypnea/respiratory distress   -c/w home Ofev  -c/w steroid taper with PCP prophylaxis in place   - Patient does not find Symbicort helpful and would prefer nebulized therapy. c/w BID Budesonide nebs. Patient aware to rinse mouth after use. She also prefers her home Ventolin.  - Outpatient follow-up with her primary Pulmonologist Dr. Marlow.  - Consider outpatient evaluation with Dr. Emery as well for ILD and pulmonary hypertension co-management if Dr. Marlow feels this would be helpful  - Has been seen by the Lung Transplant team and patient deemed to not be a candidate for lung transplant.   - Continue Sildenafil 10mg TID and monitor  - Continue diuresis with goal negative fluid balance  - Nuclear medicine cardiac shunt study was negative - patient has a history of PFO  - Continue supplemental O2 with goal O2 sat > 90%  -Treatment of anxiety as per primary and  CL Team   -DVT ppx in place- continue AC given history of PE from 2022  -[facility] - DNR/DNI  -Discussed with primary team and pt at bedside.

## 2024-05-25 NOTE — PROGRESS NOTE ADULT - PROBLEM SELECTOR PLAN 8
T2DM: Home DM medications: metformin 500 mg BID, + glimepride 1 mg daily + lantus 35 units QHS+ admelog 25 units TIDAC  - Pt w/ hx of uncontrolled T2DM, a1c 11.9.   - Endocrine consulted via email, recs appreciated  - insulin adjustments per endocrine  - MISS FS tid qac qhs  - Discharge DM medications:   - Continue with metformin 500 mg BID  - STOP glimipride due to recurrent hypoglycemia at home   - Basal/bolus, dose will depend on insulin requirement and steroid plan   - Patient will follow up at  [facility]:  [address] [place] [address]. [address]. [place] [address]  Tel: [phone]

## 2024-05-25 NOTE — PROGRESS NOTE ADULT - PROBLEM SELECTOR PLAN 7
Hx of urinary retention; follows Dr. Chua, urologist. [facility] w/ godwin replaced. Arrives to [facility] w/o Godwin. Pt on outpt med list listed Tamsulosin 0.4 mg, unclear [facility], pt saying she does not take this outpatient  - failed TOV here, godwin placed

## 2024-05-25 NOTE — PROGRESS NOTE ADULT - PROBLEM SELECTOR PLAN 2
RH failure on most recent echo  - repeat TTE with bubble study revealed severe RH dysfunction with elevated pulmonary pressure, grade II diastolic dysfunction   - Check BNP every other day  - diuresis - bumex 4 IVP TID  - target net negative 1-2L/day  - strict I&Os  - daily standing weights

## 2024-05-25 NOTE — PROGRESS NOTE ADULT - ASSESSMENT
63F PMH chronic AHRF on 2-3L NC at b/l 2/2 ILD (Followed w/ Dr. Primo Marlow, [facility]), PE on Eliquis, severe pHTN, Cor pulmonale, DM, presented initially with SOB, dry cough, chest pain, LE edema, recent admission to [facility] in March for ILD flare, admitted to outside hospital->ICU for AHRF 2/2 ILD flare, transferred to [facility] for lung transplant evaluation and found not to be a transplant candidate.

## 2024-05-25 NOTE — PROGRESS NOTE ADULT - PROBLEM SELECTOR PLAN 3
- Pt w/ hx of severe pHTN, RVSP 68 on 10/2022  - Repeat 4/12/24 at [facility] noted PAP 28, "normal PAP"   - Maintain euvolemia, as above  - sildenafil reduced to 10 mg TID as patient experienced diarrhea, which may be a side effect. will continue this dose for now

## 2024-05-25 NOTE — PROGRESS NOTE ADULT - SUBJECTIVE AND OBJECTIVE BOX
CHIEF COMPLAINT:    Interval Events: seen and examined at bedside, pt c/o severe dyspnea (HFNC weaning just attempted) although SpO2 ~90-91%. Is asking to help keep her lungs open, as she is feeling as though she is unable to get any air.    REVIEW OF SYSTEMS:  Constitutional: No fevers or chills. No weight loss. No fatigue or generalised malaise.  Eyes: No itching or discharge from the eyes  ENT: No ear pain. No ear discharge. No nasal congestion. No post nasal drip. No epistaxis. No throat pain. No sore throat. No difficulty swallowing.   CV: No chest pain. No palpitations. No lightheadedness or dizziness.   Resp: No dyspnea at rest. No dyspnea on exertion. No orthopnea. No wheezing. No cough. No stridor. No     OBJECTIVE:  ICU Vital Signs Last 24 Hrs  T(C): 36.5 (23 May 2024 04:47), Max: 36.9 (22 May 2024 19:57)  T(F): 97.7 (23 May 2024 04:47), Max: 98.4 (22 May 2024 19:57)  HR: 103 (23 May 2024 04:47) (103 - 120)  BP: 126/80 (23 May 2024 04:47) (126/80 - 134/82)  BP(mean): --  ABP: --  ABP(mean): --  RR: 18 (23 May 2024 04:47) (18 - 18)  SpO2: 99% (23 May 2024 04:47) (94% - 99%)    O2 Parameters below as of 22 May 2024 19:57  Patient On (Oxygen Delivery Method): nasal cannula  O2 Flow (L/min): 6            05-22 @ 07:01 - 05-23 @ 07:00  --------------------------------------------------------  IN: 440 mL / OUT: 2200 mL / NET: -1760 mL    05-23 @ 07:01 - 05-23 @ 11:17  --------------------------------------------------------  IN: 240 mL / OUT: 420 mL / NET: -180 mL      CAPILLARY BLOOD GLUCOSE      POCT Blood Glucose.: 122 mg/dL (23 May 2024 08:11)      PHYSICAL EXAM:  General: Awake, alert, oriented X 3.   HEENT: Atraumatic, normocephalic.                 Mallampatti Grade                 No nasal congestion.                No tonsillar or pharyngeal exudates.  Lymph Nodes: No palpable lymphadenopathy  Neck: No JVD. No carotid bruit.   Respiratory: Tachypneic on HFNC 60 LPM, 80%. (-) wheezing or rales.  Cardiovascular: S1 S2 normal. No murmurs, rubs or gallops.   Abdomen: Soft, non-tender, non-distended. No organomegaly.  Extremities: Warm to touch. Peripheral pulse palpable. No pedal edema.   Skin: No rashes or skin lesions  Neurological: Motor and sensory examination equal and normal in all four extremities.  Psychiatry: Appropriate mood and affect.    HOSPITAL MEDICATIONS:  MEDICATIONS  (STANDING):  albuterol/ipratropium for Nebulization 3 milliLiter(s) Nebulizer every 6 hours  apixaban 5 milliGRAM(s) Oral two times a day  atorvastatin 40 milliGRAM(s) Oral at bedtime  budesonide 160 MICROgram(s)/formoterol 4.5 MICROgram(s) Inhaler 2 Puff(s) Inhalation two times a day  chlorhexidine 2% Cloths 1 Application(s) Topical daily  dextrose 10% Bolus 125 milliLiter(s) IV Bolus once  dextrose 5%. 1000 milliLiter(s) (100 mL/Hr) IV Continuous <Continuous>  dextrose 5%. 1000 milliLiter(s) (50 mL/Hr) IV Continuous <Continuous>  dextrose 50% Injectable 25 Gram(s) IV Push once  dextrose 50% Injectable 12.5 Gram(s) IV Push once  furosemide   Injectable 40 milliGRAM(s) IV Push two times a day  gabapentin 300 milliGRAM(s) Oral daily  glucagon  Injectable 1 milliGRAM(s) IntraMuscular once  insulin glargine Injectable (LANTUS) 16 Unit(s) SubCutaneous at bedtime  insulin lispro (ADMELOG) corrective regimen sliding scale   SubCutaneous three times a day before meals  insulin lispro (ADMELOG) corrective regimen sliding scale   SubCutaneous <User Schedule>  insulin lispro Injectable (ADMELOG) 6 Unit(s) SubCutaneous at bedtime  insulin lispro Injectable (ADMELOG) 14 Unit(s) SubCutaneous before breakfast  insulin lispro Injectable (ADMELOG) 10 Unit(s) SubCutaneous before dinner  insulin lispro Injectable (ADMELOG) 14 Unit(s) SubCutaneous before lunch  multivitamin 1 Tablet(s) Oral daily  Ofev (Nintedanib) 150 milliGRAM(s) 150 milliGRAM(s) Oral two times a day  pantoprazole    Tablet 40 milliGRAM(s) Oral two times a day  predniSONE   Tablet 20 milliGRAM(s) Oral daily  sildenafil (REVATIO) 10 milliGRAM(s) Oral every 8 hours  simethicone 80 milliGRAM(s) Chew two times a day  traZODone 150 milliGRAM(s) Oral at bedtime  trimethoprim   80 mG/sulfamethoxazole 400 mG 1 Tablet(s) Oral daily    MEDICATIONS  (PRN):  acetaminophen     Tablet .. 650 milliGRAM(s) Oral every 6 hours PRN Temp greater or equal to 38C (100.4F), Mild Pain (1 - 3)  aluminum hydroxide/magnesium hydroxide/simethicone Suspension 30 milliLiter(s) Oral every 4 hours PRN Dyspepsia  cyclobenzaprine 5 milliGRAM(s) Oral three times a day PRN Muscle Spasm  dextrose Oral Gel 15 Gram(s) Oral once PRN Blood Glucose LESS THAN 70 milliGRAM(s)/deciliter  loperamide 2 milliGRAM(s) Oral four times a day PRN Diarrhea  LORazepam     Tablet 0.5 milliGRAM(s) Oral every 6 hours PRN Severe Anxiety  melatonin 3 milliGRAM(s) Oral at bedtime PRN Insomnia      LABS:                        9.2    7.60  )-----------( 418      ( 22 May 2024 07:18 )             30.1     05-22    135  |  100  |  30<H>  ----------------------------<  183<H>  4.7   |  26  |  0.81    Ca    8.8      22 May 2024 07:11  Phos  3.5     05-22  Mg     2.4     05-22        Urinalysis Basic - ( 22 May 2024 07:11 )    Color: x / Appearance: x / SG: x / pH: x  Gluc: 183 mg/dL / Ketone: x  / Bili: x / Urobili: x   Blood: x / Protein: x / Nitrite: x   Leuk Esterase: x / RBC: x / WBC x   Sq Epi: x / Non Sq Epi: x / Bacteria: x            MICROBIOLOGY: Reviewed    RADIOLOGY:  [x ] Reviewed and interpreted by me

## 2024-05-25 NOTE — PROGRESS NOTE ADULT - PROBLEM SELECTOR PLAN 12
DVT PPx: Eliquis  Diet: Regular  Code: DNR/DNI, molst completed in chart     Aspiration precautions  Speech and swallow evaluation pending to rule out aspiration  Fall precautions    Discharge to [facility] pending plan for monitoring diarrhea/sildenafil dosing and creatinine

## 2024-05-25 NOTE — PROGRESS NOTE ADULT - PROBLEM SELECTOR PLAN 1
Baseline 3-4L NC. Hx of ILD w/ c/f IPF. Followed w/ DOMENICO Ding. Transferred to [facility] for lung transplant eval  - NM cardiac shunt performed on 5/10 was negative for PFO  - Continue home Ofev 150mg BID  - Duonebs, Symbicort  - Bactrim ordered for ppx.   - due to respiratory distress, increased to solumedrol 40 mg daily 5/24 -   - valium 5 mg IV q12 PRN anxiety   - Deemed not to be a transplant candidate 4/30  - now requiring HFNC 100% FiO2/60L

## 2024-05-26 NOTE — PROGRESS NOTE ADULT - PROBLEM SELECTOR PLAN 7
Hx of urinary retention; follows Dr. Chua, urologist. At [facility] had failed TOV w/ godwin replaced. Arrives to [facility] w/o Godwin. Pt on outpt med list listed Tamsulosin 0.4 mg, unclear if started at [facility], pt saying she does not take this outpatient  - failed TOV here, godwin placed

## 2024-05-26 NOTE — PROGRESS NOTE ADULT - PROBLEM SELECTOR PLAN 12
DVT PPx: Eliquis  Diet: Regular  Code: DNR/DNI, molst completed in chart     Aspiration precautions  Speech and swallow evaluation pending to rule out aspiration  Fall precautions    Discharge to [facility] pending plan for monitoring diarrhea/sildenafil dosing and creatinine DVT PPx: Eliquis  Diet: Regular  Code: DNR/DNI, molst completed in chart     Aspiration precautions  Speech and swallow evaluation pending to rule out aspiration  Fall precautions    Discharge to [facility] pending plan weaning O2

## 2024-05-26 NOTE — CHART NOTE - NSCHARTNOTEFT_GEN_A_CORE
63 F w/h/o uncontrolled T2DM (A1C 11.9%) while on Lantus, Admelog, metformin, glimepiride. Unknown DM complications. Also h/o AHRF, ILD, PE, HTN. Here with SOB secondary to interstitial lung. Endocrinology consulted for diabetes management.   Chart reviewed and patient was seen at bedside earlier for severe hypoglycemia ( prandial BGs 300s)  Noted patient was on HFNC and Non- rebreather for hypoxia. On methylprednisolone 40 mg daily. Eating some food ( ate oatmeal for breakfast and mashed potates for lunch) without receiving any meal coverage insulin causing severe hyperglycemia. As per pt, not eating much due to dislike of food texture.          POCT Blood Glucose:  309 mg/dL (05-26-24 @ 16:49)  363 mg/dL (05-26-24 @ 11:55)  371 mg/dL (05-26-24 @ 11:54)  198 mg/dL (05-26-24 @ 08:08)  263 mg/dL (05-26-24 @ 02:05)  185 mg/dL (05-25-24 @ 21:50)      eMAR:  insulin glargine Injectable (LANTUS)   14 Unit(s) SubCutaneous (05-25-24 @ 21:57)    insulin lispro (ADMELOG) corrective regimen sliding scale   2 Unit(s) SubCutaneous (05-26-24 @ 02:32)    insulin lispro (ADMELOG) corrective regimen sliding scale   8 Unit(s) SubCutaneous (05-26-24 @ 17:41)   10 Unit(s) SubCutaneous (05-26-24 @ 11:57)   2 Unit(s) SubCutaneous (05-26-24 @ 08:32)    methylPREDNISolone sodium succinate Injectable   40 milliGRAM(s) IV Push (05-26-24 @ 05:05)    Diet, Pureed:   Consistent Carbohydrate {No Snacks} (CSTCHO)  Moderately Thick Liquids (MODTHICKLIQS)  Supplement Feeding Modality:  Oral  Glucerna Shake Cans or Servings Per Day:  2       Frequency:  Daily (05-26-24 @ 16:28) [Active]     # Uncontrolled type 2 diabetes mellitus with hyperglycemia, with long-term current use of insulin.   - Check BG TID AC & QHS while eating regular meals and Q6H while NPO  - fasting BG at goal, continue with Lantus to 14 units QHS   - Decrease meal coverage Admelog to 10 units with each meal ( to be given after pt eating 50% of meals, hold of NPO or eats less than 50%, give 5 units if eats 50 % of meals)- spoke with RN and ACP   - C/w mod dose correctional scale TID with meals and QHS  - Please notify endo team with any further changes on steroid doses.  - Please keep hypoglycemia protocol in place       Contact via [employer] Teams during business hours  To reach covering provider access AMION via Xi3  For Urgent matters/after-hours/weekends/holidays please page endocrine fellow on call   For nonurgent matters please email [email or web address]    Please note that this patient may be followed by different provider tomorrow.  Notify endocrine 24 hours prior to discharge for final recommendations.

## 2024-05-26 NOTE — CHART NOTE - NSCHARTNOTEFT_GEN_A_CORE
30 minutes of critical care time provided due to hypoxemic respiratory failure. Notified by nursing staff that patient in respiratory distress. Bedside assessment shows respiratory rate of 30-40 on maximum HFNC settings, SaO2 85%. Bipap was attempted 10/5 with 100% FiO2. Pt experienced worsening dyspnea due to anxiety, breathing short shallow breaths, RR 50s. Pt desaturated to 82% on bipap, and placed back on HFNC + NRB with improvement of SaO2 to 94%. Pt was given dilaudid 0.5 mg IV with improvement in dyspnea.

## 2024-05-26 NOTE — PROGRESS NOTE ADULT - PROBLEM SELECTOR PLAN 3
- Pt w/ hx of severe pHTN, RVSP 68 on 10/2022  - Repeat 4/12/24 at [facility] noted PAP 28, "normal PAP"   - Maintain euvolemia, as above  - sildenafil reduced to 10 mg TID as patient experienced diarrhea, which may be a side effect. will continue this dose for now

## 2024-05-26 NOTE — PROGRESS NOTE ADULT - PROBLEM SELECTOR PLAN 8
T2DM: Home DM medications: metformin 500 mg BID, + glimepride 1 mg daily + lantus 35 units QHS+ admelog 25 units TIDAC  - Pt w/ hx of uncontrolled T2DM, a1c 11.9.   - Endocrine consulted via email, recs appreciated  - insulin adjustments per endocrine  - MISS FS tid qac qhs  - Discharge DM medications:   - Continue with metformin 500 mg BID  - STOP glimipride due to recurrent hypoglycemia at home   - Basal/bolus, dose will depend on insulin requirement and steroid plan   - Patient will follow up at  [facility]:  [address] [place] [address]. [address]. [place] [address]  Tel: [phone]

## 2024-05-26 NOTE — PROGRESS NOTE ADULT - ASSESSMENT
63F PMH chronic AHRF on 2-3L NC at b/l 2/2 ILD (Followed w/ Dr. Primo Marlow, [facility]), PE on Eliquis, severe pHTN, Cor pulmonale, DM, presented initially with SOB, dry cough, chest pain, LE edema, recent admission to [facility] in March for ILD flare, admitted to outside hospital->ICU for AHRF 2/2 ILD flare, transferred to [facility] for lung transplant evaluation and found not to be a transplant candidate.

## 2024-05-26 NOTE — PROGRESS NOTE ADULT - SUBJECTIVE AND OBJECTIVE BOX
[facility] Division of Hospital Medicine  Héctor Lozano MD  Available via MS Teams    SUBJECTIVE / OVERNIGHT EVENTS: No acute events overnight. Pt seen and examined at bedside. Could not tolerate bipap. Still very dyspneic attempting to turn down FiO2.     ADDITIONAL REVIEW OF SYSTEMS:    MEDICATIONS  (STANDING):  albuterol/ipratropium for Nebulization 3 milliLiter(s) Nebulizer every 6 hours  apixaban 5 milliGRAM(s) Oral two times a day  budesonide 160 MICROgram(s)/formoterol 4.5 MICROgram(s) Inhaler 2 Puff(s) Inhalation two times a day  chlorhexidine 2% Cloths 1 Application(s) Topical daily  dextrose 10% Bolus 125 milliLiter(s) IV Bolus once  dextrose 5%. 1000 milliLiter(s) (100 mL/Hr) IV Continuous <Continuous>  dextrose 5%. 1000 milliLiter(s) (50 mL/Hr) IV Continuous <Continuous>  dextrose 50% Injectable 25 Gram(s) IV Push once  dextrose 50% Injectable 12.5 Gram(s) IV Push once  glucagon  Injectable 1 milliGRAM(s) IntraMuscular once  insulin glargine Injectable (LANTUS) 14 Unit(s) SubCutaneous at bedtime  insulin lispro (ADMELOG) corrective regimen sliding scale   SubCutaneous <User Schedule>  insulin lispro (ADMELOG) corrective regimen sliding scale   SubCutaneous three times a day before meals  insulin lispro Injectable (ADMELOG) 10 Unit(s) SubCutaneous before dinner  insulin lispro Injectable (ADMELOG) 14 Unit(s) SubCutaneous before lunch  insulin lispro Injectable (ADMELOG) 14 Unit(s) SubCutaneous before breakfast  methylPREDNISolone sodium succinate Injectable 40 milliGRAM(s) IV Push daily  Ofev (Nintedanib) 150 milliGRAM(s) 150 milliGRAM(s) Oral two times a day  pantoprazole  Injectable 40 milliGRAM(s) IV Push every 12 hours  sildenafil (REVATIO) 10 milliGRAM(s) Oral every 8 hours  simethicone 80 milliGRAM(s) Chew two times a day  traZODone 150 milliGRAM(s) Oral at bedtime  trimethoprim   80 mG/sulfamethoxazole 400 mG 1 Tablet(s) Oral daily    MEDICATIONS  (PRN):  acetaminophen     Tablet .. 650 milliGRAM(s) Oral every 6 hours PRN Temp greater or equal to 38C (100.4F), Mild Pain (1 - 3)  aluminum hydroxide/magnesium hydroxide/simethicone Suspension 30 milliLiter(s) Oral every 4 hours PRN Dyspepsia  cyclobenzaprine 5 milliGRAM(s) Oral three times a day PRN Muscle Spasm  dextrose Oral Gel 15 Gram(s) Oral once PRN Blood Glucose LESS THAN 70 milliGRAM(s)/deciliter  diazepam  Injectable 2.5 milliGRAM(s) IV Push every 12 hours PRN severe anxiety  HYDROmorphone  Injectable 0.2 milliGRAM(s) IV Push every 4 hours PRN respiratory distress, RR > 22  loperamide 2 milliGRAM(s) Oral four times a day PRN Diarrhea  melatonin 3 milliGRAM(s) Oral at bedtime PRN Insomnia  traMADol 25 milliGRAM(s) Oral every 6 hours PRN Moderate Pain (4 - 6)      I&O's Summary    25 May 2024 07:01  -  26 May 2024 07:00  --------------------------------------------------------  IN: 1200 mL / OUT: 2350 mL / NET: -1150 mL        T(C): 37 (05-26-24 @ 04:22), Max: 37 (05-26-24 @ 04:22)  HR: 112 (05-26-24 @ 09:17) (102 - 126)  BP: 118/76 (05-26-24 @ 09:17) (115/66 - 125/76)  RR: 19 (05-26-24 @ 09:17) (18 - 20)  SpO2: 99% (05-26-24 @ 09:17) (93% - 100%)        LABS:                        10.8   10.49 )-----------( 537      ( 26 May 2024 06:00 )             35.5     05-26    139  |  97  |  30<H>  ----------------------------<  145<H>  3.8   |  33<H>  |  0.80    Ca    9.3      26 May 2024 06:00  Phos  3.1     05-26  Mg     2.0     05-26            Urinalysis Basic - ( 26 May 2024 06:00 )    Color: x / Appearance: x / SG: x / pH: x  Gluc: 145 mg/dL / Ketone: x  / Bili: x / Urobili: x   Blood: x / Protein: x / Nitrite: x   Leuk Esterase: x / RBC: x / WBC x   Sq Epi: x / Non Sq Epi: x / Bacteria: x        SARS-CoV-2: NotDetec (11 Apr 2024 22:46)          RADIOLOGY & ADDITIONAL TESTS:  New Results Reviewed Today:   New Imaging Personally Reviewed Today:  New Electrocardiogram Personally Reviewed Today:  Prior or Outpatient Records Reviewed Today:    COMMUNICATION:  Care Discussed with Consultants/Other Providers and Details of Discussion: Discussed with ACP  Discussions with Patient/Family:  PCP Communication:  CONSTITUTIONAL: NAD    RESPIRATORY: on HFNC, no wheezing, tachypneic   CARDIOVASCULAR: regular, tachycardic, normal S1 and S2, no LE edema  ABDOMEN: soft, nt, nd   MUSCULOSKELETAL: no clubbing or cyanosis of digits; no joint swelling or tenderness to palpation  PSYCH: A+O to person, place, and time; affect appropriate  NEUROLOGY: CN 2-12 are intact and symmetric; no gross sensory deficits   SKIN: No rashes; no palpable lesions

## 2024-05-26 NOTE — PROGRESS NOTE ADULT - PROBLEM SELECTOR PLAN 1
Baseline 3-4L NC. Hx of ILD w/ c/f IPF. Followed w/ DOMENICO Ding. Transferred to [facility] for lung transplant eval  - NM cardiac shunt performed on 5/10 was negative for PFO  - Continue home Ofev 150mg BID  - Duonebs, Symbicort  - Bactrim ordered for ppx.   - due to respiratory distress, increased to solumedrol 40 mg daily 5/24 -   - valium 2.5mg IV q12 PRN anxiety (5 mg caused somnolence)  - dilaudid 0.2 mg IVP q4 hrs PRN severe respiratory distoress  - Deemed not to be a transplant candidate 4/30  - now requiring HFNC 90% FiO2/60L

## 2024-05-27 NOTE — PROGRESS NOTE ADULT - SUBJECTIVE AND OBJECTIVE BOX
[facility] Division of Hospital Medicine  Héctor Lozano MD  Available via MS Teams    SUBJECTIVE / OVERNIGHT EVENTS: No acute events overnight. Pt seen and examined at bedside. Breathlessness improved this morning. Dilaudid PRNs helped.     ADDITIONAL REVIEW OF SYSTEMS:    MEDICATIONS  (STANDING):  albuterol/ipratropium for Nebulization 3 milliLiter(s) Nebulizer every 6 hours  apixaban 5 milliGRAM(s) Oral two times a day  budesonide 160 MICROgram(s)/formoterol 4.5 MICROgram(s) Inhaler 2 Puff(s) Inhalation two times a day  chlorhexidine 2% Cloths 1 Application(s) Topical daily  dextrose 10% Bolus 125 milliLiter(s) IV Bolus once  dextrose 5%. 1000 milliLiter(s) (100 mL/Hr) IV Continuous <Continuous>  dextrose 5%. 1000 milliLiter(s) (50 mL/Hr) IV Continuous <Continuous>  dextrose 50% Injectable 25 Gram(s) IV Push once  dextrose 50% Injectable 12.5 Gram(s) IV Push once  glucagon  Injectable 1 milliGRAM(s) IntraMuscular once  insulin glargine Injectable (LANTUS) 14 Unit(s) SubCutaneous at bedtime  insulin lispro (ADMELOG) corrective regimen sliding scale   SubCutaneous three times a day before meals  insulin lispro (ADMELOG) corrective regimen sliding scale   SubCutaneous <User Schedule>  insulin lispro Injectable (ADMELOG) 10 Unit(s) SubCutaneous with dinner  insulin lispro Injectable (ADMELOG) 10 Unit(s) SubCutaneous with lunch  insulin lispro Injectable (ADMELOG) 10 Unit(s) SubCutaneous with breakfast  methylPREDNISolone sodium succinate Injectable 40 milliGRAM(s) IV Push daily  Ofev (Nintedanib) 150 milliGRAM(s) 150 milliGRAM(s) Oral two times a day  pantoprazole  Injectable 40 milliGRAM(s) IV Push every 12 hours  sildenafil (REVATIO) 10 milliGRAM(s) Oral every 8 hours  simethicone 80 milliGRAM(s) Chew two times a day  traZODone 150 milliGRAM(s) Oral at bedtime  trimethoprim   80 mG/sulfamethoxazole 400 mG 1 Tablet(s) Oral daily    MEDICATIONS  (PRN):  acetaminophen     Tablet .. 650 milliGRAM(s) Oral every 6 hours PRN Temp greater or equal to 38C (100.4F), Mild Pain (1 - 3)  aluminum hydroxide/magnesium hydroxide/simethicone Suspension 30 milliLiter(s) Oral every 4 hours PRN Dyspepsia  cyclobenzaprine 5 milliGRAM(s) Oral three times a day PRN Muscle Spasm  dextrose Oral Gel 15 Gram(s) Oral once PRN Blood Glucose LESS THAN 70 milliGRAM(s)/deciliter  diazepam  Injectable 2.5 milliGRAM(s) IV Push every 12 hours PRN severe anxiety  HYDROmorphone  Injectable 0.5 milliGRAM(s) IV Push every 4 hours PRN respiratory distress  loperamide 2 milliGRAM(s) Oral four times a day PRN Diarrhea  melatonin 3 milliGRAM(s) Oral at bedtime PRN Insomnia  traMADol 25 milliGRAM(s) Oral every 6 hours PRN Moderate Pain (4 - 6)      I&O's Summary    26 May 2024 07:01  -  27 May 2024 07:00  --------------------------------------------------------  IN: 800 mL / OUT: 950 mL / NET: -150 mL        T(C): 36.8 (05-27-24 @ 04:22), Max: 36.9 (05-26-24 @ 11:10)  HR: 105 (05-27-24 @ 09:42) (97 - 126)  BP: 115/74 (05-27-24 @ 04:22) (110/70 - 134/83)  RR: 20 (05-27-24 @ 09:42) (19 - 22)  SpO2: 100% (05-27-24 @ 09:42) (83% - 100%)        LABS:                        10.3   8.86  )-----------( 517      ( 27 May 2024 06:49 )             33.3     05-27    135  |  95<L>  |  29<H>  ----------------------------<  215<H>  4.0   |  33<H>  |  0.74    Ca    9.2      27 May 2024 06:49  Phos  3.2     05-27  Mg     2.3     05-27            Urinalysis Basic - ( 27 May 2024 06:49 )    Color: x / Appearance: x / SG: x / pH: x  Gluc: 215 mg/dL / Ketone: x  / Bili: x / Urobili: x   Blood: x / Protein: x / Nitrite: x   Leuk Esterase: x / RBC: x / WBC x   Sq Epi: x / Non Sq Epi: x / Bacteria: x        SARS-CoV-2: NotDetec (11 Apr 2024 22:46)          RADIOLOGY & ADDITIONAL TESTS:  New Results Reviewed Today:   New Imaging Personally Reviewed Today:  New Electrocardiogram Personally Reviewed Today:  Prior or Outpatient Records Reviewed Today:    COMMUNICATION:  Care Discussed with Consultants/Other Providers and Details of Discussion: Discussed with ACP  Discussions with Patient/Family:  PCP Communication:    GENERAL: comfortable appearing with HFNC  RESPIRATORY: on HFNC, no wheezing, tachypneic   CARDIOVASCULAR: regular, tachycardic, normal S1 and S2, no LE edema  ABDOMEN: soft, nt, nd   MUSCULOSKELETAL: no clubbing or cyanosis of digits; no joint swelling or tenderness to palpation  PSYCH: A+O to person, place, and time; affect appropriate  NEUROLOGY: CN 2-12 are intact and symmetric; no gross sensory deficits   SKIN: No rashes; no palpable lesions

## 2024-05-27 NOTE — PROGRESS NOTE ADULT - PROBLEM SELECTOR PLAN 12
DVT PPx: Eliquis  Diet: Regular  Code: DNR/DNI, molst completed in chart     Aspiration precautions  Speech and swallow evaluation pending to rule out aspiration  Fall precautions    Discharge to [facility] pending plan weaning O2

## 2024-05-27 NOTE — PROGRESS NOTE ADULT - PROBLEM SELECTOR PLAN 2
RH failure on most recent echo  - repeat TTE with bubble study revealed severe RH dysfunction with elevated pulmonary pressure, grade II diastolic dysfunction   - Check BNP every other day  - diuresis - bumex 2 BID  - target net negative 1-2L/day  - strict I&Os  - daily standing weights

## 2024-05-27 NOTE — PROGRESS NOTE ADULT - PROBLEM SELECTOR PLAN 7
Hx of urinary retention; follows Dr. Chua, urologist. At [facility] had failed TOV w/ godwin replaced. Arrives to [facility] w/o Godwin. Pt on outpt med list listed Tamsulosin 0.4 mg, unclear if started at [facility], pt saying she does not take this outpatient  - failed TOV here, godwin placed

## 2024-05-27 NOTE — PROGRESS NOTE ADULT - PROBLEM SELECTOR PLAN 1
Baseline 3-4L NC. Hx of ILD w/ c/f IPF. Followed w/ DOMENICO Ding. Transferred to [facility] for lung transplant eval  - NM cardiac shunt performed on 5/10 was negative for PFO  - Continue home Ofev 150mg BID  - Duonebs, Symbicort  - Bactrim ordered for ppx.   - due to respiratory distress, increased to solumedrol 40 mg daily 5/24 -   - valium 2.5mg IV q12 PRN anxiety (5 mg caused somnolence)  - dilaudid 0.5 mg IVP q4 hrs PRN severe respiratory distress  - Deemed not to be a transplant candidate 4/30  - now requiring HFNC 90% FiO2/60L

## 2024-05-27 NOTE — PROGRESS NOTE ADULT - ASSESSMENT
63F PMH chronic AHRF on 2-3L NC at b/l 2/2 ILD (Followed w/ Dr. Primo Marlow, [facility]), PE on Eliquis, severe pHTN, Cor pulmonale, DM, presented initially with SOB, dry cough, chest pain, LE edema, recent admission to [facility] in March for ILD flare, admitted to outside hospital->ICU for AHRF 2/2 ILD flare, transferred to [facility] for lung transplant evaluation and found not to be a transplant candidate.

## 2024-05-27 NOTE — PROGRESS NOTE ADULT - PROBLEM SELECTOR PLAN 8
T2DM: Home DM medications: metformin 500 mg BID, + glimepride 1 mg daily + lantus 35 units QHS+ admelog 25 units TIDAC  - Pt w/ hx of uncontrolled T2DM, a1c 11.9.   - Endocrine consulted via email, recs appreciated  - insulin adjustments per endocrine  - MISS FS tid qac qhs  - Discharge DM medications:   - Continue with metformin 500 mg BID  - STOP glimipride due to recurrent hypoglycemia at home   - Basal/bolus, dose will depend on insulin requirement and steroid plan   - Patient will follow up at  [facility]:  [address] [place] [address]. [address]. [place] [address]  Tel: [phone]

## 2024-05-27 NOTE — PROGRESS NOTE ADULT - PROBLEM SELECTOR PLAN 3
- Pt w/ hx of severe pHTN, RVSP 68 on 10/2022  - Repeat 4/12/24 at [facility] noted PAP 28, "normal PAP"   - Maintain euvolemia, as above  - sildenafil reduced to 10 mg TID as patient experienced diarrhea, which may be a side effect. will continue this dose for now

## 2024-05-28 NOTE — PROGRESS NOTE ADULT - PROBLEM SELECTOR PLAN 12
DVT PPx: Eliquis  Diet: Pureed w/ thickened (most conservative diet, unable to perform FEES)  Code: DNR/DNI, molst completed in chart     Aspiration precautions  Fall precautions    Discharge to [facility] pending plan weaning O2 (or possibly inpatient hospice)

## 2024-05-28 NOTE — PROGRESS NOTE ADULT - PROBLEM SELECTOR PLAN 8
T2DM: Home DM medications: metformin 500 mg BID, + glimepride 1 mg daily + lantus 35 units QHS+ admelog 25 units TIDAC  - Pt w/ hx of uncontrolled T2DM, a1c 11.9.   - Endocrine consulted, recs appreciated  - insulin adjustments per endocrine  - MISS FS tid qac qhs  - Discharge DM medications:   - Continue with metformin 500 mg BID  - STOP glimipride due to recurrent hypoglycemia at home   - Basal/bolus, dose will depend on insulin requirement and steroid plan

## 2024-05-28 NOTE — RAPID RESPONSE TEAM SUMMARY - NSSITUATIONBACKGROUNDRRT_GEN_ALL_CORE
63F PMH chronic AHRF on 2-3L NC at b/l 2/2 ILD (Followed w/ Dr. Primo Marlow, [facility]), PE on Eliquis, severe pHTN, Cor pulmonale, DM, presented initially with SOB, dry cough, chest pain, LE edema, recent admission to [facility] in March for ILD flare, admitted to outside hospital->ICU for AHRF 2/2 ILD flare, transferred to [facility] for lung transplant evaluation and found not to be a transplant candidate. Subsequently was made DNR/DNI.    Rapid response was called for persistent hypoxemia to 60's on maximum HFNC settings. The patient appeared to be suffering form air hunger and anxiety. Was given 1x doses of Dilaudid and Ativan PRNs, with subsequently improvement. Was placed on BiPAP for further respiratory support, though became lethargic and minimally responsive after receiving aforementioned sedating meds, so recommended primary team take the patient off of non-invasive. It is likely that she will pass from respiratory within hours to days (as no further respiratory support is possible). Palliative care consult was recommended given guarded prognosis.

## 2024-05-28 NOTE — PROGRESS NOTE ADULT - SUBJECTIVE AND OBJECTIVE BOX
Patient is a 63y old  Female who presents with a chief complaint of SOB (28 May 2024 04:19)      SUBJECTIVE / OVERNIGHT EVENTS: Patient seen and examined at bedside. No acute events overnight. No complaints. Remains on HFNC 60 L / 100%. NRB at bedside as well. GOC as below.    MEDICATIONS  (STANDING):  albuterol/ipratropium for Nebulization 3 milliLiter(s) Nebulizer every 6 hours  apixaban 5 milliGRAM(s) Oral two times a day  budesonide 160 MICROgram(s)/formoterol 4.5 MICROgram(s) Inhaler 2 Puff(s) Inhalation two times a day  buMETAnide Injectable 2 milliGRAM(s) IV Push two times a day  chlorhexidine 2% Cloths 1 Application(s) Topical daily  dextrose 10% Bolus 125 milliLiter(s) IV Bolus once  dextrose 5%. 1000 milliLiter(s) (100 mL/Hr) IV Continuous <Continuous>  dextrose 5%. 1000 milliLiter(s) (50 mL/Hr) IV Continuous <Continuous>  dextrose 50% Injectable 12.5 Gram(s) IV Push once  dextrose 50% Injectable 25 Gram(s) IV Push once  glucagon  Injectable 1 milliGRAM(s) IntraMuscular once  insulin glargine Injectable (LANTUS) 16 Unit(s) SubCutaneous at bedtime  insulin lispro (ADMELOG) corrective regimen sliding scale   SubCutaneous <User Schedule>  insulin lispro (ADMELOG) corrective regimen sliding scale   SubCutaneous three times a day before meals  insulin lispro Injectable (ADMELOG) 10 Unit(s) SubCutaneous with breakfast  insulin lispro Injectable (ADMELOG) 10 Unit(s) SubCutaneous with lunch  insulin lispro Injectable (ADMELOG) 10 Unit(s) SubCutaneous with dinner  methylPREDNISolone sodium succinate Injectable 40 milliGRAM(s) IV Push daily  Ofev (Nintedanib) 150 milliGRAM(s) 150 milliGRAM(s) Oral two times a day  pantoprazole  Injectable 40 milliGRAM(s) IV Push every 12 hours  sildenafil (REVATIO) 10 milliGRAM(s) Oral every 8 hours  simethicone 80 milliGRAM(s) Chew two times a day  traZODone 150 milliGRAM(s) Oral at bedtime  trimethoprim   80 mG/sulfamethoxazole 400 mG 1 Tablet(s) Oral daily    MEDICATIONS  (PRN):  acetaminophen     Tablet .. 650 milliGRAM(s) Oral every 6 hours PRN Temp greater or equal to 38C (100.4F), Mild Pain (1 - 3)  aluminum hydroxide/magnesium hydroxide/simethicone Suspension 30 milliLiter(s) Oral every 4 hours PRN Dyspepsia  cyclobenzaprine 5 milliGRAM(s) Oral three times a day PRN Muscle Spasm  dextrose Oral Gel 15 Gram(s) Oral once PRN Blood Glucose LESS THAN 70 milliGRAM(s)/deciliter  diazepam  Injectable 2.5 milliGRAM(s) IV Push every 12 hours PRN severe anxiety  HYDROmorphone  Injectable 0.5 milliGRAM(s) IV Push every 4 hours PRN respiratory distress  loperamide 2 milliGRAM(s) Oral four times a day PRN Diarrhea  melatonin 3 milliGRAM(s) Oral at bedtime PRN Insomnia  traMADol 25 milliGRAM(s) Oral every 6 hours PRN Moderate Pain (4 - 6)      CAPILLARY BLOOD GLUCOSE      POCT Blood Glucose.: 84 mg/dL (28 May 2024 02:36)  POCT Blood Glucose.: 94 mg/dL (27 May 2024 21:37)  POCT Blood Glucose.: 200 mg/dL (27 May 2024 16:29)  POCT Blood Glucose.: 302 mg/dL (27 May 2024 12:12)  POCT Blood Glucose.: 228 mg/dL (27 May 2024 10:03)  POCT Blood Glucose.: 249 mg/dL (27 May 2024 08:06)    I&O's Summary    27 May 2024 07:01  -  28 May 2024 07:00  --------------------------------------------------------  IN: 560 mL / OUT: 300 mL / NET: 260 mL        PHYSICAL EXAM:  Vital Signs Last 24 Hrs  T(C): 36.5 (28 May 2024 04:48), Max: 36.5 (27 May 2024 21:17)  T(F): 97.7 (28 May 2024 04:48), Max: 97.7 (27 May 2024 21:17)  HR: 101 (28 May 2024 05:29) (100 - 114)  BP: 125/79 (28 May 2024 04:48) (108/69 - 135/84)  BP(mean): --  RR: 20 (28 May 2024 05:29) (20 - 20)  SpO2: 95% (28 May 2024 05:29) (87% - 100%)    Parameters below as of 28 May 2024 05:29  Patient On (Oxygen Delivery Method): nasal cannula, high flow  O2 Flow (L/min): 60  O2 Concentration (%): 100    GEN: female in NAD, appears comfortable, no diaphoresis  EYES: No scleral injection, EOMI  ENTM: neck supple & symmetric without tracheal deviation, moist membranes, no gross hearing impairment, thyroid gland not enlarged  CV: +S1/S2, no m/r/g, no abdominal bruit, no LE edema  RESP: breathing comfortably, no respiratory accessory muscle use, +coarse breath sounds bilateral  GI: normoactive BS, soft, NTND, no rebounding/guarding, no palpable masses    LABS:                        10.3   8.86  )-----------( 517      ( 27 May 2024 06:49 )             33.3     05-28    135  |  95<L>  |  22  ----------------------------<  186<H>  4.0   |  28  |  0.70    Ca    9.1      28 May 2024 05:49  Phos  3.2     05-27  Mg     2.3     05-27            Urinalysis Basic - ( 28 May 2024 05:49 )    Color: x / Appearance: x / SG: x / pH: x  Gluc: 186 mg/dL / Ketone: x  / Bili: x / Urobili: x   Blood: x / Protein: x / Nitrite: x   Leuk Esterase: x / RBC: x / WBC x   Sq Epi: x / Non Sq Epi: x / Bacteria: x          RADIOLOGY & ADDITIONAL TESTS:  Results Reviewed:   Imaging Personally Reviewed:  Electrocardiogram Personally Reviewed:    COORDINATION OF CARE:  Care Discussed with Consultants/Other Providers [Y/N]:  Prior or Outpatient Records Reviewed [Y/N]:

## 2024-05-28 NOTE — PROGRESS NOTE ADULT - ASSESSMENT
63 F w/h/o uncontrolled T2DM (A1C 11.9%) while on Lantus, Admelog, metformin, glimepiride. Unknown DM complications. Also h/o AHRF, ILD, PE, HTN. Here with SOB secondary to interstitial lung disease. Continues with SOB and tachypnea, managed by primary medicine team. Endocrinology consulted for diabetes management. Patient with variable PO intake as she is becoming more hypoxic. RRT called this morning, GOC conversation had by team with daughter.  Continues on prednisone taper, at 20mg once daily now until 5/27.    Continues on steroid taper:  Prednisone 20 mg daily ( 5/21-5/27)  Prednisone 10 mg daily ( 5/28--

## 2024-05-28 NOTE — PROGRESS NOTE ADULT - ATTENDING COMMENTS
63F hx ILD with advanced fibrosis, PE 2022 on eliquis with severe pHTN and RV failure, who presented to [facility] for SOB, and worsening hypoxemic respiratory failure. Patient was transferred to for lung transplant eval.     Patient with negative CTrILD outpatient, repeat here NTD. TTE at [facility] with normal RV function but here with Severe pHTN and RV dysfunction. Initial concern for PFO but negative on nuclear scan. Is not deemed to be a transplant candidate off and on worsening respiratory status. Now on maxed HFNC with NRB on top.     # ILD with adv fibrosis  # Severe pHTN with RV failure  # Hx of PE  # Acute on chronic hypoxemic respiratory failure  - Multifactorial etiology for respiratory failure, Adv ILD with fibrosis, RV failure, PHTN.   - Patient evaluated by lung transplant team. Patient is not a candidate for transplant.   - CTrILD work up negative in the past, negative here to date.  - Continue with diuresis, C/W HFNC and wean as tolerated. Currently maxed on HFNC  - C/W sildenafil to 10mg TID. Monitor for side effects.   - Can increase IV steroids 40BID given clinical worsening but unlikely to help.   - C/W full A/C  - s/p course of abx, continue to monitor.  - Overall patient with poor prognosis given adv fibrosis seen on imaging, o2 requirement, severe pHTN and RV failure. Patient is not a transplant candidate. Actively deteriorating. Consider Palliative care re-eval.   - Pulmonary will follow.

## 2024-05-28 NOTE — PROGRESS NOTE ADULT - PROBLEM SELECTOR PLAN 1
Baseline 3-4L NC. Hx of ILD w/ c/f IPF. Followed w/ DOMENICO Ding. Transferred to [facility] for lung transplant eval  - NM cardiac shunt performed on 5/10 was negative for PFO  - Continue home Ofev 150mg BID  - Duonebs, Symbicort  - Bactrim ordered for ppx  - due to respiratory distress, increased to solumedrol 40 mg daily 5/24 -   - valium 2.5mg IV q12 PRN anxiety (5 mg caused somnolence)  - dilaudid 0.5 mg IVP q4 hrs PRN severe respiratory distress  - Deemed not to be a transplant candidate 4/30  - now requiring HFNC 100% at 60 L

## 2024-05-28 NOTE — PROGRESS NOTE ADULT - PROBLEM SELECTOR PLAN 7
Hx of urinary retention; follows Dr. Chua, urologist. At [facility] had failed TOV w/ Pike replaced. Arrives to [facility] w/o Pike. Pt on outpt med list listed Tamsulosin 0.4 mg, unclear if started at [facility], pt saying she does not take this outpatient  - failed TOV here, Pike placed

## 2024-05-28 NOTE — PROGRESS NOTE ADULT - NSPROGADDITIONALINFOA_GEN_ALL_CORE
Contact via Microsoft Teams during business hours  To reach covering provider access AMION via sunrise tools  For Urgent matters/after-hours/weekends/holidays please page endocrine fellow on call   For nonurgent matters please email MARIA [email or web address]    Please note that this patient may be followed by different provider tomorrow.  Notify endocrine 24 hours prior to discharge for final recommendations
Contact via Microsoft Teams during business hours  To reach covering provider access AMION via sunrise tools  For Urgent matters/after-hours/weekends/holidays please page endocrine fellow on call   For nonurgent matters please email MARIA [email or web address]    Please note that this patient may be followed by different provider tomorrow.  Notify endocrine 24 hours prior to discharge for final recommendations
Contact via Microsoft Teams during business hours  To reach covering provider access AMION via sunrise tools  For Urgent matters/after-hours/weekends/holidays please page endocrine fellow on call   For nonurgent matters please email MARIA [email or web address]    Please note that this patient may be followed by different provider tomorrow.  Notify endocrine 24 hours prior to discharge for final recommendations
-Plan discussed with pt/team.  Contact info: [phone] (24/7). pager [phone]  Amion on [place]-Tools  Teams on M-T-W-F. Unavailable Thu/Weekends/Holidays  Assessed pt/labs/meds and discussed plan of care with primary team  Adjusting insulin  Discharge plan  Follow up care
Contact via Microsoft Teams during business hours  To reach covering provider access AMION via sunrise tools  For Urgent matters/after-hours/weekends/holidays please page endocrine fellow on call   For nonurgent matters please email MARIA [email or web address]    Please note that this patient may be followed by different provider tomorrow.  Notify endocrine 24 hours prior to discharge for final recommendations
Contact via Microsoft Teams during business hours  To reach covering provider access AMION via sunrise tools  For Urgent matters/after-hours/weekends/holidays please page endocrine fellow on call   For nonurgent matters please email MARIA [email or web address]    Please note that this patient may be followed by different provider tomorrow.  Notify endocrine 24 hours prior to discharge for final recommendations
Contact via Microsoft Teams during business hours  To reach covering provider access AMION via sunrise tools  For Urgent matters/after-hours/weekends/holidays please page endocrine fellow on call   For nonurgent matters please email MARIA [email or web address]    Please note that this patient may be followed by different provider tomorrow.  Notify endocrine 24 hours prior to discharge for final recommendations
pend - transplant w/u
Contact via Microsoft Teams during business hours  To reach covering provider access AMION via sunrise tools  For Urgent matters/after-hours/weekends/holidays please page endocrine fellow on call   For nonurgent matters please email MARIA [email or web address]    Please note that this patient may be followed by different provider tomorrow.  Notify endocrine 24 hours prior to discharge for final recommendations
pend - improvement in O2 requirements, wean of HFNC, palliative recs
-Plan discussed with pt/team.  Contact info: [phone] (24/7). pager [phone]  Amion on [place]-Tools  Teams on M-T-W-F. Unavailable Thu/Weekends/Holidays  Assessed pt/labs/meds and discussed plan of care with primary team  Adjusting insulin  Discharge plan  Follow up care
Contact via Microsoft Teams during business hours  To reach covering provider access AMION via sunrise tools  For Urgent matters/after-hours/weekends/holidays please page endocrine fellow on call   For nonurgent matters please email MARIA [email or web address]    Please note that this patient may be followed by different provider tomorrow.  Notify endocrine 24 hours prior to discharge for final recommendations
The necessity of the time spent during the encounter on this date of service was due to:   - Ordering, reviewing, and interpreting labs, testing, and imaging  - Independently obtaining a review of systems and performing a physical exam  - Reviewing prior hospitalization and where necessary, outpatient records  - Reviewing consultant recommendations/communicating with consultants  - Counselling and educating patient and family regarding interpretation of aforementioned items and plan of care    Time-based billing (NON-critical care). Total minutes spent: 41
The necessity of the time spent during the encounter on this date of service was due to:   - Ordering, reviewing, and interpreting labs, testing, and imaging  - Independently obtaining a review of systems and performing a physical exam  - Reviewing prior hospitalization and where necessary, outpatient records  - Reviewing consultant recommendations/communicating with consultants  - Counselling and educating patient and family regarding interpretation of aforementioned items and plan of care    Time-based billing (NON-critical care). Total minutes spent: 51
-Plan discussed with pt/team.  Contact info: [phone] (24/7). pager [phone]  Amion on [place]-Tools  Teams on M-T-W-F. Unavailable Thu/Weekends/Holidays  Provided face to face education as well as assessed  pt/labs/meds and discussed plan with primary team  Adjusting insulin  Discharge plan  Follow up care
pend - transplant w/u - limited TTE
pend - limited TTE, improvement in O2 requirements, palliative
The necessity of the time spent during the encounter on this date of service was due to:   - Ordering, reviewing, and interpreting labs, testing, and imaging  - Independently obtaining a review of systems and performing a physical exam  - Reviewing prior hospitalization and where necessary, outpatient records  - Reviewing consultant recommendations/communicating with consultants  - Counselling and educating patient and family regarding interpretation of aforementioned items and plan of care    Time-based billing (NON-critical care). Total minutes spent: 55
Case d/w ACP on unit
The necessity of the time spent during the encounter on this date of service was due to:   - Ordering, reviewing, and interpreting labs, testing, and imaging  - Independently obtaining a review of systems and performing a physical exam  - Reviewing prior hospitalization and where necessary, outpatient records  - Reviewing consultant recommendations/communicating with consultants  - Counselling and educating patient and family regarding interpretation of aforementioned items and plan of care    Time-based billing (NON-critical care). Total minutes spent: 51
The necessity of the time spent during the encounter on this date of service was due to:   - Ordering, reviewing, and interpreting labs, testing, and imaging  - Independently obtaining a review of systems and performing a physical exam  - Reviewing prior hospitalization and where necessary, outpatient records  - Reviewing consultant recommendations/communicating with consultants  - Counselling and educating patient and family regarding interpretation of aforementioned items and plan of care    Time-based billing (NON-critical care). Total minutes spent: 51
pend - when O2 requirements ~4-5L, obtain ambulatory O2 requirements for d/c planning
pend - improvement in O2 requirements, wean of HFNC, palliative recs
The necessity of the time spent during the encounter on this date of service was due to:   - Ordering, reviewing, and interpreting labs, testing, and imaging  - Independently obtaining a review of systems and performing a physical exam  - Reviewing prior hospitalization and where necessary, outpatient records  - Reviewing consultant recommendations/communicating with consultants  - Counselling and educating patient and family regarding interpretation of aforementioned items and plan of care    Time-based billing (NON-critical care). Total minutes spent: 51
Case d/w ACP on unit
The necessity of the time spent during the encounter on this date of service was due to:   - Ordering, reviewing, and interpreting labs, testing, and imaging  - Independently obtaining a review of systems and performing a physical exam  - Reviewing prior hospitalization and where necessary, outpatient records  - Reviewing consultant recommendations/communicating with consultants  - Counselling and educating patient and family regarding interpretation of aforementioned items and plan of care    Time-based billing (NON-critical care). Total minutes spent: 51
pend - when O2 requirements ~4-5L, obtain ambulatory O2 requirements for d/c planning, CTPE
The necessity of the time spent during the encounter on this date of service was due to:   - Ordering, reviewing, and interpreting labs, testing, and imaging  - Independently obtaining a review of systems and performing a physical exam  - Reviewing prior hospitalization and where necessary, outpatient records  - Reviewing consultant recommendations/communicating with consultants  - Counselling and educating patient and family regarding interpretation of aforementioned items and plan of care    Time-based billing (NON-critical care). Total minutes spent: 61

## 2024-05-28 NOTE — PROGRESS NOTE ADULT - PROBLEM SELECTOR PLAN 1
- Check BG TID AC & QHS while eating regular meals and Q6H while NPO  - Decrease Lantus to 8 units QHS   - Continue Admelog 10 units with each meal, hold if patient is not eating, may give 5units if eating 50% of meals  - - C/w mod dose correctional scale TID with meals and QHS  - Please notify endo team with any further changes on steroid doses.    Discharge planning:   Home DM medications: metformin 500 mg BID, + glimepride 1 mg daily + lantus 35 units QHS+ admelog 25 units TIDAC  Discharge DM medications:   Continue with metformin 500 mg BID  STOP glimepiride due to recurrent hypoglycemia at home   Basal/bolus, dose will depend on insulin requirement and steroid plan   Make sure pt has Rx for all DM supplies and insulin/ DM meds.  Can follow at endo practice. [address] [place] [address] [address]. Phone . Call for apt closer to discharge- - Patient will need opthalmology and podiatry follow up as outpatient

## 2024-05-28 NOTE — PROGRESS NOTE ADULT - ASSESSMENT
63F PMH chronic AHRF on 2-3L NC at b/l 2/2 ILD (Followed w/ Dr. Primo Marlow, [facility]), PE on Eliquis, severe pHTN, Cor pulmonale, DM, presented initially with SOB, dry cough, chest pain, LE edema, recent admission to [facility] in March for ILD flare, admitted to outside hospital->ICU for AHRF 2/2 ILD flare, transferred to [facility] for lung transplant evaluation and found not to be a transplant candidate.

## 2024-05-28 NOTE — PROGRESS NOTE ADULT - SUBJECTIVE AND OBJECTIVE BOX
Chief Complaint: Diabetes Mellitus follow up    INTERVAL HX:  Patient seen at bedside.  RRT called this morning for hypoxia with saturation in the 50s.   Team speaking with daughter regarding GOC for Ms. Quiñonez.   BG with variability due to sporadic oral intake.      Review of Systems:  General: As above  GI: No nausea, vomiting  Endocrine: no  S&Sx of hypoglycemia    Allergies    No Known Allergies    Intolerances      MEDICATIONS  (STANDING):  albuterol/ipratropium for Nebulization 3 milliLiter(s) Nebulizer every 6 hours  apixaban 5 milliGRAM(s) Oral two times a day  budesonide 160 MICROgram(s)/formoterol 4.5 MICROgram(s) Inhaler 2 Puff(s) Inhalation two times a day  buMETAnide Injectable 2 milliGRAM(s) IV Push two times a day  chlorhexidine 2% Cloths 1 Application(s) Topical daily  dextrose 10% Bolus 125 milliLiter(s) IV Bolus once  dextrose 5%. 1000 milliLiter(s) (100 mL/Hr) IV Continuous <Continuous>  dextrose 5%. 1000 milliLiter(s) (50 mL/Hr) IV Continuous <Continuous>  dextrose 50% Injectable 12.5 Gram(s) IV Push once  dextrose 50% Injectable 25 Gram(s) IV Push once  diazepam  Injectable 2.5 milliGRAM(s) IV Push once  glucagon  Injectable 1 milliGRAM(s) IntraMuscular once  insulin glargine Injectable (LANTUS) 16 Unit(s) SubCutaneous at bedtime  insulin lispro (ADMELOG) corrective regimen sliding scale   SubCutaneous <User Schedule>  insulin lispro (ADMELOG) corrective regimen sliding scale   SubCutaneous three times a day before meals  insulin lispro Injectable (ADMELOG) 10 Unit(s) SubCutaneous with dinner  insulin lispro Injectable (ADMELOG) 10 Unit(s) SubCutaneous with breakfast  insulin lispro Injectable (ADMELOG) 10 Unit(s) SubCutaneous with lunch  methylPREDNISolone sodium succinate Injectable 40 milliGRAM(s) IV Push daily  Ofev (Nintedanib) 150 milliGRAM(s) 150 milliGRAM(s) Oral two times a day  pantoprazole  Injectable 40 milliGRAM(s) IV Push every 12 hours  sildenafil (REVATIO) 10 milliGRAM(s) Oral every 8 hours  simethicone 80 milliGRAM(s) Chew two times a day  traZODone 150 milliGRAM(s) Oral at bedtime  trimethoprim   80 mG/sulfamethoxazole 400 mG 1 Tablet(s) Oral daily        insulin glargine Injectable (LANTUS)   8 Unit(s) SubCutaneous (05-27-24 @ 23:44)    insulin lispro (ADMELOG) corrective regimen sliding scale   7 Unit(s) SubCutaneous (05-28-24 @ 12:06)   3 Unit(s) SubCutaneous (05-28-24 @ 08:26)   3 Unit(s) SubCutaneous (05-27-24 @ 17:10)    insulin lispro Injectable (ADMELOG)   10 Unit(s) SubCutaneous (05-27-24 @ 17:10)    insulin lispro Injectable (ADMELOG)   10 Unit(s) SubCutaneous (05-28-24 @ 08:25)    methylPREDNISolone sodium succinate Injectable   40 milliGRAM(s) IV Push (05-28-24 @ 06:23)        PHYSICAL EXAM:  VITALS: T(C): 36.4 (05-28-24 @ 12:00)  T(F): 97.6 (05-28-24 @ 12:00), Max: 97.7 (05-27-24 @ 21:17)  HR: 116 (05-28-24 @ 13:50) (100 - 121)  BP: 114/85 (05-28-24 @ 13:50) (102/67 - 135/84)  RR:  (19 - 20)  SpO2:  (76% - 100%)  Wt(kg): --  GENERAL: lying in bed post RRT.  Respiratory: Respirations labored on highflow NC.   Extremities: Warm, no edema  NEURO: sleepy, not talking    LABS:  POCT Blood Glucose.: 285 mg/dL (05-28-24 @ 11:35)  POCT Blood Glucose.: 183 mg/dL (05-28-24 @ 08:02)  POCT Blood Glucose.: 84 mg/dL (05-28-24 @ 02:36)  POCT Blood Glucose.: 94 mg/dL (05-27-24 @ 21:37)  POCT Blood Glucose.: 200 mg/dL (05-27-24 @ 16:29)  POCT Blood Glucose.: 302 mg/dL (05-27-24 @ 12:12)  POCT Blood Glucose.: 228 mg/dL (05-27-24 @ 10:03)  POCT Blood Glucose.: 249 mg/dL (05-27-24 @ 08:06)  POCT Blood Glucose.: 221 mg/dL (05-27-24 @ 02:10)  POCT Blood Glucose.: 278 mg/dL (05-26-24 @ 21:23)  POCT Blood Glucose.: 309 mg/dL (05-26-24 @ 16:49)  POCT Blood Glucose.: 363 mg/dL (05-26-24 @ 11:55)  POCT Blood Glucose.: 371 mg/dL (05-26-24 @ 11:54)  POCT Blood Glucose.: 198 mg/dL (05-26-24 @ 08:08)  POCT Blood Glucose.: 263 mg/dL (05-26-24 @ 02:05)  POCT Blood Glucose.: 185 mg/dL (05-25-24 @ 21:50)  POCT Blood Glucose.: 92 mg/dL (05-25-24 @ 19:59)  POCT Blood Glucose.: 305 mg/dL (05-25-24 @ 16:44)                          10.3   8.86  )-----------( 517      ( 27 May 2024 06:49 )             33.3     05-28    135  |  95<L>  |  22  ----------------------------<  186<H>  4.0   |  28  |  0.70    Ca    9.1      28 May 2024 05:49  Phos  3.2     05-27  Mg     2.3     05-27      eGFR: 98 mL/min/1.73m2 (28 May 2024 05:49)      Thyroid Function Tests:      A1C with Estimated Average Glucose Result: 11.9 % (04-13-24 @ 03:53)    Estimated Average Glucose: 295 mg/dL (04-13-24 @ 03:53)        Diet, Pureed:   Consistent Carbohydrate No Snacks (CSTCHO)  Moderately Thick Liquids (MODTHICKLIQS)  Supplement Feeding Modality:  Oral  Glucerna Shake Cans or Servings Per Day:  2       Frequency:  Daily (05-26-24 @ 16:28) [Active]

## 2024-05-28 NOTE — GOALS OF CARE CONVERSATION - ADVANCED CARE PLANNING - CONVERSATION DETAILS
Daughter Debbi called and updated. Daughter consents to [facility] discussion. Patient is doing worse and now on BIPAP (didn't do well with HFNC and NRB). Explained to daughter that patient may pass away soon unless oxygen improves. In the meantime we are giving her IV opioids for possible air hunger. Patient this morning states that she was not interested in hospice, though clinical course would dictate future decisions. Daughter is NOK.     Recommend palliative management at this time. Will reconsult palliative. Though at current trajectory patient may pass away today. Multiple IV Dilaudids given in the past 24 hours. Daughter recommended to come into hospital if she wants to be with patient during her final hours.

## 2024-05-28 NOTE — PROGRESS NOTE ADULT - PROBLEM SELECTOR PLAN 3
- Pt w/ hx of severe pHTN, RVSP 68 on 10/2022  - Repeat 4/12/24 at [facility] noted PAP 28, "normal PAP"   - Maintain euvolemia, as above  - sildenafil reduced to 10 mg TID as patient experienced diarrhea, which may be a side effect. will continue this dose for now

## 2024-05-28 NOTE — PROGRESS NOTE ADULT - ASSESSMENT
63F hx ILD/ probable IPF (On 2-3LNC at home), PE 2022 on eliquis, severe pHTN w/ prior cor pulmonale (RVSP 68 - 10/22 w/ TTE from 4/12 w/ PASP 28, normal LV/RV SF), IDDM, presenting as a transfer from [place] for lung transplant eval iso SOB, dry cough, chest pain. She has been receiving duonebs, symbicort, lasix, ofev and IV steroids.    CT Chest 4/12/2024: Findings suggesting interstitial lung disease without significant interval progression. No evidence of pneumonia.   CT scan from 4/12/2024 when compared with CT scan from 2020 has shown significant progression.  Most recent CT is unchanged compared with 4/12/24 study.     Recommendations  - c/w weaning O2 as able - On HFNC 60/100%. Goal spo2 of 90% and up.   - Continue with Symbicort, Duonebs, Ofev   - Increase solumedrol to 40 mg iv BID.   - PCP prophylaxis with bactrim    - Shunt study negative.   -C/w sildenafil 10mg TID   -C/w eliquis; monitor for bleeding  -C/w bumex 2 mg iv q12; monitor Cr and lytes.  - Please ambulate pt daily with goal to improve walking distance and strength.  - Would need heart/lung. Discuss with pulmonary transplant team. Patient is a poor candidate. 63F hx ILD/ probable IPF (On 2-3LNC at home), PE 2022 on eliquis, severe pHTN w/ prior cor pulmonale (RVSP 68 - 10/22 w/ TTE from 4/12 w/ PASP 28, normal LV/RV SF), IDDM, presenting as a transfer from [place] for lung transplant eval iso SOB, dry cough, chest pain. She has been receiving duonebs, symbicort, lasix, ofev and IV steroids.    CT Chest 4/12/2024: Findings suggesting interstitial lung disease without significant interval progression. No evidence of pneumonia.   CT scan from 4/12/2024 when compared with CT scan from 2020 has shown significant progression.  Most recent CT is unchanged compared with 4/12/24 study.     Recommendations  - c/w weaning O2 as able - On HFNC 60/100%. Goal spo2 of 90% and up.   - Continue with Symbicort, Duonebs, Ofev   - Increase solumedrol to 40 mg iv BID.   - PCP prophylaxis with bactrim    - Shunt study negative.   -C/w sildenafil 10mg TID   -C/w eliquis; monitor for bleeding  -C/w bumex 2 mg iv q12; monitor Cr and lytes.  - Please ambulate pt daily with goal to improve walking distance and strength.  - Would need heart/lung. Discussed with pulmonary transplant team. Patient is not a candidate.

## 2024-05-28 NOTE — PROGRESS NOTE ADULT - SUBJECTIVE AND OBJECTIVE BOX
PULMONARY SERVICE FOLLOW UP CONSULT NOTE    SUBJECTIVE:  HERNANDEZ. No clear improvement in symptoms over the past few days.      REVIEW OF SYSTEMS:  All additional ROS negative.    MEDICATIONS:  Pulmonary:  albuterol/ipratropium for Nebulization 3 milliLiter(s) Nebulizer every 6 hours  budesonide 160 MICROgram(s)/formoterol 4.5 MICROgram(s) Inhaler 2 Puff(s) Inhalation two times a day    Antimicrobials:  trimethoprim   80 mG/sulfamethoxazole 400 mG 1 Tablet(s) Oral daily    Anticoagulants:  apixaban 5 milliGRAM(s) Oral two times a day    Onc:    GI/:  aluminum hydroxide/magnesium hydroxide/simethicone Suspension 30 milliLiter(s) Oral every 4 hours PRN  loperamide 2 milliGRAM(s) Oral four times a day PRN  pantoprazole  Injectable 40 milliGRAM(s) IV Push every 12 hours  simethicone 80 milliGRAM(s) Chew two times a day    Endocrine:  dextrose 50% Injectable 25 Gram(s) IV Push once  dextrose 50% Injectable 12.5 Gram(s) IV Push once  dextrose Oral Gel 15 Gram(s) Oral once PRN  glucagon  Injectable 1 milliGRAM(s) IntraMuscular once  insulin glargine Injectable (LANTUS) 16 Unit(s) SubCutaneous at bedtime  insulin lispro (ADMELOG) corrective regimen sliding scale   SubCutaneous <User Schedule>  insulin lispro (ADMELOG) corrective regimen sliding scale   SubCutaneous three times a day before meals  insulin lispro Injectable (ADMELOG) 10 Unit(s) SubCutaneous with dinner  insulin lispro Injectable (ADMELOG) 10 Unit(s) SubCutaneous with breakfast  insulin lispro Injectable (ADMELOG) 10 Unit(s) SubCutaneous with lunch  methylPREDNISolone sodium succinate Injectable 40 milliGRAM(s) IV Push daily    Cardiac:  buMETAnide Injectable 2 milliGRAM(s) IV Push two times a day  sildenafil (REVATIO) 10 milliGRAM(s) Oral every 8 hours    Other Medications:  acetaminophen     Tablet .. 650 milliGRAM(s) Oral every 6 hours PRN  chlorhexidine 2% Cloths 1 Application(s) Topical daily  cyclobenzaprine 5 milliGRAM(s) Oral three times a day PRN  dextrose 10% Bolus 125 milliLiter(s) IV Bolus once  dextrose 5%. 1000 milliLiter(s) IV Continuous <Continuous>  dextrose 5%. 1000 milliLiter(s) IV Continuous <Continuous>  diazepam  Injectable 2.5 milliGRAM(s) IV Push once  diazepam  Injectable 2.5 milliGRAM(s) IV Push every 12 hours PRN  HYDROmorphone  Injectable 0.5 milliGRAM(s) IV Push every 4 hours PRN  melatonin 3 milliGRAM(s) Oral at bedtime PRN  Ofev (Nintedanib) 150 milliGRAM(s) 150 milliGRAM(s) Oral two times a day  traZODone 150 milliGRAM(s) Oral at bedtime      PHYSICAL EXAM  Vital Signs Last 24 Hrs  T(C): 36.4 (28 May 2024 12:00), Max: 36.5 (27 May 2024 21:17)  T(F): 97.6 (28 May 2024 12:00), Max: 97.7 (27 May 2024 21:17)  HR: 121 (28 May 2024 12:00) (100 - 121)  BP: 125/59 (28 May 2024 12:00) (102/67 - 135/84)  BP(mean): --  RR: 20 (28 May 2024 12:00) (20 - 20)  SpO2: 76% (28 May 2024 12:00) (76% - 100%)    Parameters below as of 28 May 2024 12:00  Patient On (Oxygen Delivery Method): nasal cannula, high flow        05-27 @ 07:01  -  05-28 @ 07:00  --------------------------------------------------------  IN: 560 mL / OUT: 300 mL / NET: 260 mL            CONSTITUTIONAL: No acute distress.   HEENT:  Conjunctiva clear B/L.  Moist oral mucosa.   Cardiovascular: RRR with no murmurs. No JVD noted. No lower extremity edema B/L. Extremities are warm and well perfused.    Respiratory: Diffuse rales. No accessory muscle use.   Gastrointestinal:  Soft, nontender. Non-distended. Non-rigid.    Neurologic:  Alert and awake. Moving all extremities. Following commands.    Skin:  No gross rashes notes.    LABS:      CBC Full  -  ( 27 May 2024 06:49 )  WBC Count : 8.86 K/uL  RBC Count : 3.98 M/uL  Hemoglobin : 10.3 g/dL  Hematocrit : 33.3 %  Platelet Count - Automated : 517 K/uL  Mean Cell Volume : 83.7 fl  Mean Cell Hemoglobin : 25.9 pg  Mean Cell Hemoglobin Concentration : 30.9 gm/dL  Auto Neutrophil # : x  Auto Lymphocyte # : x  Auto Monocyte # : x  Auto Eosinophil # : x  Auto Basophil # : x  Auto Neutrophil % : x  Auto Lymphocyte % : x  Auto Monocyte % : x  Auto Eosinophil % : x  Auto Basophil % : x    05-28    135  |  95<L>  |  22  ----------------------------<  186<H>  4.0   |  28  |  0.70    Ca    9.1      28 May 2024 05:49  Phos  3.2     05-27  Mg     2.3     05-27            Urinalysis Basic - ( 28 May 2024 05:49 )    Color: x / Appearance: x / SG: x / pH: x  Gluc: 186 mg/dL / Ketone: x  / Bili: x / Urobili: x   Blood: x / Protein: x / Nitrite: x   Leuk Esterase: x / RBC: x / WBC x   Sq Epi: x / Non Sq Epi: x / Bacteria: x                RADIOLOGY & ADDITIONAL STUDIES:

## 2024-05-29 NOTE — PROGRESS NOTE ADULT - SUBJECTIVE AND OBJECTIVE BOX
SUBJECTIVE AND OBJECTIVE: Pt seen and examined at bedside. Pt awake and alert, able to start   Indication for Geriatrics and Palliative Care Services/INTERVAL HPI:    OVERNIGHT EVENTS:    DNR on chart:DNI: Trial NIV  DNI: Trial NIV      Allergies    No Known Allergies    Intolerances    MEDICATIONS  (STANDING):  albuterol/ipratropium for Nebulization 3 milliLiter(s) Nebulizer every 6 hours  apixaban 5 milliGRAM(s) Oral two times a day  budesonide 160 MICROgram(s)/formoterol 4.5 MICROgram(s) Inhaler 2 Puff(s) Inhalation two times a day  buMETAnide Injectable 2 milliGRAM(s) IV Push two times a day  chlorhexidine 2% Cloths 1 Application(s) Topical daily  dextrose 10% Bolus 125 milliLiter(s) IV Bolus once  dextrose 5%. 1000 milliLiter(s) (100 mL/Hr) IV Continuous <Continuous>  dextrose 5%. 1000 milliLiter(s) (50 mL/Hr) IV Continuous <Continuous>  dextrose 50% Injectable 25 Gram(s) IV Push once  dextrose 50% Injectable 12.5 Gram(s) IV Push once  diazepam  Injectable 2.5 milliGRAM(s) IV Push once  glucagon  Injectable 1 milliGRAM(s) IntraMuscular once  insulin glargine Injectable (LANTUS) 8 Unit(s) SubCutaneous at bedtime  insulin lispro (ADMELOG) corrective regimen sliding scale   SubCutaneous three times a day before meals  insulin lispro (ADMELOG) corrective regimen sliding scale   SubCutaneous <User Schedule>  insulin lispro Injectable (ADMELOG) 10 Unit(s) SubCutaneous with dinner  insulin lispro Injectable (ADMELOG) 10 Unit(s) SubCutaneous with breakfast  insulin lispro Injectable (ADMELOG) 10 Unit(s) SubCutaneous with lunch  methylPREDNISolone sodium succinate Injectable 40 milliGRAM(s) IV Push two times a day  Ofev (Nintedanib) 150 milliGRAM(s) 150 milliGRAM(s) Oral two times a day  pantoprazole  Injectable 40 milliGRAM(s) IV Push every 12 hours  sildenafil (REVATIO) 10 milliGRAM(s) Oral every 8 hours  simethicone 80 milliGRAM(s) Chew two times a day  traZODone 150 milliGRAM(s) Oral at bedtime  trimethoprim   80 mG/sulfamethoxazole 400 mG 1 Tablet(s) Oral daily    MEDICATIONS  (PRN):  acetaminophen     Tablet .. 650 milliGRAM(s) Oral every 6 hours PRN Temp greater or equal to 38C (100.4F), Mild Pain (1 - 3)  cyclobenzaprine 5 milliGRAM(s) Oral three times a day PRN Muscle Spasm  dextrose Oral Gel 15 Gram(s) Oral once PRN Blood Glucose LESS THAN 70 milliGRAM(s)/deciliter  diazepam  Injectable 2.5 milliGRAM(s) IV Push every 12 hours PRN severe anxiety  HYDROmorphone  Injectable 0.5 milliGRAM(s) IV Push every 4 hours PRN respiratory distress  loperamide 2 milliGRAM(s) Oral four times a day PRN Diarrhea      ITEMS UNCHECKED ARE NOT PRESENT    PRESENT SYMPTOMS: [ ]Unable to self-report - see [ ] CPOT [ ] PAINADS [ ] RDOS  Source if other than patient:  [ ]Family   [ ]Team     Pain:  [ ]yes [ ]no  QOL impact -   Location -                    Aggravating factors -  Quality -  Radiation -  Timing-  Severity (0-10 scale):  Minimal acceptable level (0-10 scale):     CPOT:    https://www.[facility].org/getattachment/[email or web address]/Critical-Care-Pain-Observation-Tool-(CPOT)    PAINAD Score: See PAINAD tool and score below       Dyspnea:                           [ ]Mild [ ]Moderate [ ]Severe    RDOS: See RDOS tool and score below   0 to 2  minimal or no respiratory distress   3  mild distress  4 to 6 moderate distress  >7 severe distress      Anxiety:                             [ ]Mild [ ]Moderate [ ]Severe  Fatigue:                             [ ]Mild [ ]Moderate [ ]Severe  Nausea:                             [ ]Mild [ ]Moderate [ ]Severe  Loss of appetite:              [ ]Mild [ ]Moderate [ ]Severe  Constipation:                    [ ]Mild [ ]Moderate [ ]Severe    PCSSQ[Palliative Care Spiritual Screening Question]   Severity (0-10):  Score of 4 or > indicate consideration of Chaplaincy referral.  Chaplaincy Referral: [ ] yes [ ] refused [ ] following [ ] Deferred     Caregiver Semmes? : [ ] yes [ ] no [ ] Deferred [ ] Declined             Social work referral [ ] Patient & Family Centered Care Referral [ ]     Anticipatory Grief present?:  [ ] yes [ ] no  [ ] Deferred                  Social work referral [ ] Chaplaincy Referral [ ]    		  Other Symptoms:  [ ]All other review of systems negative     Palliative Performance Status Version 2:   See PPSv2 tool and score below         PHYSICAL EXAM:  Vital Signs Last 24 Hrs  T(C): 36.9 (29 May 2024 05:04), Max: 36.9 (29 May 2024 05:04)  T(F): 98.4 (29 May 2024 05:04), Max: 98.4 (29 May 2024 05:04)  HR: 107 (29 May 2024 10:15) (99 - 121)  BP: 101/68 (29 May 2024 05:04) (101/68 - 130/89)  BP(mean): --  RR: 18 (29 May 2024 10:15) (18 - 20)  SpO2: 96% (29 May 2024 10:15) (76% - 97%)    Parameters below as of 29 May 2024 10:15  Patient On (Oxygen Delivery Method): nasal cannula, high flow, heater temp 31  O2 Flow (L/min): 60  O2 Concentration (%): 100 I&O's Summary    28 May 2024 07:01  -  29 May 2024 07:00  --------------------------------------------------------  IN: 240 mL / OUT: 850 mL / NET: -610 mL       GENERAL: [ ]Cachexia    [ ]Alert  [ ]Oriented x   [ ]Lethargic  [ ]Unarousable  [ ]Verbal  [ ]Non-Verbal  Behavioral:   [ ]Anxiety  [ ]Delirium [ ]Agitation [ ]Other  HEENT:  [ ]Normal   [ ]Dry mouth   [ ]ET Tube/Trach  [ ]Oral lesions  PULMONARY:   [ ]Clear [ ]Tachypnea  [ ]Audible excessive secretions   [ ]Rhonchi        [ ]Right [ ]Left [ ]Bilateral  [ ]Crackles        [ ]Right [ ]Left [ ]Bilateral  [ ]Wheezing     [ ]Right [ ]Left [ ]Bilateral  [ ]Diminished BS [ ] Right [ ]Left [ ]Bilateral  CARDIOVASCULAR:    [ ]Regular [ ]Irregular [ ]Tachy  [ ]Jared [ ]Murmur [ ]Other  GASTROINTESTINAL:  [ ]Soft  [ ]Distended   [ ]+BS  [ ]Non tender [ ]Tender  [ ]Other [ ]PEG [ ]OGT/ NGT   Last BM:   GENITOURINARY:  [ ]Normal [ ]Incontinent   [ ]Oliguria/Anuria   [ ]Pike  MUSCULOSKELETAL:   [ ]Normal   [ ]Weakness  [ ]Bed/Wheelchair bound [ ]Edema  NEUROLOGIC:   [ ]No focal deficits  [ ] Cognitive impairment  [ ] Dysphagia [ ]Dysarthria [ ] Paresis [ ]Other   SKIN:   [ ]Normal  [ ]Rash  [ ]Other  [ ]Pressure ulcer(s) [ ]y [ ]n present on admission    CRITICAL CARE:  [ ]Shock Present  [ ]Septic [ ]Cardiogenic [ ]Neurologic [ ]Hypovolemic  [ ]Vasopressors [ ]Inotropes  [ ]Respiratory failure present [ ]Mechanical Ventilation [ ]Non-invasive ventilatory support [ ]High-Flow   [ ]Acute  [ ]Chronic [ ]Hypoxic  [ ]Hypercarbic [ ]Other  [ ]Other organ failure     LABS:  05-28    135  |  95<L>  |  22  ----------------------------<  186<H>  4.0   |  28  |  0.70    Ca    9.1      28 May 2024 05:49        Urinalysis Basic - ( 28 May 2024 05:49 )    Color: x / Appearance: x / SG: x / pH: x  Gluc: 186 mg/dL / Ketone: x  / Bili: x / Urobili: x   Blood: x / Protein: x / Nitrite: x   Leuk Esterase: x / RBC: x / WBC x   Sq Epi: x / Non Sq Epi: x / Bacteria: x      RADIOLOGY & ADDITIONAL STUDIES:    Protein Calorie Malnutrition Present: [ ]mild [ ]moderate [ ]severe [ ]underweight [ ]morbid obesity  https://www.andeal.org/vault/2440/web/files/ONC/Table_Clinical%20Characteristics%20to%20Document%20Malnutrition-White%20JV%20et%20al%202012.pdf    Height (cm): 167.6 (04-25-24 @ 23:50), 167.6 (04-11-24 @ 21:44)  Weight (kg): 56.6 (04-25-24 @ 23:50), 54.7 (04-12-24 @ 04:13)  BMI (kg/m2): 20.1 (04-25-24 @ 23:50), 19.5 (04-12-24 @ 04:13)    [ ]PPSV2 < or = 30%  [ ]significant weight loss [ ]poor nutritional intake [ ]anasarca[ ]Artificial Nutrition    Other REFERRALS:  [ ]Hospice  [ ]Child Life  [ ]Social Work  [ ]Case management [ ]Holistic Therapy     Goals of Care Document:LINO Sinclair (05-28-24 @ 12:16)  Goals of Care Conversation:   Participants:  · Participants  Family    Advance Directives:  · Caregiver:  declines    Conversation Discussion:  · Conversation  Diagnosis; Prognosis; MOLST Discussed  · Conversation Details  Daughter Debbi called and updated. Daughter consents to GOC discussion. Patient is doing worse and now on BIPAP (didn't do well with HFNC and NRB). Explained to daughter that patient may pass away soon unless oxygen improves. In the meantime we are giving her IV opioids for possible air hunger. Patient this morning states that she was not interested in hospice, though clinical course would dictate future decisions. Daughter is NOK.     Recommend palliative management at this time. Will reconsult palliative. Though at current trajectory patient may pass away today. Multiple IV Dilaudids given in the past 24 hours. Daughter recommended to come into hospital if she wants to be with patient during her final hours.    What Matters Most To Patient and Family:  · What matters most to patient and family  Avoiding feelings of dyspnea    Personal Advance Directives Treatment Guidelines:   Treatment Guidelines:  · Treatment Guideline Comments  Manage medically now, considering comfort measures    MOLST:  · Completed  19-Apr-2024    Location of Discussion:   Time Spent on Advance Care Planning:  Attending or JULIA Only.     I personally spent 30 minutes on advance care planning services with the patient. This time is separate and distinct from any other care management services provided on this date.    Location of Discussion:  · Location of discussion  Telephone      Electronic Signatures:  Hugo Sinclair)  (Signed 28-May-2024 12:19)  	Authored: Goals of Care Conversation, Personal Advance Directives Treatment Guidelines, Location of Discussion      Last Updated: 28-May-2024 12:19 by Hugo Sinclair)     SUBJECTIVE AND OBJECTIVE: Pt seen and examined at bedside. Pt awake and alert, able to participate in exam   Indication for Geriatrics and Palliative Care Services/INTERVAL HPI: GOC     OVERNIGHT EVENTS: RRT yesterday for hypoxia, now resolved on max hiflow     DNR on chart:DNI: Trial NIV  DNI: Trial NIV      Allergies    No Known Allergies    Intolerances    MEDICATIONS  (STANDING):  albuterol/ipratropium for Nebulization 3 milliLiter(s) Nebulizer every 6 hours  apixaban 5 milliGRAM(s) Oral two times a day  budesonide 160 MICROgram(s)/formoterol 4.5 MICROgram(s) Inhaler 2 Puff(s) Inhalation two times a day  buMETAnide Injectable 2 milliGRAM(s) IV Push two times a day  chlorhexidine 2% Cloths 1 Application(s) Topical daily  dextrose 10% Bolus 125 milliLiter(s) IV Bolus once  dextrose 5%. 1000 milliLiter(s) (100 mL/Hr) IV Continuous <Continuous>  dextrose 5%. 1000 milliLiter(s) (50 mL/Hr) IV Continuous <Continuous>  dextrose 50% Injectable 25 Gram(s) IV Push once  dextrose 50% Injectable 12.5 Gram(s) IV Push once  diazepam  Injectable 2.5 milliGRAM(s) IV Push once  glucagon  Injectable 1 milliGRAM(s) IntraMuscular once  insulin glargine Injectable (LANTUS) 8 Unit(s) SubCutaneous at bedtime  insulin lispro (ADMELOG) corrective regimen sliding scale   SubCutaneous three times a day before meals  insulin lispro (ADMELOG) corrective regimen sliding scale   SubCutaneous <User Schedule>  insulin lispro Injectable (ADMELOG) 10 Unit(s) SubCutaneous with dinner  insulin lispro Injectable (ADMELOG) 10 Unit(s) SubCutaneous with breakfast  insulin lispro Injectable (ADMELOG) 10 Unit(s) SubCutaneous with lunch  methylPREDNISolone sodium succinate Injectable 40 milliGRAM(s) IV Push two times a day  Ofev (Nintedanib) 150 milliGRAM(s) 150 milliGRAM(s) Oral two times a day  pantoprazole  Injectable 40 milliGRAM(s) IV Push every 12 hours  sildenafil (REVATIO) 10 milliGRAM(s) Oral every 8 hours  simethicone 80 milliGRAM(s) Chew two times a day  traZODone 150 milliGRAM(s) Oral at bedtime  trimethoprim   80 mG/sulfamethoxazole 400 mG 1 Tablet(s) Oral daily    MEDICATIONS  (PRN):  acetaminophen     Tablet .. 650 milliGRAM(s) Oral every 6 hours PRN Temp greater or equal to 38C (100.4F), Mild Pain (1 - 3)  cyclobenzaprine 5 milliGRAM(s) Oral three times a day PRN Muscle Spasm  dextrose Oral Gel 15 Gram(s) Oral once PRN Blood Glucose LESS THAN 70 milliGRAM(s)/deciliter  diazepam  Injectable 2.5 milliGRAM(s) IV Push every 12 hours PRN severe anxiety  HYDROmorphone  Injectable 0.5 milliGRAM(s) IV Push every 4 hours PRN respiratory distress  loperamide 2 milliGRAM(s) Oral four times a day PRN Diarrhea      ITEMS UNCHECKED ARE NOT PRESENT    PRESENT SYMPTOMS: [ ]Unable to self-report - see [ ] CPOT [ ] PAINADS [ ] RDOS  Source if other than patient:  [ ]Family   [ ]Team     Pain:  [ ]yes [ x]no  QOL impact -   Location -                    Aggravating factors -  Quality -  Radiation -  Timing-  Severity (0-10 scale):  Minimal acceptable level (0-10 scale):     CPOT:    https://www.[facility].org/getattachment/[email or web address]/Critical-Care-Pain-Observation-Tool-(CPOT)    PAINAD Score: See PAINAD tool and score below       Dyspnea:                           [x ]Mild [ ]Moderate [ ]Severe    RDOS: See RDOS tool and score below   0 to 2  minimal or no respiratory distress   3  mild distress  4 to 6 moderate distress  >7 severe distress      Anxiety:                             [ ]Mild [ x]Moderate [ ]Severe  Fatigue:                             [ ]Mild [ ]Moderate [ ]Severe  Nausea:                             [ ]Mild [ ]Moderate [ ]Severe  Loss of appetite:              [ ]Mild [ ]Moderate [ ]Severe  Constipation:                    [ ]Mild [ ]Moderate [ ]Severe    PCSSQ[Palliative Care Spiritual Screening Question]   Severity (0-10):  Score of 4 or > indicate consideration of Chaplaincy referral.  Chaplaincy Referral: [x ] yes [ ] refused [x ] following [ ] Deferred     Caregiver Linwood? : [ ] yes [ ] no [ ] Deferred [ ] Declined             Social work referral [ ] Patient & Family Centered Care Referral [ ]     Anticipatory Grief present?:  [x ] yes [ ] no  [ ] Deferred                  Social work referral [ ] Chaplaincy Referral [x ]    		  Other Symptoms:  [ x]All other review of systems negative     Palliative Performance Status Version 2:   See PPSv2 tool and score below         PHYSICAL EXAM:  Vital Signs Last 24 Hrs  T(C): 36.9 (29 May 2024 05:04), Max: 36.9 (29 May 2024 05:04)  T(F): 98.4 (29 May 2024 05:04), Max: 98.4 (29 May 2024 05:04)  HR: 107 (29 May 2024 10:15) (99 - 121)  BP: 101/68 (29 May 2024 05:04) (101/68 - 130/89)  BP(mean): --  RR: 18 (29 May 2024 10:15) (18 - 20)  SpO2: 96% (29 May 2024 10:15) (76% - 97%)    Parameters below as of 29 May 2024 10:15  Patient On (Oxygen Delivery Method): nasal cannula, high flow, heater temp 31  O2 Flow (L/min): 60  O2 Concentration (%): 100 I&O's Summary    28 May 2024 07:01  -  29 May 2024 07:00  --------------------------------------------------------  IN: 240 mL / OUT: 850 mL / NET: -610 mL       GENERAL: [ ]Cachexia    [ x]Alert  [ x]Oriented x3   [ ]Lethargic  [ ]Unarousable  [ ]Verbal  [ ]Non-Verbal  Behavioral:   [ ]Anxiety  [ ]Delirium [ ]Agitation [ ]Other  HEENT:  [ ]Normal   [ x]Dry mouth   [ ]ET Tube/Trach  [ ]Oral lesions  PULMONARY:   [ ]Clear [ x]Tachypnea  [ ]Audible excessive secretions   [ ]Rhonchi        [ ]Right [ ]Left [ ]Bilateral  [ ]Crackles        [ ]Right [ ]Left [ ]Bilateral  [ ]Wheezing     [ ]Right [ ]Left [ ]Bilateral  [x ]Diminished BS [ ] Right [ ]Left [x ]Bilateral  CARDIOVASCULAR:    [x ]Regular [ ]Irregular [ ]Tachy  [ ]Jared [ ]Murmur [ ]Other  GASTROINTESTINAL:  [x ]Soft  [ ]Distended   [x]+BS  [x ]Non tender [ ]Tender  [ ]Other [ ]PEG [ ]OGT/ NGT   Last BM:   GENITOURINARY:  [ ]Normal [x ]Incontinent   [ ]Oliguria/Anuria   [ ]Pike  MUSCULOSKELETAL:   [ ]Normal   [x]Weakness  [ x]Bed/Wheelchair bound [ ]Edema  NEUROLOGIC:   [x ]No focal deficits  [  ] Cognitive impairment  [ ] Dysphagia [ ]Dysarthria [ ] Paresis [ ]Other   SKIN:   [ ]Normal  [ ]Rash  [ ]Other  [ ]Pressure ulcer(s) [ ]y [ ]n present on admission    CRITICAL CARE:  [ ]Shock Present  [ ]Septic [ ]Cardiogenic [ ]Neurologic [ ]Hypovolemic  [ ]Vasopressors [ ]Inotropes  [ ]Respiratory failure present [ ]Mechanical Ventilation [ ]Non-invasive ventilatory support [ ]High-Flow   [ ]Acute  [ ]Chronic [ ]Hypoxic  [ ]Hypercarbic [ ]Other  [ ]Other organ failure     LABS:  05-28    135  |  95<L>  |  22  ----------------------------<  186<H>  4.0   |  28  |  0.70    Ca    9.1      28 May 2024 05:49        Urinalysis Basic - ( 28 May 2024 05:49 )    Color: x / Appearance: x / SG: x / pH: x  Gluc: 186 mg/dL / Ketone: x  / Bili: x / Urobili: x   Blood: x / Protein: x / Nitrite: x   Leuk Esterase: x / RBC: x / WBC x   Sq Epi: x / Non Sq Epi: x / Bacteria: x      RADIOLOGY & ADDITIONAL STUDIES:  < from: Xray Chest 1 View- PORTABLE-Urgent (Xray Chest 1 View- PORTABLE-Urgent .) (05.26.24 @ 13:49) >    ACC: [id number] EXAM:  XR CHEST PORTABLE URGENT 1V   ORDERED BY:  ISAMAR CHRISTIAN     PROCEDURE DATE:  05/26/2024          INTERPRETATION:  HISTORY: Hypoxemia    TECHNIQUE: A single AP view of the chest was obtained.    COMPARISON: 5/24/2024    FINDINGS: The cardiac silhouette is enlarged. There are bilateral diffuse   increased lung markings, not significantly changed. No focal   consolidation or pleural effusion is seen.    IMPRESSION: Unchanged bilateral diffuse increased lung markings.    --- End of Report ---            AXEL GO MD; Attending Radiologist  This document has been electronically signed. May 26 2024  1:54PM    < end of copied text >    Protein Calorie Malnutrition Present: [ ]mild [ ]moderate [ ]severe [ ]underweight [ ]morbid obesity  https://www.andeal.org/vault/2440/web/files/ONC/Table_Clinical%20Characteristics%20to%20Document%20Malnutrition-White%20JV%20et%20al%202012.pdf    Height (cm): 167.6 (04-25-24 @ 23:50), 167.6 (04-11-24 @ 21:44)  Weight (kg): 56.6 (04-25-24 @ 23:50), 54.7 (04-12-24 @ 04:13)  BMI (kg/m2): 20.1 (04-25-24 @ 23:50), 19.5 (04-12-24 @ 04:13)    [x ]PPSV2 < or = 30%  [ ]significant weight loss [ ]poor nutritional intake [ ]anasarca[ ]Artificial Nutrition    Other REFERRALS:  [ ]Hospice  [ ]Child Life  [ ]Social Work  [ ]Case management [ ]Holistic Therapy     Goals of Care Document:LINO Sinclair (05-28-24 @ 12:16)  Goals of Care Conversation:   Participants:  · Participants  Family    Advance Directives:  · Caregiver:  declines    Conversation Discussion:  · Conversation  Diagnosis; Prognosis; MOLST Discussed  · Conversation Details  Daughter Debbi called and updated. Daughter consents to GOC discussion. Patient is doing worse and now on BIPAP (didn't do well with HFNC and NRB). Explained to daughter that patient may pass away soon unless oxygen improves. In the meantime we are giving her IV opioids for possible air hunger. Patient this morning states that she was not interested in hospice, though clinical course would dictate future decisions. Daughter is NOK.     Recommend palliative management at this time. Will reconsult palliative. Though at current trajectory patient may pass away today. Multiple IV Dilaudids given in the past 24 hours. Daughter recommended to come into hospital if she wants to be with patient during her final hours.    What Matters Most To Patient and Family:  · What matters most to patient and family  Avoiding feelings of dyspnea    Personal Advance Directives Treatment Guidelines:   Treatment Guidelines:  · Treatment Guideline Comments  Manage medically now, considering comfort measures    MOLST:  · Completed  19-Apr-2024    Location of Discussion:   Time Spent on Advance Care Planning:  Attending or JULIA Only.     I personally spent 30 minutes on advance care planning services with the patient. This time is separate and distinct from any other care management services provided on this date.    Location of Discussion:  · Location of discussion  Telephone      Electronic Signatures:  Hugo Sinclair)  (Signed 28-May-2024 12:19)  	Authored: Goals of Care Conversation, Personal Advance Directives Treatment Guidelines, Location of Discussion      Last Updated: 28-May-2024 12:19 by Hugo Sinclair)

## 2024-05-29 NOTE — PROGRESS NOTE ADULT - PROBLEM SELECTOR PLAN 1
Baseline 3-4L NC. Hx of ILD w/ c/f IPF. Followed w/ DOMENICO Ding. Transferred to [facility] for lung transplant eval  - NM cardiac shunt performed on 5/10 was negative for PFO  - Continue home Ofev 150mg BID  - Duonebs, Symbicort  - Bactrim ordered for ppx  - Solumedrol 40 mg BID given high O2 support  - valium 2.5mg IV q12 PRN anxiety (5 mg caused somnolence)  - Dilaudid 0.5 mg IVP q4 hrs PRN severe respiratory distress  - Deemed not to be a transplant candidate 4/30  - now requiring HFNC 100% at 60 L

## 2024-05-29 NOTE — PROGRESS NOTE ADULT - ASSESSMENT
63F PMH chronic AHRF on 2-3L NC at b/l 2/2 ILD (Followed w/ Dr. Primo Marlow, [facility]), PE on Eliquis, severe pHTN, Cor pulmonale, DM, presented initially with SOB, dry cough, chest pain, LE edema, recent admission to [facility] in March for ILD flare, admitted to outside hospital->ICU for AHRF 2/2 ILD flare, transferred to [facility] for lung transplant evaluation and found not to be a transplant candidate (would require double lung and heart transplant).

## 2024-05-29 NOTE — PROGRESS NOTE ADULT - PROBLEM SELECTOR PLAN 2
pt currently not lung transplant candidate, RRT o/n for hypoxia   at time of exam on NC, dyspnea with exertion  discussed symptom management- as per pt not interested in symptom management for dyspnea pt currently not lung transplant candidate, RRT for hypoxia   at time of exam on HFNC, dyspnea with exertion  can c/w IV Dilaudid for dyspnea prn

## 2024-05-29 NOTE — PROGRESS NOTE ADULT - PROBLEM SELECTOR PLAN 1
- Check BG TID AC & QHS while eating regular meals and Q6H while NPO  - Continue Lantus to 8 units QHS   - Continue Admelog 10 units with each meal, hold if patient is not eating, may give 5units if eating 50% of meals  - C/w mod dose correctional scale TID with meals and QHS  - Please notify endo team with any further changes on steroid doses.    Discharge planning:   Home DM medications: metformin 500 mg BID, + glimepride 1 mg daily + lantus 35 units QHS+ admelog 25 units TIDAC  Discharge DM medications:   Continue with metformin 500 mg BID  STOP glimepiride due to recurrent hypoglycemia at home   Basal/bolus, dose will depend on insulin requirement and steroid plan   Make sure pt has Rx for all DM supplies and insulin/ DM meds.  Can follow at endo practice. [address] [place] [address] [address]. Phone . Call for apt closer to discharge- - Patient will need opthalmology and podiatry follow up as outpatient

## 2024-05-29 NOTE — PROGRESS NOTE ADULT - ASSESSMENT
Assessment and Plan:   Assessment      A/P:63-yo F with PMHx of chronic AHRF on 2-3L NC at b/l 2/2 ILD (Followed w/ Dr. Primo Marlow, [facility]), PE on Eliquis, severe pHTN, Cor pulmonale, DM, presented initially with SOB, dry cough, chest pain, LE edema, recent admission to [facility] in March for ILD flare, admitted to [facility]->ICU for AHRF 2/2 ILD flare, transferred to [facility] for lung transplant evaluation.     Wound Consult requested to assist w/ management of  Sacral region, BL buttocks resolving DTPI  Incontinent bowel    Recommendations:   Buttocks/ Sacrum Dominique/BID and prn soiling     Moisturize intact skin w/ SWEEN cream BID  Continue turning and positioning w/ offloading assistive devices as per protocol       encourage patient to reposition self       Continue w/ attends under pads and Pericare w/ godwin care as per protocol  Waffle Cushion to chair when oob to chair  Continue w/ low air loss pressure redistribution bed surface   Pt will need Group 2 mattress on hospital bed and ROHO cushion for wheel chair upon discharge home  Care as per medicine, will follow w/ you  Upon discharge f/u as outpatient at W[facility] [address] [facility] [phone]  Thank you for this consult,  SUSSY Ponce-BC, [facility]  [phone]  Nights/ Weekends/ Holidays please call:  General Surgery Consult pager ([phone]) for emergencies  Wound PT for multilayer leg wrapping or VAC issues (x [phone])

## 2024-05-29 NOTE — PROGRESS NOTE ADULT - PROBLEM SELECTOR PROBLEM 1
"Subjective:       Patient ID: Eloina Gibson is a 65 y.o. female.    Vitals:  height is 5' 6" (1.676 m) and weight is 79.8 kg (176 lb). Her temperature is 97.7 °F (36.5 °C). Her blood pressure is 127/77 and her pulse is 51 (abnormal). Her respiration is 18 and oxygen saturation is 98%.     Chief Complaint: Urinary Tract Infection    Urinary Tract Infection    The current episode started yesterday. Associated symptoms include frequency and urgency. Pertinent negatives include no nausea, vomiting or rash. Associated symptoms comments: Burning with urination , blood .       Constitution: Negative for fever.   Gastrointestinal: Negative for nausea, vomiting and diarrhea.   Genitourinary: Positive for dysuria, frequency and urgency.   Skin: Negative for rash.       Objective:      Physical Exam   Constitutional: She is oriented to person, place, and time. She appears well-developed and well-nourished.   HENT:   Mouth/Throat: Oropharynx is clear and moist.   Eyes: Conjunctivae are normal.   Neck: Normal range of motion.   Cardiovascular: Normal rate and regular rhythm. Exam reveals no friction rub.   No murmur heard.  Pulmonary/Chest: Effort normal.   Neurological: She is alert and oriented to person, place, and time.   Psychiatric: She has a normal mood and affect. Her behavior is normal.   Nursing note and vitals reviewed.      Assessment:       1. Burning with urination    2. Acute cystitis with hematuria        Plan:         Burning with urination  -     POCT Urinalysis, Dipstick, Automated, W/O Scope  -     Urine culture    Acute cystitis with hematuria    Other orders  -     nitrofurantoin, macrocrystal-monohydrate, (MACROBID) 100 MG capsule; Take 1 capsule (100 mg total) by mouth 2 (two) times daily. for 5 days  Dispense: 10 capsule; Refill: 0         " Debility

## 2024-05-29 NOTE — PROGRESS NOTE ADULT - PROBLEM SELECTOR PLAN 12
DVT PPx: Eliquis  Diet: Pureed w/ thickened (most conservative diet, unable to perform FEES)  Code: DNR/DNI, molst completed in chart     Aspiration precautions  Fall precautions    Discharge to [facility] pending wean of O2, but would benefit from inpatient hospice

## 2024-05-29 NOTE — PROGRESS NOTE ADULT - PROBLEM SELECTOR PLAN 3
- Pt w/ hx of severe pHTN, RVSP 68 on 10/2022  - Repeat 4/12/24 at [facility] noted PAP 28, "normal PAP"   - Maintain euvolemia, as above  - sildenafil reduced to 10 mg TID as patient experienced diarrhea, which may be a side effect. will continue this dose for now

## 2024-05-29 NOTE — PROGRESS NOTE ADULT - PROBLEM SELECTOR PLAN 4
will sign off as goals are established  palliative contact number provided to pt   case discussed with primary team  Can be reached by TEAMS M-F 9-5 Nargis Sorensen Any other time please page [phone] if needed will continue to follow for goc  palliative contact number provided to pt   case discussed with primary team  Can be reached by TEAMS M-F 9-5 Nargis Sorensen Any other time please page [phone] if needed

## 2024-05-29 NOTE — PROGRESS NOTE ADULT - PROBLEM SELECTOR PLAN 8
T2DM: Home DM medications: metformin 500 mg BID, + glimepride 1 mg daily + lantus 35 units QHS+ admelog 25 units TIDAC  - Pt w/ hx of uncontrolled T2DM, a1c 11.9.   - Endocrine consulted, recs appreciated  - insulin adjustments per endocrine  - MISS FS tid qac qhs    - Discharge DM medications:        - Continue with metformin 500 mg BID       - STOP glimepiride due to recurrent hypoglycemia at home     - Basal/bolus, dose will depend on insulin requirement and steroid plan

## 2024-05-29 NOTE — PROGRESS NOTE ADULT - ASSESSMENT
63F hx ILD/ probable IPF (On 2-3LNC at home), PE 2022 on eliquis, severe pHTN w/ prior cor pulmonale (RVSP 68 - 10/22 w/ TTE from 4/12 w/ PASP 28, normal LV/RV SF), IDDM, presenting as a transfer from [place] for lung transplant eval iso SOB, dry cough, chest pain. She has been receiving duonebs, symbicort, lasix, ofev and IV steroids.    CT Chest 4/12/2024: Findings suggesting interstitial lung disease without significant interval progression. No evidence of pneumonia.   CT scan from 4/12/2024 when compared with CT scan from 2020 has shown significant progression.  Most recent CT is unchanged compared with 4/12/24 study.     Recommendations  - c/w weaning O2 as able - On HFNC 60/100%. Goal spo2 of 90% and up.   - Continue with Symbicort, Duonebs, Ofev   - CW solumedrol to 40 mg iv BID.   - PCP prophylaxis with bactrim    - Shunt study negative.   -C/w sildenafil 10mg TID   -C/w eliquis; monitor for bleeding  -C/w bumex 2 mg iv q12; monitor Cr and lytes.  - Please ambulate pt daily with goal to improve walking distance and strength.  - Would need heart/lung. Discussed with pulmonary transplant team. Patient is not a candidate. 63F hx ILD/ probable IPF (On 2-3LNC at home), PE 2022 on eliquis, severe pHTN w/ prior cor pulmonale (RVSP 68 - 10/22 w/ TTE from 4/12 w/ PASP 28, normal LV/RV SF), IDDM, presenting as a transfer from [place] for lung transplant eval iso SOB, dry cough, chest pain. She has been receiving duonebs, symbicort, lasix, ofev and IV steroids.    CT Chest 4/12/2024: Findings suggesting interstitial lung disease without significant interval progression. No evidence of pneumonia.   CT scan from 4/12/2024 when compared with CT scan from 2020 has shown significant progression.  Most recent CT is unchanged compared with 4/12/24 study.     Recommendations  - c/w weaning O2 as able - On HFNC 60/100%. Goal spo2 of 90% and up.   - Continue with Symbicort, Duonebs, Ofev   - CW solumedrol to 40 mg iv BID.   - PCP prophylaxis with bactrim    - Shunt study negative.   - C/w sildenafil 10mg TID   - C/w eliquis; monitor for bleeding  - C/w bumex 2 mg iv q12; monitor Cr and lytes.  - Please ambulate pt daily with goal to improve walking distance and strength.  - Would need heart/lung. Discussed with pulmonary transplant team. Patient is not a candidate.

## 2024-05-29 NOTE — PROGRESS NOTE ADULT - SUBJECTIVE AND OBJECTIVE BOX
Chief Complaint/Follow-up on: Type 2 DM     Subjective: Complaining of SOB, on high flow oxygen.   Endorses nausea, has not been eating much at all - has puree diet and thickened fluids at bedside available.   Variable -302 in the last 24h         MEDICATIONS  (STANDING):  albuterol/ipratropium for Nebulization 3 milliLiter(s) Nebulizer every 6 hours  apixaban 5 milliGRAM(s) Oral two times a day  budesonide 160 MICROgram(s)/formoterol 4.5 MICROgram(s) Inhaler 2 Puff(s) Inhalation two times a day  buMETAnide Injectable 2 milliGRAM(s) IV Push two times a day  chlorhexidine 2% Cloths 1 Application(s) Topical daily  dextrose 10% Bolus 125 milliLiter(s) IV Bolus once  dextrose 5%. 1000 milliLiter(s) (100 mL/Hr) IV Continuous <Continuous>  dextrose 5%. 1000 milliLiter(s) (50 mL/Hr) IV Continuous <Continuous>  dextrose 50% Injectable 12.5 Gram(s) IV Push once  dextrose 50% Injectable 25 Gram(s) IV Push once  diazepam  Injectable 2.5 milliGRAM(s) IV Push once  glucagon  Injectable 1 milliGRAM(s) IntraMuscular once  insulin glargine Injectable (LANTUS) 8 Unit(s) SubCutaneous at bedtime  insulin lispro (ADMELOG) corrective regimen sliding scale   SubCutaneous <User Schedule>  insulin lispro (ADMELOG) corrective regimen sliding scale   SubCutaneous three times a day before meals  insulin lispro Injectable (ADMELOG) 10 Unit(s) SubCutaneous with dinner  insulin lispro Injectable (ADMELOG) 10 Unit(s) SubCutaneous with breakfast  insulin lispro Injectable (ADMELOG) 10 Unit(s) SubCutaneous with lunch  methylPREDNISolone sodium succinate Injectable 40 milliGRAM(s) IV Push two times a day  Ofev (Nintedanib) 150 milliGRAM(s) 150 milliGRAM(s) Oral two times a day  pantoprazole  Injectable 40 milliGRAM(s) IV Push every 12 hours  sildenafil (REVATIO) 10 milliGRAM(s) Oral every 8 hours  simethicone 80 milliGRAM(s) Chew two times a day  traZODone 150 milliGRAM(s) Oral at bedtime  trimethoprim   80 mG/sulfamethoxazole 400 mG 1 Tablet(s) Oral daily    MEDICATIONS  (PRN):  acetaminophen     Tablet .. 650 milliGRAM(s) Oral every 6 hours PRN Temp greater or equal to 38C (100.4F), Mild Pain (1 - 3)  cyclobenzaprine 5 milliGRAM(s) Oral three times a day PRN Muscle Spasm  dextrose Oral Gel 15 Gram(s) Oral once PRN Blood Glucose LESS THAN 70 milliGRAM(s)/deciliter  diazepam  Injectable 2.5 milliGRAM(s) IV Push every 12 hours PRN severe anxiety  HYDROmorphone  Injectable 0.5 milliGRAM(s) IV Push every 4 hours PRN respiratory distress  loperamide 2 milliGRAM(s) Oral four times a day PRN Diarrhea      PHYSICAL EXAM:  VITALS: T(C): 36.6 (05-29-24 @ 11:46)  T(F): 97.8 (05-29-24 @ 11:46), Max: 98.4 (05-29-24 @ 05:04)  HR: 108 (05-29-24 @ 11:46) (99 - 108)  BP: 111/73 (05-29-24 @ 11:46) (101/68 - 130/89)  RR:  (16 - 20)  SpO2:  (94% - 99%)  Wt(kg): --    EYES: No proptosis, no injection  HEENT:  Atraumatic, Normocephalic, moist mucous membranes  CARDIOVASCULAR: well perfused extremities ; no peripheral edema  PSYCH: normal mood, normal affect  NEURO: alert, oriented, normal speech     POCT Blood Glucose.: 302 mg/dL (05-29-24 @ 11:35)  POCT Blood Glucose.: 123 mg/dL (05-29-24 @ 07:50)  POCT Blood Glucose.: 146 mg/dL (05-29-24 @ 02:10)  POCT Blood Glucose.: 128 mg/dL (05-28-24 @ 21:27)  POCT Blood Glucose.: 200 mg/dL (05-28-24 @ 16:37)  POCT Blood Glucose.: 285 mg/dL (05-28-24 @ 11:35)  POCT Blood Glucose.: 183 mg/dL (05-28-24 @ 08:02)  POCT Blood Glucose.: 84 mg/dL (05-28-24 @ 02:36)  POCT Blood Glucose.: 94 mg/dL (05-27-24 @ 21:37)  POCT Blood Glucose.: 200 mg/dL (05-27-24 @ 16:29)  POCT Blood Glucose.: 302 mg/dL (05-27-24 @ 12:12)  POCT Blood Glucose.: 228 mg/dL (05-27-24 @ 10:03)  POCT Blood Glucose.: 249 mg/dL (05-27-24 @ 08:06)  POCT Blood Glucose.: 221 mg/dL (05-27-24 @ 02:10)  POCT Blood Glucose.: 278 mg/dL (05-26-24 @ 21:23)  POCT Blood Glucose.: 309 mg/dL (05-26-24 @ 16:49)        05-28    135  |  95<L>  |  22  ----------------------------<  186<H>  4.0   |  28  |  0.70    eGFR: 98    Ca    9.1      05-28  Mg     2.3     05-27  Phos  3.2     05-27

## 2024-05-29 NOTE — PROGRESS NOTE ADULT - SUBJECTIVE AND OBJECTIVE BOX
PULMONARY SERVICE FOLLOW UP CONSULT NOTE    SUBJECTIVE:  Denies chest pain, dyspnea, cough, or wheezing.  No acute complaints.     REVIEW OF SYSTEMS:  All additional ROS negative.    MEDICATIONS:  Pulmonary:  albuterol/ipratropium for Nebulization 3 milliLiter(s) Nebulizer every 6 hours  budesonide 160 MICROgram(s)/formoterol 4.5 MICROgram(s) Inhaler 2 Puff(s) Inhalation two times a day    Antimicrobials:  trimethoprim   80 mG/sulfamethoxazole 400 mG 1 Tablet(s) Oral daily    Anticoagulants:  apixaban 5 milliGRAM(s) Oral two times a day    Onc:    GI/:  loperamide 2 milliGRAM(s) Oral four times a day PRN  pantoprazole  Injectable 40 milliGRAM(s) IV Push every 12 hours  simethicone 80 milliGRAM(s) Chew two times a day    Endocrine:  dextrose 50% Injectable 12.5 Gram(s) IV Push once  dextrose 50% Injectable 25 Gram(s) IV Push once  dextrose Oral Gel 15 Gram(s) Oral once PRN  glucagon  Injectable 1 milliGRAM(s) IntraMuscular once  insulin glargine Injectable (LANTUS) 8 Unit(s) SubCutaneous at bedtime  insulin lispro (ADMELOG) corrective regimen sliding scale   SubCutaneous <User Schedule>  insulin lispro (ADMELOG) corrective regimen sliding scale   SubCutaneous three times a day before meals  insulin lispro Injectable (ADMELOG) 10 Unit(s) SubCutaneous with dinner  insulin lispro Injectable (ADMELOG) 10 Unit(s) SubCutaneous with breakfast  insulin lispro Injectable (ADMELOG) 10 Unit(s) SubCutaneous with lunch  methylPREDNISolone sodium succinate Injectable 40 milliGRAM(s) IV Push two times a day    Cardiac:  buMETAnide Injectable 2 milliGRAM(s) IV Push two times a day  sildenafil (REVATIO) 10 milliGRAM(s) Oral every 8 hours    Other Medications:  acetaminophen     Tablet .. 650 milliGRAM(s) Oral every 6 hours PRN  chlorhexidine 2% Cloths 1 Application(s) Topical daily  cyclobenzaprine 5 milliGRAM(s) Oral three times a day PRN  dextrose 10% Bolus 125 milliLiter(s) IV Bolus once  dextrose 5%. 1000 milliLiter(s) IV Continuous <Continuous>  dextrose 5%. 1000 milliLiter(s) IV Continuous <Continuous>  diazepam  Injectable 2.5 milliGRAM(s) IV Push once  diazepam  Injectable 2.5 milliGRAM(s) IV Push every 12 hours PRN  HYDROmorphone  Injectable 0.5 milliGRAM(s) IV Push every 4 hours PRN  Ofev (Nintedanib) 150 milliGRAM(s) 150 milliGRAM(s) Oral two times a day  traZODone 150 milliGRAM(s) Oral at bedtime      PHYSICAL EXAM  Vital Signs Last 24 Hrs  T(C): 36.9 (29 May 2024 05:04), Max: 36.9 (29 May 2024 05:04)  T(F): 98.4 (29 May 2024 05:04), Max: 98.4 (29 May 2024 05:04)  HR: 105 (29 May 2024 06:28) (99 - 121)  BP: 101/68 (29 May 2024 05:04) (101/68 - 130/89)  BP(mean): --  RR: 20 (29 May 2024 06:28) (19 - 20)  SpO2: 96% (29 May 2024 06:28) (76% - 100%)    Parameters below as of 29 May 2024 06:28  Patient On (Oxygen Delivery Method): nasal cannula, high flow  O2 Flow (L/min): 60  O2 Concentration (%): 100    05-28 @ 07:01  -  05-29 @ 07:00  --------------------------------------------------------  IN: 240 mL / OUT: 850 mL / NET: -610 mL            CONSTITUTIONAL: No acute distress.   HEENT:  Conjunctiva clear B/L.  Moist oral mucosa.   Cardiovascular: RRR with no murmurs. No JVD noted. No lower extremity edema B/L. Extremities are warm and well perfused.    Respiratory: Lungs CTAB. No wrr. No accessory muscle use.   Gastrointestinal:  Soft, nontender. Non-distended. Non-rigid.    Neurologic:  Alert and awake. Moving all extremities. Following commands.    Skin:  No gross rashes notes.    LABS:        05-28    135  |  95<L>  |  22  ----------------------------<  186<H>  4.0   |  28  |  0.70    Ca    9.1      28 May 2024 05:49            Urinalysis Basic - ( 28 May 2024 05:49 )    Color: x / Appearance: x / SG: x / pH: x  Gluc: 186 mg/dL / Ketone: x  / Bili: x / Urobili: x   Blood: x / Protein: x / Nitrite: x   Leuk Esterase: x / RBC: x / WBC x   Sq Epi: x / Non Sq Epi: x / Bacteria: x                RADIOLOGY & ADDITIONAL STUDIES: PULMONARY SERVICE FOLLOW UP CONSULT NOTE    SUBJECTIVE:  HERNANDEZ     REVIEW OF SYSTEMS:  All additional ROS negative.    MEDICATIONS:  Pulmonary:  albuterol/ipratropium for Nebulization 3 milliLiter(s) Nebulizer every 6 hours  budesonide 160 MICROgram(s)/formoterol 4.5 MICROgram(s) Inhaler 2 Puff(s) Inhalation two times a day    Antimicrobials:  trimethoprim   80 mG/sulfamethoxazole 400 mG 1 Tablet(s) Oral daily    Anticoagulants:  apixaban 5 milliGRAM(s) Oral two times a day    Onc:    GI/:  loperamide 2 milliGRAM(s) Oral four times a day PRN  pantoprazole  Injectable 40 milliGRAM(s) IV Push every 12 hours  simethicone 80 milliGRAM(s) Chew two times a day    Endocrine:  dextrose 50% Injectable 12.5 Gram(s) IV Push once  dextrose 50% Injectable 25 Gram(s) IV Push once  dextrose Oral Gel 15 Gram(s) Oral once PRN  glucagon  Injectable 1 milliGRAM(s) IntraMuscular once  insulin glargine Injectable (LANTUS) 8 Unit(s) SubCutaneous at bedtime  insulin lispro (ADMELOG) corrective regimen sliding scale   SubCutaneous <User Schedule>  insulin lispro (ADMELOG) corrective regimen sliding scale   SubCutaneous three times a day before meals  insulin lispro Injectable (ADMELOG) 10 Unit(s) SubCutaneous with dinner  insulin lispro Injectable (ADMELOG) 10 Unit(s) SubCutaneous with breakfast  insulin lispro Injectable (ADMELOG) 10 Unit(s) SubCutaneous with lunch  methylPREDNISolone sodium succinate Injectable 40 milliGRAM(s) IV Push two times a day    Cardiac:  buMETAnide Injectable 2 milliGRAM(s) IV Push two times a day  sildenafil (REVATIO) 10 milliGRAM(s) Oral every 8 hours    Other Medications:  acetaminophen     Tablet .. 650 milliGRAM(s) Oral every 6 hours PRN  chlorhexidine 2% Cloths 1 Application(s) Topical daily  cyclobenzaprine 5 milliGRAM(s) Oral three times a day PRN  dextrose 10% Bolus 125 milliLiter(s) IV Bolus once  dextrose 5%. 1000 milliLiter(s) IV Continuous <Continuous>  dextrose 5%. 1000 milliLiter(s) IV Continuous <Continuous>  diazepam  Injectable 2.5 milliGRAM(s) IV Push once  diazepam  Injectable 2.5 milliGRAM(s) IV Push every 12 hours PRN  HYDROmorphone  Injectable 0.5 milliGRAM(s) IV Push every 4 hours PRN  Ofev (Nintedanib) 150 milliGRAM(s) 150 milliGRAM(s) Oral two times a day  traZODone 150 milliGRAM(s) Oral at bedtime      PHYSICAL EXAM  Vital Signs Last 24 Hrs  T(C): 36.9 (29 May 2024 05:04), Max: 36.9 (29 May 2024 05:04)  T(F): 98.4 (29 May 2024 05:04), Max: 98.4 (29 May 2024 05:04)  HR: 105 (29 May 2024 06:28) (99 - 121)  BP: 101/68 (29 May 2024 05:04) (101/68 - 130/89)  BP(mean): --  RR: 20 (29 May 2024 06:28) (19 - 20)  SpO2: 96% (29 May 2024 06:28) (76% - 100%)    Parameters below as of 29 May 2024 06:28  Patient On (Oxygen Delivery Method): nasal cannula, high flow  O2 Flow (L/min): 60  O2 Concentration (%): 100    05-28 @ 07:01  -  05-29 @ 07:00  --------------------------------------------------------  IN: 240 mL / OUT: 850 mL / NET: -610 mL            CONSTITUTIONAL: No acute distress.   HEENT:  Conjunctiva clear B/L.  Moist oral mucosa.   Cardiovascular: RRR with no murmurs. No JVD noted. No lower extremity edema B/L. Extremities are warm and well perfused.    Respiratory: Rales. No wheezing or rhonchi.  Gastrointestinal:  Soft, nontender. Non-distended. Non-rigid.    Neurologic:  Alert and awake. Moving all extremities. Following commands.    Skin:  No gross rashes notes.    LABS:        05-28    135  |  95<L>  |  22  ----------------------------<  186<H>  4.0   |  28  |  0.70    Ca    9.1      28 May 2024 05:49            Urinalysis Basic - ( 28 May 2024 05:49 )    Color: x / Appearance: x / SG: x / pH: x  Gluc: 186 mg/dL / Ketone: x  / Bili: x / Urobili: x   Blood: x / Protein: x / Nitrite: x   Leuk Esterase: x / RBC: x / WBC x   Sq Epi: x / Non Sq Epi: x / Bacteria: x                RADIOLOGY & ADDITIONAL STUDIES:

## 2024-05-29 NOTE — PROGRESS NOTE ADULT - SUBJECTIVE AND OBJECTIVE BOX
[facility]-- WOUND TEAM -- FOLLOW UP NOTE  --------------------------------------------------------------------------------  24 hour events/subjective:     Afebrile  Tolerating po w/o n/v  pt on duel oxygenation therapy  Chart reviewed  Hx of   63-yo F with PMHx of chronic AHRF on 2-3L NC at b/l 2/2 ILD (Followed w/ Dr. Primo Marlow, [facility]), PE on Eliquis, severe pHTN, Cor pulmonale, DM, presented initially with SOB, dry cough, chest pain, LE edema, recent admission to [facility] in March for ILD flare, admitted to [facility]->ICU for AHRF 2/2 ILD flare, transferred to [facility] for lung transplant evaluation. Patient is not a candidate for lung transplant 2/2 cardiac and diabetic comorbidities.             Diet:  Diet, Pureed:   Consistent Carbohydrate No Snacks (CSTCHO)  Moderately Thick Liquids (MODTHICKLIQS)  Supplement Feeding Modality:  Oral  Glucerna Shake Cans or Servings Per Day:  2       Frequency:  Daily (05-26-24 @ 16:28)      ROS: General/ Skin/ Msk/ Neuro/ GI see HPI  all other systems negative      ALLERGIES & MEDICATIONS  --------------------------------------------------------------------------------  Allergies    No Known Allergies    Intolerances          STANDING INPATIENT MEDICATIONS    albuterol/ipratropium for Nebulization 3 milliLiter(s) Nebulizer every 6 hours  apixaban 5 milliGRAM(s) Oral two times a day  budesonide 160 MICROgram(s)/formoterol 4.5 MICROgram(s) Inhaler 2 Puff(s) Inhalation two times a day  buMETAnide Injectable 2 milliGRAM(s) IV Push two times a day  chlorhexidine 2% Cloths 1 Application(s) Topical daily  dextrose 10% Bolus 125 milliLiter(s) IV Bolus once  dextrose 5%. 1000 milliLiter(s) IV Continuous <Continuous>  dextrose 5%. 1000 milliLiter(s) IV Continuous <Continuous>  dextrose 50% Injectable 12.5 Gram(s) IV Push once  dextrose 50% Injectable 25 Gram(s) IV Push once  diazepam  Injectable 2.5 milliGRAM(s) IV Push once  glucagon  Injectable 1 milliGRAM(s) IntraMuscular once  insulin glargine Injectable (LANTUS) 8 Unit(s) SubCutaneous at bedtime  insulin lispro (ADMELOG) corrective regimen sliding scale   SubCutaneous <User Schedule>  insulin lispro (ADMELOG) corrective regimen sliding scale   SubCutaneous three times a day before meals  insulin lispro Injectable (ADMELOG) 10 Unit(s) SubCutaneous with lunch  insulin lispro Injectable (ADMELOG) 10 Unit(s) SubCutaneous with dinner  insulin lispro Injectable (ADMELOG) 10 Unit(s) SubCutaneous with breakfast  methylPREDNISolone sodium succinate Injectable 40 milliGRAM(s) IV Push two times a day  Ofev (Nintedanib) 150 milliGRAM(s) 150 milliGRAM(s) Oral two times a day  pantoprazole  Injectable 40 milliGRAM(s) IV Push every 12 hours  sildenafil (REVATIO) 10 milliGRAM(s) Oral every 8 hours  simethicone 80 milliGRAM(s) Chew two times a day  traZODone 150 milliGRAM(s) Oral at bedtime  trimethoprim   80 mG/sulfamethoxazole 400 mG 1 Tablet(s) Oral daily      PRN INPATIENT MEDICATION  acetaminophen     Tablet .. 650 milliGRAM(s) Oral every 6 hours PRN  cyclobenzaprine 5 milliGRAM(s) Oral three times a day PRN  dextrose Oral Gel 15 Gram(s) Oral once PRN  diazepam  Injectable 2.5 milliGRAM(s) IV Push every 12 hours PRN  HYDROmorphone  Injectable 0.5 milliGRAM(s) IV Push every 4 hours PRN  loperamide 2 milliGRAM(s) Oral four times a day PRN        VITALS/PHYSICAL EXAM  --------------------------------------------------------------------------------  T(C): 36.6 (05-29-24 @ 11:46), Max: 36.9 (05-29-24 @ 05:04)  HR: 108 (05-29-24 @ 11:46) (99 - 114)  BP: 111/73 (05-29-24 @ 11:46) (101/68 - 130/89)  RR: 18 (05-29-24 @ 11:46) (18 - 20)  SpO2: 97% (05-29-24 @ 11:46) (93% - 97%)  Wt(kg): --        05-28-24 @ 07:01  -  05-29-24 @ 07:00  --------------------------------------------------------  IN: 240 mL / OUT: 850 mL / NET: -610 mL        HEENT:  sclera clear, mucosa moist, throat clear, trachea midline, neck supple  Respiratory: nonlabored w/ equal chest rise  Gastrointestinal: soft NT/ND   : (+)godwin- urine retention  Neurology:  verbal,  follows commands  Psych: calm/ appropriate  Musculoskeletal:   no deformities/ contractures  Vascular: BLE equally warm no cyanosis, clubbing, edema nor acute ischemia                BLE DP pulses not palpable                    Toes darkened, tender without touch appreciate POD  Skin:  dry, frail       Sacral region, BL buttocks Hyperpigmented skin and hypopigmented skin there is a remaining            superficial opening 1.0cm x 1.0cm x 0.1 cm there is no blistering, drainage               No odor, no increase in warmth, no induration, fluctuance, nor crepitus    LABS/ CULTURES/ RADIOLOGY:           135  |  95  |  22  ----------------------------<  186      [05-28-24 @ 05:49]  4.0   |  28  |  0.70        Ca     9.1     [05-28-24 @ 05:49]            Blood Gas Calcium, Ionized - Venous: 1.24 mmol/L (05-28-24 @ 05:54)      CAPILLARY BLOOD GLUCOSE      POCT Blood Glucose.: 302 mg/dL (29 May 2024 11:35)  POCT Blood Glucose.: 123 mg/dL (29 May 2024 07:50)  POCT Blood Glucose.: 146 mg/dL (29 May 2024 02:10)  POCT Blood Glucose.: 128 mg/dL (28 May 2024 21:27)  POCT Blood Glucose.: 200 mg/dL (28 May 2024 16:37)                      A1C with Estimated Average Glucose Result: 11.9 % (04-13-24 @ 03:53)

## 2024-05-29 NOTE — PROGRESS NOTE ADULT - ATTENDING COMMENTS
63F hx ILD with advanced fibrosis, PE 2022 on eliquis with severe pHTN and RV failure, who presented to [facility] for SOB, and worsening hypoxemic respiratory failure. Patient was transferred to for lung transplant eval.     Patient with negative CTrILD outpatient, repeat here NTD. TTE at [facility] with normal RV function but here with Severe pHTN and RV dysfunction. Initial concern for PFO but negative on nuclear scan. Is not deemed to be a transplant candidate off and on worsening respiratory status. Now on maxed HFNC with NRB on top.     # ILD with adv fibrosis  # Severe pHTN with RV failure  # Hx of PE  # Acute on chronic hypoxemic respiratory failure  - Multifactorial etiology for respiratory failure, Adv ILD with fibrosis, RV failure, PHTN and possible shunting.   - Patient evaluated by lung transplant team. Patient is not a candidate for transplant.   - CTrILD work up negative in the past, negative here to date.  - Continue with diuresis, C/W HFNC and wean as tolerated. Currently maxed on HFNC  - C/W sildenafil to 10mg TID. Monitor for side effects.   - Can increase IV steroids 40BID given clinical worsening but unlikely to help. C/W PCP ppx  - C/W full A/C  - Patient with possible bubble study, but with negative nuclear medicine shunt study. However maybe intermittent based on RA pressures from the pHTN.   - s/p course of abx, continue to monitor.  - Overall patient with poor prognosis given adv fibrosis seen on imaging, o2 requirement, severe pHTN and RV failure. Patient is not a transplant candidate. Patient again decompensating. Palliave care discussions ongoing.   - Pulmonary will follow.

## 2024-05-29 NOTE — PROGRESS NOTE ADULT - PROBLEM SELECTOR PLAN 3
see GOC note above, readdressed today following GOC   DNR/I Trial of NIV  disposition: LVEON see GOC note above  DNR/I  continued medical management  spoke with patient and daughter  can consider IDT family meeting with pulmonary to readdress GOC

## 2024-05-29 NOTE — PROGRESS NOTE ADULT - PROBLEM SELECTOR PLAN 7
Hx of urinary retention; follows Dr. Chua, urologist. At [facility] had failed TOV w/ Pike replaced. Arrives to [facility] w/o Pike. Pt on outpt med list listed Tamsulosin 0.4 mg, unclear if started at [facility], pt saying she does not take this outpatient  - failed TOV here, Pike placed

## 2024-05-30 NOTE — PROGRESS NOTE ADULT - ASSESSMENT
63F PMH chronic AHRF on 2-3L NC at b/l 2/2 ILD (Followed w/ Dr. Primo Marlow, [facility]), PE on Eliquis, severe pHTN, Cor pulmonale, DM, presented initially with SOB, dry cough, chest pain, LE edema, recent admission to [facility] in March for ILD flare, admitted to outside hospital->ICU for AHRF 2/2 ILD flare, transferred to [facility] for lung transplant evaluation and found not to be a transplant candidate (would require double lung and heart transplant).

## 2024-05-30 NOTE — PROGRESS NOTE ADULT - PROBLEM SELECTOR PLAN 3
- Pt w/ hx of severe pHTN, RVSP 68 on 10/2022  - Repeat 4/12/24 at [facility] noted PAP 28, "normal PAP"   - Maintain euvolemia, as above  - sildenafil reduced to 10 mg TID as patient experienced diarrhea, which may be a side effect. will continue this dose for now

## 2024-05-30 NOTE — PROGRESS NOTE ADULT - PROBLEM SELECTOR PLAN 2
pt currently not lung transplant candidate  at time of exam on HFNC, dyspnea with exertion  can c/w IV Dilaudid for dyspnea prn

## 2024-05-30 NOTE — PROGRESS NOTE ADULT - PROBLEM SELECTOR PLAN 3
see GOC note above  DNR/I  continued medical management  spoke with patient and daughter  can consider IDT family meeting with pulmonary to readdress GOC

## 2024-05-30 NOTE — PROGRESS NOTE ADULT - ASSESSMENT
63 F w/h/o uncontrolled T2DM (A1C 11.9%) while on Lantus, Admelog, metformin, glimepiride. Unknown DM complications. Also h/o AHRF, ILD, PE, HTN. Here with SOB secondary to interstitial lung disease. Continues with SOB and tachypnea, managed by primary medicine team. Endocrinology consulted for diabetes management. BG Goal 100-180mg/dl   Tolerating pureed diet with inconsistent oral intake. Currently on Methylprednisolone 40 mg BID.    Last 24 hour BGs variable 100s-316 with fasting . FBG variable without pattern (was 123 yesterday and 220 today while receiving the same amt of lantus ) and noted elevated prelunch BGs in 300s for 2 days

## 2024-05-30 NOTE — PROGRESS NOTE ADULT - PROBLEM SELECTOR PLAN 7
Hx of urinary retention; follows Dr. Chua, urologist. At [facility] had failed TOV w/ Pike replaced. Arrives to [facility] w/o Pike. Pt on outpt med list listed Tamsulosin 0.4 mg, unclear if started at [facility], pt saying she does not take this outpatient  - failed TOV here, Pike placed

## 2024-05-30 NOTE — RAPID RESPONSE TEAM SUMMARY - NSSITUATIONBACKGROUNDRRT_GEN_ALL_CORE
63F PMH chronic AHRF on 2-3L NC at b/l 2/2 ILD (Followed w/ Dr. Primo Marlow, [facility]), PE on Eliquis, severe pHTN, Cor pulmonale, DM, presented initially with SOB, dry cough, chest pain, LE edema, [facility], admitted to outside hospital->ICU for AHRF 2/2 ILD flare, transferred to [facility] for lung transplant evaluation and found not to be a transplant candidate (would require double lung and heart transplant).       RRT called for hypoxia. Patient A&Ox3 in moderate distress, complaining of difficulty breathing and chest pain. BP, glucose within normal limits, O2 saturation found to be 40's-50's on multiple monitors with adequate waveform with patient on HFNC and NRB. Labs obtained including CBC/CMP and blood gas. Fine inspiratory crackles noted on pulmonary exam. Given patient's respiratory distress, ordered 1mg ativan with improvement in patient's work of breathing and moderate improvement in O2 saturation to mid 70's. ABG notable for increase in lactate to 5.1 and PaO2 of 44. Gave additional dose of sildenafil 10mg as well as dose of dialudid 0.5 mg PRN. Primary team to notify family about patient's condition. Given patient's progression of disease as noted on blood gas and code status, it is unlikely patient will be able to maintain adequate oxygenation. Recommended additional ativan/dilaudid as needed for comfort and air hunger. RRT ended without objection.

## 2024-05-30 NOTE — PROGRESS NOTE ADULT - SUBJECTIVE AND OBJECTIVE BOX
Patient is a 63y old  Female who presents with a chief complaint of SOB (30 May 2024 10:38)      SUBJECTIVE / OVERNIGHT EVENTS: Patient seen and examined at bedside. No acute events overnight. No complaints. Borderline hypoxic.    MEDICATIONS  (STANDING):  albuterol/ipratropium for Nebulization 3 milliLiter(s) Nebulizer every 6 hours  apixaban 5 milliGRAM(s) Oral two times a day  budesonide 160 MICROgram(s)/formoterol 4.5 MICROgram(s) Inhaler 2 Puff(s) Inhalation two times a day  buMETAnide Injectable 2 milliGRAM(s) IV Push two times a day  chlorhexidine 2% Cloths 1 Application(s) Topical daily  dextrose 10% Bolus 125 milliLiter(s) IV Bolus once  dextrose 5%. 1000 milliLiter(s) (100 mL/Hr) IV Continuous <Continuous>  dextrose 5%. 1000 milliLiter(s) (50 mL/Hr) IV Continuous <Continuous>  dextrose 50% Injectable 12.5 Gram(s) IV Push once  dextrose 50% Injectable 25 Gram(s) IV Push once  glucagon  Injectable 1 milliGRAM(s) IntraMuscular once  insulin glargine Injectable (LANTUS) 8 Unit(s) SubCutaneous at bedtime  insulin lispro (ADMELOG) corrective regimen sliding scale   SubCutaneous <User Schedule>  insulin lispro (ADMELOG) corrective regimen sliding scale   SubCutaneous three times a day before meals  insulin lispro Injectable (ADMELOG) 10 Unit(s) SubCutaneous with dinner  insulin lispro Injectable (ADMELOG) 10 Unit(s) SubCutaneous with breakfast  insulin lispro Injectable (ADMELOG) 10 Unit(s) SubCutaneous with lunch  methylPREDNISolone sodium succinate Injectable 40 milliGRAM(s) IV Push two times a day  Ofev (Nintedanib) 150 milliGRAM(s) 150 milliGRAM(s) Oral two times a day  pantoprazole  Injectable 40 milliGRAM(s) IV Push every 12 hours  sildenafil (REVATIO) 10 milliGRAM(s) Oral every 8 hours  simethicone 80 milliGRAM(s) Chew two times a day  traZODone 150 milliGRAM(s) Oral at bedtime  trimethoprim   80 mG/sulfamethoxazole 400 mG 1 Tablet(s) Oral daily    MEDICATIONS  (PRN):  acetaminophen     Tablet .. 650 milliGRAM(s) Oral every 6 hours PRN Temp greater or equal to 38C (100.4F), Mild Pain (1 - 3)  cyclobenzaprine 5 milliGRAM(s) Oral three times a day PRN Muscle Spasm  dextrose Oral Gel 15 Gram(s) Oral once PRN Blood Glucose LESS THAN 70 milliGRAM(s)/deciliter  diazepam  Injectable 2.5 milliGRAM(s) IV Push every 12 hours PRN severe anxiety  HYDROmorphone  Injectable 0.5 milliGRAM(s) IV Push every 4 hours PRN respiratory distress  loperamide 2 milliGRAM(s) Oral four times a day PRN Diarrhea      CAPILLARY BLOOD GLUCOSE      POCT Blood Glucose.: 316 mg/dL (30 May 2024 12:25)  POCT Blood Glucose.: 220 mg/dL (30 May 2024 09:27)  POCT Blood Glucose.: 182 mg/dL (30 May 2024 01:46)  POCT Blood Glucose.: 116 mg/dL (29 May 2024 21:41)  POCT Blood Glucose.: 145 mg/dL (29 May 2024 16:39)    I&O's Summary    29 May 2024 07:01  -  30 May 2024 07:00  --------------------------------------------------------  IN: 220 mL / OUT: 2200 mL / NET: -1980 mL        PHYSICAL EXAM:  Vital Signs Last 24 Hrs  T(C): 36.7 (30 May 2024 11:49), Max: 36.7 (30 May 2024 11:49)  T(F): 98 (30 May 2024 11:49), Max: 98 (30 May 2024 11:49)  HR: 116 (30 May 2024 11:49) (96 - 116)  BP: 110/72 (30 May 2024 11:49) (110/72 - 122/81)  BP(mean): --  RR: 18 (30 May 2024 11:49) (16 - 18)  SpO2: 90% (30 May 2024 11:49) (90% - 99%)    Parameters below as of 30 May 2024 11:49  Patient On (Oxygen Delivery Method): nasal cannula, high flow  O2 Flow (L/min): 60  O2 Concentration (%): 100    GEN: female in NAD, appears comfortable, no diaphoresis  EYES: No scleral injection, EOMI  ENTM: neck supple & symmetric without tracheal deviation, moist membranes, no gross hearing impairment, thyroid gland not enlarged  CV: +S1/S2, no m/r/g, no abdominal bruit, no LE edema  RESP: breathing comfortably, no respiratory accessory muscle use, CTAB, no w/r/r  GI: normoactive BS, soft, NTND, no rebounding/guarding, no palpable masses    LABS:                      RADIOLOGY & ADDITIONAL TESTS:  Results Reviewed:   Imaging Personally Reviewed:  Electrocardiogram Personally Reviewed:    COORDINATION OF CARE:  Care Discussed with Consultants/Other Providers [Y/N]:  Prior or Outpatient Records Reviewed [Y/N]:

## 2024-05-30 NOTE — PROGRESS NOTE ADULT - PROBLEM SELECTOR PLAN 1
Baseline 3-4L NC. Hx of ILD w/ c/f IPF. Followed w/ DOMENICO Ding. Transferred to [facility] for lung transplant eval  - NM cardiac shunt performed on 5/10 was negative for PFO  - Continue home Ofev 150mg BID  - Duonebs, Symbicort  - Bactrim ordered for ppx  - Solumedrol 40 mg BID given high O2 support  - valium 2.5mg IV q12 PRN anxiety (5 mg caused somnolence)  - Dilaudid 0.5 mg IVP q4 hrs PRN severe respiratory distress  - Deemed not to be a transplant candidate 4/30  - now requiring HFNC 100% at 60 L (borderline hypoxic, unable to wean)

## 2024-05-30 NOTE — PROGRESS NOTE ADULT - PROBLEM SELECTOR PLAN 3
LDL goal ,70 due to DM  Pt LDL NA  Order fasting lipid if not recently done  Statin as noted above  Manage per primary team   F/u levels as out pt      Contact via Microsoft Teams during business hours  To reach covering provider access AMION via sunrise tools  For Urgent matters/after-hours/weekends/holidays please page endocrine fellow on call   For nonurgent matters please email [email or web address]    Please note that this patient may be followed by different provider tomorrow.  Notify endocrine 24 hours prior to discharge for final recommendations

## 2024-05-30 NOTE — PROGRESS NOTE ADULT - PROBLEM SELECTOR PLAN 12
DVT PPx: Eliquis  Diet: Pureed w/ thickened (most conservative diet, unable to perform FEES)  Code: DNR/DNI, molst completed in chart     Aspiration precautions  Fall precautions    Discharge to [facility] pending wean of O2, but qualifies for inpatient hospice  Multiple GOC performed, patient still wants aggressive management

## 2024-05-30 NOTE — PROGRESS NOTE ADULT - PROBLEM SELECTOR PLAN 4
palliative will sign off  palliative contact number provided to daughter  case discussed with primary team  Can be reached by TEAMS M-F 9-5 Nargis Sorensen Any other time please page [phone] if needed palliative will sign off, please reconsult as needed   palliative contact number provided to daughter  case discussed with primary team  Can be reached by TEAMS M-F 9-5 Nargis Sorensen Any other time please page [phone] if needed

## 2024-05-30 NOTE — PROGRESS NOTE ADULT - PROBLEM SELECTOR PLAN 1
- Check BG TID AC & QHS while eating regular meals and Q6H while NPO  - Continue Lantus to 8 units QHS   - Adjust Admelog 12- 10-10 units with each meal, hold if patient is not eating, may give 50 % of scheduled dose if eating 50% of meals  - C/w mod dose correctional scale TID with meals and QHS  - Please notify endo team with any further changes on steroid doses.    Discharge planning:   Home DM medications: metformin 500 mg BID, + glimepride 1 mg daily + lantus 35 units QHS+ admelog 25 units TIDAC  Discharge DM medications:   Continue with metformin 500 mg BID  STOP glimepiride due to recurrent hypoglycemia at home   Basal/bolus, dose will depend on insulin requirement and steroid plan   Make sure pt has Rx for all DM supplies and insulin/ DM meds.  Can follow at endo practice. [address] [place] [address] [address]. Phone . Call for apt closer to discharge- - Patient will need opthalmology and podiatry follow up as outpatient - Check BG TID AC & QHS while eating regular meals and Q6H while NPO  - Continue Lantus to 8 units QHS   - Adjust Admelog 12- 10-10 units with each meal, hold if patient is not eating, may give 50 % of scheduled dose if eating 50% of meals  - C/w patient specific dose correctional scale ( 3-13 units) TID with meals and QHS  - Please notify endo team with any further changes on steroid doses.    Discharge planning:   Home DM medications: metformin 500 mg BID, + glimepride 1 mg daily + lantus 35 units QHS+ admelog 25 units TIDAC  Discharge DM medications:   Continue with metformin 500 mg BID  STOP glimepiride due to recurrent hypoglycemia at home   Basal/bolus, dose will depend on insulin requirement and steroid plan   Make sure pt has Rx for all DM supplies and insulin/ DM meds.  Can follow at endo practice. [address] [facility] [address]. Phone . Call for apt closer to discharge- - Patient will need opthalmology and podiatry follow up as outpatient

## 2024-05-30 NOTE — PROGRESS NOTE ADULT - SUBJECTIVE AND OBJECTIVE BOX
SUBJECTIVE AND OBJECTIVE: Pt seen and examined at bedside. PT able to participate in exam.   Indication for Geriatrics and Palliative Care Services/INTERVAL HPI: GOC    OVERNIGHT EVENTS: No acute events o/n     DNR on chart:DNI: Trial NIV  DNI: Trial NIV      Allergies    No Known Allergies    Intolerances    MEDICATIONS  (STANDING):  albuterol/ipratropium for Nebulization 3 milliLiter(s) Nebulizer every 6 hours  apixaban 5 milliGRAM(s) Oral two times a day  budesonide 160 MICROgram(s)/formoterol 4.5 MICROgram(s) Inhaler 2 Puff(s) Inhalation two times a day  buMETAnide Injectable 2 milliGRAM(s) IV Push two times a day  chlorhexidine 2% Cloths 1 Application(s) Topical daily  dextrose 10% Bolus 125 milliLiter(s) IV Bolus once  dextrose 5%. 1000 milliLiter(s) (100 mL/Hr) IV Continuous <Continuous>  dextrose 5%. 1000 milliLiter(s) (50 mL/Hr) IV Continuous <Continuous>  dextrose 50% Injectable 25 Gram(s) IV Push once  dextrose 50% Injectable 12.5 Gram(s) IV Push once  glucagon  Injectable 1 milliGRAM(s) IntraMuscular once  insulin glargine Injectable (LANTUS) 8 Unit(s) SubCutaneous at bedtime  insulin lispro (ADMELOG) corrective regimen sliding scale   SubCutaneous <User Schedule>  insulin lispro (ADMELOG) corrective regimen sliding scale   SubCutaneous three times a day before meals  insulin lispro Injectable (ADMELOG) 10 Unit(s) SubCutaneous with lunch  insulin lispro Injectable (ADMELOG) 10 Unit(s) SubCutaneous with dinner  insulin lispro Injectable (ADMELOG) 10 Unit(s) SubCutaneous with breakfast  methylPREDNISolone sodium succinate Injectable 40 milliGRAM(s) IV Push two times a day  Ofev (Nintedanib) 150 milliGRAM(s) 150 milliGRAM(s) Oral two times a day  pantoprazole  Injectable 40 milliGRAM(s) IV Push every 12 hours  sildenafil (REVATIO) 10 milliGRAM(s) Oral every 8 hours  simethicone 80 milliGRAM(s) Chew two times a day  traZODone 150 milliGRAM(s) Oral at bedtime  trimethoprim   80 mG/sulfamethoxazole 400 mG 1 Tablet(s) Oral daily    MEDICATIONS  (PRN):  acetaminophen     Tablet .. 650 milliGRAM(s) Oral every 6 hours PRN Temp greater or equal to 38C (100.4F), Mild Pain (1 - 3)  cyclobenzaprine 5 milliGRAM(s) Oral three times a day PRN Muscle Spasm  dextrose Oral Gel 15 Gram(s) Oral once PRN Blood Glucose LESS THAN 70 milliGRAM(s)/deciliter  diazepam  Injectable 2.5 milliGRAM(s) IV Push every 12 hours PRN severe anxiety  HYDROmorphone  Injectable 0.5 milliGRAM(s) IV Push every 4 hours PRN respiratory distress  loperamide 2 milliGRAM(s) Oral four times a day PRN Diarrhea      ITEMS UNCHECKED ARE NOT PRESENT    PRESENT SYMPTOMS: [ ]Unable to self-report - see [ ] CPOT [ ] PAINADS [ ] RDOS  Source if other than patient:  [ ]Family   [ ]Team     Pain:  [ ]yes [x ]no  QOL impact -   Location -                    Aggravating factors -  Quality -  Radiation -  Timing-  Severity (0-10 scale):  Minimal acceptable level (0-10 scale):     CPOT:    https://www.[facility].org/getattachment/cyu88s34-4o1i-[email or web address]-1150q5719d8a/Critical-Care-Pain-Observation-Tool-(CPOT)    PAINAD Score: See PAINAD tool and score below       Dyspnea:                           [ x]Mild [ ]Moderate [ ]Severe    RDOS: See RDOS tool and score below   0 to 2  minimal or no respiratory distress   3  mild distress  4 to 6 moderate distress  >7 severe distress      Anxiety:                             [ ]Mild [ ]Moderate [ ]Severe  Fatigue:                             [ ]Mild [ ]Moderate [ ]Severe  Nausea:                             [ ]Mild [ ]Moderate [ ]Severe  Loss of appetite:              [ ]Mild [ ]Moderate [ ]Severe  Constipation:                    [ ]Mild [ ]Moderate [ ]Severe    PCSSQ[Palliative Care Spiritual Screening Question]   Severity (0-10):  Score of 4 or > indicate consideration of Chaplaincy referral.  Chaplaincy Referral: [x ] yes [ ] refused [ x] following [ ] Deferred     Caregiver [place]? : [ ] yes [ ] no [ ] Deferred [ ] Declined             Social work referral [ ] Patient & Family Centered Care Referral [ ]     Anticipatory Grief present?:  [ ] yes [ ] no  [ ] Deferred                  Social work referral [ ] Chaplaincy Referral [ ]    		  Other Symptoms:  [ xAll other review of systems negative     Palliative Performance Status Version 2:   See PPSv2 tool and score below         PHYSICAL EXAM:  Vital Signs Last 24 Hrs  T(C): 36.6 (30 May 2024 05:30), Max: 36.6 (29 May 2024 11:46)  T(F): 97.9 (30 May 2024 05:30), Max: 97.9 (30 May 2024 05:30)  HR: 101 (30 May 2024 08:37) (96 - 108)  BP: 122/81 (30 May 2024 05:30) (111/73 - 122/81)  BP(mean): --  RR: 18 (30 May 2024 08:37) (16 - 18)  SpO2: 93% (30 May 2024 08:37) (93% - 99%)    Parameters below as of 30 May 2024 08:37  Patient On (Oxygen Delivery Method): nasal cannula, high flow  O2 Flow (L/min): 60  O2 Concentration (%): 100 I&O's Summary    29 May 2024 07:01  -  30 May 2024 07:00  --------------------------------------------------------  IN: 220 mL / OUT: 2200 mL / NET: -1980 mL       GENERAL: [ ]Cachexia    [x ]Alert  [ x]Oriented x3   [ ]Lethargic  [ ]Unarousable  [ ]Verbal  [ ]Non-Verbal  Behavioral:   [ ]Anxiety  [ ]Delirium [ ]Agitation [ ]Other  HEENT:  [ ]Normal   [x ]Dry mouth   [ ]ET Tube/Trach  [ ]Oral lesions  PULMONARY:   [ ]Clear [ x]Tachypnea  [ ]Audible excessive secretions   [ ]Rhonchi        [ ]Right [ ]Left [ ]Bilateral  [ ]Crackles        [ ]Right [ ]Left [ ]Bilateral  [ ]Wheezing     [ ]Right [ ]Left [ ]Bilateral  [x ]Diminished BS [ ] Right [ ]Left x[ ]Bilateral  CARDIOVASCULAR:    [ x]Regular [ ]Irregular [ ]Tachy  [ ]Jared [ ]Murmur [ ]Other  GASTROINTESTINAL:  [x ]Soft  [ ]Distended   [ x]+BS  [ x]Non tender [ ]Tender  [ ]Other [ ]PEG [ ]OGT/ NGT   Last BM:   GENITOURINARY:  [x ]Normal [ ]Incontinent   [ ]Oliguria/Anuria   [ ]Pike  MUSCULOSKELETAL:   [ ]Normal   [x ]Weakness  [x ]Bed/Wheelchair bound [ ]Edema  NEUROLOGIC:   [ ]No focal deficits  [ ] Cognitive impairment  [ ] Dysphagia [ ]Dysarthria [ ] Paresis [ ]Other   SKIN:   [ ]Normal  [ ]Rash  [ ]Other  [ ]Pressure ulcer(s) [ ]y [ ]n present on admission    CRITICAL CARE:  [ ]Shock Present  [ ]Septic [ ]Cardiogenic [ ]Neurologic [ ]Hypovolemic  [ ]Vasopressors [ ]Inotropes  [ ]Respiratory failure present [ ]Mechanical Ventilation [ ]Non-invasive ventilatory support [ ]High-Flow   [ ]Acute  [ ]Chronic [ ]Hypoxic  [ ]Hypercarbic [ ]Other  [ ]Other organ failure     LABS:            RADIOLOGY & ADDITIONAL STUDIES:    Protein Calorie Malnutrition Present: [ ]mild [ ]moderate [ ]severe [ ]underweight [ ]morbid obesity  https://www.andeal.org/vault/2440/web/files/ONC/Table_Clinical%20Characteristics%20to%20Document%20Malnutrition-White%20JV%20et%20al%008283.pdf    Height (cm): 167.6 (04-25-24 @ 23:50), 167.6 (04-11-24 @ 21:44)  Weight (kg): 56.6 (04-25-24 @ 23:50), 54.7 (04-12-24 @ 04:13)  BMI (kg/m2): 20.1 (04-25-24 @ 23:50), 19.5 (04-12-24 @ 04:13)    [x ]PPSV2 < or = 30%  [ ]significant weight loss [ ]poor nutritional intake [ ]anasarca[ ]Artificial Nutrition    Other REFERRALS:  [ ]Hospice  [ ]Child Life  [ ]Social Work  [ ]Case management [ ]Holistic Therapy     Goals of Care Document:

## 2024-05-30 NOTE — PROGRESS NOTE ADULT - SUBJECTIVE AND OBJECTIVE BOX
Seen earlier today     Chief Complaint: Diabetes Mellitus follow up    INTERVAL HX: Noted patient on HFNC and non- rebreather for hypoxia. Tolerating pureed diet with inconsistent oral intake. Patient complained about pureed diet, "vomited a little after breakfast",  wanted swallowing evaluation ASAP. RN at bedside aware. Last 24 hour BGs variable 100s-316 with fasting . FBG variable without pattern (was 123 yesterday and 220 today while receiving the same amt of lantus ), prelunch BGs in 300s for 2 days      Review of Systems:  General: As above  GI: No nausea, vomiting  Endocrine: no  S&Sx of hypoglycemia    Allergies    No Known Allergies    Intolerances      MEDICATIONS  (STANDING):  albuterol/ipratropium for Nebulization 3 milliLiter(s) Nebulizer every 6 hours  apixaban 5 milliGRAM(s) Oral two times a day  budesonide 160 MICROgram(s)/formoterol 4.5 MICROgram(s) Inhaler 2 Puff(s) Inhalation two times a day  buMETAnide Injectable 2 milliGRAM(s) IV Push two times a day  chlorhexidine 2% Cloths 1 Application(s) Topical daily  dextrose 10% Bolus 125 milliLiter(s) IV Bolus once  dextrose 5%. 1000 milliLiter(s) (100 mL/Hr) IV Continuous <Continuous>  dextrose 5%. 1000 milliLiter(s) (50 mL/Hr) IV Continuous <Continuous>  dextrose 50% Injectable 12.5 Gram(s) IV Push once  dextrose 50% Injectable 25 Gram(s) IV Push once  glucagon  Injectable 1 milliGRAM(s) IntraMuscular once  insulin glargine Injectable (LANTUS) 8 Unit(s) SubCutaneous at bedtime  insulin lispro (ADMELOG) corrective regimen sliding scale   SubCutaneous <User Schedule>  insulin lispro (ADMELOG) corrective regimen sliding scale   SubCutaneous three times a day before meals  insulin lispro Injectable (ADMELOG) 10 Unit(s) SubCutaneous with dinner  insulin lispro Injectable (ADMELOG) 10 Unit(s) SubCutaneous with breakfast  insulin lispro Injectable (ADMELOG) 10 Unit(s) SubCutaneous with lunch  methylPREDNISolone sodium succinate Injectable 40 milliGRAM(s) IV Push two times a day  Ofev (Nintedanib) 150 milliGRAM(s) 150 milliGRAM(s) Oral two times a day  pantoprazole  Injectable 40 milliGRAM(s) IV Push every 12 hours  sildenafil (REVATIO) 10 milliGRAM(s) Oral every 8 hours  simethicone 80 milliGRAM(s) Chew two times a day  traZODone 150 milliGRAM(s) Oral at bedtime  trimethoprim   80 mG/sulfamethoxazole 400 mG 1 Tablet(s) Oral daily        insulin glargine Injectable (LANTUS)   8 Unit(s) SubCutaneous (05-29-24 @ 22:08)    insulin lispro (ADMELOG) corrective regimen sliding scale   9 Unit(s) SubCutaneous (05-30-24 @ 12:28)   5 Unit(s) SubCutaneous (05-30-24 @ 09:28)    methylPREDNISolone sodium succinate Injectable   40 milliGRAM(s) IV Push (05-30-24 @ 18:10)   40 milliGRAM(s) IV Push (05-30-24 @ 05:40)        PHYSICAL EXAM:  VITALS: T(C): 36.7 (05-30-24 @ 11:49)  T(F): 98 (05-30-24 @ 11:49), Max: 98 (05-30-24 @ 11:49)  HR: 109 (05-30-24 @ 14:47) (96 - 116)  BP: 129/84 (05-30-24 @ 14:15) (110/72 - 129/84)  RR:  (18 - 18)  SpO2:  (90% - 97%)  Wt(kg): --  GENERAL: Female laying in bed, ill appearing with HFNC and non- rebreather on   Respiratory: on HFNC and no-rebreather   Extremities: Warm, no edema  NEURO: Alert , appropriate     LABS:  POCT Blood Glucose.: 114 mg/dL (05-30-24 @ 18:09)  POCT Blood Glucose.: 175 mg/dL (05-30-24 @ 16:38)  POCT Blood Glucose.: 316 mg/dL (05-30-24 @ 12:25)  POCT Blood Glucose.: 220 mg/dL (05-30-24 @ 09:27)  POCT Blood Glucose.: 182 mg/dL (05-30-24 @ 01:46)  POCT Blood Glucose.: 116 mg/dL (05-29-24 @ 21:41)  POCT Blood Glucose.: 145 mg/dL (05-29-24 @ 16:39)  POCT Blood Glucose.: 302 mg/dL (05-29-24 @ 11:35)  POCT Blood Glucose.: 123 mg/dL (05-29-24 @ 07:50)  POCT Blood Glucose.: 146 mg/dL (05-29-24 @ 02:10)  POCT Blood Glucose.: 128 mg/dL (05-28-24 @ 21:27)  POCT Blood Glucose.: 200 mg/dL (05-28-24 @ 16:37)  POCT Blood Glucose.: 285 mg/dL (05-28-24 @ 11:35)  POCT Blood Glucose.: 183 mg/dL (05-28-24 @ 08:02)  POCT Blood Glucose.: 84 mg/dL (05-28-24 @ 02:36)  POCT Blood Glucose.: 94 mg/dL (05-27-24 @ 21:37)                Thyroid Function Tests:      A1C with Estimated Average Glucose Result: 11.9 % (04-13-24 @ 03:53)    Estimated Average Glucose: 295 mg/dL (04-13-24 @ 03:53)        Diet, Pureed:   Consistent Carbohydrate No Snacks (CSTCHO)  Moderately Thick Liquids (MODTHICKLIQS)  Supplement Feeding Modality:  Oral  Glucerna Shake Cans or Servings Per Day:  2       Frequency:  Daily (05-26-24 @ 16:28) [Active]

## 2024-05-31 NOTE — CHART NOTE - NSCHARTNOTESELECT_GEN_ALL_CORE
Consent/Event Note
Critical illness/Event Note
Critical illness/Event Note
Endocrine follow up/Event Note
Endocrinology/Event Note
Event Note
GOC - Risk of Aspiration/Event Note
Medicine NP/Event Note
Nutrition Services
Speech and Swallow
endocrine/Event Note
Down titrate High flow O2/Event Note
Endocrine follow up/Event Note
Endocrine follow up/Event Note
Event Note
Event Note
Palliative
Predinner BS is 80/Event Note
endocrine/Event Note

## 2024-05-31 NOTE — PROGRESS NOTE ADULT - ASSESSMENT
63 F w/h/o uncontrolled T2DM (A1C 11.9%) while on Lantus, Admelog, metformin, glimepiride. Unknown DM complications. Also h/o AHRF, ILD, PE, HTN. Here with SOB secondary to interstitial lung disease. Continues with SOB and tachypnea, managed by primary medicine team. Endocrinology consulted for diabetes management. BG Goal 100-180mg/dl   Tolerating pureed diet with inconsistent oral intake. Currently on Methylprednisolone 40 mg BID.    Last 24 hour BGs variable 100s-316 with fasting . FBG variable without pattern (was 123 yesterday and 220 today while receiving the same amt of lantus ) and noted elevated prelunch BGs in 300s for 2 days        Incomplete**** 63 F w/h/o uncontrolled T2DM (A1C 11.9%) while on Lantus, Admelog, metformin, glimepiride. Unknown DM complications. Also h/o AHRF, ILD, PE, HTN. Here with SOB secondary to interstitial lung disease. Continues with SOB and tachypnea, managed by primary medicine team. Endocrinology consulted for diabetes management. BG Goal 100-180mg/dl   Tolerating pureed diet with inconsistent oral intake. Currently on Methylprednisolone 40 mg BID( 5/58-).  Last 24 hour BGs 175- 356 with fasting . Denies eating snacks overnight.

## 2024-05-31 NOTE — PROGRESS NOTE ADULT - SUBJECTIVE AND OBJECTIVE BOX
CHIEF COMPLAINT:    Interval Events: seen and examined at bedside, RRT overnight.     REVIEW OF SYSTEMS:  Constitutional: No fevers or chills. No weight loss. No fatigue or generalised malaise.  Eyes: No itching or discharge from the eyes  ENT: No ear pain. No ear discharge. No nasal congestion. No post nasal drip. No epistaxis. No throat pain. No sore throat. No difficulty swallowing.   CV: No chest pain. No palpitations. No lightheadedness or dizziness.   Resp: No dyspnea at rest. No dyspnea on exertion. No orthopnea. No wheezing. No cough. No stridor. No sputum production. No chest pain with respiration.      OBJECTIVE:  ICU Vital Signs Last 24 Hrs  T(C): 35.9 (31 May 2024 11:24), Max: 36.7 (30 May 2024 11:49)  T(F): 96.7 (31 May 2024 11:24), Max: 98.1 (30 May 2024 20:25)  HR: 104 (31 May 2024 11:24) (101 - 116)  BP: 108/72 (31 May 2024 11:24) (108/72 - 129/84)  BP(mean): --  ABP: --  ABP(mean): --  RR: 18 (31 May 2024 11:24) (18 - 25)  SpO2: 90% (31 May 2024 11:24) (76% - 94%)    O2 Parameters below as of 31 May 2024 11:24  Patient On (Oxygen Delivery Method): nasal cannula, high flow, +non rebreather              05-30 @ 07:01  -  05-31 @ 07:00  --------------------------------------------------------  IN: 0 mL / OUT: 700 mL / NET: -700 mL      CAPILLARY BLOOD GLUCOSE      POCT Blood Glucose.: 356 mg/dL (31 May 2024 08:01)      PHYSICAL EXAM:  General: Awake, alert, oriented X 3.   HEENT: Atraumatic, normocephalic.                 Mallampatti Grade                 No nasal congestion.                No tonsillar or pharyngeal exudates.  Lymph Nodes: No palpable lymphadenopathy  Neck: No JVD. No carotid bruit.   Respiratory: b/l rales   Cardiovascular: S1 S2 normal. No murmurs, rubs or gallops.   Abdomen: Soft, non-tender, non-distended. No organomegaly.      HOSPITAL MEDICATIONS:  MEDICATIONS  (STANDING):  albuterol/ipratropium for Nebulization 3 milliLiter(s) Nebulizer every 6 hours  apixaban 5 milliGRAM(s) Oral two times a day  budesonide 160 MICROgram(s)/formoterol 4.5 MICROgram(s) Inhaler 2 Puff(s) Inhalation two times a day  buMETAnide Injectable 2 milliGRAM(s) IV Push two times a day  chlorhexidine 2% Cloths 1 Application(s) Topical daily  dextrose 10% Bolus 125 milliLiter(s) IV Bolus once  dextrose 5%. 1000 milliLiter(s) (100 mL/Hr) IV Continuous <Continuous>  dextrose 5%. 1000 milliLiter(s) (50 mL/Hr) IV Continuous <Continuous>  dextrose 50% Injectable 12.5 Gram(s) IV Push once  dextrose 50% Injectable 25 Gram(s) IV Push once  glucagon  Injectable 1 milliGRAM(s) IntraMuscular once  insulin glargine Injectable (LANTUS) 8 Unit(s) SubCutaneous at bedtime  insulin lispro (ADMELOG) corrective regimen sliding scale   SubCutaneous <User Schedule>  insulin lispro (ADMELOG) corrective regimen sliding scale   SubCutaneous three times a day before meals  insulin lispro Injectable (ADMELOG) 10 Unit(s) SubCutaneous with dinner  insulin lispro Injectable (ADMELOG) 12 Unit(s) SubCutaneous with breakfast  insulin lispro Injectable (ADMELOG) 10 Unit(s) SubCutaneous with lunch  methylPREDNISolone sodium succinate Injectable 40 milliGRAM(s) IV Push two times a day  Ofev (Nintedanib) 150 milliGRAM(s) 150 milliGRAM(s) Oral two times a day  pantoprazole  Injectable 40 milliGRAM(s) IV Push every 12 hours  sildenafil (REVATIO) 10 milliGRAM(s) Oral every 8 hours  simethicone 80 milliGRAM(s) Chew two times a day  traZODone 150 milliGRAM(s) Oral at bedtime  trimethoprim   80 mG/sulfamethoxazole 400 mG 1 Tablet(s) Oral daily    MEDICATIONS  (PRN):  acetaminophen     Tablet .. 650 milliGRAM(s) Oral every 6 hours PRN Temp greater or equal to 38C (100.4F), Mild Pain (1 - 3)  cyclobenzaprine 5 milliGRAM(s) Oral three times a day PRN Muscle Spasm  dextrose Oral Gel 15 Gram(s) Oral once PRN Blood Glucose LESS THAN 70 milliGRAM(s)/deciliter  diazepam  Injectable 2.5 milliGRAM(s) IV Push every 12 hours PRN severe anxiety  HYDROmorphone  Injectable 0.5 milliGRAM(s) IV Push every 4 hours PRN respiratory distress  loperamide 2 milliGRAM(s) Oral four times a day PRN Diarrhea      LABS:                        11.7   12.00 )-----------( 510      ( 31 May 2024 06:01 )             38.0     05-31    140  |  96  |  43<H>  ----------------------------<  375<H>  4.7   |  27  |  1.19    Ca    9.6      31 May 2024 06:01  Phos  4.7     05-31  Mg     2.2     05-31    TPro  7.1  /  Alb  3.2<L>  /  TBili  0.4  /  DBili  x   /  AST  28  /  ALT  34  /  AlkPhos  138<H>  05-30      Urinalysis Basic - ( 31 May 2024 06:01 )    Color: x / Appearance: x / SG: x / pH: x  Gluc: 375 mg/dL / Ketone: x  / Bili: x / Urobili: x   Blood: x / Protein: x / Nitrite: x   Leuk Esterase: x / RBC: x / WBC x   Sq Epi: x / Non Sq Epi: x / Bacteria: x      Arterial Blood Gas:  05-30 @ 22:28  7.39/47/44/28/74.1/2.9  ABG lactate: --        MICROBIOLOGY:     RADIOLOGY:  [ ] Reviewed and interpreted by me    Point of Care Ultrasound Findings:    PFT:    EKG:

## 2024-05-31 NOTE — PROGRESS NOTE ADULT - PROBLEM SELECTOR PLAN 12
DVT PPx: Eliquis  Diet: Pureed w/ thickened (most conservative diet, unable to perform FEES)  Code: DNR/DNI, molst completed in chart     Aspiration precautions  Fall precautions    Discharge to [facility] pending wean of O2, but qualifies for inpatient hospice  Multiple GOC performed, patient still wants current management

## 2024-05-31 NOTE — PROGRESS NOTE ADULT - ASSESSMENT
63F hx ILD/ probable IPF (On 2-3LNC at home), PE 2022 on eliquis, severe pHTN w/ prior cor pulmonale (RVSP 68 - 10/22 w/ TTE from 4/12 w/ PASP 28, normal LV/RV SF), IDDM, presenting as a transfer from [place] for lung transplant eval iso SOB, dry cough, chest pain. She has been receiving duonebs, symbicort, lasix, ofev and IV steroids.    CT Chest 4/12/2024: Findings suggesting interstitial lung disease without significant interval progression. No evidence of pneumonia.   CT scan from 4/12/2024 when compared with CT scan from 2020 has shown significant progression.  Most recent CT is unchanged compared with 4/12/24 study.     Recommendations  - c/w weaning O2 as able - On HFNC 60/100%. Goal spo2 of 90% and up.   - Continue with Symbicort, Duonebs, Ofev   - CW solumedrol to 40 mg iv BID.   - PCP prophylaxis with bactrim    - Shunt study negative.   - C/w sildenafil 10mg TID   - C/w eliquis; monitor for bleeding  - C/w bumex 2 mg iv q12; monitor Cr and lytes.  - Please ambulate pt daily with goal to improve walking distance and strength.  - Would need heart/lung. Discussed with pulmonary transplant team. Patient is not a candidate.

## 2024-05-31 NOTE — PROGRESS NOTE ADULT - PROBLEM SELECTOR PLAN 3
LDL goal ,70 due to DM  Pt LDL NA  Order fasting lipid if not recently done  Statin as noted above  Manage per primary team   F/u levels as out pt      Contact via [employer] Teams during business hours  To reach covering provider access AMION via sunrise tools  For Urgent matters/after-hours/weekends/holidays please page endocrine fellow on call   For nonurgent matters please email [email or web address]    Please note that this patient may be followed by different provider tomorrow.  Notify endocrine 24 hours prior to discharge for final recommendations

## 2024-05-31 NOTE — PROGRESS NOTE ADULT - TIME-BASED BILLING (NON-CRITICAL CARE)
Time-based billing (NON-critical care)

## 2024-05-31 NOTE — CHART NOTE - NSCHARTNOTEFT_GEN_A_CORE
Brief Medicine NP Note:    s/p RRT follow up. RRT called for hypoxia (70's). Now in 90's and with continuation of HFNC at max setting. Pt still refusing Bipap. STAT  labs and meds ordered per RRT team/. Pt received extra dose of Dilaudid, Sildenafil and Ativan 1mg IVP x 1. Spoke with daughter to keep updated on plan of care. Daughter states	all decisions are up to her mother once she is A&OX4. Daughter made it clear again that she is a DNR/DNI and aware she is refusing Bipap. Pt with hx of ILD (transferred to [facility] for lung transplant eval): Deemed not to be a transplant candidate on 4/30. Hx of Cor Pulmonale and pHTN; started on Sildenafil, Continue home Ofev 150mg BID; c/w Duonebs, Symbicort, On IV Bumex 2mg BID. Pulm: rec Nitric oxide, only given in ICU/RCU. Pt seen by palliative, dtr wants to continue w/ current treatment. Disposition pending [facility], Accepted, and Needs AUTH.    Gilda Saucedo, ANP-BC  Dept of Medicine   via TEAMS

## 2024-05-31 NOTE — PROGRESS NOTE ADULT - TIME BILLING
- Ordering, reviewing, and/or interpreting labs, testing, and/or imaging  - Independently obtaining a review of systems and performing a physical exam  - Reviewing prior records and where necessary, outpatient records  - Counselling and educating patient and/or family regarding interpretation of aforementioned items and plan of care
Review of patient records, including history, laboratory data, and imaging. Patient evaluation and assessment. Coordination of care. Time excludes teaching or procedures.
Symptom assessment and management, supportive counseling, coordination of care
review of the medical record, interpretation of labs, imaging studies, discussion with interdisciplinary team, physical exam and medical decision making as above
- Ordering, reviewing, and/or interpreting labs, testing, and/or imaging  - Independently obtaining a review of systems and performing a physical exam  - Reviewing prior records and where necessary, outpatient records  - Counselling and educating patient and/or family regarding interpretation of aforementioned items and plan of care
Symptom assessment and management, supportive counseling, coordination of care
chart and data review, clinical assessment, and coordination of care. This excludes any time spent on separate procedures or teaching.
chart and data review, clinical assessment, and coordination of care. This excludes any time spent on separate procedures or teaching.
reviewing chart and coordinating care with primary team/staff, as well as reviewing vitals, radiology, medication list, recent labs, and prior records.
Personal review of data, imaging and discussion with medical team.
Reviewing the EMR, vitals, imaging, medication list, recent labs, prior records and coordinating care with medical providers. This time excludes procedures and teaching.
Reviewing the EMR, vitals, imaging, medication list, recent labs, prior records and coordinating care with medical providers. This time excludes procedures and teaching.
review of the medical record, interpretation of labs, imaging studies, discussion with interdisciplinary team, physical exam and medical decision making as above
- preparing to see the patient (eg, review of tests, documents, and imaging)  - performing a medically appropriate evaluation and examination  - speaking with patient and/or family as appropriate  - referring and communicating with other health care professionals  - documenting clinical information in the electronic or other health record  - care coordination.    time spent does not include teaching services.
Personal review of data, imaging and discussion with medical team.
Personal review of data, imaging and discussion with medical team.
Reviewing the EMR, vitals, imaging, medication list, recent labs, prior records and coordinating care with medical providers. This time excludes procedures and teaching.
Personal review of data, imaging and discussion with medical team.
Reviewing the EMR, vitals, imaging, medication list, recent labs, prior records and coordinating care with medical providers. This time excludes procedures and teaching.
Reviewing the EMR, vitals, imaging, medication list, recent labs, prior records and coordinating care with medical providers. This time excludes procedures and teaching.
- Ordering, reviewing, and/or interpreting labs, testing, and/or imaging  - Independently obtaining a review of systems and performing a physical exam  - Reviewing prior records and where necessary, outpatient records  - Counselling and educating patient and/or family regarding interpretation of aforementioned items and plan of care
Personal review of data, imaging and discussion with medical team.
Reviewing the EMR, vitals, imaging, medication list, recent labs, prior records and coordinating care with medical providers. This time excludes procedures and teaching.
- preparing to see the patient (eg, review of tests, documents, and imaging)  - performing a medically appropriate evaluation and examination  - speaking with patient and/or family as appropriate  - referring and communicating with other health care professionals  - documenting clinical information in the electronic or other health record  - care coordination.    time spent does not include teaching services.
- Ordering, reviewing, and/or interpreting labs, testing, and/or imaging  - Independently obtaining a review of systems and performing a physical exam  - Reviewing prior records and where necessary, outpatient records  - Counselling and educating patient and/or family regarding interpretation of aforementioned items and plan of care
Symptom assessment and management, supportive counseling, coordination of care
- Ordering, reviewing, and/or interpreting labs, testing, and/or imaging  - Independently obtaining a review of systems and performing a physical exam  - Reviewing prior records and where necessary, outpatient records  - Counselling and educating patient and/or family regarding interpretation of aforementioned items and plan of care
Symptom assessment and management, supportive counseling, coordination of care

## 2024-05-31 NOTE — PROGRESS NOTE ADULT - CONVERSATION/DISCUSSION
Diagnosis/Prognosis/MOLST Discussed
Diagnosis/Prognosis/MOLST Discussed
Diagnosis/Prognosis/Treatment Options
Diagnosis/Prognosis/Treatment Options

## 2024-05-31 NOTE — PROGRESS NOTE ADULT - CONVERSATION DETAILS
Met with patient at bedside following RRT to reassess GOC. Patient shared her experience o/n with hypoxic event requiring HFNC. She shared great relief that she was able to wean back down to NC. Readdressed GOC, answered any questions. Annita shared her goals remain the same despite the set back with an overall goal to pursue [facility]. Emotional support provided.
After overnight events discussed with patient her GOC and she still wants everything done.    Discussed with daughter Fercho who stated that she wants to have a meeting. Explained to her there are three pathways moving forward: no escalation of care, de-escalation of care or maintaining status quo. Daughter sounded tearful over the phone.    For now DNR/DNI, but trial NIPPV (though patient now refusing)
Initially met with patient. At this time patient requesting GOC discussion be held with daughter her current wishes remain to continue medical management. Spoke with Tuyet via phone. she is familiar with myself and the palliative team. We discussed her visit with mom yesterday. Explored her difficulty with pureed diet, her fatigue, and her overall guarded prognosis. Reinforced terminal irreversible illness currently requiring maximum supports. She shared she understands the situation but wants to continue to honor her mom's wishes. Explained at any time her or her mom decide quality of life is so greatly impacted by symptoms/ situation there is always the option to deescalate or cap interventions and provide a symptom directed approach to care. Tuyet shared she will continue to defer decision making to her mom as long as she can participate but if she loses the ability to participate she will decide what is the next best step for her mom. Offered to arrange an IDT family meeting for patient and daughter to both participate to clearly define goc. As per Tuyet, not at this time. Palliative contact information provided if she would like to further discuss comfort options or if palliative can be a support. Emotional support provided.
Met with patient at bedside. Reviewed current plan of care. Explored next steps as patient currently not a transplant candidate. Annita shared that although she understands that she has a chronic and progressive illness, hospice remains not in line with goals of care. She is hoping to pursue [facility] as a disposition plan in order for her to regain strength and improve her quality of life. Provided guarded prognosis of cyclical hospitalizations in the setting of respiratory failure. She verbalized understanding. Discussed implementing the hospice benefit in the home setting. She was appreciative of the information for the future. Emotional support provided.
Discussed with patient that she remains on HFNC 60L/100% which is maximum support. Typical trajectory of her lung disease is worsening with time. Explained to her unless we make significant improvement soon then she will one day decompensate. She remains DNR/I and interested in [facility]. Explained unable to discharge her with current respiratory support. I discussed hospice being a possible option (inpatient). She requires significant PRN pushes of Dilaudid. She wants to see how she does this week and consider it.
Spoke to patient and daughter, discussed HPI. She understands her terminal prognosis but remains hopeful to be discharged from the hospital in order to go to [facility] to get stronger. Reviewed yesterday's RRT and ongoing high oxygen needs. She verbalized understanding but shared she would like to continue all current interventions and treatments. Spoke with Tuyet, emotional support provided as a caregiver. She shared her goals are in line with her mom's and she would like to respect her wishes. She was amenable to ongoing palliative support but would like to continue with current plan of care of ongoing medical management. Extensive emotional support provided.

## 2024-05-31 NOTE — RAPID RESPONSE TEAM SUMMARY - NSSITUATIONBACKGROUNDRRT_GEN_ALL_CORE
See previous RRT note for details. RRT called as patient found to be hypoxic however comfort care order set not in place. Family at bedside reaffirming decision to continue with comfort care. Comfort care orders and medications ordered and RRT ended w/o objection. Primary team at bedside.

## 2024-05-31 NOTE — PROGRESS NOTE ADULT - SUBJECTIVE AND OBJECTIVE BOX
Seen earlier today     Chief Complaint: Diabetes Mellitus follow up    INTERVAL HX: Tolerating POs with inconsistent oral intake. Patient only drank some of Glucerna shake from breakfast tray, did not receive breakfast coverage insulin. remains on HFNC and non-rebreather mask. BGs 175- 356 with fasting  today. As per pt, she did not eat any snacks overnight        Review of Systems:  General: As above  GI: No nausea, vomiting  Endocrine: no  S&Sx of hypoglycemia    Allergies    No Known Allergies    Intolerances      MEDICATIONS  (STANDING):  albuterol/ipratropium for Nebulization 3 milliLiter(s) Nebulizer every 6 hours  apixaban 5 milliGRAM(s) Oral two times a day  budesonide 160 MICROgram(s)/formoterol 4.5 MICROgram(s) Inhaler 2 Puff(s) Inhalation two times a day  buMETAnide Injectable 2 milliGRAM(s) IV Push two times a day  chlorhexidine 2% Cloths 1 Application(s) Topical daily  dextrose 10% Bolus 125 milliLiter(s) IV Bolus once  dextrose 5%. 1000 milliLiter(s) (100 mL/Hr) IV Continuous <Continuous>  dextrose 5%. 1000 milliLiter(s) (50 mL/Hr) IV Continuous <Continuous>  dextrose 50% Injectable 12.5 Gram(s) IV Push once  dextrose 50% Injectable 25 Gram(s) IV Push once  glucagon  Injectable 1 milliGRAM(s) IntraMuscular once  insulin glargine Injectable (LANTUS) 10 Unit(s) SubCutaneous at bedtime  insulin lispro (ADMELOG) corrective regimen sliding scale   SubCutaneous <User Schedule>  insulin lispro (ADMELOG) corrective regimen sliding scale   SubCutaneous three times a day before meals  insulin lispro Injectable (ADMELOG) 12 Unit(s) SubCutaneous with breakfast  insulin lispro Injectable (ADMELOG) 10 Unit(s) SubCutaneous with lunch  insulin lispro Injectable (ADMELOG) 10 Unit(s) SubCutaneous with dinner  methylPREDNISolone sodium succinate Injectable 40 milliGRAM(s) IV Push two times a day  Ofev (Nintedanib) 150 milliGRAM(s) 150 milliGRAM(s) Oral two times a day  pantoprazole  Injectable 40 milliGRAM(s) IV Push every 12 hours  sildenafil (REVATIO) 10 milliGRAM(s) Oral every 8 hours  simethicone 80 milliGRAM(s) Chew two times a day  traZODone 150 milliGRAM(s) Oral at bedtime  trimethoprim   80 mG/sulfamethoxazole 400 mG 1 Tablet(s) Oral daily        insulin glargine Injectable (LANTUS)   8 Unit(s) SubCutaneous (05-30-24 @ 21:37)    insulin lispro (ADMELOG) corrective regimen sliding scale   4 Unit(s) SubCutaneous (05-31-24 @ 01:34)    insulin lispro (ADMELOG) corrective regimen sliding scale   5 Unit(s) SubCutaneous (05-31-24 @ 12:34)   11 Unit(s) SubCutaneous (05-31-24 @ 08:20)    insulin lispro Injectable (ADMELOG)   10 Unit(s) SubCutaneous (05-31-24 @ 12:35)    methylPREDNISolone sodium succinate Injectable   40 milliGRAM(s) IV Push (05-31-24 @ 05:34)   40 milliGRAM(s) IV Push (05-30-24 @ 18:10)        PHYSICAL EXAM:  VITALS: T(C): 35.9 (05-31-24 @ 11:24)  T(F): 96.7 (05-31-24 @ 11:24), Max: 98.1 (05-30-24 @ 20:25)  HR: 106 (05-31-24 @ 13:00) (101 - 114)  BP: 110/70 (05-31-24 @ 13:00) (108/72 - 117/77)  RR:  (18 - 25)  SpO2:  (76% - 94%)  Wt(kg): --  GENERAL: in NAD  Respiratory: Respirations unlabored   Extremities: Warm, no edema  NEURO: Alert , appropriate     LABS:  POCT Blood Glucose.: 204 mg/dL (05-31-24 @ 12:16)  POCT Blood Glucose.: 356 mg/dL (05-31-24 @ 08:01)  POCT Blood Glucose.: 339 mg/dL (05-31-24 @ 01:33)  POCT Blood Glucose.: 211 mg/dL (05-30-24 @ 22:19)  POCT Blood Glucose.: 167 mg/dL (05-30-24 @ 21:28)  POCT Blood Glucose.: 114 mg/dL (05-30-24 @ 18:09)  POCT Blood Glucose.: 175 mg/dL (05-30-24 @ 16:38)  POCT Blood Glucose.: 316 mg/dL (05-30-24 @ 12:25)  POCT Blood Glucose.: 220 mg/dL (05-30-24 @ 09:27)  POCT Blood Glucose.: 182 mg/dL (05-30-24 @ 01:46)  POCT Blood Glucose.: 116 mg/dL (05-29-24 @ 21:41)  POCT Blood Glucose.: 145 mg/dL (05-29-24 @ 16:39)  POCT Blood Glucose.: 302 mg/dL (05-29-24 @ 11:35)  POCT Blood Glucose.: 123 mg/dL (05-29-24 @ 07:50)  POCT Blood Glucose.: 146 mg/dL (05-29-24 @ 02:10)  POCT Blood Glucose.: 128 mg/dL (05-28-24 @ 21:27)  POCT Blood Glucose.: 200 mg/dL (05-28-24 @ 16:37)                          11.7   12.00 )-----------( 510      ( 31 May 2024 06:01 )             38.0     05-31    140  |  96  |  43<H>  ----------------------------<  375<H>  4.7   |  27  |  1.19    Ca    9.6      31 May 2024 06:01  Phos  4.7     05-31  Mg     2.2     05-31    TPro  7.1  /  Alb  3.2<L>  /  TBili  0.4  /  DBili  x   /  AST  28  /  ALT  34  /  AlkPhos  138<H>  05-30    eGFR: 52 mL/min/1.73m2 (31 May 2024 06:01)  eGFR: 57 mL/min/1.73m2 (30 May 2024 22:27)      Thyroid Function Tests:      A1C with Estimated Average Glucose Result: 11.9 % (04-13-24 @ 03:53)    Estimated Average Glucose: 295 mg/dL (04-13-24 @ 03:53)        Diet, Pureed:   Consistent Carbohydrate No Snacks (CSTCHO)  Moderately Thick Liquids (MODTHICKLIQS)  Supplement Feeding Modality:  Oral  Glucerna Shake Cans or Servings Per Day:  2       Frequency:  Daily (05-26-24 @ 16:28) [Active]             Seen earlier today     Chief Complaint: Diabetes Mellitus follow up    INTERVAL HX: Tolerating POs with inconsistent oral intake. Patient only drank some of Glucerna shake from breakfast tray, did not receive breakfast coverage insulin. remains on HFNC and non-rebreather mask. BGs 175- 356 with fasting . As per pt, she did not eat any snacks overnight        Review of Systems:  General: As above  GI: No nausea, vomiting  Endocrine: no  S&Sx of hypoglycemia    Allergies    No Known Allergies    Intolerances      MEDICATIONS  (STANDING):  albuterol/ipratropium for Nebulization 3 milliLiter(s) Nebulizer every 6 hours  apixaban 5 milliGRAM(s) Oral two times a day  budesonide 160 MICROgram(s)/formoterol 4.5 MICROgram(s) Inhaler 2 Puff(s) Inhalation two times a day  buMETAnide Injectable 2 milliGRAM(s) IV Push two times a day  chlorhexidine 2% Cloths 1 Application(s) Topical daily  dextrose 10% Bolus 125 milliLiter(s) IV Bolus once  dextrose 5%. 1000 milliLiter(s) (100 mL/Hr) IV Continuous <Continuous>  dextrose 5%. 1000 milliLiter(s) (50 mL/Hr) IV Continuous <Continuous>  dextrose 50% Injectable 12.5 Gram(s) IV Push once  dextrose 50% Injectable 25 Gram(s) IV Push once  glucagon  Injectable 1 milliGRAM(s) IntraMuscular once  insulin glargine Injectable (LANTUS) 10 Unit(s) SubCutaneous at bedtime  insulin lispro (ADMELOG) corrective regimen sliding scale   SubCutaneous <User Schedule>  insulin lispro (ADMELOG) corrective regimen sliding scale   SubCutaneous three times a day before meals  insulin lispro Injectable (ADMELOG) 12 Unit(s) SubCutaneous with breakfast  insulin lispro Injectable (ADMELOG) 10 Unit(s) SubCutaneous with lunch  insulin lispro Injectable (ADMELOG) 10 Unit(s) SubCutaneous with dinner  methylPREDNISolone sodium succinate Injectable 40 milliGRAM(s) IV Push two times a day  Ofev (Nintedanib) 150 milliGRAM(s) 150 milliGRAM(s) Oral two times a day  pantoprazole  Injectable 40 milliGRAM(s) IV Push every 12 hours  sildenafil (REVATIO) 10 milliGRAM(s) Oral every 8 hours  simethicone 80 milliGRAM(s) Chew two times a day  traZODone 150 milliGRAM(s) Oral at bedtime  trimethoprim   80 mG/sulfamethoxazole 400 mG 1 Tablet(s) Oral daily        insulin glargine Injectable (LANTUS)   8 Unit(s) SubCutaneous (05-30-24 @ 21:37)    insulin lispro (ADMELOG) corrective regimen sliding scale   4 Unit(s) SubCutaneous (05-31-24 @ 01:34)    insulin lispro (ADMELOG) corrective regimen sliding scale   5 Unit(s) SubCutaneous (05-31-24 @ 12:34)   11 Unit(s) SubCutaneous (05-31-24 @ 08:20)    insulin lispro Injectable (ADMELOG)   10 Unit(s) SubCutaneous (05-31-24 @ 12:35)    methylPREDNISolone sodium succinate Injectable   40 milliGRAM(s) IV Push (05-31-24 @ 05:34)   40 milliGRAM(s) IV Push (05-30-24 @ 18:10)        PHYSICAL EXAM:  VITALS: T(C): 35.9 (05-31-24 @ 11:24)  T(F): 96.7 (05-31-24 @ 11:24), Max: 98.1 (05-30-24 @ 20:25)  HR: 106 (05-31-24 @ 13:00) (101 - 114)  BP: 110/70 (05-31-24 @ 13:00) (108/72 - 117/77)  RR:  (18 - 25)  SpO2:  (76% - 94%)  Wt(kg): --  GENERAL: in NAD  Respiratory: Respirations unlabored   Extremities: Warm, no edema  NEURO: Alert , appropriate     LABS:  POCT Blood Glucose.: 204 mg/dL (05-31-24 @ 12:16)  POCT Blood Glucose.: 356 mg/dL (05-31-24 @ 08:01)  POCT Blood Glucose.: 339 mg/dL (05-31-24 @ 01:33)  POCT Blood Glucose.: 211 mg/dL (05-30-24 @ 22:19)  POCT Blood Glucose.: 167 mg/dL (05-30-24 @ 21:28)  POCT Blood Glucose.: 114 mg/dL (05-30-24 @ 18:09)  POCT Blood Glucose.: 175 mg/dL (05-30-24 @ 16:38)  POCT Blood Glucose.: 316 mg/dL (05-30-24 @ 12:25)  POCT Blood Glucose.: 220 mg/dL (05-30-24 @ 09:27)  POCT Blood Glucose.: 182 mg/dL (05-30-24 @ 01:46)  POCT Blood Glucose.: 116 mg/dL (05-29-24 @ 21:41)  POCT Blood Glucose.: 145 mg/dL (05-29-24 @ 16:39)  POCT Blood Glucose.: 302 mg/dL (05-29-24 @ 11:35)  POCT Blood Glucose.: 123 mg/dL (05-29-24 @ 07:50)  POCT Blood Glucose.: 146 mg/dL (05-29-24 @ 02:10)  POCT Blood Glucose.: 128 mg/dL (05-28-24 @ 21:27)  POCT Blood Glucose.: 200 mg/dL (05-28-24 @ 16:37)                          11.7   12.00 )-----------( 510      ( 31 May 2024 06:01 )             38.0     05-31    140  |  96  |  43<H>  ----------------------------<  375<H>  4.7   |  27  |  1.19    Ca    9.6      31 May 2024 06:01  Phos  4.7     05-31  Mg     2.2     05-31    TPro  7.1  /  Alb  3.2<L>  /  TBili  0.4  /  DBili  x   /  AST  28  /  ALT  34  /  AlkPhos  138<H>  05-30    eGFR: 52 mL/min/1.73m2 (31 May 2024 06:01)  eGFR: 57 mL/min/1.73m2 (30 May 2024 22:27)      Thyroid Function Tests:      A1C with Estimated Average Glucose Result: 11.9 % (04-13-24 @ 03:53)    Estimated Average Glucose: 295 mg/dL (04-13-24 @ 03:53)        Diet, Pureed:   Consistent Carbohydrate No Snacks (CSTCHO)  Moderately Thick Liquids (MODTHICKLIQS)  Supplement Feeding Modality:  Oral  Glucerna Shake Cans or Servings Per Day:  2       Frequency:  Daily (05-26-24 @ 16:28) [Active]

## 2024-05-31 NOTE — PROGRESS NOTE ADULT - PROBLEM SELECTOR PLAN 1
- Check BG TID AC & QHS while eating regular meals and Q6H while NPO  - Continue Lantus to 10units QHS   - Adjust Admelog 12- 10-10 units with each meal, hold if patient is not eating, may give 50 % of scheduled dose if eating 50% of meals  - C/w patient specific dose correctional scale ( 3-13 units) TID with meals and QHS  - Please notify endo team with any further changes on steroid doses.    Discharge planning:   Home DM medications: metformin 500 mg BID, + glimepride 1 mg daily + lantus 35 units QHS+ admelog 25 units TIDAC  Discharge DM medications:   Continue with metformin 500 mg BID  STOP glimepiride due to recurrent hypoglycemia at home   Basal/bolus, dose will depend on insulin requirement and steroid plan   Make sure pt has Rx for all DM supplies and insulin/ DM meds.  Can follow at endo practice. [address] Phone . Call for apt closer to discharge- - Patient will need opthalmology and podiatry follow up as outpatient - Check BG TID AC & QHS while eating regular meals and Q6H while NPO  - Increase Lantus to 10 units QHS   - Continue Admelog 12- 10-10 units with each meal, hold if patient is not eating, may give 50 % of scheduled dose if eating 50% of meals  - C/w patient specific dose correctional scale ( 3-13 units) TID with meals and QHS  - Please notify endo team with any further changes on steroid doses.  - Please keep hypoglycemia protocol in place     Discharge planning:   Home DM medications: metformin 500 mg BID, + glimepride 1 mg daily + lantus 35 units QHS+ admelog 25 units TIDAC  Discharge DM medications:   Continue with metformin 500 mg BID  STOP glimepiride due to recurrent hypoglycemia at home   Basal/bolus, dose will depend on insulin requirement and steroid plan   Make sure pt has Rx for all DM supplies and insulin/ DM meds.  Can follow at endo practice. [address]. Phone . Call for apt closer to discharge- - Patient will need opthalmology and podiatry follow up as outpatient

## 2024-05-31 NOTE — CHART NOTE - NSCHARTNOTEFT_GEN_A_CORE
63y F with PMHx of chronic AHRF (baseline 2-3L O2 NC) 2/2 ILD, PE, severe pHTN, Cor pulmonale, and DM presented initially to OSH with SOB, dry cough, chest pain, and LE edema. Required ICU care at [facility] for AHFR iso ILD. Transferred to [facility] for lung transplant evaluation and deemed not a candidate due to deconditioning, cardiac dysfunction, and poorly controlled diabetes. Pt noted w/ tachypnea and coughing after eating; S&S evaluation ordered.     5/22: s/p initial bedside swallow exam w/ diet deferred to team given limited trials. Of note, pt using NRB for comfort (pt w/ anxiety) despite VSS on 6LNC.  5/23 re-evaluation of swallow completed. Pt w/ no overt s/sx of aspiration/penetration on puree and thin liquids, however, FEES indicated given team c/f aspiration and pt multiple pulmonary risk factors. Of note, pt using NRB despite VSS on 6L NC, per RN, pt appears to be using it for comfort. Pt also reported to be tachypneic partially due to anxiety and pt reports substernal global sensation occasionally after meals, contributing to significant anxiety  5/24 FEES attempted, however, pt then required maximum HFNC and given tenuous respiratory status, no longer a candidate for FEES. NPO, with non-oral nutrition/hydration/medications recommended with plan for possible FEES if pt able to be weaned down on HFNC (ideally to NC).     Since then, pt has been unable to be weaned from max HFNC setting. She has had multiple GOC and x2 RRT for hypoxia on max HFNC. GOC established for acceptance of aspiration risk w/ p.o. on 5/24. Pt continues desaturate on max HFNC and given poor prognosis, does not appear to be a candidate for FEES or oral diet per this SLP.    TODAY, given pt tenuous respiratory status, w/ poor prognosis, SLP recommendations remain NPO, with non-oral nutrition/hydration/medications. Per chart note on 5/14, pt has accepted aspiration risk w/ p.o. and opted to c/w p.o. Given established GOC, no further role for SLP at this time. SUHAS Trujillo cleared SLP to sign off.    D/W SUHAS TRUJILLO  SPEECH PATHOLOGY  Florian Iyer [facility]-SL[facility] *Teams preferred* ([phone])

## 2024-05-31 NOTE — PROGRESS NOTE ADULT - REASON FOR ADMISSION
SOB

## 2024-05-31 NOTE — PROVIDER CONTACT NOTE (CRITICAL VALUE NOTIFICATION) - ASSESSMENT
AOX4. pt is hypoxic; rapid was called and in it. pt complains about burning chest pain. O2 in 60s, other VSS. pt has HFNC and nonrebreather on.

## 2024-05-31 NOTE — CHART NOTE - NSCHARTNOTEFT_GEN_A_CORE
Paged by SUHAS Vo regarding Ms. Quiñoenz who was being followed by palliative care.  Per team, patient has been having worsening hypoxia despite maximum non-invasive therapy. Conversations or goals for possible transition to comfort care have been brought up.  Discussed with team that if the goal is in line with focus on comfort, can continue care to address symptom-directed management at the end of life. Per team, at this time patient is hypoxic on NRB, however appears comfortable after receiving PRN medications. Patient with reported apparent relief after Dilaudid 0.5mg IV dosage.    Recommendations:  -Continue glycopyrrolate 0.4mg IV q6h PRN excessive secretions  -Dilaudid IV 0.5mg q3h PRN severe pain  -Dilaudid IV 0.5mg q3h PRN resp distress/dyspnea  -Dilaudid IV 0.3mg q3h PRN moderate pain  -Continue Ativan IV 0.5mg q4h PRN anxiety/agitation/respiratory distress refractory to opioids  -Would recommend also discontinuing other PRNs / medication duplicates that are not used or not known to help control patient's symptoms to avoid confusion regarding medication administration    Palliative will follow up in the morning. In the event of newly developing, evolving, or worsening symptoms, please contact the Palliative Medicine team via pager (if the patient is at [facility] #[phone] or if the patient is at [facility] #[phone]).

## 2024-05-31 NOTE — PROGRESS NOTE ADULT - PROBLEM SELECTOR PLAN 7
Hx of urinary retention; follows Dr. Chua, urologist. At [facility] had failed TOV w/ Pike replaced. Arrives to [facility] w/o Pike. Pt on outpt med list listed Tamsulosin 0.4 mg, unclear if started at [facility], pt saying she does not take this outpatient  - failed TOV here, Pike placed

## 2024-05-31 NOTE — PROVIDER CONTACT NOTE (CRITICAL VALUE NOTIFICATION) - BACKGROUND
64 yo F admitted lung transplant eval pt deemed not to be a candidate, on IV solumedrol s/p zosyn and c/w HFNC maxed. pHTN started sildenafil. anxiety; BH following c/w valium.

## 2024-05-31 NOTE — PROGRESS NOTE ADULT - PROBLEM SELECTOR PLAN 3
- Pt w/ hx of severe pHTN, RVSP 68 on 10/2022  - Repeat 4/12/24 at [facility] noted PAP 28, "normal PAP"   - Maintain euvolemia, as above  - sildenafil reduced to 10 mg TID as patient experienced diarrhea, which may be a side effect. will continue this dose for now

## 2024-05-31 NOTE — PROGRESS NOTE ADULT - LOCATION OF DISCUSSION
Patient tolerated cardiac rehab session well.     
Telephone
Face to face

## 2024-05-31 NOTE — PROGRESS NOTE ADULT - PROBLEM SELECTOR PROBLEM 4
Encounter for palliative care
Pulmonary embolism
Cor pulmonale
Encounter for palliative care
Pulmonary embolism
Cor pulmonale
Pulmonary embolism
Pulmonary embolism
Cor pulmonale
Cor pulmonale
Pulmonary embolism
Cor pulmonale
Cor pulmonale
Pulmonary embolism
Pulmonary embolism
Cor pulmonale
Encounter for palliative care
Cor pulmonale
Encounter for palliative care
Pulmonary embolism
Encounter for palliative care
Cor pulmonale
Pulmonary embolism
Pulmonary embolism
Cor pulmonale
Pulmonary embolism
Pulmonary embolism
Cor pulmonale
Cor pulmonale
Pulmonary embolism
Pulmonary embolism

## 2024-05-31 NOTE — DISCHARGE NOTE FOR THE EXPIRED PATIENT - HOSPITAL COURSE
HPI:  63-yo F with PMHx of chronic AHRF on 2-3L NC at b/l 2/2 ILD (Followed w/ DOMENICO Ding), PE on Eliquis, severe pHTN, Cor pulmonale, DM, presented initially with SOB, dry cough, chest pain, LE edema, recent admission to [facility] in March for ILD flare, admitted to [facility]->ICU for AHRF 2/2 ILD flare, transferred to [facility] for lung transplant evaluation. Pt reporting mild SOB, no fever, no sputum, no SOB, no chest pain, no edema. Reports constipation, last BM 2 days ago, with some stomach pain.     At [place] ICU, she was treated w/ IV steroids->PO taper, duonebs, symbicort, lasix. Pike placed for urinary retention, taken out prior to arrival although unclear if passed TOV at OSH. Had been weaned to NC while but was complicated by increased WOB, placed back on HFNC 50/60. Most recently escalated to methylprednisolone 40 mg IV BID. Was being treated w/ Ofev 150 mg BID, daily diuresis w/ Lasix 40->20 mg IVP on  based on volume assessment,  CXR w/ c/f R>L patchy opacities c/f pulmonary edema. Upon transfer here, admission vitals notable for temp afebrile, , /79, HFNC similar settings 45/60 100%. Labs notable for downtrending white count, stable anemia to 9-10, stable thrombocytosis to 400's. ABG 7.48 / 37 / 71 / 28, lactate 1.2.      Of note, pt is DNR/DNI trial of NIPPV - confirmed this with the patient and completed MOLST, in chart.  (2024 01:17)    Hospital Course: 63-yo F with PMHx of chronic AHRF on 2-3L NC at b/l 2/2 ILD (Followed w/ DOMENICO Ding), PE on Eliquis, severe pHTN, Cor pulmonale, DM, presented initially with SOB, dry cough, chest pain, LE edema, recent admission to [facility] in March for ILD flare, admitted to outside hospital->ICU for AHRF 2/2 ILD flare, transferred to [facility] for lung transplant evaluation.     ILD (interstitial lung disease).   Baseline 3-4L NC. Hx of ILD w/ c/f IPF. Followed w/ RM Ding. Transferred to [facility] for lung transplant eval  - NM cardiac shunt performed on 5/10 was negative for PFO  - Continue home Ofev 150mg BID  - Duonebs, Symbicort  - Transitioned to PO Prednisone 40mg, plan to taper by 10mg every 7 days. If worsening hypoxia would increase steroids  - valium 2.5 mg q12 PRN anxiety   - Deemed not to be a transplant candidate .    Cor pulmonale.   RH failure on most recent echo  - repeat TTE with bubble study revealed severe RH dysfunction with elevated pulmonary pressure, grade II diastolic dysfunction   - Check BNP every other day  - diuresis - lasix IV BID > transition to PO on discharge  - strict I&Os  - daily standing weights.    Pulmonary hypertension. Pt w/ hx of severe pHTN, RVSP 68 on 10/2022  - Repeat 24 at [facility] noted PAP 28, "normal PAP"   - Maintain euvolemia, as above  - c/w sildenafil 20 mg TID, currently tolerating.    Pulmonary embolism. Hx of PE on CTA 10/29/22 w/ RLL segmental/subsegmental pulmonary embolism  - Repeat CTA 23 w/o PE.   - DVT studies at OSH 24 negative  - continue Eliquis for now  - CTA reviewed, appears negative for acute PE.    T2DM: Home DM medications: metformin 500 mg BID, + glimepride 1 mg daily + lantus 35 units QHS+ admelog 25 units TIDAC  - Pt w/ hx of uncontrolled T2DM, a1c 11.9.   - Endocrine consulted via email, recs appreciated  - On glargine 15 units qhs, lispro 10u TID qac, hold if NPO or eating < 50% of meals  - MISS FS tid qac qhs  - Discharge DM medications:   - Continue with metformin 500 mg BID  - STOP glimipride due to recurrent hypoglycemia at home   - Basal/bolus, dose will depend on insulin requirement and steroid plan   - Patient will follow up at  [facility]:  [address] [place] [address]. [address]. [place] [address]  Tel: [phone].        Important Medication Changes and Reason:    Active or Pending Issues Requiring Follow-up:  Follow-up with Primary Care Doctor, Pulmonary, Cardiology (Heart Failure), Endocrinology     Advanced Directives:   [ ] Full code  [X ] DNR  [ ] Hospice    Discharge Diagnoses:  ILD  Right heart failure  pulmonary htn  DM2  PE    Pt  24 at 1129pm

## 2024-05-31 NOTE — PROGRESS NOTE ADULT - SUBJECTIVE AND OBJECTIVE BOX
Patient is a 63y old  Female who presents with a chief complaint of SOB (30 May 2024 19:09)      SUBJECTIVE / OVERNIGHT EVENTS: patient hypoxic yesterday, but recovered. Refused bipap at that time. had GOC with her and her daughter. Respiratory status remains tenuous. patient will likely pass away on this admission.    MEDICATIONS  (STANDING):  albuterol/ipratropium for Nebulization 3 milliLiter(s) Nebulizer every 6 hours  apixaban 5 milliGRAM(s) Oral two times a day  budesonide 160 MICROgram(s)/formoterol 4.5 MICROgram(s) Inhaler 2 Puff(s) Inhalation two times a day  buMETAnide Injectable 2 milliGRAM(s) IV Push two times a day  chlorhexidine 2% Cloths 1 Application(s) Topical daily  dextrose 10% Bolus 125 milliLiter(s) IV Bolus once  dextrose 5%. 1000 milliLiter(s) (100 mL/Hr) IV Continuous <Continuous>  dextrose 5%. 1000 milliLiter(s) (50 mL/Hr) IV Continuous <Continuous>  dextrose 50% Injectable 12.5 Gram(s) IV Push once  dextrose 50% Injectable 25 Gram(s) IV Push once  glucagon  Injectable 1 milliGRAM(s) IntraMuscular once  insulin glargine Injectable (LANTUS) 8 Unit(s) SubCutaneous at bedtime  insulin lispro (ADMELOG) corrective regimen sliding scale   SubCutaneous <User Schedule>  insulin lispro (ADMELOG) corrective regimen sliding scale   SubCutaneous three times a day before meals  insulin lispro Injectable (ADMELOG) 10 Unit(s) SubCutaneous with dinner  insulin lispro Injectable (ADMELOG) 12 Unit(s) SubCutaneous with breakfast  insulin lispro Injectable (ADMELOG) 10 Unit(s) SubCutaneous with lunch  methylPREDNISolone sodium succinate Injectable 40 milliGRAM(s) IV Push two times a day  Ofev (Nintedanib) 150 milliGRAM(s) 150 milliGRAM(s) Oral two times a day  pantoprazole  Injectable 40 milliGRAM(s) IV Push every 12 hours  sildenafil (REVATIO) 10 milliGRAM(s) Oral every 8 hours  simethicone 80 milliGRAM(s) Chew two times a day  traZODone 150 milliGRAM(s) Oral at bedtime  trimethoprim   80 mG/sulfamethoxazole 400 mG 1 Tablet(s) Oral daily    MEDICATIONS  (PRN):  acetaminophen     Tablet .. 650 milliGRAM(s) Oral every 6 hours PRN Temp greater or equal to 38C (100.4F), Mild Pain (1 - 3)  cyclobenzaprine 5 milliGRAM(s) Oral three times a day PRN Muscle Spasm  dextrose Oral Gel 15 Gram(s) Oral once PRN Blood Glucose LESS THAN 70 milliGRAM(s)/deciliter  diazepam  Injectable 2.5 milliGRAM(s) IV Push every 12 hours PRN severe anxiety  HYDROmorphone  Injectable 0.5 milliGRAM(s) IV Push every 4 hours PRN respiratory distress  loperamide 2 milliGRAM(s) Oral four times a day PRN Diarrhea      CAPILLARY BLOOD GLUCOSE      POCT Blood Glucose.: 356 mg/dL (31 May 2024 08:01)  POCT Blood Glucose.: 339 mg/dL (31 May 2024 01:33)  POCT Blood Glucose.: 211 mg/dL (30 May 2024 22:19)  POCT Blood Glucose.: 167 mg/dL (30 May 2024 21:28)  POCT Blood Glucose.: 114 mg/dL (30 May 2024 18:09)  POCT Blood Glucose.: 175 mg/dL (30 May 2024 16:38)  POCT Blood Glucose.: 316 mg/dL (30 May 2024 12:25)    I&O's Summary    30 May 2024 07:01  -  31 May 2024 07:00  --------------------------------------------------------  IN: 0 mL / OUT: 700 mL / NET: -700 mL        PHYSICAL EXAM:  Vital Signs Last 24 Hrs  T(C): 36.3 (31 May 2024 05:00), Max: 36.7 (30 May 2024 11:49)  T(F): 97.3 (31 May 2024 05:00), Max: 98.1 (30 May 2024 20:25)  HR: 110 (31 May 2024 05:00) (101 - 116)  BP: 117/77 (31 May 2024 05:00) (110/72 - 129/84)  BP(mean): --  RR: 18 (31 May 2024 05:00) (18 - 25)  SpO2: 94% (31 May 2024 05:00) (76% - 94%)    Parameters below as of 31 May 2024 05:00  Patient On (Oxygen Delivery Method): nasal cannula, high flow  O2 Flow (L/min): 60  O2 Concentration (%): 100    GEN: female in NAD, appears comfortable, no diaphoresis  EYES: No scleral injection, EOMI  ENTM: neck supple & symmetric without tracheal deviation, moist membranes, no gross hearing impairment, thyroid gland not enlarged  CV: +S1/S2, no m/r/g, no abdominal bruit, no LE edema  RESP: breathing comfortably, no respiratory accessory muscle use, +coarse crackles anteriorly  GI: normoactive BS, soft, NTND, no rebounding/guarding, no palpable masses    LABS:                        11.7   12.00 )-----------( 510      ( 31 May 2024 06:01 )             38.0     05-31    140  |  96  |  43<H>  ----------------------------<  375<H>  4.7   |  27  |  1.19    Ca    9.6      31 May 2024 06:01  Phos  4.7     05-31  Mg     2.2     05-31    TPro  7.1  /  Alb  3.2<L>  /  TBili  0.4  /  DBili  x   /  AST  28  /  ALT  34  /  AlkPhos  138<H>  05-30          Urinalysis Basic - ( 31 May 2024 06:01 )    Color: x / Appearance: x / SG: x / pH: x  Gluc: 375 mg/dL / Ketone: x  / Bili: x / Urobili: x   Blood: x / Protein: x / Nitrite: x   Leuk Esterase: x / RBC: x / WBC x   Sq Epi: x / Non Sq Epi: x / Bacteria: x          RADIOLOGY & ADDITIONAL TESTS:  Results Reviewed:   Imaging Personally Reviewed:  Electrocardiogram Personally Reviewed:    COORDINATION OF CARE:  Care Discussed with Consultants/Other Providers [Y/N]:  Prior or Outpatient Records Reviewed [Y/N]:

## 2024-05-31 NOTE — PROGRESS NOTE ADULT - ASSESSMENT
63F PMH chronic AHRF on 2-3L NC at b/l 2/2 ILD (Followed w/ Dr. Primo Marlow, [facility]), PE on Eliquis, severe pHTN, Cor pulmonale, DM, presented initially with SOB, dry cough, chest pain, LE edema, recent admission to [facility] in March for ILD flare, admitted to outside hospital->ICU for AHRF 2/2 ILD flare, transferred to [facility] for lung transplant evaluation and found not to be a transplant candidate (would require double lung and heart transplant).

## 2024-05-31 NOTE — PROGRESS NOTE ADULT - NUTRITIONAL ASSESSMENT
Diet, Regular:   Consistent Carbohydrate {No Snacks} (CSTCHO)  Supplement Feeding Modality:  Oral  Glucerna Shake Cans or Servings Per Day:  2       Frequency:  Daily (05-20-24 @ 17:15)
Diet, Regular:   Consistent Carbohydrate {No Snacks} (CSTCHO) (04-26-24 @ 15:14) [Active]      Please see RD assessment and/or follow up.  Managed by primary team as well
64 y/o F w/hx as above including chronic hypoxemic respiratory failure seconadry to ILD (unclear etiology) and severe pHTN w/cor pulmonale, and prior PE admitted to OSH for acute on chronic hypoxemic respiratory failure transferred to [facility] for lung transplant eval. Unclear etiology of worsening, presumed ILD exacerbation. O2 requirements currently coming down.    - Supplemental O2 as needed goal O2 sat >= 90%  - Steroid taper as detailed above, resume prior dose if any worsening with decrease in dosage  - Continue diuresis  - Bronchodilators  - Continue sildenafil 20mg TID  - Continue therapeutic AC  - Transplant candidacy as per transplant pulm
Diet, Pureed:   Consistent Carbohydrate {No Snacks} (CSTCHO)  Moderately Thick Liquids (MODTHICKLIQS)  Supplement Feeding Modality:  Oral  Glucerna Shake Cans or Servings Per Day:  2       Frequency:  Daily (05-26-24 @ 16:28) [Active]
This patient has been assessed with a concern for Malnutrition and has been determined to have a diagnosis/diagnoses of Mild protein-calorie malnutrition.    This patient is being managed with:   Diet Pureed-  Consistent Carbohydrate {No Snacks} (CSTCHO)  Moderately Thick Liquids (MODTHICKLIQS)  Supplement Feeding Modality:  Oral  Glucerna Shake Cans or Servings Per Day:  2       Frequency:  Daily  Entered: May 26 2024  4:29PM  
This patient has been assessed with a concern for Malnutrition and has been determined to have a diagnosis/diagnoses of Mild protein-calorie malnutrition.    This patient is being managed with:   Diet Pureed-  Consistent Carbohydrate {No Snacks} (CSTCHO)  Moderately Thick Liquids (MODTHICKLIQS)  Supplement Feeding Modality:  Oral  Glucerna Shake Cans or Servings Per Day:  2       Frequency:  Daily  Entered: May 26 2024  4:29PM  
Diet, Regular:   Consistent Carbohydrate {No Snacks} (CSTCHO) (04-26-24 @ 15:14) [Active]
Diet, Regular:   Consistent Carbohydrate {No Snacks} (CSTCHO) (04-26-24 @ 15:14) [Active]      Please see RD assessment and/or follow up.  Managed by primary team as well
62 y/o F w/hx as above including chronic hypoxemic respiratory failure seconadry to ILD (unclear etiology) and severe pHTN w/cor pulmonale, and prior PE admitted to OSH for acute on chronic hypoxemic respiratory failure transferred to [facility] for lung transplant eval. Unclear etiology of worsening, presumed ILD exacerbation. O2 requirements currently coming down.    - Supplemental O2 as needed goal O2 sat >= 90%  - Steroid taper as detailed above, resume prior dose if any worsening with decrease in dosage  - Continue diuresis  - Bronchodilators  - Continue sildenafil 20mg TID  - Continue therapeutic AC  - Transplant candidacy as per transplant pulm
This patient has been assessed with a concern for Malnutrition and has been determined to have a diagnosis/diagnoses of Mild protein-calorie malnutrition.    This patient is being managed with:   Diet Regular-  Consistent Carbohydrate {No Snacks} (CSTCHO)  Entered: Apr 26 2024  3:15PM    The following pending diet order is being considered for treatment of Mild protein-calorie malnutrition:  Diet Regular-  Consistent Carbohydrate {No Snacks} (CSTCHO)  Supplement Feeding Modality:  Oral  Glucerna Shake Cans or Servings Per Day:  2       Frequency:  Daily  Entered: May 21 2024  8:38AM    Diet Regular-  Consistent Carbohydrate {No Snacks} (CSTCHO)  Supplement Feeding Modality:  Oral  Glucerna Shake Cans or Servings Per Day:  2       Frequency:  Daily  Entered: May 20 2024  5:14PM  
Diet, Regular:   Consistent Carbohydrate {No Snacks} (CSTCHO) (04-26-24 @ 15:14) [Active]      Please see RD assessment and/or follow up.  Managed by primary team as well
Diet, NPO:   NPO for Procedure/Test     NPO Start Date: 24-May-2024,   NPO Start Time: 10:04 (05-24-24 @ 10:04) [Active]
Diet, Regular:   Consistent Carbohydrate {No Snacks} (CSTCHO) (04-26-24 @ 15:14) [Active]
Diet, Regular:   Consistent Carbohydrate {No Snacks} (CSTCHO) (04-26-24 @ 15:14) [Active]      Please see RD assessment and/or follow up.  Managed by primary team as well
Diet, Regular:   Consistent Carbohydrate {No Snacks} (CSTCHO) (04-26-24 @ 15:14) [Active]      Please see RD assessment and/or follow up.  Managed by primary team as well
This patient has been assessed with a concern for Malnutrition and has been determined to have a diagnosis/diagnoses of Mild protein-calorie malnutrition.    This patient is being managed with:   Diet Pureed-  Consistent Carbohydrate {No Snacks} (CSTCHO)  Supplement Feeding Modality:  Oral  Glucerna Shake Cans or Servings Per Day:  2       Frequency:  Daily  Entered: May 24 2024  6:37PM  
Diet, Regular:   Consistent Carbohydrate {No Snacks} (CSTCHO) (04-26-24 @ 15:14) [Active]      Please see RD assessment and/or follow up.  Managed by primary team as well
Diet, Pureed:   Consistent Carbohydrate {No Snacks} (CSTCHO)  Moderately Thick Liquids (MODTHICKLIQS)  Supplement Feeding Modality:  Oral  Glucerna Shake Cans or Servings Per Day:  2       Frequency:  Daily (05-26-24 @ 16:28) [Active]
Diet, Regular:   Consistent Carbohydrate {No Snacks} (CSTCHO) (04-26-24 @ 15:14)
Diet, Regular:   Consistent Carbohydrate {No Snacks} (CSTCHO) (04-26-24 @ 15:14) [Active]
Diet, Regular:   Consistent Carbohydrate {No Snacks} (CSTCHO) (04-26-24 @ 15:14) [Active]      Please see RD assessment and/or follow up.  Managed by primary team as well
Diet, Pureed:   Consistent Carbohydrate {No Snacks} (CSTCHO)  Moderately Thick Liquids (MODTHICKLIQS)  Supplement Feeding Modality:  Oral  Glucerna Shake Cans or Servings Per Day:  2       Frequency:  Daily (05-26-24 @ 16:28) [Active]
Diet, Pureed:   Consistent Carbohydrate {No Snacks} (CSTCHO)  Moderately Thick Liquids (MODTHICKLIQS)  Supplement Feeding Modality:  Oral  Glucerna Shake Cans or Servings Per Day:  2       Frequency:  Daily (05-26-24 @ 16:28) [Active]
Diet, Regular:   Consistent Carbohydrate {No Snacks} (CSTCHO) (04-26-24 @ 15:14)
Diet, Regular:   Consistent Carbohydrate {No Snacks} (CSTCHO) (04-26-24 @ 15:14) [Active]      Please see RD assessment and/or follow up.  Managed by primary team as well
This patient has been assessed with a concern for Malnutrition and has been determined to have a diagnosis/diagnoses of Mild protein-calorie malnutrition.    This patient is being managed with:   Diet Regular-  Consistent Carbohydrate {No Snacks} (CSTCHO)  Supplement Feeding Modality:  Oral  Glucerna Shake Cans or Servings Per Day:  2       Frequency:  Daily  Entered: May 20 2024  5:14PM  
This patient has been assessed with a concern for Malnutrition and has been determined to have a diagnosis/diagnoses of Mild protein-calorie malnutrition.    This patient is being managed with:   Diet Regular-  Consistent Carbohydrate {No Snacks} (CSTCHO)  Supplement Feeding Modality:  Oral  Glucerna Shake Cans or Servings Per Day:  2       Frequency:  Daily  Entered: May 20 2024  5:14PM  
This patient has been assessed with a concern for Malnutrition and has been determined to have a diagnosis/diagnoses of Mild protein-calorie malnutrition.    This patient is being managed with:   Diet Pureed-  Consistent Carbohydrate {No Snacks} (CSTCHO)  Supplement Feeding Modality:  Oral  Glucerna Shake Cans or Servings Per Day:  2       Frequency:  Daily  Entered: May 24 2024  6:37PM  
This patient has been assessed with a concern for Malnutrition and has been determined to have a diagnosis/diagnoses of Mild protein-calorie malnutrition.    This patient is being managed with:   Diet NPO-  NPO for Procedure/Test     NPO Start Date: 24-May-2024   NPO Start Time: 10:04  Entered: May 24 2024 10:04AM    Diet Regular-  Consistent Carbohydrate {No Snacks} (CSTCHO)  Supplement Feeding Modality:  Oral  Glucerna Shake Cans or Servings Per Day:  2       Frequency:  Daily  Entered: May 20 2024  5:14PM  
This patient has been assessed with a concern for Malnutrition and has been determined to have a diagnosis/diagnoses of Mild protein-calorie malnutrition.    This patient is being managed with:   Diet Pureed-  Consistent Carbohydrate {No Snacks} (CSTCHO)  Moderately Thick Liquids (MODTHICKLIQS)  Supplement Feeding Modality:  Oral  Glucerna Shake Cans or Servings Per Day:  2       Frequency:  Daily  Entered: May 26 2024  4:29PM  

## 2024-05-31 NOTE — RAPID RESPONSE TEAM SUMMARY - NSSITUATIONBACKGROUNDRRT_GEN_ALL_CORE
RRT called for worsening hypoxia. See prior RRT for prior info. Patient w/ O2 sat 79%, tachypneic to 20s, having shortness of breath and some chest pain due to trouble breathing. Patient w/ severe ILD on maximal therapy, no longer transplant candidate. Had extensive conversation w/ patient and her daughter Eleanor, who stated they had a discussion and wanted to prioritize comfort and reducing suffering. Discussed role of comfort care measures and family was interested in focusing on reducing patient's pain and discomfort.     Recommendation:   - Formal discussion regarding Palliative measures given patient and family GOC. If full comfort measures are desired can call on call Palliative care team to assist.

## 2024-05-31 NOTE — PROGRESS NOTE ADULT - PROBLEM SELECTOR PLAN 1
Baseline 3-4L NC. Hx of ILD w/ c/f IPF. Followed w/ DOMENICO Ding. Transferred to [facility] for lung transplant eval  - NM cardiac shunt performed on 5/10 was negative for PFO  - Continue home Ofev 150mg BID  - Duonebs, Symbicort  - Bactrim ordered for ppx  - Solumedrol 40 mg BID given high O2 support  - valium 2.5mg IV q12 PRN anxiety (5 mg caused somnolence)  - Dilaudid 0.5 mg IVP q4 hrs PRN severe respiratory distress  - Deemed not to be a transplant candidate 4/30  - now requiring HFNC 100% at 60 L (borderline hypoxic, unable to wean)  - Nitric Oxide remains a possibility, though doubtful of long term benefit

## 2024-05-31 NOTE — PROGRESS NOTE ADULT - ATTENDING SUPERVISION STATEMENT
Fellow
Resident
Fellow
Resident
Resident
Fellow
Fellow

## 2024-05-31 NOTE — PROGRESS NOTE ADULT - BILLING PROVIDER USE ONLY
Attending or JULIA Only

## 2024-06-01 NOTE — PROVIDER CONTACT NOTE (OTHER) - REASON
489mL seen on bladder scan
Absence of respirations
Pt refuses BIPAP overnight
hypoxia
Pt O2 in the high 70s
Pt sating high 60s- s/p rapid
pt. tachypnic RR 30 on High Flow o2 45L/60%
Patient complaining of anxiety- says she feels like shes having a panic attack
Pt c/o of abdominal pain/cramping
Hypoxia
Pt complains of indigestion along with pain in chest region
pt.'s Lactate=2.4
Patient c/o 10/10 chest pain
Pt sating in the high 60s-70s
786mL urine shown on Bladder Scan
Patient complaining of headache, 8/10 pain
Patient hypoxic and tachypnic on 6L NC

## 2024-06-01 NOTE — PROVIDER CONTACT NOTE (OTHER) - BACKGROUND
Admit Dx: Status post lung transplantation  63F w/ PMHx of chronic AHRF on 2-3L NC at b/l 2/2 ILD, ), PE on Eliquis, severe pHTN, Cor pulmonale
Status post lung transplantation
62 yo F admitted lung transplant eval, deemed not a candidate. ILD- on IV solumedrol, s/p zosyn, c/w HFNC-maxed. pHTN started on sildenafil. ANxiety; anxiety meds PRN. RRT 5/31 hypoxia, pt DNR/DNI
Admitted for lung transplant evaluation. not a candidate as per transplant team and is s/p hfnc, transitioned to 6L NC.
Patient transferred from [place] for lung transplant evaluation. Patient s/p anand godwin'evaristo on 4/25. Patient michael fernandez'd x2 after tato garcia.
Pt DNR/DNI- comfort measures only.
64 yo F admitted lung transplant eval; not a candidate. On iv solumedrol s/p zosyn c/w HFNC. pHTN started on sildenafil. anxiety PRN meds. Dilaudid PRN for tachypnea.
64 yo F admitted with eval for lung transplant- pt is not a candidate, on IV solumedrol, s/p zosyn and c/w HFNC maxed. Started on Sildenafil for pHTN. urinary retention- Pike. Anxiety; meds.
Status post lung transplantation
Pt presented initially w/ SOB, dry cough, CP, LE edema. Transferred to [facility] for Lung transplant eval
Pt admitted for status post lung transplantation. PMH of chronic AHRF, PE, severe pHTN, cor  pulmonale, DM. Pt presented with SOB, dry cough, CP, LE edema and for lung transplant eval.
Patient transferred from [facility] for Lung Transplant evaluation.
Patient transferred from [facility] for Lung Transplant evaluation. Patient s/p tato at [facility], dc'd on 4/25 at 1600. Bladder scanned for 570, straight cath'd for 660.
Pt admitted for lung transplantation work up
adm- ILD, DNR/DNI

## 2024-06-01 NOTE — PROVIDER CONTACT NOTE (OTHER) - NAME OF MD/NP/PA/DO NOTIFIED:
Gilda Saucedo NP
MAIKEL Kovacs
SUHAS Hearn
Dain Meredith
Gilda Saucedo NP
Andry Ezring, ACP
DR. Lozano
Dr. Stone, ACP
RRT called
SUHAS Saucedo
RRT
Rena Richards
SAMMY, Cade Bower
MAIKEL De Oliveira
SUHAS Hearn
Dain Meredith
SUHAS Saucedo

## 2024-06-01 NOTE — PROVIDER CONTACT NOTE (OTHER) - ACTION/TREATMENT ORDERED:
Provider aware. Pike insertion ordered. Pike insertion complete, patient tolerated well. Drained 800mL of urine (documented in flowsheets).
Provider notified. Straight cath ordered and completed. Drained 600mLs of urine (documented in I&O flowsheet).
RRT called
Cont. to monitor pt.
Provider notified and came to bedside to assess. Maalox x1 ordered and administered. EKG done, provider reviewed (in chart). Trops ordered and drawn.
Provider notified. Respiratory therapist came to check on setting. Provider okayed to put pt back on HFNC (60L; 100%) for 1 hour and put pt back on BIPAP.
ACP made aware.
ACP made aware. Contact ACP if O2 goes below 70. IV Dilaudid given PRN.
Lung transpant MD also in to see pt. Pt.'s high flow O2 to be changed to BiPap/MD. Cont. to monitor pt.
provider notified, Pain medications to be ordered.
ACP made aware. decision to be made in AM.
ACp made aware. Rapid called.
Provider made aware. Malox PRN given x1. If no bm can give prn suppository. Endorsed to pm RN.
Provider notified and came to bedside. RRT called for hypoxia and sob, refer to RRT note.
RRT came and evaluated patient
Provider notified, will order medication to help with patients anxiety.
prn of aluminium hydroxide was given, ativan was ordered and given, one time dose of calcium carbonate was ordered and given. Pt burning sensation and pain is relieved.

## 2024-06-01 NOTE — PROVIDER CONTACT NOTE (OTHER) - SITUATION
Pt c/o of abdominal pain/cramping
Pt complains of indigestion along with pain in chest region. Pt states she feels burning sensation
Patient complaining of anxiety- says she feels like shes having a panic attack
hypoxia
489mL seen on bladder scan
Pt sating high 60s- s/p rapid
Pt sating in the high 60s-70s
Absence of respirations
Patient complaining of headache, 8/10 pain
Patient hypoxic and tachypnic on 6L NC
Patient c/o 10/10 chest pain
786mL urine shown on Bladder Scan
Hypoxia
Pt O2 in the high 70s
Pt refuses BIPAP overnight. Pt states the BIPAP mask is uncomfortable and she is not able to sleep with it on. Pt educated on the importance of using the BIPAP overnight but pt still refuses.

## 2024-06-01 NOTE — PROVIDER CONTACT NOTE (OTHER) - RECOMMENDATIONS
Notify provider. Vitals, EKG. Labs?
pain medication
Notify ACP. gave patient valium and then called a rapid. pt refused the BIPAP and refuses intubation. Comfort care?
Will continue to monitor SPO2 continuously
Notify provider. Straight cath?
Notify provider. Pike?
Provider notified.
RRT called
Notify ACP to assess.
notify provider
Notify provider.
Notify provider. Malox PRN
notify provider
Notify ACP. Comfort care? pt refusing BIPAP and refuses intubation. pt on HFNC maxed out and non rebreather is on. Pt repositioned in bed.
Notify ACP. RRT? comfort measures?

## 2024-06-01 NOTE — PROVIDER CONTACT NOTE (OTHER) - ASSESSMENT
, POX=95% on High flow o2. c/o sob with minimal exertion. no c/o chest pain.
AOX4. VSS besides O2 in 60s on the HFNC and non rebreather. pt is having anxiety. pt then begins to say she has burning in the chest and O2 is not going up- called a rapid.
AOX4. pt denies any chest pain, palpations, headache, dizziness/lightheadedness. Pt does not look tachypneic but O2 sats in the high 60s with HFNC and non rebreather on. Valium was given for anxiety and Dilaudid was given earlier.
See RRT documentation sheet
Pt AAOx4. VSS on RA; denies CP, SOB.
Pt. asymptamatic.
A&O x4, VSS, no SOB, no palpitations, no dizziness. Patient is on HFNC. No event on tele. Complains of burning sensation in middle of chest area
Pt A&Ox4. Patient complaining of headache, 8/10 pain. No complaints or indicators of chest pain, palpitations, SOB, or dizziness. Patient VSS. ST on tele Monitor, on HIFLOW.
Patient A&Ox4. Patient endorses bladder fullness.
Pt A&Ox4. No complaints or indicators of chest pain, palpitations, SOB, headache, or dizziness. Patient VSS. no acute events
Patient A&Ox4. Patient denies any pain, c/o sob and blurry vision. Neuro checks performed, no deficits noted except L pupil larger than R pupil and blurry vision. Patient satting at 84% on 6L NC.
AOX4. pt denies any chest pain, palpitations, headahce, dizziness/lightheadedness or SOB. Pt O2 high 70s on HFNC and nonrebreather. Pt is SR on tele.
Patient A&Ox4. Patient c/o 10/10 chest tightness that has a burning sensation. Pain does not radiate anywhere and is localized to the middle of the chest. VSS except tachypnea and tachycardia (refer to flowsheets on 4/26 at 05:04).
Patient A&Ox4. Patient denies any pain on palpation of bladder. Patient placed on primafit overnight, no UO.
Pt denies difficult breathing, sob, cp. Pt SPO2: 95% on BIPAP.
Pt has absence of respirations. Pt unresponsive to stimuli.
pt. O2 sat sustaining 50's after OOB to chair with PT. pt. was on HFNC max setting.

## 2024-06-01 NOTE — PROVIDER CONTACT NOTE (OTHER) - DATE AND TIME:
26-Apr-2024 12:40
26-Apr-2024 20:30
06-May-2024 19:54
26-May-2024 00:14
26-Apr-2024 05:06
28-May-2024 11:30
01-Jun-2024 21:37
17-May-2024 18:40
26-Apr-2024 09:40
31-May-2024 23:00
26-Apr-2024 05:39
24-May-2024 23:28
28-Apr-2024 19:53
01-Jun-2024 23:26
29-Apr-2024 03:51
31-May-2024 19:00
31-May-2024 23:00

## 2025-02-14 NOTE — PROGRESS NOTE ADULT - SUBJECTIVE AND OBJECTIVE BOX
PULMONARY CONSULT SERVICE FOLLOW-UP NOTE***INCOMPLETE    INTERVAL HPI:  Reviewed chart and overnight events; patient seen and examined at bedside.    MEDICATIONS:  Pulmonary:  albuterol    90 MICROgram(s) HFA Inhaler 2 Puff(s) Inhalation every 6 hours PRN  guaiFENesin Oral Liquid (Sugar-Free) 200 milliGRAM(s) Oral every 6 hours PRN    Antimicrobials:  trimethoprim  160 mG/sulfamethoxazole 800 mG 1 Tablet(s) Oral daily    Anticoagulants:  apixaban 5 milliGRAM(s) Oral every 12 hours    Cardiac:      Allergies    No Known Allergies    Intolerances        Vital Signs Last 24 Hrs  T(C): 36.8 (03 Mar 2023 13:25), Max: 37.6 (03 Mar 2023 10:18)  T(F): 98.2 (03 Mar 2023 13:25), Max: 99.7 (03 Mar 2023 10:18)  HR: 95 (03 Mar 2023 13:25) (88 - 96)  BP: 110/69 (03 Mar 2023 13:25) (110/69 - 135/81)  BP(mean): --  RR: 18 (03 Mar 2023 13:25) (18 - 20)  SpO2: 97% (03 Mar 2023 13:25) (97% - 100%)    Parameters below as of 03 Mar 2023 13:25  Patient On (Oxygen Delivery Method): nasal cannula  O2 Flow (L/min): 3          PHYSICAL EXAM:  Constitutional: well-appearing  HEENT: NC/AT; PERRL, anicteric sclera; MMM  Neck: supple  Cardiovascular: +S1/S2, RRR  Respiratory: CTA B/L; no W/R/R  Gastrointestinal: soft, NT/ND  Extremities: WWP; no edema, clubbing or cyanosis  Vascular: 2+ radial pulses B/L  Neurological: awake and alert; PADILLA    LABS:      CBC Full  -  ( 03 Mar 2023 05:50 )  WBC Count : 12.37 K/uL  RBC Count : 4.12 M/uL  Hemoglobin : 10.2 g/dL  Hematocrit : 33.8 %  Platelet Count - Automated : 455 K/uL  Mean Cell Volume : 82.0 fl  Mean Cell Hemoglobin : 24.8 pg  Mean Cell Hemoglobin Concentration : 30.2 gm/dL  Auto Neutrophil # : 10.13 K/uL  Auto Lymphocyte # : 1.14 K/uL  Auto Monocyte # : 1.02 K/uL  Auto Eosinophil # : 0.00 K/uL  Auto Basophil # : 0.01 K/uL  Auto Neutrophil % : 81.9 %  Auto Lymphocyte % : 9.2 %  Auto Monocyte % : 8.2 %  Auto Eosinophil % : 0.0 %  Auto Basophil % : 0.1 %    03-03    133<L>  |  102  |  24<H>  ----------------------------<  266<H>  4.9   |  26  |  1.06    Ca    9.4      03 Mar 2023 05:50  Phos  3.7     03-03  Mg     2.3     03-03    TPro  6.5  /  Alb  2.3<L>  /  TBili  0.3  /  DBili  x   /  AST  32  /  ALT  36  /  AlkPhos  125<H>  03-03                      RADIOLOGY & ADDITIONAL STUDIES: PULMONARY CONSULT SERVICE FOLLOW-UP NOTE    INTERVAL HPI:  Reviewed chart and overnight events; patient seen and examined at bedside. Continues to feel some improvement since initial admission. Did not sleep well overnight however but plans to rest later in day. Feels ok sitting up in chair. Rec portable O2 concentrator and is at bedside today. No new complaints otherwise.     MEDICATIONS:  Pulmonary:  albuterol    90 MICROgram(s) HFA Inhaler 2 Puff(s) Inhalation every 6 hours PRN  guaiFENesin Oral Liquid (Sugar-Free) 200 milliGRAM(s) Oral every 6 hours PRN    Antimicrobials:  trimethoprim  160 mG/sulfamethoxazole 800 mG 1 Tablet(s) Oral daily    Anticoagulants:  apixaban 5 milliGRAM(s) Oral every 12 hours    Cardiac:    Allergies    No Known Allergies    Intolerances    Vital Signs Last 24 Hrs  T(C): 36.8 (03 Mar 2023 13:25), Max: 37.6 (03 Mar 2023 10:18)  T(F): 98.2 (03 Mar 2023 13:25), Max: 99.7 (03 Mar 2023 10:18)  HR: 95 (03 Mar 2023 13:25) (88 - 96)  BP: 110/69 (03 Mar 2023 13:25) (110/69 - 135/81)  BP(mean): --  RR: 18 (03 Mar 2023 13:25) (18 - 20)  SpO2: 97% (03 Mar 2023 13:25) (97% - 100%)    Parameters below as of 03 Mar 2023 13:25  Patient On (Oxygen Delivery Method): nasal cannula  O2 Flow (L/min): 3    PHYSICAL EXAM:  Constitutional:  non-toxic appearing woman sitting in chair bedside; NAD  HEENT: NC/AT; PERRL, anicteric sclera; MMM  Neck: supple  Cardiovascular: +S1/S2, RRR  Respiratory: few scattered velcro-like crackles B/L, good inspiratory effort and less tachypneic overall; conversive full sentences  Gastrointestinal: soft, NT/ND  Extremities: WWP; no edema, clubbing or cyanosis  Vascular: 2+ radial pulses B/L  Neurological: awake and alert; PADILLA    LABS:  CBC Full  -  ( 03 Mar 2023 05:50 )  WBC Count : 12.37 K/uL  RBC Count : 4.12 M/uL  Hemoglobin : 10.2 g/dL  Hematocrit : 33.8 %  Platelet Count - Automated : 455 K/uL  Mean Cell Volume : 82.0 fl  Mean Cell Hemoglobin : 24.8 pg  Mean Cell Hemoglobin Concentration : 30.2 gm/dL  Auto Neutrophil # : 10.13 K/uL  Auto Lymphocyte # : 1.14 K/uL  Auto Monocyte # : 1.02 K/uL  Auto Eosinophil # : 0.00 K/uL  Auto Basophil # : 0.01 K/uL  Auto Neutrophil % : 81.9 %  Auto Lymphocyte % : 9.2 %  Auto Monocyte % : 8.2 %  Auto Eosinophil % : 0.0 %  Auto Basophil % : 0.1 %    03-03    133<L>  |  102  |  24<H>  ----------------------------<  266<H>  4.9   |  26  |  1.06    Ca    9.4      03 Mar 2023 05:50  Phos  3.7     03-03  Mg     2.3     03-03    TPro  6.5  /  Alb  2.3<L>  /  TBili  0.3  /  DBili  x   /  AST  32  /  ALT  36  /  AlkPhos  125<H>  03-03    RADIOLOGY & ADDITIONAL STUDIES:  No interval study for review No

## 2025-05-19 NOTE — PROGRESS NOTE ADULT - PROBLEM SELECTOR PLAN 4
Peripheral Block    Date/Time: 5/19/2025 7:10 AM    Performed by: Bal Moore MD  Authorized by: Bal Moore MD      General Information and Staff    Start Time:  5/19/2025 7:10 AM  End Time:  5/19/2025 7:10 AM  Anesthesiologist:  Bal Moore MD  Performed by:  Anesthesiologist  Patient Location:  PACU      Site Identification: real time ultrasound guided and image stored and retrievable      Reason for Block: at surgeon's request and post-op pain management    Preanesthetic Checklist: 2 patient identifers, IV checked, risks and benefits discussed, monitors and equipment checked, pre-op evaluation, timeout performed, anesthesia consent and sterile technique used      Procedure Details    Patient Position:  Sitting  Prep: ChloraPrep    Monitoring:  Cardiac monitor  Block Type:  Interscalene  Laterality:  Left  Injection Technique:  Single-shot    Needle    Needle Type:  Short-bevel  Needle Gauge:  22 G  Needle Length:  50 mm  Needle Localization:  Ultrasound guidance              Assessment    Injection Assessment:  Good spread noted, incremental injection, local visualized surrounding nerve on ultrasound, low pressure, negative aspiration for heme and no pain on injection  Heart Rate Change: No        Medications  5/19/2025 7:10 AM  ropivacaine (NAROPIN) injection 0.5% - Infiltration   30 mL - 5/19/2025 7:10:00 AM    Additional Comments       RH failure on most recent echo  - diuresis as above  - strict I&Os  - daily standing weights

## 2025-07-01 NOTE — PROGRESS NOTE ADULT - PROBLEM SELECTOR PLAN 1
Faxed refill request received from:    Pharmacy name/location:      [id number] - [facility] [address], [phone], [phone]  [address]  [facility] [address]  Phone: [phone] Fax: [phone]     Medication and dose requested: Current Medications[1]     valACYclovir 1 G Oral Tab Sig:   Take 1 tablet (1,000 mg total) by mouth daily.     Additional notes:           [1]   Current Outpatient Medications   Medication Sig Dispense Refill    levothyroxine 25 MCG Oral Tab Take 1 tablet (25 mcg total) by mouth before breakfast. 90 tablet 1    lisinopril-hydroCHLOROthiazide 20-12.5 MG Oral Tab Take 1 tablet by mouth daily. 90 tablet 1    valACYclovir 1 G Oral Tab Take 1 tablet (1,000 mg total) by mouth daily. 90 tablet 1    hydrocortisone 2.5 % External Cream Apply 1 Application topically 2 (two) times daily.      desonide 0.05 % External Cream       Vitamin B-12 1000 MCG Oral Tab Take 1 tablet (1,000 mcg total) by mouth daily. 30 tablet 3      - Check BG TID AC & QHS while eating regular meals and Q6H while NPO  - FOLLOW INSULIN ORDERS FOR NPO STATUS UNTIL FEES STUDY DONE  - Adjust Lantus to 14 units QHS   - Adjust insulin Admelog to 14 units with breakfast and adjust  to 14 units with lunch, and 12 units with dinner for now. Hold for NPO status until procedure or if eating less than 50% of meals  - C/w Admelog 6 units at bedtime if pt eating snack at night. PLEASE ASK PT if she is having a snack.   - C/w mod dose correctional scale TID with meals and QHS  - Please notify endo team with any further changes on steroid doses.    Discharge planning:   Home DM medications: metformin 500 mg BID, + glimepride 1 mg daily + lantus 35 units QHS+ admelog 25 units TIDAC  Discharge DM medications:   Continue with metformin 500 mg BID  STOP glimepiride due to recurrent hypoglycemia at home   Basal/bolus, dose will depend on insulin requirement and steroid plan   Make sure pt has Rx for all DM supplies and insulin/ DM meds.  Can follow at endo practice. [address] [place] [address] [address]. Phone . Call for apt closer to discharge- - Patient will need opthalmology and podiatry follow up as outpatient  Pt will likely need rehab due to deconditioning - Check BG TID AC & QHS while eating regular meals and Q6H while NPO  - FOLLOW INSULIN ORDERS FOR NPO STATUS UNTIL FEES STUDY DONE  - Adjust Lantus to 14 units QHS   - Adjust insulin Admelog to 14 units with breakfast and adjust to14 units with lunch, and 12 units with dinner for now. Hold for NPO status until procedure or if eating less than 50% of meals  - C/w Admelog 6 units at bedtime if pt eating snack at night. PLEASE ASK PT if she is having a snack.   - C/w mod dose correctional scale TID with meals and QHS  - Please notify endo team with any further changes on steroid doses.    Discharge planning:   Home DM medications: metformin 500 mg BID, + glimepride 1 mg daily + lantus 35 units QHS+ admelog 25 units TIDAC  Discharge DM medications:   Continue with metformin 500 mg BID  STOP glimepiride due to recurrent hypoglycemia at home   Basal/bolus, dose will depend on insulin requirement and steroid plan   Make sure pt has Rx for all DM supplies and insulin/ DM meds.  Can follow at endo practice. [address] [place] [address] [address]. Phone . Call for apt closer to discharge- - Patient will need opthalmology and podiatry follow up as outpatient  Pt will likely need rehab due to deconditioning